# Patient Record
Sex: FEMALE | Race: OTHER | HISPANIC OR LATINO | Employment: UNEMPLOYED | URBAN - METROPOLITAN AREA
[De-identification: names, ages, dates, MRNs, and addresses within clinical notes are randomized per-mention and may not be internally consistent; named-entity substitution may affect disease eponyms.]

---

## 2017-01-31 ENCOUNTER — TRANSCRIBE ORDERS (OUTPATIENT)
Dept: URGENT CARE | Facility: MEDICAL CENTER | Age: 62
End: 2017-01-31

## 2017-01-31 ENCOUNTER — OFFICE VISIT (OUTPATIENT)
Dept: URGENT CARE | Facility: MEDICAL CENTER | Age: 62
End: 2017-01-31
Payer: COMMERCIAL

## 2017-01-31 ENCOUNTER — HOSPITAL ENCOUNTER (OUTPATIENT)
Dept: RADIOLOGY | Facility: MEDICAL CENTER | Age: 62
Discharge: HOME | End: 2017-01-31
Attending: PHYSICIAN ASSISTANT | Admitting: FAMILY MEDICINE
Payer: COMMERCIAL

## 2017-01-31 DIAGNOSIS — R05.9 COUGH: ICD-10-CM

## 2017-01-31 PROCEDURE — 71020 HB CHEST X-RAY 2VW FRONTAL&LATL: CPT

## 2017-01-31 PROCEDURE — G0382 LEV 3 HOSP TYPE B ED VISIT: HCPCS

## 2017-01-31 PROCEDURE — 94640 AIRWAY INHALATION TREATMENT: CPT

## 2017-03-10 ENCOUNTER — DOCTOR'S OFFICE (OUTPATIENT)
Dept: URBAN - METROPOLITAN AREA CLINIC 136 | Facility: CLINIC | Age: 62
Setting detail: OPHTHALMOLOGY
End: 2017-03-10
Payer: COMMERCIAL

## 2017-03-10 DIAGNOSIS — H40.10X1: ICD-10-CM

## 2017-03-10 DIAGNOSIS — Z96.1: ICD-10-CM

## 2017-03-10 DIAGNOSIS — H02.403: ICD-10-CM

## 2017-03-10 PROCEDURE — 92012 INTRM OPH EXAM EST PATIENT: CPT | Performed by: OPHTHALMOLOGY

## 2017-03-10 ASSESSMENT — REFRACTION_MANIFEST
OS_VA3: 20/
OU_VA: 20/
OD_VA1: 20/
OD_SPHERE: PLANO
OS_VA1: 20/
OD_VA1: 20/
OS_VA3: 20/
OU_VA: 20/
OD_ADD: +2.50
OU_VA: 20/
OD_VA2: 20/
OS_VA2: 20/
OD_VA2: 20/
OD_VA3: 20/
OD_VA3: 20/
OS_VA1: 20/
OS_SPHERE: -1.00
OD_VA1: 20/20-2
OD_VA3: 20/
OD_VA2: 20/
OS_VA2: 20/
OS_ADD: +2.50
OS_VA3: 20/
OS_VA2: 20/

## 2017-03-10 ASSESSMENT — REFRACTION_CURRENTRX
OD_SPHERE: +1.50
OS_SPHERE: +1.50
OD_OVR_VA: 20/
OS_ADD: +3.00
OS_AXIS: 165
OD_VPRISM_DIRECTION: BF
OD_ADD: +3.00
OD_CYLINDER: SPH
OS_CYLINDER: -0.75
OS_OVR_VA: 20/
OD_OVR_VA: 20/
OS_VPRISM_DIRECTION: BF
OS_OVR_VA: 20/
OS_OVR_VA: 20/
OD_OVR_VA: 20/

## 2017-03-10 ASSESSMENT — REFRACTION_AUTOREFRACTION
OS_CYLINDER: -0.25
OS_SPHERE: -1.00
OD_CYLINDER: -0.75
OD_AXIS: 121
OD_SPHERE: +0.50
OS_AXIS: 160

## 2017-03-10 ASSESSMENT — VISUAL ACUITY
OD_BCVA: 20/60-2
OS_BCVA: 20/20-2

## 2017-03-10 ASSESSMENT — CONFRONTATIONAL VISUAL FIELD TEST (CVF)
OS_FINDINGS: FULL
OD_FINDINGS: FULL

## 2017-03-10 ASSESSMENT — SPHEQUIV_DERIVED
OS_SPHEQUIV: -1.125
OD_SPHEQUIV: 0.125

## 2017-03-28 ENCOUNTER — DOCTOR'S OFFICE (OUTPATIENT)
Dept: URBAN - METROPOLITAN AREA CLINIC 136 | Facility: CLINIC | Age: 62
Setting detail: OPHTHALMOLOGY
End: 2017-03-28
Payer: COMMERCIAL

## 2017-03-28 DIAGNOSIS — H35.81: ICD-10-CM

## 2017-03-28 DIAGNOSIS — H35.342: ICD-10-CM

## 2017-03-28 DIAGNOSIS — H40.10X1: ICD-10-CM

## 2017-03-28 DIAGNOSIS — H43.813: ICD-10-CM

## 2017-03-28 PROCEDURE — 92014 COMPRE OPH EXAM EST PT 1/>: CPT | Performed by: OPHTHALMOLOGY

## 2017-03-28 PROCEDURE — 92134 CPTRZ OPH DX IMG PST SGM RTA: CPT | Performed by: OPHTHALMOLOGY

## 2017-03-28 ASSESSMENT — REFRACTION_MANIFEST
OS_VA1: 20/
OD_ADD: +2.50
OS_ADD: +2.50
OU_VA: 20/
OD_SPHERE: PLANO
OD_VA3: 20/
OS_SPHERE: -1.00
OD_VA1: 20/
OS_VA3: 20/
OU_VA: 20/
OD_VA2: 20/
OS_VA3: 20/
OS_VA2: 20/
OD_VA3: 20/
OD_VA2: 20/
OD_VA1: 20/
OU_VA: 20/
OD_VA1: 20/20-2
OD_VA3: 20/
OS_VA3: 20/
OS_VA2: 20/
OS_VA1: 20/
OD_VA2: 20/
OS_VA2: 20/

## 2017-03-28 ASSESSMENT — REFRACTION_CURRENTRX
OS_CYLINDER: -0.75
OD_VPRISM_DIRECTION: BF
OD_CYLINDER: SPH
OD_OVR_VA: 20/
OS_AXIS: 165
OD_OVR_VA: 20/
OS_VPRISM_DIRECTION: BF
OD_OVR_VA: 20/
OS_OVR_VA: 20/
OS_ADD: +3.00
OS_OVR_VA: 20/
OS_OVR_VA: 20/
OS_SPHERE: +1.50
OD_ADD: +3.00
OD_SPHERE: +1.50

## 2017-03-28 ASSESSMENT — REFRACTION_AUTOREFRACTION
OS_AXIS: 160
OS_CYLINDER: -0.25
OD_AXIS: 121
OD_CYLINDER: -0.75
OD_SPHERE: +0.50
OS_SPHERE: -1.00

## 2017-03-28 ASSESSMENT — SPHEQUIV_DERIVED
OD_SPHEQUIV: 0.125
OS_SPHEQUIV: -1.125

## 2017-03-28 ASSESSMENT — CONFRONTATIONAL VISUAL FIELD TEST (CVF)
OS_FINDINGS: FULL
OD_FINDINGS: FULL

## 2017-03-28 ASSESSMENT — VISUAL ACUITY
OD_BCVA: 20/80-2
OS_BCVA: 20/20-2

## 2017-04-27 ENCOUNTER — RX ONLY (RX ONLY)
Age: 62
End: 2017-04-27

## 2017-04-27 ENCOUNTER — DOCTOR'S OFFICE (OUTPATIENT)
Dept: URBAN - METROPOLITAN AREA CLINIC 136 | Facility: CLINIC | Age: 62
Setting detail: OPHTHALMOLOGY
End: 2017-04-27
Payer: COMMERCIAL

## 2017-04-27 DIAGNOSIS — H10.12: ICD-10-CM

## 2017-04-27 DIAGNOSIS — H35.81: ICD-10-CM

## 2017-04-27 DIAGNOSIS — H02.831: ICD-10-CM

## 2017-04-27 DIAGNOSIS — H10.11: ICD-10-CM

## 2017-04-27 DIAGNOSIS — H02.403: ICD-10-CM

## 2017-04-27 DIAGNOSIS — H02.834: ICD-10-CM

## 2017-04-27 PROCEDURE — 92285 EXTERNAL OCULAR PHOTOGRAPHY: CPT | Performed by: OPHTHALMOLOGY

## 2017-04-27 PROCEDURE — 92012 INTRM OPH EXAM EST PATIENT: CPT | Performed by: OPHTHALMOLOGY

## 2017-04-27 ASSESSMENT — REFRACTION_CURRENTRX
OD_VPRISM_DIRECTION: BF
OS_OVR_VA: 20/
OD_OVR_VA: 20/
OD_ADD: +3.00
OD_OVR_VA: 20/
OS_AXIS: 165
OS_ADD: +3.00
OS_SPHERE: +1.50
OD_OVR_VA: 20/
OD_CYLINDER: SPH
OS_CYLINDER: -0.75
OS_VPRISM_DIRECTION: BF
OD_SPHERE: +1.50

## 2017-04-27 ASSESSMENT — REFRACTION_MANIFEST
OU_VA: 20/
OD_VA3: 20/
OS_VA2: 20/
OS_ADD: +2.50
OS_VA3: 20/
OD_VA1: 20/
OD_VA1: 20/
OD_VA3: 20/
OS_VA1: 20/
OU_VA: 20/
OS_VA3: 20/
OD_VA1: 20/20-2
OU_VA: 20/
OD_SPHERE: PLANO
OS_VA2: 20/
OS_VA1: 20/
OD_VA2: 20/
OD_VA2: 20/
OS_VA2: 20/
OD_VA2: 20/
OD_VA3: 20/
OS_VA3: 20/
OD_ADD: +2.50
OS_SPHERE: -1.00

## 2017-04-27 ASSESSMENT — LID POSITION - PTOSIS
OS_PTOSIS: 2+
OD_PTOSIS: 1+ 2+

## 2017-04-27 ASSESSMENT — REFRACTION_AUTOREFRACTION
OD_CYLINDER: -0.75
OS_SPHERE: -1.00
OD_SPHERE: +0.50
OS_AXIS: 160
OS_CYLINDER: -0.25
OD_AXIS: 121

## 2017-04-27 ASSESSMENT — LID POSITION - DERMATOCHALASIS
OD_DERMATOCHALASIS: 1+
OS_DERMATOCHALASIS: 1+

## 2017-04-27 ASSESSMENT — CONFRONTATIONAL VISUAL FIELD TEST (CVF)
OD_FINDINGS: FULL
OS_FINDINGS: FULL

## 2017-04-27 ASSESSMENT — SPHEQUIV_DERIVED
OS_SPHEQUIV: -1.125
OD_SPHEQUIV: 0.125

## 2017-04-27 ASSESSMENT — VISUAL ACUITY
OD_BCVA: 20/150
OS_BCVA: 20/20-2

## 2017-05-11 ENCOUNTER — DOCTOR'S OFFICE (OUTPATIENT)
Dept: URBAN - METROPOLITAN AREA CLINIC 136 | Facility: CLINIC | Age: 62
Setting detail: OPHTHALMOLOGY
End: 2017-05-11
Payer: COMMERCIAL

## 2017-05-11 DIAGNOSIS — Z96.1: ICD-10-CM

## 2017-05-11 DIAGNOSIS — H43.813: ICD-10-CM

## 2017-05-11 DIAGNOSIS — H35.81: ICD-10-CM

## 2017-05-11 DIAGNOSIS — H35.342: ICD-10-CM

## 2017-05-11 DIAGNOSIS — H40.10X1: ICD-10-CM

## 2017-05-11 DIAGNOSIS — H02.834: ICD-10-CM

## 2017-05-11 DIAGNOSIS — H02.403: ICD-10-CM

## 2017-05-11 DIAGNOSIS — H02.831: ICD-10-CM

## 2017-05-11 PROCEDURE — 92012 INTRM OPH EXAM EST PATIENT: CPT | Performed by: OPHTHALMOLOGY

## 2017-05-11 PROCEDURE — 92134 CPTRZ OPH DX IMG PST SGM RTA: CPT | Performed by: OPHTHALMOLOGY

## 2017-05-11 ASSESSMENT — REFRACTION_MANIFEST
OS_VA3: 20/
OS_VA2: 20/
OD_VA1: 20/
OS_VA2: 20/
OU_VA: 20/
OD_VA1: 20/20-2
OD_VA1: 20/
OU_VA: 20/
OS_VA3: 20/
OD_ADD: +2.50
OD_VA2: 20/
OD_VA3: 20/
OS_VA1: 20/
OS_ADD: +2.50
OD_SPHERE: PLANO
OS_SPHERE: -1.00
OD_VA2: 20/
OD_VA2: 20/
OD_VA3: 20/
OS_VA3: 20/
OU_VA: 20/
OD_VA3: 20/
OS_VA1: 20/
OS_VA2: 20/

## 2017-05-11 ASSESSMENT — LID POSITION - PTOSIS
OD_PTOSIS: 1+ 2+
OS_PTOSIS: 2+

## 2017-05-11 ASSESSMENT — REFRACTION_CURRENTRX
OD_VPRISM_DIRECTION: BF
OD_OVR_VA: 20/
OD_SPHERE: +1.50
OS_SPHERE: +1.50
OS_OVR_VA: 20/
OD_CYLINDER: SPH
OD_ADD: +3.00
OS_VPRISM_DIRECTION: BF
OS_ADD: +3.00
OS_OVR_VA: 20/
OS_OVR_VA: 20/
OS_AXIS: 165
OS_CYLINDER: -0.75

## 2017-05-11 ASSESSMENT — SPHEQUIV_DERIVED
OD_SPHEQUIV: 0.125
OS_SPHEQUIV: -1.125

## 2017-05-11 ASSESSMENT — REFRACTION_AUTOREFRACTION
OS_AXIS: 160
OD_CYLINDER: -0.75
OS_CYLINDER: -0.25
OD_AXIS: 121
OD_SPHERE: +0.50
OS_SPHERE: -1.00

## 2017-05-11 ASSESSMENT — VISUAL ACUITY
OS_BCVA: 20/20-
OD_BCVA: 20/200

## 2017-05-11 ASSESSMENT — LID POSITION - DERMATOCHALASIS
OS_DERMATOCHALASIS: 1+
OD_DERMATOCHALASIS: 1+

## 2017-05-11 ASSESSMENT — CONFRONTATIONAL VISUAL FIELD TEST (CVF)
OS_FINDINGS: FULL
OD_FINDINGS: FULL

## 2017-06-02 ENCOUNTER — DOCTOR'S OFFICE (OUTPATIENT)
Dept: URBAN - METROPOLITAN AREA CLINIC 136 | Facility: CLINIC | Age: 62
Setting detail: OPHTHALMOLOGY
End: 2017-06-02
Payer: COMMERCIAL

## 2017-06-02 DIAGNOSIS — H40.1131: ICD-10-CM

## 2017-06-02 PROCEDURE — 92014 COMPRE OPH EXAM EST PT 1/>: CPT | Performed by: OPHTHALMOLOGY

## 2017-06-02 ASSESSMENT — CONFRONTATIONAL VISUAL FIELD TEST (CVF)
OD_FINDINGS: FULL
OS_FINDINGS: FULL

## 2017-06-05 ASSESSMENT — REFRACTION_MANIFEST
OU_VA: 20/
OS_ADD: +2.50
OU_VA: 20/
OD_VA3: 20/
OD_VA1: 20/
OD_SPHERE: PLANO
OS_VA1: 20/
OD_VA2: 20/
OU_VA: 20/
OD_VA2: 20/
OS_VA2: 20/
OD_ADD: +2.50
OS_VA2: 20/
OD_VA2: 20/
OS_SPHERE: -1.00
OS_VA1: 20/
OD_VA1: 20/
OD_VA3: 20/
OS_VA3: 20/
OS_VA3: 20/
OD_VA3: 20/
OD_VA1: 20/20-2
OS_VA2: 20/
OS_VA3: 20/

## 2017-06-05 ASSESSMENT — REFRACTION_CURRENTRX
OD_SPHERE: +1.50
OD_OVR_VA: 20/
OS_CYLINDER: -0.75
OS_AXIS: 165
OS_SPHERE: +1.50
OD_OVR_VA: 20/
OS_OVR_VA: 20/
OS_OVR_VA: 20/
OD_OVR_VA: 20/
OD_VPRISM_DIRECTION: BF
OS_VPRISM_DIRECTION: BF
OD_ADD: +3.00
OS_ADD: +3.00
OS_OVR_VA: 20/
OD_CYLINDER: SPH

## 2017-06-05 ASSESSMENT — REFRACTION_AUTOREFRACTION
OS_SPHERE: -1.00
OS_CYLINDER: -0.25
OD_CYLINDER: -0.75
OD_SPHERE: +0.50
OS_AXIS: 160
OD_AXIS: 121

## 2017-06-05 ASSESSMENT — VISUAL ACUITY
OS_BCVA: 20/25
OD_BCVA: 20/200

## 2017-06-05 ASSESSMENT — SPHEQUIV_DERIVED
OD_SPHEQUIV: 0.125
OS_SPHEQUIV: -1.125

## 2017-06-05 ASSESSMENT — LID POSITION - PTOSIS
OS_PTOSIS: 2+
OD_PTOSIS: 1+ 2+

## 2017-06-05 ASSESSMENT — LID POSITION - DERMATOCHALASIS
OS_DERMATOCHALASIS: 1+
OD_DERMATOCHALASIS: 1+

## 2017-07-11 ENCOUNTER — DOCTOR'S OFFICE (OUTPATIENT)
Dept: URBAN - METROPOLITAN AREA CLINIC 136 | Facility: CLINIC | Age: 62
Setting detail: OPHTHALMOLOGY
End: 2017-07-11
Payer: COMMERCIAL

## 2017-07-11 DIAGNOSIS — H35.81: ICD-10-CM

## 2017-07-11 DIAGNOSIS — H35.342: ICD-10-CM

## 2017-07-11 DIAGNOSIS — H43.813: ICD-10-CM

## 2017-07-11 DIAGNOSIS — H40.1131: ICD-10-CM

## 2017-07-11 PROCEDURE — 92014 COMPRE OPH EXAM EST PT 1/>: CPT | Performed by: OPHTHALMOLOGY

## 2017-07-11 PROCEDURE — 92134 CPTRZ OPH DX IMG PST SGM RTA: CPT | Performed by: OPHTHALMOLOGY

## 2017-07-11 ASSESSMENT — REFRACTION_MANIFEST
OU_VA: 20/
OS_SPHERE: -1.00
OD_ADD: +2.50
OS_VA1: 20/
OD_SPHERE: PLANO
OD_VA2: 20/
OD_VA1: 20/
OU_VA: 20/
OS_ADD: +2.50
OS_VA3: 20/
OD_VA3: 20/
OS_VA2: 20/
OS_VA2: 20/
OD_VA2: 20/
OD_VA2: 20/
OS_VA3: 20/
OS_VA1: 20/
OD_VA3: 20/
OU_VA: 20/
OS_VA3: 20/
OS_VA2: 20/
OD_VA1: 20/
OD_VA1: 20/20-2
OD_VA3: 20/

## 2017-07-11 ASSESSMENT — LID POSITION - PTOSIS
OS_PTOSIS: 2+
OD_PTOSIS: 1+ 2+

## 2017-07-11 ASSESSMENT — REFRACTION_CURRENTRX
OD_VPRISM_DIRECTION: BF
OD_CYLINDER: SPH
OD_OVR_VA: 20/
OS_AXIS: 165
OS_SPHERE: +1.50
OD_OVR_VA: 20/
OS_ADD: +3.00
OD_OVR_VA: 20/
OD_SPHERE: +1.50
OS_OVR_VA: 20/
OS_CYLINDER: -0.75
OD_ADD: +3.00
OS_VPRISM_DIRECTION: BF

## 2017-07-11 ASSESSMENT — REFRACTION_AUTOREFRACTION
OD_AXIS: 121
OS_SPHERE: -1.00
OS_AXIS: 160
OD_SPHERE: +0.50
OD_CYLINDER: -0.75
OS_CYLINDER: -0.25

## 2017-07-11 ASSESSMENT — LID POSITION - DERMATOCHALASIS
OS_DERMATOCHALASIS: 1+
OD_DERMATOCHALASIS: 1+

## 2017-07-11 ASSESSMENT — VISUAL ACUITY
OD_BCVA: 20/150
OS_BCVA: 20/25

## 2017-07-11 ASSESSMENT — SPHEQUIV_DERIVED
OS_SPHEQUIV: -1.125
OD_SPHEQUIV: 0.125

## 2017-07-11 ASSESSMENT — CONFRONTATIONAL VISUAL FIELD TEST (CVF)
OS_FINDINGS: FULL
OD_FINDINGS: FULL

## 2017-11-06 ENCOUNTER — DOCTOR'S OFFICE (OUTPATIENT)
Dept: URBAN - METROPOLITAN AREA CLINIC 136 | Facility: CLINIC | Age: 62
Setting detail: OPHTHALMOLOGY
End: 2017-11-06
Payer: COMMERCIAL

## 2017-11-06 DIAGNOSIS — Z96.1: ICD-10-CM

## 2017-11-06 DIAGNOSIS — H10.11: ICD-10-CM

## 2017-11-06 DIAGNOSIS — H10.12: ICD-10-CM

## 2017-11-06 DIAGNOSIS — H35.342: ICD-10-CM

## 2017-11-06 DIAGNOSIS — H02.403: ICD-10-CM

## 2017-11-06 DIAGNOSIS — H35.81: ICD-10-CM

## 2017-11-06 DIAGNOSIS — H40.1131: ICD-10-CM

## 2017-11-06 DIAGNOSIS — H02.831: ICD-10-CM

## 2017-11-06 DIAGNOSIS — H02.834: ICD-10-CM

## 2017-11-06 DIAGNOSIS — H43.813: ICD-10-CM

## 2017-11-06 PROCEDURE — 92133 CPTRZD OPH DX IMG PST SGM ON: CPT | Performed by: OPHTHALMOLOGY

## 2017-11-06 PROCEDURE — 92014 COMPRE OPH EXAM EST PT 1/>: CPT | Performed by: OPHTHALMOLOGY

## 2017-11-06 ASSESSMENT — REFRACTION_MANIFEST
OD_VA3: 20/
OD_VA2: 20/
OU_VA: 20/
OD_ADD: +2.50
OS_VA1: 20/
OS_VA3: 20/
OS_VA3: 20/
OU_VA: 20/
OU_VA: 20/
OS_VA3: 20/
OS_VA1: 20/
OS_VA2: 20/
OD_VA2: 20/
OS_VA2: 20/
OD_SPHERE: PLANO
OD_VA1: 20/
OD_VA1: 20/20-2
OD_VA3: 20/
OS_VA2: 20/
OS_ADD: +2.50
OD_VA3: 20/
OD_VA2: 20/
OD_VA1: 20/
OS_SPHERE: -1.00

## 2017-11-06 ASSESSMENT — REFRACTION_AUTOREFRACTION
OS_CYLINDER: -0.25
OD_SPHERE: +0.50
OS_SPHERE: -1.00
OS_AXIS: 160
OD_AXIS: 121
OD_CYLINDER: -0.75

## 2017-11-06 ASSESSMENT — REFRACTION_CURRENTRX
OD_OVR_VA: 20/
OS_SPHERE: +1.50
OS_VPRISM_DIRECTION: BF
OD_SPHERE: +1.50
OD_ADD: +3.00
OD_VPRISM_DIRECTION: BF
OD_CYLINDER: SPH
OS_OVR_VA: 20/
OS_OVR_VA: 20/
OS_ADD: +3.00
OS_AXIS: 165
OS_CYLINDER: -0.75
OS_OVR_VA: 20/
OD_OVR_VA: 20/
OD_OVR_VA: 20/

## 2017-11-06 ASSESSMENT — SPHEQUIV_DERIVED
OS_SPHEQUIV: -1.125
OD_SPHEQUIV: 0.125

## 2017-11-06 ASSESSMENT — LID POSITION - PTOSIS
OD_PTOSIS: 1+ 2+
OS_PTOSIS: 2+

## 2017-11-06 ASSESSMENT — VISUAL ACUITY
OD_BCVA: 20/200
OS_BCVA: 20/25

## 2017-11-06 ASSESSMENT — LID POSITION - DERMATOCHALASIS
OD_DERMATOCHALASIS: 1+
OS_DERMATOCHALASIS: 1+

## 2017-11-16 ENCOUNTER — DOCTOR'S OFFICE (OUTPATIENT)
Dept: URBAN - METROPOLITAN AREA CLINIC 136 | Facility: CLINIC | Age: 62
Setting detail: OPHTHALMOLOGY
End: 2017-11-16
Payer: COMMERCIAL

## 2017-11-16 ENCOUNTER — RX ONLY (RX ONLY)
Age: 62
End: 2017-11-16

## 2017-11-16 DIAGNOSIS — H40.1131: ICD-10-CM

## 2017-11-16 DIAGNOSIS — H43.813: ICD-10-CM

## 2017-11-16 DIAGNOSIS — H35.81: ICD-10-CM

## 2017-11-16 DIAGNOSIS — H21.232: ICD-10-CM

## 2017-11-16 DIAGNOSIS — H35.342: ICD-10-CM

## 2017-11-16 DIAGNOSIS — Z96.1: ICD-10-CM

## 2017-11-16 PROCEDURE — 92134 CPTRZ OPH DX IMG PST SGM RTA: CPT | Performed by: OPHTHALMOLOGY

## 2017-11-16 PROCEDURE — 92014 COMPRE OPH EXAM EST PT 1/>: CPT | Performed by: OPHTHALMOLOGY

## 2017-11-16 ASSESSMENT — CONFRONTATIONAL VISUAL FIELD TEST (CVF)
OS_FINDINGS: FULL
OD_FINDINGS: FULL

## 2017-11-16 ASSESSMENT — LID POSITION - PTOSIS
OS_PTOSIS: 2+
OD_PTOSIS: 1+ 2+

## 2017-11-16 ASSESSMENT — LID POSITION - DERMATOCHALASIS
OD_DERMATOCHALASIS: 1+
OS_DERMATOCHALASIS: 1+

## 2017-11-17 PROBLEM — H35.81 MACULAR EDEMA: Status: RESOLVED | Noted: 2017-03-28 | Resolved: 2017-11-17

## 2017-11-17 ASSESSMENT — REFRACTION_CURRENTRX
OD_SPHERE: +1.50
OS_AXIS: 165
OS_SPHERE: +1.50
OS_OVR_VA: 20/
OD_OVR_VA: 20/
OS_VPRISM_DIRECTION: BF
OD_VPRISM_DIRECTION: BF
OD_CYLINDER: SPH
OD_ADD: +3.00
OS_OVR_VA: 20/
OS_CYLINDER: -0.75
OS_OVR_VA: 20/
OS_ADD: +3.00
OD_OVR_VA: 20/
OD_OVR_VA: 20/

## 2017-11-17 ASSESSMENT — REFRACTION_MANIFEST
OD_VA2: 20/
OD_VA3: 20/
OD_SPHERE: PLANO
OD_VA2: 20/
OU_VA: 20/
OS_VA2: 20/
OU_VA: 20/
OD_VA1: 20/20-2
OS_VA3: 20/
OD_ADD: +2.50
OS_VA1: 20/
OD_VA3: 20/
OD_VA1: 20/
OD_VA3: 20/
OD_VA1: 20/
OS_VA1: 20/
OS_SPHERE: -1.00
OS_VA2: 20/
OS_VA3: 20/
OS_VA3: 20/
OU_VA: 20/
OD_VA2: 20/
OS_ADD: +2.50
OS_VA2: 20/

## 2017-11-17 ASSESSMENT — REFRACTION_AUTOREFRACTION
OS_AXIS: 160
OS_CYLINDER: -0.25
OS_SPHERE: -1.00
OD_CYLINDER: -0.75
OD_AXIS: 121
OD_SPHERE: +0.50

## 2017-11-17 ASSESSMENT — SPHEQUIV_DERIVED
OD_SPHEQUIV: 0.125
OS_SPHEQUIV: -1.125

## 2017-11-17 ASSESSMENT — VISUAL ACUITY
OD_BCVA: 20/150
OS_BCVA: 20/25

## 2017-12-13 ENCOUNTER — AMBUL SURGICAL CARE (OUTPATIENT)
Dept: URBAN - METROPOLITAN AREA SURGERY 32 | Facility: SURGERY | Age: 62
Setting detail: OPHTHALMOLOGY
End: 2017-12-13
Payer: COMMERCIAL

## 2017-12-13 DIAGNOSIS — H40.1121: ICD-10-CM

## 2017-12-13 PROCEDURE — G8907 PT DOC NO EVENTS ON DISCHARG: HCPCS | Performed by: OPHTHALMOLOGY

## 2017-12-13 PROCEDURE — G8918 PT W/O PREOP ORDER IV AB PRO: HCPCS | Performed by: OPHTHALMOLOGY

## 2017-12-13 PROCEDURE — 65855 TRABECULOPLASTY LASER SURG: CPT | Performed by: OPHTHALMOLOGY

## 2017-12-21 ENCOUNTER — DOCTOR'S OFFICE (OUTPATIENT)
Dept: URBAN - METROPOLITAN AREA CLINIC 136 | Facility: CLINIC | Age: 62
Setting detail: OPHTHALMOLOGY
End: 2017-12-21
Payer: COMMERCIAL

## 2017-12-21 DIAGNOSIS — H02.831: ICD-10-CM

## 2017-12-21 DIAGNOSIS — H02.834: ICD-10-CM

## 2017-12-21 DIAGNOSIS — Z96.1: ICD-10-CM

## 2017-12-21 PROCEDURE — 92285 EXTERNAL OCULAR PHOTOGRAPHY: CPT | Performed by: OPHTHALMOLOGY

## 2017-12-21 PROCEDURE — 99024 POSTOP FOLLOW-UP VISIT: CPT | Performed by: OPHTHALMOLOGY

## 2017-12-21 ASSESSMENT — REFRACTION_AUTOREFRACTION
OS_AXIS: 160
OD_SPHERE: +0.50
OS_CYLINDER: -0.25
OS_SPHERE: -1.00
OD_CYLINDER: -0.75
OD_AXIS: 121

## 2017-12-21 ASSESSMENT — CONFRONTATIONAL VISUAL FIELD TEST (CVF)
OD_FINDINGS: FULL
OS_FINDINGS: FULL

## 2017-12-21 ASSESSMENT — REFRACTION_MANIFEST
OS_VA3: 20/
OS_VA3: 20/
OU_VA: 20/
OS_ADD: +2.50
OS_VA1: 20/
OD_VA1: 20/
OD_VA3: 20/
OS_VA1: 20/
OU_VA: 20/
OD_SPHERE: PLANO
OD_ADD: +2.50
OS_VA3: 20/
OS_VA2: 20/
OS_SPHERE: -1.00
OS_VA2: 20/
OS_VA2: 20/
OD_VA1: 20/20-2
OU_VA: 20/
OD_VA2: 20/
OD_VA3: 20/
OD_VA2: 20/
OD_VA3: 20/
OD_VA1: 20/
OD_VA2: 20/

## 2017-12-21 ASSESSMENT — REFRACTION_CURRENTRX
OS_SPHERE: +1.50
OS_OVR_VA: 20/
OD_OVR_VA: 20/
OS_CYLINDER: -0.75
OS_ADD: +3.00
OD_SPHERE: +1.50
OD_OVR_VA: 20/
OD_CYLINDER: SPH
OS_OVR_VA: 20/
OD_OVR_VA: 20/
OD_ADD: +3.00
OD_VPRISM_DIRECTION: BF
OS_OVR_VA: 20/
OS_AXIS: 165
OS_VPRISM_DIRECTION: BF

## 2017-12-21 ASSESSMENT — SUPERFICIAL PUNCTATE KERATITIS (SPK)
OD_SPK: T
OS_SPK: T

## 2017-12-21 ASSESSMENT — VISUAL ACUITY
OD_BCVA: 20/
OS_BCVA: 20/20-2

## 2017-12-21 ASSESSMENT — LID POSITION - DERMATOCHALASIS
OD_DERMATOCHALASIS: 1+
OS_DERMATOCHALASIS: 1+

## 2017-12-21 ASSESSMENT — LID EXAM ASSESSMENTS
OD_MEIBOMITIS: 1+
OS_MEIBOMITIS: 1+

## 2017-12-21 ASSESSMENT — SPHEQUIV_DERIVED
OS_SPHEQUIV: -1.125
OD_SPHEQUIV: 0.125

## 2017-12-21 ASSESSMENT — LID POSITION - PTOSIS
OD_PTOSIS: 1+ 2+
OS_PTOSIS: 2+

## 2018-02-21 ENCOUNTER — DOCTOR'S OFFICE (OUTPATIENT)
Dept: URBAN - METROPOLITAN AREA CLINIC 136 | Facility: CLINIC | Age: 63
Setting detail: OPHTHALMOLOGY
End: 2018-02-21
Payer: COMMERCIAL

## 2018-02-21 DIAGNOSIS — H02.831: ICD-10-CM

## 2018-02-21 DIAGNOSIS — H02.834: ICD-10-CM

## 2018-02-21 PROCEDURE — 92082 INTERMEDIATE VISUAL FIELD XM: CPT | Performed by: OPHTHALMOLOGY

## 2018-03-05 ENCOUNTER — DOCTOR'S OFFICE (OUTPATIENT)
Dept: URBAN - METROPOLITAN AREA CLINIC 136 | Facility: CLINIC | Age: 63
Setting detail: OPHTHALMOLOGY
End: 2018-03-05
Payer: COMMERCIAL

## 2018-03-05 DIAGNOSIS — H40.1131: ICD-10-CM

## 2018-03-05 DIAGNOSIS — H02.831: ICD-10-CM

## 2018-03-05 DIAGNOSIS — H02.834: ICD-10-CM

## 2018-03-05 DIAGNOSIS — Z96.1: ICD-10-CM

## 2018-03-05 PROCEDURE — 99214 OFFICE O/P EST MOD 30 MIN: CPT | Performed by: OPHTHALMOLOGY

## 2018-03-05 ASSESSMENT — REFRACTION_MANIFEST
OD_VA2: 20/
OS_VA2: 20/
OD_VA1: 20/
OS_VA3: 20/
OD_SPHERE: PLANO
OS_ADD: +2.50
OS_VA1: 20/
OS_VA2: 20/
OS_VA1: 20/
OS_SPHERE: -1.00
OD_VA3: 20/
OU_VA: 20/
OS_VA3: 20/
OD_VA1: 20/
OD_ADD: +2.50
OU_VA: 20/
OD_VA2: 20/
OD_VA3: 20/
OD_VA3: 20/
OD_VA2: 20/
OS_VA3: 20/
OS_VA2: 20/
OD_VA1: 20/20-2
OU_VA: 20/

## 2018-03-05 ASSESSMENT — REFRACTION_CURRENTRX
OS_ADD: +3.00
OD_CYLINDER: SPH
OS_AXIS: 165
OD_OVR_VA: 20/
OS_OVR_VA: 20/
OS_OVR_VA: 20/
OD_ADD: +3.00
OD_SPHERE: +1.50
OD_VPRISM_DIRECTION: BF
OS_VPRISM_DIRECTION: BF
OS_CYLINDER: -0.75
OS_OVR_VA: 20/
OS_SPHERE: +1.50

## 2018-03-05 ASSESSMENT — CONFRONTATIONAL VISUAL FIELD TEST (CVF)
OD_FINDINGS: FULL
OS_FINDINGS: FULL

## 2018-03-05 ASSESSMENT — VISUAL ACUITY
OD_BCVA: 20/125-1
OS_BCVA: 20/20-2

## 2018-03-05 ASSESSMENT — SPHEQUIV_DERIVED
OD_SPHEQUIV: 0.125
OS_SPHEQUIV: -1.125

## 2018-03-05 ASSESSMENT — SUPERFICIAL PUNCTATE KERATITIS (SPK)
OS_SPK: T
OD_SPK: T

## 2018-03-05 ASSESSMENT — REFRACTION_AUTOREFRACTION
OS_CYLINDER: -0.25
OD_AXIS: 121
OS_AXIS: 160
OS_SPHERE: -1.00
OD_CYLINDER: -0.75
OD_SPHERE: +0.50

## 2018-03-05 ASSESSMENT — LID POSITION - DERMATOCHALASIS
OD_DERMATOCHALASIS: 1+
OS_DERMATOCHALASIS: 1+

## 2018-03-05 ASSESSMENT — LID POSITION - PTOSIS
OS_PTOSIS: 2+
OD_PTOSIS: 1+ 2+

## 2018-03-05 ASSESSMENT — LID EXAM ASSESSMENTS
OS_MEIBOMITIS: 1+
OD_MEIBOMITIS: 1+

## 2018-04-03 ENCOUNTER — AMBUL SURGICAL CARE (OUTPATIENT)
Dept: URBAN - METROPOLITAN AREA SURGERY 32 | Facility: SURGERY | Age: 63
Setting detail: OPHTHALMOLOGY
End: 2018-04-03
Payer: COMMERCIAL

## 2018-04-03 DIAGNOSIS — H02.424: ICD-10-CM

## 2018-04-03 DIAGNOSIS — H02.834: ICD-10-CM

## 2018-04-03 DIAGNOSIS — H02.421: ICD-10-CM

## 2018-04-03 DIAGNOSIS — H02.422: ICD-10-CM

## 2018-04-03 DIAGNOSIS — H02.831: ICD-10-CM

## 2018-04-03 PROCEDURE — G8918 PT W/O PREOP ORDER IV AB PRO: HCPCS | Performed by: OPHTHALMOLOGY

## 2018-04-03 PROCEDURE — 67904 REPAIR EYELID DEFECT: CPT | Performed by: OPHTHALMOLOGY

## 2018-04-03 PROCEDURE — 15823 BLEPHARP UPR EYELID XCSV SKN: CPT | Performed by: OPHTHALMOLOGY

## 2018-04-03 PROCEDURE — G8907 PT DOC NO EVENTS ON DISCHARG: HCPCS | Performed by: OPHTHALMOLOGY

## 2018-04-13 ENCOUNTER — RX ONLY (RX ONLY)
Age: 63
End: 2018-04-13

## 2018-04-13 ENCOUNTER — DOCTOR'S OFFICE (OUTPATIENT)
Dept: URBAN - METROPOLITAN AREA CLINIC 136 | Facility: CLINIC | Age: 63
Setting detail: OPHTHALMOLOGY
End: 2018-04-13

## 2018-04-13 DIAGNOSIS — H02.831: ICD-10-CM

## 2018-04-13 DIAGNOSIS — H02.834: ICD-10-CM

## 2018-04-13 DIAGNOSIS — H02.403: ICD-10-CM

## 2018-04-13 DIAGNOSIS — H04.123: ICD-10-CM

## 2018-04-13 PROCEDURE — 99024 POSTOP FOLLOW-UP VISIT: CPT | Performed by: OPHTHALMOLOGY

## 2018-04-13 ASSESSMENT — REFRACTION_CURRENTRX
OS_OVR_VA: 20/
OD_ADD: +3.00
OD_CYLINDER: SPH
OS_CYLINDER: -0.75
OS_OVR_VA: 20/
OS_SPHERE: +1.50
OD_OVR_VA: 20/
OD_SPHERE: +1.50
OS_OVR_VA: 20/
OD_OVR_VA: 20/
OD_OVR_VA: 20/
OS_VPRISM_DIRECTION: BF
OD_VPRISM_DIRECTION: BF
OS_AXIS: 165
OS_ADD: +3.00

## 2018-04-13 ASSESSMENT — REFRACTION_MANIFEST
OD_ADD: +2.50
OS_VA2: 20/
OS_ADD: +2.50
OD_VA2: 20/
OD_VA3: 20/
OU_VA: 20/
OS_VA1: 20/
OD_VA1: 20/
OS_VA2: 20/
OS_VA3: 20/
OU_VA: 20/
OS_VA2: 20/
OS_SPHERE: -1.00
OD_VA2: 20/
OU_VA: 20/
OD_VA2: 20/
OS_VA3: 20/
OD_VA3: 20/
OD_VA3: 20/
OD_SPHERE: PLANO
OD_VA1: 20/20-2
OS_VA3: 20/
OS_VA1: 20/
OD_VA1: 20/

## 2018-04-13 ASSESSMENT — CONFRONTATIONAL VISUAL FIELD TEST (CVF)
OD_FINDINGS: FULL
OS_FINDINGS: FULL

## 2018-04-13 ASSESSMENT — LID POSITION - DERMATOCHALASIS
OD_DERMATOCHALASIS: ABSENT
OS_DERMATOCHALASIS: ABSENT

## 2018-04-13 ASSESSMENT — SPHEQUIV_DERIVED
OD_SPHEQUIV: 0.125
OS_SPHEQUIV: -1.375

## 2018-04-13 ASSESSMENT — REFRACTION_AUTOREFRACTION
OD_SPHERE: +0.75
OS_CYLINDER: -0.25
OD_AXIS: 110
OD_CYLINDER: -1.25
OS_SPHERE: -1.25
OS_AXIS: 063

## 2018-04-13 ASSESSMENT — LID POSITION - PTOSIS
OS_PTOSIS: ABSENT
OD_PTOSIS: ABSENT

## 2018-04-13 ASSESSMENT — KERATOMETRY
OS_K2POWER_DIOPTERS: 44.75
OD_K2POWER_DIOPTERS: 44.50
OS_K1POWER_DIOPTERS: 43.00
OD_K1POWER_DIOPTERS: 43.75
OD_AXISANGLE_DEGREES: 126
OS_AXISANGLE_DEGREES: 70

## 2018-04-13 ASSESSMENT — AXIALLENGTH_DERIVED
OD_AL: 23.3169
OS_AL: 23.9967

## 2018-04-13 ASSESSMENT — VISUAL ACUITY
OS_BCVA: 20/25-2
OD_BCVA: 20/300

## 2018-04-13 ASSESSMENT — SUPERFICIAL PUNCTATE KERATITIS (SPK)
OS_SPK: T 1+
OD_SPK: T

## 2018-04-13 ASSESSMENT — LID EXAM ASSESSMENTS
OD_MEIBOMITIS: 1+
OS_MEIBOMITIS: 1+

## 2018-05-11 ENCOUNTER — DOCTOR'S OFFICE (OUTPATIENT)
Dept: URBAN - METROPOLITAN AREA CLINIC 136 | Facility: CLINIC | Age: 63
Setting detail: OPHTHALMOLOGY
End: 2018-05-11

## 2018-05-11 DIAGNOSIS — H02.403: ICD-10-CM

## 2018-05-11 DIAGNOSIS — H04.123: ICD-10-CM

## 2018-05-11 DIAGNOSIS — H40.1131: ICD-10-CM

## 2018-05-11 DIAGNOSIS — H02.831: ICD-10-CM

## 2018-05-11 DIAGNOSIS — H02.834: ICD-10-CM

## 2018-05-11 PROCEDURE — 99024 POSTOP FOLLOW-UP VISIT: CPT | Performed by: OPHTHALMOLOGY

## 2018-05-11 ASSESSMENT — REFRACTION_CURRENTRX
OD_ADD: +3.00
OS_OVR_VA: 20/
OD_OVR_VA: 20/
OD_SPHERE: +1.50
OD_CYLINDER: SPH
OD_VPRISM_DIRECTION: BF
OD_OVR_VA: 20/
OS_OVR_VA: 20/
OS_SPHERE: +1.50
OS_VPRISM_DIRECTION: BF
OS_OVR_VA: 20/
OS_CYLINDER: -0.75
OS_AXIS: 165
OD_OVR_VA: 20/
OS_ADD: +3.00

## 2018-05-11 ASSESSMENT — REFRACTION_MANIFEST
OD_VA1: 20/
OD_VA3: 20/
OU_VA: 20/
OS_VA3: 20/
OD_VA2: 20/
OD_VA3: 20/
OU_VA: 20/
OD_VA1: 20/
OD_VA3: 20/
OS_SPHERE: -1.00
OD_VA1: 20/20-2
OS_VA2: 20/
OS_ADD: +2.50
OS_VA3: 20/
OS_VA1: 20/
OD_SPHERE: PLANO
OS_VA2: 20/
OD_ADD: +2.50
OS_VA3: 20/
OS_VA1: 20/
OU_VA: 20/
OD_VA2: 20/
OD_VA2: 20/
OS_VA2: 20/

## 2018-05-11 ASSESSMENT — LID POSITION - DERMATOCHALASIS
OD_DERMATOCHALASIS: ABSENT
OS_DERMATOCHALASIS: ABSENT

## 2018-05-11 ASSESSMENT — VISUAL ACUITY
OS_BCVA: 20/25+1
OD_BCVA: 20/200

## 2018-05-11 ASSESSMENT — REFRACTION_AUTOREFRACTION
OS_SPHERE: -1.25
OD_AXIS: 110
OS_AXIS: 063
OS_CYLINDER: -0.25
OD_CYLINDER: -1.25
OD_SPHERE: +0.75

## 2018-05-11 ASSESSMENT — CONFRONTATIONAL VISUAL FIELD TEST (CVF)
OS_FINDINGS: FULL
OD_FINDINGS: FULL

## 2018-05-11 ASSESSMENT — LID EXAM ASSESSMENTS
OS_MEIBOMITIS: 1+
OD_MEIBOMITIS: 1+

## 2018-05-11 ASSESSMENT — SUPERFICIAL PUNCTATE KERATITIS (SPK)
OD_SPK: T
OS_SPK: T 1+

## 2018-05-11 ASSESSMENT — LID POSITION - PTOSIS
OS_PTOSIS: ABSENT
OD_PTOSIS: ABSENT

## 2018-05-11 ASSESSMENT — SPHEQUIV_DERIVED
OS_SPHEQUIV: -1.375
OD_SPHEQUIV: 0.125

## 2018-05-16 ENCOUNTER — RX ONLY (RX ONLY)
Age: 63
End: 2018-05-16

## 2018-05-16 ENCOUNTER — DOCTOR'S OFFICE (OUTPATIENT)
Dept: URBAN - METROPOLITAN AREA CLINIC 136 | Facility: CLINIC | Age: 63
Setting detail: OPHTHALMOLOGY
End: 2018-05-16
Payer: COMMERCIAL

## 2018-05-16 DIAGNOSIS — H02.403: ICD-10-CM

## 2018-05-16 DIAGNOSIS — H40.1131: ICD-10-CM

## 2018-05-16 DIAGNOSIS — H02.834: ICD-10-CM

## 2018-05-16 DIAGNOSIS — H35.342: ICD-10-CM

## 2018-05-16 DIAGNOSIS — H21.232: ICD-10-CM

## 2018-05-16 DIAGNOSIS — Z96.1: ICD-10-CM

## 2018-05-16 DIAGNOSIS — H43.813: ICD-10-CM

## 2018-05-16 DIAGNOSIS — H02.831: ICD-10-CM

## 2018-05-16 DIAGNOSIS — H04.123: ICD-10-CM

## 2018-05-16 PROCEDURE — 92014 COMPRE OPH EXAM EST PT 1/>: CPT | Performed by: OPHTHALMOLOGY

## 2018-05-16 PROCEDURE — 92134 CPTRZ OPH DX IMG PST SGM RTA: CPT | Performed by: OPHTHALMOLOGY

## 2018-05-16 ASSESSMENT — REFRACTION_MANIFEST
OS_VA3: 20/
OD_VA1: 20/20-2
OS_VA3: 20/
OD_VA3: 20/
OS_SPHERE: -1.00
OD_VA1: 20/
OS_VA3: 20/
OU_VA: 20/
OD_VA3: 20/
OD_VA2: 20/
OS_VA1: 20/
OS_VA2: 20/
OD_VA1: 20/
OU_VA: 20/
OD_SPHERE: PLANO
OD_VA2: 20/
OS_ADD: +2.50
OS_VA2: 20/
OU_VA: 20/
OS_VA2: 20/
OD_VA2: 20/
OS_VA1: 20/
OD_VA3: 20/
OD_ADD: +2.50

## 2018-05-16 ASSESSMENT — REFRACTION_CURRENTRX
OD_SPHERE: +1.50
OS_CYLINDER: -0.75
OS_VPRISM_DIRECTION: BF
OD_OVR_VA: 20/
OS_OVR_VA: 20/
OS_OVR_VA: 20/
OD_OVR_VA: 20/
OD_OVR_VA: 20/
OD_ADD: +3.00
OS_SPHERE: +1.50
OS_OVR_VA: 20/
OD_VPRISM_DIRECTION: BF
OD_CYLINDER: SPH
OS_ADD: +3.00
OS_AXIS: 165

## 2018-05-16 ASSESSMENT — LID POSITION - PTOSIS
OS_PTOSIS: ABSENT
OD_PTOSIS: ABSENT

## 2018-05-16 ASSESSMENT — LID POSITION - DERMATOCHALASIS
OD_DERMATOCHALASIS: ABSENT
OS_DERMATOCHALASIS: ABSENT

## 2018-05-16 ASSESSMENT — CONFRONTATIONAL VISUAL FIELD TEST (CVF)
OS_FINDINGS: FULL
OD_FINDINGS: FULL

## 2018-05-16 ASSESSMENT — SUPERFICIAL PUNCTATE KERATITIS (SPK)
OD_SPK: T
OS_SPK: T 1+

## 2018-05-16 ASSESSMENT — VISUAL ACUITY
OD_BCVA: 20/200
OS_BCVA: 20/25

## 2018-05-16 ASSESSMENT — REFRACTION_AUTOREFRACTION
OD_CYLINDER: -1.25
OS_AXIS: 063
OD_SPHERE: +0.75
OD_AXIS: 110
OS_SPHERE: -1.25
OS_CYLINDER: -0.25

## 2018-05-16 ASSESSMENT — SPHEQUIV_DERIVED
OS_SPHEQUIV: -1.375
OD_SPHEQUIV: 0.125

## 2018-05-16 ASSESSMENT — LID EXAM ASSESSMENTS
OD_MEIBOMITIS: 1+
OS_MEIBOMITIS: 1+

## 2018-05-23 ENCOUNTER — OPTICAL OFFICE (OUTPATIENT)
Dept: URBAN - METROPOLITAN AREA CLINIC 143 | Facility: CLINIC | Age: 63
Setting detail: OPHTHALMOLOGY
End: 2018-05-23
Payer: COMMERCIAL

## 2018-05-23 ENCOUNTER — DOCTOR'S OFFICE (OUTPATIENT)
Dept: URBAN - METROPOLITAN AREA CLINIC 136 | Facility: CLINIC | Age: 63
Setting detail: OPHTHALMOLOGY
End: 2018-05-23
Payer: COMMERCIAL

## 2018-05-23 DIAGNOSIS — H52.221: ICD-10-CM

## 2018-05-23 DIAGNOSIS — H52.12: ICD-10-CM

## 2018-05-23 DIAGNOSIS — H52.01: ICD-10-CM

## 2018-05-23 DIAGNOSIS — H52.4: ICD-10-CM

## 2018-05-23 PROCEDURE — V2020 VISION SVCS FRAMES PURCHASES: HCPCS | Performed by: OPTOMETRIST

## 2018-05-23 PROCEDURE — V2203 LENS SPHCYL BIFOCAL 4.00D/.1: HCPCS | Performed by: OPTOMETRIST

## 2018-05-23 PROCEDURE — 92014 COMPRE OPH EXAM EST PT 1/>: CPT | Performed by: OPTOMETRIST

## 2018-05-23 PROCEDURE — V2200 LENS SPHER BIFOC PLANO 4.00D: HCPCS | Performed by: OPTOMETRIST

## 2018-05-23 ASSESSMENT — REFRACTION_MANIFEST
OD_VA3: 20/
OD_VA1: 20/
OS_VA1: 20/
OD_VA2: 20/
OU_VA: 20/
OD_VA1: 20/
OS_VA3: 20/
OS_VA2: 20/
OS_VA2: 20/
OD_VA3: 20/
OS_VA3: 20/
OS_VA1: 20/
OD_VA2: 20/
OU_VA: 20/

## 2018-05-23 ASSESSMENT — LID POSITION - DERMATOCHALASIS
OD_DERMATOCHALASIS: ABSENT
OS_DERMATOCHALASIS: ABSENT

## 2018-05-23 ASSESSMENT — KERATOMETRY
OS_AXISANGLE_DEGREES: 70
OS_K1POWER_DIOPTERS: 43.00
OD_K1POWER_DIOPTERS: 43.75
OS_K2POWER_DIOPTERS: 44.75
OD_AXISANGLE_DEGREES: 126
OD_K2POWER_DIOPTERS: 44.50

## 2018-05-23 ASSESSMENT — REFRACTION_OUTSIDERX
OD_SPHERE: +0.25
OS_ADD: +2.50
OD_VA1: 20/20-2
OS_VA2: 20/50
OD_AXIS: 110
OD_VA2: 20/20
OD_VA3: 20/
OD_CYLINDER: -0.75
OS_VA3: 20/
OU_VA: 20/
OS_CYLINDER: SPH
OS_SPHERE: -1.00
OD_ADD: +2.50

## 2018-05-23 ASSESSMENT — REFRACTION_CURRENTRX
OS_ADD: +3.00
OD_CYLINDER: SPH
OS_SPHERE: +1.50
OD_OVR_VA: 20/
OS_CYLINDER: -0.75
OD_VPRISM_DIRECTION: BF
OD_SPHERE: +1.50
OS_VPRISM_DIRECTION: BF
OS_OVR_VA: 20/
OD_OVR_VA: 20/
OD_ADD: +3.00
OS_AXIS: 165
OS_OVR_VA: 20/
OS_OVR_VA: 20/
OD_OVR_VA: 20/

## 2018-05-23 ASSESSMENT — AXIALLENGTH_DERIVED
OS_AL: 23.9464
OD_AL: 23.3645

## 2018-05-23 ASSESSMENT — VISUAL ACUITY
OS_BCVA: 20/25-1
OD_BCVA: 20/200

## 2018-05-23 ASSESSMENT — SUPERFICIAL PUNCTATE KERATITIS (SPK)
OS_SPK: T 1+
OD_SPK: T

## 2018-05-23 ASSESSMENT — REFRACTION_AUTOREFRACTION
OD_SPHERE: +0.50
OD_CYLINDER: -1.00
OS_AXIS: 109
OS_SPHERE: -1.00
OD_AXIS: 108
OS_CYLINDER: -0.50

## 2018-05-23 ASSESSMENT — LID EXAM ASSESSMENTS
OD_MEIBOMITIS: 1+
OS_MEIBOMITIS: 1+

## 2018-05-23 ASSESSMENT — SPHEQUIV_DERIVED
OS_SPHEQUIV: -1.25
OD_SPHEQUIV: 0

## 2018-05-23 ASSESSMENT — CONFRONTATIONAL VISUAL FIELD TEST (CVF)
OD_FINDINGS: FULL
OS_FINDINGS: FULL

## 2018-05-23 ASSESSMENT — LID POSITION - PTOSIS
OD_PTOSIS: ABSENT
OS_PTOSIS: ABSENT

## 2018-09-12 ENCOUNTER — DOCTOR'S OFFICE (OUTPATIENT)
Dept: URBAN - METROPOLITAN AREA CLINIC 136 | Facility: CLINIC | Age: 63
Setting detail: OPHTHALMOLOGY
End: 2018-09-12
Payer: COMMERCIAL

## 2018-09-12 DIAGNOSIS — Z96.1: ICD-10-CM

## 2018-09-12 DIAGNOSIS — H21.232: ICD-10-CM

## 2018-09-12 DIAGNOSIS — H43.813: ICD-10-CM

## 2018-09-12 DIAGNOSIS — H40.1131: ICD-10-CM

## 2018-09-12 DIAGNOSIS — H04.123: ICD-10-CM

## 2018-09-12 DIAGNOSIS — H35.342: ICD-10-CM

## 2018-09-12 PROCEDURE — 92134 CPTRZ OPH DX IMG PST SGM RTA: CPT | Performed by: OPHTHALMOLOGY

## 2018-09-12 PROCEDURE — 76514 ECHO EXAM OF EYE THICKNESS: CPT | Performed by: OPHTHALMOLOGY

## 2018-09-12 PROCEDURE — 92014 COMPRE OPH EXAM EST PT 1/>: CPT | Performed by: OPHTHALMOLOGY

## 2018-09-12 PROCEDURE — 92250 FUNDUS PHOTOGRAPHY W/I&R: CPT | Performed by: OPHTHALMOLOGY

## 2018-09-12 ASSESSMENT — PACHYMETRY
OS_CT_CORRECTION: -1
OS_CT_UM: 563
OD_CT_CORRECTION: -2
OD_CT_UM: 570

## 2018-09-12 ASSESSMENT — SUPERFICIAL PUNCTATE KERATITIS (SPK)
OD_SPK: T
OS_SPK: T 1+

## 2018-09-12 ASSESSMENT — LID POSITION - DERMATOCHALASIS
OD_DERMATOCHALASIS: ABSENT
OS_DERMATOCHALASIS: ABSENT

## 2018-09-12 ASSESSMENT — LID POSITION - PTOSIS
OD_PTOSIS: ABSENT
OS_PTOSIS: ABSENT

## 2018-09-12 ASSESSMENT — LID EXAM ASSESSMENTS
OD_MEIBOMITIS: 1+
OS_MEIBOMITIS: 1+

## 2018-09-12 ASSESSMENT — CONFRONTATIONAL VISUAL FIELD TEST (CVF)
OD_FINDINGS: FULL
OS_FINDINGS: FULL

## 2019-06-04 ASSESSMENT — REFRACTION_MANIFEST
OU_VA: 20/
OS_SPHERE: -1.00
OD_ADD: +2.50
OD_VA3: 20/
OD_AXIS: 110
OS_VA3: 20/
OU_VA: 20/
OD_SPHERE: +0.25
OS_ADD: +2.50
OD_VA1: 20/20-2
OD_VA3: 20/
OD_VA2: 20/20
OS_VA2: 20/
OD_VA1: 20/
OD_VA2: 20/
OS_VA2: 20/50
OD_CYLINDER: -0.75
OS_VA1: 20/
OS_VA3: 20/
OS_CYLINDER: SPH

## 2019-06-04 ASSESSMENT — REFRACTION_CURRENTRX
OS_OVR_VA: 20/
OD_OVR_VA: 20/
OD_OVR_VA: 20/
OS_OVR_VA: 20/
OS_ADD: +3.00
OS_CYLINDER: -0.75
OD_VPRISM_DIRECTION: BF
OD_ADD: +3.00
OD_SPHERE: +1.50
OD_CYLINDER: SPH
OS_VPRISM_DIRECTION: BF
OS_AXIS: 165
OS_OVR_VA: 20/
OD_OVR_VA: 20/
OS_SPHERE: +1.50

## 2019-06-04 ASSESSMENT — REFRACTION_AUTOREFRACTION
OS_SPHERE: -1.00
OD_SPHERE: +0.50
OD_CYLINDER: -1.00
OS_CYLINDER: -0.50
OD_AXIS: 108
OS_AXIS: 109

## 2019-06-04 ASSESSMENT — SPHEQUIV_DERIVED
OD_SPHEQUIV: -0.125
OD_SPHEQUIV: 0
OS_SPHEQUIV: -1.25

## 2019-06-04 ASSESSMENT — VISUAL ACUITY
OD_BCVA: 20/200
OS_BCVA: 20/25-1

## 2019-10-03 ENCOUNTER — EVALUATION (OUTPATIENT)
Dept: PHYSICAL THERAPY | Facility: REHABILITATION | Age: 64
End: 2019-10-03
Payer: COMMERCIAL

## 2019-10-03 ENCOUNTER — TRANSCRIBE ORDERS (OUTPATIENT)
Dept: PHYSICAL THERAPY | Facility: REHABILITATION | Age: 64
End: 2019-10-03

## 2019-10-03 DIAGNOSIS — N81.89 PELVIC FLOOR WEAKNESS IN FEMALE: ICD-10-CM

## 2019-10-03 DIAGNOSIS — N39.3 FEMALE STRESS INCONTINENCE: Primary | ICD-10-CM

## 2019-10-03 DIAGNOSIS — N39.3 URINARY, INCONTINENCE, STRESS FEMALE: Primary | ICD-10-CM

## 2019-10-03 PROCEDURE — G8988 SELF CARE GOAL STATUS: HCPCS | Performed by: PHYSICAL THERAPIST

## 2019-10-03 PROCEDURE — G8987 SELF CARE CURRENT STATUS: HCPCS | Performed by: PHYSICAL THERAPIST

## 2019-10-03 PROCEDURE — 97161 PT EVAL LOW COMPLEX 20 MIN: CPT | Performed by: PHYSICAL THERAPIST

## 2019-10-03 RX ORDER — MONTELUKAST SODIUM 10 MG/1
10 TABLET ORAL
COMMUNITY
End: 2020-09-03 | Stop reason: SDUPTHER

## 2019-10-03 RX ORDER — LEVOTHYROXINE SODIUM 0.1 MG/1
80 TABLET ORAL DAILY
COMMUNITY
End: 2020-09-01

## 2019-10-03 RX ORDER — UREA 10 %
100 LOTION (ML) TOPICAL DAILY
COMMUNITY
End: 2020-05-12

## 2019-10-03 RX ORDER — MULTIVIT WITH MINERALS/LUTEIN
1000 TABLET ORAL DAILY
COMMUNITY
End: 2020-05-12

## 2019-10-03 RX ORDER — ALBUTEROL SULFATE 1.25 MG/3ML
1 SOLUTION RESPIRATORY (INHALATION) EVERY 6 HOURS PRN
COMMUNITY
End: 2020-05-12

## 2019-10-03 NOTE — PROGRESS NOTES
PT Evaluation     Today's date: 10/3/2019  Patient name: Barry Manley  : 1955  MRN: [id number]  Referring provider: Kait Webb MD  Dx:   Encounter Diagnosis     ICD-10-CM    1  Urinary, incontinence, stress female N39 3    2  Pelvic floor weakness in female N81 89        Start Time: 1700  Stop Time: 1800  Total time in clinic (min): 60 minutes    Assessment  Assessment details: The patient is a 59year old female with complaints of stress urinary incontinence for over one year now  Education provided today and her daughter was present and helped to translate  She presents with weakness in her pelvic floor muscles with assessment today  She had some difficulty isolating her pelvic floor muscles and needed additional instruction to reduce compensation with glutes and hip adductors  She did have difficulty with coordinating her diaphragm and breathing with j carlos her pelvic floor muscles  She also demonstrates some mild pressure/dropping with cue to bear down  She would benefit from skilled pelvic floor therapy to help reduce/manage her symptoms, address her impairments, and maximize her function and quality of life upon discharge  She will be given HEP throughout episode of care  Thank you for the referral    Impairments: abnormal coordination, abnormal muscle tone, activity intolerance, impaired physical strength, lacks appropriate home exercise program and pain with function  Understanding of Dx/Px/POC: good   Prognosis: good    Goals  ST  The patient will improve awareness/isolation of the pelvic floor muscles without compensation in 8 weeks  2  The patient will improve pelvic floor strength by 1 to 2 MMT grades in 8 weeks  3  The patient will improve pelvic floor muscle endurance to 3 to 5 seconds in 8 weeks  LT  The patient will reduce episodes of stress incontinence by 50 to 75% upon discharge  2  The patient will manage symptoms by compliance with HEP upon discharge     3  The patient will eliminate need for pads upon discharge  Plan  Patient would benefit from: skilled PT  Planned modality interventions: biofeedback and ultrasound (Real Time Ultrasound)  Planned therapy interventions: manual therapy, neuromuscular re-education, patient education, strengthening, therapeutic exercise, therapeutic training, home exercise program, abdominal trunk stabilization, self care, postural training, therapeutic activities and stretching  Frequency: 1x week  Duration in visits: 12  Duration in weeks: 12  Plan of Care beginning date: 10/3/2019  Plan of Care expiration date: 12/26/2019  Treatment plan discussed with: patient        PT Pelvic Floor Subjective:   History of Present Illness: The patient's daughter, Jsutice Mcgee, is present for the evaluation today  The patient only speaks Slovenian and her daughter helps to interpret  She states that the patient was referred for pelvic floor therapy by her GYN, Dr Chelsea Hunt for leakage of urine  The reports that she has been experiencing this for over one year now  She never had issues prior to this  The leakage occurs with a cough or sneeze mostly  She does not have a follow up scheduled with Dr Chelsea Hunt at this time  She notes that the symptoms do not interfere with any function  Her goal for PT is to not have to utilize panti liners at home     Social Support:     Lives with:  Spouse    Relationship status: /committed    Work status: unemployed    History of Depression: no  Hand dominance:  Right  Diet and Exercise:      Patient unable to exercise/walk long distances because of her asthma  Co-morbidities:    Asthma  allergies  OB/ gyn History    Gestational History:     Number of prior pregnancies: 3    Number of term pregnancies: 3    Number of vaginal deliveries: 3    Delivery Complications:  No complications with any of the deliveries  No episiotomies or perineal tears during deliveries    Menstrual History:      Menopausal: menopause (at 43 years old)  no hormone replacement therapy  Bladder Function:     Voiding Difficulties negative for: urgency, frequent urination and incomplete emptying      Voiding Difficulties comments:     Voiding frequency: every 1-2 hours and every 3-4 hours    Urinary leakage: urine leakage    Urinary leakage aggravated by: coughing, sneezing, exercise and walking to the bathroom    Urinary leakage not aggravated by: standing up    Nocturia (episodes per night): 1    Painful urination: No      Intake (ounces): Water: 48,     Intake (ounces) comment: 3 bottles of water  Juice  No coffee or alcohol  Incontinence Management:     Pads/Diaper Use:  Day and night    Pads/Diapers Additional Comments: light panti liner  Bowel Function:     Voiding DIfficulties: stool frequency abnormal and constipation      Bowel frequency: multiple times a day    [place] Stool Scale: type 1  Sexual Function:     Sexually Active:  Not sexually active  Pain:     No pain reported by patient  Treatments:     None        Objective   Pelvic Floor Exam     General Perineum Exam:   perineum intact (urethra caruncle)  Positive for descent, gaping introitus, no pelvic organ prolapse at rest and perianal erythema       General perineum exam comments: Pelvic floor verbal consent and written consent signed and in chart (written consent in Afghan)  Education provided today: Daughter helped to interpret  Pelvic floor anatomy and function  Physiology/relationship of abdominal canister and pelvis/pelvic organs/pelvic floor muscles  PT exam and course of treatment  Importance of body mechanics and ergonomics in regards to protecting against activities which increase IAP and pressure      Visual Inspection of Perineum:   Excursion of perineal body in cephalad direction with contraction of pelvic floor muscles (PFM): unable and unable to isolate without substitution  Excursion of perineal body in caudal direction with relaxation of pelvic floor muscles (PFM): fair Involuntary contraction with coughing: no  Cotton swab test: non-tender  Cough reflex: no cough reflex  Sensation: intact    Pelvic Organ Prolapse   Position: hook-lying  At rest: none  With bearing down: mild (>1cm from hymenal remnants)  Location: anterior    Pelvic Floor Muscle Exam     Palpation   No increased muscle tension in the periurethral  Minimal increased muscle tension in the bulbospongiosus, ischiocavernosus, super transverse perineal, puborectalis and pubococcygeus  No tenderness on right in the bulbospongiosus, ischiocavernosus, super transverse perineal, pubococcygeus and periurethral  Minimal tenderness on right in the puborectalis  No tenderness on left in the bulbospongiosus, ischiocavernosus, super transverse perineal, pubococcygeus and periurethral  Minimal tenderness on left in the puborectalis    Muscle Contraction: substitution and patient utilized glutes and hip adductors  Breathing pattern with contraction: holding breath  Pelvic floor muscle relaxation is incomplete  75% pelvic floor relaxation  3 seconds required for complete relaxation       PERFECT Score   Power right: 1/5  Power left: 1/5        Flowsheet Rows      Most Recent Value   PT/OT G-Codes   Assessment Type  Evaluation   G code set  Self-Care   Self Care Current Status ()  CK   Self Care Goal Status ()  CJ           Precautions: hypothyroid; Pashto speaking  Daily Treatment Diary     Manual  10/3                                                                                 Exercise Diary  10/3            PFMC: quick flicks 2"MXQSB/4"JFKS 10x  HEP given            PFMC: slow holds 5"work/10"rest             PFMC: elevators             TA ADIM             TA ADIM + PFMC             TA ADIM + PFMC + hip add isometrics             TA ADIM + PFMC + hip abd isometrics             TA + PFMC in quadruped             Seated PFMC             Pball seated PFMC             PFMC with sit to stand             [facility] with squat             PF with step ups                                                                                                            Modalities

## 2019-10-03 NOTE — LETTER
October 3, 2019    Emily Dia, [address] [place] [address] [address]    Patient: Barry Manley   YOB: 1955   Date of Visit: 10/3/2019     Encounter Diagnosis     ICD-10-CM    1  Urinary, incontinence, stress female N39 3    2  Pelvic floor weakness in female N80 80        Dear Dr Alberto Cameron:    Thank you for your recent referral of Barry Manley  Please review the attached evaluation summary from Noreen's recent visit  Please verify that you agree with the plan of care by signing the attached order  If you have any questions or concerns, please do not hesitate to call  I sincerely appreciate the opportunity to share in the care of one of your patients and hope to have another opportunity to work with you in the near future  Sincerely,    Ck Perkins, PT      Referring Provider:      I certify that I have read the below Plan of Care and certify the need for these services furnished under this plan of treatment while under my care  Emily Dia MD  [facility] [address] [facility] [address]  [facility] [address]: [phone]          PT Evaluation     Today's date: 10/3/2019  Patient name: Barry Manley  : 1955  MRN: [id number]  Referring provider: Kait Webb MD  Dx:   Encounter Diagnosis     ICD-10-CM    1  Urinary, incontinence, stress female N39 3    2  Pelvic floor weakness in female N81 89        Start Time: 1700  Stop Time: 1800  Total time in clinic (min): 60 minutes    Assessment  Assessment details: The patient is a 59year old female with complaints of stress urinary incontinence for over one year now  Education provided today and her daughter was present and helped to translate  She presents with weakness in her pelvic floor muscles with assessment today  She had some difficulty isolating her pelvic floor muscles and needed additional instruction to reduce compensation with glutes and hip adductors   She did have difficulty with coordinating her diaphragm and breathing with j carlos her pelvic floor muscles  She also demonstrates some mild pressure/dropping with cue to bear down  She would benefit from skilled pelvic floor therapy to help reduce/manage her symptoms, address her impairments, and maximize her function and quality of life upon discharge  She will be given HEP throughout episode of care  Thank you for the referral    Impairments: abnormal coordination, abnormal muscle tone, activity intolerance, impaired physical strength, lacks appropriate home exercise program and pain with function  Understanding of Dx/Px/POC: good   Prognosis: good    Plan  Patient would benefit from: skilled PT  Planned modality interventions: biofeedback and ultrasound (Real Time Ultrasound)  Planned therapy interventions: manual therapy, neuromuscular re-education, patient education, strengthening, therapeutic exercise, therapeutic training, home exercise program, abdominal trunk stabilization, self care, postural training, therapeutic activities and stretching  Frequency: 1x week  Duration in visits: 12  Duration in weeks: 12  Plan of Care beginning date: 10/3/2019  Plan of Care expiration date: 12/26/2019  Treatment plan discussed with: patient        PT Pelvic Floor Subjective:   History of Present Illness: The patient's daughter, Len White, is present for the evaluation today  The patient only speaks Irish and her daughter helps to interpret  She states that the patient was referred for pelvic floor therapy by her GYN, Dr Umberto Hastings for leakage of urine  The reports that she has been experiencing this for over one year now  She never had issues prior to this  The leakage occurs with a cough or sneeze mostly  She does not have a follow up scheduled with Dr Umberto Hastings at this time  She notes that the symptoms do not interfere with any function  Her goal for PT is to not have to utilize panti liners at home     Social Support:     Lives with:  Spouse Relationship status: /committed    Work status: unemployed    History of Depression: no  Hand dominance:  Right  Diet and Exercise:      Patient unable to exercise/walk long distances because of her asthma  Co-morbidities:    Asthma  allergies  OB/ gyn History    Gestational History:     Number of prior pregnancies: 3    Number of term pregnancies: 3    Number of vaginal deliveries: 3    Delivery Complications:  No complications with any of the deliveries  No episiotomies or perineal tears during deliveries    Menstrual History:      Menopausal: menopause (at 43years old)  no hormone replacement therapy  Bladder Function:     Voiding Difficulties negative for: urgency, frequent urination and incomplete emptying      Voiding Difficulties comments:     Voiding frequency: every 1-2 hours and every 3-4 hours    Urinary leakage: urine leakage    Urinary leakage aggravated by: coughing, sneezing, exercise and walking to the bathroom    Urinary leakage not aggravated by: standing up    Nocturia (episodes per night): 1    Painful urination: No      Intake (ounces): Water: 48,     Intake (ounces) comment: 3 bottles of water  Juice  No coffee or alcohol  Incontinence Management:     Pads/Diaper Use:  Day and night    Pads/Diapers Additional Comments: light panti liner  Bowel Function:     Voiding DIfficulties: stool frequency abnormal and constipation      Bowel frequency: multiple times a day    [place] Stool Scale: type 1  Sexual Function:     Sexually Active:  Not sexually active  Pain:     No pain reported by patient  Treatments:     None        Objective   Pelvic Floor Exam     General Perineum Exam:   perineum intact (urethra caruncle)  Positive for descent, gaping introitus, no pelvic organ prolapse at rest and perianal erythema       General perineum exam comments: Pelvic floor verbal consent and written consent signed and in chart (written consent in Khmer)  Education provided today: Daughter helped to interpret  Pelvic floor anatomy and function  Physiology/relationship of abdominal canister and pelvis/pelvic organs/pelvic floor muscles  PT exam and course of treatment  Importance of body mechanics and ergonomics in regards to protecting against activities which increase IAP and pressure      Visual Inspection of Perineum:   Excursion of perineal body in cephalad direction with contraction of pelvic floor muscles (PFM): unable and unable to isolate without substitution  Excursion of perineal body in caudal direction with relaxation of pelvic floor muscles (PFM): fair   Involuntary contraction with coughing: no  Cotton swab test: non-tender  Cough reflex: no cough reflex  Sensation: intact    Pelvic Organ Prolapse   Position: hook-lying  At rest: none  With bearing down: mild (>1cm from hymenal remnants)  Location: anterior    Pelvic Floor Muscle Exam     Palpation   No increased muscle tension in the periurethral  Minimal increased muscle tension in the bulbospongiosus, ischiocavernosus, super transverse perineal, puborectalis and pubococcygeus  No tenderness on right in the bulbospongiosus, ischiocavernosus, super transverse perineal, pubococcygeus and periurethral  Minimal tenderness on right in the puborectalis  No tenderness on left in the bulbospongiosus, ischiocavernosus, super transverse perineal, pubococcygeus and periurethral  Minimal tenderness on left in the puborectalis    Muscle Contraction: substitution and patient utilized glutes and hip adductors  Breathing pattern with contraction: holding breath  Pelvic floor muscle relaxation is incomplete  75% pelvic floor relaxation  3 seconds required for complete relaxation       PERFECT Score   Power right: 1/5  Power left: 1/5        Flowsheet Rows      Most Recent Value   PT/OT G-Codes   Assessment Type  Evaluation   G code set  Self-Care   Self Care Current Status ()  CK   Self Care Goal Status ()  CJ           Precautions: hypothyroid; Kenyan speaking  Daily Treatment Diary     Manual  10/3                                                                                 Exercise Diary  10/3            PFMC: quick flicks 8"PJPSG/6"OCCF 10x  HEP given            PFMC: slow holds 5"work/10"rest             PFMC: elevators             TA ADIM             TA ADIM + PFMC             TA ADIM + PFMC + hip add isometrics             TA ADIM + PFMC + hip abd isometrics             TA + PFMC in quadruped             Seated PFMC             Pball seated PFMC             PFMC with sit to stand             [address] with squat             PFMC with step ups                                                                                                            Modalities

## 2019-10-10 ENCOUNTER — OFFICE VISIT (OUTPATIENT)
Dept: PHYSICAL THERAPY | Facility: REHABILITATION | Age: 64
End: 2019-10-10
Payer: COMMERCIAL

## 2019-10-10 DIAGNOSIS — N39.3 URINARY, INCONTINENCE, STRESS FEMALE: Primary | ICD-10-CM

## 2019-10-10 DIAGNOSIS — N81.89 PELVIC FLOOR WEAKNESS IN FEMALE: ICD-10-CM

## 2019-10-10 PROCEDURE — 97112 NEUROMUSCULAR REEDUCATION: CPT | Performed by: PHYSICAL THERAPIST

## 2019-10-10 PROCEDURE — 97110 THERAPEUTIC EXERCISES: CPT | Performed by: PHYSICAL THERAPIST

## 2019-10-10 NOTE — PROGRESS NOTES
Daily Note     Today's date: 10/10/2019  Patient name: Timothy Hansen  : 1955  MRN: [id number]  Referring provider: Shyanne Lisa MD  Dx:   Encounter Diagnosis     ICD-10-CM    1  Urinary, incontinence, stress female N39 3    2  Pelvic floor weakness in female N81 89        Start Time: 180  Stop Time: 8759  Total time in clinic (min): 40 minutes    Subjective: The patient's daughter, Eunice Mendez, was present for treatment today  The patient reports that she does not remember any leakage of urine over the last week  The patient's daughter said she is unsure if the patient had none or simply does not remember  Objective: See treatment diary below      Assessment: Tolerated treatment well  Patient required cueing for form with exercises  She had difficulty isolating her pelvic floor muscles and was compensating with her abdominals and glutes and demonstrated breath holding  She was given some verbal and tactile cues  Biofeedback with Telesis was utilized today to help with patient learning  She had more difficulty with quick flicks due to muscle fatigue than with slow holds  She had good coordination of the diaphragm with her pelvic floor muscles  Educated the patient in regards to how to properly perform the exercises at home and to avoid holding her breath or increasing IAP  She is not scheduled until 10/31 for her next visit  She was given HEP for home today with pictures  Plan: Continue per plan of care        Precautions: hypothyroid; Malaysian speaking  Daily Treatment Diary     Manual  10/3 10/10                                     Education  10 min           Biofeedback awareness training  15 min                            Exercise Diary  10/3 10/10           PFMC: quick flicks 5"EVYOA/3"LVJE 10x  HEP given 2x10           PFMC: slow holds 5"work/10"rest  2x10           PFMC: elevators             TA ADIM             TA ADIM + PFMC             TA ADIM + PFMC + hip add isometrics             TA ADIM + [address] + hip abd isometrics             TA + PFMC in quadruped             Seated PFMC             Pball seated [address]             PFMC with sit to stand             [address] with squat             PFMC with step ups                                                                                                            Modalities

## 2019-10-31 ENCOUNTER — OFFICE VISIT (OUTPATIENT)
Dept: PHYSICAL THERAPY | Facility: REHABILITATION | Age: 64
End: 2019-10-31
Payer: COMMERCIAL

## 2019-10-31 DIAGNOSIS — N39.3 URINARY, INCONTINENCE, STRESS FEMALE: Primary | ICD-10-CM

## 2019-10-31 DIAGNOSIS — N81.89 PELVIC FLOOR WEAKNESS IN FEMALE: ICD-10-CM

## 2019-10-31 PROCEDURE — 97530 THERAPEUTIC ACTIVITIES: CPT | Performed by: PHYSICAL THERAPIST

## 2019-10-31 PROCEDURE — 97112 NEUROMUSCULAR REEDUCATION: CPT | Performed by: PHYSICAL THERAPIST

## 2019-10-31 NOTE — PROGRESS NOTES
Daily Note     Today's date: 10/31/2019  Patient name: Jody Hughes  : 1955  MRN: [id number]  Referring provider: Noa Jackson MD  Dx:   Encounter Diagnosis     ICD-10-CM    1  Urinary, incontinence, stress female N39 3    2  Pelvic floor weakness in female N81 89        Start Time: 1700  Stop Time: 1745  Total time in clinic (min): 45 minutes    Subjective: The patient's daughter was not present but helped to translate over the phone  The patient reports some improvements in incontinence of urine  She is experiencing leakage when she coughs  She has been compliant with HEP  She does also report a pressure downward during bowel movements as if "intestines are coming out"  She denies having to splint during bowel movement  Objective: See treatment diary below      Assessment: Tolerated treatment well  Patient did well with verbal and visual cues utilizing Biofeedback during treatment  She was able to contract against increase in IAP with a cough (on cue) by j carlos her pelvic floor muscles  She is able to recruit her muscles, but was more challenged in sitting and standing  She will continue with HEP including 10 contractions, 3x per day in sitting, standing, and supine  She has one visit remaining and her daughter will be present  Resting rate on average: < 3 0 mV  Recruitment rate: 10 - 12 0 mV in supine and 6 0 -7 0 mV in standing    Plan: Continue per plan of care        Precautions: hypothyroid; Burkinan speaking  Daily Treatment Diary     Manual  10/3 10/10 10/31                                    Education  10 min           Biofeedback awareness training  15 min                            Exercise Diary  10/3 10/10 10/31          PFMC: quick flicks 2"UXOPV/8"XDIG 10x  HEP given 2x10 10x          PFMC: slow holds 5"work/10"rest  2x10 10x          PFMC: elevators             TA ADIM             TA ADIM + PFMC             TA ADIM + PFMC + hip add isometrics             TA ADIM + PFMC + hip abd isometrics             TA + PFMC in quadruped             Seated PFMC             Pball seated [address]             PFMC with sit to stand   10x          PFMC with squat             PFMC with step ups             PFMC in standing   10x          PFMC with the knack in standing   Cough  10x                                                                               Modalities

## 2019-11-19 NOTE — PROGRESS NOTES
PT Discharge    Today's date: 2019  Patient name: Carol Cunha  : 1955  MRN: [id number]  Referring provider: Jasmin Neal MD  Dx:   Encounter Diagnosis     ICD-10-CM    1  Urinary, incontinence, stress female N39 3    2  Pelvic floor weakness in female N81 89        Start Time: 1700  Stop Time: 1745  Total time in clinic (min): 45 minutes    Assessment/Plan: The patient was treated for a total of 3 visits including her IE on 10/3/19  She did not show for her last visit  Called her home and her daughter reports that she forgot and went out of town  PT did not have any further appointments to reschedule the patient to for a while  She does have HEP for home  She would need a new prescription to return for further treatment       Pelvic Floor Subjective     Objective

## 2020-04-13 ENCOUNTER — TELEPHONE (OUTPATIENT)
Dept: FAMILY MEDICINE CLINIC | Facility: CLINIC | Age: 65
End: 2020-04-13

## 2020-04-15 DIAGNOSIS — L20.89 FLEXURAL ATOPIC DERMATITIS: Primary | ICD-10-CM

## 2020-04-15 RX ORDER — PAROXETINE 10 MG/1
TABLET, FILM COATED ORAL
COMMUNITY
Start: 2020-03-08 | End: 2020-06-07

## 2020-04-15 RX ORDER — MECLIZINE HCL 12.5 MG/1
TABLET ORAL
COMMUNITY
Start: 2019-08-26 | End: 2020-05-12

## 2020-04-15 RX ORDER — IPRATROPIUM BROMIDE AND ALBUTEROL SULFATE 2.5; .5 MG/3ML; MG/3ML
1 SOLUTION RESPIRATORY (INHALATION) EVERY 4 HOURS PRN
COMMUNITY
Start: 2017-01-31 | End: 2020-04-15

## 2020-04-15 RX ORDER — DIPHENHYDRAMINE HCL 50 MG
50 CAPSULE ORAL EVERY 6 HOURS PRN
Qty: 25 CAPSULE | Refills: 0 | Status: SHIPPED | OUTPATIENT
Start: 2020-04-15 | End: 2020-05-12

## 2020-04-15 RX ORDER — BRIMONIDINE TARTRATE 2 MG/ML
1 SOLUTION/ DROPS OPHTHALMIC 3 TIMES DAILY
COMMUNITY
End: 2021-10-26

## 2020-04-15 RX ORDER — DIPHENHYDRAMINE HYDROCHLORIDE 25 MG/1
25 CAPSULE ORAL DAILY
COMMUNITY
End: 2020-05-12

## 2020-04-15 RX ORDER — ALBUTEROL SULFATE 90 UG/1
AEROSOL, METERED RESPIRATORY (INHALATION)
COMMUNITY
Start: 2020-04-11 | End: 2020-05-12

## 2020-04-15 RX ORDER — TIMOLOL MALEATE 5 MG/ML
SOLUTION/ DROPS OPHTHALMIC
COMMUNITY
Start: 2019-05-13 | End: 2022-01-04

## 2020-04-15 RX ORDER — PREDNISONE 20 MG/1
20 TABLET ORAL
Qty: 18 TABLET | Refills: 0 | Status: SHIPPED | OUTPATIENT
Start: 2020-04-15 | End: 2020-05-12

## 2020-04-15 RX ORDER — PREDNISONE 20 MG/1
TABLET ORAL
COMMUNITY
Start: 2020-02-19 | End: 2020-04-15 | Stop reason: SDUPTHER

## 2020-04-15 RX ORDER — IPRATROPIUM BROMIDE AND ALBUTEROL SULFATE 2.5; .5 MG/3ML; MG/3ML
SOLUTION RESPIRATORY (INHALATION)
COMMUNITY
Start: 2020-04-11 | End: 2020-05-12 | Stop reason: SDUPTHER

## 2020-05-08 DIAGNOSIS — J45.50 SEVERE PERSISTENT ASTHMA WITHOUT COMPLICATION: Primary | ICD-10-CM

## 2020-05-11 RX ORDER — D-METHORPHAN/PE/ACETAMINOPHEN 5-325MG/15
LIQUID (ML) ORAL
COMMUNITY
Start: 2020-04-16 | End: 2020-05-12

## 2020-05-11 RX ORDER — UMECLIDINIUM BROMIDE AND VILANTEROL TRIFENATATE 62.5; 25 UG/1; UG/1
POWDER RESPIRATORY (INHALATION)
COMMUNITY
Start: 2020-04-12 | End: 2020-05-12

## 2020-05-12 ENCOUNTER — TELEMEDICINE (OUTPATIENT)
Dept: FAMILY MEDICINE CLINIC | Facility: CLINIC | Age: 65
End: 2020-05-12
Payer: MEDICARE

## 2020-05-12 VITALS
OXYGEN SATURATION: 94 % | TEMPERATURE: 97.8 F | WEIGHT: 167 LBS | BODY MASS INDEX: 27.82 KG/M2 | SYSTOLIC BLOOD PRESSURE: 123 MMHG | RESPIRATION RATE: 14 BRPM | DIASTOLIC BLOOD PRESSURE: 79 MMHG | HEART RATE: 69 BPM | HEIGHT: 65 IN

## 2020-05-12 DIAGNOSIS — Z20.828 EXPOSURE TO SARS-ASSOCIATED CORONAVIRUS: ICD-10-CM

## 2020-05-12 DIAGNOSIS — J22 LOWER RESPIRATORY INFECTION (E.G., BRONCHITIS, PNEUMONIA, PNEUMONITIS, PULMONITIS): ICD-10-CM

## 2020-05-12 DIAGNOSIS — J44.0 CHRONIC OBSTRUCTIVE PULMONARY DISEASE WITH ACUTE LOWER RESPIRATORY INFECTION (HCC): Primary | ICD-10-CM

## 2020-05-12 PROBLEM — C14.0 CANCER OF PHARYNX (HCC): Status: ACTIVE | Noted: 2019-08-27

## 2020-05-12 PROBLEM — H91.90 LOSS OF HEARING: Status: ACTIVE | Noted: 2019-12-07

## 2020-05-12 PROBLEM — Z92.3 PERSONAL HISTORY OF RADIATION THERAPY: Status: ACTIVE | Noted: 2019-12-07

## 2020-05-12 PROBLEM — Z85.21 HISTORY OF LARYNGEAL CANCER: Status: ACTIVE | Noted: 2019-12-07

## 2020-05-12 PROBLEM — J44.9 CHRONIC OBSTRUCTIVE PULMONARY DISEASE (HCC): Status: ACTIVE | Noted: 2019-08-27

## 2020-05-12 PROBLEM — N18.30 CHRONIC KIDNEY DISEASE, STAGE 3 (MODERATE): Status: ACTIVE | Noted: 2019-08-27

## 2020-05-12 PROBLEM — F41.8 MIXED ANXIETY AND DEPRESSIVE DISORDER: Status: ACTIVE | Noted: 2019-08-27

## 2020-05-12 PROBLEM — F41.0 PANIC ATTACK: Status: ACTIVE | Noted: 2019-08-27

## 2020-05-12 PROCEDURE — 99214 OFFICE O/P EST MOD 30 MIN: CPT | Performed by: FAMILY MEDICINE

## 2020-05-12 RX ORDER — ALBUTEROL SULFATE 90 UG/1
AEROSOL, METERED RESPIRATORY (INHALATION)
Qty: 18 INHALER | Refills: 2 | Status: SHIPPED | OUTPATIENT
Start: 2020-05-12 | End: 2020-09-03 | Stop reason: SDUPTHER

## 2020-05-12 RX ORDER — LEVOFLOXACIN 500 MG/1
500 TABLET, FILM COATED ORAL EVERY 24 HOURS
Qty: 5 TABLET | Refills: 0 | Status: SHIPPED | OUTPATIENT
Start: 2020-05-12 | End: 2020-05-17

## 2020-05-12 RX ORDER — IPRATROPIUM BROMIDE AND ALBUTEROL SULFATE 2.5; .5 MG/3ML; MG/3ML
3 SOLUTION RESPIRATORY (INHALATION) EVERY 6 HOURS PRN
Qty: 60 VIAL | Refills: 0 | Status: SHIPPED | OUTPATIENT
Start: 2020-05-12 | End: 2020-06-23

## 2020-05-13 DIAGNOSIS — Z20.828 EXPOSURE TO SARS-ASSOCIATED CORONAVIRUS: ICD-10-CM

## 2020-05-13 PROCEDURE — U0003 INFECTIOUS AGENT DETECTION BY NUCLEIC ACID (DNA OR RNA); SEVERE ACUTE RESPIRATORY SYNDROME CORONAVIRUS 2 (SARS-COV-2) (CORONAVIRUS DISEASE [COVID-19]), AMPLIFIED PROBE TECHNIQUE, MAKING USE OF HIGH THROUGHPUT TECHNOLOGIES AS DESCRIBED BY CMS-2020-01-R: HCPCS | Performed by: FAMILY MEDICINE

## 2020-05-14 LAB — SARS-COV-2 RNA RESP QL NAA+PROBE: NEGATIVE

## 2020-05-19 ENCOUNTER — TELEPHONE (OUTPATIENT)
Dept: FAMILY MEDICINE CLINIC | Facility: CLINIC | Age: 65
End: 2020-05-19

## 2020-05-19 DIAGNOSIS — J44.0 CHRONIC OBSTRUCTIVE PULMONARY DISEASE WITH ACUTE LOWER RESPIRATORY INFECTION (HCC): Primary | ICD-10-CM

## 2020-05-19 RX ORDER — AZITHROMYCIN 250 MG/1
TABLET, FILM COATED ORAL
Qty: 6 TABLET | Refills: 0 | Status: SHIPPED | OUTPATIENT
Start: 2020-05-19 | End: 2020-05-24

## 2020-06-07 DIAGNOSIS — F41.8 MIXED ANXIETY AND DEPRESSIVE DISORDER: Primary | ICD-10-CM

## 2020-06-07 RX ORDER — PAROXETINE 10 MG/1
TABLET, FILM COATED ORAL
Qty: 90 TABLET | Refills: 1 | Status: SHIPPED | OUTPATIENT
Start: 2020-06-07 | End: 2020-09-01

## 2020-06-18 DIAGNOSIS — J44.0 CHRONIC OBSTRUCTIVE PULMONARY DISEASE WITH ACUTE LOWER RESPIRATORY INFECTION (HCC): ICD-10-CM

## 2020-06-22 DIAGNOSIS — J44.0 CHRONIC OBSTRUCTIVE PULMONARY DISEASE WITH ACUTE LOWER RESPIRATORY INFECTION (HCC): ICD-10-CM

## 2020-06-22 RX ORDER — FLUTICASONE FUROATE, UMECLIDINIUM BROMIDE AND VILANTEROL TRIFENATATE 100; 62.5; 25 UG/1; UG/1; UG/1
POWDER RESPIRATORY (INHALATION)
Qty: 1 INHALER | Refills: 3 | Status: SHIPPED | OUTPATIENT
Start: 2020-06-22 | End: 2020-08-31

## 2020-06-23 RX ORDER — IPRATROPIUM BROMIDE AND ALBUTEROL SULFATE 2.5; .5 MG/3ML; MG/3ML
SOLUTION RESPIRATORY (INHALATION)
Qty: 180 VIAL | Refills: 1 | Status: SHIPPED | OUTPATIENT
Start: 2020-06-23 | End: 2021-04-07 | Stop reason: SDUPTHER

## 2020-08-10 ENCOUNTER — DOCTOR'S OFFICE (OUTPATIENT)
Dept: URBAN - METROPOLITAN AREA CLINIC 136 | Facility: CLINIC | Age: 65
Setting detail: OPHTHALMOLOGY
End: 2020-08-10
Payer: COMMERCIAL

## 2020-08-10 VITALS — HEIGHT: 55 IN

## 2020-08-10 DIAGNOSIS — H35.342: ICD-10-CM

## 2020-08-10 DIAGNOSIS — H04.121: ICD-10-CM

## 2020-08-10 DIAGNOSIS — H04.122: ICD-10-CM

## 2020-08-10 DIAGNOSIS — H40.1131: ICD-10-CM

## 2020-08-10 DIAGNOSIS — Z96.1: ICD-10-CM

## 2020-08-10 DIAGNOSIS — H21.232: ICD-10-CM

## 2020-08-10 DIAGNOSIS — H43.813: ICD-10-CM

## 2020-08-10 PROBLEM — H02.834 DERMATOCHALASIS; RIGHT UPPER LID, LEFT UPPER LID: Status: RESOLVED | Noted: 2017-04-27 | Resolved: 2020-08-10

## 2020-08-10 PROBLEM — H02.831 DERMATOCHALASIS; RIGHT UPPER LID, LEFT UPPER LID: Status: RESOLVED | Noted: 2017-04-27 | Resolved: 2020-08-10

## 2020-08-10 PROBLEM — H02.403 PTOSIS; BOTH EYES: Status: RESOLVED | Noted: 2017-03-28 | Resolved: 2020-08-10

## 2020-08-10 PROCEDURE — 92083 EXTENDED VISUAL FIELD XM: CPT | Performed by: OPHTHALMOLOGY

## 2020-08-10 PROCEDURE — 92014 COMPRE OPH EXAM EST PT 1/>: CPT | Performed by: OPHTHALMOLOGY

## 2020-08-10 PROCEDURE — 92134 CPTRZ OPH DX IMG PST SGM RTA: CPT | Performed by: OPHTHALMOLOGY

## 2020-08-10 PROCEDURE — 83861 MICROFLUID ANALY TEARS: CPT | Performed by: OPHTHALMOLOGY

## 2020-08-10 PROCEDURE — 76514 ECHO EXAM OF EYE THICKNESS: CPT | Performed by: OPHTHALMOLOGY

## 2020-08-10 ASSESSMENT — CONFRONTATIONAL VISUAL FIELD TEST (CVF)
OS_FINDINGS: FULL
OD_FINDINGS: FULL

## 2020-08-10 ASSESSMENT — REFRACTION_CURRENTRX
OD_AXIS: 110
OD_SPHERE: +0.25
OS_SPHERE: -1.00
OD_OVR_VA: 20/
OD_CYLINDER: -0.75
OD_ADD: +2.50
OS_OVR_VA: 20/
OS_VPRISM_DIRECTION: BF
OS_ADD: +2.50
OS_CYLINDER: SPHERE
OD_VPRISM_DIRECTION: BF

## 2020-08-10 ASSESSMENT — REFRACTION_MANIFEST
OD_AXIS: 110
OS_SPHERE: -1.00
OD_SPHERE: +0.25
OD_VA1: 20/20-2
OS_CYLINDER: SPH
OD_CYLINDER: -0.75
OS_ADD: +2.50
OD_ADD: +2.50
OD_VA2: 20/20
OS_VA2: 20/50

## 2020-08-10 ASSESSMENT — KERATOMETRY
OS_K1POWER_DIOPTERS: 43.00
OS_AXISANGLE_DEGREES: 70
OS_K2POWER_DIOPTERS: 44.75
OD_AXISANGLE_DEGREES: 126
OD_K1POWER_DIOPTERS: 43.75
OD_K2POWER_DIOPTERS: 44.50

## 2020-08-10 ASSESSMENT — LID POSITION - DERMATOCHALASIS
OS_DERMATOCHALASIS: ABSENT
OD_DERMATOCHALASIS: ABSENT

## 2020-08-10 ASSESSMENT — AXIALLENGTH_DERIVED
OD_AL: 23.4124
OD_AL: 23.2221

## 2020-08-10 ASSESSMENT — PACHYMETRY
OD_CT_CORRECTION: -1
OD_CT_UM: 557
OS_CT_CORRECTION: -1
OS_CT_UM: 562

## 2020-08-10 ASSESSMENT — LID POSITION - PTOSIS
OS_PTOSIS: ABSENT
OD_PTOSIS: ABSENT

## 2020-08-10 ASSESSMENT — LID EXAM ASSESSMENTS
OD_MEIBOMITIS: 1+
OS_MEIBOMITIS: 1+

## 2020-08-10 ASSESSMENT — SPHEQUIV_DERIVED
OD_SPHEQUIV: -0.125
OD_SPHEQUIV: 0.375

## 2020-08-10 ASSESSMENT — SUPERFICIAL PUNCTATE KERATITIS (SPK)
OD_SPK: T
OS_SPK: T 1+

## 2020-08-10 ASSESSMENT — REFRACTION_AUTOREFRACTION
OD_CYLINDER: -0.25
OS_AXIS: 109
OD_SPHERE: +0.50
OS_CYLINDER: SPHERE
OS_SPHERE: -0.50
OD_AXIS: 115

## 2020-08-10 ASSESSMENT — VISUAL ACUITY
OD_BCVA: 20/200
OS_BCVA: 20/25

## 2020-08-23 DIAGNOSIS — J44.0 CHRONIC OBSTRUCTIVE PULMONARY DISEASE WITH ACUTE LOWER RESPIRATORY INFECTION (HCC): ICD-10-CM

## 2020-08-31 RX ORDER — FLUTICASONE FUROATE, UMECLIDINIUM BROMIDE AND VILANTEROL TRIFENATATE 100; 62.5; 25 UG/1; UG/1; UG/1
POWDER RESPIRATORY (INHALATION)
Qty: 1 INHALER | Refills: 3 | Status: SHIPPED | OUTPATIENT
Start: 2020-08-31 | End: 2020-09-01 | Stop reason: SDUPTHER

## 2020-09-01 ENCOUNTER — OFFICE VISIT (OUTPATIENT)
Dept: OBGYN CLINIC | Facility: CLINIC | Age: 65
End: 2020-09-01
Payer: COMMERCIAL

## 2020-09-01 ENCOUNTER — DOCTOR'S OFFICE (OUTPATIENT)
Dept: URBAN - METROPOLITAN AREA CLINIC 137 | Facility: CLINIC | Age: 65
Setting detail: OPHTHALMOLOGY
End: 2020-09-01
Payer: COMMERCIAL

## 2020-09-01 VITALS
DIASTOLIC BLOOD PRESSURE: 70 MMHG | HEIGHT: 65 IN | TEMPERATURE: 97.9 F | WEIGHT: 177 LBS | SYSTOLIC BLOOD PRESSURE: 130 MMHG | BODY MASS INDEX: 29.49 KG/M2

## 2020-09-01 DIAGNOSIS — R32 URINARY INCONTINENCE, UNSPECIFIED TYPE: ICD-10-CM

## 2020-09-01 DIAGNOSIS — Z11.51 SCREENING FOR HUMAN PAPILLOMAVIRUS: ICD-10-CM

## 2020-09-01 DIAGNOSIS — Z13.820 SCREENING FOR OSTEOPOROSIS: ICD-10-CM

## 2020-09-01 DIAGNOSIS — J44.0 CHRONIC OBSTRUCTIVE PULMONARY DISEASE WITH ACUTE LOWER RESPIRATORY INFECTION (HCC): ICD-10-CM

## 2020-09-01 DIAGNOSIS — Z12.4 SCREENING FOR MALIGNANT NEOPLASM OF THE CERVIX: ICD-10-CM

## 2020-09-01 DIAGNOSIS — Z01.419 ROUTINE GYNECOLOGICAL EXAMINATION: Primary | ICD-10-CM

## 2020-09-01 DIAGNOSIS — H52.221: ICD-10-CM

## 2020-09-01 DIAGNOSIS — H52.01: ICD-10-CM

## 2020-09-01 DIAGNOSIS — Z12.39 SCREENING FOR MALIGNANT NEOPLASM OF BREAST: ICD-10-CM

## 2020-09-01 PROBLEM — H40.1131: Status: ACTIVE | Noted: 2017-06-02

## 2020-09-01 PROBLEM — H52.4: Status: ACTIVE | Noted: 2018-05-23

## 2020-09-01 PROBLEM — H21.232: Status: ACTIVE | Noted: 2017-11-06

## 2020-09-01 PROBLEM — H35.342: Status: ACTIVE | Noted: 2017-03-28

## 2020-09-01 PROBLEM — H04.121 DRY EYE; RIGHT EYE, LEFT EYE: Status: ACTIVE | Noted: 2020-08-10

## 2020-09-01 PROBLEM — H52.12: Status: ACTIVE | Noted: 2018-05-23

## 2020-09-01 PROBLEM — H43.813: Status: ACTIVE | Noted: 2017-03-28

## 2020-09-01 PROBLEM — H04.122 DRY EYE; RIGHT EYE, LEFT EYE: Status: ACTIVE | Noted: 2020-08-10

## 2020-09-01 PROCEDURE — G0145 SCR C/V CYTO,THINLAYER,RESCR: HCPCS | Performed by: OBSTETRICS & GYNECOLOGY

## 2020-09-01 PROCEDURE — 92014 COMPRE OPH EXAM EST PT 1/>: CPT | Performed by: OPTOMETRIST

## 2020-09-01 PROCEDURE — G0101 CA SCREEN;PELVIC/BREAST EXAM: HCPCS | Performed by: OBSTETRICS & GYNECOLOGY

## 2020-09-01 PROCEDURE — 87624 HPV HI-RISK TYP POOLED RSLT: CPT | Performed by: OBSTETRICS & GYNECOLOGY

## 2020-09-01 RX ORDER — FLUTICASONE FUROATE, UMECLIDINIUM BROMIDE AND VILANTEROL TRIFENATATE 100; 62.5; 25 UG/1; UG/1; UG/1
1 POWDER RESPIRATORY (INHALATION) DAILY
Qty: 1 INHALER | Refills: 3 | Status: SHIPPED | OUTPATIENT
Start: 2020-09-01 | End: 2020-09-03 | Stop reason: SDUPTHER

## 2020-09-01 ASSESSMENT — KERATOMETRY
OS_K1POWER_DIOPTERS: 43.00
OD_K2POWER_DIOPTERS: 44.50
OD_K1POWER_DIOPTERS: 43.75
OS_AXISANGLE_DEGREES: 70
OS_K2POWER_DIOPTERS: 44.75
OD_AXISANGLE_DEGREES: 126

## 2020-09-01 ASSESSMENT — CONFRONTATIONAL VISUAL FIELD TEST (CVF)
OS_FINDINGS: FULL
OD_FINDINGS: FULL

## 2020-09-01 ASSESSMENT — REFRACTION_MANIFEST
OD_VA1: 20/20-2
OS_ADD: +2.50
OD_VA2: 20/20
OS_VA1: 20/100
OD_ADD: +2.50
OS_CYLINDER: 0.00
OS_SPHERE: -1.00
OD_AXIS: 95
OD_SPHERE: +0.25
OD_CYLINDER: -0.75
OS_VA2: 20/50
OD_SPHERE: +0.75
OS_CYLINDER: SPH
OD_AXIS: 110
OS_ADD: +2.50
OS_SPHERE: -1.00
OD_CYLINDER: -0.75
OD_VA1: 20/20
OS_AXIS: 0
OD_ADD: +2.50

## 2020-09-01 ASSESSMENT — AXIALLENGTH_DERIVED
OD_AL: 23.4124
OS_AL: 23.9464
OD_AL: 23.3645
OD_AL: 23.2221
OS_AL: 23.8465

## 2020-09-01 ASSESSMENT — REFRACTION_AUTOREFRACTION
OD_CYLINDER: -0.50
OD_AXIS: 093
OS_SPHERE: -1.00
OD_SPHERE: +0.25
OS_AXIS: 050
OS_CYLINDER: -0.50

## 2020-09-01 ASSESSMENT — REFRACTION_CURRENTRX
OD_ADD: +2.50
OS_OVR_VA: 20/
OS_SPHERE: -1.00
OD_SPHERE: +0.25
OS_VPRISM_DIRECTION: BF
OD_AXIS: 106
OS_CYLINDER: SPHERE
OD_VPRISM_DIRECTION: BF
OS_ADD: +2.50
OD_CYLINDER: -0.75
OD_OVR_VA: 20/

## 2020-09-01 ASSESSMENT — SPHEQUIV_DERIVED
OS_SPHEQUIV: -1
OD_SPHEQUIV: 0
OD_SPHEQUIV: 0.375
OS_SPHEQUIV: -1.25
OD_SPHEQUIV: -0.125

## 2020-09-01 ASSESSMENT — VISUAL ACUITY
OS_BCVA: 20/25+2
OD_BCVA: 20/300

## 2020-09-01 ASSESSMENT — SUPERFICIAL PUNCTATE KERATITIS (SPK)
OS_SPK: T 1+
OD_SPK: T

## 2020-09-01 ASSESSMENT — LID EXAM ASSESSMENTS
OD_MEIBOMITIS: 1+
OS_MEIBOMITIS: 1+

## 2020-09-01 ASSESSMENT — LID POSITION - DERMATOCHALASIS
OD_DERMATOCHALASIS: ABSENT
OS_DERMATOCHALASIS: ABSENT

## 2020-09-01 ASSESSMENT — LID POSITION - PTOSIS
OS_PTOSIS: ABSENT
OD_PTOSIS: ABSENT

## 2020-09-01 NOTE — PROGRESS NOTES
Kaden Carrillo is a 72 y o  female who presents for annual well woman exam      Patient has no more menses since age 48 denies any postmenopausal bleeding patient still complaining of stress urinary incontinence not responding to pelvic floor therapy and Roberto shaikh would recommend refer to urologist for possible think procedure after evaluation, patient has history of throat cancer with radiation and chemotherapy the patient is currently not sexually active    Menstrual History:  OB History        3    Para   3    Term   3            AB        Living   3       SAB        TAB        Ectopic        Multiple        Live Births                      No LMP recorded  Patient is premenopausal        The following portions of the patient's history were reviewed and updated as appropriate: allergies, current medications, past family history, past medical history, past social history, past surgical history and problem list     Review of Systems  Review of Systems   Constitutional: Negative for activity change, appetite change, chills, fatigue and fever  Respiratory: Negative for cough and shortness of breath  Cardiovascular: Negative for chest pain, palpitations and leg swelling  Gastrointestinal: Negative for abdominal pain, constipation, diarrhea, nausea and vomiting  Genitourinary: Negative for difficulty urinating, dysuria, flank pain, frequency, hematuria, urgency and vaginal discharge  Neurological: Negative for dizziness and headaches  Psychiatric/Behavioral: Negative for confusion            Objective      /70 (BP Location: Left arm, Patient Position: Sitting, Cuff Size: Adult)   Temp 97 9 °F (36 6 °C)   Ht 5' 5" (1 651 m)   Wt 80 3 kg (177 lb)   BMI 29 45 kg/m²     Physical Exam  OBGyn Exam     General:   alert and oriented, in no acute distress, alert, appears stated age and cooperative   Heart: regular rate and rhythm, S1, S2 normal, no murmur, click, rub or gallop   Lungs: clear to auscultation bilaterally   Abdomen: soft, non-tender, without masses or organomegaly   Vulva: normal   Vagina: normal mucosa, normal discharge   Cervix: no bleeding following Pap, no cervical motion tenderness and no lesions   Uterus: normal size   Adnexa:  Breast Exam:  normal adnexa  breasts appear normal, no suspicious masses, no skin or nipple changes or axillary nodes  Patient's neck very difficult to examine nontender status post radiation therapy secondary to throat cancer  Patient is complaining of recurrent folliculitis in her months area Female hygiene discussed with patient in details no active lesion noted today            Assessment      @well woman@   17-year-old female  Annual exam  History of throat cancer with radiation and chemotherapy  Chronic hypertension stable  Hypothyroid  Chronic kidney disease stage 3  Stress urinary incontinence  Had prior 3 vaginal delivery and tubal ligation  Plan   Pap/HPV  Diet/exercise  Calcium/vitamin-D  Mammogram  Colonoscopy  DEXA scan  Urology referral secondary to side BRENDAN failed physical therapy and conservative management   All questions answered  There are no Patient Instructions on file for this visit

## 2020-09-02 LAB
HPV HR 12 DNA CVX QL NAA+PROBE: NEGATIVE
HPV16 DNA CVX QL NAA+PROBE: NEGATIVE
HPV18 DNA CVX QL NAA+PROBE: NEGATIVE

## 2020-09-03 ENCOUNTER — OFFICE VISIT (OUTPATIENT)
Dept: FAMILY MEDICINE CLINIC | Facility: CLINIC | Age: 65
End: 2020-09-03
Payer: COMMERCIAL

## 2020-09-03 VITALS
TEMPERATURE: 98 F | RESPIRATION RATE: 16 BRPM | WEIGHT: 179 LBS | HEIGHT: 65 IN | SYSTOLIC BLOOD PRESSURE: 160 MMHG | OXYGEN SATURATION: 94 % | DIASTOLIC BLOOD PRESSURE: 70 MMHG | HEART RATE: 73 BPM | BODY MASS INDEX: 29.82 KG/M2

## 2020-09-03 DIAGNOSIS — J44.0 CHRONIC OBSTRUCTIVE PULMONARY DISEASE WITH ACUTE LOWER RESPIRATORY INFECTION (HCC): ICD-10-CM

## 2020-09-03 DIAGNOSIS — F32.0 CURRENT MILD EPISODE OF MAJOR DEPRESSIVE DISORDER WITHOUT PRIOR EPISODE (HCC): Primary | ICD-10-CM

## 2020-09-03 DIAGNOSIS — F41.8 DEPRESSION WITH ANXIETY: ICD-10-CM

## 2020-09-03 DIAGNOSIS — Z12.11 COLON CANCER SCREENING: ICD-10-CM

## 2020-09-03 DIAGNOSIS — M65.311 TRIGGER FINGER OF RIGHT THUMB: ICD-10-CM

## 2020-09-03 DIAGNOSIS — E66.3 OVERWEIGHT: ICD-10-CM

## 2020-09-03 DIAGNOSIS — J45.50 SEVERE PERSISTENT ASTHMA WITHOUT COMPLICATION: ICD-10-CM

## 2020-09-03 DIAGNOSIS — E03.8 OTHER SPECIFIED HYPOTHYROIDISM: ICD-10-CM

## 2020-09-03 DIAGNOSIS — Z11.59 ENCOUNTER FOR HEPATITIS C SCREENING TEST FOR LOW RISK PATIENT: ICD-10-CM

## 2020-09-03 PROBLEM — M72.0 DUPUYTREN CONTRACTURE: Status: ACTIVE | Noted: 2020-09-03

## 2020-09-03 PROCEDURE — 99214 OFFICE O/P EST MOD 30 MIN: CPT | Performed by: FAMILY MEDICINE

## 2020-09-03 RX ORDER — MONTELUKAST SODIUM 10 MG/1
10 TABLET ORAL
Qty: 90 TABLET | Refills: 1 | Status: SHIPPED | OUTPATIENT
Start: 2020-09-03 | End: 2020-12-29 | Stop reason: SDUPTHER

## 2020-09-03 RX ORDER — PAROXETINE 10 MG/1
1 TABLET, FILM COATED ORAL
COMMUNITY
Start: 2020-03-08 | End: 2020-09-03 | Stop reason: SDUPTHER

## 2020-09-03 RX ORDER — UMECLIDINIUM BROMIDE AND VILANTEROL TRIFENATATE 62.5; 25 UG/1; UG/1
POWDER RESPIRATORY (INHALATION)
COMMUNITY
Start: 2020-03-16 | End: 2020-09-03 | Stop reason: SDUPTHER

## 2020-09-03 RX ORDER — LEVOTHYROXINE SODIUM 0.1 MG/1
100 TABLET ORAL DAILY
Qty: 90 TABLET | Refills: 1 | Status: SHIPPED | OUTPATIENT
Start: 2020-09-03 | End: 2021-04-07 | Stop reason: SDUPTHER

## 2020-09-03 RX ORDER — LEVOTHYROXINE SODIUM 0.1 MG/1
TABLET ORAL
COMMUNITY
Start: 2020-06-18 | End: 2020-09-03 | Stop reason: SDUPTHER

## 2020-09-03 RX ORDER — PAROXETINE 10 MG/1
10 TABLET, FILM COATED ORAL DAILY
Qty: 90 TABLET | Refills: 1 | Status: SHIPPED | OUTPATIENT
Start: 2020-09-03 | End: 2021-02-22

## 2020-09-03 RX ORDER — ALBUTEROL SULFATE 90 UG/1
2 AEROSOL, METERED RESPIRATORY (INHALATION) EVERY 4 HOURS PRN
Qty: 2 INHALER | Refills: 2 | Status: SHIPPED | OUTPATIENT
Start: 2020-09-03 | End: 2021-06-14 | Stop reason: SDUPTHER

## 2020-09-03 RX ORDER — UMECLIDINIUM BROMIDE AND VILANTEROL TRIFENATATE 62.5; 25 UG/1; UG/1
1 POWDER RESPIRATORY (INHALATION) DAILY
Qty: 1 INHALER | Refills: 1 | Status: SHIPPED | OUTPATIENT
Start: 2020-09-03 | End: 2020-12-29

## 2020-09-03 RX ORDER — FLUTICASONE FUROATE, UMECLIDINIUM BROMIDE AND VILANTEROL TRIFENATATE 100; 62.5; 25 UG/1; UG/1; UG/1
1 POWDER RESPIRATORY (INHALATION) DAILY
Qty: 1 INHALER | Refills: 3 | Status: SHIPPED | OUTPATIENT
Start: 2020-09-03 | End: 2020-12-29

## 2020-09-04 RX ORDER — ACETAMINOPHEN AND CODEINE PHOSPHATE 300; 15 MG/1; MG/1
1 TABLET ORAL EVERY 12 HOURS PRN
Qty: 10 TABLET | Refills: 0 | Status: ON HOLD | OUTPATIENT
Start: 2020-09-04 | End: 2021-02-16 | Stop reason: ALTCHOICE

## 2020-09-04 NOTE — PROGRESS NOTES
Subjective:      Patient ID: Derick Gilbert is a 72 y o  female  Copd: states that she has run out of her control inhaler  Htn: controlled, no headache, chest pan or palpitations  Right thumb pain; states going on for few weeks, gets locked, swollen, no injury  Hypothyroidism: on levothyroxine, controlled, concerned of weight gain      Past Medical History:   Diagnosis Date    Acute kidney failure (HCC)     Allergies     Anemia     Cancer ([facility] )     Chronic kidney disease (CKD), stage III (moderate) (HCC)     COPD (chronic obstructive pulmonary disease) (HCC)     Disease of thyroid gland     Essential hypertension     GERD (gastroesophageal reflux disease)     Glaucoma     High blood pressure     Hyperlipidemia     Hypothyroidism     Throat cancer ([facility] ) 2014    chemo and rad therapy 2014       Family History   Problem Relation Age of Onset    Brain cancer Sister     Breast cancer Sister     Diabetes Sister     Other Mother         respiratory disorder    Sudden death Father         shot himself    Suicidality Father        Past Surgical History:   Procedure Laterality Date    COLONOSCOPY  2016    ESOPHAGOGASTRODUODENOSCOPY  2016    LARYNGOSCOPY      w/ Bx      TUBAL LIGATION          reports that she quit smoking about 6 years ago  She quit after 40 00 years of use   She has never used smokeless tobacco       Current Outpatient Medications:     PARoxetine (PAXIL) 10 mg tablet, Take 1 tablet (10 mg total) by mouth daily, Disp: 90 tablet, Rfl: 1    umeclidinium-vilanterol (Anoro Ellipta) 62 5-25 MCG/INH inhaler, Inhale 1 puff daily, Disp: 1 Inhaler, Rfl: 1    acetaminophen-codeine (TYLENOL #2) 300-15 MG per tablet, Take 1 tablet by mouth every 12 (twelve) hours as needed for moderate pain, Disp: 10 tablet, Rfl: 0    albuterol (PROVENTIL HFA,VENTOLIN HFA) 90 mcg/act inhaler, Inhale 2 puffs every 4 (four) hours as needed for wheezing, Disp: 2 Inhaler, Rfl: 2    brimonidine tartrate 0 2 % ophthalmic solution, Apply 1 drop to eye Three times a day, Disp: , Rfl:     fluticasone-umeclidinium-vilanterol (Trelegy Ellipta) 100-62 5-25 MCG/INH inhaler, Inhale 1 puff daily Rinse mouth after use , Disp: 1 Inhaler, Rfl: 3    ipratropium-albuterol (DUO-NEB) 0 5-2 5 mg/3 mL nebulizer solution, TAKE 1 VIAL BY NEBULIZATION EVERY 6 (SIX) HOURS AS NEEDED FOR WHEEZING OR SHORTNESS OF BREATH, Disp: 180 vial, Rfl: 1    levothyroxine 100 mcg tablet, Take 1 tablet (100 mcg total) by mouth daily, Disp: 90 tablet, Rfl: 1    montelukast (SINGULAIR) 10 mg tablet, Take 1 tablet (10 mg total) by mouth daily at bedtime, Disp: 90 tablet, Rfl: 1    timolol (TIMOPTIC) 0 5 % ophthalmic solution, INSTILL 1 DROP INTO EACH EYE TWICE DAILY, Disp: , Rfl:     The following portions of the patient's history were reviewed and updated as appropriate: allergies, current medications, past family history, past medical history, past social history, past surgical history and problem list     Review of Systems   Constitutional: Negative for activity change, appetite change, chills, diaphoresis, fatigue and fever  HENT: Negative for congestion, facial swelling, mouth sores, rhinorrhea, sinus pressure, sneezing, sore throat, tinnitus, trouble swallowing and voice change  Eyes: Negative for pain, discharge, redness and visual disturbance  Respiratory: Negative for cough, shortness of breath and wheezing  Cardiovascular: Negative for chest pain, palpitations and leg swelling  Gastrointestinal: Negative for abdominal pain, blood in stool, constipation, diarrhea and nausea  Endocrine: Negative for cold intolerance and heat intolerance  Genitourinary: Negative for dysuria, hematuria and urgency  Musculoskeletal: Positive for arthralgias  Negative for back pain and myalgias  Skin: Negative for rash and wound  Allergic/Immunologic: Negative for environmental allergies and food allergies     Neurological: Negative for dizziness, tremors, seizures, syncope, facial asymmetry, speech difficulty, weakness, light-headedness, numbness and headaches  Hematological: Negative for adenopathy  Psychiatric/Behavioral: Negative for agitation and behavioral problems  Objective:    /70 (BP Location: Left arm, Patient Position: Sitting, Cuff Size: Standard)   Pulse 73   Temp 98 °F (36 7 °C) (Temporal)   Resp 16   Ht 5' 5" (1 651 m)   Wt 81 2 kg (179 lb)   SpO2 94%   BMI 29 79 kg/m²      Physical Exam  Vitals signs and nursing note reviewed  Exam conducted with a chaperone present  Constitutional:       Appearance: She is well-developed  HENT:      Head: Normocephalic and atraumatic  Right Ear: External ear normal       Left Ear: External ear normal       Nose: Nose normal    Eyes:      General: No scleral icterus  Right eye: No discharge  Left eye: No discharge  Conjunctiva/sclera: Conjunctivae normal       Pupils: Pupils are equal, round, and reactive to light  Neck:      Thyroid: No thyromegaly  Cardiovascular:      Rate and Rhythm: Normal rate  Heart sounds: No murmur  No friction rub  Pulmonary:      Effort: Pulmonary effort is normal       Breath sounds: Normal breath sounds  No wheezing or rales  Abdominal:      General: Bowel sounds are normal  There is no distension  Palpations: Abdomen is soft  There is no mass  Tenderness: There is no abdominal tenderness  There is no guarding or rebound  Hernia: No hernia is present  Musculoskeletal:         General: No tenderness or deformity  Comments: Right thumb swollen and painful, trigger thumb   Lymphadenopathy:      Cervical: No cervical adenopathy  Skin:     Findings: No erythema or rash  Neurological:      Mental Status: She is alert and oriented to person, place, and time        Coordination: Coordination normal       Deep Tendon Reflexes: Reflexes normal    Psychiatric:         Behavior: Behavior normal  Judgment: Judgment normal            Recent Results (from the past 1008 hour(s))   HPV High Risk    Collection Time: 09/01/20  4:06 PM    Specimen: Cervix; Thin-Prep Vial   Result Value Ref Range    HPV Other HR Negative Negative    HPV16 Negative Negative    HPV18 Negative Negative       Assessment/Plan:       Diagnoses and all orders for this visit:    Current mild episode of major depressive disorder without prior episode (HCC)    Severe persistent asthma without complication  -     albuterol (PROVENTIL HFA,VENTOLIN HFA) 90 mcg/act inhaler; Inhale 2 puffs every 4 (four) hours as needed for wheezing  -     umeclidinium-vilanterol (Anoro Ellipta) 62 5-25 MCG/INH inhaler; Inhale 1 puff daily  -     montelukast (SINGULAIR) 10 mg tablet; Take 1 tablet (10 mg total) by mouth daily at bedtime    Chronic obstructive pulmonary disease with acute lower respiratory infection (HCC)  -     fluticasone-umeclidinium-vilanterol (Trelegy Ellipta) 100-62 5-25 MCG/INH inhaler; Inhale 1 puff daily Rinse mouth after use  -     CBC and differential; Future    Other specified hypothyroidism  -     levothyroxine 100 mcg tablet; Take 1 tablet (100 mcg total) by mouth daily  -     TSH, 3rd generation; Future    Depression with anxiety  -     PARoxetine (PAXIL) 10 mg tablet; Take 1 tablet (10 mg total) by mouth daily  -     Cancel: CBC and differential; Future  -     Comprehensive metabolic panel; Future  -     CBC and differential; Future    Trigger finger of right thumb  -     Ambulatory referral to Orthopedic Surgery; Future  -     acetaminophen-codeine (TYLENOL #2) 300-15 MG per tablet; Take 1 tablet by mouth every 12 (twelve) hours as needed for moderate pain    Overweight  -     Lipid panel; Future    Encounter for hepatitis C screening test for low risk patient  -     Hepatitis C antibody; Future    Colon cancer screening  -     Cologuard;  Future    Other orders  -     Discontinue: PARoxetine (PAXIL) 10 mg tablet; 1 tablet  - Discontinue: umeclidinium-vilanterol (Anoro Ellipta) 62 5-25 MCG/INH inhaler; 1 (one) time each day  -     Discontinue: levothyroxine 100 mcg tablet  -     Cancel: Hepatitis C antibody; Future  -     Cancel: Cologuard; Future        Check labs, refis given, refer to ortho, she cannot take nsaids due to hx of john paul,htn  Tylenol w codeine for pain    Does not want to do colonoscopy, agreeable to do cologuard, ordered  She is aware that she will need to do colonoscopy if cologuard is positive and small lesions may be missed

## 2020-09-08 ENCOUNTER — APPOINTMENT (OUTPATIENT)
Dept: LAB | Facility: CLINIC | Age: 65
End: 2020-09-08
Payer: COMMERCIAL

## 2020-09-08 DIAGNOSIS — E66.3 OVERWEIGHT: ICD-10-CM

## 2020-09-08 DIAGNOSIS — Z11.59 ENCOUNTER FOR HEPATITIS C SCREENING TEST FOR LOW RISK PATIENT: ICD-10-CM

## 2020-09-08 DIAGNOSIS — J44.0 CHRONIC OBSTRUCTIVE PULMONARY DISEASE WITH ACUTE LOWER RESPIRATORY INFECTION (HCC): ICD-10-CM

## 2020-09-08 DIAGNOSIS — F41.8 DEPRESSION WITH ANXIETY: ICD-10-CM

## 2020-09-08 DIAGNOSIS — E03.8 OTHER SPECIFIED HYPOTHYROIDISM: ICD-10-CM

## 2020-09-08 LAB
ALBUMIN SERPL BCP-MCNC: 3.6 G/DL (ref 3.5–5)
ALP SERPL-CCNC: 168 U/L (ref 46–116)
ALT SERPL W P-5'-P-CCNC: 22 U/L (ref 12–78)
ANION GAP SERPL CALCULATED.3IONS-SCNC: 7 MMOL/L (ref 4–13)
AST SERPL W P-5'-P-CCNC: 16 U/L (ref 5–45)
BASOPHILS # BLD AUTO: 0.03 THOUSANDS/ΜL (ref 0–0.1)
BASOPHILS NFR BLD AUTO: 1 % (ref 0–1)
BILIRUB SERPL-MCNC: 0.22 MG/DL (ref 0.2–1)
BUN SERPL-MCNC: 28 MG/DL (ref 5–25)
CALCIUM SERPL-MCNC: 9 MG/DL (ref 8.3–10.1)
CHLORIDE SERPL-SCNC: 108 MMOL/L (ref 100–108)
CHOLEST SERPL-MCNC: 185 MG/DL (ref 50–200)
CO2 SERPL-SCNC: 29 MMOL/L (ref 21–32)
CREAT SERPL-MCNC: 1.25 MG/DL (ref 0.6–1.3)
EOSINOPHIL # BLD AUTO: 0.14 THOUSAND/ΜL (ref 0–0.61)
EOSINOPHIL NFR BLD AUTO: 3 % (ref 0–6)
ERYTHROCYTE [DISTWIDTH] IN BLOOD BY AUTOMATED COUNT: 14.1 % (ref 11.6–15.1)
GFR SERPL CREATININE-BSD FRML MDRD: 45 ML/MIN/1.73SQ M
GLUCOSE P FAST SERPL-MCNC: 88 MG/DL (ref 65–99)
HCT VFR BLD AUTO: 39.5 % (ref 34.8–46.1)
HCV AB SER QL: NORMAL
HDLC SERPL-MCNC: 64 MG/DL
HGB BLD-MCNC: 11.8 G/DL (ref 11.5–15.4)
IMM GRANULOCYTES # BLD AUTO: 0.02 THOUSAND/UL (ref 0–0.2)
IMM GRANULOCYTES NFR BLD AUTO: 0 % (ref 0–2)
LDLC SERPL CALC-MCNC: 106 MG/DL (ref 0–100)
LYMPHOCYTES # BLD AUTO: 1.03 THOUSANDS/ΜL (ref 0.6–4.47)
LYMPHOCYTES NFR BLD AUTO: 21 % (ref 14–44)
MCH RBC QN AUTO: 26.1 PG (ref 26.8–34.3)
MCHC RBC AUTO-ENTMCNC: 29.9 G/DL (ref 31.4–37.4)
MCV RBC AUTO: 87 FL (ref 82–98)
MONOCYTES # BLD AUTO: 0.45 THOUSAND/ΜL (ref 0.17–1.22)
MONOCYTES NFR BLD AUTO: 9 % (ref 4–12)
NEUTROPHILS # BLD AUTO: 3.33 THOUSANDS/ΜL (ref 1.85–7.62)
NEUTS SEG NFR BLD AUTO: 66 % (ref 43–75)
NONHDLC SERPL-MCNC: 121 MG/DL
NRBC BLD AUTO-RTO: 0 /100 WBCS
PLATELET # BLD AUTO: 188 THOUSANDS/UL (ref 149–390)
PMV BLD AUTO: 10.1 FL (ref 8.9–12.7)
POTASSIUM SERPL-SCNC: 4.9 MMOL/L (ref 3.5–5.3)
PROT SERPL-MCNC: 7.1 G/DL (ref 6.4–8.2)
RBC # BLD AUTO: 4.52 MILLION/UL (ref 3.81–5.12)
SODIUM SERPL-SCNC: 144 MMOL/L (ref 136–145)
TRIGL SERPL-MCNC: 74 MG/DL
TSH SERPL DL<=0.05 MIU/L-ACNC: 0.65 UIU/ML (ref 0.36–3.74)
WBC # BLD AUTO: 5 THOUSAND/UL (ref 4.31–10.16)

## 2020-09-08 PROCEDURE — 85025 COMPLETE CBC W/AUTO DIFF WBC: CPT

## 2020-09-08 PROCEDURE — 36415 COLL VENOUS BLD VENIPUNCTURE: CPT

## 2020-09-08 PROCEDURE — 80053 COMPREHEN METABOLIC PANEL: CPT

## 2020-09-08 PROCEDURE — 84443 ASSAY THYROID STIM HORMONE: CPT

## 2020-09-08 PROCEDURE — 86803 HEPATITIS C AB TEST: CPT

## 2020-09-08 PROCEDURE — 80061 LIPID PANEL: CPT

## 2020-09-09 DIAGNOSIS — R74.8 ELEVATED ALKALINE PHOSPHATASE LEVEL: Primary | ICD-10-CM

## 2020-09-09 LAB
LAB AP GYN PRIMARY INTERPRETATION: NORMAL
Lab: NORMAL

## 2020-09-18 ENCOUNTER — HOSPITAL ENCOUNTER (OUTPATIENT)
Dept: ULTRASOUND IMAGING | Facility: HOSPITAL | Age: 65
Discharge: HOME | End: 2020-09-18
Payer: COMMERCIAL

## 2020-09-18 ENCOUNTER — OPTICAL OFFICE (OUTPATIENT)
Dept: URBAN - METROPOLITAN AREA CLINIC 143 | Facility: CLINIC | Age: 65
Setting detail: OPHTHALMOLOGY
End: 2020-09-18
Payer: COMMERCIAL

## 2020-09-18 DIAGNOSIS — H52.221: ICD-10-CM

## 2020-09-18 DIAGNOSIS — R74.8 ELEVATED ALKALINE PHOSPHATASE LEVEL: ICD-10-CM

## 2020-09-18 DIAGNOSIS — H52.12: ICD-10-CM

## 2020-09-18 PROCEDURE — 76705 ECHO EXAM OF ABDOMEN: CPT

## 2020-09-18 PROCEDURE — V2020 VISION SVCS FRAMES PURCHASES: HCPCS | Performed by: OPHTHALMOLOGY

## 2020-09-18 PROCEDURE — V2200 LENS SPHER BIFOC PLANO 4.00D: HCPCS | Performed by: OPHTHALMOLOGY

## 2020-09-18 PROCEDURE — V2025 EYEGLASSES DELUX FRAMES: HCPCS | Performed by: OPHTHALMOLOGY

## 2020-09-18 PROCEDURE — V2203 LENS SPHCYL BIFOCAL 4.00D/.1: HCPCS | Performed by: OPHTHALMOLOGY

## 2020-09-21 DIAGNOSIS — N18.30 CHRONIC KIDNEY DISEASE, STAGE 3 (MODERATE): Primary | ICD-10-CM

## 2020-09-30 ENCOUNTER — CONSULT (OUTPATIENT)
Dept: OBGYN CLINIC | Facility: CLINIC | Age: 65
End: 2020-09-30
Payer: COMMERCIAL

## 2020-09-30 VITALS
HEART RATE: 62 BPM | WEIGHT: 175 LBS | SYSTOLIC BLOOD PRESSURE: 138 MMHG | HEIGHT: 65 IN | DIASTOLIC BLOOD PRESSURE: 75 MMHG | TEMPERATURE: 97.9 F | BODY MASS INDEX: 29.16 KG/M2

## 2020-09-30 DIAGNOSIS — M65.311 TRIGGER FINGER OF RIGHT THUMB: Primary | ICD-10-CM

## 2020-09-30 PROCEDURE — 20550 NJX 1 TENDON SHEATH/LIGAMENT: CPT | Performed by: SURGERY

## 2020-09-30 PROCEDURE — 99204 OFFICE O/P NEW MOD 45 MIN: CPT | Performed by: SURGERY

## 2020-09-30 RX ORDER — DEXAMETHASONE SODIUM PHOSPHATE 100 MG/10ML
40 INJECTION INTRAMUSCULAR; INTRAVENOUS
Status: COMPLETED | OUTPATIENT
Start: 2020-09-30 | End: 2020-09-30

## 2020-09-30 RX ORDER — LIDOCAINE HYDROCHLORIDE 10 MG/ML
1 INJECTION, SOLUTION INFILTRATION; PERINEURAL
Status: COMPLETED | OUTPATIENT
Start: 2020-09-30 | End: 2020-09-30

## 2020-09-30 RX ADMIN — DEXAMETHASONE SODIUM PHOSPHATE 40 MG: 100 INJECTION INTRAMUSCULAR; INTRAVENOUS at 15:39

## 2020-09-30 RX ADMIN — LIDOCAINE HYDROCHLORIDE 1 ML: 10 INJECTION, SOLUTION INFILTRATION; PERINEURAL at 15:39

## 2020-09-30 NOTE — PROGRESS NOTES
Tu COLMENARES  Attending, Orthopaedic Surgery  Hand, Wrist, and Elbow Surgery  [facility]      ORTHOPAEDIC HAND, WRIST, AND ELBOW OFFICE  VISIT       ASSESSMENT/PLAN:      72 y o  female with a right thumb trigger finger  The etiology of a trigger finger was discussed with Princess Reilly today as well as treatment options  A right thumb trigger finger CSI was performed in the office without complication  Post injection protocol was discussed  If the CSI is beneficial it can be repeated in 6 weeks time  I will only perform 2 trigger finger CSI's before there is a theoretic change of tendon rupture  If the CSI is not beneficial, a trigger finger release may be discussed in 6 weeks time  Follow up in 6 weeks time for re-evaluation  [place] interpretation was provided     The patient verbalized understanding of exam findings and treatment plan  We engaged in the shared decision-making process and treatment options were discussed at length with the patient  Surgical and conservative management discussed today along with risks and benefits  Diagnoses and all orders for this visit:    Trigger finger of right thumb  -     Ambulatory referral to Orthopedic Surgery    Other orders  -     Hand/upper extremity injection      Follow Up:  Return in about 6 weeks (around 11/11/2020)  To Do Next Visit:  Re-evaluation of current issue    General Discussions:  Trigger Finger: The anatomy and physiology of trigger finger was discussed with the patient today in the office  Edema and increased contact pressure within the flexor tendons at the A1 pulley can cause pain, crepitation, and triggering or locking of the digit resulting in limitation of function  Symptoms can occur at anytime but are typically worse in the morning or after a brief rest from repetitive activity    Treatment options include resting/nighttime MP blocking splints to decrease edema, oral anti-inflammatory medications, home or formal therapy exercises, up to 2 steroid injections within the tendon sheath, or surgical release  While majority of patients do respond to conservative treatment, up to 20% may require surgical release        ____________________________________________________________________________________________________________________________________________      CHIEF COMPLAINT:  Chief Complaint   Patient presents with    Right Thumb - Pain       SUBJECTIVE:  Edward Bentley is a 72y o  year old RHD female who presents to the office today for right thumb clicking, catching and locking  Noreen was referred to the office for consultation by her PCP, Dr Emigdio Rodriguez  The patient states that her right thumb has been clicking, catching and locking for aprox  1 month  She denies any injury or trama  She notes pain to her right thumb A1 pulley  She denies having to use the other hand to unlock her thumb  She did undergo bilateral carpal tunnel release's performed in [facility] at least 10 years ago        Pain/symptom timing:  Worse during the day when active  Pain/symptom context:  Worse with activites and work  Pain/symptom modifying factors:  Rest makes better, activities make worse  Pain/symptom associated signs/symptoms: none    Prior treatment   · NSAIDsNot Asked   · Injections No   · Bracing/Orthotics No    Physical Therapy No     I have personally reviewed all the relevant PMH, PSH, SH, FH, Medications and allergies      PAST MEDICAL HISTORY:  Past Medical History:   Diagnosis Date    [facility]     Allergies     Anemia     [facility]     Chronic kidney disease (CKD), stage III (moderate) (HCC)     [facility]ch[facility] obstructive [facility] disease) [facility]     Disease of thyroid gland     Essential hypertension     GERD (gastroesophageal reflux disease)     Glaucoma     High blood pressure     Hyperlipidemia     Hypothyroidism     [facility] ) 2014    chemo and rad therapy 2014       PAST SURGICAL HISTORY:  Past Surgical History:   Procedure Laterality Date    COLONOSCOPY  2016    ESOPHAGOGASTRODUODENOSCOPY  2016    LARYNGOSCOPY      w/ Bx      TUBAL LIGATION         FAMILY HISTORY:  Family History   Problem Relation Age of Onset    Brain cancer Sister    David Ziegler Breast cancer Sister     Diabetes Sister    David Ziegler Other Mother         respiratory disorder   David Ziegler Sudden death Father         shot himself    Suicidality Father        SOCIAL HISTORY:  Social History     Tobacco Use    Smoking status: Former Smoker     Years: 40 00     Last attempt to quit:      Years since quittin 7    Smokeless tobacco: Never Used   Substance Use Topics    Alcohol use: Not on file     Comment: None    Drug use: Not on file     Comment: Denies       MEDICATIONS:    Current Outpatient Medications:     acetaminophen-codeine (TYLENOL #2) 300-15 MG per tablet, Take 1 tablet by mouth every 12 (twelve) hours as needed for moderate pain, Disp: 10 tablet, Rfl: 0    albuterol (PROVENTIL HFA,VENTOLIN HFA) 90 mcg/act inhaler, Inhale 2 puffs every 4 (four) hours as needed for wheezing, Disp: 2 Inhaler, Rfl: 2    brimonidine tartrate 0 2 % ophthalmic solution, Apply 1 drop to eye Three times a day, Disp: , Rfl:     fluticasone-umeclidinium-vilanterol (Trelegy Ellipta) 100-62 5-25 MCG/INH inhaler, Inhale 1 puff daily Rinse mouth after use , Disp: 1 Inhaler, Rfl: 3    ipratropium-albuterol (DUO-NEB) 0 5-2 5 mg/3 mL nebulizer solution, TAKE 1 VIAL BY NEBULIZATION EVERY 6 (SIX) HOURS AS NEEDED FOR WHEEZING OR SHORTNESS OF BREATH, Disp: 180 vial, Rfl: 1    levothyroxine 100 mcg tablet, Take 1 tablet (100 mcg total) by mouth daily, Disp: 90 tablet, Rfl: 1    montelukast (SINGULAIR) 10 mg tablet, Take 1 tablet (10 mg total) by mouth daily at bedtime, Disp: 90 tablet, Rfl: 1    PARoxetine (PAXIL) 10 mg tablet, Take 1 tablet (10 mg total) by mouth daily, Disp: 90 tablet, Rfl: 1    timolol (TIMOPTIC) 0 5 % ophthalmic solution, INSTILL 1 DROP INTO EACH EYE TWICE DAILY, Disp: , Rfl:     umeclidinium-vilanterol (Anoro Ellipta) 62 5-25 MCG/INH inhaler, Inhale 1 puff daily, Disp: 1 Inhaler, Rfl: 1    ALLERGIES:  Allergies   Allergen Reactions    Amifostine     Pollen Extract            REVIEW OF SYSTEMS:  Review of Systems   Constitutional: Negative for chills, fever and unexpected weight change  HENT: Negative for hearing loss, nosebleeds and sore throat  Eyes: Negative for pain, redness and visual disturbance  Respiratory: Negative for cough, shortness of breath and wheezing  Cardiovascular: Negative for chest pain, palpitations and leg swelling  Gastrointestinal: Negative for abdominal pain, nausea and vomiting  Endocrine: Negative for polydipsia and polyuria  Genitourinary: Negative for difficulty urinating and hematuria  Musculoskeletal: Negative for arthralgias, joint swelling and myalgias  Skin: Negative for rash and wound  Neurological: Negative for dizziness, numbness and headaches  Psychiatric/Behavioral: Negative for decreased concentration, dysphoric mood and suicidal ideas  The patient is not nervous/anxious          VITALS:  Vitals:    09/30/20 1505   BP: 138/75   Pulse: 62   Temp: 97 9 °F (36 6 °C)       LABS:  HgA1c: No results found for: HGBA1C  BMP:   Lab Results   Component Value Date    GLUCOSE 83 02/25/2015    CALCIUM 9 0 09/08/2020     02/25/2015    K 4 9 09/08/2020    CO2 29 09/08/2020     09/08/2020    BUN 28 (H) 09/08/2020    CREATININE 1 25 09/08/2020       _____________________________________________________  PHYSICAL EXAMINATION:  General: well developed and well nourished, alert, oriented times 3 and appears comfortable  Psychiatric: Normal  HEENT: Normocephalic, Atraumatic Trachea Midline, No torticollis  Pulmonary: No audible wheezing or respiratory distress   Cardiovascular: No pitting edema, 2+ radial pulse   Skin: No masses, erythema, lacerations, fluctation, ulcerations  Neurovascular: Sensation Intact to the Median, Ulnar, Radial Nerve, Motor Intact to the Median, Ulnar, Radial Nerve and Pulses Intact  Musculoskeletal: Normal, except as noted in detailed exam and in HPI  MUSCULOSKELETAL EXAMINATION:    Right thumb:  Positive palpable nodule over the A1 pulley  Positive tenderness to palpation over A1 pulley  Positive catching  Positive clicking   Full composite fist  Brisk capillary refill      ___________________________________________________  STUDIES REVIEWED:  No new imaging to review           PROCEDURES PERFORMED:  Hand/upper extremity injection: R thumb A1  Date/Time: 9/30/2020 3:39 PM  Consent given by: patient  Site marked: site marked  Timeout: Immediately prior to procedure a time out was called to verify the correct patient, procedure, equipment, support staff and site/side marked as required   Supporting Documentation  Indications: pain   Procedure Details  Condition:trigger finger Location: thumb - R thumb A1   Preparation: Patient was prepped and draped in the usual sterile fashion  Needle size: 25 G  Ultrasound guidance: no  Medications administered: 1 mL lidocaine 1 %; 40 mg dexamethasone 100 mg/10 mL    Patient tolerance: patient tolerated the procedure well with no immediate complications  Dressing:  Sterile dressing applied            _____________________________________________________      Scribe Attestation    I,:   Haseeb Fermin am acting as a scribe while in the presence of the attending physician :        I,:   Tu Westbrook MD personally performed the services described in this documentation    as scribed in my presence :

## 2020-10-07 ENCOUNTER — CLINICAL SUPPORT (OUTPATIENT)
Dept: FAMILY MEDICINE CLINIC | Facility: CLINIC | Age: 65
End: 2020-10-07
Payer: COMMERCIAL

## 2020-10-07 DIAGNOSIS — Z23 ENCOUNTER FOR IMMUNIZATION: Primary | ICD-10-CM

## 2020-10-07 PROCEDURE — G0008 ADMIN INFLUENZA VIRUS VAC: HCPCS

## 2020-10-07 PROCEDURE — 90670 PCV13 VACCINE IM: CPT

## 2020-10-07 PROCEDURE — G0009 ADMIN PNEUMOCOCCAL VACCINE: HCPCS

## 2020-10-07 PROCEDURE — 90662 IIV NO PRSV INCREASED AG IM: CPT

## 2020-10-12 ENCOUNTER — TELEPHONE (OUTPATIENT)
Dept: FAMILY MEDICINE CLINIC | Facility: CLINIC | Age: 65
End: 2020-10-12

## 2020-11-11 ENCOUNTER — OFFICE VISIT (OUTPATIENT)
Dept: OBGYN CLINIC | Facility: CLINIC | Age: 65
End: 2020-11-11
Payer: COMMERCIAL

## 2020-11-11 VITALS
BODY MASS INDEX: 29.99 KG/M2 | DIASTOLIC BLOOD PRESSURE: 83 MMHG | WEIGHT: 180 LBS | HEIGHT: 65 IN | HEART RATE: 65 BPM | SYSTOLIC BLOOD PRESSURE: 138 MMHG

## 2020-11-11 DIAGNOSIS — M65.311 TRIGGER FINGER OF RIGHT THUMB: Primary | ICD-10-CM

## 2020-11-11 PROCEDURE — 99213 OFFICE O/P EST LOW 20 MIN: CPT | Performed by: SURGERY

## 2020-11-11 PROCEDURE — 20550 NJX 1 TENDON SHEATH/LIGAMENT: CPT | Performed by: SURGERY

## 2020-11-11 RX ORDER — DEXAMETHASONE SODIUM PHOSPHATE 100 MG/10ML
40 INJECTION INTRAMUSCULAR; INTRAVENOUS
Status: COMPLETED | OUTPATIENT
Start: 2020-11-11 | End: 2020-11-11

## 2020-11-11 RX ORDER — LIDOCAINE HYDROCHLORIDE 10 MG/ML
1 INJECTION, SOLUTION INFILTRATION; PERINEURAL
Status: COMPLETED | OUTPATIENT
Start: 2020-11-11 | End: 2020-11-11

## 2020-11-11 RX ADMIN — DEXAMETHASONE SODIUM PHOSPHATE 40 MG: 100 INJECTION INTRAMUSCULAR; INTRAVENOUS at 14:42

## 2020-11-11 RX ADMIN — LIDOCAINE HYDROCHLORIDE 1 ML: 10 INJECTION, SOLUTION INFILTRATION; PERINEURAL at 14:42

## 2020-12-10 ENCOUNTER — OFFICE VISIT (OUTPATIENT)
Dept: OBGYN CLINIC | Facility: CLINIC | Age: 65
End: 2020-12-10
Payer: COMMERCIAL

## 2020-12-10 ENCOUNTER — LAB (OUTPATIENT)
Dept: LAB | Facility: CLINIC | Age: 65
End: 2020-12-10
Payer: COMMERCIAL

## 2020-12-10 VITALS
BODY MASS INDEX: 29.32 KG/M2 | HEART RATE: 75 BPM | SYSTOLIC BLOOD PRESSURE: 135 MMHG | WEIGHT: 176 LBS | DIASTOLIC BLOOD PRESSURE: 80 MMHG | HEIGHT: 65 IN

## 2020-12-10 DIAGNOSIS — Z01.812 ENCOUNTER FOR PRE-OPERATIVE LABORATORY TESTING: ICD-10-CM

## 2020-12-10 DIAGNOSIS — M65.311 TRIGGER FINGER OF RIGHT THUMB: Primary | ICD-10-CM

## 2020-12-10 DIAGNOSIS — M65.311 TRIGGER FINGER OF RIGHT THUMB: ICD-10-CM

## 2020-12-10 LAB
ANION GAP SERPL CALCULATED.3IONS-SCNC: 6 MMOL/L (ref 4–13)
BUN SERPL-MCNC: 27 MG/DL (ref 5–25)
CALCIUM SERPL-MCNC: 9.3 MG/DL (ref 8.3–10.1)
CHLORIDE SERPL-SCNC: 104 MMOL/L (ref 100–108)
CO2 SERPL-SCNC: 31 MMOL/L (ref 21–32)
CREAT SERPL-MCNC: 1.24 MG/DL (ref 0.6–1.3)
GFR SERPL CREATININE-BSD FRML MDRD: 46 ML/MIN/1.73SQ M
GLUCOSE SERPL-MCNC: 83 MG/DL (ref 65–140)
POTASSIUM SERPL-SCNC: 4.2 MMOL/L (ref 3.5–5.3)
SODIUM SERPL-SCNC: 141 MMOL/L (ref 136–145)

## 2020-12-10 PROCEDURE — 93005 ELECTROCARDIOGRAM TRACING: CPT

## 2020-12-10 PROCEDURE — 36415 COLL VENOUS BLD VENIPUNCTURE: CPT

## 2020-12-10 PROCEDURE — 80048 BASIC METABOLIC PNL TOTAL CA: CPT

## 2020-12-10 PROCEDURE — 99214 OFFICE O/P EST MOD 30 MIN: CPT | Performed by: SURGERY

## 2020-12-11 LAB
ATRIAL RATE: 59 BPM
P AXIS: 83 DEGREES
PR INTERVAL: 160 MS
QRS AXIS: 27 DEGREES
QRSD INTERVAL: 84 MS
QT INTERVAL: 432 MS
QTC INTERVAL: 427 MS
T WAVE AXIS: 29 DEGREES
VENTRICULAR RATE: 59 BPM

## 2020-12-29 ENCOUNTER — TELEMEDICINE (OUTPATIENT)
Dept: FAMILY MEDICINE CLINIC | Facility: CLINIC | Age: 65
End: 2020-12-29
Payer: COMMERCIAL

## 2020-12-29 ENCOUNTER — HOSPITAL ENCOUNTER (OUTPATIENT)
Dept: BONE DENSITY | Facility: IMAGING CENTER | Age: 65
Discharge: HOME | End: 2020-12-29
Payer: COMMERCIAL

## 2020-12-29 ENCOUNTER — HOSPITAL ENCOUNTER (OUTPATIENT)
Dept: MAMMOGRAPHY | Facility: IMAGING CENTER | Age: 65
Discharge: HOME | End: 2020-12-29
Payer: COMMERCIAL

## 2020-12-29 VITALS — BODY MASS INDEX: 26.68 KG/M2 | WEIGHT: 170 LBS | HEIGHT: 67 IN

## 2020-12-29 DIAGNOSIS — J45.50 SEVERE PERSISTENT ASTHMA WITHOUT COMPLICATION: ICD-10-CM

## 2020-12-29 DIAGNOSIS — R91.8 MULTIPLE LUNG NODULES ON CT: ICD-10-CM

## 2020-12-29 DIAGNOSIS — J47.1 BRONCHIECTASIS WITH ACUTE EXACERBATION (HCC): ICD-10-CM

## 2020-12-29 DIAGNOSIS — Z12.31 VISIT FOR SCREENING MAMMOGRAM: ICD-10-CM

## 2020-12-29 DIAGNOSIS — Z13.820 SCREENING FOR OSTEOPOROSIS: ICD-10-CM

## 2020-12-29 DIAGNOSIS — Z00.00 MEDICARE ANNUAL WELLNESS VISIT, SUBSEQUENT: Primary | ICD-10-CM

## 2020-12-29 DIAGNOSIS — J43.9 MILD EMPHYSEMA (HCC): ICD-10-CM

## 2020-12-29 PROCEDURE — 77080 DXA BONE DENSITY AXIAL: CPT

## 2020-12-29 PROCEDURE — 77067 SCR MAMMO BI INCL CAD: CPT

## 2020-12-29 PROCEDURE — G0439 PPPS, SUBSEQ VISIT: HCPCS | Performed by: FAMILY MEDICINE

## 2020-12-29 PROCEDURE — 77063 BREAST TOMOSYNTHESIS BI: CPT

## 2020-12-29 PROCEDURE — 99213 OFFICE O/P EST LOW 20 MIN: CPT | Performed by: FAMILY MEDICINE

## 2020-12-29 RX ORDER — FLUTICASONE PROPIONATE 110 UG/1
2 AEROSOL, METERED RESPIRATORY (INHALATION) 2 TIMES DAILY
Qty: 1 INHALER | Refills: 0 | Status: SHIPPED | OUTPATIENT
Start: 2020-12-29 | End: 2021-01-12 | Stop reason: SDUPTHER

## 2020-12-29 RX ORDER — MONTELUKAST SODIUM 10 MG/1
10 TABLET ORAL
Qty: 90 TABLET | Refills: 1 | Status: SHIPPED | OUTPATIENT
Start: 2020-12-29 | End: 2021-04-07 | Stop reason: SDUPTHER

## 2020-12-29 RX ORDER — TIOTROPIUM BROMIDE AND OLODATEROL 3.124; 2.736 UG/1; UG/1
SPRAY, METERED RESPIRATORY (INHALATION)
COMMUNITY
Start: 2020-12-21 | End: 2021-01-12 | Stop reason: SDUPTHER

## 2021-01-05 ENCOUNTER — ANESTHESIA EVENT (OUTPATIENT)
Dept: PERIOP | Facility: AMBULARY SURGERY CENTER | Age: 66
End: 2021-01-05
Payer: COMMERCIAL

## 2021-01-08 ENCOUNTER — TELEMEDICINE (OUTPATIENT)
Dept: FAMILY MEDICINE CLINIC | Facility: CLINIC | Age: 66
End: 2021-01-08
Payer: COMMERCIAL

## 2021-01-08 DIAGNOSIS — M79.10 MYALGIA: ICD-10-CM

## 2021-01-08 DIAGNOSIS — G44.209 ACUTE NON INTRACTABLE TENSION-TYPE HEADACHE: ICD-10-CM

## 2021-01-08 DIAGNOSIS — G44.209 ACUTE NON INTRACTABLE TENSION-TYPE HEADACHE: Primary | ICD-10-CM

## 2021-01-08 PROCEDURE — U0003 INFECTIOUS AGENT DETECTION BY NUCLEIC ACID (DNA OR RNA); SEVERE ACUTE RESPIRATORY SYNDROME CORONAVIRUS 2 (SARS-COV-2) (CORONAVIRUS DISEASE [COVID-19]), AMPLIFIED PROBE TECHNIQUE, MAKING USE OF HIGH THROUGHPUT TECHNOLOGIES AS DESCRIBED BY CMS-2020-01-R: HCPCS | Performed by: FAMILY MEDICINE

## 2021-01-08 PROCEDURE — U0005 INFEC AGEN DETEC AMPLI PROBE: HCPCS | Performed by: FAMILY MEDICINE

## 2021-01-08 PROCEDURE — 99213 OFFICE O/P EST LOW 20 MIN: CPT | Performed by: FAMILY MEDICINE

## 2021-01-08 NOTE — PROGRESS NOTES
Virtual Regular Visit      Assessment/Plan:    Problem List Items Addressed This Visit        Other    Myalgia     Mild achiness check covid  otc tylenol or advil         Tension type headache - Primary     Pt says very mild only use tylenol or advil pt concerned about covid                    Reason for visit is  achiness  Chief Complaint   Patient presents with    URI    Virtual Regular Visit        Encounter provider Leon Freeman MD    Provider located at [address] [place] [address]  60  [address] [place] [id number] [phone]      Recent Visits  No visits were found meeting these conditions  Showing recent visits within past 7 days and meeting all other requirements     Today's Visits  Date Type Provider Dept   01/08/21 Telemedicine Leon Freeman MD [address] today's visits and meeting all other requirements     Future Appointments  No visits were found meeting these conditions  Showing future appointments within next 150 days and meeting all other requirements        The patient was identified by name and date of birth  Higinio Dee was informed that this is a telemedicine visit and that the visit is being conducted through [facility] and patient was informed that this is a secure, HIPAA-compliant platform  She agrees to proceed     My office door was closed  No one else was in the room  She acknowledged consent and understanding of privacy and security of the video platform  The patient has agreed to participate and understands they can discontinue the visit at any time  Patient is aware this is a billable service  Subjective  Higinio Dee is a 72 y o  female pt with mild headache  And mild body aches is concerned about covid since  goes in and out no other symptoms        HPI     Past Medical History:   Diagnosis Date    Acute kidney failure (HCC)     Allergies     Anemia     Cancer ([facility] [address] )     Chronic kidney disease (CKD), stage III (moderate)     COPD (chronic obstructive pulmonary disease) (HCC)     Disease of thyroid gland     Essential hypertension     GERD (gastroesophageal reflux disease)     Glaucoma     High blood pressure     Hyperlipidemia     Hypothyroidism     [facility]roat cancer ([facility] [address] ) 2014    chemo and rad therapy 2014       Past Surgical History:   Procedure Laterality Date    COLONOSCOPY  2016    ESOPHAGOGASTRODUODENOSCOPY  2016    LARYNGOSCOPY      w/ Bx      TUBAL LIGATION         Current Outpatient Medications   Medication Sig Dispense Refill    acetaminophen-codeine (TYLENOL #2) 300-15 MG per tablet Take 1 tablet by mouth every 12 (twelve) hours as needed for moderate pain (Patient not taking: Reported on 12/29/2020) 10 tablet 0    albuterol (PROVENTIL HFA,VENTOLIN HFA) 90 mcg/act inhaler Inhale 2 puffs every 4 (four) hours as needed for wheezing 2 Inhaler 2    brimonidine tartrate 0 2 % ophthalmic solution Apply 1 drop to eye Three times a day      fluticasone (FLOVENT HFA) 110 MCG/ACT inhaler Inhale 2 puffs 2 (two) times a day Rinse mouth after use  1 Inhaler 0    ipratropium-albuterol (DUO-NEB) 0 5-2 5 mg/3 mL nebulizer solution TAKE 1 VIAL BY NEBULIZATION EVERY 6 (SIX) HOURS AS NEEDED FOR WHEEZING OR SHORTNESS OF BREATH 180 vial 1    levothyroxine 100 mcg tablet Take 1 tablet (100 mcg total) by mouth daily 90 tablet 1    montelukast (SINGULAIR) 10 mg tablet Take 1 tablet (10 mg total) by mouth daily at bedtime 90 tablet 1    PARoxetine (PAXIL) 10 mg tablet Take 1 tablet (10 mg total) by mouth daily 90 tablet 1    Stiolto Respimat 2 5-2 5 MCG/ACT inhaler       timolol (TIMOPTIC) 0 5 % ophthalmic solution INSTILL 1 DROP INTO EACH EYE TWICE DAILY       No current facility-administered medications for this visit  Allergies   Allergen Reactions    Amifostine     Pollen Extract        Review of Systems   Constitutional: Negative for appetite change, chills, fatigue and fever     HENT: Negative for congestion, rhinorrhea, sinus pain and sore throat  Eyes: Negative for discharge  Respiratory: Negative for cough, shortness of breath and wheezing  Cardiovascular: Negative for chest pain and palpitations  Gastrointestinal: Negative for abdominal pain, diarrhea, nausea and vomiting  Musculoskeletal: Positive for myalgias  Neurological: Positive for headaches (very mild)  Psychiatric/Behavioral: Negative for dysphoric mood  The patient is not nervous/anxious  Video Exam    There were no vitals filed for this visit  Physical Exam  Constitutional:       General: She is not in acute distress  Appearance: Normal appearance  She is well-developed  She is not ill-appearing  Neck:      Musculoskeletal: Normal range of motion  Thyroid: No thyromegaly  Cardiovascular:      Rate and Rhythm: Normal rate  Pulmonary:      Effort: Pulmonary effort is normal  No respiratory distress  Breath sounds: Normal breath sounds  Neurological:      General: No focal deficit present  Mental Status: She is alert and oriented to person, place, and time  Mental status is at baseline  Psychiatric:         Behavior: Behavior normal          Thought Content: Thought content normal           I spent 15 minutes directly with the patient during this visit      [address] acknowledges that she has consented to an online visit or consultation  She understands that the online visit is based solely on information provided by her, and that, in the absence of a face-to-face physical evaluation by the physician, the diagnosis she receives is both limited and provisional in terms of accuracy and completeness  This is not intended to replace a full medical face-to-face evaluation by the physician  [facility] understands and accepts these terms

## 2021-01-09 LAB — SARS-COV-2 RNA SPEC QL NAA+PROBE: DETECTED

## 2021-01-12 ENCOUNTER — TELEMEDICINE (OUTPATIENT)
Dept: FAMILY MEDICINE CLINIC | Facility: CLINIC | Age: 66
End: 2021-01-12
Payer: COMMERCIAL

## 2021-01-12 ENCOUNTER — TELEPHONE (OUTPATIENT)
Dept: FAMILY MEDICINE CLINIC | Facility: CLINIC | Age: 66
End: 2021-01-12

## 2021-01-12 DIAGNOSIS — J47.1 BRONCHIECTASIS WITH ACUTE EXACERBATION (HCC): ICD-10-CM

## 2021-01-12 DIAGNOSIS — R05.9 COUGH: ICD-10-CM

## 2021-01-12 DIAGNOSIS — J43.9 MILD EMPHYSEMA (HCC): ICD-10-CM

## 2021-01-12 DIAGNOSIS — G44.83 PRIMARY COUGH HEADACHE: ICD-10-CM

## 2021-01-12 DIAGNOSIS — J45.50 SEVERE PERSISTENT ASTHMA WITHOUT COMPLICATION: ICD-10-CM

## 2021-01-12 DIAGNOSIS — U07.1 COVID-19 VIRUS DETECTED: Primary | ICD-10-CM

## 2021-01-12 PROCEDURE — 99214 OFFICE O/P EST MOD 30 MIN: CPT | Performed by: FAMILY MEDICINE

## 2021-01-12 RX ORDER — TIOTROPIUM BROMIDE AND OLODATEROL 3.124; 2.736 UG/1; UG/1
2 SPRAY, METERED RESPIRATORY (INHALATION) DAILY
Qty: 1 INHALER | Refills: 5 | Status: SHIPPED | OUTPATIENT
Start: 2021-01-12 | End: 2021-04-07 | Stop reason: SDUPTHER

## 2021-01-12 RX ORDER — FLUTICASONE PROPIONATE 110 UG/1
2 AEROSOL, METERED RESPIRATORY (INHALATION) 2 TIMES DAILY
Qty: 1 INHALER | Refills: 5 | Status: ON HOLD | OUTPATIENT
Start: 2021-01-12 | End: 2021-02-16 | Stop reason: ALTCHOICE

## 2021-01-12 RX ORDER — PROMETHAZINE HYDROCHLORIDE AND CODEINE PHOSPHATE 6.25; 1 MG/5ML; MG/5ML
5 SYRUP ORAL EVERY 6 HOURS PRN
Qty: 180 ML | Refills: 0 | Status: ON HOLD | OUTPATIENT
Start: 2021-01-12 | End: 2021-02-16 | Stop reason: ALTCHOICE

## 2021-01-12 NOTE — TELEPHONE ENCOUNTER
Spoke with daughter- she is willing to pay for it, cannot obtain prior Lorenzo Almonte  She cannot take NSAIDs and has Tylenol with codeine

## 2021-01-12 NOTE — TELEPHONE ENCOUNTER
Pharmacy called & said medication ordered for her is not covered with her insurance   (Promethazine-codeine)  [facility]

## 2021-01-12 NOTE — PROGRESS NOTES
COVID-19 Virtual Visit     Assessment/Plan:    Problem List Items Addressed This Visit        Respiratory    Mild emphysema (HCC)    Relevant Medications    promethazine-codeine (PHENERGAN WITH CODEINE) 6 25-10 mg/5 mL syrup    Stiolto Respimat 2 5-2 5 MCG/ACT inhaler    fluticasone (FLOVENT HFA) 110 MCG/ACT inhaler    Bronchiectasis with acute exacerbation (HCC)    Relevant Medications    promethazine-codeine (PHENERGAN WITH CODEINE) 6 25-10 mg/5 mL syrup    Stiolto Respimat 2 5-2 5 MCG/ACT inhaler    fluticasone (FLOVENT HFA) 110 MCG/ACT inhaler    Severe persistent asthma without complication    Relevant Medications    Stiolto Respimat 2 5-2 5 MCG/ACT inhaler    fluticasone (FLOVENT HFA) 110 MCG/ACT inhaler      Other Visit Diagnoses     COVID-19 virus detected    -  Primary    Cough        Relevant Medications    promethazine-codeine (PHENERGAN WITH CODEINE) 6 25-10 mg/5 mL syrup    Primary cough headache        Relevant Medications    promethazine-codeine (PHENERGAN WITH CODEINE) 6 25-10 mg/5 mL syrup         Disposition:     I recommended continued isolation until at least 24 hours have passed since recovery defined as resolution of fever without the use of fever-reducing medications AND improvement in COVID symptoms AND 10 days have passed since onset of symptoms (or 10 days have passed since date of first positive viral diagnostic test for asymptomatic patients)  Promethazine-codeine for cough and headache, cannot take NSAIDs, discussed using masks, hand washing, avoiding crowded places, limiting social contact, go to ER if you develop -chest pain,fever>103 5,shortness of breath, lightheadedness  I have spent 15 minutes directly with the patient  Greater than 50% of this time was spent in counseling/coordination of care regarding: prognosis, risks and benefits of treatment options, instructions for management and patient and family education          Encounter provider Forrest Bates, MD    Provider located at [address] [place] [address]  60  [address] [facility] [id number] [address] [phone]    Recent Visits  Date Type Provider Dept   01/08/21 Telemedicine Emperatriz Loco MD  [address] recent visits within past 7 days and meeting all other requirements     Today's Visits  Date Type Provider Dept   01/12/21 Telephone Amira Elias MD [facility]   01/12/21 Telemedicine Amira Elias MD  [address] [facility] today's visits and meeting all other requirements     Future Appointments  No visits were found meeting these conditions  Showing future appointments within next 150 days and meeting all other requirements      This virtual check-in was done via [employer] and patient was informed that this is not a secure, HIPAA-compliant platform  She agrees to proceed  Patient agrees to participate in a virtual check in via telephone or video visit instead of presenting to the office to address urgent/immediate medical needs  Patient is aware this is a billable service  After connecting through [facility], the patient was identified by name and date of birth  Irina Peraza was informed that this was a telemedicine visit and that the exam was being conducted confidentially over secure lines  My office door was closed  No one else was in the room  Irina Peraza acknowledged consent and understanding of privacy and security of the telemedicine visit  I informed the patient that I have reviewed her record in Epic and presented the opportunity for her to ask any questions regarding the visit today  The patient agreed to participate  Subjective:   Irina Peraza is a 72 y o  female who has been screened for COVID-19  Symptom change since last report: improving  Patient's symptoms include fatigue, nasal congestion, rhinorrhea, cough, myalgias and headache   Patient denies fever, chills, malaise, sore throat, anosmia, loss of taste, shortness of breath, chest tightness, abdominal pain, nausea, vomiting and diarrhea  Render Christine has been staying home and has isolated themselves in her home  She is taking care to not share personal items and is cleaning all surfaces that are touched often, like counters, tabletops, and doorknobs using household cleaning sprays or wipes  She is wearing a mask when she leaves her room  Date of symptom onset: 1/8/2021    Lab Results   Component Value Date    SARSCOV2 Detected (A) 01/08/2021     Past Medical History:   Diagnosis Date    Acute kidney failure (HCC)     Allergies     Anemia     [facility] ([facility] )     Chronic kidney disease (CKD), stage III (moderate)     COPD (chronic obstructive pulmonary disease) (HCC)     Disease of thyroid gland     Essential hypertension     GERD (gastroesophageal reflux disease)     Glaucoma     High blood pressure     Hyperlipidemia     Hypothyroidism     Throat cancer ([place] [address] ) 2014    chemo and rad therapy 2014     Past Surgical History:   Procedure Laterality Date    COLONOSCOPY  2016    ESOPHAGOGASTRODUODENOSCOPY  2016    LARYNGOSCOPY      w/ Bx      TUBAL LIGATION       Current Outpatient Medications   Medication Sig Dispense Refill    acetaminophen-codeine (TYLENOL #2) 300-15 MG per tablet Take 1 tablet by mouth every 12 (twelve) hours as needed for moderate pain (Patient not taking: Reported on 12/29/2020) 10 tablet 0    albuterol (PROVENTIL HFA,VENTOLIN HFA) 90 mcg/act inhaler Inhale 2 puffs every 4 (four) hours as needed for wheezing 2 Inhaler 2    brimonidine tartrate 0 2 % ophthalmic solution Apply 1 drop to eye Three times a day      fluticasone (FLOVENT HFA) 110 MCG/ACT inhaler Inhale 2 puffs 2 (two) times a day Rinse mouth after use   1 Inhaler 5    ipratropium-albuterol (DUO-NEB) 0 5-2 5 mg/3 mL nebulizer solution TAKE 1 VIAL BY NEBULIZATION EVERY 6 (SIX) HOURS AS NEEDED FOR WHEEZING OR SHORTNESS OF BREATH 180 vial 1    levothyroxine 100 mcg tablet Take 1 tablet (100 mcg total) by mouth daily 90 tablet 1    montelukast (SINGULAIR) 10 mg tablet Take 1 tablet (10 mg total) by mouth daily at bedtime 90 tablet 1    PARoxetine (PAXIL) 10 mg tablet Take 1 tablet (10 mg total) by mouth daily 90 tablet 1    promethazine-codeine (PHENERGAN WITH CODEINE) 6 25-10 mg/5 mL syrup Take 5 mL by mouth every 6 (six) hours as needed for cough 180 mL 0    Stiolto Respimat 2 5-2 5 MCG/ACT inhaler Inhale 2 puffs daily 1 Inhaler 5    timolol (TIMOPTIC) 0 5 % ophthalmic solution INSTILL 1 DROP INTO EACH EYE TWICE DAILY       No current facility-administered medications for this visit  Allergies   Allergen Reactions    Amifostine     Pollen Extract        Review of Systems   Constitutional: Positive for fatigue  Negative for chills and fever  HENT: Positive for congestion and rhinorrhea  Negative for sore throat  Respiratory: Positive for cough  Negative for chest tightness and shortness of breath  Gastrointestinal: Negative for abdominal pain, diarrhea, nausea and vomiting  Musculoskeletal: Positive for myalgias  Neurological: Positive for headaches  Objective: There were no vitals filed for this visit  Physical Exam  Vitals signs and nursing note reviewed  Constitutional:       Appearance: She is well-developed  She is ill-appearing  HENT:      Head: Normocephalic and atraumatic  Right Ear: External ear normal       Left Ear: External ear normal    Eyes:      General: No scleral icterus  Conjunctiva/sclera: Conjunctivae normal       Pupils: Pupils are equal, round, and reactive to light  Pulmonary:      Effort: Pulmonary effort is normal    Abdominal:      Palpations: Abdomen is soft  Tenderness: There is no abdominal tenderness  Comments: Self exam   Skin:     Coloration: Skin is not jaundiced or pale  Neurological:      General: No focal deficit present  Mental Status: She is alert  Mental status is at baseline  Psychiatric:         Mood and Affect: Mood normal          Behavior: Behavior normal        VIRTUAL VISIT DISCLAIMER    Felix Almaguer acknowledges that she has consented to an online visit or consultation  She understands that the online visit is based solely on information provided by her, and that, in the absence of a face-to-face physical evaluation by the physician, the diagnosis she receives is both limited and provisional in terms of accuracy and completeness  This is not intended to replace a full medical face-to-face evaluation by the physician  Felix Almaguer understands and accepts these terms

## 2021-01-19 ENCOUNTER — ANESTHESIA (OUTPATIENT)
Dept: PERIOP | Facility: AMBULARY SURGERY CENTER | Age: 66
End: 2021-01-19
Payer: COMMERCIAL

## 2021-02-09 NOTE — PRE-PROCEDURE INSTRUCTIONS
Pre-Surgery Instructions:   Medication Instructions    acetaminophen-codeine (TYLENOL #2) 300-15 MG per tablet Instructed patient per Anesthesia Guidelines   albuterol (PROVENTIL HFA,VENTOLIN HFA) 90 mcg/act inhaler Instructed patient per Anesthesia Guidelines   brimonidine tartrate 0 2 % ophthalmic solution Instructed patient per Anesthesia Guidelines   fluticasone (FLOVENT HFA) 110 MCG/ACT inhaler Instructed patient per Anesthesia Guidelines   ipratropium-albuterol (DUO-NEB) 0 5-2 5 mg/3 mL nebulizer solution Instructed patient per Anesthesia Guidelines   levothyroxine 100 mcg tablet Instructed patient per Anesthesia Guidelines   montelukast (SINGULAIR) 10 mg tablet Instructed patient per Anesthesia Guidelines   PARoxetine (PAXIL) 10 mg tablet Instructed patient per Anesthesia Guidelines   Stiolto Respimat 2 5-2 5 MCG/ACT inhaler Instructed patient per Anesthesia Guidelines   timolol (TIMOPTIC) 0 5 % ophthalmic solution Instructed patient per Anesthesia Guidelines  Pre op and bathing instructions reviewed  Pt has hibiclens  Pt  Verbalized understanding of current visitor restrictions  Pt  Verbalized an understanding of all instructions reviewed and offers no concerns at this time  Instructed to avoid all ASA/NSAIDs and OTC Vit/Supp from now until after surgery  Tylenol ok prn  Instructed to take per normal schedule including DOS with sips water DOS will take Levothyroxine,Paxil,with a few sips of H2O  May use inhalers and eye drops DOS

## 2021-02-16 ENCOUNTER — HOSPITAL ENCOUNTER (OUTPATIENT)
Facility: AMBULARY SURGERY CENTER | Age: 66
Setting detail: OUTPATIENT SURGERY
Discharge: HOME | End: 2021-02-16
Attending: SURGERY | Admitting: SURGERY
Payer: COMMERCIAL

## 2021-02-16 VITALS
DIASTOLIC BLOOD PRESSURE: 59 MMHG | SYSTOLIC BLOOD PRESSURE: 110 MMHG | RESPIRATION RATE: 18 BRPM | TEMPERATURE: 97 F | OXYGEN SATURATION: 92 % | WEIGHT: 178 LBS | BODY MASS INDEX: 29.66 KG/M2 | HEIGHT: 65 IN | HEART RATE: 60 BPM

## 2021-02-16 VITALS — HEART RATE: 66 BPM

## 2021-02-16 DIAGNOSIS — M65.311 TRIGGER FINGER OF RIGHT THUMB: Primary | ICD-10-CM

## 2021-02-16 DIAGNOSIS — Z47.89 AFTERCARE FOLLOWING SURGERY OF THE MUSCULOSKELETAL SYSTEM: ICD-10-CM

## 2021-02-16 PROBLEM — U07.1 COVID-19: Status: ACTIVE | Noted: 2021-02-16

## 2021-02-16 PROBLEM — I10 HTN (HYPERTENSION): Status: ACTIVE | Noted: 2021-02-16

## 2021-02-16 PROBLEM — E03.9 HYPOTHYROIDISM: Status: ACTIVE | Noted: 2021-02-16

## 2021-02-16 PROBLEM — E78.5 HYPERLIPIDEMIA: Status: ACTIVE | Noted: 2021-02-16

## 2021-02-16 PROCEDURE — 26055 INCISE FINGER TENDON SHEATH: CPT | Performed by: PHYSICIAN ASSISTANT

## 2021-02-16 PROCEDURE — 26055 INCISE FINGER TENDON SHEATH: CPT | Performed by: SURGERY

## 2021-02-16 PROCEDURE — NC001 PR NO CHARGE: Performed by: SURGERY

## 2021-02-16 RX ORDER — ONDANSETRON 2 MG/ML
4 INJECTION INTRAMUSCULAR; INTRAVENOUS ONCE AS NEEDED
Status: DISCONTINUED | OUTPATIENT
Start: 2021-02-16 | End: 2021-02-16 | Stop reason: HOSPADM

## 2021-02-16 RX ORDER — SODIUM CHLORIDE, SODIUM LACTATE, POTASSIUM CHLORIDE, CALCIUM CHLORIDE 600; 310; 30; 20 MG/100ML; MG/100ML; MG/100ML; MG/100ML
20 INJECTION, SOLUTION INTRAVENOUS CONTINUOUS
Status: DISCONTINUED | OUTPATIENT
Start: 2021-02-16 | End: 2021-02-16 | Stop reason: HOSPADM

## 2021-02-16 RX ORDER — SODIUM CHLORIDE 9 MG/ML
INJECTION, SOLUTION INTRAVENOUS CONTINUOUS PRN
Status: DISCONTINUED | OUTPATIENT
Start: 2021-02-16 | End: 2021-02-16

## 2021-02-16 RX ORDER — SODIUM CHLORIDE, SODIUM LACTATE, POTASSIUM CHLORIDE, CALCIUM CHLORIDE 600; 310; 30; 20 MG/100ML; MG/100ML; MG/100ML; MG/100ML
125 INJECTION, SOLUTION INTRAVENOUS CONTINUOUS
Status: DISCONTINUED | OUTPATIENT
Start: 2021-02-16 | End: 2021-02-16 | Stop reason: HOSPADM

## 2021-02-16 RX ORDER — HYDROCODONE BITARTRATE AND ACETAMINOPHEN 5; 325 MG/1; MG/1
1 TABLET ORAL EVERY 6 HOURS PRN
Qty: 5 TABLET | Refills: 0 | Status: SHIPPED | OUTPATIENT
Start: 2021-02-16 | End: 2021-02-26

## 2021-02-16 RX ORDER — PROPOFOL 10 MG/ML
INJECTION, EMULSION INTRAVENOUS AS NEEDED
Status: DISCONTINUED | OUTPATIENT
Start: 2021-02-16 | End: 2021-02-16

## 2021-02-16 RX ORDER — CEFAZOLIN SODIUM 1 G/3ML
INJECTION, POWDER, FOR SOLUTION INTRAMUSCULAR; INTRAVENOUS AS NEEDED
Status: DISCONTINUED | OUTPATIENT
Start: 2021-02-16 | End: 2021-02-16

## 2021-02-16 RX ORDER — MIDAZOLAM HYDROCHLORIDE 2 MG/2ML
INJECTION, SOLUTION INTRAMUSCULAR; INTRAVENOUS AS NEEDED
Status: DISCONTINUED | OUTPATIENT
Start: 2021-02-16 | End: 2021-02-16

## 2021-02-16 RX ORDER — UBIDECARENONE 75 MG
100 CAPSULE ORAL DAILY
COMMUNITY
End: 2021-10-26

## 2021-02-16 RX ORDER — IBUPROFEN 800 MG
1 TABLET ORAL DAILY
COMMUNITY
End: 2021-10-26

## 2021-02-16 RX ORDER — FENTANYL CITRATE 50 UG/ML
INJECTION, SOLUTION INTRAMUSCULAR; INTRAVENOUS AS NEEDED
Status: DISCONTINUED | OUTPATIENT
Start: 2021-02-16 | End: 2021-02-16

## 2021-02-16 RX ORDER — PROPOFOL 10 MG/ML
INJECTION, EMULSION INTRAVENOUS CONTINUOUS PRN
Status: DISCONTINUED | OUTPATIENT
Start: 2021-02-16 | End: 2021-02-16

## 2021-02-16 RX ORDER — FENTANYL CITRATE/PF 50 MCG/ML
25 SYRINGE (ML) INJECTION
Status: DISCONTINUED | OUTPATIENT
Start: 2021-02-16 | End: 2021-02-16 | Stop reason: HOSPADM

## 2021-02-16 RX ADMIN — PROPOFOL 60 MG: 10 INJECTION, EMULSION INTRAVENOUS at 09:04

## 2021-02-16 RX ADMIN — MIDAZOLAM HYDROCHLORIDE 2 MG: 1 INJECTION, SOLUTION INTRAMUSCULAR; INTRAVENOUS at 08:59

## 2021-02-16 RX ADMIN — SODIUM CHLORIDE: 9 INJECTION, SOLUTION INTRAVENOUS at 09:02

## 2021-02-16 RX ADMIN — FENTANYL CITRATE 25 MCG: 50 INJECTION, SOLUTION INTRAMUSCULAR; INTRAVENOUS at 09:02

## 2021-02-16 RX ADMIN — PROPOFOL 100 MCG/KG/MIN: 10 INJECTION, EMULSION INTRAVENOUS at 09:04

## 2021-02-16 RX ADMIN — CEFAZOLIN 2000 MG: 1 INJECTION, POWDER, FOR SOLUTION INTRAMUSCULAR; INTRAVENOUS at 09:02

## 2021-02-16 RX ADMIN — FENTANYL CITRATE 25 MCG: 50 INJECTION, SOLUTION INTRAMUSCULAR; INTRAVENOUS at 09:04

## 2021-02-16 NOTE — ANESTHESIA POSTPROCEDURE EVALUATION
Post-Op Assessment Note    CV Status:  Stable  Pain Score: 0    Pain management: adequate     Mental Status:  Alert and awake   Hydration Status:  Euvolemic and stable   PONV Controlled:  Controlled   Airway Patency:  Patent and adequate      Post Op Vitals Reviewed: Yes      Staff: CRNA         No complications documented      BP   126/58   Temp  97   Pulse  65   Resp   12   SpO2   94

## 2021-02-16 NOTE — DISCHARGE INSTRUCTIONS
Post Operative Instructions    You have had surgery on your arm today, please read and follow the information below:  · Elevate your hand above your elbow during the next 24-48 hours to help with swelling  · Place your hand and arm over your head with motion at your shoulder three times a day  · Do not apply any cream/ointment/oil to your incisions including antibiotics  · Do not soak your hands in standing water (dishwater, tubs, Jacuzzi's, pools, etc ) until given permission (typically 2-3 weeks after injury)    Call the office if you notice any:  · Increased numbness or tingling of your hand or fingers that is not relieved with elevation  · Increasing pain that is not controlled with medication  · Difficulty chewing, breathing, swallowing  · Chest pains or shortness of breath  · Fever over 101 4 degrees  Bandage: Please keep bandages clean and dry  Remove bandage after 5 days  Once bandages are removed you may wash hands with soap and water, short showers are okay as well but please avoid soaking the hand as described above  Please do NOT put any topical agents on the surgical wound including neosporin, peroxide, tea tree oil, vitamin E, etc  as these can delay wound healing  Motion: Move fingers into a fist 5 times a day, DO NOT move any splinted fingers  Weight bearing status: Avoid heavy lifting (>5 pounds) with the extremity that was operated on until follow up appointment  Normal activities of daily living are OK  Ice: Ice for 10 minutes every hour as needed for swelling x 24 hours  Sling: No sling necessary      Pain medication:   Naproxen 220 mg two time a day (do not take this medication if you were told by your doctor that you cannot take anti-inflammatories or NSAIDS)  Tylenol Extended Release 650 mg every 8 hours  Norco/Hydrocodone one tab every 6 hours ONLY AS NEEDED for severe pain         Follow-up Appointment: 10-14 days with Dr Marychuy Brown        Please call the office if you have any questions or concerns regarding your post-operative care

## 2021-02-16 NOTE — OP NOTE
OPERATIVE REPORT  PATIENT NAME: Fransisco Car  :  1955  MRN: [id number]  Pt Location: [facility]    SURGERY DATE: 21    Surgeon(s) and Role:     * Juliana Rosado MD - Primary     * Varun Bedoya PA-C - Assisting    Pre-Op Diagnosis:  Trigger finger of right thumb [M65 311]    Post-Op Diagnosis Codes:     * Trigger finger of right thumb [M65 311]    Procedure(s):  THUMB TRIGGER FINGER RELEASE (Right)    Specimen(s):  * No orders in the log *    Estimated Blood Loss:   Minimal    Anesthesia Type:   Conscious Sedation     IMPLANTS:  * No implants in log *    PERIOPERATIVE ANTIBIOTICS:    cefazolin, 2 grams    Tourniquet Time: 3 min  at 250 mmHg          Operative Indications: The patient has a history of Trigger Finger  right  thumb that was recalcitrant to conservative management  The decision was made to bring the patient to the operating room for Trigger Finger Release  right  thumb  Risks of the procedure were explained which include, but are not limited to bleeding; infection; damage to nerves, arteries,veins, tendons; scar; pain; need for reoperation; failure to give desired result; and risks of anaesthesia  All questions were answered to satisfaction and they were willing to proceed  Operative Findings:  thickened A1 pulley    Complications:   None    Procedure and Technique:  After the patient, site, and procedure were identified, the patient was brought into the operating room in a supine position  Local anaesthesia and sedation were provided  A well padded tourniquet was applied to the extremity, set at 250 mmHg  The  right upper extremity was then prepped and drapped in a normal, sterile, orthopedic fashion  A  Horizontal incision is made in line with the proximal crease of the right thumb, overlying the A1 pulley Dissection was  carried down under loupe magnification  Skin and subcutaneous tissues were sharply incised   The radial digital nerve was identified and protected The tissue was elevated off the A1 pulley  The A1 pulley was  identified and incised  There was no retinacular cyst noted  Tenosynovectomy was not carried out  The FPL was noted to have  scuffing at the volar surface     At the completion of the procedure, hemostasis was obtained with cautery and direct pressure  The wounds were copiously irrigated with sterile solution  The wounds were closed with Prolene  Sterile dressings were applied, including Xeroform, gauze, tweeners, webril, ACE  Please note, all sponge, needle, and instrument counts were correct prior to closure  Loupe magnification was utilized  The patient tolerated the procedure well       I was present for all critical portions of the procedure, A qualified resident physician was not available and A physician assistant was required during the procedure for retraction tissue handling,dissection and suturing    Patient Disposition:  PACU     SIGNATURE: Diane Awad MD  DATE: 02/16/21  TIME: 10:12 AM

## 2021-02-16 NOTE — ANESTHESIA PREPROCEDURE EVALUATION
Procedure:  THUMB TRIGGER FINGER RELEASE (Right Hand)    Relevant Problems   CARDIO   (+) HTN (hypertension)   (+) Hyperlipidemia      ENDO   (+) Hypothyroidism      /RENAL   (+) Chronic kidney disease, stage 3 (moderate)      NEURO/PSYCH   (+) History of laryngeal cancer   (+) Mixed anxiety and depressive disorder   (+) Panic attack   (+) Tension type headache      PULMONARY   (+) Mild emphysema (HCC)   (+) Severe persistent asthma without complication      Other   (+) COVID-19 (1/8/2021, resolved)        Physical Exam    Airway    Mallampati score: III  TM Distance: >3 FB  Neck ROM: full     Dental       Cardiovascular      Pulmonary      Other Findings        Anesthesia Plan  ASA Score- 3     Anesthesia Type- IV sedation with anesthesia with ASA Monitors  Additional Monitors:   Airway Plan:           Plan Factors-    Chart reviewed  Existing labs reviewed  Patient summary reviewed  Induction- intravenous  Postoperative Plan- Plan for postoperative opioid use  Informed Consent- Anesthetic plan and risks discussed with patient  I personally reviewed this patient with the CRNA  Discussed and agreed on the Anesthesia Plan with the CRNA  Kostas Gore

## 2021-02-16 NOTE — H&P
ASSESSMENT/PLAN:       73 yo female with right thumb trigger finger     Visit was carried out using [employer] interpretor #[id number]     Patient has tried and failed two injections for this finger and has had no improvement in symptoms  She continues to experience significant functional impairment in daily tasks due to the finger and would like to proceed with surgical intervention  Risks, benefits, and alternatives were discussed and informed consent was signed  Patient has history of laryngeal cancer that has since been resolved after chemo and radiation  She will require EKG and BMP prior to surgery  We will see her back 2 weeks after surgery for suture removal and postop evaluation      The patient verbalized understanding of exam findings and treatment plan  We engaged in the shared decision-making process and treatment options were discussed at length with the patient  Surgical and conservative management discussed today along with risks and benefits      Diagnoses and all orders for this visit:     Trigger finger of right thumb  -     Case request operating room: RELEASE TRIGGER FINGER; Standing  -     Basic metabolic panel; Future  -     EKG 12 lead; Future  -     Case request operating room: RELEASE TRIGGER FINGER     Encounter for pre-operative laboratory testing  -     Basic metabolic panel; Future  -     EKG 12 lead; Future     Other orders  -     Void on call to OR; Standing  -     Insert peripheral IV; Standing  -     Diet NPO; Sips with meds; Standing          Trigger Finger Release: The anatomy and physiology of trigger finger was discussed with the patient today in the office  Edema and increased contact pressure within the flexor tendons at the A1 pulley can cause pain, crepitation, and limitation of function    Treatment options include resting MP blocking splints to decrease edema, oral anti-inflammatory medications, home or formal therapy exercises, up to 2 steroid injections or surgical release  While majority of patients do respond to conservative treatment, up to 20% may require surgical release  The patient has elected release of the trigger finger  The patient has elected to undergo a release of the A1 pulley (trigger finger)  A small incision will be made over the palmar aspect of the hand, the tendon sheath holding the flexor tendons will be released  In the postoperative period, light activities are allowed immediately, driving is allowed when narcotic medication has stopped, and the incision may get wet after 2 days  Heavy activities (lifting more than approximately 10 pounds) will be allowed after the follow up appointment in 1-2 weeks  While the pain and discomfort within the wrist typically improves rapidly, some residual discomfort may be present for up to 6 weeks  The nodule that is typically palpable in the palmar aspect of the hand will not be removed, as this would necessitate removal of a portion of the flexor tendon, however the catching, clicking, and locking should resolve  Approximate success rate is 98%  The risks and benefits of the procedure were explained to the patient, which include, but are not limited to: Bleeding, infection, recurrence, pain, scar, damage to tendons, damage to nerves, and damage to blood vessels, need for future surgery and complications related to anesthesia  If bony work is done, risks also include malunion and nonunion  These risks, along with alternative conservative treatment options, and postoperative protocols were voiced back and understood by the patient  All questions were answered to the patient's satisfaction  The patient agrees to comply with a standard postoperative protocol, and is willing to proceed  Education was provided via written and auditory forms  There were no barriers to learning   Written handouts regarding wound care, incision and scar care, and general preoperative information, as well as risks and benefits were provided to the patient      Follow Up:  Return for After Surgery         To Do Next Visit:  Re-evaluation of current issue and Sutures out     ____________________________________________________________________________________________________________________________________________        CHIEF COMPLAINT:      Chief Complaint   Patient presents with    Right Thumb - Follow-up         SUBJECTIVE:  Brii Bragg is a 72y o  year old RHD female who presents for follow up evaluation of a right thumb TF  She has received 2 injections in the finger and continues to notice no improvement in symptoms  She denies any numbness or tingling in the hand   She does note significant difficulty with dressing and doing daily activities          I have personally reviewed all the relevant PMH, PSH, SH, FH, Medications and allergies       PAST MEDICAL HISTORY:       Past Medical History:   Diagnosis Date    [facility]      Allergies      Anemia      Cancer ([facility] )      Chronic kidney disease (CKD), stage III (moderate)      COPD (chronic obstructive pulmonary disease) (HCC)      Disease of thyroid gland      Essential hypertension      GERD (gastroesophageal reflux disease)      Glaucoma      High blood pressure      Hyperlipidemia      Hypothyroidism      Throat cancer ([facility] ) 2014     chemo and rad therapy 2014         PAST SURGICAL HISTORY:        Past Surgical History:   Procedure Laterality Date    COLONOSCOPY   2016    ESOPHAGOGASTRODUODENOSCOPY   2016    LARYNGOSCOPY         w/ Bx      TUBAL LIGATION             FAMILY HISTORY:        Family History   Problem Relation Age of Onset    Brain cancer Sister      Breast cancer Sister      Diabetes Sister      Other Mother           respiratory disorder    Sudden death Father           shot himself    Suicidality Father           SOCIAL HISTORY:  Social History            Tobacco Use    Smoking status: Former Smoker       Years: 40 00       Quit date:        Years since quittin 9    Smokeless tobacco: Never Used   Substance Use Topics    Alcohol use: Not on file       Comment: None    Drug use: Not on file       Comment: Denies         MEDICATIONS:     Current Outpatient Medications:     acetaminophen-codeine (TYLENOL #2) 300-15 MG per tablet, Take 1 tablet by mouth every 12 (twelve) hours as needed for moderate pain, Disp: 10 tablet, Rfl: 0    albuterol (PROVENTIL HFA,VENTOLIN HFA) 90 mcg/act inhaler, Inhale 2 puffs every 4 (four) hours as needed for wheezing, Disp: 2 Inhaler, Rfl: 2    brimonidine tartrate 0 2 % ophthalmic solution, Apply 1 drop to eye Three times a day, Disp: , Rfl:     fluticasone-umeclidinium-vilanterol (Trelegy Ellipta) 100-62 5-25 MCG/INH inhaler, Inhale 1 puff daily Rinse mouth after use , Disp: 1 Inhaler, Rfl: 3    ipratropium-albuterol (DUO-NEB) 0 5-2 5 mg/3 mL nebulizer solution, TAKE 1 VIAL BY NEBULIZATION EVERY 6 (SIX) HOURS AS NEEDED FOR WHEEZING OR SHORTNESS OF BREATH, Disp: 180 vial, Rfl: 1    levothyroxine 100 mcg tablet, Take 1 tablet (100 mcg total) by mouth daily, Disp: 90 tablet, Rfl: 1    montelukast (SINGULAIR) 10 mg tablet, Take 1 tablet (10 mg total) by mouth daily at bedtime, Disp: 90 tablet, Rfl: 1    PARoxetine (PAXIL) 10 mg tablet, Take 1 tablet (10 mg total) by mouth daily, Disp: 90 tablet, Rfl: 1    timolol (TIMOPTIC) 0 5 % ophthalmic solution, INSTILL 1 DROP INTO EACH EYE TWICE DAILY, Disp: , Rfl:     umeclidinium-vilanterol (Anoro Ellipta) 62 5-25 MCG/INH inhaler, Inhale 1 puff daily, Disp: 1 Inhaler, Rfl: 1     ALLERGIES:       Allergies   Allergen Reactions    Amifostine      Pollen Extract           REVIEW OF SYSTEMS:  Review of Systems   Constitutional: Negative for chills, diaphoresis, fatigue and fever  HENT: Negative for congestion, facial swelling, hearing loss, sore throat, trouble swallowing and voice change      Respiratory: Negative for apnea, cough, shortness of breath, wheezing and stridor  Cardiovascular: Negative for chest pain, palpitations and leg swelling  Gastrointestinal: Negative for abdominal pain, constipation, nausea and vomiting  Endocrine: Negative for cold intolerance and heat intolerance  Musculoskeletal: Positive for arthralgias  Negative for gait problem, joint swelling and myalgias  Skin: Negative for color change, pallor, rash and wound  Neurological: Negative for tremors, speech difficulty, weakness and numbness  Psychiatric/Behavioral: Negative for agitation, confusion, decreased concentration and hallucinations  The patient is not nervous/anxious           VITALS:      Vitals:     12/10/20 1356   BP: 135/80   Pulse: 75         LABS:  HgA1c: No results found for: HGBA1C  BMP:         Lab Results   Component Value Date     GLUCOSE 83 02/25/2015     CALCIUM 9 0 09/08/2020      02/25/2015     K 4 9 09/08/2020     CO2 29 09/08/2020      09/08/2020     BUN 28 (H) 09/08/2020     CREATININE 1 25 09/08/2020         _____________________________________________________  PHYSICAL EXAMINATION:  General: well developed and well nourished, alert, oriented times 3 and appears comfortable  Psychiatric: Normal  HEENT: Normocephalic, Atraumatic Trachea Midline, No torticollis  Pulmonary: No audible wheezing or respiratory distress   Cardiovascular: No pitting edema, 2+ radial pulse   Skin: No masses, erythema, lacerations, fluctation, ulcerations  Neurovascular: Sensation Intact to the Median, Ulnar, Radial Nerve, Motor Intact to the Median, Ulnar, Radial Nerve and Pulses Intact  Musculoskeletal: Normal, except as noted in detailed exam and in HPI         MUSCULOSKELETAL EXAMINATION:  right thumb finger:  Negative palpable nodule over the A1 pulley  Positive tenderness to palpation over A1 pulley  Positive catching   Positive clicking           ___________________________________________________  STUDIES REVIEWED:  No new studies to review       PROCEDURES PERFORMED:  Procedures  No Procedures performed today     _____________________________________________________

## 2021-02-20 DIAGNOSIS — F41.8 DEPRESSION WITH ANXIETY: ICD-10-CM

## 2021-02-22 RX ORDER — PAROXETINE 10 MG/1
10 TABLET, FILM COATED ORAL EVERY MORNING
Qty: 90 TABLET | Refills: 1 | Status: SHIPPED | OUTPATIENT
Start: 2021-02-22 | End: 2021-04-07 | Stop reason: SDUPTHER

## 2021-03-01 ENCOUNTER — OFFICE VISIT (OUTPATIENT)
Dept: OBGYN CLINIC | Facility: CLINIC | Age: 66
End: 2021-03-01

## 2021-03-01 VITALS — HEIGHT: 65 IN | WEIGHT: 178 LBS | BODY MASS INDEX: 29.66 KG/M2

## 2021-03-01 DIAGNOSIS — Z98.890 S/P TRIGGER FINGER RELEASE: Primary | ICD-10-CM

## 2021-03-01 PROCEDURE — 99024 POSTOP FOLLOW-UP VISIT: CPT | Performed by: SURGERY

## 2021-03-01 NOTE — PROGRESS NOTES
Assessment/Plan:  Patient ID: Bakari Lo 72 y o  female   Surgery: Thumb Trigger Finger Release - Right  Date of Surgery: 2/16/2021    13 days s/p right thumb trigger finger release  Sutures were removed today  She was advised to perform scar massage  She will also work on thumb ROM, specifically at the IP joint  Fritzi Beatriz was shown place and hold exercises in the office today  Activities to tolerance, advised to avoid dirty dishes x1 week to allow for the incision to heal further  She may shower normally  Follow Up:  PRN    To Do Next Visit:  Re-evaluation       CHIEF COMPLAINT:  No chief complaint on file  SUBJECTIVE:  Bakari Lo is a 72y o  year old female who presents for follow up after Thumb Trigger Finger Release - Right  Today patient has None and No Complaints         PHYSICAL EXAMINATION:  General: well developed and well nourished, alert, oriented times 3 and appears comfortable  Psychiatric: Normal    MUSCULOSKELETAL EXAMINATION:  Incision: clean, dry and suture(s) intact   Surgery Site: normal, no evidence of infection   Range of Motion: As expected and full composite fist possible  Neurovascular status: Neuro intact, good cap refill  Activity Restrictions: No restrictions  Done today: Sutures out      STUDIES REVIEWED:  No Studies to review      PROCEDURES PERFORMED:  Procedures  No Procedures performed today    Scribe Attestation    I,:  Cherry Fermin am acting as a scribe while in the presence of the attending physician :       I,:  Paco Morrissey MD personally performed the services described in this documentation    as scribed in my presence :

## 2021-03-16 ENCOUNTER — TELEPHONE (OUTPATIENT)
Dept: FAMILY MEDICINE CLINIC | Facility: CLINIC | Age: 66
End: 2021-03-16

## 2021-03-16 NOTE — TELEPHONE ENCOUNTER
Gosia Robert said her mom is scheduled for the COVID vaccine tomorrow, but she has some concerns & wants to know if she is ok to get it with her allergies, & "health issues"

## 2021-03-16 NOTE — TELEPHONE ENCOUNTER
Yes, she can stay there longer if needed, may take benadryl before she goes, but no reason to delay vaccination

## 2021-04-07 ENCOUNTER — OFFICE VISIT (OUTPATIENT)
Dept: FAMILY MEDICINE CLINIC | Facility: CLINIC | Age: 66
End: 2021-04-07
Payer: COMMERCIAL

## 2021-04-07 VITALS
WEIGHT: 181 LBS | SYSTOLIC BLOOD PRESSURE: 112 MMHG | RESPIRATION RATE: 18 BRPM | TEMPERATURE: 97.2 F | OXYGEN SATURATION: 96 % | HEART RATE: 64 BPM | BODY MASS INDEX: 33.31 KG/M2 | DIASTOLIC BLOOD PRESSURE: 60 MMHG | HEIGHT: 62 IN

## 2021-04-07 DIAGNOSIS — J44.0 CHRONIC OBSTRUCTIVE PULMONARY DISEASE WITH ACUTE LOWER RESPIRATORY INFECTION (HCC): ICD-10-CM

## 2021-04-07 DIAGNOSIS — M79.672 BILATERAL LEG AND FOOT PAIN: Primary | ICD-10-CM

## 2021-04-07 DIAGNOSIS — M79.604 BILATERAL LEG AND FOOT PAIN: Primary | ICD-10-CM

## 2021-04-07 DIAGNOSIS — M79.671 BILATERAL LEG AND FOOT PAIN: Primary | ICD-10-CM

## 2021-04-07 DIAGNOSIS — K21.9 GASTROESOPHAGEAL REFLUX DISEASE WITHOUT ESOPHAGITIS: ICD-10-CM

## 2021-04-07 DIAGNOSIS — J45.50 SEVERE PERSISTENT ASTHMA WITHOUT COMPLICATION: ICD-10-CM

## 2021-04-07 DIAGNOSIS — F32.0 CURRENT MILD EPISODE OF MAJOR DEPRESSIVE DISORDER WITHOUT PRIOR EPISODE (HCC): ICD-10-CM

## 2021-04-07 DIAGNOSIS — M79.605 BILATERAL LEG AND FOOT PAIN: Primary | ICD-10-CM

## 2021-04-07 DIAGNOSIS — E03.8 OTHER SPECIFIED HYPOTHYROIDISM: ICD-10-CM

## 2021-04-07 DIAGNOSIS — J43.9 MILD EMPHYSEMA (HCC): ICD-10-CM

## 2021-04-07 DIAGNOSIS — F41.8 DEPRESSION WITH ANXIETY: ICD-10-CM

## 2021-04-07 PROBLEM — I10 HTN (HYPERTENSION): Status: RESOLVED | Noted: 2021-02-16 | Resolved: 2021-04-07

## 2021-04-07 PROBLEM — E03.9 HYPOTHYROIDISM: Status: RESOLVED | Noted: 2021-02-16 | Resolved: 2021-04-07

## 2021-04-07 PROCEDURE — 1124F ACP DISCUSS-NO DSCNMKR DOCD: CPT | Performed by: FAMILY MEDICINE

## 2021-04-07 PROCEDURE — 99214 OFFICE O/P EST MOD 30 MIN: CPT | Performed by: FAMILY MEDICINE

## 2021-04-07 RX ORDER — TIOTROPIUM BROMIDE AND OLODATEROL 3.124; 2.736 UG/1; UG/1
2 SPRAY, METERED RESPIRATORY (INHALATION) DAILY
Qty: 1 INHALER | Refills: 5 | Status: SHIPPED | OUTPATIENT
Start: 2021-04-07 | End: 2021-09-29

## 2021-04-07 RX ORDER — MONTELUKAST SODIUM 10 MG/1
10 TABLET ORAL
Qty: 90 TABLET | Refills: 1 | Status: SHIPPED | OUTPATIENT
Start: 2021-04-07 | End: 2022-01-19 | Stop reason: SDUPTHER

## 2021-04-07 RX ORDER — LEVOTHYROXINE SODIUM 0.1 MG/1
100 TABLET ORAL DAILY
Qty: 90 TABLET | Refills: 1 | Status: SHIPPED | OUTPATIENT
Start: 2021-04-07 | End: 2021-11-23

## 2021-04-07 RX ORDER — PANTOPRAZOLE SODIUM 40 MG/1
40 TABLET, DELAYED RELEASE ORAL
Qty: 30 TABLET | Refills: 5 | Status: SHIPPED | OUTPATIENT
Start: 2021-04-07 | End: 2021-10-04

## 2021-04-07 RX ORDER — PAROXETINE 10 MG/1
10 TABLET, FILM COATED ORAL EVERY MORNING
Qty: 90 TABLET | Refills: 1 | Status: SHIPPED | OUTPATIENT
Start: 2021-04-07 | End: 2021-07-12 | Stop reason: SDUPTHER

## 2021-04-07 RX ORDER — ASPIRIN 81 MG/1
81 TABLET ORAL DAILY
Qty: 90 TABLET | Refills: 1 | Status: SHIPPED | OUTPATIENT
Start: 2021-04-07 | End: 2021-09-29 | Stop reason: SDUPTHER

## 2021-04-07 RX ORDER — IPRATROPIUM BROMIDE AND ALBUTEROL SULFATE 2.5; .5 MG/3ML; MG/3ML
3 SOLUTION RESPIRATORY (INHALATION) EVERY 6 HOURS PRN
Qty: 180 VIAL | Refills: 1 | Status: SHIPPED | OUTPATIENT
Start: 2021-04-07 | End: 2021-07-06

## 2021-04-07 NOTE — PROGRESS NOTES
Subjective:      Patient ID: Toribio Bedoya is a 72 y o  female      - - [id number] used, daughter with her today, speaks Hungarian only  1 episode- of right great toe turning purple which last for 3 hours, does not remember injuring it, she does have bilateral leg pain especially upon sitting /squatting  Heartburn-worse in morning, and when she lays down, she eats a lot of queso,no weight loss or abdominal pain, no vomiting or blood in stool      Past Medical History:   Diagnosis Date    [facility]     Allergies     Anemia     Cancer ([facility] )     Chronic kidney disease (CKD), stage III (moderate)     COPD (chronic obstructive pulmonary disease) ([place] [address] )     COVID-19     Covid + 7-0-48-cough at that time now fully resolved    Disease of thyroid gland     Essential hypertension     GERD (gastroesophageal reflux disease)     Glaucoma     High blood pressure     HTN (hypertension) 2/16/2021    Hyperlipidemia     Hypothyroidism     Throat cancer ([place] [address] ) 2014    chemo and rad therapy 2014       Family History   Problem Relation Age of Onset    Brain cancer Sister     Diabetes Sister     Breast cancer Sister     Other Mother         respiratory disorder    Sudden death Father         shot himself    Suicidality Father     No Known Problems Daughter     No Known Problems Maternal Grandmother     No Known Problems Maternal Grandfather     No Known Problems Paternal Grandmother     No Known Problems Paternal Grandfather     No Known Problems Sister     No Known Problems Sister     No Known Problems Sister     No Known Problems Sister     No Known Problems Sister     No Known Problems Sister     No Known Problems Sister     No Known Problems Daughter     No Known Problems Maternal Aunt     No Known Problems Maternal Aunt     No Known Problems Maternal Aunt     No Known Problems Maternal Aunt     No Known Problems Maternal Aunt     No Known Problems Paternal Aunt     No Known Problems Paternal Aunt     No Known Problems Paternal Aunt     No Known Problems Paternal Aunt        Past Surgical History:   Procedure Laterality Date    COLONOSCOPY  2016    ESOPHAGOGASTRODUODENOSCOPY  2016    LARYNGOSCOPY      w/ Bx   MT INCISE FINGER TENDON SHEATH Right 2/16/2021    Procedure: THUMB TRIGGER FINGER RELEASE;  Surgeon: Rose Marie Meek MD;  [facility]: [facility];  Service: Orthopedics    TUBAL LIGATION          reports that she quit smoking about 7 years ago  She quit after 40 00 years of use  She has never used smokeless tobacco  She reports that she does not drink alcohol        Current Outpatient Medications:     albuterol (PROVENTIL HFA,VENTOLIN HFA) 90 mcg/act inhaler, Inhale 2 puffs every 4 (four) hours as needed for wheezing, Disp: 2 Inhaler, Rfl: 2    brimonidine tartrate 0 2 % ophthalmic solution, Apply 1 drop to eye Three times a day, Disp: , Rfl:     Cholecalciferol (Vitamin D3) 10 MCG (400 UNIT) CAPS, Take 1 capsule by mouth daily, Disp: , Rfl:     cyanocobalamin (VITAMIN B-12) 100 mcg tablet, Take 100 mcg by mouth daily, Disp: , Rfl:     ipratropium-albuterol (DUO-NEB) 0 5-2 5 mg/3 mL nebulizer solution, Take 1 vial (3 mL total) by nebulization every 6 (six) hours as needed for wheezing or shortness of breath, Disp: 180 vial, Rfl: 1    levothyroxine 100 mcg tablet, Take 1 tablet (100 mcg total) by mouth daily, Disp: 90 tablet, Rfl: 1    montelukast (SINGULAIR) 10 mg tablet, Take 1 tablet (10 mg total) by mouth daily at bedtime, Disp: 90 tablet, Rfl: 1    PARoxetine (PAXIL) 10 mg tablet, Take 1 tablet (10 mg total) by mouth every morning, Disp: 90 tablet, Rfl: 1    Pyridoxine HCl (VITAMIN B-6 PO), Take 1 tablet by mouth daily, Disp: , Rfl:     Stiolto Respimat 2 5-2 5 MCG/ACT inhaler, Inhale 2 puffs daily, Disp: 1 Inhaler, Rfl: 5    timolol (TIMOPTIC) 0 5 % ophthalmic solution, INSTILL 1 DROP INTO EACH EYE TWICE DAILY, Disp: , Rfl:   aspirin (ECOTRIN LOW STRENGTH) 81 mg EC tablet, Take 1 tablet (81 mg total) by mouth daily, Disp: 90 tablet, Rfl: 1    pantoprazole (PROTONIX) 40 mg tablet, Take 1 tablet (40 mg total) by mouth daily before breakfast, Disp: 30 tablet, Rfl: 5    The following portions of the patient's history were reviewed and updated as appropriate: allergies, current medications, past family history, past medical history, past social history, past surgical history and problem list     Review of Systems   Constitutional: Negative for activity change, appetite change, chills, diaphoresis, fatigue and fever  HENT: Negative for congestion, facial swelling, mouth sores, rhinorrhea, sinus pressure, sneezing, sore throat, tinnitus, trouble swallowing and voice change  Heartburn+   Eyes: Negative for pain, discharge, redness and visual disturbance  Respiratory: Negative for cough, shortness of breath and wheezing  Cardiovascular: Negative for chest pain, palpitations and leg swelling  Gastrointestinal: Negative for abdominal pain, blood in stool, constipation, diarrhea and nausea  Endocrine: Negative for cold intolerance and heat intolerance  Genitourinary: Negative for dysuria, hematuria and urgency  Musculoskeletal: Negative for arthralgias, back pain and myalgias  Bilateral toe discoloration and pain on exertion     Skin: Negative for rash and wound  Discoloration of both toes   Allergic/Immunologic: Negative for environmental allergies and food allergies  Neurological: Negative for dizziness, tremors, seizures, syncope, facial asymmetry, speech difficulty, weakness, light-headedness, numbness and headaches  Hematological: Negative for adenopathy  Psychiatric/Behavioral: Negative for agitation and behavioral problems             Objective:    /60 (BP Location: Left arm, Patient Position: Sitting, Cuff Size: Adult)   Pulse 64   Temp (!) 97 2 °F (36 2 °C) (Temporal)   Resp 18   Ht 5' 2" (1 575 m)   Wt 82 1 kg (181 lb)   SpO2 96%   BMI 33 11 kg/m²      Physical Exam  Vitals signs and nursing note reviewed  Constitutional:       Appearance: Normal appearance  She is well-developed  HENT:      Head: Normocephalic and atraumatic  Right Ear: External ear normal       Left Ear: External ear normal       Nose: Nose normal    Eyes:      General: No scleral icterus  Right eye: No discharge  Left eye: No discharge  Conjunctiva/sclera: Conjunctivae normal       Pupils: Pupils are equal, round, and reactive to light  Neck:      Musculoskeletal: Normal range of motion and neck supple  Thyroid: No thyromegaly  Cardiovascular:      Rate and Rhythm: Normal rate and regular rhythm  Heart sounds: Normal heart sounds  No murmur  No gallop  Pulmonary:      Effort: Pulmonary effort is normal       Breath sounds: Normal breath sounds  No wheezing or rales  Abdominal:      General: There is no distension  Palpations: Abdomen is soft  Tenderness: There is no abdominal tenderness  Musculoskeletal:         General: No tenderness or deformity  Lymphadenopathy:      Cervical: No cervical adenopathy  Skin:     Findings: No erythema or rash  Neurological:      General: No focal deficit present  Mental Status: She is alert and oriented to person, place, and time  Mental status is at baseline  Psychiatric:         Mood and Affect: Mood normal          Behavior: Behavior normal              Assessment/Plan:         Diagnoses and all orders for this visit:    Bilateral leg and foot pain  -     VAS lower limb arterial duplex, complete bilateral; Future  -     VAS lower limb venous duplex study, complete bilateral; Future  -     aspirin (ECOTRIN LOW STRENGTH) 81 mg EC tablet;  Take 1 tablet (81 mg total) by mouth daily    Chronic obstructive pulmonary disease with acute lower respiratory infection (HCC)  -     ipratropium-albuterol (DUO-NEB) 0 5-2 5 mg/3 mL nebulizer solution; Take 1 vial (3 mL total) by nebulization every 6 (six) hours as needed for wheezing or shortness of breath    Current mild episode of major depressive disorder without prior episode ([facility] )    Other specified hypothyroidism  -     levothyroxine 100 mcg tablet; Take 1 tablet (100 mcg total) by mouth daily    Severe persistent asthma without complication  -     montelukast (SINGULAIR) 10 mg tablet; Take 1 tablet (10 mg total) by mouth daily at bedtime  -     Stiolto Respimat 2 5-2 5 MCG/ACT inhaler; Inhale 2 puffs daily  -     ipratropium-albuterol (DUO-NEB) 0 5-2 5 mg/3 mL nebulizer solution; Take 1 vial (3 mL total) by nebulization every 6 (six) hours as needed for wheezing or shortness of breath    Depression with anxiety  -     PARoxetine (PAXIL) 10 mg tablet; Take 1 tablet (10 mg total) by mouth every morning    Mild emphysema (HCC)  -     Stiolto Respimat 2 5-2 5 MCG/ACT inhaler; Inhale 2 puffs daily    Gastroesophageal reflux disease without esophagitis  -     pantoprazole (PROTONIX) 40 mg tablet; Take 1 tablet (40 mg total) by mouth daily before breakfast        Discussed importance of diet and lifestyle modifications to control GERD symptoms  Discussed the importance of eating small, frequent meals instead of large meals  Patient  was counseled on  behavioral modification including avoiding spices, Caffeine, tomato gravy, fried and fatty food , dark chocolate,wine and other caffeinated drinks along with the last meal at least 3 hours from bedtime and do not lay down 2-3 hours following a meal   Also counseled on maintaining the head elevated at 30 degree lizz when sleeping  No red flag symptoms including hematemesis, abdominal pain or weight loss reported  Given a script for Pantoprazole 40 QD    Check venous and arterial studies of lower extremities  Continue stiolto for COPD and prn albuterol  Continue paxil for derpession  Read package inserts for all medications before starting a new medications, call me if you have any questions  Patient was given opportunity to ask questions and all questions were answered

## 2021-04-16 ENCOUNTER — HOSPITAL ENCOUNTER (OUTPATIENT)
Dept: VASCULAR ULTRASOUND | Facility: HOSPITAL | Age: 66
Discharge: HOME | End: 2021-04-16
Attending: FAMILY MEDICINE
Payer: COMMERCIAL

## 2021-04-16 DIAGNOSIS — M79.671 BILATERAL LEG AND FOOT PAIN: ICD-10-CM

## 2021-04-16 DIAGNOSIS — R09.89 CARDIORESPIRATORY PROBLEMS: ICD-10-CM

## 2021-04-16 DIAGNOSIS — M79.605 BILATERAL LEG AND FOOT PAIN: ICD-10-CM

## 2021-04-16 DIAGNOSIS — M79.604 BILATERAL LEG AND FOOT PAIN: ICD-10-CM

## 2021-04-16 DIAGNOSIS — M79.672 BILATERAL LEG AND FOOT PAIN: ICD-10-CM

## 2021-04-16 PROCEDURE — 93970 EXTREMITY STUDY: CPT

## 2021-04-16 PROCEDURE — 93925 LOWER EXTREMITY STUDY: CPT

## 2021-04-16 PROCEDURE — 93923 UPR/LXTR ART STDY 3+ LVLS: CPT

## 2021-04-17 PROCEDURE — 93925 LOWER EXTREMITY STUDY: CPT | Performed by: SURGERY

## 2021-04-17 PROCEDURE — 93922 UPR/L XTREMITY ART 2 LEVELS: CPT | Performed by: SURGERY

## 2021-04-19 PROCEDURE — 93970 EXTREMITY STUDY: CPT | Performed by: SURGERY

## 2021-06-10 ENCOUNTER — TELEPHONE (OUTPATIENT)
Dept: FAMILY MEDICINE CLINIC | Facility: CLINIC | Age: 66
End: 2021-06-10

## 2021-06-14 ENCOUNTER — OFFICE VISIT (OUTPATIENT)
Dept: FAMILY MEDICINE CLINIC | Facility: CLINIC | Age: 66
End: 2021-06-14
Payer: COMMERCIAL

## 2021-06-14 VITALS
BODY MASS INDEX: 33.34 KG/M2 | DIASTOLIC BLOOD PRESSURE: 60 MMHG | WEIGHT: 181.2 LBS | SYSTOLIC BLOOD PRESSURE: 126 MMHG | RESPIRATION RATE: 18 BRPM | TEMPERATURE: 97.8 F | HEIGHT: 62 IN | HEART RATE: 68 BPM | OXYGEN SATURATION: 93 %

## 2021-06-14 DIAGNOSIS — Z23 ENCOUNTER FOR IMMUNIZATION: ICD-10-CM

## 2021-06-14 DIAGNOSIS — C14.0 CANCER OF PHARYNX (HCC): ICD-10-CM

## 2021-06-14 DIAGNOSIS — R09.02 HYPOXIA: ICD-10-CM

## 2021-06-14 DIAGNOSIS — R06.00 PERSISTENT DYSPNEA AFTER COVID-19: ICD-10-CM

## 2021-06-14 DIAGNOSIS — U09.9 PERSISTENT DYSPNEA AFTER COVID-19: ICD-10-CM

## 2021-06-14 DIAGNOSIS — R07.89 LEFT CHEST PRESSURE: Primary | ICD-10-CM

## 2021-06-14 DIAGNOSIS — J43.9 MILD EMPHYSEMA (HCC): ICD-10-CM

## 2021-06-14 DIAGNOSIS — J45.50 SEVERE PERSISTENT ASTHMA WITHOUT COMPLICATION: ICD-10-CM

## 2021-06-14 PROCEDURE — G0009 ADMIN PNEUMOCOCCAL VACCINE: HCPCS | Performed by: FAMILY MEDICINE

## 2021-06-14 PROCEDURE — 90732 PPSV23 VACC 2 YRS+ SUBQ/IM: CPT | Performed by: FAMILY MEDICINE

## 2021-06-14 PROCEDURE — 99214 OFFICE O/P EST MOD 30 MIN: CPT | Performed by: FAMILY MEDICINE

## 2021-06-14 RX ORDER — ALBUTEROL SULFATE 90 UG/1
2 AEROSOL, METERED RESPIRATORY (INHALATION) EVERY 4 HOURS PRN
Qty: 36 G | Refills: 5 | Status: SHIPPED | OUTPATIENT
Start: 2021-06-14 | End: 2022-01-19 | Stop reason: SDUPTHER

## 2021-06-14 NOTE — PROGRESS NOTES
Subjective:      Patient ID: John Ceballos is a 77 y o  female  With daughter Jovanni Kate who she would like to interpret for her today  Offered  services  she previously used the  and does not like to use them  Lavinianicholdee dee Rojo presents with history of CKD3, Hypertension, hypthyroidism, hyperlipidemia, Glaucoma and more recent COVID-10 infection  States that since she has COVID-19 she has been feeling more short of breath and is crying and calls her daughter saying that she is afraid that she is going to die because her oxygen is too low  She does have a history of underlying COPD and asthma including history of laryngeal cancer  Treated with surgery  and radiation  Today her oxygen saturation was 93% which is the lowest it has been in any of her office visits  She has been using her Stiolto daily  She has also been using Flovent  She does not have albuterol inhaler and states that it ran out and the pharmacy did not dispense it  She does have ipratropium albuterol nebulizer solution and uses that intermittently as needed          Past Medical History:   Diagnosis Date    Acute kidney failure (HCC)     Allergies     Anemia     Cancer (HCC)     Chronic kidney disease (CKD), stage III (moderate) (HCC)     COPD [facility]chronic obstructive [facility] disease) ([facility] )     COVID-19     Covid + 9-7-16-cough at that time now fully resolved    Disease of thyroid gland     Essential hypertension     GERD (gastroesophageal reflux disease)     Glaucoma     High blood pressure     HTN (hypertension) 2/16/2021    Hyperlipidemia     Hypothyroidism     Throat cancer ([facility] ) 2014    chemo and rad therapy 2014       Family History   Problem Relation Age of Onset    Brain cancer Sister     Diabetes Sister     Breast cancer Sister     Other Mother         respiratory disorder    Sudden death Father         shot himself    Suicidality Father     No Known Problems Daughter     No Known Problems Maternal Grandmother     No Known Problems Maternal Grandfather     No Known Problems Paternal Grandmother     No Known Problems Paternal Grandfather     No Known Problems Sister     No Known Problems Sister     No Known Problems Sister     No Known Problems Sister     No Known Problems Sister     No Known Problems Sister     No Known Problems Sister     No Known Problems Daughter     No Known Problems Maternal Aunt     No Known Problems Maternal Aunt     No Known Problems Maternal Aunt     No Known Problems Maternal Aunt     No Known Problems Maternal Aunt     No Known Problems Paternal Aunt     No Known Problems Paternal Aunt     No Known Problems Paternal Aunt     No Known Problems Paternal Aunt        Past Surgical History:   Procedure Laterality Date    COLONOSCOPY  2016    ESOPHAGOGASTRODUODENOSCOPY  2016    LARYNGOSCOPY      w/ Bx   [place] INCISE FINGER TENDON SHEATH Right 2/16/2021    Procedure: THUMB TRIGGER FINGER RELEASE;  Surgeon: Lydia Marino MD;  [facility]: [facility];  Service: Orthopedics    TUBAL LIGATION          reports that she quit smoking about 7 years ago  She quit after 40 00 years of use  She has never used smokeless tobacco  She reports that she does not drink alcohol        Current Outpatient Medications:     albuterol (PROVENTIL HFA,VENTOLIN HFA) 90 mcg/act inhaler, Inhale 2 puffs every 4 (four) hours as needed for wheezing, Disp: 36 g, Rfl: 5    aspirin (ECOTRIN LOW STRENGTH) 81 mg EC tablet, Take 1 tablet (81 mg total) by mouth daily, Disp: 90 tablet, Rfl: 1    brimonidine tartrate 0 2 % ophthalmic solution, Apply 1 drop to eye Three times a day, Disp: , Rfl:     Cholecalciferol (Vitamin D3) 10 MCG (400 UNIT) CAPS, Take 1 capsule by mouth daily, Disp: , Rfl:     cyanocobalamin (VITAMIN B-12) 100 mcg tablet, Take 100 mcg by mouth daily, Disp: , Rfl:     ipratropium-albuterol (DUO-NEB) 0 5-2 5 mg/3 mL nebulizer solution, Take 1 vial (3 mL total) by nebulization every 6 (six) hours as needed for wheezing or shortness of breath, Disp: 180 vial, Rfl: 1    levothyroxine 100 mcg tablet, Take 1 tablet (100 mcg total) by mouth daily, Disp: 90 tablet, Rfl: 1    montelukast (SINGULAIR) 10 mg tablet, Take 1 tablet (10 mg total) by mouth daily at bedtime, Disp: 90 tablet, Rfl: 1    pantoprazole (PROTONIX) 40 mg tablet, Take 1 tablet (40 mg total) by mouth daily before breakfast, Disp: 30 tablet, Rfl: 5    PARoxetine (PAXIL) 10 mg tablet, Take 1 tablet (10 mg total) by mouth every morning, Disp: 90 tablet, Rfl: 1    Pyridoxine HCl (VITAMIN B-6 PO), Take 1 tablet by mouth daily, Disp: , Rfl:     Stiolto Respimat 2 5-2 5 MCG/ACT inhaler, Inhale 2 puffs daily, Disp: 1 Inhaler, Rfl: 5    timolol (TIMOPTIC) 0 5 % ophthalmic solution, INSTILL 1 DROP INTO EACH EYE TWICE DAILY, Disp: , Rfl:     Pulmicort Flexhaler 180 MCG/ACT inhaler, INHALE 2 PUFFS POR VIA ORAL A DIARIO, Disp: 1 each, Rfl: 1    The following portions of the patient's history were reviewed and updated as appropriate: allergies, current medications, past family history, past medical history, past social history, past surgical history and problem list     Review of Systems   Constitutional: Negative for fatigue and fever  HENT: Negative for congestion, facial swelling, mouth sores, rhinorrhea, sore throat and trouble swallowing  Eyes: Negative for pain and redness  Respiratory: Positive for shortness of breath  Negative for cough and wheezing  Cardiovascular: Negative for chest pain, palpitations and leg swelling  Gastrointestinal: Negative for abdominal pain, blood in stool, constipation, diarrhea and nausea  Genitourinary: Negative for dysuria, hematuria and urgency  Musculoskeletal: Negative for arthralgias, back pain and myalgias  Skin: Negative for rash and wound  Neurological: Negative for seizures, syncope and headaches  Hematological: Negative for adenopathy  Psychiatric/Behavioral: Negative for agitation and behavioral problems  PHQ-9 Depression Screening    PHQ-9:   Frequency of the following problems over the past two weeks:      Little interest or pleasure in doing things: 0 - not at all  Feeling down, depressed, or hopeless: 0 - not at all  Trouble falling or staying asleep, or sleeping too much: 0 - not at all  Feeling tired or having little energy: 3 - nearly every day  Poor appetite or overeatin - not at all  Feeling bad about yourself - or that you are a failure or have let yourself or your family down: 1 - several days  Trouble concentrating on things, such as reading the newspaper or watching television: 3 - nearly every day  Moving or speaking so slowly that other people could have noticed  Or the opposite - being so fidgety or restless that you have been moving around a lot more than usual: 0 - not at all  Thoughts that you would be better off dead, or of hurting yourself in some way: 0 - not at all  PHQ-2 Score: 0  PHQ-9 Score: 7           Objective:    /60 (BP Location: Left arm, Patient Position: Sitting, Cuff Size: Adult)   Pulse 68   Temp 97 8 °F (36 6 °C) (Temporal)   Resp 18   Ht 5' 2" (1 575 m)   Wt 82 2 kg (181 lb 3 2 oz)   SpO2 93%   BMI 33 14 kg/m²      Physical Exam  Vitals and nursing note reviewed  Constitutional:       Appearance: Normal appearance  She is well-developed  HENT:      Head: Normocephalic and atraumatic  Right Ear: External ear normal       Left Ear: External ear normal       Nose: Nose normal    Eyes:      General: No scleral icterus  Right eye: No discharge  Left eye: No discharge  Conjunctiva/sclera: Conjunctivae normal       Pupils: Pupils are equal, round, and reactive to light  Cardiovascular:      Rate and Rhythm: Normal rate and regular rhythm  Heart sounds: Normal heart sounds  No murmur heard  No gallop      Pulmonary:      Effort: Pulmonary effort is normal       Breath sounds: Examination of the right-upper field reveals decreased breath sounds  Examination of the left-upper field reveals decreased breath sounds  Examination of the right-middle field reveals decreased breath sounds  Examination of the left-middle field reveals decreased breath sounds  Examination of the right-lower field reveals decreased breath sounds  Examination of the left-lower field reveals decreased breath sounds  Decreased breath sounds present  No wheezing or rales  Abdominal:      General: There is no distension  Palpations: Abdomen is soft  Tenderness: There is no abdominal tenderness  Musculoskeletal:         General: No tenderness or deformity  Cervical back: Normal range of motion and neck supple  Lymphadenopathy:      Cervical: No cervical adenopathy  Skin:     Findings: No erythema or rash  Neurological:      General: No focal deficit present  Mental Status: She is alert  Mental status is at baseline  Psychiatric:         Mood and Affect: Mood normal          Behavior: Behavior normal              Laboratory Results: I have personally reviewed the pertinent laboratory results/reports     Radiology/Other Diagnostic Testing Results: I have personally reviewed pertinent reports  VAS lower limb arterial duplex, complete bilateral    Result Date: 4/17/2021   [facility] REPORT [facility]INICAL: Indications:  Patient does not speak [place]  Per medical record patient is referred for evaluation due to B/L leg pain and a single episode of both 1st digits turning purple  Risk Factors The patient has no history of HLD  Clinical Right Pressure:  99/ mm Hg, Left Pressure:  103/ mm Hg    FINDINGS:  Segment                Right            Left                                          PSV (cm/s)  EDV  PSV (cm/s)  EDV  Common Femoral Artery         106    0         113    0  Prox Profunda                  66    0          56    0  Prox SFA 115    0         115    0  Mid SFA                        85    0          89    0  Dist SFA                       52    0          66    0  Proximal Pop                   58    0          60    0  Distal Pop                    107    0          24       Dist Post Tibial               91    0          60    0  Dist  Ant  Tibial              68    0                   Dist Peroneal                                   56    0     CONCLUSION:  Impression: RIGHT LOWER LIMB: This resting evaluation shows no evidence of significant arterial occlusive disease  Ankle/Brachial index:   Poor/non-compressible Biphasic waveforms at ankle  PVR/ PPG tracings are normal  Metatarsal pressure of 103 mmHg Great toe pressure of 98 mmHg, within the healing range  LEFT LOWER LIMB: This resting evaluation shows no evidence of significant arterial occlusive disease  Ankle/Brachial index:   Poor/non-compressible Biphasic waveforms at ankle  PVR/ PPG tracings are normal  Metatarsal pressure of 156 mmHg Great toe pressure of 105 mmHg, within the healing range  SIGNATURE: Electronically Signed by: Gracy Daniel on 2021-04-17 11:03:09 AM    VAS lower limb venous duplex study, complete bilateral    Result Date: 4/19/2021   [facility] REPORT CLINICAL: Indications: Patient does not speak [place]  Per medical record patient is referred for evaluation due to B/L leg pain and a single episode of both 1st digits turning purple  Documented cancer history  CONCLUSION: Impression: RIGHT LOWER LIMB: No evidence of acute or chronic deep vein thrombosis  No evidence of superficial thrombophlebitis noted  Doppler evaluation shows a normal response to augmentation maneuvers  Popliteal, posterior tibial and peroneal arterial Doppler waveforms are triphasic/biphasic  LEFT LOWER LIMB: No evidence of acute or chronic deep vein thrombosis  No evidence of superficial thrombophlebitis noted   Doppler evaluation shows a normal response to augmentation maneuvers  Popliteal, posterior tibial and peroneal arterial Doppler waveforms are triphasic/biphasic  SIGNATURE: Electronically Signed by: Alfred Maldonado MD on 2021-04-19 08:28:56 AM       Assessment/Plan:         Diagnoses and all orders for this visit:    Left chest pressure  -     Echo complete with contrast if indicated; Future    Severe persistent asthma without complication  -     albuterol (PROVENTIL HFA,VENTOLIN HFA) 90 mcg/act inhaler; Inhale 2 puffs every 4 (four) hours as needed for wheezing    Cancer of pharynx (HCC)    Mild emphysema (HCC)  -     Discontinue: budesonide (PULMICORT) 180 MCG/ACT inhaler; Inhale 2 puffs daily  -     Respiratory in home assessment for portable supply    Persistent dyspnea after COVID-19  -     XR chest pa & lateral; Future  -     Respiratory in home assessment for portable supply    Encounter for immunization  -     PNEUMOCOCCAL POLYSACCHARIDE VACCINE 23-VALENT =>3YO SQ IM  -     Cancel: TDAP VACCINE GREATER THAN OR EQUAL TO 8YO IM    Hypoxia  -     Respiratory in home assessment for portable supply          I will check labs as above  Patient previously had inverted T-waves in the EKG as well as left atrial enlargement on the EKG done prior  Today EKG -showed normal sinus rhythm, no acute TS-T wave changes, motion artifact+  Rsr' in avf  She does not have chest pressure at present but it happens on exertion  I will order echo with contrast   She does have high risk of underlying cardiac disease is given hyperlipidemia, hypertension, history of radiation  All check a chest x-ray  Also send her for respiratory in-home assessment for portable oxygen supply  I have advised her to switch to Pulmicort and continue Stiolto  If Pulmicort is not covered by insurance I have advised her to continue Flovent daily  Have advised her to use albuterol 2 puffs every 2-4 hours as needed for shortness of breath    She is due for her Pneumovax today and was administered in the office today  I have also advised her daughter to schedule a follow-up appointment with pulmonologist   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

## 2021-06-15 PROCEDURE — 93000 ELECTROCARDIOGRAM COMPLETE: CPT | Performed by: FAMILY MEDICINE

## 2021-06-15 RX ORDER — BUDESONIDE 180 UG/1
AEROSOL, POWDER RESPIRATORY (INHALATION)
Qty: 1 EACH | Refills: 1 | Status: SHIPPED | OUTPATIENT
Start: 2021-06-15 | End: 2021-07-02

## 2021-07-01 ENCOUNTER — HOSPITAL ENCOUNTER (INPATIENT)
Facility: HOSPITAL | Age: 66
LOS: 1 days | Discharge: HOME | DRG: 192 | End: 2021-07-03
Attending: EMERGENCY MEDICINE | Admitting: INTERNAL MEDICINE
Payer: COMMERCIAL

## 2021-07-01 ENCOUNTER — APPOINTMENT (EMERGENCY)
Dept: CT IMAGING | Facility: HOSPITAL | Age: 66
DRG: 192 | End: 2021-07-01
Payer: COMMERCIAL

## 2021-07-01 ENCOUNTER — APPOINTMENT (EMERGENCY)
Dept: RADIOLOGY | Facility: HOSPITAL | Age: 66
DRG: 192 | End: 2021-07-01
Payer: COMMERCIAL

## 2021-07-01 DIAGNOSIS — J45.50 SEVERE PERSISTENT ASTHMA WITHOUT COMPLICATION: ICD-10-CM

## 2021-07-01 DIAGNOSIS — R79.89 ELEVATED BRAIN NATRIURETIC PEPTIDE (BNP) LEVEL: ICD-10-CM

## 2021-07-01 DIAGNOSIS — J44.0 CHRONIC OBSTRUCTIVE PULMONARY DISEASE WITH ACUTE LOWER RESPIRATORY INFECTION (HCC): ICD-10-CM

## 2021-07-01 DIAGNOSIS — R06.00 EXERTIONAL DYSPNEA: Primary | ICD-10-CM

## 2021-07-01 PROBLEM — Z86.16 HISTORY OF COVID-19: Status: ACTIVE | Noted: 2021-07-01

## 2021-07-01 LAB
ALBUMIN SERPL BCP-MCNC: 3.5 G/DL (ref 3.5–5)
ALP SERPL-CCNC: 138 U/L (ref 46–116)
ALT SERPL W P-5'-P-CCNC: 20 U/L (ref 12–78)
ANION GAP SERPL CALCULATED.3IONS-SCNC: 7 MMOL/L (ref 4–13)
AST SERPL W P-5'-P-CCNC: 21 U/L (ref 5–45)
BASOPHILS # BLD AUTO: 0.02 THOUSANDS/ΜL (ref 0–0.1)
BASOPHILS NFR BLD AUTO: 0 % (ref 0–1)
BILIRUB SERPL-MCNC: 0.25 MG/DL (ref 0.2–1)
BILIRUB UR QL STRIP: NEGATIVE
BUN SERPL-MCNC: 23 MG/DL (ref 5–25)
CALCIUM SERPL-MCNC: 9.2 MG/DL (ref 8.3–10.1)
CHLORIDE SERPL-SCNC: 104 MMOL/L (ref 100–108)
CLARITY UR: CLEAR
CO2 SERPL-SCNC: 32 MMOL/L (ref 21–32)
COLOR UR: NORMAL
CREAT SERPL-MCNC: 1.27 MG/DL (ref 0.6–1.3)
EOSINOPHIL # BLD AUTO: 0.25 THOUSAND/ΜL (ref 0–0.61)
EOSINOPHIL NFR BLD AUTO: 4 % (ref 0–6)
ERYTHROCYTE [DISTWIDTH] IN BLOOD BY AUTOMATED COUNT: 13.5 % (ref 11.6–15.1)
GFR SERPL CREATININE-BSD FRML MDRD: 44 ML/MIN/1.73SQ M
GLUCOSE SERPL-MCNC: 170 MG/DL (ref 65–140)
GLUCOSE UR STRIP-MCNC: NEGATIVE MG/DL
HCT VFR BLD AUTO: 36.6 % (ref 34.8–46.1)
HGB BLD-MCNC: 11.3 G/DL (ref 11.5–15.4)
HGB UR QL STRIP.AUTO: NEGATIVE
IMM GRANULOCYTES # BLD AUTO: 0.02 THOUSAND/UL (ref 0–0.2)
IMM GRANULOCYTES NFR BLD AUTO: 0 % (ref 0–2)
KETONES UR STRIP-MCNC: NEGATIVE MG/DL
LEUKOCYTE ESTERASE UR QL STRIP: NEGATIVE
LYMPHOCYTES # BLD AUTO: 0.47 THOUSANDS/ΜL (ref 0.6–4.47)
LYMPHOCYTES NFR BLD AUTO: 8 % (ref 14–44)
MCH RBC QN AUTO: 26.5 PG (ref 26.8–34.3)
MCHC RBC AUTO-ENTMCNC: 30.9 G/DL (ref 31.4–37.4)
MCV RBC AUTO: 86 FL (ref 82–98)
MONOCYTES # BLD AUTO: 0.4 THOUSAND/ΜL (ref 0.17–1.22)
MONOCYTES NFR BLD AUTO: 7 % (ref 4–12)
NEUTROPHILS # BLD AUTO: 4.76 THOUSANDS/ΜL (ref 1.85–7.62)
NEUTS SEG NFR BLD AUTO: 81 % (ref 43–75)
NITRITE UR QL STRIP: NEGATIVE
NRBC BLD AUTO-RTO: 0 /100 WBCS
NT-PROBNP SERPL-MCNC: 603 PG/ML
PH UR STRIP.AUTO: 6 [PH]
PLATELET # BLD AUTO: 210 THOUSANDS/UL (ref 149–390)
PMV BLD AUTO: 10.5 FL (ref 8.9–12.7)
POTASSIUM SERPL-SCNC: 4.6 MMOL/L (ref 3.5–5.3)
PROT SERPL-MCNC: 7.4 G/DL (ref 6.4–8.2)
PROT UR STRIP-MCNC: NEGATIVE MG/DL
RBC # BLD AUTO: 4.26 MILLION/UL (ref 3.81–5.12)
SODIUM SERPL-SCNC: 143 MMOL/L (ref 136–145)
SP GR UR STRIP.AUTO: 1.01 (ref 1–1.03)
TROPONIN I SERPL-MCNC: <0.02 NG/ML
UROBILINOGEN UR QL STRIP.AUTO: 0.2 E.U./DL
WBC # BLD AUTO: 5.92 THOUSAND/UL (ref 4.31–10.16)

## 2021-07-01 PROCEDURE — 71045 X-RAY EXAM CHEST 1 VIEW: CPT

## 2021-07-01 PROCEDURE — 81003 URINALYSIS AUTO W/O SCOPE: CPT | Performed by: EMERGENCY MEDICINE

## 2021-07-01 PROCEDURE — 80053 COMPREHEN METABOLIC PANEL: CPT | Performed by: EMERGENCY MEDICINE

## 2021-07-01 PROCEDURE — 84484 ASSAY OF TROPONIN QUANT: CPT | Performed by: EMERGENCY MEDICINE

## 2021-07-01 PROCEDURE — 93005 ELECTROCARDIOGRAM TRACING: CPT

## 2021-07-01 PROCEDURE — 83880 ASSAY OF NATRIURETIC PEPTIDE: CPT | Performed by: EMERGENCY MEDICINE

## 2021-07-01 PROCEDURE — 70491 CT SOFT TISSUE NECK W/DYE: CPT

## 2021-07-01 PROCEDURE — 99285 EMERGENCY DEPT VISIT HI MDM: CPT

## 2021-07-01 PROCEDURE — G1004 CDSM NDSC: HCPCS

## 2021-07-01 PROCEDURE — 85025 COMPLETE CBC W/AUTO DIFF WBC: CPT | Performed by: EMERGENCY MEDICINE

## 2021-07-01 PROCEDURE — 99285 EMERGENCY DEPT VISIT HI MDM: CPT | Performed by: EMERGENCY MEDICINE

## 2021-07-01 PROCEDURE — 36415 COLL VENOUS BLD VENIPUNCTURE: CPT | Performed by: EMERGENCY MEDICINE

## 2021-07-01 RX ORDER — OXYMETAZOLINE HYDROCHLORIDE 0.05 G/100ML
2 SPRAY NASAL ONCE
Status: COMPLETED | OUTPATIENT
Start: 2021-07-01 | End: 2021-07-01

## 2021-07-01 RX ADMIN — OXYMETAZOLINE HYDROCHLORIDE 2 SPRAY: 0.05 SPRAY NASAL at 23:14

## 2021-07-01 RX ADMIN — IOHEXOL 85 ML: 350 INJECTION, SOLUTION INTRAVENOUS at 22:05

## 2021-07-02 ENCOUNTER — APPOINTMENT (OUTPATIENT)
Dept: CT IMAGING | Facility: HOSPITAL | Age: 66
DRG: 192 | End: 2021-07-02
Payer: COMMERCIAL

## 2021-07-02 ENCOUNTER — APPOINTMENT (OUTPATIENT)
Dept: NON INVASIVE DIAGNOSTICS | Facility: HOSPITAL | Age: 66
DRG: 192 | End: 2021-07-02
Payer: COMMERCIAL

## 2021-07-02 PROBLEM — M54.2 NECK PAIN ON RIGHT SIDE: Status: ACTIVE | Noted: 2021-07-02

## 2021-07-02 LAB
ANION GAP SERPL CALCULATED.3IONS-SCNC: 8 MMOL/L (ref 4–13)
ATRIAL RATE: 59 BPM
ATRIAL RATE: 63 BPM
BUN SERPL-MCNC: 21 MG/DL (ref 5–25)
CALCIUM SERPL-MCNC: 9.2 MG/DL (ref 8.3–10.1)
CHLORIDE SERPL-SCNC: 105 MMOL/L (ref 100–108)
CO2 SERPL-SCNC: 29 MMOL/L (ref 21–32)
CREAT SERPL-MCNC: 1.14 MG/DL (ref 0.6–1.3)
D DIMER PPP FEU-MCNC: 0.59 UG/ML FEU
ERYTHROCYTE [DISTWIDTH] IN BLOOD BY AUTOMATED COUNT: 13.5 % (ref 11.6–15.1)
GFR SERPL CREATININE-BSD FRML MDRD: 50 ML/MIN/1.73SQ M
GLUCOSE P FAST SERPL-MCNC: 81 MG/DL (ref 65–99)
GLUCOSE SERPL-MCNC: 81 MG/DL (ref 65–140)
HCT VFR BLD AUTO: 36.2 % (ref 34.8–46.1)
HGB BLD-MCNC: 10.8 G/DL (ref 11.5–15.4)
MCH RBC QN AUTO: 25.9 PG (ref 26.8–34.3)
MCHC RBC AUTO-ENTMCNC: 29.8 G/DL (ref 31.4–37.4)
MCV RBC AUTO: 87 FL (ref 82–98)
P AXIS: 83 DEGREES
P AXIS: 97 DEGREES
PLATELET # BLD AUTO: 181 THOUSANDS/UL (ref 149–390)
PMV BLD AUTO: 10.3 FL (ref 8.9–12.7)
POTASSIUM SERPL-SCNC: 4.4 MMOL/L (ref 3.5–5.3)
PR INTERVAL: 154 MS
PR INTERVAL: 192 MS
QRS AXIS: 59 DEGREES
QRS AXIS: 60 DEGREES
QRSD INTERVAL: 82 MS
QRSD INTERVAL: 84 MS
QT INTERVAL: 402 MS
QT INTERVAL: 414 MS
QTC INTERVAL: 409 MS
QTC INTERVAL: 411 MS
RBC # BLD AUTO: 4.17 MILLION/UL (ref 3.81–5.12)
SODIUM SERPL-SCNC: 142 MMOL/L (ref 136–145)
T WAVE AXIS: 55 DEGREES
T WAVE AXIS: 65 DEGREES
TROPONIN I SERPL-MCNC: <0.02 NG/ML
TROPONIN I SERPL-MCNC: <0.02 NG/ML
VENTRICULAR RATE: 59 BPM
VENTRICULAR RATE: 63 BPM
WBC # BLD AUTO: 5.01 THOUSAND/UL (ref 4.31–10.16)

## 2021-07-02 PROCEDURE — 93010 ELECTROCARDIOGRAM REPORT: CPT | Performed by: INTERNAL MEDICINE

## 2021-07-02 PROCEDURE — 99220 PR INITIAL OBSERVATION CARE/DAY 70 MINUTES: CPT | Performed by: INTERNAL MEDICINE

## 2021-07-02 PROCEDURE — 84484 ASSAY OF TROPONIN QUANT: CPT | Performed by: NURSE PRACTITIONER

## 2021-07-02 PROCEDURE — 93005 ELECTROCARDIOGRAM TRACING: CPT

## 2021-07-02 PROCEDURE — 85027 COMPLETE CBC AUTOMATED: CPT | Performed by: NURSE PRACTITIONER

## 2021-07-02 PROCEDURE — 36415 COLL VENOUS BLD VENIPUNCTURE: CPT | Performed by: NURSE PRACTITIONER

## 2021-07-02 PROCEDURE — 93306 TTE W/DOPPLER COMPLETE: CPT

## 2021-07-02 PROCEDURE — B24BZZZ ULTRASONOGRAPHY OF HEART WITH AORTA: ICD-10-PCS | Performed by: INTERNAL MEDICINE

## 2021-07-02 PROCEDURE — 93306 TTE W/DOPPLER COMPLETE: CPT | Performed by: INTERNAL MEDICINE

## 2021-07-02 PROCEDURE — 80048 BASIC METABOLIC PNL TOTAL CA: CPT | Performed by: NURSE PRACTITIONER

## 2021-07-02 PROCEDURE — 85379 FIBRIN DEGRADATION QUANT: CPT | Performed by: NURSE PRACTITIONER

## 2021-07-02 RX ORDER — IPRATROPIUM BROMIDE AND ALBUTEROL SULFATE 2.5; .5 MG/3ML; MG/3ML
3 SOLUTION RESPIRATORY (INHALATION) EVERY 6 HOURS PRN
Status: DISCONTINUED | OUTPATIENT
Start: 2021-07-02 | End: 2021-07-03 | Stop reason: HOSPADM

## 2021-07-02 RX ORDER — PAROXETINE 10 MG/1
10 TABLET, FILM COATED ORAL EVERY MORNING
Status: DISCONTINUED | OUTPATIENT
Start: 2021-07-02 | End: 2021-07-03 | Stop reason: HOSPADM

## 2021-07-02 RX ORDER — MONTELUKAST SODIUM 10 MG/1
10 TABLET ORAL
Status: DISCONTINUED | OUTPATIENT
Start: 2021-07-02 | End: 2021-07-03 | Stop reason: HOSPADM

## 2021-07-02 RX ORDER — BRIMONIDINE TARTRATE 0.15 %
1 DROPS OPHTHALMIC (EYE) 3 TIMES DAILY
Status: DISCONTINUED | OUTPATIENT
Start: 2021-07-02 | End: 2021-07-03 | Stop reason: HOSPADM

## 2021-07-02 RX ORDER — OMEGA-3S/DHA/EPA/FISH OIL/D3 300MG-1000
400 CAPSULE ORAL DAILY
Status: DISCONTINUED | OUTPATIENT
Start: 2021-07-02 | End: 2021-07-03 | Stop reason: HOSPADM

## 2021-07-02 RX ORDER — PANTOPRAZOLE SODIUM 40 MG/1
40 TABLET, DELAYED RELEASE ORAL
Status: DISCONTINUED | OUTPATIENT
Start: 2021-07-02 | End: 2021-07-03 | Stop reason: HOSPADM

## 2021-07-02 RX ORDER — ALBUTEROL SULFATE 90 UG/1
2 AEROSOL, METERED RESPIRATORY (INHALATION) EVERY 4 HOURS PRN
Status: DISCONTINUED | OUTPATIENT
Start: 2021-07-02 | End: 2021-07-03 | Stop reason: HOSPADM

## 2021-07-02 RX ORDER — TIMOLOL MALEATE 5 MG/ML
1 SOLUTION/ DROPS OPHTHALMIC 2 TIMES DAILY
Status: DISCONTINUED | OUTPATIENT
Start: 2021-07-02 | End: 2021-07-03 | Stop reason: HOSPADM

## 2021-07-02 RX ORDER — LEVOTHYROXINE SODIUM 0.1 MG/1
100 TABLET ORAL
Status: DISCONTINUED | OUTPATIENT
Start: 2021-07-02 | End: 2021-07-03 | Stop reason: HOSPADM

## 2021-07-02 RX ORDER — BENZONATATE 100 MG/1
100 CAPSULE ORAL 3 TIMES DAILY PRN
Status: DISCONTINUED | OUTPATIENT
Start: 2021-07-02 | End: 2021-07-03 | Stop reason: HOSPADM

## 2021-07-02 RX ORDER — ASPIRIN 81 MG/1
81 TABLET ORAL DAILY
Status: DISCONTINUED | OUTPATIENT
Start: 2021-07-02 | End: 2021-07-03 | Stop reason: HOSPADM

## 2021-07-02 RX ORDER — GUAIFENESIN 600 MG
600 TABLET, EXTENDED RELEASE 12 HR ORAL EVERY 12 HOURS SCHEDULED
Status: DISCONTINUED | OUTPATIENT
Start: 2021-07-02 | End: 2021-07-03 | Stop reason: HOSPADM

## 2021-07-02 RX ADMIN — GUAIFENESIN 600 MG: 600 TABLET, EXTENDED RELEASE ORAL at 13:51

## 2021-07-02 RX ADMIN — BRIMONIDINE TARTRATE 1 DROP: 1.5 SOLUTION OPHTHALMIC at 21:25

## 2021-07-02 RX ADMIN — ENOXAPARIN SODIUM 40 MG: 40 INJECTION SUBCUTANEOUS at 10:25

## 2021-07-02 RX ADMIN — TIMOLOL MALEATE 1 DROP: 5 SOLUTION/ DROPS OPHTHALMIC at 10:29

## 2021-07-02 RX ADMIN — BRIMONIDINE TARTRATE 1 DROP: 1.5 SOLUTION OPHTHALMIC at 10:28

## 2021-07-02 RX ADMIN — TIOTROPIUM BROMIDE 18 MCG: 18 CAPSULE ORAL; RESPIRATORY (INHALATION) at 10:28

## 2021-07-02 RX ADMIN — CHOLECALCIFEROL TAB 10 MCG (400 UNIT) 400 UNITS: 10 TAB at 10:26

## 2021-07-02 RX ADMIN — SALMETEROL XINAFOATE 1 PUFF: 50 POWDER, METERED ORAL; RESPIRATORY (INHALATION) at 10:27

## 2021-07-02 RX ADMIN — ASPIRIN 81 MG: 81 TABLET, COATED ORAL at 10:26

## 2021-07-02 RX ADMIN — MONTELUKAST 10 MG: 10 TABLET, FILM COATED ORAL at 21:24

## 2021-07-02 RX ADMIN — BRIMONIDINE TARTRATE 1 DROP: 1.5 SOLUTION OPHTHALMIC at 17:49

## 2021-07-02 RX ADMIN — SALMETEROL XINAFOATE 1 PUFF: 50 POWDER, METERED ORAL; RESPIRATORY (INHALATION) at 21:25

## 2021-07-02 RX ADMIN — LEVOTHYROXINE SODIUM 100 MCG: 100 TABLET ORAL at 05:28

## 2021-07-02 RX ADMIN — PAROXETINE HYDROCHLORIDE 10 MG: 10 TABLET, FILM COATED ORAL at 10:26

## 2021-07-02 RX ADMIN — VITAM B12 100 MCG: 100 TAB at 10:26

## 2021-07-02 RX ADMIN — MONTELUKAST 10 MG: 10 TABLET, FILM COATED ORAL at 01:48

## 2021-07-02 RX ADMIN — PANTOPRAZOLE SODIUM 40 MG: 40 TABLET, DELAYED RELEASE ORAL at 05:28

## 2021-07-02 RX ADMIN — TIMOLOL MALEATE 1 DROP: 5 SOLUTION/ DROPS OPHTHALMIC at 21:25

## 2021-07-02 RX ADMIN — GUAIFENESIN 600 MG: 600 TABLET, EXTENDED RELEASE ORAL at 21:24

## 2021-07-02 NOTE — ASSESSMENT & PLAN NOTE
Lab Results   Component Value Date    EGFR 44 07/01/2021    EGFR 46 12/10/2020    EGFR 45 09/08/2020    CREATININE 1 27 07/01/2021    CREATININE 1 24 12/10/2020    CREATININE 1 25 09/08/2020    Currently at baseline   Monitor BMP

## 2021-07-02 NOTE — ASSESSMENT & PLAN NOTE
· Presentation:  Patient presents due to complaints of worsening shortness of breath especially on exertion  Patient is Bengali-speaking only; therefore, history of present illness was provided by daughter at bedside  Daughter reports the patient had COVID in April 2021; chart review indicates patient was COVID positive in January 2021  Daughter states that ever since [facility] patient has had intermittent shortness of breath which is worse with any type of exertion  · Etiology: Suspect multifactorial in the setting of history of COVID 19 infection versus worsening emphysema versus anxiety versus possible new onset of heart failure  · Per ED provider, SpO2 of 88% with ambulation  Currently patient's SpO2 97% on room air  · Troponin <0 02, trend x 3   ·   · Obtain D-dimer  · Obtain ECHO  · Respiratory protocol  · Repeat ambulating pulse ox prior to discharge

## 2021-07-02 NOTE — UTILIZATION REVIEW
Initial Clinical Review    Admission: Date/Time/Statement:   Admission Orders (From admission, onward)     Ordered        07/01/21 2350  Place in Observation  Once                   Orders Placed This Encounter   Procedures    Place in Observation     Standing Status:   Standing     Number of Occurrences:   1     Order Specific Question:   Level of Care     Answer:   Med Surg [16]     ED Arrival Information     Expected Arrival Acuity    - 7/1/2021 18:50 Urgent         Means of arrival Escorted by Service Admission type    Walk-In Family Member Hospitalist Urgent         Arrival complaint    neck pain  shortness of breath        Chief Complaint   Patient presents with    Shortness of Breath     Pt reports worsening SOB on exertion, reports possible swollen lymph nodes with pain  Hx throat cancer with radiation  Reports worsening SOB after Covid in April  Initial Presentation: 77 y o  female with a PMH of laryngeal cancer, CKD, COPD, anemia, GERD and COVID-19 infection who presents to ED as a walk-in with sob, worse with exertion  Arabic-speaking only; therefore, history of present illness was provided by daughter at bedside  Daughter reports the patient had COVID in April 2021; chart review indicates patient was COVID positive in January 2021  Daughter states that ever since [facility] patient has had intermittent shortness of breath which is worse with any type of exertion  In ED SpO2 of 88% with ambulation  Trop neg,   CXR neg  Admit observation to M/S/Tele unit with dyspnea  O2 as needed, current RA Sat 97%  Troponin <0 02, trend x 3  D-dimer and Echo ordered   Continue home medications of Singulair, Salmeterol, Spiriva, Nebs and Proventil prn          ED Triage Vitals   Temperature Pulse Respirations Blood Pressure SpO2   07/01/21 1910 07/01/21 1910 07/01/21 1910 07/01/21 1910 07/01/21 1910   98 2 °F (36 8 °C) 77 18 155/67 96 %      Temp Source Heart Rate Source Patient Position - Orthostatic VS BP Location FiO2 (%)   07/01/21 1910 07/01/21 1910 07/01/21 2230 07/01/21 2230 --   Oral Monitor Sitting Right arm       Pain Score       07/01/21 1910       Worst Possible Pain          Wt Readings from Last 1 Encounters:   07/02/21 82 1 kg (181 lb)     Additional Vital Signs:   Date/Time  Temp  Pulse  Resp  BP  SpO2  O2 Device  Patient Position - Orthostatic VS   07/02/21 0627  98 2 °F (36 8 °C)  77  19  169/78  98 %  --  Lying   07/02/21 0156  98 4 °F (36 9 °C)  67  16  186/80Abnormal   97 %  None (Room air)  Lying   07/02/21 0112  98 3 °F (36 8 °C)  66  16  149/72  97 %  None (Room air)  Sitting   07/01/21 2230  --  60  18  150/69  96 %  None (Room air)  Sitting   07/01/21 1935  --  --  --  --  --  None (Room air)  --   07/01/21 1910  98 2 °F (36 8 °C)  77  18  155/67  96 %  None (Room air)  --       Pertinent Labs/Diagnostic Test Results:   CXR 7/2 -- No acute cardiopulmonary disease  CT soft tissue neck 7/2 -- No cervical lymphadenopathy or soft tissue neck     Mild mediastinal lymphadenopathy measuring up to 14 mm   No prior studies are available for comparison   A nonemergent enhanced CT chest is recommended for further evaluation as metastatic disease cannot be excluded in this patient with a history of laryngeal cancer      Results from last 7 days   Lab Units 07/02/21 0521 07/01/21 2121   WBC Thousand/uL 5 01 5 92   HEMOGLOBIN g/dL 10 8* 11 3*   HEMATOCRIT % 36 2 36 6   PLATELETS Thousands/uL 181 210   NEUTROS ABS Thousands/µL  --  4 76     Results from last 7 days   Lab Units 07/02/21  0521 07/01/21 2121   SODIUM mmol/L 142 143   POTASSIUM mmol/L 4 4 4 6   CHLORIDE mmol/L 105 104   CO2 mmol/L 29 32   ANION GAP mmol/L 8 7   BUN mg/dL 21 23   CREATININE mg/dL 1 14 1 27   EGFR ml/min/1 73sq m 50 44   CALCIUM mg/dL 9 2 9 2     Results from last 7 days   Lab Units 07/01/21 2121   AST U/L 21   ALT U/L 20   ALK PHOS U/L 138*   TOTAL PROTEIN g/dL 7 4   ALBUMIN g/dL 3 5   TOTAL BILIRUBIN mg/dL 0 25 Results from last 7 days   Lab Units 07/02/21  0521 07/01/21 2121   GLUCOSE RANDOM mg/dL 81 170*     Results from last 7 days   Lab Units 07/02/21  0408 07/02/21  0106 07/01/21 2121   TROPONIN I ng/mL <0 02 <0 02 <0 02     Results from last 7 days   Lab Units 07/02/21  0106   D-DIMER QUANTITATIVE ug/ml FEU 0 59*     Results from last 7 days   Lab Units 07/01/21 2121   NT-PRO BNP pg/mL 603*       Results from last 7 days   Lab Units 07/01/21 2126   CLARITY UA  Clear   COLOR UA  Light Yellow   SPEC GRAV UA  1 015   PH UA  6 0   GLUCOSE UA mg/dl Negative   KETONES UA mg/dl Negative   BLOOD UA  Negative   PROTEIN UA mg/dl Negative   NITRITE UA  Negative   BILIRUBIN UA  Negative   UROBILINOGEN UA E U /dl 0 2   LEUKOCYTES UA  Negative             ED Treatment:   Medication Administration from 07/01/2021 1849 to 07/02/2021 0138       Date/Time Order Dose Route Action     07/01/2021 2314 oxymetazoline (AFRIN) 0 05 % nasal spray 2 spray 2 spray Each Nare Given     Past Medical History:   Diagnosis Date    Acute kidney failure (HCC)     Allergies     Anemia     Cancer ([place] [address] )     Chronic kidney disease (CKD), stage III (moderate) (HCC)     COPD (chronic obstructive pulmonary disease) ([place] [address] )     COVID-19     Covid + 4-3-14-cough at that time now fully resolved    Disease of thyroid gland     Essential hypertension     GERD (gastroesophageal reflux disease)     Glaucoma     High blood pressure     HTN (hypertension) 2/16/2021    Hyperlipidemia     Hypothyroidism     Throat cancer ([place] [address] ) 2014    chemo and rad therapy 2014     Present on Admission:   Chronic kidney disease, stage 3 (moderate)   Mild emphysema (HCC)   Mixed anxiety and depressive disorder      Admitting Diagnosis: Neck pain [M54 2]  Exertional dyspnea [R06 00]  Elevated brain natriuretic peptide (BNP) level [R79 89]  Age/Sex: 77 y o  female  Admission Orders:  Scheduled Medications:  aspirin, 81 mg, Oral, Daily  brimonidine, 1 drop, Both Eyes, TID  cholecalciferol, 400 Units, Oral, Daily  cyanocobalamin, 100 mcg, Oral, Daily  enoxaparin, 40 mg, Subcutaneous, Daily  levothyroxine, 100 mcg, Oral, Early Morning  montelukast, 10 mg, Oral, HS  pantoprazole, 40 mg, Oral, Daily Before Breakfast  PARoxetine, 10 mg, Oral, QAM  salmeterol, 1 puff, Inhalation, Q12H ESTEVAN  timolol, 1 drop, Both Eyes, BID  tiotropium, 18 mcg, Inhalation, Daily         PRN Meds:  albuterol, 2 puff, Inhalation, Q4H PRN  ipratropium-albuterol, 3 mL, Nebulization, Q6H PRN      Network Utilization Review Department  ATTENTION: Please call with any questions or concerns to [phone] and carefully listen to the prompts so that you are directed to the right person  All voicemails are confidential   Tracy Dull all requests for admission clinical reviews, approved or denied determinations and any other requests to dedicated fax number below belonging to the campus where the patient is receiving treatment   List of dedicated fax numbers for the Facilities:  [address] DENIALS (Administrative/Medical Necessity) [phone]   [address] (Maternity/NICU/Pediatrics) [phone] [address] Sami Nur [id number] [place] [address] Peggy Perez [address] [facility] [place] [address] [phone]

## 2021-07-02 NOTE — ASSESSMENT & PLAN NOTE
· Presentation:  Patient presents due to complaints of worsening shortness of breath especially on exertion  Patient is [place]-speaking only; therefore, history of present illness was provided by daughter at bedside  Daughter reports the patient had COVID in April 2021; chart review indicates patient was COVID positive in January 2021  D[facility] states [facility] [place] [facility] had intermittent shortness of breath which is worse with any type of exertion  · Echo unremarkable  · Likely due to known COPD    · Home O2 eval  · Rec outpt pulm eval

## 2021-07-02 NOTE — H&P
[facility]  [facility] 1955[facility] 77 [facility]  [facility] MRN: [id number]  Unit/Bed#: S -01 Encounter: [id number]  Primary Care Provider: Nathen Nuñez MD   Date and time admitted to hospital: 7/1/2021  7:19 PM    * Dyspnea  Assessment & Plan  · Presentation:  Patient presents due to complaints of worsening shortness of breath especially on exertion  Patient is Wallisian-speaking only; therefore, history of present illness was provided by daughter at bedside  Daughter reports the patient had COVID in April 2021; chart review indicates patient was COVID positive in January 2021  Daughter states that ever since [facility] patient has had intermittent shortness of breath which is worse with any type of exertion  · Etiology: Suspect multifactorial in the setting of history of COVID 19 infection versus worsening emphysema versus anxiety versus possible new onset of heart failure  · Per ED provider, SpO2 of 88% with ambulation  Currently patient's SpO2 97% on room air  · Troponin <0 02, trend x 3   ·   · Obtain D-dimer  · Obtain ECHO  · Respiratory protocol  · Repeat ambulating pulse ox prior to discharge  Neck pain on right side  Assessment & Plan  · History of laryngeal cancer  · CT soft tissue neck: Pending  History of COVID-19  Assessment & Plan  · Daughter reports COVID-19 in April 2021  · Per chart review, patient tested positive for COVID-19 on 01/08/2021  Mild emphysema (HCC)  Assessment & Plan  · No wheezing appreciated  · Continue home medications of Singulair, Salmeterol, Spiriva, Nebs and Proventil prn  History of laryngeal cancer  Assessment & Plan  · S/P chemoradiation in 2014  · Follow ups annually with E[facility] at [facility]      Chronic kidney disease, stage 3 (moderate)  Assessment & Plan  Lab Results   Component Value Date    EGFR 50 07/02/2021    EGFR 44 07/01/2021    EGFR 46 12/10/2020    CREATININE 1 14 07/02/2021    CREATININE 1 27 07/01/2021    CREATININE 1 24 12/10/2020    Currently at baseline   Monitor BMP  Mixed anxiety and depressive disorder  Assessment & Plan  · Continue Paxil  VTE Pharmacologic Prophylaxis: VTE Score: 4 Moderate Risk (Score 3-4) - Pharmacological DVT Prophylaxis Ordered: enoxaparin (Lovenox)  Code Status: Level 1 - Full Code Patient  Discussion with family: Updated  (daughter) at bedside  Anticipated Length of Stay: Patient will be admitted on an observation basis with an anticipated length of stay of less than 2 midnights secondary to dyspnea work up  Total Time for Visit, including Counseling / Coordination of Care: 70 minutes Greater than 50% of this total time spent on direct patient counseling and coordination of care  Chief Complaint: Shortness of breath    History of Present Illness:  Wil Cooper is a 77 y o  female with a PMH of laryngeal cancer, CKD, COPD, anemia, GERD and COVID-19 infection who presents with shortness of breath  Patient presents due to complaints of worsening shortness of breath especially on exertion  Patient is Greek-speaking only; therefore, history of present illness was provided by daughter at bedside  Daughter reports the patient had COVID in April 2021; chart review indicates patient was COVID positive in January 2021  Daughter states that ever since [facility] patient has had intermittent shortness of breath which is worse with any type of exertion  Patient denies chest pain, chest tightness, fever, chills, leg swelling, myalgias, dizziness, lightheadedness or headache  Daughter reports that the patient is a former smoker who quit in 2014  Additionally, her daughter states that she recently saw ENT doctor for routine follow up and everything was good with her check up  Per ED provider, SpO2 of 88% with ambulation  Review of Systems:  Review of Systems   Constitutional: Negative for fever  Respiratory: Positive for shortness of breath   Negative for cough and wheezing  Cardiovascular: Negative for chest pain and leg swelling  Musculoskeletal: Negative for myalgias  Neurological: Negative for dizziness and light-headedness  Psychiatric/Behavioral: The patient is nervous/anxious  All other systems reviewed and are negative  Past Medical and Surgical History:   Past Medical History:   Diagnosis Date    [facility] failure ([facility] )     Allergies     Anemia     Cancer ([place] [address] )     Chronic kidney disease (CKD), stage III (moderate) (HCC)     COPD (chronic obstructive pulmonary disease) ([place] [address] )     COVID-19     Covid + 7-6-15-cough at that time now fully resolved    Disease of thyroid gland     Essential hypertension     GERD (gastroesophageal reflux disease)     Glaucoma     High blood pressure     HTN (hypertension) 2/16/2021    Hyperlipidemia     Hypothyroidism     Throat cancer ([place] [address] ) 2014    chemo and rad therapy 2014       Past Surgical History:   Procedure Laterality Date    COLONOSCOPY  2016    ESOPHAGOGASTRODUODENOSCOPY  2016    LARYNGOSCOPY      w/ Bx   AL INCISE FINGER TENDON SHEATH Right 2/16/2021    Procedure: THUMB TRIGGER FINGER RELEASE;  Surgeon: Daniela Harris MD;  Location: [facility]  MAIN OR;  Service: Orthopedics    TUBAL LIGATION         Meds/Allergies:  Prior to Admission medications    Medication Sig Start Date End Date Taking?  Authorizing Provider   albuterol (PROVENTIL HFA,VENTOLIN HFA) 90 mcg/act inhaler Inhale 2 puffs every 4 (four) hours as needed for wheezing 6/14/21  Yes Daria Cleveland MD   aspirin (ECOTRIN LOW STRENGTH) 81 mg EC tablet Take 1 tablet (81 mg total) by mouth daily 4/7/21  Yes Daria Cleveland MD   brimonidine tartrate 0 2 % ophthalmic solution Apply 1 drop to eye Three times a day   Yes Historical Provider, MD   Cholecalciferol (Vitamin D3) 10 MCG (400 UNIT) CAPS Take 1 capsule by mouth daily   Yes Historical Provider, MD   cyanocobalamin (VITAMIN B-12) 100 mcg tablet Take 100 mcg by mouth daily   Yes Historical Provider, MD   ipratropium-albuterol (DUO-NEB) 0 5-2 5 mg/3 mL nebulizer solution Take 1 vial (3 mL total) by nebulization every 6 (six) hours as needed for wheezing or shortness of breath 21  Yes Nathen Nuñez MD   levothyroxine 100 mcg tablet Take 1 tablet (100 mcg total) by mouth daily 21  Yes Nathen Nuñez MD   montelukast (SINGULAIR) 10 mg tablet Take 1 tablet (10 mg total) by mouth daily at bedtime 21  Yes Nathen Nuñez MD   pantoprazole (PROTONIX) 40 mg tablet Take 1 tablet (40 mg total) by mouth daily before breakfast 21  Yes Nathen Nuñez MD   PARoxetine (PAXIL) 10 mg tablet Take 1 tablet (10 mg total) by mouth every morning 21  Yes Nathen Nuñez MD   Pyridoxine HCl (VITAMIN B-6 PO) Take 1 tablet by mouth daily   Yes Historical Provider, MD   Stiolto Respimat 2 5-2 5 MCG/ACT inhaler Inhale 2 puffs daily 21  Yes Nathen Nuñez MD   timolol (TIMOPTIC) 0 5 % ophthalmic solution INSTILL 1 DROP INTO EACH EYE TWICE DAILY 19  Yes Historical Provider, MD   Pulmicort Flexhaler 180 MCG/ACT inhaler INHALE 2 PUFFS POR VIA ORAL A DIARIO  Patient not taking: Reported on 2021 6/15/21   Nathen Nuñez MD     I have reviewed home medications with patient family member  Allergies:    Allergies   Allergen Reactions    Amifostine Rash    Pollen Extract Allergic Rhinitis       Social History:  Marital Status: /Civil Union   Occupation: Unknown  Patient Pre-hospital Living Situation: Home  Patient Pre-hospital Level of Mobility: walks  Patient Pre-hospital Diet Restrictions: None  Substance Use History:   Social History     Substance and Sexual Activity   Alcohol Use Never    Comment: None     Social History     Tobacco Use   Smoking Status Former Smoker    Years: 40 00    Quit date:     Years since quittin 5   Smokeless Tobacco Never Used     Social History     Substance and Sexual Activity   Drug Use Not on file    Comment: Denies       Family History:  Family History   Problem Relation Age of Onset    Brain cancer Sister     Diabetes Sister     Breast cancer Sister     Other Mother         respiratory disorder    Sudden death Father         shot himself    Suicidality Father     No Known Problems Daughter     No Known Problems Maternal Grandmother     No Known Problems Maternal Grandfather     No Known Problems Paternal Grandmother     No Known Problems Paternal Grandfather     No Known Problems Sister     No Known Problems Sister     No Known Problems Sister     No Known Problems Sister     No Known Problems Sister     No Known Problems Sister     No Known Problems Sister     No Known Problems Daughter     No Known Problems Maternal Aunt     No Known Problems Maternal Aunt     No Known Problems Maternal Aunt     No Known Problems Maternal Aunt     No Known Problems Maternal Aunt     No Known Problems Paternal Aunt     No Known Problems Paternal Aunt     No Known Problems Paternal Aunt     No Known Problems Paternal Aunt        Physical Exam:     Vitals:   Blood Pressure: (!) 186/80 (07/02/21 0156)  Pulse: 67 (07/02/21 0156)  Temperature: 98 4 °F (36 9 °C) (07/02/21 0156)  Temp Source: Oral (07/02/21 0156)  Respirations: 16 (07/02/21 0156)  Height: 5' 2" (157 5 cm) (07/02/21 0112)  Weight - Scale: 82 1 kg (181 lb) (07/02/21 0112)  SpO2: 97 % (07/02/21 0156)    Physical Exam  Vitals and nursing note reviewed  Constitutional:       General: She is not in acute distress  Appearance: Normal appearance  She is obese  She is not ill-appearing, toxic-appearing or diaphoretic  HENT:      Head: Normocephalic and atraumatic  Nose: Nose normal       Mouth/Throat:      Pharynx: Oropharynx is clear  Eyes:      General: No scleral icterus  Conjunctiva/sclera: Conjunctivae normal    Cardiovascular:      Rate and Rhythm: Normal rate and regular rhythm  Pulses: Normal pulses             Dorsalis pedis pulses are 2+ on the right side and 2+ on the left side  Heart sounds: Normal heart sounds  No murmur heard  Abdominal:      General: Bowel sounds are normal  There is no distension  Palpations: Abdomen is soft  Tenderness: There is no abdominal tenderness  Musculoskeletal:      Cervical back: Normal range of motion  Right lower leg: No edema  Left lower leg: No edema  Skin:     General: Skin is warm and dry  Neurological:      Mental Status: She is alert and oriented to person, place, and time  Psychiatric:         Mood and Affect: Mood is anxious  Additional Data:     Lab Results:  Results from last 7 days   Lab Units 07/02/21  0521 07/01/21  2121   WBC Thousand/uL 5 01 5 92   HEMOGLOBIN g/dL 10 8* 11 3*   HEMATOCRIT % 36 2 36 6   PLATELETS Thousands/uL 181 210   NEUTROS PCT %  --  81*   LYMPHS PCT %  --  8*   MONOS PCT %  --  7   EOS PCT %  --  4     Results from last 7 days   Lab Units 07/02/21  0521 07/01/21  2121   SODIUM mmol/L 142 143   POTASSIUM mmol/L 4 4 4 6   CHLORIDE mmol/L 105 104   CO2 mmol/L 29 32   BUN mg/dL 21 23   CREATININE mg/dL 1 14 1 27   ANION GAP mmol/L 8 7   CALCIUM mg/dL 9 2 9 2   ALBUMIN g/dL  --  3 5   TOTAL BILIRUBIN mg/dL  --  0 25   ALK PHOS U/L  --  138*   ALT U/L  --  20   AST U/L  --  21   GLUCOSE RANDOM mg/dL 81 170*                       Imaging: Personally reviewed the following imaging: chest xray  CT soft tissue neck   Final Result by Radha Ding MD (07/02 0407)      No cervical lymphadenopathy or soft tissue neck  Mild mediastinal lymphadenopathy measuring up to 14 mm  No prior studies are available for comparison  A nonemergent enhanced CT chest is recommended for further evaluation as metastatic disease cannot be excluded in this patient with a history of    laryngeal cancer              Findings are consistent with the preliminary report from [facility] which was provided shortly after completion of the exam  There is an additional finding of mild mediastinal lymphadenopathy  Workstation performed: [id number]         XR chest 1 view portable   ED Interpretation by Pa Sanabria DO (07/01 2211)   No infiltrates  No cardiomegaly  CT chest abdomen pelvis w contrast    (Results Pending)       EKG and Other Studies Reviewed on Admission:   · EKG: Sinus Bradycardia  HR 59     ** Please Note: This note has been constructed using a voice recognition system   **

## 2021-07-02 NOTE — ASSESSMENT & PLAN NOTE
· Daughter reports COVID-19 in April 2021  · Per chart review, patient tested positive for COVID-19 on 01/08/2021

## 2021-07-02 NOTE — ASSESSMENT & PLAN NOTE
Lab Results   Component Value Date    EGFR 50 07/02/2021    EGFR 44 07/01/2021    EGFR 46 12/10/2020    CREATININE 1 14 07/02/2021    CREATININE 1 27 07/01/2021    CREATININE 1 24 12/10/2020    Currently at baseline   Monitor BMP

## 2021-07-02 NOTE — PLAN OF CARE
Problem: SAFETY ADULT  Goal: Patient will remain free of falls  Description: INTERVENTIONS:  - Educate patient/family on patient safety including physical limitations  - Instruct patient to call for assistance with activity   - Consult OT/PT to assist with strengthening/mobility   - Keep Call bell within reach  - Keep bed low and locked with side rails adjusted as appropriate  - Keep care items and personal belongings within reach  - Initiate and maintain comfort rounds  - Make Fall Risk Sign visible to staff  - Offer Toileting every  Hours, in advance of need  - Initiate/Maintain alarm  - Obtain necessary fall risk management equipment:   - Apply yellow socks and bracelet for high fall risk patients  - Consider moving patient to room near nurses station  Outcome: Progressing  Goal: Maintain or return to baseline ADL function  Description: INTERVENTIONS:  -  Assess patient's ability to carry out ADLs; assess patient's baseline for ADL function and identify physical deficits which impact ability to perform ADLs (bathing, care of mouth/teeth, toileting, grooming, dressing, etc )  - Assess/evaluate cause of self-care deficits   - Assess range of motion  - Assess patient's mobility; develop plan if impaired  - Assess patient's need for assistive devices and provide as appropriate  - Encourage maximum independence but intervene and supervise when necessary  - Involve family in performance of ADLs  - Assess for home care needs following discharge   - Consider OT consult to assist with ADL evaluation and planning for discharge  - Provide patient education as appropriate  Outcome: Progressing  Goal: Maintains/Returns to pre admission functional level  Description: INTERVENTIONS:  - Perform BMAT or MOVE assessment daily    - Set and communicate daily mobility goal to care team and patient/family/caregiver     - Collaborate with rehabilitation services on mobility goals if consulted  - Perform Range of Motion  times a day   - Reposition patient every  hours  - Dangle patient  times a day  - Stand patient  times a day  - Ambulate patient  times a day  - Out of bed to chair  times a day   - Out of bed for meals imes a day  - Out of bed for toileting  - Record patient progress and toleration of activity level   Outcome: Progressing     Problem: DISCHARGE PLANNING  Goal: Discharge to home or other facility with appropriate resources  Description: INTERVENTIONS:  - Identify barriers to discharge w/patient and caregiver  - Arrange for needed discharge resources and transportation as appropriate  - Identify discharge learning needs (meds, wound care, etc )  - Arrange for interpretive services to assist at discharge as needed  - Refer to Case Management Department for coordinating discharge planning if the patient needs post-hospital services based on physician/advanced practitioner order or complex needs related to functional status, cognitive ability, or social support system  Outcome: Progressing     Problem: Knowledge Deficit  Goal: Patient/family/caregiver demonstrates understanding of disease process, treatment plan, medications, and discharge instructions  Description: Complete learning assessment and assess knowledge base    Interventions:  - Provide teaching at level of understanding  - Provide teaching via preferred learning methods  Outcome: Progressing

## 2021-07-02 NOTE — ASSESSMENT & PLAN NOTE
· S/P chemoradiation in 2014  · Follow ups annually with ENT at [facility]    · Pending CT chest and abdomen

## 2021-07-02 NOTE — ASSESSMENT & PLAN NOTE
· No wheezing appreciated  · Continue home medications of Singulair, Salmeterol, Spiriva, Nebs and Proventil prn  · COPD per record with unknown severity  Rec outpt f/u with pulmonary   No acute exacerbation

## 2021-07-02 NOTE — CASE MANAGEMENT
LOS: 1  Readmission: none  Bundle: none  Unplanned Readmission Risk: n/a - observation    Patient admitted under observation due to dyspnea  Patient no longer on any o2; sats have been stable on room air  May need respiratory eval prior to d/c home per MD; will continue to follow  Met with patient's son, Shona Favre, and daughter, Steffany Esqueda, at bedside to complete assessment  Patient's son answered majority of questions as patient primarily Azeri-speaking  Per son, patient lives at home with her spouse in a single-level home with no steps to enter  Reports patient is independent with ADLs and ambulation  Denies any home DME  Denies any history of home care services or past physical/occupational therapy  Patient's PCP [facility]  Primary pharmacy is usually [facility] in [place], although family states they will consider filling meds at [facility] if they accept insurance at d/c  Patient does not have any advanced directives or living will  Confirmed that daughter, Steffany Esqueda, would be emergency contact as she manages all appointments, etc  Patient does not drive, but family transports when needed  Observation notice reviewed and daughter signed; Turkmen copy also provided for their records  No discharge needs identified at this time  Will continue to follow      ESTER Del Real  7/2/2021  5:28 PM

## 2021-07-02 NOTE — ED NOTES
Patient O2 while standing 95%, dropped to 89% while ambulating  Pt reports she feels SOB while walking in hallway and also c/o right sided throat/neck pain where radiation was  Physician notified       Payal Jones RN  07/01/21 0232

## 2021-07-02 NOTE — ED PROVIDER NOTES
History  Chief Complaint   Patient presents with    Shortness of Breath     Pt reports worsening SOB on exertion, reports possible swollen lymph nodes with pain  Hx throat cancer with radiation  Reports worsening SOB after Covid in April  Patient is a 59-year-old female who presents with exertional dyspnea  Patient states that she has been feeling short of breath with activity  Her daughter is concerned that she was previously very active and now is not active due to her shortness of breath  Patient has to take breaks when performing simple activities like taking a shower  Patient denies chest pain, cough, diaphoresis, vomiting  Patient also complains of right-sided neck pain which started several days ago  Her daughter is concerned because of a history of cancer  History provided by:  Patient and relative  Shortness of Breath  Severity:  Moderate  Timing:  Intermittent  Progression:  Worsening  Chronicity:  New  Context: activity    Worsened by: Activity  Associated symptoms: neck pain    Associated symptoms: no abdominal pain, no chest pain, no cough, no diaphoresis, no fever, no headaches, no rash, no sore throat, no syncope and no vomiting        Prior to Admission Medications   Prescriptions Last Dose Informant Patient Reported? Taking?    Cholecalciferol (Vitamin D3) 10 MCG (400 UNIT) CAPS 6/30/2021 at Unknown time  Yes Yes   Sig: Take 1 capsule by mouth daily   PARoxetine (PAXIL) 10 mg tablet 6/30/2021 at Unknown time  No Yes   Sig: Take 1 tablet (10 mg total) by mouth every morning   Pyridoxine HCl (VITAMIN B-6 PO) 6/30/2021 at Unknown time  Yes Yes   Sig: Take 1 tablet by mouth daily   Stiolto Respimat 2 5-2 5 MCG/ACT inhaler 6/30/2021 at Unknown time  No Yes   Sig: Inhale 2 puffs daily   albuterol (PROVENTIL HFA,VENTOLIN HFA) 90 mcg/act inhaler Past Week at Unknown time  No Yes   Sig: Inhale 2 puffs every 4 (four) hours as needed for wheezing   aspirin (ECOTRIN LOW STRENGTH) 81 mg EC tablet 6/30/2021 at Unknown time  No Yes   Sig: Take 1 tablet (81 mg total) by mouth daily   brimonidine tartrate 0 2 % ophthalmic solution 6/30/2021 at Unknown time Self Yes Yes   Sig: Apply 1 drop to eye Three times a day   cyanocobalamin (VITAMIN B-12) 100 mcg tablet 6/30/2021 at Unknown time  Yes Yes   Sig: Take 100 mcg by mouth daily   ipratropium-albuterol (DUO-NEB) 0 5-2 5 mg/3 mL nebulizer solution 6/30/2021 at Unknown time  No Yes   Sig: Take 1 vial (3 mL total) by nebulization every 6 (six) hours as needed for wheezing or shortness of breath   levothyroxine 100 mcg tablet 6/30/2021 at Unknown time  No Yes   Sig: Take 1 tablet (100 mcg total) by mouth daily   montelukast (SINGULAIR) 10 mg tablet 6/30/2021 at Unknown time  No Yes   Sig: Take 1 tablet (10 mg total) by mouth daily at bedtime   pantoprazole (PROTONIX) 40 mg tablet 6/30/2021 at Unknown time  No Yes   Sig: Take 1 tablet (40 mg total) by mouth daily before breakfast   timolol (TIMOPTIC) 0 5 % ophthalmic solution 6/30/2021 at Unknown time Self Yes Yes   Sig: INSTILL 1 DROP INTO EACH EYE TWICE DAILY      Facility-Administered Medications: None       Past Medical History:   Diagnosis Date    Acute kidney failure (HCC)     Allergies     Anemia     Cancer ([place] [address] )     Chronic kidney disease (CKD), stage III (moderate) (HCC)     COPD (chronic obstructive pulmonary disease) ([place] [address] )     COVID-19     Covid + 2-2-65-cough at that time now fully resolved    Disease of thyroid gland     Essential hypertension     GERD (gastroesophageal reflux disease)     Glaucoma     High blood pressure     HTN (hypertension) 2/16/2021    Hyperlipidemia     Hypothyroidism     Throat cancer ([facility] ) 2014    chemo and rad therapy 2014       Past Surgical History:   Procedure Laterality Date    COLONOSCOPY  2016    ESOPHAGOGASTRODUODENOSCOPY  2016    LARYNGOSCOPY      w/ Bx       KS INCISE FINGER TENDON SHEATH Right 2/16/2021    Procedure: THUMB TRIGGER FINGER RELEASE;  Surgeon: Rosezella Curling, MD;  [facility];  Service: Orthopedics    TUBAL LIGATION         Family History   Problem Relation Age of Onset    Brain cancer Sister     Diabetes Sister    David Chiang Breast cancer Sister     Other Mother         respiratory disorder    Sudden death Father         shot himself    Suicidality Father     No Known Problems Daughter     No Known Problems Maternal Grandmother     No Known Problems Maternal Grandfather     No Known Problems Paternal Grandmother     No Known Problems Paternal Grandfather     No Known Problems Sister     No Known Problems Sister     No Known Problems Sister     No Known Problems Sister     No Known Problems Sister     No Known Problems Sister     No Known Problems Sister     No Known Problems Daughter     No Known Problems Maternal Aunt     No Known Problems Maternal Aunt     No Known Problems Maternal Aunt     No Known Problems Maternal Aunt     No Known Problems Maternal Aunt     No Known Problems Paternal Aunt     No Known Problems Paternal Aunt     No Known Problems Paternal Aunt     No Known Problems Paternal Aunt      I have reviewed and agree with the history as documented  E-Cigarette/Vaping    E-Cigarette Use Never User      E-Cigarette/Vaping Substances    Nicotine No     THC No     CBD No      Social History     Tobacco Use    Smoking status: Former Smoker     Years: 40 00     Quit date:      Years since quittin 5    Smokeless tobacco: Never Used   Vaping Use    Vaping Use: Never used   Substance Use Topics    Alcohol use: Never     Comment: None    Drug use: Not on file     Comment: Denies       Review of Systems   Constitutional: Negative for chills, diaphoresis and fever  HENT: Negative for nosebleeds, sore throat and trouble swallowing  Eyes: Negative for photophobia, pain and visual disturbance  Respiratory: Positive for shortness of breath   Negative for cough and chest tightness  Cardiovascular: Negative for chest pain, palpitations, leg swelling and syncope  Gastrointestinal: Negative for abdominal pain, constipation, diarrhea, nausea and vomiting  Endocrine: Negative for polydipsia and polyuria  Genitourinary: Negative for difficulty urinating, dysuria, hematuria, pelvic pain, vaginal bleeding and vaginal discharge  Musculoskeletal: Positive for neck pain  Negative for back pain and neck stiffness  Skin: Negative for pallor and rash  Neurological: Negative for dizziness, seizures, light-headedness and headaches  All other systems reviewed and are negative  Physical Exam  Physical Exam  Vitals and nursing note reviewed  Constitutional:       General: She is not in acute distress  Appearance: She is well-developed  HENT:      Head: Normocephalic and atraumatic  Eyes:      Pupils: Pupils are equal, round, and reactive to light  Cardiovascular:      Rate and Rhythm: Normal rate and regular rhythm  Pulses: Normal pulses  Heart sounds: Normal heart sounds  Pulmonary:      Effort: Pulmonary effort is normal  No respiratory distress  Breath sounds: Normal breath sounds  Abdominal:      General: There is no distension  Palpations: Abdomen is soft  Abdomen is not rigid  Tenderness: There is no abdominal tenderness  There is no guarding or rebound  Musculoskeletal:         General: No tenderness  Normal range of motion  Cervical back: Normal range of motion and neck supple  Lymphadenopathy:      Cervical: No cervical adenopathy  Skin:     General: Skin is warm and dry  Capillary Refill: Capillary refill takes less than 2 seconds  Neurological:      Mental Status: She is alert and oriented to person, place, and time  Cranial Nerves: No cranial nerve deficit  Sensory: No sensory deficit           Vital Signs  ED Triage Vitals   Temperature Pulse Respirations Blood Pressure SpO2   07/01/21 1910 07/01/21 1910 07/01/21 1910 07/01/21 1910 07/01/21 1910   98 2 °F (36 8 °C) 77 18 155/67 96 %      Temp Source Heart Rate Source Patient Position - Orthostatic VS BP Location FiO2 (%)   07/01/21 1910 07/01/21 1910 07/01/21 2230 07/01/21 2230 --   Oral Monitor Sitting Right arm       Pain Score       07/01/21 1910       Worst Possible Pain           Vitals:    07/02/21 0156 07/02/21 0627 07/02/21 1512 07/02/21 2142   BP: (!) 186/80 169/78 134/61 134/63   Pulse: 67 77 67 77   Patient Position - Orthostatic VS: Lying Lying Lying Sitting         Visual Acuity      ED Medications  Medications   albuterol (PROVENTIL HFA,VENTOLIN HFA) inhaler 2 puff (has no administration in time range)   aspirin (ECOTRIN LOW STRENGTH) EC tablet 81 mg (81 mg Oral Given 7/2/21 1026)   brimonidine (ALPHAGAN P) 0 15 % ophthalmic solution 1 drop (1 drop Both Eyes Given 7/2/21 2125)   cholecalciferol (VITAMIN D3) tablet 400 Units (400 Units Oral Given 7/2/21 1026)   cyanocobalamin (VITAMIN B-12) tablet 100 mcg (100 mcg Oral Given 7/2/21 1026)   ipratropium-albuterol (DUO-NEB) 0 5-2 5 mg/3 mL inhalation solution 3 mL (has no administration in time range)   levothyroxine tablet 100 mcg (100 mcg Oral Given 7/3/21 0529)   montelukast (SINGULAIR) tablet 10 mg (10 mg Oral Given 7/2/21 2124)   pantoprazole (PROTONIX) EC tablet 40 mg (40 mg Oral Given 7/3/21 0529)   PARoxetine (PAXIL) tablet 10 mg (10 mg Oral Given 7/2/21 1026)   tiotropium (SPIRIVA) capsule for inhaler 18 mcg (18 mcg Inhalation Given 7/2/21 1028)   salmeterol (Serevent Diskus) 50 mcg/dose inhaler 1 puff (1 puff Inhalation Given 7/2/21 2125)   timolol (TIMOPTIC) 0 5 % ophthalmic solution 1 drop (1 drop Both Eyes Given 7/2/21 2125)   enoxaparin (LOVENOX) subcutaneous injection 40 mg (40 mg Subcutaneous Given 7/2/21 1025)   guaiFENesin (MUCINEX) 12 hr tablet 600 mg (600 mg Oral Given 7/2/21 2124)   benzonatate (TESSALON PERLES) capsule 100 mg (has no administration in time range)   iohexol (OMNIPAQUE) 350 MG/ML injection (SINGLE-DOSE) 100 mL (85 mL Intravenous Given 7/1/21 2205)   oxymetazoline (AFRIN) 0 05 % nasal spray 2 spray (2 sprays Each Nare Given 7/1/21 8434)       Diagnostic Studies  Results Reviewed     Procedure Component Value Units Date/Time    Troponin I [[id number]]  (Normal) Collected: 07/02/21 0408    Lab Status: Final result Specimen: Blood from Arm, Left Updated: 07/02/21 0605     Troponin I <0 02 ng/mL     Basic metabolic panel [[id number]] Collected: 07/02/21 0521    Lab Status: Final result Specimen: Blood from Arm, Left Updated: 07/02/21 0550     Sodium 142 mmol/L      Potassium 4 4 mmol/L      Chloride 105 mmol/L      CO2 29 mmol/L      ANION GAP 8 mmol/L      BUN 21 mg/dL      Creatinine 1 14 mg/dL      Glucose 81 mg/dL      Glucose, Fasting 81 mg/dL      Calcium 9 2 mg/dL      eGFR 50 ml/min/1 73sq m     Narrative:      [facility] guidelines for Chronic Kidney Disease (CKD):     Stage 1 with normal or high GFR (GFR > 90 mL/min/1 73 square meters)    Stage 2 Mild CKD (GFR = 60-89 mL/min/1 73 square meters)    Stage 3A Moderate CKD (GFR = 45-59 mL/min/1 73 square meters)    Stage 3B Moderate CKD (GFR = 30-44 mL/min/1 73 square meters)    Stage 4 Severe CKD (GFR = 15-29 mL/min/1 73 square meters)    Stage 5 End Stage CKD (GFR <15 mL/min/1 73 square meters)  Note: GFR calculation is accurate only with a steady state creatinine    CBC (With Platelets) [[id number]]  (Abnormal) Collected: 07/02/21 0521    Lab Status: Final result Specimen: Blood from Arm, Left Updated: 07/02/21 0540     WBC 5 01 Thousand/uL      RBC 4 17 Million/uL      Hemoglobin 10 8 g/dL      Hematocrit 36 2 %      MCV 87 fL      MCH 25 9 pg      MCHC 29 8 g/dL      RDW 13 5 %      Platelets 043 Thousands/uL      MPV 10 3 fL     Troponin I [[id number]]  (Normal) Collected: 07/02/21 0106    Lab Status: Final result Specimen: Blood from Arm, Right Updated: 07/02/21 0138     Troponin I <0 02 ng/mL     D-dimer, quantitative [[id number]]  (Abnormal) Collected: 07/02/21 0106    Lab Status: Final result Specimen: Blood from Arm, Right Updated: 07/02/21 0136     D-Dimer, Quant 0 59 ug/ml FEU     Platelet count [[id number]]     Lab Status: No result Specimen: Blood     NT-BNP PRO [[id number]]  (Abnormal) Collected: 07/01/21 2121    Lab Status: Final result Specimen: Blood from Arm, Right Updated: 07/01/21 2156     NT-proBNP 603 pg/mL     Troponin I [[id number]]  (Normal) Collected: 07/01/21 2121    Lab Status: Final result Specimen: Blood from Arm, Right Updated: 07/01/21 2151     Troponin I <0 02 ng/mL     Comprehensive metabolic panel [[id number]]  (Abnormal) Collected: 07/01/21 2121    Lab Status: Final result Specimen: Blood from Arm, Right Updated: 07/01/21 2150     Sodium 143 mmol/L      Potassium 4 6 mmol/L      Chloride 104 mmol/L      CO2 32 mmol/L      ANION GAP 7 mmol/L      BUN 23 mg/dL      Creatinine 1 27 mg/dL      Glucose 170 mg/dL      Calcium 9 2 mg/dL      AST 21 U/L      ALT 20 U/L      Alkaline Phosphatase 138 U/L      Total Protein 7 4 g/dL      Albumin 3 5 g/dL      Total Bilirubin 0 25 mg/dL      eGFR 44 ml/min/1 73sq m     Narrative:      [facility] guidelines for Chronic Kidney Disease (CKD):     Stage 1 with normal or high GFR (GFR > 90 mL/min/1 73 square meters)    Stage 2 Mild CKD (GFR = 60-89 mL/min/1 73 square meters)    Stage 3A Moderate CKD (GFR = 45-59 mL/min/1 73 square meters)    Stage 3B Moderate CKD (GFR = 30-44 mL/min/1 73 square meters)    Stage 4 Severe CKD (GFR = 15-29 mL/min/1 73 square meters)    Stage 5 End Stage CKD (GFR <15 mL/min/1 73 square meters)  Note: GFR calculation is accurate only with a steady state creatinine    UA (URINE) with reflex to Scope [[id number]] Collected: 07/01/21 2126    Lab Status: Final result Specimen: Urine, Clean Catch Updated: 07/01/21 2137     Color, UA Light Yellow     Clarity, UA Clear     Specific [place], UA 1 015     pH, UA 6 0     Leukocytes, UA Negative     Nitrite, UA Negative     Protein, UA Negative mg/dl      Glucose, UA Negative mg/dl      Ketones, UA Negative mg/dl      Urobilinogen, UA 0 2 E U /dl      Bilirubin, UA Negative     Blood, UA Negative    CBC and differential [[id number]]  (Abnormal) Collected: 07/01/21 2121    Lab Status: Final result Specimen: Blood from Arm, Right Updated: 07/01/21 2134     WBC 5 92 Thousand/uL      RBC 4 26 Million/uL      Hemoglobin 11 3 g/dL      Hematocrit 36 6 %      MCV 86 fL      MCH 26 5 pg      MCHC 30 9 g/dL      RDW 13 5 %      MPV 10 5 fL      Platelets 788 Thousands/uL      nRBC 0 /100 WBCs      Neutrophils Relative 81 %      Immat GRANS % 0 %      Lymphocytes Relative 8 %      Monocytes Relative 7 %      Eosinophils Relative 4 %      Basophils Relative 0 %      Neutrophils Absolute 4 76 Thousands/µL      Immature Grans Absolute 0 02 Thousand/uL      Lymphocytes Absolute 0 47 Thousands/µL      Monocytes Absolute 0 40 Thousand/µL      Eosinophils Absolute 0 25 Thousand/µL      Basophils Absolute 0 02 Thousands/µL                  CT soft tissue neck   Final Result by Jose Luis Esparza MD (07/02 9161)      No cervical lymphadenopathy or soft tissue neck  Mild mediastinal lymphadenopathy measuring up to 14 mm  No prior studies are available for comparison  A nonemergent enhanced CT chest is recommended for further evaluation as metastatic disease cannot be excluded in this patient with a history of    laryngeal cancer  Findings are consistent with the preliminary report from [facility] which was provided shortly after completion of the exam  There is an additional finding of mild mediastinal lymphadenopathy  Workstation performed: [id number]         XR chest 1 view portable   ED Interpretation by Devaughn Crews DO (07/01 2211)   No infiltrates  No cardiomegaly        Final Result by Sheri Hitchcock MD (07/02 9327)      No acute cardiopulmonary disease  Workstation performed: [id number]         CT chest abdomen pelvis w contrast    (Results Pending)              Procedures  ECG 12 Lead Documentation Only    Date/Time: 7/1/2021 10:40 PM  Performed by: Alfreda Eagle DO  Authorized by: Alfreda Eagle DO     ECG reviewed by me, the ED Provider: yes    Patient location:  ED  Previous ECG:     Previous ECG:  Compared to current    Similarity:  No change    Comparison to cardiac monitor: Yes    Comments:      Sinus bradycardia rate of 59 beats per minute  Normal intervals  Normal axis  Normal QRS  No ST T wave abnormalities  Similar to previous from 12/10/2020  ED Course                             SBIRT 22yo+      Most Recent Value   SBIRT (24 yo +)   In order to provide better care to our patients, we are screening all of our patients for alcohol and drug use  Would it be okay to ask you these screening questions? Unable to answer at this time Filed at: 07/01/2021 1937                    MDM  Number of Diagnoses or Management Options  Elevated brain natriuretic peptide (BNP) level: new and requires workup  Exertional dyspnea: new and requires workup  Diagnosis management comments: Patient presents with exertional dyspnea  Chest x-ray unremarkable  BNP is slightly elevated  Concern for cardiac etiology for exertional dyspnea  Ambulated patient in the emergency department and her oxygen desaturated to 88% on room air  She did recover once seated  Will hospitalize for further workup and treatment         Amount and/or Complexity of Data Reviewed  Clinical lab tests: ordered and reviewed  Tests in the radiology section of CPT®: ordered and reviewed  Tests in the medicine section of CPT®: ordered and reviewed  Review and summarize past medical records: yes  Discuss the patient with other providers: yes  Independent visualization of images, tracings, or specimens: yes    Risk of Complications, Morbidity, and/or Mortality  Presenting problems: high  Diagnostic procedures: moderate  Management options: moderate    Patient Progress  Patient progress: stable      Disposition  Final diagnoses:   Exertional dyspnea   Elevated brain natriuretic peptide (BNP) level     Time reflects when diagnosis was documented in both MDM as applicable and the Disposition within this note     Time User Action Codes Description Comment    7/1/2021 11:49 PM Nash Foil L Add [R06 00] Exertional dyspnea     7/1/2021 11:49 PM Cindy Ratel Add [R79 89] Elevated brain natriuretic peptide (BNP) level       ED Disposition     ED Disposition Condition Date/Time Comment    Admit Stable Thu Jul 1, 2021 11:49 PM Case was discussed with TERESA and the patient's admission status was agreed to be Admission Status: observation status to the service of Dr Ayala Selby  Follow-up Information    None         Current Discharge Medication List      CONTINUE these medications which have NOT CHANGED    Details   albuterol (PROVENTIL HFA,VENTOLIN HFA) 90 mcg/act inhaler Inhale 2 puffs every 4 (four) hours as needed for wheezing  Qty: 36 g, Refills: 5    Comments: Substitution to a formulary equivalent within the same pharmaceutical class is authorized    Associated Diagnoses: Severe persistent asthma without complication      aspirin (ECOTRIN LOW STRENGTH) 81 mg EC tablet Take 1 tablet (81 mg total) by mouth daily  Qty: 90 tablet, Refills: 1    Associated Diagnoses: Bilateral leg and foot pain      brimonidine tartrate 0 2 % ophthalmic solution Apply 1 drop to eye Three times a day      Cholecalciferol (Vitamin D3) 10 MCG (400 UNIT) CAPS Take 1 capsule by mouth daily      cyanocobalamin (VITAMIN B-12) 100 mcg tablet Take 100 mcg by mouth daily      ipratropium-albuterol (DUO-NEB) 0 5-2 5 mg/3 mL nebulizer solution Take 1 vial (3 mL total) by nebulization every 6 (six) hours as needed for wheezing or shortness of breath  Qty: 180 vial, Refills: 1    Comments: DX Code Needed    Associated Diagnoses: Chronic obstructive pulmonary disease with acute lower respiratory infection ([facility] ); Severe persistent asthma without complication      levothyroxine 100 mcg tablet Take 1 tablet (100 mcg total) by mouth daily  Qty: 90 tablet, Refills: 1    Associated Diagnoses: Other specified hypothyroidism      montelukast (SINGULAIR) 10 mg tablet Take 1 tablet (10 mg total) by mouth daily at bedtime  Qty: 90 tablet, Refills: 1    Associated Diagnoses: Severe persistent asthma without complication      pantoprazole (PROTONIX) 40 mg tablet Take 1 tablet (40 mg total) by mouth daily before breakfast  Qty: 30 tablet, Refills: 5    Associated Diagnoses: Gastroesophageal reflux disease without esophagitis      PARoxetine (PAXIL) 10 mg tablet Take 1 tablet (10 mg total) by mouth every morning  Qty: 90 tablet, Refills: 1    Associated Diagnoses: Depression with anxiety      Pyridoxine HCl (VITAMIN B-6 PO) Take 1 tablet by mouth daily      Stiolto Respimat 2 5-2 5 MCG/ACT inhaler Inhale 2 puffs daily  Qty: 1 Inhaler, Refills: 5    Associated Diagnoses: Mild emphysema ([facility] ); Severe persistent asthma without complication      timolol (TIMOPTIC) 0 5 % ophthalmic solution INSTILL 1 DROP INTO EACH EYE TWICE DAILY           No discharge procedures on file      PDMP Review       Value Time User    PDMP Reviewed  Yes 6/14/2021  4:39 PM Jamila Burr MD          ED Provider  Electronically Signed by           Bobby Crow DO  07/03/21 0229

## 2021-07-02 NOTE — ASSESSMENT & PLAN NOTE
· No wheezing appreciated  · Continue home medications of Singulair, Salmeterol, Spiriva, Nebs and Proventil prn

## 2021-07-03 ENCOUNTER — APPOINTMENT (OUTPATIENT)
Dept: CT IMAGING | Facility: HOSPITAL | Age: 66
DRG: 192 | End: 2021-07-03
Payer: COMMERCIAL

## 2021-07-03 VITALS
BODY MASS INDEX: 33.27 KG/M2 | WEIGHT: 180.78 LBS | HEIGHT: 62 IN | DIASTOLIC BLOOD PRESSURE: 61 MMHG | SYSTOLIC BLOOD PRESSURE: 125 MMHG | HEART RATE: 76 BPM | TEMPERATURE: 98.2 F | RESPIRATION RATE: 18 BRPM | OXYGEN SATURATION: 96 %

## 2021-07-03 PROCEDURE — 99239 HOSP IP/OBS DSCHRG MGMT >30: CPT | Performed by: INTERNAL MEDICINE

## 2021-07-03 PROCEDURE — G1004 CDSM NDSC: HCPCS

## 2021-07-03 PROCEDURE — 94761 N-INVAS EAR/PLS OXIMETRY MLT: CPT

## 2021-07-03 PROCEDURE — 71260 CT THORAX DX C+: CPT

## 2021-07-03 PROCEDURE — 74177 CT ABD & PELVIS W/CONTRAST: CPT

## 2021-07-03 RX ADMIN — ASPIRIN 81 MG: 81 TABLET, COATED ORAL at 08:15

## 2021-07-03 RX ADMIN — PANTOPRAZOLE SODIUM 40 MG: 40 TABLET, DELAYED RELEASE ORAL at 05:29

## 2021-07-03 RX ADMIN — TIOTROPIUM BROMIDE 18 MCG: 18 CAPSULE ORAL; RESPIRATORY (INHALATION) at 08:19

## 2021-07-03 RX ADMIN — SALMETEROL XINAFOATE 1 PUFF: 50 POWDER, METERED ORAL; RESPIRATORY (INHALATION) at 08:17

## 2021-07-03 RX ADMIN — LEVOTHYROXINE SODIUM 100 MCG: 100 TABLET ORAL at 05:29

## 2021-07-03 RX ADMIN — ENOXAPARIN SODIUM 40 MG: 40 INJECTION SUBCUTANEOUS at 08:20

## 2021-07-03 RX ADMIN — BRIMONIDINE TARTRATE 1 DROP: 1.5 SOLUTION OPHTHALMIC at 08:18

## 2021-07-03 RX ADMIN — GUAIFENESIN 600 MG: 600 TABLET, EXTENDED RELEASE ORAL at 08:15

## 2021-07-03 RX ADMIN — TIMOLOL MALEATE 1 DROP: 5 SOLUTION/ DROPS OPHTHALMIC at 08:16

## 2021-07-03 RX ADMIN — VITAM B12 100 MCG: 100 TAB at 08:15

## 2021-07-03 RX ADMIN — BRIMONIDINE TARTRATE 1 DROP: 1.5 SOLUTION OPHTHALMIC at 16:28

## 2021-07-03 RX ADMIN — IOHEXOL 100 ML: 350 INJECTION, SOLUTION INTRAVENOUS at 16:19

## 2021-07-03 RX ADMIN — PAROXETINE HYDROCHLORIDE 10 MG: 10 TABLET, FILM COATED ORAL at 08:19

## 2021-07-03 RX ADMIN — CHOLECALCIFEROL TAB 10 MCG (400 UNIT) 400 UNITS: 10 TAB at 08:15

## 2021-07-03 NOTE — PROGRESS NOTES
Pt walked in hallway and around room on room air with a portable pulse ox, O2 dipped down to 86% after a minute of walking  Respiratory Therapist contacted  Pt returned to room  No signs of distress, O2 back at 93%  Will continue to monitor

## 2021-07-03 NOTE — PROGRESS NOTES
[facility]  Progress Note - Jaxon Bernardns 1955, 77 y o  female MRN: [id number]  Unit/Bed#: S -01 Encounter: [id number]  Primary Care Provider: Domingo Ureña MD   Date and time admitted to hospital: 7/1/2021  7:19 PM    Mild emphysema ([address] )  Assessment & Plan  · No wheezing appreciated  · Continue home medications of Singulair, Salmeterol, Spiriva, Nebs and Proventil prn  · COPD per record with unknown severity  Rec outpt f/u with pulmonary  No acute exacerbation    * Dyspnea  Assessment & Plan  · Presentation:  Patient presents due to complaints of worsening shortness of breath especially on exertion  Patient is Zimbabwean-speaking only; therefore, history of present illness was provided by daughter at bedside  Daughter reports the patient had COVID in April 2021; chart review indicates patient was COVID positive in January 2021  D[facility] states that [facility] had intermittent shortness of breath which is worse with any type of exertion  · Echo unremarkable  · Likely due to known COPD  · Home O2 eval  · Rec outpt pulm eval    History of COVID-19  Assessment & Plan  · Daughter reports COVID-19 in April 2021  · Per chart review, patient tested positive for COVID-19 on 01/08/2021  Mixed anxiety and depressive disorder  Assessment & Plan  · Continue Paxil  History of laryngeal cancer  Assessment & Plan  · S/P chemoradiation in 2014  · Follow ups annually with ENT at [facility]  · Pending CT chest and abdomen    Chronic kidney disease, stage 3 (moderate)  Assessment & Plan  Lab Results   Component Value Date    EGFR 50 07/02/2021    EGFR 44 07/01/2021    EGFR 46 12/10/2020    CREATININE 1 14 07/02/2021    CREATININE 1 27 07/01/2021    CREATININE 1 24 12/10/2020    Currently at baseline   Monitor BMP  VTE Pharmacologic Prophylaxis:   Pharmacologic: Enoxaparin (Lovenox)  Mechanical: Mechanical VTE prophylaxis in place      Patient Centered Rounds:  Discussions with Specialists or Other Care Team Provider:   Education and Discussions with Family / Patient:   Time Spent for Care: 20 minutes  More than 50% of total time spent on counseling and coordination of care as described above  Current Length of Stay: 0 day(s)  Current Patient Status: Observation   Certification Statement: The patient will continue to require additional inpatient hospital stay due to Waiting for CT report    Discharge Plan:   Code Status: Level 1 - Full Code    Subjective:   Pt seen and examined  Breathing better today  Objective:   Vitals:   Temp (24hrs), Av 4 °F (36 9 °C), Min:98 2 °F (36 8 °C), Max:98 6 °F (37 °C)    Temp:  [98 2 °F (36 8 °C)-98 6 °F (37 °C)] 98 2 °F (36 8 °C)  HR:  [75-77] 76  Resp:  [16-18] 18  BP: (125-134)/(56-63) 125/61  SpO2:  [92 %-96 %] 96 %  Body mass index is 33 06 kg/m²  Input and Output Summary (last 24 hours): Intake/Output Summary (Last 24 hours) at 7/3/2021 191  Last data filed at 7/3/2021 1300  Gross per 24 hour   Intake 480 ml   Output 0 ml   Net 480 ml       Physical Exam:     Physical Exam  Constitutional:       General: She is not in acute distress  Appearance: She is not ill-appearing, toxic-appearing or diaphoretic  Cardiovascular:      Rate and Rhythm: Normal rate and regular rhythm  Pulses: Normal pulses  Heart sounds: No murmur heard  Pulmonary:      Effort: Pulmonary effort is normal  No respiratory distress  Breath sounds: Normal breath sounds  No wheezing  Abdominal:      General: Abdomen is flat  Bowel sounds are normal       Palpations: Abdomen is soft  Musculoskeletal:         General: No swelling  Neurological:      Mental Status: She is oriented to person, place, and time     Psychiatric:         Mood and Affect: Mood normal          Behavior: Behavior normal          Additional Data:   Labs:  Results from last 7 days   Lab Units 21  0521 21  2121   WBC Thousand/uL 5  01 5 92   HEMOGLOBIN g/dL 10 8* 11 3*   HEMATOCRIT % 36 2 36 6   PLATELETS Thousands/uL 181 210   NEUTROS PCT %  --  81*   LYMPHS PCT %  --  8*   MONOS PCT %  --  7   EOS PCT %  --  4     Results from last 7 days   Lab Units 07/02/21  0521 07/01/21  2121   POTASSIUM mmol/L 4 4 4 6   CHLORIDE mmol/L 105 104   CO2 mmol/L 29 32   BUN mg/dL 21 23   CREATININE mg/dL 1 14 1 27   CALCIUM mg/dL 9 2 9 2   ALK PHOS U/L  --  138*   ALT U/L  --  20   AST U/L  --  21             Cultures:   Blood Culture: No results found for: BLOODCX  Urine Culture: No results found for: URINECX  Sputum Culture: No components found for: SPUTUMCX  Wound Culture: No results found for: WOUNDCULT    Last 24 Hours Medication List:   Current Facility-Administered Medications   Medication Dose Route Frequency Provider Last Rate    albuterol  2 puff Inhalation Q4H PRN GERTRUDIS Abdi      aspirin  81 mg Oral Daily GERTRUDIS Abdi      benzonatate  100 mg Oral TID PRN Magalis Deutsch MD      brimonidine  1 drop Both Eyes TID GERTRUDIS Abdi      cholecalciferol  400 Units Oral Daily Germania Lopez, GERTRUDIS      cyanocobalamin  100 mcg Oral Daily Germania Lopez, GERTRUDIS      enoxaparin  40 mg Subcutaneous Daily Germania Lopez, GERTRUDIS      guaiFENesin  600 mg Oral Q12H Noemy Watson MD      ipratropium-albuterol  3 mL Nebulization Q6H PRN GERTRUDIS Abdi      levothyroxine  100 mcg Oral Early Morning Germania Lopez, GERTRUDIS      montelukast  10 mg Oral HS Germania Lopez, GERTRUDIS      pantoprazole  40 mg Oral Daily Before Breakfast Germania Lopez, GERTRUDIS      PARoxetine  10 mg Oral QAM Germania Lopez, GERTRUDIS      salmeterol  1 puff Inhalation Q12H [address] [place] GERTRUDIS Lan      timolol  1 drop Both Eyes BID Germania Lopez, GERTRUDIS      tiotropium  18 mcg Inhalation Daily GERTRUDIS Abdi          Today, Patient Was Seen By: Magalis Deutsch MD    ** Please Note: Dragon 360 Dictation voice to text software may have been used in the creation of this document   **

## 2021-07-03 NOTE — RESPIRATORY THERAPY NOTE
Home Oxygen Qualifying Test       Patient name: Gorge Fang        : 1955   Date of Test:  July 3, 2021  Diagnosis: Dyspnea     Home Oxygen Test:    **Medicare Guidelines require item(s) 1-5 on all ambulatory patients or 1 and 2 on non-ambulatory patients  1   Baseline SPO2 on Room Air at rest: 94-95%  2   SPO2 during exercise on Room Air: 84-91%  During exercise monitor SpO2  If SPO2 increases >=89% with ambulation do not add supplemental             oxygen  If <= 88% on room air add O2 via NC and titrate patient  Patient must be ambulated with O2 and titrated to > 88% with exertion  3   SPO2 on Oxygen at rest: 93% on 2 lpm     4   SPO2 during exercise on Oxygen  91-94% a liter flow of 2 lpm     5   Exercise performed:          walking, duration 6 (min)          [x]  Supplemental Home Oxygen is indicated  []  Client does not qualify for home oxygen  Respiratory Additional Notes: Pt walked for total of 6 minutes  Pt had desaturation down to 84% on RA  Pt was started on 2L NC  SpO2 increased to 93% and walk was continued  No further desaturation while on 2L NC      Heidi Earl, RT

## 2021-07-03 NOTE — CASE MANAGEMENT
Patient qualifies for home o2 - sats dropping with exertion  Met with patient and daughter, Palma Feliz, at bedside to discuss same  Daughter in agreement for home o2 to be arranged and denies any preference for DME provider  In agreement for order to be sent to [employer] for review  Reviewed home o2 and portable tanks and insurance coverage and refill process for same  Aware that portable tank being delivered to bedside will only last a few hours, and concentrator will be delivered directly to the home  States that patient's spouse is home to accept delivery  Referral sent via [facility] to [employer]; awaiting confirmation and then portable o2 to be delivered to bedside      ESTER Silver  7/3/2021  2:44 PM

## 2021-07-03 NOTE — UTILIZATION REVIEW
Continued Stay Review  Admission: Date/Time/Statement:  7/1/2021 2350 Observation AND CHANGED 7/3/2021 1912 INPATIENT RE: PATIENT NEEDS > 2 MIDNIGHT STAY FOR ONGOING DYSPNEA WITH DECREASED SAT OF 84% REQUIRING OXYGEN, FURTHER WORK UP WITH CT CHEST  Start   Ordered   07/03/21 1912  Inpatient Admission Once     Transfer Service: General Medicine       Question Answer Comment   Level of Care Med Surg    Estimated length of stay More than 2 Midnights    Certification I certify that inpatient services are medically necessary for this patient for a duration of greater than two midnights  See H&P and MD Progress Notes for additional information about the patient's course of treatment  07/03/21 1912       Date: 7/3/2021                         Current Patient Class: inpatient  Current Level of Care: med surg     HPI:66 y o  female initially admitted on  7/1/2021 to observation due to dyspnea/neck pain  History of laryngeal cancer, covid 19  4/2021, emphysema, CKD with baseline creatinine 1 24  Presented due to exertional dyspnea and neck pain which started few days ago  On exam lungs diminished breath sounds  Sat 88% in the ED  NT-proBNP 603  Ct neck showed mediastinal lymphadenopathy of 14 mm  Plan is echo  Home oxygen evaluation  Trend troponin  Assessment/Plan:  7/3/2021:  CHANGED TO INPATIENT:  Dyspnea  Patient desaturates on room air with ambulate to 84%  Placed on oxygen at 2 liters  Patient with history of COPD on continued home medications of Singulair, Salmeterol, Spiriva, Nebs and Proventil prn  Ct chest is ordered and foundLymphadenopathies and lung nodule found on CT chest abdomen and neck    To continue oxygen       Vital Signs:   03/21 1523  98 2 °F (36 8 °C)  76  18  125/61  88  96 %  26  1 5 L/min  Nasal cannula       07/03/21 0836  98 6 °F (37 °C)  75  16  130/56  92 %  None (Room air)  Sitting   07/02/21 2142  98 3 °F (36 8 °C)  77  18  134/63  95 %  None (Room air) Sitting   07/02/21 1512  98 2 °F (36 8 °C)  67  18  134/61  93 %  None (Room air)  Lying   07/02/21 0627  98 2 °F (36 8 °C)  77  19  169/78  98 %  --  Lying   07/02/21 0156  98 4 °F (36 9 °C)  67  16  186/80Abnormal   97 %  None (Room air)  Lying   07/02/21 0112  98 3 °F (36 8 °C)  66  16  149/72  97 %  None (Room air)  Sitting   07/01/21 2230  --  60  18  150/69  96 %  None (Room air)         Pertinent Labs/Diagnostic Results:   7/2/2021 ct soft tissue neck - No cervical lymphadenopathy or soft tissue neck  Mild mediastinal lymphadenopathy measuring up to 14 mm   No prior studies are available for comparison   A nonemergent enhanced CT chest is recommended for further evaluation as metastatic disease cannot be excluded in this patient with a history of laryngeal cancer  7/2/2021 CxR No acute cardiopulmonary disease  7/2/2021 ECG - Normal sinus rhythm   Normal ECG   When compared with ECG of 01-JUL-2021 22:30, (unconfirmed)   No significant change was found     7/2/2021 ECG - Sinus bradycardia   Otherwise normal ECG   When compared with ECG of 10-DEC-2020 15:00,   Nonspecific T wave abnormality no longer evident in Inferior leads   Nonspecific T wave abnormality no longer evident in Lateral leads     7/3/2021 ct chest/abdomen Suspicious left lower lobe lung nodule measuring 1 2 cm on image 3/94  Suspicious AP window lymph node measuring 1 3 cm  Stefania mesentery with multiple abnormal lymph nodes the largest measuring 1 1 x 1 3 cm    Consider PET/CT scan versus close interval follow-up of these suspicious abnormalities in a patient with history of cancer    Results from last 7 days   Lab Units 07/02/21  0521 07/01/21  2121   WBC Thousand/uL 5 01 5 92   HEMOGLOBIN g/dL 10 8* 11 3*   HEMATOCRIT % 36 2 36 6   PLATELETS Thousands/uL 181 210   NEUTROS ABS Thousands/µL  --  4 76         Results from last 7 days   Lab Units 07/02/21  0521 07/01/21  2121   SODIUM mmol/L 142 143   POTASSIUM mmol/L 4 4 4 6   CHLORIDE mmol/L 105 104   CO2 mmol/L 29 32   ANION GAP mmol/L 8 7   BUN mg/dL 21 23   CREATININE mg/dL 1 14 1 27   EGFR ml/min/1 73sq m 50 44   CALCIUM mg/dL 9 2 9 2     Results from last 7 days   Lab Units 07/01/21 2121   AST U/L 21   ALT U/L 20   ALK PHOS U/L 138*   TOTAL PROTEIN g/dL 7 4   ALBUMIN g/dL 3 5   TOTAL BILIRUBIN mg/dL 0 25     Results from last 7 days   Lab Units 07/02/21  0521 07/01/21 2121   GLUCOSE RANDOM mg/dL 81 170*     Results from last 7 days   Lab Units 07/02/21  0408 07/02/21  0106 07/01/21 2121   TROPONIN I ng/mL <0 02 <0 02 <0 02     Results from last 7 days   Lab Units 07/02/21  0106   D-DIMER QUANTITATIVE ug/ml FEU 0 59*     Results from last 7 days   Lab Units 07/01/21 2121   NT-PRO BNP pg/mL 603*     Results from last 7 days   Lab Units 07/01/21 2126   CLARITY UA  Clear   COLOR UA  Light Yellow   SPEC GRAV UA  1 015   PH UA  6 0   GLUCOSE UA mg/dl Negative   KETONES UA mg/dl Negative   BLOOD UA  Negative   PROTEIN UA mg/dl Negative   NITRITE UA  Negative   BILIRUBIN UA  Negative   UROBILINOGEN UA E U /dl 0 2   LEUKOCYTES UA  Negative       Medications:   Scheduled Medications:  aspirin, 81 mg, Oral, Daily  brimonidine, 1 drop, Both Eyes, TID  cholecalciferol, 400 Units, Oral, Daily  cyanocobalamin, 100 mcg, Oral, Daily  enoxaparin, 40 mg, Subcutaneous, Daily  guaiFENesin, 600 mg, Oral, Q12H [facility]  levothyroxine, 100 mcg, Oral, Early Morning  montelukast, 10 mg, Oral, HS  pantoprazole, 40 mg, Oral, Daily Before Breakfast  PARoxetine, 10 mg, Oral, QAM  salmeterol, 1 puff, Inhalation, Q12H ESTEVAN  timolol, 1 drop, Both Eyes, BID  tiotropium, 18 mcg, Inhalation, Daily      Continuous IV Infusions: none     PRN Meds: not used   albuterol, 2 puff, Inhalation, Q4H PRN  benzonatate, 100 mg, Oral, TID PRN  ipratropium-albuterol, 3 mL, Nebulization, Q6H PRN        Discharge Plan: To be determined       Network Utilization Review Department  ATTENTION: Please call with any questions or concerns to [phone] and carefully listen to the prompts so that you are directed to the right person  All voicemails are confidential   Audelia Manley all requests for admission clinical reviews, approved or denied determinations and any other requests to dedicated fax number below belonging to the campus where the patient is receiving treatment   List of dedicated fax numbers for the Facilities:  [address] DENIALS (Administrative/Medical Necessity) [phone]   [address] (Maternity/NICU/Pediatrics) [phone]   [address] [place] [address] [id number] [place] [address] [address] [place] [address] [phone]

## 2021-07-03 NOTE — CASE MANAGEMENT
[facility] spoke [facility] Jamie Zarate; confirmed that order has been received and  will be reaching out to family to arrange for concentrator delivery  Portable tank delivered to patient's bedside; daughter signed delivery slip  Daughter aware that  will be contacting her to arrange delivery - [facility]  Provided her with number for office for when they leave the hospital, so that [facility] is aware of discharge timeframe if concentrator is not delivered prior to that  Daughter understanding of same      ESTER Ahumada  7/3/2021  3:36 PM

## 2021-07-04 NOTE — DISCHARGE SUMMARY
[facility]  Discharge- Kike Alarcon 1955, 77 y o  female MRN: [id number]  Unit/Bed#: S -01 Encounter: [id number]  Primary Care Provider: Deborah New MD   Date and time admitted to hospital: 7/1/2021  7:19 PM    Mild emphysema ([address] )  Assessment & Plan  · No wheezing appreciated  · Continue home medications of Singulair, Salmeterol, Spiriva, Nebs and Proventil prn  · COPD per record with unknown severity  Rec outpt f/u with pulmonary  No acute exacerbation    * Dyspnea  Assessment & Plan  · Presentation:  Patient presents due to complaints of worsening shortness of breath especially on exertion  Patient is Ukrainian-speaking only; therefore, history of present illness was provided by daughter at bedside  Daughter reports the patient had COVID in April 2021; chart review indicates patient was COVID positive in January 2021  Daughter states that ever since [place] patient has had intermittent shortness of breath which is worse with any type of exertion  · Echo unremarkable  · Likely due to known COPD  · Home O2 eval  · Rec outpt pulm eval    History of laryngeal cancer  Assessment & Plan  · S/P chemoradiation in 2014  · Follow ups annually with [facility]  · Pending CT chest and abdomen    Chronic kidney disease, stage 3 (moderate)  Assessment & Plan  Lab Results   Component Value Date    EGFR 50 07/02/2021    EGFR 44 07/01/2021    EGFR 46 12/10/2020    CREATININE 1 14 07/02/2021    CREATININE 1 27 07/01/2021    CREATININE 1 24 12/10/2020    Currently at baseline   Monitor BMP        Discharging Physician / Practitioner: Brielle Logan MD  PCP: Deborah New MD  Admission Date:   Admission Orders (From admission, onward)     Ordered        07/03/21 1912  Inpatient Admission  Once         07/01/21 2350  Place in Observation  Once                   Discharge Date: 07/03/21    Medical Problems     Resolved Problems  Date Reviewed: 7/2/2021    None                  Significant Findings / Test Results:   CT chest/abd -- Suspicious left lower lobe lung nodule measuring 1 2 cm on image 3/94  Suspicious AP window lymph node measuring 1 3 cm  Stefania mesentery with multiple abnormal lymph nodes the largest measuring 1 1 x 1 3 cm  · CT soft tissue-- Mild mediastinal lymphadenopathy measuring up to 14 mm  ·   Echocardiogram showed a normal EF    Reason for Admission: Dyspnea    Hospital Course:     Ever Lagunas is a 77 y o  female patient with PMHX of Laryngeal cancer CKD COPD anemia GERD and recent COVID-19 infection who originally presented to the hospital on 7/1/2021 due to  Dyspnea  She was with O2 sat of 88 percent with ambulation in the ED  Patient had extensive workup including CT chest abdomen and CT neck  with above-stated results  Patient will follow with Pulmonary and ENT  given her CT findings and known history of laryngeal cancer  Home O2 eval was done today patient will be discharged home with nasal cannula O2 as needed for activity  It was felt that her dyspnea is likely due to known COPD  We recommend outpatient PFT and pulmonary follow-up  Please see above list of diagnoses and related plan for additional information  Discharge Day Visit / Exam:     * Please refer to separate progress note for these details *    Discussion with Family: Daughter    Discharge instructions/Information to patient and family:   See after visit summary for information provided to patient and family  Provisions for Follow-Up Care:  See after visit summary for information related to follow-up care and any pertinent home health orders  Disposition:     Home    For Discharges to [facility] SNF:   · Not Applicable to this Patient - Not Applicable to this Patient    Planned Readmission: No     Discharge Statement:  I spent 30 minutes discharging the patient  This time was spent on the day of discharge  I had direct contact with the patient on the day of discharge  Greater than 50% of the total time was spent examining patient, answering all patient questions, arranging and discussing plan of care with patient as well as directly providing post-discharge instructions  Additional time then spent on discharge activities  Discharge Medications:  See after visit summary for reconciled discharge medications provided to patient and family        ** Please Note: This note has been constructed using a voice recognition system **

## 2021-07-04 NOTE — DISCHARGE INSTR - AVS FIRST PAGE
Dear Byron Berry,     It was our pleasure to care for you here at [facility]  It is our hope that we were always able to exceed the expected standards for your care during your stay  You were hospitalized due to shortness of breath/COPD  You were cared for on the 3rd floor under the service of Chelsy Teixeira MD with the [facility] Hospitalist Group who covers for your primary care physician (PCP), Boris Rosales MD, while you were hospitalized  If you have any questions or concerns related to this hospitalization, you may contact us at [phone]  For follow up as well as medication refills, we recommend that you follow up with your primary care physician  A registered nurse will reach out to you by phone within a few days after your discharge to answer any additional questions that you may have after going home  However, at this time we provide for you here, the most important instructions / recommendations at discharge:     · Notable Medication Adjustments -   ·  use oxygen as needed for activities  · Testing Required after Discharge -   ·   There were enlarged lymph nodes and lung nodule seen on CT scans  You will need repeat imaging and possible further study  Please follow with your doctor's recommendations  · Important follow up information -   ·  ENT  ·  lung doctor  ·  family doctor  · Other Instructions -   · Please review this entire after visit summary as additional general instructions including medication list, appointments, activity, diet, any pertinent wound care, and other additional recommendations from your care team that may be provided for you        Sincerely,     Chelsy Teixeira MD

## 2021-07-04 NOTE — ASSESSMENT & PLAN NOTE
· Presentation:  Patient presents due to complaints of worsening shortness of breath especially on exertion  Patient is Qatari-speaking only; therefore, history of present illness was provided by daughter at bedside  Daughter reports the patient had COVID in April 2021; chart review indicates patient was COVID positive in January 2021  D[facility] states that [facility] had [facility] of breath which is worse with any type of exertion  · Echo unremarkable  · Likely due to known COPD    · Home O2 eval  · Rec outpt pulm eval

## 2021-07-04 NOTE — ASSESSMENT & PLAN NOTE
· S/P chemoradiation in 2014  · Follow ups annually with ENT at [facility]    · Pending CT chest and abdomen

## 2021-07-04 NOTE — INCIDENTAL FINDINGS
The following findings require follow up:  Radiographic finding   Finding:    Lymphadenopathies and lung nodule found on CT chest abdomen and neck   Follow up required:  Repeat imaging and possible further workup    Please follow with your lung doctor and ENT doctor   Follow up should be done within 2 month(s)    Please notify the following clinician to assist with the follow up:    your ENT doctor and your lung doctor

## 2021-07-06 ENCOUNTER — TELEPHONE (OUTPATIENT)
Dept: FAMILY MEDICINE CLINIC | Facility: CLINIC | Age: 66
End: 2021-07-06

## 2021-07-06 ENCOUNTER — TRANSITIONAL CARE MANAGEMENT (OUTPATIENT)
Dept: FAMILY MEDICINE CLINIC | Facility: CLINIC | Age: 66
End: 2021-07-06

## 2021-07-06 DIAGNOSIS — J47.1 BRONCHIECTASIS WITH ACUTE EXACERBATION (HCC): ICD-10-CM

## 2021-07-06 DIAGNOSIS — J43.9 MILD EMPHYSEMA (HCC): Primary | ICD-10-CM

## 2021-07-06 LAB
DME PARACHUTE DELIVERY DATE ACTUAL: NORMAL
DME PARACHUTE DELIVERY DATE REQUESTED: NORMAL
DME PARACHUTE ITEM DESCRIPTION: NORMAL
DME PARACHUTE ORDER STATUS: NORMAL
DME PARACHUTE SUPPLIER NAME: NORMAL
DME PARACHUTE SUPPLIER PHONE: NORMAL

## 2021-07-06 RX ORDER — IPRATROPIUM BROMIDE AND ALBUTEROL SULFATE 2.5; .5 MG/3ML; MG/3ML
SOLUTION RESPIRATORY (INHALATION)
Qty: 540 ML | Refills: 1 | Status: SHIPPED | OUTPATIENT
Start: 2021-07-06 | End: 2021-10-12

## 2021-07-06 NOTE — TELEPHONE ENCOUNTER
Patients daughter called asking for a pulmonologist she can see within the [facility] network  Do we have any recommendations?   She also booked her TCM on Monday

## 2021-07-06 NOTE — UTILIZATION REVIEW
Inpatient Admission Authorization Request   NOTIFICATION OF INPATIENT ADMISSION/INPATIENT AUTHORIZATION REQUEST   SERVICING FACILITY:   [address]  Tax ID: [id number]  NPI: [id number]  Place of Service: Inpatient [facility]  [address]  Place of Service Code: 24     ATTENDING PROVIDER:  Attending Name and NPI#: Odalys Balderramama [[id number]]  Address: [address]  Phone: [phone]     UTILIZATION REVIEW CONTACT:  Felicitas Delacruz, Utilization   Network Utilization Review Department  Phone: [phone]  Fax: [phone]  Email: [email or web address]     PHYSICIAN ADVISORY SERVICES:  FOR [id number] REVIEW - MEDICAL NECESSITY DENIAL  Phone: [phone]  Fax: [phone]  Email: [email or web address]     TYPE OF REQUEST:  Inpatient Status     ADMISSION INFORMATION:  ADMISSION DATE/TIME: 7/3/21  7:12 PM  PATIENT DIAGNOSIS CODE/DESCRIPTION:  Neck pain [M54 2]  Exertional dyspnea [R06 00]  Elevated brain natriuretic peptide (BNP) level [R79 89]  DISCHARGE DATE/TIME: 7/3/2021  9:01 PM  DISCHARGE DISPOSITION (IF DISCHARGED): Home/Self Care     IMPORTANT INFORMATION:  Please contact the Felicitas Delacruz directly with any questions or concerns regarding this request  Department voicemails are confidential     Send requests for admission clinical reviews, concurrent reviews, approvals, and administrative denials due to lack of clinical to fax [phone]             Initial Clinical Review    Admission: Date/Time/Statement:   Admission Orders (From admission, onward)     Ordered        07/03/21 1912  Inpatient Admission  Once         07/01/21 2350  Place in Observation  Once                   Orders Placed This Encounter   Procedures    Place in Observation     Standing Status:   Standing     Number of Occurrences:   1     Order Specific Question:   Level of Care     Answer:   Med Surg [16]    Inpatient Admission     Standing Status:   Standing     Number of Occurrences:   1     Order Specific Question:   Level of Care     Answer:   Med Surg [16]     Order Specific Question:   Estimated length of stay     Answer:   More than 2 Midnights     Order Specific Question:   Certification     Answer:   I certify that inpatient services are medically necessary for this patient for a duration of greater than two midnights  See H&P and MD Progress Notes for additional information about the patient's course of treatment  ED Arrival Information     Expected Arrival Acuity    - 7/1/2021 18:50 Urgent         Means of arrival Escorted by Service Admission type    [facility] Urgent         Arrival complaint    neck pain  shortness of breath        Chief Complaint   Patient presents with    Shortness of Breath     Pt reports worsening SOB on exertion, reports possible swollen lymph nodes with pain  Hx throat cancer with radiation  Reports worsening SOB after Covid in April  Initial Presentation: 77 y o  female with a PMH of laryngeal cancer, CKD, COPD, anemia, GERD and COVID-19 infection who presents to ED as a walk-in with sob, worse with exertion  Czech-speaking only; therefore, history of present illness was provided by daughter at bedside  Daughter reports the patient had COVID in April 2021; chart review indicates patient was COVID positive in January 2021  Daughter states that ever since [facility] patient has had intermittent shortness of breath which is worse with any type of exertion  In ED SpO2 of 88% with ambulation  Trop neg,   CXR neg  Admit observation to M/S/Tele unit with dyspnea  O2 as needed, current RA Sat 97%  Troponin <0 02, trend x 3  D-dimer and Echo ordered   Continue home medications of Singulair, Salmeterol, Spiriva, Nebs and Proventil prn          ED Triage Vitals   Temperature Pulse Respirations Blood Pressure SpO2   07/01/21 1910 07/01/21 1910 07/01/21 1910 07/01/21 1910 07/01/21 1910   98 2 °F (36 8 °C) 77 18 155/67 96 %      Temp Source Heart Rate Source Patient Position - Orthostatic VS BP Location FiO2 (%)   07/01/21 1910 07/01/21 1910 07/01/21 2230 07/01/21 2230 --   Oral Monitor Sitting Right arm       Pain Score       07/01/21 1910       Worst Possible Pain          Wt Readings from Last 1 Encounters:   07/03/21 82 kg (180 lb 12 4 oz)     Additional Vital Signs:   Date/Time  Temp  Pulse  Resp  BP  SpO2  O2 Device  Patient Position - Orthostatic VS   07/02/21 0627  98 2 °F (36 8 °C)  77  19  169/78  98 %  --  Lying   07/02/21 0156  98 4 °F (36 9 °C)  67  16  186/80Abnormal   97 %  None (Room air)  Lying   07/02/21 0112  98 3 °F (36 8 °C)  66  16  149/72  97 %  None (Room air)  Sitting   07/01/21 2230  --  60  18  150/69  96 %  None (Room air)  Sitting   07/01/21 1935  --  --  --  --  --  None (Room air)  --   07/01/21 1910  98 2 °F (36 8 °C)  77  18  155/67  96 %  None (Room air)  --       Pertinent Labs/Diagnostic Test Results:   CXR 7/2 -- No acute cardiopulmonary disease  CT soft tissue neck 7/2 -- No cervical lymphadenopathy or soft tissue neck     Mild mediastinal lymphadenopathy measuring up to 14 mm   No prior studies are available for comparison   A nonemergent enhanced CT chest is recommended for further evaluation as metastatic disease cannot be excluded in this patient with a history of laryngeal cancer      Results from last 7 days   Lab Units 07/02/21  0521 07/01/21  2121   WBC Thousand/uL 5 01 5 92   HEMOGLOBIN g/dL 10 8* 11 3*   HEMATOCRIT % 36 2 36 6   PLATELETS Thousands/uL 181 210   NEUTROS ABS Thousands/µL  --  4 76     Results from last 7 days   Lab Units 07/02/21  0521 07/01/21  2121   SODIUM mmol/L 142 143   POTASSIUM mmol/L 4 4 4 6   CHLORIDE mmol/L 105 104   CO2 mmol/L 29 32   ANION GAP mmol/L 8 7   BUN mg/dL 21 23   CREATININE mg/dL 1 14 1 27   EGFR ml/min/1 73sq m 50 44   CALCIUM mg/dL 9 2 9 2     Results from last 7 days   Lab Units 07/01/21 2121   AST U/L 21   ALT U/L 20   ALK PHOS U/L 138*   TOTAL PROTEIN g/dL 7 4   ALBUMIN g/dL 3 5   TOTAL BILIRUBIN mg/dL 0 25     Results from last 7 days   Lab Units 07/02/21  0521 07/01/21 2121   GLUCOSE RANDOM mg/dL 81 170*     Results from last 7 days   Lab Units 07/02/21  0408 07/02/21  0106 07/01/21 2121   TROPONIN I ng/mL <0 02 <0 02 <0 02     Results from last 7 days   Lab Units 07/02/21  0106   D-DIMER QUANTITATIVE ug/ml FEU 0 59*     Results from last 7 days   Lab Units 07/01/21 2121   NT-PRO BNP pg/mL 603*       Results from last 7 days   Lab Units 07/01/21 2126   CLARITY UA  Clear   COLOR UA  Light Yellow   SPEC GRAV UA  1 015   PH UA  6 0   GLUCOSE UA mg/dl Negative   KETONES UA mg/dl Negative   BLOOD UA  Negative   PROTEIN UA mg/dl Negative   NITRITE UA  Negative   BILIRUBIN UA  Negative   UROBILINOGEN UA E U /dl 0 2   LEUKOCYTES UA  Negative             ED Treatment:   Medication Administration from 07/01/2021 1849 to 07/02/2021 0138       Date/Time Order Dose Route Action     07/01/2021 2314 oxymetazoline (AFRIN) 0 05 % nasal spray 2 spray 2 spray Each Nare Given     Past Medical History:   Diagnosis Date    Acute kidney failure (HCC)     Allergies     Anemia     [place] ([place] [address] )     Chronic kidney disease (CKD), stage III (moderate) (HCC)     COPD (chronic obstructive pulmonary disease) ([place] [address] )     COVID-19     Covid + 5-3-73-cough at that time now fully resolved    Disease of thyroid gland     Essential hypertension     GERD (gastroesophageal reflux disease)     Glaucoma     High blood pressure     HTN (hypertension) 2/16/2021    Hyperlipidemia     Hypothyroidism     Throat cancer ([place] [address] ) 2014    chemo and rad therapy 2014     Present on Admission:   Chronic kidney disease, stage 3 (moderate)   Mild emphysema (HCC)   Mixed anxiety and depressive disorder      Admitting Diagnosis: Neck pain [M54 2]  Exertional dyspnea [R06 00]  Elevated brain natriuretic peptide (BNP) level [R79 89]  Age/Sex: 77 y o  female  Admission Orders:  Scheduled Medications:  No current facility-administered medications for this encounter  No current facility-administered medications for this encounter  PRN Meds:  No current facility-administered medications for this encounter  Network Utilization Review Department  ATTENTION: Please call with any questions or concerns to [phone] and carefully listen to the prompts so that you are directed to the right person  All voicemails are confidential   Rosaura Bell all requests for admission clinical reviews, approved or denied determinations and any other requests to dedicated fax number below belonging to the campus where the patient is receiving treatment   List of dedicated fax numbers for the Facilities:  [address] DENIALS (Administrative/Medical Necessity) [phone]   [address] (Maternity/NICU/Pediatrics) [phone]   [address] 200 [facility] [address] [place] [address] [address] [facility] [address] [place] [address] [phone]        Franko Villanueva RN   Registered Nurse   Specialty:  Utilization Review   Utilization Review       Addendum   Date of Service:  7/3/2021  9:48 AM                    Show:Clear all  [x]Manual[x]Template[x]Copied    Added by:  Jairo Spring RN    []Hussein for details  Continued Stay Review  Admission: Date/Time/Statement:  7/1/2021 2350 Observation AND CHANGED 7/3/2021 1912 INPATIENT RE: PATIENT NEEDS > 2 MIDNIGHT STAY FOR ONGOING DYSPNEA WITH DECREASED SAT OF 84% REQUIRING OXYGEN, FURTHER WORK UP WITH CT CHEST  Start     Ordered   07/03/21 1912  Inpatient Admission Once     Transfer Service: General Medicine       Question Answer Comment   Level of Care Med Surg     Estimated length of stay More than 2 Midnights     Certification I certify that inpatient services are medically necessary for this patient for a duration of greater than two midnights  See H&P and MD Progress Notes for additional information about the patient's course of treatment          07/03/21 1912         Date: 7/3/2021                          Current Patient Class: inpatient       Current Level of Care: med surg      HPI:66 y o  female initially admitted on  7/1/2021 to observation due to dyspnea/neck pain  History of laryngeal cancer, covid 19  4/2021, emphysema, CKD with baseline creatinine 1 24  Presented due to exertional dyspnea and neck pain which started few days ago  On exam lungs diminished breath sounds  Sat 88% in the ED  NT-proBNP 603  Ct neck showed mediastinal lymphadenopathy of 14 mm  Plan is echo  Home oxygen evaluation  Trend troponin        Assessment/Plan:  7/3/2021:  CHANGED TO INPATIENT:  Dyspnea  Patient desaturates on room air with ambulate to 84%  Placed on oxygen at 2 liters  Patient with history of COPD on continued home medications of Singulair, Salmeterol, Spiriva, Nebs and Proventil prn  Ct chest is ordered and foundLymphadenopathies and lung nodule found on CT chest abdomen and neck       To continue oxygen       Vital Signs:   03/21 1523   98 2 °F (36 8 °C)   76   18   125/61   88   96 %   26   1 5 L/min   Nasal cannula         07/03/21 0836   98 6 °F (37 °C)   75   16   130/56   92 %   None (Room air)   Sitting   07/02/21 2142   98 3 °F (36 8 °C)   77   18   134/63   95 %   None (Room air)   Sitting   07/02/21 1512   98 2 °F (36 8 °C)   67   18   134/61   93 %   None (Room air)   Lying 07/02/21 0627   98 2 °F (36 8 °C)   77   19   169/78   98 %   --   Lying   07/02/21 0156   98 4 °F (36 9 °C)   67   16   186/80Abnormal    97 %   None (Room air)   Lying   07/02/21 0112   98 3 °F (36 8 °C)   66   16   149/72   97 %   None (Room air)   Sitting   07/01/21 2230   --   60   18   150/69   96 %   None (Room air)             Pertinent Labs/Diagnostic Results:   7/2/2021 ct soft tissue neck - No cervical lymphadenopathy or soft tissue neck  Mild mediastinal lymphadenopathy measuring up to 14 mm   No prior studies are available for comparison   A nonemergent enhanced CT chest is recommended for further evaluation as metastatic disease cannot be excluded in this patient with a history of laryngeal cancer       7/2/2021 CxR No acute cardiopulmonary disease       7/2/2021 ECG - Normal sinus rhythm   Normal ECG   When compared with ECG of 01-JUL-2021 22:30, (unconfirmed)   No significant change was found      7/2/2021 ECG - Sinus bradycardia   Otherwise normal ECG   When compared with ECG of 10-DEC-2020 15:00,   Nonspecific T wave abnormality no longer evident in Inferior leads   Nonspecific T wave abnormality no longer evident in Lateral leads      7/3/2021 ct chest/abdomen Suspicious left lower lobe lung nodule measuring 1 2 cm on image 3/94  Suspicious AP window lymph node measuring 1 3 cm  Stefania mesentery with multiple abnormal lymph nodes the largest measuring 1 1 x 1 3 cm    Consider PET/CT scan versus close interval follow-up of these suspicious abnormalities in a patient with history of cancer          Results from last 7 days   Lab Units 07/02/21  0521 07/01/21 2121   WBC Thousand/uL 5 01 5 92   HEMOGLOBIN g/dL 10 8* 11 3*   HEMATOCRIT % 36 2 36 6   PLATELETS Thousands/uL 181 210   NEUTROS ABS Thousands/µL  --  4 76                Results from last 7 days   Lab Units 07/02/21  0521 07/01/21 2121   SODIUM mmol/L 142 143   POTASSIUM mmol/L 4 4 4 6   CHLORIDE mmol/L 105 104   CO2 mmol/L 29 32 ANION GAP mmol/L 8 7   BUN mg/dL 21 23   CREATININE mg/dL 1 14 1 27   EGFR ml/min/1 73sq m 50 44   CALCIUM mg/dL 9 2 9 2           Results from last 7 days   Lab Units 07/01/21 2121   AST U/L 21   ALT U/L 20   ALK PHOS U/L 138*   TOTAL PROTEIN g/dL 7 4   ALBUMIN g/dL 3 5   TOTAL BILIRUBIN mg/dL 0 25            Results from last 7 days   Lab Units 07/02/21  0521 07/01/21 2121   GLUCOSE RANDOM mg/dL 81 170*             Results from last 7 days   Lab Units 07/02/21  0408 07/02/21  0106 07/01/21 2121   TROPONIN I ng/mL <0 02 <0 02 <0 02           Results from last 7 days   Lab Units 07/02/21  0106   D-DIMER QUANTITATIVE ug/ml FEU 0 59*           Results from last 7 days   Lab Units 07/01/21 2121   NT-PRO BNP pg/mL 603*           Results from last 7 days   Lab Units 07/01/21 2126   CLARITY UA   Clear   COLOR UA   Light Yellow   SPEC GRAV UA   1 015   PH UA   6 0   GLUCOSE UA mg/dl Negative   KETONES UA mg/dl Negative   BLOOD UA   Negative   PROTEIN UA mg/dl Negative   NITRITE UA   Negative   BILIRUBIN UA   Negative   UROBILINOGEN UA E U /dl 0 2   LEUKOCYTES UA   Negative         Medications:   Scheduled Medications:  aspirin, 81 mg, Oral, Daily  brimonidine, 1 drop, Both Eyes, TID  cholecalciferol, 400 Units, Oral, Daily  cyanocobalamin, 100 mcg, Oral, Daily  enoxaparin, 40 mg, Subcutaneous, Daily  guaiFENesin, 600 mg, Oral, Q12H [facility]  levothyroxine, 100 mcg, Oral, Early Morning  montelukast, 10 mg, Oral, HS  pantoprazole, 40 mg, Oral, Daily Before Breakfast  PARoxetine, 10 mg, Oral, QAM  salmeterol, 1 puff, Inhalation, Q12H ESTEVAN  timolol, 1 drop, Both Eyes, BID  tiotropium, 18 mcg, Inhalation, Daily        Continuous IV Infusions: none  PRN Meds: not used   albuterol, 2 puff, Inhalation, Q4H PRN  benzonatate, 100 mg, Oral, TID PRN  ipratropium-albuterol, 3 mL, Nebulization, Q6H PRN           Discharge Plan:   To be determined       Network Utilization Review Department  ATTENTION: Please call with any questions or concerns to [phone] and carefully listen to the prompts so that you are directed to the right person  All voicemails are confidential   Li List all requests for admission clinical reviews, approved or denied determinations and any other requests to dedicated fax number below belonging to the campus where the patient is receiving treatment   List of dedicated fax numbers for the Facilities:  [address] DENIALS (Administrative/Medical Necessity) [phone]   [address] (Maternity/NICU/Pediatrics) [phone]   [address] [place] [address] [address] [phone] [place] [address] [address] Ces[address] [place] [address] [facility] [place] [address] [phone]          Revision History

## 2021-07-07 NOTE — UTILIZATION REVIEW
Notification of Discharge   This is a Notification of Discharge from our facility [address]  Please be advised that this patient has been discharge from our facility  Below you will find the admission and discharge date and time including the patients disposition  UTILIZATION REVIEW CONTACT:  Darlean Lefort  Utilization   Network Utilization Review Department  Phone: [phone] x carefully listen to the prompts  All voicemails are confidential   Email: [email or web address]     PHYSICIAN ADVISORY SERVICES:  FOR [id number] REVIEW - MEDICAL NECESSITY DENIAL  Phone: [phone]  Fax: [phone]  Email: [email or web address]     PRESENTATION DATE: 7/1/2021  7:19 PM  OBERVATION ADMISSION DATE:   INPATIENT ADMISSION DATE: 7/3/21  7:12 PM   DISCHARGE DATE: 7/3/2021  9:01 PM  DISPOSITION: Home/Self Care Home/Self Care      IMPORTANT INFORMATION:  Send all requests for admission clinical reviews, approved or denied determinations and any other requests to dedicated fax number below belonging to the campus where the patient is receiving treatment   List of dedicated fax numbers:  [address] DENIALS (Administrative/Medical Necessity) [phone]   1000  [facility] (Maternity/NICU/Pediatrics) [phone]   Tariq Ortega [phone]   [facility] [phone]   [facility] [phone]   Daniel Allred [facility] 1525 [facility] [phone]   [facility]  [phone]   220[address] [facility]  2401 [facility] 1000 [address] [facility] [phone]

## 2021-07-12 ENCOUNTER — OFFICE VISIT (OUTPATIENT)
Dept: FAMILY MEDICINE CLINIC | Facility: CLINIC | Age: 66
End: 2021-07-12
Payer: COMMERCIAL

## 2021-07-12 VITALS
BODY MASS INDEX: 32.9 KG/M2 | WEIGHT: 178.8 LBS | HEART RATE: 66 BPM | DIASTOLIC BLOOD PRESSURE: 70 MMHG | RESPIRATION RATE: 20 BRPM | TEMPERATURE: 97 F | OXYGEN SATURATION: 93 % | SYSTOLIC BLOOD PRESSURE: 118 MMHG | HEIGHT: 62 IN

## 2021-07-12 DIAGNOSIS — R09.02 HYPOXIA: ICD-10-CM

## 2021-07-12 DIAGNOSIS — R91.1 NODULE OF LOWER LOBE OF LEFT LUNG: ICD-10-CM

## 2021-07-12 DIAGNOSIS — E66.09 CLASS 1 OBESITY DUE TO EXCESS CALORIES WITH SERIOUS COMORBIDITY AND BODY MASS INDEX (BMI) OF 32.0 TO 32.9 IN ADULT: ICD-10-CM

## 2021-07-12 DIAGNOSIS — R59.0 MESENTERIC LYMPHADENOPATHY: ICD-10-CM

## 2021-07-12 DIAGNOSIS — J45.50 SEVERE PERSISTENT ASTHMA WITHOUT COMPLICATION: Primary | ICD-10-CM

## 2021-07-12 DIAGNOSIS — M54.2 NECK PAIN ON LEFT SIDE: ICD-10-CM

## 2021-07-12 DIAGNOSIS — F41.8 DEPRESSION WITH ANXIETY: ICD-10-CM

## 2021-07-12 DIAGNOSIS — J43.9 MILD EMPHYSEMA (HCC): ICD-10-CM

## 2021-07-12 DIAGNOSIS — R09.82 POST-NASAL DRIP: ICD-10-CM

## 2021-07-12 PROCEDURE — 99495 TRANSJ CARE MGMT MOD F2F 14D: CPT | Performed by: FAMILY MEDICINE

## 2021-07-12 RX ORDER — PAROXETINE 10 MG/1
10 TABLET, FILM COATED ORAL EVERY MORNING
Qty: 90 TABLET | Refills: 1 | Status: SHIPPED | OUTPATIENT
Start: 2021-07-12 | End: 2021-10-26

## 2021-07-12 RX ORDER — TIZANIDINE 2 MG/1
2 TABLET ORAL EVERY 8 HOURS PRN
Qty: 20 TABLET | Refills: 0 | Status: SHIPPED | OUTPATIENT
Start: 2021-07-12 | End: 2021-09-29

## 2021-07-12 RX ORDER — IPRATROPIUM BROMIDE 42 UG/1
2 SPRAY, METERED NASAL 4 TIMES DAILY
Qty: 15 ML | Refills: 1 | Status: SHIPPED | OUTPATIENT
Start: 2021-07-12 | End: 2021-07-19

## 2021-07-12 RX ORDER — UMECLIDINIUM BROMIDE AND VILANTEROL TRIFENATATE 62.5; 25 UG/1; UG/1
1 POWDER RESPIRATORY (INHALATION) DAILY
Qty: 60 BLISTER | Refills: 1 | Status: SHIPPED | OUTPATIENT
Start: 2021-07-12 | End: 2021-09-29

## 2021-07-13 PROBLEM — E66.09 CLASS 1 OBESITY DUE TO EXCESS CALORIES WITH SERIOUS COMORBIDITY AND BODY MASS INDEX (BMI) OF 32.0 TO 32.9 IN ADULT: Status: ACTIVE | Noted: 2021-07-13

## 2021-07-13 PROBLEM — R09.02 HYPOXIA: Status: ACTIVE | Noted: 2021-07-13

## 2021-07-13 PROBLEM — R59.0 MESENTERIC LYMPHADENOPATHY: Status: ACTIVE | Noted: 2021-07-13

## 2021-07-13 PROBLEM — E66.811 CLASS 1 OBESITY DUE TO EXCESS CALORIES WITH SERIOUS COMORBIDITY AND BODY MASS INDEX (BMI) OF 32.0 TO 32.9 IN ADULT: Status: ACTIVE | Noted: 2021-07-13

## 2021-07-13 PROBLEM — R91.1 NODULE OF LOWER LOBE OF LEFT LUNG: Status: ACTIVE | Noted: 2020-12-29

## 2021-07-13 PROBLEM — R09.82 POST-NASAL DRIP: Status: ACTIVE | Noted: 2021-07-13

## 2021-07-13 PROBLEM — M54.2 NECK PAIN ON LEFT SIDE: Status: ACTIVE | Noted: 2021-07-13

## 2021-07-13 NOTE — PROGRESS NOTES
Subjective:      Patient ID: Ryan Eugene is a 77 y o  female  TCM Call (since 6/12/2021)     Date and time call was made  7/6/2021 11:52 AM    Hospital care reviewed  Records reviewed    Patient was hospitialized at  [address] [place] [address]        Date of Admission  07/01/21    Date of discharge  07/03/21    Diagnosis  dyspnea    Disposition  Home    Were the patients medications reviewed and updated  Yes    Current Symptoms  Shortness of breath    Shortness of breath severity  Moderate (Comment)  while walking      TCM Call (since 6/12/2021)     Post hospital issues  None    Should patient be enrolled in anticoag monitoring? No    Scheduled for follow up?   Yes    Patients specialists  [facility]    Did you obtain your prescribed medications  Yes    Do you need help managing your prescriptions or medications  No    Is transportation to your appointment needed  No    I have advised the patient to call PCP with any new or worsening symptoms    Roshan Chan MA      Kolby Suni to ER with shortness of breath, was found to have low O2 on ambulation and was dc home on 2L of oxygen, CT abdo and CT chest were done which were pending at discharge and patient was discharged home  Today she complains of right sided neck pain, right sided leg pain and right sided chest discomfort or pulling sensation  History was provided by daughter Ansley Rios whe she prefers to translate as she has a difficult time with the  on phone  She prefers anoro over [employer] as her breathing is better with Anoro      Past Medical History:   Diagnosis Date    Acute kidney failure ([address] )     Allergies     Anemia     Cancer [facility]     Chronic kidney disease (CKD), stage III (moderate) ([address] )     COPD (chronic obstructive pulmonary disease) ([facility] )     COVID-19     Covid + 9-8-76-cough at that time now fully resolved    Disease of thyroid gland     Essential hypertension     GERD (gastroesophageal reflux disease)     Glaucoma     High blood pressure     HTN (hypertension) 2/16/2021    Hyperlipidemia     Hypothyroidism     Throat cancer ([address] ) 2014    chemo and rad therapy 2014       Family History   Problem Relation Age of Onset    Brain cancer Sister     Diabetes Sister     Breast cancer Sister     Other Mother         respiratory disorder    Sudden death Father         shot himself    Suicidality Father     No Known Problems Daughter     No Known Problems Maternal Grandmother     No Known Problems Maternal Grandfather     No Known Problems Paternal Grandmother     No Known Problems Paternal Grandfather     No Known Problems Sister     No Known Problems Sister     No Known Problems Sister     No Known Problems Sister     No Known Problems Sister     No Known Problems Sister     No Known Problems Sister     No Known Problems Daughter     No Known Problems Maternal Aunt     No Known Problems Maternal Aunt     No Known Problems Maternal Aunt     No Known Problems Maternal Aunt     No Known Problems Maternal Aunt     No Known Problems Paternal Aunt     No Known Problems Paternal Aunt     No Known Problems Paternal Aunt     No Known Problems Paternal Aunt        Past Surgical History:   Procedure Laterality Date    COLONOSCOPY  2016    ESOPHAGOGASTRODUODENOSCOPY  2016    LARYNGOSCOPY      w/ Bx   HI INCISE FINGER TENDON SHEATH Right 2/16/2021    Procedure: THUMB TRIGGER FINGER RELEASE;  Surgeon: Blank Schmitt MD;  Location: [facility];  Service: Orthopedics    TUBAL LIGATION          reports that she quit smoking about 7 years ago  She quit after 40 00 years of use  She has never used smokeless tobacco  She reports that she does not drink alcohol        Current Outpatient Medications:     albuterol (PROVENTIL HFA,VENTOLIN HFA) 90 mcg/act inhaler, Inhale 2 puffs every 4 (four) hours as needed for wheezing, Disp: 36 g, Rfl: 5    aspirin (ECOTRIN LOW STRENGTH) 81 mg EC tablet, Take 1 tablet (81 mg total) by mouth daily, Disp: 90 tablet, Rfl: 1    brimonidine tartrate 0 2 % ophthalmic solution, Apply 1 drop to eye Three times a day, Disp: , Rfl:     Cholecalciferol (Vitamin D3) 10 MCG (400 UNIT) CAPS, Take 1 capsule by mouth daily, Disp: , Rfl:     cyanocobalamin (VITAMIN B-12) 100 mcg tablet, Take 100 mcg by mouth daily, Disp: , Rfl:     levothyroxine 100 mcg tablet, Take 1 tablet (100 mcg total) by mouth daily, Disp: 90 tablet, Rfl: 1    montelukast (SINGULAIR) 10 mg tablet, Take 1 tablet (10 mg total) by mouth daily at bedtime, Disp: 90 tablet, Rfl: 1    pantoprazole (PROTONIX) 40 mg tablet, Take 1 tablet (40 mg total) by mouth daily before breakfast, Disp: 30 tablet, Rfl: 5    Pyridoxine HCl (VITAMIN B-6 PO), Take 1 tablet by mouth daily, Disp: , Rfl:     Stiolto Respimat 2 5-2 5 MCG/ACT inhaler, Inhale 2 puffs daily, Disp: 1 Inhaler, Rfl: 5    timolol (TIMOPTIC) 0 5 % ophthalmic solution, INSTILL 1 DROP INTO EACH EYE TWICE DAILY, Disp: , Rfl:     ipratropium (ATROVENT) 0 06 % nasal spray, 2 sprays into each nostril 4 (four) times a day, Disp: 15 mL, Rfl: 1    ipratropium-albuterol (DUO-NEB) 0 5-2 5 mg/3 mL nebulizer solution, USE 1 VIAL VIA NEBULIZER EVERY 6 HOURS AS NEEDED FOR WHEEZING/SHORTNESS OF BREATH (Patient not taking: Reported on 7/6/2021), Disp: 540 mL, Rfl: 1    PARoxetine (PAXIL) 10 mg tablet, Take 1 tablet (10 mg total) by mouth every morning, Disp: 90 tablet, Rfl: 1    tiZANidine (ZANAFLEX) 2 mg tablet, Take 1 tablet (2 mg total) by mouth every 8 (eight) hours as needed for muscle spasms, Disp: 20 tablet, Rfl: 0    umeclidinium-vilanterol (Anoro Ellipta) 62 5-25 MCG/INH inhaler, Inhale 1 puff daily, Disp: 60 blister, Rfl: 1    The following portions of the patient's history were reviewed and updated as appropriate: allergies, current medications, past family history, past medical history, past social history, past surgical history and problem list     Review of Systems Constitutional: Negative for fatigue and fever  HENT: Positive for postnasal drip  Negative for congestion, facial swelling, mouth sores, rhinorrhea, sore throat and trouble swallowing  Eyes: Negative for pain and redness  Respiratory: Positive for chest tightness and shortness of breath  Negative for cough and wheezing  Cardiovascular: Negative for chest pain, palpitations and leg swelling  Gastrointestinal: Negative for abdominal pain, blood in stool, constipation, diarrhea and nausea  Genitourinary: Negative for dysuria, hematuria and urgency  Musculoskeletal: Positive for arthralgias and neck pain  Negative for back pain and myalgias  Skin: Negative for rash and wound  Neurological: Negative for seizures, syncope and headaches  Hematological: Negative for adenopathy  Psychiatric/Behavioral: Negative for agitation and behavioral problems  Objective:    /70 (BP Location: Left arm, Patient Position: Sitting, Cuff Size: Adult)   Pulse 66   Temp (!) 97 °F (36 1 °C) (Temporal)   Resp 20   Ht 5' 2" (1 575 m)   Wt 81 1 kg (178 lb 12 8 oz)   SpO2 93%   BMI 32 70 kg/m²      Physical Exam  Vitals and nursing note reviewed  Constitutional:       Appearance: Normal appearance  She is well-developed  She is not ill-appearing  HENT:      Head: Normocephalic and atraumatic  Right Ear: External ear normal       Left Ear: External ear normal       Nose: Nose normal       Mouth/Throat:      Mouth: Mucous membranes are moist       Pharynx: No oropharyngeal exudate or posterior oropharyngeal erythema  Eyes:      General: No scleral icterus  Right eye: No discharge  Left eye: No discharge  Conjunctiva/sclera: Conjunctivae normal    Cardiovascular:      Rate and Rhythm: Normal rate  Heart sounds: No murmur heard  No gallop  Pulmonary:      Effort: Pulmonary effort is normal  No respiratory distress  Breath sounds: Normal breath sounds   No stridor  No wheezing, rhonchi or rales  Abdominal:      Palpations: Abdomen is soft  Tenderness: There is no abdominal tenderness  Musculoskeletal:         General: No tenderness or deformity  Right lower leg: No edema  Left lower leg: No edema  Skin:     Findings: No erythema or rash  Neurological:      General: No focal deficit present  Mental Status: She is alert and oriented to person, place, and time  Mental status is at baseline        Deep Tendon Reflexes: Reflexes normal    Psychiatric:         Behavior: Behavior normal          Judgment: Judgment normal            Recent Results (from the past 2520 hour(s))   CBC and differential    Collection Time: 07/01/21  9:21 PM   Result Value Ref Range    WBC 5 92 4 31 - 10 16 Thousand/uL    RBC 4 26 3 81 - 5 12 Million/uL    Hemoglobin 11 3 (L) 11 5 - 15 4 g/dL    Hematocrit 36 6 34 8 - 46 1 %    MCV 86 82 - 98 fL    MCH 26 5 (L) 26 8 - 34 3 pg    MCHC 30 9 (L) 31 4 - 37 4 g/dL    RDW 13 5 11 6 - 15 1 %    MPV 10 5 8 9 - 12 7 fL    Platelets 173 729 - 909 Thousands/uL    nRBC 0 /100 WBCs    Neutrophils Relative 81 (H) 43 - 75 %    Immat GRANS % 0 0 - 2 %    Lymphocytes Relative 8 (L) 14 - 44 %    Monocytes Relative 7 4 - 12 %    Eosinophils Relative 4 0 - 6 %    Basophils Relative 0 0 - 1 %    Neutrophils Absolute 4 76 1 85 - 7 62 Thousands/µL    Immature Grans Absolute 0 02 0 00 - 0 20 Thousand/uL    Lymphocytes Absolute 0 47 (L) 0 60 - 4 47 Thousands/µL    Monocytes Absolute 0 40 0 17 - 1 22 Thousand/µL    Eosinophils Absolute 0 25 0 00 - 0 61 Thousand/µL    Basophils Absolute 0 02 0 00 - 0 10 Thousands/µL   Troponin I    Collection Time: 07/01/21  9:21 PM   Result Value Ref Range    Troponin I <0 02 <=0 04 ng/mL   NT-BNP PRO    Collection Time: 07/01/21  9:21 PM   Result Value Ref Range    NT-proBNP 603 (H) <125 pg/mL   Comprehensive metabolic panel    Collection Time: 07/01/21  9:21 PM   Result Value Ref Range    Sodium 143 136 - 145 mmol/L    Potassium 4 6 3 5 - 5 3 mmol/L    Chloride 104 100 - 108 mmol/L    CO2 32 21 - 32 mmol/L    ANION GAP 7 4 - 13 mmol/L    BUN 23 5 - 25 mg/dL    Creatinine 1 27 0 60 - 1 30 mg/dL    Glucose 170 (H) 65 - 140 mg/dL    Calcium 9 2 8 3 - 10 1 mg/dL    AST 21 5 - 45 U/L    ALT 20 12 - 78 U/L    Alkaline Phosphatase 138 (H) 46 - 116 U/L    Total Protein 7 4 6 4 - 8 2 g/dL    Albumin 3 5 3 5 - 5 0 g/dL    Total Bilirubin 0 25 0 20 - 1 00 mg/dL    eGFR 44 ml/min/1 73sq m   UA (URINE) with reflex to Scope    Collection Time: 07/01/21  9:26 PM   Result Value Ref Range    Color, UA Light Yellow     Clarity, UA Clear     Specific Benav[place], UA 1 015 1 003 - 1 030    pH, UA 6 0 4 5, 5 0, 5 5, 6 0, 6 5, 7 0, 7 5, 8 0    Leukocytes, UA Negative Negative    Nitrite, UA Negative Negative    Protein, UA Negative Negative mg/dl    Glucose, UA Negative Negative mg/dl    Ketones, UA Negative Negative mg/dl    Urobilinogen, UA 0 2 0 2, 1 0 E U /dl E U /dl    Bilirubin, UA Negative Negative    Blood, UA Negative Negative   ECG 12 lead    Collection Time: 07/01/21 10:30 PM   Result Value Ref Range    Ventricular Rate 59 BPM    Atrial Rate 59 BPM    SD Interval 192 ms    QRSD Interval 84 ms    QT Interval 414 ms    QTC Interval 409 ms    P Hawley 97 degrees    QRS Axis 60 degrees    T Wave Axis 65 degrees   D-dimer, quantitative    Collection Time: 07/02/21  1:06 AM   Result Value Ref Range    D-Dimer, Quant 0 59 (H) <0 50 ug/ml FEU   Troponin I    Collection Time: 07/02/21  1:06 AM   Result Value Ref Range    Troponin I <0 02 <=0 04 ng/mL   ECG 12 lead    Collection Time: 07/02/21  1:08 AM   Result Value Ref Range    Ventricular Rate 63 BPM    Atrial Rate 63 BPM    SD Interval 154 ms    QRSD Interval 82 ms    QT Interval 402 ms    QTC Interval 411 ms    P Axis 83 degrees    QRS Axis 59 degrees    T Wave Axis 55 degrees   Troponin I    Collection Time: 07/02/21  4:08 AM   Result Value Ref Range    Troponin I <0 02 <=0 04 ng/mL   Basic metabolic panel    Collection Time: 21  5:21 AM   Result Value Ref Range    Sodium 142 136 - 145 mmol/L    Potassium 4 4 3 5 - 5 3 mmol/L    Chloride 105 100 - 108 mmol/L    CO2 29 21 - 32 mmol/L    ANION GAP 8 4 - 13 mmol/L    BUN 21 5 - 25 mg/dL    Creatinine 1 14 0 60 - 1 30 mg/dL    Glucose 81 65 - 140 mg/dL    Glucose, Fasting 81 65 - 99 mg/dL    Calcium 9 2 8 3 - 10 1 mg/dL    eGFR 50 ml/min/1 73sq m   CBC (With Platelets)    Collection Time: 21  5:21 AM   Result Value Ref Range    WBC 5 01 4 31 - 10 16 Thousand/uL    RBC 4 17 3 81 - 5 12 Million/uL    Hemoglobin 10 8 (L) 11 5 - 15 4 g/dL    Hematocrit 36 2 34 8 - 46 1 %    MCV 87 82 - 98 fL    MCH 25 9 (L) 26 8 - 34 3 pg    MCHC 29 8 (L) 31 4 - 37 4 g/dL    RDW 13 5 11 6 - 15 1 %    Platelets 208 764 - 524 Thousands/uL    MPV 10 3 8 9 - 12 7 fL   Home O2 Setup    Collection Time: 21  2:26 PM   Result Value Ref Range    Supplier Name [facility]/[address]     Supplier Phone Number [phone]     Order Status Delivery Successful     Delivery Note      Delivery Request Date 2021     Date Delivered  2021     Item Description       Home Oxygen Concentrator with Portability, Adult, Standard Liter Flow    Item Description Portable Gaseous Oxygen System     Item Description Portable O2 Contents, Gas     Item Description No Conserving Device     Item Description O2 Humidifier Bottle, Standard Liter Flow        Laboratory Results: I have personally reviewed the pertinent laboratory results/reports     Radiology/Other Diagnostic Testing Results: I have personally reviewed pertinent reports        Echo complete with contrast if indicated    Result Date: 2021  [address], [address] [place] [address] [phone] Transthoracic Echocardiogram 2D, M-mode, Doppler, and Color Doppler Study date:  2021 Patient: Pierre Monreal MR number: [id number] Account number: [de-identified] : 1955 Age: 77 years Gender: Female Status: Outpatient Location: Bedside Height: 62 in Weight: 181 lb BP: 169/ 78 mmHg Indications: Shortness of breath  Diagnoses: R06 02 - Shortness of breath Sonographer:  DEVIN Mitchell Primary Physician:  Mariana Ribeiro MD Referring Physician:  GERTRUDIS Chow Group:  [facility] Interpreting Physician:  Peace Jordan DO SUMMARY LEFT VENTRICLE: Systolic function was normal  Ejection fraction was estimated to be 60 %  There were no regional wall motion abnormalities  Wall thickness was at the upper limits of normal  Doppler parameters were consistent with abnormal left ventricular relaxation (grade 1 diastolic dysfunction)  RIGHT VENTRICLE: The size was normal  Systolic function was normal  HISTORY: PRIOR HISTORY: Covid-19 1/21, H/o Cancer, CKD, Emphysema, Dyspnea  PROCEDURE: The procedure was performed at the bedside  This was a routine study  The transthoracic approach was used  The study included complete 2D imaging, M-mode, complete spectral Doppler, and color Doppler  The heart rate was 61 bpm, at the start of the study  Images were obtained from the parasternal, apical, subcostal, and suprasternal notch acoustic windows  Image quality was adequate  LEFT VENTRICLE: Size was normal  Systolic function was normal  Ejection fraction was estimated to be 60 %  There were no regional wall motion abnormalities  Wall thickness was at the upper limits of normal  DOPPLER: Doppler parameters were consistent with abnormal left ventricular relaxation (grade 1 diastolic dysfunction)  There was no evidence of elevated ventricular filling pressure by Doppler parameters  RIGHT VENTRICLE: The size was normal  Systolic function was normal  Wall thickness was normal  LEFT ATRIUM: Size was normal  RIGHT ATRIUM: Size was normal  MITRAL VALVE: Valve structure was normal  There was normal leaflet separation  DOPPLER: The transmitral velocity was within the normal range   There was no evidence for stenosis  There was trace regurgitation  AORTIC VALVE: The valve was trileaflet  Leaflets exhibited normal thickness and normal cuspal separation  DOPPLER: Transaortic velocity was within the normal range  There was no evidence for stenosis  There was no regurgitation  TRICUSPID VALVE: The valve structure was normal  There was normal leaflet separation  DOPPLER: The transtricuspid velocity was within the normal range  There was no evidence for stenosis  There was trace regurgitation  The tricuspid jet envelope definition was inadequate for estimation of RV systolic pressure  There are no indirect findings (abnormal RV volume or geometry, altered pulmonary flow velocity profile, or leftward septal displacement) which would suggest moderate or severe pulmonary hypertension  PULMONIC VALVE: Leaflets exhibited normal thickness, no calcification, and normal cuspal separation  DOPPLER: The transpulmonic velocity was within the normal range  There was trace regurgitation  PERICARDIUM: There was no pericardial effusion  The pericardium was normal in appearance  AORTA: The root exhibited normal size  SYSTEMIC VEINS: IVC: The inferior vena cava was normal in size and course   Respirophasic changes were normal  SYSTEM MEASUREMENT TABLES 2D %FS: 31 19 % Ao Diam: 3 25 cm EDV(Teich): 92 41 ml EF(Teich): 59 1 % ESV(Teich): 37 79 ml IVSd: 0 92 cm LA Area: 13 47 cm2 LA Diam: 3 49 cm LVEDV MOD A4C: 62 25 ml LVEF MOD A4C: 58 87 % LVESV MOD A4C: 25 6 ml LVIDd: 4 5 cm LVIDs: 3 1 cm LVLd A4C: 7 5 cm LVLs A4C: 6 11 cm LVPWd: 0 91 cm RA Area: 15 09 cm2 RVIDd: 4 19 cm SV MOD A4C: 36 65 ml SV(Teich): 54 61 ml CW AV MaxP 11 mmHg AV Vmax: 1 13 m/s MM TAPSE: 2 57 cm PW E' Sept: 0 09 m/s E/E' Sept: 8 54 LVOT Vmax: 0 81 m/s LVOT maxP 62 mmHg MV A Khang: 0 91 m/s MV Dec Armstrong: 4 61 m/s2 MV DecT: 175 25 ms MV E Khang: 0 81 m/s MV E/A Ratio: 0 89 MV PHT: 50 82 ms MVA By PHT: 4 33 cm2 [facility] Prepared and electronically signed by Ne Santoyo DO Signed 02-Jul-2021 13:44:30     XR chest 1 view portable    Result Date: 7/2/2021  CHEST INDICATION:   Shortness of breath  COMPARISON: Chest radiograph from 1/31/2017, CT of the neck from 7/1/2021 which includes the upper chest  EXAM PERFORMED/VIEWS:  XR CHEST PORTABLE  FINDINGS: Cardiomediastinal silhouette normal  Lungs clear  No effusion or pneumothorax  Osseous structures normal for age  No acute cardiopulmonary disease  Workstation performed: [id number]     CT soft tissue neck    Result Date: 7/2/2021  CT NECK WITH CONTRAST INDICATION:   Neck mass, history of malignancy (Age => 15y) Right neck pain  History of laryngeal cancer, status post chemoradiation in 2014  COMPARISON:  CT neck dated April 11, 2014  TECHNIQUE:  Axial, sagittal, and coronal 2D reformatted images were created from the axial source data and submitted for interpretation  Radiation dose length product (DLP) for this visit:  555 mGy-cm   This examination, like all CT scans performed in the [facility], was performed utilizing techniques to minimize radiation dose exposure, including the use of iterative reconstruction and automated exposure control  IV Contrast:  85 mL of iohexol (OMNIPAQUE) IMAGE QUALITY:  Diagnostic  FINDINGS: VISUALIZED BRAIN PARENCHYMA:  No acute intracranial pathology of the visualized brain parenchyma  VISUALIZED ORBITS AND PARANASAL SINUSES:  Normal  NASAL CAVITY AND NASOPHARYNX:  Normal  SUPRAHYOID NECK:  Normal oral cavity, tongue base, tonsillar fossa and epiglottis  INFRAHYOID NECK:  Aryepiglottic folds and piriform sinuses are normal   Normal glottis and subglottic airway  THYROID GLAND:  Unremarkable  PAROTID AND SUBMANDIBULAR GLANDS:  Normal  LYMPH NODES:  No pathologic or enlarged adenopathy  VASCULAR STRUCTURES:  Normal enhancement of the cervical vasculature   THORACIC INLET:  Emphysematous changes are noted at both upper lobes  There are prominent to mildly enlarged mediastinal lymph nodes measuring up to 14 mm (at the aortopulmonary window) involving the partially visualized upper chest  BONY STRUCTURES: No acute fracture or destructive osseous lesion  No cervical lymphadenopathy or soft tissue neck  Mild mediastinal lymphadenopathy measuring up to 14 mm  No prior studies are available for comparison  A nonemergent enhanced CT chest is recommended for further evaluation as metastatic disease cannot be excluded in this patient with a history of laryngeal cancer  Findings are consistent with the preliminary report from [facility] which was provided shortly after completion of the exam  There is an additional finding of mild mediastinal lymphadenopathy  Workstation performed: [id number]        Assessment/Plan:         Diagnoses and all orders for this visit:    Severe persistent asthma without complication  -     umeclidinium-vilanterol (Anoro Ellipta) 62 5-25 MCG/INH inhaler; Inhale 1 puff daily    Depression with anxiety  -     PARoxetine (PAXIL) 10 mg tablet; Take 1 tablet (10 mg total) by mouth every morning    Neck pain on left side  -     tiZANidine (ZANAFLEX) 2 mg tablet; Take 1 tablet (2 mg total) by mouth every 8 (eight) hours as needed for muscle spasms    Nodule of lower lobe of left lung  -     NM pet ct tumor imaging whole body; Future  -     Ambulatory referral to Hematology / Oncology; Future    Mesenteric lymphadenopathy  -     Ambulatory referral to Hematology / Oncology; Future    Post-nasal drip  -     ipratropium (ATROVENT) 0 06 % nasal spray; 2 sprays into each nostril 4 (four) times a day    Other orders  -     Cancel: Portable Concentrators       I have discussed at length the findings of CT abdomen and chest with patient and daughter Ma Collet    Patient and daughter both are aware that there is a high suspicion for malignancy and 2 nodules were seen which are 1 3 cm mesenteric nodule in the abdomen and 1 2 cm nodule in the  Left lung  Have referred her to Hematology  she does follow up annually with  ENT and most recent visit was told to follow-up annually  I will give her tizanidine muscle relaxer for her neck pain leg pain  I will order PET-CT of her body in order to rule out malignancy in this patient with a history of laryngeal cancer and previous radiation  I had previously ordered CT of her abdomen due to elevated liver enzymes and fluctuating alkaline phosphatase however it was pending insurance approval and patient was unable to do it  I have previously done ultrasound neck  Over the past 2 year and chest x-ray for her which have been normal  I have told her to follow-up in 4 weeks or sooner if her situation worsens  I have ordered a portable oxygen concentrator  Ipratropium   Nasal spray twice daily for postnasal drip as this also causes or cough  Read package inserts for all medications before starting a new medications, call me if you have any questions  BMI Counseling: Body mass index is 32 7 kg/m²  The BMI is above normal  Nutrition recommendations include decreasing portion sizes, encouraging healthy choices of fruits and vegetables, decreasing fast food intake, consuming healthier snacks, limiting drinks that contain sugar, moderation in carbohydrate intake and reducing intake of cholesterol  Exercise recommendations include moderate physical activity 150 minutes/week  No pharmacotherapy was ordered  Patient is here for transition of care  I have spent > 40 minutes reconciling medications, reviewing hpi, reviewing discharge summary, reviewing hospital labs and imaging and counseling and coordinating his care  Patient was given opportunity to ask questions and all questions were answered  Portions of the record may have been created with voice recognition software   Occasional wrong word or "sound a like" substitutions may have occurred due to the inherent limitations of voice recognition software  Read the chart carefully and recognize, using context, where substitutions have occurred

## 2021-07-16 ENCOUNTER — OFFICE VISIT (OUTPATIENT)
Dept: PULMONOLOGY | Facility: MEDICAL CENTER | Age: 66
End: 2021-07-16
Payer: COMMERCIAL

## 2021-07-16 VITALS
HEART RATE: 74 BPM | OXYGEN SATURATION: 93 % | TEMPERATURE: 97.2 F | BODY MASS INDEX: 32.94 KG/M2 | HEIGHT: 62 IN | DIASTOLIC BLOOD PRESSURE: 82 MMHG | WEIGHT: 179 LBS | SYSTOLIC BLOOD PRESSURE: 124 MMHG | RESPIRATION RATE: 12 BRPM

## 2021-07-16 DIAGNOSIS — R06.00 DYSPNEA ON EXERTION: ICD-10-CM

## 2021-07-16 DIAGNOSIS — R91.1 NODULE OF LOWER LOBE OF LEFT LUNG: ICD-10-CM

## 2021-07-16 DIAGNOSIS — R09.02 EXERCISE HYPOXEMIA: ICD-10-CM

## 2021-07-16 DIAGNOSIS — J43.2 CENTRILOBULAR EMPHYSEMA (HCC): Primary | ICD-10-CM

## 2021-07-16 PROCEDURE — 99204 OFFICE O/P NEW MOD 45 MIN: CPT | Performed by: INTERNAL MEDICINE

## 2021-07-16 PROCEDURE — 94010 BREATHING CAPACITY TEST: CPT | Performed by: INTERNAL MEDICINE

## 2021-07-16 RX ORDER — PREDNISONE 10 MG/1
TABLET ORAL
Qty: 50 TABLET | Refills: 0 | Status: SHIPPED | OUTPATIENT
Start: 2021-07-16 | End: 2021-09-29

## 2021-07-16 NOTE — PATIENT INSTRUCTIONS
Tomorrow morning start prednisone 4 tablets for 4 days 3 tablets for 4 days 2 tablets for 4 days then 1 tablet for 4 days    Use Anoro 1 puff daily while taking the prednisone    When you finish the prednisone start Trelegy 100 mcg 1 puff daily    PET scan August 10th    Buy a pulse oximeter for 30 dollars from [employer] or [employer]

## 2021-07-17 NOTE — PROGRESS NOTES
Assessment/Plan:       Problem List Items Addressed This Visit        Respiratory    Centrilobular emphysema ([address] ) - Primary       Spirometry done today shows very severe airflow obstruction with FEV1 of only 0 53 L or 24% of predicted  Flow volume loop shows marked concavity of the expiratory portion of the flow volume loop consistent with very severe airflow obstruction  She still having shortness of breath with activity so will give her trial of prednisone 40 mg for 4 days with 10 mg taper every 5th day     I did order a complete PFT with blood gas to be done in about 3-4 weeks before in follow-up visit to see if there is any improvement of lung function with treatment  She once before been Stiolto but has some difficulty using this inhaler  I did give her samples of Anoro to try 1 puff daily as this would be easier for her to use  When she completes the prednisone taper she will then start samples of Trelegy 100 mcg 1 puff daily in place of the Anoro  Relevant Medications    predniSONE 10 mg tablet    Other Relevant Orders    Complete PFT with Post Bronchodilator and ABG       Other    Nodule of lower lobe of left lung      I reviewed CT scan of chest done July 3rd and showed this to daughter  There is a 1 2 cm left lower lobe nodule in the posterior basilar region of the lung  This possibly could be an early lung cancer  A PET CT scan of the chest has already been ordered   This will be done before her next office visit  Dyspnea on exertion       She does have shortness of breath with walking 100 ft or going up any type a stairs or hill  Spirometry today showsf severe airflow obstruction  Recent echocardiogram was normal   I prescribed a tapering dose of prednisone to see if this helps her breathing    Lung sounds were decreased but clear today         Relevant Medications    predniSONE 10 mg tablet    Other Relevant Orders    POCT spirometry (Completed)    Exercise hypoxemia She does have mild oxygen desaturation with ambulation  She does have home oxygen that she was prescribed recently that she can use 2 liters/minutes with activity  I did recommend to the daughter to purchase a pulse oximeter so that she can monitor patient's O2 saturations at home  I did tell her goal was for the O2 saturation to be 90% or better                 Return in about 1 month (around 8/16/2021)  All questions are answered to the patient's satisfaction and understanding  She verbalizes understanding  She is encouraged to call with any further questions or concerns  Portions of the record may have been created with voice recognition software  Occasional wrong word or "sound a like" substitutions may have occurred due to the inherent limitations of voice recognition software  Read the chart carefully and recognize, using context, where substitutions have occurred  a    Electronically Signed by Lizette Tong DO    ______________________________________________________________________    Chief Complaint: No chief complaint on file  Patient ID: Tonya Villatoro is a 77 y o  y o  female has a past medical history of Acute kidney failure ([address] ), Allergies, Anemia, Cancer ([address] ), Chronic kidney disease (CKD), stage III (moderate) ([address] ), COPD (chronic obstructive pulmonary disease) ([address] ), COVID-19, Disease of thyroid gland, Essential hypertension, GERD (gastroesophageal reflux disease), Glaucoma, High blood pressure, HTN (hypertension) (2/16/2021), Hyperlipidemia, Hypothyroidism, and Throat cancer ([address] ) (2014)  7/16/2021  Patient presents today for initial visit  She was accompanied by her daughter Michelet Jefferson who patient requested be  for her    HPI    Tonya Villatoro does have shortness of breath with exertional activity  She is very short of breath going up a flight of stairs or if she has to walk 100 ft  Only has occasional cough  She does not wake up at night gasping for air  No chest pain  She does have some dryness in her right nasal passage  She quit smoking in 2014 but did smoke 1 pack of cigarettes per day for 40 years  She is not having any weight loss or hemoptysis  She did have a mild case of COVID 19 infection in January and  Nasal swab test was done January 8, 2021 which was positive  Daughter feels patient has had some increased shortness of    Paul Medina   Was hospitalized from 7/1 to 7/3/21 with shortness of breath   Was admitted to [facility]  She presented ER complaining of shortness of breath  CTA of chest with contrast done on July 3rd showed a 1 2 cm nodule in the posterior basilar portion of the left lower lobe  There are some emphysematous changes bilaterally  She did qualify for home oxygen on July 3rd  Oximetry testing showed her O2 saturation decreased to 84% with ambulating and was 94-95% rest   She required 2 L of oxygen to keep her saturations at 91-94% with walking  Echocardiogram was done during that admission and LV systolic function was normal   No evidence of any pulmonary hypertension  She does have nebulizer with DuoNeb she can use when needed  She is also taking singular  Does have history of laryngeal cancer diagnosed in 2014 had chemo and radiation therapy   Also has history of hypothyroidism and takes levothyroxine supplement and has history of GERD for which she takes Protonix  She was diagnosed with 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 and completed chemo radiation in August 2014  Does have some difficult time swallowing dry foods but otherwise can swallow liquids and other substances without difficulty  Has been no evidence of any recurrence  She does follow with [facility]  Review of Systems   Constitutional: Negative for chills, fever and unexpected weight change  HENT: Negative for congestion, rhinorrhea and sore throat           Has some dryness of right nasal passage   Eyes: Negative for discharge and redness  Respiratory: Positive for shortness of breath  Cardiovascular: Negative for chest pain, palpitations and leg swelling  Gastrointestinal: Negative for abdominal distention, abdominal pain and nausea  Endocrine: Negative for polydipsia and polyphagia  Genitourinary: Negative for dysuria  Musculoskeletal: Negative for joint swelling and myalgias  Skin: Negative for rash  Neurological: Negative for light-headedness  Psychiatric/Behavioral: Positive for decreased concentration  Social history: She reports that she quit smoking about 7 years ago  She has a 40 00 pack-year smoking history  She has never used smokeless tobacco  She reports that she does not drink alcohol  Past surgical history:   Past Surgical History:   Procedure Laterality Date    COLONOSCOPY  2016    ESOPHAGOGASTRODUODENOSCOPY  2016    LARYNGOSCOPY      w/ Bx       AR INCISE FINGER TENDON SHEATH Right 2/16/2021    Procedure: THUMB TRIGGER FINGER RELEASE;  Surgeon: Glenna Owen MD;  Location: [facility];  Service: Orthopedics    TUBAL LIGATION       Family history:   Family History   Problem Relation Age of Onset    Brain cancer Sister     Diabetes Sister    Levora Stuart Breast cancer Sister     Other Mother         respiratory disorder    Sudden death Father         shot himself    Suicidality Father     No Known Problems Daughter     No Known Problems Maternal Grandmother     No Known Problems Maternal Grandfather     No Known Problems Paternal Grandmother     No Known Problems Paternal Grandfather     No Known Problems Sister     No Known Problems Sister     No Known Problems Sister     No Known Problems Sister     No Known Problems Sister     No Known Problems Sister     No Known Problems Sister     No Known Problems Daughter     No Known Problems Maternal Aunt     No Known Problems Maternal Aunt     No Known Problems Maternal Aunt     No Known Problems Maternal Aunt     No Known Problems Maternal Aunt     No Known Problems Paternal Aunt     No Known Problems Paternal Aunt     No Known Problems Paternal Aunt     No Known Problems Paternal Aunt        Immunization History   Administered Date(s) Administered    INFLUENZA 02/06/2019    Influenza, high dose seasonal 0 7 mL 10/07/2020    Pneumococcal Conjugate 13-Valent 10/07/2020    Pneumococcal Polysaccharide PPV23 06/14/2021     Current Outpatient Medications   Medication Sig Dispense Refill    albuterol (PROVENTIL HFA,VENTOLIN HFA) 90 mcg/act inhaler Inhale 2 puffs every 4 (four) hours as needed for wheezing 36 g 5    aspirin (ECOTRIN LOW STRENGTH) 81 mg EC tablet Take 1 tablet (81 mg total) by mouth daily 90 tablet 1    brimonidine tartrate 0 2 % ophthalmic solution Apply 1 drop to eye Three times a day      Cholecalciferol (Vitamin D3) 10 MCG (400 UNIT) CAPS Take 1 capsule by mouth daily      cyanocobalamin (VITAMIN B-12) 100 mcg tablet Take 100 mcg by mouth daily      ipratropium (ATROVENT) 0 06 % nasal spray 2 sprays into each nostril 4 (four) times a day 15 mL 1    ipratropium-albuterol (DUO-NEB) 0 5-2 5 mg/3 mL nebulizer solution USE 1 VIAL VIA NEBULIZER EVERY 6 HOURS AS NEEDED FOR WHEEZING/SHORTNESS OF BREATH 540 mL 1    levothyroxine 100 mcg tablet Take 1 tablet (100 mcg total) by mouth daily 90 tablet 1    montelukast (SINGULAIR) 10 mg tablet Take 1 tablet (10 mg total) by mouth daily at bedtime 90 tablet 1    pantoprazole (PROTONIX) 40 mg tablet Take 1 tablet (40 mg total) by mouth daily before breakfast 30 tablet 5    PARoxetine (PAXIL) 10 mg tablet Take 1 tablet (10 mg total) by mouth every morning 90 tablet 1    Pyridoxine HCl (VITAMIN B-6 PO) Take 1 tablet by mouth daily      timolol (TIMOPTIC) 0 5 % ophthalmic solution INSTILL 1 DROP INTO EACH EYE TWICE DAILY      tiZANidine (ZANAFLEX) 2 mg tablet Take 1 tablet (2 mg total) by mouth every 8 (eight) hours as needed for muscle spasms 20 tablet 0    umeclidinium-vilanterol (Anoro Ellipta) 62 5-25 MCG/INH inhaler Inhale 1 puff daily 60 blister 1    predniSONE 10 mg tablet Take 4 tablets x4 days then 3 tablets x4 days then 2 tablets x4 days then 1 tablet x4 days then stop 50 tablet 0    Stiolto Respimat 2 5-2 5 MCG/ACT inhaler Inhale 2 puffs daily (Patient not taking: Reported on 7/16/2021) 1 Inhaler 5     No current facility-administered medications for this visit  Allergies: Amifostine and Pollen extract    Objective:  Vitals:    07/16/21 1411   BP: 124/82   Pulse: 74   Resp: 12   Temp: (!) 97 2 °F (36 2 °C)   TempSrc: Temporal   SpO2: 93%   Weight: 81 2 kg (179 lb)   Height: 5' 2" (1 575 m)   Oxygen Therapy  SpO2: 93 %    Wt Readings from Last 3 Encounters:   07/16/21 81 2 kg (179 lb)   07/12/21 81 1 kg (178 lb 12 8 oz)   07/03/21 82 kg (180 lb 12 4 oz)     Body mass index is 32 74 kg/m²  Physical Exam  Vitals reviewed  Constitutional:       General: She is not in acute distress  Appearance: Normal appearance  She is well-developed and normal weight  She is not diaphoretic  HENT:      Head: Normocephalic and atraumatic  Nose: Nose normal  No congestion  Mouth/Throat:      Mouth: Mucous membranes are dry  Pharynx: Oropharynx is clear  No oropharyngeal exudate  Eyes:      Pupils: Pupils are equal, round, and reactive to light  Neck:      Thyroid: No thyromegaly  Trachea: No tracheal deviation  Cardiovascular:      Rate and Rhythm: Normal rate and regular rhythm  Heart sounds: No murmur heard  Pulmonary:      Effort: Pulmonary effort is normal  No respiratory distress  Breath sounds: No wheezing, rhonchi or rales  Comments: Lung sounds are decreased but clear  Chest:      Chest wall: No tenderness  Abdominal:      General: Bowel sounds are normal  There is no distension  Palpations: Abdomen is soft  Tenderness: There is no abdominal tenderness     Musculoskeletal: General: Normal range of motion  Cervical back: Normal range of motion and neck supple  Lymphadenopathy:      Cervical: No cervical adenopathy  Skin:     General: Skin is warm and dry  Neurological:      General: No focal deficit present  Mental Status: She is alert and oriented to person, place, and time  Cranial Nerves: No cranial nerve deficit  Psychiatric:         Mood and Affect: Mood normal          Behavior: Behavior normal          Lab Review:   Lab Results   Component Value Date     02/25/2015    K 4 4 07/02/2021    K 4 4 02/25/2015     07/02/2021     02/25/2015    CO2 29 07/02/2021    CO2 33 (H) 02/25/2015    BUN 21 07/02/2021    BUN 22 02/25/2015    CREATININE 1 14 07/02/2021    CREATININE 1 03 02/25/2015    GLUCOSE 83 02/25/2015    CALCIUM 9 2 07/02/2021    CALCIUM 9 2 02/25/2015     Lab Results   Component Value Date    WBC 5 01 07/02/2021    WBC 3 55 (L) 02/25/2015    HGB 10 8 (L) 07/02/2021    HGB 9 8 (L) 02/25/2015    HCT 36 2 07/02/2021    HCT 33 4 (L) 02/25/2015    MCV 87 07/02/2021    MCV 87 02/25/2015     07/02/2021     02/25/2015       Diagnostics:  I have personally reviewed pertinent films in PACS  CT of chest performed on 7/3/21 with contrast revealed   Evidence of emphysematous changes bilaterally  There is also a 1 2 cm left lower lobe nodule and 1 3 cm AP  Window lymph node  This nodule in the left lower lobe is in the posterior basal part of the lung  Pulse oximetry testing:    Room air O2 saturation at rest was 94% and with walking 250 ft lowest O2 saturation was 88%   on 2 liters/minute at rest O2 saturation was 97% and with ambulating 250 ft with 2 L of oxygen lowest saturation was 95%      Office Spirometry Results: done today  FVC - 1 43 L    51%  FEV1 - 0 53 L  24%  FEV1/FVC% - 37%    Very severe airflow obstruction  Moderate decrease in FVC likely due to air trapping    Flow volume loop shows severe concavity of expiratory flow loop     Echo complete with contrast if indicated    Result Date: 2021  Narrative: [facility], [address] [place] [address] [phone] Transthoracic Echocardiogram 2D, M-mode, Doppler, and Color Doppler Study date:  2021 Patient: Marcel Saint MR number: [id number] Account number: [de-identified] : 1955 Age: 77 years Gender: Female Status: Outpatient Location: Bedside Height: 62 in Weight: 181 lb BP: 169/ 78 mmHg Indications: Shortness of breath  Diagnoses: R06 02 - Shortness of breath Sonographer:  DEVIN Trejo Primary Physician:  Selene Garcia MD Referring Physician:  GERTRUDIS Munoz Group:  [facility] Interpreting Physician:  Ynes Davis DO SUMMARY LEFT VENTRICLE: Systolic function was normal  Ejection fraction was estimated to be 60 %  There were no regional wall motion abnormalities  Wall thickness was at the upper limits of normal  Doppler parameters were consistent with abnormal left ventricular relaxation (grade 1 diastolic dysfunction)  RIGHT VENTRICLE: The size was normal  Systolic function was normal  HISTORY: PRIOR HISTORY: Covid-19 , H/o Cancer, CKD, Emphysema, Dyspnea  PROCEDURE: The procedure was performed at the bedside  This was a routine study  The transthoracic approach was used  The study included complete 2D imaging, M-mode, complete spectral Doppler, and color Doppler  The heart rate was 61 bpm, at the start of the study  Images were obtained from the parasternal, apical, subcostal, and suprasternal notch acoustic windows  Image quality was adequate  LEFT VENTRICLE: Size was normal  Systolic function was normal  Ejection fraction was estimated to be 60 %  There were no regional wall motion abnormalities  Wall thickness was at the upper limits of normal  DOPPLER: Doppler parameters were consistent with abnormal left ventricular relaxation (grade 1 diastolic dysfunction)   There was no evidence of elevated ventricular filling pressure by Doppler parameters  RIGHT VENTRICLE: The size was normal  Systolic function was normal  Wall thickness was normal  LEFT ATRIUM: Size was normal  RIGHT ATRIUM: Size was normal  MITRAL VALVE: Valve structure was normal  There was normal leaflet separation  DOPPLER: The transmitral velocity was within the normal range  There was no evidence for stenosis  There was trace regurgitation  AORTIC VALVE: The valve was trileaflet  Leaflets exhibited normal thickness and normal cuspal separation  DOPPLER: Transaortic velocity was within the normal range  There was no evidence for stenosis  There was no regurgitation  TRICUSPID VALVE: The valve structure was normal  There was normal leaflet separation  DOPPLER: The transtricuspid velocity was within the normal range  There was no evidence for stenosis  There was trace regurgitation  The tricuspid jet envelope definition was inadequate for estimation of RV systolic pressure  There are no indirect findings (abnormal RV volume or geometry, altered pulmonary flow velocity profile, or leftward septal displacement) which would suggest moderate or severe pulmonary hypertension  PULMONIC VALVE: Leaflets exhibited normal thickness, no calcification, and normal cuspal separation  DOPPLER: The transpulmonic velocity was within the normal range  There was trace regurgitation  PERICARDIUM: There was no pericardial effusion  The pericardium was normal in appearance  AORTA: The root exhibited normal size  SYSTEMIC VEINS: IVC: The inferior vena cava was normal in size and course   Respirophasic changes were normal  SYSTEM MEASUREMENT TABLES 2D %FS: 31 19 % Ao Diam: 3 25 cm EDV(Teich): 92 41 ml EF(Teich): 59 1 % ESV(Teich): 37 79 ml IVSd: 0 92 cm LA Area: 13 47 cm2 LA Diam: 3 49 cm LVEDV MOD A4C: 62 25 ml LVEF MOD A4C: 58 87 % LVESV MOD A4C: 25 6 ml LVIDd: 4 5 cm LVIDs: 3 1 cm LVLd A4C: 7 5 cm LVLs A4C: 6 11 cm LVPWd: 0 91 cm RA Area: 15 09 cm2 RVIDd: 4 19 cm SV MOD A4C: 36 65 ml SV(Teich): 54 61 ml CW AV MaxP 11 mmHg AV Vmax: 1 13 m/s MM TAPSE: 2 57 cm PW E' Sept: 0 09 m/s E/E' Sept: 8 54 LVOT Vmax: 0 81 m/s LVOT maxP 62 mmHg MV A Khang: 0 91 m/s MV Dec Cottle: 4 61 m/s2 MV DecT: 175 25 ms MV E Khang: 0 81 m/s MV E/A Ratio: 0 89 MV PHT: 50 82 ms MVA By PHT: 4 33 cm2 [employer] Accredited [facility] Prepared and electronically signed by La Rowell DO Signed 2021 13:44:30     XR chest 1 view portable    Result Date: 2021  Narrative: CHEST INDICATION:   Shortness of breath  COMPARISON: Chest radiograph from 2017, CT of the neck from 2021 which includes the upper chest  EXAM PERFORMED/VIEWS:  XR CHEST PORTABLE  FINDINGS: Cardiomediastinal silhouette normal  Lungs clear  No effusion or pneumothorax  Osseous structures normal for age  Impression: No acute cardiopulmonary disease  Workstation performed: [id number]     CT soft tissue neck    Result Date: 2021  Narrative: CT NECK WITH CONTRAST INDICATION:   Neck mass, history of malignancy (Age => 15y) Right neck pain  History of laryngeal cancer, status post chemoradiation in   COMPARISON:  CT neck dated 2014  TECHNIQUE:  Axial, sagittal, and coronal 2D reformatted images were created from the axial source data and submitted for interpretation  Radiation dose length product (DLP) for this visit:  555 mGy-cm   This examination, like all CT scans performed in the [facility], was performed utilizing techniques to minimize radiation dose exposure, including the use of iterative reconstruction and automated exposure control  IV Contrast:  85 mL of iohexol (OMNIPAQUE) IMAGE QUALITY:  Diagnostic  FINDINGS: VISUALIZED BRAIN PARENCHYMA:  No acute intracranial pathology of the visualized brain parenchyma   VISUALIZED ORBITS AND PARANASAL SINUSES:  Normal  NASAL CAVITY AND NASOPHARYNX:  Normal  SUPRAHYOID NECK:  Normal oral cavity, tongue base, tonsillar fossa and epiglottis  INFRAHYOID NECK:  Aryepiglottic folds and piriform sinuses are normal   Normal glottis and subglottic airway  THYROID GLAND:  Unremarkable  PAROTID AND SUBMANDIBULAR GLANDS:  Normal  LYMPH NODES:  No pathologic or enlarged adenopathy  VASCULAR STRUCTURES:  Normal enhancement of the cervical vasculature  THORACIC INLET:  Emphysematous changes are noted at both upper lobes  There are prominent to mildly enlarged mediastinal lymph nodes measuring up to 14 mm (at the aortopulmonary window) involving the partially visualized upper chest  BONY STRUCTURES: No acute fracture or destructive osseous lesion  Impression: No cervical lymphadenopathy or soft tissue neck  Mild mediastinal lymphadenopathy measuring up to 14 mm  No prior studies are available for comparison  A nonemergent enhanced CT chest is recommended for further evaluation as metastatic disease cannot be excluded in this patient with a history of laryngeal cancer  Findings are consistent with the preliminary report from [email or web address] which was provided shortly after completion of the exam  There is an additional finding of mild mediastinal lymphadenopathy  Workstation performed: [id number]     CT chest abdomen pelvis w contrast    Result Date: 7/3/2021  Narrative: CT CHEST, ABDOMEN AND PELVIS WITH IV CONTRAST INDICATION:   Lymphadenopathy, chest or axilla abnormal ct of soft tissue neck, mediastinal lympadenopathy  History of laryngeal cancer  COMPARISON:  CT neck from 2 days ago  TECHNIQUE: CT examination of the chest, abdomen and pelvis was performed  Axial, sagittal, and coronal 2D reformatted images were created from the source data and submitted for interpretation  Radiation dose length product (DLP) for this visit:  637 mGy-cm     This examination, like all CT scans performed in the [facility], was performed utilizing techniques to minimize radiation dose exposure, including the use of iterative reconstruction and automated exposure control  IV Contrast:  100 mL of iohexol (OMNIPAQUE) Enteric Contrast: Enteric contrast was not administered  FINDINGS: CHEST LUNGS:  Background emphysema  Suspicious left lower lobe nodule measuring 1 2 cm on image 3/94  Minimal atelectasis in the lingula and right middle lobe  No endotracheal or endobronchial lesion  PLEURA:  Unremarkable  HEART/GREAT VESSELS:  Unremarkable for patient's age  MEDIASTINUM AND KAYY:  Prominent AP window node measuring 1 3 cm  No other pathologically enlarged lymph nodes identified  CHEST WALL AND LOWER NECK:   Unremarkable  ABDOMEN LIVER/BILIARY TREE:  Unremarkable  GALLBLADDER:  No calcified gallstones  No pericholecystic inflammatory change  SPLEEN:  Unremarkable  PANCREAS:  Unremarkable  ADRENAL GLANDS:  Unremarkable  KIDNEYS/URETERS:  Unremarkable  No hydronephrosis  STOMACH AND BOWEL:  Unremarkable  APPENDIX:  A normal appendix was visualized  ABDOMINOPELVIC CAVITY:  No ascites  No pneumoperitoneum  Stefania mesentery with adenopathy in the mesenteric root for example1 1 x 1 3 on image 2/71  VESSELS:  Unremarkable for patient's age  PELVIS REPRODUCTIVE ORGANS:  Unremarkable for patient's age  URINARY BLADDER:  Unremarkable  ABDOMINAL WALL/INGUINAL REGIONS:  Unremarkable  OSSEOUS STRUCTURES:  No acute fracture or destructive osseous lesion  Impression: Suspicious left lower lobe lung nodule measuring 1 2 cm on image 3/94  Suspicious AP window lymph node measuring 1 3 cm  Stefania mesentery with multiple abnormal lymph nodes the largest measuring 1 1 x 1 3 cm  Consider PET/CT scan versus close interval follow-up of these suspicious abnormalities in a patient with history of cancer  The study was marked in [facility] for immediate notification   Workstation performed: [id number]

## 2021-07-18 PROBLEM — R06.09 DYSPNEA ON EXERTION: Status: ACTIVE | Noted: 2021-07-01

## 2021-07-18 PROBLEM — J43.2 CENTRILOBULAR EMPHYSEMA (HCC): Status: ACTIVE | Noted: 2019-08-27

## 2021-07-19 ENCOUNTER — TELEPHONE (OUTPATIENT)
Dept: HEMATOLOGY ONCOLOGY | Facility: CLINIC | Age: 66
End: 2021-07-19

## 2021-07-19 DIAGNOSIS — R09.82 POST-NASAL DRIP: ICD-10-CM

## 2021-07-19 RX ORDER — IPRATROPIUM BROMIDE 42 UG/1
2 SPRAY, METERED NASAL 4 TIMES DAILY
Qty: 15 ML | Refills: 1 | Status: SHIPPED | OUTPATIENT
Start: 2021-07-19 | End: 2021-11-24

## 2021-07-19 NOTE — ASSESSMENT & PLAN NOTE
I reviewed CT scan of chest done July 3rd and showed this to daughter  There is a 1 2 cm left lower lobe nodule in the posterior basilar region of the lung  This possibly could be an early lung cancer  A PET CT scan of the chest has already been ordered   This will be done before her next office visit

## 2021-07-19 NOTE — ASSESSMENT & PLAN NOTE
She does have mild oxygen desaturation with ambulation  She does have home oxygen that she was prescribed recently that she can use 2 liters/minutes with activity  I did recommend to the daughter to purchase a pulse oximeter so that she can monitor patient's O2 saturations at home     I did tell her goal was for the O2 saturation to be 90% or better

## 2021-07-19 NOTE — ASSESSMENT & PLAN NOTE
Spirometry done today shows very severe airflow obstruction with FEV1 of only 0 53 L or 24% of predicted  Flow volume loop shows marked concavity of the expiratory portion of the flow volume loop consistent with very severe airflow obstruction  She still having shortness of breath with activity so will give her trial of prednisone 40 mg for 4 days with 10 mg taper every 5th day     I did order a complete PFT with blood gas to be done in about 3-4 weeks before in follow-up visit to see if there is any improvement of lung function with treatment  She once before been Stiolto but has some difficulty using this inhaler  I did give her samples of Anoro to try 1 puff daily as this would be easier for her to use  When she completes the prednisone taper she will then start samples of Trelegy 100 mcg 1 puff daily in place of the Anoro

## 2021-07-19 NOTE — TELEPHONE ENCOUNTER
Patient has pet/ct 8/10/21 and appointment with pulmonology 8/23/21  Uncertain significance of mesentery  Recommend f/u with physician after pet/ct

## 2021-07-19 NOTE — ASSESSMENT & PLAN NOTE
She does have shortness of breath with walking 100 ft or going up any type a stairs or hill  Spirometry today showsf severe airflow obstruction  Recent echocardiogram was normal   I prescribed a tapering dose of prednisone to see if this helps her breathing    Lung sounds were decreased but clear today

## 2021-07-19 NOTE — TELEPHONE ENCOUNTER
New Patient Encounter    New Patient Intake Form   Patient Details:  Marta Bansal  1955  [id number]    Background Information:  [address] starts by opening a telephone encounter and gathering the following information   Who is calling to schedule? If not self, relationship to patient? Child   Referring Provider Pablo Connor   What is the diagnosis? Nodule of lower lobe of left lung   Mesenteric lymphadenopathy   Is this Cancer or Non-Cancer? Non-Cancer   Is this diagnosis confirmed? Yes   When was the diagnosis? 7/2021   Is there a confirmed diagnosis from a biopsy/tissue reviewed by pathology? No   Were outside slides requested? No   Is patient aware of diagnosis? Yes   Is there a personal history and what kind? Throat CA 6 yrs ago  Had chemo w/ Dr Elver Olivera private practice, [facility] and RT at [facility]   Is there a family history and what kind? brain   Reason for visit? New Diagnosis   Have you had any imaging or labs done? If so: when, where? yes  [facility]   Are records in Psychiatric? Yes (requesting [facility] records from [facility])   If patient has a prior history of cancer were old records obtained? Daughter will request from Dr Elver Olivera   Was the patient told to bring a disk? No   Does the patient smoke or Vape? No   If yes, how many packs or cartridges per day? Scheduling Information:   Preferred [place]: [place]     Are there any dates/time the patient cannot be seen? No  Prefer as late as possible  Miscellaneous: PET scheduled for 8/10, ordered by Dr Dinorah Johnson, no bx scheduled at this time  Pt is Zambian speaking only  After completing the above information, please route to Financial Counselor and the appropriate Nurse Navigator for review

## 2021-07-20 DIAGNOSIS — J43.2 CENTRILOBULAR EMPHYSEMA (HCC): Primary | ICD-10-CM

## 2021-07-22 NOTE — TELEPHONE ENCOUNTER
I spoke with Dioni Snyder, pt only able to go to [facility]  Pt has been scheduled for 8/12/21 with Dr Deandra Hastings

## 2021-08-04 ENCOUNTER — TELEPHONE (OUTPATIENT)
Dept: FAMILY MEDICINE CLINIC | Facility: CLINIC | Age: 66
End: 2021-08-04

## 2021-08-04 DIAGNOSIS — J20.9 ACUTE BRONCHITIS, UNSPECIFIED ORGANISM: Primary | ICD-10-CM

## 2021-08-04 RX ORDER — AZITHROMYCIN 250 MG/1
TABLET, FILM COATED ORAL
Qty: 6 TABLET | Refills: 0 | Status: SHIPPED | OUTPATIENT
Start: 2021-08-04 | End: 2021-08-09

## 2021-08-04 NOTE — TELEPHONE ENCOUNTER
Kelsy Steve (dtg) is calling for her mom  Is asking if you can order medication for her, like a Zpak or antibiotic  Started yesterday with congestion, tired, chong   Evan Yuen has been sick too)    OTC not working  NK[facility]  [facility]

## 2021-08-09 ENCOUNTER — TELEPHONE (OUTPATIENT)
Dept: FAMILY MEDICINE CLINIC | Facility: CLINIC | Age: 66
End: 2021-08-09

## 2021-08-09 NOTE — TELEPHONE ENCOUNTER
Patients daughtercalled stating that the antibiotic we started her on last week doesn't seem to be helping  I did explain to her that it is a 10 day antibiotic but she seems worse not better  She is having facial under the eyes by the nose and her forehead  Coughing with a lot of phlegm

## 2021-08-10 ENCOUNTER — HOSPITAL ENCOUNTER (OUTPATIENT)
Dept: RADIOLOGY | Age: 66
Discharge: HOME | End: 2021-08-10
Payer: COMMERCIAL

## 2021-08-10 DIAGNOSIS — R91.1 NODULE OF LOWER LOBE OF LEFT LUNG: ICD-10-CM

## 2021-08-10 DIAGNOSIS — C32.9 SQUAMOUS CELL CARCINOMA OF LARYNX (HCC): ICD-10-CM

## 2021-08-10 LAB — GLUCOSE SERPL-MCNC: 92 MG/DL (ref 65–140)

## 2021-08-10 PROCEDURE — G1004 CDSM NDSC: HCPCS

## 2021-08-10 PROCEDURE — 78815 PET IMAGE W/CT SKULL-THIGH: CPT

## 2021-08-10 PROCEDURE — A9552 F18 FDG: HCPCS

## 2021-08-10 PROCEDURE — 82948 REAGENT STRIP/BLOOD GLUCOSE: CPT

## 2021-08-12 ENCOUNTER — TELEPHONE (OUTPATIENT)
Dept: HEMATOLOGY ONCOLOGY | Facility: CLINIC | Age: 66
End: 2021-08-12

## 2021-08-12 ENCOUNTER — CONSULT (OUTPATIENT)
Dept: HEMATOLOGY ONCOLOGY | Facility: CLINIC | Age: 66
End: 2021-08-12
Payer: COMMERCIAL

## 2021-08-12 VITALS
HEIGHT: 62 IN | DIASTOLIC BLOOD PRESSURE: 62 MMHG | RESPIRATION RATE: 16 BRPM | OXYGEN SATURATION: 93 % | SYSTOLIC BLOOD PRESSURE: 120 MMHG | BODY MASS INDEX: 33.21 KG/M2 | WEIGHT: 180.5 LBS | TEMPERATURE: 96.8 F | HEART RATE: 69 BPM

## 2021-08-12 DIAGNOSIS — D50.9 IRON DEFICIENCY ANEMIA, UNSPECIFIED IRON DEFICIENCY ANEMIA TYPE: Primary | ICD-10-CM

## 2021-08-12 DIAGNOSIS — R59.0 MESENTERIC LYMPHADENOPATHY: ICD-10-CM

## 2021-08-12 DIAGNOSIS — R91.1 NODULE OF LOWER LOBE OF LEFT LUNG: ICD-10-CM

## 2021-08-12 PROCEDURE — 99205 OFFICE O/P NEW HI 60 MIN: CPT | Performed by: INTERNAL MEDICINE

## 2021-08-12 NOTE — TELEPHONE ENCOUNTER
New Patient Request   Patient Details:     Diandra Rendon      1955      [id number]      Reason for Appointment   Who is calling to schedule? Physician Office   If not Patient, what is their name? Saba Santoyo    What is the diagnosis? R91 1 (ICD-10-CM) - Nodule of lower lobe of left lung   R59 0 (ICD-10-CM) - Mesenteric lymphadenopathy      Who is the referring doctor? Bette Rock MD   Scheduling Information   Which department are you scheduling with ? Surg Onc   Preferred [address] Number to call back on? If calling from the [facility], use the Nurse number  [phone]   Miscellaneous Information: Referred to Dr Jose Desouza- Phone number is to her  daughter Lurdes Obrien       Please advise the patient, a new patient  will be calling them back within 1 business day

## 2021-08-12 NOTE — PROGRESS NOTES
Oncology Outpatient Consult Note  Maeve Hooper 77 y o  female MRN: @ Encounter: [id number]        Date:  8/12/2021    CC:    Nonspecific lung findings and mesenteric adenopathy on a PET-CT scan with history of head and neck malignancy more than 6 years ago  HPI:  Maeve Hooper is seen for initial consultation 8/12/2021 regarding  A history of head and neck cancer more than 6 years ago  The patient had a CT scan which revealed  A suspicious left lower lobe lung nodule measuring 1 2 cm and is suspicious AP window lymph node measuring 1 3 cm with Stefania mesentery with multiple abnormal nodes the largest measuring 1 1 x 1 3 cm  A PET-CT scan was done to follow-up on this and the patient was referred to see Pulmonary Medicine who she is apparently seeing on the 23rd of August   The PET-CT scan  Showed that the 1 2 x 1 1 cm left lung base nodule does not demonstrate any significant FDG uptake which would favor benign etiology although a low-grade neoplasm could not be excluded  Scattered mildly  Hypermetabolic densities in the bilateral lungs likely inflammatory or infectious could be reassessed on follow-up CT  Mildly prominent mediastinal and mesenteric lymph nodes with mild FDG activity were nonspecific but a low-grade lymphoproliferative disorder could not be excluded  Clinical correlation was recommended  The patient was referred to see us without any biopsies  Again her head and neck malignancy was taken care of by Dr Jefferson Walker at [facility]   She states she has had head and neck exams yearly and her imaging is not suggestive of metastatic disease from this head and neck malignancy at this time with no evidence of local recurrence  I explained to the patient and her son that at this point without a tissue diagnosis it is very hard for me to say anything  She is anemic and her iron does seem to be low so I will check her iron studies and I advised her take an iron pill daily    I will refer to our colleagues in Surgical Oncology to see if they would consider a laparoscopic biopsy of 1 of these lymph nodes or if they would favor observation  She is seeing Pulmonary Medicine and they will need to weigh in on the mediastinal adenopathy along with the lung lesions  Again my suspicion for metastatic head and neck malignancy is very low based on this PET-CT scan  The patient is otherwise at baseline  Denies any nausea or vomiting  The rest of her 14 point review of systems today was negative  Test Results:    Imaging: NM PET CT skull base to mid thigh    Result Date: 8/10/2021  Narrative: WHOLE-BODY PET/CT SCAN INDICATION: C32 9: Malignant neoplasm of larynx, unspecified R91 1: Solitary pulmonary nodule  , restaging for treatment management   MODIFIER: PS COMPARISON: CT 7/3/2021 and priors CELL TYPE:  Squamous cell carcinoma, left larynx biopsied 2014 TECHNIQUE: Following the intravenous administration of 10 8 mCi F-18-FDG, dedicated head and neck images were obtained from the skull vertex to the thoracic inlet with the patient's arms at their side 60 minutes post injection  Subsequently, whole body images were obtained from the thoracic inlet through the proximal femurs with the patients arms above their head  Multiplanar attenuation corrected and non attenuation corrected PET images are available for interpretation  Contiguous, low dose, axial CT sections were also obtained from the head through the thoracic inlet and from the thoracic inlet through the proximal femurs  Intravenous contrast material was not utilized  Additional coronal and sagittal images are available as well as fused PET/CT images  This examination, like all CT scans performed in the [facility], was performed utilizing techniques to minimize radiation dose exposure, including the use of iterative reconstruction and automated exposure control    Fasting serum glucose: 92 mg/dl FINDINGS: VISUALIZED BRAIN: No the bilateral lungs as above, likely inflammatory/infectious  These may also be reassessed on follow-up CT  3   Mildly prominent mediastinal and mesenteric nodes with mild FDG activity, nonspecific however a low-grade lymphoproliferative disorder is not excluded  Clinical correlation recommended  The study was marked in EPIC for significant notification  Workstation performed: [id number]       Labs:   Lab Results   Component Value Date    WBC 5 01 07/02/2021    HGB 10 8 (L) 07/02/2021    HCT 36 2 07/02/2021    MCV 87 07/02/2021     07/02/2021     Lab Results   Component Value Date     02/25/2015    K 4 4 07/02/2021     07/02/2021    CO2 29 07/02/2021    ANIONGAP 3 (L) 02/25/2015    BUN 21 07/02/2021    CREATININE 1 14 07/02/2021    GLUCOSE 83 02/25/2015    GLUF 81 07/02/2021    CALCIUM 9 2 07/02/2021    AST 21 07/01/2021    ALT 20 07/01/2021    ALKPHOS 138 (H) 07/01/2021    PROT 6 9 02/25/2015    BILITOT 0 3 02/25/2015    EGFR 50 07/02/2021       Lab Results   Component Value Date    [id number] 1,273 (H) 02/25/2015       ROS: As stated in history of present illness otherwise her 14 point review of systems today was negative      Active Problems:   Patient Active Problem List   Diagnosis    Cancer of pharynx (HCC)    Cataract    Chronic kidney disease, stage 3 (moderate)    Centrilobular emphysema (HCC)    Extrinsic obstruction of cartilagenous portion of eustachian tube    Personal history of radiation therapy    History of laryngeal cancer    Loss of hearing    Depression with anxiety    Numbness    Panic attack    Vitamin D deficiency    Dupuytren contracture    Elevated alkaline phosphatase level    Bronchiectasis with acute exacerbation (HCC)    Severe persistent asthma without complication    Nodule of lower lobe of left lung    Myalgia    Tension type headache    Hyperlipidemia    COVID-19    Trigger finger of right thumb    Current mild episode of major depressive disorder without prior episode ([place] [address] )    Persistent dyspnea after COVID-19    Dyspnea on exertion    History of COVID-19    Class 1 obesity due to excess calories with serious comorbidity and body mass index (BMI) of 32 0 to 32 9 in adult    Exercise hypoxemia    Post-nasal drip    Mesenteric lymphadenopathy    Neck pain on left side       Past Medical History:   Past Medical History:   Diagnosis Date    Acute kidney failure (HCC)     Allergies     Anemia     Cancer ([place] [address] )     Chronic kidney disease (CKD), stage III (moderate) (HCC)     COPD (chronic obstructive pulmonary disease) ([place] [address] )     COVID-19     Covid + 1-1-91-cough at that time now fully resolved    Disease of thyroid gland     Essential hypertension     GERD (gastroesophageal reflux disease)     Glaucoma     High blood pressure     HTN (hypertension) 2/16/2021    Hyperlipidemia     Hypothyroidism     Throat cancer ([place] [address] ) 2014    chemo and rad therapy 2014       Surgical History:   Past Surgical History:   Procedure Laterality Date    COLONOSCOPY  2016    ESOPHAGOGASTRODUODENOSCOPY  2016    LARYNGOSCOPY      w/ Bx       AZ INCISE FINGER TENDON SHEATH Right 2/16/2021    Procedure: THUMB TRIGGER FINGER RELEASE;  Surgeon: Yessenia Barrios MD;  Location: [facility]  MAIN [facility];  Service: Orthopedics    TUBAL LIGATION         Family History:    Family History   Problem Relation Age of Onset    Brain cancer Sister     Diabetes Sister     Breast cancer Sister     Other Mother         respiratory disorder    Sudden death Father         shot himself    Suicidality Father     No Known Problems Daughter     No Known Problems Maternal Grandmother     No Known Problems Maternal Grandfather     No Known Problems Paternal Grandmother     No Known Problems Paternal Grandfather     No Known Problems Sister     No Known Problems Sister     No Known Problems Sister     No Known Problems Sister     No Known Problems Sister     No Known Problems Sister     No Known Problems Sister     No Known Problems Daughter     No Known Problems Maternal Aunt     No Known Problems Maternal Aunt     No Known Problems Maternal Aunt     No Known Problems Maternal Aunt     No Known Problems Maternal Aunt     No Known Problems Paternal Aunt     No Known Problems Paternal Aunt     No Known Problems Paternal Aunt     No Known Problems Paternal Aunt        Cancer-related family history includes Brain cancer in her sister; Breast cancer in her sister      Social History:   Social History     Socioeconomic History    Marital status: /Civil Union     Spouse name: Not on file    Number of children: Not on file    Years of education: Not on file    Highest education level: Not on file   Occupational History    Not on file   Tobacco Use    Smoking status: Former Smoker     Packs/day: 1 00     Years: 40 00     Pack years: 40 00     Quit date:      Years since quittin 6    Smokeless tobacco: Never Used   Vaping Use    Vaping Use: Never used   Substance and Sexual Activity    Alcohol use: Never     Comment: None    Drug use: Not on file     Comment: Denies    Sexual activity: Not Currently   Other Topics Concern    Not on file   Social History Narrative    Most recent tobacco use screenin2020    Do you currently or have you served in [facility] [employer]: No    Were you activated, into active duty, as a member of the [employer] or as a Reservist: No    Marital status:     Sexual orientation: Heterosexual    Exercise level: Occasional    Diet: Regular    General stress level: Low    Has smoked since age: 23    Alcohol intake: None    Caffeine intake: Occasional    Chewing tobacco: none    Illicit drugs: Denies    Guns present in home: No    Seat belts used routinely: Yes    Sunscreen used routinely: Yes    Smoke alarm in home: Yes    Advance directive: No    Salt Intake: HTN Diet    Has the Patient had a mammogram to screen for breast cancer within 24 months: Yes    Would the patient like to schedule a Mammogram: No    Is the patient interested in a colorectal cancer screening: No    Live alone or with others: with others    Sexually active: No    Do you feel safe at home: Yes     Social Determinants of Health     Financial Resource Strain:     Difficulty of Paying Living Expenses:    Food Insecurity:     Worried About Running Out of Food in the Last Year:     [address] in the Last Year:    Transportation Needs:     Lack of Transportation (Medical):      Lack of Transportation (Non-Medical):    Physical Activity:     Days of Exercise per Week:     Minutes of Exercise per Session:    Stress:     Feeling of Stress :    Social Connections:     Frequency of Communication with Friends and Family:     Frequency of Social Gatherings with Friends and Family:     Attends Sabianist Services:     Active Member of Clubs or Organizations:     Attends Club or Organization Meetings:     Marital Status:    Intimate Partner Violence:     Fear of Current or Ex-Partner:     Emotionally Abused:     Physically Abused:     Sexually Abused:        Current Medications:   Current Outpatient Medications   Medication Sig Dispense Refill    albuterol (PROVENTIL HFA,VENTOLIN HFA) 90 mcg/act inhaler Inhale 2 puffs every 4 (four) hours as needed for wheezing 36 g 5    aspirin (ECOTRIN LOW STRENGTH) 81 mg EC tablet Take 1 tablet (81 mg total) by mouth daily 90 tablet 1    brimonidine tartrate 0 2 % ophthalmic solution Apply 1 drop to eye Three times a day      Cholecalciferol (Vitamin D3) 10 MCG (400 UNIT) CAPS Take 1 capsule by mouth daily      cyanocobalamin (VITAMIN B-12) 100 mcg tablet Take 100 mcg by mouth daily      ipratropium (ATROVENT) 0 06 % nasal spray 2 SPRAYS INTO EACH NOSTRIL 4 (FOUR) TIMES A DAY 15 mL 1    ipratropium-albuterol (DUO-NEB) 0 5-2 5 mg/3 mL nebulizer solution USE 1 VIAL VIA NEBULIZER EVERY 6 HOURS AS NEEDED FOR WHEEZING/SHORTNESS OF BREATH 540 mL 1    pantoprazole (PROTONIX) 40 mg tablet Take 1 tablet (40 mg total) by mouth daily before breakfast 30 tablet 5    PARoxetine (PAXIL) 10 mg tablet Take 1 tablet (10 mg total) by mouth every morning 90 tablet 1    predniSONE 10 mg tablet Take 4 tablets x4 days then 3 tablets x4 days then 2 tablets x4 days then 1 tablet x4 days then stop 50 tablet 0    Pyridoxine HCl (VITAMIN B-6 PO) Take 1 tablet by mouth daily      timolol (TIMOPTIC) 0 5 % ophthalmic solution INSTILL 1 DROP INTO EACH EYE TWICE DAILY      umeclidinium-vilanterol (Anoro Ellipta) 62 5-25 MCG/INH inhaler Inhale 1 puff daily 60 blister 1    levothyroxine 100 mcg tablet Take 1 tablet (100 mcg total) by mouth daily 90 tablet 1    montelukast (SINGULAIR) 10 mg tablet Take 1 tablet (10 mg total) by mouth daily at bedtime (Patient not taking: Reported on 8/12/2021) 90 tablet 1    Stiolto Respimat 2 5-2 5 MCG/ACT inhaler Inhale 2 puffs daily (Patient not taking: Reported on 7/16/2021) 1 Inhaler 5    tiZANidine (ZANAFLEX) 2 mg tablet Take 1 tablet (2 mg total) by mouth every 8 (eight) hours as needed for muscle spasms (Patient not taking: Reported on 8/12/2021) 20 tablet 0     No current facility-administered medications for this visit  Allergies: Allergies   Allergen Reactions    Amifostine Rash    Pollen Extract Allergic Rhinitis         Physical Exam:    Body surface area is 1 83 meters squared      Wt Readings from Last 3 Encounters:   08/12/21 81 9 kg (180 lb 8 oz)   07/16/21 81 2 kg (179 lb)   07/12/21 81 1 kg (178 lb 12 8 oz)        Temp Readings from Last 3 Encounters:   08/12/21 (!) 96 8 °F (36 °C) (Temporal)   07/16/21 (!) 97 2 °F (36 2 °C) (Temporal)   07/12/21 (!) 97 °F (36 1 °C) (Temporal)        BP Readings from Last 3 Encounters:   08/12/21 120/62   07/16/21 124/82   07/12/21 118/70         Pulse Readings from Last 3 Encounters:   08/12/21 69   07/16/21 74   07/12/21 66       Physical Exam     Constitutional   General appearance: No acute distress, well appearing and well nourished  Eyes   Conjunctiva and lids: No swelling, erythema or discharge  Pupils and irises: Equal, round and reactive to light  Ears, Nose, Mouth, and Throat   External inspection of ears and nose: Normal     Nasal mucosa, septum, and turbinates: Normal without edema or erythema  Oropharynx: Normal with no erythema, edema, exudate or lesions  Pulmonary   Respiratory effort: No increased work of breathing or signs of respiratory distress  Auscultation of lungs: Clear to auscultation  Cardiovascular   Palpation of heart: Normal PMI, no thrills  Auscultation of heart: Normal rate and rhythm, normal S1 and S2, without murmurs  Examination of extremities for edema and/or varicosities: Normal     Carotid pulses: Normal     Abdomen   Abdomen: Non-tender, no masses  Liver and spleen: No hepatomegaly or splenomegaly  Lymphatic   Palpation of lymph nodes in neck: No lymphadenopathy  Musculoskeletal   Gait and station: Normal     Digits and nails: Normal without clubbing or cyanosis  Inspection/palpation of joints, bones, and muscles: Normal     Skin   Skin and subcutaneous tissue: Normal without rashes or lesions  Neurologic   Cranial nerves: Cranial nerves 2-12 intact  Sensation: No sensory loss  Psychiatric   Orientation to person, place, and time: Normal     Mood and affect: Normal           Assessment/ Plan:      The patient is a pleasant 58-year-old female with a remote history of head and neck malignancy was referred to see us for non FDG avid lung nodule and some mediastinal and mesenteric adenopathy which had low-grade activity but was never sampled  The patient has an appointment to see our colleagues in Pulmonary Medicine within the next 2 weeks  I will set her up to see our colleagues in Surgical Oncology  Between them 1 of these lymph nodes will need to be sampled    The concern is for a low-grade lymphoproliferative neoplasm like a lymphoma so ideally removal of a lymph node would help with this diagnosis  I will order iron studies and also a repeat CBC with differential and a flow cytometry  I will see the patient back in 4-6 weeks  She will have been seen by our colleagues in surgery and Pulmonary Medicine by then  Based on the recommendations we can then make further recommendations as without a tissue biopsy or any evidence histologically of malignancy I would have no further recommendations  The patient's son understands and agrees with this  I will see her back in 6 weeks  She will have blood work done within the next few weeks  Goals and Barriers:  Current Goal: Prolong Survival from   Head and neck Cancer  Barriers: None  Patient's Capacity to Self Care:  Patient able to self care  Portions of the record may have been created with voice recognition software   Occasional wrong word or "sound a like" substitutions may have occurred due to the inherent limitations of voice recognition software   Read the chart carefully and recognize, using context, where substitutions have occurred

## 2021-08-17 NOTE — TELEPHONE ENCOUNTER
New Patient Encounter    New Patient Intake Form   Patient Details:  Maeve Hooper  1955  [id number]    Background Information:  [address] starts by opening a telephone encounter and gathering the following information   Who is calling to schedule? If not self, relationship to patient? Child Ashley    Referring Provider Brittney Bruno MD   What is the diagnosis? R91 1 (ICD-10-CM) - Nodule of lower lobe of left lung   R59 0 (ICD-10-CM) - Mesenteric lymphadenopathy        Is this Cancer or Non-Cancer? Non-Cancer   Is this diagnosis confirmed? Yes   When was the diagnosis? 08/2021   Is there a confirmed diagnosis from a biopsy/tissue reviewed by pathology? NA   Were outside slides requested? NA   Is patient aware of diagnosis? Yes   Is there a personal history and what kind? Yes throat cancer cancer free 5 years ago    Is there a family history and what kind? Yes sister - head/breast   Reason for visit? New Diagnosis   Have you had any imaging or labs done? If so: when, where? Yes   NM PET CT 08/10/2021 labs 07/02/2021   Are records in [facility]? yes   Are records needed form an outside facility? No   If yes, name, city, state where facility is located  N/a   If patient has a prior history of cancer were old records obtained? No   Was the patient told to bring a disk? No   Does the patient smoke or Vape? No   If yes, how many packs or cartridges per day? n/a   Scheduling Information:   Preferred [place]: [facility]     Are there any dates/time the patient cannot be seen? Miscellaneous: Patient is scheduled 09/14/2021 with Dr Albert Section    After completing the above information, please route to Financial Counselor and the appropriate Nurse Navigator for review

## 2021-08-23 ENCOUNTER — OFFICE VISIT (OUTPATIENT)
Dept: PULMONOLOGY | Facility: MEDICAL CENTER | Age: 66
End: 2021-08-23
Payer: COMMERCIAL

## 2021-08-23 VITALS
SYSTOLIC BLOOD PRESSURE: 122 MMHG | RESPIRATION RATE: 12 BRPM | HEART RATE: 74 BPM | DIASTOLIC BLOOD PRESSURE: 74 MMHG | HEIGHT: 62 IN | OXYGEN SATURATION: 92 % | WEIGHT: 181.2 LBS | BODY MASS INDEX: 33.34 KG/M2 | TEMPERATURE: 97.3 F

## 2021-08-23 DIAGNOSIS — R59.0 MEDIASTINAL LYMPHADENOPATHY: ICD-10-CM

## 2021-08-23 DIAGNOSIS — R91.1 LUNG NODULE: ICD-10-CM

## 2021-08-23 DIAGNOSIS — R59.0 MESENTERIC LYMPHADENOPATHY: ICD-10-CM

## 2021-08-23 DIAGNOSIS — J43.2 CENTRILOBULAR EMPHYSEMA (HCC): Primary | ICD-10-CM

## 2021-08-23 PROCEDURE — 99214 OFFICE O/P EST MOD 30 MIN: CPT | Performed by: PHYSICIAN ASSISTANT

## 2021-08-23 RX ORDER — BUDESONIDE, GLYCOPYRROLATE, AND FORMOTEROL FUMARATE 160; 9; 4.8 UG/1; UG/1; UG/1
2 AEROSOL, METERED RESPIRATORY (INHALATION) 2 TIMES DAILY
Qty: 10.7 G | Refills: 0 | Status: SHIPPED | COMMUNITY
Start: 2021-08-23 | End: 2021-09-29

## 2021-08-23 RX ORDER — BUDESONIDE, GLYCOPYRROLATE, AND FORMOTEROL FUMARATE 160; 9; 4.8 UG/1; UG/1; UG/1
2 AEROSOL, METERED RESPIRATORY (INHALATION) 2 TIMES DAILY
Qty: 10.7 G | Refills: 11 | Status: SHIPPED | OUTPATIENT
Start: 2021-08-23 | End: 2021-09-29

## 2021-08-23 NOTE — PROGRESS NOTES
Assessment/Plan:    Problem List Items Addressed This Visit        Respiratory    Centrilobular emphysema ([address] ) - Primary    Relevant Medications    Budeson-Glycopyrrol-Formoterol (Breztri Aerosphere) 160-9-4 8 MCG/ACT AERO    Budeson-Glycopyrrol-Formoterol (Breztri Aerosphere) 160-9-4 8 MCG/ACT AERO       Cardiovascular and Mediastinum    Mediastinal lymphadenopathy       Immune and Lymphatic    Mesenteric lymphadenopathy       Other    Lung nodule            Plan for today/next follow-up:    Left lower lung nodule:   -Follow-up CT scan 3 months  -follow up with oncology for imaging follow up   -contact office by November if imaging has not yet been followed up   Mediastinal and Abdominal Lymphadenopathy:  -concern for lymphoma process  -follow up with oncology as scheduled on 9/23    very severe COPD:  -start Breztri  -samples provided  -call office with persistent symptoms or lack of insurance coverage    Follow up in 6 months or earlier if needed  Return in about 6 months (around 2/23/2022)  All questions are answered to the patient's satisfaction and understanding  She verbalizes understanding  She is encouraged to call with any further questions or concerns  ______________________________________________________________________    Chief Complaint: No chief complaint on file  Patient ID: Luiza Zeng is a 77 y o  y o  female has a past medical history of Acute kidney failure ([address] ), Allergies, Anemia, Cancer ([address] ), Chronic kidney disease (CKD), stage III (moderate) ([address] ), COPD (chronic obstructive pulmonary disease) ([address] ), COVID-19, Disease of thyroid gland, Essential hypertension, GERD (gastroesophageal reflux disease), Glaucoma, High blood pressure, HTN (hypertension) (2/16/2021), Hyperlipidemia, Hypothyroidism, and Throat cancer ([address] ) (2014)      8/23/2021  Patient presents today for follow-up visit for:    Hunter Leon via [address] [facility]  [place] Gar[place] [id number] [facility]      Has follow-up with Dr Jocelyn Steele on  @ [address]  Prior Hx head and neck CA  Recently seen  by Dr Georgia Shelby  Recommendations for PET CT made which she has obtained and w/ mild hypermetabolic activity in chest and mesenteric lymph nodes  emphysematous COPD with very severe markers FEV1 24% of predicted  Is using Anoro and Ventolin  Still SOB despite Anoro    Discussed Breivonne Schillinglegy  Trial    PT opts for [facility] as she prefers a pump style medication  Has scheduled follow up on  w/ Heme / Onc  Discussed needs imaging follow up in approx 3 months which can be pursued at the direction of oncology service and would be pursuant of follow up for lung nodule by approx 2021 timeframe  Labs Reviewed: yes   Imaging Reviewed:* 3 July 2021      8/10/2021    Echo Reviewed:  EF 61 G1DD  PFT:      Smoking history:   Social History     Tobacco Use   Smoking Status Former Smoker    Packs/day: 1 00    Years: 40 00    Pack years: 40 00    Quit date:     Years since quittin 6   Smokeless Tobacco Never Used       Review of Systems   Constitutional: Negative for activity change, appetite change, chills, fatigue and fever  HENT: Negative for postnasal drip, rhinorrhea, sinus pressure and sinus pain  Eyes: Negative for visual disturbance  Respiratory: Positive for shortness of breath  Negative for apnea, cough, chest tightness, wheezing and stridor  Cardiovascular: Negative for chest pain and leg swelling  Gastrointestinal: Negative for diarrhea, nausea and vomiting  Endocrine: Negative for cold intolerance and heat intolerance  Musculoskeletal: Negative for arthralgias, back pain, joint swelling and myalgias  Skin: Negative for color change  Neurological: Negative for syncope and light-headedness  Hematological: Negative for adenopathy  Psychiatric/Behavioral: Negative for confusion and sleep disturbance         The following portions of the patient's history were reviewed and updated as appropriate: allergies, current medications, past family history, past medical history, past social history, past surgical history and problem list     Smoking history: She reports that she quit smoking about 7 years ago  She has a 40 00 pack-year smoking history  She has never used smokeless tobacco   Social history: She reports that she quit smoking about 7 years ago  She has a 40 00 pack-year smoking history  She has never used smokeless tobacco  She reports that she does not drink alcohol  Past Medical History:   Diagnosis Date    Acute kidney failure (HCC)     Allergies     Anemia     Cancer ([place] [address] )     Chronic kidney disease (CKD), stage III (moderate) (HCC)     COPD (chronic obstructive pulmonary disease) ([place] [address] )     COVID-19     Covid + 6-4-12-cough at that time now fully resolved    Disease of thyroid gland     Essential hypertension     GERD (gastroesophageal reflux disease)     Glaucoma     High blood pressure     HTN (hypertension) 2/16/2021    Hyperlipidemia     Hypothyroidism     Throat cancer ([place] [address] ) 2014    chemo and rad therapy 2014     Past Surgical History:   Procedure Laterality Date    COLONOSCOPY  2016    ESOPHAGOGASTRODUODENOSCOPY  2016    LARYNGOSCOPY      w/ Bx       AK INCISE FINGER TENDON SHEATH Right 2/16/2021    Procedure: THUMB TRIGGER FINGER RELEASE;  Surgeon: Lisa Stallings MD;  Location: [facility];  Service: Orthopedics    TUBAL LIGATION       Family History   Problem Relation Age of Onset    Brain cancer Sister     Diabetes Sister     Breast cancer Sister     Other Mother         respiratory disorder    Sudden death Father         shot himself    Suicidality Father     No Known Problems Daughter     No Known Problems Maternal Grandmother     No Known Problems Maternal Grandfather     No Known Problems Paternal Grandmother     No Known Problems Paternal Grandfather     No Known Problems Sister     No Known Problems Sister     No Known Problems Sister     No Known Problems Sister     No Known Problems Sister     No Known Problems Sister     No Known Problems Sister     No Known Problems Daughter     No Known Problems Maternal Aunt     No Known Problems Maternal Aunt     No Known Problems Maternal Aunt     No Known Problems Maternal Aunt     No Known Problems Maternal Aunt     No Known Problems Paternal Aunt     No Known Problems Paternal Aunt     No Known Problems Paternal Aunt     No Known Problems Paternal Aunt      Immunization History   Administered Date(s) Administered    INFLUENZA 02/06/2019    Influenza, high dose seasonal 0 7 mL 10/07/2020    Pneumococcal Conjugate 13-Valent 10/07/2020    Pneumococcal Polysaccharide PPV23 06/14/2021     Current Outpatient Medications   Medication Sig Dispense Refill    albuterol (PROVENTIL HFA,VENTOLIN HFA) 90 mcg/act inhaler Inhale 2 puffs every 4 (four) hours as needed for wheezing 36 g 5    aspirin (ECOTRIN LOW STRENGTH) 81 mg EC tablet Take 1 tablet (81 mg total) by mouth daily 90 tablet 1    brimonidine tartrate 0 2 % ophthalmic solution Apply 1 drop to eye Three times a day      Cholecalciferol (Vitamin D3) 10 MCG (400 UNIT) CAPS Take 1 capsule by mouth daily      cyanocobalamin (VITAMIN B-12) 100 mcg tablet Take 100 mcg by mouth daily      ipratropium (ATROVENT) 0 06 % nasal spray 2 SPRAYS INTO EACH NOSTRIL 4 (FOUR) TIMES A DAY 15 mL 1    ipratropium-albuterol (DUO-NEB) 0 5-2 5 mg/3 mL nebulizer solution USE 1 VIAL VIA NEBULIZER EVERY 6 HOURS AS NEEDED FOR WHEEZING/SHORTNESS OF BREATH 540 mL 1    levothyroxine 100 mcg tablet Take 1 tablet (100 mcg total) by mouth daily 90 tablet 1    pantoprazole (PROTONIX) 40 mg tablet Take 1 tablet (40 mg total) by mouth daily before breakfast 30 tablet 5    PARoxetine (PAXIL) 10 mg tablet Take 1 tablet (10 mg total) by mouth every morning 90 tablet 1    Pyridoxine HCl (VITAMIN B-6 PO) Take 1 tablet by mouth daily      timolol (TIMOPTIC) 0 5 % ophthalmic solution INSTILL 1 DROP INTO EACH EYE TWICE DAILY      umeclidinium-vilanterol (Anoro Ellipta) 62 5-25 MCG/INH inhaler Inhale 1 puff daily 60 blister 1    montelukast (SINGULAIR) 10 mg tablet Take 1 tablet (10 mg total) by mouth daily at bedtime (Patient not taking: Reported on 8/12/2021) 90 tablet 1    predniSONE 10 mg tablet Take 4 tablets x4 days then 3 tablets x4 days then 2 tablets x4 days then 1 tablet x4 days then stop 50 tablet 0    Stiolto Respimat 2 5-2 5 MCG/ACT inhaler Inhale 2 puffs daily (Patient not taking: Reported on 7/16/2021) 1 Inhaler 5    tiZANidine (ZANAFLEX) 2 mg tablet Take 1 tablet (2 mg total) by mouth every 8 (eight) hours as needed for muscle spasms (Patient not taking: Reported on 8/12/2021) 20 tablet 0     No current facility-administered medications for this visit  Allergies: Amifostine and Pollen extract    Objective:  Vitals:    08/23/21 0825   BP: 122/74   Pulse: 74   Resp: 12   Temp: (!) 97 3 °F (36 3 °C)   TempSrc: Temporal   SpO2: 92%   Weight: 82 2 kg (181 lb 3 2 oz)   Height: 5' 2" (1 575 m)   Oxygen Therapy  SpO2: 92 %    Wt Readings from Last 3 Encounters:   08/23/21 82 2 kg (181 lb 3 2 oz)   08/12/21 81 9 kg (180 lb 8 oz)   07/16/21 81 2 kg (179 lb)     Body mass index is 33 14 kg/m²  Physical Exam  Constitutional:       Appearance: She is well-developed  HENT:      Head: Normocephalic and atraumatic  Eyes:      Conjunctiva/sclera: Conjunctivae normal       Pupils: Pupils are equal, round, and reactive to light  Cardiovascular:      Rate and Rhythm: Normal rate and regular rhythm  Heart sounds: Normal heart sounds  Pulmonary:      Effort: Pulmonary effort is normal  No respiratory distress  Breath sounds: Normal breath sounds  No wheezing or rales  Chest:      Chest wall: No tenderness  Abdominal:      General: Bowel sounds are normal       Palpations: Abdomen is soft  Musculoskeletal:         General: Normal range of motion  Cervical back: Normal range of motion and neck supple  Skin:     General: Skin is warm and dry  Neurological:      Mental Status: She is alert and oriented to person, place, and time           Lab Review:   Hospital Outpatient Visit on 08/10/2021   Component Date Value    POC Glucose 08/10/2021 92    Admission on 07/01/2021, Discharged on 07/03/2021   Component Date Value    WBC 07/01/2021 5 92     RBC 07/01/2021 4 26     Hemoglobin 07/01/2021 11 3*    Hematocrit 07/01/2021 36 6     MCV 07/01/2021 86     MCH 07/01/2021 26 5*    MCHC 07/01/2021 30 9*    RDW 07/01/2021 13 5     MPV 07/01/2021 10 5     Platelets 37/09/2619 210     nRBC 07/01/2021 0     Neutrophils Relative 07/01/2021 81*    Immat GRANS % 07/01/2021 0     Lymphocytes Relative 07/01/2021 8*    Monocytes Relative 07/01/2021 7     Eosinophils Relative 07/01/2021 4     Basophils Relative 07/01/2021 0     Neutrophils Absolute 07/01/2021 4 76     Immature Grans Absolute 07/01/2021 0 02     Lymphocytes Absolute 07/01/2021 0 47*    Monocytes Absolute 07/01/2021 0 40     Eosinophils Absolute 07/01/2021 0 25     Basophils Absolute 07/01/2021 0 02     Color, UA 07/01/2021 Light Yellow     Clarity, UA 07/01/2021 Clear     Specific Gravity, UA 07/01/2021 1 015     pH, UA 07/01/2021 6 0     Leukocytes, UA 07/01/2021 Negative     Nitrite, UA 07/01/2021 Negative     Protein, UA 07/01/2021 Negative     Glucose, UA 07/01/2021 Negative     Ketones, UA 07/01/2021 Negative     Urobilinogen, UA 07/01/2021 0 2     Bilirubin, UA 07/01/2021 Negative     Blood, UA 07/01/2021 Negative     Troponin I 07/01/2021 <0 02     NT-proBNP 07/01/2021 603*    Sodium 07/01/2021 143     Potassium 07/01/2021 4 6     Chloride 07/01/2021 104     CO2 07/01/2021 32     ANION GAP 07/01/2021 7     BUN 07/01/2021 23     Creatinine 07/01/2021 1 27     Glucose 07/01/2021 170*    Calcium 07/01/2021 9 2     AST 07/01/2021 21     ALT 07/01/2021 20  Alkaline Phosphatase 07/01/2021 138*    Total Protein 07/01/2021 7 4     Albumin 07/01/2021 3 5     Total Bilirubin 07/01/2021 0 25     eGFR 07/01/2021 44     D-Dimer, Quant 07/02/2021 0 59*    Troponin I 07/02/2021 <0 02     Troponin I 07/02/2021 <0 02     Sodium 07/02/2021 142     Potassium 07/02/2021 4 4     Chloride 07/02/2021 105     CO2 07/02/2021 29     ANION GAP 07/02/2021 8     BUN 07/02/2021 21     Creatinine 07/02/2021 1 14     Glucose 07/02/2021 81     Glucose, Fasting 07/02/2021 81     Calcium 07/02/2021 9 2     eGFR 07/02/2021 50     WBC 07/02/2021 5 01     RBC 07/02/2021 4 17     Hemoglobin 07/02/2021 10 8*    Hematocrit 07/02/2021 36 2     MCV 07/02/2021 87     MCH 07/02/2021 25 9*    MCHC 07/02/2021 29 8*    RDW 07/02/2021 13 5     Platelets 75/28/5105 181     MPV 07/02/2021 10 3     Ventricular Rate 07/01/2021 59     Atrial Rate 07/01/2021 59     CT Interval 07/01/2021 192     QRSD Interval 07/01/2021 84     QT Interval 07/01/2021 414     QTC Interval 07/01/2021 409     P Axis 07/01/2021 97     QRS Axis 07/01/2021 60     T Wave Axis 07/01/2021 65     Ventricular Rate 07/02/2021 63     Atrial Rate 07/02/2021 63     CT Interval 07/02/2021 154     QRSD Interval 07/02/2021 82     QT Interval 07/02/2021 402     QTC Interval 07/02/2021 411     P Axis 07/02/2021 83     QRS Axis 07/02/2021 59     T Wave Axis 07/02/2021 [address] Name 07/03/2021 [employer]     Supplier Phone Number 07/03/2021 [phone]     Order Status 07/03/2021 Delivery Successful     Delivery Request Date 07/03/2021 07/03/2021     Date Delivered  07/03/2021 07/06/2021     Item Description 07/03/2021 Home Oxygen Concentrator with Portability, Adult, Standard Liter Flow     Item Description 07/03/2021 Portable Gaseous Oxygen System     Item Description 07/03/2021 Portable O2 Contents, Gas     Item Description 07/03/2021 No Conserving Device     Item Description 07/03/2021 O2 Humidifier Bottle, Standard Liter Flow        Diagnostics:  No orders to display            ESS:    NM PET CT skull base to mid thigh    Result Date: 8/10/2021  Narrative: WHOLE-BODY PET/CT SCAN INDICATION: C32 9: Malignant neoplasm of larynx, unspecified R91 1: Solitary pulmonary nodule  , restaging for treatment management   MODIFIER: PS COMPARISON: CT 7/3/2021 and priors CELL TYPE:  Squamous cell carcinoma, left larynx biopsied 2014 TECHNIQUE: Following the intravenous administration of 10 8 mCi F-18-FDG, dedicated head and neck images were obtained from the skull vertex to the thoracic inlet with the patient's arms at their side 60 minutes post injection  Subsequently, whole body images were obtained from the thoracic inlet through the proximal femurs with the patients arms above their head  Multiplanar attenuation corrected and non attenuation corrected PET images are available for interpretation  Contiguous, low dose, axial CT sections were also obtained from the head through the thoracic inlet and from the thoracic inlet through the proximal femurs  Intravenous contrast material was not utilized  Additional coronal and sagittal images are available as well as fused PET/CT images  This examination, like all CT scans performed in the [facility], was performed utilizing techniques to minimize radiation dose exposure, including the use of iterative reconstruction and automated exposure control  Fasting serum glucose: 92 mg/dl FINDINGS: VISUALIZED BRAIN: No acute abnormalities are seen  HEAD/NECK:   There is a physiologic distribution of FDG  No FDG avid cervical adenopathy is seen  CT images:  Left maxillary and ethmoid sinus disease  CHEST: 1 2 x 1 1 cm left lung base nodule image 3/83 does not demonstrate any significant FDG uptake  This would favor a benign etiology, although a low-grade neoplasm is not excluded at this time   Also in the left posterior lung base image 3/88, there is a 9 x 4 mm nodular density that is new from the recent CT, with mild FDG activity, SUV 2 3  This is likely inflammatory/infectious in etiology  Stable atelectasis/infiltrate in the right middle and left lingular regions, with minimal FDG activity, also likely inflammatory  There are shotty mediastinal nodes with mild FDG activity  These may be reactive, however a low-grade lymphoproliferative disorder is not excluded at this time  Example precarinal node image 3/52 measures 7 mm, SUV 2 5  AP window node image 3/52 measures 1 1 cm, SUV 3  Mild bilateral perihilar activity is also nonspecific  SUV measures 3 1 on the right and 3 5 on the left  CT images: Emphysema  Coronary artery calcifications  ABDOMEN:   Mesenteric stranding with mildly prominent nodes are again visualized, some demonstrating mild FDG uptake  Low-grade lymphoproliferative disorder is not excluded  Example node image 3/127 measures 2 x 1 4 cm, SUV 2 3  CT images: Small hiatal hernia  PELVIS:   No FDG avid soft tissue lesions are seen  CT images: Stable  OSSEOUS STRUCTURES:  No FDG avid lesions are  seen  CT images:  No significant findings  Impression: 1   1 2 x 1 1 cm left lung base nodule does not demonstrate any significant FDG uptake  This would favor a benign etiology, although a low-grade neoplasm is not excluded at this time  Continued CT follow-up in 6 months is suggested  If this nodule continues to enlarge, tissue sampling may still be warranted  2   Scattered mildly hypermetabolic densities in the bilateral lungs as above, likely inflammatory/infectious  These may also be reassessed on follow-up CT  3   Mildly prominent mediastinal and mesenteric nodes with mild FDG activity, nonspecific however a low-grade lymphoproliferative disorder is not excluded  Clinical correlation recommended  The study was marked in EPIC for significant notification   Workstation performed: [id number]           Portions of the record may have been created with voice recognition software  Occasional wrong word or "sound a like" substitutions may have occurred due to the inherent limitations of voice recognition software  Read the chart carefully and recognize, using context, where substitutions have occurred      Electronically Signed by Damon Schmid PA-C

## 2021-08-23 NOTE — PATIENT INSTRUCTIONS
Plan for today/next follow-up:    Left lower lung nodule:   -Follow-up CT scan 3 months  -follow up with oncology for imaging follow up   -contact office by November if imaging has not yet been followed up   Mediastinal and Abdominal Lymphadenopathy:  -concern for lymphoma process  -follow up with oncology as scheduled on 9/23    very severe COPD:  -start Breztri  -samples provided  -call office with persistent symptoms or lack of insurance coverage    Follow up in 6 months or earlier if needed

## 2021-09-13 ENCOUNTER — APPOINTMENT (EMERGENCY)
Dept: RADIOLOGY | Facility: HOSPITAL | Age: 66
End: 2021-09-13
Payer: COMMERCIAL

## 2021-09-13 ENCOUNTER — HOSPITAL ENCOUNTER (EMERGENCY)
Facility: HOSPITAL | Age: 66
Discharge: HOME | End: 2021-09-13
Payer: COMMERCIAL

## 2021-09-13 VITALS
OXYGEN SATURATION: 94 % | DIASTOLIC BLOOD PRESSURE: 106 MMHG | HEART RATE: 84 BPM | SYSTOLIC BLOOD PRESSURE: 120 MMHG | BODY MASS INDEX: 33.11 KG/M2 | RESPIRATION RATE: 18 BRPM | WEIGHT: 181 LBS | TEMPERATURE: 98.3 F

## 2021-09-13 DIAGNOSIS — S50.01XA CONTUSION OF RIGHT ELBOW, INITIAL ENCOUNTER: ICD-10-CM

## 2021-09-13 DIAGNOSIS — S51.011A LACERATION OF RIGHT ELBOW, INITIAL ENCOUNTER: Primary | ICD-10-CM

## 2021-09-13 PROCEDURE — 99283 EMERGENCY DEPT VISIT LOW MDM: CPT

## 2021-09-13 PROCEDURE — 12001 RPR S/N/AX/GEN/TRNK 2.5CM/<: CPT

## 2021-09-13 PROCEDURE — 99284 EMERGENCY DEPT VISIT MOD MDM: CPT

## 2021-09-13 PROCEDURE — 73070 X-RAY EXAM OF ELBOW: CPT

## 2021-09-13 PROCEDURE — 90471 IMMUNIZATION ADMIN: CPT

## 2021-09-13 PROCEDURE — 90715 TDAP VACCINE 7 YRS/> IM: CPT

## 2021-09-13 RX ORDER — CEPHALEXIN 250 MG/1
500 CAPSULE ORAL ONCE
Status: COMPLETED | OUTPATIENT
Start: 2021-09-13 | End: 2021-09-13

## 2021-09-13 RX ORDER — BACITRACIN, NEOMYCIN, POLYMYXIN B 400; 3.5; 5 [USP'U]/G; MG/G; [USP'U]/G
1 OINTMENT TOPICAL ONCE
Status: COMPLETED | OUTPATIENT
Start: 2021-09-13 | End: 2021-09-13

## 2021-09-13 RX ORDER — CEPHALEXIN 250 MG/1
250 CAPSULE ORAL EVERY 8 HOURS SCHEDULED
Qty: 20 CAPSULE | Refills: 0 | Status: SHIPPED | OUTPATIENT
Start: 2021-09-13 | End: 2021-09-18

## 2021-09-13 RX ADMIN — TETANUS TOXOID, REDUCED DIPHTHERIA TOXOID AND ACELLULAR PERTUSSIS VACCINE, ADSORBED 0.5 ML: 5; 2.5; 8; 8; 2.5 SUSPENSION INTRAMUSCULAR at 17:08

## 2021-09-13 RX ADMIN — BACITRACIN, NEOMYCIN, POLYMYXIN B 1 SMALL APPLICATION: 400; 3.5; 5 OINTMENT TOPICAL at 18:37

## 2021-09-13 RX ADMIN — CEPHALEXIN 500 MG: 250 CAPSULE ORAL at 17:07

## 2021-09-13 NOTE — ED PROVIDER NOTES
History  Chief Complaint   Patient presents with    Elbow Injury     Fell while mopping floor, landed on R elbow  Pt with abrasion on that elbow     60-year-old female here secondary to injury to right hand today when she slipped while mopping landing on the floor injuring her right elbow  Patient has laceration over the right elbow  Patient having pain with flexion extension of the elbow  Patient did not hit head no neck pain patient denies any back pain  Patient having pain with moving the right arm  Prior to Admission Medications   Prescriptions Last Dose Informant Patient Reported? Taking? Budeson-Glycopyrrol-Formoterol (Breztri Aerosphere) 160-9-4 8 MCG/ACT AERO   No No   Sig: Inhale 2 puffs 2 (two) times a day Rinse mouth after use  Budeson-Glycopyrrol-Formoterol (Breztri Aerosphere) 160-9-4 8 MCG/ACT AERO   No No   Sig: Inhale 2 puffs 2 (two) times a day Rinse mouth after use     Cholecalciferol (Vitamin D3) 10 MCG (400 UNIT) CAPS   Yes No   Sig: Take 1 capsule by mouth daily   PARoxetine (PAXIL) 10 mg tablet   No No   Sig: Take 1 tablet (10 mg total) by mouth every morning   Pyridoxine HCl (VITAMIN B-6 PO)   Yes No   Sig: Take 1 tablet by mouth daily   Stiolto Respimat 2 5-2 5 MCG/ACT inhaler   No No   Sig: Inhale 2 puffs daily   Patient not taking: Reported on 2021   albuterol (PROVENTIL HFA,VENTOLIN HFA) 90 mcg/act inhaler   No No   Sig: Inhale 2 puffs every 4 (four) hours as needed for wheezing   aspirin (ECOTRIN LOW STRENGTH) 81 mg EC tablet   No No   Sig: Take 1 tablet (81 mg total) by mouth daily   brimonidine tartrate 0 2 % ophthalmic solution  Self Yes No   Sig: Apply 1 drop to eye Three times a day   cyanocobalamin (VITAMIN B-12) 100 mcg tablet   Yes No   Sig: Take 100 mcg by mouth daily   ipratropium (ATROVENT) 0 06 % nasal spray   No No   Si SPRAYS INTO EACH NOSTRIL 4 (FOUR) TIMES A DAY   ipratropium-albuterol (DUO-NEB) 0 5-2 5 mg/3 mL nebulizer solution   No No   Sig: USE 1 VIAL VIA NEBULIZER EVERY 6 HOURS AS NEEDED FOR WHEEZING/SHORTNESS OF BREATH   levothyroxine 100 mcg tablet   No No   Sig: Take 1 tablet (100 mcg total) by mouth daily   montelukast (SINGULAIR) 10 mg tablet   No No   Sig: Take 1 tablet (10 mg total) by mouth daily at bedtime   Patient not taking: Reported on 8/12/2021   pantoprazole (PROTONIX) 40 mg tablet   No No   Sig: Take 1 tablet (40 mg total) by mouth daily before breakfast   predniSONE 10 mg tablet   No No   Sig: Take 4 tablets x4 days then 3 tablets x4 days then 2 tablets x4 days then 1 tablet x4 days then stop   tiZANidine (ZANAFLEX) 2 mg tablet   No No   Sig: Take 1 tablet (2 mg total) by mouth every 8 (eight) hours as needed for muscle spasms   Patient not taking: Reported on 8/12/2021   timolol (TIMOPTIC) 0 5 % ophthalmic solution  Self Yes No   Sig: INSTILL 1 DROP INTO EACH EYE TWICE DAILY   umeclidinium-vilanterol (Anoro Ellipta) 62 5-25 MCG/INH inhaler   No No   Sig: Inhale 1 puff daily      Facility-Administered Medications: None       Past Medical History:   Diagnosis Date    Acute kidney failure (HCC)     Allergies     Anemia     Cancer (HCC)     Chronic kidney disease (CKD), stage III (moderate) (HCC)     [facility]ch[facility] obstructive [facility] disease) ([facility] )     COVID-19     Covid + 7-0-98-cough at that time now fully resolved    Disease of thyroid gland     Essential hypertension     GERD (gastroesophageal reflux disease)     Glaucoma     High blood pressure     HTN (hypertension) 2/16/2021    Hyperlipidemia     Hypothyroidism     Throat cancer ([facility] ) 2014    chemo and rad therapy 2014       Past Surgical History:   Procedure Laterality Date    COLONOSCOPY  2016    ESOPHAGOGASTRODUODENOSCOPY  2016    LARYNGOSCOPY      w/ Bx       MN INCISE FINGER TENDON SHEATH Right 2/16/2021    Procedure: THUMB TRIGGER FINGER RELEASE;  Surgeon: Fatou Tang MD;  Location: [facility];  Service: [address] [place] [address]  Family History   Problem Relation Age of Onset    Brain cancer Sister     Diabetes Sister    Flor Hernandez Breast cancer Sister     Other Mother         respiratory disorder    Sudden death Father         shot himself    Suicidality Father     No Known Problems Daughter     No Known Problems Maternal Grandmother     No Known Problems Maternal Grandfather     No Known Problems Paternal Grandmother     No Known Problems Paternal Grandfather     No Known Problems Sister     No Known Problems Sister     No Known Problems Sister     No Known Problems Sister     No Known Problems Sister     No Known Problems Sister     No Known Problems Sister     No Known Problems Daughter     No Known Problems Maternal Aunt     No Known Problems Maternal Aunt     No Known Problems Maternal Aunt     No Known Problems Maternal Aunt     No Known Problems Maternal Aunt     No Known Problems Paternal Aunt     No Known Problems Paternal Aunt     No Known Problems Paternal Aunt     No Known Problems Paternal Aunt      I have reviewed and agree with the history as documented  E-Cigarette/Vaping    E-Cigarette Use Never User      E-Cigarette/Vaping Substances    Nicotine No     THC No     CBD No      Social History     Tobacco Use    Smoking status: Former Smoker     Packs/day: 1 00     Years: 40 00     Pack years: 40 00     Quit date:      Years since quittin 7    Smokeless tobacco: Never Used   Vaping Use    Vaping Use: Never used   Substance Use Topics    Alcohol use: Never     Comment: None    Drug use: Not on file     Comment: Denies       Review of Systems   Constitutional: Negative for chills and fever  HENT: Negative for congestion  Eyes: Negative for visual disturbance  Respiratory: Negative for shortness of breath  Cardiovascular: Negative for chest pain  Gastrointestinal: Negative for abdominal pain  Endocrine: Negative for cold intolerance     Genitourinary: Negative for frequency  Musculoskeletal: Positive for arthralgias  Negative for gait problem  Skin: Positive for wound  Negative for rash  Neurological: Negative for dizziness  Psychiatric/Behavioral: Negative for behavioral problems and confusion  Physical Exam  Physical Exam  Vitals and nursing note reviewed  Constitutional:       General: She is not in acute distress  Appearance: She is well-developed  HENT:      Head: Normocephalic and atraumatic  Eyes:      Conjunctiva/sclera: Conjunctivae normal    Cardiovascular:      Rate and Rhythm: Normal rate and regular rhythm  Heart sounds: No murmur heard  Pulmonary:      Effort: Pulmonary effort is normal  No respiratory distress  Breath sounds: Normal breath sounds  Abdominal:      Palpations: Abdomen is soft  Tenderness: There is no abdominal tenderness  Musculoskeletal:      Cervical back: Neck supple  Comments: Patient has appears to be a 2 cm laceration over the elbow no significant effusion she does have full range of motion flexion extension with some pain  Skin:     General: Skin is warm and dry  Neurological:      Mental Status: She is alert           Vital Signs  ED Triage Vitals [09/13/21 1643]   Temperature Pulse Respirations Blood Pressure SpO2   98 3 °F (36 8 °C) 84 18 (!) 120/106 94 %      Temp src Heart Rate Source Patient Position - Orthostatic VS BP Location FiO2 (%)   -- -- -- -- --      Pain Score       --           Vitals:    09/13/21 1643   BP: (!) 120/106   Pulse: 84         Visual Acuity      ED Medications  Medications   tetanus-diphtheria-acellular pertussis (BOOSTRIX) IM injection 0 5 mL (0 5 mL Intramuscular Given 9/13/21 1708)   cephalexin (KEFLEX) capsule 500 mg (500 mg Oral Given 9/13/21 1707)       Diagnostic Studies  Results Reviewed     None                 XR elbow 2 views RIGHT   ED Interpretation by Nina Beverly MD (09/13 1812)   X-ray right elbow demonstrates no acute fracture Procedures  Laceration repair    Date/Time: 9/13/2021 6:13 PM  Performed by: Geovanna Melgar MD  Authorized by: Geovanna Melgar MD   Consent: Verbal consent obtained  Consent given by: patient  Patient understanding: patient states understanding of the procedure being performed  Patient consent: the patient's understanding of the procedure matches consent given  Procedure consent: procedure consent matches procedure scheduled  Relevant documents: relevant documents present and verified  Site marked: the operative site was marked  Radiology Images displayed and confirmed  If images not available, report reviewed: imaging studies available  Patient identity confirmed: verbally with patient  Time out: Immediately prior to procedure a "time out" was called to verify the correct patient, procedure, equipment, support staff and site/side marked as required  Body area: upper extremity  Location details: right elbow  Laceration length: 2 5 cm  Anesthesia: local infiltration    Anesthesia:  Local Anesthetic: lidocaine 1% without epinephrine    Sedation:  Patient sedated: no      Wound Dehiscence:  Superficial Wound Dehiscence: simple closure      Procedure Details:  Preparation: Patient was prepped and draped in the usual sterile fashion  Irrigation solution: saline  Amount of cleaning: standard  Debridement: none  Degree of undermining: none  Skin closure: 4-0 nylon  Number of sutures: 4  Approximation: close  Approximation difficulty: simple  Dressing: antibiotic ointment and 4x4 sterile gauze               ED Course                             SBIRT 22yo+      Most Recent Value   SBIRT (24 yo +)   In order to provide better care to our patients, we are screening all of our patients for alcohol and drug use  Would it be okay to ask you these screening questions?   No Filed at: 09/13/2021 1647                    [facility]  Number of Diagnoses or Management Options  Diagnosis management comments: Patient was monitored emergency department she received Keflex 500 mg p o  Patient received Tdap 0 5 mL IM patient was sutured as per procedure note x-ray demonstrated no acute fracture plan will be to discharge home follow-up for suture removal in 12 days local wound care      Disposition  Final diagnoses:   None     ED Disposition     None      Follow-up Information    None         Patient's Medications   Discharge Prescriptions    No medications on file     No discharge procedures on file      PDMP Review       Value Time User    PDMP Reviewed  Yes 7/19/2021  9:10 AM Daria Cleveland MD          ED Provider  Electronically Signed by           Geovanna Melgar MD  09/13/21 [address] [facility] [address], [place]  09/13/21 3564

## 2021-09-23 ENCOUNTER — TELEPHONE (OUTPATIENT)
Dept: HEMATOLOGY ONCOLOGY | Facility: CLINIC | Age: 66
End: 2021-09-23

## 2021-09-23 NOTE — TELEPHONE ENCOUNTER
The patient was a No Show for her Th, 09/23/2021, Appointment w/ Dr Prabha Castro today at [facility]  I called Jorge Rodriguez (Daughter) and left  advising her about this   I requested she call us back to re-schedule  -Claude Pereyra

## 2021-09-29 ENCOUNTER — OFFICE VISIT (OUTPATIENT)
Dept: FAMILY MEDICINE CLINIC | Facility: CLINIC | Age: 66
End: 2021-09-29
Payer: COMMERCIAL

## 2021-09-29 VITALS
OXYGEN SATURATION: 96 % | RESPIRATION RATE: 18 BRPM | SYSTOLIC BLOOD PRESSURE: 120 MMHG | DIASTOLIC BLOOD PRESSURE: 62 MMHG | TEMPERATURE: 97.9 F | WEIGHT: 182 LBS | HEART RATE: 64 BPM | BODY MASS INDEX: 33.49 KG/M2 | HEIGHT: 62 IN

## 2021-09-29 DIAGNOSIS — M54.2 NECK PAIN ON LEFT SIDE: ICD-10-CM

## 2021-09-29 DIAGNOSIS — R22.1 MASS OF LATERAL NECK: ICD-10-CM

## 2021-09-29 DIAGNOSIS — J45.50 SEVERE PERSISTENT ASTHMA WITHOUT COMPLICATION: ICD-10-CM

## 2021-09-29 DIAGNOSIS — J43.9 MILD EMPHYSEMA (HCC): ICD-10-CM

## 2021-09-29 DIAGNOSIS — J43.2 CENTRILOBULAR EMPHYSEMA (HCC): ICD-10-CM

## 2021-09-29 DIAGNOSIS — S51.011D LACERATION OF RIGHT ELBOW, SUBSEQUENT ENCOUNTER: Primary | ICD-10-CM

## 2021-09-29 DIAGNOSIS — M25.551 ACUTE RIGHT HIP PAIN: ICD-10-CM

## 2021-09-29 PROCEDURE — 99214 OFFICE O/P EST MOD 30 MIN: CPT | Performed by: FAMILY MEDICINE

## 2021-09-29 RX ORDER — BUDESONIDE, GLYCOPYRROLATE, AND FORMOTEROL FUMARATE 160; 9; 4.8 UG/1; UG/1; UG/1
2 AEROSOL, METERED RESPIRATORY (INHALATION) 2 TIMES DAILY
Qty: 10.7 G | Refills: 0 | Status: SHIPPED | OUTPATIENT
Start: 2021-09-29 | End: 2021-10-26 | Stop reason: SDUPTHER

## 2021-09-29 RX ORDER — ASPIRIN 81 MG/1
81 TABLET ORAL DAILY
Qty: 90 TABLET | Refills: 1 | Status: SHIPPED | OUTPATIENT
Start: 2021-09-29 | End: 2022-01-19 | Stop reason: SDUPTHER

## 2021-09-29 RX ORDER — TIZANIDINE 2 MG/1
2 TABLET ORAL EVERY 8 HOURS PRN
Qty: 20 TABLET | Refills: 0 | Status: SHIPPED | OUTPATIENT
Start: 2021-09-29 | End: 2021-10-26

## 2021-09-29 RX ORDER — TIOTROPIUM BROMIDE AND OLODATEROL 3.124; 2.736 UG/1; UG/1
2 SPRAY, METERED RESPIRATORY (INHALATION) DAILY
Qty: 4 G | Refills: 2 | Status: SHIPPED | OUTPATIENT
Start: 2021-09-29 | End: 2021-09-29

## 2021-09-29 NOTE — PROGRESS NOTES
Subjective:      Patient ID: Dillan Carter is a 77 y o  female  With recent fall where she missed the railing , hit her righthip which still is painful, required 4 sutures over right posterior elbow  There is a new neck swelling that has developed since her fall  COPD- she does not like breztri, states it does not work, she would like her stiolto refilled   Maryam Sheets preferred  by patient    Suture / Staple Removal  The sutures were placed 11 to 14 days ago  She tried regular soap and water washings since the wound repair  The treatment provided significant relief  There is no redness present  There is no swelling present  There is no pain present  She has no difficulty moving the affected extremity or digit         Past Medical History:   Diagnosis Date    Acute kidney failure (HCC)     Allergies     Anemia     Cancer (HCC)     Chronic kidney disease (CKD), stage III (moderate) (HCC)     COPD [facility] disease) ([facility] )     COVID-19     Covid + 0-3-30-cough at that time now fully resolved    Disease of thyroid gland     Essential hypertension     GERD (gastroesophageal reflux disease)     Glaucoma     High blood pressure     HTN (hypertension) 2/16/2021    Hyperlipidemia     Hypothyroidism     Throat cancer ([place] [address] ) 2014    chemo and rad therapy 2014       Family History   Problem Relation Age of Onset    Brain cancer Sister     Diabetes Sister     Breast cancer Sister     Other Mother         respiratory disorder    Sudden death Father         shot himself    Suicidality Father     No Known Problems Daughter     No Known Problems Maternal Grandmother     No Known Problems Maternal Grandfather     No Known Problems Paternal Grandmother     No Known Problems Paternal Grandfather     No Known Problems Sister     No Known Problems Sister     No Known Problems Sister     No Known Problems Sister     No Known Problems Sister     No Known Problems Sister     No Known Problems Sister     No Known Problems Daughter     No Known Problems Maternal Aunt     No Known Problems Maternal Aunt     No Known Problems Maternal Aunt     No Known Problems Maternal Aunt     No Known Problems Maternal Aunt     No Known Problems Paternal Aunt     No Known Problems Paternal Aunt     No Known Problems Paternal Aunt     No Known Problems Paternal Aunt        Past Surgical History:   Procedure Laterality Date    COLONOSCOPY  2016    ESOPHAGOGASTRODUODENOSCOPY  2016    LARYNGOSCOPY      w/ Bx   AL INCISE FINGER TENDON SHEATH Right 2/16/2021    Procedure: THUMB TRIGGER FINGER RELEASE;  Surgeon: Kingston Brizuela MD;  [facility];  Service: Orthopedics    TUBAL LIGATION          reports that she quit smoking about 7 years ago  She has a 40 00 pack-year smoking history  She has never used smokeless tobacco  She reports that she does not drink alcohol        Current Outpatient Medications:     albuterol (PROVENTIL HFA,VENTOLIN HFA) 90 mcg/act inhaler, Inhale 2 puffs every 4 (four) hours as needed for wheezing, Disp: 36 g, Rfl: 5    aspirin (ECOTRIN LOW STRENGTH) 81 mg EC tablet, Take 1 tablet (81 mg total) by mouth daily, Disp: 90 tablet, Rfl: 1    brimonidine tartrate 0 2 % ophthalmic solution, Apply 1 drop to eye Three times a day, Disp: , Rfl:     Budeson-Glycopyrrol-Formoterol (Breztri Aerosphere) 160-9-4 8 MCG/ACT AERO, Inhale 2 puffs 2 (two) times a day Rinse mouth after use , Disp: 10 7 g, Rfl: 0    Cholecalciferol (Vitamin D3) 10 MCG (400 UNIT) CAPS, Take 1 capsule by mouth daily, Disp: , Rfl:     cyanocobalamin (VITAMIN B-12) 100 mcg tablet, Take 100 mcg by mouth daily, Disp: , Rfl:     ipratropium (ATROVENT) 0 06 % nasal spray, 2 SPRAYS INTO EACH NOSTRIL 4 (FOUR) TIMES A DAY, Disp: 15 mL, Rfl: 1    ipratropium-albuterol (DUO-NEB) 0 5-2 5 mg/3 mL nebulizer solution, USE 1 VIAL VIA NEBULIZER EVERY 6 HOURS AS NEEDED FOR WHEEZING/SHORTNESS OF BREATH, Disp: 540 mL, Rfl: 1    levothyroxine 100 mcg tablet, Take 1 tablet (100 mcg total) by mouth daily, Disp: 90 tablet, Rfl: 1    pantoprazole (PROTONIX) 40 mg tablet, Take 1 tablet (40 mg total) by mouth daily before breakfast, Disp: 30 tablet, Rfl: 5    PARoxetine (PAXIL) 10 mg tablet, Take 1 tablet (10 mg total) by mouth every morning, Disp: 90 tablet, Rfl: 1    Pyridoxine HCl (VITAMIN B-6 PO), Take 1 tablet by mouth daily, Disp: , Rfl:     timolol (TIMOPTIC) 0 5 % ophthalmic solution, INSTILL 1 DROP INTO EACH EYE TWICE DAILY, Disp: , Rfl:     montelukast (SINGULAIR) 10 mg tablet, Take 1 tablet (10 mg total) by mouth daily at bedtime (Patient not taking: Reported on 8/12/2021), Disp: 90 tablet, Rfl: 1    tiZANidine (ZANAFLEX) 2 mg tablet, Take 1 tablet (2 mg total) by mouth every 8 (eight) hours as needed for muscle spasms (and hip pain), Disp: 20 tablet, Rfl: 0    The following portions of the patient's history were reviewed and updated as appropriate: allergies, current medications, past family history, past medical history, past social history, past surgical history and problem list     Review of Systems   Constitutional: Negative for fatigue and fever  HENT: Negative for congestion, facial swelling, mouth sores, rhinorrhea, sore throat and trouble swallowing  Eyes: Negative for pain and redness  Respiratory: Negative for cough, shortness of breath and wheezing  Cardiovascular: Negative for chest pain, palpitations and leg swelling  Gastrointestinal: Negative for abdominal pain, blood in stool, constipation, diarrhea and nausea  Genitourinary: Negative for dysuria, hematuria and urgency  Musculoskeletal: Positive for arthralgias  Negative for back pain and myalgias  Skin: Positive for wound  Negative for rash  Neurological: Negative for seizures, syncope and headaches  Hematological: Negative for adenopathy     Psychiatric/Behavioral: Negative for agitation and behavioral problems  Objective:    /62 (BP Location: Right arm, Patient Position: Sitting, Cuff Size: Adult)   Pulse 64   Temp 97 9 °F (36 6 °C) (Temporal)   Resp 18   Ht 5' 2" (1 575 m)   Wt 82 6 kg (182 lb)   SpO2 96%   BMI 33 29 kg/m²      Physical Exam  Vitals and nursing note reviewed  Constitutional:       Appearance: Normal appearance  She is well-developed  She is not ill-appearing  HENT:      Head: Normocephalic and atraumatic  Right Ear: External ear normal       Left Ear: External ear normal       Nose: Nose normal       Mouth/Throat:      Mouth: Mucous membranes are moist       Pharynx: No oropharyngeal exudate or posterior oropharyngeal erythema  Eyes:      General: No scleral icterus  Right eye: No discharge  Left eye: No discharge  Conjunctiva/sclera: Conjunctivae normal    Neck:     Cardiovascular:      Rate and Rhythm: Normal rate  Heart sounds: No murmur heard  No gallop  Pulmonary:      Effort: Pulmonary effort is normal  No respiratory distress  Breath sounds: Normal breath sounds  No stridor  No wheezing, rhonchi or rales  Abdominal:      Palpations: Abdomen is soft  Tenderness: There is no abdominal tenderness  Musculoskeletal:         General: No tenderness or deformity  Right elbow: Laceration present  Skin:     Findings: No erythema or rash  Neurological:      Mental Status: She is alert  Mental status is at baseline     Psychiatric:         Behavior: Behavior normal          Judgment: Judgment normal            Recent Results (from the past 2520 hour(s))   CBC and differential    Collection Time: 07/01/21  9:21 PM   Result Value Ref Range    WBC 5 92 4 31 - 10 16 Thousand/uL    RBC 4 26 3 81 - 5 12 Million/uL    Hemoglobin 11 3 (L) 11 5 - 15 4 g/dL    Hematocrit 36 6 34 8 - 46 1 %    MCV 86 82 - 98 fL    MCH 26 5 (L) 26 8 - 34 3 pg    MCHC 30 9 (L) 31 4 - 37 4 g/dL    RDW 13 5 11 6 - 15 1 %    MPV 10 5 8 9 - 12 7 fL    Platelets 829 856 - 751 Thousands/uL    nRBC 0 /100 WBCs    Neutrophils Relative 81 (H) 43 - 75 %    Immat GRANS % 0 0 - 2 %    Lymphocytes Relative 8 (L) 14 - 44 %    Monocytes Relative 7 4 - 12 %    Eosinophils Relative 4 0 - 6 %    Basophils Relative 0 0 - 1 %    Neutrophils Absolute 4 76 1 85 - 7 62 Thousands/µL    Immature Grans Absolute 0 02 0 00 - 0 20 Thousand/uL    Lymphocytes Absolute 0 47 (L) 0 60 - 4 47 Thousands/µL    Monocytes Absolute 0 40 0 17 - 1 22 Thousand/µL    Eosinophils Absolute 0 25 0 00 - 0 61 Thousand/µL    Basophils Absolute 0 02 0 00 - 0 10 Thousands/µL   Troponin I    Collection Time: 07/01/21  9:21 PM   Result Value Ref Range    Troponin I <0 02 <=0 04 ng/mL   NT-BNP PRO    Collection Time: 07/01/21  9:21 PM   Result Value Ref Range    NT-proBNP 603 (H) <125 pg/mL   Comprehensive metabolic panel    Collection Time: 07/01/21  9:21 PM   Result Value Ref Range    Sodium 143 136 - 145 mmol/L    Potassium 4 6 3 5 - 5 3 mmol/L    Chloride 104 100 - 108 mmol/L    CO2 32 21 - 32 mmol/L    ANION GAP 7 4 - 13 mmol/L    BUN 23 5 - 25 mg/dL    Creatinine 1 27 0 60 - 1 30 mg/dL    Glucose 170 (H) 65 - 140 mg/dL    Calcium 9 2 8 3 - 10 1 mg/dL    AST 21 5 - 45 U/L    ALT 20 12 - 78 U/L    Alkaline Phosphatase 138 (H) 46 - 116 U/L    Total Protein 7 4 6 4 - 8 2 g/dL    Albumin 3 5 3 5 - 5 0 g/dL    Total Bilirubin 0 25 0 20 - 1 00 mg/dL    eGFR 44 ml/min/1 73sq m   UA (URINE) with reflex to Scope    Collection Time: 07/01/21  9:26 PM   Result Value Ref Range    Color, UA Light Yellow     Clarity, UA Clear     Specific [place], UA 1 015 1 003 - 1 030    pH, UA 6 0 4 5, 5 0, 5 5, 6 0, 6 5, 7 0, 7 5, 8 0    Leukocytes, UA Negative Negative    Nitrite, UA Negative Negative    Protein, UA Negative Negative mg/dl    Glucose, UA Negative Negative mg/dl    Ketones, UA Negative Negative mg/dl    Urobilinogen, UA 0 2 0 2, 1 0 E U /dl E U /dl    Bilirubin, UA Negative Negative    Blood, UA Negative Negative   ECG 12 lead    Collection Time: 07/01/21 10:30 PM   Result Value Ref Range    Ventricular Rate 59 BPM    Atrial Rate 59 BPM    SD Interval 192 ms    QRSD Interval 84 ms    QT Interval 414 ms    QTC Interval 409 ms    P [place] 97 degrees    QRS Axis 60 degrees    T Wave Axis 65 degrees   D-dimer, quantitative    Collection Time: 07/02/21  1:06 AM   Result Value Ref Range    D-Dimer, Quant 0 59 (H) <0 50 ug/ml FEU   Troponin I    Collection Time: 07/02/21  1:06 AM   Result Value Ref Range    Troponin I <0 02 <=0 04 ng/mL   ECG 12 lead    Collection Time: 07/02/21  1:08 AM   Result Value Ref Range    Ventricular Rate 63 BPM    Atrial Rate 63 BPM    SD Interval 154 ms    QRSD Interval 82 ms    QT Interval 402 ms    QTC Interval 411 ms    P Axis 83 degrees    QRS Axis 59 degrees    T Wave Axis 55 degrees   Troponin I    Collection Time: 07/02/21  4:08 AM   Result Value Ref Range    Troponin I <0 02 <=0 04 ng/mL   Basic metabolic panel    Collection Time: 07/02/21  5:21 AM   Result Value Ref Range    Sodium 142 136 - 145 mmol/L    Potassium 4 4 3 5 - 5 3 mmol/L    Chloride 105 100 - 108 mmol/L    CO2 29 21 - 32 mmol/L    ANION GAP 8 4 - 13 mmol/L    BUN 21 5 - 25 mg/dL    Creatinine 1 14 0 60 - 1 30 mg/dL    Glucose 81 65 - 140 mg/dL    Glucose, Fasting 81 65 - 99 mg/dL    Calcium 9 2 8 3 - 10 1 mg/dL    eGFR 50 ml/min/1 73sq m   CBC (With Platelets)    Collection Time: 07/02/21  5:21 AM   Result Value Ref Range    WBC 5 01 4 31 - 10 16 Thousand/uL    RBC 4 17 3 81 - 5 12 Million/uL    Hemoglobin 10 8 (L) 11 5 - 15 4 g/dL    Hematocrit 36 2 34 8 - 46 1 %    MCV 87 82 - 98 fL    MCH 25 9 (L) 26 8 - 34 3 pg    MCHC 29 8 (L) 31 4 - 37 4 g/dL    RDW 13 5 11 6 - 15 1 %    Platelets 333 979 - 019 Thousands/uL    MPV 10 3 8 9 - 12 7 fL   Home O2 Setup    Collection Time: 07/03/21  2:26 PM   Result Value Ref Range    Supplier Name [facility]/[address]     Supplier Phone Number [phone]     Order Status Delivery Successful     Delivery Note      Delivery Request Date 07/03/2021     Date Delivered  07/06/2021     Item Description       Home Oxygen Concentrator with Portability, Adult, Standard Liter Flow    Item Description Portable Gaseous Oxygen System     Item Description Portable O2 Contents, Gas     Item Description No Conserving Device     Item Description O2 Humidifier Bottle, Standard Liter Flow    Fingerstick Glucose (POCT)    Collection Time: 08/10/21  9:58 AM   Result Value Ref Range    POC Glucose 92 65 - 140 mg/dl       Laboratory Results: I have personally reviewed the pertinent laboratory results/reports     Radiology/Other Diagnostic Testing Results: I have personally reviewed pertinent reports  XR elbow 2 views RIGHT    Result Date: 9/14/2021  RIGHT ELBOW INDICATION:   right elbow injury  COMPARISON:  None VIEWS:  XR ELBOW 2 VW RIGHT FINDINGS: There is no acute fracture or dislocation  There is no joint effusion  No significant degenerative changes  No lytic or blastic osseous lesion  Soft tissues are unremarkable  No acute osseous abnormality  Workstation performed: [id number]        Assessment/Plan:         Diagnoses and all orders for this visit:    Laceration of right elbow, subsequent encounter    Mild emphysema ([address] )  -     Discontinue: Stiolto Respimat 2 5-2 5 MCG/ACT inhaler; Inhale 2 puffs daily    Severe persistent asthma without complication  -     Discontinue: Stiolto Respimat 2 5-2 5 MCG/ACT inhaler; Inhale 2 puffs daily    Centrilobular emphysema (HCC)  -     Budeson-Glycopyrrol-Formoterol (Breztri Aerosphere) 160-9-4 8 MCG/ACT AERO; Inhale 2 puffs 2 (two) times a day Rinse mouth after use  Acute right hip pain  -     aspirin (ECOTRIN LOW STRENGTH) 81 mg EC tablet; Take 1 tablet (81 mg total) by mouth daily    Neck pain on left side  -     tiZANidine (ZANAFLEX) 2 mg tablet;  Take 1 tablet (2 mg total) by mouth every 8 (eight) hours as needed for muscle spasms (and hip pain)    Mass of lateral neck  -     US head neck soft tissue; Future      4 sutures removed, wound is intact, no complications  Patient prefers stiolto over breztra(got sample from pulm), advised to use breztri- apparently she was using the empty inhaler- educated- to check the numbers to make sure medication is dispensed  Stop stiolto and she is not on anoro  Tizanidine prn for right hip pain  There is a new neck swelling that has developed since her fall-repeat US neck    Read package inserts for all medications before starting a new medications, call me if you have any questions  Patient was given opportunity to ask questions and all questions were answered  Portions of the record may have been created with voice recognition software  Occasional wrong word or "sound a like" substitutions may have occurred due to the inherent limitations of voice recognition software  Read the chart carefully and recognize, using context, where substitutions have occurred

## 2021-10-03 DIAGNOSIS — K21.9 GASTROESOPHAGEAL REFLUX DISEASE WITHOUT ESOPHAGITIS: ICD-10-CM

## 2021-10-04 ENCOUNTER — HOSPITAL ENCOUNTER (OUTPATIENT)
Dept: ULTRASOUND IMAGING | Facility: HOSPITAL | Age: 66
Discharge: HOME | End: 2021-10-04
Attending: FAMILY MEDICINE
Payer: COMMERCIAL

## 2021-10-04 DIAGNOSIS — R22.1 MASS OF LATERAL NECK: ICD-10-CM

## 2021-10-04 PROCEDURE — 76536 US EXAM OF HEAD AND NECK: CPT

## 2021-10-04 RX ORDER — PANTOPRAZOLE SODIUM 40 MG/1
TABLET, DELAYED RELEASE ORAL
Qty: 90 TABLET | Refills: 1 | Status: SHIPPED | OUTPATIENT
Start: 2021-10-04 | End: 2021-10-26 | Stop reason: SDUPTHER

## 2021-10-12 DIAGNOSIS — J44.0 CHRONIC OBSTRUCTIVE PULMONARY DISEASE WITH ACUTE LOWER RESPIRATORY INFECTION (HCC): ICD-10-CM

## 2021-10-12 DIAGNOSIS — J45.50 SEVERE PERSISTENT ASTHMA WITHOUT COMPLICATION: ICD-10-CM

## 2021-10-12 RX ORDER — IPRATROPIUM BROMIDE AND ALBUTEROL SULFATE 2.5; .5 MG/3ML; MG/3ML
SOLUTION RESPIRATORY (INHALATION)
Qty: 540 ML | Refills: 1 | Status: SHIPPED | OUTPATIENT
Start: 2021-10-12 | End: 2022-04-21 | Stop reason: SDUPTHER

## 2021-10-26 ENCOUNTER — TELEMEDICINE (OUTPATIENT)
Dept: FAMILY MEDICINE CLINIC | Facility: CLINIC | Age: 66
End: 2021-10-26
Payer: COMMERCIAL

## 2021-10-26 DIAGNOSIS — J43.2 CENTRILOBULAR EMPHYSEMA (HCC): ICD-10-CM

## 2021-10-26 DIAGNOSIS — K21.9 GASTROESOPHAGEAL REFLUX DISEASE WITHOUT ESOPHAGITIS: ICD-10-CM

## 2021-10-26 DIAGNOSIS — J20.9 ACUTE BRONCHITIS, UNSPECIFIED ORGANISM: Primary | ICD-10-CM

## 2021-10-26 PROCEDURE — 99214 OFFICE O/P EST MOD 30 MIN: CPT | Performed by: FAMILY MEDICINE

## 2021-10-26 RX ORDER — PREDNISONE 20 MG/1
20 TABLET ORAL 2 TIMES DAILY WITH MEALS
Qty: 10 TABLET | Refills: 0 | Status: SHIPPED | OUTPATIENT
Start: 2021-10-26 | End: 2021-10-31

## 2021-10-26 RX ORDER — PANTOPRAZOLE SODIUM 40 MG/1
40 TABLET, DELAYED RELEASE ORAL DAILY
Qty: 90 TABLET | Refills: 1 | Status: SHIPPED | OUTPATIENT
Start: 2021-10-26 | End: 2022-01-19 | Stop reason: SDUPTHER

## 2021-10-26 RX ORDER — AZITHROMYCIN 250 MG/1
TABLET, FILM COATED ORAL
Qty: 6 TABLET | Refills: 0 | Status: SHIPPED | OUTPATIENT
Start: 2021-10-26 | End: 2021-10-31

## 2021-10-26 RX ORDER — BUDESONIDE, GLYCOPYRROLATE, AND FORMOTEROL FUMARATE 160; 9; 4.8 UG/1; UG/1; UG/1
2 AEROSOL, METERED RESPIRATORY (INHALATION) 2 TIMES DAILY
Qty: 10.7 G | Refills: 3 | Status: SHIPPED | OUTPATIENT
Start: 2021-10-26 | End: 2022-01-19 | Stop reason: SDUPTHER

## 2021-10-26 RX ORDER — PROMETHAZINE HYDROCHLORIDE AND CODEINE PHOSPHATE 6.25; 1 MG/5ML; MG/5ML
5 SYRUP ORAL EVERY 6 HOURS PRN
Qty: 180 ML | Refills: 0 | Status: SHIPPED | OUTPATIENT
Start: 2021-10-26 | End: 2022-01-04 | Stop reason: SDUPTHER

## 2021-11-10 ENCOUNTER — CLINICAL SUPPORT (OUTPATIENT)
Dept: FAMILY MEDICINE CLINIC | Facility: CLINIC | Age: 66
End: 2021-11-10
Payer: COMMERCIAL

## 2021-11-10 DIAGNOSIS — Z23 ENCOUNTER FOR IMMUNIZATION: Primary | ICD-10-CM

## 2021-11-10 PROCEDURE — G0008 ADMIN INFLUENZA VIRUS VAC: HCPCS

## 2021-11-10 PROCEDURE — 90662 IIV NO PRSV INCREASED AG IM: CPT

## 2021-11-21 DIAGNOSIS — E03.8 OTHER SPECIFIED HYPOTHYROIDISM: ICD-10-CM

## 2021-11-23 RX ORDER — LEVOTHYROXINE SODIUM 0.1 MG/1
TABLET ORAL
Qty: 90 TABLET | Refills: 1 | Status: SHIPPED | OUTPATIENT
Start: 2021-11-23 | End: 2022-01-19 | Stop reason: SDUPTHER

## 2021-12-04 ENCOUNTER — IMMUNIZATIONS (OUTPATIENT)
Dept: FAMILY MEDICINE CLINIC | Facility: HOSPITAL | Age: 66
End: 2021-12-04

## 2021-12-04 DIAGNOSIS — Z23 ENCOUNTER FOR IMMUNIZATION: Primary | ICD-10-CM

## 2021-12-04 PROCEDURE — 91300 COVID-19 PFIZER VACC 0.3 ML: CPT

## 2021-12-04 PROCEDURE — 0001A COVID-19 PFIZER VACC 0.3 ML: CPT

## 2022-01-03 DIAGNOSIS — Z20.822 EXPOSURE TO COVID-19 VIRUS: ICD-10-CM

## 2022-01-03 DIAGNOSIS — B34.9 VIRAL INFECTION, UNSPECIFIED: ICD-10-CM

## 2022-01-03 PROCEDURE — U0003 INFECTIOUS AGENT DETECTION BY NUCLEIC ACID (DNA OR RNA); SEVERE ACUTE RESPIRATORY SYNDROME CORONAVIRUS 2 (SARS-COV-2) (CORONAVIRUS DISEASE [COVID-19]), AMPLIFIED PROBE TECHNIQUE, MAKING USE OF HIGH THROUGHPUT TECHNOLOGIES AS DESCRIBED BY CMS-2020-01-R: HCPCS | Performed by: FAMILY MEDICINE

## 2022-01-03 PROCEDURE — U0005 INFEC AGEN DETEC AMPLI PROBE: HCPCS | Performed by: FAMILY MEDICINE

## 2022-01-04 ENCOUNTER — TELEPHONE (OUTPATIENT)
Dept: FAMILY MEDICINE CLINIC | Facility: CLINIC | Age: 67
End: 2022-01-04

## 2022-01-04 ENCOUNTER — TELEMEDICINE (OUTPATIENT)
Dept: FAMILY MEDICINE CLINIC | Facility: CLINIC | Age: 67
End: 2022-01-04
Payer: COMMERCIAL

## 2022-01-04 DIAGNOSIS — J47.1 BRONCHIECTASIS WITH ACUTE EXACERBATION (HCC): Primary | ICD-10-CM

## 2022-01-04 DIAGNOSIS — J20.9 ACUTE BRONCHITIS, UNSPECIFIED ORGANISM: ICD-10-CM

## 2022-01-04 PROCEDURE — 99213 OFFICE O/P EST LOW 20 MIN: CPT | Performed by: FAMILY MEDICINE

## 2022-01-04 RX ORDER — PREDNISONE 20 MG/1
20 TABLET ORAL 2 TIMES DAILY WITH MEALS
Qty: 10 TABLET | Refills: 0 | Status: SHIPPED | OUTPATIENT
Start: 2022-01-04 | End: 2022-01-09

## 2022-01-04 RX ORDER — PROMETHAZINE HYDROCHLORIDE AND CODEINE PHOSPHATE 6.25; 1 MG/5ML; MG/5ML
5 SYRUP ORAL EVERY 6 HOURS PRN
Qty: 118 ML | Refills: 0 | Status: SHIPPED | OUTPATIENT
Start: 2022-01-04 | End: 2022-01-19

## 2022-01-04 RX ORDER — BRIMONIDINE TARTRATE 2 MG/ML
SOLUTION/ DROPS OPHTHALMIC
COMMUNITY
Start: 2021-12-23

## 2022-01-04 NOTE — PROGRESS NOTES
Virtual Regular Visit    Verification of patient location:    Patient is located in the following state in which I hold an active license [place]      Assessment/Plan:    Problem List Items Addressed This Visit        Respiratory    Bronchiectasis with acute exacerbation ([address] ) - Primary    Relevant Medications    predniSONE 20 mg tablet    promethazine-codeine (PHENERGAN WITH CODEINE) 6 25-10 mg/5 mL syrup      Other Visit Diagnoses     Acute bronchitis, unspecified organism        Relevant Medications    promethazine-codeine (PHENERGAN WITH CODEINE) 6 25-10 mg/5 mL syrup      COVID-19 test pending  Promethazine-codeine prn for cough  Prednisone BID for 5 days , given her history of bronchiectasis and Asthma/COPD  Ibuprofen prn for pain/fever  F/U in 2 weeks for MAWV    BMI Counseling: There is no height or weight on file to calculate BMI  The BMI is above normal  Nutrition recommendations include decreasing portion sizes, encouraging healthy choices of fruits and vegetables, decreasing fast food intake, consuming healthier snacks, limiting drinks that contain sugar, moderation in carbohydrate intake and reducing intake of cholesterol  Exercise recommendations include moderate physical activity 150 minutes/week  No pharmacotherapy was ordered  Rationale for BMI follow-up plan is due to patient being overweight or obese  Reason for visit is   Chief Complaint   Patient presents with    Virtual Brief Visit     being tested for COVID    Cough     dry    Nasal Congestion    Virtual Regular Visit        Encounter provider Katie Daily MD    Provider located at [address] [place] [id number] [phone]      Recent Visits  No visits were found meeting these conditions    Showing recent visits within past 7 days and meeting all other requirements  Today's Visits  Date Type Provider Dept   01/04/22 Telemedicine MD [facility]   Showing today's visits and meeting all other requirements  Future Appointments  No visits were found meeting these conditions  Showing future appointments within next 150 days and meeting all other requirements       The patient was identified by name and date of birth  Forrest Taylor was informed that this is a telemedicine visit and that the visit is being conducted through Telephone after multiple failed attempt to connect via video  My office door was closed  No one else was in the room  She acknowledged consent and understanding of privacy and security of the video platform  The patient has agreed to participate and understands they can discontinue the visit at any time  It was my intent to perform this visit via video technology but the patient was not able to do a video connection so the visit was completed via audio telephone only  -[id number] service was used for the entire duration of the vision  Patient is aware this is a billable service  Subjective  Forrest Taylor is a 77 y o  female       COUGH and congestion for 2-3 days, cough is worse, has phlegm, COVID-19 test is pending       Past Medical History:   Diagnosis Date    Acute kidney failure (HCC)     Allergies     Anemia     [facility]     Chronic kidney disease (CKD), stage III (moderate) (HCC)     [facility]     COVID-19     Covid + 9-4-79-cough at that time now fully resolved    Disease of thyroid gland     Essential hypertension     GERD (gastroesophageal reflux disease)     Glaucoma     High blood pressure     HTN (hypertension) 2/16/2021    Hyperlipidemia     Hypothyroidism     Throat cancer ([facility] ) 2014    chemo and rad therapy 2014       Past Surgical History:   Procedure Laterality Date    COLONOSCOPY  2016    ESOPHAGOGASTRODUODENOSCOPY  2016    LARYNGOSCOPY      w/ Bx       [place] INCISE FINGER TENDON SHEATH Right 2/16/2021    Procedure: THUMB TRIGGER FINGER RELEASE;  Surgeon: Ioana Dent MD;  [facility];  Service: Orthopedics    TUBAL LIGATION         Current Outpatient Medications   Medication Sig Dispense Refill    albuterol (PROVENTIL HFA,VENTOLIN HFA) 90 mcg/act inhaler Inhale 2 puffs every 4 (four) hours as needed for wheezing 36 g 5    aspirin (ECOTRIN LOW STRENGTH) 81 mg EC tablet Take 1 tablet (81 mg total) by mouth daily 90 tablet 1    brimonidine tartrate 0 2 % ophthalmic solution       Budeson-Glycopyrrol-Formoterol (Breztri Aerosphere) 160-9-4 8 MCG/ACT AERO Inhale 2 puffs 2 (two) times a day Rinse mouth after use  10 7 g 3    ipratropium (ATROVENT) 0 06 % nasal spray USE 2 SPRAYS IN EACH NOSTRIL 4 TIMES DAILY 15 mL 3    ipratropium-albuterol (DUO-NEB) 0 5-2 5 mg/3 mL nebulizer solution USE 1 VIAL VIA NEBULIZER EVERY 6 HOURS AS NEEDED FOR WHEEZING/SHORTNESS OF BREATH 540 mL 1    levothyroxine 100 mcg tablet TOME MERCEDES TABLETA TODOS LOS CUMMINGS 90 tablet 1    montelukast (SINGULAIR) 10 mg tablet Take 1 tablet (10 mg total) by mouth daily at bedtime 90 tablet 1    pantoprazole (PROTONIX) 40 mg tablet Take 1 tablet (40 mg total) by mouth daily 90 tablet 1    predniSONE 20 mg tablet Take 1 tablet (20 mg total) by mouth 2 (two) times a day with meals for 5 days 10 tablet 0    promethazine-codeine (PHENERGAN WITH CODEINE) 6 25-10 mg/5 mL syrup Take 5 mL by mouth every 6 (six) hours as needed for cough 118 mL 0     No current facility-administered medications for this visit  Allergies   Allergen Reactions    Amifostine Rash    Pollen Extract Allergic Rhinitis       Review of Systems   Constitutional: Negative for fatigue and fever  HENT: Positive for congestion  Negative for facial swelling, mouth sores, rhinorrhea, sore throat and trouble swallowing  Eyes: Negative for pain and redness  Respiratory: Positive for cough  Negative for shortness of breath and wheezing      Cardiovascular: Negative for chest pain, palpitations and leg swelling  Gastrointestinal: Negative for abdominal pain, blood in stool, constipation, diarrhea and nausea  Genitourinary: Negative for dysuria, hematuria and urgency  Musculoskeletal: Negative for arthralgias, back pain and myalgias  Skin: Negative for rash and wound  Neurological: Positive for headaches  Negative for seizures and syncope  Hematological: Negative for adenopathy  Psychiatric/Behavioral: Negative for agitation and behavioral problems  VIRTUAL VISIT DISCLAIMER      Celina Barker verbally agrees to participate in [employer]  Pt is aware that [employer] could be limited without vital signs or the ability to perform a full hands-on physical exam  Noreen Corea understands she or the provider may request at any time to terminate the video visit and request the patient to seek care or treatment in person

## 2022-01-04 NOTE — TELEPHONE ENCOUNTER
Spoke with the pharmacy cough medicine is not covered  They ran it through on a coupon card and will be $18 and no cough medicine's are covered  Left message for patient's daughter letting her know this

## 2022-01-04 NOTE — TELEPHONE ENCOUNTER
Dtg said mom was seen in V V  earlier today & got a call from pharmacy that one of the meds ordered was not going to be covered? ? Asking to speak with Marvin Baker

## 2022-01-19 ENCOUNTER — OFFICE VISIT (OUTPATIENT)
Dept: FAMILY MEDICINE CLINIC | Facility: CLINIC | Age: 67
End: 2022-01-19
Payer: COMMERCIAL

## 2022-01-19 VITALS
DIASTOLIC BLOOD PRESSURE: 66 MMHG | WEIGHT: 177 LBS | HEIGHT: 62 IN | RESPIRATION RATE: 20 BRPM | OXYGEN SATURATION: 91 % | TEMPERATURE: 97.2 F | SYSTOLIC BLOOD PRESSURE: 140 MMHG | BODY MASS INDEX: 32.57 KG/M2 | HEART RATE: 86 BPM

## 2022-01-19 DIAGNOSIS — F41.1 GENERALIZED ANXIETY DISORDER: ICD-10-CM

## 2022-01-19 DIAGNOSIS — J44.1 COPD EXACERBATION (HCC): Primary | ICD-10-CM

## 2022-01-19 DIAGNOSIS — J45.50 SEVERE PERSISTENT ASTHMA WITHOUT COMPLICATION: ICD-10-CM

## 2022-01-19 DIAGNOSIS — Z12.31 ENCOUNTER FOR SCREENING MAMMOGRAM FOR BREAST CANCER: ICD-10-CM

## 2022-01-19 DIAGNOSIS — J20.9 ACUTE BRONCHITIS, UNSPECIFIED ORGANISM: ICD-10-CM

## 2022-01-19 DIAGNOSIS — N18.31 STAGE 3A CHRONIC KIDNEY DISEASE (HCC): ICD-10-CM

## 2022-01-19 DIAGNOSIS — J43.9 MILD EMPHYSEMA (HCC): ICD-10-CM

## 2022-01-19 DIAGNOSIS — K21.9 GASTROESOPHAGEAL REFLUX DISEASE WITHOUT ESOPHAGITIS: ICD-10-CM

## 2022-01-19 DIAGNOSIS — M25.551 ACUTE RIGHT HIP PAIN: ICD-10-CM

## 2022-01-19 DIAGNOSIS — Z00.01 ENCOUNTER FOR MEDICARE ANNUAL EXAMINATION WITH ABNORMAL FINDINGS: ICD-10-CM

## 2022-01-19 DIAGNOSIS — E03.9 ACQUIRED HYPOTHYROIDISM: ICD-10-CM

## 2022-01-19 DIAGNOSIS — E03.8 OTHER SPECIFIED HYPOTHYROIDISM: ICD-10-CM

## 2022-01-19 DIAGNOSIS — F32.0 CURRENT MILD EPISODE OF MAJOR DEPRESSIVE DISORDER WITHOUT PRIOR EPISODE (HCC): ICD-10-CM

## 2022-01-19 DIAGNOSIS — R91.1 LUNG NODULE: ICD-10-CM

## 2022-01-19 DIAGNOSIS — C32.9 SQUAMOUS CELL CARCINOMA OF LARYNX (HCC): ICD-10-CM

## 2022-01-19 PROCEDURE — 99214 OFFICE O/P EST MOD 30 MIN: CPT | Performed by: FAMILY MEDICINE

## 2022-01-19 PROCEDURE — 99497 ADVNCD CARE PLAN 30 MIN: CPT | Performed by: FAMILY MEDICINE

## 2022-01-19 PROCEDURE — G0439 PPPS, SUBSEQ VISIT: HCPCS | Performed by: FAMILY MEDICINE

## 2022-01-19 RX ORDER — MONTELUKAST SODIUM 10 MG/1
10 TABLET ORAL
Qty: 90 TABLET | Refills: 1 | Status: SHIPPED | OUTPATIENT
Start: 2022-01-19 | End: 2022-06-02

## 2022-01-19 RX ORDER — ESCITALOPRAM OXALATE 10 MG/1
10 TABLET ORAL DAILY
Qty: 90 TABLET | Refills: 0 | Status: SHIPPED | OUTPATIENT
Start: 2022-01-19 | End: 2022-05-23

## 2022-01-19 RX ORDER — LEVOTHYROXINE SODIUM 0.1 MG/1
100 TABLET ORAL DAILY
Qty: 90 TABLET | Refills: 1 | Status: SHIPPED | OUTPATIENT
Start: 2022-01-19

## 2022-01-19 RX ORDER — AZITHROMYCIN 250 MG/1
TABLET, FILM COATED ORAL
Qty: 6 TABLET | Refills: 0 | Status: SHIPPED | OUTPATIENT
Start: 2022-01-19 | End: 2022-01-24

## 2022-01-19 RX ORDER — BUDESONIDE, GLYCOPYRROLATE, AND FORMOTEROL FUMARATE 160; 9; 4.8 UG/1; UG/1; UG/1
2 AEROSOL, METERED RESPIRATORY (INHALATION) 2 TIMES DAILY
Qty: 10.7 G | Refills: 3 | Status: SHIPPED | OUTPATIENT
Start: 2022-01-19 | End: 2022-04-21 | Stop reason: SDUPTHER

## 2022-01-19 RX ORDER — ALBUTEROL SULFATE 90 UG/1
2 AEROSOL, METERED RESPIRATORY (INHALATION) EVERY 4 HOURS PRN
Qty: 36 G | Refills: 5 | Status: SHIPPED | OUTPATIENT
Start: 2022-01-19

## 2022-01-19 RX ORDER — ASPIRIN 81 MG/1
81 TABLET ORAL DAILY
Qty: 90 TABLET | Refills: 1 | Status: SHIPPED | OUTPATIENT
Start: 2022-01-19 | End: 2022-06-02

## 2022-01-19 RX ORDER — PANTOPRAZOLE SODIUM 40 MG/1
40 TABLET, DELAYED RELEASE ORAL DAILY
Qty: 90 TABLET | Refills: 1 | Status: SHIPPED | OUTPATIENT
Start: 2022-01-19 | End: 2022-08-10

## 2022-01-19 NOTE — PROGRESS NOTES
Assessment and Plan:     Problem List Items Addressed This Visit        Respiratory    Centrilobular emphysema (HCC)    Relevant Medications    montelukast (SINGULAIR) 10 mg tablet    Budeson-Glycopyrrol-Formoterol (Breztri Aerosphere) 160-9-4 8 MCG/ACT AERO    albuterol (PROVENTIL HFA,VENTOLIN HFA) 90 mcg/act inhaler    Severe persistent asthma without complication    Relevant Medications    montelukast (SINGULAIR) 10 mg tablet    Budeson-Glycopyrrol-Formoterol (Breztri Aerosphere) 160-9-4 8 MCG/ACT AERO    albuterol (PROVENTIL HFA,VENTOLIN HFA) 90 mcg/act inhaler       Genitourinary    Chronic kidney disease, stage 3 (moderate)       Other    Lung nodule    Relevant Orders    CT lung nodule follow-up    Current mild episode of major depressive disorder without prior episode (HCC)    Relevant Medications    escitalopram (LEXAPRO) 10 mg tablet      Other Visit Diagnoses     Encounter for Medicare annual examination with abnormal findings    -  Primary    Mild emphysema (HCC)        Relevant Medications    montelukast (SINGULAIR) 10 mg tablet    Budeson-Glycopyrrol-Formoterol (Breztri Aerosphere) 160-9-4 8 MCG/ACT AERO    albuterol (PROVENTIL HFA,VENTOLIN HFA) 90 mcg/act inhaler    Squamous cell carcinoma of larynx (HCC)        Encounter for screening mammogram for breast cancer        Relevant Orders    Mammo screening bilateral w 3d & cad    Acquired hypothyroidism        Relevant Medications    levothyroxine 100 mcg tablet    Other Relevant Orders    TSH, 3rd generation    Generalized anxiety disorder        Relevant Medications    escitalopram (LEXAPRO) 10 mg tablet    Gastroesophageal reflux disease without esophagitis        Relevant Medications    pantoprazole (PROTONIX) 40 mg tablet    Other specified hypothyroidism        Relevant Medications    levothyroxine 100 mcg tablet    Acute bronchitis, unspecified organism        Relevant Medications    montelukast (SINGULAIR) 10 mg tablet    azithromycin (Zithromax) 250 mg tablet    Budeson-Glycopyrrol-Formoterol (Breztri Aerosphere) 160-9-4 8 MCG/ACT AERO    albuterol (PROVENTIL HFA,VENTOLIN HFA) 90 mcg/act inhaler    Acute right hip pain        Relevant Medications    aspirin (ECOTRIN LOW STRENGTH) 81 mg EC tablet          Falls Plan of Care: balance, strength, and gait training instructions were provided  Preventive health issues were discussed with patient, and age appropriate screening tests were ordered as noted in patient's After Visit Summary  Personalized health advice and appropriate referrals for health education or preventive services given if needed, as noted in patient's After Visit Summary       History of Present Illness:     Patient presents for Medicare Annual Wellness visit    Patient Care Team:  Marilyn Abbasi MD as PCP - General (Family Medicine)  Marilyn Abbasi MD as PCP - [address] [place] [address] (RTE)     Problem List:     Patient Active Problem List   Diagnosis    Cancer of pharynx ([address] )    Cataract    Chronic kidney disease, stage 3 (moderate)    Centrilobular emphysema ([address] )    Extrinsic obstruction of cartilagenous portion of eustachian tube    Personal history of radiation therapy    History of laryngeal cancer    Loss of hearing    Depression with anxiety    Numbness    Panic attack    Vitamin D deficiency    Dupuytren contracture    Elevated alkaline phosphatase level    Bronchiectasis with acute exacerbation (HCC)    Severe persistent asthma without complication    Lung nodule    Myalgia    Tension type headache    Hyperlipidemia    COVID-19    Trigger finger of right thumb    Current mild episode of major depressive disorder without prior episode (HCC)    Persistent dyspnea after COVID-19    Dyspnea on exertion    History of COVID-19    Class 1 obesity due to excess calories with serious comorbidity and body mass index (BMI) of 32 0 to 32 9 in adult    Exercise hypoxemia    Post-nasal drip    Mesenteric lymphadenopathy    Neck pain on left side    Mediastinal lymphadenopathy      Past Medical and Surgical History:     Past Medical History:   Diagnosis Date    Acute kidney failure (HCC)     Allergies     Anemia     Cancer ([place] [address] )     Chronic kidney disease (CKD), stage III (moderate) (HCC)     COPD (chronic obstructive pulmonary disease) ([place] [address] )     COVID-19     Covid + 7-1-31-cough at that time now fully resolved    Disease of thyroid gland     Essential hypertension     GERD (gastroesophageal reflux disease)     Glaucoma     High blood pressure     HTN (hypertension) 2/16/2021    Hyperlipidemia     Hypothyroidism     Throat cancer ([place] [address] ) 2014    chemo and rad therapy 2014     Past Surgical History:   Procedure Laterality Date    COLONOSCOPY  2016    ESOPHAGOGASTRODUODENOSCOPY  2016    LARYNGOSCOPY      w/ Bx       UT INCISE FINGER TENDON SHEATH Right 2/16/2021    Procedure: THUMB TRIGGER FINGER RELEASE;  Surgeon: Siria Brian MD;  [facility];  Service: Orthopedics    TUBAL LIGATION        Family History:     Family History   Problem Relation Age of Onset    Brain cancer Sister     Diabetes Sister    Shannon Clunes Breast cancer Sister     Other Mother         respiratory disorder    Sudden death Father         shot himself    Suicidality Father     No Known Problems Daughter     No Known Problems Maternal Grandmother     No Known Problems Maternal Grandfather     No Known Problems Paternal Grandmother     No Known Problems Paternal Grandfather     No Known Problems Sister     No Known Problems Sister     No Known Problems Sister     No Known Problems Sister     No Known Problems Sister     No Known Problems Sister     No Known Problems Sister     No Known Problems Daughter     No Known Problems Maternal Aunt     No Known Problems Maternal Aunt     No Known Problems Maternal Aunt     No Known Problems Maternal Aunt     No Known Problems Maternal Aunt     No Known Problems Paternal Aunt     No Known Problems Paternal Aunt     No Known Problems Paternal Aunt     No Known Problems Paternal Aunt       Social History:     Social History     Socioeconomic History    Marital status: /Civil Union     Spouse name: None    Number of children: None    Years of education: None    Highest education level: None   Occupational History    None   Tobacco Use    Smoking status: Former Smoker     Packs/day: 1 00     Years: 40 00     Pack years: 40 00     Quit date:      Years since quittin 0    Smokeless tobacco: Never Used   Vaping Use    Vaping Use: Never used   Substance and Sexual Activity    Alcohol use: Never     Comment: None    Drug use: None     Comment: Denies    Sexual activity: Not Currently   Other Topics Concern    None   Social History Narrative    Most recent tobacco use screenin2020    Do you currently or have you served in the [employer]: No    Were you activated, into active duty, as a member of the [employer] or as a Reservist: No    Marital status:     Sexual orientation: Heterosexual    Exercise level: Occasional    Diet: Regular    General stress level: Low    Has smoked since age: 23    Alcohol intake: None    Caffeine intake: Occasional    Chewing tobacco: none    Illicit drugs: Denies    Guns present in home: No    Seat belts used routinely: Yes    Sunscreen used routinely: Yes    Smoke alarm in home: Yes    Advance directive: No    Salt Intake: HTN Diet    Has the Patient had a mammogram to screen for breast cancer within 24 months: Yes    Would the patient like to schedule a Mammogram: No    Is the patient interested in a colorectal cancer screening: No    Live alone or with others: with others    Sexually active: No    Do you feel safe at home: Yes     Social Determinants of Health     Financial Resource Strain: Not on file   Food Insecurity: Not on file   Transportation Needs: Not on file   Physical Activity: Not on file   Stress: Not on file   Social Connections: Not on file   Intimate Partner Violence: Not on file   Housing Stability: Not on file      Medications and Allergies:     Current Outpatient Medications   Medication Sig Dispense Refill    albuterol (PROVENTIL HFA,VENTOLIN HFA) 90 mcg/act inhaler Inhale 2 puffs every 4 (four) hours as needed for wheezing 36 g 5    aspirin (ECOTRIN LOW STRENGTH) 81 mg EC tablet Take 1 tablet (81 mg total) by mouth daily 90 tablet 1    Budeson-Glycopyrrol-Formoterol (Breztri Aerosphere) 160-9-4 8 MCG/ACT AERO Inhale 2 puffs 2 (two) times a day Rinse mouth after use  10 7 g 3    ipratropium-albuterol (DUO-NEB) 0 5-2 5 mg/3 mL nebulizer solution USE 1 VIAL VIA NEBULIZER EVERY 6 HOURS AS NEEDED FOR WHEEZING/SHORTNESS OF BREATH 540 mL 1    levothyroxine 100 mcg tablet Take 1 tablet (100 mcg total) by mouth daily 90 tablet 1    montelukast (SINGULAIR) 10 mg tablet Take 1 tablet (10 mg total) by mouth daily at bedtime 90 tablet 1    pantoprazole (PROTONIX) 40 mg tablet Take 1 tablet (40 mg total) by mouth daily 90 tablet 1    azithromycin (Zithromax) 250 mg tablet Take 2 tablets (500 mg total) by mouth daily for 1 day, THEN 1 tablet (250 mg total) daily for 4 days  6 tablet 0    brimonidine tartrate 0 2 % ophthalmic solution       escitalopram (LEXAPRO) 10 mg tablet Take 1 tablet (10 mg total) by mouth daily 90 tablet 0    ipratropium (ATROVENT) 0 06 % nasal spray USE 2 SPRAYS IN EACH NOSTRIL 4 TIMES DAILY 15 mL 3     No current facility-administered medications for this visit       Allergies   Allergen Reactions    Amifostine Rash    Pollen Extract Allergic Rhinitis      Immunizations:     Immunization History   Administered Date(s) Administered    COVID-19 PFIZER VACCINE 0 3 ML IM 12/04/2021    INFLUENZA 02/06/2019    Influenza, high dose seasonal 0 7 mL 10/07/2020, 11/10/2021    Pneumococcal Conjugate 13-Valent 10/07/2020    Pneumococcal Polysaccharide PPV23 06/14/2021    Tdap 09/13/2021      Health Maintenance:         Topic Date Due    Breast Cancer Screening: Mammogram  12/29/2021    Lung Cancer Screening  07/03/2022    Colorectal Cancer Screening  09/16/2023    Hepatitis C Screening  Completed         Topic Date Due    COVID-19 Vaccine (2 - Pfizer 3-dose series) 12/25/2021      Medicare Health Risk Assessment:     /66 (BP Location: Right arm, Patient Position: Sitting, Cuff Size: Adult)   Pulse 86   Temp (!) 97 2 °F (36 2 °C) (Temporal)   Resp 20   Ht 5' 2" (1 575 m)   Wt 80 3 kg (177 lb)   SpO2 91%   BMI 32 37 kg/m²      Yashira Jay is here for her Subsequent Wellness visit  Last Medicare Wellness visit information reviewed, patient interviewed, no change since last AWV  Health Risk Assessment:   Patient rates overall health as good  Patient feels that their physical health rating is same  Patient is satisfied with their life  Eyesight was rated as same  Hearing was rated as same  Patient feels that their emotional and mental health rating is slightly worse  Patients states they are always angry  Patient states they are never, rarely unusually tired/fatigued  Pain experienced in the last 7 days has been none  Patient states that she has experienced no weight loss or gain in last 6 months  Depression Screening:   PHQ-9 Score: 9      Fall Risk Screening: In the past year, patient has experienced: history of falling in past year    Number of falls: 2 or more  Injured during fall?: No    Feels unsteady when standing or walking?: No    Worried about falling?: Yes      Urinary Incontinence Screening:   Patient has not leaked urine accidently in the last six months  Home Safety:  Patient does not have trouble with stairs inside or outside of their home  Patient has working smoke alarms and has working carbon monoxide detector  Home safety hazards include: none  Nutrition:   Current diet is Regular       Medications:   Patient is not currently taking any over-the-counter supplements  Patient is able to manage medications  Activities of Daily Living (ADLs)/Instrumental Activities of Daily Living (IADLs):   Walk and transfer into and out of bed and chair?: Yes  Dress and groom yourself?: Yes    Bathe or shower yourself?: Yes    Feed yourself?  Yes  Do your laundry/housekeeping?: Yes  Manage your money, pay your bills and track your expenses?: Yes  Make your own meals?: Yes    Do your own shopping?: Yes    Previous Hospitalizations:   Any hospitalizations or ED visits within the last 12 months?: Yes    How many hospitalizations have you had in the last year?: 1-2    Advance Care Planning:   Living will: No    Durable POA for healthcare: No    Advanced directive: Yes    Advanced directive counseling given: Yes    Five wishes given: Yes    Patient declined ACP directive: No    End of Life Decisions reviewed with patient: Yes    Provider agrees with end of life decisions: Yes      Cognitive Screening:   Provider or family/friend/caregiver concerned regarding cognition?: No    PREVENTIVE SCREENINGS      Cardiovascular Screening:    General: Screening Not Indicated and History Lipid Disorder      Diabetes Screening:     General: Screening Current      Colorectal Cancer Screening:     General: Screening Current      Breast Cancer Screening:     General: Screening Current      Cervical Cancer Screening:    General: Screening Not Indicated      Osteoporosis Screening:    General: Screening Current      Abdominal Aortic Aneurysm (AAA) Screening:        General: Screening Not Indicated      Lung Cancer Screening:     General: Screening Current      Hepatitis C Screening:    General: Screening Current    Screening, Brief Intervention, and Referral to Treatment (SBIRT)    Screening      AUDIT-C Screenin) How often did you have a drink containing alcohol in the past year? never  2) How many drinks did you have on a typical day when you were drinking in the past year? 0  3) How often did you have 6 or more drinks on one occasion in the past year? never    AUDIT-C Score: 0  Interpretation: Score 0-2 (female): Negative screen for alcohol misuse    Single Item Drug Screening:  How often have you used an illegal drug (including marijuana) or a prescription medication for non-medical reasons in the past year? never    Single Item Drug Screen Score: 0  Interpretation: Negative screen for possible drug use disorder    Other Counseling Topics:   Car/seat belt/driving safety, skin self-exam, sunscreen and regular weightbearing exercise and calcium and vitamin D intake         Erna Abdul MD

## 2022-01-19 NOTE — PATIENT INSTRUCTIONS
Start escitalopram 10 mg oral daily  Follow-up CT of the lungs for lung nodule in 1 month  Follow-up with oncologist  Dr Sindhu King for lung nodule and positive pet ct  Get the blood work as ordered prior to the appointment  Obtain mammogram is ordered      Medicare Preventive Visit Patient Instructions  Thank you for completing your Welcome to Medicare Visit or Medicare Annual Wellness Visit today  Your next wellness visit will be due in one year (1/20/2023)  The screening/preventive services that you may require over the next 5-10 years are detailed below  Some tests may not apply to you based off risk factors and/or age  Screening tests ordered at today's visit but not completed yet may show as past due  Also, please note that scanned in results may not display below  Preventive Screenings:  Service Recommendations Previous Testing/Comments   Colorectal Cancer Screening  * Colonoscopy    * Fecal Occult Blood Test (FOBT)/Fecal Immunochemical Test (FIT)  * Fecal DNA/Cologuard Test  * Flexible Sigmoidoscopy Age: 54-65 years old   Colonoscopy: every 10 years (may be performed more frequently if at higher risk)  OR  FOBT/FIT: every 1 year  OR  Cologuard: every 3 years  OR  Sigmoidoscopy: every 5 years  Screening may be recommended earlier than age 48 if at higher risk for colorectal cancer  Also, an individualized decision between you and your healthcare provider will decide whether screening between the ages of 74-80 would be appropriate  Colonoscopy: 09/16/2020  FOBT/FIT: Not on file  Cologuard: 09/16/2020  Sigmoidoscopy: Not on file          Breast Cancer Screening Age: 36 years old  Frequency: every 1-2 years  Not required if history of left and right mastectomy Mammogram: 12/29/2020    Screening Current   Cervical Cancer Screening Between the ages of 21-29, pap smear recommended once every 3 years  Between the ages of 33-67, can perform pap smear with HPV co-testing every 5 years     Recommendations may differ for women with a history of total hysterectomy, cervical cancer, or abnormal pap smears in past  Pap Smear: 09/01/2020    Screening Not Indicated   Hepatitis C Screening Once for adults born between 1945 and 1965  More frequently in patients at high risk for Hepatitis C Hep C Antibody: 09/08/2020    Screening Current   Diabetes Screening 1-2 times per year if you're at risk for diabetes or have pre-diabetes Fasting glucose: 81 mg/dL   A1C: No results in last 5 years    Screening Current   Cholesterol Screening Once every 5 years if you don't have a lipid disorder  May order more often based on risk factors  Lipid panel: 09/08/2020    Screening Not Indicated  History Lipid Disorder     Other Preventive Screenings Covered by Medicare:  1  Abdominal Aortic Aneurysm (AAA) Screening: covered once if your at risk  You're considered to be at risk if you have a family history of AAA  2  Lung Cancer Screening: covers low dose CT scan once per year if you meet all of the following conditions: (1) Age 50-69; (2) No signs or symptoms of lung cancer; (3) Current smoker or have quit smoking within the last 15 years; (4) You have a tobacco smoking history of at least 30 pack years (packs per day multiplied by number of years you smoked); (5) You get a written order from a healthcare provider  3  Glaucoma Screening: covered annually if you're considered high risk: (1) You have diabetes OR (2) Family history of glaucoma OR (3)  aged 48 and older OR (3)  American aged 72 and older  3  Osteoporosis Screening: covered every 2 years if you meet one of the following conditions: (1) You're estrogen deficient and at risk for osteoporosis based off medical history and other findings; (2) Have a vertebral abnormality; (3) On glucocorticoid therapy for more than 3 months; (4) Have primary hyperparathyroidism; (5) On osteoporosis medications and need to assess response to drug therapy     · Last bone density test (DXA Scan): 12/29/2020   5  HIV Screening: covered annually if you're between the age of 15-65  Also covered annually if you are younger than 13 and older than 72 with risk factors for HIV infection  For pregnant patients, it is covered up to 3 times per pregnancy  Immunizations:  Immunization Recommendations   Influenza Vaccine Annual influenza vaccination during flu season is recommended for all persons aged >= 6 months who do not have contraindications   Pneumococcal Vaccine (Prevnar and Pneumovax)  * Prevnar = PCV13  * Pneumovax = PPSV23   Adults 25-60 years old: 1-3 doses may be recommended based on certain risk factors  Adults 72 years old: Prevnar (PCV13) vaccine recommended followed by Pneumovax (PPSV23) vaccine  If already received PPSV23 since turning 65, then PCV13 recommended at least one year after PPSV23 dose  Hepatitis B Vaccine 3 dose series if at intermediate or high risk (ex: diabetes, end stage renal disease, liver disease)   Tetanus (Td) Vaccine - COST NOT COVERED BY MEDICARE PART B Following completion of primary series, a booster dose should be given every 10 years to maintain immunity against tetanus  Td may also be given as tetanus wound prophylaxis  Tdap Vaccine - COST NOT COVERED BY MEDICARE PART B Recommended at least once for all adults  For pregnant patients, recommended with each pregnancy  Shingles Vaccine (Shingrix) - COST NOT COVERED BY MEDICARE PART B  2 shot series recommended in those aged 48 and above     Health Maintenance Due:      Topic Date Due    Breast Cancer Screening: Mammogram  12/29/2021    Lung Cancer Screening  07/03/2022    Colorectal Cancer Screening  09/16/2023    Hepatitis C Screening  Completed     Immunizations Due:      Topic Date Due    COVID-19 Vaccine (2 - Pfizer 3-dose series) 12/25/2021     Advance Directives   What are advance directives? Advance directives are legal documents that state your wishes and plans for medical care   These plans are made ahead of time in case you lose your ability to make decisions for yourself  Advance directives can apply to any medical decision, such as the treatments you want, and if you want to donate organs  What are the types of advance directives? There are many types of advance directives, and each state has rules about how to use them  You may choose a combination of any of the following:  · Living will: This is a written record of the treatment you want  You can also choose which treatments you do not want, which to limit, and which to stop at a certain time  This includes surgery, medicine, IV fluid, and tube feedings  · Durable power of  for healthcare [place] [facility]): This is a written record that states who you want to make healthcare choices for you when you are unable to make them for yourself  This person, called a proxy, is usually a family member or a friend  You may choose more than 1 proxy  · Do not resuscitate (DNR) order:  A DNR order is used in case your heart stops beating or you stop breathing  It is a request not to have certain forms of treatment, such as CPR  A DNR order may be included in other types of advance directives  · Medical directive: This covers the care that you want if you are in a coma, near death, or unable to make decisions for yourself  You can list the treatments you want for each condition  Treatment may include pain medicine, surgery, blood transfusions, dialysis, IV or tube feedings, and a ventilator (breathing machine)  · Values history: This document has questions about your views, beliefs, and how you feel and think about life  This information can help others choose the care that you would choose  Why are advance directives important? An advance directive helps you control your care  Although spoken wishes may be used, it is better to have your wishes written down  Spoken wishes can be misunderstood, or not followed   Treatments may be given even if you do not want them  An advance directive may make it easier for your family to make difficult choices about your care  Fall Prevention    Fall prevention  includes ways to make your home and other areas safer  It also includes ways you can move more carefully to prevent a fall  Health conditions that cause changes in your blood pressure, vision, or muscle strength and coordination may increase your risk for falls  Medicines may also increase your risk for falls if they make you dizzy, weak, or sleepy  Fall prevention tips:   · Stand or sit up slowly  · Use assistive devices as directed  · Wear shoes that fit well and have soles that   · Wear a personal alarm  · Stay active  · Manage your medical conditions  Home Safety Tips:  · Add items to prevent falls in the bathroom  · Keep paths clear  · Install bright lights in your home  · Keep items you use often on shelves within reach  · Paint or place reflective tape on the edges of your stairs  Weight Management   Why it is important to manage your weight:  Being overweight increases your risk of health conditions such as heart disease, high blood pressure, type 2 diabetes, and certain types of cancer  It can also increase your risk for osteoarthritis, sleep apnea, and other respiratory problems  Aim for a slow, steady weight loss  Even a small amount of weight loss can lower your risk of health problems  How to lose weight safely:  A safe and healthy way to lose weight is to eat fewer calories and get regular exercise  You can lose up about 1 pound a week by decreasing the number of calories you eat by 500 calories each day  Healthy meal plan for weight management:  A healthy meal plan includes a variety of foods, contains fewer calories, and helps you stay healthy  A healthy meal plan includes the following:  · Eat whole-grain foods more often  A healthy meal plan should contain fiber   Fiber is the part of grains, fruits, and vegetables that is not broken down by your body  Whole-grain foods are healthy and provide extra fiber in your diet  Some examples of whole-grain foods are whole-wheat breads and pastas, oatmeal, brown rice, and bulgur  · Eat a variety of vegetables every day  Include dark, leafy greens such as spinach, kale, ludin greens, and mustard greens  Eat yellow and orange vegetables such as carrots, sweet potatoes, and winter squash  · Eat a variety of fruits every day  Choose fresh or canned fruit (canned in its own juice or light syrup) instead of juice  Fruit juice has very little or no fiber  · Eat low-fat dairy foods  Drink fat-free (skim) milk or 1% milk  Eat fat-free yogurt and low-fat cottage cheese  Try low-fat cheeses such as mozzarella and other reduced-fat cheeses  · Choose meat and other protein foods that are low in fat  Choose beans or other legumes such as split peas or lentils  Choose fish, skinless poultry (chicken or turkey), or lean cuts of red meat (beef or pork)  Before you cook meat or poultry, cut off any visible fat  · Use less fat and oil  Try baking foods instead of frying them  Add less fat, such as margarine, sour cream, regular salad dressing and mayonnaise to foods  Eat fewer high-fat foods  Some examples of high-fat foods include french fries, doughnuts, ice cream, and cakes  · Eat fewer sweets  Limit foods and drinks that are high in sugar  This includes candy, cookies, regular soda, and sweetened drinks  Exercise:  Exercise at least 30 minutes per day on most days of the week  Some examples of exercise include walking, biking, dancing, and swimming  You can also fit in more physical activity by taking the stairs instead of the elevator or parking farther away from stores  Ask your healthcare provider about the best exercise plan for you        © Copyright [address] 2018 Information is for End User's use only and may not be sold, redistributed or otherwise used for commercial purposes   All illustrations and images included in CareNotes® are the copyrighted property of [employer] D [employer] M [employer]  or [facility] Marcia Farias

## 2022-01-20 NOTE — PROGRESS NOTES
Serious Illness Conversation    1  What is your understanding now of where you are with your illness? Prognostic Understanding: appropriate understanding of prognosis     2  How much information about what is likely to be ahead with your illness would you like to have? Information: patient wants to be fully informed     3  What did you (clinician) communicate to the patient? Prognostic Communication: Uncertain - It can be difficult to predict what will happen with your illness  I hope you will continue to live well for a long time but Im worried that you could get sick quickly, and I think it is important to prepare for that possibility  4  If your health situation worsens, what are your most important goals? Goals: achieve particular life goal, be mentally aware, have my medical decisions respected, live as long as possible, no matter what, be physically comfortable, be at home, be spiritually and emotionally at peace     5  What are the biggest fears and worries about the future and your health? Fears/Worries: loss of control, loss of dignity, emotional concerns     6  What abilities are so critical to your life that you cannot imagine living without them? Unacceptable Function: being chronically confused or not being myself, being in pain or very uncomfortable, being unconscious, not being myself, being unable to talk, being unable to communicate effectively, being in chronic severe pain, not being able to care for myself, including toileting and feeding, being unable to interact with others     7  What gives you strength as you think about the future with your illness? Anxiety about cancer     8  If you become sicker, how much are you willing to go through for the possibility of gaining more time? Be in the hospital: Yes    Be in the ICU: Yes    Be on a ventilator: Yes    Be on dialysis: Yes       9  How much does your proxy and family know about your priorities and wishes?   Discussion Discussion: wants clinician to talk with family  Daughter Shan Martines present during the discussion and used as  during the visit at patient's request due to cultural comfort        How does this plan sound to you? I will do everything I can to help you through this    Patient verbalized understanding of the plan     I have spent 41 minutes speaking with my patient on advanced care planning today or during this visit     Advanced directives  Five Wishes: Patient has Five Wishes, not in chart

## 2022-01-20 NOTE — PROGRESS NOTES
Subjective:      Patient ID: Oscar Villalpando is a 77 y o  female  With daughter Ciro Mendoza who she would like to interpret for her today  Offered  services  she previously used the  and does not like to use them  Melissa Tena presents with history of CKD3, Hypertension, hypthyroidism, hyperlipidemia, Glaucoma and recovering from viral infection- has wheezing and cough with productive sputum  Daughter reports that Melissa Tena has been anxious and TIMMY positive today  She does have a history of underlying COPD and asthma including history of laryngeal cancer  Treated with surgery  and radiation  Today her oxygen saturation was 91% which is the lowest it has been in any of her office visits  She has been using her Stiolto daily  Uses albuterol inhaler prn  She does have ipratropium albuterol nebulizer solution and uses that intermittently as needed  She missed the appointment with Dr Roselia Walker and did not follow up for her lung and abdominal nodule positive of PET CT, daughter Ciro Mendoza states she was overwhelmed and hence did not follow up and is aware of the results and that it could be cancer  Patient was scheduled for [address] [place] [address] however she did have EM problems that were addressed and is aware that she will get 2 separate bills,   And is agreeable to  Conducting the visit          Past Medical History:   Diagnosis Date    Acute kidney failure (HCC)     Allergies     Anemia     Cancer (HCC)     Chronic kidney disease (CKD), stage III (moderate) (HCC)     COPD [facility]ch[facility] obstructive pulmonary disease) ([facility] )     COVID-19     Covid + 9-9-38-cough at that time now fully resolved    Disease of thyroid gland     Essential hypertension     GERD (gastroesophageal reflux disease)     Glaucoma     High blood pressure     HTN (hypertension) 2/16/2021    Hyperlipidemia     Hypothyroidism     Throat cancer ([facility] ) 2014    chemo and rad therapy 2014       Family History   Problem Relation Age of Onset    Brain cancer Sister     Diabetes Sister     Breast cancer Sister     Other Mother         respiratory disorder    Sudden death Father         shot himself    Suicidality Father     No Known Problems Daughter     No Known Problems Maternal Grandmother     No Known Problems Maternal Grandfather     No Known Problems Paternal Grandmother     No Known Problems Paternal Grandfather     No Known Problems Sister     No Known Problems Sister     No Known Problems Sister     No Known Problems Sister     No Known Problems Sister     No Known Problems Sister     No Known Problems Sister     No Known Problems Daughter     No Known Problems Maternal Aunt     No Known Problems Maternal Aunt     No Known Problems Maternal Aunt     No Known Problems Maternal Aunt     No Known Problems Maternal Aunt     No Known Problems Paternal Aunt     No Known Problems Paternal Aunt     No Known Problems Paternal Aunt     No Known Problems Paternal Aunt        Past Surgical History:   Procedure Laterality Date    COLONOSCOPY  2016    ESOPHAGOGASTRODUODENOSCOPY  2016    LARYNGOSCOPY      w/ Bx   IA INCISE FINGER TENDON SHEATH Right 2/16/2021    Procedure: THUMB TRIGGER FINGER RELEASE;  Surgeon: Aylin Mack MD;  [facility];  Service: Orthopedics    TUBAL LIGATION          reports that she quit smoking about 8 years ago  She has a 40 00 pack-year smoking history  She has never used smokeless tobacco  She reports that she does not drink alcohol        Current Outpatient Medications:     albuterol (PROVENTIL HFA,VENTOLIN HFA) 90 mcg/act inhaler, Inhale 2 puffs every 4 (four) hours as needed for wheezing, Disp: 36 g, Rfl: 5    aspirin (ECOTRIN LOW STRENGTH) 81 mg EC tablet, Take 1 tablet (81 mg total) by mouth daily, Disp: 90 tablet, Rfl: 1    Budeson-Glycopyrrol-Formoterol (Breztri Aerosphere) 160-9-4 8 MCG/ACT AERO, Inhale 2 puffs 2 (two) times a day Rinse mouth after use , Disp: 10 7 g, Rfl: 3   ipratropium-albuterol (DUO-NEB) 0 5-2 5 mg/3 mL nebulizer solution, USE 1 VIAL VIA NEBULIZER EVERY 6 HOURS AS NEEDED FOR WHEEZING/SHORTNESS OF BREATH, Disp: 540 mL, Rfl: 1    levothyroxine 100 mcg tablet, Take 1 tablet (100 mcg total) by mouth daily, Disp: 90 tablet, Rfl: 1    montelukast (SINGULAIR) 10 mg tablet, Take 1 tablet (10 mg total) by mouth daily at bedtime, Disp: 90 tablet, Rfl: 1    pantoprazole (PROTONIX) 40 mg tablet, Take 1 tablet (40 mg total) by mouth daily, Disp: 90 tablet, Rfl: 1    azithromycin (Zithromax) 250 mg tablet, Take 2 tablets (500 mg total) by mouth daily for 1 day, THEN 1 tablet (250 mg total) daily for 4 days  , Disp: 6 tablet, Rfl: 0    brimonidine tartrate 0 2 % ophthalmic solution, , Disp: , Rfl:     escitalopram (LEXAPRO) 10 mg tablet, Take 1 tablet (10 mg total) by mouth daily, Disp: 90 tablet, Rfl: 0    ipratropium (ATROVENT) 0 06 % nasal spray, USE 2 SPRAYS IN EACH NOSTRIL 4 TIMES DAILY, Disp: 15 mL, Rfl: 3    The following portions of the patient's history were reviewed and updated as appropriate: allergies, current medications, past family history, past medical history, past social history, past surgical history and problem list     Review of Systems   Constitutional: Negative for fatigue and fever  HENT: Positive for congestion  Negative for facial swelling, mouth sores, rhinorrhea, sore throat and trouble swallowing  Eyes: Negative for pain and redness  Respiratory: Positive for cough and wheezing  Negative for shortness of breath  Cardiovascular: Negative for chest pain, palpitations and leg swelling  Gastrointestinal: Negative for abdominal pain, blood in stool, constipation, diarrhea and nausea  Genitourinary: Negative for dysuria, hematuria and urgency  Musculoskeletal: Negative for arthralgias, back pain and myalgias  Skin: Negative for rash and wound  Neurological: Negative for seizures, syncope and headaches     Hematological: Negative for adenopathy  Psychiatric/Behavioral: Negative for agitation and behavioral problems  The patient is nervous/anxious  PHQ-2/9 Depression Screening    Little interest or pleasure in doing things: 0 - not at all  Feeling down, depressed, or hopeless: 0 - not at all  Trouble falling or staying asleep, or sleeping too much: 3 - nearly every day  Feeling tired or having little energy: 0 - not at all  Poor appetite or overeating: 3 - nearly every day  Feeling bad about yourself - or that you are a failure or have let yourself or your family down: 0 - not at all  Trouble concentrating on things, such as reading the newspaper or watching television: 3 - nearly every day  Moving or speaking so slowly that other people could have noticed  Or the opposite - being so fidgety or restless that you have been moving around a lot more than usual: 0 - not at all  Thoughts that you would be better off dead, or of hurting yourself in some way: 0 - not at all  PHQ-9 Score: 9   PHQ-9 Interpretation: Mild depression        TIMMY-7 Flowsheet Screening      Most Recent Value   Over the last 2 weeks, how often have you been bothered by any of the following problems? Feeling nervous, anxious, or on edge 3   Not being able to stop or control worrying 3   Worrying too much about different things 3   Trouble relaxing 3   Being so restless that it is hard to sit still 0   Becoming easily annoyed or irritable 3            Objective:    /66 (BP Location: Right arm, Patient Position: Sitting, Cuff Size: Adult)   Pulse 86   Temp (!) 97 2 °F (36 2 °C) (Temporal)   Resp 20   Ht 5' 2" (1 575 m)   Wt 80 3 kg (177 lb)   SpO2 91%   BMI 32 37 kg/m²      Physical Exam  Vitals and nursing note reviewed  Constitutional:       Appearance: Normal appearance  She is well-developed  She is not ill-appearing  HENT:      Head: Normocephalic and atraumatic        Right Ear: External ear normal       Left Ear: External ear normal       Nose: Nose normal       Mouth/Throat:      Mouth: Mucous membranes are moist       Pharynx: No oropharyngeal exudate or posterior oropharyngeal erythema  Eyes:      General: No scleral icterus  Right eye: No discharge  Left eye: No discharge  Conjunctiva/sclera: Conjunctivae normal    Cardiovascular:      Rate and Rhythm: Normal rate  Heart sounds: No murmur heard  No gallop  Pulmonary:      Effort: Pulmonary effort is normal  No respiratory distress  Breath sounds: No stridor  Wheezing present  No rhonchi or rales  Abdominal:      Palpations: Abdomen is soft  Tenderness: There is no abdominal tenderness  Musculoskeletal:         General: No tenderness or deformity  Skin:     Findings: No erythema or rash  Neurological:      Mental Status: She is alert  Mental status is at baseline  Psychiatric:         Behavior: Behavior normal          Judgment: Judgment normal            Recent Results (from the past 2520 hour(s))   COVID Only- Collected at [facility] or [facility]    Collection Time: 01/03/22  6:57 PM    Specimen: Nose; Nares   Result Value Ref Range    SARS-CoV-2 Negative Negative       Laboratory Results: I have personally reviewed the pertinent laboratory results/reports     Radiology/Other Diagnostic Testing Results: I have personally reviewed pertinent reports  US head neck soft tissue    Result Date: 10/7/2021  ULTRASOUND HEAD NECK SOFT TISSUE INDICATION:   R22 1: Localized swelling, mass and lump, neck  COMPARISON:  None TECHNIQUE: Targeted real-time ultrasound of the left upper neck soft tissues was performed with a linear transducer with both volumetric sweeps and still imaging techniques  FINDINGS:  There is no suspicious mass or focal fluid collection in the area of palpable concern in the left upper neck soft tissues as shown by the patient  A 7 x 4 x 6 mm benign-appearing left cervical lymph node is noted       No suspicious mass or focal fluid collection in the area of palpable concern in the left upper neck soft tissues as shown by the patient  Workstation performed: [id number]        Assessment/Plan:       Diagnoses and all orders for this visit:    Encounter for Medicare annual examination with abnormal findings    Mild emphysema ([place] [address] )    Current mild episode of major depressive disorder without prior episode ([place] [address] )    Squamous cell carcinoma of larynx ([place] [address] )    Stage 3a chronic kidney disease ([place] [address] )    Encounter for screening mammogram for breast cancer  -     Mammo screening bilateral w 3d & cad; Future    Centrilobular emphysema ([place] [address] )  -     Budeson-Glycopyrrol-Formoterol (Breztri Aerosphere) 160-9-4 8 MCG/ACT AERO; Inhale 2 puffs 2 (two) times a day Rinse mouth after use  Acquired hypothyroidism  -     TSH, 3rd generation; Future  -     levothyroxine 100 mcg tablet; Take 1 tablet (100 mcg total) by mouth daily    Lung nodule  -     CT lung nodule follow-up; Future    Generalized anxiety disorder  -     escitalopram (LEXAPRO) 10 mg tablet; Take 1 tablet (10 mg total) by mouth daily    Gastroesophageal reflux disease without esophagitis  -     pantoprazole (PROTONIX) 40 mg tablet; Take 1 tablet (40 mg total) by mouth daily    Severe persistent asthma without complication  -     montelukast (SINGULAIR) 10 mg tablet; Take 1 tablet (10 mg total) by mouth daily at bedtime  -     albuterol (PROVENTIL HFA,VENTOLIN HFA) 90 mcg/act inhaler; Inhale 2 puffs every 4 (four) hours as needed for wheezing    Other specified hypothyroidism    Acute bronchitis, unspecified organism  -     azithromycin (Zithromax) 250 mg tablet; Take 2 tablets (500 mg total) by mouth daily for 1 day, THEN 1 tablet (250 mg total) daily for 4 days  Acute right hip pain  -     aspirin (ECOTRIN LOW STRENGTH) 81 mg EC tablet;  Take 1 tablet (81 mg total) by mouth daily      Start escitalopram 10 mg oral daily  Follow-up CT of the lungs for lung nodule in 1 month  Follow-up with oncologist Dr Dexter Bamberger for lung nodule and positive pet ct  Get the blood work as ordered prior to the appointment  Obtain mammogram is ordered  Azithromycin for 5 days, call if not improved-will prescribe prednisone, go home and use duoneb immediately      Read package inserts for all medications before starting a new medications, call me if you have any questions  Patient was given opportunity to ask questions and all questions were answered  Portions of the record may have been created with voice recognition software  Occasional wrong word or "sound a like" substitutions may have occurred due to the inherent limitations of voice recognition software  Read the chart carefully and recognize, using context, where substitutions have occurred

## 2022-02-23 ENCOUNTER — APPOINTMENT (OUTPATIENT)
Dept: LAB | Facility: HOSPITAL | Age: 67
End: 2022-02-23
Payer: COMMERCIAL

## 2022-02-23 DIAGNOSIS — E03.9 ACQUIRED HYPOTHYROIDISM: ICD-10-CM

## 2022-02-23 DIAGNOSIS — D50.9 IRON DEFICIENCY ANEMIA, UNSPECIFIED IRON DEFICIENCY ANEMIA TYPE: ICD-10-CM

## 2022-02-23 DIAGNOSIS — R59.0 MESENTERIC LYMPHADENOPATHY: ICD-10-CM

## 2022-02-23 DIAGNOSIS — R91.1 NODULE OF LOWER LOBE OF LEFT LUNG: ICD-10-CM

## 2022-02-23 LAB
ALBUMIN SERPL BCP-MCNC: 4.3 G/DL (ref 3.4–4.8)
ALP SERPL-CCNC: 110.8 U/L (ref 35–140)
ALT SERPL W P-5'-P-CCNC: 19 U/L (ref 5–54)
ANION GAP SERPL CALCULATED.3IONS-SCNC: 6 MMOL/L (ref 4–13)
AST SERPL W P-5'-P-CCNC: 27 U/L (ref 15–41)
BASOPHILS # BLD AUTO: 0.03 THOUSANDS/ΜL (ref 0–0.1)
BASOPHILS NFR BLD AUTO: 0 % (ref 0–1)
BILIRUB SERPL-MCNC: 0.42 MG/DL (ref 0.3–1.2)
BUN SERPL-MCNC: 30 MG/DL (ref 6–20)
CALCIUM SERPL-MCNC: 9.8 MG/DL (ref 8.4–10.2)
CHLORIDE SERPL-SCNC: 105 MMOL/L (ref 96–108)
CO2 SERPL-SCNC: 31 MMOL/L (ref 22–33)
CREAT SERPL-MCNC: 1.24 MG/DL (ref 0.4–1.1)
EOSINOPHIL # BLD AUTO: 0.14 THOUSAND/ΜL (ref 0–0.61)
EOSINOPHIL NFR BLD AUTO: 2 % (ref 0–6)
ERYTHROCYTE [DISTWIDTH] IN BLOOD BY AUTOMATED COUNT: 14.6 % (ref 11.6–15.1)
FERRITIN SERPL-MCNC: 42 NG/ML (ref 8–388)
GFR SERPL CREATININE-BSD FRML MDRD: 45 ML/MIN/1.73SQ M
GLUCOSE P FAST SERPL-MCNC: 97 MG/DL (ref 70–105)
HCT VFR BLD AUTO: 38.6 % (ref 34.8–46.1)
HGB BLD-MCNC: 11.9 G/DL (ref 11.5–15.4)
IMM GRANULOCYTES # BLD AUTO: 0.03 THOUSAND/UL (ref 0–0.2)
IMM GRANULOCYTES NFR BLD AUTO: 0 % (ref 0–2)
IRON SATN MFR SERPL: 15 % (ref 15–50)
IRON SERPL-MCNC: 50 UG/DL (ref 50–170)
LYMPHOCYTES # BLD AUTO: 1.04 THOUSANDS/ΜL (ref 0.6–4.47)
LYMPHOCYTES NFR BLD AUTO: 15 % (ref 14–44)
MCH RBC QN AUTO: 25.8 PG (ref 26.8–34.3)
MCHC RBC AUTO-ENTMCNC: 30.8 G/DL (ref 31.4–37.4)
MCV RBC AUTO: 84 FL (ref 82–98)
MONOCYTES # BLD AUTO: 0.52 THOUSAND/ΜL (ref 0.17–1.22)
MONOCYTES NFR BLD AUTO: 8 % (ref 4–12)
NEUTROPHILS # BLD AUTO: 5.04 THOUSANDS/ΜL (ref 1.85–7.62)
NEUTS SEG NFR BLD AUTO: 75 % (ref 43–75)
NRBC BLD AUTO-RTO: 0 /100 WBCS
PLATELET # BLD AUTO: 174 THOUSANDS/UL (ref 149–390)
PMV BLD AUTO: 10.1 FL (ref 8.9–12.7)
POTASSIUM SERPL-SCNC: 4.5 MMOL/L (ref 3.5–5)
PROT SERPL-MCNC: 7.1 G/DL (ref 6.4–8.3)
RBC # BLD AUTO: 4.61 MILLION/UL (ref 3.81–5.12)
SODIUM SERPL-SCNC: 142 MMOL/L (ref 133–145)
TIBC SERPL-MCNC: 325 UG/DL (ref 250–450)
TSH SERPL DL<=0.05 MIU/L-ACNC: 0.58 UIU/ML (ref 0.34–5.6)
WBC # BLD AUTO: 6.8 THOUSAND/UL (ref 4.31–10.16)

## 2022-02-23 PROCEDURE — 88185 FLOWCYTOMETRY/TC ADD-ON: CPT

## 2022-02-23 PROCEDURE — 82728 ASSAY OF FERRITIN: CPT

## 2022-02-23 PROCEDURE — 88184 FLOWCYTOMETRY/ TC 1 MARKER: CPT

## 2022-02-23 PROCEDURE — 83540 ASSAY OF IRON: CPT

## 2022-02-23 PROCEDURE — 83550 IRON BINDING TEST: CPT

## 2022-02-23 PROCEDURE — 84443 ASSAY THYROID STIM HORMONE: CPT

## 2022-02-23 PROCEDURE — 83918 ORGANIC ACIDS TOTAL QUANT: CPT

## 2022-02-23 PROCEDURE — 36415 COLL VENOUS BLD VENIPUNCTURE: CPT

## 2022-02-23 PROCEDURE — 80053 COMPREHEN METABOLIC PANEL: CPT

## 2022-02-23 PROCEDURE — 85025 COMPLETE CBC W/AUTO DIFF WBC: CPT

## 2022-02-25 LAB — METHYLMALONATE SERPL-SCNC: 131 NMOL/L (ref 0–378)

## 2022-02-26 ENCOUNTER — HOSPITAL ENCOUNTER (OUTPATIENT)
Dept: CT IMAGING | Facility: HOSPITAL | Age: 67
Discharge: HOME | End: 2022-02-26
Attending: FAMILY MEDICINE
Payer: COMMERCIAL

## 2022-02-26 DIAGNOSIS — R91.1 LUNG NODULE: ICD-10-CM

## 2022-02-26 PROCEDURE — G1004 CDSM NDSC: HCPCS

## 2022-02-26 PROCEDURE — 71250 CT THORAX DX C-: CPT

## 2022-02-28 ENCOUNTER — TELEPHONE (OUTPATIENT)
Dept: HEMATOLOGY ONCOLOGY | Facility: CLINIC | Age: 67
End: 2022-02-28

## 2022-02-28 NOTE — TELEPHONE ENCOUNTER
Scheduling Appointment     Who Is Calling to Schedule Child   Doctor Dr Bello Alfaro   Date and Time 04/20 at 1:00pm    Reason for scheduling appointment Follow up    Patient verbalized understanding    yes

## 2022-03-08 LAB — SCAN RESULT: NORMAL

## 2022-03-26 ENCOUNTER — HOSPITAL ENCOUNTER (OUTPATIENT)
Dept: MAMMOGRAPHY | Facility: HOSPITAL | Age: 67
Discharge: HOME | End: 2022-03-26
Payer: COMMERCIAL

## 2022-03-26 DIAGNOSIS — Z12.31 ENCOUNTER FOR SCREENING MAMMOGRAM FOR BREAST CANCER: ICD-10-CM

## 2022-03-26 DIAGNOSIS — Z12.31 ENCOUNTER FOR SCREENING MAMMOGRAM FOR MALIGNANT NEOPLASM OF BREAST: ICD-10-CM

## 2022-03-26 PROCEDURE — 77063 BREAST TOMOSYNTHESIS BI: CPT

## 2022-03-26 PROCEDURE — 77067 SCR MAMMO BI INCL CAD: CPT

## 2022-04-05 ENCOUNTER — TELEPHONE (OUTPATIENT)
Dept: FAMILY MEDICINE CLINIC | Facility: CLINIC | Age: 67
End: 2022-04-05

## 2022-04-05 NOTE — TELEPHONE ENCOUNTER
Patient's daughter called stating that patient called her having tightness in her chest  I advised her daughter that she needs to go the ER or a walk-in center to be seen  Her daughter said it's not like chest tightness in her heart but the patient explained it as her lungs bothering her  I still advised that she needs to go to the ER or walk-in center to have her lungs listened too

## 2022-04-18 ENCOUNTER — TELEPHONE (OUTPATIENT)
Dept: HEMATOLOGY ONCOLOGY | Facility: HOSPITAL | Age: 67
End: 2022-04-18

## 2022-04-18 NOTE — TELEPHONE ENCOUNTER
04/18/22    LVM reminding pt for updated labs prior to the next appt  I would prefer to repeat CBC/ CMP/ Iron panel  Advising pt to get blood work done in any [facility] labs  [facility] number also provided  ---------------------------    I will still keep pt's appt even she doesn't repeat her labs based on the fact of language barrier and her recent labs

## 2022-04-19 ENCOUNTER — TELEPHONE (OUTPATIENT)
Dept: HEMATOLOGY ONCOLOGY | Facility: CLINIC | Age: 67
End: 2022-04-19

## 2022-04-19 NOTE — TELEPHONE ENCOUNTER
Reschedule Appointment     Who is calling in Daughter   Doctor Appointment Scheduled with Dr Jose Palomino date and time 04/20 at 1:00pm   New date and time 05/24 at 2:20pm   Location [facility]   Patient verbalized understanding    yes

## 2022-04-21 ENCOUNTER — PREP FOR PROCEDURE (OUTPATIENT)
Dept: INTERVENTIONAL RADIOLOGY/VASCULAR | Facility: CLINIC | Age: 67
End: 2022-04-21

## 2022-04-21 ENCOUNTER — CONSULT (OUTPATIENT)
Dept: PULMONOLOGY | Facility: CLINIC | Age: 67
End: 2022-04-21
Payer: COMMERCIAL

## 2022-04-21 VITALS
SYSTOLIC BLOOD PRESSURE: 149 MMHG | BODY MASS INDEX: 31.83 KG/M2 | DIASTOLIC BLOOD PRESSURE: 67 MMHG | HEART RATE: 65 BPM | WEIGHT: 173 LBS | HEIGHT: 62 IN | OXYGEN SATURATION: 91 %

## 2022-04-21 DIAGNOSIS — R91.1 NODULE OF LOWER LOBE OF RIGHT LUNG: Primary | ICD-10-CM

## 2022-04-21 DIAGNOSIS — Z85.21 HISTORY OF LARYNGEAL CANCER: ICD-10-CM

## 2022-04-21 DIAGNOSIS — J43.2 CENTRILOBULAR EMPHYSEMA (HCC): Primary | ICD-10-CM

## 2022-04-21 DIAGNOSIS — R91.8 MULTIPLE LUNG NODULES: ICD-10-CM

## 2022-04-21 DIAGNOSIS — R91.1 PULMONARY NODULE: ICD-10-CM

## 2022-04-21 PROCEDURE — 99215 OFFICE O/P EST HI 40 MIN: CPT | Performed by: INTERNAL MEDICINE

## 2022-04-21 RX ORDER — IPRATROPIUM BROMIDE AND ALBUTEROL SULFATE 2.5; .5 MG/3ML; MG/3ML
3 SOLUTION RESPIRATORY (INHALATION) EVERY 6 HOURS PRN
Qty: 360 ML | Refills: 1 | Status: SHIPPED | OUTPATIENT
Start: 2022-04-21 | End: 2022-06-30

## 2022-04-21 RX ORDER — SODIUM CHLORIDE 9 MG/ML
75 INJECTION, SOLUTION INTRAVENOUS CONTINUOUS
Status: CANCELLED | OUTPATIENT
Start: 2022-04-21

## 2022-04-21 RX ORDER — BUDESONIDE, GLYCOPYRROLATE, AND FORMOTEROL FUMARATE 160; 9; 4.8 UG/1; UG/1; UG/1
2 AEROSOL, METERED RESPIRATORY (INHALATION) 2 TIMES DAILY
Qty: 10.7 G | Refills: 3 | Status: SHIPPED | OUTPATIENT
Start: 2022-04-21

## 2022-04-21 RX ORDER — TIMOLOL MALEATE 5 MG/ML
SOLUTION/ DROPS OPHTHALMIC
COMMUNITY
Start: 2022-03-23

## 2022-04-21 RX ORDER — SODIUM CHLORIDE 9 MG/ML
75 INJECTION, SOLUTION INTRAVENOUS CONTINUOUS
OUTPATIENT
Start: 2022-04-21

## 2022-04-21 NOTE — PROGRESS NOTES
Assessment/Plan:    Multiple lung nodules  She has multiple lung nodules on her CT scan which have been stable  Her previous CT scan was unremarkable  However, she has an enlarging 1 6 x 0 8 cm ovoid groundglass right lower lobe nodule on her CT scan from  February 2022  High index of suspicion for malignancy in view of her history of smoking COPD and previous history of laryngeal cancer  I discussed with Dr Melissa Boggs from IR regarding CT-guided biopsy  Centrilobular emphysema ([address] )  She has severe COPD emphysema from her previous smoking  Her previous spirogram showed severe airflow obstruction  On clinical examination her chest auscultation revealed bilateral rhonchi  She is currently on treatment with Breztri, and nebulized ipratropium and albuterol  She is on oxygen supplementation at 2 liters/minute  She has not had any previous PFT  I have ordered a PFT with 6 minute walk test   She wants to know whether she will be eligible for small portable oxygen concentrator  I had a long discussion with her and her daughter and answered all their questions  History of laryngeal cancer  She had laryngeal cancer in 2015 which was treated with radiation therapy and chemo therapy  She has post radiation changes in the neck     She used to follow with Dr Rosina Gutierrez  She also reports weightloss and anorexia, and I have advised her to speak to her primary care physician for further investigations in this regard  Diagnoses and all orders for this visit:    Centrilobular emphysema ([address] )  -     Complete PFT with post Bronchodilator and Six Minute walk; Future  -     Budeson-Glycopyrrol-Formoterol (Breztri Aerosphere) 160-9-4 8 MCG/ACT AERO; Inhale 2 puffs 2 (two) times a day Rinse mouth after use  -     ipratropium-albuterol (DUO-NEB) 0 5-2 5 mg/3 mL nebulizer solution;  Take 3 mL by nebulization every 6 (six) hours as needed for wheezing or shortness of breath    History of laryngeal cancer    Multiple lung nodules    Pulmonary nodule  -     Ambulatory Referral to Pulmonology  -     Ambulatory Referral to Interventional Radiology; Future    Other orders  -     timolol (TIMOPTIC) 0 5 % ophthalmic solution; INSTILL 1 DROP INTO EACH EYE TWO TIMES A DAY          Subjective:      Patient ID: Mikael Dominguez is a 77 y o  female  [place] has previous history of laryngeal cancer in 2015 which was treated with radiation therapy and chemotherapy  She has radiation fibrosis and skin changes in the neck following that  She has been a smoker for a long time for more than 50 years 1 pack per day  She quit in 2015  She has history of COPD emphysema and has been on treatment with Breztri and nebulized ipratropium and albuterol  Her exercise tolerance is less than 100 feet and she has cough with clear phlegm now  She also has wheezing  No chest pain or palpitations  No fever or chills  Reportedly she has lost weight and her appetite has been poor  She uses oxygen at 2 liters/minute  She has been having multiple lung nodules  She had underwent CT PET scan in September 2021 and there was no significant FDG activity  She had a repeat scan in February 2022 which showed an enlarging 1 6 centimeter right lower lobe low lung nodule with ground-glass consistency  The possibility of malignancy is being considered because of her smoking and previous history of cancer  She denies any hemoptysis  She has not had any PFTs the past   Her previous spirogram showed severe airflow obstruction  The following portions of the patient's history were reviewed and updated as appropriate: allergies, current medications, past family history, past medical history, past social history, past surgical history and problem list     Review of Systems   Constitutional: Positive for activity change, appetite change and unexpected weight change  Negative for chills, diaphoresis, fatigue and fever     HENT: Negative for hearing loss, rhinorrhea, sneezing, sore throat, trouble swallowing and voice change  Eyes: Negative for visual disturbance  Respiratory: Positive for cough, shortness of breath and wheezing  Negative for choking and chest tightness  Cardiovascular: Negative for chest pain, palpitations and leg swelling  Gastrointestinal: Negative for abdominal pain, constipation, diarrhea, nausea and vomiting  Genitourinary: Negative for dysuria, frequency and urgency  Musculoskeletal: Negative for arthralgias, gait problem and joint swelling  Skin: Negative for rash  Neurological: Negative for dizziness, tremors, seizures, light-headedness and headaches  Psychiatric/Behavioral: Negative for agitation and sleep disturbance  The patient is not nervous/anxious  Objective:      /67 (BP Location: Right arm, Patient Position: Sitting, Cuff Size: Adult)   Pulse 65   Ht 5' 2" (1 575 m)   Wt 78 5 kg (173 lb)   SpO2 91%   BMI 31 64 kg/m²          Physical Exam  Vitals reviewed  Constitutional:       General: She is not in acute distress  Appearance: She is obese  She is not ill-appearing, toxic-appearing or diaphoretic  HENT:      Head: Normocephalic  Mouth/Throat:      Mouth: Mucous membranes are moist    Eyes:      General: No scleral icterus  Conjunctiva/sclera: Conjunctivae normal    Neck:      Vascular: No carotid bruit  Comments: Radiation changes  Cardiovascular:      Rate and Rhythm: Normal rate and regular rhythm  Heart sounds: Normal heart sounds  No murmur heard  Pulmonary:      Effort: Pulmonary effort is normal  No respiratory distress  Breath sounds: No stridor  Rhonchi present  No wheezing or rales  Chest:      Chest wall: No tenderness  Abdominal:      General: Bowel sounds are normal       Palpations: Abdomen is soft  Tenderness: There is no abdominal tenderness  There is no guarding  Musculoskeletal:      Cervical back: No rigidity or tenderness  Right lower leg: No edema  Left lower leg: No edema  Lymphadenopathy:      Cervical: No cervical adenopathy  Skin:     Coloration: Skin is not jaundiced or pale  Findings: No rash  Neurological:      Mental Status: She is alert and oriented to person, place, and time  Gait: Gait normal    Psychiatric:         Mood and Affect: Mood normal          Behavior: Behavior normal          Thought Content:  Thought content normal          Judgment: Judgment normal        I spent 45 minutes of time taking care of this patient with complex pulmonary problems the majority of this time was spent directly with the patient counseling as well as coordinating care

## 2022-04-21 NOTE — ASSESSMENT & PLAN NOTE
She had laryngeal cancer in 2015 which was treated with radiation therapy and chemo therapy  She has post radiation changes in the neck     She used to follow with Dr Dewey Gamez  She also reports weightloss and anorexia, and I have advised her to speak to her primary care physician for further investigations in this regard 
She has multiple lung nodules on her CT scan which have been stable  Her previous CT scan was unremarkable  However, she has an enlarging 1 6 x 0 8 cm ovoid groundglass right lower lobe nodule on her CT scan from  February 2022  High index of suspicion for malignancy in view of her history of smoking COPD and previous history of laryngeal cancer  I discussed with Dr Osei Escobedo from IR regarding CT-guided biopsy 
She has severe COPD emphysema from her previous smoking  Her previous spirogram showed severe airflow obstruction  On clinical examination her chest auscultation revealed bilateral rhonchi  She is currently on treatment with Breztri, and nebulized ipratropium and albuterol  She is on oxygen supplementation at 2 liters/minute  She has not had any previous PFT  I have ordered a PFT with 6 minute walk test   She wants to know whether she will be eligible for small portable oxygen concentrator  I had a long discussion with her and her daughter and answered all their questions 
no

## 2022-05-16 ENCOUNTER — HOSPITAL ENCOUNTER (EMERGENCY)
Facility: HOSPITAL | Age: 67
Discharge: HOME | End: 2022-05-16
Attending: EMERGENCY MEDICINE
Payer: COMMERCIAL

## 2022-05-16 ENCOUNTER — APPOINTMENT (EMERGENCY)
Dept: RADIOLOGY | Facility: HOSPITAL | Age: 67
End: 2022-05-16
Payer: COMMERCIAL

## 2022-05-16 VITALS
DIASTOLIC BLOOD PRESSURE: 88 MMHG | OXYGEN SATURATION: 93 % | HEART RATE: 72 BPM | SYSTOLIC BLOOD PRESSURE: 131 MMHG | RESPIRATION RATE: 20 BRPM | TEMPERATURE: 98.7 F

## 2022-05-16 DIAGNOSIS — M54.12 CERVICAL RADICULOPATHY: ICD-10-CM

## 2022-05-16 DIAGNOSIS — M25.512 LEFT SHOULDER PAIN: Primary | ICD-10-CM

## 2022-05-16 DIAGNOSIS — M47.812 DEGENERATIVE ARTHRITIS OF CERVICAL SPINE: ICD-10-CM

## 2022-05-16 PROCEDURE — 99284 EMERGENCY DEPT VISIT MOD MDM: CPT | Performed by: PHYSICIAN ASSISTANT

## 2022-05-16 PROCEDURE — 99283 EMERGENCY DEPT VISIT LOW MDM: CPT

## 2022-05-16 PROCEDURE — 72040 X-RAY EXAM NECK SPINE 2-3 VW: CPT

## 2022-05-16 PROCEDURE — 73030 X-RAY EXAM OF SHOULDER: CPT

## 2022-05-16 RX ORDER — SENNOSIDES 8.6 MG
650 CAPSULE ORAL EVERY 8 HOURS PRN
Qty: 30 TABLET | Refills: 0 | Status: SHIPPED | OUTPATIENT
Start: 2022-05-16

## 2022-05-16 RX ORDER — LIDOCAINE 50 MG/G
1 PATCH TOPICAL DAILY
Qty: 15 PATCH | Refills: 0 | Status: SHIPPED | OUTPATIENT
Start: 2022-05-16

## 2022-05-16 RX ORDER — LIDOCAINE 50 MG/G
1 PATCH TOPICAL ONCE
Status: DISCONTINUED | OUTPATIENT
Start: 2022-05-16 | End: 2022-05-16 | Stop reason: HOSPADM

## 2022-05-16 RX ORDER — ACETAMINOPHEN 325 MG/1
650 TABLET ORAL ONCE
Status: COMPLETED | OUTPATIENT
Start: 2022-05-16 | End: 2022-05-16

## 2022-05-16 RX ORDER — METHOCARBAMOL 500 MG/1
500 TABLET, FILM COATED ORAL ONCE
Status: COMPLETED | OUTPATIENT
Start: 2022-05-16 | End: 2022-05-16

## 2022-05-16 RX ORDER — METHOCARBAMOL 500 MG/1
500 TABLET, FILM COATED ORAL 2 TIMES DAILY
Qty: 20 TABLET | Refills: 0 | Status: SHIPPED | OUTPATIENT
Start: 2022-05-16 | End: 2022-08-10

## 2022-05-16 RX ADMIN — ACETAMINOPHEN 650 MG: 325 TABLET ORAL at 11:53

## 2022-05-16 RX ADMIN — LIDOCAINE 5% 1 PATCH: 700 PATCH TOPICAL at 11:53

## 2022-05-16 RX ADMIN — METHOCARBAMOL 500 MG: 500 TABLET ORAL at 11:53

## 2022-05-16 NOTE — ED PROVIDER NOTES
History  Chief Complaint   Patient presents with    Shoulder Pain     Pt reports worsening L shoulder pain for 1 week, denies CP or cardiac complaints, injured same arm a month ago, but pain never this bad      Patient is a 63-year-old female with a past medical history of CKD 3, COPD, HTN, HLD and hypothyroidism, presenting to the ED for evaluation of left shoulder pain x1 week  Patient's son is at bedside and assisting with history/interpretation  Patient denies any falls, trauma or injuries to the shoulder  She endorses a fall that occurred about a month ago but says that she injured the right elbow at that time and did not have any pain in the left shoulder after this fall  The pain is significantly worsened with abduction and external rotation of the shoulder joint  She also reports chronic pain in the neck that has been radiating down her left arm for the past few weeks with intermittent pins and needle sensation  She denies numbness/weakness of upper extremities  She has not taken any over-the-counter medications for the pain  She denies any fevers, chills, dizziness, chest pain, shortness of breath, abdominal pain or N/V/D  Prior to Admission Medications   Prescriptions Last Dose Informant Patient Reported? Taking? Budeson-Glycopyrrol-Formoterol (Breztri Aerosphere) 160-9-4 8 MCG/ACT AERO   No No   Sig: Inhale 2 puffs 2 (two) times a day Rinse mouth after use     albuterol (PROVENTIL HFA,VENTOLIN HFA) 90 mcg/act inhaler   No No   Sig: Inhale 2 puffs every 4 (four) hours as needed for wheezing   aspirin (ECOTRIN LOW STRENGTH) 81 mg EC tablet   No No   Sig: Take 1 tablet (81 mg total) by mouth daily   brimonidine tartrate 0 2 % ophthalmic solution   Yes No   escitalopram (LEXAPRO) 10 mg tablet   No No   Sig: Take 1 tablet (10 mg total) by mouth daily   ipratropium (ATROVENT) 0 06 % nasal spray   No No   Sig: USE 2 SPRAYS IN EACH NOSTRIL 4 TIMES DAILY   ipratropium-albuterol (DUO-NEB) 0 5-2 5 mg/3 mL nebulizer solution   No No   Sig: Take 3 mL by nebulization every 6 (six) hours as needed for wheezing or shortness of breath   levothyroxine 100 mcg tablet   No No   Sig: Take 1 tablet (100 mcg total) by mouth daily   montelukast (SINGULAIR) 10 mg tablet   No No   Sig: Take 1 tablet (10 mg total) by mouth daily at bedtime   pantoprazole (PROTONIX) 40 mg tablet   No No   Sig: Take 1 tablet (40 mg total) by mouth daily   timolol (TIMOPTIC) 0 5 % ophthalmic solution   Yes No   Sig: INSTILL 1 DROP INTO EACH EYE TWO TIMES A DAY      Facility-Administered Medications: None       Past Medical History:   Diagnosis Date    Acute kidney failure (HCC)     Allergies     Anemia     [place] ([place] [address] )     Chronic kidney disease (CKD), stage III (moderate) (HCC)     COPD (chronic obstructive pulmonary disease) ([place] [address] )     COVID-19     Covid + 0-3-48-cough at that time now fully resolved    Disease of thyroid gland     Essential hypertension     GERD (gastroesophageal reflux disease)     Glaucoma     High blood pressure     HTN (hypertension) 2/16/2021    Hyperlipidemia     Hypothyroidism     Throat cancer ([place] [address] ) 2014    chemo and rad therapy 2014       Past Surgical History:   Procedure Laterality Date    COLONOSCOPY  2016    ESOPHAGOGASTRODUODENOSCOPY  2016    LARYNGOSCOPY      w/ Bx       DC INCISE FINGER TENDON SHEATH Right 2/16/2021    Procedure: THUMB TRIGGER FINGER RELEASE;  Surgeon: Salvador Escamilla MD;  [facility];  Service: Orthopedics    TUBAL LIGATION         Family History   Problem Relation Age of Onset    Brain cancer Sister    Shruthi Slipper Diabetes Sister     Breast cancer Sister     Other Mother         respiratory disorder    Sudden death Father         shot himself    Suicidality Father     No Known Problems Daughter     No Known Problems Maternal Grandmother     No Known Problems Maternal Grandfather     No Known Problems Paternal Grandmother     No Known Problems Paternal Grandfather     No Known Problems Sister     No Known Problems Sister     No Known Problems Sister     No Known Problems Sister     No Known Problems Sister     No Known Problems Sister     No Known Problems Sister     No Known Problems Daughter     No Known Problems Maternal Aunt     No Known Problems Maternal Aunt     No Known Problems Maternal Aunt     No Known Problems Maternal Aunt     No Known Problems Maternal Aunt     No Known Problems Paternal Aunt     No Known Problems Paternal Aunt     No Known Problems Paternal Aunt     No Known Problems Paternal Aunt      I have reviewed and agree with the history as documented  E-Cigarette/Vaping    E-Cigarette Use Never User      E-Cigarette/Vaping Substances    Nicotine No     THC No     CBD No      Social History     Tobacco Use    Smoking status: Former Smoker     Packs/day: 1 00     Years: 40 00     Pack years: 40 00     Quit date:      Years since quittin 3    Smokeless tobacco: Never Used   Vaping Use    Vaping Use: Never used   Substance Use Topics    Alcohol use: Never     Comment: None       Review of Systems   Constitutional: Negative for appetite change, chills, fatigue and fever  HENT: Negative for congestion, rhinorrhea, sinus pressure, sinus pain and sore throat  Eyes: Negative for photophobia and visual disturbance  Respiratory: Negative for cough, shortness of breath and wheezing  Cardiovascular: Negative for chest pain, palpitations and leg swelling  Gastrointestinal: Negative for abdominal pain, blood in stool, constipation, diarrhea, nausea and vomiting  Genitourinary: Negative for difficulty urinating, dysuria, flank pain, frequency, hematuria and urgency  Musculoskeletal: Positive for arthralgias (left shoulder pain) and neck pain  Negative for back pain, joint swelling and myalgias  Neurological: Negative for dizziness, syncope, weakness, light-headedness and headaches     All other systems reviewed and are negative  Physical Exam  Physical Exam  Vitals and nursing note reviewed  Constitutional:       General: She is awake  Appearance: Normal appearance  She is well-developed  She is not toxic-appearing or diaphoretic  HENT:      Head: Normocephalic and atraumatic  Right Ear: External ear normal       Left Ear: External ear normal       Nose: Nose normal       Mouth/Throat:      Lips: Pink  Mouth: Mucous membranes are moist    Eyes:      General: Lids are normal  No scleral icterus  Conjunctiva/sclera: Conjunctivae normal       Pupils: Pupils are equal, round, and reactive to light  Neck:      Comments: Tenderness over the left cervical paraspinal muscles  No midline tenderness, deformities or step-offs  No nuchal rigidity/meningeal signs  Patient is able to move neck freely in all directions without difficulty  Cardiovascular:      Rate and Rhythm: Normal rate and regular rhythm  Pulses: Normal pulses  Radial pulses are 2+ on the right side and 2+ on the left side  Heart sounds: Normal heart sounds, S1 normal and S2 normal    Pulmonary:      Effort: Pulmonary effort is normal  No accessory muscle usage  Breath sounds: Normal breath sounds  No stridor  No decreased breath sounds, wheezing, rhonchi or rales  Abdominal:      General: Abdomen is flat  Bowel sounds are normal  There is no distension  Palpations: Abdomen is soft  Tenderness: There is no abdominal tenderness  There is no right CVA tenderness, left CVA tenderness, guarding or rebound  Musculoskeletal:      Right shoulder: Normal       Left shoulder: Tenderness present  No swelling, deformity or effusion  Decreased range of motion  Normal strength  Normal pulse  Arms:       Cervical back: Full passive range of motion without pain and neck supple  No signs of trauma  Muscular tenderness present  No pain with movement  Right lower leg: No edema        Left lower leg: No edema  Comments: Strength 5/5 in bilateral upper and lower extremities  Sensation equal and intact bilaterally  Radial pulses 2+ bilaterally  Cap refill <2 seconds  Lymphadenopathy:      Cervical: No cervical adenopathy  Skin:     General: Skin is warm and dry  Capillary Refill: Capillary refill takes less than 2 seconds  Coloration: Skin is not cyanotic, jaundiced or pale  Neurological:      General: No focal deficit present  Mental Status: She is alert and oriented to person, place, and time  GCS: GCS eye subscore is 4  GCS verbal subscore is 5  GCS motor subscore is 6  Cranial Nerves: No dysarthria or facial asymmetry  Sensory: Sensation is intact  Motor: Motor function is intact  Coordination: Coordination is intact  Gait: Gait is intact  Gait normal       Comments: No focal neurological deficits  Psychiatric:         Mood and Affect: Mood normal          Speech: Speech normal          Behavior: Behavior is cooperative           Vital Signs  ED Triage Vitals   Temperature Pulse Respirations Blood Pressure SpO2   05/16/22 1113 05/16/22 1113 05/16/22 1113 05/16/22 1113 05/16/22 1113   98 7 °F (37 1 °C) 72 20 131/88 93 %      Temp Source Heart Rate Source Patient Position - Orthostatic VS BP Location FiO2 (%)   05/16/22 1113 05/16/22 1113 05/16/22 1113 05/16/22 1113 --   Oral Monitor Sitting Left arm       Pain Score       05/16/22 1153       Med Not Given for Pain - for MAR use only           Vitals:    05/16/22 1113   BP: 131/88   Pulse: 72   Patient Position - Orthostatic VS: Sitting         Visual Acuity      ED Medications  Medications   lidocaine (LIDODERM) 5 % patch 1 patch (1 patch Topical Medication Applied 5/16/22 1153)   methocarbamol (ROBAXIN) tablet 500 mg (500 mg Oral Given 5/16/22 1153)   acetaminophen (TYLENOL) tablet 650 mg (650 mg Oral Given 5/16/22 1153)       Diagnostic Studies  Results Reviewed     None                 XR shoulder 2+ views LEFT   Final Result by Mariaa Cid MD (05/16 7915)      No acute osseous abnormality  Workstation performed: [id number]         XR cervical spine 2 or 3 views   Final Result by Mariaa Cid MD (05/16 5928)      No acute osseous abnormality  Degenerative changes as above  Workstation performed: [id number]                    Procedures  Procedures         ED Course                               SBIRT 20yo+    Flowsheet Row Most Recent Value   SBIRT (23 yo +)    In order to provide better care to our patients, we are screening all of our patients for alcohol and drug use  Would it be okay to ask you these screening questions? Unable to answer at this time Filed at: 05/16/2022 1114                    MDM  Number of Diagnoses or Management Options  Cervical radiculopathy  Degenerative arthritis of cervical spine  Left shoulder pain  Diagnosis management comments: Patient is a 26-year-old female with a past medical history of CKD 3, COPD, HTN, HLD and hypothyroidism, presenting to the ED for evaluation of left shoulder pain x1 week  Patient has specific point tenderness over the left glenohumeral joint and cervical paraspinal muscles that is worsened with palpation or movement of the shoulder/arm  Do not suspect cardiac origin at this time  X-rays showed mild degenerative changes in the cervical spine  Patient's exam consistent with a cervical radiculopathy with no red flag symptoms (numbness, weakness, etc ) at this time  I also suspect some rotator cuff injury/tendinitis of the shoulder  Ortho and comprehensive Spine program referrals were placed  Patient had significant improvement after muscle relaxers and lidocaine patch  Encouraged patient to rest and avoid heavy lifting over the next few days  Advised prompt follow-up with PCP or return to the ED for new/worsening symptoms    The management plan was discussed in detail with the patient at bedside and all questions were answered  Strict ED return instructions were discussed at bedside  Prior to discharge, both verbal and written instructions were provided  We discussed the signs and symptoms that should prompt the patient to return to the ED  All questions were answered and the patient was comfortable with the plan of care and discharged home  The patient agrees to return to the Emergency Department for concerns and/or progression of illness  Amount and/or Complexity of Data Reviewed  Tests in the radiology section of CPT®: reviewed and ordered    Patient Progress  Patient progress: stable      Disposition  Final diagnoses:   Left shoulder pain   Cervical radiculopathy   Degenerative arthritis of cervical spine     Time reflects when diagnosis was documented in both MDM as applicable and the Disposition within this note     Time User Action Codes Description Comment    5/16/2022 12:49 PM [employer] [employer] [address] Add [M25 [id number]] Left shoulder pain     5/16/2022 12:50 PM [employer] [address] Add [M54 12] Cervical radiculopathy     5/16/2022  1:00 PM [employer] [address] Add [O51 [id number]] Degenerative arthritis of cervical spine       ED Disposition     ED Disposition   Discharge    Condition   Stable    Date/Time   Mon May 16, 2022 12:49 PM    Comment   Naida Armas discharge to home/self care                 Follow-up Information     Follow up With Specialties Details Why Contact Info Additional Information    Tamanna Ward MD Family Medicine Schedule an appointment as soon as possible for a visit   [address]  [facility] [address] Physical Therapy Schedule an appointment as soon as possible for a visit   [phone]    [facility] Emergency Medicine  If symptoms worsen [address] Emergency Department,  [address] [address], [place], [place], [address]          Discharge Medication List as of 5/16/2022  1:02 PM      START taking these medications    Details   acetaminophen (TYLENOL) 650 mg CR tablet Take 1 tablet (650 mg total) by mouth every 8 (eight) hours as needed for mild pain, Starting Mon 5/16/2022, Normal      lidocaine (Lidoderm) 5 % Apply 1 patch topically in the morning   Remove & Discard patch within 12 hours or as directed by MD , Starting Mon 5/16/2022, Normal      methocarbamol (ROBAXIN) 500 mg tablet Take 1 tablet (500 mg total) by mouth in the morning and 1 tablet (500 mg total) in the evening , Starting Mon 5/16/2022, Normal         CONTINUE these medications which have NOT CHANGED    Details   albuterol (PROVENTIL HFA,VENTOLIN HFA) 90 mcg/act inhaler Inhale 2 puffs every 4 (four) hours as needed for wheezing, Starting Wed 1/19/2022, Normal      aspirin (ECOTRIN LOW STRENGTH) 81 mg EC tablet Take 1 tablet (81 mg total) by mouth daily, Starting Wed 1/19/2022, Normal      brimonidine tartrate 0 2 % ophthalmic solution Starting Thu 12/23/2021, Historical Med      Budeson-Glycopyrrol-Formoterol (Breztri Aerosphere) 160-9-4 8 MCG/ACT AERO Inhale 2 puffs 2 (two) times a day Rinse mouth after use , Starting Thu 4/21/2022, Normal      escitalopram (LEXAPRO) 10 mg tablet Take 1 tablet (10 mg total) by mouth daily, Starting Wed 1/19/2022, Normal      ipratropium (ATROVENT) 0 06 % nasal spray USE 2 SPRAYS IN EACH NOSTRIL 4 TIMES DAILY, Normal      ipratropium-albuterol (DUO-NEB) 0 5-2 5 mg/3 mL nebulizer solution Take 3 mL by nebulization every 6 (six) hours as needed for wheezing or shortness of breath, Starting Thu 4/21/2022, Until Mon 6/20/2022 at 2359, Normal      levothyroxine 100 mcg tablet Take 1 tablet (100 mcg total) by mouth daily, Starting Wed 1/19/2022, Normal      montelukast (SINGULAIR) 10 mg tablet Take 1 tablet (10 mg total) by mouth daily at bedtime, Starting Wed 1/19/2022, Normal      pantoprazole (PROTONIX) 40 mg tablet Take 1 tablet (40 mg total) by mouth daily, Starting Wed 1/19/2022, Normal      timolol (TIMOPTIC) 0 5 % ophthalmic solution INSTILL 1 DROP INTO EACH EYE TWO TIMES A DAY, Historical Med                 PDMP Review       Value Time User    PDMP Reviewed  Yes 1/19/2022  5:39 PM Varghese Felder MD          ED Provider  Electronically Signed by           Larisa Allen PA-C  05/16/22 5179

## 2022-05-16 NOTE — H&P (VIEW-ONLY)
History  Chief Complaint   Patient presents with    Shoulder Pain     Pt reports worsening L shoulder pain for 1 week, denies CP or cardiac complaints, injured same arm a month ago, but pain never this bad      Patient is a 49-year-old female with a past medical history of CKD 3, COPD, HTN, HLD and hypothyroidism, presenting to the ED for evaluation of left shoulder pain x1 week  Patient's son is at bedside and assisting with history/interpretation  Patient denies any falls, trauma or injuries to the shoulder  She endorses a fall that occurred about a month ago but says that she injured the right elbow at that time and did not have any pain in the left shoulder after this fall  The pain is significantly worsened with abduction and external rotation of the shoulder joint  She also reports chronic pain in the neck that has been radiating down her left arm for the past few weeks with intermittent pins and needle sensation  She denies numbness/weakness of upper extremities  She has not taken any over-the-counter medications for the pain  She denies any fevers, chills, dizziness, chest pain, shortness of breath, abdominal pain or N/V/D  Prior to Admission Medications   Prescriptions Last Dose Informant Patient Reported? Taking? Budeson-Glycopyrrol-Formoterol (Breztri Aerosphere) 160-9-4 8 MCG/ACT AERO   No No   Sig: Inhale 2 puffs 2 (two) times a day Rinse mouth after use     albuterol (PROVENTIL HFA,VENTOLIN HFA) 90 mcg/act inhaler   No No   Sig: Inhale 2 puffs every 4 (four) hours as needed for wheezing   aspirin (ECOTRIN LOW STRENGTH) 81 mg EC tablet   No No   Sig: Take 1 tablet (81 mg total) by mouth daily   brimonidine tartrate 0 2 % ophthalmic solution   Yes No   escitalopram (LEXAPRO) 10 mg tablet   No No   Sig: Take 1 tablet (10 mg total) by mouth daily   ipratropium (ATROVENT) 0 06 % nasal spray   No No   Sig: USE 2 SPRAYS IN EACH NOSTRIL 4 TIMES DAILY   ipratropium-albuterol (DUO-NEB) 0 5-2 5 mg/3 mL nebulizer solution   No No   Sig: Take 3 mL by nebulization every 6 (six) hours as needed for wheezing or shortness of breath   levothyroxine 100 mcg tablet   No No   Sig: Take 1 tablet (100 mcg total) by mouth daily   montelukast (SINGULAIR) 10 mg tablet   No No   Sig: Take 1 tablet (10 mg total) by mouth daily at bedtime   pantoprazole (PROTONIX) 40 mg tablet   No No   Sig: Take 1 tablet (40 mg total) by mouth daily   timolol (TIMOPTIC) 0 5 % ophthalmic solution   Yes No   Sig: INSTILL 1 DROP INTO EACH EYE TWO TIMES A DAY      Facility-Administered Medications: None       Past Medical History:   Diagnosis Date    Acute kidney failure (HCC)     Allergies     Anemia     Cancer ([place] [address] )     Chronic kidney disease (CKD), stage III (moderate) (HCC)     COPD (chronic obstructive pulmonary disease) ([place] [address] )     COVID-19     Covid + 2-2-77-cough at that time now fully resolved    Disease of thyroid gland     Essential hypertension     GERD (gastroesophageal reflux disease)     Glaucoma     High blood pressure     HTN (hypertension) 2/16/2021    Hyperlipidemia     Hypothyroidism     Throat cancer ([place] [address] ) 2014    chemo and rad therapy 2014       Past Surgical History:   Procedure Laterality Date    COLONOSCOPY  2016    ESOPHAGOGASTRODUODENOSCOPY  2016    LARYNGOSCOPY      w/ Bx       PA INCISE FINGER TENDON SHEATH Right 2/16/2021    Procedure: THUMB TRIGGER FINGER RELEASE;  Surgeon: Sergio Taylor MD;  [facility]: [facility];  Service: Orthopedics    TUBAL LIGATION         Family History   Problem Relation Age of Onset    Brain cancer Sister    Julieann Frankel Diabetes Sister     Breast cancer Sister     Other Mother         respiratory disorder    Sudden death Father         shot himself    Suicidality Father     No Known Problems Daughter     No Known Problems Maternal Grandmother     No Known Problems Maternal Grandfather     No Known Problems Paternal Grandmother     No Known Problems Paternal Grandfather     No Known Problems Sister     No Known Problems Sister     No Known Problems Sister     No Known Problems Sister     No Known Problems Sister     No Known Problems Sister     No Known Problems Sister     No Known Problems Daughter     No Known Problems Maternal Aunt     No Known Problems Maternal Aunt     No Known Problems Maternal Aunt     No Known Problems Maternal Aunt     No Known Problems Maternal Aunt     No Known Problems Paternal Aunt     No Known Problems Paternal Aunt     No Known Problems Paternal Aunt     No Known Problems Paternal Aunt      I have reviewed and agree with the history as documented  E-Cigarette/Vaping    E-Cigarette Use Never User      E-Cigarette/Vaping Substances    Nicotine No     THC No     CBD No      Social History     Tobacco Use    Smoking status: Former Smoker     Packs/day: 1 00     Years: 40 00     Pack years: 40 00     Quit date:      Years since quittin 3    Smokeless tobacco: Never Used   Vaping Use    Vaping Use: Never used   Substance Use Topics    Alcohol use: Never     Comment: None       Review of Systems   Constitutional: Negative for appetite change, chills, fatigue and fever  HENT: Negative for congestion, rhinorrhea, sinus pressure, sinus pain and sore throat  Eyes: Negative for photophobia and visual disturbance  Respiratory: Negative for cough, shortness of breath and wheezing  Cardiovascular: Negative for chest pain, palpitations and leg swelling  Gastrointestinal: Negative for abdominal pain, blood in stool, constipation, diarrhea, nausea and vomiting  Genitourinary: Negative for difficulty urinating, dysuria, flank pain, frequency, hematuria and urgency  Musculoskeletal: Positive for arthralgias (left shoulder pain) and neck pain  Negative for back pain, joint swelling and myalgias  Neurological: Negative for dizziness, syncope, weakness, light-headedness and headaches     All other systems reviewed and are negative  Physical Exam  Physical Exam  Vitals and nursing note reviewed  Constitutional:       General: She is awake  Appearance: Normal appearance  She is well-developed  She is not toxic-appearing or diaphoretic  HENT:      Head: Normocephalic and atraumatic  Right Ear: External ear normal       Left Ear: External ear normal       Nose: Nose normal       Mouth/Throat:      Lips: Pink  Mouth: Mucous membranes are moist    Eyes:      General: Lids are normal  No scleral icterus  Conjunctiva/sclera: Conjunctivae normal       Pupils: Pupils are equal, round, and reactive to light  Neck:      Comments: Tenderness over the left cervical paraspinal muscles  No midline tenderness, deformities or step-offs  No nuchal rigidity/meningeal signs  Patient is able to move neck freely in all directions without difficulty  Cardiovascular:      Rate and Rhythm: Normal rate and regular rhythm  Pulses: Normal pulses  Radial pulses are 2+ on the right side and 2+ on the left side  Heart sounds: Normal heart sounds, S1 normal and S2 normal    Pulmonary:      Effort: Pulmonary effort is normal  No accessory muscle usage  Breath sounds: Normal breath sounds  No stridor  No decreased breath sounds, wheezing, rhonchi or rales  Abdominal:      General: Abdomen is flat  Bowel sounds are normal  There is no distension  Palpations: Abdomen is soft  Tenderness: There is no abdominal tenderness  There is no right CVA tenderness, left CVA tenderness, guarding or rebound  Musculoskeletal:      Right shoulder: Normal       Left shoulder: Tenderness present  No swelling, deformity or effusion  Decreased range of motion  Normal strength  Normal pulse  Arms:       Cervical back: Full passive range of motion without pain and neck supple  No signs of trauma  Muscular tenderness present  No pain with movement  Right lower leg: No edema        Left lower leg: No edema  Comments: Strength 5/5 in bilateral upper and lower extremities  Sensation equal and intact bilaterally  Radial pulses 2+ bilaterally  Cap refill <2 seconds  Lymphadenopathy:      Cervical: No cervical adenopathy  Skin:     General: Skin is warm and dry  Capillary Refill: Capillary refill takes less than 2 seconds  Coloration: Skin is not cyanotic, jaundiced or pale  Neurological:      General: No focal deficit present  Mental Status: She is alert and oriented to person, place, and time  GCS: GCS eye subscore is 4  GCS verbal subscore is 5  GCS motor subscore is 6  Cranial Nerves: No dysarthria or facial asymmetry  Sensory: Sensation is intact  Motor: Motor function is intact  Coordination: Coordination is intact  Gait: Gait is intact  Gait normal       Comments: No focal neurological deficits  Psychiatric:         Mood and Affect: Mood normal          Speech: Speech normal          Behavior: Behavior is cooperative           Vital Signs  ED Triage Vitals   Temperature Pulse Respirations Blood Pressure SpO2   05/16/22 1113 05/16/22 1113 05/16/22 1113 05/16/22 1113 05/16/22 1113   98 7 °F (37 1 °C) 72 20 131/88 93 %      Temp Source Heart Rate Source Patient Position - Orthostatic VS BP Location FiO2 (%)   05/16/22 1113 05/16/22 1113 05/16/22 1113 05/16/22 1113 --   Oral Monitor Sitting Left arm       Pain Score       05/16/22 1153       Med Not Given for Pain - for MAR use only           Vitals:    05/16/22 1113   BP: 131/88   Pulse: 72   Patient Position - Orthostatic VS: Sitting         Visual Acuity      ED Medications  Medications   lidocaine (LIDODERM) 5 % patch 1 patch (1 patch Topical Medication Applied 5/16/22 1153)   methocarbamol (ROBAXIN) tablet 500 mg (500 mg Oral Given 5/16/22 1153)   acetaminophen (TYLENOL) tablet 650 mg (650 mg Oral Given 5/16/22 1153)       Diagnostic Studies  Results Reviewed     None                 XR shoulder 2+ views LEFT   Final Result by Jhoan Reeves MD (05/16 6139)      No acute osseous abnormality  Workstation performed: [id number]         XR cervical spine 2 or 3 views   Final Result by Jhoan Reeves MD (05/16 1605)      No acute osseous abnormality  Degenerative changes as above  Workstation performed: [id number]                    Procedures  Procedures         ED Course                               SBIRT 22yo+    Flowsheet Row Most Recent Value   SBIRT (25 yo +)    In order to provide better care to our patients, we are screening all of our patients for alcohol and drug use  Would it be okay to ask you these screening questions? Unable to answer at this time Filed at: 05/16/2022 1114                    MDM  Number of Diagnoses or Management Options  Cervical radiculopathy  Degenerative arthritis of cervical spine  Left shoulder pain  Diagnosis management comments: Patient is a 45-year-old female with a past medical history of CKD 3, COPD, HTN, HLD and hypothyroidism, presenting to the ED for evaluation of left shoulder pain x1 week  Patient has specific point tenderness over the left glenohumeral joint and cervical paraspinal muscles that is worsened with palpation or movement of the shoulder/arm  Do not suspect cardiac origin at this time  X-rays showed mild degenerative changes in the cervical spine  Patient's exam consistent with a cervical radiculopathy with no red flag symptoms (numbness, weakness, etc ) at this time  I also suspect some rotator cuff injury/tendinitis of the shoulder  Ortho and comprehensive Spine program referrals were placed  Patient had significant improvement after muscle relaxers and lidocaine patch  Encouraged patient to rest and avoid heavy lifting over the next few days  Advised prompt follow-up with PCP or return to the ED for new/worsening symptoms    The management plan was discussed in detail with the patient at bedside and all questions were answered  Strict ED return instructions were discussed at bedside  Prior to discharge, both verbal and written instructions were provided  We discussed the signs and symptoms that should prompt the patient to return to the ED  All questions were answered and the patient was comfortable with the plan of care and discharged home  The patient agrees to return to the Emergency Department for concerns and/or progression of illness  Amount and/or Complexity of Data Reviewed  Tests in the radiology section of CPT®: reviewed and ordered    Patient Progress  Patient progress: stable      Disposition  Final diagnoses:   Left shoulder pain   Cervical radiculopathy   Degenerative arthritis of cervical spine     Time reflects when diagnosis was documented in both MDM as applicable and the Disposition within this note     Time User Action Codes Description Comment    5/16/2022 12:49 PM  Gal Add [M25 512] Left shoulder pain     5/16/2022 12:50 PM  Gal Add [M54 12] Cervical radiculopathy     5/16/2022  1:00 PM  Gal Add [D30 879] Degenerative arthritis of cervical spine       ED Disposition     ED Disposition   Discharge    Condition   Stable    Date/Time   Mon May 16, 2022 12:49 PM    Comment   Cheryl Rodgers discharge to home/self care                 Follow-up Information     Follow up With Specialties Details Why Contact Info Additional Information    Rose Ramirez MD Family Medicine Schedule an appointment as soon as possible for a visit   [address] [facility], [place] [address] [facility] Schedule an appointment as soon as possible for a visit   [phone]    [place] [address] Emergency Department Emergency Medicine  If symptoms worsen [address] Emergency Department,  [address], [place], [place], [address]          Discharge Medication List as of 5/16/2022  1:02 PM      START taking these medications    Details   acetaminophen (TYLENOL) 650 mg CR tablet Take 1 tablet (650 mg total) by mouth every 8 (eight) hours as needed for mild pain, Starting Mon 5/16/2022, Normal      lidocaine (Lidoderm) 5 % Apply 1 patch topically in the morning   Remove & Discard patch within 12 hours or as directed by MD , Starting Mon 5/16/2022, Normal      methocarbamol (ROBAXIN) 500 mg tablet Take 1 tablet (500 mg total) by mouth in the morning and 1 tablet (500 mg total) in the evening , Starting Mon 5/16/2022, Normal         CONTINUE these medications which have NOT CHANGED    Details   albuterol (PROVENTIL HFA,VENTOLIN HFA) 90 mcg/act inhaler Inhale 2 puffs every 4 (four) hours as needed for wheezing, Starting Wed 1/19/2022, Normal      aspirin (ECOTRIN LOW STRENGTH) 81 mg EC tablet Take 1 tablet (81 mg total) by mouth daily, Starting Wed 1/19/2022, Normal      brimonidine tartrate 0 2 % ophthalmic solution Starting Thu 12/23/2021, Historical Med      Budeson-Glycopyrrol-Formoterol (Breztri Aerosphere) 160-9-4 8 MCG/ACT AERO Inhale 2 puffs 2 (two) times a day Rinse mouth after use , Starting Thu 4/21/2022, Normal      escitalopram (LEXAPRO) 10 mg tablet Take 1 tablet (10 mg total) by mouth daily, Starting Wed 1/19/2022, Normal      ipratropium (ATROVENT) 0 06 % nasal spray USE 2 SPRAYS IN EACH NOSTRIL 4 TIMES DAILY, Normal      ipratropium-albuterol (DUO-NEB) 0 5-2 5 mg/3 mL nebulizer solution Take 3 mL by nebulization every 6 (six) hours as needed for wheezing or shortness of breath, Starting Thu 4/21/2022, Until Mon 6/20/2022 at 2359, Normal      levothyroxine 100 mcg tablet Take 1 tablet (100 mcg total) by mouth daily, Starting Wed 1/19/2022, Normal      montelukast (SINGULAIR) 10 mg tablet Take 1 tablet (10 mg total) by mouth daily at bedtime, Starting Wed 1/19/2022, Normal      pantoprazole (PROTONIX) 40 mg tablet Take 1 tablet (40 mg total) by mouth daily, Starting Wed 1/19/2022, Normal      timolol (TIMOPTIC) 0 5 % ophthalmic solution INSTILL 1 DROP INTO EACH EYE TWO TIMES A DAY, Historical Med                 PDMP Review       Value Time User    PDMP Reviewed  Yes 1/19/2022  5:39 PM Stefany Ludwig MD          ED Provider  Electronically Signed by           Rosaura Desouza PA-C  05/16/22 3326

## 2022-05-17 ENCOUNTER — TELEPHONE (OUTPATIENT)
Dept: PHYSICAL THERAPY | Facility: OTHER | Age: 67
End: 2022-05-17

## 2022-05-17 NOTE — TELEPHONE ENCOUNTER
See note from 10:28 am today  Called patients daughter like she requested  Call went to   Voice message left for patient to call back  Phone number and hours of business provided  Will defer x1 day and wait for call back

## 2022-05-17 NOTE — TELEPHONE ENCOUNTER
Call placed to the patient per [facility] referral     Contact # is for her daughter Kaylan Cannon  Spoke with her and she states her mother does not speak [place] and that she would be available later today to assist with translating  Informed Kaylan Cannon that I will be call her back @ 4:30 today

## 2022-05-18 ENCOUNTER — TELEPHONE (OUTPATIENT)
Dept: PHYSICAL THERAPY | Facility: OTHER | Age: 67
End: 2022-05-18

## 2022-05-18 ENCOUNTER — HOSPITAL ENCOUNTER (OUTPATIENT)
Dept: RADIOLOGY | Facility: HOSPITAL | Age: 67
Discharge: HOME | End: 2022-05-18

## 2022-05-18 ENCOUNTER — HOSPITAL ENCOUNTER (OUTPATIENT)
Dept: CT IMAGING | Facility: HOSPITAL | Age: 67
Discharge: HOME | End: 2022-05-18
Attending: RADIOLOGY | Admitting: RADIOLOGY
Payer: COMMERCIAL

## 2022-05-18 VITALS
BODY MASS INDEX: 31.83 KG/M2 | TEMPERATURE: 97 F | OXYGEN SATURATION: 95 % | SYSTOLIC BLOOD PRESSURE: 125 MMHG | HEART RATE: 57 BPM | RESPIRATION RATE: 16 BRPM | DIASTOLIC BLOOD PRESSURE: 67 MMHG | HEIGHT: 62 IN | WEIGHT: 173 LBS

## 2022-05-18 DIAGNOSIS — R91.1 NODULE OF LOWER LOBE OF RIGHT LUNG: ICD-10-CM

## 2022-05-18 LAB
INR PPP: 0.91 (ref 0.84–1.19)
PROTHROMBIN TIME: 12.3 SECONDS (ref 11.6–14.5)

## 2022-05-18 PROCEDURE — 99153 MOD SED SAME PHYS/QHP EA: CPT

## 2022-05-18 PROCEDURE — 71045 X-RAY EXAM CHEST 1 VIEW: CPT

## 2022-05-18 PROCEDURE — 88305 TISSUE EXAM BY PATHOLOGIST: CPT | Performed by: PATHOLOGY

## 2022-05-18 PROCEDURE — 77012 CT SCAN FOR NEEDLE BIOPSY: CPT

## 2022-05-18 PROCEDURE — 99152 MOD SED SAME PHYS/QHP 5/>YRS: CPT

## 2022-05-18 PROCEDURE — 88334 PATH CONSLTJ SURG CYTO XM EA: CPT | Performed by: PATHOLOGY

## 2022-05-18 PROCEDURE — 88333 PATH CONSLTJ SURG CYTO XM 1: CPT | Performed by: PATHOLOGY

## 2022-05-18 PROCEDURE — 32408 CORE NDL BX LNG/MED PERQ: CPT

## 2022-05-18 PROCEDURE — 32408 CORE NDL BX LNG/MED PERQ: CPT | Performed by: RADIOLOGY

## 2022-05-18 PROCEDURE — 99152 MOD SED SAME PHYS/QHP 5/>YRS: CPT | Performed by: RADIOLOGY

## 2022-05-18 PROCEDURE — 85610 PROTHROMBIN TIME: CPT | Performed by: RADIOLOGY

## 2022-05-18 RX ORDER — MIDAZOLAM HYDROCHLORIDE 2 MG/2ML
INJECTION, SOLUTION INTRAMUSCULAR; INTRAVENOUS CODE/TRAUMA/SEDATION MEDICATION
Status: COMPLETED | OUTPATIENT
Start: 2022-05-18 | End: 2022-05-18

## 2022-05-18 RX ORDER — SODIUM CHLORIDE 9 MG/ML
75 INJECTION, SOLUTION INTRAVENOUS CONTINUOUS
Status: DISCONTINUED | OUTPATIENT
Start: 2022-05-18 | End: 2022-05-19 | Stop reason: HOSPADM

## 2022-05-18 RX ORDER — FENTANYL CITRATE 50 UG/ML
INJECTION, SOLUTION INTRAMUSCULAR; INTRAVENOUS CODE/TRAUMA/SEDATION MEDICATION
Status: COMPLETED | OUTPATIENT
Start: 2022-05-18 | End: 2022-05-18

## 2022-05-18 RX ADMIN — MIDAZOLAM HYDROCHLORIDE 1 MG: 1 INJECTION, SOLUTION INTRAMUSCULAR; INTRAVENOUS at 10:33

## 2022-05-18 RX ADMIN — MIDAZOLAM HYDROCHLORIDE 1 MG: 1 INJECTION, SOLUTION INTRAMUSCULAR; INTRAVENOUS at 10:51

## 2022-05-18 RX ADMIN — FENTANYL CITRATE 50 MCG: 50 INJECTION, SOLUTION INTRAMUSCULAR; INTRAVENOUS at 10:51

## 2022-05-18 RX ADMIN — FENTANYL CITRATE 50 MCG: 50 INJECTION, SOLUTION INTRAMUSCULAR; INTRAVENOUS at 10:33

## 2022-05-18 NOTE — INTERVAL H&P NOTE
Update: (This section must be completed if the H&P was completed greater than 24 hrs to procedure or admission)    H&P reviewed  After examining the patient, I find no changed to the H&P since it had been written  Patient re-evaluated   Accept as history and physical     Anamika Bermudez MD/May 18, 2022/12:16 PM

## 2022-05-18 NOTE — TELEPHONE ENCOUNTER
See notes from 4/17/22  Patients daughter Chanda Guerrero did not return our call as she requested      Will close referral

## 2022-05-18 NOTE — DISCHARGE INSTRUCTIONS
Needle Biopsy of the Lung    WHAT YOU NEED TO KNOW:  A needle biopsy of the lung is a procedure to remove cells or tissue from your lung  You may have a fine needle aspiration biopsy (FNAB), or a core needle biopsy (CNB)  A FNAB is used to remove cells through a thin needle  CNB uses a thicker needle to remove lung tissue  The samples are collected and tested for inflammation, infection, or cancer  DISCHARGE INSTRUCTIONS:   Resume your normal diet  Small sips of flat soda will help with nausea  Limit your activity for 24 hours  Wound Care:      - Remove band aid in 24 hours      Contact Interventional Radiology at [phone] Elle PATIENTS: Contact Interventional Radiology at [phone]) Devora Zach PATIENTS: Contact Interventional Radiology at [phone]) if any of the following occur:    - You have a fever greater than 101*    - You cough up large amounts of bright red blood     - You have chest pain with breathing    - You have shortness of breath    -You have persistent nausea and vomiting    - You have pus, redness or swelling around your biopsy site    - You have questions or concerns about your condition or care

## 2022-05-19 NOTE — PROGRESS NOTES
BMI Counseling: Body mass index is 31 61 kg/m²  The BMI is above normal  Nutrition recommendations include decreasing portion sizes, encouraging healthy choices of fruits and vegetables, decreasing fast food intake, consuming healthier snacks, limiting drinks that contain sugar, moderation in carbohydrate intake, increasing intake of lean protein, reducing intake of saturated and trans fat and reducing intake of cholesterol  Exercise recommendations include vigorous physical activity 75 minutes/week, exercising 3-5 times per week, obtaining a gym membership and strength training exercises  No pharmacotherapy was ordered  Rationale for BMI follow-up plan is due to patient being overweight or obese  Assessment/Plan:         Problem List Items Addressed This Visit        Other    Preop examination - Primary    Preoperative cardiovascular examination    Relevant Orders    ECG 12 lead            Subjective:      Patient ID: Iza Jefferson is a 79 y o  female  Patient is a 79year old female present for preoperative clearance for bilateral upper eye lid blepharoplasty with fat pad removal   Patient currently requires EKG and a follow-up pulmonary consultation for clearance for surgery due to history of COPD  Patient be scheduled for surgery 05/31/2022  The following portions of the patient's history were reviewed and updated as appropriate:   Past Medical History:  She has a past medical history of Acute kidney failure ([address] ), Allergies, Anemia, Cancer ([address] ), Chronic kidney disease (CKD), stage III (moderate) ([address] ), COPD (chronic obstructive pulmonary disease) ([address] ), COVID-19, Disease of thyroid gland, Essential hypertension, GERD (gastroesophageal reflux disease), Glaucoma, High blood pressure, HTN (hypertension) (2/16/2021), Hyperlipidemia, Hypothyroidism, and Throat cancer ([address] ) (2014)  ,  _______________________________________________________________________  Medical Problems:  does not have any pertinent problems on file ,  _______________________________________________________________________  Past Surgical History:   has a past surgical history that includes Tubal ligation; Colonoscopy (2016); Esophagogastroduodenoscopy (2016); Laryngoscopy; pr incise finger tendon sheath (Right, 2/16/2021); and IR biopsy lung (5/18/2022)  ,  _______________________________________________________________________  Family History:  family history includes Brain cancer in her sister; Breast cancer in her sister; Diabetes in her sister; No Known Problems in her daughter, daughter, maternal aunt, maternal aunt, maternal aunt, maternal aunt, maternal aunt, maternal grandfather, maternal grandmother, paternal aunt, paternal aunt, paternal aunt, paternal aunt, paternal grandfather, paternal grandmother, sister, sister, sister, sister, sister, sister, and sister; Other in her mother; Sudden death in her father; Suicidality in her father ,  _______________________________________________________________________  Social History:   reports that she quit smoking about 8 years ago  She has a 40 00 pack-year smoking history  She has never used smokeless tobacco  She reports that she does not drink alcohol  No history on file for drug use ,  _______________________________________________________________________  Allergies:  is allergic to amifostine and pollen extract     _______________________________________________________________________  Current Outpatient Medications   Medication Sig Dispense Refill    acetaminophen (TYLENOL) 650 mg CR tablet Take 1 tablet (650 mg total) by mouth every 8 (eight) hours as needed for mild pain 30 tablet 0    albuterol (PROVENTIL HFA,VENTOLIN HFA) 90 mcg/act inhaler Inhale 2 puffs every 4 (four) hours as needed for wheezing 36 g 5    aspirin (ECOTRIN LOW STRENGTH) 81 mg EC tablet Take 1 tablet (81 mg total) by mouth daily 90 tablet 1    brimonidine tartrate 0 2 % ophthalmic solution       Budeson-Glycopyrrol-Formoterol (Breztri Aerosphere) 160-9-4 8 MCG/ACT AERO Inhale 2 puffs 2 (two) times a day Rinse mouth after use  10 7 g 3    escitalopram (LEXAPRO) 10 mg tablet TAKE 1 TABLET BY MOUTH EVERY DAY 90 tablet 0    ipratropium (ATROVENT) 0 06 % nasal spray USE 2 SPRAYS IN EACH NOSTRIL 4 TIMES DAILY 15 mL 3    ipratropium-albuterol (DUO-NEB) 0 5-2 5 mg/3 mL nebulizer solution Take 3 mL by nebulization every 6 (six) hours as needed for wheezing or shortness of breath 360 mL 1    levothyroxine 100 mcg tablet Take 1 tablet (100 mcg total) by mouth daily 90 tablet 1    lidocaine (Lidoderm) 5 % Apply 1 patch topically in the morning  Remove & Discard patch within 12 hours or as directed by MD  15 patch 0    methocarbamol (ROBAXIN) 500 mg tablet Take 1 tablet (500 mg total) by mouth in the morning and 1 tablet (500 mg total) in the evening  20 tablet 0    montelukast (SINGULAIR) 10 mg tablet Take 1 tablet (10 mg total) by mouth daily at bedtime 90 tablet 1    pantoprazole (PROTONIX) 40 mg tablet Take 1 tablet (40 mg total) by mouth daily 90 tablet 1    timolol (TIMOPTIC) 0 5 % ophthalmic solution INSTILL 1 DROP INTO EACH EYE TWO TIMES A DAY       No current facility-administered medications for this visit      _______________________________________________________________________  Review of Systems   Constitutional: Negative for activity change, appetite change, chills, fatigue, fever and unexpected weight change  HENT: Negative for congestion, ear discharge, ear pain, nosebleeds, postnasal drip, rhinorrhea, sinus pressure, sinus pain, sneezing, sore throat and voice change  Eyes: Negative for pain, redness and visual disturbance  Respiratory: Negative for cough, chest tightness, shortness of breath and wheezing  Cardiovascular: Negative for chest pain and palpitations  Gastrointestinal: Negative for abdominal distention, abdominal pain, constipation, diarrhea, nausea and vomiting  Endocrine: Negative  Genitourinary: Negative for difficulty urinating, dysuria, flank pain, frequency, hematuria and urgency  Musculoskeletal: Negative for arthralgias and myalgias  Skin: Negative  Allergic/Immunologic: Negative  Neurological: Negative  Hematological: Negative  Psychiatric/Behavioral: Negative  Objective:  Vitals:    05/23/22 1628   BP: 124/70   BP Location: Left arm   Patient Position: Sitting   Cuff Size: Standard   Pulse: 73   Resp: 18   Temp: 98 1 °F (36 7 °C)   TempSrc: Temporal   SpO2: 94%   Weight: 78 4 kg (172 lb 12 8 oz)   Height: 5' 2" (1 575 m)     Body mass index is 31 61 kg/m²  Physical Exam  Vitals and nursing note reviewed  Constitutional:       Appearance: Normal appearance  She is well-developed  She is obese  HENT:      Head: Normocephalic and atraumatic  Right Ear: Tympanic membrane, ear canal and external ear normal       Left Ear: Tympanic membrane, ear canal and external ear normal       Nose: Nose normal  No congestion or rhinorrhea  Mouth/Throat:      Mouth: Mucous membranes are moist       Pharynx: No oropharyngeal exudate or posterior oropharyngeal erythema  Eyes:      Extraocular Movements: Extraocular movements intact  Conjunctiva/sclera: Conjunctivae normal       Pupils: Pupils are equal, round, and reactive to light  Cardiovascular:      Rate and Rhythm: Normal rate and regular rhythm  Pulses: Normal pulses  Heart sounds: Normal heart sounds  No murmur heard  Pulmonary:      Effort: Pulmonary effort is normal       Breath sounds: Normal breath sounds  Abdominal:      General: Bowel sounds are normal       Palpations: Abdomen is soft  Musculoskeletal:         General: Normal range of motion  Cervical back: Normal range of motion  Skin:     General: Skin is warm  Capillary Refill: Capillary refill takes less than 2 seconds  Neurological:      General: No focal deficit present  Mental Status: She is alert and oriented to person, place, and time     Psychiatric:         Mood and Affect: Mood normal          Behavior: Behavior normal

## 2022-05-20 ENCOUNTER — TELEPHONE (OUTPATIENT)
Dept: PULMONOLOGY | Facility: CLINIC | Age: 67
End: 2022-05-20

## 2022-05-20 ENCOUNTER — TELEPHONE (OUTPATIENT)
Dept: HEMATOLOGY ONCOLOGY | Facility: HOSPITAL | Age: 67
End: 2022-05-20

## 2022-05-20 DIAGNOSIS — D50.9 IRON DEFICIENCY ANEMIA, UNSPECIFIED IRON DEFICIENCY ANEMIA TYPE: ICD-10-CM

## 2022-05-20 DIAGNOSIS — R91.1 NODULE OF LOWER LOBE OF LEFT LUNG: Primary | ICD-10-CM

## 2022-05-20 NOTE — TELEPHONE ENCOUNTER
05/20/22    Spoke with patient's daughter Josi Romero reminding updated labs prior to appt  I will repeat CBC/ Iron study    Advising pt to go to any [facility] walk-in labs to get blood work done  Pt stated she will get labs done tomorrow

## 2022-05-22 DIAGNOSIS — F41.1 GENERALIZED ANXIETY DISORDER: ICD-10-CM

## 2022-05-23 ENCOUNTER — APPOINTMENT (OUTPATIENT)
Dept: LAB | Facility: CLINIC | Age: 67
End: 2022-05-23
Payer: COMMERCIAL

## 2022-05-23 ENCOUNTER — CONSULT (OUTPATIENT)
Dept: FAMILY MEDICINE CLINIC | Facility: CLINIC | Age: 67
End: 2022-05-23
Payer: COMMERCIAL

## 2022-05-23 VITALS
HEIGHT: 62 IN | WEIGHT: 172.8 LBS | OXYGEN SATURATION: 94 % | BODY MASS INDEX: 31.8 KG/M2 | TEMPERATURE: 98.1 F | DIASTOLIC BLOOD PRESSURE: 70 MMHG | RESPIRATION RATE: 18 BRPM | SYSTOLIC BLOOD PRESSURE: 124 MMHG | HEART RATE: 73 BPM

## 2022-05-23 DIAGNOSIS — Z01.818 PREOP EXAMINATION: Primary | ICD-10-CM

## 2022-05-23 DIAGNOSIS — Z01.811 PREOP PULMONARY/RESPIRATORY EXAM: ICD-10-CM

## 2022-05-23 DIAGNOSIS — R91.1 NODULE OF LOWER LOBE OF LEFT LUNG: ICD-10-CM

## 2022-05-23 DIAGNOSIS — Z01.810 PREOPERATIVE CARDIOVASCULAR EXAMINATION: ICD-10-CM

## 2022-05-23 DIAGNOSIS — D50.9 IRON DEFICIENCY ANEMIA, UNSPECIFIED IRON DEFICIENCY ANEMIA TYPE: ICD-10-CM

## 2022-05-23 LAB
ALBUMIN SERPL BCP-MCNC: 4.2 G/DL (ref 3.5–5)
ALP SERPL-CCNC: 107 U/L (ref 34–104)
ALT SERPL W P-5'-P-CCNC: 12 U/L (ref 7–52)
ANION GAP SERPL CALCULATED.3IONS-SCNC: 6 MMOL/L (ref 4–13)
AST SERPL W P-5'-P-CCNC: 21 U/L (ref 13–39)
BASOPHILS # BLD AUTO: 0.05 THOUSANDS/ΜL (ref 0–0.1)
BASOPHILS NFR BLD AUTO: 1 % (ref 0–1)
BILIRUB SERPL-MCNC: 0.41 MG/DL (ref 0.2–1)
BUN SERPL-MCNC: 26 MG/DL (ref 5–25)
CALCIUM SERPL-MCNC: 9.6 MG/DL (ref 8.4–10.2)
CHLORIDE SERPL-SCNC: 105 MMOL/L (ref 96–108)
CO2 SERPL-SCNC: 32 MMOL/L (ref 21–32)
CREAT SERPL-MCNC: 1.24 MG/DL (ref 0.6–1.3)
EOSINOPHIL # BLD AUTO: 0.21 THOUSAND/ΜL (ref 0–0.61)
EOSINOPHIL NFR BLD AUTO: 3 % (ref 0–6)
ERYTHROCYTE [DISTWIDTH] IN BLOOD BY AUTOMATED COUNT: 14.6 % (ref 11.6–15.1)
FERRITIN SERPL-MCNC: 36 NG/ML (ref 8–388)
GFR SERPL CREATININE-BSD FRML MDRD: 45 ML/MIN/1.73SQ M
GLUCOSE SERPL-MCNC: 90 MG/DL (ref 65–140)
HCT VFR BLD AUTO: 42.8 % (ref 34.8–46.1)
HGB BLD-MCNC: 12.8 G/DL (ref 11.5–15.4)
IMM GRANULOCYTES # BLD AUTO: 0.03 THOUSAND/UL (ref 0–0.2)
IMM GRANULOCYTES NFR BLD AUTO: 1 % (ref 0–2)
IRON SATN MFR SERPL: 27 % (ref 15–50)
IRON SERPL-MCNC: 88 UG/DL (ref 50–170)
LYMPHOCYTES # BLD AUTO: 0.99 THOUSANDS/ΜL (ref 0.6–4.47)
LYMPHOCYTES NFR BLD AUTO: 15 % (ref 14–44)
MCH RBC QN AUTO: 25.8 PG (ref 26.8–34.3)
MCHC RBC AUTO-ENTMCNC: 29.9 G/DL (ref 31.4–37.4)
MCV RBC AUTO: 86 FL (ref 82–98)
MONOCYTES # BLD AUTO: 0.48 THOUSAND/ΜL (ref 0.17–1.22)
MONOCYTES NFR BLD AUTO: 7 % (ref 4–12)
NEUTROPHILS # BLD AUTO: 4.81 THOUSANDS/ΜL (ref 1.85–7.62)
NEUTS SEG NFR BLD AUTO: 73 % (ref 43–75)
NRBC BLD AUTO-RTO: 0 /100 WBCS
PLATELET # BLD AUTO: 221 THOUSANDS/UL (ref 149–390)
PMV BLD AUTO: 10.1 FL (ref 8.9–12.7)
POTASSIUM SERPL-SCNC: 5.6 MMOL/L (ref 3.5–5.3)
PROT SERPL-MCNC: 7.1 G/DL (ref 6.4–8.4)
RBC # BLD AUTO: 4.97 MILLION/UL (ref 3.81–5.12)
SODIUM SERPL-SCNC: 143 MMOL/L (ref 135–147)
TIBC SERPL-MCNC: 331 UG/DL (ref 250–450)
WBC # BLD AUTO: 6.57 THOUSAND/UL (ref 4.31–10.16)

## 2022-05-23 PROCEDURE — 99215 OFFICE O/P EST HI 40 MIN: CPT | Performed by: NURSE PRACTITIONER

## 2022-05-23 PROCEDURE — 80053 COMPREHEN METABOLIC PANEL: CPT

## 2022-05-23 PROCEDURE — 82728 ASSAY OF FERRITIN: CPT

## 2022-05-23 PROCEDURE — 83540 ASSAY OF IRON: CPT

## 2022-05-23 PROCEDURE — 83550 IRON BINDING TEST: CPT

## 2022-05-23 PROCEDURE — 36415 COLL VENOUS BLD VENIPUNCTURE: CPT

## 2022-05-23 PROCEDURE — 85025 COMPLETE CBC W/AUTO DIFF WBC: CPT

## 2022-05-23 RX ORDER — ESCITALOPRAM OXALATE 10 MG/1
TABLET ORAL
Qty: 90 TABLET | Refills: 0 | Status: SHIPPED | OUTPATIENT
Start: 2022-05-23 | End: 2022-06-30

## 2022-05-23 NOTE — TELEPHONE ENCOUNTER
05/23/22    Pt doesn't have labs done  I spoke with Ambrocio Camacho again offering r/s but she insisted to keep pt's appt and will get her labs done by end of today  I will keep her appt  Initial Size Of Lesion: 3.1

## 2022-05-24 ENCOUNTER — OFFICE VISIT (OUTPATIENT)
Dept: HEMATOLOGY ONCOLOGY | Facility: CLINIC | Age: 67
End: 2022-05-24
Payer: COMMERCIAL

## 2022-05-24 ENCOUNTER — TELEPHONE (OUTPATIENT)
Dept: HEMATOLOGY ONCOLOGY | Facility: CLINIC | Age: 67
End: 2022-05-24

## 2022-05-24 VITALS
HEIGHT: 62 IN | TEMPERATURE: 97.6 F | WEIGHT: 172 LBS | SYSTOLIC BLOOD PRESSURE: 120 MMHG | BODY MASS INDEX: 31.65 KG/M2 | DIASTOLIC BLOOD PRESSURE: 70 MMHG | RESPIRATION RATE: 14 BRPM | OXYGEN SATURATION: 93 % | HEART RATE: 84 BPM

## 2022-05-24 DIAGNOSIS — C76.0 HEAD AND NECK CANCER (HCC): ICD-10-CM

## 2022-05-24 DIAGNOSIS — R59.0 MESENTERIC LYMPHADENOPATHY: ICD-10-CM

## 2022-05-24 DIAGNOSIS — R91.1 NODULE OF LOWER LOBE OF LEFT LUNG: Primary | ICD-10-CM

## 2022-05-24 PROCEDURE — 99214 OFFICE O/P EST MOD 30 MIN: CPT | Performed by: INTERNAL MEDICINE

## 2022-05-24 NOTE — PROGRESS NOTES
Hematology/Oncology Outpatient Follow- up Note  Santa Elizabeth 79 y o  female MRN: @ Encounter: [id number]        Date:  5/24/2022    Presenting Complaint/Diagnosis :   Nonspecific lung findings and mesenteric adenopathy and a PET-CT scan in 2021    HPI:      Santa Elizabeth is seen for initial consultation 8/12/2021 regarding  A history of head and neck cancer more than 6 years ago  The patient had a CT scan which revealed  A suspicious left lower lobe lung nodule measuring 1 2 cm and is suspicious AP window lymph node measuring 1 3 cm with Stefania mesentery with multiple abnormal nodes the largest measuring 1 1 x 1 3 cm  A PET-CT scan was done to follow-up on this and the patient was referred to see Pulmonary Medicine who she is apparently seeing on the 23rd of August   The PET-CT scan  Showed that the 1 2 x 1 1 cm left lung base nodule does not demonstrate any significant FDG uptake which would favor benign etiology although a low-grade neoplasm could not be excluded  Scattered mildly  Hypermetabolic densities in the bilateral lungs likely inflammatory or infectious could be reassessed on follow-up CT  Mildly prominent mediastinal and mesenteric lymph nodes with mild FDG activity were nonspecific but a low-grade lymphoproliferative disorder could not be excluded  Clinical correlation was recommended  The patient was referred to see us without any biopsies  Again her head and neck malignancy was taken care of by Dr Kami Posada at [facility]   She states she has had head and neck exams yearly and her imaging is not suggestive of metastatic disease from this head and neck malignancy at this time with no evidence of local recurrence  I explained to the patient and her son that at this point without a tissue diagnosis it is very hard for me to say anything  She is anemic and her iron does seem to be low so I will check her iron studies and I advised her take an iron pill daily    I will refer to our colleagues in Surgical Oncology to see if they would consider a laparoscopic biopsy of 1 of these lymph nodes or if they would favor observation  She is seeing Pulmonary Medicine and they will need to weigh in on the mediastinal adenopathy along with the lung lesions  Again my suspicion for metastatic head and neck malignancy is very low based on this PET-CT scan  Previous Hematologic/ Oncologic History:    Workup    Current Hematologic/ Oncologic Treatment:    Workup    Interval History:    Patient returns for follow-up visit  She had an IR guided lung biopsy which did not show any evidence of malignancy  They had advise repeat imaging in a few months and then possible re-biopsy if indicated  I will set the patient up for a PET-CT scan to be done in 2 months  Depending on the results biopsy could be considered  I have a note in the chart from the patient's pulmonologist to the same affect i e  repeat imaging and then biopsy if indicated and they are obviously following also  In terms of symptoms today she has a slightly tender lymph node according to in the left neck  She did have an ultrasound in October of 2021 which showed no major abnormalities in that area  Regardless we will do a follow-up PET-CT scan to evaluate both the neck and the lung with a history of head and neck malignancy  Denies any nausea denies any vomiting denies any diarrhea  She needs to follow-up with ear nose and throat physician also and I will set this up  The rest of her 14 point review of systems today was negative  Test Results:    Imaging: XR chest portable    Result Date: 5/18/2022  Narrative: CHEST INDICATION:   Post lung nodule biopsy, right  COMPARISON:  July 1, 2021 and correlated with CT of the chest performed February 26, 2022 EXAM PERFORMED/VIEWS:  XR CHEST PORTABLE FINDINGS: Cardiomediastinal silhouette appears unremarkable  Known small bilateral pulmonary nodules are not well seen on the current study  Small right lower lung postbiopsy hemorrhage, within normal limits  No pneumothorax  No acute osseous abnormality  Impression: 1  Small right lower lung post biopsy hemorrhage, within normal limits  No pneumothorax  2   Known small bilateral pulmonary nodules are not well seen on the current study  Workstation performed: [id number]     XR cervical spine 2 or 3 views    Result Date: 5/16/2022  Narrative: CERVICAL SPINE INDICATION:   pain, no trauma  COMPARISON:  None VIEWS:  XR SPINE CERVICAL 2 OR 3 VW INJURY Images: 3 FINDINGS: No acute fracture or subluxation  Anatomic alignment  Mild disc space narrowing at C5-6 and C6-7 with minimal osteophyte  Mild hypertrophy of the bilateral C5-6 uncovertebral joints  Normal prevertebral soft tissues  Clear lung apices  Impression: No acute osseous abnormality  Degenerative changes as above  Workstation performed: [id number]     XR shoulder 2+ views LEFT    Result Date: 5/16/2022  Narrative: LEFT SHOULDER INDICATION:   pain, no trauma  COMPARISON:  None VIEWS:  XR SHOULDER 2+ VW LEFT Images: 3 FINDINGS: There is no acute fracture or dislocation  No significant degenerative changes  No lytic or blastic osseous lesion  Soft tissues are unremarkable  Impression: No acute osseous abnormality  Workstation performed: [id number]     IR biopsy lung    Result Date: 5/19/2022  Narrative: CT-scan guided needle biopsy of right lower lobe groundglass nodule History: 20-year-old female with laryngeal cancer the right lower lobe groundglass nodule increasing in size  Radiation dose: 1958 mGy Concious sedation time: 1 hour Technique: This examination, like all CT scans performed in the [facility], was performed utilizing techniques to minimize radiation dose exposure, including the use of iterative reconstruction and automated exposure control  The patient was brought to the CT scanner and placed prone on the table   After axial images were obtained through the region of interest an area of the skin was then marked, prepped, and draped in usual sterile fashion  Lidocaine was administered to the skin and a small skin incision was made  A 19-gauge cannula was advanced up to the lesion, and through this, 3, 20-gauge core biopsy specimens were obtained  The specimen was reviewed by pathology for adequacy  The needle was removed and a post biopsy CT demonstrated small pulmonary hemorrhage with small right pneumothorax  Repeat imaging at 5 minutes demonstrated no enlargement of the pneumothorax  A sterile dressing was applied  The patient was transported to  recovery in stable condition  Impression: Impression: Successful percutaneous core biopsy of right lower lobe groundglass pulmonary nodule  A full pathology report will follow  Workstation performed: [id number]       Labs:   Lab Results   Component Value Date    WBC 6 57 05/23/2022    HGB 12 8 05/23/2022    HCT 42 8 05/23/2022    MCV 86 05/23/2022     05/23/2022     Lab Results   Component Value Date     02/25/2015    K 5 6 (H) 05/23/2022     05/23/2022    CO2 32 05/23/2022    ANIONGAP 3 (L) 02/25/2015    BUN 26 (H) 05/23/2022    CREATININE 1 24 05/23/2022    GLUCOSE 83 02/25/2015    GLUF 97 02/23/2022    CALCIUM 9 6 05/23/2022    AST 21 05/23/2022    ALT 12 05/23/2022    ALKPHOS 107 (H) 05/23/2022    PROT 6 9 02/25/2015    BILITOT 0 3 02/25/2015    EGFR 45 05/23/2022       Lab Results   Component Value Date    IRON 88 05/23/2022    TIBC 331 05/23/2022    FERRITIN 36 05/23/2022       Lab Results   Component Value Date    VLZBOGGZ91 1,273 (H) 02/25/2015         ROS: As stated in the history of present illness otherwise his 14 point review of systems today was negative        Active Problems:   Patient Active Problem List   Diagnosis    Cancer of pharynx ([address] )    Cataract    Chronic kidney disease, stage 3 (moderate)    Centrilobular emphysema (HCC)    Extrinsic obstruction of cartilagenous portion of eustachian tube    Personal history of radiation therapy    History of laryngeal cancer    Loss of hearing    Depression with anxiety    Numbness    Panic attack    Vitamin D deficiency    Dupuytren contracture    Elevated alkaline phosphatase level    Bronchiectasis with acute exacerbation (HCC)    Severe persistent asthma without complication    Multiple lung nodules    Myalgia    Tension type headache    Hyperlipidemia    COVID-19    Trigger finger of right thumb    Current mild episode of major depressive disorder without prior episode (HCC)    Persistent dyspnea after COVID-19    Dyspnea on exertion    History of COVID-19    Class 1 obesity due to excess calories with serious comorbidity and body mass index (BMI) of 32 0 to 32 9 in adult    Exercise hypoxemia    Post-nasal drip    Mesenteric lymphadenopathy    Neck pain on left side    Mediastinal lymphadenopathy    Preop examination    Preoperative cardiovascular examination       Past Medical History:   Past Medical History:   Diagnosis Date    Acute kidney failure (HCC)     Allergies     Anemia     Cancer (HCC)     Chronic kidney disease (CKD), stage III (moderate) (HCC)     COPD [facility]chronic obstructive pulmonary disease) ([facility] )     COVID-19     Covid + 0-2-65-cough at that time now fully resolved    Disease of thyroid gland     Essential hypertension     GERD (gastroesophageal reflux disease)     Glaucoma     High blood pressure     HTN (hypertension) 2/16/2021    Hyperlipidemia     Hypothyroidism     Throat cancer ([facility] ) 2014    chemo and rad therapy 2014       Surgical History:   Past Surgical History:   Procedure Laterality Date    COLONOSCOPY  2016    ESOPHAGOGASTRODUODENOSCOPY  2016    IR BIOPSY LUNG  5/18/2022    LARYNGOSCOPY      w/ Bx       NE INCISE FINGER TENDON SHEATH Right 2/16/2021    Procedure: THUMB TRIGGER FINGER RELEASE;  Surgeon: Chon Russell MD;  Location: [facility]; Service: Orthopedics    TUBAL LIGATION         Family History:    Family History   Problem Relation Age of Onset    Brain cancer Sister     Diabetes Sister    Julieann Frankel Breast cancer Sister     Other Mother         respiratory disorder    Sudden death Father         shot himself    Suicidality Father     No Known Problems Daughter     No Known Problems Maternal Grandmother     No Known Problems Maternal Grandfather     No Known Problems Paternal Grandmother     No Known Problems Paternal Grandfather     No Known Problems Sister     No Known Problems Sister     No Known Problems Sister     No Known Problems Sister     No Known Problems Sister     No Known Problems Sister     No Known Problems Sister     No Known Problems Daughter     No Known Problems Maternal Aunt     No Known Problems Maternal Aunt     No Known Problems Maternal Aunt     No Known Problems Maternal Aunt     No Known Problems Maternal Aunt     No Known Problems Paternal Aunt     No Known Problems Paternal Aunt     No Known Problems Paternal Aunt     No Known Problems Paternal Aunt        Cancer-related family history includes Brain cancer in her sister; Breast cancer in her sister      Social History:   Social History     Socioeconomic History    Marital status: /Civil Union     Spouse name: Not on file    Number of children: Not on file    Years of education: Not on file    Highest education level: Not on file   Occupational History    Not on file   Tobacco Use    Smoking status: Former Smoker     Packs/day: 1 00     Years: 40 00     Pack years: 40 00     Quit date:      Years since quittin 3    Smokeless tobacco: Never Used   Vaping Use    Vaping Use: Never used   Substance and Sexual Activity    Alcohol use: Never     Comment: None    Drug use: Not on file     Comment: Denies    Sexual activity: Not Currently   Other Topics Concern    Not on file   Social History Narrative    Most recent tobacco use screenin2020    Do you currently or have you served in the [employer]: No    Were you activated, into active duty, as a member of the [employer] or as a Reservist: No    Marital status:     Sexual orientation: Heterosexual    Exercise level: Occasional    Diet: Regular    General stress level: Low    Has smoked since age: 23    Alcohol intake: None    Caffeine intake: Occasional    Chewing tobacco: none    Illicit drugs: Denies    Guns present in home: No    Seat belts used routinely: Yes    Sunscreen used routinely: Yes    Smoke alarm in home: Yes    Advance directive: No    Salt Intake: HTN Diet    Has the Patient had a mammogram to screen for breast cancer within 24 months: Yes    Would the patient like to schedule a Mammogram: No    Is the patient interested in a colorectal cancer screening: No    Live alone or with others: with others    Sexually active: No    Do you feel safe at home: Yes     Social Determinants of Health     Financial Resource Strain: Not on file   Food Insecurity: Not on file   Transportation Needs: Not on file   Physical Activity: Not on file   Stress: Not on file   Social Connections: Not on file   Intimate Partner Violence: Not on file   Housing Stability: Not on file       Current Medications:   Current Outpatient Medications   Medication Sig Dispense Refill    acetaminophen (TYLENOL) 650 mg CR tablet Take 1 tablet (650 mg total) by mouth every 8 (eight) hours as needed for mild pain 30 tablet 0    albuterol (PROVENTIL HFA,VENTOLIN HFA) 90 mcg/act inhaler Inhale 2 puffs every 4 (four) hours as needed for wheezing 36 g 5    brimonidine tartrate 0 2 % ophthalmic solution       Budeson-Glycopyrrol-Formoterol (Breztri Aerosphere) 160-9-4 8 MCG/ACT AERO Inhale 2 puffs 2 (two) times a day Rinse mouth after use   10 7 g 3    escitalopram (LEXAPRO) 10 mg tablet TAKE 1 TABLET BY MOUTH EVERY DAY 90 tablet 0    ipratropium (ATROVENT) 0 06 % nasal spray USE 2 SPRAYS IN EACH NOSTRIL 4 TIMES DAILY 15 mL 3    ipratropium-albuterol (DUO-NEB) 0 5-2 5 mg/3 mL nebulizer solution Take 3 mL by nebulization every 6 (six) hours as needed for wheezing or shortness of breath 360 mL 1    levothyroxine 100 mcg tablet Take 1 tablet (100 mcg total) by mouth daily 90 tablet 1    lidocaine (Lidoderm) 5 % Apply 1 patch topically in the morning  Remove & Discard patch within 12 hours or as directed by MD  15 patch 0    methocarbamol (ROBAXIN) 500 mg tablet Take 1 tablet (500 mg total) by mouth in the morning and 1 tablet (500 mg total) in the evening  20 tablet 0    montelukast (SINGULAIR) 10 mg tablet Take 1 tablet (10 mg total) by mouth daily at bedtime 90 tablet 1    pantoprazole (PROTONIX) 40 mg tablet Take 1 tablet (40 mg total) by mouth daily 90 tablet 1    timolol (TIMOPTIC) 0 5 % ophthalmic solution INSTILL 1 DROP INTO EACH EYE TWO TIMES A DAY      aspirin (ECOTRIN LOW STRENGTH) 81 mg EC tablet Take 1 tablet (81 mg total) by mouth daily (Patient not taking: Reported on 5/24/2022) 90 tablet 1     No current facility-administered medications for this visit  Allergies: Allergies   Allergen Reactions    Amifostine Rash    Pollen Extract Allergic Rhinitis       Physical Exam:    Body surface area is 1 79 meters squared  Wt Readings from Last 3 Encounters:   05/24/22 78 kg (172 lb)   05/23/22 78 4 kg (172 lb 12 8 oz)   05/18/22 78 5 kg (173 lb)        Temp Readings from Last 3 Encounters:   05/24/22 97 6 °F (36 4 °C) (Temporal)   05/23/22 98 1 °F (36 7 °C) (Temporal)   05/18/22 (!) 97 °F (36 1 °C)        BP Readings from Last 3 Encounters:   05/24/22 120/70   05/23/22 124/70   05/18/22 125/67         Pulse Readings from Last 3 Encounters:   05/24/22 84   05/23/22 73   05/18/22 57        Physical Exam     Constitutional   General appearance: No acute distress, well appearing and well nourished  Eyes   Conjunctiva and lids: No swelling, erythema or discharge      Pupils and irises: Equal, round and reactive to light  Ears, Nose, Mouth, and Throat   External inspection of ears and nose: Normal     Nasal mucosa, septum, and turbinates: Normal without edema or erythema  Oropharynx: Normal with no erythema, edema, exudate or lesions  Pulmonary   Respiratory effort: No increased work of breathing or signs of respiratory distress  Auscultation of lungs: Clear to auscultation  Cardiovascular   Palpation of heart: Normal PMI, no thrills  Auscultation of heart: Normal rate and rhythm, normal S1 and S2, without murmurs  Examination of extremities for edema and/or varicosities: Normal     Carotid pulses: Normal     Abdomen   Abdomen: Non-tender, no masses  Liver and spleen: No hepatomegaly or splenomegaly  Lymphatic   Palpation of lymph nodes in neck: No lymphadenopathy  Musculoskeletal   Gait and station: Normal     Digits and nails: Normal without clubbing or cyanosis  Inspection/palpation of joints, bones, and muscles: Normal     Skin   Skin and subcutaneous tissue: Normal without rashes or lesions  Neurologic   Cranial nerves: Cranial nerves 2-12 intact  Sensation: No sensory loss  Psychiatric   Orientation to person, place, and time: Normal     Mood and affect: Normal         Assessment / Plan:    The patient is a pleasant 66-year-old female with a remote history of head and neck malignancy was referred to see us for non FDG avid lung nodule and some mediastinal and mesenteric adenopathy which had low-grade activity but was never sampled  We ended up arranging a biopsy and repeat blood work for the patient  Iron studies are normal   Biopsy showed minute foci of atypical glandular proliferation but no definitive diagnosis could be made  The plan at this point is for repeat imaging and then re-biopsy if needed in a few months  This is per the patient's pulmonology notes associated with the biopsy    White count hemoglobin and platelet count are normal     At this point I will see the patient back in 2 months with repeat imaging to include a PET-CT scan for restaging  This will be almost a year from her last scan  She needs lung nodule re-evaluated  She also has tenderness in the neck  I will set up to see ear nose and throat surgery for consult and possible evaluation  Again her most recent biopsy of the changing lung nodule did not show any obvious changes  We will repeat imaging to make sure this was not sampling error  Goals and Barriers:  Current Goal:  Prolong Survival from head and neck cancer and lung nodule  Barriers: None  Patient's Capacity to Self Care:  Patient able to self care  Portions of the record may have been created with voice recognition software  Occasional wrong word or "sound a like" substitutions may have occurred due to the inherent limitations of voice recognition software  Read the chart carefully and recognize, using context, where substitutions have occurred

## 2022-05-24 NOTE — TELEPHONE ENCOUNTER
Due to patient's insurance ([employer]) the only [facility] in the area that accepts is [facility] [place]   I did schedule the appointment for the next available which was 12/30/22 @ 1:40pm and requested the patient be put on a waiting list

## 2022-05-27 ENCOUNTER — TELEPHONE (OUTPATIENT)
Dept: HEMATOLOGY ONCOLOGY | Facility: CLINIC | Age: 67
End: 2022-05-27

## 2022-05-27 ENCOUNTER — TELEPHONE (OUTPATIENT)
Dept: HEMATOLOGY ONCOLOGY | Facility: HOSPITAL | Age: 67
End: 2022-05-27

## 2022-05-27 DIAGNOSIS — C76.0 HEAD AND NECK CANCER (HCC): ICD-10-CM

## 2022-05-27 DIAGNOSIS — R59.0 MESENTERIC LYMPHADENOPATHY: ICD-10-CM

## 2022-05-27 DIAGNOSIS — R91.1 NODULE OF LOWER LOBE OF LEFT LUNG: Primary | ICD-10-CM

## 2022-05-27 NOTE — TELEPHONE ENCOUNTER
PET SCAN is being denied by insurance and recommending CT scan, unless you want to have a peer to peer scheduled please let us know thanks

## 2022-05-27 NOTE — TELEPHONE ENCOUNTER
Good morning  The pt is scheduled for a PET scan on 6/9  Her insurance is denying this request due to not meeting medical necessity guidelines  Stating that a CT would be more appropriate for this pt  There is an option to call in and speak directly to a physician reviewer to perform a peer to peer at 0   Case #[id number]    Please let us know if a peer to peer will be performed or if you will be canceling the PET scan  Thank you

## 2022-05-27 NOTE — TELEPHONE ENCOUNTER
Called patient's daughter Susiecarrie Machado, spoke to Lei Machado  Let Lei Machado know insurance did not clear the PET scan that the appt was changed to a CT scan  Lei Machado confirmed this  Lei Machado confirmed appt,date,time and location and instructions and will  the barium for the scan  I am mailing her instructions and calendar

## 2022-05-31 ENCOUNTER — HOSPITAL ENCOUNTER (OUTPATIENT)
Dept: PULMONOLOGY | Facility: HOSPITAL | Age: 67
Discharge: HOME | End: 2022-05-31
Attending: INTERNAL MEDICINE
Payer: COMMERCIAL

## 2022-05-31 DIAGNOSIS — J43.2 CENTRILOBULAR EMPHYSEMA (HCC): ICD-10-CM

## 2022-05-31 PROCEDURE — 94761 N-INVAS EAR/PLS OXIMETRY MLT: CPT

## 2022-05-31 PROCEDURE — 94060 EVALUATION OF WHEEZING: CPT

## 2022-05-31 PROCEDURE — 94618 PULMONARY STRESS TESTING: CPT | Performed by: INTERNAL MEDICINE

## 2022-05-31 PROCEDURE — 94729 DIFFUSING CAPACITY: CPT | Performed by: INTERNAL MEDICINE

## 2022-05-31 PROCEDURE — 94726 PLETHYSMOGRAPHY LUNG VOLUMES: CPT

## 2022-05-31 PROCEDURE — 94060 EVALUATION OF WHEEZING: CPT | Performed by: INTERNAL MEDICINE

## 2022-05-31 PROCEDURE — 94729 DIFFUSING CAPACITY: CPT

## 2022-05-31 PROCEDURE — 94726 PLETHYSMOGRAPHY LUNG VOLUMES: CPT | Performed by: INTERNAL MEDICINE

## 2022-05-31 RX ORDER — ALBUTEROL SULFATE 2.5 MG/3ML
2.5 SOLUTION RESPIRATORY (INHALATION) ONCE
Status: COMPLETED | OUTPATIENT
Start: 2022-05-31 | End: 2022-05-31

## 2022-05-31 RX ADMIN — ALBUTEROL SULFATE 2.5 MG: 2.5 SOLUTION RESPIRATORY (INHALATION) at 10:10

## 2022-06-02 ENCOUNTER — OFFICE VISIT (OUTPATIENT)
Dept: PULMONOLOGY | Facility: CLINIC | Age: 67
End: 2022-06-02
Payer: COMMERCIAL

## 2022-06-02 VITALS
HEART RATE: 71 BPM | SYSTOLIC BLOOD PRESSURE: 140 MMHG | OXYGEN SATURATION: 94 % | BODY MASS INDEX: 31.83 KG/M2 | HEIGHT: 62 IN | TEMPERATURE: 97.6 F | WEIGHT: 173 LBS | DIASTOLIC BLOOD PRESSURE: 80 MMHG

## 2022-06-02 DIAGNOSIS — Z01.811 PREOP PULMONARY/RESPIRATORY EXAM: ICD-10-CM

## 2022-06-02 DIAGNOSIS — J43.2 CENTRILOBULAR EMPHYSEMA (HCC): Primary | ICD-10-CM

## 2022-06-02 DIAGNOSIS — M25.551 ACUTE RIGHT HIP PAIN: ICD-10-CM

## 2022-06-02 DIAGNOSIS — R91.8 MULTIPLE LUNG NODULES: ICD-10-CM

## 2022-06-02 DIAGNOSIS — J45.50 SEVERE PERSISTENT ASTHMA WITHOUT COMPLICATION: ICD-10-CM

## 2022-06-02 PROCEDURE — 99213 OFFICE O/P EST LOW 20 MIN: CPT | Performed by: INTERNAL MEDICINE

## 2022-06-02 RX ORDER — MONTELUKAST SODIUM 10 MG/1
TABLET ORAL
Qty: 90 TABLET | Refills: 1 | Status: SHIPPED | OUTPATIENT
Start: 2022-06-02

## 2022-06-02 RX ORDER — ASPIRIN 81 MG/1
TABLET, COATED ORAL
Qty: 90 TABLET | Refills: 1 | Status: SHIPPED | OUTPATIENT
Start: 2022-06-02 | End: 2022-08-10

## 2022-06-02 NOTE — ASSESSMENT & PLAN NOTE
Severe dyspnea  Patient uses exercise oxygen I ordered portable oxygen concentrator for her  PFTs do demonstrate severe airflow obstruction with dramatic hyperinflation and air trapping  Structural is emphysema on CT scan  This patient is on appropriate therapy but might benefit from endobronchial valves  Referral to to Dr Kwame Ricci for evaluation  I emphasized regular exercise with oxygen on pre treated with nebulized bronchodilators  Patient's daughter does not think it be practical get her to the hospital for regular pulmonary rehabilitation even though that would be ideal   Trial of Acapella for sense of retained sputum    Instructions given

## 2022-06-02 NOTE — ASSESSMENT & PLAN NOTE
Biopsy demonstrating atypical cells but no convincing sign of true malignancy  Differential diagnosis as listed  Do not think it is practical to consider resection in this patient  Clinical follow-up will be required  We did not address this the to a great degree today

## 2022-06-02 NOTE — PROGRESS NOTES
Assessment/Plan:    Multiple lung nodules  Biopsy demonstrating atypical cells but no convincing sign of true malignancy  Differential diagnosis as listed  Do not think it is practical to consider resection in this patient  Clinical follow-up will be required  We did not address this the to a great degree today  Centrilobular emphysema (HCC)  Severe dyspnea  Patient uses exercise oxygen I ordered portable oxygen concentrator for her  PFTs do demonstrate severe airflow obstruction with dramatic hyperinflation and air trapping  Structural is emphysema on CT scan  This patient is on appropriate therapy but might benefit from endobronchial valves  Referral to to Dr Angela Echols for evaluation  I emphasized regular exercise with oxygen on pre treated with nebulized bronchodilators  Patient's daughter does not think it be practical get her to the hospital for regular pulmonary rehabilitation even though that would be ideal   Trial of Acapella for sense of retained sputum  Instructions given       Diagnoses and all orders for this visit:    Centrilobular emphysema ([address] )  -     Home Oxygen with Portability    Preop pulmonary/respiratory exam  -     Ambulatory Referral to Pulmonology    Multiple lung nodules          Subjective:      Patient ID: Abelardo Cope is a 79 y o  female  Patient returns for evaluation of the COPD  This diagnosis confirmed by PFTs demonstrating severe airflow obstruction and dramatic hyperinflation and air trapping  DLCO not overly low  Patient on appropriate inhaled therapy  In the future however give some thought to providing nebulized long-acting bronchodilators rather than metered dose inhaler  Last smoked in 2014  I could not determine that she ever had alpha-1 antitrypsin level measured and I will request this  Patient has a sense of frequent choking on mucus and I gave her an Acapella device and with instructions  We discussed regular exercise    CT scan shows primarily upper lobe emphysema  She might benefit from endobronchial valves and I made referral for this  Has a sense of nasal its obstruction and does have nasal steroid which I recommended daily use  She is being seen by an ENT physician who might propose surgery  The following portions of the patient's history were reviewed and updated as appropriate: allergies, current medications, past family history, past medical history, past social history, past surgical history and problem list     Review of Systems   Constitutional: Positive for activity change  Negative for unexpected weight change  Respiratory: Positive for cough and shortness of breath  Negative for wheezing  Cardiovascular: Negative for chest pain  Objective:      /80 (BP Location: Right arm, Patient Position: Sitting, Cuff Size: Adult)   Pulse 71   Temp 97 6 °F (36 4 °C) (Tympanic)   Ht 5' 2" (1 575 m)   Wt 78 5 kg (173 lb)   SpO2 94%   BMI 31 64 kg/m²          Physical Exam  Vitals reviewed  Constitutional:       Appearance: She is normal weight  She is not ill-appearing  HENT:      Nose:      Comments: Septal deviation to the left with very narrow left nasal passage  Cardiovascular:      Rate and Rhythm: Normal rate and regular rhythm  Pulses:           Radial pulses are 2+ on the right side and 2+ on the left side  Heart sounds: Normal heart sounds  Comments: No obvious pulmonary hypertension  Pulmonary:      Effort: Pulmonary effort is normal       Breath sounds: Examination of the right-lower field reveals decreased breath sounds  Examination of the left-lower field reveals decreased breath sounds  Decreased breath sounds present  No wheezing or rhonchi  Musculoskeletal:         General: No swelling  Cervical back: No rigidity or tenderness  Right lower leg: No edema  Left lower leg: No edema  Lymphadenopathy:      Cervical: No cervical adenopathy     Skin:     General: Skin is warm and dry    Neurological:      Mental Status: She is alert and oriented to person, place, and time     Psychiatric:         Mood and Affect: Mood normal          Behavior: Behavior normal

## 2022-06-03 ENCOUNTER — TELEPHONE (OUTPATIENT)
Dept: PULMONOLOGY | Facility: CLINIC | Age: 67
End: 2022-06-03

## 2022-06-03 NOTE — TELEPHONE ENCOUNTER
Patient has had her PFT pre and post and 6MW, looks like the number are with in the ranges that are needed  Patient will need all the other testing ordered and scheduled  Did you want me to set her up with an appointment in August on one of the Valve days?

## 2022-06-03 NOTE — TELEPHONE ENCOUNTER
Katrina Reeves can you reach out to this patient and schedule her with Dr Vaibhav Vieira for endobronchial valve eval

## 2022-06-06 LAB
DME PARACHUTE DELIVERY DATE REQUESTED: NORMAL
DME PARACHUTE DELIVERY NOTE: NORMAL
DME PARACHUTE ITEM DESCRIPTION: NORMAL
DME PARACHUTE ORDER STATUS: NORMAL
DME PARACHUTE SUPPLIER NAME: NORMAL
DME PARACHUTE SUPPLIER PHONE: NORMAL

## 2022-06-07 ENCOUNTER — TELEPHONE (OUTPATIENT)
Dept: PULMONOLOGY | Facility: CLINIC | Age: 67
End: 2022-06-07

## 2022-06-07 NOTE — TELEPHONE ENCOUNTER
Oksana Orourke from [facility] called and lm regarding questions to the pt, for the POC  She left her number and asked for a call back at your convenience      [phone]

## 2022-06-08 ENCOUNTER — TELEPHONE (OUTPATIENT)
Dept: PULMONOLOGY | Facility: CLINIC | Age: 67
End: 2022-06-08

## 2022-06-08 DIAGNOSIS — R91.8 MULTIPLE LUNG NODULES: ICD-10-CM

## 2022-06-08 DIAGNOSIS — C14.0 CANCER OF PHARYNX (HCC): Primary | ICD-10-CM

## 2022-06-08 DIAGNOSIS — J43.2 CENTRILOBULAR EMPHYSEMA (HCC): ICD-10-CM

## 2022-06-08 NOTE — TELEPHONE ENCOUNTER
It appears that the patient has a CT of the chest, abdomen and pelvis pending for July  I have placed a new order so that the chest can be done with navigation/valve protocol  Can you call Radiology to confirm they will do this study appropriately?

## 2022-06-08 NOTE — TELEPHONE ENCOUNTER
HT from [facility] surgical called requesting last office visit as patient has an upcoming procedure       Office note faxed to   Ph: [phone]

## 2022-06-09 NOTE — TELEPHONE ENCOUNTER
I called and spoke to Miko Ayala in the [address] department at [facility] and he stated that they can be done the same time and I will note on the scans the protocols that need to be done

## 2022-06-13 NOTE — TELEPHONE ENCOUNTER
Spoke to Mike Chiu in [place] imaging note was placed and they will do the chest portion with the mattie/valave protocol  ,

## 2022-06-29 ENCOUNTER — TELEPHONE (OUTPATIENT)
Dept: FAMILY MEDICINE CLINIC | Facility: CLINIC | Age: 67
End: 2022-06-29

## 2022-06-29 NOTE — TELEPHONE ENCOUNTER
Has a rash around stomach (under breasts), & has gotten medication for this in the past   Can she get a refill? Nguyen Bermudez doesn't know what the medication is    [address] [facility]

## 2022-06-30 DIAGNOSIS — R21 RASH: Primary | ICD-10-CM

## 2022-06-30 DIAGNOSIS — F41.1 GENERALIZED ANXIETY DISORDER: ICD-10-CM

## 2022-06-30 DIAGNOSIS — J43.2 CENTRILOBULAR EMPHYSEMA (HCC): ICD-10-CM

## 2022-06-30 RX ORDER — NYSTATIN 100000 U/G
CREAM TOPICAL 2 TIMES DAILY
Qty: 30 G | Refills: 0 | Status: SHIPPED | OUTPATIENT
Start: 2022-06-30

## 2022-06-30 RX ORDER — IPRATROPIUM BROMIDE AND ALBUTEROL SULFATE 2.5; .5 MG/3ML; MG/3ML
3 SOLUTION RESPIRATORY (INHALATION) EVERY 6 HOURS PRN
Qty: 360 ML | Refills: 1 | Status: SHIPPED | OUTPATIENT
Start: 2022-06-30 | End: 2022-08-29

## 2022-06-30 RX ORDER — NYSTATIN 100000 U/G
CREAM TOPICAL 2 TIMES DAILY
COMMUNITY
End: 2022-06-30 | Stop reason: SDUPTHER

## 2022-06-30 RX ORDER — ESCITALOPRAM OXALATE 10 MG/1
TABLET ORAL
Qty: 90 TABLET | Refills: 0 | Status: SHIPPED | OUTPATIENT
Start: 2022-06-30 | End: 2022-08-10

## 2022-06-30 NOTE — TELEPHONE ENCOUNTER
There is no cream on chart  call her pharmacy if it is under breasts red and itchy likley candida and would use nystatin cream bid #30 if this is not cameron complaint would need visit to diagnose

## 2022-07-05 ENCOUNTER — TELEPHONE (OUTPATIENT)
Dept: PULMONOLOGY | Facility: CLINIC | Age: 67
End: 2022-07-05

## 2022-07-05 NOTE — TELEPHONE ENCOUNTER
Patient's daughter calling to make an appt for zephyr valves, referred by [facility]   Please advise [phone]

## 2022-07-06 NOTE — TELEPHONE ENCOUNTER
Called and spoke to patient's [facility]  I advised her that we are waiting on the imaging to be completed on 7/17 the CTA is scheduled for that day and per Madison Nelson in radiology they are going to do the [place] portion of the study from that imaging  I advised her that I do have her mom's information  We are just waiting for the testing to be completed and reviewed by Dr Maty Schneider to then schedule appt  She stated she understood

## 2022-07-17 ENCOUNTER — HOSPITAL ENCOUNTER (OUTPATIENT)
Dept: CT IMAGING | Facility: HOSPITAL | Age: 67
Discharge: HOME | End: 2022-07-17
Attending: INTERNAL MEDICINE

## 2022-07-17 DIAGNOSIS — R59.0 MESENTERIC LYMPHADENOPATHY: ICD-10-CM

## 2022-07-17 DIAGNOSIS — R91.1 NODULE OF LOWER LOBE OF LEFT LUNG: ICD-10-CM

## 2022-07-17 DIAGNOSIS — C76.0 HEAD AND NECK CANCER (HCC): ICD-10-CM

## 2022-07-18 ENCOUNTER — HOSPITAL ENCOUNTER (OUTPATIENT)
Dept: CT IMAGING | Facility: HOSPITAL | Age: 67
Discharge: HOME | End: 2022-07-18
Attending: INTERNAL MEDICINE
Payer: COMMERCIAL

## 2022-07-18 PROCEDURE — 71260 CT THORAX DX C+: CPT

## 2022-07-18 PROCEDURE — 74177 CT ABD & PELVIS W/CONTRAST: CPT

## 2022-07-18 RX ADMIN — IOHEXOL 70 ML: 350 INJECTION, SOLUTION INTRAVENOUS at 12:22

## 2022-07-22 ENCOUNTER — TELEPHONE (OUTPATIENT)
Dept: HEMATOLOGY ONCOLOGY | Facility: CLINIC | Age: 67
End: 2022-07-22

## 2022-07-22 NOTE — TELEPHONE ENCOUNTER
Significant findings reported on CT CAP completed on 07/18/2022  Pt has f/u scheduled with Dr Marquis Coombs on 7/27/2022  IMPRESSION:        1  Centrilobular emphysema  2   Stable 1 2 cm left lower lobe nodule, nonhypermetabolic on PET/CT dated 5/70/6525   3   Stable 1 4 cm groundglass opacity right lower lobe  Based on current Fleischner Society 2017 Guidelines on incidental pulmonary nodule, followup noncontrast CT is recommended at 6-12 months from the initial examination to confirm persistence; if   stable at that time, additional followup CT is recommended for every 2 years until 5 years of stability is demonstrated  4   Nonspecific mesenteric stranding with mildly prominent mesenteric lymph nodes again noted  While this may be postinflammatory in nature, low-grade lymphoproliferative disorder not excluded and continued surveillance advised (1 year follow-up CT   suggested)  5   Additional findings as noted      The study was marked in EPIC for significant notification

## 2022-07-25 ENCOUNTER — TELEPHONE (OUTPATIENT)
Dept: HEMATOLOGY ONCOLOGY | Facility: HOSPITAL | Age: 67
End: 2022-07-25

## 2022-07-25 DIAGNOSIS — D50.9 IRON DEFICIENCY ANEMIA, UNSPECIFIED IRON DEFICIENCY ANEMIA TYPE: ICD-10-CM

## 2022-07-25 DIAGNOSIS — C76.0 HEAD AND NECK CANCER (HCC): ICD-10-CM

## 2022-07-25 DIAGNOSIS — R91.1 NODULE OF LOWER LOBE OF LEFT LUNG: Primary | ICD-10-CM

## 2022-07-25 DIAGNOSIS — R59.0 MESENTERIC LYMPHADENOPATHY: ICD-10-CM

## 2022-07-25 NOTE — TELEPHONE ENCOUNTER
Doesn't look like they did the [place] protocol   I will contact radiology and see if they can go back and fix

## 2022-07-25 NOTE — TELEPHONE ENCOUNTER
07/25/22    Spoke with patient's daughter reminding updated labs prior to appt  It would be better if pt can repeat her CBC/ CMP  Advising pt to go to any [facility]'s walk-in labs to get blood work done

## 2022-07-25 NOTE — TELEPHONE ENCOUNTER
Pt's daughter calling asking for appointment after the ct was done  She insisted she needed something in [facility]   Pt is scheduled

## 2022-07-25 NOTE — TELEPHONE ENCOUNTER
CTA completed on 07/17 per my note CT portion was supposed to be done with [place] protocol  Please review

## 2022-07-26 ENCOUNTER — TELEPHONE (OUTPATIENT)
Dept: HEMATOLOGY ONCOLOGY | Facility: CLINIC | Age: 67
End: 2022-07-26

## 2022-07-26 NOTE — TELEPHONE ENCOUNTER
Appointment Confirmation (to confirm pre existing appointments - ONLY)  No need to route   Appointment with  Dr Dahlia Gutierrez   Appointment date & time 7/27 2:20PM   Location [place]   Patient verbilized Understanding yes

## 2022-07-27 ENCOUNTER — OFFICE VISIT (OUTPATIENT)
Dept: HEMATOLOGY ONCOLOGY | Facility: CLINIC | Age: 67
End: 2022-07-27
Payer: COMMERCIAL

## 2022-07-27 VITALS
SYSTOLIC BLOOD PRESSURE: 126 MMHG | HEART RATE: 74 BPM | BODY MASS INDEX: 32.57 KG/M2 | DIASTOLIC BLOOD PRESSURE: 72 MMHG | HEIGHT: 62 IN | RESPIRATION RATE: 16 BRPM | OXYGEN SATURATION: 92 % | WEIGHT: 177 LBS | TEMPERATURE: 98 F

## 2022-07-27 DIAGNOSIS — Z12.11 COLON CANCER SCREENING: ICD-10-CM

## 2022-07-27 DIAGNOSIS — R91.1 NODULE OF LOWER LOBE OF LEFT LUNG: ICD-10-CM

## 2022-07-27 DIAGNOSIS — R59.0 MESENTERIC LYMPHADENOPATHY: Primary | ICD-10-CM

## 2022-07-27 DIAGNOSIS — C76.0 HEAD AND NECK CANCER (HCC): ICD-10-CM

## 2022-07-27 PROCEDURE — 99214 OFFICE O/P EST MOD 30 MIN: CPT | Performed by: INTERNAL MEDICINE

## 2022-07-27 NOTE — PROGRESS NOTES
Hematology/Oncology Outpatient Follow- up Note  Rajni Reinoso 79 y o  female MRN: @ Encounter: [id number]        Date:  7/27/2022    Presenting Complaint/Diagnosis :     Nonspecific lung findings and mesenteric adenopathy on a PET-CT scan in 2021    HPI:      Jessica Kennedy seen for initial consultation 8/12/2021 regarding  A history of head and neck cancer more than 6 years ago   The patient had a CT scan which revealed  A suspicious left lower lobe lung nodule measuring 1 2 cm and is suspicious AP window lymph node measuring 1 3 cm with Stefania mesentery with multiple abnormal nodes the largest measuring 1 1 x 1 3 cm   A PET-CT scan was done to follow-up on this and the patient was referred to see Pulmonary Medicine who she is apparently seeing on the 23rd of August   The PET-CT scan [facility] that the 1 2 x 1 1 cm left lung base nodule does not demonstrate any significant FDG uptake which would favor benign etiology although a low-grade neoplasm could not be excluded   Scattered mildly  Hypermetabolic densities in the bilateral lungs likely inflammatory or infectious could be reassessed on follow-up CT  Rochelle Boom prominent mediastinal and mesenteric lymph nodes with mild FDG activity were nonspecific but a low-grade lymphoproliferative disorder could not be excluded   Clinical correlation was recommended   The patient was referred to see us without any biopsies   Again her head and neck malignancy was taken care of by Dr Siddhartha Jacobo at [facility] states she has had head and neck exams yearly and her imaging is not suggestive of metastatic disease from this head and neck malignancy at this time with no evidence of local recurrence       Previous Hematologic/ Oncologic History:      As above    Current Hematologic/ Oncologic Treatment:      Observation    Interval History:      Patient returns for follow-up visit    Most recent blood work shows a white count of 6 57, hemoglobin 12 8 and platelet count of 221  Her most recent imaging shows stable 1 4 cm ground-glass opacity in the right lower lobe with stable 1 2 cm left lower lobe nodule and nonspecific mesenteric stranding with mildly prominent mesenteric lymph nodes again noted  Blood work shows normal iron at this point  White count is not elevated and platelet count is normal   Patient herself is at baseline  Denies any nausea denies any vomiting denies any diarrhea  The rest of her 14 point review of systems today was negative  Test Results:    Imaging: CT chest abdomen pelvis w contrast    Result Date: 7/22/2022  Narrative: CT CHEST, ABDOMEN AND PELVIS WITH IV CONTRAST INDICATION:   R91 1: Solitary pulmonary nodule R59 0: Localized enlarged lymph nodes C76 0: Malignant neoplasm of head, face and neck  HX laryngeal and pharynx cancer, checking for disease progression; HX laryngeal and pharynx cancer, checking for disease progression COMPARISON:  CT chest 2/26/2022; CT chest abdomen pelvis 7/3/2021 TECHNIQUE: CT examination of the chest, abdomen and pelvis was performed  Axial, sagittal, and coronal 2D reformatted images were created from the source data and submitted for interpretation  Radiation dose length product (DLP) for this visit:  676 mGy-cm   This examination, like all CT scans performed in the [facility], was performed utilizing techniques to minimize radiation dose exposure, including the use of iterative reconstruction and automated exposure control  IV Contrast:  70 mL of iohexol (OMNIPAQUE) Enteric Contrast: Enteric contrast was administered  FINDINGS: CHEST LUNGS:  Mild centrilobular emphysematous change is again noted  There is a stable 1 1 x 1 2 cm left lower lobe somewhat lobulated nodule again noted which is unchanged from study of 7/3/2021 and PET/CT of 1/72/3347 (nonhypermetabolic)  Also again identified is a groundglass opacity measuring 0 7 x 1 4 cm which is unchanged    This was biopsied 5/18/2022 with atypical cells but overall indeterminate result  The lungs are otherwise clear  The central airways are patent  PLEURA:  Unremarkable  HEART/GREAT VESSELS: Heart is normal in size  Mild coronary artery calcification is present  No thoracic aortic aneurysm  MEDIASTINUM AND KAYY:  Stable small likely reactive mediastinal lymph nodes again noted, the largest in the aorticopulmonary window region measuring 9 mm short axis  CHEST WALL AND LOWER NECK:  Unremarkable  ABDOMEN LIVER/BILIARY TREE:  Mild steatosis  GALLBLADDER:  No calcified gallstones  No pericholecystic inflammatory change  SPLEEN:  Unremarkable  PANCREAS:  Unremarkable  ADRENAL GLANDS:  Unremarkable  KIDNEYS/URETERS:  Unremarkable  No hydronephrosis  STOMACH AND BOWEL:  Small hiatal hernia is present  Mild retained fecal material in the colon noted  No bowel wall thickening or intestinal obstruction is identified  APPENDIX:  No findings to suggest appendicitis  ABDOMINOPELVIC CAVITY:  Mild hazy stranding at the mesenteric root is again noted) "garo mesentery ") similar to the prior examinations  A few mildly prominent mesenteric lymph nodes persist in this region, the largest measuring 1 3 x 2 4 cm (series 2 image 68), unchanged from the study of 7/3/2021  These previous study demonstrated mild increased metabolic activity on PET CT dated 8/10/2021 and although most likely reactive in nature, a low-grade lymphoproliferative disorder cannot be entirely excluded  Continued routine follow-up advised  No ascites or pneumoperitoneum identified  VESSELS:  Unremarkable for patient's age  PELVIS REPRODUCTIVE ORGANS:  Unremarkable for patient's age  URINARY BLADDER:  Unremarkable  ABDOMINAL WALL/INGUINAL REGIONS:  Unremarkable  OSSEOUS STRUCTURES:  No acute fracture or destructive osseous lesion  Impression: 1  Centrilobular emphysema   2   Stable 1 2 cm left lower lobe nodule, nonhypermetabolic on PET/CT dated 8/48/3795  3   Stable 1 4 cm groundglass opacity right lower lobe  Based on current Fleischner Society 2017 Guidelines on incidental pulmonary nodule, followup noncontrast CT is recommended at 6-12 months from the initial examination to confirm persistence; if stable at that time, additional followup CT is recommended for every 2 years until 5 years of stability is demonstrated  4   Nonspecific mesenteric stranding with mildly prominent mesenteric lymph nodes again noted  While this may be postinflammatory in nature, low-grade lymphoproliferative disorder not excluded and continued surveillance advised (1 year follow-up CT suggested)  5   Additional findings as noted  The study was marked in EPIC for significant notification  Workstation performed: [id number]       Labs:   Lab Results   Component Value Date    WBC 6 57 05/23/2022    HGB 12 8 05/23/2022    HCT 42 8 05/23/2022    MCV 86 05/23/2022     05/23/2022     Lab Results   Component Value Date     02/25/2015    K 5 6 (H) 05/23/2022     05/23/2022    CO2 32 05/23/2022    ANIONGAP 3 (L) 02/25/2015    BUN 26 (H) 05/23/2022    CREATININE 1 24 05/23/2022    GLUCOSE 83 02/25/2015    GLUF 97 02/23/2022    CALCIUM 9 6 05/23/2022    AST 21 05/23/2022    ALT 12 05/23/2022    ALKPHOS 107 (H) 05/23/2022    PROT 6 9 02/25/2015    BILITOT 0 3 02/25/2015    EGFR 45 05/23/2022     Lab Results   Component Value Date    IRON 88 05/23/2022    TIBC 331 05/23/2022    FERRITIN 36 05/23/2022       Lab Results   Component Value Date    OSGAIXPJ51 1,273 (H) 02/25/2015         ROS: As stated in the history of present illness otherwise his 14 point review of systems today was negative        Active Problems:   Patient Active Problem List   Diagnosis    Cancer of pharynx ([address] )    Cataract    Chronic kidney disease, stage 3 (moderate)    Centrilobular emphysema (HCC)    Extrinsic obstruction of cartilagenous portion of eustachian tube    Personal history of radiation therapy    History of laryngeal cancer    Loss of hearing    Depression with anxiety    Numbness    Panic attack    Vitamin D deficiency    Dupuytren contracture    Elevated alkaline phosphatase level    Bronchiectasis with acute exacerbation (HCC)    Severe persistent asthma without complication    Multiple lung nodules    Myalgia    Tension type headache    Hyperlipidemia    COVID-19    Trigger finger of right thumb    Current mild episode of major depressive disorder without [facility]    Persistent dyspnea after COVID-19    History of COVID-19    Class 1 obesity due to excess calories with serious comorbidity and body mass index (BMI) of 32 0 to 32 9 in adult    Exercise hypoxemia    Post-nasal drip    Mesenteric lymphadenopathy    Neck pain on left side    Mediastinal lymphadenopathy    Preop examination    Preoperative cardiovascular examination       Past Medical History:   Past Medical History:   Diagnosis Date    Acute kidney failure (HCC)     Allergies     Anemia     Cancer (HCC)     Chronic kidney disease (CKD), stage III (moderate) (HCC)     COPD (chronic obstructive pulmonary disease) ([facility] )     COVID-19     Covid + 6-8-55-cough at that time now fully resolved    Disease of thyroid gland     Essential hypertension     GERD (gastroesophageal reflux disease)     Glaucoma     High blood pressure     HTN (hypertension) 2/16/2021    Hyperlipidemia     Hypothyroidism     Throat cancer ([facility] ) 2014    chemo and rad therapy 2014       Surgical History:   Past Surgical History:   Procedure Laterality Date    COLONOSCOPY  2016    ESOPHAGOGASTRODUODENOSCOPY  2016    IR BIOPSY LUNG  5/18/2022    LARYNGOSCOPY      w/ Bx       WI INCISE FINGER TENDON SHEATH Right 2/16/2021    Procedure: THUMB TRIGGER FINGER RELEASE;  Surgeon: Power Saha MD;  [facility]: [facility];  Service: Orthopedics    TUBAL LIGATION         Family History:    Family History   Problem Relation Age of Onset  Brain cancer Sister     Diabetes Sister     Breast cancer Sister     Other Mother         respiratory disorder    Sudden death Father         shot himself    Suicidality Father     No Known Problems Daughter     No Known Problems Maternal Grandmother     No Known Problems Maternal Grandfather     No Known Problems Paternal Grandmother     No Known Problems Paternal Grandfather     No Known Problems Sister     No Known Problems Sister     No Known Problems Sister     No Known Problems Sister     No Known Problems Sister     No Known Problems Sister     No Known Problems Sister     No Known Problems Daughter     No Known Problems Maternal Aunt     No Known Problems Maternal Aunt     No Known Problems Maternal Aunt     No Known Problems Maternal Aunt     No Known Problems Maternal Aunt     No Known Problems Paternal Aunt     No Known Problems Paternal Aunt     No Known Problems Paternal Aunt     No Known Problems Paternal Aunt        Cancer-related family history includes Brain cancer in her sister; Breast cancer in her sister      Social History:   Social History     Socioeconomic History    Marital status: /Civil Union     Spouse name: Not on file    Number of children: Not on file    Years of education: Not on file    Highest education level: Not on file   Occupational History    Not on file   Tobacco Use    Smoking status: Former Smoker     Packs/day: 1 00     Years: 40 00     Pack years: 40 00     Quit date:      Years since quittin 5    Smokeless tobacco: Never Used   Vaping Use    Vaping Use: Never used   Substance and Sexual Activity    Alcohol use: Never     Comment: None    Drug use: Not on file     Comment: Denies    Sexual activity: Not Currently   Other Topics Concern    Not on file   Social History Narrative    Most recent tobacco use screenin2020    Do you currently or have you served in the [employer]: No    Were you activated, into active duty, as a member of the [employer] or as a Reservist: No    Marital status:     Sexual orientation: Heterosexual    Exercise level: Occasional    Diet: Regular    General stress level: Low    Has smoked since age: 23    Alcohol intake: None    Caffeine intake: Occasional    Chewing tobacco: none    Illicit drugs: Denies    Guns present in home: No    Seat belts used routinely: Yes    Sunscreen used routinely: Yes    Smoke alarm in home: Yes    Advance directive: No    Salt Intake: HTN Diet    Has the Patient had a mammogram to screen for breast cancer within 24 months: Yes    Would the patient like to schedule a Mammogram: No    Is the patient interested in a colorectal cancer screening: No    Live alone or with others: with others    Sexually active: No    Do you feel safe at home: Yes     Social Determinants of Health     Financial Resource Strain: Not on file   Food Insecurity: Not on file   Transportation Needs: Not on file   Physical Activity: Not on file   Stress: Not on file   Social Connections: Not on file   Intimate Partner Violence: Not on file   Housing Stability: Not on file       Current Medications:   Current Outpatient Medications   Medication Sig Dispense Refill    acetaminophen (TYLENOL) 650 mg CR tablet Take 1 tablet (650 mg total) by mouth every 8 (eight) hours as needed for mild pain 30 tablet 0    albuterol (PROVENTIL HFA,VENTOLIN HFA) 90 mcg/act inhaler Inhale 2 puffs every 4 (four) hours as needed for wheezing 36 g 5    brimonidine tartrate 0 2 % ophthalmic solution       Budeson-Glycopyrrol-Formoterol (Breztri Aerosphere) 160-9-4 8 MCG/ACT AERO Inhale 2 puffs 2 (two) times a day Rinse mouth after use   10 7 g 3    ipratropium (ATROVENT) 0 06 % nasal spray USE 2 SPRAYS IN EACH NOSTRIL 4 TIMES DAILY 15 mL 3    ipratropium-albuterol (DUO-NEB) 0 5-2 5 mg/3 mL nebulizer solution TAKE 3 ML BY NEBULIZATION EVERY 6 (SIX) HOURS AS NEEDED FOR WHEEZING OR SHORTNESS OF BREATH 360 mL 1    levothyroxine 100 mcg tablet Take 1 tablet (100 mcg total) by mouth daily 90 tablet 1    lidocaine (Lidoderm) 5 % Apply 1 patch topically in the morning  Remove & Discard patch within 12 hours or as directed by MD  15 patch 0    montelukast (SINGULAIR) 10 mg tablet TAKE 1 TABLET BY MOUTH DAILY AT BEDTIME 90 tablet 1    nystatin (MYCOSTATIN) cream Apply topically 2 (two) times a day 30 g 0    timolol (TIMOPTIC) 0 5 % ophthalmic solution INSTILL 1 DROP INTO EACH EYE TWO TIMES A DAY      Aspirin Low Dose 81 MG EC tablet TAKE 1 TABLET BY MOUTH EVERY DAY (Patient not taking: Reported on 7/27/2022) 90 tablet 1    escitalopram (LEXAPRO) 10 mg tablet TAKE 1 TABLET BY MOUTH EVERY DAY (Patient not taking: Reported on 7/27/2022) 90 tablet 0    methocarbamol (ROBAXIN) 500 mg tablet Take 1 tablet (500 mg total) by mouth in the morning and 1 tablet (500 mg total) in the evening  (Patient not taking: Reported on 7/27/2022) 20 tablet 0    pantoprazole (PROTONIX) 40 mg tablet Take 1 tablet (40 mg total) by mouth daily (Patient not taking: Reported on 7/27/2022) 90 tablet 1     No current facility-administered medications for this visit  Allergies: Allergies   Allergen Reactions    Amifostine Rash    Pollen Extract Allergic Rhinitis       Physical Exam:    Body surface area is 1 81 meters squared  Wt Readings from Last 3 Encounters:   07/27/22 80 3 kg (177 lb)   06/02/22 78 5 kg (173 lb)   05/24/22 78 kg (172 lb)        Temp Readings from Last 3 Encounters:   07/27/22 98 °F (36 7 °C) (Temporal)   06/02/22 97 6 °F (36 4 °C) (Tympanic)   05/24/22 97 6 °F (36 4 °C) (Temporal)        BP Readings from Last 3 Encounters:   07/27/22 126/72   06/02/22 140/80   05/24/22 120/70         Pulse Readings from Last 3 Encounters:   07/27/22 74   06/02/22 71   05/24/22 84       Physical Exam     Constitutional   General appearance: No acute distress, well appearing and well nourished      Eyes   Conjunctiva and lids: No swelling, erythema or discharge  Pupils and irises: Equal, round and reactive to light  Ears, Nose, Mouth, and Throat   External inspection of ears and nose: Normal     Nasal mucosa, septum, and turbinates: Normal without edema or erythema  Oropharynx: Normal with no erythema, edema, exudate or lesions  Pulmonary   Respiratory effort: No increased work of breathing or signs of respiratory distress  Auscultation of lungs: Clear to auscultation  Cardiovascular   Palpation of heart: Normal PMI, no thrills  Auscultation of heart: Normal rate and rhythm, normal S1 and S2, without murmurs  Examination of extremities for edema and/or varicosities: Normal     Carotid pulses: Normal     Abdomen   Abdomen: Non-tender, no masses  Liver and spleen: No hepatomegaly or splenomegaly  Lymphatic   Palpation of lymph nodes in neck: No lymphadenopathy  Musculoskeletal   Gait and station: Normal     Digits and nails: Normal without clubbing or cyanosis  Inspection/palpation of joints, bones, and muscles: Normal     Skin   Skin and subcutaneous tissue: Normal without rashes or lesions  Neurologic   Cranial nerves: Cranial nerves 2-12 intact  Sensation: No sensory loss  Psychiatric   Orientation to person, place, and time: Normal     Mood and affect: Normal         Assessment / Plan:      The patient is a pleasant 24-year-old female with a remote history of head and neck malignancy was referred to see us for non FDG avid lung nodule and some mediastinal and mesenteric adenopathy which had low-grade activity but was never sampled  We ended up arranging a biopsy and repeat blood work for the patient  Iron studies are normal   Biopsy showed minute foci of atypical glandular proliferation but no definitive diagnosis could be made  PET-CT scan was ordered but not approved so a CT scan of the chest abdomen pelvis was done    It showed stable 1 2 cm left lower lobe nodule 9 hypermetabolic on a PET-CT scan last year  It showed a stable 1 4 cm ground-glass opacity in the right lower lobe  There was nonspecific mesenteric stranding with mildly prominent mesenteric lymph nodes again noted which may be postinflammatory in nature although a low-grade lymphoproliferative disorder could not be excluded  At this point there is no evidence of progression anywhere  I advised her to follow regularly with her ENT physician for scopes  Her voice to change a little and she will discuss this with them and call them according to her son and the patient  I will set her up with our colleagues in Colorectal surgery for colonoscopy  I will see her back in 6 months with repeat imaging and blood work  Goals and Barriers:  Current Goal:  Prolong Survival from head and neck cancer, mesenteric lymphadenopathy  Barriers: None  Patient's Capacity to Self Care:  Patient able to self care  Portions of the record may have been created with voice recognition software  Occasional wrong word or "sound a like" substitutions may have occurred due to the inherent limitations of voice recognition software  Read the chart carefully and recognize, using context, where substitutions have occurred

## 2022-07-29 ENCOUNTER — TELEPHONE (OUTPATIENT)
Dept: PULMONOLOGY | Facility: CLINIC | Age: 67
End: 2022-07-29

## 2022-07-29 NOTE — TELEPHONE ENCOUNTER
I called pt and left her a Vm asking her if she can come in on 8/1/2022 at 11 am  If she calls office and confirms 11 am we will block the current time she is scheduled for

## 2022-07-29 NOTE — TELEPHONE ENCOUNTER
----- Message from Desiree Ordaz DO sent at 7/29/2022  1:02 PM EDT -----  We can do 11am but please block the other slot if she moves  ----- Message -----  From: Santi Nye  Sent: 7/29/2022  12:57 PM EDT  To: Desiree Ordaz DO    Pt called Dacia Kay and would like to know if she can be seen earlier on the date of her appt or in the morning on Friday  Please advise  ----- Message -----  From: Maribel De Paz  Sent: 7/29/2022  12:07 PM EDT  To: Santi Nye    Can she be seen at an earlier time or Friday 8-5?

## 2022-08-01 DIAGNOSIS — J44.9 CHRONIC OBSTRUCTIVE PULMONARY DISEASE, UNSPECIFIED COPD TYPE (HCC): Primary | ICD-10-CM

## 2022-08-10 ENCOUNTER — TELEMEDICINE (OUTPATIENT)
Dept: FAMILY MEDICINE CLINIC | Facility: CLINIC | Age: 67
End: 2022-08-10
Payer: COMMERCIAL

## 2022-08-10 VITALS
DIASTOLIC BLOOD PRESSURE: 64 MMHG | HEIGHT: 62 IN | TEMPERATURE: 97.4 F | HEART RATE: 74 BPM | OXYGEN SATURATION: 93 % | SYSTOLIC BLOOD PRESSURE: 120 MMHG | BODY MASS INDEX: 32.2 KG/M2 | WEIGHT: 175 LBS | RESPIRATION RATE: 14 BRPM

## 2022-08-10 DIAGNOSIS — R91.1 PULMONARY NODULE: ICD-10-CM

## 2022-08-10 DIAGNOSIS — C32.9 SQUAMOUS CELL CARCINOMA OF LARYNX (HCC): ICD-10-CM

## 2022-08-10 DIAGNOSIS — J43.9 ACUTE EXACERBATION OF EMPHYSEMA (HCC): Primary | ICD-10-CM

## 2022-08-10 DIAGNOSIS — R22.1 LOCALIZED SWELLING, MASS AND LUMP, NECK: ICD-10-CM

## 2022-08-10 DIAGNOSIS — Z20.822 CLOSE EXPOSURE TO COVID-19 VIRUS: ICD-10-CM

## 2022-08-10 LAB
SARS-COV-2 AG UPPER RESP QL IA: NEGATIVE
VALID CONTROL: NORMAL

## 2022-08-10 PROCEDURE — 99215 OFFICE O/P EST HI 40 MIN: CPT | Performed by: FAMILY MEDICINE

## 2022-08-10 PROCEDURE — 87811 SARS-COV-2 COVID19 W/OPTIC: CPT | Performed by: FAMILY MEDICINE

## 2022-08-10 RX ORDER — PREDNISONE 20 MG/1
20 TABLET ORAL 2 TIMES DAILY WITH MEALS
Qty: 10 TABLET | Refills: 0 | Status: SHIPPED | OUTPATIENT
Start: 2022-08-10 | End: 2022-09-21

## 2022-08-10 RX ORDER — AZITHROMYCIN 250 MG/1
TABLET, FILM COATED ORAL
Qty: 6 TABLET | Refills: 0 | Status: SHIPPED | OUTPATIENT
Start: 2022-08-10 | End: 2022-08-15

## 2022-08-10 RX ORDER — BROMPHENIRAMINE MALEATE, PSEUDOEPHEDRINE HYDROCHLORIDE, AND DEXTROMETHORPHAN HYDROBROMIDE 2; 30; 10 MG/5ML; MG/5ML; MG/5ML
5 SYRUP ORAL 4 TIMES DAILY PRN
Qty: 120 ML | Refills: 0 | Status: SHIPPED | OUTPATIENT
Start: 2022-08-10 | End: 2022-08-20

## 2022-08-10 NOTE — PROGRESS NOTES
Subjective:      Patient ID: Marcella Baker is a 79 y o  female  Was scheudled for virtual but came to the office   service used for the entire visit  Cough, congestion, chills and worsening shortness of breath for 4 days, daughter present in the room, another daughter Ashley Gonzalez available on the phone    Emphysema- patient is a former 36 PPY smoker , quit 2014, with ILD, Emphysema and lung nodule, recently ground glass opacity in new CT- she is undergoing evaluation for [place] valves and needs repeat CT which is not covered by insurance  Also I am not sure of clear benefit of this procedure on her at this time  Feels her neck lump has increased in size, has a follow up with ENT  Depression- daughter Ashley Gonzalez is worried and states Marybeth Lawrence is constantly anxious and calls her multiple times a day    Cough  Associated symptoms include chills and a sore throat  Pertinent negatives include no chest pain, eye redness, fever, headaches, myalgias, rash, rhinorrhea, shortness of breath or wheezing         Past Medical History:   Diagnosis Date    Acute kidney failure (HCC)     Allergic     Allergies     Anemia     Asthma     [facility]     Chronic kidney disease (CKD), stage III (moderate) (HCC)     [facility]ch[facility]     COVID-19     Covid + 1-6-09-cough at that time now fully resolved    Disease of thyroid gland     Essential hypertension     GERD (gastroesophageal reflux disease)     Glaucoma     High blood pressure     HTN (hypertension) 02/16/2021    Hyperlipidemia     Hypothyroidism     Thro[facility] ) 2014    chemo and rad therapy 2014       Family History   Problem Relation Age of Onset    Brain cancer Sister     Diabetes Sister     Breast cancer Sister     Other Mother         respiratory disorder    Sudden death Father         shot himself    Suicidality Father     No Known Problems Daughter     No Known Problems Maternal Grandmother     No Known Problems Maternal Grandfather     No Known Problems Paternal Grandmother     No Known Problems Paternal Grandfather     No Known Problems Sister     No Known Problems Sister     No Known Problems Sister     No Known Problems Sister     No Known Problems Sister     No Known Problems Sister     No Known Problems Sister     No Known Problems Daughter     No Known Problems Maternal Aunt     No Known Problems Maternal Aunt     No Known Problems Maternal Aunt     No Known Problems Maternal Aunt     No Known Problems Maternal Aunt     No Known Problems Paternal Aunt     No Known Problems Paternal Aunt     No Known Problems Paternal Aunt     No Known Problems Paternal Aunt        Past Surgical History:   Procedure Laterality Date    COLONOSCOPY  2016    ESOPHAGOGASTRODUODENOSCOPY  2016    EYE SURGERY      IR BIOPSY LUNG  05/18/2022    LARYNGOSCOPY      w/ Bx   [place] INCISE FINGER TENDON SHEATH Right 02/16/2021    Procedure: THUMB TRIGGER FINGER RELEASE;  Surgeon: Sujey Van MD;  Location: [facility]  MAIN [facility];  Service: Orthopedics    TUBAL LIGATION          reports that she quit smoking about 8 years ago  She has a 40 00 pack-year smoking history  She has never used smokeless tobacco  She reports that she does not drink alcohol  Current Outpatient Medications:     acetaminophen (TYLENOL) 650 mg CR tablet, Take 1 tablet (650 mg total) by mouth every 8 (eight) hours as needed for mild pain, Disp: 30 tablet, Rfl: 0    albuterol (PROVENTIL HFA,VENTOLIN HFA) 90 mcg/act inhaler, Inhale 2 puffs every 4 (four) hours as needed for wheezing, Disp: 36 g, Rfl: 5    azithromycin (Zithromax) 250 mg tablet, Take 2 tablets (500 mg total) by mouth daily for 1 day, THEN 1 tablet (250 mg total) daily for 4 days  , Disp: 6 tablet, Rfl: 0    brimonidine tartrate 0 2 % ophthalmic solution, , Disp: , Rfl:     brompheniramine-pseudoephedrine-DM 30-2-10 MG/5ML syrup, Take 5 mL by mouth 4 (four) times a day as needed for cough or allergies for up to 10 days, Disp: 120 mL, Rfl: 0    Budeson-Glycopyrrol-Formoterol (Breztri Aerosphere) 160-9-4 8 MCG/ACT AERO, Inhale 2 puffs 2 (two) times a day Rinse mouth after use , Disp: 10 7 g, Rfl: 3    ipratropium (ATROVENT) 0 06 % nasal spray, USE 2 SPRAYS IN EACH NOSTRIL 4 TIMES DAILY, Disp: 15 mL, Rfl: 3    ipratropium-albuterol (DUO-NEB) 0 5-2 5 mg/3 mL nebulizer solution, TAKE 3 ML BY NEBULIZATION EVERY 6 (SIX) HOURS AS NEEDED FOR WHEEZING OR SHORTNESS OF BREATH, Disp: 360 mL, Rfl: 1    levothyroxine 100 mcg tablet, Take 1 tablet (100 mcg total) by mouth daily, Disp: 90 tablet, Rfl: 1    lidocaine (Lidoderm) 5 %, Apply 1 patch topically in the morning  Remove & Discard patch within 12 hours or as directed by MD , Disp: 15 patch, Rfl: 0    montelukast (SINGULAIR) 10 mg tablet, TAKE 1 TABLET BY MOUTH DAILY AT BEDTIME, Disp: 90 tablet, Rfl: 1    nystatin (MYCOSTATIN) cream, Apply topically 2 (two) times a day, Disp: 30 g, Rfl: 0    predniSONE 20 mg tablet, Take 1 tablet (20 mg total) by mouth 2 (two) times a day with meals, Disp: 10 tablet, Rfl: 0    timolol (TIMOPTIC) 0 5 % ophthalmic solution, INSTILL 1 DROP INTO EACH EYE TWO TIMES A DAY, Disp: , Rfl:     The following portions of the patient's history were reviewed and updated as appropriate: allergies, current medications, past family history, past medical history, past social history, past surgical history and problem list     Review of Systems   Constitutional: Positive for chills  Negative for fatigue and fever  HENT: Positive for congestion and sore throat  Negative for facial swelling, mouth sores, rhinorrhea and trouble swallowing  Neck swelling+   Eyes: Negative for pain and redness  Respiratory: Positive for cough  Negative for shortness of breath and wheezing  Cardiovascular: Negative for chest pain, palpitations and leg swelling     Gastrointestinal: Negative for abdominal pain, blood in stool, constipation, diarrhea and nausea  Genitourinary: Negative for dysuria, hematuria and urgency  Musculoskeletal: Negative for arthralgias, back pain and myalgias  Skin: Negative for rash and wound  Neurological: Negative for seizures, syncope and headaches  Hematological: Negative for adenopathy  Psychiatric/Behavioral: Negative for agitation and behavioral problems  Objective:    /64 (BP Location: Right arm, Patient Position: Sitting, Cuff Size: Adult)   Pulse 74   Temp (!) 97 4 °F (36 3 °C) (Temporal)   Resp 14   Ht 5' 2" (1 575 m)   Wt 79 4 kg (175 lb)   SpO2 93%   BMI 32 01 kg/m²      Physical Exam  Vitals and nursing note reviewed  Constitutional:       Appearance: Normal appearance  She is well-developed  She is not ill-appearing  HENT:      Head: Normocephalic and atraumatic  Right Ear: External ear normal       Left Ear: External ear normal       Nose: Congestion present  Mouth/Throat:      Mouth: Mucous membranes are moist       Pharynx: No oropharyngeal exudate or posterior oropharyngeal erythema  Eyes:      General: No scleral icterus  Right eye: No discharge  Left eye: No discharge  Conjunctiva/sclera: Conjunctivae normal    Cardiovascular:      Rate and Rhythm: Normal rate  Heart sounds: No murmur heard  No gallop  Pulmonary:      Effort: Pulmonary effort is normal  No respiratory distress  Breath sounds: No stridor  Examination of the right-upper field reveals rales  Examination of the left-upper field reveals rales  Examination of the right-middle field reveals rales  Examination of the left-middle field reveals rales  Examination of the right-lower field reveals rales  Examination of the left-lower field reveals rales  Decreased breath sounds and rales present  No wheezing or rhonchi  Abdominal:      Palpations: Abdomen is soft  Tenderness: There is no abdominal tenderness     Musculoskeletal: General: No tenderness or deformity  Skin:     Findings: No erythema or rash  Neurological:      Mental Status: She is alert  Mental status is at baseline     Psychiatric:         Behavior: Behavior normal          Judgment: Judgment normal            Recent Results (from the past 8736 hour(s))   COVID Only- Collected at [facility]    Collection Time: 01/03/22  6:57 PM    Specimen: Nose; Nares   Result Value Ref Range    SARS-CoV-2 Negative Negative   CBC and differential    Collection Time: 02/23/22  8:37 AM   Result Value Ref Range    WBC 6 80 4 31 - 10 16 Thousand/uL    RBC 4 61 3 81 - 5 12 Million/uL    Hemoglobin 11 9 11 5 - 15 4 g/dL    Hematocrit 38 6 34 8 - 46 1 %    MCV 84 82 - 98 fL    MCH 25 8 (L) 26 8 - 34 3 pg    MCHC 30 8 (L) 31 4 - 37 4 g/dL    RDW 14 6 11 6 - 15 1 %    MPV 10 1 8 9 - 12 7 fL    Platelets 948 158 - 534 Thousands/uL    nRBC 0 /100 WBCs    Neutrophils Relative 75 43 - 75 %    Immat GRANS % 0 0 - 2 %    Lymphocytes Relative 15 14 - 44 %    Monocytes Relative 8 4 - 12 %    Eosinophils Relative 2 0 - 6 %    Basophils Relative 0 0 - 1 %    Neutrophils Absolute 5 04 1 85 - 7 62 Thousands/µL    Immature Grans Absolute 0 03 0 00 - 0 20 Thousand/uL    Lymphocytes Absolute 1 04 0 60 - 4 47 Thousands/µL    Monocytes Absolute 0 52 0 17 - 1 22 Thousand/µL    Eosinophils Absolute 0 14 0 00 - 0 61 Thousand/µL    Basophils Absolute 0 03 0 00 - 0 10 Thousands/µL   Comprehensive metabolic panel    Collection Time: 02/23/22  8:37 AM   Result Value Ref Range    Sodium 142 133 - 145 mmol/L    Potassium 4 5 3 5 - 5 0 mmol/L    Chloride 105 96 - 108 mmol/L    CO2 31 22 - 33 mmol/L    ANION GAP 6 4 - 13 mmol/L    BUN 30 (H) 6 - 20 mg/dL    Creatinine 1 24 (H) 0 40 - 1 10 mg/dL    Glucose, Fasting 97 70 - 105 mg/dL    Calcium 9 8 8 4 - 10 2 mg/dL    AST 27 15 - 41 U/L    ALT 19 5 - 54 U/L    Alkaline Phosphatase 110 8 35 - 140 U/L    Total Protein 7 1 6 4 - 8 3 g/dL    Albumin 4 3 3 4 - 4 8 g/dL    Total Bilirubin 0 42 0 30 - 1 20 mg/dL    eGFR 45 ml/min/1 73sq m   Ferritin    Collection Time: 02/23/22  8:37 AM   Result Value Ref Range    Ferritin 42 8 - 388 ng/mL   Iron Saturation %    Collection Time: 02/23/22  8:37 AM   Result Value Ref Range    Iron Saturation 15 15 - 50 %    TIBC 325 250 - 450 ug/dL    Iron 50 50 - 170 ug/dL   Methylmalonic acid, serum    Collection Time: 02/23/22  8:37 AM   Result Value Ref Range    Methylmalonic Acid, S 131 0 - 378 nmol/L   Leukemia/Lymphoma flow cytometry    Collection Time: 02/23/22  8:37 AM   Result Value Ref Range    Scan Result SEE WRITTEN REPORT    TSH, 3rd generation    Collection Time: 02/23/22  8:37 AM   Result Value Ref Range    TSH 3RD GENERATON 0 581 0 340 - 5 600 uIU/mL   Protime-INR    Collection Time: 05/18/22  9:28 AM   Result Value Ref Range    Protime 12 3 11 6 - 14 5 seconds    INR 0 91 0 84 - 1 19   Tissue Exam    Collection Time: 05/18/22 10:58 AM   Result Value Ref Range    Case Report       Surgical Pathology Report                         Case: [id number]                                   Authorizing Provider:  Sanjana Jones MD        Collected:           05/18/2022 1058              Ordering Location:     [facility]        Received:            05/18/2022 [address] 1 Scan                                                            Pathologist:           José Miguel Marsh MD                                                                 Specimen:    Lesion, Right pulmonary nodule                                                             Final Diagnosis       A  Lesion, Right pulmonary nodule:  - Minute foci of atypical glandular proliferations  See comment  Comment: There are very minute areas of atypical glands on the core biopsies    Touch preparation slides demonstrate more cellular material with clusters of atypical epithelial cells present  The differential includes atypical adenomatous hyperplasia, adenocarcinoma in-situ, or a low-grade invasive adenocarcinoma  Additional tissue sampling, including excision, may be considered if clinically indicated  Additional Information       All reported additional testing was performed with appropriately reactive controls  These tests were developed and their performance characteristics determined by [facility] or appropriate performing facility, though some tests may be performed on tissues which have not been validated for performance characteristics (such as staining performed on alcohol exposed cell blocks and decalcified tissues)  Results should be interpreted with caution and in the context of the patients clinical condition  These tests may not be cleared or approved by the [place]  Food and Drug Administration, though the FDA has determined that such clearance or approval is not necessary  These tests are used for clinical purposes and they should not be regarded as investigational or for research  This laboratory has been approved by [place] 88, designated as a high-complexity laboratory and is qualified to perform these tests     Interpretation performed at [facility], [address]      Intraoperative Consultation          Touch prep x2:  - Adequate, requested additional core  CVytlacil  Interpretation performed at [id number] [facility], [address] [place] [address]        Gross Description          A  The specimen is received fresh, labeled with the patient's name and hospital number, and is designated "she right pulmonary nodule   It consists of 3 gray-brown, delicate, and cylindrical tissue cores  that range from 0 5 cm in length by less than 0 1 cm in diameter to 0 8 cm in length by less than 0 1 cm in diameter   The cores are placed between 2 sponges and submitted entirely as follows: A1: One core   A2: One core   A3: One core     Note: The estimated total formalin fixation time based upon information provided by the submitting clinician and the standard processing schedule is 29 00 hours       HPolster        CBC and differential    Collection Time: 05/23/22  4:05 PM   Result Value Ref Range    WBC 6 57 4 31 - 10 16 Thousand/uL    RBC 4 97 3 81 - 5 12 Million/uL    Hemoglobin 12 8 11 5 - 15 4 g/dL    Hematocrit 42 8 34 8 - 46 1 %    MCV 86 82 - 98 fL    MCH 25 8 (L) 26 8 - 34 3 pg    MCHC 29 9 (L) 31 4 - 37 4 g/dL    RDW 14 6 11 6 - 15 1 %    MPV 10 1 8 9 - 12 7 fL    Platelets 040 829 - 604 Thousands/uL    nRBC 0 /100 WBCs    Neutrophils Relative 73 43 - 75 %    Immat GRANS % 1 0 - 2 %    Lymphocytes Relative 15 14 - 44 %    Monocytes Relative 7 4 - 12 %    Eosinophils Relative 3 0 - 6 %    Basophils Relative 1 0 - 1 %    Neutrophils Absolute 4 81 1 85 - 7 62 Thousands/µL    Immature Grans Absolute 0 03 0 00 - 0 20 Thousand/uL    Lymphocytes Absolute 0 99 0 60 - 4 47 Thousands/µL    Monocytes Absolute 0 48 0 17 - 1 22 Thousand/µL    Eosinophils Absolute 0 21 0 00 - 0 61 Thousand/µL    Basophils Absolute 0 05 0 00 - 0 10 Thousands/µL   Comprehensive metabolic panel    Collection Time: 05/23/22  4:05 PM   Result Value Ref Range    Sodium 143 135 - 147 mmol/L    Potassium 5 6 (H) 3 5 - 5 3 mmol/L    Chloride 105 96 - 108 mmol/L    CO2 32 21 - 32 mmol/L    ANION GAP 6 4 - 13 mmol/L    BUN 26 (H) 5 - 25 mg/dL    Creatinine 1 24 0 60 - 1 30 mg/dL    Glucose 90 65 - 140 mg/dL    Calcium 9 6 8 4 - 10 2 mg/dL    AST 21 13 - 39 U/L    ALT 12 7 - 52 U/L    Alkaline Phosphatase 107 (H) 34 - 104 U/L    Total Protein 7 1 6 4 - 8 4 g/dL    Albumin 4 2 3 5 - 5 0 g/dL    Total Bilirubin 0 41 0 20 - 1 00 mg/dL    eGFR 45 ml/min/1 73sq m   Iron Saturation %    Collection Time: 05/23/22  4:05 PM   Result Value Ref Range    Iron Saturation 27 15 - 50 %    TIBC 331 250 - 450 ug/dL    Iron 88 50 - 170 ug/dL Ferritin    Collection Time: 05/23/22  4:05 PM   Result Value Ref Range    Ferritin 36 8 - 388 ng/mL   Portability Revision for Existing O2 Patients    Collection Time: 06/03/22  1:59 PM   Result Value Ref Range    Supplier Name [employer] [place]     Supplier Phone Number [phone]     Order Status Contacting Patient     Delivery Note Please contact daughter Apryl Azevedo      Delivery Request Date 06/03/2022     Item Description       O2 with Portability for Existing O2 Patients, Standard Liter Flow    Item Description New Portable Setup, Portable Oxygen Concentrator     Item Description POC with Conserving Device / Pulse Dose    POCT Rapid Covid Ag    Collection Time: 08/10/22  9:41 AM   Result Value Ref Range    POCT SARS-CoV-2 Ag Negative Negative    VALID CONTROL Valid        Laboratory Results: I have personally reviewed the pertinent laboratory results/reports     Radiology/Other Diagnostic Testing Results: I have personally reviewed pertinent reports  CT chest abdomen pelvis w contrast    Result Date: 7/22/2022  CT CHEST, ABDOMEN AND PELVIS WITH IV CONTRAST INDICATION:   R91 1: Solitary pulmonary nodule R59 0: Localized enlarged lymph nodes C76 0: Malignant neoplasm of head, face and neck  HX laryngeal and pharynx cancer, checking for disease progression; HX laryngeal and pharynx cancer, checking for disease progression COMPARISON:  CT chest 2/26/2022; CT chest abdomen pelvis 7/3/2021 TECHNIQUE: CT examination of the chest, abdomen and pelvis was performed  Axial, sagittal, and coronal 2D reformatted images were created from the source data and submitted for interpretation  Radiation dose length product (DLP) for this visit:  676 mGy-cm   This examination, like all CT scans performed in the [facility], was performed utilizing techniques to minimize radiation dose exposure, including the use of iterative reconstruction and automated exposure control   IV Contrast:  70 mL of iohexol (OMNIPAQUE) Enteric Contrast: Enteric contrast was administered  FINDINGS: CHEST LUNGS:  Mild centrilobular emphysematous change is again noted  There is a stable 1 1 x 1 2 cm left lower lobe somewhat lobulated nodule again noted which is unchanged from study of 7/3/2021 and PET/CT of 4/05/3996 (nonhypermetabolic)  Also again identified is a groundglass opacity measuring 0 7 x 1 4 cm which is unchanged  This was biopsied 5/18/2022 with atypical cells but overall indeterminate result  The lungs are otherwise clear  The central airways are patent  PLEURA:  Unremarkable  HEART/GREAT VESSELS: Heart is normal in size  Mild coronary artery calcification is present  No thoracic aortic aneurysm  MEDIASTINUM AND KAYY:  Stable small likely reactive mediastinal lymph nodes again noted, the largest in the aorticopulmonary window region measuring 9 mm short axis  CHEST WALL AND LOWER NECK:  Unremarkable  ABDOMEN LIVER/BILIARY TREE:  Mild steatosis  GALLBLADDER:  No calcified gallstones  No pericholecystic inflammatory change  SPLEEN:  Unremarkable  PANCREAS:  Unremarkable  ADRENAL GLANDS:  Unremarkable  KIDNEYS/URETERS:  Unremarkable  No hydronephrosis  STOMACH AND BOWEL:  Small hiatal hernia is present  Mild retained fecal material in the colon noted  No bowel wall thickening or intestinal obstruction is identified  APPENDIX:  No findings to suggest appendicitis  ABDOMINOPELVIC CAVITY:  Mild hazy stranding at the mesenteric root is again noted) "garo mesentery ") similar to the prior examinations  A few mildly prominent mesenteric lymph nodes persist in this region, the largest measuring 1 3 x 2 4 cm (series 2 image 68), unchanged from the study of 7/3/2021  These previous study demonstrated mild increased metabolic activity on PET CT dated 8/10/2021 and although most likely reactive in nature, a low-grade lymphoproliferative disorder cannot be entirely excluded    Continued routine follow-up advised  No ascites or pneumoperitoneum identified  VESSELS:  Unremarkable for patient's age  PELVIS REPRODUCTIVE ORGANS:  Unremarkable for patient's age  URINARY BLADDER:  Unremarkable  ABDOMINAL WALL/INGUINAL REGIONS:  Unremarkable  OSSEOUS STRUCTURES:  No acute fracture or destructive osseous lesion  1   Centrilobular emphysema  2   Stable 1 2 cm left lower lobe nodule, nonhypermetabolic on PET/CT dated 8/33/4122  3   Stable 1 4 cm groundglass opacity right lower lobe  Based on current Fleischner Society 2017 Guidelines on incidental pulmonary nodule, followup noncontrast CT is recommended at 6-12 months from the initial examination to confirm persistence; if stable at that time, additional followup CT is recommended for every 2 years until 5 years of stability is demonstrated  4   Nonspecific mesenteric stranding with mildly prominent mesenteric lymph nodes again noted  While this may be postinflammatory in nature, low-grade lymphoproliferative disorder not excluded and continued surveillance advised (1 year follow-up CT suggested)  5   Additional findings as noted  The study was marked in EPIC for significant notification  Workstation performed: [id number]        Assessment/Plan:         Diagnoses and all orders for this visit:    Acute exacerbation of emphysema ([address] )  -     POCT Rapid Covid Ag  -     brompheniramine-pseudoephedrine-DM 30-2-10 MG/5ML syrup; Take 5 mL by mouth 4 (four) times a day as needed for cough or allergies for up to 10 days  -     azithromycin (Zithromax) 250 mg tablet; Take 2 tablets (500 mg total) by mouth daily for 1 day, THEN 1 tablet (250 mg total) daily for 4 days  -     predniSONE 20 mg tablet;  Take 1 tablet (20 mg total) by mouth 2 (two) times a day with meals    Localized swelling, mass and lump, neck  -     Cancel: CT soft tissue neck wo contrast; Future    Close exposure to COVID-19 virus  -     POCT Rapid Covid Ag    Pulmonary nodule    Squamous cell carcinoma of larynx ([facility] )      since she has a follow up with ENT, recommend CT neck given she has had abnormal CT abdo,chest , pelvis insurance denied [place] PET CT previously ordered by Dr Ainsley Hook, I am not clear on her prognosis with [place] valve given her history of cancer and further more ongoing symptoms,Abnormal CT chest and abdomen with possibility of low grade lymphoproliferative disorder  I have spoken to both daughters and Junaid How, using   Spent >60 min in reviewing chart, documenting, >50% time with patient and family   Read package inserts for all medications before starting a new medications, call me if you have any questions  Patient was given opportunity to ask questions and all questions were answered  Portions of the record may have been created with voice recognition software  Occasional wrong word or "sound a like" substitutions may have occurred due to the inherent limitations of voice recognition software  Read the chart carefully and recognize, using context, where substitutions have occurred

## 2022-08-30 ENCOUNTER — TELEPHONE (OUTPATIENT)
Dept: PULMONOLOGY | Facility: CLINIC | Age: 67
End: 2022-08-30

## 2022-08-30 DIAGNOSIS — J43.2 CENTRILOBULAR EMPHYSEMA (HCC): ICD-10-CM

## 2022-08-30 RX ORDER — IPRATROPIUM BROMIDE AND ALBUTEROL SULFATE 2.5; .5 MG/3ML; MG/3ML
3 SOLUTION RESPIRATORY (INHALATION) EVERY 6 HOURS PRN
Qty: 360 ML | Refills: 1 | Status: ON HOLD | OUTPATIENT
Start: 2022-08-30 | End: 2022-10-29

## 2022-08-30 NOTE — TELEPHONE ENCOUNTER
8/30/22 - Pt wants a call back from Kizzy Farias to speak with her in [place] to discuss her portable O2 concerns  Pt wants call back today

## 2022-09-09 NOTE — TELEPHONE ENCOUNTER
Spoke to patient daughter she stated [facility]  told her they have no information, printed last office note and PFT and faxed to giovanny at [facility]

## 2022-09-21 ENCOUNTER — OFFICE VISIT (OUTPATIENT)
Dept: FAMILY MEDICINE CLINIC | Facility: CLINIC | Age: 67
End: 2022-09-21
Payer: COMMERCIAL

## 2022-09-21 VITALS
RESPIRATION RATE: 14 BRPM | TEMPERATURE: 97 F | SYSTOLIC BLOOD PRESSURE: 122 MMHG | HEIGHT: 62 IN | OXYGEN SATURATION: 91 % | WEIGHT: 176 LBS | BODY MASS INDEX: 32.39 KG/M2 | DIASTOLIC BLOOD PRESSURE: 62 MMHG | HEART RATE: 66 BPM

## 2022-09-21 DIAGNOSIS — J45.50 SEVERE PERSISTENT ASTHMA WITHOUT COMPLICATION: ICD-10-CM

## 2022-09-21 DIAGNOSIS — Z85.21 HISTORY OF LARYNGEAL CANCER: ICD-10-CM

## 2022-09-21 DIAGNOSIS — N18.31 STAGE 3A CHRONIC KIDNEY DISEASE (HCC): ICD-10-CM

## 2022-09-21 DIAGNOSIS — M54.6 ACUTE MIDLINE THORACIC BACK PAIN: Primary | ICD-10-CM

## 2022-09-21 DIAGNOSIS — Z23 ENCOUNTER FOR IMMUNIZATION: ICD-10-CM

## 2022-09-21 DIAGNOSIS — M54.12 CERVICAL RADICULOPATHY: ICD-10-CM

## 2022-09-21 DIAGNOSIS — J43.9 MILD EMPHYSEMA (HCC): ICD-10-CM

## 2022-09-21 PROBLEM — C32.9 SQUAMOUS CELL CARCINOMA OF LARYNX (HCC): Status: RESOLVED | Noted: 2022-08-10 | Resolved: 2022-09-21

## 2022-09-21 PROCEDURE — G0008 ADMIN INFLUENZA VIRUS VAC: HCPCS | Performed by: FAMILY MEDICINE

## 2022-09-21 PROCEDURE — 99214 OFFICE O/P EST MOD 30 MIN: CPT | Performed by: FAMILY MEDICINE

## 2022-09-21 PROCEDURE — 90662 IIV NO PRSV INCREASED AG IM: CPT | Performed by: FAMILY MEDICINE

## 2022-09-21 RX ORDER — TIZANIDINE 2 MG/1
2 TABLET ORAL EVERY 8 HOURS PRN
Qty: 30 TABLET | Refills: 0 | Status: ON HOLD | OUTPATIENT
Start: 2022-09-21

## 2022-09-21 RX ORDER — LIDOCAINE 50 MG/G
OINTMENT TOPICAL AS NEEDED
Qty: 35.44 G | Refills: 0 | Status: ON HOLD | OUTPATIENT
Start: 2022-09-21

## 2022-09-21 NOTE — PROGRESS NOTES
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 s/p chemoradiation completed in August 2014      present, phone  service used      Past Medical History:   Diagnosis Date    Acute kidney failure ([place] [address] )     Allergic     Allergies     Anemia     Asthma     Cancer ([place] [address] )     Chronic kidney disease (CKD), stage III (moderate) (HCC)     COPD (chronic obstructive pulmonary disease) ([place] [address] )     COVID-19     Covid + 4-6-27-cough at that time now fully resolved    Disease of thyroid gland     Essential hypertension     GERD (gastroesophageal reflux disease)     Glaucoma     High blood pressure     HTN (hypertension) 02/16/2021    Hyperlipidemia     Hypothyroidism     Squamous cell carcinoma of larynx ([place] [address] ) 8/10/2022    Throat cancer ([place] [address] ) 2014    chemo and rad therapy 2014       Family History   Problem Relation Age of Onset    Brain cancer Sister     Diabetes Sister     Breast cancer Sister     Other Mother         respiratory disorder    Sudden death Father         shot himself    Suicidality Father     No Known Problems Daughter     No Known Problems Maternal Grandmother     No Known Problems Maternal Grandfather     No Known Problems Paternal Grandmother     No Known Problems Paternal Grandfather     No Known Problems Sister     No Known Problems Sister     No Known Problems Sister     No Known Problems Sister     No Known Problems Sister     No Known Problems Sister     No Known Problems Sister     No Known Problems Daughter     No Known Problems Maternal Aunt     No Known Problems Maternal Aunt     No Known Problems Maternal Aunt     No Known Problems Maternal Aunt     No Known Problems Maternal Aunt     No Known Problems Paternal Aunt     No Known Problems Paternal Aunt     No Known Problems Paternal Aunt     No Known Problems Paternal Aunt        Past Surgical History:   Procedure Laterality Date    COLONOSCOPY  2016    ESOPHAGOGASTRODUODENOSCOPY  2016    EYE SURGERY      IR BIOPSY LUNG  05/18/2022    LARYNGOSCOPY      w/ Bx   OK INCISE FINGER TENDON SHEATH Right 02/16/2021    Procedure: THUMB TRIGGER FINGER RELEASE;  Surgeon: Lilliam Saldaña MD;  [facility];  Service: Orthopedics    TUBAL LIGATION          reports that she quit smoking about 8 years ago  She has a 40 00 pack-year smoking history  She has never used smokeless tobacco  She reports that she does not drink alcohol        Current Outpatient Medications:     acetaminophen (TYLENOL) 650 mg CR tablet, Take 1 tablet (650 mg total) by mouth every 8 (eight) hours as needed for mild pain, Disp: 30 tablet, Rfl: 0    albuterol (PROVENTIL HFA,VENTOLIN HFA) 90 mcg/act inhaler, Inhale 2 puffs every 4 (four) hours as needed for wheezing, Disp: 36 g, Rfl: 5    brimonidine tartrate 0 2 % ophthalmic solution, , Disp: , Rfl:     Budeson-Glycopyrrol-Formoterol (Breztri Aerosphere) 160-9-4 8 MCG/ACT AERO, Inhale 2 puffs 2 (two) times a day Rinse mouth after use , Disp: 10 7 g, Rfl: 3    guaiFENesin (ROBITUSSIN) 100 mg/5 mL syrup, Take 10 mL (200 mg total) by mouth 3 (three) times a day as needed for cough for up to 10 days, Disp: 236 mL, Rfl: 0    ipratropium (ATROVENT) 0 06 % nasal spray, USE 2 SPRAYS IN EACH NOSTRIL 4 TIMES DAILY, Disp: 15 mL, Rfl: 3    ipratropium-albuterol (DUO-NEB) 0 5-2 5 mg/3 mL nebulizer solution, TAKE 3 ML BY NEBULIZATION EVERY 6 (SIX) HOURS AS NEEDED FOR WHEEZING OR SHORTNESS OF BREATH, Disp: 360 mL, Rfl: 1    levothyroxine 100 mcg tablet, Take 1 tablet (100 mcg total) by mouth daily, Disp: 90 tablet, Rfl: 1    lidocaine (XYLOCAINE) 5 % ointment, Apply topically as needed for mild pain, Disp: 35 44 g, Rfl: 0    montelukast (SINGULAIR) 10 mg tablet, TAKE 1 TABLET BY MOUTH DAILY AT BEDTIME, Disp: 90 tablet, Rfl: 1    nystatin (MYCOSTATIN) cream, Apply topically 2 (two) times a day, Disp: 30 g, Rfl: 0    timolol (TIMOPTIC) 0 5 % ophthalmic solution, INSTILL 1 DROP INTO EACH EYE TWO TIMES A DAY, Disp: , Rfl:     tiZANidine (ZANAFLEX) 2 mg tablet, Take 1 tablet (2 mg total) by mouth every 8 (eight) hours as needed for muscle spasms (back pain), Disp: 30 tablet, Rfl: 0    The following portions of the patient's history were reviewed and updated as appropriate: allergies, current medications, past family history, past medical history, past social history, past surgical history and problem list     Review of Systems   Constitutional: Negative for fatigue and fever  HENT: Negative for congestion, facial swelling, mouth sores, rhinorrhea, sore throat and trouble swallowing  Eyes: Negative for pain and redness  Respiratory: Negative for cough, shortness of breath and wheezing  Cardiovascular: Negative for chest pain, palpitations and leg swelling  Gastrointestinal: Negative for abdominal pain, blood in stool, constipation, diarrhea and nausea  Genitourinary: Negative for dysuria, hematuria and urgency  Musculoskeletal: Positive for arthralgias and back pain  Negative for myalgias  Skin: Negative for rash and wound  Neurological: Negative for seizures, syncope and headaches  Hematological: Negative for adenopathy  Psychiatric/Behavioral: Negative for agitation and behavioral problems  PHQ-2/9 Depression Screening               Objective:    /62 (BP Location: Right arm, Patient Position: Sitting, Cuff Size: Adult)   Pulse 66   Temp (!) 97 °F (36 1 °C) (Temporal)   Resp 14   Ht 5' 2" (1 575 m)   Wt 79 8 kg (176 lb)   SpO2 91%   BMI 32 19 kg/m²      Physical Exam  Vitals and nursing note reviewed  Constitutional:       Appearance: Normal appearance  She is well-developed  She is not ill-appearing  HENT:      Head: Normocephalic and atraumatic  Right Ear: External ear normal       Left Ear: External ear normal       Nose: Nose normal       Mouth/Throat:      Mouth: Mucous membranes are moist       Pharynx: No oropharyngeal exudate or posterior oropharyngeal erythema  Eyes:      General: No scleral icterus  Right eye: No discharge  Left eye: No discharge  Conjunctiva/sclera: Conjunctivae normal    Cardiovascular:      Rate and Rhythm: Normal rate  Heart sounds: No murmur heard  No gallop  Pulmonary:      Effort: Pulmonary effort is normal  No respiratory distress  Breath sounds: Normal breath sounds  No stridor  No wheezing, rhonchi or rales  Abdominal:      Palpations: Abdomen is soft  Tenderness: There is no abdominal tenderness  Musculoskeletal:         General: No deformity  Lumbar back: Spasms and tenderness present  No bony tenderness  Decreased range of motion  Negative right straight leg raise test and negative left straight leg raise test         Back:       Right hip: Normal       Left hip: Normal       Right lower leg: No edema  Left lower leg: No edema  Skin:     Findings: No erythema or rash  Neurological:      Mental Status: She is alert  Mental status is at baseline     Psychiatric:         Behavior: Behavior normal          Judgment: Judgment normal            Recent Results (from the past 8736 hour(s))   COVID Only- Collected at [facility]    Collection Time: 01/03/22  6:57 PM    Specimen: Nose; Nares   Result Value Ref Range    SARS-CoV-2 Negative Negative   CBC and differential    Collection Time: 02/23/22  8:37 AM   Result Value Ref Range    WBC 6 80 4 31 - 10 16 Thousand/uL    RBC 4 61 3 81 - 5 12 Million/uL    Hemoglobin 11 9 11 5 - 15 4 g/dL    Hematocrit 38 6 34 8 - 46 1 %    MCV 84 82 - 98 fL    MCH 25 8 (L) 26 8 - 34 3 pg    MCHC 30 8 (L) 31 4 - 37 4 g/dL    RDW 14 6 11 6 - 15 1 %    MPV 10 1 8 9 - 12 7 fL Platelets 516 901 - 361 Thousands/uL    nRBC 0 /100 WBCs    Neutrophils Relative 75 43 - 75 %    Immat GRANS % 0 0 - 2 %    Lymphocytes Relative 15 14 - 44 %    Monocytes Relative 8 4 - 12 %    Eosinophils Relative 2 0 - 6 %    Basophils Relative 0 0 - 1 %    Neutrophils Absolute 5 04 1 85 - 7 62 Thousands/µL    Immature Grans Absolute 0 03 0 00 - 0 20 Thousand/uL    Lymphocytes Absolute 1 04 0 60 - 4 47 Thousands/µL    Monocytes Absolute 0 52 0 17 - 1 22 Thousand/µL    Eosinophils Absolute 0 14 0 00 - 0 61 Thousand/µL    Basophils Absolute 0 03 0 00 - 0 10 Thousands/µL   Comprehensive metabolic panel    Collection Time: 02/23/22  8:37 AM   Result Value Ref Range    Sodium 142 133 - 145 mmol/L    Potassium 4 5 3 5 - 5 0 mmol/L    Chloride 105 96 - 108 mmol/L    CO2 31 22 - 33 mmol/L    ANION GAP 6 4 - 13 mmol/L    BUN 30 (H) 6 - 20 mg/dL    Creatinine 1 24 (H) 0 40 - 1 10 mg/dL    Glucose, Fasting 97 70 - 105 mg/dL    Calcium 9 8 8 4 - 10 2 mg/dL    AST 27 15 - 41 U/L    ALT 19 5 - 54 U/L    Alkaline Phosphatase 110 8 35 - 140 U/L    Total Protein 7 1 6 4 - 8 3 g/dL    Albumin 4 3 3 4 - 4 8 g/dL    Total Bilirubin 0 42 0 30 - 1 20 mg/dL    eGFR 45 ml/min/1 73sq m   Ferritin    Collection Time: 02/23/22  8:37 AM   Result Value Ref Range    Ferritin 42 8 - 388 ng/mL   Iron Saturation %    Collection Time: 02/23/22  8:37 AM   Result Value Ref Range    Iron Saturation 15 15 - 50 %    TIBC 325 250 - 450 ug/dL    Iron 50 50 - 170 ug/dL   Methylmalonic acid, serum    Collection Time: 02/23/22  8:37 AM   Result Value Ref Range    Methylmalonic Acid, S 131 0 - 378 nmol/L   Leukemia/Lymphoma flow cytometry    Collection Time: 02/23/22  8:37 AM   Result Value Ref Range    Scan Result SEE WRITTEN REPORT    TSH, 3rd generation    Collection Time: 02/23/22  8:37 AM   Result Value Ref Range    TSH 3RD GENERATON 0 581 0 340 - 5 600 uIU/mL   Protime-INR    Collection Time: 05/18/22  9:28 AM   Result Value Ref Range    Protime 12 3 11 6 - 14 5 seconds    INR 0 91 0 84 - 1 19   Tissue Exam    Collection Time: 05/18/22 10:58 AM   Result Value Ref Range    Case Report       Surgical Pathology Report                         Case: [id number]                                   Authorizing Provider:  Jonathan Kebede MD        Collected:           05/18/2022 1058              Ordering Location:     [facility]        Received:            05/18/2022 [address] Scan                                                            Pathologist:           Sreedhar Armstrong MD                                                                 Specimen:    Lesion, Right pulmonary nodule                                                             Final Diagnosis       A  Lesion, Right pulmonary nodule:  - Minute foci of atypical glandular proliferations  See comment  Comment: There are very minute areas of atypical glands on the core biopsies  Touch preparation slides demonstrate more cellular material with clusters of atypical epithelial cells present  The differential includes atypical adenomatous hyperplasia, adenocarcinoma in-situ, or a low-grade invasive adenocarcinoma  Additional tissue sampling, including excision, may be considered if clinically indicated  Additional Information       All reported additional testing was performed with appropriately reactive controls  These tests were developed and their performance characteristics determined by [address] [place] [address] Specialty Laboratory or appropriate performing facility, though some tests may be performed on tissues which have not been validated for performance characteristics (such as staining performed on alcohol exposed cell blocks and decalcified tissues)  Results should be interpreted with caution and in the context of the patients clinical condition  These tests may not be cleared or approved by the [place]  Food and Drug Administration, though the FDA has determined that such clearance or approval is not necessary  These tests are used for clinical purposes and they should not be regarded as investigational or for research  This laboratory has been approved by CLIA 88, designated as a high-complexity laboratory and is qualified to perform these tests     Interpretation performed at [facility], [address]      Intraoperative Consultation          Touch prep x2:  - Adequate, requested additional core  CVytlacil  Interpretation performed at [address] [place] [address], [address] [place] [address]        Gross Description          A  The specimen is received fresh, labeled with the patient's name and hospital number, and is designated "she right pulmonary nodule   It consists of 3 gray-brown, delicate, and cylindrical tissue cores  that range from 0 5 cm in length by less than 0 1 cm in diameter to 0 8 cm in length by less than 0 1 cm in diameter  The cores are placed between 2 sponges and submitted entirely as follows:      A1: One core   A2: One core   A3: One core     Note: The estimated total formalin fixation time based upon information provided by the submitting clinician and the standard processing schedule is 29 00 hours       [facility]        CBC and differential    Collection Time: 05/23/22  4:05 PM   Result Value Ref Range    WBC 6 57 4 31 - 10 16 Thousand/uL    RBC 4 97 3 81 - 5 12 Million/uL    Hemoglobin 12 8 11 5 - 15 4 g/dL    Hematocrit 42 8 34 8 - 46 1 %    MCV 86 82 - 98 fL    MCH 25 8 (L) 26 8 - 34 3 pg    MCHC 29 9 (L) 31 4 - 37 4 g/dL    RDW 14 6 11 6 - 15 1 %    MPV 10 1 8 9 - 12 7 fL    Platelets 820 963 - 981 Thousands/uL    nRBC 0 /100 WBCs    Neutrophils Relative 73 43 - 75 %    Immat GRANS % 1 0 - 2 %    Lymphocytes Relative 15 14 - 44 %    Monocytes Relative 7 4 - 12 %    Eosinophils Relative 3 0 - 6 % Basophils Relative 1 0 - 1 %    Neutrophils Absolute 4 81 1 85 - 7 62 Thousands/µL    Immature Grans Absolute 0 03 0 00 - 0 20 Thousand/uL    Lymphocytes Absolute 0 99 0 60 - 4 47 Thousands/µL    Monocytes Absolute 0 48 0 17 - 1 22 Thousand/µL    Eosinophils Absolute 0 21 0 00 - 0 61 Thousand/µL    Basophils Absolute 0 05 0 00 - 0 10 Thousands/µL   Comprehensive metabolic panel    Collection Time: 05/23/22  4:05 PM   Result Value Ref Range    Sodium 143 135 - 147 mmol/L    Potassium 5 6 (H) 3 5 - 5 3 mmol/L    Chloride 105 96 - 108 mmol/L    CO2 32 21 - 32 mmol/L    ANION GAP 6 4 - 13 mmol/L    BUN 26 (H) 5 - 25 mg/dL    Creatinine 1 24 0 60 - 1 30 mg/dL    Glucose 90 65 - 140 mg/dL    Calcium 9 6 8 4 - 10 2 mg/dL    AST 21 13 - 39 U/L    ALT 12 7 - 52 U/L    Alkaline Phosphatase 107 (H) 34 - 104 U/L    Total Protein 7 1 6 4 - 8 4 g/dL    Albumin 4 2 3 5 - 5 0 g/dL    Total Bilirubin 0 41 0 20 - 1 00 mg/dL    eGFR 45 ml/min/1 73sq m   Iron Saturation %    Collection Time: 05/23/22  4:05 PM   Result Value Ref Range    Iron Saturation 27 15 - 50 %    TIBC 331 250 - 450 ug/dL    Iron 88 50 - 170 ug/dL   Ferritin    Collection Time: 05/23/22  4:05 PM   Result Value Ref Range    Ferritin 36 8 - 388 ng/mL   Portability Revision for Existing O2 Patients    Collection Time: 06/03/22  1:59 PM   Result Value Ref Range    Supplier Name [facility]/[address]     Supplier Phone Number [phone]     Order Status Contacting Patient     Delivery Note Please contact daughter Kathrine Schultz     Delivery Request Date 06/03/2022     Item Description       O2 with Portability for Existing O2 Patients, Standard Liter Flow    Item Description New Portable Setup, Portable Oxygen Concentrator     Item Description POC with Conserving Device / Pulse Dose    POCT Rapid Covid Ag    Collection Time: 08/10/22  9:41 AM   Result Value Ref Range    POCT SARS-CoV-2 Ag Negative Negative    VALID CONTROL Valid        Laboratory Results: I have personally reviewed the pertinent laboratory results/reports     Radiology/Other Diagnostic Testing Results: I have personally reviewed pertinent reports  CT chest abdomen pelvis w contrast    Result Date: 7/22/2022  CT CHEST, ABDOMEN AND PELVIS WITH IV CONTRAST INDICATION:   R91 1: Solitary pulmonary nodule R59 0: Localized enlarged lymph nodes C76 0: Malignant neoplasm of head, face and neck  HX laryngeal and pharynx cancer, checking for disease progression; HX laryngeal and pharynx cancer, checking for disease progression COMPARISON:  CT chest 2/26/2022; CT chest abdomen pelvis 7/3/2021 TECHNIQUE: CT examination of the chest, abdomen and pelvis was performed  Axial, sagittal, and coronal 2D reformatted images were created from the source data and submitted for interpretation  Radiation dose length product (DLP) for this visit:  676 mGy-cm   This examination, like all CT scans performed in the [facility], was performed utilizing techniques to minimize radiation dose exposure, including the use of iterative reconstruction and automated exposure control  IV Contrast:  70 mL of iohexol (OMNIPAQUE) Enteric Contrast: Enteric contrast was administered  FINDINGS: CHEST LUNGS:  Mild centrilobular emphysematous change is again noted  There is a stable 1 1 x 1 2 cm left lower lobe somewhat lobulated nodule again noted which is unchanged from study of 7/3/2021 and PET/CT of 1/87/2637 (nonhypermetabolic)  Also again identified is a groundglass opacity measuring 0 7 x 1 4 cm which is unchanged  This was biopsied 5/18/2022 with atypical cells but overall indeterminate result  The lungs are otherwise clear  The central airways are patent  PLEURA:  Unremarkable  HEART/GREAT VESSELS: Heart is normal in size  Mild coronary artery calcification is present  No thoracic aortic aneurysm   MEDIASTINUM AND KAYY:  Stable small likely reactive mediastinal lymph nodes again noted, the largest in the aorticopulmonary window region measuring 9 mm short axis  CHEST WALL AND LOWER NECK:  Unremarkable  ABDOMEN LIVER/BILIARY TREE:  Mild steatosis  GALLBLADDER:  No calcified gallstones  No pericholecystic inflammatory change  SPLEEN:  Unremarkable  PANCREAS:  Unremarkable  ADRENAL GLANDS:  Unremarkable  KIDNEYS/URETERS:  Unremarkable  No hydronephrosis  STOMACH AND BOWEL:  Small hiatal hernia is present  Mild retained fecal material in the colon noted  No bowel wall thickening or intestinal obstruction is identified  APPENDIX:  No findings to suggest appendicitis  ABDOMINOPELVIC CAVITY:  Mild hazy stranding at the mesenteric root is again noted) "garo mesentery ") similar to the prior examinations  A few mildly prominent mesenteric lymph nodes persist in this region, the largest measuring 1 3 x 2 4 cm (series 2 image 68), unchanged from the study of 7/3/2021  These previous study demonstrated mild increased metabolic activity on PET CT dated 8/10/2021 and although most likely reactive in nature, a low-grade lymphoproliferative disorder cannot be entirely excluded  Continued routine follow-up advised  No ascites or pneumoperitoneum identified  VESSELS:  Unremarkable for patient's age  PELVIS REPRODUCTIVE ORGANS:  Unremarkable for patient's age  URINARY BLADDER:  Unremarkable  ABDOMINAL WALL/INGUINAL REGIONS:  Unremarkable  OSSEOUS STRUCTURES:  No acute fracture or destructive osseous lesion  1   Centrilobular emphysema  2   Stable 1 2 cm left lower lobe nodule, nonhypermetabolic on PET/CT dated 7/21/0992  3   Stable 1 4 cm groundglass opacity right lower lobe  Based on current Fleischner Society 2017 Guidelines on incidental pulmonary nodule, followup noncontrast CT is recommended at 6-12 months from the initial examination to confirm persistence; if stable at that time, additional followup CT is recommended for every 2 years until 5 years of stability is demonstrated   4   Nonspecific mesenteric stranding with mildly prominent mesenteric lymph nodes again noted  While this may be postinflammatory in nature, low-grade lymphoproliferative disorder not excluded and continued surveillance advised (1 year follow-up CT suggested)  5   Additional findings as noted  The study was marked in EPIC for significant notification  Workstation performed: [id number]        Assessment/Plan:       Diagnoses and all orders for this visit:    Acute midline thoracic back pain  -     tiZANidine (ZANAFLEX) 2 mg tablet; Take 1 tablet (2 mg total) by mouth every 8 (eight) hours as needed for muscle spasms (back pain)  -     Discontinue: Diclofenac Sodium (VOLTAREN) 1 %; Apply 2 g topically 4 (four) times a day  -     lidocaine (XYLOCAINE) 5 % ointment; Apply topically as needed for mild pain    Mild emphysema (HCC)  -     guaiFENesin (ROBITUSSIN) 100 mg/5 mL syrup; Take 10 mL (200 mg total) by mouth 3 (three) times a day as needed for cough for up to 10 days    Encounter for immunization  -     influenza vaccine, high-dose, PF 0 7 mL (FLUZONE HIGH-DOSE)    Severe persistent asthma without complication    Stage 3a chronic kidney disease (HCC)    Cervical radiculopathy  -     lidocaine (XYLOCAINE) 5 % ointment; Apply topically as needed for mild pain    History of laryngeal cancer        No oral NSAIDs, can use minimal topical lidocaine gel and prn tylenol and tizanidine for pain  Robitussin prn for cough  Reviewed ENT consult and previous Hem-onc consult with Dr Justine Saenz- she does have stable ground-glass opacity of right lower lobe and prominent mildly prominent mesenteric lymph nodes again noted which may be postinflammatory in nature although a low-grade lymphoproliferative disorder could not be excluded which Jerald Glover is aware of  Read package inserts for all medications before starting a new medications, call me if you have any questions    Patient was given opportunity to ask questions and all questions were answered  Portions of the record may have been created with voice recognition software  Occasional wrong word or "sound a like" substitutions may have occurred due to the inherent limitations of voice recognition software  Read the chart carefully and recognize, using context, where substitutions have occurred

## 2022-10-08 DIAGNOSIS — R09.82 POST-NASAL DRIP: ICD-10-CM

## 2022-10-11 RX ORDER — IPRATROPIUM BROMIDE 42 UG/1
SPRAY, METERED NASAL
Qty: 15 ML | Refills: 3 | Status: SHIPPED | OUTPATIENT
Start: 2022-10-11 | End: 2022-10-26

## 2022-10-25 DIAGNOSIS — R09.82 POST-NASAL DRIP: ICD-10-CM

## 2022-10-26 RX ORDER — IPRATROPIUM BROMIDE 42 UG/1
SPRAY, METERED NASAL
Qty: 45 ML | Refills: 2 | Status: SHIPPED | OUTPATIENT
Start: 2022-10-26

## 2022-10-28 ENCOUNTER — HOSPITAL ENCOUNTER (INPATIENT)
Facility: HOSPITAL | Age: 67
LOS: 3 days | Discharge: HOME | End: 2022-10-31
Attending: EMERGENCY MEDICINE | Admitting: INTERNAL MEDICINE

## 2022-10-28 ENCOUNTER — APPOINTMENT (EMERGENCY)
Dept: RADIOLOGY | Facility: HOSPITAL | Age: 67
End: 2022-10-28

## 2022-10-28 DIAGNOSIS — R10.13 DYSPEPSIA: ICD-10-CM

## 2022-10-28 DIAGNOSIS — J43.2 CENTRILOBULAR EMPHYSEMA (HCC): ICD-10-CM

## 2022-10-28 DIAGNOSIS — R07.9 CHEST PAIN: Primary | ICD-10-CM

## 2022-10-28 DIAGNOSIS — E03.9 HYPOTHYROIDISM, UNSPECIFIED TYPE: ICD-10-CM

## 2022-10-28 DIAGNOSIS — J18.9 LEFT LOWER LOBE PNEUMONIA: ICD-10-CM

## 2022-10-28 LAB
2HR DELTA HS TROPONIN: 0 NG/L
ANION GAP SERPL CALCULATED.3IONS-SCNC: 4 MMOL/L (ref 4–13)
APTT PPP: 28 SECONDS (ref 23–37)
BASOPHILS # BLD AUTO: 0.03 THOUSANDS/ÂΜL (ref 0–0.1)
BASOPHILS NFR BLD AUTO: 0 % (ref 0–1)
BUN SERPL-MCNC: 18 MG/DL (ref 5–25)
CALCIUM SERPL-MCNC: 9.5 MG/DL (ref 8.4–10.2)
CARDIAC TROPONIN I PNL SERPL HS: 3 NG/L
CARDIAC TROPONIN I PNL SERPL HS: 3 NG/L
CHLORIDE SERPL-SCNC: 104 MMOL/L (ref 96–108)
CO2 SERPL-SCNC: 31 MMOL/L (ref 21–32)
CREAT SERPL-MCNC: 1.12 MG/DL (ref 0.6–1.3)
EOSINOPHIL # BLD AUTO: 0.04 THOUSAND/ÂΜL (ref 0–0.61)
EOSINOPHIL NFR BLD AUTO: 0 % (ref 0–6)
ERYTHROCYTE [DISTWIDTH] IN BLOOD BY AUTOMATED COUNT: 14 % (ref 11.6–15.1)
FLUAV RNA RESP QL NAA+PROBE: NEGATIVE
FLUBV RNA RESP QL NAA+PROBE: NEGATIVE
GFR SERPL CREATININE-BSD FRML MDRD: 50 ML/MIN/1.73SQ M
GLUCOSE SERPL-MCNC: 133 MG/DL (ref 65–140)
HCT VFR BLD AUTO: 39.1 % (ref 34.8–46.1)
HGB BLD-MCNC: 12 G/DL (ref 11.5–15.4)
IMM GRANULOCYTES # BLD AUTO: 0.09 THOUSAND/UL (ref 0–0.2)
IMM GRANULOCYTES NFR BLD AUTO: 1 % (ref 0–2)
INR PPP: 1.07 (ref 0.84–1.19)
LACTATE SERPL-SCNC: 0.7 MMOL/L (ref 0.5–2)
LYMPHOCYTES # BLD AUTO: 0.5 THOUSANDS/ÂΜL (ref 0.6–4.47)
LYMPHOCYTES NFR BLD AUTO: 3 % (ref 14–44)
MAGNESIUM SERPL-MCNC: 1.7 MG/DL (ref 1.9–2.7)
MCH RBC QN AUTO: 26.8 PG (ref 26.8–34.3)
MCHC RBC AUTO-ENTMCNC: 30.7 G/DL (ref 31.4–37.4)
MCV RBC AUTO: 88 FL (ref 82–98)
MONOCYTES # BLD AUTO: 1.21 THOUSAND/ÂΜL (ref 0.17–1.22)
MONOCYTES NFR BLD AUTO: 8 % (ref 4–12)
NEUTROPHILS # BLD AUTO: 13.1 THOUSANDS/ÂΜL (ref 1.85–7.62)
NEUTS SEG NFR BLD AUTO: 88 % (ref 43–75)
NRBC BLD AUTO-RTO: 0 /100 WBCS
PLATELET # BLD AUTO: 185 THOUSANDS/UL (ref 149–390)
PMV BLD AUTO: 9.8 FL (ref 8.9–12.7)
POTASSIUM SERPL-SCNC: 4.7 MMOL/L (ref 3.5–5.3)
PROCALCITONIN SERPL-MCNC: 0.24 NG/ML
PROTHROMBIN TIME: 14.1 SECONDS (ref 11.6–14.5)
RBC # BLD AUTO: 4.47 MILLION/UL (ref 3.81–5.12)
RSV RNA RESP QL NAA+PROBE: NEGATIVE
SARS-COV-2 RNA RESP QL NAA+PROBE: NEGATIVE
SODIUM SERPL-SCNC: 139 MMOL/L (ref 135–147)
TSH SERPL DL<=0.05 MIU/L-ACNC: 0.22 UIU/ML (ref 0.45–4.5)
WBC # BLD AUTO: 14.97 THOUSAND/UL (ref 4.31–10.16)

## 2022-10-28 RX ORDER — ENOXAPARIN SODIUM 100 MG/ML
40 INJECTION SUBCUTANEOUS DAILY
Status: DISCONTINUED | OUTPATIENT
Start: 2022-10-29 | End: 2022-10-31 | Stop reason: HOSPADM

## 2022-10-28 RX ORDER — MONTELUKAST SODIUM 10 MG/1
10 TABLET ORAL
Status: DISCONTINUED | OUTPATIENT
Start: 2022-10-28 | End: 2022-10-31 | Stop reason: HOSPADM

## 2022-10-28 RX ORDER — OXYCODONE HYDROCHLORIDE 5 MG/1
2.5 TABLET ORAL EVERY 4 HOURS PRN
Status: DISCONTINUED | OUTPATIENT
Start: 2022-10-28 | End: 2022-10-31 | Stop reason: HOSPADM

## 2022-10-28 RX ORDER — BENZONATATE 100 MG/1
100 CAPSULE ORAL 3 TIMES DAILY PRN
Status: DISCONTINUED | OUTPATIENT
Start: 2022-10-28 | End: 2022-10-31 | Stop reason: HOSPADM

## 2022-10-28 RX ORDER — ACETAMINOPHEN 325 MG/1
975 TABLET ORAL EVERY 8 HOURS SCHEDULED
Status: DISCONTINUED | OUTPATIENT
Start: 2022-10-28 | End: 2022-10-31 | Stop reason: HOSPADM

## 2022-10-28 RX ORDER — BRIMONIDINE TARTRATE 2 MG/ML
1 SOLUTION/ DROPS OPHTHALMIC 3 TIMES DAILY
Status: DISCONTINUED | OUTPATIENT
Start: 2022-10-28 | End: 2022-10-31 | Stop reason: HOSPADM

## 2022-10-28 RX ORDER — IPRATROPIUM BROMIDE AND ALBUTEROL SULFATE 2.5; .5 MG/3ML; MG/3ML
3 SOLUTION RESPIRATORY (INHALATION) EVERY 6 HOURS PRN
Status: DISCONTINUED | OUTPATIENT
Start: 2022-10-28 | End: 2022-10-28

## 2022-10-28 RX ORDER — CEFTRIAXONE 1 G/50ML
1000 INJECTION, SOLUTION INTRAVENOUS ONCE
Status: COMPLETED | OUTPATIENT
Start: 2022-10-28 | End: 2022-10-28

## 2022-10-28 RX ORDER — LEVALBUTEROL 1.25 MG/.5ML
1.25 SOLUTION, CONCENTRATE RESPIRATORY (INHALATION)
Status: DISCONTINUED | OUTPATIENT
Start: 2022-10-29 | End: 2022-10-31 | Stop reason: HOSPADM

## 2022-10-28 RX ORDER — LEVOTHYROXINE SODIUM 0.1 MG/1
100 TABLET ORAL
Status: DISCONTINUED | OUTPATIENT
Start: 2022-10-29 | End: 2022-10-29

## 2022-10-28 RX ORDER — ASPIRIN 81 MG/1
324 TABLET, CHEWABLE ORAL ONCE
Status: COMPLETED | OUTPATIENT
Start: 2022-10-28 | End: 2022-10-28

## 2022-10-28 RX ORDER — CEFTRIAXONE 1 G/50ML
1000 INJECTION, SOLUTION INTRAVENOUS EVERY 24 HOURS
Status: DISCONTINUED | OUTPATIENT
Start: 2022-10-29 | End: 2022-10-31 | Stop reason: HOSPADM

## 2022-10-28 RX ORDER — MAGNESIUM SULFATE 1 G/100ML
1 INJECTION INTRAVENOUS ONCE
Status: COMPLETED | OUTPATIENT
Start: 2022-10-28 | End: 2022-10-28

## 2022-10-28 RX ORDER — TIMOLOL MALEATE 5 MG/ML
1 SOLUTION/ DROPS OPHTHALMIC 2 TIMES DAILY
Status: DISCONTINUED | OUTPATIENT
Start: 2022-10-28 | End: 2022-10-31 | Stop reason: HOSPADM

## 2022-10-28 RX ORDER — BUDESONIDE 0.5 MG/2ML
0.5 INHALANT ORAL
Status: DISCONTINUED | OUTPATIENT
Start: 2022-10-28 | End: 2022-10-31 | Stop reason: HOSPADM

## 2022-10-28 RX ADMIN — TIMOLOL MALEATE 1 DROP: 5 SOLUTION/ DROPS OPHTHALMIC at 22:46

## 2022-10-28 RX ADMIN — CEFTRIAXONE 1000 MG: 1 INJECTION, SOLUTION INTRAVENOUS at 11:03

## 2022-10-28 RX ADMIN — IPRATROPIUM BROMIDE AND ALBUTEROL SULFATE 3 ML: 2.5; .5 SOLUTION RESPIRATORY (INHALATION) at 20:01

## 2022-10-28 RX ADMIN — BRIMONIDINE TARTRATE 1 DROP: 2 SOLUTION/ DROPS OPHTHALMIC at 22:46

## 2022-10-28 RX ADMIN — ASPIRIN 81 MG CHEWABLE TABLET 324 MG: 81 TABLET CHEWABLE at 09:12

## 2022-10-28 RX ADMIN — SODIUM CHLORIDE 1000 ML: 0.9 INJECTION, SOLUTION INTRAVENOUS at 11:00

## 2022-10-28 RX ADMIN — AZITHROMYCIN 500 MG: 500 INJECTION, POWDER, LYOPHILIZED, FOR SOLUTION INTRAVENOUS at 12:09

## 2022-10-28 RX ADMIN — MAGNESIUM SULFATE HEPTAHYDRATE 1 G: 1 INJECTION, SOLUTION INTRAVENOUS at 13:50

## 2022-10-28 RX ADMIN — ACETAMINOPHEN 975 MG: 325 TABLET, FILM COATED ORAL at 21:09

## 2022-10-28 RX ADMIN — MONTELUKAST 10 MG: 10 TABLET, FILM COATED ORAL at 21:09

## 2022-10-28 NOTE — ED PROVIDER NOTES
History  Chief Complaint   Patient presents with   • Chest Pain     Patient c/o chest pain since yesterday  This 72-year-old female presents to the emergency room with her   She is essentially [place]ym[place]-speaking that is interpreted  She has a 1 day history of chest pain that started last evening  She describes it as constant pressure  She states it radiates from her left chest to her left shoulder  She complaints of pain that radiates up to the left lateral aspect of her neck  The pain is not worse with range of motion of her neck or shoulder  She denies any nausea or diaphoresis  She denies any increasing shortness of breath  She does have a history of COPD and wears chronic oxygen at 2 liters/minute  She has not noticed a increased work of breathing with activity  She states that this chest pain is worse with exertion and activity  It is unrelieved with rest   She denies any diaphoresis  She does complain of a 2-3 day history of nasal congestion and postnasal drip and nonproductive cough  She denies any fever or chills  She denies any generalized myalgias  Patient is a former smoker of 1 pack a day for the past 40 years  She quit in 2014  Denies alcohol use  Denies any substance abuse  Differential diagnosis includes but is not limited to acute coronary syndrome, COVID, myocarditis, pericarditis, GERD, esophagitis, pneumonia    Past medical history is positive for COPD, hypertension, acute kidney failure, allergies, anemia, asthma, cancer, COVID, hypothyroidism, GERD, glaucoma, hyperlipidemia, squamous cell carcinoma of the larynx      History provided by:  Patient  Chest Pain  Pain location:  L chest  Pain quality: aching    Pain radiates to:  L shoulder  Pain radiates to the back: no    Pain severity:  Moderate  Onset quality:  Gradual  Duration:  1 day  Timing:  Constant  Chronicity:  New  Context: at rest    Context: not breathing, no drug use, not eating, not lifting, no movement, not raising an arm, no stress and no trauma    Relieved by:  Nothing  Worsened by:  Exertion  Ineffective treatments:  Rest and oxygen  Associated symptoms: cough and shortness of breath    Associated symptoms: no abdominal pain, no AICD problem, no altered mental status, no anorexia, no anxiety, no back pain, no claudication, no diaphoresis, no dizziness, no dysphagia, no fatigue, no fever, no headache, no heartburn, no lower extremity edema, no nausea, no near-syncope, no numbness, no orthopnea, no palpitations, no PND, no syncope, not vomiting and no weakness    Cough:     Cough characteristics:  Dry and non-productive    Severity:  Mild    Onset quality:  Gradual    Duration:  2 days    Timing:  Intermittent    Progression:  Unchanged    Chronicity:  New  Risk factors: hypertension and obesity    Risk factors: no aortic disease, no coronary artery disease, no diabetes mellitus, no Sherita-Danlos syndrome, no high cholesterol, no immobilization, not male, no Marfan's syndrome, no prior DVT/PE, no smoking and no surgery        Prior to Admission Medications   Prescriptions Last Dose Informant Patient Reported? Taking? Budeson-Glycopyrrol-Formoterol (Breztri Aerosphere) 160-9-4 8 MCG/ACT AERO  Self No No   Sig: Inhale 2 puffs 2 (two) times a day Rinse mouth after use     acetaminophen (TYLENOL) 650 mg CR tablet  Self No No   Sig: Take 1 tablet (650 mg total) by mouth every 8 (eight) hours as needed for mild pain   albuterol (PROVENTIL HFA,VENTOLIN HFA) 90 mcg/act inhaler  Self No No   Sig: Inhale 2 puffs every 4 (four) hours as needed for wheezing   brimonidine tartrate 0 2 % ophthalmic solution  Self Yes No   ipratropium (ATROVENT) 0 06 % nasal spray   No No   Sig: USE 2 SPRAYS IN EACH NOSTRIL 4 TIMES DAILY   ipratropium-albuterol (DUO-NEB) 0 5-2 5 mg/3 mL nebulizer solution   No No   Sig: TAKE 3 ML BY NEBULIZATION EVERY 6 (SIX) HOURS AS NEEDED FOR WHEEZING OR SHORTNESS OF BREATH   levothyroxine 100 mcg tablet Self No No   Sig: Take 1 tablet (100 mcg total) by mouth daily   lidocaine (XYLOCAINE) 5 % ointment   No No   Sig: Apply topically as needed for mild pain   montelukast (SINGULAIR) 10 mg tablet  Self No No   Sig: TAKE 1 TABLET BY MOUTH DAILY AT BEDTIME   nystatin (MYCOSTATIN) cream  Self No No   Sig: Apply topically 2 (two) times a day   tiZANidine (ZANAFLEX) 2 mg tablet   No No   Sig: Take 1 tablet (2 mg total) by mouth every 8 (eight) hours as needed for muscle spasms (back pain)   timolol (TIMOPTIC) 0 5 % ophthalmic solution  Self Yes No   Sig: INSTILL 1 DROP INTO EACH EYE TWO TIMES A DAY      Facility-Administered Medications: None       Past Medical History:   Diagnosis Date   • Acute kidney failure (HCC)    • Allergic    • Allergies    • Anemia    • Asthma    • Cancer ([place] [address] )    • Chronic kidney disease (CKD), stage III (moderate) ([facility])    • COPD (chronic obstructive pulmonary disease) ([place] [address] )    • COVID-19     Covid + 1-9-33-cough at that time now fully resolved   • Disease of thyroid gland    • Essential hypertension    • GERD (gastroesophageal reflux disease)    • Glaucoma    • High blood pressure    • HTN (hypertension) 02/16/2021   • Hyperlipidemia    • Hypothyroidism    • Squamous cell carcinoma of larynx ([place] [address] ) 8/10/2022   • Throat cancer ([place] [address] ) 2014    chemo and rad therapy 2014       Past Surgical History:   Procedure Laterality Date   • COLONOSCOPY  2016   • ESOPHAGOGASTRODUODENOSCOPY  2016   • EYE SURGERY     • IR BIOPSY LUNG  05/18/2022   • LARYNGOSCOPY      w/ Bx      • DC INCISE FINGER TENDON SHEATH Right 02/16/2021    Procedure: THUMB TRIGGER FINGER RELEASE;  Surgeon: Jenny Muñoz MD;  Location: [facility];  Service: Orthopedics   • TUBAL LIGATION         Family History   Problem Relation Age of Onset   • Brain cancer Sister    • Diabetes Sister    • Breast cancer Sister    • Other Mother         respiratory disorder   • Sudden death Father         shot himself   • Suicidality Father    • No Known Problems Daughter    • No Known Problems Maternal Grandmother    • No Known Problems Maternal Grandfather    • No Known Problems Paternal Grandmother    • No Known Problems Paternal Grandfather    • No Known Problems Sister    • No Known Problems Sister    • No Known Problems Sister    • No Known Problems Sister    • No Known Problems Sister    • No Known Problems Sister    • No Known Problems Sister    • No Known Problems Daughter    • No Known Problems Maternal Aunt    • No Known Problems Maternal Aunt    • No Known Problems Maternal Aunt    • No Known Problems Maternal Aunt    • No Known Problems Maternal Aunt    • No Known Problems Paternal Aunt    • No Known Problems Paternal Aunt    • No Known Problems Paternal Aunt    • No Known Problems Paternal Aunt      I have reviewed and agree with the history as documented  E-Cigarette/Vaping   • E-Cigarette Use Never User      E-Cigarette/Vaping Substances   • Nicotine No    • THC No    • CBD No      Social History     Tobacco Use   • Smoking status: Former Smoker     Packs/day: 1 00     Years: 40 00     Pack years: 40 00     Quit date: 2014     Years since quittin 8   • Smokeless tobacco: Never Used   Vaping Use   • Vaping Use: Never used   Substance Use Topics   • Alcohol use: Never     Comment: None       Review of Systems   Constitutional: Positive for activity change  Negative for appetite change, chills, diaphoresis, fatigue and fever  HENT: Positive for congestion, postnasal drip and rhinorrhea  Negative for sore throat and trouble swallowing  Eyes: Negative for pain, discharge, redness and itching  Respiratory: Positive for cough and shortness of breath  Cardiovascular: Positive for chest pain  Negative for palpitations, orthopnea, claudication, syncope, PND and near-syncope  Gastrointestinal: Negative for abdominal pain, anorexia, diarrhea, heartburn, nausea and vomiting     Genitourinary: Negative for dysuria, frequency, hematuria and urgency  Musculoskeletal: Negative for back pain  Skin: Negative for color change, pallor and rash  Neurological: Negative for dizziness, weakness, numbness and headaches  Psychiatric/Behavioral: Negative for confusion  All other systems reviewed and are negative  Physical Exam  Physical Exam  Vitals and nursing note reviewed  Constitutional:       General: She is not in acute distress  Appearance: She is well-developed and normal weight  She is not ill-appearing or diaphoretic  HENT:      Head: Normocephalic and atraumatic  Neck:      Vascular: No hepatojugular reflux  Cardiovascular:      Rate and Rhythm: Normal rate  Heart sounds: Normal heart sounds  Pulmonary:      Effort: Pulmonary effort is normal       Breath sounds: Normal breath sounds  Musculoskeletal:      Cervical back: Neck supple  Right lower leg: No edema  Left lower leg: No edema  Lymphadenopathy:      Cervical: No cervical adenopathy  Skin:     General: Skin is warm  Capillary Refill: Capillary refill takes less than 2 seconds  Neurological:      Mental Status: She is alert and oriented to person, place, and time     Psychiatric:         Mood and Affect: Mood normal          Behavior: Behavior normal          Vital Signs  ED Triage Vitals [10/28/22 0836]   Temperature Pulse Respirations Blood Pressure SpO2   98 8 °F (37 1 °C) 90 22 141/64 98 %      Temp Source Heart Rate Source Patient Position - Orthostatic VS BP Location FiO2 (%)   Oral Monitor Lying Right arm --      Pain Score       10 - Worst Possible Pain           Vitals:    10/28/22 0836 10/28/22 1115   BP: 141/64 130/60   Pulse: 90 72   Patient Position - Orthostatic VS: Lying          Visual Acuity      ED Medications  Medications   aspirin chewable tablet 324 mg (324 mg Oral Given 10/28/22 0912)   cefTRIAXone (ROCEPHIN) IVPB (premix in dextrose) 1,000 mg 50 mL (0 mg Intravenous Stopped 10/28/22 1133)   azithromycin (ZITHROMAX) 500 mg in sodium chloride 0 9% 250mL IVPB 500 mg (500 mg Intravenous New Bag 10/28/22 1209)   magnesium sulfate IVPB (premix) SOLN 1 g (1 g Intravenous New Bag 10/28/22 1350)   sodium chloride 0 9 % bolus 2,000 mL (1,000 mL Intravenous New Bag 10/28/22 1100)       Diagnostic Studies  Results Reviewed     Procedure Component Value Units Date/Time    Blood culture #1 [[id number]] Collected: 10/28/22 1101    Lab Status: Preliminary result Specimen: Blood from Arm, Right Updated: 10/28/22 1503     Blood Culture Received in Microbiology Lab  Culture in Progress  Blood culture #2 [[id number]] Collected: 10/28/22 1101    Lab Status: Preliminary result Specimen: Blood from Arm, Left Updated: 10/28/22 1503     Blood Culture Received in Microbiology Lab  Culture in Progress  Procalcitonin [[id number]]  (Normal) Collected: 10/28/22 1101    Lab Status: Final result Specimen: Blood from Arm, Right Updated: 10/28/22 1138     Procalcitonin 0 24 ng/ml     HS Troponin I 2hr [[id number]]  (Normal) Collected: 10/28/22 1101    Lab Status: Final result Specimen: Blood from Arm, Right Updated: 10/28/22 1137     hs TnI 2hr 3 ng/L      Delta 2hr hsTnI 0 ng/L     Lactic acid, plasma [[id number]]  (Normal) Collected: 10/28/22 1101    Lab Status: Final result Specimen: Blood from Arm, Right Updated: 10/28/22 1126     LACTIC ACID 0 7 mmol/L     Narrative:      Result may be elevated if tourniquet was used during collection  FLU/RSV/COVID - if FLU/RSV clinically relevant [[id number]]  (Normal) Collected: 10/28/22 0914    Lab Status: Final result Specimen: Nares from Nose Updated: 10/28/22 1015     SARS-CoV-2 Negative     INFLUENZA A PCR Negative     INFLUENZA B PCR Negative     RSV PCR Negative    Narrative:      FOR PEDIATRIC PATIENTS - copy/paste COVID Guidelines URL to browser: [email or web address]    SARS-CoV-2 assay is a Nucleic Acid Amplification assay intended for the  qualitative detection of nucleic acid from SARS-CoV-2 in nasopharyngeal  swabs  Results are for the presumptive identification of SARS-CoV-2 RNA  Positive results are indicative of infection with SARS-CoV-2, the virus  causing COVID-19, but do not rule out bacterial infection or co-infection  with other viruses  Laboratories within the [facility] and its  territories are required to report all positive results to the appropriate  public health authorities  Negative results do not preclude SARS-CoV-2  infection and should not be used as the sole basis for treatment or other  patient management decisions  Negative results must be combined with  clinical observations, patient history, and epidemiological information  This test has not been FDA cleared or approved  This test has been authorized by FDA under an Emergency Use Authorization  (EUA)  This test is only authorized for the duration of time the  declaration that circumstances exist justifying the authorization of the  emergency use of an in vitro diagnostic tests for detection of SARS-CoV-2  virus and/or diagnosis of COVID-19 infection under section 564(b)(1) of  the Act, 21 U  S C  566YVL-7(Q)(2), unless the authorization is terminated  or revoked sooner  The test has been validated but independent review by FDA  and CLIA is pending  Test performed using [employer] Gene[employer]pert: This RT-PCR assay targets N2,  a region unique to SARS-CoV-2  A conserved region in the E-gene was chosen  for pan-Sarbecovirus detection which includes SARS-CoV-2  According to CMS-2020-01-R, this platform meets the definition of high-throughput technology  TSH [[id number]]  (Abnormal) Collected: 10/28/22 0914    Lab Status: Final result Specimen: Blood from Arm, Right Updated: 10/28/22 1006     TSH [address] [place] [address] 0 217 uIU/mL     Narrative:      Patients undergoing fluorescein dye angiography may retain small amounts of fluorescein in the body for 48-72 hours post procedure   Samples containing fluorescein can produce falsely depressed TSH values  If the patient had this procedure,a specimen should be resubmitted post fluorescein clearance        HS Troponin 0hr (reflex protocol) [[id number]]  (Normal) Collected: 10/28/22 0914    Lab Status: Final result Specimen: Blood from Arm, Right Updated: 10/28/22 0955     hs TnI 0hr 3 ng/L     Basic metabolic panel [[id number]] Collected: 10/28/22 0914    Lab Status: Final result Specimen: Blood from Arm, Right Updated: 10/28/22 0945     Sodium 139 mmol/L      Potassium 4 7 mmol/L      Chloride 104 mmol/L      CO2 31 mmol/L      ANION GAP 4 mmol/L      BUN 18 mg/dL      Creatinine 1 12 mg/dL      Glucose 133 mg/dL      Calcium 9 5 mg/dL      eGFR 50 ml/min/1 73sq m     Narrative:      [facility] guidelines for Chronic Kidney Disease (CKD):   •  Stage 1 with normal or high GFR (GFR > 90 mL/min/1 73 square meters)  •  Stage 2 Mild CKD (GFR = 60-89 mL/min/1 73 square meters)  •  Stage 3A Moderate CKD (GFR = 45-59 mL/min/1 73 square meters)  •  Stage 3B Moderate CKD (GFR = 30-44 mL/min/1 73 square meters)  •  Stage 4 Severe CKD (GFR = 15-29 mL/min/1 73 square meters)  •  Stage 5 End Stage CKD (GFR <15 mL/min/1 73 square meters)  Note: GFR calculation is accurate only with a steady state creatinine    Protime-INR [[id number]]  (Normal) Collected: 10/28/22 0914    Lab Status: Final result Specimen: Blood from Arm, Right Updated: 10/28/22 0945     Protime 14 1 seconds      INR 1 07    APTT [[id number]]  (Normal) Collected: 10/28/22 0914    Lab Status: Final result Specimen: Blood from Arm, Right Updated: 10/28/22 0945     PTT 28 seconds     Magnesium [[id number]]  (Abnormal) Collected: 10/28/22 0914    Lab Status: Final result Specimen: Blood from Arm, Right Updated: 10/28/22 0945     Magnesium 1 7 mg/dL     CBC and differential [[id number]]  (Abnormal) Collected: 10/28/22 0914    Lab Status: Final result Specimen: Blood from Arm, Right Updated: 10/28/22 0929     WBC 14 97 Thousand/uL      RBC 4 47 Million/uL      Hemoglobin 12 0 g/dL      Hematocrit 39 1 %      MCV 88 fL      MCH 26 8 pg      MCHC 30 7 g/dL      RDW 14 0 %      MPV 9 8 fL      Platelets 899 Thousands/uL      nRBC 0 /100 WBCs      Neutrophils Relative 88 %      Immat GRANS % 1 %      Lymphocytes Relative 3 %      Monocytes Relative 8 %      Eosinophils Relative 0 %      Basophils Relative 0 %      Neutrophils Absolute 13 10 Thousands/µL      Immature Grans Absolute 0 09 Thousand/uL      Lymphocytes Absolute 0 50 Thousands/µL      Monocytes Absolute 1 21 Thousand/µL      Eosinophils Absolute 0 04 Thousand/µL      Basophils Absolute 0 03 Thousands/µL                  XR chest 1 view portable   ED Interpretation by Leonia Harada, PA-C (10/28 1019)   LLL pneumonia        Final Result by Elva Machado MD (10/28 1012)      Development of left basal infiltrate suspicious for pneumonia                  Workstation performed: [id number]                    Procedures  ECG 12 Lead Documentation Only    Date/Time: 10/28/2022 9:27 AM  Performed by: Leonia Harada, PA-C  Authorized by: Leonia Harada, PA-C     Indications / Diagnosis:  Chest pain  ECG reviewed by me, the ED Provider: yes    Patient location:  ED  Previous ECG:     Previous ECG:  Unavailable  Interpretation:     Interpretation: normal    Rate:     ECG rate:  86    ECG rate assessment: normal    Rhythm:     Rhythm: sinus rhythm    Ectopy:     Ectopy: none    QRS:     QRS axis:  Normal    QRS intervals:  Normal  Conduction:     Conduction: normal    ST segments:     ST segments:  Normal  T waves:     T waves: normal    Comments:      No sign of acute ischemia    Independently interpreted by me             ED Course             HEART Risk Score    Flowsheet Row Most Recent Value   Heart Score Risk Calculator    History 1 Filed at: 10/28/2022 1736   ECG 0 Filed at: 10/28/2022 1736   Age 2 Filed at: 10/28/2022 1736   Risk Factors 1 Filed at: 10/28/2022 1736   Troponin 0 Filed at: 10/28/2022 1736   HEART Score 4 Filed at: 10/28/2022 1736                                      [facility]  Number of Diagnoses or Management Options  Chest pain: new and requires workup  Left lower lobe pneumonia: new and requires workup     Amount and/or Complexity of Data Reviewed  Clinical lab tests: ordered and reviewed  Tests in the radiology section of CPT®: ordered and reviewed  Tests in the medicine section of CPT®: ordered and reviewed        Disposition  Final diagnoses:   Chest pain   Left lower lobe pneumonia     Time reflects when diagnosis was documented in both MDM as applicable and the Disposition within this note     Time User Action Codes Description Comment    10/28/2022 11:14 AM Hickey Goodpasture Add [R07 9] Chest pain     10/28/2022 11:14 AM Hickey Goodpasture Add [J18 9] Left lower lobe pneumonia       ED Disposition     ED Disposition   Admit    Condition   Stable    Date/Time   Fri Oct 28, 2022 11:18 AM    Comment   Case was discussed with Dr Sarah Villegas and the patient's admission status was agreed to be Admission Status: inpatient status to the service of Dr Sarah Villegas   Follow-up Information    None         Current Discharge Medication List      CONTINUE these medications which have NOT CHANGED    Details   acetaminophen (TYLENOL) 650 mg CR tablet Take 1 tablet (650 mg total) by mouth every 8 (eight) hours as needed for mild pain  Qty: 30 tablet, Refills: 0    Associated Diagnoses: Left shoulder pain; Cervical radiculopathy      albuterol (PROVENTIL HFA,VENTOLIN HFA) 90 mcg/act inhaler Inhale 2 puffs every 4 (four) hours as needed for wheezing  Qty: 36 g, Refills: 5    Comments: Substitution to a formulary equivalent within the same pharmaceutical class is authorized    Associated Diagnoses: Severe persistent asthma without complication      brimonidine tartrate 0 2 % ophthalmic solution Budeson-Glycopyrrol-Formoterol (Breztri Aerosphere) 160-9-4 8 MCG/ACT AERO Inhale 2 puffs 2 (two) times a day Rinse mouth after use  Qty: 10 7 g, Refills: 3    Comments: Discontinue stiolto  Associated Diagnoses: Centrilobular emphysema (HCC)      ipratropium (ATROVENT) 0 06 % nasal spray USE 2 SPRAYS IN EACH NOSTRIL 4 TIMES DAILY  Qty: 45 mL, Refills: 2    Associated Diagnoses: Post-nasal drip      ipratropium-albuterol (DUO-NEB) 0 5-2 5 mg/3 mL nebulizer solution TAKE 3 ML BY NEBULIZATION EVERY 6 (SIX) HOURS AS NEEDED FOR WHEEZING OR SHORTNESS OF BREATH  Qty: 360 mL, Refills: 1    Associated Diagnoses: Centrilobular emphysema (HCC)      levothyroxine 100 mcg tablet Take 1 tablet (100 mcg total) by mouth daily  Qty: 90 tablet, Refills: 1    Comments: PT NEEDS NEW SCRIPT  Associated Diagnoses: Acquired hypothyroidism      lidocaine (XYLOCAINE) 5 % ointment Apply topically as needed for mild pain  Qty: 35 44 g, Refills: 0    Associated Diagnoses: Acute midline thoracic back pain; Cervical radiculopathy      montelukast (SINGULAIR) 10 mg tablet TAKE 1 TABLET BY MOUTH DAILY AT BEDTIME  Qty: 90 tablet, Refills: 1    Associated Diagnoses: Severe persistent asthma without complication      nystatin (MYCOSTATIN) cream Apply topically 2 (two) times a day  Qty: 30 g, Refills: 0    Associated Diagnoses: Rash      timolol (TIMOPTIC) 0 5 % ophthalmic solution INSTILL 1 DROP INTO EACH EYE TWO TIMES A DAY      tiZANidine (ZANAFLEX) 2 mg tablet Take 1 tablet (2 mg total) by mouth every 8 (eight) hours as needed for muscle spasms (back pain)  Qty: 30 tablet, Refills: 0    Associated Diagnoses: Acute midline thoracic back pain             No discharge procedures on file      PDMP Review       Value Time User    PDMP Reviewed  Yes 9/21/2022 10:16 AM Arya Perez MD          ED Provider  Electronically Signed by           Lauri Wisdom PA-C  10/28/22 0715

## 2022-10-29 LAB
ANION GAP SERPL CALCULATED.3IONS-SCNC: 9 MMOL/L (ref 4–13)
ATRIAL RATE: 86 BPM
BUN SERPL-MCNC: 17 MG/DL (ref 5–25)
CALCIUM SERPL-MCNC: 9.3 MG/DL (ref 8.4–10.2)
CHLORIDE SERPL-SCNC: 106 MMOL/L (ref 96–108)
CO2 SERPL-SCNC: 26 MMOL/L (ref 21–32)
CREAT SERPL-MCNC: 0.96 MG/DL (ref 0.6–1.3)
ERYTHROCYTE [DISTWIDTH] IN BLOOD BY AUTOMATED COUNT: 14.2 % (ref 11.6–15.1)
GFR SERPL CREATININE-BSD FRML MDRD: 61 ML/MIN/1.73SQ M
GLUCOSE SERPL-MCNC: 75 MG/DL (ref 65–140)
HCT VFR BLD AUTO: 38 % (ref 34.8–46.1)
HGB BLD-MCNC: 11.3 G/DL (ref 11.5–15.4)
MCH RBC QN AUTO: 26.7 PG (ref 26.8–34.3)
MCHC RBC AUTO-ENTMCNC: 29.7 G/DL (ref 31.4–37.4)
MCV RBC AUTO: 90 FL (ref 82–98)
P AXIS: 83 DEGREES
PLATELET # BLD AUTO: 192 THOUSANDS/UL (ref 149–390)
PMV BLD AUTO: 10.5 FL (ref 8.9–12.7)
POTASSIUM SERPL-SCNC: 4.1 MMOL/L (ref 3.5–5.3)
PR INTERVAL: 146 MS
PROCALCITONIN SERPL-MCNC: 0.27 NG/ML
QRS AXIS: 2 DEGREES
QRSD INTERVAL: 86 MS
QT INTERVAL: 352 MS
QTC INTERVAL: 421 MS
RBC # BLD AUTO: 4.23 MILLION/UL (ref 3.81–5.12)
SODIUM SERPL-SCNC: 141 MMOL/L (ref 135–147)
T WAVE AXIS: 59 DEGREES
T4 FREE SERPL-MCNC: 1.64 NG/DL (ref 0.76–1.46)
VENTRICULAR RATE: 86 BPM
WBC # BLD AUTO: 13.84 THOUSAND/UL (ref 4.31–10.16)

## 2022-10-29 RX ORDER — ESCITALOPRAM OXALATE 10 MG/1
10 TABLET ORAL DAILY
Status: DISCONTINUED | OUTPATIENT
Start: 2022-10-30 | End: 2022-10-31 | Stop reason: HOSPADM

## 2022-10-29 RX ORDER — LEVOTHYROXINE SODIUM 0.07 MG/1
75 TABLET ORAL
Status: DISCONTINUED | OUTPATIENT
Start: 2022-10-30 | End: 2022-10-31 | Stop reason: HOSPADM

## 2022-10-29 RX ORDER — IPRATROPIUM BROMIDE AND ALBUTEROL SULFATE 2.5; .5 MG/3ML; MG/3ML
3 SOLUTION RESPIRATORY (INHALATION) ONCE
Status: COMPLETED | OUTPATIENT
Start: 2022-10-29 | End: 2022-10-29

## 2022-10-29 RX ADMIN — BUDESONIDE 0.5 MG: 0.5 INHALANT ORAL at 19:40

## 2022-10-29 RX ADMIN — BRIMONIDINE TARTRATE 1 DROP: 2 SOLUTION/ DROPS OPHTHALMIC at 08:23

## 2022-10-29 RX ADMIN — TIMOLOL MALEATE 1 DROP: 5 SOLUTION/ DROPS OPHTHALMIC at 17:21

## 2022-10-29 RX ADMIN — BRIMONIDINE TARTRATE 1 DROP: 2 SOLUTION/ DROPS OPHTHALMIC at 21:15

## 2022-10-29 RX ADMIN — MONTELUKAST 10 MG: 10 TABLET, FILM COATED ORAL at 22:18

## 2022-10-29 RX ADMIN — IPRATROPIUM BROMIDE AND ALBUTEROL SULFATE 3 ML: 2.5; .5 SOLUTION RESPIRATORY (INHALATION) at 06:43

## 2022-10-29 RX ADMIN — ACETAMINOPHEN 975 MG: 325 TABLET, FILM COATED ORAL at 06:48

## 2022-10-29 RX ADMIN — ACETAMINOPHEN 975 MG: 325 TABLET, FILM COATED ORAL at 22:18

## 2022-10-29 RX ADMIN — IPRATROPIUM BROMIDE 0.5 MG: 0.5 SOLUTION RESPIRATORY (INHALATION) at 19:41

## 2022-10-29 RX ADMIN — LEVALBUTEROL HYDROCHLORIDE 1.25 MG: 1.25 SOLUTION, CONCENTRATE RESPIRATORY (INHALATION) at 13:42

## 2022-10-29 RX ADMIN — IPRATROPIUM BROMIDE 0.5 MG: 0.5 SOLUTION RESPIRATORY (INHALATION) at 13:42

## 2022-10-29 RX ADMIN — DICLOFENAC SODIUM TOPICAL GEL, 1%, 2 G: 10 GEL TOPICAL at 22:20

## 2022-10-29 RX ADMIN — ENOXAPARIN SODIUM 40 MG: 40 INJECTION SUBCUTANEOUS at 08:22

## 2022-10-29 RX ADMIN — LEVALBUTEROL HYDROCHLORIDE 1.25 MG: 1.25 SOLUTION, CONCENTRATE RESPIRATORY (INHALATION) at 19:41

## 2022-10-29 RX ADMIN — CEFTRIAXONE 1000 MG: 1 INJECTION, SOLUTION INTRAVENOUS at 11:51

## 2022-10-29 RX ADMIN — TIMOLOL MALEATE 1 DROP: 5 SOLUTION/ DROPS OPHTHALMIC at 08:24

## 2022-10-29 RX ADMIN — BRIMONIDINE TARTRATE 1 DROP: 2 SOLUTION/ DROPS OPHTHALMIC at 17:17

## 2022-10-29 RX ADMIN — BUDESONIDE 0.5 MG: 0.5 INHALANT ORAL at 07:54

## 2022-10-29 RX ADMIN — LEVOTHYROXINE SODIUM 100 MCG: 100 TABLET ORAL at 06:48

## 2022-10-29 NOTE — ASSESSMENT & PLAN NOTE
Chronic, not in acute exacerbation  Resume home Breztri inhaler BID  Resume home duonebs prn  Resume [place] O2, on 2L at baseline

## 2022-10-29 NOTE — PROGRESS NOTES
[facility]  Progress Note - Izzy Guy 1955, 79 y o  female MRN: [id number]  Unit/Bed#: S -01 Encounter: [id number]  Primary Care Provider: Lesia Callahan MD   Date and time admitted to hospital: 10/28/2022  8:30 AM    * Community acquired pneumonia of left lower lobe of lung  Assessment & Plan  · POA: cough, congestion, chills, SOBoE, runny nose, left sided chest pain for 3 days  · Suspect viral, will monitor procal   · WBC 14k  · Covid/flu/rsv negative  · Trop neg x2  · Lactate wnl  · procal mildly elevated  · Drip score 1  · CXR suspicious for PNA    Plan:  · Ceftriaxone IV  · duonebs prn  · Resume home inhaler: Breztri  · mucinex BID, tessalon perles prn  · Resume NC oxygen, 2L at baseline  · IS, resp protocol  · Schedule tylenol for pain  · Trend CBC  · AM procal      Chest pain  Assessment & Plan  · POA: left sided chest pain, worse with deep breath  · Tender on palpation  · Suspect costochondritis in setting of suspected PNA  · Tylenol scheduled for pain q8h  · voltaren gel qid   · Management of pna as below    Hypothyroidism  Assessment & Plan  Lab Results   Component Value Date    [id number] 0 217 (L) 10/28/2022     TSH low on admission  Resume home levothyroxine, adjust as needed    Centrilobular emphysema ([address] )  Assessment & Plan  Chronic, not in acute exacerbation  Resume home Breztri inhaler BID  Resume home duonebs prn  Resume NC O2, on 2L at baseline    Chronic kidney disease, stage 3 (moderate)  Assessment & Plan  Lab Results   Component Value Date    EGFR 61 10/29/2022    EGFR 50 10/28/2022    EGFR 45 05/23/2022    CREATININE 0 96 10/29/2022    CREATININE 1 12 10/28/2022    CREATININE 1 24 05/23/2022     Chronic, stable  Trend BMP   Encourage PO intake        VTE Pharmacologic Prophylaxis: VTE Score: 7 High Risk (Score >/= 5) - Pharmacological DVT Prophylaxis Ordered: enoxaparin (Lovenox)  Sequential Compression Devices Ordered  Patient Centered Rounds:  I performed bedside rounds with nursing staff today  Discussions with Specialists or Other Care Team Provider: N/A     Education and Discussions with Family / Patient: Patient declined call to   Current Length of Stay: 1 day(s)  Current Patient Status: Inpatient   Discharge Plan: Anticipate discharge in 24-48 hrs to home  Code Status: Level 1 - Full Code    Subjective:   No acute events overnight  Patient reports improvement in shortness of breath and chest pain  Discussed continuing treatment with IV antibiotics in setting of elevated pro count with imaging findings suspicious for pneumonia  Patient with no further concerns at this time  Objective:     Vitals:   Temp (24hrs), Av 2 °F (36 8 °C), Min:98 2 °F (36 8 °C), Max:98 3 °F (36 8 °C)    Temp:  [98 2 °F (36 8 °C)-98 3 °F (36 8 °C)] 98 3 °F (36 8 °C)  HR:  [78-91] 85  Resp:  [18-20] 18  BP: (102-145)/(47-64) 102/47  SpO2:  [94 %-97 %] 94 %  Body mass index is 32 9 kg/m²  Input and Output Summary (last 24 hours):   No intake or output data in the 24 hours ending 10/29/22 1458    Physical Exam:   Physical Exam  Vitals and nursing note reviewed  Constitutional:       General: She is not in acute distress  Appearance: She is well-developed  She is not ill-appearing or toxic-appearing  HENT:      Head: Normocephalic and atraumatic  Eyes:      Conjunctiva/sclera: Conjunctivae normal    Cardiovascular:      Rate and Rhythm: Normal rate and regular rhythm  Heart sounds: No murmur heard  Pulmonary:      Effort: Pulmonary effort is normal  No respiratory distress  Breath sounds: Rhonchi present  Abdominal:      Palpations: Abdomen is soft  Tenderness: There is no abdominal tenderness  Musculoskeletal:      Cervical back: Neck supple  Skin:     General: Skin is warm and dry  Capillary Refill: Capillary refill takes less than 2 seconds     Neurological:      Mental Status: She is alert and oriented to person, place, and time  Psychiatric:         Mood and Affect: Mood normal          Behavior: Behavior normal           Additional Data:     Labs:  Results from last 7 days   Lab Units 10/29/22  0606 10/28/22  0914   WBC Thousand/uL 13 84* 14 97*   HEMOGLOBIN g/dL 11 3* 12 0   HEMATOCRIT % 38 0 39 1   PLATELETS Thousands/uL 192 185   NEUTROS PCT %  --  88*   LYMPHS PCT %  --  3*   MONOS PCT %  --  8   EOS PCT %  --  0     Results from last 7 days   Lab Units 10/29/22  0606   SODIUM mmol/L 141   POTASSIUM mmol/L 4 1   CHLORIDE mmol/L 106   CO2 mmol/L 26   BUN mg/dL 17   CREATININE mg/dL 0 96   ANION GAP mmol/L 9   CALCIUM mg/dL 9 3   GLUCOSE RANDOM mg/dL 75     Results from last 7 days   Lab Units 10/28/22  0914   INR  1 07             Results from last 7 days   Lab Units 10/29/22  0606 10/28/22  1101   LACTIC ACID mmol/L  --  0 7   PROCALCITONIN ng/ml 0 27* 0 24       Lines/Drains:  Invasive Devices  Report    Peripheral Intravenous Line  Duration           Peripheral IV 10/28/22 Right Antecubital 1 day                      Imaging: Reviewed radiology reports from this admission including: chest xray    Recent Cultures (last 7 days):   Results from last 7 days   Lab Units 10/28/22  1101   BLOOD CULTURE  No Growth at 24 hrs  No Growth at 24 hrs         Last 24 Hours Medication List:   Current Facility-Administered Medications   Medication Dose Route Frequency Provider Last Rate   • acetaminophen  975 mg Oral [facility] Bernabe Woods DO     • benzonatate  100 mg Oral TID PRN Bernabe Woods DO     • brimonidine tartrate  1 drop Both Eyes TID Bernabe Woods DO     • budesonide  0 5 mg Nebulization Q12H Aylinalondra Hughes DO     • cefTRIAXone  1,000 mg Intravenous Q24H Bernabe Woods DO 1,000 mg (10/29/22 1151)   • Diclofenac Sodium  2 g Topical 4x Daily Bernabe Woods DO     • enoxaparin  40 mg Subcutaneous Daily Bernabe Woods DO     • ipratropium  0 5 mg Nebulization TID Tavon Werner MD     • levalbuterol  1 25 mg Nebulization TID Tavon Werner MD     • levothyroxine  100 mcg Oral Early Morning Haydee Mckeon DO     • montelukast  10 mg Oral HS Haydee Mckeon DO     • NON FORMULARY  2 puff Inhalation BID Haydee Mckeon DO     • oxyCODONE  2 5 mg Oral Q4H PRN Agustin Barahona MD     • timolol  1 drop Both Eyes BID Haydee Mckeon DO          Today, Patient Was Seen By: Maureen Duverney    **Please Note: This note may have been constructed using a voice recognition system  **

## 2022-10-29 NOTE — ASSESSMENT & PLAN NOTE
· POA: left sided chest pain, worse with deep breath  · Tender on palpation  · Suspect costochondritis in setting of suspected PNA  · Tylenol scheduled for pain q8h  · voltaren gel qid   · Management of pna as below

## 2022-10-29 NOTE — UTILIZATION REVIEW
Initial Clinical Review    Admission: Date/Time/Statement:   Admission Orders (From admission, onward)     Ordered        10/28/22 1117  INPATIENT ADMISSION  Once                      Orders Placed This Encounter   Procedures   • INPATIENT ADMISSION     Standing Status:   Standing     Number of Occurrences:   1     Order Specific Question:   Level of Care     Answer:   Med Surg [16]     Order Specific Question:   Estimated length of stay     Answer:   More than 2 Midnights     Order Specific Question:   Certification     Answer:   I certify that inpatient services are medically necessary for this patient for a duration of greater than two midnights  See H&P and MD Progress Notes for additional information about the patient's course of treatment  ED Arrival Information     Expected   -    Arrival   10/28/2022 08:25    Acuity   Emergent            Means of arrival   Wheelchair    Escorted by   Spouse    Service   Hospitalist    Admission type   Emergency            Arrival complaint   COPD/chest pain           Chief Complaint   Patient presents with   • Chest Pain     Patient c/o chest pain since yesterday  Initial Presentation: 79 y o  female PMH COPD ,CKD stage 3, hypothyroidism, depression, pararyngeal/laryngeal carcinoma (chemo,rad therapy 2014)  She present ED with c/o chest pain, congestion, runny nose and increased SOB on exertion  cough x3 days  Chest pain worse with deep breath  She has been using nebulizer q2hr without improvement  In ED pt is congestion, lungs left basal rales and tenderness is al the left lower lateral chest area  Still with sob  CXR showing LLL Pneumonia  Admitted Inpatient Community acquired pneumonia of the left lobe  Suspect viral, wbc 14K covid flu rsv negative  Troponin neg x2 , scheduled tylenol for pain, continue IV Ceftriaxone  duonebs prn  Resume home inhaler, mucinex bid ,tessalon perles prn     Resume oxygen 2L baseline , ISS, resp protocol    Date: 10/29/22   Day 2:   Community acquired pneumonia of left lower lobe of lung , cxr suspicious for PNA ,lungs with Rhonchi, continue IV Ceftriaxone, duonebs prn , IS continue oxygen am procal    Hypothyroidism tsh 0 217, levothyroxine   ckd stage 3 trend bmp    ED Triage Vitals [10/28/22 0836]   Temperature Pulse Respirations Blood Pressure SpO2   98 8 °F (37 1 °C) 90 22 141/64 98 %      Temp Source Heart Rate Source Patient Position - Orthostatic VS BP Location FiO2 (%)   Oral Monitor Lying Right arm --      Pain Score       10 - Worst Possible Pain          Wt Readings from Last 1 Encounters:   10/28/22 81 6 kg (179 lb 14 3 oz)     Additional Vital Signs:   10/29/22 0756 -- -- -- -- -- 96 % 28 2 L/min Nasal cannula --   10/29/22 07:44:41 98 3 °F (36 8 °C) 85 18 102/47 Abnormal  65 97 % -- -- -- --   10/29/22 06:08:40 98 2 °F (36 8 °C) 91 18 145/62 90 96 % -- -- -- Sitting   10/28/22 2015 98 2 °F (36 8 °C) 78 20 136/64 -- 96 % -- -- -- Sitting   10/28/22 1900 -- -- -- -- -- 95            Pertinent Labs/Diagnostic Test Results:   XR chest 1 view portable   ED Interpretation by Tom Watters PA-C (10/28 1019)   LLL pneumonia        Final Result by Aly Worley MD (10/28 1012)      Development of left basal infiltrate suspicious for pneumonia                  Workstation performed: [id number]           Results from last 7 days   Lab Units 10/28/22  0914   SARS-COV-2  Negative     Results from last 7 days   Lab Units 10/29/22  0606 10/28/22  0914   WBC Thousand/uL 13 84* 14 97*   HEMOGLOBIN g/dL 11 3* 12 0   HEMATOCRIT % 38 0 39 1   PLATELETS Thousands/uL 192 185   NEUTROS ABS Thousands/µL  --  13 10*     Results from last 7 days   Lab Units 10/29/22  0606 10/28/22  0914   SODIUM mmol/L 141 139   POTASSIUM mmol/L 4 1 4 7   CHLORIDE mmol/L 106 104   CO2 mmol/L 26 31   ANION GAP mmol/L 9 4   BUN mg/dL 17 18   CREATININE mg/dL 0 96 1 12   EGFR ml/min/1 73sq m 61 50   CALCIUM mg/dL 9 3 9 5   MAGNESIUM mg/dL  --  1 7* Results from last 7 days   Lab Units 10/29/22  0606 10/28/22  0914   GLUCOSE RANDOM mg/dL 75 133     Results from last 7 days   Lab Units 10/28/22  1101 10/28/22  0914   HS TNI 0HR ng/L  --  3   HS TNI 2HR ng/L 3  --    HSTNI D2 ng/L 0  --      Results from last 7 days   Lab Units 10/28/22  0914   PROTIME seconds 14 1   INR  1 07   PTT seconds 28     Results from last 7 days   Lab Units 10/28/22  0914   TSH 3RD GENERATON uIU/mL 0 217*     Results from last 7 days   Lab Units 10/29/22  0606 10/28/22  1101   PROCALCITONIN ng/ml 0 27* 0 24     Results from last 7 days   Lab Units 10/28/22  1101   LACTIC ACID mmol/L 0 7     Results from last 7 days   Lab Units 10/28/22  0914   INFLUENZA A PCR  Negative   INFLUENZA B PCR  Negative   RSV PCR  Negative     Results from last 7 days   Lab Units 10/28/22  1101   BLOOD CULTURE  Received in Microbiology Lab  Culture in Progress  Received in Microbiology Lab  Culture in Progress         ED Treatment:   Medication Administration from 10/28/2022 0824 to 10/28/2022 1334       Date/Time Order Dose Route Action Action by Comments     10/28/2022 0912 aspirin chewable tablet 324 mg 324 mg Oral Given Cindy Lowe RN      10/28/2022 1133 cefTRIAXone (ROCEPHIN) IVPB (premix in dextrose) 1,000 mg 50 mL 0 mg Intravenous Stopped Paulina Hale RN      10/28/2022 1103 cefTRIAXone (ROCEPHIN) IVPB (premix in dextrose) 1,000 mg 50 mL 1,000 mg Intravenous New Bag Janelle Hutchinson RN      10/28/2022 1209 azithromycin (ZITHROMAX) 500 mg in sodium chloride 0 9% 250mL IVPB 500 mg 500 mg Intravenous New Bag Paulina Hale RN      10/28/2022 1100 sodium chloride 0 9 % bolus 2,000 mL 1,000 mL Intravenous New Bag Cindy Lowe RN         Past Medical History:   Diagnosis Date   • Acute kidney failure ([facility] )    • Allergic    • Allergies    • Anemia    • Asthma    • Cancer ([facility] )    • Chronic kidney disease (CKD), stage III (moderate) ([address] )    • COPD (chronic obstructive pulmonary disease) ([facility] )    • COVID-19     Covid + 9-1-95-cough at that time now fully resolved   • Disease of thyroid gland    • Essential hypertension    • GERD (gastroesophageal reflux disease)    • Glaucoma    • High blood pressure    • HTN (hypertension) 02/16/2021   • Hyperlipidemia    • Hypothyroidism    • 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   • Throat cancer ([place] [address] ) 2014    chemo and rad therapy 2014     Present on Admission:  • Chronic kidney disease, stage 3 (moderate)  • Centrilobular emphysema (HCC)      Admitting Diagnosis: Chest pain [R07 9]  Left lower lobe pneumonia [J18 9]  Age/Sex: 79 y o  female  Admission Orders:  gmf  Oxygen 2L nc   Aspiration precuations  Oral care  IS  Cbc plt bmp  Sputum gscs  Legionella antigen, strep pneumoniae urine   Scheduled Medications:  acetaminophen, 975 mg, Oral, Q8H [address]  brimonidine tartrate, 1 drop, Both Eyes, TID  budesonide, 0 5 mg, Nebulization, Q12H  cefTRIAXone, 1,000 mg, Intravenous, Q24H  Diclofenac Sodium, 2 g, Topical, 4x Daily  enoxaparin, 40 mg, Subcutaneous, Daily  ipratropium, 0 5 mg, Nebulization, TID  levalbuterol, 1 25 mg, Nebulization, TID  levothyroxine, 100 mcg, Oral, Early Morning  montelukast, 10 mg, Oral, HS  NON FORMULARY, 2 puff, Inhalation, BID  timolol, 1 drop, Both Eyes, BID      Continuous IV Infusions:     PRN Meds:  benzonatate, 100 mg, Oral, TID PRN  oxyCODONE, 2 5 mg, Oral, Q4H PRN        None    Network Utilization Review Department  ATTENTION: Please call with any questions or concerns to [phone] and carefully listen to the prompts so that you are directed to the right person  All voicemails are confidential   Paullye Festus all requests for admission clinical reviews, approved or denied determinations and any other requests to dedicated fax number below belonging to the campus where the patient is receiving treatment   List of dedicated fax numbers for the Facilities:  FACILITY NAME UR FAX NUMBER   ADMISSION DENIALS (Administrative/Medical [facility]) [phone]   [address] (Maternity/NICU/Pediatrics) Priscilla Colon [address] [place] Lucy Ramires  [phone]   [address] [place] [address] [place] [address] 28 U [facility] 310 [facility] [place] [address] [phone]

## 2022-10-29 NOTE — PLAN OF CARE
Problem: PAIN - ADULT  Goal: Verbalizes/displays adequate comfort level or baseline comfort level  Description: Interventions:  - Encourage patient to monitor pain and request assistance  - Assess pain using appropriate pain scale  - Administer analgesics based on type and severity of pain and evaluate response  - Implement non-pharmacological measures as appropriate and evaluate response  - Consider cultural and social influences on pain and pain management  - Notify physician/advanced practitioner if interventions unsuccessful or patient reports new pain  Outcome: Progressing     Problem: INFECTION - ADULT  Goal: Absence or prevention of progression during hospitalization  Description: INTERVENTIONS:  - Assess and monitor for signs and symptoms of infection  - Monitor lab/diagnostic results  - Monitor all insertion sites, i e  indwelling lines, tubes, and drains  - Monitor endotracheal if appropriate and nasal secretions for changes in amount and color  - [place] appropriate cooling/warming therapies per order  - Administer medications as ordered  - Instruct and encourage patient and family to use good hand hygiene technique  Outcome: Progressing     Problem: DISCHARGE PLANNING  Goal: Discharge to home or other facility with appropriate resources  Description: INTERVENTIONS:  - Identify barriers to discharge w/patient and caregiver  - Arrange for needed discharge resources and transportation as appropriate  - Identify discharge learning needs (meds, wound care, etc )  - Arrange for interpretive services to assist at discharge as needed  - Refer to Case Management Department for coordinating discharge planning if the patient needs post-hospital services based on physician/advanced practitioner order or complex needs related to functional status, cognitive ability, or social support system  Outcome: Progressing     Problem: Knowledge Deficit  Goal: Patient/family/caregiver demonstrates understanding of disease process, treatment plan, medications, and discharge instructions  Description: Complete learning assessment and assess knowledge base    Interventions:  - Provide teaching at level of understanding  - Provide teaching via preferred learning methods  Outcome: Progressing

## 2022-10-29 NOTE — H&P
[facility]  H&P- Ema Dale 1955, 79 y o  female MRN: [id number]  Unit/Bed#: S -01 Encounter: [id number]  Primary Care Provider: Humberto Mane MD   Date and time admitted to hospital: 10/28/2022  8:30 AM    * Chest pain  Assessment & Plan  POA: left sided chest pain, worse with deep breath  Tender on palpation  Suspect costochondritis in setting of suspected PNA  Tylenol scheduled for pain q8h  voltaren gel qid   Management of pna as below    Community acquired pneumonia of left lower lobe of lung  Assessment & Plan  POA: cough, congestion, chills, SOBoE, runny nose, left sided chest pain for 3 days  Suspect viral, will monitor procal   WBC 14k  Covid/flu/rsv negative  Trop neg x2  Lactate wnl  procal neg  Drip score 1    Plan:  Ceftriaxone IV, can discontinue if repeat procal neg  duonebs prn  Resume home inhaler: Breztri  mucinex BID, tessalon perles prn  Resume NC oxygen, 2L at baseline  ISS, resp protocol  Schedule tylenol for pain  Am CBC      Hypothyroidism  Assessment & Plan  TSH low on admission   F/u T4  Resume home levo, adjust if needed     Centrilobular emphysema (HCC)  Assessment & Plan  Chronic, not in acute exacerbation  Resume home Breztri inhaler BID  Resume home duonebs prn  Resume NC O2, on 2L at baseline    Chronic kidney disease, stage 3 (moderate)  Assessment & Plan  Lab Results   Component Value Date    EGFR 50 10/28/2022    EGFR 45 05/23/2022    EGFR 45 02/23/2022    CREATININE 1 12 10/28/2022    CREATININE 1 24 05/23/2022    CREATININE 1 24 (H) 02/23/2022   Chronic, stable  monitor with AM labs  Encourage PO intake    VTE Prophylaxis: Enoxaparin (Lovenox)  / sequential compression device   Code Status: level 1  POLST: There is no POLST form on file for this patient (pre-hospital)    Anticipated Length of Stay:  Patient will be admitted on an Inpatient basis with an anticipated length of stay of  > 2 midnights     Justification for Hospital Stay: PNA    Chief Complaint:   Chest pain    History of Present Illness:    Virgil White is a 79 y o  female with past medical history of COPD, CKD stage 3, hypothyroidism, depression, pharyngeal/laryngeal carcinoma who presents with chest pain, congestion, runny nose, increased SOBoE, chills, cough x3 days  Chest pain is worse with deep breathing and worse with ambulation associated with some shortness of breath  She also notes some associated headache  She denies known sick contacts  She has been using her DuoNeb at home every 2 hours without significant improvement  She has been using her scheduled Breztri inhaler twice daily  In the ED she was noted to have white blood [place] count of 61383  COVID fluid RSV were negative, troponins were negative, will lactate and procalcitonin were within normal limits  She received IV ceftriaxone and IV fluids 2L  Review of Systems:    Review of Systems   Constitutional: Positive for chills  Negative for fever  HENT: Positive for congestion, postnasal drip and rhinorrhea  Eyes: Negative for visual disturbance  Respiratory: Positive for cough and shortness of breath  Cardiovascular: Positive for chest pain  Negative for leg swelling  Gastrointestinal: Negative for abdominal pain, diarrhea, nausea and vomiting  Genitourinary: Negative for difficulty urinating and dysuria  Musculoskeletal: Negative for gait problem  Skin: Negative for rash  Neurological: Positive for headaches  Negative for dizziness and syncope  Psychiatric/Behavioral: Negative for confusion         Past Medical and Surgical History:     Past Medical History:   Diagnosis Date   • Acute kidney failure ([facility] [place] [facility] )    • Allergic    • Allergies    • Anemia    • Asthma    • Cancer ([facility] [place] [facility] )    • Chronic kidney disease (CKD), stage III (moderate) ([facility] )    • COPD (chronic obstructive pulmonary disease) ([facility] [place] [facility] )    • COVID-19     Covid + 9-8-24-cough at that time now fully resolved   • Disease of thyroid gland    • Essential hypertension    • GERD (gastroesophageal reflux disease)    • Glaucoma    • High blood pressure    • HTN (hypertension) 02/16/2021   • Hyperlipidemia    • Hypothyroidism    • Squamous cell carcinoma of larynx ([facility] ) 8/10/2022   • [facility] 2014    chemo and rad therapy 2014       Past Surgical History:   Procedure Laterality Date   • COLONOSCOPY  2016   • ESOPHAGOGASTRODUODENOSCOPY  2016   • EYE SURGERY     • IR BIOPSY LUNG  05/18/2022   • LARYNGOSCOPY      w/ Bx  • NY INCISE FINGER TENDON SHEATH Right 02/16/2021    Procedure: THUMB TRIGGER FINGER RELEASE;  Surgeon: Jenny Muñoz MD;  Location: [facility]  MAIN OR;  Service: Orthopedics   • TUBAL LIGATION         Meds/Allergies:    Prior to Admission medications    Medication Sig Start Date End Date Taking?  Authorizing Provider   brimonidine tartrate 0 2 % ophthalmic solution  12/23/21  Yes Historical Provider, MD   Budeson-Glycopyrrol-Formoterol (Breztri Aerosphere) 160-9-4 8 MCG/ACT AERO Inhale 2 puffs 2 (two) times a day Rinse mouth after use  4/21/22  Yes Shannan Thompson MD   ipratropium-albuterol (DUO-NEB) 0 5-2 5 mg/3 mL nebulizer solution TAKE 3 ML BY NEBULIZATION EVERY 6 (SIX) HOURS AS NEEDED FOR WHEEZING OR SHORTNESS OF BREATH 8/30/22 10/29/22 Yes Zana Cushing, MD   levothyroxine 100 mcg tablet Take 1 tablet (100 mcg total) by mouth daily 1/19/22  Yes Shawn Vizcaino MD   montelukast (SINGULAIR) 10 mg tablet TAKE 1 TABLET BY MOUTH DAILY AT BEDTIME 6/2/22  Yes Shawn Vizcaino MD   timolol (TIMOPTIC) 0 5 % ophthalmic solution INSTILL 1 DROP INTO EACH EYE TWO TIMES A DAY 3/23/22  Yes Historical Provider, MD   acetaminophen (TYLENOL) 650 mg CR tablet Take 1 tablet (650 mg total) by mouth every 8 (eight) hours as needed for mild pain 5/16/22   Zari Root PA-C   albuterol (PROVENTIL HFA,VENTOLIN HFA) 90 mcg/act inhaler Inhale 2 puffs every 4 (four) hours as needed for wheezing 1/19/22   Shawn Vizcaino MD   ipratropium (ATROVENT) 0 06 % nasal spray USE 2 SPRAYS IN EACH NOSTRIL 4 TIMES DAILY 10/26/22   Travon Woodward MD   lidocaine (XYLOCAINE) 5 % ointment Apply topically as needed for mild pain 22   Travon Woodward MD   nystatin (MYCOSTATIN) cream Apply topically 2 (two) times a day 22   Isis Navas MD   tiZANidine (ZANAFLEX) 2 mg tablet Take 1 tablet (2 mg total) by mouth every 8 (eight) hours as needed for muscle spasms (back pain) 22   Travon Woodward MD     I have reviewed home medications with patient personally  Allergies:    Allergies   Allergen Reactions   • Amifostine Rash   • Pollen Extract Allergic Rhinitis       Social History:     Marital Status: /Civil Union   Occupation: unknown  Patient Pre-hospital Living Situation: home  Patient Pre-hospital Level of Mobility: full  Patient Pre-hospital Diet Restrictions: none  Substance Use History:   Social History     Substance and Sexual Activity   Alcohol Use Never    Comment: None     Social History     Tobacco Use   Smoking Status Former Smoker   • Packs/day: 1 00   • Years: 40 00   • Pack years: 40 00   • Quit date: 2014   • Years since quittin 8   Smokeless Tobacco Never Used     Social History     Substance and Sexual Activity   Drug Use Not on file    Comment: Denies       Family History:    Family History   Problem Relation Age of Onset   • Brain cancer Sister    • Diabetes Sister    • Breast cancer Sister    • Other Mother         respiratory disorder   • Sudden death Father         shot himself   • Suicidality Father    • No Known Problems Daughter    • No Known Problems Maternal Grandmother    • No Known Problems Maternal Grandfather    • No Known Problems Paternal Grandmother    • No Known Problems Paternal Grandfather    • No Known Problems Sister    • No Known Problems Sister    • No Known Problems Sister    • No Known Problems Sister    • No Known Problems Sister    • No Known Problems Sister    • No Known Problems Sister    • No Known Problems Daughter    • No Known Problems Maternal Aunt    • No Known Problems Maternal Aunt    • No Known Problems Maternal Aunt    • No Known Problems Maternal Aunt    • No Known Problems Maternal Aunt    • No Known Problems Paternal Aunt    • No Known Problems Paternal Aunt    • No Known Problems Paternal Aunt    • No Known Problems Paternal Aunt        Physical Exam:     Vitals:   Blood Pressure: 130/60 (10/28/22 1115)  Pulse: 72 (10/28/22 1115)  Temperature: 98 8 °F (37 1 °C) (10/28/22 0836)  Temp Source: Oral (10/28/22 0836)  Respirations: 20 (10/28/22 1115)  Height: 5' 2" (157 5 cm) (10/28/22 0836)  Weight - Scale: 81 6 kg (179 lb 14 3 oz) (10/28/22 0836)  SpO2: 95 % (10/28/22 1900)    Physical Exam  Vitals reviewed  Constitutional:       General: She is not in acute distress  Appearance: She is normal weight  She is not ill-appearing  HENT:      Head: Normocephalic and atraumatic  Right Ear: External ear normal       Left Ear: External ear normal       Nose: Congestion present  Mouth/Throat:      Mouth: Mucous membranes are moist       Pharynx: Oropharynx is clear  Eyes:      Extraocular Movements: Extraocular movements intact  Conjunctiva/sclera: Conjunctivae normal    Cardiovascular:      Rate and Rhythm: Normal rate and regular rhythm  Pulses: Normal pulses  Heart sounds: Normal heart sounds  Pulmonary:      Effort: Pulmonary effort is normal       Breath sounds: Normal breath sounds  Chest:      Chest wall: Tenderness (left sided) present  Abdominal:      General: Abdomen is flat  Bowel sounds are normal       Palpations: Abdomen is soft  Musculoskeletal:      Right lower leg: No edema  Left lower leg: No edema  Skin:     General: Skin is warm and dry  Capillary Refill: Capillary refill takes less than 2 seconds  Neurological:      General: No focal deficit present  Mental Status: She is alert     Psychiatric:         Mood and Affect: Mood normal          Behavior: Behavior normal          Additional Data:     Lab Results: I have personally reviewed pertinent reports  Results from last 7 days   Lab Units 10/28/22  0914   WBC Thousand/uL 14 97*   HEMOGLOBIN g/dL 12 0   HEMATOCRIT % 39 1   PLATELETS Thousands/uL 185   NEUTROS PCT % 88*   LYMPHS PCT % 3*   MONOS PCT % 8   EOS PCT % 0     Results from last 7 days   Lab Units 10/28/22  0914   POTASSIUM mmol/L 4 7   CHLORIDE mmol/L 104   CO2 mmol/L 31   BUN mg/dL 18   CREATININE mg/dL 1 12   CALCIUM mg/dL 9 5     Results from last 7 days   Lab Units 10/28/22  0914   INR  1 07       Imaging: I have personally reviewed pertinent reports  XR chest 1 view portable    Result Date: 10/28/2022  Narrative: CHEST INDICATION:   chest pain  COMPARISON:  5/18/2022 EXAM PERFORMED/VIEWS:  XR CHEST PORTABLE Single view FINDINGS: Development of left basal infiltrate suspicious for pneumonia Cardiomediastinal silhouette appears unremarkable  No pneumothorax or pleural effusion  Osseous structures appear within normal limits for patient age  Impression: Development of left basal infiltrate suspicious for pneumonia Workstation performed: [id number]       EKG, Pathology, and Other Studies Reviewed on Admission:   Date/Time: 10/28/2022 9:27 AM   Performed by: Jenelle Wasserman PA-C   Authorized by: Jenelle Wasserman PA-C     Indications / Diagnosis:  Chest pain   ECG reviewed by me, the ED Provider: yes     Patient location:  ED   Previous ECG:     Previous ECG:  Unavailable   Interpretation:     Interpretation: normal     Rate:     ECG rate:  86     ECG rate assessment: normal     Rhythm:     Rhythm: sinus rhythm     Ectopy:     Ectopy: none     QRS:     QRS axis:  Normal     QRS intervals:  Normal   Conduction:     Conduction: normal     ST segments:     ST segments:  Normal   T waves:     T waves: normal     Comments:      No sign of acute ischemia       Epic / Care Everywhere Records Reviewed:  Yes ** Please Note: This note has been constructed using a voice recognition system   **

## 2022-10-29 NOTE — ASSESSMENT & PLAN NOTE
Lab Results   Component Value Date    [id number] 0 217 (L) 10/28/2022     TSH low on admission  Resume home levothyroxine, adjust as needed

## 2022-10-29 NOTE — ASSESSMENT & PLAN NOTE
Lab Results   Component Value Date    EGFR 50 10/28/2022    EGFR 45 05/23/2022    EGFR 45 02/23/2022    CREATININE 1 12 10/28/2022    CREATININE 1 24 05/23/2022    CREATININE 1 24 (H) 02/23/2022   Chronic, stable  monitor with AM labs  Encourage PO intake

## 2022-10-29 NOTE — ASSESSMENT & PLAN NOTE
· POA: cough, congestion, chills, SOBoE, runny nose, left sided chest pain for 3 days    · Suspect viral, will monitor procal   · WBC 14k  · Covid/flu/rsv negative  · Trop neg x2  · Lactate wnl  · procal mildly elevated  · Drip score 1  · CXR suspicious for PNA    Plan:  · Ceftriaxone IV  · duonebs prn  · Resume home inhaler: Breztri  · mucinex BID, tessalon perles prn  · Resume NC oxygen, 2L at baseline  · IS, resp protocol  · Schedule tylenol for pain  · Trend CBC  · AM procal

## 2022-10-29 NOTE — ASSESSMENT & PLAN NOTE
POA: left sided chest pain, worse with deep breath  Tender on palpation  Suspect costochondritis in setting of suspected PNA  Tylenol scheduled for pain q8h  voltaren gel qid   Management of pna as below

## 2022-10-29 NOTE — ASSESSMENT & PLAN NOTE
Lab Results   Component Value Date    EGFR 61 10/29/2022    EGFR 50 10/28/2022    EGFR 45 05/23/2022    CREATININE 0 96 10/29/2022    CREATININE 1 12 10/28/2022    CREATININE 1 24 05/23/2022     Chronic, stable  Trend BMP   Encourage PO intake

## 2022-10-29 NOTE — ASSESSMENT & PLAN NOTE
POA: cough, congestion, chills, SOBoE, runny nose, left sided chest pain for 3 days    Suspect viral, will monitor procal   WBC 14k  Covid/flu/rsv negative  Trop neg x2  Lactate wnl  procal neg  Drip score 1    Plan:  Ceftriaxone IV, can discontinue if repeat procal neg  duonebs prn  Resume home inhaler: Breztri  mucinex BID, tessalon perles prn  Resume NC oxygen, 2L at baseline  ISS, resp protocol  Schedule tylenol for pain  Am CBC

## 2022-10-30 ENCOUNTER — APPOINTMENT (INPATIENT)
Dept: CT IMAGING | Facility: HOSPITAL | Age: 67
End: 2022-10-30

## 2022-10-30 PROBLEM — R10.13 DYSPEPSIA: Status: ACTIVE | Noted: 2022-10-30

## 2022-10-30 PROBLEM — D64.9 ANEMIA: Status: ACTIVE | Noted: 2022-10-30

## 2022-10-30 LAB
D DIMER PPP FEU-MCNC: 1.37 UG/ML FEU
ERYTHROCYTE [DISTWIDTH] IN BLOOD BY AUTOMATED COUNT: 13.9 % (ref 11.6–15.1)
HCT VFR BLD AUTO: 36.4 % (ref 34.8–46.1)
HGB BLD-MCNC: 10.9 G/DL (ref 11.5–15.4)
MCH RBC QN AUTO: 26.6 PG (ref 26.8–34.3)
MCHC RBC AUTO-ENTMCNC: 29.9 G/DL (ref 31.4–37.4)
MCV RBC AUTO: 89 FL (ref 82–98)
PLATELET # BLD AUTO: 191 THOUSANDS/UL (ref 149–390)
PMV BLD AUTO: 10.6 FL (ref 8.9–12.7)
PROCALCITONIN SERPL-MCNC: 0.25 NG/ML
RBC # BLD AUTO: 4.1 MILLION/UL (ref 3.81–5.12)
WBC # BLD AUTO: 9.43 THOUSAND/UL (ref 4.31–10.16)

## 2022-10-30 RX ORDER — PANTOPRAZOLE SODIUM 40 MG/1
40 TABLET, DELAYED RELEASE ORAL
Status: DISCONTINUED | OUTPATIENT
Start: 2022-10-30 | End: 2022-10-31 | Stop reason: HOSPADM

## 2022-10-30 RX ORDER — CALCIUM CARBONATE 200(500)MG
500 TABLET,CHEWABLE ORAL 2 TIMES DAILY PRN
Status: DISCONTINUED | OUTPATIENT
Start: 2022-10-30 | End: 2022-10-31 | Stop reason: HOSPADM

## 2022-10-30 RX ORDER — SODIUM CHLORIDE 9 MG/ML
75 INJECTION, SOLUTION INTRAVENOUS CONTINUOUS
Status: DISCONTINUED | OUTPATIENT
Start: 2022-10-30 | End: 2022-10-31 | Stop reason: HOSPADM

## 2022-10-30 RX ADMIN — ENOXAPARIN SODIUM 40 MG: 40 INJECTION SUBCUTANEOUS at 09:15

## 2022-10-30 RX ADMIN — ESCITALOPRAM OXALATE 10 MG: 10 TABLET ORAL at 09:15

## 2022-10-30 RX ADMIN — SODIUM CHLORIDE 75 ML/HR: 0.9 INJECTION, SOLUTION INTRAVENOUS at 17:26

## 2022-10-30 RX ADMIN — BUDESONIDE 0.5 MG: 0.5 INHALANT ORAL at 19:44

## 2022-10-30 RX ADMIN — LEVALBUTEROL HYDROCHLORIDE 1.25 MG: 1.25 SOLUTION, CONCENTRATE RESPIRATORY (INHALATION) at 07:34

## 2022-10-30 RX ADMIN — LEVALBUTEROL HYDROCHLORIDE 1.25 MG: 1.25 SOLUTION, CONCENTRATE RESPIRATORY (INHALATION) at 14:49

## 2022-10-30 RX ADMIN — MONTELUKAST 10 MG: 10 TABLET, FILM COATED ORAL at 21:51

## 2022-10-30 RX ADMIN — DICLOFENAC SODIUM TOPICAL GEL, 1%, 2 G: 10 GEL TOPICAL at 21:51

## 2022-10-30 RX ADMIN — DICLOFENAC SODIUM TOPICAL GEL, 1%, 2 G: 10 GEL TOPICAL at 17:33

## 2022-10-30 RX ADMIN — IPRATROPIUM BROMIDE 0.5 MG: 0.5 SOLUTION RESPIRATORY (INHALATION) at 07:34

## 2022-10-30 RX ADMIN — LEVALBUTEROL HYDROCHLORIDE 1.25 MG: 1.25 SOLUTION, CONCENTRATE RESPIRATORY (INHALATION) at 19:44

## 2022-10-30 RX ADMIN — BUDESONIDE 0.5 MG: 0.5 INHALANT ORAL at 07:34

## 2022-10-30 RX ADMIN — PANTOPRAZOLE SODIUM 40 MG: 40 TABLET, DELAYED RELEASE ORAL at 12:11

## 2022-10-30 RX ADMIN — TIMOLOL MALEATE 1 DROP: 5 SOLUTION/ DROPS OPHTHALMIC at 17:33

## 2022-10-30 RX ADMIN — BRIMONIDINE TARTRATE 1 DROP: 2 SOLUTION/ DROPS OPHTHALMIC at 21:51

## 2022-10-30 RX ADMIN — BRIMONIDINE TARTRATE 1 DROP: 2 SOLUTION/ DROPS OPHTHALMIC at 09:16

## 2022-10-30 RX ADMIN — IOHEXOL 85 ML: 350 INJECTION, SOLUTION INTRAVENOUS at 16:44

## 2022-10-30 RX ADMIN — IPRATROPIUM BROMIDE 0.5 MG: 0.5 SOLUTION RESPIRATORY (INHALATION) at 19:44

## 2022-10-30 RX ADMIN — LEVOTHYROXINE SODIUM 75 MCG: 75 TABLET ORAL at 04:36

## 2022-10-30 RX ADMIN — BRIMONIDINE TARTRATE 1 DROP: 2 SOLUTION/ DROPS OPHTHALMIC at 17:30

## 2022-10-30 RX ADMIN — IPRATROPIUM BROMIDE 0.5 MG: 0.5 SOLUTION RESPIRATORY (INHALATION) at 14:49

## 2022-10-30 RX ADMIN — TIMOLOL MALEATE 1 DROP: 5 SOLUTION/ DROPS OPHTHALMIC at 09:16

## 2022-10-30 RX ADMIN — ACETAMINOPHEN 975 MG: 325 TABLET, FILM COATED ORAL at 21:51

## 2022-10-30 RX ADMIN — CEFTRIAXONE 1000 MG: 1 INJECTION, SOLUTION INTRAVENOUS at 09:15

## 2022-10-30 NOTE — ASSESSMENT & PLAN NOTE
Lab Results   Component Value Date    EGFR 61 10/29/2022    EGFR 50 10/28/2022    EGFR 45 05/23/2022    CREATININE 0 96 10/29/2022    CREATININE 1 12 10/28/2022    CREATININE 1 24 05/23/2022     Chronic, stable  Plan  Follow-up with PCP

## 2022-10-30 NOTE — ASSESSMENT & PLAN NOTE
Lab Results   Component Value Date    [id number] 0 217 (L) 10/28/2022     TSH low on admission  Plan  Continue levothyroxine 75 mcg    Follow-up with PCP

## 2022-10-30 NOTE — ASSESSMENT & PLAN NOTE
· POA: left sided chest pain, worse with deep breath  · Tender on palpation  · Has elevated D-dimer  1 37  · CTA chest:  No evidence of pulmonary embolism  Plan  Tylenol PRN for pain  Follow-up with PCP

## 2022-10-30 NOTE — DISCHARGE SUMMARY
[facility]  Discharge- Higinio Dee 1955, 79 y o  female MRN: [id number]  Unit/Bed#: S -01 Encounter: [id number]  Primary Care Provider: Maxi Godfrey MD   Date and time admitted to hospital: 10/28/2022  8:30 AM    * Community acquired pneumonia of left lower lobe of lung  Assessment & Plan  · POA: cough, congestion, chills, SOBoE, runny nose, left sided chest pain for 3 days  · Suspect viral, will monitor procal   · WBC POA 14k, currently within normal range  · Covid/flu/rsv negative  · Trop neg x2  · Lactate wnl  · procal mildly elevated  · Drip score 1  · CXR suspicious for PNA     Plan:  Continue cefdinir for 3 days  Continue benzonatate PRN for cough  Follow up with PCP in 1 week  CBC and BMP in 1 week  Repeat CXR in 6-8 weeks  Repeat Chest CT in 6 weeks  Chest pain  Assessment & Plan  · POA: left sided chest pain, worse with deep breath  · Tender on palpation  · Has elevated D-dimer  1 37  · CTA chest:  No evidence of pulmonary embolism  Plan  Tylenol PRN for pain  Follow-up with PCP  Anemia  Assessment & Plan  Stable  Follow-up with PCP  CBC in 1 week  Hypothyroidism  Assessment & Plan  Lab Results   Component Value Date    [id number] 0 217 (L) 10/28/2022     TSH low on admission  Plan  Continue levothyroxine 75 mcg  Follow-up with PCP      Multiple lung nodules  Assessment & Plan  Follow-up with PCP/pulmonology  Repeat chest x-ray in 6-8 weeks      Centrilobular emphysema ([address] )  Assessment & Plan  Chronic, not in acute exacerbation  Continue home Breztri inhaler BID  Continue home duonebs prn  Continue [place] O2, on 2L at baseline  Follow-up with PCP      Chronic kidney disease, stage 3 (moderate)  Assessment & Plan  Lab Results   Component Value Date    EGFR 61 10/29/2022    EGFR 50 10/28/2022    EGFR 45 05/23/2022    CREATININE 0 96 10/29/2022    CREATININE 1 12 10/28/2022    CREATININE 1 24 05/23/2022     Chronic, stable  Plan  Follow-up with PCP      Medical Problems             Resolved Problems  Date Reviewed: 10/31/2022   None               Discharging Resident: Chery Tipton [facility]  Discharging Attending: Rommel Swann*  PCP: Vik Soliman MD  Admission Date:   Admission Orders (From admission, onward)     Ordered        10/28/22 1117  [facility]  Once                      Discharge Date: 10/31/22    Consultations During Hospital Stay:  · None    Procedures Performed:   · None    Significant Findings / Test Results:   · TSH 0 217, T4 1 64  · Elevated WBC  · Chest x-ray: Development of left basal infiltrate suspicious for pneumonia    Incidental Findings:   Elevated D-dimer  CTA-chest:  Stable 12 mm left lower lobe pulmonary nodule and 14 mm groundglass nodule in the right lower lobe  4 9 mm indeterminate liver lesion, not visualized on prior exam which may be secondary to differences in contrast bolus timing  Recommend further characterization with nonemergent liver MRI  Test Results Pending at Discharge (will require follow up): · None     Outpatient Tests Requested:  CBC and BMP in 1 week  Chest x-ray in 6-8 weeks    Complications:  None    Reason for Admission:  No    Hospital Course:   Real Hi is a 79 y o  female patient who originally presented to the hospital on 10/28/2022 due to chest pain, congestion, runny nose, increasing SOB, and cough for 3 days  WBC 14 K on admission  COVID negative  Lactate normal   Procalcitonin elevated  CXR: Development of left basal infiltrate suspicious for pneumonia  Patient was treated for community-acquired pneumonia of left lower lobe  Treated with IV ceftriaxone  Patient improved clinically  Will be discharged on oral antibiotics  Patient may get repeat chest x-ray in 6-8 weeks  CBC and BMP in 1 week  Moreover, patient's D-dimer was elevated  CTA chest was ordered  No evidence of pulmonary embolism seen     Also, found to have low TSH with elevated T4  Levothyroxine dose was reduced to 75 mcg from 100 mcg  Please see above list of diagnoses and related plan for additional information  Condition at Discharge: good    Discharge Day Visit / Exam:   Subjective:  Patient is feeling better  Satting well on 1 L of oxygen  No SOB, chest pain or dyspnea  No fever  Having good oral intake  Having good UOP  No new complaint  Vitals: Blood Pressure: 156/68 (10/31/22 0724)  Pulse: 79 (10/31/22 0724)  Temperature: 97 6 °F (36 4 °C) (10/31/22 0724)  Temp Source: Oral (10/31/22 0724)  Respirations: 18 (10/31/22 0724)  Height: 5' 2" (157 5 cm) (10/28/22 0836)  Weight - Scale: 81 6 kg (179 lb 14 3 oz) (10/28/22 0836)  SpO2: 93 % (10/31/22 1329)  Exam:   Physical Exam  Constitutional:       General: She is not in acute distress  Appearance: She is not ill-appearing  HENT:      Head: Normocephalic  Cardiovascular:      Rate and Rhythm: Normal rate and regular rhythm  Heart sounds: Normal heart sounds  Pulmonary:      Effort: Pulmonary effort is normal  No tachypnea or respiratory distress  Abdominal:      General: Bowel sounds are normal       Palpations: Abdomen is soft  Skin:     General: Skin is warm  Capillary Refill: Capillary refill takes less than 2 seconds  Neurological:      General: No focal deficit present  Mental Status: She is alert and oriented to person, place, and time  Psychiatric:         Mood and Affect: Mood normal          Behavior: Behavior normal           Discussion with Family: Updated  (daughter) via phone  Discharge instructions/Information to patient and family:   See after visit summary for information provided to patient and family  Provisions for Follow-Up Care:  See after visit summary for information related to follow-up care and any pertinent home health orders         Disposition:   Home    Planned Readmission: no    Discharge Medications:  See after visit summary for reconciled discharge medications provided to patient and/or family        **Please Note: This note may have been constructed using a voice recognition system**

## 2022-10-30 NOTE — ASSESSMENT & PLAN NOTE
Chronic, not in acute exacerbation  Continue home Breztri inhaler BID  Continue home duonebs prn  Continue [place] O2, on 2L at baseline  Follow-up with PCP

## 2022-10-30 NOTE — UTILIZATION REVIEW
NOTIFICATION OF INPATIENT ADMISSION   AUTHORIZATION REQUEST   SERVICING FACILITY:   [address] Barry Maxwell, [address] [place] [address]  Tax ID: [id number]  NPI: [id number]   ATTENDING PROVIDER:  Attending Name and NPI#: Ck Hicks [place] [[id number]]  Address: [address] [place] [address] Dr Barry Maxwell, [address] [place] [address]  Phone: [phone]     ADMISSION INFORMATION:  [facility]patient [facility]  [address]  Place of Service Code: 21  Inpatient Admission Date/Time: 10/28/22 11:17 AM  Discharge Date/Time: No discharge date for patient encounter  Admitting Diagnosis Code/Description:  Chest pain [R07 9]  Left lower lobe pneumonia [J18 9]     UTILIZATION REVIEW CONTACT:  Ileana Golden Utilization   Network Utilization Review Department  Phone: [phone]  Fax: [phone]  Email: [email or web address]  Contact for approvals/pending authorizations, clinical reviews, and discharge  PHYSICIAN ADVISORY SERVICES:  Medical Necessity Denial & Obsx-kr-Fqzr Review  Phone: [phone]  Fax: [phone]  Email: [email or web address]

## 2022-10-30 NOTE — DISCHARGE INSTR - AVS FIRST PAGE
Dear Timothy Hansen,     It was our pleasure to care for you here at [facility]  It is our hope that we were always able to exceed the expected standards for your care during your stay  You were hospitalized due to pneumonia  You were cared for on the 3rd floor by Jaswant De La Rosa under the service of [address] [place] [address] with the [facility] who covers for your primary care physician (PCP), Lidia Irizarry MD, while you were hospitalized  If you have any questions or concerns related to this hospitalization, you may contact us at [phone]  For follow up as well as any medication refills, we recommend that you follow up with your primary care physician  A registered nurse will reach out to you by phone within a few days after your discharge to answer any additional questions that you may have after going home  However, at this time we provide for you here, the most important instructions / recommendations at discharge:     Notable Medication Adjustments -   Take Cefdinir 300 mg 1 capsule my mouth every 12 hours for 3 days  Take benzonatae 100 mg 3 rimes daily as needed for cough  Take pantoprazole 40 mg daily as needed for heart burn  Take levothyroxine 75 mcg  Testing Required after Discharge -   CBC and BMP in 1 week  Chest x ray in 6-8 weeks  Chest CT in 6 weeks  Important follow up information -   Follow up with your PCP in 1 week  Other Instructions -   Visit ED if you develop fever, shortness of breath or worsening of symptoms  Please review this entire after visit summary as additional general instructions including medication list, appointments, activity, diet, any pertinent wound care, and other additional recommendations from your care team that may be provided for you        Sincerely,     Jaswant De La Rosa MD

## 2022-10-30 NOTE — ASSESSMENT & PLAN NOTE
· POA: cough, congestion, chills, SOBoE, runny nose, left sided chest pain for 3 days  · Suspect viral, will monitor procal   · WBC POA 14k, currently within normal range  · Covid/flu/rsv negative  · Trop neg x2  · Lactate wnl  · procal mildly elevated  · Drip score 1  · CXR suspicious for PNA     Plan:  Continue cefdinir for 3 days  Continue benzonatate PRN for cough  Follow up with PCP in 1 week  CBC and BMP in 1 week  Repeat CXR in 6-8 weeks  Repeat Chest CT in 6 weeks

## 2022-10-30 NOTE — ASSESSMENT & PLAN NOTE
Lab Results   Component Value Date    EGFR 61 10/29/2022    EGFR 50 10/28/2022    EGFR 45 05/23/2022    CREATININE 0 96 10/29/2022    CREATININE 1 12 10/28/2022    CREATININE 1 24 05/23/2022

## 2022-10-30 NOTE — PROGRESS NOTES
[facility]  Progress Note - Higinio Dee 1955, 79 y o  female MRN: [id number]  Unit/Bed#: S -01 Encounter: [id number]  Primary Care Provider: Maxi Godfrey MD   Date and time admitted to hospital: 10/28/2022  8:30 AM    * Community acquired pneumonia of left lower lobe of lung  Assessment & Plan  · POA: cough, congestion, chills, SOBoE, runny nose, left sided chest pain for 3 days  · Suspect viral, will monitor procal   · WBC POA 14k, currently within normal range  · Covid/flu/rsv negative  · Trop neg x2  · Lactate wnl  · procal mildly elevated  · Drip score 1  · CXR suspicious for PNA    Plan:  Continue ceftriaxone IV  DuoNebs p r n  Resume home inhaler  Continue benzonatate PRN for cough  IS, resp protocol  Tylenol p r n  For pain  Trend CBC  Chest pain  Assessment & Plan  · POA: left sided chest pain, worse with deep breath  · Tender on palpation  · Has elevated D-dimer  1 37  Plan  Tylenol PRN for pain  Follow-up CTA chest   Continue IV fluids  Anemia  Assessment & Plan  Stable  will monitor    Hypothyroidism  Assessment & Plan  Lab Results   Component Value Date    [id number] 0 217 (L) 10/28/2022     TSH low on admission  Plan  Continue levothyroxine 75 mcg  Centrilobular emphysema ([address] )  Assessment & Plan  Chronic, not in acute exacerbation  Resume home Breztri inhaler BID  Resume home duonebs prn  Resume NC O2, on 2L at baseline      Chronic kidney disease, stage 3 (moderate)  Assessment & Plan  Lab Results   Component Value Date    EGFR 61 10/29/2022    EGFR 50 10/28/2022    EGFR 45 05/23/2022    CREATININE 0 96 10/29/2022    CREATININE 1 12 10/28/2022    CREATININE 1 24 05/23/2022     Chronic, stable  Plan  Monitor renal function        VTE Pharmacologic Prophylaxis: VTE Score: 7 High Risk (Score >/= 5) - Pharmacological DVT Prophylaxis Ordered: enoxaparin (Lovenox)  Sequential Compression Devices Ordered      Patient Centered Rounds: I performed bedside rounds with nursing staff today  Discussions with Specialists or Other Care Team Provider: N/A     Education and Discussions with Family / Patient: talked to daughter at bedside  Current Length of Stay: 2 day(s)  Current Patient Status: Inpatient   Discharge Plan: Anticipate discharge in 24-48 hrs to home  Code Status: Level 1 - Full Code    Subjective:   No acute events overnight  Patient still has some cough  Still complaining of chest pain on deep breathing  But overall patient feels better  Patient he is sitting on the bed comfortable  On 1 L of oxygen  Vital stable  No fever  D dimer elevated  Objective:     Vitals:   Temp (24hrs), Av 3 °F (36 8 °C), Min:97 8 °F (36 6 °C), Max:98 8 °F (37 1 °C)    Temp:  [97 8 °F (36 6 °C)-98 8 °F (37 1 °C)] 98 2 °F (36 8 °C)  HR:  [79-85] 80  Resp:  [18-19] 19  BP: (130-143)/(51-80) 143/80  SpO2:  [94 %-100 %] 100 %  Body mass index is 32 9 kg/m²  Input and Output Summary (last 24 hours): Intake/Output Summary (Last 24 hours) at 10/30/2022 1332  Last data filed at 10/30/2022 0900  Gross per 24 hour   Intake 240 ml   Output --   Net 240 ml       Physical Exam:   Physical Exam  Vitals and nursing note reviewed  Constitutional:       General: She is not in acute distress  Appearance: She is well-developed  She is not ill-appearing or toxic-appearing  HENT:      Head: Normocephalic and atraumatic  Eyes:      Conjunctiva/sclera: Conjunctivae normal    Cardiovascular:      Rate and Rhythm: Normal rate and regular rhythm  Heart sounds: No murmur heard  Pulmonary:      Effort: Pulmonary effort is normal  No respiratory distress  Breath sounds: Rhonchi present  Abdominal:      Palpations: Abdomen is soft  Tenderness: There is no abdominal tenderness  Musculoskeletal:      Cervical back: Neck supple  Skin:     General: Skin is warm and dry  Capillary Refill: Capillary refill takes less than 2 seconds  Neurological:      Mental Status: She is alert and oriented to person, place, and time  Psychiatric:         Mood and Affect: Mood normal          Behavior: Behavior normal           Additional Data:     Labs:  Results from last 7 days   Lab Units 10/30/22  0434 10/29/22  0606 10/28/22  0914   WBC Thousand/uL 9 43   < > 14 97*   HEMOGLOBIN g/dL 10 9*   < > 12 0   HEMATOCRIT % 36 4   < > 39 1   PLATELETS Thousands/uL 191   < > 185   NEUTROS PCT %  --   --  88*   LYMPHS PCT %  --   --  3*   MONOS PCT %  --   --  8   EOS PCT %  --   --  0    < > = values in this interval not displayed  Results from last 7 days   Lab Units 10/29/22  0606   SODIUM mmol/L 141   POTASSIUM mmol/L 4 1   CHLORIDE mmol/L 106   CO2 mmol/L 26   BUN mg/dL 17   CREATININE mg/dL 0 96   ANION GAP mmol/L 9   CALCIUM mg/dL 9 3   GLUCOSE RANDOM mg/dL 75     Results from last 7 days   Lab Units 10/28/22  0914   INR  1 07             Results from last 7 days   Lab Units 10/30/22  0434 10/29/22  0606 10/28/22  1101   LACTIC ACID mmol/L  --   --  0 7   PROCALCITONIN ng/ml 0 25 0 27* 0 24       Lines/Drains:  Invasive Devices  Report    Peripheral Intravenous Line  Duration           Peripheral IV 10/28/22 Right Antecubital 2 days                      Imaging: Reviewed radiology reports from this admission including: chest xray    Recent Cultures (last 7 days):   Results from last 7 days   Lab Units 10/28/22  1101   BLOOD CULTURE  No Growth at 24 hrs  No Growth at 24 hrs         Last 24 Hours Medication List:   Current Facility-Administered Medications   Medication Dose Route Frequency Provider Last Rate   • acetaminophen  975 mg Oral [facility] Randolph Peterson, DO     • benzonatate  100 mg Oral TID PRN Randolph Peterson DO     • brimonidine tartrate  1 drop Both Eyes TID Randolph Peterson DO     • budesonide  0 5 mg Nebulization Q12H Sreekanth Hughes DO     • cefTRIAXone  1,000 mg Intravenous Q24H Randolph Peterson DO 1,000 mg (10/30/22 0915)   • Diclofenac Sodium  2 g Topical 4x Daily Morena León DO     • enoxaparin  40 mg Subcutaneous Daily Morena León DO     • escitalopram  10 mg Oral Daily Agustin Wyatt MD     • ipratropium  0 5 mg Nebulization TID Tavon Werner MD     • levalbuterol  1 25 mg Nebulization TID Tavon Werner MD     • levothyroxine  75 mcg Oral Early Morning Agustin Wyatt MD     • montelukast  10 mg Oral HS Morena León DO     • NON FORMULARY  2 puff Inhalation BID Morena León DO     • oxyCODONE  2 5 mg Oral Q4H PRN Agustin Wyatt MD     • pantoprazole  40 mg Oral Early Morning Deven Healy MD     • sodium chloride  75 mL/hr Intravenous Continuous Deven Stein MD     • timolol  1 drop Both Eyes BID Morena León DO          Today, Patient Was Seen By: Virgil Rowell    **Please Note: This note may have been constructed using a voice recognition system  **

## 2022-10-30 NOTE — CASE MANAGEMENT
Case Management Progress Note    Patient name Marcie Mcmahon  Location S /S -03 MRN [id number]  : 1955 Date 10/30/2022       LOS (days): 2  Geometric Mean LOS (GMLOS) (days):   Days to GMLOS:        OBJECTIVE:        Current admission status: Inpatient  Preferred Pharmacy:   [id number] [place], [address]  [address] [address]  Phone: [phone] Fax: [phone]    Primary Care Provider: Chelo Minor MD    Primary Insurance: [facility]  Secondary Insurance: [facility]    PROGRESS NOTE:    CM made aware patient/family requesting POC  POC ordered created via [place] to [address] (patient current with [facility] for current home O2/DME)  Contact information listed on AVS      CM will continue to follow for any other DCP needs

## 2022-10-31 VITALS
DIASTOLIC BLOOD PRESSURE: 83 MMHG | HEART RATE: 80 BPM | SYSTOLIC BLOOD PRESSURE: 168 MMHG | HEIGHT: 62 IN | TEMPERATURE: 98.2 F | RESPIRATION RATE: 16 BRPM | BODY MASS INDEX: 33.1 KG/M2 | OXYGEN SATURATION: 97 % | WEIGHT: 179.9 LBS

## 2022-10-31 LAB
ANION GAP SERPL CALCULATED.3IONS-SCNC: 5 MMOL/L (ref 4–13)
BUN SERPL-MCNC: 12 MG/DL (ref 5–25)
CALCIUM SERPL-MCNC: 9.3 MG/DL (ref 8.4–10.2)
CHLORIDE SERPL-SCNC: 105 MMOL/L (ref 96–108)
CO2 SERPL-SCNC: 31 MMOL/L (ref 21–32)
CREAT SERPL-MCNC: 0.95 MG/DL (ref 0.6–1.3)
ERYTHROCYTE [DISTWIDTH] IN BLOOD BY AUTOMATED COUNT: 13.8 % (ref 11.6–15.1)
GFR SERPL CREATININE-BSD FRML MDRD: 62 ML/MIN/1.73SQ M
GLUCOSE SERPL-MCNC: 87 MG/DL (ref 65–140)
HCT VFR BLD AUTO: 35.5 % (ref 34.8–46.1)
HGB BLD-MCNC: 10.6 G/DL (ref 11.5–15.4)
MCH RBC QN AUTO: 26.4 PG (ref 26.8–34.3)
MCHC RBC AUTO-ENTMCNC: 29.9 G/DL (ref 31.4–37.4)
MCV RBC AUTO: 89 FL (ref 82–98)
PLATELET # BLD AUTO: 197 THOUSANDS/UL (ref 149–390)
PMV BLD AUTO: 9.7 FL (ref 8.9–12.7)
POTASSIUM SERPL-SCNC: 4.2 MMOL/L (ref 3.5–5.3)
RBC # BLD AUTO: 4.01 MILLION/UL (ref 3.81–5.12)
SODIUM SERPL-SCNC: 141 MMOL/L (ref 135–147)
WBC # BLD AUTO: 5.65 THOUSAND/UL (ref 4.31–10.16)

## 2022-10-31 RX ORDER — IPRATROPIUM BROMIDE AND ALBUTEROL SULFATE 2.5; .5 MG/3ML; MG/3ML
3 SOLUTION RESPIRATORY (INHALATION) EVERY 6 HOURS PRN
Qty: 360 ML | Refills: 1 | Status: SHIPPED | OUTPATIENT
Start: 2022-10-31 | End: 2022-12-30

## 2022-10-31 RX ORDER — BENZONATATE 100 MG/1
100 CAPSULE ORAL 3 TIMES DAILY PRN
Qty: 18 CAPSULE | Refills: 0 | Status: SHIPPED | OUTPATIENT
Start: 2022-10-31

## 2022-10-31 RX ORDER — CEFDINIR 300 MG/1
300 CAPSULE ORAL EVERY 12 HOURS SCHEDULED
Qty: 6 CAPSULE | Refills: 0 | Status: SHIPPED | OUTPATIENT
Start: 2022-10-31 | End: 2022-11-03

## 2022-10-31 RX ORDER — PANTOPRAZOLE SODIUM 40 MG/1
40 TABLET, DELAYED RELEASE ORAL DAILY
Qty: 30 TABLET | Refills: 0 | Status: SHIPPED | OUTPATIENT
Start: 2022-10-31

## 2022-10-31 RX ORDER — LEVOTHYROXINE SODIUM 0.07 MG/1
75 TABLET ORAL
Qty: 30 TABLET | Refills: 0 | Status: SHIPPED | OUTPATIENT
Start: 2022-10-31 | End: 2022-11-03 | Stop reason: SDUPTHER

## 2022-10-31 RX ADMIN — LEVOTHYROXINE SODIUM 75 MCG: 75 TABLET ORAL at 05:36

## 2022-10-31 RX ADMIN — IPRATROPIUM BROMIDE 0.5 MG: 0.5 SOLUTION RESPIRATORY (INHALATION) at 13:28

## 2022-10-31 RX ADMIN — LEVALBUTEROL HYDROCHLORIDE 1.25 MG: 1.25 SOLUTION, CONCENTRATE RESPIRATORY (INHALATION) at 13:29

## 2022-10-31 RX ADMIN — ENOXAPARIN SODIUM 40 MG: 40 INJECTION SUBCUTANEOUS at 09:12

## 2022-10-31 RX ADMIN — BRIMONIDINE TARTRATE 1 DROP: 2 SOLUTION/ DROPS OPHTHALMIC at 09:12

## 2022-10-31 RX ADMIN — LEVALBUTEROL HYDROCHLORIDE 1.25 MG: 1.25 SOLUTION, CONCENTRATE RESPIRATORY (INHALATION) at 07:41

## 2022-10-31 RX ADMIN — CEFTRIAXONE 1000 MG: 1 INJECTION, SOLUTION INTRAVENOUS at 12:55

## 2022-10-31 RX ADMIN — ACETAMINOPHEN 975 MG: 325 TABLET, FILM COATED ORAL at 05:36

## 2022-10-31 RX ADMIN — ESCITALOPRAM OXALATE 10 MG: 10 TABLET ORAL at 09:12

## 2022-10-31 RX ADMIN — BUDESONIDE 0.5 MG: 0.5 INHALANT ORAL at 07:41

## 2022-10-31 RX ADMIN — PANTOPRAZOLE SODIUM 40 MG: 40 TABLET, DELAYED RELEASE ORAL at 05:36

## 2022-10-31 RX ADMIN — TIMOLOL MALEATE 1 DROP: 5 SOLUTION/ DROPS OPHTHALMIC at 09:12

## 2022-10-31 RX ADMIN — IPRATROPIUM BROMIDE 0.5 MG: 0.5 SOLUTION RESPIRATORY (INHALATION) at 07:41

## 2022-10-31 NOTE — CASE MANAGEMENT
Case Management Progress Note    Patient name Irina Peraza  Location S /S -66 MRN [id number]  : 1955 Date 10/31/2022       LOS (days): 3  Geometric Mean LOS (GMLOS) (days):   Days to GMLOS:        OBJECTIVE:        Current admission status: Inpatient  Preferred Pharmacy:   [id number] [place], [address]  [address] [place] [address] [address] [address]  Phone: [phone] Fax: [phone]    Primary Care Provider: Amira Elias MD    Primary Insurance: [facility]  Secondary Insurance: [facility]    PROGRESS NOTE:    CM informed by [employer] they will re-open POC and process to f/u with pt at home    CM informed [employer] plan for DC home today

## 2022-10-31 NOTE — PLAN OF CARE
Problem: PAIN - ADULT  Goal: Verbalizes/displays adequate comfort level or baseline comfort level  Description: Interventions:  - Encourage patient to monitor pain and request assistance  - Assess pain using appropriate pain scale  - Administer analgesics based on type and severity of pain and evaluate response  - Implement non-pharmacological measures as appropriate and evaluate response  - Consider cultural and social influences on pain and pain management  - Notify physician/advanced practitioner if interventions unsuccessful or patient reports new pain  10/31/2022 1135 by Philomena Stewart RN  Outcome: Progressing  10/31/2022 1135 by Philomena Stewart RN  Outcome: Progressing     Problem: INFECTION - ADULT  Goal: Absence or prevention of progression during hospitalization  Description: INTERVENTIONS:  - Assess and monitor for signs and symptoms of infection  - Monitor lab/diagnostic results  - Monitor all insertion sites, i e  indwelling lines, tubes, and drains  - Monitor endotracheal if appropriate and nasal secretions for changes in amount and color  - [place] appropriate cooling/warming therapies per order  - Administer medications as ordered  - Instruct and encourage patient and family to use good hand hygiene technique  - Identify and instruct in appropriate isolation precautions for identified infection/condition  10/31/2022 1135 by Philomena Stewart RN  Outcome: Progressing  10/31/2022 1135 by Philomena Stewart RN  Outcome: Progressing     Problem: Knowledge Deficit  Goal: Patient/family/caregiver demonstrates understanding of disease process, treatment plan, medications, and discharge instructions  Description: Complete learning assessment and assess knowledge base    Interventions:  - Provide teaching at level of understanding  - Provide teaching via preferred learning methods  10/31/2022 1135 by Philomena Stewart RN  Outcome: Progressing  10/31/2022 1135 by Philomena Stewart RN  Outcome: Progressing     Problem: Potential for Falls  Goal: Patient will remain free of falls  Description: INTERVENTIONS:  - Educate patient/family on patient safety including physical limitations  - Instruct patient to call for assistance with activity   - Consult OT/PT to assist with strengthening/mobility   - Keep Call bell within reach  - Keep bed low and locked with side rails adjusted as appropriate  - Keep care items and personal belongings within reach  - Initiate and maintain comfort rounds  - Make Fall Risk Sign visible to staff  - Offer Toileting every 2 Hours, in advance of need  - Obtain necessary fall risk management equipment  - Apply yellow socks and bracelet for high fall risk patients  - Consider moving patient to room near nurses station  10/31/2022 1135 by Trina Villalpando RN  Outcome: Progressing  10/31/2022 1135 by Trina Villalpando RN  Outcome: Progressing     Problem: RESPIRATORY - ADULT  Goal: Achieves optimal ventilation and oxygenation  Description: INTERVENTIONS:  - Assess for changes in respiratory status  - Assess for changes in mentation and behavior  - Position to facilitate oxygenation and minimize respiratory effort  - Oxygen administered by appropriate delivery if ordered  - Initiate smoking cessation education as indicated  - Encourage broncho-pulmonary hygiene including cough, deep breathe, Incentive Spirometry  - Assess the need for suctioning and aspirate as needed  - Assess and instruct to report SOB or any respiratory difficulty  - Respiratory Therapy support as indicated  Outcome: Progressing

## 2022-10-31 NOTE — INCIDENTAL FINDINGS
The following findings require follow up:  Radiographic finding   Finding:  Stable 12 mm left lower lobe pulmonary nodule and 14 mm nodule at the right lower lobe  9 mm indeterminate liver lesion  Follow up required:  Outpatient follow-up with primary care physician  Follow up should be done within 1 month(s)    These findings were discussed with the patient with our medical resident, who speaks good Mongolian, translating      Please notify the following clinician to assist with the follow up:   Dr Dank Hampton

## 2022-11-01 NOTE — UTILIZATION REVIEW
NOTIFICATION OF ADMISSION DISCHARGE   This is a Notification of Discharge from [address]  Please be advised that this patient has been discharge from our facility  Below you will find the admission and discharge date and time including the patient’s disposition  UTILIZATION REVIEW CONTACT:  Cahta Adams MA  Utilization   Network Utilization Review Department  Phone: [phone] x carefully listen to the prompts  All voicemails are confidential   Email: [email or web address]     ADMISSION INFORMATION  PRESENTATION DATE: 10/28/2022  8:30 AM  OBERVATION ADMISSION DATE:   INPATIENT ADMISSION DATE: 10/28/22 11:17 AM   DISCHARGE DATE: 10/31/2022  4:46 PM  DISPOSITION: Home/Self Care Home/Self Care      IMPORTANT INFORMATION:  Send all requests for admission clinical reviews, approved or denied determinations and any other requests to dedicated fax number below belonging to the campus where the patient is receiving treatment   List of dedicated fax numbers:  [address] DENIALS (Administrative/Medical Necessity) [phone]   [address] (Maternity/NICU/Pediatrics) [phone]   [facility] [phone]   [place] [address] [phone]   [address] [phone]   220 [facility] [phone] 90 [facility] [phone]   [address] [place] [address] 119 [phone]   [facility]  [phone]   [address] [facility] [phone] 412 [facility] [address] [facility] [phone]

## 2022-11-02 ENCOUNTER — TRANSITIONAL CARE MANAGEMENT (OUTPATIENT)
Dept: FAMILY MEDICINE CLINIC | Facility: CLINIC | Age: 67
End: 2022-11-02

## 2022-11-02 LAB
BACTERIA BLD CULT: NORMAL
BACTERIA BLD CULT: NORMAL

## 2022-11-02 RX ORDER — GUAIFENESIN 100 MG/5ML
SYRUP ORAL
COMMUNITY
Start: 2022-09-21

## 2022-11-03 ENCOUNTER — OFFICE VISIT (OUTPATIENT)
Dept: FAMILY MEDICINE CLINIC | Facility: CLINIC | Age: 67
End: 2022-11-03

## 2022-11-03 VITALS
DIASTOLIC BLOOD PRESSURE: 66 MMHG | HEIGHT: 62 IN | SYSTOLIC BLOOD PRESSURE: 126 MMHG | HEART RATE: 67 BPM | BODY MASS INDEX: 32.57 KG/M2 | OXYGEN SATURATION: 92 % | WEIGHT: 177 LBS | TEMPERATURE: 97.2 F

## 2022-11-03 DIAGNOSIS — J43.2 CENTRILOBULAR EMPHYSEMA (HCC): ICD-10-CM

## 2022-11-03 DIAGNOSIS — K76.89 LIVER NODULE: Primary | ICD-10-CM

## 2022-11-03 DIAGNOSIS — F41.1 GAD (GENERALIZED ANXIETY DISORDER): ICD-10-CM

## 2022-11-03 DIAGNOSIS — N18.31 STAGE 3A CHRONIC KIDNEY DISEASE (HCC): ICD-10-CM

## 2022-11-03 DIAGNOSIS — E03.9 HYPOTHYROIDISM, UNSPECIFIED TYPE: ICD-10-CM

## 2022-11-03 DIAGNOSIS — F41.8 DEPRESSION WITH ANXIETY: ICD-10-CM

## 2022-11-03 DIAGNOSIS — R91.8 OPACITIES OF BOTH LUNGS PRESENT ON CHEST X-RAY: ICD-10-CM

## 2022-11-03 DIAGNOSIS — D50.8 IRON DEFICIENCY ANEMIA DUE TO DIETARY CAUSES: ICD-10-CM

## 2022-11-03 DIAGNOSIS — J18.9 COMMUNITY ACQUIRED PNEUMONIA, BILATERAL: ICD-10-CM

## 2022-11-03 DIAGNOSIS — L50.9 HIVES: ICD-10-CM

## 2022-11-03 RX ORDER — FLUOXETINE 10 MG/1
10 TABLET, FILM COATED ORAL DAILY
Qty: 30 TABLET | Refills: 2 | Status: SHIPPED | OUTPATIENT
Start: 2022-11-03

## 2022-11-03 RX ORDER — FERROUS SULFATE TAB EC 324 MG (65 MG FE EQUIVALENT) 324 (65 FE) MG
324 TABLET DELAYED RESPONSE ORAL
Qty: 90 TABLET | Refills: 1 | Status: SHIPPED | OUTPATIENT
Start: 2022-11-03

## 2022-11-03 RX ORDER — HYDROXYZINE HYDROCHLORIDE 10 MG/1
10 TABLET, FILM COATED ORAL EVERY 6 HOURS PRN
Qty: 30 TABLET | Refills: 0 | Status: SHIPPED | OUTPATIENT
Start: 2022-11-03

## 2022-11-03 RX ORDER — PAROXETINE 10 MG/1
10 TABLET, FILM COATED ORAL EVERY MORNING
Qty: 90 TABLET | Refills: 1 | Status: SHIPPED | OUTPATIENT
Start: 2022-11-03 | End: 2022-11-03

## 2022-11-03 RX ORDER — LEVOTHYROXINE SODIUM 0.07 MG/1
75 TABLET ORAL
Qty: 90 TABLET | Refills: 0 | Status: SHIPPED | OUTPATIENT
Start: 2022-11-03

## 2022-11-03 NOTE — PATIENT INSTRUCTIONS
Stop  cefdinir- you had allergic reaction to this  Do not ever take it again  Start taking hydroxyzine 10 mg 1 tablet as needed for anxiety as well as this allergy reaction  It will help you with the itching  Contact the pulmonologist for portable oxygen    I have ordered MRI of her liver to further characterize the lesion or the nodule that was seen on your liver  Call to Schedule this MRI  Obtain CT scan of the chest for follow-up of her pneumonia and lung nodules in 6 weeks  Call to schedule now  Start taking fluoxetine 10 mg oral daily for anxiety  Continue levothyroxine 75 mcg oral daily  Continue pantoprazole 40 mg 1 capsule daily in the morning empty stomach  Obtain labs in 3 months    I have attached your updated medication list  Start taking iron 325 mg oral daily after eating lunch

## 2022-11-03 NOTE — PROGRESS NOTES
FAMILY MEDICINE TRANSITION OF CARE OFFICE VISIT  [facility] - [facility] FAMILY MEDICINE    NAME: Valeria Nichols  AGE: 79 y o  SEX: female  : 1955       DATE: 11/3/2022    Assessment and Plan     Problem List Items Addressed This Visit        Endocrine    Hypothyroidism    Relevant Medications    levothyroxine 75 mcg tablet       Respiratory    Centrilobular emphysema (HCC)    Relevant Orders    CT chest wo contrast    Community acquired pneumonia, bilateral    Relevant Orders    CT chest wo contrast       Genitourinary    Chronic kidney disease, stage 3 (moderate)    Relevant Orders    Comprehensive metabolic panel       Other    Depression with anxiety    Relevant Medications    hydrOXYzine HCL (ATARAX) 10 mg tablet    FLUoxetine (PROzac) 10 MG tablet      Other Visit Diagnoses     Liver nodule    -  Primary    Relevant Orders    MRI abdomen w wo contrast    Hives        Relevant Medications    hydrOXYzine HCL (ATARAX) 10 mg tablet    TIMMY (generalized anxiety disorder)        Relevant Medications    hydrOXYzine HCL (ATARAX) 10 mg tablet    FLUoxetine (PROzac) 10 MG tablet    Iron deficiency anemia due to dietary causes        Relevant Medications    ferrous sulfate 324 (65 Fe) mg    Other Relevant Orders    CBC and differential    Iron Panel (Includes Ferritin, Iron Sat%, Iron, and TIBC)    Opacities of both lungs present on chest x-ray            Stop  cefdinir- you had allergic reaction to this  Do not ever take it again  Start taking hydroxyzine 10 mg 1 tablet as needed for anxiety as well as this allergy reaction  It will help you with the itching  Contact the pulmonologist for portable oxygen    I have ordered MRI of her liver to further characterize the lesion or the nodule that was seen on your liver  Call to Schedule this MRI  Obtain CT scan of the chest for follow-up of her pneumonia and lung nodules in 6 weeks  Call to schedule now      Start taking fluoxetine 10 mg oral daily for anxiety  Continue levothyroxine 75 mcg oral daily  Continue pantoprazole 40 mg 1 capsule daily in the morning empty stomach  Obtain labs in 3 months    I have attached your updated medication list  Start taking iron 325 mg oral daily after eating lunch  - Counseling Documentation: patient was counseled regarding: diagnostic results, instructions for management, prognosis, patient and family education, impressions and risks and benefits of treatment options  - Counseling Time: counseling time more than 50% of visit: 1 hour  - Barriers to treatment: language, anxiety, cultural factors  - Medication Side Effects: Adverse side effects of medications were reviewed with the patient/guardian today  - Self Referrals: Yes     Transitional Care Management Review     Ava Monte is a 79 y o  female here for TCM follow-up    During the TCM phone call patient stated:    TCM Call     Date and time call was made  11/2/2022 11:41 AM    Hospital care reviewed  Records reviewed    Patient was hospitialized at  [address] [place] [address]    Date of Admission  10/28/22    Date of discharge  10/31/22    Diagnosis  Community acquired pneumonia of left lower lobe of lung    Disposition  Home    Were the patients medications reviewed and updated  Yes    Current Symptoms  --  just overall not feeling well- could be her nerves about what is going on    Shortness of breath severity  Moderate  while walking      TCM Call     Post hospital issues  None    Should patient be enrolled in anticoag monitoring? No    Scheduled for follow up?   Yes    Patients specialists  Pulmonlolgist    Did you obtain your prescribed medications  Yes    Do you need help managing your prescriptions or medications  No    Is transportation to your appointment needed  No    I have advised the patient to call PCP with any new or worsening symptoms  Marko Greer MA          History of Present Illness     TCM Call     Date and time call was made 11/2/2022 11:41 AM    Hospital care reviewed  Records reviewed    Patient was hospitialized at  [facility]    Date of Admission  10/28/22    Date of discharge  10/31/22    Diagnosis  Community acquired pneumonia of left lower lobe of lung    Disposition  Home    Were the patients medications reviewed and updated  Yes    Current Symptoms  --  just overall not feeling well- could be her nerves   about what is going on    Shortness of breath severity  Moderate  while walking      TCM Call     Post hospital issues  None    Should patient be enrolled in anticoag monitoring? No    Scheduled for follow up? Yes    Patients specialists  Pulmonlolgist    Did you obtain your prescribed medications  Yes    Do you need help managing your prescriptions or medications  No    Is transportation to your appointment needed  No    I have advised the patient to call PCP with any new or worsening symptoms    Christopher Perdomo MA      Feeling better, but has a rash on her forearm-left sided raised -the only change is her new antibiotics     service used  Daughter irina was also available by phone added to the history  She is anxious due to her younger sister everton diagnosed with pancreatic cancer -stage 4    She was taking escitalopram in the hospital which she did not like, she does like paxil but did not take it when prescribed  Needs portable oxygen concentrator      The following portions of the patient's history were reviewed and updated as appropriate: allergies, current medications, past family history, past medical history, past social history, past surgical history and problem list     Review of Systems       Review of Systems   Constitutional: Negative for fatigue and fever  HENT: Negative for congestion, facial swelling, mouth sores, rhinorrhea, sore throat and trouble swallowing  Eyes: Negative for pain and redness  Respiratory: Positive for shortness of breath   Negative for cough and wheezing  Cardiovascular: Negative for chest pain, palpitations and leg swelling  Gastrointestinal: Negative for abdominal pain, blood in stool, constipation, diarrhea and nausea  Genitourinary: Negative for dysuria, hematuria and urgency  Musculoskeletal: Negative for arthralgias, back pain and myalgias  Skin: Positive for rash  Negative for wound  Neurological: Negative for seizures, syncope and headaches  Hematological: Negative for adenopathy  Psychiatric/Behavioral: Negative for agitation and behavioral problems  The patient is nervous/anxious          Active Problem List     Patient Active Problem List   Diagnosis   • Cancer of pharynx ([address] )   • Cataract   • Chronic kidney disease, stage 3 (moderate)   • Centrilobular emphysema (HCC)   • Extrinsic obstruction of cartilagenous portion of eustachian tube   • Personal history of radiation therapy   • History of laryngeal cancer   • Loss of hearing   • Depression with anxiety   • Numbness   • Panic attack   • Vitamin D deficiency   • Dupuytren contracture   • Elevated alkaline phosphatase level   • Bronchiectasis with acute exacerbation (HCC)   • Severe persistent asthma without complication   • Multiple lung nodules   • Myalgia   • Tension type headache   • Hyperlipidemia   • Hypothyroidism   • COVID-19   • Trigger finger of right thumb   • Current mild episode of major depressive disorder without prior episode (HCC)   • Persistent dyspnea after COVID-19   • History of COVID-19   • Class 1 obesity due to excess calories with serious comorbidity and body mass index (BMI) of 32 0 to 32 9 in adult   • Exercise hypoxemia   • Post-nasal drip   • Mesenteric lymphadenopathy   • Neck pain on left side   • Mediastinal lymphadenopathy   • Preop examination   • Preoperative cardiovascular examination   • Community acquired pneumonia, bilateral   • Chest pain   • Anemia   • Dyspepsia       Objective     /66 (BP Location: Right arm, Patient Position: Sitting, Cuff Size: Standard)   Pulse 67   Temp (!) 97 2 °F (36 2 °C) (Temporal)   Ht 5' 2" (1 575 m)   Wt 80 3 kg (177 lb)   SpO2 92%   BMI 32 37 kg/m²     Physical Exam  Vitals and nursing note reviewed  Constitutional:       Appearance: Normal appearance  She is well-developed  She is not ill-appearing  HENT:      Head: Normocephalic and atraumatic  Right Ear: External ear normal       Left Ear: External ear normal       Nose: Nose normal       Mouth/Throat:      Mouth: Mucous membranes are moist       Pharynx: No oropharyngeal exudate or posterior oropharyngeal erythema  Eyes:      General: No scleral icterus  Right eye: No discharge  Left eye: No discharge  Conjunctiva/sclera: Conjunctivae normal    Cardiovascular:      Rate and Rhythm: Normal rate  Heart sounds: No murmur heard  No gallop  Pulmonary:      Effort: Pulmonary effort is normal  No respiratory distress  Breath sounds: No stridor  Examination of the right-upper field reveals decreased breath sounds  Examination of the left-upper field reveals decreased breath sounds  Examination of the left-middle field reveals rhonchi  Decreased breath sounds and rhonchi present  No wheezing or rales  Comments: Oxygen via nasal cannula  Abdominal:      Palpations: Abdomen is soft  Tenderness: There is no abdominal tenderness  Musculoskeletal:         General: No tenderness or deformity  Skin:     Findings: No erythema or rash  Neurological:      Mental Status: She is alert  Mental status is at baseline  Psychiatric:         Behavior: Behavior normal          Judgment: Judgment normal          Laboratory Results: I have personally reviewed the pertinent laboratory results/reports     Radiology/Other Diagnostic Testing Results: I have personally reviewed pertinent reports  XR chest 1 view portable    Result Date: 10/28/2022  CHEST INDICATION:   chest pain   COMPARISON:  5/18/2022 EXAM PERFORMED/VIEWS:  XR CHEST PORTABLE Single view FINDINGS: Development of left basal infiltrate suspicious for pneumonia Cardiomediastinal silhouette appears unremarkable  No pneumothorax or pleural effusion  Osseous structures appear within normal limits for patient age       Development of left basal infiltrate suspicious for pneumonia Workstation performed: [id number]        Current Medications     Current Outpatient Medications:   •  Budeson-Glycopyrrol-Formoterol (Breztri Aerosphere) 160-9-4 8 MCG/ACT AERO, Inhale 2 puffs 2 (two) times a day Rinse mouth after use , Disp: 10 7 g, Rfl: 3  •  ferrous sulfate 324 (65 Fe) mg, Take 1 tablet (324 mg total) by mouth daily after lunch, Disp: 90 tablet, Rfl: 1  •  FLUoxetine (PROzac) 10 MG tablet, Take 1 tablet (10 mg total) by mouth daily, Disp: 30 tablet, Rfl: 2  •  hydrOXYzine HCL (ATARAX) 10 mg tablet, Take 1 tablet (10 mg total) by mouth every 6 (six) hours as needed for itching, allergies or anxiety, Disp: 30 tablet, Rfl: 0  •  levothyroxine 75 mcg tablet, Take 1 tablet (75 mcg total) by mouth daily in the early morning, Disp: 90 tablet, Rfl: 0  •  montelukast (SINGULAIR) 10 mg tablet, TAKE 1 TABLET BY MOUTH DAILY AT BEDTIME, Disp: 90 tablet, Rfl: 1  •  albuterol (PROVENTIL HFA,VENTOLIN HFA) 90 mcg/act inhaler, Inhale 2 puffs every 4 (four) hours as needed for wheezing, Disp: 36 g, Rfl: 5  •  benzonatate (TESSALON PERLES) 100 mg capsule, Take 1 capsule (100 mg total) by mouth 3 (three) times a day as needed for cough, Disp: 18 capsule, Rfl: 0  •  brimonidine tartrate 0 2 % ophthalmic solution, , Disp: , Rfl:   •  Diclofenac Sodium (VOLTAREN) 1 %, APPLY 2 GRAMS TO AFFECTED AREA 4 TIMES A DAY, Disp: , Rfl:   •  guaiFENesin (ROBITUSSIN) 100 MG/5ML oral liquid, TAKE 10ML BY MOUTH 3 TIMES A DAY AS NEEDED FOR COUGH FOR UP TO 10 DAYS, Disp: , Rfl:   •  ipratropium (ATROVENT) 0 06 % nasal spray, USE 2 SPRAYS IN EACH NOSTRIL 4 TIMES DAILY, Disp: 45 mL, Rfl: 2  • ipratropium-albuterol (DUO-NEB) 0 5-2 5 mg/3 mL nebulizer solution, Take 3 mL by nebulization every 6 (six) hours as needed for wheezing or shortness of breath, Disp: 360 mL, Rfl: 1  •  lidocaine (XYLOCAINE) 5 % ointment, Apply topically as needed for mild pain, Disp: 35 44 g, Rfl: 0  •  nystatin (MYCOSTATIN) cream, Apply topically 2 (two) times a day, Disp: 30 g, Rfl: 0  •  pantoprazole (PROTONIX) 40 mg tablet, Take 1 tablet (40 mg total) by mouth daily, Disp: 30 tablet, Rfl: 0  •  timolol (TIMOPTIC) 0 5 % ophthalmic solution, INSTILL 1 DROP INTO EACH EYE TWO TIMES A DAY, Disp: , Rfl:     Quintin Dela Cruz  [facility]

## 2022-11-22 DIAGNOSIS — R10.13 DYSPEPSIA: ICD-10-CM

## 2022-11-24 DIAGNOSIS — J43.2 CENTRILOBULAR EMPHYSEMA (HCC): ICD-10-CM

## 2022-11-25 RX ORDER — BUDESONIDE, GLYCOPYRROLATE, AND FORMOTEROL FUMARATE 160; 9; 4.8 UG/1; UG/1; UG/1
AEROSOL, METERED RESPIRATORY (INHALATION)
Qty: 32.1 G | Refills: 1 | Status: SHIPPED | OUTPATIENT
Start: 2022-11-25

## 2022-11-26 DIAGNOSIS — F41.1 GAD (GENERALIZED ANXIETY DISORDER): ICD-10-CM

## 2022-11-26 DIAGNOSIS — F41.8 DEPRESSION WITH ANXIETY: ICD-10-CM

## 2022-11-28 RX ORDER — PANTOPRAZOLE SODIUM 40 MG/1
TABLET, DELAYED RELEASE ORAL
Qty: 90 TABLET | Refills: 1 | Status: SHIPPED | OUTPATIENT
Start: 2022-11-28

## 2022-11-28 RX ORDER — FLUOXETINE 10 MG/1
TABLET, FILM COATED ORAL
Qty: 90 TABLET | Refills: 1 | Status: SHIPPED | OUTPATIENT
Start: 2022-11-28

## 2022-11-28 NOTE — TELEPHONE ENCOUNTER
Marcie Salome is not a patient of the [facility] office  No longer under the care of Dr Tavarez Bench  Will route to PCP

## 2022-12-12 ENCOUNTER — OFFICE VISIT (OUTPATIENT)
Dept: FAMILY MEDICINE CLINIC | Facility: CLINIC | Age: 67
End: 2022-12-12

## 2022-12-12 VITALS
SYSTOLIC BLOOD PRESSURE: 110 MMHG | HEART RATE: 64 BPM | HEIGHT: 62 IN | BODY MASS INDEX: 32.94 KG/M2 | OXYGEN SATURATION: 94 % | TEMPERATURE: 97.2 F | DIASTOLIC BLOOD PRESSURE: 66 MMHG | RESPIRATION RATE: 16 BRPM | WEIGHT: 179 LBS

## 2022-12-12 DIAGNOSIS — J43.2 CENTRILOBULAR EMPHYSEMA (HCC): ICD-10-CM

## 2022-12-12 DIAGNOSIS — R68.89 FLU-LIKE SYMPTOMS: Primary | ICD-10-CM

## 2022-12-12 DIAGNOSIS — F43.21 GRIEF: ICD-10-CM

## 2022-12-12 DIAGNOSIS — J18.9 LEFT LOWER LOBE PNEUMONIA: ICD-10-CM

## 2022-12-12 DIAGNOSIS — J45.50 SEVERE PERSISTENT ASTHMA WITHOUT COMPLICATION: ICD-10-CM

## 2022-12-12 LAB
SL AMB POCT RAPID FLU A: NEGATIVE
SL AMB POCT RAPID FLU B: NEGATIVE

## 2022-12-12 RX ORDER — OSELTAMIVIR PHOSPHATE 75 MG/1
75 CAPSULE ORAL EVERY 12 HOURS SCHEDULED
Qty: 10 CAPSULE | Refills: 0 | Status: SHIPPED | OUTPATIENT
Start: 2022-12-12 | End: 2022-12-17

## 2022-12-12 RX ORDER — BENZONATATE 100 MG/1
100 CAPSULE ORAL 3 TIMES DAILY PRN
Qty: 20 CAPSULE | Refills: 0 | Status: SHIPPED | OUTPATIENT
Start: 2022-12-12 | End: 2022-12-12

## 2022-12-12 RX ORDER — BUDESONIDE, GLYCOPYRROLATE, AND FORMOTEROL FUMARATE 160; 9; 4.8 UG/1; UG/1; UG/1
2 AEROSOL, METERED RESPIRATORY (INHALATION) 2 TIMES DAILY
Qty: 32.1 G | Refills: 1 | Status: SHIPPED | OUTPATIENT
Start: 2022-12-12

## 2022-12-12 RX ORDER — BENZONATATE 100 MG/1
100 CAPSULE ORAL 3 TIMES DAILY PRN
Qty: 20 CAPSULE | Refills: 0
Start: 2022-12-12

## 2022-12-12 RX ORDER — MONTELUKAST SODIUM 10 MG/1
10 TABLET ORAL
Qty: 90 TABLET | Refills: 1 | Status: SHIPPED | OUTPATIENT
Start: 2022-12-12

## 2022-12-12 RX ORDER — GUAIFENESIN 100 MG/5ML
200 SYRUP ORAL 3 TIMES DAILY PRN
Qty: 120 ML | Refills: 0 | Status: SHIPPED | OUTPATIENT
Start: 2022-12-12

## 2022-12-12 NOTE — PROGRESS NOTES
Subjective:      Patient ID: Live Chi is a 79 y o  female  With cough congestion headache, chest pain, shortness of breath, exposure to influenza, for 2 days  She is also grieving the loss of her younger sister who  of cancer  Daughter Brian Kaur present and gave the history  Patient reports her then the   Cough  Associated symptoms include chest pain, headaches and shortness of breath  Pertinent negatives include no eye redness, fever, myalgias, rash, rhinorrhea, sore throat or wheezing  Headache  Chest Pain   Associated symptoms include a cough, headaches and shortness of breath  Pertinent negatives include no abdominal pain, back pain, fever, nausea or palpitations  Pertinent negatives for past medical history include no seizures         Past Medical History:   Diagnosis Date   • Acute kidney failure (HCC)    • Allergic    • Allergies    • Anemia    • Asthma    • Cancer ([place] [address] )    • Chronic kidney disease (CKD), stage III (moderate) (HCC)    • COPD (chronic obstructive pulmonary disease) ([place] [address] )    • COVID-19     Covid + 7-5-54-cough at that time now fully resolved   • Disease of thyroid gland    • Essential hypertension    • GERD (gastroesophageal reflux disease)    • Glaucoma    • High blood pressure    • HTN (hypertension) 2021   • Hyperlipidemia    • Hypothyroidism    • Squamous cell carcinoma of larynx ([place] [address] ) 8/10/2022   • Throat cancer ([place] [address] )     chemo and rad therapy        Family History   Problem Relation Age of Onset   • Other Mother         respiratory disorder   • Sudden death Father         shot himself   • Suicidality Father    • Brain cancer Sister    • Diabetes Sister    • Breast cancer Sister    • Cancer Sister 58        pancreatic cancer   • No Known Problems Sister    • No Known Problems Sister    • No Known Problems Sister    • No Known Problems Sister    • No Known Problems Sister    • No Known Problems Sister    • No Known Problems Daughter    • No Known Problems Daughter    • No Known Problems Maternal Grandmother    • No Known Problems Maternal Grandfather    • No Known Problems Paternal Grandmother    • No Known Problems Paternal Grandfather    • No Known Problems Maternal Aunt    • No Known Problems Maternal Aunt    • No Known Problems Maternal Aunt    • No Known Problems Maternal Aunt    • No Known Problems Maternal Aunt    • No Known Problems Paternal Aunt    • No Known Problems Paternal Aunt    • No Known Problems Paternal Aunt    • No Known Problems Paternal Aunt        Past Surgical History:   Procedure Laterality Date   • COLONOSCOPY  2016   • ESOPHAGOGASTRODUODENOSCOPY  2016   • EYE SURGERY     • IR BIOPSY LUNG  05/18/2022   • LARYNGOSCOPY      w/ Bx  • TX TENDON SHEATH INCISION Right 02/16/2021    Procedure: THUMB TRIGGER FINGER RELEASE;  Surgeon: Spenser Waters MD;  Location: [facility];  Service: Orthopedics   • TUBAL LIGATION          reports that she quit smoking about 8 years ago  Her smoking use included cigarettes  She has a 40 00 pack-year smoking history  She has never used smokeless tobacco  She reports that she does not drink alcohol and does not use drugs        Current Outpatient Medications:   •  albuterol (PROVENTIL HFA,VENTOLIN HFA) 90 mcg/act inhaler, Inhale 2 puffs every 4 (four) hours as needed for wheezing, Disp: 36 g, Rfl: 5  •  benzonatate (TESSALON PERLES) 100 mg capsule, Take 1 capsule (100 mg total) by mouth 3 (three) times a day as needed for cough, Disp: 20 capsule, Rfl: 0  •  brimonidine tartrate 0 2 % ophthalmic solution, , Disp: , Rfl:   •  Budeson-Glycopyrrol-Formoterol (Breztri Aerosphere) 160-9-4 8 MCG/ACT AERO, Inhale 2 puffs 2 (two) times a day Rinse mouth after use , Disp: 32 1 g, Rfl: 1  •  Diclofenac Sodium (VOLTAREN) 1 %, APPLY 2 GRAMS TO AFFECTED AREA 4 TIMES A DAY, Disp: , Rfl:   •  ferrous sulfate 324 (65 Fe) mg, Take 1 tablet (324 mg total) by mouth daily after lunch, Disp: 90 tablet, Rfl: 1  • FLUoxetine (PROzac) 10 MG tablet, TAKE 1 TABLET BY MOUTH EVERY DAY, Disp: 90 tablet, Rfl: 1  •  guaiFENesin (ROBITUSSIN) 100 MG/5ML oral liquid, Take 10 mL (200 mg total) by mouth 3 (three) times a day as needed for cough, Disp: 120 mL, Rfl: 0  •  hydrOXYzine HCL (ATARAX) 10 mg tablet, Take 1 tablet (10 mg total) by mouth every 6 (six) hours as needed for itching, allergies or anxiety, Disp: 30 tablet, Rfl: 0  •  ipratropium (ATROVENT) 0 06 % nasal spray, USE 2 SPRAYS IN EACH NOSTRIL 4 TIMES DAILY, Disp: 45 mL, Rfl: 2  •  ipratropium-albuterol (DUO-NEB) 0 5-2 5 mg/3 mL nebulizer solution, Take 3 mL by nebulization every 6 (six) hours as needed for wheezing or shortness of breath, Disp: 360 mL, Rfl: 1  •  levothyroxine 75 mcg tablet, Take 1 tablet (75 mcg total) by mouth daily in the early morning, Disp: 90 tablet, Rfl: 0  •  montelukast (SINGULAIR) 10 mg tablet, Take 1 tablet (10 mg total) by mouth daily at bedtime, Disp: 90 tablet, Rfl: 1  •  nystatin (MYCOSTATIN) cream, Apply topically 2 (two) times a day, Disp: 30 g, Rfl: 0  •  oseltamivir (TAMIFLU) 75 mg capsule, Take 1 capsule (75 mg total) by mouth every 12 (twelve) hours for 5 days, Disp: 10 capsule, Rfl: 0  •  pantoprazole (PROTONIX) 40 mg tablet, TAKE 1 TABLET BY MOUTH EVERY DAY, Disp: 90 tablet, Rfl: 1  •  timolol (TIMOPTIC) 0 5 % ophthalmic solution, INSTILL 1 DROP INTO EACH EYE TWO TIMES A DAY, Disp: , Rfl:     The following portions of the patient's history were reviewed and updated as appropriate: allergies, current medications, past family history, past medical history, past social history, past surgical history and problem list     Review of Systems   Constitutional: Negative for fatigue and fever  HENT: Positive for congestion  Negative for facial swelling, mouth sores, rhinorrhea, sore throat and trouble swallowing  Eyes: Negative for pain and redness  Respiratory: Positive for cough and shortness of breath  Negative for wheezing  Cardiovascular: Positive for chest pain  Negative for palpitations and leg swelling  Gastrointestinal: Negative for abdominal pain, blood in stool, constipation, diarrhea and nausea  Genitourinary: Negative for dysuria, hematuria and urgency  Musculoskeletal: Negative for arthralgias, back pain and myalgias  Skin: Negative for rash and wound  Neurological: Positive for headaches  Negative for seizures and syncope  Hematological: Negative for adenopathy  Psychiatric/Behavioral: Negative for agitation and behavioral problems  PHQ-2/9 Depression Screening               Objective:    /66 (BP Location: Right arm, Patient Position: Sitting, Cuff Size: Adult)   Pulse 64   Temp (!) 97 2 °F (36 2 °C) (Temporal)   Resp 16   Ht 5' 2" (1 575 m)   Wt 81 2 kg (179 lb)   SpO2 94%   BMI 32 74 kg/m²      Physical Exam  Vitals and nursing note reviewed  Constitutional:       Appearance: Normal appearance  She is well-developed  She is not ill-appearing  HENT:      Head: Normocephalic and atraumatic  Right Ear: External ear normal       Left Ear: External ear normal       Nose: Congestion present  Mouth/Throat:      Mouth: Mucous membranes are moist       Pharynx: No oropharyngeal exudate or posterior oropharyngeal erythema  Eyes:      General: No scleral icterus  Right eye: No discharge  Left eye: No discharge  Conjunctiva/sclera: Conjunctivae normal    Cardiovascular:      Rate and Rhythm: Normal rate  Heart sounds: No murmur heard  No gallop  Pulmonary:      Effort: Pulmonary effort is normal  No respiratory distress  Breath sounds: Normal breath sounds  No stridor  No wheezing, rhonchi or rales  Abdominal:      Palpations: Abdomen is soft  Tenderness: There is no abdominal tenderness  Musculoskeletal:         General: No tenderness or deformity  Skin:     Findings: No erythema or rash     Neurological:      Mental Status: She is alert  Mental status is at baseline     Psychiatric:         Behavior: Behavior normal          Judgment: Judgment normal            Recent Results (from the past 8736 hour(s))   COVID Only- Collected at [facility]    Collection Time: 01/03/22  6:57 PM    Specimen: Nose; Nares   Result Value Ref Range    SARS-CoV-2 Negative Negative   CBC and differential    Collection Time: 02/23/22  8:37 AM   Result Value Ref Range    WBC 6 80 4 31 - 10 16 Thousand/uL    RBC 4 61 3 81 - 5 12 Million/uL    Hemoglobin 11 9 11 5 - 15 4 g/dL    Hematocrit 38 6 34 8 - 46 1 %    MCV 84 82 - 98 fL    MCH 25 8 (L) 26 8 - 34 3 pg    MCHC 30 8 (L) 31 4 - 37 4 g/dL    RDW 14 6 11 6 - 15 1 %    MPV 10 1 8 9 - 12 7 fL    Platelets 248 453 - 094 Thousands/uL    nRBC 0 /100 WBCs    Neutrophils Relative 75 43 - 75 %    Immat GRANS % 0 0 - 2 %    Lymphocytes Relative 15 14 - 44 %    Monocytes Relative 8 4 - 12 %    Eosinophils Relative 2 0 - 6 %    Basophils Relative 0 0 - 1 %    Neutrophils Absolute 5 04 1 85 - 7 62 Thousands/µL    Immature Grans Absolute 0 03 0 00 - 0 20 Thousand/uL    Lymphocytes Absolute 1 04 0 60 - 4 47 Thousands/µL    Monocytes Absolute 0 52 0 17 - 1 22 Thousand/µL    Eosinophils Absolute 0 14 0 00 - 0 61 Thousand/µL    Basophils Absolute 0 03 0 00 - 0 10 Thousands/µL   Comprehensive metabolic panel    Collection Time: 02/23/22  8:37 AM   Result Value Ref Range    Sodium 142 133 - 145 mmol/L    Potassium 4 5 3 5 - 5 0 mmol/L    Chloride 105 96 - 108 mmol/L    CO2 31 22 - 33 mmol/L    ANION GAP 6 4 - 13 mmol/L    BUN 30 (H) 6 - 20 mg/dL    Creatinine 1 24 (H) 0 40 - 1 10 mg/dL    Glucose, Fasting 97 70 - 105 mg/dL    Calcium 9 8 8 4 - 10 2 mg/dL    AST 27 15 - 41 U/L    ALT 19 5 - 54 U/L    Alkaline Phosphatase 110 8 35 - 140 U/L    Total Protein 7 1 6 4 - 8 3 g/dL    Albumin 4 3 3 4 - 4 8 g/dL    Total Bilirubin 0 42 0 30 - 1 20 mg/dL    eGFR 45 ml/min/1 73sq m   Ferritin    Collection Time: 02/23/22  8:37 AM Result Value Ref Range    Ferritin 42 8 - 388 ng/mL   Iron Saturation %    Collection Time: 02/23/22  8:37 AM   Result Value Ref Range    Iron Saturation 15 15 - 50 %    TIBC 325 250 - 450 ug/dL    Iron 50 50 - 170 ug/dL   Methylmalonic acid, serum    Collection Time: 02/23/22  8:37 AM   Result Value Ref Range    Methylmalonic Acid, S 131 0 - 378 nmol/L   Leukemia/Lymphoma flow cytometry    Collection Time: 02/23/22  8:37 AM   Result Value Ref Range    Scan Result SEE WRITTEN REPORT    TSH, 3rd generation    Collection Time: 02/23/22  8:37 AM   Result Value Ref Range    TSH 3RD GENERATON 0 581 0 340 - 5 600 uIU/mL   Protime-INR    Collection Time: 05/18/22  9:28 AM   Result Value Ref Range    Protime 12 3 11 6 - 14 5 seconds    INR 0 91 0 84 - 1 19   Tissue Exam    Collection Time: 05/18/22 10:58 AM   Result Value Ref Range    Case Report       Surgical Pathology Report                         Case: [id number]                                   Authorizing Provider:  Luh Escamilla MD        Collected:           05/18/2022 1058              Ordering Location:     [facility]        Received:            05/18/2022 [address] Scan                                                            Pathologist:           Michaelle Dickerson MD                                                                 Specimen:    Lesion, Right pulmonary nodule                                                             Final Diagnosis       A  Lesion, Right pulmonary nodule:  - Minute foci of atypical glandular proliferations  See comment  Comment: There are very minute areas of atypical glands on the core biopsies  Touch preparation slides demonstrate more cellular material with clusters of atypical epithelial cells present    The differential includes atypical adenomatous hyperplasia, adenocarcinoma in-situ, or a low-grade invasive adenocarcinoma  Additional tissue sampling, including excision, may be considered if clinically indicated  Additional Information       All reported additional testing was performed with appropriately reactive controls  These tests were developed and their performance characteristics determined by [facility] or appropriate performing facility, though some tests may be performed on tissues which have not been validated for performance characteristics (such as staining performed on alcohol exposed cell blocks and decalcified tissues)  Results should be interpreted with caution and in the context of the patients’ clinical condition  These tests may not be cleared or approved by the [place]  Food and Drug Administration, though the FDA has determined that such clearance or approval is not necessary  These tests are used for clinical purposes and they should not be regarded as investigational or for research  This laboratory has been approved by [facility], designated as a high-complexity laboratory and is qualified to perform these tests     Interpretation performed at [facility], [address]      Intraoperative Consultation          Touch prep x2:  - Adequate, requested additional core  CVytlacil  Interpretation performed at [id number] [place] [address], [address] [place] [address]        Gross Description          A  The specimen is received fresh, labeled with the patient's name and hospital number, and is designated "she right pulmonary nodule ” It consists of 3 gray-brown, delicate, and cylindrical tissue cores  that range from 0 5 cm in length by less than 0 1 cm in diameter to 0 8 cm in length by less than 0 1 cm in diameter   The cores are placed between 2 sponges and submitted entirely as follows:      A1: One core   A2: One core   A3: One core     Note: The estimated total formalin fixation time based upon information provided by the submitting clinician and the standard processing schedule is 29 00 hours       olster        CBC and differential    Collection Time: 05/23/22  4:05 PM   Result Value Ref Range    WBC 6 57 4 31 - 10 16 Thousand/uL    RBC 4 97 3 81 - 5 12 Million/uL    Hemoglobin 12 8 11 5 - 15 4 g/dL    Hematocrit 42 8 34 8 - 46 1 %    MCV 86 82 - 98 fL    MCH 25 8 (L) 26 8 - 34 3 pg    MCHC 29 9 (L) 31 4 - 37 4 g/dL    RDW 14 6 11 6 - 15 1 %    MPV 10 1 8 9 - 12 7 fL    Platelets 564 765 - 363 Thousands/uL    nRBC 0 /100 WBCs    Neutrophils Relative 73 43 - 75 %    Immat GRANS % 1 0 - 2 %    Lymphocytes Relative 15 14 - 44 %    Monocytes Relative 7 4 - 12 %    Eosinophils Relative 3 0 - 6 %    Basophils Relative 1 0 - 1 %    Neutrophils Absolute 4 81 1 85 - 7 62 Thousands/µL    Immature Grans Absolute 0 03 0 00 - 0 20 Thousand/uL    Lymphocytes Absolute 0 99 0 60 - 4 47 Thousands/µL    Monocytes Absolute 0 48 0 17 - 1 22 Thousand/µL    Eosinophils Absolute 0 21 0 00 - 0 61 Thousand/µL    Basophils Absolute 0 05 0 00 - 0 10 Thousands/µL   Comprehensive metabolic panel    Collection Time: 05/23/22  4:05 PM   Result Value Ref Range    Sodium 143 135 - 147 mmol/L    Potassium 5 6 (H) 3 5 - 5 3 mmol/L    Chloride 105 96 - 108 mmol/L    CO2 32 21 - 32 mmol/L    ANION GAP 6 4 - 13 mmol/L    BUN 26 (H) 5 - 25 mg/dL    Creatinine 1 24 0 60 - 1 30 mg/dL    Glucose 90 65 - 140 mg/dL    Calcium 9 6 8 4 - 10 2 mg/dL    AST 21 13 - 39 U/L    ALT 12 7 - 52 U/L    Alkaline Phosphatase 107 (H) 34 - 104 U/L    Total Protein 7 1 6 4 - 8 4 g/dL    Albumin 4 2 3 5 - 5 0 g/dL    Total Bilirubin 0 41 0 20 - 1 00 mg/dL    eGFR 45 ml/min/1 73sq m   Iron Saturation %    Collection Time: 05/23/22  4:05 PM   Result Value Ref Range    Iron Saturation 27 15 - 50 %    TIBC 331 250 - 450 ug/dL    Iron 88 50 - 170 ug/dL   Ferritin    Collection Time: 05/23/22  4:05 PM   Result Value Ref Range    Ferritin 36 8 - 388 ng/mL   POCT Rapid Covid Ag    Collection Time: 08/10/22  9:41 AM   Result Value Ref Range    POCT SARS-CoV-2 Ag Negative Negative    VALID CONTROL Valid    ECG 12 lead    Collection Time: 10/28/22  8:39 AM   Result Value Ref Range    Ventricular Rate 86 BPM    Atrial Rate 86 BPM    PA Interval 146 ms    QRSD Interval 86 ms    QT Interval 352 ms    QTC Interval 421 ms    P Axis 83 degrees    QRS Axis 2 degrees    T Wave Axis 59 degrees   CBC and differential    Collection Time: 10/28/22  9:14 AM   Result Value Ref Range    WBC 14 97 (H) 4 31 - 10 16 Thousand/uL    RBC 4 47 3 81 - 5 12 Million/uL    Hemoglobin 12 0 11 5 - 15 4 g/dL    Hematocrit 39 1 34 8 - 46 1 %    MCV 88 82 - 98 fL    MCH 26 8 26 8 - 34 3 pg    MCHC 30 7 (L) 31 4 - 37 4 g/dL    RDW 14 0 11 6 - 15 1 %    MPV 9 8 8 9 - 12 7 fL    Platelets 731 554 - 644 Thousands/uL    nRBC 0 /100 WBCs    Neutrophils Relative 88 (H) 43 - 75 %    Immat GRANS % 1 0 - 2 %    Lymphocytes Relative 3 (L) 14 - 44 %    Monocytes Relative 8 4 - 12 %    Eosinophils Relative 0 0 - 6 %    Basophils Relative 0 0 - 1 %    Neutrophils Absolute 13 10 (H) 1 85 - 7 62 Thousands/µL    Immature Grans Absolute 0 09 0 00 - 0 20 Thousand/uL    Lymphocytes Absolute 0 50 (L) 0 60 - 4 47 Thousands/µL    Monocytes Absolute 1 21 0 17 - 1 22 Thousand/µL    Eosinophils Absolute 0 04 0 00 - 0 61 Thousand/µL    Basophils Absolute 0 03 0 00 - 0 10 Thousands/µL   Protime-INR    Collection Time: 10/28/22  9:14 AM   Result Value Ref Range    Protime 14 1 11 6 - 14 5 seconds    INR 1 07 0 84 - 1 19   APTT    Collection Time: 10/28/22  9:14 AM   Result Value Ref Range    PTT 28 23 - 37 seconds   Basic metabolic panel    Collection Time: 10/28/22  9:14 AM   Result Value Ref Range    Sodium 139 135 - 147 mmol/L    Potassium 4 7 3 5 - 5 3 mmol/L    Chloride 104 96 - 108 mmol/L    CO2 31 21 - 32 mmol/L    ANION GAP 4 4 - 13 mmol/L    BUN 18 5 - 25 mg/dL    Creatinine 1 12 0 60 - 1 30 mg/dL    Glucose 133 65 - 140 mg/dL Calcium 9 5 8 4 - 10 2 mg/dL    eGFR 50 ml/min/1 73sq m   Magnesium    Collection Time: 10/28/22  9:14 AM   Result Value Ref Range    Magnesium 1 7 (L) 1 9 - 2 7 mg/dL   HS Troponin 0hr (reflex protocol)    Collection Time: 10/28/22  9:14 AM   Result Value Ref Range    hs TnI 0hr 3 "Refer to ACS Flowchart"- see link ng/L   TSH    Collection Time: 10/28/22  9:14 AM   Result Value Ref Range    TSH 3RD GENERATON 0 217 (L) 0 450 - 4 500 uIU/mL   FLU/RSV/COVID - if FLU/RSV clinically relevant    Collection Time: 10/28/22  9:14 AM    Specimen: Nose; Nares   Result Value Ref Range    SARS-CoV-2 Negative Negative    INFLUENZA A PCR Negative Negative    INFLUENZA B PCR Negative Negative    RSV PCR Negative Negative   HS Troponin I 2hr    Collection Time: 10/28/22 11:01 AM   Result Value Ref Range    hs TnI 2hr 3 "Refer to ACS Flowchart"- see link ng/L    Delta 2hr hsTnI 0 <20 ng/L   Blood culture #1    Collection Time: 10/28/22 11:01 AM    Specimen: Arm, Right; Blood   Result Value Ref Range    Blood Culture No Growth After 5 Days  Blood culture #2    Collection Time: 10/28/22 11:01 AM    Specimen: Arm, Left; Blood   Result Value Ref Range    Blood Culture No Growth After 5 Days      Procalcitonin    Collection Time: 10/28/22 11:01 AM   Result Value Ref Range    Procalcitonin 0 24 <=0 25 ng/ml   Lactic acid, plasma    Collection Time: 10/28/22 11:01 AM   Result Value Ref Range    LACTIC ACID 0 7 0 5 - 2 0 mmol/L   T4, free    Collection Time: 10/28/22 11:01 AM   Result Value Ref Range    Free T4 1 64 (H) 0 76 - 1 46 ng/dL   Procalcitonin    Collection Time: 10/29/22  6:06 AM   Result Value Ref Range    Procalcitonin 0 27 (H) <=0 25 ng/ml   CBC and Platelet, AM Draw, Tomorrow    Collection Time: 10/29/22  6:06 AM   Result Value Ref Range    WBC 13 84 (H) 4 31 - 10 16 Thousand/uL    RBC 4 23 3 81 - 5 12 Million/uL    Hemoglobin 11 3 (L) 11 5 - 15 4 g/dL    Hematocrit 38 0 34 8 - 46 1 %    MCV 90 82 - 98 fL    MCH 26 7 (L) 26 8 - 34 3 pg    MCHC 29 7 (L) 31 4 - 37 4 g/dL    RDW 14 2 11 6 - 15 1 %    Platelets 530 565 - 851 Thousands/uL    MPV 10 5 8 9 - 12 7 fL   Basic metabolic panel, AM Draw, Tomorrow    Collection Time: 10/29/22  6:06 AM   Result Value Ref Range    Sodium 141 135 - 147 mmol/L    Potassium 4 1 3 5 - 5 3 mmol/L    Chloride 106 96 - 108 mmol/L    CO2 26 21 - 32 mmol/L    ANION GAP 9 4 - 13 mmol/L    BUN 17 5 - 25 mg/dL    Creatinine 0 96 0 60 - 1 30 mg/dL    Glucose 75 65 - 140 mg/dL    Calcium 9 3 8 4 - 10 2 mg/dL    eGFR 61 ml/min/1 73sq m   CBC and Platelet    Collection Time: 10/30/22  4:34 AM   Result Value Ref Range    WBC 9 43 4 31 - 10 16 Thousand/uL    RBC 4 10 3 81 - 5 12 Million/uL    Hemoglobin 10 9 (L) 11 5 - 15 4 g/dL    Hematocrit 36 4 34 8 - 46 1 %    MCV 89 82 - 98 fL    MCH 26 6 (L) 26 8 - 34 3 pg    MCHC 29 9 (L) 31 4 - 37 4 g/dL    RDW 13 9 11 6 - 15 1 %    Platelets 112 153 - 237 Thousands/uL    MPV 10 6 8 9 - 12 7 fL   Procalcitonin    Collection Time: 10/30/22  4:34 AM   Result Value Ref Range    Procalcitonin 0 25 <=0 25 ng/ml   D-dimer, quantitative    Collection Time: 10/30/22 12:12 PM   Result Value Ref Range    D-Dimer, Quant 1 37 (H) <0 50 ug/ml FEU   CBC and Platelet    Collection Time: 10/31/22  5:12 AM   Result Value Ref Range    WBC 5 65 4 31 - 10 16 Thousand/uL    RBC 4 01 3 81 - 5 12 Million/uL    Hemoglobin 10 6 (L) 11 5 - 15 4 g/dL    Hematocrit 35 5 34 8 - 46 1 %    MCV 89 82 - 98 fL    MCH 26 4 (L) 26 8 - 34 3 pg    MCHC 29 9 (L) 31 4 - 37 4 g/dL    RDW 13 8 11 6 - 15 1 %    Platelets 528 267 - 336 Thousands/uL    MPV 9 7 8 9 - 12 7 fL   Basic metabolic panel    Collection Time: 10/31/22  5:12 AM   Result Value Ref Range    Sodium 141 135 - 147 mmol/L    Potassium 4 2 3 5 - 5 3 mmol/L    Chloride 105 96 - 108 mmol/L    CO2 31 21 - 32 mmol/L    ANION GAP 5 4 - 13 mmol/L    BUN 12 5 - 25 mg/dL    Creatinine 0 95 0 60 - 1 30 mg/dL    Glucose 87 65 - 140 mg/dL    Calcium 9 3 8 4 - 10 2 mg/dL    eGFR 62 ml/min/1 73sq m   POCT rapid flu A and B    Collection Time: 12/12/22  5:32 PM   Result Value Ref Range    RAPID FLU A negative     RAPID FLU B negative        Laboratory Results: I have personally reviewed the pertinent laboratory results/reports     Radiology/Other Diagnostic Testing Results: I have personally reviewed pertinent reports  XR chest 1 view portable    Result Date: 10/28/2022  CHEST INDICATION:   chest pain  COMPARISON:  5/18/2022 EXAM PERFORMED/VIEWS:  XR CHEST PORTABLE Single view FINDINGS: Development of left basal infiltrate suspicious for pneumonia Cardiomediastinal silhouette appears unremarkable  No pneumothorax or pleural effusion  Osseous structures appear within normal limits for patient age       Development of left basal infiltrate suspicious for pneumonia Workstation performed: [id number]        Assessment/Plan:  Problem List Items Addressed This Visit        Respiratory    Centrilobular emphysema (HCC)    Relevant Medications    Budeson-Glycopyrrol-Formoterol (Breztri Aerosphere) 160-9-4 8 MCG/ACT AERO    montelukast (SINGULAIR) 10 mg tablet    guaiFENesin (ROBITUSSIN) 100 MG/5ML oral liquid    benzonatate (TESSALON PERLES) 100 mg capsule    Severe persistent asthma without complication    Relevant Medications    Budeson-Glycopyrrol-Formoterol (Breztri Aerosphere) 160-9-4 8 MCG/ACT AERO    montelukast (SINGULAIR) 10 mg tablet   Other Visit Diagnoses     Flu-like symptoms    -  Primary    Relevant Medications    oseltamivir (TAMIFLU) 75 mg capsule    guaiFENesin (ROBITUSSIN) 100 MG/5ML oral liquid    benzonatate (TESSALON PERLES) 100 mg capsule    Other Relevant Orders    POCT rapid flu A and B (Completed)    Left lower lobe pneumonia        Relevant Medications    Budeson-Glycopyrrol-Formoterol (Breztri Aerosphere) 160-9-4 8 MCG/ACT AERO    montelukast (SINGULAIR) 10 mg tablet    guaiFENesin (ROBITUSSIN) 100 MG/5ML oral liquid    benzonatate (TESSALON PERLES) 100 mg capsule    Grief            Brief grief counseling done  Chest pain is may be noncardiac in origin  Symptoms are consistent with influenza  Her rapid flu test was negative  I will treat her empirically with Tamiflu since her daughter and other family members had similar symptoms and tested positive  This is likely a false negative test    Continue daily montelukast   Robitussin as needed for cough and Tessalon Perles as needed for cough  Tylenol for pain and fever  Read package inserts for all medications before starting a new medications, call me if you have any questions  Patient was given opportunity to ask questions and all questions were answered  Disclaimer: Portions of the record may have been created with voice recognition software  Occasional wrong word or "sound a like" substitutions may have occurred due to the inherent limitations of voice recognition software  Read the chart carefully and recognize, using context, where substitutions have occurred  I have used the Epic copy/forward function to compose this note  I have reviewed my current note to ensure it reflects the current patient status, exam, assessment and plan

## 2022-12-15 ENCOUNTER — HOSPITAL ENCOUNTER (OUTPATIENT)
Dept: CT IMAGING | Facility: HOSPITAL | Age: 67
End: 2022-12-15
Attending: FAMILY MEDICINE

## 2022-12-15 DIAGNOSIS — J18.9 COMMUNITY ACQUIRED PNEUMONIA, BILATERAL: ICD-10-CM

## 2022-12-15 DIAGNOSIS — J43.2 CENTRILOBULAR EMPHYSEMA (HCC): ICD-10-CM

## 2022-12-20 ENCOUNTER — HOSPITAL ENCOUNTER (OUTPATIENT)
Dept: CT IMAGING | Facility: HOSPITAL | Age: 67
Discharge: HOME | End: 2022-12-20

## 2022-12-20 DIAGNOSIS — J43.9 EMPHYSEMA LUNG (HCC): ICD-10-CM

## 2022-12-28 ENCOUNTER — DOCUMENTATION (OUTPATIENT)
Dept: PULMONOLOGY | Facility: CLINIC | Age: 67
End: 2022-12-28

## 2022-12-30 ENCOUNTER — HOSPITAL ENCOUNTER (OUTPATIENT)
Dept: MRI IMAGING | Facility: HOSPITAL | Age: 67
End: 2022-12-30
Attending: FAMILY MEDICINE

## 2022-12-30 DIAGNOSIS — K76.89 LIVER NODULE: ICD-10-CM

## 2022-12-30 RX ADMIN — GADOBUTROL 8 ML: 604.72 INJECTION INTRAVENOUS at 12:08

## 2023-01-02 PROBLEM — J18.9 COMMUNITY ACQUIRED PNEUMONIA, BILATERAL: Status: RESOLVED | Noted: 2022-10-28 | Resolved: 2023-01-02

## 2023-01-23 ENCOUNTER — OFFICE VISIT (OUTPATIENT)
Dept: FAMILY MEDICINE CLINIC | Facility: CLINIC | Age: 68
End: 2023-01-23

## 2023-01-23 VITALS
DIASTOLIC BLOOD PRESSURE: 50 MMHG | TEMPERATURE: 97.4 F | HEART RATE: 64 BPM | BODY MASS INDEX: 32.57 KG/M2 | WEIGHT: 177 LBS | HEIGHT: 62 IN | OXYGEN SATURATION: 95 % | SYSTOLIC BLOOD PRESSURE: 122 MMHG | RESPIRATION RATE: 18 BRPM

## 2023-01-23 DIAGNOSIS — Z91.041 CONTRAST MEDIA ALLERGY: ICD-10-CM

## 2023-01-23 DIAGNOSIS — N18.31 STAGE 3A CHRONIC KIDNEY DISEASE (HCC): ICD-10-CM

## 2023-01-23 DIAGNOSIS — J43.2 CENTRILOBULAR EMPHYSEMA (HCC): ICD-10-CM

## 2023-01-23 DIAGNOSIS — Z71.0 DISCUSSION ABOUT ADVANCE CARE PLANNING HELD WITH FAMILY MEMBER: ICD-10-CM

## 2023-01-23 DIAGNOSIS — C32.9 MALIGNANT NEOPLASM OF LARYNX, UNSPECIFIED (HCC): ICD-10-CM

## 2023-01-23 DIAGNOSIS — Z13.31 ENCOUNTER FOR SCREENING FOR DEPRESSION: ICD-10-CM

## 2023-01-23 DIAGNOSIS — Z00.00 MEDICARE ANNUAL WELLNESS VISIT, SUBSEQUENT: Primary | ICD-10-CM

## 2023-01-23 DIAGNOSIS — M25.551 RIGHT HIP PAIN: ICD-10-CM

## 2023-01-23 DIAGNOSIS — Z71.89 ADVANCED CARE PLANNING/COUNSELING DISCUSSION: ICD-10-CM

## 2023-01-23 DIAGNOSIS — F32.0 CURRENT MILD EPISODE OF MAJOR DEPRESSIVE DISORDER WITHOUT PRIOR EPISODE (HCC): ICD-10-CM

## 2023-01-23 DIAGNOSIS — E03.9 HYPOTHYROIDISM, UNSPECIFIED TYPE: ICD-10-CM

## 2023-01-23 DIAGNOSIS — Z12.31 ENCOUNTER FOR SCREENING MAMMOGRAM FOR BREAST CANCER: ICD-10-CM

## 2023-01-23 RX ORDER — PREDNISONE 20 MG/1
40 TABLET ORAL ONCE
Qty: 2 TABLET | Refills: 0 | Status: SHIPPED | OUTPATIENT
Start: 2023-01-23 | End: 2023-01-23

## 2023-01-23 RX ORDER — DIPHENHYDRAMINE HCL 25 MG
25 TABLET ORAL EVERY 6 HOURS PRN
Qty: 30 TABLET | Refills: 0 | Status: SHIPPED | OUTPATIENT
Start: 2023-01-23

## 2023-01-23 RX ORDER — DIPHENHYDRAMINE HCL 25 MG
25 TABLET ORAL EVERY 6 HOURS PRN
Qty: 30 TABLET | Refills: 0 | Status: SHIPPED | OUTPATIENT
Start: 2023-01-23 | End: 2023-01-23 | Stop reason: SDUPTHER

## 2023-01-23 NOTE — PATIENT INSTRUCTIONS
Medicare Preventive Visit Patient Instructions  Thank you for completing your Welcome to Medicare Visit or Medicare Annual Wellness Visit today  Your next wellness visit will be due in one year (1/24/2024)  The screening/preventive services that you may require over the next 5-10 years are detailed below  Some tests may not apply to you based off risk factors and/or age  Screening tests ordered at today's visit but not completed yet may show as past due  Also, please note that scanned in results may not display below  Preventive Screenings:  Service Recommendations Previous Testing/Comments   Colorectal Cancer Screening  * Colonoscopy    * Fecal Occult Blood Test (FOBT)/Fecal Immunochemical Test (FIT)  * Fecal DNA/Cologuard Test  * Flexible Sigmoidoscopy Age: 39-70 years old   Colonoscopy: every 10 years (may be performed more frequently if at higher risk)  OR  FOBT/FIT: every 1 year  OR  Cologuard: every 3 years  OR  Sigmoidoscopy: every 5 years  Screening may be recommended earlier than age 39 if at higher risk for colorectal cancer  Also, an individualized decision between you and your healthcare provider will decide whether screening between the ages of 74-80 would be appropriate  Colonoscopy: 09/16/2020  FOBT/FIT: Not on file  Cologuard: 09/16/2020  Sigmoidoscopy: Not on file          Breast Cancer Screening Age: 36 years old  Frequency: every 1-2 years  Not required if history of left and right mastectomy Mammogram: 03/26/2022    Screening Current   Cervical Cancer Screening Between the ages of 21-29, pap smear recommended once every 3 years  Between the ages of 33-67, can perform pap smear with HPV co-testing every 5 years     Recommendations may differ for women with a history of total hysterectomy, cervical cancer, or abnormal pap smears in past  Pap Smear: 09/01/2020    Screening Not Indicated   Hepatitis C Screening Once for adults born between 1945 and 1965  More frequently in patients at high risk for Hepatitis C Hep C Antibody: 09/08/2020    Screening Current   Diabetes Screening 1-2 times per year if you're at risk for diabetes or have pre-diabetes Fasting glucose: 97 mg/dL (2/23/2022)  A1C: No results in last 5 years (No results in last 5 years)  Screening Current   Cholesterol Screening Once every 5 years if you don't have a lipid disorder  May order more often based on risk factors  Lipid panel: 09/08/2020    Screening Not Indicated  History Lipid Disorder     Other Preventive Screenings Covered by Medicare:  1  Abdominal Aortic Aneurysm (AAA) Screening: covered once if your at risk  You're considered to be at risk if you have a family history of AAA  2  Lung Cancer Screening: covers low dose CT scan once per year if you meet all of the following conditions: (1) Age 50-69; (2) No signs or symptoms of lung cancer; (3) Current smoker or have quit smoking within the last 15 years; (4) You have a tobacco smoking history of at least 20 pack years (packs per day multiplied by number of years you smoked); (5) You get a written order from a healthcare provider  3  Glaucoma Screening: covered annually if you're considered high risk: (1) You have diabetes OR (2) Family history of glaucoma OR (3)  aged 48 and older OR (3)  American aged 72 and older  3  Osteoporosis Screening: covered every 2 years if you meet one of the following conditions: (1) You're estrogen deficient and at risk for osteoporosis based off medical history and other findings; (2) Have a vertebral abnormality; (3) On glucocorticoid therapy for more than 3 months; (4) Have primary hyperparathyroidism; (5) On osteoporosis medications and need to assess response to drug therapy  · Last bone density test (DXA Scan): 12/29/2020   5  HIV Screening: covered annually if you're between the age of 15-65  Also covered annually if you are younger than 13 and older than 72 with risk factors for HIV infection   For pregnant patients, it is covered up to 3 times per pregnancy  Immunizations:  Immunization Recommendations   Influenza Vaccine Annual influenza vaccination during flu season is recommended for all persons aged >= 6 months who do not have contraindications   Pneumococcal Vaccine   * Pneumococcal conjugate vaccine = PCV13 (Prevnar 13), PCV15 (Vaxneuvance), PCV20 (Prevnar 20)  * Pneumococcal polysaccharide vaccine = PPSV23 (Pneumovax) Adults 25-60 years old: 1-3 doses may be recommended based on certain risk factors  Adults 72 years old: 1-2 doses may be recommended based off what pneumonia vaccine you previously received   Hepatitis B Vaccine 3 dose series if at intermediate or high risk (ex: diabetes, end stage renal disease, liver disease)   Tetanus (Td) Vaccine - COST NOT COVERED BY MEDICARE PART B Following completion of primary series, a booster dose should be given every 10 years to maintain immunity against tetanus  Td may also be given as tetanus wound prophylaxis  Tdap Vaccine - COST NOT COVERED BY MEDICARE PART B Recommended at least once for all adults  For pregnant patients, recommended with each pregnancy  Shingles Vaccine (Shingrix) - COST NOT COVERED BY MEDICARE PART B  2 shot series recommended in those aged 48 and above     Health Maintenance Due:      Topic Date Due   • Breast Cancer Screening: Mammogram  03/26/2023   • Colorectal Cancer Screening  09/16/2023   • Lung Cancer Screening  12/15/2023   • Hepatitis C Screening  Completed     Immunizations Due:      Topic Date Due   • COVID-19 Vaccine (4 - Booster for [place] series) 01/29/2022     Advance Directives   What are advance directives? Advance directives are legal documents that state your wishes and plans for medical care  These plans are made ahead of time in case you lose your ability to make decisions for yourself  Advance directives can apply to any medical decision, such as the treatments you want, and if you want to donate organs     What are the types of advance directives? There are many types of advance directives, and each state has rules about how to use them  You may choose a combination of any of the following:  · Living will: This is a written record of the treatment you want  You can also choose which treatments you do not want, which to limit, and which to stop at a certain time  This includes surgery, medicine, IV fluid, and tube feedings  · Durable power of  for healthcare [facility]): This is a written record that states who you want to make healthcare choices for you when you are unable to make them for yourself  This person, called a proxy, is usually a family member or a friend  You may choose more than 1 proxy  · Do not resuscitate (DNR) order:  A DNR order is used in case your heart stops beating or you stop breathing  It is a request not to have certain forms of treatment, such as CPR  A DNR order may be included in other types of advance directives  · Medical directive: This covers the care that you want if you are in a coma, near death, or unable to make decisions for yourself  You can list the treatments you want for each condition  Treatment may include pain medicine, surgery, blood transfusions, dialysis, IV or tube feedings, and a ventilator (breathing machine)  · Values history: This document has questions about your views, beliefs, and how you feel and think about life  This information can help others choose the care that you would choose  Why are advance directives important? An advance directive helps you control your care  Although spoken wishes may be used, it is better to have your wishes written down  Spoken wishes can be misunderstood, or not followed  Treatments may be given even if you do not want them  An advance directive may make it easier for your family to make difficult choices about your care  Fall Prevention    Fall prevention  includes ways to make your home and other areas safer   It also includes ways you can move more carefully to prevent a fall  Health conditions that cause changes in your blood pressure, vision, or muscle strength and coordination may increase your risk for falls  Medicines may also increase your risk for falls if they make you dizzy, weak, or sleepy  Fall prevention tips:   · Stand or sit up slowly  · Use assistive devices as directed  · Wear shoes that fit well and have soles that   · Wear a personal alarm  · Stay active  · Manage your medical conditions  Home Safety Tips:  · Add items to prevent falls in the bathroom  · Keep paths clear  · Install bright lights in your home  · Keep items you use often on shelves within reach  · Paint or place reflective tape on the edges of your stairs  Weight Management   Why it is important to manage your weight:  Being overweight increases your risk of health conditions such as heart disease, high blood pressure, type 2 diabetes, and certain types of cancer  It can also increase your risk for osteoarthritis, sleep apnea, and other respiratory problems  Aim for a slow, steady weight loss  Even a small amount of weight loss can lower your risk of health problems  How to lose weight safely:  A safe and healthy way to lose weight is to eat fewer calories and get regular exercise  You can lose up about 1 pound a week by decreasing the number of calories you eat by 500 calories each day  Healthy meal plan for weight management:  A healthy meal plan includes a variety of foods, contains fewer calories, and helps you stay healthy  A healthy meal plan includes the following:  · Eat whole-grain foods more often  A healthy meal plan should contain fiber  Fiber is the part of grains, fruits, and vegetables that is not broken down by your body  Whole-grain foods are healthy and provide extra fiber in your diet   Some examples of whole-grain foods are whole-wheat breads and pastas, oatmeal, brown rice, and bulgur  · Eat a variety of vegetables every day  Include dark, leafy greens such as spinach, kale, ludin greens, and mustard greens  Eat yellow and orange vegetables such as carrots, sweet potatoes, and winter squash  · Eat a variety of fruits every day  Choose fresh or canned fruit (canned in its own juice or light syrup) instead of juice  Fruit juice has very little or no fiber  · Eat low-fat dairy foods  Drink fat-free (skim) milk or 1% milk  Eat fat-free yogurt and low-fat cottage cheese  Try low-fat cheeses such as mozzarella and other reduced-fat cheeses  · Choose meat and other protein foods that are low in fat  Choose beans or other legumes such as split peas or lentils  Choose fish, skinless poultry (chicken or turkey), or lean cuts of red meat (beef or pork)  Before you cook meat or poultry, cut off any visible fat  · Use less fat and oil  Try baking foods instead of frying them  Add less fat, such as margarine, sour cream, regular salad dressing and mayonnaise to foods  Eat fewer high-fat foods  Some examples of high-fat foods include french fries, doughnuts, ice cream, and cakes  · Eat fewer sweets  Limit foods and drinks that are high in sugar  This includes candy, cookies, regular soda, and sweetened drinks  Exercise:  Exercise at least 30 minutes per day on most days of the week  Some examples of exercise include walking, biking, dancing, and swimming  You can also fit in more physical activity by taking the stairs instead of the elevator or parking farther away from stores  Ask your healthcare provider about the best exercise plan for you  © Copyright [employer] 2018 Information is for End User's use only and may not be sold, redistributed or otherwise used for commercial purposes   All illustrations and images included in CareNotes® are the copyrighted property of [employer]  or [address] [place] [address] [employer]

## 2023-01-23 NOTE — PROGRESS NOTES
Assessment and Plan:     Problem List Items Addressed This Visit        Endocrine    Hypothyroidism    Relevant Orders    TSH, 3rd generation       Respiratory    Centrilobular emphysema (HCC)    Relevant Medications    diphenhydrAMINE (BENADRYL) 25 mg tablet       Genitourinary    Chronic kidney disease, stage 3 (moderate)       Other    Current mild episode of major depressive disorder without prior episode ([facility] )   Other Visit Diagnoses     Medicare annual wellness visit, subsequent    -  Primary    Right hip pain        Relevant Orders    XR hips bilateral 3-4 vw w pelvis if performed    Malignant neoplasm of larynx, unspecified ([place] [id number] )        Encounter for screening mammogram for breast cancer        Relevant Orders    Mammo screening bilateral w 3d & cad    Contrast media allergy        Relevant Medications    diphenhydrAMINE (BENADRYL) 25 mg tablet    Advanced care planning/counseling discussion        Discussion about advance care planning held with family member        Encounter for screening for depression            Copd on breztri and albuterol prn  Xray bilateral hip for chronic hip pain, can use tylenol prn for hip pain  Given post contrast rash that she had previously-recommend prednisone 40 mg and benadryl one time prior to CT imaging tomorrow  Annual mammogram ordered  Advised to obtain labs as previously ordered  BMI Counseling: Body mass index is 32 37 kg/m²  The BMI is above normal  Nutrition recommendations include decreasing portion sizes, encouraging healthy choices of fruits and vegetables, decreasing fast food intake, consuming healthier snacks, limiting drinks that contain sugar, moderation in carbohydrate intake and reducing intake of cholesterol  Exercise recommendations include moderate physical activity 150 minutes/week  No pharmacotherapy was ordered  Rationale for BMI follow-up plan is due to patient being overweight or obese       Falls Plan of Care: balance, strength, and gait training instructions were provided  Preventive health issues were discussed with patient, and age appropriate screening tests were ordered as noted in patient's After Visit Summary  Personalized health advice and appropriate referrals for health education or preventive services given if needed, as noted in patient's After Visit Summary  History of Present Illness:     Patient presents for a Medicare Wellness Visit    Here for follow up with Son opal  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 laryngeal cancer follows ENT, abdominal nodule - follows with Dr Shira Sidhu, COPD-saw pulmonary, she has had multiple CT chests, most recent 3 weeks ago  complains of intermittent right hip pain intermittently, worse for past 2 weeks  , no difficulty walking  States that previously after the contrast for CT abdomen she had a rash and itching within an hour and she is concerned about imaging tomorrow  She has stopped her anxiety and depression meds-does not need it anymore     Patient Care Team:  Reed Hernandez MD as PCP - General (Family Medicine)  Reed Hernandez MD as PCP - [address] [place] [address] ([place])  Reed Hernandez MD as PCP - PCP-[facility] ([place])     Review of Systems:     Review of Systems   Constitutional: Negative for fatigue and fever  HENT: Negative for congestion, facial swelling, mouth sores, rhinorrhea, sore throat and trouble swallowing  Eyes: Negative for pain and redness  Respiratory: Negative for cough, shortness of breath and wheezing  Cardiovascular: Negative for chest pain, palpitations and leg swelling  Gastrointestinal: Negative for abdominal pain, blood in stool, constipation, diarrhea and nausea  Genitourinary: Negative for dysuria, hematuria and urgency  Musculoskeletal: Positive for arthralgias  Negative for back pain and myalgias  Skin: Negative for rash and wound     Neurological: Negative for seizures, syncope and headaches  Hematological: Negative for adenopathy  Psychiatric/Behavioral: Negative for agitation and behavioral problems          Problem List:     Patient Active Problem List   Diagnosis   • Cancer of pharynx ([facility] )   • Cataract   • Chronic kidney disease, stage 3 (moderate)   • Centrilobular emphysema (HCC)   • Extrinsic obstruction of cartilagenous portion of eustachian tube   • Personal history of radiation therapy   • History of laryngeal cancer   • Loss of hearing   • Depression with anxiety   • Numbness   • Panic attack   • Vitamin D deficiency   • Dupuytren contracture   • Elevated alkaline phosphatase level   • Bronchiectasis with acute exacerbation (HCC)   • Severe persistent asthma without complication   • Multiple lung nodules   • Myalgia   • Tension type headache   • Hyperlipidemia   • Hypothyroidism   • COVID-19   • Trigger finger of right thumb   • Current mild episode of major depressive disorder without prior episode (HCC)   • Persistent dyspnea after COVID-19   • History of COVID-19   • Class 1 obesity due to excess calories with serious comorbidity and body mass index (BMI) of 32 0 to 32 9 in adult   • Exercise hypoxemia   • Post-nasal drip   • Mesenteric lymphadenopathy   • Neck pain on left side   • Mediastinal lymphadenopathy   • Preop examination   • Preoperative cardiovascular examination   • Chest pain   • Anemia   • Dyspepsia      Past Medical and Surgical History:     Past Medical History:   Diagnosis Date   • Acute kidney failure ([facility] )    • Allergic    • Allergies    • Anemia    • Asthma    • Cancer ([facility] )    • Chronic kidney disease (CKD), stage III (moderate) (HCC)    • COPD (chronic obstructive pulmonary disease) ([facility] )    • COVID-19     Covid + 6-0-31-cough at that time now fully resolved   • Disease of thyroid gland    • Essential hypertension    • GERD (gastroesophageal reflux disease)    • Glaucoma    • High blood pressure    • HTN (hypertension) 02/16/2021   • Hyperlipidemia    • Hypothyroidism    • 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   • Throat cancer ([facility] ) 2014    chemo and rad therapy 2014     Past Surgical History:   Procedure Laterality Date   • COLONOSCOPY  2016   • ESOPHAGOGASTRODUODENOSCOPY  2016   • EYE SURGERY     • IR BIOPSY LUNG  05/18/2022   • LARYNGOSCOPY      w/ Bx      • CO TENDON SHEATH INCISION Right 02/16/2021    Procedure: THUMB TRIGGER FINGER RELEASE;  Surgeon: Nichole Reina MD;  Location: [facility];  Service: Orthopedics   • TUBAL LIGATION        Family History:     Family History   Problem Relation Age of Onset   • Other Mother         respiratory disorder   • Sudden death Father         shot himself   • Suicidality Father    • Brain cancer Sister    • Diabetes Sister    • Breast cancer Sister    • Cancer Sister 58        pancreatic cancer   • No Known Problems Sister    • No Known Problems Sister    • No Known Problems Sister    • No Known Problems Sister    • No Known Problems Sister    • No Known Problems Sister    • No Known Problems Daughter    • No Known Problems Daughter    • No Known Problems Maternal Grandmother    • No Known Problems Maternal Grandfather    • No Known Problems Paternal Grandmother    • No Known Problems Paternal Grandfather    • No Known Problems Maternal Aunt    • No Known Problems Maternal Aunt    • No Known Problems Maternal Aunt    • No Known Problems Maternal Aunt    • No Known Problems Maternal Aunt    • No Known Problems Paternal Aunt    • No Known Problems Paternal Aunt    • No Known Problems Paternal Aunt    • No Known Problems Paternal Aunt       Social History:     Social History     Socioeconomic History   • Marital status: /Civil Union     Spouse name: None   • Number of children: None   • Years of education: None   • Highest education level: None   Occupational History   • None   Tobacco Use   • Smoking status: Former     Packs/day: 1 00     Years: 40 00     Pack years: 40 00 Types: Cigarettes     Quit date: 2014     Years since quittin 0   • Smokeless tobacco: Never   Vaping Use   • Vaping Use: Never used   Substance and Sexual Activity   • Alcohol use: Never     Comment: None   • Drug use: Never     Comment: Denies   • Sexual activity: Not Currently   Other Topics Concern   • None   Social History Narrative    Most recent tobacco use screenin2020    Do you currently or have you served in the [employer]: No    Were you activated, into active duty, as a member of the [employer] or as a Reservist: No    Marital status:     Sexual orientation: Heterosexual    Exercise level: Occasional    Diet: Regular    General stress level: Low    Has smoked since age: 23    Alcohol intake: None    Caffeine intake: Occasional    Chewing tobacco: none    Illicit drugs: Denies    Guns present in home: No    Seat belts used routinely: Yes    Sunscreen used routinely: Yes    Smoke alarm in home: Yes    Advance directive: No    Salt Intake: HTN Diet    Has the Patient had a mammogram to screen for breast cancer within 24 months: Yes    Would the patient like to schedule a Mammogram: No    Is the patient interested in a colorectal cancer screening: No    Live alone or with others: with others    Sexually active: No    Do you feel safe at home: Yes     Social Determinants of Health     Financial Resource Strain: Low Risk    • Difficulty of Paying Living Expenses: Not hard at all   Food Insecurity: Not on file   Transportation Needs: No Transportation Needs   • Lack of Transportation (Medical): No   • Lack of Transportation (Non-Medical):  No   Physical Activity: Not on file   Stress: Not on file   Social Connections: Not on file   Intimate Partner Violence: Not on file   Housing Stability: Not on file      Medications and Allergies:     Current Outpatient Medications   Medication Sig Dispense Refill   • albuterol (PROVENTIL HFA,VENTOLIN HFA) 90 mcg/act inhaler Inhale 2 puffs every 4 (four) hours as needed for wheezing 36 g 5   • brimonidine tartrate 0 2 % ophthalmic solution      • Budeson-Glycopyrrol-Formoterol (Breztri Aerosphere) 160-9-4 8 MCG/ACT AERO Inhale 2 puffs 2 (two) times a day Rinse mouth after use  32 1 g 1   • diphenhydrAMINE (BENADRYL) 25 mg tablet Take 1 tablet (25 mg total) by mouth every 6 (six) hours as needed for itching 30 tablet 0   • ferrous sulfate 324 (65 Fe) mg Take 1 tablet (324 mg total) by mouth daily after lunch 90 tablet 1   • FLUoxetine (PROzac) 10 MG tablet TAKE 1 TABLET BY MOUTH EVERY DAY 90 tablet 1   • levothyroxine 75 mcg tablet Take 1 tablet (75 mcg total) by mouth daily in the early morning 90 tablet 0   • timolol (TIMOPTIC) 0 5 % ophthalmic solution INSTILL 1 DROP INTO EACH EYE TWO TIMES A DAY       No current facility-administered medications for this visit  Allergies   Allergen Reactions   • Cefdinir Hives   • Amifostine Rash   • Pollen Extract Allergic Rhinitis      Immunizations:     Immunization History   Administered Date(s) Administered   • COVID-19 MODERNA VACC 0 5 ML IM 03/17/2021, 04/14/2021   • COVID-19 PFIZER VACCINE 0 3 ML IM 12/04/2021   • INFLUENZA 02/06/2019   • Influenza, high dose seasonal 0 7 mL 10/07/2020, 11/10/2021, 09/21/2022   • Pneumococcal Conjugate 13-Valent 10/07/2020   • Pneumococcal Polysaccharide PPV23 06/14/2021   • Tdap 09/13/2021      Health Maintenance:         Topic Date Due   • Breast Cancer Screening: Mammogram  03/26/2023   • Colorectal Cancer Screening  09/16/2023   • Lung Cancer Screening  12/15/2023   • Hepatitis C Screening  Completed         Topic Date Due   • COVID-19 Vaccine (4 - Booster for Tala Mace series) 01/29/2022      Medicare Screening Tests and Risk Assessments:     Gabriela Leon is here for her Subsequent Wellness visit  Last Medicare Wellness visit information reviewed, patient interviewed, no change since last AWV  Health Risk Assessment:   Patient rates overall health as good   Patient feels that their physical health rating is same  Patient is very satisfied with their life  Eyesight was rated as same  Hearing was rated as same  Patient feels that their emotional and mental health rating is same  Patients states they are always angry  Patient states they are sometimes unusually tired/fatigued  Pain experienced in the last 7 days has been some  Patient's pain rating has been 10/10  Patient states that she has experienced no weight loss or gain in last 6 months  Depression Screening:   PHQ-9 Score: 0      Fall Risk Screening: In the past year, patient has experienced: history of falling in past year    Number of falls: 1  Injured during fall?: Yes    Feels unsteady when standing or walking?: No    Worried about falling?: No      Urinary Incontinence Screening:   Patient has not leaked urine accidently in the last six months  Home Safety:  Patient has trouble with stairs inside or outside of their home  Patient has working smoke alarms and has working carbon monoxide detector  Home safety hazards include: none  Nutrition:   Current diet is Regular  Medications:   Patient is not currently taking any over-the-counter supplements  Patient is able to manage medications  Activities of Daily Living (ADLs)/Instrumental Activities of Daily Living (IADLs):   Walk and transfer into and out of bed and chair?: Yes  Dress and groom yourself?: Yes    Bathe or shower yourself?: Yes    Feed yourself?  Yes  Do your laundry/housekeeping?: Yes  Manage your money, pay your bills and track your expenses?: Yes  Make your own meals?: Yes    Do your own shopping?: Yes    Previous Hospitalizations:   Any hospitalizations or ED visits within the last 12 months?: Yes    How many hospitalizations have you had in the last year?: 1-2    Advance Care Planning:   Living will: No    Durable POA for healthcare: No      PREVENTIVE SCREENINGS      Cardiovascular Screening:    General: Screening Not Indicated and History Lipid Disorder      Diabetes Screening:     General: Screening Current      Breast Cancer Screening:     General: Screening Current      Cervical Cancer Screening:    General: Screening Not Indicated      Lung Cancer Screening:     General: Screening Current      Hepatitis C Screening:    General: Screening Current    Screening, Brief Intervention, and Referral to Treatment (SBIRT)    Screening      AUDIT-C Screenin) How often did you have a drink containing alcohol in the past year? never  2) How many drinks did you have on a typical day when you were drinking in the past year? 0  3) How often did you have 6 or more drinks on one occasion in the past year? never    AUDIT-C Score: 0  Interpretation: Score 0-2 (female): Negative screen for alcohol misuse    Single Item Drug Screening:  How often have you used an illegal drug (including marijuana) or a prescription medication for non-medical reasons in the past year? never    Single Item Drug Screen Score: 0  Interpretation: Negative screen for possible drug use disorder    No results found  Physical Exam:     /50 (BP Location: Right arm, Patient Position: Sitting, Cuff Size: Adult)   Pulse 64   Temp (!) 97 4 °F (36 3 °C) (Tympanic)   Resp 18   Ht 5' 2" (1 575 m)   Wt 80 3 kg (177 lb)   SpO2 95%   BMI 32 37 kg/m²     Physical Exam  Vitals and nursing note reviewed  Constitutional:       Appearance: She is well-developed  She is obese  HENT:      Head: Normocephalic and atraumatic  Right Ear: Tympanic membrane, ear canal and external ear normal       Left Ear: Tympanic membrane, ear canal and external ear normal       Nose: Nose normal       Mouth/Throat:      Pharynx: No oropharyngeal exudate  Eyes:      General: No scleral icterus  Right eye: No discharge  Left eye: No discharge  Conjunctiva/sclera: Conjunctivae normal       Pupils: Pupils are equal, round, and reactive to light     Neck:      Thyroid: No thyromegaly  Cardiovascular:      Rate and Rhythm: Normal rate and regular rhythm  Heart sounds: No murmur heard  No gallop  Pulmonary:      Effort: Pulmonary effort is normal  No respiratory distress  Breath sounds: Normal breath sounds  No wheezing or rales  Abdominal:      Palpations: Abdomen is soft  Tenderness: There is no abdominal tenderness  Musculoskeletal:         General: No tenderness or deformity  Cervical back: Normal range of motion  Right lower leg: No edema  Left lower leg: No edema  Lymphadenopathy:      Cervical: No cervical adenopathy  Skin:     General: Skin is warm  Capillary Refill: Capillary refill takes less than 2 seconds  Findings: No erythema or rash  Neurological:      Mental Status: She is alert and oriented to person, place, and time  Deep Tendon Reflexes: Reflexes normal    Psychiatric:         Behavior: Behavior normal          Thought Content:  Thought content normal          Judgment: Judgment normal           Karlee Swain MD

## 2023-01-24 ENCOUNTER — HOSPITAL ENCOUNTER (OUTPATIENT)
Dept: CT IMAGING | Facility: HOSPITAL | Age: 68
Discharge: HOME | End: 2023-01-24
Attending: INTERNAL MEDICINE

## 2023-01-24 DIAGNOSIS — C76.0 HEAD AND NECK CANCER (HCC): ICD-10-CM

## 2023-01-24 DIAGNOSIS — R59.0 MESENTERIC LYMPHADENOPATHY: ICD-10-CM

## 2023-01-24 DIAGNOSIS — R91.1 NODULE OF LOWER LOBE OF LEFT LUNG: ICD-10-CM

## 2023-01-24 RX ADMIN — IOHEXOL 35 ML: 300 INJECTION, SOLUTION INTRAVENOUS at 10:44

## 2023-01-24 RX ADMIN — IOHEXOL 100 ML: 350 INJECTION, SOLUTION INTRAVENOUS at 10:44

## 2023-01-24 NOTE — PROGRESS NOTES
Serious Illness Conversation    1  What is your understanding now of where you are with your illness? Prognostic Understanding: appropriate understanding of prognosis     2  How much information about what is likely to be ahead with your illness would you like to have? Information: patient wants to be fully informed     3  What did you (clinician) communicate to the patient? Prognostic Communication: Uncertain - It can be difficult to predict what will happen with your illness  I hope you will continue to live well for a long time but I’m worried that you could get sick quickly, and I think it is important to prepare for that possibility  4  If your health situation worsens, what are your most important goals? Goals: be mentally aware, have my medical decisions respected, be spiritually and emotionally at peace, be physically comfortable     5  What are the biggest fears and worries about the future and your health? Fears/Worries: choking or suffocating     6  What abilities are so critical to your life that you cannot imagine living without them? Unacceptable Function: not being able to care for myself, including toileting and feeding, being in pain or very uncomfortable  Being bedbound     7  What gives you strength as you think about the future with your illness? GOD and THEN FAMILY     8  If you become sicker, how much are you willing to go through for the possibility of gaining more time? Be in the hospital: Yes Have a feeding tube: Yes   Be in the ICU: Yes Live in a nursing home: No   Be on a ventilator: Yes Be uncomfortable: No   Be on dialysis: Yes Undergo aggressive test and/or procedures: No   9  How much does your proxy and family know about your priorities and wishes? Discussion Discussion: wants clinician to talk with family     Briana Hyman heard you say that staying pain free and living as long as possible is really important to you   Keeping that in mind, and what we know about your illness, I recommend that we continue current management and do periodic screenings as recommended from time to time  This will help us make sure that your treatment plans reflect what’s important to you  How does this plan sound to you? I will do everything I can to help you through this    Patient verbalized understanding of the plan     I have spent 25 minutes speaking with my patient on advanced care planning today or during this visit     Advanced directives  Five Wishes: Patient has Five Wishes, not in chart

## 2023-01-28 DIAGNOSIS — J43.2 CENTRILOBULAR EMPHYSEMA (HCC): ICD-10-CM

## 2023-01-30 RX ORDER — IPRATROPIUM BROMIDE AND ALBUTEROL SULFATE 2.5; .5 MG/3ML; MG/3ML
3 SOLUTION RESPIRATORY (INHALATION) EVERY 6 HOURS PRN
Qty: 360 ML | Refills: 1 | Status: SHIPPED | OUTPATIENT
Start: 2023-01-30 | End: 2023-03-31

## 2023-02-01 ENCOUNTER — OFFICE VISIT (OUTPATIENT)
Dept: HEMATOLOGY ONCOLOGY | Facility: CLINIC | Age: 68
End: 2023-02-01

## 2023-02-01 VITALS
TEMPERATURE: 98 F | BODY MASS INDEX: 32.76 KG/M2 | HEIGHT: 62 IN | RESPIRATION RATE: 14 BRPM | HEART RATE: 78 BPM | WEIGHT: 178 LBS | SYSTOLIC BLOOD PRESSURE: 120 MMHG | DIASTOLIC BLOOD PRESSURE: 60 MMHG | OXYGEN SATURATION: 94 %

## 2023-02-01 DIAGNOSIS — R59.0 MESENTERIC LYMPHADENOPATHY: ICD-10-CM

## 2023-02-01 DIAGNOSIS — C76.0 HEAD AND NECK CANCER (HCC): Primary | ICD-10-CM

## 2023-02-01 NOTE — PROGRESS NOTES
Hematology/Oncology Outpatient Follow- up Note  Susan Gerber 79 y o  female MRN: @ Encounter: [id number]        Date:  2/1/2023    Presenting Complaint/Diagnosis : Nonspecific lung findings and mesenteric adenopathy on a PET-CT scan in 2021    HPI:    Jordi Garcia seen for initial consultation 8/12/2021 regarding  A history of head and neck cancer more than 6 years ago   The patient had a CT scan which revealed  A suspicious left lower lobe lung nodule measuring 1 2 cm and is suspicious AP window lymph node measuring 1 3 cm with Stefania mesentery with multiple abnormal nodes the largest measuring 1 1 x 1 3 cm   A PET-CT scan was done to follow-up on this and the patient was referred to see Pulmonary Medicine who she is apparently seeing on the 23rd of August   The PET-CT scan [facility] that the 1 2 x 1 1 cm left lung base nodule does not demonstrate any significant FDG uptake which would favor benign etiology although a low-grade neoplasm could not be excluded   Scattered mildly  Hypermetabolic densities in the bilateral lungs likely inflammatory or infectious could be reassessed on follow-up CT  Herb Mike prominent mediastinal and mesenteric lymph nodes with mild FDG activity were nonspecific but a low-grade lymphoproliferative disorder could not be excluded   Clinical correlation was recommended   The patient was referred to see us without any biopsies   Again her head and neck malignancy was taken care of by Dr Jonna Friedman at [facility] states she has had head and neck exams yearly and her imaging is not suggestive of metastatic disease from this head and neck malignancy at this time with no evidence of local recurrence       Previous Hematologic/ Oncologic History:      As above    Current Hematologic/ Oncologic Treatment:      Observation  Interval History:      Patient returns for follow-up visit  Blood work is stable    Imaging is stable with no evidence of progression of the findings on previous imaging which is nonspecific  Has not had a colonoscopy or ENT exam from what I can see  I emphasized the importance of following up with her other doctors  She did not have any of the blood work we had recommended  Last blood work is from October which showed hemoglobin of 10 6 with a normal white count of 5 65 and platelet count of 413  I advised her the importance of compliance and following up with her primary care physician also for these issues  Denies any nausea denies any vomiting denies any diarrhea  The patient herself is asymptomatic with no problems  The rest of her 14 point review of systems today was negative  Test Results:    Imaging: CT chest abdomen pelvis w contrast    Result Date: 1/29/2023  Narrative: CT CHEST, ABDOMEN AND PELVIS WITH IV CONTRAST INDICATION:   R59 0: Localized enlarged lymph nodes C76 0: Malignant neoplasm of head, face and neck R91 1: Solitary pulmonary nodule  History of head and neck cancer more than 6 years ago  Lung nodule  AP window lymph node  Unless changes in the mesentery  Results of lung biopsy performed May 18, 2022 reveal differential which includes atypical adenomatous hyperplasia, adenocarcinoma in-situ, or a low-grade invasive adenocarcinoma  Reevaluate  COMPARISON:  Multiple prior examinations including most recent abdominal MRI performed December 30, 2022, most recent chest CT performed December 15, 2022, and most recent PET/CT performed August 10, 2021  TECHNIQUE: CT examination of the chest, abdomen and pelvis was performed  Axial, sagittal, and coronal 2D reformatted images were created from the source data and submitted for interpretation  Radiation dose length product (DLP) for this visit:  803 mGy-cm     This examination, like all CT scans performed in the [facility], was performed utilizing techniques to minimize radiation dose exposure, including the use of iterative reconstruction and automated exposure control  IV Contrast: 100 mL of iohexol (OMNIPAQUE) Enteric Contrast: Enteric contrast was administered  FINDINGS: CHEST LUNGS:  Mild centrilobular emphysematous change is again noted  Lobulated left lower lobe pulmonary nodule on image 199 of series 4, 12 x 11 mm, unchanged over multiple prior examinations at least as far back as August 10, 2021  This was not hypermetabolic on prior PET/CT scan performed in August 2021  Purely groundglass opacity lateral right lower lobe image 150 of series 4 measuring 14 x 7 mm also unchanged over multiple prior examinations  Biopsy results of this lesion from May 18, 2022 are as described in clinical indication above  No new, enlarging, or otherwise suspicious pulmonary lesion  No tracheal or endobronchial lesion PLEURA:  Unremarkable  HEART/GREAT VESSELS: Heart is normal in size  Mild coronary artery calcification is present  No thoracic aortic aneurysm  MEDIASTINUM AND KAYY:  Borderline and minimally enlarged mediastinal and hilar nodes, largest measuring 15 x 10 mm in the AP window on image 47 of series 2 are, when allowing for differences in measuring technique, unchanged over multiple prior examinations at least as far back as July 2021  This also includes an unchanged lower right paraesophageal node measuring 9 x 7 mm on image 92 of series 2  Small sliding-type hiatal hernia noted  CHEST WALL AND LOWER NECK:  Unremarkable  ABDOMEN LIVER/BILIARY TREE:  A 5 mm arterial phase enhancing finding identified on MR performed December 30, 2022 is not detectable solid by CT and probably represents vascular flow phenomenon of arterial portal shunting as mentioned in report of prior MR  No CT findings to suggest hepatic tumor  Normal hepatic contours  No biliary dilatation  Portal and hepatic veins  GALLBLADDER:  No calcified gallstones  No pericholecystic inflammatory change  SPLEEN:  Unremarkable  PANCREAS:  Unremarkable  ADRENAL GLANDS:  Unremarkable   KIDNEYS/URETERS: Unremarkable  No hydronephrosis  STOMACH AND BOWEL:  Small hiatal hernia  No bowel wall thickening or bowel obstruction  APPENDIX:  No findings to suggest appendicitis  ABDOMINOPELVIC CAVITY:  Mild hazy groundglass change associated with the small bowel mesentery is unchanged from previous examinations most consistent with mild changes of chronic mesenteric panniculitis  No developing lymphadenopathy  No ascites  No pneumoperitoneum  VESSELS:  Unremarkable for patient's age  PELVIS REPRODUCTIVE ORGANS:  Unremarkable for patient's age  URINARY BLADDER:  Unremarkable  ABDOMINAL WALL/INGUINAL REGIONS:  Unremarkable  OSSEOUS STRUCTURES:  No acute fracture or destructive osseous lesion  Impression: No significant interval change from previous examinations  Specifically, unchanged findings as below: -Unchanged 12 mm noncalcified left lower lobe pulmonary nodule unchanged over multiple prior examinations and without hypermetabolism on PET/CT scan of August 10, 4182, almost certainly benign  -Unchanged 14 mm groundglass opacity in the lateral right lower lobe with no associated solid tissue and with no interval increase in size from prior examination, recently biopsied indeterminate findings  -Unchanged benign borderline and minimally enlarged mediastinal nodes, present over multiple prior examinations and not hypermetabolic on PET/CT of August 10, 2021  -Unchanged appearance of groundglass opacity in the small bowel mesentery with few intermixed nodes most consistent with mild sequela of chronic mesenteric panniculitis  -Reidentified findings of emphysema and sliding type hiatal hernia   Workstation performed: [id number]       Labs:   Lab Results   Component Value Date    WBC 5 65 10/31/2022    HGB 10 6 (L) 10/31/2022    HCT 35 5 10/31/2022    MCV 89 10/31/2022     10/31/2022     Lab Results   Component Value Date     02/25/2015    K 4 2 10/31/2022     10/31/2022    CO2 31 10/31/2022    ANIONGAP 3 (L) 02/25/2015    BUN 12 10/31/2022    CREATININE 0 95 10/31/2022    GLUCOSE 83 02/25/2015    GLUF 97 02/23/2022    CALCIUM 9 3 10/31/2022    AST 21 05/23/2022    ALT 12 05/23/2022    ALKPHOS 107 (H) 05/23/2022    PROT 6 9 02/25/2015    BILITOT 0 3 02/25/2015    EGFR 62 10/31/2022       Lab Results   Component Value Date    IRON 88 05/23/2022    TIBC 331 05/23/2022    FERRITIN 36 05/23/2022       Lab Results   Component Value Date    WXFMICSS53 1,273 (H) 02/25/2015         ROS: As stated in the history of present illness otherwise his 14 point review of systems today was negative        Active Problems:   Patient Active Problem List   Diagnosis   • Cancer of pharynx ([facility] [address] )   • Cataract   • Chronic kidney disease, stage 3 (moderate)   • Centrilobular emphysema (HCC)   • Extrinsic obstruction of cartilagenous portion of eustachian tube   • Personal history of radiation therapy   • History of laryngeal cancer   • Loss of hearing   • Depression with anxiety   • Numbness   • Panic attack   • Vitamin D deficiency   • Dupuytren contracture   • Elevated alkaline phosphatase level   • Bronchiectasis with acute exacerbation (HCC)   • Severe persistent asthma without complication   • Multiple lung nodules   • Myalgia   • Tension type headache   • Hyperlipidemia   • Hypothyroidism   • COVID-19   • Trigger finger of right thumb   • Current mild episode of major depressive disorder without prior episode (HCC)   • Persistent dyspnea after COVID-19   • History of COVID-19   • Class 1 obesity due to excess calories with serious comorbidity and body mass index (BMI) of 32 0 to 32 9 in adult   • Exercise hypoxemia   • Post-nasal drip   • Mesenteric lymphadenopathy   • Neck pain on left side   • Mediastinal lymphadenopathy   • Preop examination   • Preoperative cardiovascular examination   • Chest pain   • Anemia   • Dyspepsia       Past Medical History:   Past Medical History:   Diagnosis Date   • [facility] )    • Allergic    • Allergies    • Anemia    • Asthma    • Cancer ([place] [address] )    • Chronic kidney disease (CKD), stage III (moderate) (HCC)    • COPD (chronic obstructive pulmonary disease) ([place] [address] )    • COVID-19     Covid + 0-2-80-cough at that time now fully resolved   • Disease of thyroid gland    • Essential hypertension    • GERD (gastroesophageal reflux disease)    • Glaucoma    • High blood pressure    • HTN (hypertension) 02/16/2021   • Hyperlipidemia    • Hypothyroidism    • 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   • Throat cancer ([place] [address] ) 2014    chemo and rad therapy 2014       Surgical History:   Past Surgical History:   Procedure Laterality Date   • COLONOSCOPY  2016   • ESOPHAGOGASTRODUODENOSCOPY  2016   • EYE SURGERY     • IR BIOPSY LUNG  05/18/2022   • LARYNGOSCOPY      w/ Bx      • OK TENDON SHEATH INCISION Right 02/16/2021    Procedure: THUMB TRIGGER FINGER RELEASE;  Surgeon: Felicita Martínez MD;  Location: [facility]  MAIN [facility];  Service: Orthopedics   • TUBAL LIGATION         Family History:    Family History   Problem Relation Age of Onset   • Other Mother         respiratory disorder   • Sudden death Father         shot himself   • Suicidality Father    • Brain cancer Sister    • Diabetes Sister    • Breast cancer Sister    • Cancer Sister 58        pancreatic cancer   • No Known Problems Sister    • No Known Problems Sister    • No Known Problems Sister    • No Known Problems Sister    • No Known Problems Sister    • No Known Problems Sister    • No Known Problems Daughter    • No Known Problems Daughter    • No Known Problems Maternal Grandmother    • No Known Problems Maternal Grandfather    • No Known Problems Paternal Grandmother    • No Known Problems Paternal Grandfather    • No Known Problems Maternal Aunt    • No Known Problems Maternal Aunt    • No Known Problems Maternal Aunt    • No Known Problems Maternal Aunt    • No Known Problems Maternal Aunt    • No Known Problems Paternal Aunt    • No Known Problems Paternal Aunt    • No Known Problems Paternal Aunt    • No Known Problems Paternal Aunt        Cancer-related family history includes Brain cancer in her sister; Breast cancer in her sister; Cancer (age of onset: 58) in her sister  Social History:   Social History     Socioeconomic History   • Marital status: /Civil Union     Spouse name: Not on file   • Number of children: Not on file   • Years of education: Not on file   • Highest education level: Not on file   Occupational History   • Not on file   Tobacco Use   • Smoking status: Former     Packs/day: 1 00     Years: 40 00     Pack years: 40 00     Types: Cigarettes     Quit date: 2014     Years since quittin 0   • Smokeless tobacco: Never   Vaping Use   • Vaping Use: Never used   Substance and Sexual Activity   • Alcohol use: Never     Comment: None   • Drug use: Never     Comment: Denies   • Sexual activity: Not Currently   Other Topics Concern   • Not on file   Social History Narrative    Most recent tobacco use screenin2020    Do you currently or have you served in [employer]: No    Were you activated, into active duty, as a member of the [employer] or as a Reservist: No    Marital status:     Sexual orientation: Heterosexual    Exercise level: Occasional    Diet: Regular    General stress level: Low    Has smoked since age: 23    Alcohol intake: None    Caffeine intake: Occasional    Chewing tobacco: none    Illicit drugs: Denies    Guns present in home: No    Seat belts used routinely: Yes    Sunscreen used routinely: Yes    Smoke alarm in home: Yes    Advance directive: No    Salt Intake: HTN Diet    Has the Patient had a mammogram to screen for breast cancer within 24 months: Yes    Would the patient like to schedule a Mammogram: No    Is the patient interested in a colorectal cancer screening: No    Live alone or with others: with others    Sexually active: No    Do you feel safe at home:  Yes Social Determinants of Health     Financial Resource Strain: Low Risk    • Difficulty of Paying Living Expenses: Not hard at all   Food Insecurity: Not on file   Transportation Needs: No Transportation Needs   • Lack of Transportation (Medical): No   • Lack of Transportation (Non-Medical): No   Physical Activity: Not on file   Stress: Not on file   Social Connections: Not on file   Intimate Partner Violence: Not on file   Housing Stability: Not on file       Current Medications:   Current Outpatient Medications   Medication Sig Dispense Refill   • albuterol (PROVENTIL HFA,VENTOLIN HFA) 90 mcg/act inhaler Inhale 2 puffs every 4 (four) hours as needed for wheezing 36 g 5   • brimonidine tartrate 0 2 % ophthalmic solution      • Budeson-Glycopyrrol-Formoterol (Breztri Aerosphere) 160-9-4 8 MCG/ACT AERO Inhale 2 puffs 2 (two) times a day Rinse mouth after use  32 1 g 1   • diphenhydrAMINE (BENADRYL) 25 mg tablet Take 1 tablet (25 mg total) by mouth every 6 (six) hours as needed for itching 30 tablet 0   • ferrous sulfate 324 (65 Fe) mg Take 1 tablet (324 mg total) by mouth daily after lunch 90 tablet 1   • FLUoxetine (PROzac) 10 MG tablet TAKE 1 TABLET BY MOUTH EVERY DAY 90 tablet 1   • ipratropium-albuterol (DUO-NEB) 0 5-2 5 mg/3 mL nebulizer solution TAKE 3 ML BY NEBULIZATION EVERY 6 (SIX) HOURS AS NEEDED FOR WHEEZING OR SHORTNESS OF BREATH 360 mL 1   • levothyroxine 75 mcg tablet Take 1 tablet (75 mcg total) by mouth daily in the early morning 90 tablet 0   • timolol (TIMOPTIC) 0 5 % ophthalmic solution INSTILL 1 DROP INTO EACH EYE TWO TIMES A DAY       No current facility-administered medications for this visit  Allergies: Allergies   Allergen Reactions   • Cefdinir Hives   • Amifostine Rash   • Pollen Extract Allergic Rhinitis       Physical Exam:    Body surface area is 1 82 meters squared      Wt Readings from Last 3 Encounters:   02/01/23 80 7 kg (178 lb)   01/23/23 80 3 kg (177 lb)   12/12/22 81 2 kg (179 lb)        Temp Readings from Last 3 Encounters:   02/01/23 98 °F (36 7 °C) (Temporal)   01/23/23 (!) 97 4 °F (36 3 °C) (Tympanic)   12/12/22 (!) 97 2 °F (36 2 °C) (Temporal)        BP Readings from Last 3 Encounters:   02/01/23 120/60   01/23/23 122/50   12/12/22 110/66         Pulse Readings from Last 3 Encounters:   02/01/23 78   01/23/23 64   12/12/22 64        Physical Exam     Constitutional   General appearance: No acute distress, well appearing and well nourished  Eyes   Conjunctiva and lids: No swelling, erythema or discharge  Pupils and irises: Equal, round and reactive to light  Ears, Nose, Mouth, and Throat   External inspection of ears and nose: Normal     Nasal mucosa, septum, and turbinates: Normal without edema or erythema  Oropharynx: Normal with no erythema, edema, exudate or lesions  Pulmonary   Respiratory effort: No increased work of breathing or signs of respiratory distress  Auscultation of lungs: Clear to auscultation  Cardiovascular   Palpation of heart: Normal PMI, no thrills  Auscultation of heart: Normal rate and rhythm, normal S1 and S2, without murmurs  Examination of extremities for edema and/or varicosities: Normal     Carotid pulses: Normal     Abdomen   Abdomen: Non-tender, no masses  Liver and spleen: No hepatomegaly or splenomegaly  Lymphatic   Palpation of lymph nodes in neck: No lymphadenopathy  Musculoskeletal   Gait and station: Normal     Digits and nails: Normal without clubbing or cyanosis  Inspection/palpation of joints, bones, and muscles: Normal     Skin   Skin and subcutaneous tissue: Normal without rashes or lesions  Neurologic   Cranial nerves: Cranial nerves 2-12 intact  Sensation: No sensory loss      Psychiatric   Orientation to person, place, and time: Normal     Mood and affect: Normal         Assessment / Plan:      The patient is a pleasant 42-year-old female with a remote history of head and neck malignancy was referred to see us for non FDG avid lung nodule and some mediastinal and mesenteric adenopathy which had low-grade activity but was never sampled       We ended up arranging a biopsy and repeat blood work for the patient  Janae Bur studies are normal   Biopsy showed minute foci of atypical glandular proliferation but no definitive diagnosis could be made  PET-CT scan was ordered but not approved so a CT scan of the chest abdomen pelvis was done  It showed stable 1 2 cm left lower lobe nodule 9 hypermetabolic on a PET-CT scan last year  It showed a stable 1 4 cm ground-glass opacity in the right lower lobe  There was nonspecific mesenteric stranding with mildly prominent mesenteric lymph nodes again noted which may be postinflammatory in nature although a low-grade lymphoproliferative disorder could not be excluded  We advised the patient to stay on observation and then we would repeat imaging and she should continue to follow with ENT along with her other physicians for routine maintenance including colonoscopies  She has not set this up despite multiple referrals  We discussed this again today with her son and he promises to do so so I will put in another referral     The patient's most recent CAT scan shows no significant interval change from previous examination  At this point continued observation is reasonable  We will see her back in 6 months with repeat blood work and imaging  Until then she has any question she will call our office  She will get her screening colonoscopy and ENT exam before then  Goals and Barriers:  Current Goal:  Prolong Survival from head and neck cancer  Barriers: None  Patient's Capacity to Self Care:  Patient  able to self care  Portions of the record may have been created with voice recognition software  Occasional wrong word or "sound a like" substitutions may have occurred due to the inherent limitations of voice recognition software    Read the chart carefully and recognize, using context, where substitutions have occurred

## 2023-02-28 DIAGNOSIS — E03.9 HYPOTHYROIDISM, UNSPECIFIED TYPE: ICD-10-CM

## 2023-02-28 RX ORDER — LEVOTHYROXINE SODIUM 0.07 MG/1
75 TABLET ORAL
Qty: 90 TABLET | Refills: 0 | Status: SHIPPED | OUTPATIENT
Start: 2023-02-28 | End: 2023-03-10

## 2023-03-23 ENCOUNTER — TELEPHONE (OUTPATIENT)
Dept: FAMILY MEDICINE CLINIC | Facility: CLINIC | Age: 68
End: 2023-03-23

## 2023-03-28 ENCOUNTER — OFFICE VISIT (OUTPATIENT)
Dept: FAMILY MEDICINE CLINIC | Facility: CLINIC | Age: 68
End: 2023-03-28

## 2023-03-28 VITALS
HEART RATE: 70 BPM | WEIGHT: 179 LBS | RESPIRATION RATE: 18 BRPM | TEMPERATURE: 97.3 F | DIASTOLIC BLOOD PRESSURE: 60 MMHG | OXYGEN SATURATION: 96 % | HEIGHT: 62 IN | BODY MASS INDEX: 32.94 KG/M2 | SYSTOLIC BLOOD PRESSURE: 138 MMHG

## 2023-03-28 DIAGNOSIS — J31.1 POST-NASAL CATARRH: ICD-10-CM

## 2023-03-28 DIAGNOSIS — K21.9 GASTROESOPHAGEAL REFLUX DISEASE WITHOUT ESOPHAGITIS: ICD-10-CM

## 2023-03-28 DIAGNOSIS — J43.2 CENTRILOBULAR EMPHYSEMA (HCC): ICD-10-CM

## 2023-03-28 DIAGNOSIS — J20.9 ACUTE BRONCHITIS, UNSPECIFIED ORGANISM: Primary | ICD-10-CM

## 2023-03-28 DIAGNOSIS — J30.1 SEASONAL ALLERGIC RHINITIS DUE TO POLLEN: ICD-10-CM

## 2023-03-28 RX ORDER — IPRATROPIUM BROMIDE 42 UG/1
2 SPRAY, METERED NASAL 3 TIMES DAILY
Qty: 15 ML | Refills: 0 | Status: SHIPPED | OUTPATIENT
Start: 2023-03-28

## 2023-03-28 RX ORDER — PREDNISONE 20 MG/1
20 TABLET ORAL 2 TIMES DAILY WITH MEALS
Qty: 10 TABLET | Refills: 0 | Status: SHIPPED | OUTPATIENT
Start: 2023-03-28 | End: 2023-04-02

## 2023-03-28 RX ORDER — FEXOFENADINE HCL 180 MG/1
180 TABLET ORAL DAILY
Qty: 30 TABLET | Refills: 1 | Status: SHIPPED | OUTPATIENT
Start: 2023-03-28

## 2023-03-28 RX ORDER — FLUTICASONE PROPIONATE 50 MCG
1 SPRAY, SUSPENSION (ML) NASAL DAILY
Qty: 16 G | Refills: 0 | Status: SHIPPED | OUTPATIENT
Start: 2023-03-28

## 2023-03-28 RX ORDER — MONTELUKAST SODIUM 10 MG/1
10 TABLET ORAL
Qty: 30 TABLET | Refills: 0 | Status: SHIPPED | OUTPATIENT
Start: 2023-03-28

## 2023-03-28 RX ORDER — OMEPRAZOLE 40 MG/1
40 CAPSULE, DELAYED RELEASE ORAL
Qty: 30 CAPSULE | Refills: 5 | Status: SHIPPED | OUTPATIENT
Start: 2023-03-28 | End: 2023-09-24

## 2023-03-28 NOTE — PROGRESS NOTES
Subjective:      Patient ID: Kevin Hunt is a 79 y o  female   service use [id number]  15 days of dry cough and epigastric pain  No nausea, vomiting    Cough  Associated symptoms include headaches  Pertinent negatives include no chest pain, eye redness, fever, myalgias, rash, rhinorrhea, sore throat, shortness of breath or wheezing         Past Medical History:   Diagnosis Date   • Acute kidney failure (HCC)    • Allergic    • Allergies    • Anemia    • Asthma    • Cancer ([place] [address] )    • Chronic kidney disease (CKD), stage III (moderate) (HCC)    • COPD (chronic obstructive pulmonary disease) ([place] [address] )    • COVID-19     Covid + 4-7-44-cough at that time now fully resolved   • Disease of thyroid gland    • Essential hypertension    • GERD (gastroesophageal reflux disease)    • Glaucoma    • High blood pressure    • HTN (hypertension) 02/16/2021   • Hyperlipidemia    • Hypothyroidism    • Squamous cell carcinoma of larynx ([place] [address] ) 8/10/2022   • Throat cancer ([place] [address] ) 2014    chemo and rad therapy 2014       Family History   Problem Relation Age of Onset   • Other Mother         respiratory disorder   • Sudden death Father         shot himself   • Suicidality Father    • Brain cancer Sister    • Diabetes Sister    • Breast cancer Sister    • Cancer Sister 58        pancreatic cancer   • No Known Problems Sister    • No Known Problems Sister    • No Known Problems Sister    • No Known Problems Sister    • No Known Problems Sister    • No Known Problems Sister    • No Known Problems Daughter    • No Known Problems Daughter    • No Known Problems Maternal Grandmother    • No Known Problems Maternal Grandfather    • No Known Problems Paternal Grandmother    • No Known Problems Paternal Grandfather    • No Known Problems Maternal Aunt    • No Known Problems Maternal Aunt    • No Known Problems Maternal Aunt    • No Known Problems Maternal Aunt    • No Known Problems Maternal Aunt    • No Known Problems Paternal Aunt • No Known Problems Paternal Aunt    • No Known Problems Paternal Aunt    • No Known Problems Paternal Aunt        Past Surgical History:   Procedure Laterality Date   • COLONOSCOPY  2016   • ESOPHAGOGASTRODUODENOSCOPY  2016   • EYE SURGERY     • IR BIOPSY LUNG  05/18/2022   • LARYNGOSCOPY      w/ Bx  • NE TENDON SHEATH INCISION Right 02/16/2021    Procedure: THUMB TRIGGER FINGER RELEASE;  Surgeon: Anupama Daniels MD;  Location: [facility];  Service: Orthopedics   • TUBAL LIGATION          reports that she quit smoking about 9 years ago  Her smoking use included cigarettes  She has a 40 00 pack-year smoking history  She has never used smokeless tobacco  She reports that she does not drink alcohol and does not use drugs        Current Outpatient Medications:   •  albuterol (PROVENTIL HFA,VENTOLIN HFA) 90 mcg/act inhaler, TAKE 2 PUFFS BY MOUTH EVERY 4 HOURS AS NEEDED FOR WHEEZE, Disp: 18 g, Rfl: 5  •  brimonidine tartrate 0 2 % ophthalmic solution, , Disp: , Rfl:   •  Budeson-Glycopyrrol-Formoterol (Breztri Aerosphere) 160-9-4 8 MCG/ACT AERO, Inhale 2 puffs 2 (two) times a day Rinse mouth after use , Disp: 32 1 g, Rfl: 1  •  diphenhydrAMINE (BENADRYL) 25 mg tablet, Take 1 tablet (25 mg total) by mouth every 6 (six) hours as needed for itching, Disp: 30 tablet, Rfl: 0  •  ferrous sulfate 324 (65 Fe) mg, Take 1 tablet (324 mg total) by mouth daily after lunch, Disp: 90 tablet, Rfl: 1  •  fexofenadine (ALLEGRA) 180 MG tablet, Take 1 tablet (180 mg total) by mouth daily, Disp: 30 tablet, Rfl: 1  •  FLUoxetine (PROzac) 10 MG tablet, TAKE 1 TABLET BY MOUTH EVERY DAY, Disp: 90 tablet, Rfl: 1  •  fluticasone (FLONASE) 50 mcg/act nasal spray, 1 spray into each nostril daily, Disp: 16 g, Rfl: 0  •  ipratropium (ATROVENT) 0 06 % nasal spray, 2 sprays into each nostril 3 (three) times a day For increased nasal secretion, Disp: 15 mL, Rfl: 0  •  ipratropium-albuterol (DUO-NEB) 0 5-2 5 mg/3 mL nebulizer solution, TAKE 3 ML BY NEBULIZATION EVERY 6 (SIX) HOURS AS NEEDED FOR WHEEZING OR SHORTNESS OF BREATH, Disp: 360 mL, Rfl: 1  •  levothyroxine 75 mcg tablet, TAKE 1 TABLET (75 MCG TOTAL) BY MOUTH DAILY IN THE EARLY MORNING, Disp: 90 tablet, Rfl: 0  •  lidocaine (XYLOCAINE) 5 % ointment, APPLY TOPICALLY AS NEEDED FOR MILD PAIN, Disp: 35 44 g, Rfl: 0  •  montelukast (SINGULAIR) 10 mg tablet, Take 1 tablet (10 mg total) by mouth daily at bedtime, Disp: 30 tablet, Rfl: 0  •  omeprazole (PriLOSEC) 40 MG capsule, Take 1 capsule (40 mg total) by mouth daily before breakfast, Disp: 30 capsule, Rfl: 5  •  predniSONE 20 mg tablet, Take 1 tablet (20 mg total) by mouth 2 (two) times a day with meals for 5 days, Disp: 10 tablet, Rfl: 0  •  timolol (TIMOPTIC) 0 5 % ophthalmic solution, INSTILL 1 DROP INTO EACH EYE TWO TIMES A DAY, Disp: , Rfl:     The following portions of the patient's history were reviewed and updated as appropriate: allergies, current medications, past family history, past medical history, past social history, past surgical history and problem list     Review of Systems   Constitutional: Negative for fatigue and fever  HENT: Negative for congestion, facial swelling, mouth sores, rhinorrhea, sore throat and trouble swallowing  Eyes: Negative for pain and redness  Respiratory: Positive for cough  Negative for shortness of breath and wheezing  Cardiovascular: Negative for chest pain, palpitations and leg swelling  Gastrointestinal: Negative for abdominal pain, blood in stool, constipation, diarrhea and nausea  Epigastric pain_+   Genitourinary: Negative for dysuria, hematuria and urgency  Musculoskeletal: Negative for arthralgias, back pain and myalgias  Skin: Negative for rash and wound  Neurological: Positive for headaches  Negative for seizures and syncope  Hematological: Negative for adenopathy  Psychiatric/Behavioral: Negative for agitation and behavioral problems           PHQ-2/9 Depression "Screening    Little interest or pleasure in doing things: 0 - not at all  Feeling down, depressed, or hopeless: 0 - not at all  Trouble falling or staying asleep, or sleeping too much: 0 - not at all  Feeling tired or having little energy: 0 - not at all  Poor appetite or overeatin - not at all  Feeling bad about yourself - or that you are a failure or have let yourself or your family down: 0 - not at all  Trouble concentrating on things, such as reading the newspaper or watching television: 0 - not at all  Moving or speaking so slowly that other people could have noticed  Or the opposite - being so fidgety or restless that you have been moving around a lot more than usual: 0 - not at all  Thoughts that you would be better off dead, or of hurting yourself in some way: 0 - not at all  PHQ-9 Score: 0   PHQ-9 Interpretation: No or Minimal depression              Objective:    /60 (BP Location: Left arm, Patient Position: Sitting, Cuff Size: Adult)   Pulse 70   Temp (!) 97 3 °F (36 3 °C) (Tympanic)   Resp 18   Ht 5' 2\" (1 575 m)   Wt 81 2 kg (179 lb)   SpO2 96% Comment: on 2L of O2  BMI 32 74 kg/m²      Physical Exam  Vitals and nursing note reviewed  Constitutional:       Appearance: Normal appearance  She is well-developed  HENT:      Head: Normocephalic and atraumatic  Right Ear: External ear normal       Left Ear: External ear normal       Nose: Nose normal       Mouth/Throat:      Mouth: Mucous membranes are moist       Pharynx: No oropharyngeal exudate or posterior oropharyngeal erythema  Eyes:      General: No scleral icterus  Right eye: No discharge  Left eye: No discharge  Conjunctiva/sclera: Conjunctivae normal       Pupils: Pupils are equal, round, and reactive to light  Neck:      Thyroid: No thyromegaly  Cardiovascular:      Rate and Rhythm: Normal rate and regular rhythm  Heart sounds: Normal heart sounds  No murmur heard  No gallop     Pulmonary: " Effort: Pulmonary effort is normal       Breath sounds: Normal breath sounds  No wheezing or rales  Comments: On 2L of oxygen via nasal cannula  Abdominal:      General: There is no distension  Palpations: Abdomen is soft  Tenderness: There is no abdominal tenderness  Musculoskeletal:         General: No tenderness or deformity  Cervical back: Normal range of motion and neck supple  Lymphadenopathy:      Cervical: No cervical adenopathy  Skin:     Findings: No erythema or rash  Neurological:      General: No focal deficit present  Mental Status: She is alert  Mental status is at baseline  Psychiatric:         Mood and Affect: Mood normal          Behavior: Behavior normal            Recent Results (from the past 8736 hour(s))   Protime-INR    Collection Time: 05/18/22  9:28 AM   Result Value Ref Range    Protime 12 3 11 6 - 14 5 seconds    INR 0 91 0 84 - 1 19   Tissue Exam    Collection Time: 05/18/22 10:58 AM   Result Value Ref Range    Case Report       Surgical Pathology Report                         Case: [id number]                                   Authorizing Provider:  Taylor John MD        Collected:           05/18/2022 1058              Ordering Location:     [facility]        Received:            05/18/2022 [facility] 1 Scan                                                            Pathologist:           Roopa Butler MD                                                                 Specimen:    Lesion, Right pulmonary nodule                                                             Final Diagnosis       A  Lesion, Right pulmonary nodule:  - Minute foci of atypical glandular proliferations  See comment  Comment: There are very minute areas of atypical glands on the core biopsies    Touch preparation slides "demonstrate more cellular material with clusters of atypical epithelial cells present  The differential includes atypical adenomatous hyperplasia, adenocarcinoma in-situ, or a low-grade invasive adenocarcinoma  Additional tissue sampling, including excision, may be considered if clinically indicated  Additional Information       All reported additional testing was performed with appropriately reactive controls  These tests were developed and their performance characteristics determined by [facility] or appropriate performing facility, though some tests may be performed on tissues which have not been validated for performance characteristics (such as staining performed on alcohol exposed cell blocks and decalcified tissues)  Results should be interpreted with caution and in the context of the patients’ clinical condition  These tests may not be cleared or approved by the [place]  Food and Drug Administration, though the FDA has determined that such clearance or approval is not necessary  These tests are used for clinical purposes and they should not be regarded as investigational or for research  This laboratory has been approved by CLIA 88, designated as a high-complexity laboratory and is qualified to perform these tests     Interpretation performed at [facility], [address]      Intraoperative Consultation          Touch prep x2:  - Adequate, requested additional core  CVytlacil  Interpretation performed at [address] [place] [address], [address] [facility]        Gross Description          A  The specimen is received fresh, labeled with the patient's name and hospital number, and is designated \"she right pulmonary nodule ” It consists of 3 gray-brown, delicate, and cylindrical tissue cores  that range from 0 5 cm in length by less than 0 1 cm in diameter to 0 8 cm in length by less than 0 1 cm in diameter   The cores are placed between 2 sponges and " submitted entirely as follows:      A1: One core   A2: One core   A3: One core     Note: The estimated total formalin fixation time based upon information provided by the submitting clinician and the standard processing schedule is 29 00 hours       olster        CBC and differential    Collection Time: 05/23/22  4:05 PM   Result Value Ref Range    WBC 6 57 4 31 - 10 16 Thousand/uL    RBC 4 97 3 81 - 5 12 Million/uL    Hemoglobin 12 8 11 5 - 15 4 g/dL    Hematocrit 42 8 34 8 - 46 1 %    MCV 86 82 - 98 fL    MCH 25 8 (L) 26 8 - 34 3 pg    MCHC 29 9 (L) 31 4 - 37 4 g/dL    RDW 14 6 11 6 - 15 1 %    MPV 10 1 8 9 - 12 7 fL    Platelets 923 650 - 628 Thousands/uL    nRBC 0 /100 WBCs    Neutrophils Relative 73 43 - 75 %    Immat GRANS % 1 0 - 2 %    Lymphocytes Relative 15 14 - 44 %    Monocytes Relative 7 4 - 12 %    Eosinophils Relative 3 0 - 6 %    Basophils Relative 1 0 - 1 %    Neutrophils Absolute 4 81 1 85 - 7 62 Thousands/µL    Immature Grans Absolute 0 03 0 00 - 0 20 Thousand/uL    Lymphocytes Absolute 0 99 0 60 - 4 47 Thousands/µL    Monocytes Absolute 0 48 0 17 - 1 22 Thousand/µL    Eosinophils Absolute 0 21 0 00 - 0 61 Thousand/µL    Basophils Absolute 0 05 0 00 - 0 10 Thousands/µL   Comprehensive metabolic panel    Collection Time: 05/23/22  4:05 PM   Result Value Ref Range    Sodium 143 135 - 147 mmol/L    Potassium 5 6 (H) 3 5 - 5 3 mmol/L    Chloride 105 96 - 108 mmol/L    CO2 32 21 - 32 mmol/L    ANION GAP 6 4 - 13 mmol/L    BUN 26 (H) 5 - 25 mg/dL    Creatinine 1 24 0 60 - 1 30 mg/dL    Glucose 90 65 - 140 mg/dL    Calcium 9 6 8 4 - 10 2 mg/dL    AST 21 13 - 39 U/L    ALT 12 7 - 52 U/L    Alkaline Phosphatase 107 (H) 34 - 104 U/L    Total Protein 7 1 6 4 - 8 4 g/dL    Albumin 4 2 3 5 - 5 0 g/dL    Total Bilirubin 0 41 0 20 - 1 00 mg/dL    eGFR 45 ml/min/1 73sq m   Iron Saturation %    Collection Time: 05/23/22  4:05 PM   Result Value Ref Range    Iron Saturation 27 15 - 50 %    TIBC 331 250 - 450 ug/dL Iron 88 50 - 170 ug/dL   Ferritin    Collection Time: 05/23/22  4:05 PM   Result Value Ref Range    Ferritin 36 8 - 388 ng/mL   POCT Rapid Covid Ag    Collection Time: 08/10/22  9:41 AM   Result Value Ref Range    POCT SARS-CoV-2 Ag Negative Negative    VALID CONTROL Valid    ECG 12 lead    Collection Time: 10/28/22  8:39 AM   Result Value Ref Range    Ventricular Rate 86 BPM    Atrial Rate 86 BPM    SD Interval 146 ms    QRSD Interval 86 ms    QT Interval 352 ms    QTC Interval 421 ms    P Axis 83 degrees    QRS Axis 2 degrees    T Wave Axis 59 degrees   CBC and differential    Collection Time: 10/28/22  9:14 AM   Result Value Ref Range    WBC 14 97 (H) 4 31 - 10 16 Thousand/uL    RBC 4 47 3 81 - 5 12 Million/uL    Hemoglobin 12 0 11 5 - 15 4 g/dL    Hematocrit 39 1 34 8 - 46 1 %    MCV 88 82 - 98 fL    MCH 26 8 26 8 - 34 3 pg    MCHC 30 7 (L) 31 4 - 37 4 g/dL    RDW 14 0 11 6 - 15 1 %    MPV 9 8 8 9 - 12 7 fL    Platelets 114 106 - 601 Thousands/uL    nRBC 0 /100 WBCs    Neutrophils Relative 88 (H) 43 - 75 %    Immat GRANS % 1 0 - 2 %    Lymphocytes Relative 3 (L) 14 - 44 %    Monocytes Relative 8 4 - 12 %    Eosinophils Relative 0 0 - 6 %    Basophils Relative 0 0 - 1 %    Neutrophils Absolute 13 10 (H) 1 85 - 7 62 Thousands/µL    Immature Grans Absolute 0 09 0 00 - 0 20 Thousand/uL    Lymphocytes Absolute 0 50 (L) 0 60 - 4 47 Thousands/µL    Monocytes Absolute 1 21 0 17 - 1 22 Thousand/µL    Eosinophils Absolute 0 04 0 00 - 0 61 Thousand/µL    Basophils Absolute 0 03 0 00 - 0 10 Thousands/µL   Protime-INR    Collection Time: 10/28/22  9:14 AM   Result Value Ref Range    Protime 14 1 11 6 - 14 5 seconds    INR 1 07 0 84 - 1 19   APTT    Collection Time: 10/28/22  9:14 AM   Result Value Ref Range    PTT 28 23 - 37 seconds   Basic metabolic panel    Collection Time: 10/28/22  9:14 AM   Result Value Ref Range    Sodium 139 135 - 147 mmol/L    Potassium 4 7 3 5 - 5 3 mmol/L    Chloride 104 96 - 108 mmol/L    CO2 "31 21 - 32 mmol/L    ANION GAP 4 4 - 13 mmol/L    BUN 18 5 - 25 mg/dL    Creatinine 1 12 0 60 - 1 30 mg/dL    Glucose 133 65 - 140 mg/dL    Calcium 9 5 8 4 - 10 2 mg/dL    eGFR 50 ml/min/1 73sq m   Magnesium    Collection Time: 10/28/22  9:14 AM   Result Value Ref Range    Magnesium 1 7 (L) 1 9 - 2 7 mg/dL   HS Troponin 0hr (reflex protocol)    Collection Time: 10/28/22  9:14 AM   Result Value Ref Range    hs TnI 0hr 3 \"Refer to ACS Flowchart\"- see link ng/L   TSH    Collection Time: 10/28/22  9:14 AM   Result Value Ref Range    TSH 3RD GENERATON 0 217 (L) 0 450 - 4 500 uIU/mL   FLU/RSV/COVID - if FLU/RSV clinically relevant    Collection Time: 10/28/22  9:14 AM    Specimen: Nose; Nares   Result Value Ref Range    SARS-CoV-2 Negative Negative    INFLUENZA A PCR Negative Negative    INFLUENZA B PCR Negative Negative    RSV PCR Negative Negative   HS Troponin I 2hr    Collection Time: 10/28/22 11:01 AM   Result Value Ref Range    hs TnI 2hr 3 \"Refer to ACS Flowchart\"- see link ng/L    Delta 2hr hsTnI 0 <20 ng/L   Blood culture #1    Collection Time: 10/28/22 11:01 AM    Specimen: Arm, Right; Blood   Result Value Ref Range    Blood Culture No Growth After 5 Days  Blood culture #2    Collection Time: 10/28/22 11:01 AM    Specimen: Arm, Left; Blood   Result Value Ref Range    Blood Culture No Growth After 5 Days      Procalcitonin    Collection Time: 10/28/22 11:01 AM   Result Value Ref Range    Procalcitonin 0 24 <=0 25 ng/ml   Lactic acid, plasma    Collection Time: 10/28/22 11:01 AM   Result Value Ref Range    LACTIC ACID 0 7 0 5 - 2 0 mmol/L   T4, free    Collection Time: 10/28/22 11:01 AM   Result Value Ref Range    Free T4 1 64 (H) 0 76 - 1 46 ng/dL   Procalcitonin    Collection Time: 10/29/22  6:06 AM   Result Value Ref Range    Procalcitonin 0 27 (H) <=0 25 ng/ml   CBC and Platelet, AM Draw, Tomorrow    Collection Time: 10/29/22  6:06 AM   Result Value Ref Range    WBC 13 84 (H) 4 31 - 10 16 Thousand/uL    RBC " 4  23 3 81 - 5 12 Million/uL    Hemoglobin 11 3 (L) 11 5 - 15 4 g/dL    Hematocrit 38 0 34 8 - 46 1 %    MCV 90 82 - 98 fL    MCH 26 7 (L) 26 8 - 34 3 pg    MCHC 29 7 (L) 31 4 - 37 4 g/dL    RDW 14 2 11 6 - 15 1 %    Platelets 920 677 - 705 Thousands/uL    MPV 10 5 8 9 - 12 7 fL   Basic metabolic panel, AM Draw, Tomorrow    Collection Time: 10/29/22  6:06 AM   Result Value Ref Range    Sodium 141 135 - 147 mmol/L    Potassium 4 1 3 5 - 5 3 mmol/L    Chloride 106 96 - 108 mmol/L    CO2 26 21 - 32 mmol/L    ANION GAP 9 4 - 13 mmol/L    BUN 17 5 - 25 mg/dL    Creatinine 0 96 0 60 - 1 30 mg/dL    Glucose 75 65 - 140 mg/dL    Calcium 9 3 8 4 - 10 2 mg/dL    eGFR 61 ml/min/1 73sq m   CBC and Platelet    Collection Time: 10/30/22  4:34 AM   Result Value Ref Range    WBC 9 43 4 31 - 10 16 Thousand/uL    RBC 4 10 3 81 - 5 12 Million/uL    Hemoglobin 10 9 (L) 11 5 - 15 4 g/dL    Hematocrit 36 4 34 8 - 46 1 %    MCV 89 82 - 98 fL    MCH 26 6 (L) 26 8 - 34 3 pg    MCHC 29 9 (L) 31 4 - 37 4 g/dL    RDW 13 9 11 6 - 15 1 %    Platelets 985 910 - 796 Thousands/uL    MPV 10 6 8 9 - 12 7 fL   Procalcitonin    Collection Time: 10/30/22  4:34 AM   Result Value Ref Range    Procalcitonin 0 25 <=0 25 ng/ml   D-dimer, quantitative    Collection Time: 10/30/22 12:12 PM   Result Value Ref Range    D-Dimer, Quant 1 37 (H) <0 50 ug/ml FEU   CBC and Platelet    Collection Time: 10/31/22  5:12 AM   Result Value Ref Range    WBC 5 65 4 31 - 10 16 Thousand/uL    RBC 4 01 3 81 - 5 12 Million/uL    Hemoglobin 10 6 (L) 11 5 - 15 4 g/dL    Hematocrit 35 5 34 8 - 46 1 %    MCV 89 82 - 98 fL    MCH 26 4 (L) 26 8 - 34 3 pg    MCHC 29 9 (L) 31 4 - 37 4 g/dL    RDW 13 8 11 6 - 15 1 %    Platelets 931 782 - 065 Thousands/uL    MPV 9 7 8 9 - 12 7 fL   Basic metabolic panel    Collection Time: 10/31/22  5:12 AM   Result Value Ref Range    Sodium 141 135 - 147 mmol/L    Potassium 4 2 3 5 - 5 3 mmol/L    Chloride 105 96 - 108 mmol/L    CO2 31 21 - 32 mmol/L ANION GAP 5 4 - 13 mmol/L    BUN 12 5 - 25 mg/dL    Creatinine 0 95 0 60 - 1 30 mg/dL    Glucose 87 65 - 140 mg/dL    Calcium 9 3 8 4 - 10 2 mg/dL    eGFR 62 ml/min/1 73sq m   POCT rapid flu A and B    Collection Time: 12/12/22  5:32 PM   Result Value Ref Range    RAPID FLU A negative     RAPID FLU B negative        Laboratory Results: I have personally reviewed the pertinent laboratory results/reports     Radiology/Other Diagnostic Testing Results: I have personally reviewed pertinent reports  CT chest abdomen pelvis w contrast    Result Date: 1/29/2023  CT CHEST, ABDOMEN AND PELVIS WITH IV CONTRAST INDICATION:   R59 0: Localized enlarged lymph nodes C76 0: Malignant neoplasm of head, face and neck R91 1: Solitary pulmonary nodule  History of head and neck cancer more than 6 years ago  Lung nodule  AP window lymph node  Unless changes in the mesentery  Results of lung biopsy performed May 18, 2022 reveal differential which includes atypical adenomatous hyperplasia, adenocarcinoma in-situ, or a low-grade invasive adenocarcinoma  Reevaluate  COMPARISON:  Multiple prior examinations including most recent abdominal MRI performed December 30, 2022, most recent chest CT performed December 15, 2022, and most recent PET/CT performed August 10, 2021  TECHNIQUE: CT examination of the chest, abdomen and pelvis was performed  Axial, sagittal, and coronal 2D reformatted images were created from the source data and submitted for interpretation  Radiation dose length product (DLP) for this visit:  803 mGy-cm   This examination, like all CT scans performed in the [facility], was performed utilizing techniques to minimize radiation dose exposure, including the use of iterative reconstruction and automated exposure control  IV Contrast: 100 mL of iohexol (OMNIPAQUE) Enteric Contrast: Enteric contrast was administered  FINDINGS: CHEST LUNGS:  Mild centrilobular emphysematous change is again noted  Lobulated left lower lobe pulmonary nodule on image 199 of series 4, 12 x 11 mm, unchanged over multiple prior examinations at least as far back as August 10, 2021  This was not hypermetabolic on prior PET/CT scan performed in August 2021  Purely groundglass opacity lateral right lower lobe image 150 of series 4 measuring 14 x 7 mm also unchanged over multiple prior examinations  Biopsy results of this lesion from May 18, 2022 are as described in clinical indication above  No new, enlarging, or otherwise suspicious pulmonary lesion  No tracheal or endobronchial lesion PLEURA:  Unremarkable  HEART/GREAT VESSELS: Heart is normal in size  Mild coronary artery calcification is present  No thoracic aortic aneurysm  MEDIASTINUM AND KAYY:  Borderline and minimally enlarged mediastinal and hilar nodes, largest measuring 15 x 10 mm in the AP window on image 47 of series 2 are, when allowing for differences in measuring technique, unchanged over multiple prior examinations at least as far back as July 2021  This also includes an unchanged lower right paraesophageal node measuring 9 x 7 mm on image 92 of series 2  Small sliding-type hiatal hernia noted  CHEST WALL AND LOWER NECK:  Unremarkable  ABDOMEN LIVER/BILIARY TREE:  A 5 mm arterial phase enhancing finding identified on MR performed December 30, 2022 is not detectable solid by CT and probably represents vascular flow phenomenon of arterial portal shunting as mentioned in report of prior MR  No CT findings to suggest hepatic tumor  Normal hepatic contours  No biliary dilatation  Portal and hepatic veins  GALLBLADDER:  No calcified gallstones  No pericholecystic inflammatory change  SPLEEN:  Unremarkable  PANCREAS:  Unremarkable  ADRENAL GLANDS:  Unremarkable  KIDNEYS/URETERS:  Unremarkable  No hydronephrosis  STOMACH AND BOWEL:  Small hiatal hernia  No bowel wall thickening or bowel obstruction  APPENDIX:  No findings to suggest appendicitis   ABDOMINOPELVIC CAVITY:  Mild hazy groundglass change associated with the small bowel mesentery is unchanged from previous examinations most consistent with mild changes of chronic mesenteric panniculitis  No developing lymphadenopathy  No ascites  No pneumoperitoneum  VESSELS:  Unremarkable for patient's age  PELVIS REPRODUCTIVE ORGANS:  Unremarkable for patient's age  URINARY BLADDER:  Unremarkable  ABDOMINAL WALL/INGUINAL REGIONS:  Unremarkable  OSSEOUS STRUCTURES:  No acute fracture or destructive osseous lesion  No significant interval change from previous examinations  Specifically, unchanged findings as below: -Unchanged 12 mm noncalcified left lower lobe pulmonary nodule unchanged over multiple prior examinations and without hypermetabolism on PET/CT scan of August 10, 1778, almost certainly benign  -Unchanged 14 mm groundglass opacity in the lateral right lower lobe with no associated solid tissue and with no interval increase in size from prior examination, recently biopsied indeterminate findings  -Unchanged benign borderline and minimally enlarged mediastinal nodes, present over multiple prior examinations and not hypermetabolic on PET/CT of August 10, 2021  -Unchanged appearance of groundglass opacity in the small bowel mesentery with few intermixed nodes most consistent with mild sequela of chronic mesenteric panniculitis  -Reidentified findings of emphysema and sliding type hiatal hernia   Workstation performed: [id number]        Assessment/Plan:  Problem List Items Addressed This Visit        Respiratory    Centrilobular emphysema (HCC)    Relevant Medications    montelukast (SINGULAIR) 10 mg tablet    predniSONE 20 mg tablet    ipratropium (ATROVENT) 0 06 % nasal spray    fexofenadine (ALLEGRA) 180 MG tablet    fluticasone (FLONASE) 50 mcg/act nasal spray   Other Visit Diagnoses     Acute bronchitis, unspecified organism    -  Primary    Relevant Medications    montelukast (SINGULAIR) 10 mg tablet "predniSONE 20 mg tablet    ipratropium (ATROVENT) 0 06 % nasal spray    fexofenadine (ALLEGRA) 180 MG tablet    fluticasone (FLONASE) 50 mcg/act nasal spray    Post-nasal catarrh        Relevant Medications    ipratropium (ATROVENT) 0 06 % nasal spray    fexofenadine (ALLEGRA) 180 MG tablet    Seasonal allergic rhinitis due to pollen        Relevant Medications    predniSONE 20 mg tablet    fluticasone (FLONASE) 50 mcg/act nasal spray    Gastroesophageal reflux disease without esophagitis        Relevant Medications    omeprazole (PriLOSEC) 40 MG capsule          Likely dry cough is due to post nasal drip-advised to start montelukast daily and ipratropium nasal spray 2 to 3 times a day and start allegra daily at night time  If this does not improve the symptoms in 3 days then she is going to start prednisone 20 mg she is going to start prednisone 20 mg 1 a twice a day for 5 days if her symptoms  She may consider starting flonase nasal spray daily  Her epigastric pain is likely secondary to worsening of her gastritis and reflux symptoms for which I will resume omeprazole 40 mg oral daily  She will continue this for 14 days and thereafter as needed  Read package inserts for all medications before starting a new medications, call me if you have any questions  Patient was given opportunity to ask questions and all questions were answered  Disclaimer: Portions of the record may have been created with voice recognition software  Occasional wrong word or \"sound a like\" substitutions may have occurred due to the inherent limitations of voice recognition software  Read the chart carefully and recognize, using context, where substitutions have occurred  I have used the Epic copy/forward function to compose this note  I have reviewed my current note to ensure it reflects the current patient status, exam, assessment and plan      "

## 2023-04-25 ENCOUNTER — HOSPITAL ENCOUNTER (OUTPATIENT)
Dept: MAMMOGRAPHY | Facility: HOSPITAL | Age: 68
Discharge: HOME | End: 2023-04-25

## 2023-04-25 VITALS — HEIGHT: 62 IN | WEIGHT: 179.01 LBS | BODY MASS INDEX: 32.94 KG/M2

## 2023-04-25 DIAGNOSIS — Z12.31 ENCOUNTER FOR SCREENING MAMMOGRAM FOR BREAST CANCER: ICD-10-CM

## 2023-04-27 ENCOUNTER — OFFICE VISIT (OUTPATIENT)
Dept: FAMILY MEDICINE CLINIC | Facility: CLINIC | Age: 68
End: 2023-04-27

## 2023-04-27 ENCOUNTER — APPOINTMENT (OUTPATIENT)
Dept: LAB | Facility: CLINIC | Age: 68
End: 2023-04-27

## 2023-04-27 ENCOUNTER — HOSPITAL ENCOUNTER (OUTPATIENT)
Dept: RADIOLOGY | Facility: HOSPITAL | Age: 68
Discharge: HOME | End: 2023-04-27

## 2023-04-27 VITALS
OXYGEN SATURATION: 92 % | DIASTOLIC BLOOD PRESSURE: 80 MMHG | RESPIRATION RATE: 20 BRPM | SYSTOLIC BLOOD PRESSURE: 138 MMHG | WEIGHT: 185 LBS | BODY MASS INDEX: 34.04 KG/M2 | HEIGHT: 62 IN | HEART RATE: 82 BPM | TEMPERATURE: 97.2 F

## 2023-04-27 DIAGNOSIS — E03.9 HYPOTHYROIDISM, UNSPECIFIED TYPE: ICD-10-CM

## 2023-04-27 DIAGNOSIS — M46.1 SACROILIAC INFLAMMATION (HCC): ICD-10-CM

## 2023-04-27 DIAGNOSIS — N18.31 STAGE 3A CHRONIC KIDNEY DISEASE (HCC): ICD-10-CM

## 2023-04-27 DIAGNOSIS — M25.551 RIGHT HIP PAIN: ICD-10-CM

## 2023-04-27 DIAGNOSIS — D50.8 IRON DEFICIENCY ANEMIA DUE TO DIETARY CAUSES: ICD-10-CM

## 2023-04-27 DIAGNOSIS — M25.551 RIGHT HIP PAIN: Primary | ICD-10-CM

## 2023-04-27 DIAGNOSIS — J43.2 CENTRILOBULAR EMPHYSEMA (HCC): ICD-10-CM

## 2023-04-27 LAB
ALBUMIN SERPL BCP-MCNC: 4.3 G/DL (ref 3.5–5)
ALP SERPL-CCNC: 114 U/L (ref 34–104)
ALT SERPL W P-5'-P-CCNC: 13 U/L (ref 7–52)
ANION GAP SERPL CALCULATED.3IONS-SCNC: 6 MMOL/L (ref 4–13)
AST SERPL W P-5'-P-CCNC: 22 U/L (ref 13–39)
BASOPHILS # BLD AUTO: 0.05 THOUSANDS/ΜL (ref 0–0.1)
BASOPHILS NFR BLD AUTO: 1 % (ref 0–1)
BILIRUB SERPL-MCNC: 0.41 MG/DL (ref 0.2–1)
BUN SERPL-MCNC: 28 MG/DL (ref 5–25)
CALCIUM SERPL-MCNC: 9.5 MG/DL (ref 8.4–10.2)
CHLORIDE SERPL-SCNC: 103 MMOL/L (ref 96–108)
CO2 SERPL-SCNC: 31 MMOL/L (ref 21–32)
CREAT SERPL-MCNC: 1.18 MG/DL (ref 0.6–1.3)
EOSINOPHIL # BLD AUTO: 0.12 THOUSAND/ΜL (ref 0–0.61)
EOSINOPHIL NFR BLD AUTO: 1 % (ref 0–6)
ERYTHROCYTE [DISTWIDTH] IN BLOOD BY AUTOMATED COUNT: 14.5 % (ref 11.6–15.1)
FERRITIN SERPL-MCNC: 57 NG/ML (ref 8–388)
GFR SERPL CREATININE-BSD FRML MDRD: 47 ML/MIN/1.73SQ M
GLUCOSE SERPL-MCNC: 80 MG/DL (ref 65–140)
HCT VFR BLD AUTO: 41.4 % (ref 34.8–46.1)
HGB BLD-MCNC: 12.5 G/DL (ref 11.5–15.4)
IMM GRANULOCYTES # BLD AUTO: 0.04 THOUSAND/UL (ref 0–0.2)
IMM GRANULOCYTES NFR BLD AUTO: 1 % (ref 0–2)
IRON SATN MFR SERPL: 19 % (ref 15–50)
IRON SERPL-MCNC: 65 UG/DL (ref 50–170)
LYMPHOCYTES # BLD AUTO: 0.88 THOUSANDS/ΜL (ref 0.6–4.47)
LYMPHOCYTES NFR BLD AUTO: 11 % (ref 14–44)
MCH RBC QN AUTO: 27 PG (ref 26.8–34.3)
MCHC RBC AUTO-ENTMCNC: 30.2 G/DL (ref 31.4–37.4)
MCV RBC AUTO: 89 FL (ref 82–98)
MONOCYTES # BLD AUTO: 0.59 THOUSAND/ΜL (ref 0.17–1.22)
MONOCYTES NFR BLD AUTO: 7 % (ref 4–12)
NEUTROPHILS # BLD AUTO: 6.69 THOUSANDS/ΜL (ref 1.85–7.62)
NEUTS SEG NFR BLD AUTO: 79 % (ref 43–75)
NRBC BLD AUTO-RTO: 0 /100 WBCS
PLATELET # BLD AUTO: 189 THOUSANDS/UL (ref 149–390)
PMV BLD AUTO: 9.7 FL (ref 8.9–12.7)
POTASSIUM SERPL-SCNC: 4.5 MMOL/L (ref 3.5–5.3)
PROT SERPL-MCNC: 7 G/DL (ref 6.4–8.4)
RBC # BLD AUTO: 4.63 MILLION/UL (ref 3.81–5.12)
SODIUM SERPL-SCNC: 140 MMOL/L (ref 135–147)
TIBC SERPL-MCNC: 336 UG/DL (ref 250–450)
TSH SERPL DL<=0.05 MIU/L-ACNC: 3.63 UIU/ML (ref 0.45–4.5)
WBC # BLD AUTO: 8.37 THOUSAND/UL (ref 4.31–10.16)

## 2023-04-27 RX ORDER — BUDESONIDE, GLYCOPYRROLATE, AND FORMOTEROL FUMARATE 160; 9; 4.8 UG/1; UG/1; UG/1
2 AEROSOL, METERED RESPIRATORY (INHALATION) 2 TIMES DAILY
Qty: 32.1 G | Refills: 1 | Status: SHIPPED | OUTPATIENT
Start: 2023-04-27

## 2023-04-27 RX ORDER — TRAMADOL HYDROCHLORIDE 50 MG/1
50 TABLET ORAL EVERY 12 HOURS PRN
Qty: 20 TABLET | Refills: 0 | Status: SHIPPED | OUTPATIENT
Start: 2023-04-27

## 2023-04-27 RX ORDER — IPRATROPIUM BROMIDE AND ALBUTEROL SULFATE 2.5; .5 MG/3ML; MG/3ML
SOLUTION RESPIRATORY (INHALATION)
COMMUNITY
Start: 2023-04-06

## 2023-04-27 RX ORDER — LEVOTHYROXINE SODIUM 0.07 MG/1
75 TABLET ORAL
Qty: 90 TABLET | Refills: 0 | Status: SHIPPED | OUTPATIENT
Start: 2023-04-27

## 2023-04-27 NOTE — PATIENT INSTRUCTIONS
Get the blood work as ordered last time  Take tylenol 500 mg every 4-6 hours for pain as needed  Diclofenac gel as needed for hip pain every 4 hours, can also use lidocaine 5% cream as needed  Obtain Xray of the hip as ordered last time  Use tramadol every 12 hours as needed for severe pain that does not respond to tylenol

## 2023-04-28 NOTE — PROGRESS NOTES
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 laryngeal cancer follows ENT, abdominal nodule - followed with Dr Julio(left)-transitioning to Dr Neno Craig, COPD-saw pulmonary, she has had multiple serial chest CT chest and CT abdomen for nodules   She has taken fluoxetine again and is doing ok  Here with  for follow up - did not obtain xray of the hip or labs as previously ordered  Main complain is anterior and posterior hip pain     used for entire duration of the visit      Past Medical History:   Diagnosis Date   • Acute kidney failure ([facility] )    • Allergic    • Allergies    • Anemia    • Asthma    • Cancer ([facility] )    • Chronic kidney disease (CKD), stage III (moderate) ([facility] )    • COPD (chronic obstructive pulmonary disease) ([facility] )    • COVID-19     Covid + 5-0-55-cough at that time now fully resolved   • Disease of thyroid gland    • Essential hypertension    • GERD (gastroesophageal reflux disease)    • Glaucoma    • High blood pressure    • HTN (hypertension) 02/16/2021   • Hyperlipidemia    • Hypothyroidism    • 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   • Throat cancer ([facility] ) 2014    chemo and rad therapy 2014       Family History   Problem Relation Age of Onset   • Other Mother         respiratory disorder   • Sudden death Father         shot himself   • Suicidality Father    • Brain cancer Sister    • Diabetes Sister    • Breast cancer Sister    • Cancer Sister 58        pancreatic cancer   • No Known Problems Sister    • No Known Problems Sister    • No Known Problems Sister    • No Known Problems Sister    • No Known Problems Sister    • No Known Problems Sister    • No Known Problems Daughter    • No Known Problems Daughter    • No Known Problems Maternal Grandmother    • No Known Problems Maternal Grandfather    • No Known Problems Paternal Grandmother • No Known Problems Paternal Grandfather    • No Known Problems Maternal Aunt    • No Known Problems Maternal Aunt    • No Known Problems Maternal Aunt    • No Known Problems Maternal Aunt    • No Known Problems Maternal Aunt    • No Known Problems Paternal Aunt    • No Known Problems Paternal Aunt    • No Known Problems Paternal Aunt    • No Known Problems Paternal Aunt        Past Surgical History:   Procedure Laterality Date   • COLONOSCOPY  2016   • ESOPHAGOGASTRODUODENOSCOPY  2016   • EYE SURGERY     • IR BIOPSY LUNG  05/18/2022   • LARYNGOSCOPY      w/ Bx  • PA TENDON SHEATH INCISION Right 02/16/2021    Procedure: THUMB TRIGGER FINGER RELEASE;  Surgeon: Floy Severance, MD;  Location: [facility]  MAIN [facility];  Service: Orthopedics   • TUBAL LIGATION          reports that she quit smoking about 9 years ago  Her smoking use included cigarettes  She has a 40 00 pack-year smoking history  She has never used smokeless tobacco  She reports that she does not drink alcohol and does not use drugs        Current Outpatient Medications:   •  albuterol (PROVENTIL HFA,VENTOLIN HFA) 90 mcg/act inhaler, TAKE 2 PUFFS BY MOUTH EVERY 4 HOURS AS NEEDED FOR WHEEZE, Disp: 18 g, Rfl: 5  •  brimonidine tartrate 0 2 % ophthalmic solution, , Disp: , Rfl:   •  Budeson-Glycopyrrol-Formoterol (Breztri Aerosphere) 160-9-4 8 MCG/ACT AERO, Inhale 2 puffs 2 (two) times a day Rinse mouth after use , Disp: 32 1 g, Rfl: 1  •  Diclofenac Sodium (VOLTAREN) 1 %, Apply 2 g topically 4 (four) times a day, Disp: 350 g, Rfl: 0  •  diphenhydrAMINE (BENADRYL) 25 mg tablet, Take 1 tablet (25 mg total) by mouth every 6 (six) hours as needed for itching, Disp: 30 tablet, Rfl: 0  •  ferrous sulfate 324 (65 Fe) mg, Take 1 tablet (324 mg total) by mouth daily after lunch, Disp: 90 tablet, Rfl: 1  •  fexofenadine (ALLEGRA) 180 MG tablet, TAKE 1 TABLET BY MOUTH EVERY DAY, Disp: 90 tablet, Rfl: 1  •  FLUoxetine (PROzac) 10 MG tablet, TAKE 1 TABLET BY MOUTH EVERY DAY, Disp: 90 tablet, Rfl: 1  •  fluticasone (FLONASE) 50 mcg/act nasal spray, 1 spray into each nostril daily, Disp: 16 g, Rfl: 0  •  ipratropium (ATROVENT) 0 06 % nasal spray, 2 SPRAYS INTO EACH NOSTRIL 3 (THREE) TIMES A DAY FOR INCREASED NASAL SECRETION, Disp: 15 mL, Rfl: 3  •  levothyroxine 75 mcg tablet, Take 1 tablet (75 mcg total) by mouth daily in the early morning, Disp: 90 tablet, Rfl: 0  •  lidocaine (XYLOCAINE) 5 % ointment, APPLY TOPICALLY AS NEEDED FOR MILD PAIN, Disp: 35 44 g, Rfl: 0  •  montelukast (SINGULAIR) 10 mg tablet, TAKE 1 TABLET BY MOUTH DAILY AT BEDTIME, Disp: 90 tablet, Rfl: 1  •  omeprazole (PriLOSEC) 40 MG capsule, TAKE 1 CAPSULE BY MOUTH EVERY DAY BEFORE BREAKFAST, Disp: 90 capsule, Rfl: 2  •  timolol (TIMOPTIC) 0 5 % ophthalmic solution, INSTILL 1 DROP INTO EACH EYE TWO TIMES A DAY, Disp: , Rfl:   •  traMADol (Ultram) 50 mg tablet, Take 1 tablet (50 mg total) by mouth every 12 (twelve) hours as needed for moderate pain, Disp: 20 tablet, Rfl: 0  •  ipratropium-albuterol (DUO-NEB) 0 5-2 5 mg/3 mL nebulizer solution, , Disp: , Rfl:     The following portions of the patient's history were reviewed and updated as appropriate: allergies, current medications, past family history, past medical history, past social history, past surgical history and problem list     Review of Systems   Constitutional: Negative for activity change, appetite change, chills, diaphoresis, fatigue and fever  HENT: Negative for congestion, facial swelling, mouth sores, rhinorrhea, sinus pressure, sneezing, sore throat, tinnitus, trouble swallowing and voice change  Eyes: Negative for pain, discharge, redness and visual disturbance  Respiratory: Negative for cough, shortness of breath and wheezing  Cardiovascular: Negative for chest pain, palpitations and leg swelling  Gastrointestinal: Negative for abdominal pain, blood in stool, constipation, diarrhea and nausea     Endocrine: Negative for cold "intolerance and heat intolerance  Genitourinary: Negative for dysuria, hematuria and urgency  Musculoskeletal: Positive for arthralgias  Negative for back pain and myalgias  Skin: Negative for rash and wound  Allergic/Immunologic: Negative for environmental allergies and food allergies  Neurological: Negative for dizziness, tremors, seizures, syncope, facial asymmetry, speech difficulty, weakness, light-headedness, numbness and headaches  Hematological: Negative for adenopathy  Psychiatric/Behavioral: Negative for agitation and behavioral problems  PHQ-2/9 Depression Screening    Little interest or pleasure in doing things: 0 - not at all  Feeling down, depressed, or hopeless: 0 - not at all  Trouble falling or staying asleep, or sleeping too much: 0 - not at all  Feeling tired or having little energy: 0 - not at all  Poor appetite or overeatin - not at all  Feeling bad about yourself - or that you are a failure or have let yourself or your family down: 0 - not at all  Trouble concentrating on things, such as reading the newspaper or watching television: 0 - not at all  Moving or speaking so slowly that other people could have noticed  Or the opposite - being so fidgety or restless that you have been moving around a lot more than usual: 0 - not at all  Thoughts that you would be better off dead, or of hurting yourself in some way: 0 - not at all  PHQ-9 Score: 0   PHQ-9 Interpretation: No or Minimal depression              Objective:    /80 (BP Location: Right arm, Patient Position: Sitting, Cuff Size: Adult)   Pulse 82   Temp (!) 97 2 °F (36 2 °C) (Tympanic)   Resp 20   Ht 5' 2\" (1 575 m)   Wt 83 9 kg (185 lb)   SpO2 92%   BMI 33 84 kg/m²      Physical Exam  Vitals and nursing note reviewed  Constitutional:       Appearance: Normal appearance  She is well-developed  HENT:      Head: Normocephalic and atraumatic        Right Ear: External ear normal       Left Ear: External " ear normal       Nose: Nose normal    Eyes:      General: No scleral icterus  Right eye: No discharge  Left eye: No discharge  Conjunctiva/sclera: Conjunctivae normal       Pupils: Pupils are equal, round, and reactive to light  Neck:      Thyroid: No thyromegaly  Cardiovascular:      Rate and Rhythm: Normal rate and regular rhythm  Heart sounds: Normal heart sounds  No murmur heard  No gallop  Pulmonary:      Effort: Pulmonary effort is normal       Breath sounds: Decreased air movement present  Examination of the right-upper field reveals decreased breath sounds  Examination of the left-upper field reveals decreased breath sounds  Examination of the right-middle field reveals decreased breath sounds  Examination of the left-middle field reveals decreased breath sounds  Decreased breath sounds present  No wheezing or rales  Abdominal:      General: There is no distension  Palpations: Abdomen is soft  Tenderness: There is no abdominal tenderness  Musculoskeletal:         General: Tenderness (right sacroiliac joint and right hip laterally, no limitation of ROM) present  No deformity  Cervical back: Normal range of motion and neck supple  Right lower leg: No edema  Left lower leg: No edema  Lymphadenopathy:      Cervical: No cervical adenopathy  Skin:     Findings: No erythema or rash  Neurological:      General: No focal deficit present  Mental Status: She is alert  Mental status is at baseline     Psychiatric:         Mood and Affect: Mood normal          Behavior: Behavior normal                  Assessment/Plan:  Problem List Items Addressed This Visit        Endocrine    Hypothyroidism    Relevant Medications    levothyroxine 75 mcg tablet       Respiratory    Centrilobular emphysema (HCC)    Relevant Medications    ipratropium-albuterol (DUO-NEB) 0 5-2 5 mg/3 mL nebulizer solution    Budeson-Glycopyrrol-Formoterol (Breztri Aerosphere) "160-9-4 8 MCG/ACT AERO   Other Visit Diagnoses     Right hip pain    -  Primary    Relevant Medications    Diclofenac Sodium (VOLTAREN) 1 %    traMADol (Ultram) 50 mg tablet    Sacroiliac inflammation ([address] )            Get the blood work as ordered last time  Take tylenol 500 mg every 4-6 hours for pain as needed  Diclofenac gel as needed for hip pain every 4 hours, can also use lidocaine 5% cream as needed  Obtain Xray of the hip as ordered last time  Use tramadol every 12 hours as needed for severe pain that does not respond to tylenol    I have spent 43 min face to face with patient ,reviewing data, labs , imaging, going over medications and treatment plain, additional 10 min documenting  Total 53 in spent in patients care           Read package inserts for all medications before starting a new medications, call me if you have any questions  Patient was given opportunity to ask questions and all questions were answered  Disclaimer: Portions of the record may have been created with voice recognition software  Occasional wrong word or \"sound a like\" substitutions may have occurred due to the inherent limitations of voice recognition software  Read the chart carefully and recognize, using context, where substitutions have occurred  I have used the Epic copy/forward function to compose this note  I have reviewed my current note to ensure it reflects the current patient status, exam, assessment and plan      "

## 2023-05-17 ENCOUNTER — OFFICE VISIT (OUTPATIENT)
Dept: PULMONOLOGY | Facility: CLINIC | Age: 68
End: 2023-05-17

## 2023-05-17 VITALS
BODY MASS INDEX: 33.86 KG/M2 | DIASTOLIC BLOOD PRESSURE: 80 MMHG | HEART RATE: 75 BPM | WEIGHT: 184 LBS | TEMPERATURE: 98 F | HEIGHT: 62 IN | OXYGEN SATURATION: 97 % | SYSTOLIC BLOOD PRESSURE: 122 MMHG

## 2023-05-17 DIAGNOSIS — J43.2 CENTRILOBULAR EMPHYSEMA (HCC): Primary | ICD-10-CM

## 2023-05-17 NOTE — ASSESSMENT & PLAN NOTE
This patient continues to have dyspnea on exertion grade 3  Uses oxygen with activity  I encouraged her to use oxygen at night  Previously we had discussed having her evaluated for endobronchial valves  That appointment was canceled  I told the patient through the  that I would prefer that she meet directly with Dr Ubaldo Vale to discuss the potential that endobronchial valves might improve her lung function and her symptoms  I did discuss this directly with her daughter  Otherwise patient advised to do walking on a regular basis using oxygen  Continues with Breztri and as needed albuterol

## 2023-05-17 NOTE — PROGRESS NOTES
Assessment/Plan:    Centrilobular emphysema ([address] )  This patient continues to have dyspnea on exertion grade 3  Uses oxygen with activity  I encouraged her to use oxygen at night  Previously we had discussed having her evaluated for endobronchial valves  That appointment was canceled  I told the patient through the  that I would prefer that she meet directly with Dr Janelle Traore to discuss the potential that endobronchial valves might improve her lung function and her symptoms  I did discuss this directly with her daughter  Otherwise patient advised to do walking on a regular basis using oxygen  Continues with Breztri and as needed albuterol  Diagnoses and all orders for this visit:    Centrilobular emphysema ([address] )          Subjective:      Patient ID: Nadeem Holbrook is a 76 y o  female  I spoke with this patient through a   Patient still has moderate to severe dyspnea on exertion  Not overly troubled with cough as far as I can tell  Uses oxygen with activity but not at night  No leg edema apparent  Patient did have testing for endobronchial valves including appropriate imaging  An appointment with Dr Janelle Traore was canceled for unclear reasons  I spoke to the patient's daughter by phone and she stated that the primary care physician told her not to proceed with endobronchial valves  I am disappointed that they have not met directly with the physician that would do this procedure to get the details in terms of the potential benefits and side effects  I recommend she continue with the current Breo as tree and as needed albuterol  Patient states that she has some dysphagia  Note that she has a distant history of radiation for head neck cancer  Recent CT scan demonstrates 2 stable lung nodules structural emphysema    A groundglass opacity has been previously biopsied demonstrating atypical glandular features with a potential differential diagnosis including "low-grade adenocarcinoma  primary symptoms  Associated symptoms include coughing and headaches  Pertinent negatives include no chest pain, fever, myalgias or sore throat  The following portions of the patient's history were reviewed and updated as appropriate: allergies, current medications, past family history, past medical history, past social history, past surgical history and problem list     Review of Systems   Constitutional: Negative for activity change, appetite change, fever and unexpected weight change  HENT: Positive for rhinorrhea and trouble swallowing  Negative for ear pain, postnasal drip, sneezing and sore throat  Respiratory: Positive for cough and shortness of breath  Negative for wheezing  Cardiovascular: Negative for chest pain, palpitations and leg swelling  Musculoskeletal: Negative for myalgias  Neurological: Positive for headaches  Objective:      /80   Pulse 75   Temp 98 °F (36 7 °C)   Ht 5' 2\" (1 575 m)   Wt 83 5 kg (184 lb)   SpO2 97%   BMI 33 65 kg/m²          Physical Exam  Vitals reviewed  Constitutional:       General: She is not in acute distress  Appearance: She is normal weight  She is not ill-appearing  Cardiovascular:      Rate and Rhythm: Normal rate and regular rhythm  Heart sounds: Heart sounds are distant  No murmur heard  Pulmonary:      Breath sounds: Examination of the right-lower field reveals decreased breath sounds  Examination of the left-lower field reveals decreased breath sounds  Decreased breath sounds present  No wheezing or rhonchi  Musculoskeletal:         General: No swelling  Cervical back: No rigidity or tenderness  Right lower leg: No edema  Left lower leg: No edema  Lymphadenopathy:      Cervical: No cervical adenopathy  Skin:     General: Skin is warm and dry  Neurological:      Mental Status: She is alert and oriented to person, place, and time     Psychiatric:         Mood and " Affect: Mood normal          Behavior: Behavior normal        Answers for HPI/ROS submitted by the patient on 5/17/2023  Do you have chest tightness?: Yes  Do you have a cough?: Yes  Do you have difficulty breathing?: Yes  Do you have a hoarse voice?: Yes  Do you have shortness of breath?: Yes  Chronicity: chronic  When did you first notice your symptoms?: more than 1 month ago  How often do your symptoms occur?: daily  Since you first noticed this problem, how has it changed?: gradually worsening  Have you had a change in appetite?: No  Do you have chest pain?: No  Do you have shortness of breath that occurs with effort or exertion?: Yes  Do you have ear congestion?: No  Do you have ear pain?: No  Do you have a fever?: No  Do you have headaches?: Yes  Do you have heartburn?: No  Do you have fatigue?: Yes  Do you have muscle pain?: No  Do you have nasal congestion?: No  Do you have shortness of breath when lying flat?: No  Do you have shortness of breath when you wake up?: Yes  Do you have post-nasal drip?: No  Do you have a runny nose?: Yes  Do you have sneezing?: No  Do you have a sore throat?: No  Do you have sweats?: No  Do you have trouble swallowing?: Yes  Have you experienced weight loss?: No  Which of the following makes your symptoms worse?: any activity, change in weather, climbing stairs, emotional stress, exercise, minimal activity, occupational exposure, pollen, strenuous activity  Which of the following makes your symptoms better?: rest, steroid inhaler

## 2023-05-19 DIAGNOSIS — J30.1 SEASONAL ALLERGIC RHINITIS DUE TO POLLEN: ICD-10-CM

## 2023-05-19 DIAGNOSIS — J31.1 POST-NASAL CATARRH: ICD-10-CM

## 2023-05-19 RX ORDER — IPRATROPIUM BROMIDE 42 UG/1
2 SPRAY, METERED NASAL 3 TIMES DAILY
Qty: 15 ML | Refills: 2 | Status: SHIPPED | OUTPATIENT
Start: 2023-05-19

## 2023-05-19 RX ORDER — FLUTICASONE PROPIONATE 50 MCG
SPRAY, SUSPENSION (ML) NASAL
Qty: 24 ML | Refills: 1 | Status: SHIPPED | OUTPATIENT
Start: 2023-05-19

## 2023-05-21 ENCOUNTER — APPOINTMENT (EMERGENCY)
Dept: RADIOLOGY | Facility: HOSPITAL | Age: 68
End: 2023-05-21

## 2023-05-21 ENCOUNTER — HOSPITAL ENCOUNTER (EMERGENCY)
Facility: HOSPITAL | Age: 68
Discharge: HOME | End: 2023-05-21
Attending: EMERGENCY MEDICINE

## 2023-05-21 VITALS
OXYGEN SATURATION: 95 % | DIASTOLIC BLOOD PRESSURE: 66 MMHG | RESPIRATION RATE: 20 BRPM | SYSTOLIC BLOOD PRESSURE: 140 MMHG | HEIGHT: 62 IN | HEART RATE: 79 BPM | TEMPERATURE: 99.9 F | WEIGHT: 182 LBS | BODY MASS INDEX: 33.49 KG/M2

## 2023-05-21 DIAGNOSIS — J40 BRONCHITIS: ICD-10-CM

## 2023-05-21 DIAGNOSIS — R06.02 SHORTNESS OF BREATH: Primary | ICD-10-CM

## 2023-05-21 DIAGNOSIS — J18.9 PNEUMONIA: ICD-10-CM

## 2023-05-21 LAB
ALBUMIN SERPL BCP-MCNC: 4 G/DL (ref 3.5–5)
ALP SERPL-CCNC: 117 U/L (ref 34–104)
ALT SERPL W P-5'-P-CCNC: 15 U/L (ref 7–52)
ANION GAP SERPL CALCULATED.3IONS-SCNC: 5 MMOL/L (ref 4–13)
APTT PPP: 26 SECONDS (ref 23–37)
AST SERPL W P-5'-P-CCNC: 24 U/L (ref 13–39)
BASOPHILS # BLD MANUAL: 0 THOUSAND/UL (ref 0–0.1)
BASOPHILS NFR MAR MANUAL: 0 % (ref 0–1)
BILIRUB SERPL-MCNC: 0.48 MG/DL (ref 0.2–1)
BUN SERPL-MCNC: 25 MG/DL (ref 5–25)
CALCIUM SERPL-MCNC: 9 MG/DL (ref 8.4–10.2)
CARDIAC TROPONIN I PNL SERPL HS: 3 NG/L
CHLORIDE SERPL-SCNC: 100 MMOL/L (ref 96–108)
CO2 SERPL-SCNC: 34 MMOL/L (ref 21–32)
CREAT SERPL-MCNC: 1.02 MG/DL (ref 0.6–1.3)
EOSINOPHIL # BLD MANUAL: 0.28 THOUSAND/UL (ref 0–0.4)
EOSINOPHIL NFR BLD MANUAL: 2 % (ref 0–6)
ERYTHROCYTE [DISTWIDTH] IN BLOOD BY AUTOMATED COUNT: 13.9 % (ref 11.6–15.1)
FLUAV RNA RESP QL NAA+PROBE: NEGATIVE
FLUBV RNA RESP QL NAA+PROBE: NEGATIVE
GFR SERPL CREATININE-BSD FRML MDRD: 56 ML/MIN/1.73SQ M
GLUCOSE SERPL-MCNC: 102 MG/DL (ref 65–140)
HCT VFR BLD AUTO: 41.7 % (ref 34.8–46.1)
HGB BLD-MCNC: 12.5 G/DL (ref 11.5–15.4)
INR PPP: 0.92 (ref 0.84–1.19)
LYMPHOCYTES # BLD AUTO: 0.71 THOUSAND/UL (ref 0.6–4.47)
LYMPHOCYTES # BLD AUTO: 5 % (ref 14–44)
MAGNESIUM SERPL-MCNC: 1.8 MG/DL (ref 1.9–2.7)
MCH RBC QN AUTO: 26.8 PG (ref 26.8–34.3)
MCHC RBC AUTO-ENTMCNC: 30 G/DL (ref 31.4–37.4)
MCV RBC AUTO: 90 FL (ref 82–98)
MONOCYTES # BLD AUTO: 0.71 THOUSAND/UL (ref 0–1.22)
MONOCYTES NFR BLD: 5 % (ref 4–12)
NEUTROPHILS # BLD MANUAL: 12.47 THOUSAND/UL (ref 1.85–7.62)
NEUTS BAND NFR BLD MANUAL: 1 % (ref 0–8)
NEUTS SEG NFR BLD AUTO: 87 % (ref 43–75)
PLATELET # BLD AUTO: 189 THOUSANDS/UL (ref 149–390)
PLATELET BLD QL SMEAR: ADEQUATE
PMV BLD AUTO: 10.1 FL (ref 8.9–12.7)
POTASSIUM SERPL-SCNC: 4.4 MMOL/L (ref 3.5–5.3)
PROT SERPL-MCNC: 7 G/DL (ref 6.4–8.4)
PROTHROMBIN TIME: 12.5 SECONDS (ref 11.6–14.5)
RBC # BLD AUTO: 4.66 MILLION/UL (ref 3.81–5.12)
RBC MORPH BLD: NORMAL
RSV RNA RESP QL NAA+PROBE: NEGATIVE
SARS-COV-2 RNA RESP QL NAA+PROBE: NEGATIVE
SODIUM SERPL-SCNC: 139 MMOL/L (ref 135–147)
WBC # BLD AUTO: 14.17 THOUSAND/UL (ref 4.31–10.16)

## 2023-05-21 RX ORDER — PREDNISONE 20 MG/1
20 TABLET ORAL DAILY
Qty: 5 TABLET | Refills: 0 | Status: SHIPPED | OUTPATIENT
Start: 2023-05-21 | End: 2023-05-26

## 2023-05-21 RX ORDER — METHYLPREDNISOLONE SODIUM SUCCINATE 125 MG/2ML
100 INJECTION, POWDER, LYOPHILIZED, FOR SOLUTION INTRAMUSCULAR; INTRAVENOUS ONCE
Status: COMPLETED | OUTPATIENT
Start: 2023-05-21 | End: 2023-05-21

## 2023-05-21 RX ORDER — AZITHROMYCIN 250 MG/1
250 TABLET, FILM COATED ORAL EVERY 24 HOURS
Qty: 4 TABLET | Refills: 0 | Status: SHIPPED | OUTPATIENT
Start: 2023-05-21 | End: 2023-05-25

## 2023-05-21 RX ORDER — AZITHROMYCIN 250 MG/1
TABLET, FILM COATED ORAL
Qty: 6 TABLET | Refills: 0 | Status: SHIPPED | OUTPATIENT
Start: 2023-05-21 | End: 2023-05-21 | Stop reason: SDUPTHER

## 2023-05-21 RX ORDER — SODIUM CHLORIDE 9 MG/ML
125 INJECTION, SOLUTION INTRAVENOUS CONTINUOUS
Status: DISCONTINUED | OUTPATIENT
Start: 2023-05-21 | End: 2023-05-21 | Stop reason: HOSPADM

## 2023-05-21 RX ORDER — IPRATROPIUM BROMIDE AND ALBUTEROL SULFATE 2.5; .5 MG/3ML; MG/3ML
3 SOLUTION RESPIRATORY (INHALATION)
Status: DISCONTINUED | OUTPATIENT
Start: 2023-05-21 | End: 2023-05-21 | Stop reason: HOSPADM

## 2023-05-21 RX ORDER — AZITHROMYCIN 250 MG/1
500 TABLET, FILM COATED ORAL ONCE
Status: COMPLETED | OUTPATIENT
Start: 2023-05-21 | End: 2023-05-21

## 2023-05-21 RX ADMIN — SODIUM CHLORIDE 125 ML/HR: 0.9 INJECTION, SOLUTION INTRAVENOUS at 16:43

## 2023-05-21 RX ADMIN — METHYLPREDNISOLONE SODIUM SUCCINATE 100 MG: 125 INJECTION, POWDER, FOR SOLUTION INTRAMUSCULAR; INTRAVENOUS at 16:43

## 2023-05-21 RX ADMIN — AZITHROMYCIN MONOHYDRATE 500 MG: 250 TABLET ORAL at 18:39

## 2023-05-21 RX ADMIN — IPRATROPIUM BROMIDE AND ALBUTEROL SULFATE 3 ML: 2.5; .5 SOLUTION RESPIRATORY (INHALATION) at 16:43

## 2023-05-21 NOTE — ED PROVIDER NOTES
History  Chief Complaint   Patient presents with   • Shortness of Breath     Patient reports SOB, increased work of breathing, HA, dizziness, generalized bodyaches, and low Sp02 reading at home  History provided by:  Patient  Shortness of Breath  Severity:  Moderate  Onset quality:  Gradual  Duration:  1 hour  Timing:  Intermittent  Progression:  Resolved  Chronicity:  New  Context: activity and URI    Context: not animal exposure, not emotional upset, not fumes, not known allergens, not occupational exposure, not pollens, not smoke exposure, not strong odors and not weather changes    Relieved by:  Nothing  Worsened by:  Coughing, activity and exertion  Ineffective treatments:  Inhaler, rest, sitting up and oxygen  Associated symptoms: no abdominal pain, no chest pain, no claudication, no cough, no diaphoresis, no ear pain, no fever, no headaches, no hemoptysis, no neck pain, no PND, no rash, no sore throat, no sputum production, no syncope, no swollen glands, no vomiting and no wheezing    Risk factors: hx of cancer    Risk factors: no recent alcohol use, no family hx of DVT, no hx of PE/DVT, no obesity, no oral contraceptive use, no prolonged immobilization, no recent surgery and no tobacco use        Prior to Admission Medications   Prescriptions Last Dose Informant Patient Reported? Taking? Budeson-Glycopyrrol-Formoterol (Breztri Aerosphere) 160-9-4 8 MCG/ACT AERO   No No   Sig: Inhale 2 puffs 2 (two) times a day Rinse mouth after use     Diclofenac Sodium (VOLTAREN) 1 %   No No   Sig: Apply 2 g topically 4 (four) times a day   FLUoxetine (PROzac) 10 MG tablet   No No   Sig: TAKE 1 TABLET BY MOUTH EVERY DAY   albuterol (PROVENTIL HFA,VENTOLIN HFA) 90 mcg/act inhaler   No No   Sig: TAKE 2 PUFFS BY MOUTH EVERY 4 HOURS AS NEEDED FOR WHEEZE   brimonidine tartrate 0 2 % ophthalmic solution   Yes No   diphenhydrAMINE (BENADRYL) 25 mg tablet   No No   Sig: Take 1 tablet (25 mg total) by mouth every 6 (six) hours as needed for itching   ferrous sulfate 324 (65 Fe) mg   No No   Sig: Take 1 tablet (324 mg total) by mouth daily after lunch   fexofenadine (ALLEGRA) 180 MG tablet   No No   Sig: TAKE 1 TABLET BY MOUTH EVERY DAY   fluticasone (FLONASE) 50 mcg/act nasal spray   No No   Sig: SPRAY 1 SPRAY INTO EACH NOSTRIL EVERY DAY   ipratropium (ATROVENT) 0 06 % nasal spray   No No   Si SPRAYS INTO EACH NOSTRIL 3 (THREE) TIMES A DAY FOR INCREASED NASAL SECRETION   ipratropium-albuterol (DUO-NEB) 0 5-2 5 mg/3 mL nebulizer solution   Yes No   levothyroxine 75 mcg tablet   No No   Sig: Take 1 tablet (75 mcg total) by mouth daily in the early morning   lidocaine (XYLOCAINE) 5 % ointment   No No   Sig: APPLY TOPICALLY AS NEEDED FOR MILD PAIN   montelukast (SINGULAIR) 10 mg tablet   No No   Sig: TAKE 1 TABLET BY MOUTH DAILY AT BEDTIME   omeprazole (PriLOSEC) 40 MG capsule   No No   Sig: TAKE 1 CAPSULE BY MOUTH EVERY DAY BEFORE BREAKFAST   timolol (TIMOPTIC) 0 5 % ophthalmic solution   Yes No   Sig: INSTILL 1 DROP INTO EACH EYE TWO TIMES A DAY   traMADol (Ultram) 50 mg tablet   No No   Sig: Take 1 tablet (50 mg total) by mouth every 12 (twelve) hours as needed for moderate pain      Facility-Administered Medications: None       Past Medical History:   Diagnosis Date   • Acute kidney failure (HCC)    • Allergic    • Allergies    • Anemia    • Asthma    • Cancer (HCC)    • Cataract    • Chronic kidney disease (CKD), stage III (moderate) ([facility])    • COPD (chronic obstructive pulmonary disease) ([facility])    • COVID-19     Covid + 2-4-11-cough at that time now fully resolved   • Disease of thyroid gland    • Essential hypertension    • GERD (gastroesophageal reflux disease)    • Glaucoma    • High blood pressure    • HTN (hypertension) 2021   • Hyperlipidemia    • Hypothyroidism    • Pulmonary hypertension (HCC)    • 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   • Throat cancer ([facility] [address] ) 2014    chemo and rad therapy  Past Surgical History:   Procedure Laterality Date   • COLONOSCOPY  2016   • ESOPHAGOGASTRODUODENOSCOPY  2016   • EYE SURGERY     • IR BIOPSY LUNG  2022   • LARYNGOSCOPY      w/ Bx  • NC TENDON SHEATH INCISION Right 2021    Procedure: THUMB TRIGGER FINGER RELEASE;  Surgeon: Tresa Cruz MD;  Location: [facility]  MAIN [facility];  Service: Orthopedics   • TUBAL LIGATION         Family History   Problem Relation Age of Onset   • Other Mother         respiratory disorder   • Sudden death Father         shot himself   • Suicidality Father    • Brain cancer Sister    • Diabetes Sister    • Breast cancer Sister    • Cancer Sister         pancreatic cancer   • No Known Problems Sister    • No Known Problems Sister    • No Known Problems Sister    • No Known Problems Sister    • No Known Problems Sister    • No Known Problems Sister    • No Known Problems Daughter    • No Known Problems Daughter    • No Known Problems Maternal Grandmother    • No Known Problems Maternal Grandfather    • No Known Problems Paternal Grandmother    • No Known Problems Paternal Grandfather    • No Known Problems Maternal Aunt    • No Known Problems Maternal Aunt    • No Known Problems Maternal Aunt    • No Known Problems Maternal Aunt    • No Known Problems Maternal Aunt    • No Known Problems Paternal Aunt    • No Known Problems Paternal Aunt    • No Known Problems Paternal Aunt    • No Known Problems Paternal Aunt      I have reviewed and agree with the history as documented      E-Cigarette/Vaping   • E-Cigarette Use Never User      E-Cigarette/Vaping Substances   • Nicotine No    • THC No    • CBD No      Social History     Tobacco Use   • Smoking status: Former     Packs/day: 1 00     Years: 40 00     Pack years: 40 00     Types: Cigarettes     Quit date: 2014     Years since quittin 3   • Smokeless tobacco: Never   Vaping Use   • Vaping Use: Never used   Substance Use Topics   • Alcohol use: Never     Comment: None • Drug use: Never     Comment: Denies       Review of Systems   Constitutional: Positive for activity change  Negative for appetite change, chills, diaphoresis, fatigue and fever  HENT: Positive for congestion, postnasal drip and rhinorrhea  Negative for ear pain and sore throat  Respiratory: Positive for shortness of breath  Negative for cough, hemoptysis, sputum production and wheezing  Cardiovascular: Negative for chest pain, claudication, syncope and PND  Gastrointestinal: Negative for abdominal pain, anal bleeding, blood in stool, diarrhea and vomiting  Genitourinary: Negative for dysuria, frequency, hematuria and urgency  Musculoskeletal: Negative for back pain and neck pain  Skin: Negative for color change and rash  Neurological: Negative for headaches  Psychiatric/Behavioral: Negative for confusion  All other systems reviewed and are negative  Physical Exam  Physical Exam  Vitals and nursing note reviewed  Constitutional:       General: She is not in acute distress  Appearance: She is well-developed and normal weight  She is not ill-appearing, toxic-appearing or diaphoretic  HENT:      Head: Normocephalic and atraumatic  Mouth/Throat:      Pharynx: No pharyngeal swelling or oropharyngeal exudate  Neck:      Thyroid: No thyromegaly  Vascular: No hepatojugular reflux or JVD  Trachea: No tracheal deviation  Cardiovascular:      Rate and Rhythm: Normal rate and regular rhythm  Pulmonary:      Breath sounds: Examination of the right-upper field reveals decreased breath sounds  Examination of the left-upper field reveals decreased breath sounds  Examination of the right-middle field reveals decreased breath sounds  Examination of the left-middle field reveals decreased breath sounds  Examination of the right-lower field reveals rhonchi  Examination of the left-lower field reveals rhonchi  Decreased breath sounds and rhonchi present     Abdominal: Palpations: Abdomen is soft  There is no hepatomegaly, splenomegaly or mass  Tenderness: There is no abdominal tenderness  There is no guarding or rebound  Musculoskeletal:         General: Normal range of motion  Cervical back: Neck supple  Right lower leg: No edema  Left lower leg: No edema  Lymphadenopathy:      Cervical: No cervical adenopathy  Skin:     General: Skin is warm  Capillary Refill: Capillary refill takes less than 2 seconds  Neurological:      General: No focal deficit present  Mental Status: She is alert and oriented to person, place, and time     Psychiatric:         Mood and Affect: Mood normal          Behavior: Behavior normal          Vital Signs  ED Triage Vitals [05/21/23 1531]   Temperature Pulse Respirations Blood Pressure SpO2   99 9 °F (37 7 °C) 80 22 156/84 96 %      Temp Source Heart Rate Source Patient Position - Orthostatic VS BP Location FiO2 (%)   Oral Monitor Sitting Right arm --      Pain Score       8           Vitals:    05/21/23 1531 05/21/23 1600 05/21/23 1730   BP: 156/84 108/64 140/66   Pulse: 80 78 79   Patient Position - Orthostatic VS: Sitting           Visual Acuity  Visual Acuity    Flowsheet Row Most Recent Value   L Pupil Size (mm) 3   R Pupil Size (mm) 3          ED Medications  Medications   methylPREDNISolone sodium succinate (Solu-MEDROL) injection 100 mg (100 mg Intravenous Given 5/21/23 1643)   azithromycin (ZITHROMAX) tablet 500 mg (500 mg Oral Given 5/21/23 1839)       Diagnostic Studies  Results Reviewed     Procedure Component Value Units Date/Time    FLU/RSV/COVID - if FLU/RSV clinically relevant [[id number]]  (Normal) Collected: 05/21/23 1633    Lab Status: Final result Specimen: Nares from Nose Updated: 05/21/23 3617     SARS-CoV-2 Negative     INFLUENZA A PCR Negative     INFLUENZA B PCR Negative     RSV PCR Negative    Narrative:      FOR PEDIATRIC PATIENTS - copy/paste COVID Guidelines URL to browser: [email or web address]    SARS-CoV-2 assay is a Nucleic Acid Amplification assay intended for the  qualitative detection of nucleic acid from SARS-CoV-2 in nasopharyngeal  swabs  Results are for the presumptive identification of SARS-CoV-2 RNA  Positive results are indicative of infection with SARS-CoV-2, the virus  causing COVID-19, but do not rule out bacterial infection or co-infection  with other viruses  Laboratories within the [place] and its  territories are required to report all positive results to the appropriate  public health authorities  Negative results do not preclude SARS-CoV-2  infection and should not be used as the sole basis for treatment or other  patient management decisions  Negative results must be combined with  clinical observations, patient history, and epidemiological information  This test has not been FDA cleared or approved  This test has been authorized by FDA under an Emergency Use Authorization  (EUA)  This test is only authorized for the duration of time the  declaration that circumstances exist justifying the authorization of the  emergency use of an in vitro diagnostic tests for detection of SARS-CoV-2  virus and/or diagnosis of COVID-19 infection under section 564(b)(1) of  the Act, 21 U  S C  541YAO-3(O)(3), unless the authorization is terminated  or revoked sooner  The test has been validated but independent review by FDA  and CLIA is pending  Test performed using [employer] GeneXpert: This RT-PCR assay targets N2,  a region unique to SARS-CoV-2  A conserved region in the E-gene was chosen  for pan-Sarbecovirus detection which includes SARS-CoV-2  According to CMS-2020-01-R, this platform meets the definition of high-[employer] technology      Manual Differential(PHLEBS Do Not Order) [[id number]]  (Abnormal) Collected: 05/21/23 1633    Lab Status: Final result Specimen: Blood from Arm, Right Updated: 05/21/23 1732     Segmented % 87 %      Bands % 1 %      Lymphocytes % 5 %      Monocytes % 5 %      Eosinophils, % 2 %      Basophils % 0 %      Absolute Neutrophils 12 47 Thousand/uL      Lymphocytes Absolute 0 71 Thousand/uL      Monocytes Absolute 0 71 Thousand/uL      Eosinophils Absolute 0 28 Thousand/uL      Basophils Absolute 0 00 Thousand/uL      Total Counted --     RBC Morphology Normal     Platelet Estimate Adequate    CBC and differential [[id number]]  (Abnormal) Collected: 05/21/23 1633    Lab Status: Final result Specimen: Blood from Arm, Right Updated: 05/21/23 1732     WBC 14 17 Thousand/uL      RBC 4 66 Million/uL      Hemoglobin 12 5 g/dL      Hematocrit 41 7 %      MCV 90 fL      MCH 26 8 pg      MCHC 30 0 g/dL      RDW 13 9 %      MPV 10 1 fL      Platelets 130 Thousands/uL     Narrative: This is an appended report  These results have been appended to a previously verified report      HS Troponin 0hr (reflex protocol) [[id number]]  (Normal) Collected: 05/21/23 1633    Lab Status: Final result Specimen: Blood from Arm, Right Updated: 05/21/23 1710     hs TnI 0hr 3 ng/L     Comprehensive metabolic panel [[id number]]  (Abnormal) Collected: 05/21/23 1633    Lab Status: Final result Specimen: Blood from Arm, Right Updated: 05/21/23 1703     Sodium 139 mmol/L      Potassium 4 4 mmol/L      Chloride 100 mmol/L      CO2 34 mmol/L      ANION GAP 5 mmol/L      BUN 25 mg/dL      Creatinine 1 02 mg/dL      Glucose 102 mg/dL      Calcium 9 0 mg/dL      AST 24 U/L      ALT 15 U/L      Alkaline Phosphatase 117 U/L      Total Protein 7 0 g/dL      Albumin 4 0 g/dL      Total Bilirubin 0 48 mg/dL      eGFR 56 ml/min/1 73sq m     Narrative:      [facility] guidelines for Chronic Kidney Disease (CKD):   •  Stage 1 with normal or high GFR (GFR > 90 mL/min/1 73 square meters)  •  Stage 2 Mild CKD (GFR = 60-89 mL/min/1 73 square meters)  •  Stage 3A Moderate CKD (GFR = 45-59 mL/min/1 73 square meters)  •  Stage 3B Moderate CKD (GFR = 30-44 mL/min/1 73 square meters)  •  Stage 4 Severe CKD (GFR = 15-29 mL/min/1 73 square meters)  •  Stage 5 End Stage CKD (GFR <15 mL/min/1 73 square meters)  Note: GFR calculation is accurate only with a steady state creatinine    Magnesium [[id number]]  (Abnormal) Collected: 05/21/23 1633    Lab Status: Final result Specimen: Blood from Arm, Right Updated: 05/21/23 1703     Magnesium 1 8 mg/dL     Protime-INR [[id number]]  (Normal) Collected: 05/21/23 1633    Lab Status: Final result Specimen: Blood from Arm, Right Updated: 05/21/23 1659     Protime 12 5 seconds      INR 0 92    APTT [[id number]]  (Normal) Collected: 05/21/23 1633    Lab Status: Final result Specimen: Blood from Arm, Right Updated: 05/21/23 1659     PTT 26 seconds                  XR chest 1 view portable   ED Interpretation by Lara Avendaño PA-C (05/21 1719)   No acute cardiopulmonary disease      Final Result by Timbo Quinn MD (05/21 1937)      Patchy opacities right upper lobe suspicious for pneumonia  No change 12 mm nodule left lung base  This has had prior work-up with CT and PET imaging  Follow-up would be based on those studies which are more sensitive than plain film radiographs  Findings marked for immediate notification regarding pneumonia                 Workstation performed: [id number]                    Procedures  ECG 12 Lead Documentation Only    Date/Time: 5/21/2023 4:05 PM  Performed by: Lara Avendaño PA-C  Authorized by: Lara Avendaño PA-C     Indications / Diagnosis:  Dyspnea  ECG reviewed by me, the ED Provider: yes    Patient location:  ED  Previous ECG:     Previous ECG:  Compared to current    Similarity:  No change    Comparison to cardiac monitor: No    Interpretation:     Interpretation: normal    Rate:     ECG rate:  78    ECG rate assessment: normal    Rhythm:     Rhythm: sinus rhythm    Ectopy:     Ectopy: none    QRS:     QRS axis: Normal    QRS intervals:  Normal  Conduction:     Conduction: normal    ST segments:     ST segments:  Normal  T waves:     T waves: normal    Comments:      No signs of acute ischemia    Independently interpreted by me             ED Course  ED Course as of 05/22/23 2055   Sun May 21, 2023   1744 Patient signed out to [employer] PA-C  If second troponin is negative and the patient is able to ambulate without becoming hypoxic, she may be able to be discharged home with doxycycline and prednisone  She will continue with her inhalers  SBIRT 22yo+    Flowsheet Row Most Recent Value   Initial Alcohol Screen: US AUDIT-C     1  How often do you have a drink containing alcohol? 0 Filed at: 05/21/2023 1532   2  How many drinks containing alcohol do you have on a typical day you are drinking? 0 Filed at: 05/21/2023 1532   3a  Male UNDER 65: How often do you have five or more drinks on one occasion? 0 Filed at: 05/21/2023 1532   3b  FEMALE Any Age, or MALE 65+: How often do you have 4 or more drinks on one occassion? 0 Filed at: 05/21/2023 1532   Audit-C Score 0 Filed at: 05/21/2023 1532   KVNG: How many times in the past year have you    Used an illegal drug or used a prescription medication for non-medical reasons? Never Filed at: 05/21/2023 1532                    Medical Decision Making  This is a 35-year-old  woman presents emergency room with complaints of shortness of breath  She states she felt short of breath  She is always on 2 L of oxygen at home  She states that she put a pulse ox on her finger and noticed that it was 50%  It did not improve with rest   She   Presented to the emergency room for evaluation  Here in the emergency room she does not complain of shortness of breath  She does complain of productive cough with white phlegm  She complains of shortness of breath that is worse with activity  She complains of some nasal congestion and postnasal drip    She denies any sore throat  She denies any chest pain but does complain of chest tightness  She complains of and some nonspecific anterior abdominal pain  She denies any nausea or vomiting  She denies any urinary symptoms of frequency, urgency, hematuria, dysuria  She denies any black tarry stools or bright red blood per rectum  She has been moving her bowels normally  She has never had any surgeries on her abdomen in the past     Past medical history is positive for COPD, allergies, anemia, asthma, throat cancer, cataracts, chronic kidney disease, COVID, hypothyroidism, GERD, glaucoma, hypertension, hyperlipidemia, hypothyroidism, pulmonary hypertension, squamous cell carcinoma of the larynx  Physical exam-  This 28-year-old  female is alert and oriented x3  She is in no acute distress  She is well and nontoxic in appearance's  She speaks in full sentences  She is on 2 L of nasal cannula and is satting 96% on room air  Her head is normocephalic  Her mucous membranes are moist without evidence of exudates or erythema in the posterior pharynx  Her neck is supple upon palpation with no lymphadenopathy  The neck is supple without rigidity  There is no thyroid megaly seen or palpated  Lungs are clear to auscultation  Breath sounds are distal in the upper lung fields  Few scattered rhonchi in both bases  Her heart is regular rate and rhythm without murmur  Abdomen is soft nondistended nontender without mass or hepatosplenomegaly  There is no peripheral edema inspected  Laboratory studies were taken and were reviewed  Chest x-ray demonstrated pneumonia  An EKG did not demonstrate any acute signs of ischemia  Impression is acute bronchitis  Pneumonia  Dyspnea    Plan-  Patient was given a prescription for Zithromax to take 500 mg now and then to 50 a day every day for 4 more days    She was instructed to follow-up with her family physician for recheck exam   She was given reasons to return to the emergency room should her symptoms worsen  Bronchitis: acute illness or injury  Pneumonia: acute illness or injury  Shortness of breath: acute illness or injury  Amount and/or Complexity of Data Reviewed  Labs: ordered  Details: Independently interpreted by me  Radiology: ordered and independent interpretation performed  Details: Dependently interpreted by me, interpreted by the radiologist as well  ECG/medicine tests: ordered and independent interpretation performed  Details: Independently interpreted by me, no acute signs of ischemia      Risk  Prescription drug management  Disposition  Final diagnoses:   Shortness of breath   Bronchitis   Pneumonia     Time reflects when diagnosis was documented in both MDM as applicable and the Disposition within this note     Time User Action Codes Description Comment    5/21/2023  5:58 PM Ann Antoine Add [R06 02] Shortness of breath     5/21/2023  6:28 PM Ann Antoine Add [J40] Bronchitis     5/22/2023  7:41 PM Nichelle Niño Add [J18 9] Pneumonia       ED Disposition     ED Disposition   Discharge    Condition   Stable    Date/Time   Sun May 21, 2023  6:28 PM    Comment   Anthony Ascencio discharge to home/self care                 Follow-up Information     Follow up With Specialties Details Why Contact Info Additional [address] Emergency Department Emergency Medicine Go to  If symptoms worsen [address] Emergency Department, [place], [place], [address]    Black Cha MD [facility] Medicine Schedule an appointment as soon as possible for a visit  As needed [address]  [id number] [facility] [address] [phone]             Discharge Medication List as of 5/21/2023  6:37 PM      START taking these medications    Details   predniSONE 20 mg tablet Take 1 tablet (20 mg total) by mouth daily for 5 days, Starting Sun 5/21/2023, Until Fri 5/26/2023, Normal         CONTINUE these medications which have CHANGED    Details   azithromycin (ZITHROMAX) 250 mg tablet Take 1 tablet (250 mg total) by mouth every 24 hours for 4 days, Starting Sun 5/21/2023, Until Thu 5/25/2023, Normal         CONTINUE these medications which have NOT CHANGED    Details   albuterol (PROVENTIL HFA,VENTOLIN HFA) 90 mcg/act inhaler TAKE 2 PUFFS BY MOUTH EVERY 4 HOURS AS NEEDED FOR WHEEZE, Normal      brimonidine tartrate 0 2 % ophthalmic solution Starting Thu 12/23/2021, Historical Med      Budeson-Glycopyrrol-Formoterol (Breztri Aerosphere) 160-9-4 8 MCG/ACT AERO Inhale 2 puffs 2 (two) times a day Rinse mouth after use , Starting Thu 4/27/2023, Normal      Diclofenac Sodium (VOLTAREN) 1 % Apply 2 g topically 4 (four) times a day, Starting Thu 4/27/2023, Normal      diphenhydrAMINE (BENADRYL) 25 mg tablet Take 1 tablet (25 mg total) by mouth every 6 (six) hours as needed for itching, Starting Mon 1/23/2023, Normal      ferrous sulfate 324 (65 Fe) mg Take 1 tablet (324 mg total) by mouth daily after lunch, Starting Thu 11/3/2022, Normal      fexofenadine (ALLEGRA) 180 MG tablet TAKE 1 TABLET BY MOUTH EVERY DAY, Normal      FLUoxetine (PROzac) 10 MG tablet TAKE 1 TABLET BY MOUTH EVERY DAY, Normal      fluticasone (FLONASE) 50 mcg/act nasal spray SPRAY 1 SPRAY INTO EACH NOSTRIL EVERY DAY, Normal      ipratropium (ATROVENT) 0 06 % nasal spray 2 SPRAYS INTO EACH NOSTRIL 3 (THREE) TIMES A DAY FOR INCREASED NASAL SECRETION, Starting Fri 5/19/2023, Normal      ipratropium-albuterol (DUO-NEB) 0 5-2 5 mg/3 mL nebulizer solution Starting Thu 4/6/2023, Historical Med      levothyroxine 75 mcg tablet Take 1 tablet (75 mcg total) by mouth daily in the early morning, Starting Thu 4/27/2023, Normal      lidocaine (XYLOCAINE) 5 % ointment APPLY TOPICALLY AS NEEDED FOR MILD PAIN, Normal      montelukast (SINGULAIR) 10 mg tablet TAKE 1 TABLET BY MOUTH DAILY AT BEDTIME, Normal      omeprazole (PriLOSEC) 40 MG capsule TAKE 1 CAPSULE BY MOUTH EVERY DAY BEFORE BREAKFAST, Normal      timolol (TIMOPTIC) 0 5 % ophthalmic solution INSTILL 1 DROP INTO EACH EYE TWO TIMES A DAY, Historical Med      traMADol (Ultram) 50 mg tablet Take 1 tablet (50 mg total) by mouth every 12 (twelve) hours as needed for moderate pain, Starting Thu 4/27/2023, Normal             No discharge procedures on file      PDMP Review       Value Time User    PDMP Reviewed  Yes 4/27/2023 11:08 AM Georgia Arevalo MD          ED Provider  Electronically Signed by           Lottie Dueñas PA-C  05/22/23 2056

## 2023-05-21 NOTE — ED NOTES
Patient's O2 saturation 90-92% on 2L NC (baseline) when ambulating  No distress noted  Patient noted she felt pressure in her lungs  Provider aware       Clark Eisenmenger, RN  05/21/23 7106

## 2023-05-22 ENCOUNTER — VBI (OUTPATIENT)
Dept: ADMINISTRATIVE | Facility: OTHER | Age: 68
End: 2023-05-22

## 2023-05-22 LAB
ATRIAL RATE: 78 BPM
P AXIS: 84 DEGREES
PR INTERVAL: 154 MS
QRS AXIS: 34 DEGREES
QRSD INTERVAL: 82 MS
QT INTERVAL: 354 MS
QTC INTERVAL: 403 MS
T WAVE AXIS: 88 DEGREES
VENTRICULAR RATE: 78 BPM

## 2023-05-22 NOTE — TELEPHONE ENCOUNTER
Kristal Zimmer    ED Visit Information     Ed visit date: 5/21/23  Diagnosis Description:   Shortness of breath   In Network? Yes [facility]  Discharge status: Home  Discharged with meds ? Yes  Number of ED visits to date: 1  ED Severity:1     Outreach Information    Outreach successful: Yes 2  Date letter mailed:0  Date Finalized:5-23-23    Care Coordination    Follow up appointment with pcp: no declined  Transportation issues ? No    [employer] type: 7 Day Outreach  Emergent necessity warranted by diagnosis: Yes  [facility] PCP: Yes  Transportation: Friend/Family Transport  Called PCP first?: No  Feels able to call PCP for urgent problems ?: Yes  Understands what emergencies can be handled by PCP ?: Yes  Ever any problems getting appointment with PCP for minor emergency/urgency problems?: No  Practice Contacted Patient ?: No  Pt had ED follow up with pcp/staff ?: No    Seen for follow-up out of network ?: No  Reason Patient went to ED instead of Urgent Care or PCP?: Perceived Severity of Illness  Urgent care Education?: No  05/22/2023 11:10 AM EDT by Danielle Comment 05/22/2023 11:10 AM EDT by Danielle Comment  [facility] [address] [facility] ([address]) [phone] ([phone] (Mobile)  [phone] (Mobile)   - Not AvailableCommunicated -     05/23/2023 09:43 AM EDT by Madison Lynn 05/23/2023 09:43 AM EDT by Travon Novoa ([address]) [phone] (HZARZM)[phone] (Mobile)  [phone] (Mobile) Remove    Personal communication with patients daughter regarding recent ED visit on 5-21-23  Patients daughter stated that patient is doing much better   Patients daughter declines PCP follow-up and states she will call if she needs to schedule an appt

## 2023-06-13 DIAGNOSIS — J31.1 POST-NASAL CATARRH: ICD-10-CM

## 2023-06-13 RX ORDER — IPRATROPIUM BROMIDE 42 UG/1
2 SPRAY, METERED NASAL 3 TIMES DAILY
Qty: 45 ML | Refills: 1 | Status: SHIPPED | OUTPATIENT
Start: 2023-06-13

## 2023-07-10 ENCOUNTER — OFFICE VISIT (OUTPATIENT)
Dept: FAMILY MEDICINE CLINIC | Facility: CLINIC | Age: 68
End: 2023-07-10
Payer: COMMERCIAL

## 2023-07-10 VITALS
OXYGEN SATURATION: 98 % | SYSTOLIC BLOOD PRESSURE: 126 MMHG | BODY MASS INDEX: 33.86 KG/M2 | HEART RATE: 66 BPM | HEIGHT: 62 IN | DIASTOLIC BLOOD PRESSURE: 72 MMHG | TEMPERATURE: 97.6 F | WEIGHT: 184 LBS | RESPIRATION RATE: 20 BRPM

## 2023-07-10 DIAGNOSIS — J34.2 DEVIATED NASAL SEPTUM: ICD-10-CM

## 2023-07-10 DIAGNOSIS — M53.3 PAIN OF RIGHT SACROILIAC JOINT: ICD-10-CM

## 2023-07-10 DIAGNOSIS — E83.42 HYPOMAGNESEMIA: ICD-10-CM

## 2023-07-10 DIAGNOSIS — J96.11 CHRONIC RESPIRATORY FAILURE WITH HYPOXIA, ON HOME O2 THERAPY (HCC): ICD-10-CM

## 2023-07-10 DIAGNOSIS — Z99.81 CHRONIC RESPIRATORY FAILURE WITH HYPOXIA, ON HOME O2 THERAPY (HCC): ICD-10-CM

## 2023-07-10 DIAGNOSIS — M79.89 SWELLING OF LOWER EXTREMITY: Primary | ICD-10-CM

## 2023-07-10 DIAGNOSIS — J45.50 SEVERE PERSISTENT ASTHMA WITHOUT COMPLICATION: ICD-10-CM

## 2023-07-10 DIAGNOSIS — J47.9 BRONCHIECTASIS WITHOUT COMPLICATION (HCC): ICD-10-CM

## 2023-07-10 PROCEDURE — 99214 OFFICE O/P EST MOD 30 MIN: CPT | Performed by: FAMILY MEDICINE

## 2023-07-10 RX ORDER — LANOLIN ALCOHOL/MO/W.PET/CERES
400 CREAM (GRAM) TOPICAL DAILY
Qty: 30 TABLET | Refills: 1 | Status: SHIPPED | OUTPATIENT
Start: 2023-07-10

## 2023-07-10 RX ORDER — IPRATROPIUM BROMIDE AND ALBUTEROL SULFATE 2.5; .5 MG/3ML; MG/3ML
3 SOLUTION RESPIRATORY (INHALATION) EVERY 8 HOURS PRN
Qty: 60 ML | Refills: 2 | Status: SHIPPED | OUTPATIENT
Start: 2023-07-10

## 2023-07-11 PROBLEM — M53.3 PAIN OF RIGHT SACROILIAC JOINT: Status: ACTIVE | Noted: 2023-07-11

## 2023-07-11 PROBLEM — J96.11 CHRONIC RESPIRATORY FAILURE WITH HYPOXIA, ON HOME O2 THERAPY (HCC): Status: ACTIVE | Noted: 2023-07-11

## 2023-07-11 PROBLEM — J34.2 DEVIATED NASAL SEPTUM: Status: ACTIVE | Noted: 2023-07-11

## 2023-07-11 PROBLEM — E83.42 HYPOMAGNESEMIA: Status: ACTIVE | Noted: 2023-07-11

## 2023-07-11 PROBLEM — Z99.81 CHRONIC RESPIRATORY FAILURE WITH HYPOXIA, ON HOME O2 THERAPY (HCC): Status: ACTIVE | Noted: 2023-07-11

## 2023-07-11 NOTE — PROGRESS NOTES
Subjective:      Patient ID: Liza Cast is a 76 y.o. female.     Here with daughter Hilda Headley she prefers to translate  States that for 2-3 days has right lower extremity trace swelling upon standing and also had left leg swelling now, also reports that breathing has worsened since weather has been hot , humid and rainy  She does have ongoing right hip pain, she does not take tramadol        Past Medical History:   Diagnosis Date   • Acute kidney failure ([address])    • Allergic    • Allergies    • Anemia    • Asthma    • Cancer ([address])    • Cataract    • Chronic kidney disease (CKD), stage III (moderate) (HCC)    • COPD (chronic obstructive pulmonary disease) ([address])    • COVID-19     Covid + 9-3-15-cough at that time now fully resolved   • Disease of thyroid gland    • Essential hypertension    • GERD (gastroesophageal reflux disease)    • Glaucoma    • High blood pressure    • HTN (hypertension) 02/16/2021   • Hyperlipidemia    • Hypothyroidism    • Pulmonary hypertension ([address])    • 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   • Throat cancer ([address]) 2014    chemo and rad therapy 2014       Family History   Problem Relation Age of Onset   • Other Mother         respiratory disorder   • Sudden death Father         shot himself   • Suicidality Father    • Brain cancer Sister    • Diabetes Sister    • Breast cancer Sister    • Cancer Sister         pancreatic cancer   • No Known Problems Sister    • No Known Problems Sister    • No Known Problems Sister    • No Known Problems Sister    • No Known Problems Sister    • No Known Problems Sister    • No Known Problems Daughter    • No Known Problems Daughter    • No Known Problems Maternal Grandmother    • No Known Problems Maternal Grandfather    • No Known Problems Paternal Grandmother    • No Known Problems Paternal Grandfather    • No Known Problems Maternal Aunt    • No Known Problems Maternal Aunt    • No Known Problems Maternal Aunt    • No Known Problems Maternal Aunt    • No Known Problems Maternal Aunt    • No Known Problems Paternal Aunt    • No Known Problems Paternal Aunt    • No Known Problems Paternal Aunt    • No Known Problems Paternal Aunt        Past Surgical History:   Procedure Laterality Date   • COLONOSCOPY  2016   • ESOPHAGOGASTRODUODENOSCOPY  2016   • EYE SURGERY     • IR BIOPSY LUNG  05/18/2022   • LARYNGOSCOPY      w/ Bx. • DC TENDON SHEATH INCISION Right 02/16/2021    Procedure: THUMB TRIGGER FINGER RELEASE;  Surgeon: Mari Kumar MD;  Location: [facility];  Service: Orthopedics   • TUBAL LIGATION          reports that she quit smoking about 9 years ago. Her smoking use included cigarettes. She has a 40.00 pack-year smoking history. She has never used smokeless tobacco. She reports that she does not drink alcohol and does not use drugs.       Current Outpatient Medications:   •  albuterol (PROVENTIL HFA,VENTOLIN HFA) 90 mcg/act inhaler, TAKE 2 PUFFS BY MOUTH EVERY 4 HOURS AS NEEDED FOR WHEEZE, Disp: 18 g, Rfl: 5  •  brimonidine tartrate 0.2 % ophthalmic solution, , Disp: , Rfl:   •  Budeson-Glycopyrrol-Formoterol (Breztri Aerosphere) 160-9-4.8 MCG/ACT AERO, Inhale 2 puffs 2 (two) times a day Rinse mouth after use., Disp: 32.1 g, Rfl: 1  •  Diclofenac Sodium (VOLTAREN) 1 %, Apply 2 g topically 4 (four) times a day, Disp: 350 g, Rfl: 0  •  diphenhydrAMINE (BENADRYL) 25 mg tablet, Take 1 tablet (25 mg total) by mouth every 6 (six) hours as needed for itching, Disp: 30 tablet, Rfl: 0  •  ferrous sulfate 324 (65 Fe) mg, Take 1 tablet (324 mg total) by mouth daily after lunch, Disp: 90 tablet, Rfl: 1  •  fexofenadine (ALLEGRA) 180 MG tablet, TAKE 1 TABLET BY MOUTH EVERY DAY, Disp: 90 tablet, Rfl: 1  •  FLUoxetine (PROzac) 10 MG tablet, TAKE 1 TABLET BY MOUTH EVERY DAY, Disp: 90 tablet, Rfl: 1  •  fluticasone (FLONASE) 50 mcg/act nasal spray, SPRAY 1 SPRAY INTO EACH NOSTRIL EVERY DAY, Disp: 24 mL, Rfl: 1  •  ipratropium (ATROVENT) 0.06 % nasal spray, 2 SPRAYS INTO EACH NOSTRIL 3 (THREE) TIMES A DAY FOR INCREASED NASAL SECRETION, Disp: 45 mL, Rfl: 1  •  ipratropium-albuterol (DUO-NEB) 0.5-2.5 mg/3 mL nebulizer solution, Take 3 mL by nebulization every 8 (eight) hours as needed for wheezing or shortness of breath, Disp: 60 mL, Rfl: 2  •  levothyroxine 75 mcg tablet, Take 1 tablet (75 mcg total) by mouth daily in the early morning, Disp: 90 tablet, Rfl: 0  •  lidocaine (XYLOCAINE) 5 % ointment, APPLY TOPICALLY AS NEEDED FOR MILD PAIN, Disp: 35.44 g, Rfl: 0  •  magnesium Oxide (MAG-OX) 400 mg TABS, Take 1 tablet (400 mg total) by mouth daily, Disp: 30 tablet, Rfl: 1  •  montelukast (SINGULAIR) 10 mg tablet, TAKE 1 TABLET BY MOUTH DAILY AT BEDTIME, Disp: 90 tablet, Rfl: 1  •  omeprazole (PriLOSEC) 40 MG capsule, TAKE 1 CAPSULE BY MOUTH EVERY DAY BEFORE BREAKFAST, Disp: 90 capsule, Rfl: 2  •  timolol (TIMOPTIC) 0.5 % ophthalmic solution, INSTILL 1 DROP INTO EACH EYE TWO TIMES A DAY, Disp: , Rfl:     The following portions of the patient's history were reviewed and updated as appropriate: allergies, current medications, past family history, past medical history, past social history, past surgical history and problem list.    Review of Systems   Constitutional: Negative for fatigue and fever. HENT: Negative for congestion, facial swelling, mouth sores, rhinorrhea, sore throat and trouble swallowing. Eyes: Negative for pain and redness. Respiratory: Positive for shortness of breath. Negative for cough and wheezing. Cardiovascular: Positive for leg swelling. Negative for chest pain and palpitations. Gastrointestinal: Negative for abdominal pain, blood in stool, constipation, diarrhea and nausea. Genitourinary: Negative for dysuria, hematuria and urgency. Musculoskeletal: Positive for arthralgias. Negative for back pain and myalgias. Skin: Negative for rash and wound. Neurological: Negative for seizures, syncope and headaches. Hematological: Negative for adenopathy. Psychiatric/Behavioral: Negative for agitation and behavioral problems. PHQ-2/9 Depression Screening    Little interest or pleasure in doing things: 0 - not at all  Feeling down, depressed, or hopeless: 0 - not at all  Trouble falling or staying asleep, or sleeping too much: 0 - not at all  Feeling tired or having little energy: 0 - not at all  Poor appetite or overeatin - not at all  Feeling bad about yourself - or that you are a failure or have let yourself or your family down: 0 - not at all  Trouble concentrating on things, such as reading the newspaper or watching television: 0 - not at all  Moving or speaking so slowly that other people could have noticed. Or the opposite - being so fidgety or restless that you have been moving around a lot more than usual: 0 - not at all  Thoughts that you would be better off dead, or of hurting yourself in some way: 0 - not at all  PHQ-9 Score: 0   PHQ-9 Interpretation: No or Minimal depression              Objective:    /72 (BP Location: Left arm, Patient Position: Sitting, Cuff Size: Adult)   Pulse 66   Temp 97.6 °F (36.4 °C) (Tympanic)   Resp 20   Ht 5' 2" (1.575 m)   Wt 83.5 kg (184 lb)   SpO2 98% Comment: on 2 liters of O2  BMI 33.65 kg/m²      Physical Exam  Vitals and nursing note reviewed. Constitutional:       Appearance: Normal appearance. She is well-developed. She is not ill-appearing. HENT:      Head: Normocephalic and atraumatic. Right Ear: External ear normal.      Left Ear: External ear normal.      Nose: Septal deviation present. Comments: Occluded right nostril due to DNS     Mouth/Throat:      Mouth: Mucous membranes are moist.      Pharynx: No oropharyngeal exudate or posterior oropharyngeal erythema. Eyes:      General: No scleral icterus. Right eye: No discharge. Left eye: No discharge.       Conjunctiva/sclera: Conjunctivae normal.   Cardiovascular: Rate and Rhythm: Normal rate. Heart sounds: No murmur heard. No gallop. Pulmonary:      Effort: Pulmonary effort is normal. No respiratory distress. Breath sounds: Decreased air movement present. No stridor. Examination of the right-upper field reveals decreased breath sounds. Examination of the left-upper field reveals decreased breath sounds. Examination of the right-middle field reveals decreased breath sounds. Examination of the left-middle field reveals decreased breath sounds. Decreased breath sounds present. No wheezing, rhonchi or rales. Comments: Oxygen via nasal cannula  Abdominal:      Palpations: Abdomen is soft. Tenderness: There is no abdominal tenderness. Musculoskeletal:         General: Tenderness (Tenderness (right sacroiliac joint and right hip laterally, no limitation of ROM)) present. No deformity. Right lower leg: Edema present. Left lower leg: Edema (trace bilateral pitting ) present. Skin:     Findings: No erythema or rash. Neurological:      Mental Status: She is alert. Mental status is at baseline.    Psychiatric:         Behavior: Behavior normal.         Judgment: Judgment normal.           Recent Results (from the past 8736 hour(s))   POCT Rapid Covid Ag    Collection Time: 08/10/22  9:41 AM   Result Value Ref Range    POCT SARS-CoV-2 Ag Negative Negative    VALID CONTROL Valid    ECG 12 lead    Collection Time: 10/28/22  8:39 AM   Result Value Ref Range    Ventricular Rate 86 BPM    Atrial Rate 86 BPM    MD Interval 146 ms    QRSD Interval 86 ms    QT Interval 352 ms    QTC Interval 421 ms    P Axis 83 degrees    QRS Axis 2 degrees    T Wave Axis 59 degrees   CBC and differential    Collection Time: 10/28/22  9:14 AM   Result Value Ref Range    WBC 14.97 (H) 4.31 - 10.16 Thousand/uL    RBC 4.47 3.81 - 5.12 Million/uL    Hemoglobin 12.0 11.5 - 15.4 g/dL    Hematocrit 39.1 34.8 - 46.1 %    MCV 88 82 - 98 fL    MCH 26.8 26.8 - 34.3 pg    MCHC 30.7 (L) 31.4 - 37.4 g/dL    RDW 14.0 11.6 - 15.1 %    MPV 9.8 8.9 - 12.7 fL    Platelets 918 321 - 274 Thousands/uL    nRBC 0 /100 WBCs    Neutrophils Relative 88 (H) 43 - 75 %    Immat GRANS % 1 0 - 2 %    Lymphocytes Relative 3 (L) 14 - 44 %    Monocytes Relative 8 4 - 12 %    Eosinophils Relative 0 0 - 6 %    Basophils Relative 0 0 - 1 %    Neutrophils Absolute 13.10 (H) 1.85 - 7.62 Thousands/µL    Immature Grans Absolute 0.09 0.00 - 0.20 Thousand/uL    Lymphocytes Absolute 0.50 (L) 0.60 - 4.47 Thousands/µL    Monocytes Absolute 1.21 0.17 - 1.22 Thousand/µL    Eosinophils Absolute 0.04 0.00 - 0.61 Thousand/µL    Basophils Absolute 0.03 0.00 - 0.10 Thousands/µL   Protime-INR    Collection Time: 10/28/22  9:14 AM   Result Value Ref Range    Protime 14.1 11.6 - 14.5 seconds    INR 1.07 0.84 - 1.19   APTT    Collection Time: 10/28/22  9:14 AM   Result Value Ref Range    PTT 28 23 - 37 seconds   Basic metabolic panel    Collection Time: 10/28/22  9:14 AM   Result Value Ref Range    Sodium 139 135 - 147 mmol/L    Potassium 4.7 3.5 - 5.3 mmol/L    Chloride 104 96 - 108 mmol/L    CO2 31 21 - 32 mmol/L    ANION GAP 4 4 - 13 mmol/L    BUN 18 5 - 25 mg/dL    Creatinine 1.12 0.60 - 1.30 mg/dL    Glucose 133 65 - 140 mg/dL    Calcium 9.5 8.4 - 10.2 mg/dL    eGFR 50 ml/min/1.73sq m   Magnesium    Collection Time: 10/28/22  9:14 AM   Result Value Ref Range    Magnesium 1.7 (L) 1.9 - 2.7 mg/dL   HS Troponin 0hr (reflex protocol)    Collection Time: 10/28/22  9:14 AM   Result Value Ref Range    hs TnI 0hr 3 "Refer to ACS Flowchart"- see link ng/L   TSH    Collection Time: 10/28/22  9:14 AM   Result Value Ref Range    TSH 3RD GENERATON 0.217 (L) 0.450 - 4.500 uIU/mL   FLU/RSV/COVID - if FLU/RSV clinically relevant    Collection Time: 10/28/22  9:14 AM    Specimen: Nose; Nares   Result Value Ref Range    SARS-CoV-2 Negative Negative    INFLUENZA A PCR Negative Negative    INFLUENZA B PCR Negative Negative    RSV PCR Negative Negative HS Troponin I 2hr    Collection Time: 10/28/22 11:01 AM   Result Value Ref Range    hs TnI 2hr 3 "Refer to ACS Flowchart"- see link ng/L    Delta 2hr hsTnI 0 <20 ng/L   Blood culture #1    Collection Time: 10/28/22 11:01 AM    Specimen: Arm, Right; Blood   Result Value Ref Range    Blood Culture No Growth After 5 Days. Blood culture #2    Collection Time: 10/28/22 11:01 AM    Specimen: Arm, Left; Blood   Result Value Ref Range    Blood Culture No Growth After 5 Days.     Procalcitonin    Collection Time: 10/28/22 11:01 AM   Result Value Ref Range    Procalcitonin 0.24 <=0.25 ng/ml   Lactic acid, plasma    Collection Time: 10/28/22 11:01 AM   Result Value Ref Range    LACTIC ACID 0.7 0.5 - 2.0 mmol/L   T4, free    Collection Time: 10/28/22 11:01 AM   Result Value Ref Range    Free T4 1.64 (H) 0.76 - 1.46 ng/dL   Procalcitonin    Collection Time: 10/29/22  6:06 AM   Result Value Ref Range    Procalcitonin 0.27 (H) <=0.25 ng/ml   CBC and Platelet, AM Draw, Tomorrow    Collection Time: 10/29/22  6:06 AM   Result Value Ref Range    WBC 13.84 (H) 4.31 - 10.16 Thousand/uL    RBC 4.23 3.81 - 5.12 Million/uL    Hemoglobin 11.3 (L) 11.5 - 15.4 g/dL    Hematocrit 38.0 34.8 - 46.1 %    MCV 90 82 - 98 fL    MCH 26.7 (L) 26.8 - 34.3 pg    MCHC 29.7 (L) 31.4 - 37.4 g/dL    RDW 14.2 11.6 - 15.1 %    Platelets 075 089 - 645 Thousands/uL    MPV 10.5 8.9 - 12.7 fL   Basic metabolic panel, AM Draw, Tomorrow    Collection Time: 10/29/22  6:06 AM   Result Value Ref Range    Sodium 141 135 - 147 mmol/L    Potassium 4.1 3.5 - 5.3 mmol/L    Chloride 106 96 - 108 mmol/L    CO2 26 21 - 32 mmol/L    ANION GAP 9 4 - 13 mmol/L    BUN 17 5 - 25 mg/dL    Creatinine 0.96 0.60 - 1.30 mg/dL    Glucose 75 65 - 140 mg/dL    Calcium 9.3 8.4 - 10.2 mg/dL    eGFR 61 ml/min/1.73sq m   CBC and Platelet    Collection Time: 10/30/22  4:34 AM   Result Value Ref Range    WBC 9.43 4.31 - 10.16 Thousand/uL    RBC 4.10 3.81 - 5.12 Million/uL    Hemoglobin 10.9 (L) 11.5 - 15.4 g/dL    Hematocrit 36.4 34.8 - 46.1 %    MCV 89 82 - 98 fL    MCH 26.6 (L) 26.8 - 34.3 pg    MCHC 29.9 (L) 31.4 - 37.4 g/dL    RDW 13.9 11.6 - 15.1 %    Platelets 600 822 - 560 Thousands/uL    MPV 10.6 8.9 - 12.7 fL   Procalcitonin    Collection Time: 10/30/22  4:34 AM   Result Value Ref Range    Procalcitonin 0.25 <=0.25 ng/ml   D-dimer, quantitative    Collection Time: 10/30/22 12:12 PM   Result Value Ref Range    D-Dimer, Quant 1.37 (H) <0.50 ug/ml FEU   CBC and Platelet    Collection Time: 10/31/22  5:12 AM   Result Value Ref Range    WBC 5.65 4.31 - 10.16 Thousand/uL    RBC 4.01 3.81 - 5.12 Million/uL    Hemoglobin 10.6 (L) 11.5 - 15.4 g/dL    Hematocrit 35.5 34.8 - 46.1 %    MCV 89 82 - 98 fL    MCH 26.4 (L) 26.8 - 34.3 pg    MCHC 29.9 (L) 31.4 - 37.4 g/dL    RDW 13.8 11.6 - 15.1 %    Platelets 255 494 - 245 Thousands/uL    MPV 9.7 8.9 - 12.7 fL   Basic metabolic panel    Collection Time: 10/31/22  5:12 AM   Result Value Ref Range    Sodium 141 135 - 147 mmol/L    Potassium 4.2 3.5 - 5.3 mmol/L    Chloride 105 96 - 108 mmol/L    CO2 31 21 - 32 mmol/L    ANION GAP 5 4 - 13 mmol/L    BUN 12 5 - 25 mg/dL    Creatinine 0.95 0.60 - 1.30 mg/dL    Glucose 87 65 - 140 mg/dL    Calcium 9.3 8.4 - 10.2 mg/dL    eGFR 62 ml/min/1.73sq m   POCT rapid flu A and B    Collection Time: 12/12/22  5:32 PM   Result Value Ref Range    RAPID FLU A negative     RAPID FLU B negative    CBC and differential    Collection Time: 04/27/23 12:12 PM   Result Value Ref Range    WBC 8.37 4.31 - 10.16 Thousand/uL    RBC 4.63 3.81 - 5.12 Million/uL    Hemoglobin 12.5 11.5 - 15.4 g/dL    Hematocrit 41.4 34.8 - 46.1 %    MCV 89 82 - 98 fL    MCH 27.0 26.8 - 34.3 pg    MCHC 30.2 (L) 31.4 - 37.4 g/dL    RDW 14.5 11.6 - 15.1 %    MPV 9.7 8.9 - 12.7 fL    Platelets 614 210 - 022 Thousands/uL    nRBC 0 /100 WBCs    Neutrophils Relative 79 (H) 43 - 75 %    Immat GRANS % 1 0 - 2 %    Lymphocytes Relative 11 (L) 14 - 44 %    Monocytes Relative 7 4 - 12 %    Eosinophils Relative 1 0 - 6 %    Basophils Relative 1 0 - 1 %    Neutrophils Absolute 6.69 1.85 - 7.62 Thousands/µL    Immature Grans Absolute 0.04 0.00 - 0.20 Thousand/uL    Lymphocytes Absolute 0.88 0.60 - 4.47 Thousands/µL    Monocytes Absolute 0.59 0.17 - 1.22 Thousand/µL    Eosinophils Absolute 0.12 0.00 - 0.61 Thousand/µL    Basophils Absolute 0.05 0.00 - 0.10 Thousands/µL   Comprehensive metabolic panel    Collection Time: 04/27/23 12:12 PM   Result Value Ref Range    Sodium 140 135 - 147 mmol/L    Potassium 4.5 3.5 - 5.3 mmol/L    Chloride 103 96 - 108 mmol/L    CO2 31 21 - 32 mmol/L    ANION GAP 6 4 - 13 mmol/L    BUN 28 (H) 5 - 25 mg/dL    Creatinine 1.18 0.60 - 1.30 mg/dL    Glucose 80 65 - 140 mg/dL    Calcium 9.5 8.4 - 10.2 mg/dL    AST 22 13 - 39 U/L    ALT 13 7 - 52 U/L    Alkaline Phosphatase 114 (H) 34 - 104 U/L    Total Protein 7.0 6.4 - 8.4 g/dL    Albumin 4.3 3.5 - 5.0 g/dL    Total Bilirubin 0.41 0.20 - 1.00 mg/dL    eGFR 47 ml/min/1.73sq m   TSH, 3rd generation    Collection Time: 04/27/23 12:12 PM   Result Value Ref Range    TSH 3RD GENERATON 3.635 0.450 - 4.500 uIU/mL   Iron Saturation %    Collection Time: 04/27/23 12:12 PM   Result Value Ref Range    Iron Saturation 19 15 - 50 %    TIBC 336 250 - 450 ug/dL    Iron 65 50 - 170 ug/dL   Ferritin    Collection Time: 04/27/23 12:12 PM   Result Value Ref Range    Ferritin 57 8 - 388 ng/mL   ECG 12 lead    Collection Time: 05/21/23  3:55 PM   Result Value Ref Range    Ventricular Rate 78 BPM    Atrial Rate 78 BPM    MS Interval 154 ms    QRSD Interval 82 ms    QT Interval 354 ms    QTC Interval 403 ms    P Axis 84 degrees    QRS Axis 34 degrees    T Wave Axis 88 degrees   CBC and differential    Collection Time: 05/21/23  4:33 PM   Result Value Ref Range    WBC 14.17 (H) 4.31 - 10.16 Thousand/uL    RBC 4.66 3.81 - 5.12 Million/uL    Hemoglobin 12.5 11.5 - 15.4 g/dL    Hematocrit 41.7 34.8 - 46.1 %    MCV 90 82 - 98 fL MCH 26.8 26.8 - 34.3 pg    MCHC 30.0 (L) 31.4 - 37.4 g/dL    RDW 13.9 11.6 - 15.1 %    MPV 10.1 8.9 - 12.7 fL    Platelets 668 077 - 989 Thousands/uL   Protime-INR    Collection Time: 05/21/23  4:33 PM   Result Value Ref Range    Protime 12.5 11.6 - 14.5 seconds    INR 0.92 0.84 - 1.19   APTT    Collection Time: 05/21/23  4:33 PM   Result Value Ref Range    PTT 26 23 - 37 seconds   FLU/RSV/COVID - if FLU/RSV clinically relevant    Collection Time: 05/21/23  4:33 PM    Specimen: Nose; Nares   Result Value Ref Range    SARS-CoV-2 Negative Negative    INFLUENZA A PCR Negative Negative    INFLUENZA B PCR Negative Negative    RSV PCR Negative Negative   Comprehensive metabolic panel    Collection Time: 05/21/23  4:33 PM   Result Value Ref Range    Sodium 139 135 - 147 mmol/L    Potassium 4.4 3.5 - 5.3 mmol/L    Chloride 100 96 - 108 mmol/L    CO2 34 (H) 21 - 32 mmol/L    ANION GAP 5 4 - 13 mmol/L    BUN 25 5 - 25 mg/dL    Creatinine 1.02 0.60 - 1.30 mg/dL    Glucose 102 65 - 140 mg/dL    Calcium 9.0 8.4 - 10.2 mg/dL    AST 24 13 - 39 U/L    ALT 15 7 - 52 U/L    Alkaline Phosphatase 117 (H) 34 - 104 U/L    Total Protein 7.0 6.4 - 8.4 g/dL    Albumin 4.0 3.5 - 5.0 g/dL    Total Bilirubin 0.48 0.20 - 1.00 mg/dL    eGFR 56 ml/min/1.73sq m   Magnesium    Collection Time: 05/21/23  4:33 PM   Result Value Ref Range    Magnesium 1.8 (L) 1.9 - 2.7 mg/dL   HS Troponin 0hr (reflex protocol)    Collection Time: 05/21/23  4:33 PM   Result Value Ref Range    hs TnI 0hr 3 "Refer to ACS Flowchart"- see link ng/L   Manual Differential(PHLEBS Do Not Order)    Collection Time: 05/21/23  4:33 PM   Result Value Ref Range    Segmented % 87 (H) 43 - 75 %    Bands % 1 0 - 8 %    Lymphocytes % 5 (L) 14 - 44 %    Monocytes % 5 4 - 12 %    Eosinophils, % 2 0 - 6 %    Basophils % 0 0 - 1 %    Absolute Neutrophils 12.47 (H) 1.85 - 7.62 Thousand/uL    Lymphocytes Absolute 0.71 0.60 - 4.47 Thousand/uL    Monocytes Absolute 0.71 0.00 - 1.22 Thousand/uL Eosinophils Absolute 0.28 0.00 - 0.40 Thousand/uL    Basophils Absolute 0.00 0.00 - 0.10 Thousand/uL    Total Counted      RBC Morphology Normal     Platelet Estimate Adequate Adequate       Laboratory Results: I have personally reviewed the pertinent laboratory results/reports     Radiology/Other Diagnostic Testing Results: I have personally reviewed pertinent reports. XR chest 1 view portable    Result Date: 5/21/2023  CHEST INDICATION:   cough, dyspnea. COMPARISON: Chest radiograph October 28, 2022. CT chest January 24, 2023. EXAM PERFORMED/VIEWS:  XR CHEST PORTABLE FINDINGS: Cardiomediastinal silhouette appears unremarkable. There are are several patchy opacities in the right upper lobe which are new since the comparison study. Subtle 12 mm nodule left lung base unchanged since the prior CT. Osseous structures appear within normal limits for patient age. Patchy opacities right upper lobe suspicious for pneumonia. No change 12 mm nodule left lung base. This has had prior work-up with CT and PET imaging. Follow-up would be based on those studies which are more sensitive than plain film radiographs. Findings marked for immediate notification regarding pneumonia. Workstation performed: [id number]        Assessment/Plan:    Rao Ohara was seen today for foot swelling and lungs tight. Diagnoses and all orders for this visit:    Swelling of lower extremity  -     magnesium Oxide (MAG-OX) 400 mg TABS; Take 1 tablet (400 mg total) by mouth daily    Severe persistent asthma without complication    Bronchiectasis without complication (HCC)  -     magnesium Oxide (MAG-OX) 400 mg TABS; Take 1 tablet (400 mg total) by mouth daily  -     ipratropium-albuterol (DUO-NEB) 0.5-2.5 mg/3 mL nebulizer solution;  Take 3 mL by nebulization every 8 (eight) hours as needed for wheezing or shortness of breath    Pain of right sacroiliac joint    Deviated nasal septum    Hypomagnesemia    Chronic respiratory failure with hypoxia, on home O2 therapy (HCC)        START magnesium supplement  No acute exacerbation suspected, prn duoneb treatment  Wear compression stockings and elevate leg when standing and sitting which will help with trace LE swelling. F/u in 3months, sooner if not improved. Read package inserts for all medications before starting a new medications, call me if you have any questions. Patient was given opportunity to ask questions and all questions were answered. Disclaimer: Portions of the record may have been created with voice recognition software. Occasional wrong word or "sound a like" substitutions may have occurred due to the inherent limitations of voice recognition software. Read the chart carefully and recognize, using context, where substitutions have occurred. I have used the Epic copy/forward function to compose this note. I have reviewed my current note to ensure it reflects the current patient status, exam, assessment and plan.

## 2023-07-13 DIAGNOSIS — J30.1 SEASONAL ALLERGIC RHINITIS DUE TO POLLEN: ICD-10-CM

## 2023-07-13 RX ORDER — FLUTICASONE PROPIONATE 50 MCG
SPRAY, SUSPENSION (ML) NASAL
Qty: 24 ML | Refills: 2 | Status: SHIPPED | OUTPATIENT
Start: 2023-07-13

## 2023-07-24 DIAGNOSIS — J47.9 BRONCHIECTASIS WITHOUT COMPLICATION (HCC): ICD-10-CM

## 2023-07-25 RX ORDER — IPRATROPIUM BROMIDE AND ALBUTEROL SULFATE 2.5; .5 MG/3ML; MG/3ML
3 SOLUTION RESPIRATORY (INHALATION) EVERY 8 HOURS PRN
Qty: 270 ML | Refills: 2 | Status: SHIPPED | OUTPATIENT
Start: 2023-07-25

## 2023-07-31 ENCOUNTER — HOSPITAL ENCOUNTER (OUTPATIENT)
Dept: CT IMAGING | Facility: HOSPITAL | Age: 68
Discharge: HOME | End: 2023-07-31
Attending: INTERNAL MEDICINE
Payer: COMMERCIAL

## 2023-07-31 DIAGNOSIS — C76.0 HEAD AND NECK CANCER (HCC): ICD-10-CM

## 2023-07-31 DIAGNOSIS — R59.0 MESENTERIC LYMPHADENOPATHY: ICD-10-CM

## 2023-07-31 PROCEDURE — 74177 CT ABD & PELVIS W/CONTRAST: CPT

## 2023-07-31 PROCEDURE — G1004 CDSM NDSC: HCPCS

## 2023-07-31 PROCEDURE — 71260 CT THORAX DX C+: CPT

## 2023-07-31 PROCEDURE — 70491 CT SOFT TISSUE NECK W/DYE: CPT

## 2023-07-31 RX ADMIN — IOHEXOL 100 ML: 350 INJECTION, SOLUTION INTRAVENOUS at 16:21

## 2023-08-03 DIAGNOSIS — J47.9 BRONCHIECTASIS WITHOUT COMPLICATION (HCC): ICD-10-CM

## 2023-08-03 DIAGNOSIS — M79.89 SWELLING OF LOWER EXTREMITY: ICD-10-CM

## 2023-08-03 RX ORDER — LANOLIN ALCOHOL/MO/W.PET/CERES
400 CREAM (GRAM) TOPICAL DAILY
Qty: 90 TABLET | Refills: 1 | Status: SHIPPED | OUTPATIENT
Start: 2023-08-03

## 2023-08-04 ENCOUNTER — DOCUMENTATION (OUTPATIENT)
Dept: HEMATOLOGY ONCOLOGY | Facility: CLINIC | Age: 68
End: 2023-08-04

## 2023-08-04 NOTE — PROGRESS NOTES
Read requested for :      CT chest abdomen pelvis w contrast (Order: [id number]) - 7/31/2023      Verbal confirmation from Jan.

## 2023-08-07 ENCOUNTER — OFFICE VISIT (OUTPATIENT)
Dept: HEMATOLOGY ONCOLOGY | Facility: CLINIC | Age: 68
End: 2023-08-07
Payer: COMMERCIAL

## 2023-08-07 ENCOUNTER — TELEPHONE (OUTPATIENT)
Dept: HEMATOLOGY ONCOLOGY | Facility: CLINIC | Age: 68
End: 2023-08-07

## 2023-08-07 VITALS
BODY MASS INDEX: 34.5 KG/M2 | TEMPERATURE: 97.7 F | SYSTOLIC BLOOD PRESSURE: 130 MMHG | OXYGEN SATURATION: 94 % | RESPIRATION RATE: 17 BRPM | WEIGHT: 187.5 LBS | HEART RATE: 73 BPM | DIASTOLIC BLOOD PRESSURE: 78 MMHG | HEIGHT: 62 IN

## 2023-08-07 DIAGNOSIS — R91.8 MULTIPLE LUNG NODULES: ICD-10-CM

## 2023-08-07 DIAGNOSIS — Z85.21 HISTORY OF LARYNGEAL CANCER: Primary | ICD-10-CM

## 2023-08-07 PROBLEM — J34.2 DEVIATED NASAL SEPTUM: Status: RESOLVED | Noted: 2023-07-11 | Resolved: 2023-08-07

## 2023-08-07 PROCEDURE — 99214 OFFICE O/P EST MOD 30 MIN: CPT | Performed by: INTERNAL MEDICINE

## 2023-08-07 NOTE — PROGRESS NOTES
Hematology Outpatient Follow - Up Note  Raquel Her 76 y.o. female MRN: @ Encounter: [id number]        Date:  8/7/2023        Assessment/ Plan:    59-year-old female with a remote history of head and neck malignancy was referred to see us for non FDG avid lung nodule and some mediastinal and mesenteric adenopathy which had low-grade activity but was never sampled.       We ended up arranging a biopsy and repeat blood work for the patient. Sheridan Merritts studies are normal.  Biopsy showed minute foci of atypical glandular proliferation but no definitive diagnosis could be made.   PET-CT scan was ordered but not approved so a CT scan of the chest abdomen pelvis was done. CAT scan showed no evidence of labial cancer, stable bilateral pulmonary nodule, there is a 3 to 4 mm left lower lobe lung nodule and this is a new from before we will repeat CAT scan of the chest in 6 months        Labs and imaging studies are reviewed by ordering provider once results are available. If there are findings that need immediate attention, you will be contacted when results available. Discussing results and the implication on your healthcare is best discussed in person at your follow-up visit.        HPI:    Epifanio Leyden seen for initial consultation 8/12/2021 regarding  A history of head and neck cancer more than 6 years ago.  The patient had a CT scan which revealed  A suspicious left lower lobe lung nodule measuring 1.2 cm and is suspicious AP window lymph node measuring 1.3 cm with Stefania mesentery with multiple abnormal nodes the largest measuring 1.1 x 1.3 cm.  A PET-CT scan was done to follow-up on this and the patient was referred to see Pulmonary Medicine who she is apparently seeing on the 23rd of August.  The PET-CT scan  Showed that the 1.2 x 1.1 cm left lung base nodule does not demonstrate any significant FDG uptake which would favor benign etiology although a low-grade neoplasm could not be excluded.  Scattered mildly  Hypermetabolic densities in the bilateral lungs likely inflammatory or infectious could be reassessed on follow-up CT. Keila Nguyen prominent mediastinal and mesenteric lymph nodes with mild FDG activity were nonspecific but a low-grade lymphoproliferative disorder could not be excluded.  Clinical correlation was recommended.  The patient was referred to see us without any biopsies.  Again her head and neck malignancy was taken care of by Dr. Verner Brand at [facility]. [facility] states she has had head and neck exams yearly and her imaging is not suggestive of metastatic disease from this head and neck malignancy at this time with no evidence of local recurrence.    Interval History:        Previous Treatment:         Test Results:    Imaging: CT chest abdomen pelvis w contrast    Result Date: 8/6/2023  Narrative: CT CHEST, ABDOMEN AND PELVIS WITH IV CONTRAST INDICATION:   R59.0: Localized enlarged lymph nodes C76.0: Malignant neoplasm of head, face and neck. COMPARISON: Comparison is made to prior studies, most recent is dated January 24, 2023. TECHNIQUE: CT examination of the chest, abdomen and pelvis was performed. Axial, sagittal, and coronal 2D reformatted images were created from the source data and submitted for interpretation. Radiation dose length product (DLP) for this visit:  [phone] mGy-cm . This examination, like all CT scans performed in the [facility], was performed utilizing techniques to minimize radiation dose exposure, including the use of iterative reconstruction and automated exposure control. IV Contrast:  100 mL of iohexol (OMNIPAQUE) Enteric Contrast: Enteric contrast was administered. FINDINGS: CHEST LUNGS: Groundglass nodule right lower lobe measures 1 x 1 cm, previously 1.2 x 1.1 cm, not significantly changed. Lobulated solid nodule in the left lower lobe is unchanged measuring 1.2 x 1.1 cm.  Cluster of tree-in-bud nodules in the right lung apex are new compatible with bronchiolitis. Also new nodule in the left lower lobe medially which measures 0.4 cm on series 5 image 137. Lower lungs are degraded by respiratory motion artifact. Moderate centrilobular emphysema. Biapical scarring and scarring/atelectasis anterior right middle lobe and lingula unchanged. . Central airways are patent. PLEURA:  Unremarkable. HEART/GREAT VESSELS: Heart is unremarkable for patient's age. No thoracic aortic aneurysm. MEDIASTINUM AND KAYY: Mediastinal lymph nodes are stable, with an index lower right paraesophageal lymph node node measuring 1 x 0.9 cm, previously 1.2 x 1 cm (this lymph node was remeasured for purposes of direct comparison.). CHEST WALL AND LOWER NECK:  Unremarkable. ABDOMEN LIVER/BILIARY TREE:  Unremarkable. GALLBLADDER:  No calcified gallstones. No pericholecystic inflammatory change. SPLEEN:  Unremarkable. PANCREAS:  Unremarkable. ADRENAL GLANDS:  Unremarkable. KIDNEYS/URETERS:  Unremarkable. No hydronephrosis. STOMACH AND BOWEL: Large amount of stool throughout the colon. No bowel obstruction. No bowel wall thickening. APPENDIX:  No findings to suggest appendicitis. 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 VESSELS:  Unremarkable for patient's age. PELVIS REPRODUCTIVE ORGANS:  Unremarkable for patient's age. URINARY BLADDER:  Unremarkable. ABDOMINAL WALL/INGUINAL REGIONS:  Unremarkable. OSSEOUS STRUCTURES:  No acute fracture or destructive osseous lesion. Impression: New left lower lobe 4 mm pulmonary nodule which is too small to characterize. Recommend short interim follow-up CT scan of the chest in approximately 3 months. Right upper lobe bronchiolitis. No significant change in the size of a groundglass right lower lobe nodule and no change in the size of a solid nodule in the left lower lobe. ..  No metastatic disease in the abdomen or pelvis. Workstation performed: [id number]     CT soft tissue neck w contrast    Result Date: 8/4/2023  Narrative: CT NECK WITH CONTRAST INDICATION:   R59.0: Localized enlarged lymph nodes C76.0: Malignant neoplasm of head, face and neck. History of laryngeal cancer status post chemoradiation therapy 2014. COMPARISON: 7/1/2021 TECHNIQUE:  Axial, sagittal, and coronal 2D reformatted images were created from the axial source data and submitted for interpretation. Radiation dose length product (DLP) for this visit:  554 mGy-cm . This examination, like all CT scans performed in the [facility], was performed utilizing techniques to minimize radiation dose exposure, including the use of iterative reconstruction and automated exposure control. IV Contrast:  100 mL of iohexol (OMNIPAQUE) IMAGE QUALITY:  Diagnostic. FINDINGS: VISUALIZED BRAIN PARENCHYMA:  No acute intracranial pathology of the visualized brain parenchyma. VISUALIZED ORBITS: The patient is status post bilateral cataract surgery. PARANASAL SINUSES: Normal visualized paranasal sinuses. NASAL CAVITY AND NASOPHARYNX:  Normal. SUPRAHYOID NECK:  Normal oral cavity, tongue base, tonsillar fossa. INFRAHYOID NECK:  Aryepiglottic folds and piriform sinuses are normal. No nodular enhancing lesions within the glottic or subglottic airway. Thickening of the true vocal cords is likely posttreatment in nature. . There is also thickening of the epiglottis. THYROID GLAND:  Unremarkable. PAROTID AND SUBMANDIBULAR GLANDS:  Normal. LYMPH NODES:  No pathologic or enlarged adenopathy. VASCULAR STRUCTURES:  Normal enhancement of the cervical vasculature. THORACIC INLET: Correlate with separate chest CT report for additional findings. BONY STRUCTURES: No acute fracture or destructive osseous lesion. Impression: No pathologically enlarged cervical lymphadenopathy. No evidence for residual or recurrent disease within the larynx.  Correlate with separate chest CT report for additional findings. Workstation performed: [id number]       Labs:   Lab Results   Component Value Date    WBC 14.17 (H) 05/21/2023    HGB 12.5 05/21/2023    HCT 41.7 05/21/2023    MCV 90 05/21/2023     05/21/2023     Lab Results   Component Value Date     02/25/2015    K 4.4 05/21/2023     05/21/2023    CO2 34 (H) 05/21/2023    ANIONGAP 3 (L) 02/25/2015    BUN 25 05/21/2023    CREATININE 1.02 05/21/2023    GLUCOSE 83 02/25/2015    GLUF 97 02/23/2022    CALCIUM 9.0 05/21/2023    AST 24 05/21/2023    ALT 15 05/21/2023    ALKPHOS 117 (H) 05/21/2023    PROT 6.9 02/25/2015    BILITOT 0.3 02/25/2015    EGFR 56 05/21/2023       Lab Results   Component Value Date    IRON 65 04/27/2023    TIBC 336 04/27/2023    FERRITIN 57 04/27/2023       Lab Results   Component Value Date    BIBRXZTK07 1,273 (H) 02/25/2015         ROS: Review of Systems   Constitutional: Negative for appetite change, chills, diaphoresis, fatigue and unexpected weight change. HENT:   Negative for mouth sores, nosebleeds, sore throat, trouble swallowing and voice change. Eyes: Negative for eye problems and icterus. Respiratory: Positive for shortness of breath and wheezing. Negative for chest tightness, cough and hemoptysis. Cardiovascular: Negative for chest pain, leg swelling and palpitations. Gastrointestinal: Negative for abdominal distention, abdominal pain, blood in stool, constipation, diarrhea, nausea and vomiting. Endocrine: Negative for hot flashes. Genitourinary: Negative for bladder incontinence, difficulty urinating, dyspareunia, dysuria and frequency. Musculoskeletal: Negative for arthralgias, back pain, gait problem, neck pain and neck stiffness. Skin: Negative for itching and rash. Neurological: Negative for dizziness, gait problem, headaches, numbness, seizures and speech difficulty. Hematological: Negative for adenopathy. Does not bruise/bleed easily. Psychiatric/Behavioral: Negative for decreased concentration, depression, sleep disturbance and suicidal ideas. The patient is not nervous/anxious. Current Medications: Reviewed  Allergies: Reviewed  PMH/FH/SH:  Reviewed      Physical Exam:    Body surface area is 1.86 meters squared. Wt Readings from Last 3 Encounters:   08/07/23 85 kg (187 lb 8 oz)   07/10/23 83.5 kg (184 lb)   05/21/23 82.6 kg (182 lb)        Temp Readings from Last 3 Encounters:   08/07/23 97.7 °F (36.5 °C) (Temporal)   07/10/23 97.6 °F (36.4 °C) (Tympanic)   05/21/23 99.9 °F (37.7 °C) (Oral)        BP Readings from Last 3 Encounters:   08/07/23 130/78   07/10/23 126/72   05/21/23 140/66         Pulse Readings from Last 3 Encounters:   08/07/23 73   07/10/23 66   05/21/23 79        Physical Exam  Vitals reviewed. Constitutional:       General: She is not in acute distress. Appearance: She is well-developed. She is not diaphoretic. HENT:      Head: Normocephalic and atraumatic. Eyes:      Conjunctiva/sclera: Conjunctivae normal.   Neck:      Trachea: No tracheal deviation. Cardiovascular:      Rate and Rhythm: Normal rate and regular rhythm. Heart sounds: No murmur heard. No friction rub. No gallop. Pulmonary:      Effort: Pulmonary effort is normal. No respiratory distress. Breath sounds: Wheezing present. No rales. Chest:      Chest wall: No tenderness. Abdominal:      General: There is no distension. Palpations: Abdomen is soft. Tenderness: There is no abdominal tenderness. Musculoskeletal:      Cervical back: Normal range of motion and neck supple. Right lower leg: Edema present. Left lower leg: Edema present. Lymphadenopathy:      Cervical: No cervical adenopathy. Skin:     General: Skin is warm and dry. Coloration: Skin is not pale. Findings: No erythema. Neurological:      Mental Status: She is alert and oriented to person, place, and time.    Psychiatric: Behavior: Behavior normal.         Thought Content: Thought content normal.         Judgment: Judgment normal.         ECO    Goals and Barriers:  Current Goal: Minimize effects of disease. Barriers: None. Patient's Capacity to Self Care:  Patient is able to self care.     Code Status: [unfilled]

## 2023-08-07 NOTE — TELEPHONE ENCOUNTER
Appointment Confirmation   Who are you speaking with? Child   If it is not the patient, are they listed on an active communication consent form? Yes   Which provider is the appointment scheduled with? Dr. Martina Nicole   When is the appointment scheduled? Please list date and time 08/07/2023 @3PM   At which location is the appointment scheduled to take place? [facility]   Did caller verbalize understanding of appointment details?  Yes

## 2023-08-12 DIAGNOSIS — E03.9 HYPOTHYROIDISM, UNSPECIFIED TYPE: ICD-10-CM

## 2023-08-14 RX ORDER — LEVOTHYROXINE SODIUM 0.07 MG/1
75 TABLET ORAL
Qty: 90 TABLET | Refills: 0 | Status: SHIPPED | OUTPATIENT
Start: 2023-08-14

## 2023-09-16 DIAGNOSIS — J30.1 SEASONAL ALLERGIC RHINITIS DUE TO POLLEN: ICD-10-CM

## 2023-09-18 RX ORDER — FLUTICASONE PROPIONATE 50 MCG
SPRAY, SUSPENSION (ML) NASAL
Qty: 24 ML | Refills: 2 | Status: SHIPPED | OUTPATIENT
Start: 2023-09-18

## 2023-09-29 ENCOUNTER — TELEPHONE (OUTPATIENT)
Dept: PULMONOLOGY | Facility: CLINIC | Age: 68
End: 2023-09-29

## 2023-09-29 NOTE — TELEPHONE ENCOUNTER
[facility] called about about needs a medical necessity form for this patient. Form is waiting to be signed and dated by Doctor.

## 2023-10-09 ENCOUNTER — OFFICE VISIT (OUTPATIENT)
Dept: FAMILY MEDICINE CLINIC | Facility: CLINIC | Age: 68
End: 2023-10-09
Payer: COMMERCIAL

## 2023-10-09 VITALS
TEMPERATURE: 96.9 F | WEIGHT: 185 LBS | HEART RATE: 80 BPM | HEIGHT: 62 IN | OXYGEN SATURATION: 98 % | DIASTOLIC BLOOD PRESSURE: 70 MMHG | SYSTOLIC BLOOD PRESSURE: 122 MMHG | BODY MASS INDEX: 34.04 KG/M2 | RESPIRATION RATE: 20 BRPM

## 2023-10-09 DIAGNOSIS — E66.09 CLASS 1 OBESITY DUE TO EXCESS CALORIES WITH SERIOUS COMORBIDITY AND BODY MASS INDEX (BMI) OF 33.0 TO 33.9 IN ADULT: ICD-10-CM

## 2023-10-09 DIAGNOSIS — R91.8 MULTIPLE LUNG NODULES: ICD-10-CM

## 2023-10-09 DIAGNOSIS — K59.04 CHRONIC IDIOPATHIC CONSTIPATION: ICD-10-CM

## 2023-10-09 DIAGNOSIS — J47.9 BRONCHIECTASIS WITHOUT COMPLICATION (HCC): ICD-10-CM

## 2023-10-09 DIAGNOSIS — J45.50 SEVERE PERSISTENT ASTHMA WITHOUT COMPLICATION: ICD-10-CM

## 2023-10-09 DIAGNOSIS — F41.1 GAD (GENERALIZED ANXIETY DISORDER): ICD-10-CM

## 2023-10-09 DIAGNOSIS — F41.8 DEPRESSION WITH ANXIETY: ICD-10-CM

## 2023-10-09 DIAGNOSIS — K21.9 GASTROESOPHAGEAL REFLUX DISEASE WITHOUT ESOPHAGITIS: Primary | ICD-10-CM

## 2023-10-09 DIAGNOSIS — J43.2 CENTRILOBULAR EMPHYSEMA (HCC): ICD-10-CM

## 2023-10-09 DIAGNOSIS — Z12.11 COLON CANCER SCREENING: ICD-10-CM

## 2023-10-09 PROCEDURE — 99214 OFFICE O/P EST MOD 30 MIN: CPT | Performed by: FAMILY MEDICINE

## 2023-10-09 RX ORDER — POLYETHYLENE GLYCOL 3350 17 G/17G
POWDER, FOR SOLUTION ORAL
Qty: 507 G | Refills: 0 | Status: SHIPPED | OUTPATIENT
Start: 2023-10-09

## 2023-10-09 RX ORDER — BUDESONIDE, GLYCOPYRROLATE, AND FORMOTEROL FUMARATE 160; 9; 4.8 UG/1; UG/1; UG/1
2 AEROSOL, METERED RESPIRATORY (INHALATION) 2 TIMES DAILY
Qty: 32.1 G | Refills: 1 | Status: SHIPPED | OUTPATIENT
Start: 2023-10-09

## 2023-10-09 RX ORDER — MONTELUKAST SODIUM 10 MG/1
10 TABLET ORAL
Qty: 90 TABLET | Refills: 1 | Status: SHIPPED | OUTPATIENT
Start: 2023-10-09

## 2023-10-09 RX ORDER — ALBUTEROL SULFATE 90 UG/1
2 AEROSOL, METERED RESPIRATORY (INHALATION) EVERY 6 HOURS PRN
Qty: 18 G | Refills: 5 | Status: SHIPPED | OUTPATIENT
Start: 2023-10-09

## 2023-10-09 RX ORDER — FAMOTIDINE 20 MG/1
20 TABLET, FILM COATED ORAL
Qty: 90 TABLET | Refills: 0 | Status: SHIPPED | OUTPATIENT
Start: 2023-10-09

## 2023-10-09 RX ORDER — PANTOPRAZOLE SODIUM 40 MG/1
40 TABLET, DELAYED RELEASE ORAL
Qty: 90 TABLET | Refills: 1 | Status: SHIPPED | OUTPATIENT
Start: 2023-10-09 | End: 2024-04-06

## 2023-10-09 RX ORDER — SERTRALINE HYDROCHLORIDE 25 MG/1
25 TABLET, FILM COATED ORAL
Qty: 90 TABLET | Refills: 1 | Status: SHIPPED | OUTPATIENT
Start: 2023-10-09 | End: 2024-04-06

## 2023-10-09 RX ORDER — IPRATROPIUM BROMIDE AND ALBUTEROL SULFATE 2.5; .5 MG/3ML; MG/3ML
3 SOLUTION RESPIRATORY (INHALATION) EVERY 8 HOURS PRN
Qty: 270 ML | Refills: 2 | Status: SHIPPED | OUTPATIENT
Start: 2023-10-09

## 2023-10-09 NOTE — PROGRESS NOTES
Subjective:      Patient ID: Juvenal Rossi is a 76 y.o. female.     Here for follow up with Son opal  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 laryngeal cancer follows ENT, abdominal nodule ,lung nodule- follows with Dr Leda Simms, COPD-saw pulmonary, she has had multiple CT chest and abdomen, follow up CT is scheduled  complains of epigastric pain, also feels depressed        Past Medical History:   Diagnosis Date    Acute kidney failure ([address])     Allergic     Allergies     Anemia     Asthma     Cancer ([address])     Cataract     Chronic kidney disease (CKD), stage III (moderate) (HCC)     COPD (chronic obstructive pulmonary disease) ([address])     COVID-19     Covid + 5-1-12-cough at that time now fully resolved    Disease of thyroid gland     Essential hypertension     GERD (gastroesophageal reflux disease)     Glaucoma     High blood pressure     HTN (hypertension) 02/16/2021    Hyperlipidemia     Hypothyroidism     Pulmonary hypertension (HCC)     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    Throat cancer ([address]) 2014    chemo and rad therapy 2014       Family History   Problem Relation Age of Onset    Other Mother         respiratory disorder    Sudden death Father         shot himself    Suicidality Father     Brain cancer Sister     Diabetes Sister     Breast cancer Sister     Cancer Sister         pancreatic cancer    No Known Problems Sister     No Known Problems Sister     No Known Problems Sister     No Known Problems Sister     No Known Problems Sister     No Known Problems Sister     No Known Problems Daughter     No Known Problems Daughter     No Known Problems Maternal Grandmother     No Known Problems Maternal Grandfather     No Known Problems Paternal Grandmother     No Known Problems Paternal Grandfather     No Known Problems Maternal Aunt     No Known Problems Maternal Aunt     No Known Problems Maternal Aunt     No Known Problems Maternal Aunt     No Known Problems Maternal Aunt     No Known Problems Paternal Aunt     No Known Problems Paternal Aunt     No Known Problems Paternal Aunt     No Known Problems Paternal Aunt        Past Surgical History:   Procedure Laterality Date    COLONOSCOPY  2016    ESOPHAGOGASTRODUODENOSCOPY  2016    EYE SURGERY      IR BIOPSY LUNG  05/18/2022    LARYNGOSCOPY      w/ Bx. [place] TENDON SHEATH INCISION Right 02/16/2021    Procedure: THUMB TRIGGER FINGER RELEASE;  Surgeon: Radha Tuttle MD;  Location: [facility];  Service: Orthopedics    TUBAL LIGATION          reports that she quit smoking about 9 years ago. Her smoking use included cigarettes. She has a 40.00 pack-year smoking history. She has never used smokeless tobacco. She reports that she does not drink alcohol and does not use drugs.       Current Outpatient Medications:     albuterol (PROVENTIL HFA,VENTOLIN HFA) 90 mcg/act inhaler, Inhale 2 puffs every 6 (six) hours as needed for wheezing or shortness of breath, Disp: 18 g, Rfl: 5    brimonidine tartrate 0.2 % ophthalmic solution, , Disp: , Rfl:     Budeson-Glycopyrrol-Formoterol (Breztri Aerosphere) 160-9-4.8 MCG/ACT AERO, Inhale 2 puffs 2 (two) times a day Rinse mouth after use., Disp: 32.1 g, Rfl: 1    Diclofenac Sodium (VOLTAREN) 1 %, Apply 2 g topically 4 (four) times a day, Disp: 350 g, Rfl: 0    diphenhydrAMINE (BENADRYL) 25 mg tablet, Take 1 tablet (25 mg total) by mouth every 6 (six) hours as needed for itching, Disp: 30 tablet, Rfl: 0    famotidine (PEPCID) 20 mg tablet, Take 1 tablet (20 mg total) by mouth daily at bedtime, Disp: 90 tablet, Rfl: 0    ferrous sulfate 324 (65 Fe) mg, Take 1 tablet (324 mg total) by mouth daily after lunch, Disp: 90 tablet, Rfl: 1    fexofenadine (ALLEGRA) 180 MG tablet, TAKE 1 TABLET BY MOUTH EVERY DAY, Disp: 90 tablet, Rfl: 1    fluticasone (FLONASE) 50 mcg/act nasal spray, SPRAY 1 SPRAY INTO EACH NOSTRIL EVERY DAY, Disp: 24 mL, Rfl: 2 ipratropium (ATROVENT) 0.06 % nasal spray, 2 SPRAYS INTO EACH NOSTRIL 3 (THREE) TIMES A DAY FOR INCREASED NASAL SECRETION, Disp: 45 mL, Rfl: 1    ipratropium-albuterol (DUO-NEB) 0.5-2.5 mg/3 mL nebulizer solution, Take 3 mL by nebulization every 8 (eight) hours as needed for wheezing or shortness of breath, Disp: 270 mL, Rfl: 2    levothyroxine 75 mcg tablet, TAKE 1 TABLET (75 MCG TOTAL) BY MOUTH DAILY IN THE EARLY MORNING, Disp: 90 tablet, Rfl: 0    lidocaine (XYLOCAINE) 5 % ointment, APPLY TOPICALLY AS NEEDED FOR MILD PAIN, Disp: 35.44 g, Rfl: 0    magnesium Oxide (MAG-OX) 400 mg TABS, TAKE 1 TABLET BY MOUTH EVERY DAY, Disp: 90 tablet, Rfl: 1    montelukast (SINGULAIR) 10 mg tablet, Take 1 tablet (10 mg total) by mouth daily at bedtime, Disp: 90 tablet, Rfl: 1    pantoprazole (PROTONIX) 40 mg tablet, Take 1 tablet (40 mg total) by mouth daily before breakfast, Disp: 90 tablet, Rfl: 1    polyethylene glycol (GLYCOLAX) 17 GM/SCOOP powder, 1 scoop in 8 oz of water/orange juice, Disp: 507 g, Rfl: 0    sertraline (ZOLOFT) 25 mg tablet, Take 1 tablet (25 mg total) by mouth daily at bedtime, Disp: 90 tablet, Rfl: 1    timolol (TIMOPTIC) 0.5 % ophthalmic solution, INSTILL 1 DROP INTO EACH EYE TWO TIMES A DAY, Disp: , Rfl:     The following portions of the patient's history were reviewed and updated as appropriate: allergies, current medications, past family history, past medical history, past social history, past surgical history and problem list.    Review of Systems   Constitutional:  Negative for fatigue and fever. HENT:  Negative for congestion, facial swelling, mouth sores, rhinorrhea, sore throat and trouble swallowing. Eyes:  Negative for pain and redness. Respiratory:  Negative for cough, shortness of breath and wheezing. Cardiovascular:  Negative for chest pain, palpitations and leg swelling. Gastrointestinal:  Positive for abdominal pain.  Negative for blood in stool, constipation, diarrhea and nausea. Genitourinary:  Negative for dysuria, hematuria and urgency. Musculoskeletal:  Negative for arthralgias, back pain and myalgias. Skin:  Negative for rash and wound. Neurological:  Negative for seizures, syncope and headaches. Hematological:  Negative for adenopathy. Psychiatric/Behavioral:  Negative for agitation and behavioral problems. PHQ-2/9 Depression Screening    Little interest or pleasure in doing things: 3 - nearly every day  Feeling down, depressed, or hopeless: 3 - nearly every day  Trouble falling or staying asleep, or sleeping too much: 3 - nearly every day  Feeling tired or having little energy: 3 - nearly every day  Poor appetite or overeatin - not at all  Feeling bad about yourself - or that you are a failure or have let yourself or your family down: 3 - nearly every day  Trouble concentrating on things, such as reading the newspaper or watching television: 3 - nearly every day  Moving or speaking so slowly that other people could have noticed. Or the opposite - being so fidgety or restless that you have been moving around a lot more than usual: 0 - not at all  Thoughts that you would be better off dead, or of hurting yourself in some way: 0 - not at all  PHQ-9 Score: 18   PHQ-9 Interpretation: Moderately severe depression                Objective:    /70 (BP Location: Left arm, Patient Position: Sitting, Cuff Size: Adult)   Pulse 80   Temp (!) 96.9 °F (36.1 °C) (Tympanic)   Resp 20   Ht 5' 2" (1.575 m)   Wt 83.9 kg (185 lb)   SpO2 98% Comment: 2l  BMI 33.84 kg/m²      Physical Exam  Vitals and nursing note reviewed. Constitutional:       Appearance: Normal appearance. She is well-developed. She is not ill-appearing. HENT:      Head: Normocephalic and atraumatic.       Right Ear: External ear normal.      Left Ear: External ear normal.      Nose: Nose normal.      Mouth/Throat:      Mouth: Mucous membranes are moist.      Pharynx: No oropharyngeal exudate or posterior oropharyngeal erythema. Eyes:      General: No scleral icterus. Right eye: No discharge. Left eye: No discharge. Conjunctiva/sclera: Conjunctivae normal.   Cardiovascular:      Rate and Rhythm: Normal rate. Heart sounds: No murmur heard. No gallop. Pulmonary:      Effort: Pulmonary effort is normal. No respiratory distress. Breath sounds: Normal breath sounds. No stridor. No wheezing, rhonchi or rales. Abdominal:      Palpations: Abdomen is soft. Tenderness: There is abdominal tenderness (epigastric). Musculoskeletal:         General: No tenderness or deformity. Skin:     Findings: No erythema or rash. Neurological:      Mental Status: She is alert. Mental status is at baseline.    Psychiatric:         Behavior: Behavior normal.         Judgment: Judgment normal.           Recent Results (from the past 8736 hour(s))   ECG 12 lead    Collection Time: 10/28/22  8:39 AM   Result Value Ref Range    Ventricular Rate 86 BPM    Atrial Rate 86 BPM    [place] Interval 146 ms    QRSD Interval 86 ms    QT Interval 352 ms    QTC Interval 421 ms    P Axis 83 degrees    QRS Axis 2 degrees    T Wave Axis 59 degrees   CBC and differential    Collection Time: 10/28/22  9:14 AM   Result Value Ref Range    WBC 14.97 (H) 4.31 - 10.16 Thousand/uL    RBC 4.47 3.81 - 5.12 Million/uL    Hemoglobin 12.0 11.5 - 15.4 g/dL    Hematocrit 39.1 34.8 - 46.1 %    MCV 88 82 - 98 fL    MCH 26.8 26.8 - 34.3 pg    MCHC 30.7 (L) 31.4 - 37.4 g/dL    RDW 14.0 11.6 - 15.1 %    MPV 9.8 8.9 - 12.7 fL    Platelets 492 269 - 944 Thousands/uL    nRBC 0 /100 WBCs    Neutrophils Relative 88 (H) 43 - 75 %    Immat GRANS % 1 0 - 2 %    Lymphocytes Relative 3 (L) 14 - 44 %    Monocytes Relative 8 4 - 12 %    Eosinophils Relative 0 0 - 6 %    Basophils Relative 0 0 - 1 %    Neutrophils Absolute 13.10 (H) 1.85 - 7.62 Thousands/µL    Immature Grans Absolute 0.09 0.00 - 0.20 Thousand/uL    Lymphocytes Absolute 0.50 (L) 0.60 - 4.47 Thousands/µL    Monocytes Absolute 1.21 0.17 - 1.22 Thousand/µL    Eosinophils Absolute 0.04 0.00 - 0.61 Thousand/µL    Basophils Absolute 0.03 0.00 - 0.10 Thousands/µL   Protime-INR    Collection Time: 10/28/22  9:14 AM   Result Value Ref Range    Protime 14.1 11.6 - 14.5 seconds    INR 1.07 0.84 - 1.19   APTT    Collection Time: 10/28/22  9:14 AM   Result Value Ref Range    PTT 28 23 - 37 seconds   Basic metabolic panel    Collection Time: 10/28/22  9:14 AM   Result Value Ref Range    Sodium 139 135 - 147 mmol/L    Potassium 4.7 3.5 - 5.3 mmol/L    Chloride 104 96 - 108 mmol/L    CO2 31 21 - 32 mmol/L    ANION GAP 4 4 - 13 mmol/L    BUN 18 5 - 25 mg/dL    Creatinine 1.12 0.60 - 1.30 mg/dL    Glucose 133 65 - 140 mg/dL    Calcium 9.5 8.4 - 10.2 mg/dL    eGFR 50 ml/min/1.73sq m   Magnesium    Collection Time: 10/28/22  9:14 AM   Result Value Ref Range    Magnesium 1.7 (L) 1.9 - 2.7 mg/dL   HS Troponin 0hr (reflex protocol)    Collection Time: 10/28/22  9:14 AM   Result Value Ref Range    hs TnI 0hr 3 "Refer to ACS Flowchart"- see link ng/L   TSH    Collection Time: 10/28/22  9:14 AM   Result Value Ref Range    TSH 3RD GENERATON 0.217 (L) 0.450 - 4.500 uIU/mL   FLU/RSV/COVID - if FLU/RSV clinically relevant    Collection Time: 10/28/22  9:14 AM    Specimen: Nose; Nares   Result Value Ref Range    SARS-CoV-2 Negative Negative    INFLUENZA A PCR Negative Negative    INFLUENZA B PCR Negative Negative    RSV PCR Negative Negative   HS Troponin I 2hr    Collection Time: 10/28/22 11:01 AM   Result Value Ref Range    hs TnI 2hr 3 "Refer to ACS Flowchart"- see link ng/L    Delta 2hr hsTnI 0 <20 ng/L   Blood culture #1    Collection Time: 10/28/22 11:01 AM    Specimen: Arm, Right; Blood   Result Value Ref Range    Blood Culture No Growth After 5 Days.     Blood culture #2    Collection Time: 10/28/22 11:01 AM    Specimen: Arm, Left; Blood   Result Value Ref Range    Blood Culture No Growth After 5 Days.    Procalcitonin    Collection Time: 10/28/22 11:01 AM   Result Value Ref Range    Procalcitonin 0.24 <=0.25 ng/ml   Lactic acid, plasma    Collection Time: 10/28/22 11:01 AM   Result Value Ref Range    LACTIC ACID 0.7 0.5 - 2.0 mmol/L   T4, free    Collection Time: 10/28/22 11:01 AM   Result Value Ref Range    Free T4 1.64 (H) 0.76 - 1.46 ng/dL   Procalcitonin    Collection Time: 10/29/22  6:06 AM   Result Value Ref Range    Procalcitonin 0.27 (H) <=0.25 ng/ml   CBC and Platelet, AM Draw, Tomorrow    Collection Time: 10/29/22  6:06 AM   Result Value Ref Range    WBC 13.84 (H) 4.31 - 10.16 Thousand/uL    RBC 4.23 3.81 - 5.12 Million/uL    Hemoglobin 11.3 (L) 11.5 - 15.4 g/dL    Hematocrit 38.0 34.8 - 46.1 %    MCV 90 82 - 98 fL    MCH 26.7 (L) 26.8 - 34.3 pg    MCHC 29.7 (L) 31.4 - 37.4 g/dL    RDW 14.2 11.6 - 15.1 %    Platelets 848 323 - 843 Thousands/uL    MPV 10.5 8.9 - 12.7 fL   Basic metabolic panel, AM Draw, Tomorrow    Collection Time: 10/29/22  6:06 AM   Result Value Ref Range    Sodium 141 135 - 147 mmol/L    Potassium 4.1 3.5 - 5.3 mmol/L    Chloride 106 96 - 108 mmol/L    CO2 26 21 - 32 mmol/L    ANION GAP 9 4 - 13 mmol/L    BUN 17 5 - 25 mg/dL    Creatinine 0.96 0.60 - 1.30 mg/dL    Glucose 75 65 - 140 mg/dL    Calcium 9.3 8.4 - 10.2 mg/dL    eGFR 61 ml/min/1.73sq m   CBC and Platelet    Collection Time: 10/30/22  4:34 AM   Result Value Ref Range    WBC 9.43 4.31 - 10.16 Thousand/uL    RBC 4.10 3.81 - 5.12 Million/uL    Hemoglobin 10.9 (L) 11.5 - 15.4 g/dL    Hematocrit 36.4 34.8 - 46.1 %    MCV 89 82 - 98 fL    MCH 26.6 (L) 26.8 - 34.3 pg    MCHC 29.9 (L) 31.4 - 37.4 g/dL    RDW 13.9 11.6 - 15.1 %    Platelets 170 425 - 061 Thousands/uL    MPV 10.6 8.9 - 12.7 fL   Procalcitonin    Collection Time: 10/30/22  4:34 AM   Result Value Ref Range    Procalcitonin 0.25 <=0.25 ng/ml   D-dimer, quantitative    Collection Time: 10/30/22 12:12 PM   Result Value Ref Range    D-Dimer, Quant 1.37 (H) <0.50 ug/ml FEU   CBC and Platelet    Collection Time: 10/31/22  5:12 AM   Result Value Ref Range    WBC 5.65 4.31 - 10.16 Thousand/uL    RBC 4.01 3.81 - 5.12 Million/uL    Hemoglobin 10.6 (L) 11.5 - 15.4 g/dL    Hematocrit 35.5 34.8 - 46.1 %    MCV 89 82 - 98 fL    MCH 26.4 (L) 26.8 - 34.3 pg    MCHC 29.9 (L) 31.4 - 37.4 g/dL    RDW 13.8 11.6 - 15.1 %    Platelets 014 264 - 436 Thousands/uL    MPV 9.7 8.9 - 12.7 fL   Basic metabolic panel    Collection Time: 10/31/22  5:12 AM   Result Value Ref Range    Sodium 141 135 - 147 mmol/L    Potassium 4.2 3.5 - 5.3 mmol/L    Chloride 105 96 - 108 mmol/L    CO2 31 21 - 32 mmol/L    ANION GAP 5 4 - 13 mmol/L    BUN 12 5 - 25 mg/dL    Creatinine 0.95 0.60 - 1.30 mg/dL    Glucose 87 65 - 140 mg/dL    Calcium 9.3 8.4 - 10.2 mg/dL    eGFR 62 ml/min/1.73sq m   POCT rapid flu A and B    Collection Time: 12/12/22  5:32 PM   Result Value Ref Range    RAPID FLU A negative     RAPID FLU B negative    CBC and differential    Collection Time: 04/27/23 12:12 PM   Result Value Ref Range    WBC 8.37 4.31 - 10.16 Thousand/uL    RBC 4.63 3.81 - 5.12 Million/uL    Hemoglobin 12.5 11.5 - 15.4 g/dL    Hematocrit 41.4 34.8 - 46.1 %    MCV 89 82 - 98 fL    MCH 27.0 26.8 - 34.3 pg    MCHC 30.2 (L) 31.4 - 37.4 g/dL    RDW 14.5 11.6 - 15.1 %    MPV 9.7 8.9 - 12.7 fL    Platelets 281 885 - 930 Thousands/uL    nRBC 0 /100 WBCs    Neutrophils Relative 79 (H) 43 - 75 %    Immat GRANS % 1 0 - 2 %    Lymphocytes Relative 11 (L) 14 - 44 %    Monocytes Relative 7 4 - 12 %    Eosinophils Relative 1 0 - 6 %    Basophils Relative 1 0 - 1 %    Neutrophils Absolute 6.69 1.85 - 7.62 Thousands/µL    Immature Grans Absolute 0.04 0.00 - 0.20 Thousand/uL    Lymphocytes Absolute 0.88 0.60 - 4.47 Thousands/µL    Monocytes Absolute 0.59 0.17 - 1.22 Thousand/µL    Eosinophils Absolute 0.12 0.00 - 0.61 Thousand/µL    Basophils Absolute 0.05 0.00 - 0.10 Thousands/µL   Comprehensive metabolic panel    Collection Time: 04/27/23 12:12 PM   Result Value Ref Range    Sodium 140 135 - 147 mmol/L    Potassium 4.5 3.5 - 5.3 mmol/L    Chloride 103 96 - 108 mmol/L    CO2 31 21 - 32 mmol/L    ANION GAP 6 4 - 13 mmol/L    BUN 28 (H) 5 - 25 mg/dL    Creatinine 1.18 0.60 - 1.30 mg/dL    Glucose 80 65 - 140 mg/dL    Calcium 9.5 8.4 - 10.2 mg/dL    AST 22 13 - 39 U/L    ALT 13 7 - 52 U/L    Alkaline Phosphatase 114 (H) 34 - 104 U/L    Total Protein 7.0 6.4 - 8.4 g/dL    Albumin 4.3 3.5 - 5.0 g/dL    Total Bilirubin 0.41 0.20 - 1.00 mg/dL    eGFR 47 ml/min/1.73sq m   TSH, 3rd generation    Collection Time: 04/27/23 12:12 PM   Result Value Ref Range    TSH 3RD GENERATON 3.635 0.450 - 4.500 uIU/mL   Iron Saturation %    Collection Time: 04/27/23 12:12 PM   Result Value Ref Range    Iron Saturation 19 15 - 50 %    TIBC 336 250 - 450 ug/dL    Iron 65 50 - 170 ug/dL   Ferritin    Collection Time: 04/27/23 12:12 PM   Result Value Ref Range    Ferritin 57 8 - 388 ng/mL   ECG 12 lead    Collection Time: 05/21/23  3:55 PM   Result Value Ref Range    Ventricular Rate 78 BPM    Atrial Rate 78 BPM    [place] Interval 154 ms    QRSD Interval 82 ms    QT Interval 354 ms    QTC Interval 403 ms    P Axis 84 degrees    QRS Axis 34 degrees    T Wave Axis 88 degrees   CBC and differential    Collection Time: 05/21/23  4:33 PM   Result Value Ref Range    WBC 14.17 (H) 4.31 - 10.16 Thousand/uL    RBC 4.66 3.81 - 5.12 Million/uL    Hemoglobin 12.5 11.5 - 15.4 g/dL    Hematocrit 41.7 34.8 - 46.1 %    MCV 90 82 - 98 fL    MCH 26.8 26.8 - 34.3 pg    MCHC 30.0 (L) 31.4 - 37.4 g/dL    RDW 13.9 11.6 - 15.1 %    MPV 10.1 8.9 - 12.7 fL    Platelets 377 824 - 171 Thousands/uL   Protime-INR    Collection Time: 05/21/23  4:33 PM   Result Value Ref Range    Protime 12.5 11.6 - 14.5 seconds    INR 0.92 0.84 - 1.19   APTT    Collection Time: 05/21/23  4:33 PM   Result Value Ref Range    PTT 26 23 - 37 seconds   FLU/RSV/COVID - if FLU/RSV clinically relevant    Collection Time: 05/21/23  4:33 PM Specimen: Nose; Nares   Result Value Ref Range    SARS-CoV-2 Negative Negative    INFLUENZA A PCR Negative Negative    INFLUENZA B PCR Negative Negative    RSV PCR Negative Negative   Comprehensive metabolic panel    Collection Time: 05/21/23  4:33 PM   Result Value Ref Range    Sodium 139 135 - 147 mmol/L    Potassium 4.4 3.5 - 5.3 mmol/L    Chloride 100 96 - 108 mmol/L    CO2 34 (H) 21 - 32 mmol/L    ANION GAP 5 4 - 13 mmol/L    BUN 25 5 - 25 mg/dL    Creatinine 1.02 0.60 - 1.30 mg/dL    Glucose 102 65 - 140 mg/dL    Calcium 9.0 8.4 - 10.2 mg/dL    AST 24 13 - 39 U/L    ALT 15 7 - 52 U/L    Alkaline Phosphatase 117 (H) 34 - 104 U/L    Total Protein 7.0 6.4 - 8.4 g/dL    Albumin 4.0 3.5 - 5.0 g/dL    Total Bilirubin 0.48 0.20 - 1.00 mg/dL    eGFR 56 ml/min/1.73sq m   Magnesium    Collection Time: 05/21/23  4:33 PM   Result Value Ref Range    Magnesium 1.8 (L) 1.9 - 2.7 mg/dL   HS Troponin 0hr (reflex protocol)    Collection Time: 05/21/23  4:33 PM   Result Value Ref Range    hs TnI 0hr 3 "Refer to ACS Flowchart"- see link ng/L   Manual Differential(PHLEBS Do Not Order)    Collection Time: 05/21/23  4:33 PM   Result Value Ref Range    Segmented % 87 (H) 43 - 75 %    Bands % 1 0 - 8 %    Lymphocytes % 5 (L) 14 - 44 %    Monocytes % 5 4 - 12 %    Eosinophils, % 2 0 - 6 %    Basophils % 0 0 - 1 %    Absolute Neutrophils 12.47 (H) 1.85 - 7.62 Thousand/uL    Lymphocytes Absolute 0.71 0.60 - 4.47 Thousand/uL    Monocytes Absolute 0.71 0.00 - 1.22 Thousand/uL    Eosinophils Absolute 0.28 0.00 - 0.40 Thousand/uL    Basophils Absolute 0.00 0.00 - 0.10 Thousand/uL    Total Counted      RBC Morphology Normal     Platelet Estimate Adequate Adequate       Laboratory Results: I have personally reviewed the pertinent laboratory results/reports     Radiology/Other Diagnostic Testing Results: I have personally reviewed pertinent reports.       CT chest abdomen pelvis w contrast    Result Date: 8/6/2023  CT CHEST, ABDOMEN AND PELVIS WITH IV CONTRAST INDICATION:   R59.0: Localized enlarged lymph nodes C76.0: Malignant neoplasm of head, face and neck. COMPARISON: Comparison is made to prior studies, most recent is dated January 24, 2023. TECHNIQUE: CT examination of the chest, abdomen and pelvis was performed. Axial, sagittal, and coronal 2D reformatted images were created from the source data and submitted for interpretation. Radiation dose length product (DLP) for this visit:  [phone] mGy-cm . This examination, like all CT scans performed in the [facility], was performed utilizing techniques to minimize radiation dose exposure, including the use of iterative reconstruction and automated exposure control. IV Contrast:  100 mL of iohexol (OMNIPAQUE) Enteric Contrast: Enteric contrast was administered. FINDINGS: CHEST LUNGS: Groundglass nodule right lower lobe measures 1 x 1 cm, previously 1.2 x 1.1 cm, not significantly changed. Lobulated solid nodule in the left lower lobe is unchanged measuring 1.2 x 1.1 cm. Cluster of tree-in-bud nodules in the right lung apex are new compatible with bronchiolitis. Also new nodule in the left lower lobe medially which measures 0.4 cm on series 5 image 137. Lower lungs are degraded by respiratory motion artifact. Moderate centrilobular emphysema. Biapical scarring and scarring/atelectasis anterior right middle lobe and lingula unchanged. . Central airways are patent. PLEURA:  Unremarkable. HEART/GREAT VESSELS: Heart is unremarkable for patient's age. No thoracic aortic aneurysm. MEDIASTINUM AND KAYY: Mediastinal lymph nodes are stable, with an index lower right paraesophageal lymph node node measuring 1 x 0.9 cm, previously 1.2 x 1 cm (this lymph node was remeasured for purposes of direct comparison.). CHEST WALL AND LOWER NECK:  Unremarkable. ABDOMEN LIVER/BILIARY TREE:  Unremarkable. GALLBLADDER:  No calcified gallstones. No pericholecystic inflammatory change. SPLEEN:  Unremarkable. PANCREAS:  Unremarkable. ADRENAL GLANDS:  Unremarkable. KIDNEYS/URETERS:  Unremarkable. No hydronephrosis. STOMACH AND BOWEL: Large amount of stool throughout the colon. No bowel obstruction. No bowel wall thickening. APPENDIX:  No findings to suggest appendicitis. 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 VESSELS:  Unremarkable for patient's age. PELVIS REPRODUCTIVE ORGANS:  Unremarkable for patient's age. URINARY BLADDER:  Unremarkable. ABDOMINAL WALL/INGUINAL REGIONS:  Unremarkable. OSSEOUS STRUCTURES:  No acute fracture or destructive osseous lesion. New left lower lobe 4 mm pulmonary nodule which is too small to characterize. Recommend short interim follow-up CT scan of the chest in approximately 3 months. Right upper lobe bronchiolitis. No significant change in the size of a groundglass right lower lobe nodule and no change in the size of a solid nodule in the left lower lobe. .. No metastatic disease in the abdomen or pelvis. Workstation performed: [id number]     CT soft tissue neck w contrast    Result Date: 8/4/2023  CT NECK WITH CONTRAST INDICATION:   R59.0: Localized enlarged lymph nodes C76.0: Malignant neoplasm of head, face and neck. History of laryngeal cancer status post chemoradiation therapy 2014. COMPARISON: 7/1/2021 TECHNIQUE:  Axial, sagittal, and coronal 2D reformatted images were created from the axial source data and submitted for interpretation. Radiation dose length product (DLP) for this visit:  554 mGy-cm . This examination, like all CT scans performed in the [facility], was performed utilizing techniques to minimize radiation dose exposure, including the use of iterative reconstruction and automated exposure control. IV Contrast:  100 mL of iohexol (OMNIPAQUE) IMAGE QUALITY:  Diagnostic.  FINDINGS: VISUALIZED BRAIN PARENCHYMA:  No acute intracranial pathology of the visualized brain parenchyma. VISUALIZED ORBITS: The patient is status post bilateral cataract surgery. PARANASAL SINUSES: Normal visualized paranasal sinuses. NASAL CAVITY AND NASOPHARYNX:  Normal. SUPRAHYOID NECK:  Normal oral cavity, tongue base, tonsillar fossa. INFRAHYOID NECK:  Aryepiglottic folds and piriform sinuses are normal. No nodular enhancing lesions within the glottic or subglottic airway. Thickening of the true vocal cords is likely posttreatment in nature. . There is also thickening of the epiglottis. THYROID GLAND:  Unremarkable. PAROTID AND SUBMANDIBULAR GLANDS:  Normal. LYMPH NODES:  No pathologic or enlarged adenopathy. VASCULAR STRUCTURES:  Normal enhancement of the cervical vasculature. THORACIC INLET: Correlate with separate chest CT report for additional findings. BONY STRUCTURES: No acute fracture or destructive osseous lesion. No pathologically enlarged cervical lymphadenopathy. No evidence for residual or recurrent disease within the larynx. Correlate with separate chest CT report for additional findings.  Workstation performed: [id number]        Assessment/Plan:  Problem List Items Addressed This Visit          Respiratory    Centrilobular emphysema (HCC)    Relevant Medications    montelukast (SINGULAIR) 10 mg tablet    Budeson-Glycopyrrol-Formoterol (Breztri Aerosphere) 160-9-4.8 MCG/ACT AERO    ipratropium-albuterol (DUO-NEB) 0.5-2.5 mg/3 mL nebulizer solution    albuterol (PROVENTIL HFA,VENTOLIN HFA) 90 mcg/act inhaler    Severe persistent asthma without complication    Relevant Medications    montelukast (SINGULAIR) 10 mg tablet    Budeson-Glycopyrrol-Formoterol (Breztri Aerosphere) 160-9-4.8 MCG/ACT AERO    ipratropium-albuterol (DUO-NEB) 0.5-2.5 mg/3 mL nebulizer solution    albuterol (PROVENTIL HFA,VENTOLIN HFA) 90 mcg/act inhaler    Multiple lung nodules    Relevant Medications    montelukast (SINGULAIR) 10 mg tablet Budeson-Glycopyrrol-Formoterol (Breztri Aerosphere) 160-9-4.8 MCG/ACT AERO    ipratropium-albuterol (DUO-NEB) 0.5-2.5 mg/3 mL nebulizer solution    albuterol (PROVENTIL HFA,VENTOLIN HFA) 90 mcg/act inhaler       Other    Depression with anxiety    Relevant Medications    sertraline (ZOLOFT) 25 mg tablet     Other Visit Diagnoses       Gastroesophageal reflux disease without esophagitis    -  Primary    Relevant Medications    pantoprazole (PROTONIX) 40 mg tablet    famotidine (PEPCID) 20 mg tablet    Other Relevant Orders    Ambulatory Referral to Gastroenterology    Bronchiectasis without complication (HCC)        Relevant Medications    montelukast (SINGULAIR) 10 mg tablet    Budeson-Glycopyrrol-Formoterol (Breztri Aerosphere) 160-9-4.8 MCG/ACT AERO    ipratropium-albuterol (DUO-NEB) 0.5-2.5 mg/3 mL nebulizer solution    albuterol (PROVENTIL HFA,VENTOLIN HFA) 90 mcg/act inhaler    TIMMY (generalized anxiety disorder)        Relevant Medications    sertraline (ZOLOFT) 25 mg tablet    Chronic idiopathic constipation        Relevant Medications    polyethylene glycol (GLYCOLAX) 17 GM/SCOOP powder    Class 1 obesity due to excess calories with serious comorbidity and body mass index (BMI) of 33.0 to 33.9 in adult        Colon cancer screening        Relevant Orders    Ambulatory Referral to Gastroenterology            Stop taking omeprazole 40 mg oral daily. You will start taking pantoprazole 40 mg oral daily in the morning empty stomach do not eat or drink anything for 30 minutes thereafter. You will take your levothyroxine the same day. You will take famotidine 20 mg for 6 weeks and sertraline 25 mg daily at bedtime for your depression. You will continue your current inhalers. follow-up with gastroenterology for colonoscopy and endoscopy. Discussed importance of diet and lifestyle modifications to control GERD symptoms. Discussed the importance of eating small, frequent meals instead of large meals.    Patient was counseled on  behavioral modification including avoiding spices, Caffeine, tomato gravy, fried and fatty food , dark chocolate,wine and other caffeinated drinks along with the last meal at least 3 hours from bedtime and do not lay down 2-3 hours following a meal.  Also counseled on maintaining the head elevated at 30 degree lizz when sleeping. Discussed with daughter Diana Arnett over the phone at patient's request.           Read package inserts for all medications before starting a new medications, call me if you have any questions. Patient was given opportunity to ask questions and all questions were answered. Disclaimer: Portions of the record may have been created with voice recognition software. Occasional wrong word or "sound a like" substitutions may have occurred due to the inherent limitations of voice recognition software. Read the chart carefully and recognize, using context, where substitutions have occurred. I have used the Epic copy/forward function to compose this note. I have reviewed my current note to ensure it reflects the current patient status, exam, assessment and plan.

## 2023-10-09 NOTE — PATIENT INSTRUCTIONS
Stop taking omeprazole 40 mg oral daily. You will start taking pantoprazole 40 mg oral daily in the morning empty stomach do not eat or drink anything for 30 minutes thereafter. You will take your levothyroxine the same day. You will take famotidine 20 mg for 6 weeks and sertraline 25 mg daily at bedtime for your depression. You will continue your current inhalers. You will follow-up with gastroenterology for colonoscopy and endoscopy. Discussed importance of diet and lifestyle modifications to control GERD symptoms. Discussed the importance of eating small, frequent meals instead of large meals. Patient  was counseled on  behavioral modification including avoiding spices, Caffeine, tomato gravy, fried and fatty food , dark chocolate,wine and other caffeinated drinks along with the last meal at least 3 hours from bedtime and do not lay down 2-3 hours following a meal.  Also counseled on maintaining the head elevated at 30 degree lizz when sleeping.

## 2023-10-22 DIAGNOSIS — J31.1 POST-NASAL CATARRH: ICD-10-CM

## 2023-10-23 RX ORDER — FEXOFENADINE HCL 180 MG/1
TABLET ORAL
Qty: 90 TABLET | Refills: 1 | Status: SHIPPED | OUTPATIENT
Start: 2023-10-23

## 2023-11-08 ENCOUNTER — TELEPHONE (OUTPATIENT)
Dept: FAMILY MEDICINE CLINIC | Facility: CLINIC | Age: 68
End: 2023-11-08

## 2023-11-08 ENCOUNTER — TELEPHONE (OUTPATIENT)
Age: 68
End: 2023-11-08

## 2023-11-08 ENCOUNTER — TELEMEDICINE (OUTPATIENT)
Dept: FAMILY MEDICINE CLINIC | Facility: CLINIC | Age: 68
End: 2023-11-08
Payer: COMMERCIAL

## 2023-11-08 DIAGNOSIS — J96.11 CHRONIC RESPIRATORY FAILURE WITH HYPOXIA, ON HOME O2 THERAPY: ICD-10-CM

## 2023-11-08 DIAGNOSIS — J43.2 CENTRILOBULAR EMPHYSEMA (HCC): Primary | ICD-10-CM

## 2023-11-08 DIAGNOSIS — J44.1 COPD WITH ACUTE EXACERBATION (HCC): ICD-10-CM

## 2023-11-08 DIAGNOSIS — J47.1 BRONCHIECTASIS WITH ACUTE EXACERBATION (HCC): ICD-10-CM

## 2023-11-08 DIAGNOSIS — Z99.81 CHRONIC RESPIRATORY FAILURE WITH HYPOXIA, ON HOME O2 THERAPY: ICD-10-CM

## 2023-11-08 PROCEDURE — 99214 OFFICE O/P EST MOD 30 MIN: CPT | Performed by: FAMILY MEDICINE

## 2023-11-08 RX ORDER — AZITHROMYCIN 250 MG/1
TABLET, FILM COATED ORAL
Qty: 6 TABLET | Refills: 0 | Status: SHIPPED | OUTPATIENT
Start: 2023-11-08 | End: 2023-11-13

## 2023-11-08 RX ORDER — PREDNISONE 20 MG/1
20 TABLET ORAL 2 TIMES DAILY WITH MEALS
Qty: 10 TABLET | Refills: 0 | Status: SHIPPED | OUTPATIENT
Start: 2023-11-08 | End: 2023-11-13

## 2023-11-08 NOTE — TELEPHONE ENCOUNTER
Pt's daughter called in requesting an appt with Dr. Tutu Hinton only. There was no availability on the schedule. Warm transferred her over to Jocelyn Molina in the office.

## 2023-11-08 NOTE — TELEPHONE ENCOUNTER
Daughter Ashley called and wanted to know if you could do a virtual visit with her Mom today. Cough and congestion. No fever and Covid tested her twice. Only wants her to see you because she said you know her history.

## 2023-11-08 NOTE — PROGRESS NOTES
Virtual Regular Visit    Verification of patient location:    Patient is located at Home in the following state in which I hold an active license [place]      Assessment/Plan:    Problem List Items Addressed This Visit          Respiratory    Centrilobular emphysema ([address]) - Primary    Relevant Medications    predniSONE 20 mg tablet    Bronchiectasis with acute exacerbation (HCC)    Chronic respiratory failure with hypoxia, on home O2 therapy      Other Visit Diagnoses       COPD with acute exacerbation ([address])        Relevant Medications    predniSONE 20 mg tablet    azithromycin (Zithromax) 250 mg tablet                 Reason for visit is   Chief Complaint   Patient presents with    Virtual Regular Visit          Encounter provider Hugo Camargo MD    Provider located at [address] [place] [id number] [phone]      Recent Visits  Date Type Provider Dept   11/08/23 Telemedicine Hugo Camargo MD [facility]   11/08/23 Telephone [facility]   Showing recent visits within past 7 days and meeting all other requirements  Future Appointments  No visits were found meeting these conditions. Showing future appointments within next 150 days and meeting all other requirements       The patient was identified by name and date of birth. Austine Libman was informed that this is a telemedicine visit and that the visit is being conducted through the [employer] as she could not use Epic Platform. She agrees to proceed. .  My office door was closed. No one else was in the room. She acknowledged consent and understanding of privacy and security of the video platform. The patient has agreed to participate and understands they can discontinue the visit at any time. Patient is aware this is a billable service.      Subjective  Austine Libman is a 76 y.o. female       With 2 days of cough, congestion, green phlegm, shortness of breath, fatigue and wheezing  2 home covid-19 tests are negative         Past Medical History:   Diagnosis Date    Acute kidney failure (HCC)     Allergic     Allergies     Anemia     Asthma     Cancer ([address])     Cataract     Chronic kidney disease (CKD), stage III (moderate) (HCC)     COPD (chronic obstructive pulmonary disease) ([address])     COVID-19     Covid + 1-5-38-cough at that time now fully resolved    Disease of thyroid gland     Essential hypertension     GERD (gastroesophageal reflux disease)     Glaucoma     High blood pressure     HTN (hypertension) 02/16/2021    Hyperlipidemia     Hypothyroidism     Pulmonary hypertension ([address])     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 NM TENDON SHEATH INCISION Right 02/16/2021    Procedure: THUMB TRIGGER FINGER RELEASE;  Surgeon: Lynford Holstein, MD;  Location: [facility];  Service: Orthopedics    TUBAL LIGATION         Current Outpatient Medications   Medication Sig Dispense Refill    azithromycin (Zithromax) 250 mg tablet Take 2 tablets (500 mg total) by mouth daily for 1 day, THEN 1 tablet (250 mg total) daily for 4 days. 6 tablet 0    predniSONE 20 mg tablet Take 1 tablet (20 mg total) by mouth 2 (two) times a day with meals for 5 days 10 tablet 0    albuterol (PROVENTIL HFA,VENTOLIN HFA) 90 mcg/act inhaler Inhale 2 puffs every 6 (six) hours as needed for wheezing or shortness of breath 18 g 5    brimonidine tartrate 0.2 % ophthalmic solution       Budeson-Glycopyrrol-Formoterol (Breztri Aerosphere) 160-9-4.8 MCG/ACT AERO Inhale 2 puffs 2 (two) times a day Rinse mouth after use.  32.1 g 1    Diclofenac Sodium (VOLTAREN) 1 % Apply 2 g topically 4 (four) times a day 350 g 0    diphenhydrAMINE (BENADRYL) 25 mg tablet Take 1 tablet (25 mg total) by mouth every 6 (six) hours as needed for itching 30 tablet 0    famotidine (PEPCID) 20 mg tablet Take 1 tablet (20 mg total) by mouth daily at bedtime 90 tablet 0    ferrous sulfate 324 (65 Fe) mg Take 1 tablet (324 mg total) by mouth daily after lunch 90 tablet 1    fexofenadine (ALLEGRA) 180 MG tablet TAKE 1 TABLET BY MOUTH EVERY DAY 90 tablet 1    fluticasone (FLONASE) 50 mcg/act nasal spray SPRAY 1 SPRAY INTO EACH NOSTRIL EVERY DAY 24 mL 2    ipratropium (ATROVENT) 0.06 % nasal spray 2 SPRAYS INTO EACH NOSTRIL 3 (THREE) TIMES A DAY FOR INCREASED NASAL SECRETION 45 mL 1    ipratropium-albuterol (DUO-NEB) 0.5-2.5 mg/3 mL nebulizer solution Take 3 mL by nebulization every 8 (eight) hours as needed for wheezing or shortness of breath 270 mL 2    levothyroxine 75 mcg tablet TAKE 1 TABLET (75 MCG TOTAL) BY MOUTH DAILY IN THE EARLY MORNING 90 tablet 0    lidocaine (XYLOCAINE) 5 % ointment APPLY TOPICALLY AS NEEDED FOR MILD PAIN 35.44 g 0    magnesium Oxide (MAG-OX) 400 mg TABS TAKE 1 TABLET BY MOUTH EVERY DAY 90 tablet 1    montelukast (SINGULAIR) 10 mg tablet Take 1 tablet (10 mg total) by mouth daily at bedtime 90 tablet 1    pantoprazole (PROTONIX) 40 mg tablet Take 1 tablet (40 mg total) by mouth daily before breakfast 90 tablet 1    polyethylene glycol (GLYCOLAX) 17 GM/SCOOP powder 1 scoop in 8 oz of water/orange juice 507 g 0    sertraline (ZOLOFT) 25 mg tablet Take 1 tablet (25 mg total) by mouth daily at bedtime 90 tablet 1    timolol (TIMOPTIC) 0.5 % ophthalmic solution INSTILL 1 DROP INTO EACH EYE TWO TIMES A DAY       No current facility-administered medications for this visit. Allergies   Allergen Reactions    Cefdinir Hives    Amifostine Rash    Pollen Extract Allergic Rhinitis       Review of Systems   Constitutional:  Positive for fatigue. Negative for chills and fever. HENT:  Positive for congestion.  Negative for facial swelling, mouth sores, rhinorrhea, sore throat and trouble swallowing. Eyes:  Negative for pain and redness. Respiratory:  Positive for cough, shortness of breath and wheezing. Cardiovascular:  Negative for chest pain, palpitations and leg swelling. Gastrointestinal:  Negative for abdominal pain, blood in stool, constipation, diarrhea and nausea. Genitourinary:  Negative for dysuria, hematuria and urgency. Musculoskeletal:  Negative for arthralgias, back pain and myalgias. Skin:  Negative for rash and wound. Neurological:  Negative for seizures, syncope and headaches. Hematological:  Negative for adenopathy. Psychiatric/Behavioral:  Negative for agitation and behavioral problems. Video Exam    There were no vitals filed for this visit. Physical Exam  Vitals and nursing note reviewed. Constitutional:       Appearance: She is well-developed. HENT:      Head: Normocephalic and atraumatic. Right Ear: External ear normal.      Left Ear: External ear normal.      Nose: Congestion present. Eyes:      General: No scleral icterus. Right eye: No discharge. Left eye: No discharge. Conjunctiva/sclera: Conjunctivae normal.      Comments: Visual observation   Pulmonary:      Effort: No respiratory distress. Musculoskeletal:         General: No tenderness or deformity. Neurological:      Mental Status: She is alert. Mental status is at baseline.    Psychiatric:         Behavior: Behavior normal.         Judgment: Judgment normal.

## 2023-11-17 ENCOUNTER — TELEPHONE (OUTPATIENT)
Age: 68
End: 2023-11-17

## 2023-11-17 DIAGNOSIS — J30.1 SEASONAL ALLERGIC RHINITIS DUE TO POLLEN: ICD-10-CM

## 2023-11-17 DIAGNOSIS — E03.9 HYPOTHYROIDISM, UNSPECIFIED TYPE: ICD-10-CM

## 2023-11-17 RX ORDER — FLUTICASONE PROPIONATE 50 MCG
SPRAY, SUSPENSION (ML) NASAL
Qty: 24 ML | Refills: 2 | Status: SHIPPED | OUTPATIENT
Start: 2023-11-17

## 2023-11-17 RX ORDER — LEVOTHYROXINE SODIUM 0.07 MG/1
75 TABLET ORAL
Qty: 90 TABLET | Refills: 0 | Status: SHIPPED | OUTPATIENT
Start: 2023-11-17

## 2023-11-17 NOTE — TELEPHONE ENCOUNTER
Fluticasone (FLONASE) 50 mcg/act nasal spray PA Submitted with clinical documentation via surescripts, waiting on determination.

## 2023-11-17 NOTE — TELEPHONE ENCOUNTER
Fluticasone (FLONASE) 50 mcg/act nasal spray PA Approved / medication is on plan formulary, pharmacy made aware.

## 2023-11-20 ENCOUNTER — TELEPHONE (OUTPATIENT)
Age: 68
End: 2023-11-20

## 2023-11-20 NOTE — TELEPHONE ENCOUNTER
Patients GI provider:  Dr. Byron Narvaez    Number to return call: [phone]    Reason for call: GI symptoms-- GERD /colon screening,  No previous GI Hx    Scheduled procedure/appointment date if applicable: Appt 8/92/58

## 2023-11-27 ENCOUNTER — TELEPHONE (OUTPATIENT)
Dept: PULMONOLOGY | Facility: CLINIC | Age: 68
End: 2023-11-27

## 2023-11-29 ENCOUNTER — OFFICE VISIT (OUTPATIENT)
Dept: PULMONOLOGY | Facility: CLINIC | Age: 68
End: 2023-11-29
Payer: COMMERCIAL

## 2023-11-29 VITALS
SYSTOLIC BLOOD PRESSURE: 126 MMHG | BODY MASS INDEX: 34.6 KG/M2 | OXYGEN SATURATION: 92 % | WEIGHT: 188 LBS | HEART RATE: 71 BPM | HEIGHT: 62 IN | TEMPERATURE: 96.9 F | DIASTOLIC BLOOD PRESSURE: 64 MMHG

## 2023-11-29 DIAGNOSIS — J43.2 CENTRILOBULAR EMPHYSEMA (HCC): Primary | ICD-10-CM

## 2023-11-29 DIAGNOSIS — R09.82 POST-NASAL DRIP: ICD-10-CM

## 2023-11-29 PROCEDURE — 99213 OFFICE O/P EST LOW 20 MIN: CPT | Performed by: INTERNAL MEDICINE

## 2023-11-29 RX ORDER — AZITHROMYCIN 250 MG/1
250 TABLET, FILM COATED ORAL 3 TIMES WEEKLY
Qty: 14 TABLET | Refills: 5 | Status: SHIPPED | OUTPATIENT
Start: 2023-11-29 | End: 2024-06-12

## 2023-11-29 NOTE — PROGRESS NOTES
Assessment/Plan:    Centrilobular emphysema (HCC)  Dyspnea on exertion grade 3. Patient complains that she has increased cough and sputum every winter. Based on this I decided to put her on azithromycin 250 mg 3 times weekly to control sputum. This also may be useful in reducing the likelihood of exacerbations. Advised to do regular exercise. Patient has had appropriate vaccinations but I advised RSV vaccine. Post-nasal drip  She complains of postnasal drip worse in the winter causing throat congestion and hoarseness. She already is on nasal fluticasone and ipratropium. If the latter is not helpful I would advocate trying azelastine. Diagnoses and all orders for this visit:    Centrilobular emphysema ([address])  -     azithromycin (ZITHROMAX) 250 mg tablet; Take 1 tablet (250 mg total) by mouth 3 (three) times a week    Post-nasal drip          Subjective:      Patient ID: Yareli Guerra is a 76 y.o. female. This patient returns for reevaluation of advanced COPD. I started with an online  but patient prefers to have her daughter to do interpretation and we switch to that. Patient concerned that she has advancing dyspnea on exertion and is discouraged. Has chronic cough and now green sputum. Apparently treated a month ago with antibiotic and prednisone. Patient states that she has cough and sputum all winter long. Does not otherwise act as if she has an acute respiratory infection. Has chronic nose and sinus problems possibly related to deviated nasal septum. Add previous ENT evaluations but not recommended to have surgery. Treated with nasal fluticasone and ipratropium without much benefit. No known heart problem. Occasional sticking pain in the chest wall. Uses daily Breztri and as needed DuoNeb in the nebulizer or albuterol when away from home.         The following portions of the patient's history were reviewed and updated as appropriate: allergies, current medications, past family history, past medical history, past social history, past surgical history and problem list.    Review of Systems   Constitutional:  Negative for activity change, chills, fever and unexpected weight change. HENT:  Positive for congestion, postnasal drip and sinus pain. Respiratory:  Positive for shortness of breath. Negative for cough and wheezing. Cardiovascular:  Positive for chest pain. Negative for palpitations and leg swelling. See HPI   Musculoskeletal:  Negative for myalgias. Allergic/Immunologic: Negative for environmental allergies. Neurological:  Negative for headaches. Objective:      /64 (BP Location: Left arm, Patient Position: Sitting, Cuff Size: Standard)   Pulse 71   Temp (!) 96.9 °F (36.1 °C) (Tympanic)   Ht 5' 2" (1.575 m)   Wt 85.3 kg (188 lb)   SpO2 95%   BMI 34.39 kg/m²          Physical Exam  Vitals reviewed. Constitutional:       General: She is not in acute distress. Appearance: She is normal weight. She is not ill-appearing. Comments: Hoarse voice. Patient states this is chronic   Neck:      Comments: Fibrotic neck tissues from prior radiation  Cardiovascular:      Rate and Rhythm: Normal rate and regular rhythm. Pulses:           Radial pulses are 2+ on the right side and 2+ on the left side. Heart sounds: Heart sounds are distant. No murmur heard. Pulmonary:      Effort: Pulmonary effort is normal.      Breath sounds: Examination of the right-lower field reveals decreased breath sounds. Examination of the left-lower field reveals decreased breath sounds. Decreased breath sounds and rhonchi present. No wheezing. Comments: Rhonchi with cough  Musculoskeletal:         General: No swelling. Cervical back: Rigidity present. No tenderness. Right lower leg: No edema. Left lower leg: No edema. Lymphadenopathy:      Cervical: No cervical adenopathy. Skin:     General: Skin is warm and dry. Neurological:      Mental Status: She is alert and oriented to person, place, and time.    Psychiatric:         Mood and Affect: Mood normal.         Behavior: Behavior normal.

## 2023-11-29 NOTE — ASSESSMENT & PLAN NOTE
Dyspnea on exertion grade 3. Patient complains that she has increased cough and sputum every winter. Based on this I decided to put her on azithromycin 250 mg 3 times weekly to control sputum. This also may be useful in reducing the likelihood of exacerbations. Advised to do regular exercise. Patient has had appropriate vaccinations but I advised RSV vaccine.

## 2023-11-29 NOTE — ASSESSMENT & PLAN NOTE
She complains of postnasal drip worse in the winter causing throat congestion and hoarseness. She already is on nasal fluticasone and ipratropium. If the latter is not helpful I would advocate trying azelastine.

## 2023-12-28 ENCOUNTER — TELEPHONE (OUTPATIENT)
Age: 68
End: 2023-12-28

## 2023-12-28 NOTE — TELEPHONE ENCOUNTER
Pt's daughter called in, about 2 weeks ago she dropped off forms to be filled out for her mom Noreen, she hasn't heard an update. Please advise

## 2023-12-30 DIAGNOSIS — M54.6 ACUTE MIDLINE THORACIC BACK PAIN: ICD-10-CM

## 2024-01-01 ENCOUNTER — APPOINTMENT (EMERGENCY)
Dept: RADIOLOGY | Facility: HOSPITAL | Age: 69
End: 2024-01-01
Payer: COMMERCIAL

## 2024-01-01 ENCOUNTER — HOSPITAL ENCOUNTER (EMERGENCY)
Facility: HOSPITAL | Age: 69
Discharge: HOME | End: 2024-01-02
Attending: EMERGENCY MEDICINE
Payer: COMMERCIAL

## 2024-01-01 DIAGNOSIS — J44.9 COPD (CHRONIC OBSTRUCTIVE PULMONARY DISEASE) (HCC): ICD-10-CM

## 2024-01-01 DIAGNOSIS — J96.11 CHRONIC RESPIRATORY FAILURE WITH HYPOXIA, ON HOME O2 THERAPY: ICD-10-CM

## 2024-01-01 DIAGNOSIS — J10.1 INFLUENZA A: Primary | ICD-10-CM

## 2024-01-01 DIAGNOSIS — Z99.81 CHRONIC RESPIRATORY FAILURE WITH HYPOXIA, ON HOME O2 THERAPY: ICD-10-CM

## 2024-01-01 LAB
ALBUMIN SERPL BCP-MCNC: 4 G/DL (ref 3.5–5)
ALP SERPL-CCNC: 95 U/L (ref 34–104)
ALT SERPL W P-5'-P-CCNC: 16 U/L (ref 7–52)
ANION GAP SERPL CALCULATED.3IONS-SCNC: 5 MMOL/L
AST SERPL W P-5'-P-CCNC: 24 U/L (ref 13–39)
BASOPHILS # BLD AUTO: 0.03 THOUSANDS/ÂΜL (ref 0–0.1)
BASOPHILS NFR BLD AUTO: 1 % (ref 0–1)
BILIRUB SERPL-MCNC: 0.28 MG/DL (ref 0.2–1)
BNP SERPL-MCNC: 44 PG/ML (ref 0–100)
BUN SERPL-MCNC: 28 MG/DL (ref 5–25)
CALCIUM SERPL-MCNC: 9.4 MG/DL (ref 8.4–10.2)
CARDIAC TROPONIN I PNL SERPL HS: 4 NG/L
CHLORIDE SERPL-SCNC: 104 MMOL/L (ref 96–108)
CO2 SERPL-SCNC: 33 MMOL/L (ref 21–32)
CREAT SERPL-MCNC: 1.16 MG/DL (ref 0.6–1.3)
EOSINOPHIL # BLD AUTO: 0.08 THOUSAND/ÂΜL (ref 0–0.61)
EOSINOPHIL NFR BLD AUTO: 1 % (ref 0–6)
ERYTHROCYTE [DISTWIDTH] IN BLOOD BY AUTOMATED COUNT: 15.1 % (ref 11.6–15.1)
FLUAV RNA RESP QL NAA+PROBE: POSITIVE
FLUBV RNA RESP QL NAA+PROBE: NEGATIVE
GFR SERPL CREATININE-BSD FRML MDRD: 48 ML/MIN/1.73SQ M
GLUCOSE SERPL-MCNC: 98 MG/DL (ref 65–140)
HCT VFR BLD AUTO: 38.7 % (ref 34.8–46.1)
HGB BLD-MCNC: 11.2 G/DL (ref 11.5–15.4)
IMM GRANULOCYTES # BLD AUTO: 0.03 THOUSAND/UL (ref 0–0.2)
IMM GRANULOCYTES NFR BLD AUTO: 1 % (ref 0–2)
LYMPHOCYTES # BLD AUTO: 0.64 THOUSANDS/ÂΜL (ref 0.6–4.47)
LYMPHOCYTES NFR BLD AUTO: 10 % (ref 14–44)
MCH RBC QN AUTO: 25.9 PG (ref 26.8–34.3)
MCHC RBC AUTO-ENTMCNC: 28.9 G/DL (ref 31.4–37.4)
MCV RBC AUTO: 89 FL (ref 82–98)
MONOCYTES # BLD AUTO: 0.81 THOUSAND/ÂΜL (ref 0.17–1.22)
MONOCYTES NFR BLD AUTO: 13 % (ref 4–12)
NEUTROPHILS # BLD AUTO: 4.81 THOUSANDS/ÂΜL (ref 1.85–7.62)
NEUTS SEG NFR BLD AUTO: 74 % (ref 43–75)
NRBC BLD AUTO-RTO: 0 /100 WBCS
PLATELET # BLD AUTO: 234 THOUSANDS/UL (ref 149–390)
PMV BLD AUTO: 9.7 FL (ref 8.9–12.7)
POTASSIUM SERPL-SCNC: 4.6 MMOL/L (ref 3.5–5.3)
PROT SERPL-MCNC: 7 G/DL (ref 6.4–8.4)
RBC # BLD AUTO: 4.33 MILLION/UL (ref 3.81–5.12)
RSV RNA RESP QL NAA+PROBE: NEGATIVE
SARS-COV-2 RNA RESP QL NAA+PROBE: NEGATIVE
SODIUM SERPL-SCNC: 142 MMOL/L (ref 135–147)
WBC # BLD AUTO: 6.4 THOUSAND/UL (ref 4.31–10.16)

## 2024-01-01 PROCEDURE — 71045 X-RAY EXAM CHEST 1 VIEW: CPT

## 2024-01-01 PROCEDURE — 96374 THER/PROPH/DIAG INJ IV PUSH: CPT

## 2024-01-01 PROCEDURE — 80053 COMPREHEN METABOLIC PANEL: CPT | Performed by: EMERGENCY MEDICINE

## 2024-01-01 PROCEDURE — 94640 AIRWAY INHALATION TREATMENT: CPT

## 2024-01-01 PROCEDURE — 85025 COMPLETE CBC W/AUTO DIFF WBC: CPT | Performed by: EMERGENCY MEDICINE

## 2024-01-01 PROCEDURE — 84484 ASSAY OF TROPONIN QUANT: CPT | Performed by: EMERGENCY MEDICINE

## 2024-01-01 PROCEDURE — 0241U HB NFCT DS VIR RESP RNA 4 TRGT: CPT | Performed by: EMERGENCY MEDICINE

## 2024-01-01 PROCEDURE — 36415 COLL VENOUS BLD VENIPUNCTURE: CPT

## 2024-01-01 PROCEDURE — 83880 ASSAY OF NATRIURETIC PEPTIDE: CPT | Performed by: EMERGENCY MEDICINE

## 2024-01-01 PROCEDURE — 99285 EMERGENCY DEPT VISIT HI MDM: CPT

## 2024-01-01 PROCEDURE — 93005 ELECTROCARDIOGRAM TRACING: CPT

## 2024-01-01 PROCEDURE — 99285 EMERGENCY DEPT VISIT HI MDM: CPT | Performed by: EMERGENCY MEDICINE

## 2024-01-01 RX ORDER — IPRATROPIUM BROMIDE AND ALBUTEROL SULFATE 2.5; .5 MG/3ML; MG/3ML
3 SOLUTION RESPIRATORY (INHALATION)
Status: COMPLETED | OUTPATIENT
Start: 2024-01-01 | End: 2024-01-01

## 2024-01-01 RX ORDER — IPRATROPIUM BROMIDE AND ALBUTEROL SULFATE 2.5; .5 MG/3ML; MG/3ML
3 SOLUTION RESPIRATORY (INHALATION)
Status: DISCONTINUED | OUTPATIENT
Start: 2024-01-02 | End: 2024-01-01

## 2024-01-01 RX ORDER — METHYLPREDNISOLONE SODIUM SUCCINATE 125 MG/2ML
125 INJECTION, POWDER, LYOPHILIZED, FOR SOLUTION INTRAMUSCULAR; INTRAVENOUS ONCE
Status: COMPLETED | OUTPATIENT
Start: 2024-01-01 | End: 2024-01-01

## 2024-01-01 RX ADMIN — IPRATROPIUM BROMIDE AND ALBUTEROL SULFATE 3 ML: .5; 3 SOLUTION RESPIRATORY (INHALATION) at 23:22

## 2024-01-01 RX ADMIN — METHYLPREDNISOLONE SODIUM SUCCINATE 125 MG: 125 INJECTION, POWDER, FOR SOLUTION INTRAMUSCULAR; INTRAVENOUS at 23:23

## 2024-01-02 VITALS
TEMPERATURE: 98.5 F | HEART RATE: 73 BPM | DIASTOLIC BLOOD PRESSURE: 76 MMHG | OXYGEN SATURATION: 95 % | RESPIRATION RATE: 24 BRPM | SYSTOLIC BLOOD PRESSURE: 160 MMHG

## 2024-01-02 LAB
ATRIAL RATE: 73 BPM
P AXIS: 90 DEGREES
PR INTERVAL: 142 MS
QRS AXIS: 19 DEGREES
QRSD INTERVAL: 62 MS
QT INTERVAL: 450 MS
QTC INTERVAL: 495 MS
T WAVE AXIS: 247 DEGREES
VENTRICULAR RATE: 73 BPM

## 2024-01-02 RX ORDER — GUAIFENESIN 600 MG/1
1200 TABLET, EXTENDED RELEASE ORAL EVERY 12 HOURS SCHEDULED
Qty: 20 TABLET | Refills: 0 | Status: SHIPPED | OUTPATIENT
Start: 2024-01-02 | End: 2024-01-07

## 2024-01-02 RX ORDER — PREDNISONE 20 MG/1
40 TABLET ORAL DAILY
Qty: 8 TABLET | Refills: 0 | Status: SHIPPED | OUTPATIENT
Start: 2024-01-02 | End: 2024-01-06

## 2024-01-02 RX ORDER — LIDOCAINE 50 MG/G
OINTMENT TOPICAL
Qty: 35.44 G | Refills: 0 | Status: SHIPPED | OUTPATIENT
Start: 2024-01-02

## 2024-01-02 NOTE — ED PROVIDER NOTES
History  Chief Complaint   Patient presents with    Shortness of Breath     Patient reports SOB x3 days. Reports partner had cold like sx prior to her getting sick. Wears chronically 2L NC. +ST +weakness, unable to ambulate Hx Emphysema/ COPD.      60-year-old female history of COPD presenting due to worsening shortness of breath over the last 3 days.  Patient states she is on baseline 2 L nasal cannula due to COPD and her  has recently had a respiratory illness.  Patient states that she has been having increased shortness of breath but her oxygen saturations at home have been 96 to 98% with a normal heart rate.  Patient states she has been feeling more short of breath and also cough productive of clear or green sputum and runny nose.  When talking patient's voice does sound a little wheezy but daughter states that this is normal for her due to history of throat cancer.  Patient has been prescribed azithromycin that she takes every other day to help prevent any lung infections.        Prior to Admission Medications   Prescriptions Last Dose Informant Patient Reported? Taking?   Budeson-Glycopyrrol-Formoterol (Breztri Aerosphere) 160-9-4.8 MCG/ACT AERO   No No   Sig: Inhale 2 puffs 2 (two) times a day Rinse mouth after use.   Diclofenac Sodium (VOLTAREN) 1 %   No No   Sig: Apply 2 g topically 4 (four) times a day   albuterol (PROVENTIL HFA,VENTOLIN HFA) 90 mcg/act inhaler   No No   Sig: Inhale 2 puffs every 6 (six) hours as needed for wheezing or shortness of breath   azithromycin (ZITHROMAX) 250 mg tablet   No No   Sig: Take 1 tablet (250 mg total) by mouth 3 (three) times a week   brimonidine tartrate 0.2 % ophthalmic solution  Self Yes No   diphenhydrAMINE (BENADRYL) 25 mg tablet  Self No No   Sig: Take 1 tablet (25 mg total) by mouth every 6 (six) hours as needed for itching   famotidine (PEPCID) 20 mg tablet   No No   Sig: Take 1 tablet (20 mg total) by mouth daily at bedtime   ferrous sulfate 324 (65 Fe)  mg  Self No No   Sig: Take 1 tablet (324 mg total) by mouth daily after lunch   fexofenadine (ALLEGRA) 180 MG tablet   No No   Sig: TAKE 1 TABLET BY MOUTH EVERY DAY   fluticasone (FLONASE) 50 mcg/act nasal spray   No No   Sig: SPRAY 1 SPRAY INTO EACH NOSTRIL EVERY DAY   ipratropium (ATROVENT) 0.06 % nasal spray   No No   Si SPRAYS INTO EACH NOSTRIL 3 (THREE) TIMES A DAY FOR INCREASED NASAL SECRETION   ipratropium-albuterol (DUO-NEB) 0.5-2.5 mg/3 mL nebulizer solution   No No   Sig: Take 3 mL by nebulization every 8 (eight) hours as needed for wheezing or shortness of breath   levothyroxine 75 mcg tablet   No No   Sig: TAKE 1 TABLET (75 MCG TOTAL) BY MOUTH DAILY IN THE EARLY MORNING   lidocaine (XYLOCAINE) 5 % ointment   No No   Sig: APPLY TOPICALLY AS NEEDED FOR MILD PAIN   magnesium Oxide (MAG-OX) 400 mg TABS   No No   Sig: TAKE 1 TABLET BY MOUTH EVERY DAY   montelukast (SINGULAIR) 10 mg tablet   No No   Sig: Take 1 tablet (10 mg total) by mouth daily at bedtime   pantoprazole (PROTONIX) 40 mg tablet   No No   Sig: Take 1 tablet (40 mg total) by mouth daily before breakfast   polyethylene glycol (GLYCOLAX) 17 GM/SCOOP powder   No No   Si scoop in 8 oz of water/orange juice   sertraline (ZOLOFT) 25 mg tablet   No No   Sig: Take 1 tablet (25 mg total) by mouth daily at bedtime   timolol (TIMOPTIC) 0.5 % ophthalmic solution  Self Yes No   Sig: INSTILL 1 DROP INTO EACH EYE TWO TIMES A DAY      Facility-Administered Medications: None       Past Medical History:   Diagnosis Date    Acute kidney failure (HCC)     Allergic     Allergies     Anemia     Asthma     Cancer ([facility])     Cataract     Chronic kidney disease (CKD), stage III (moderate) ([facility])     COPD (chronic obstructive pulmonary disease) ([facility])     COVID-19     Covid + 4-4-12-cough at that time now fully resolved    Disease of thyroid gland     Essential hypertension     GERD (gastroesophageal reflux disease)     Glaucoma     High blood pressure     HTN  (hypertension) 02/16/2021    Hyperlipidemia     Hypothyroidism     Pulmonary hypertension (HCC)     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     UT TENDON SHEATH INCISION Right 02/16/2021    Procedure: THUMB TRIGGER FINGER RELEASE;  Surgeon: Angeles Denton MD;  Location: [facility]  MAIN [facility];  Service: Orthopedics    TUBAL LIGATION         Family History   Problem Relation Age of Onset    Other Mother         respiratory disorder    Sudden death Father         shot himself    Suicidality Father     Brain cancer Sister     Diabetes Sister     Breast cancer Sister     Cancer Sister 62        pancreatic cancer    No Known Problems Sister     No Known Problems Sister     No Known Problems Sister     No Known Problems Sister     No Known Problems Sister     No Known Problems Sister     No Known Problems Daughter     No Known Problems Daughter     No Known Problems Maternal Grandmother     No Known Problems Maternal Grandfather     No Known Problems Paternal Grandmother     No Known Problems Paternal Grandfather     No Known Problems Maternal Aunt     No Known Problems Maternal Aunt     No Known Problems Maternal Aunt     No Known Problems Maternal Aunt     No Known Problems Maternal Aunt     No Known Problems Paternal Aunt     No Known Problems Paternal Aunt     No Known Problems Paternal Aunt     No Known Problems Paternal Aunt      I have reviewed and agree with the history as documented.    E-Cigarette/Vaping    E-Cigarette Use Never User      E-Cigarette/Vaping Substances    Nicotine No     THC No     CBD No      Social History     Tobacco Use    Smoking status: Former     Current packs/day: 0.00     Average packs/day: 1 pack/day for 40.0 years (40.0 ttl pk-yrs)      Types: Cigarettes     Start date: 1/1/1974     Quit date: 1/1/2014     Years since quitting: 10.0    Smokeless tobacco: Never   Vaping Use    Vaping status: Never Used   Substance Use Topics    Alcohol use: Never     Comment: None    Drug use: Never     Comment: Denies        Review of Systems   Constitutional:  Negative for chills and fever.   HENT:  Positive for congestion and sore throat. Negative for ear pain.    Eyes:  Negative for pain and visual disturbance.   Respiratory:  Positive for cough and shortness of breath.    Cardiovascular:  Negative for chest pain and palpitations.   Gastrointestinal:  Negative for abdominal pain, constipation, diarrhea, nausea and vomiting.   Genitourinary:  Negative for dysuria and hematuria.   Musculoskeletal:  Negative for arthralgias and back pain.   Skin:  Negative for color change and rash.   Neurological:  Positive for weakness and headaches. Negative for dizziness, seizures, syncope and light-headedness.   All other systems reviewed and are negative.      Physical Exam  ED Triage Vitals [01/01/24 2115]   Temperature Pulse Respirations Blood Pressure SpO2   98.5 °F (36.9 °C) 74 (!) 24 164/78 94 %      Temp Source Heart Rate Source Patient Position - Orthostatic VS BP Location FiO2 (%)   Oral Monitor Sitting Right arm --      Pain Score       --             Orthostatic Vital Signs  Vitals:    01/01/24 2230 01/01/24 2330 01/02/24 0000 01/02/24 0101   BP: (!) 176/79 168/77 (!) 177/86 160/76   Pulse: 75 73 75 73   Patient Position - Orthostatic VS:           Physical Exam  Vitals and nursing note reviewed.   Constitutional:       General: She is not in acute distress.     Appearance: She is well-developed.      Comments: Patient [place]an-speaking, speaking in full sentences, in no acute distress.   HENT:      Head: Normocephalic and atraumatic.      Nose: Nose normal. No congestion.   Eyes:      Extraocular Movements: Extraocular movements intact.      Conjunctiva/sclera:  Conjunctivae normal.      Pupils: Pupils are equal, round, and reactive to light.   Cardiovascular:      Rate and Rhythm: Normal rate and regular rhythm.      Pulses: Normal pulses.      Heart sounds: Normal heart sounds. No murmur heard.  Pulmonary:      Effort: Pulmonary effort is normal. No respiratory distress.      Breath sounds: Decreased breath sounds present. No wheezing, rhonchi or rales.   Chest:      Chest wall: No tenderness.   Abdominal:      General: Abdomen is flat. Bowel sounds are normal.      Palpations: Abdomen is soft.      Tenderness: There is no abdominal tenderness. There is no right CVA tenderness or left CVA tenderness.   Musculoskeletal:         General: No deformity or signs of injury. Normal range of motion.      Cervical back: Normal range of motion and neck supple. No rigidity or tenderness.      Right lower leg: No tenderness. Edema present.      Left lower leg: No tenderness. Edema present.      Comments: 1+ pitting edema bilaterally.  No erythema or tenderness to palpation.   Skin:     General: Skin is warm and dry.      Findings: No bruising, lesion or rash.   Neurological:      General: No focal deficit present.      Mental Status: She is alert.      Sensory: No sensory deficit.         ED Medications  Medications   methylPREDNISolone sodium succinate (Solu-MEDROL) injection 125 mg (125 mg Intravenous Given 1/1/24 2323)   ipratropium-albuterol (DUO-NEB) 0.5-2.5 mg/3 mL inhalation solution 3 mL (3 mL Nebulization Given 1/1/24 2322)   ipratropium-albuterol (DUO-NEB) 0.5-2.5 mg/3 mL inhalation solution 3 mL (3 mL Nebulization Given 1/1/24 2322)   ipratropium-albuterol (DUO-NEB) 0.5-2.5 mg/3 mL inhalation solution 3 mL (3 mL Nebulization Given 1/1/24 2322)       Diagnostic Studies  Results Reviewed       Procedure Component Value Units Date/Time    FLU/RSV/COVID - if FLU/RSV clinically relevant [[id number]]  (Abnormal) Collected: 01/01/24 2125    Lab Status: Final result Specimen: Nares  from Nose Updated: 01/01/24 2237     SARS-CoV-2 Negative     INFLUENZA A PCR Positive     INFLUENZA B PCR Negative     RSV PCR Negative    Narrative:      FOR PEDIATRIC PATIENTS - copy/paste COVID Guidelines URL to browser: [email or web address]    SARS-CoV-2 assay is a Nucleic Acid Amplification assay intended for the  qualitative detection of nucleic acid from SARS-CoV-2 in nasopharyngeal  swabs. Results are for the presumptive identification of SARS-CoV-2 RNA.    Positive results are indicative of infection with SARS-CoV-2, the virus  causing COVID-19, but do not rule out bacterial infection or co-infection  with other viruses. Laboratories within the [place] and its  territories are required to report all positive results to the appropriate  public health authorities. Negative results do not preclude SARS-CoV-2  infection and should not be used as the sole basis for treatment or other  patient management decisions. Negative results must be combined with  clinical observations, patient history, and epidemiological information.  This test has not been FDA cleared or approved.    This test has been authorized by FDA under an Emergency Use Authorization  (EUA). This test is only authorized for the duration of time the  declaration that circumstances exist justifying the authorization of the  emergency use of an in vitro diagnostic tests for detection of SARS-CoV-2  virus and/or diagnosis of COVID-19 infection under section 564(b)(1) of  the Act, 21 U.S.C. 360bbb-3(b)(1), unless the authorization is terminated  or revoked sooner. The test has been validated but independent review by FDA  and CLIA is pending.    Test performed using [employer]: This RT-PCR assay targets N2,  a region unique to SARS-CoV-2. A conserved region in the E-gene was chosen  for pan-Sarbecovirus detection which includes SARS-CoV-2.    According to CMS-2020-01-R, this platform  meets the definition of high-throughput technology.    HS Troponin 0hr (reflex protocol) [[id number]]  (Normal) Collected: 01/01/24 2125    Lab Status: Final result Specimen: Blood from Arm, Right Updated: 01/01/24 2208     hs TnI 0hr 4 ng/L     B-Type Natriuretic Peptide(BNP) [[id number]]  (Normal) Collected: 01/01/24 2125    Lab Status: Final result Specimen: Blood from Arm, Right Updated: 01/01/24 2206     BNP 44 pg/mL     Comprehensive metabolic panel [[id number]]  (Abnormal) Collected: 01/01/24 2125    Lab Status: Final result Specimen: Blood from Arm, Right Updated: 01/01/24 2203     Sodium 142 mmol/L      Potassium 4.6 mmol/L      Chloride 104 mmol/L      CO2 33 mmol/L      ANION GAP 5 mmol/L      BUN 28 mg/dL      Creatinine 1.16 mg/dL      Glucose 98 mg/dL      Calcium 9.4 mg/dL      AST 24 U/L      ALT 16 U/L      Alkaline Phosphatase 95 U/L      Total Protein 7.0 g/dL      Albumin 4.0 g/dL      Total Bilirubin 0.28 mg/dL      eGFR 48 ml/min/1.73sq m     Narrative:      National Kidney Disease Foundation guidelines for Chronic Kidney Disease (CKD):     Stage 1 with normal or high GFR (GFR > 90 mL/min/1.73 square meters)    Stage 2 Mild CKD (GFR = 60-89 mL/min/1.73 square meters)    Stage 3A Moderate CKD (GFR = 45-59 mL/min/1.73 square meters)    Stage 3B Moderate CKD (GFR = 30-44 mL/min/1.73 square meters)    Stage 4 Severe CKD (GFR = 15-29 mL/min/1.73 square meters)    Stage 5 End Stage CKD (GFR <15 mL/min/1.73 square meters)  Note: GFR calculation is accurate only with a steady state creatinine    CBC and differential [[id number]]  (Abnormal) Collected: 01/01/24 2125    Lab Status: Final result Specimen: Blood from Arm, Right Updated: 01/01/24 2149     WBC 6.40 Thousand/uL      RBC 4.33 Million/uL      Hemoglobin 11.2 g/dL      Hematocrit 38.7 %      MCV 89 fL      MCH 25.9 pg      MCHC 28.9 g/dL      RDW 15.1 %      MPV 9.7 fL      Platelets 234 Thousands/uL      nRBC 0 /100 WBCs      Neutrophils Relative  74 %      Immat GRANS % 1 %      Lymphocytes Relative 10 %      Monocytes Relative 13 %      Eosinophils Relative 1 %      Basophils Relative 1 %      Neutrophils Absolute 4.81 Thousands/µL      Immature Grans Absolute 0.03 Thousand/uL      Lymphocytes Absolute 0.64 Thousands/µL      Monocytes Absolute 0.81 Thousand/µL      Eosinophils Absolute 0.08 Thousand/µL      Basophils Absolute 0.03 Thousands/µL                    XR chest 1 view portable   ED Interpretation by Cy Lagunas MD (01/02 0014)   My personal interpretation-no acute cardiopulmonary disease appreciated.            Procedures  ECG 12 Lead Documentation Only    Date/Time: 1/2/2024 6:18 AM    Performed by: Cy Lagunas MD  Authorized by: Cy Lagunas MD    Patient location:  ED  Interpretation:     Interpretation: abnormal    Quality:     Tracing quality:  Limited by artifact  Rate:     ECG rate:  73    ECG rate assessment: normal    Rhythm:     Rhythm: sinus rhythm          ED Course  ED Course as of 01/02/24 0625   Mon Jan 01, 2024   2226 68-year-old female presenting due to worsening shortness of breath and cough for the last few days.  Presents sats has been normal at home but states patient has been wheezy with runny nose headache and cough productive mucus.  Concern for flu/COVID/RSV, pneumonia, ACS, bronchitis, COPD exacerbation, heart failure.  Will evaluate with nasal swab, basic labs, EKG, chest x-ray.  Due to concern of COPD exacerbation, will start patient on DuoNebs and Solu-Medrol.    Vitals reviewed, tachypneic on arrival.   2248 Hemoglobin(!): 11.2  Baseline 12.5   2248 Comprehensive metabolic panel(!)  Baseline   2248 hs TnI 0hr: 4  WNL   2248 BNP: 44  WNL   2248 INFLU A PCR(!): Positive  Flu positive   2250 Will start patient on 125 mg Solu-Medrol as well as DuoNeb treatments.  Patient is outside window for Tamiflu.   Tue Jan 02, 2024   0014 XR chest 1 view portable  My personal interpretation-no acute cardiopulmonary disease  appreciated.     0102 On reassessment, patient is feeling much better at this time.  Symptoms likely due to influenza infection.  Main concern is for viral illness triggering a COPD exacerbation at this time.    Will prescribe short course of steroids as well as Mucinex.  Patient is agreeable and appropriate for discharge at this time.  Return precautions discussed.    Vitals remained stable throughout emergency department stay.  Patient no longer tachypneic on evaluation.  No acute distress prior to discharge.             HEART Risk Score      Flowsheet Row Most Recent Value   Heart Score Risk Calculator    History 1 Filed at: 01/02/2024 0619   ECG 1 Filed at: 01/02/2024 0619   Age 2 Filed at: 01/02/2024 0619   Risk Factors 1 Filed at: 01/02/2024 0619   Troponin 0 Filed at: 01/02/2024 0619   HEART Score 5 Filed at: 01/02/2024 0619                            Wells' Criteria for PE      Flowsheet Row Most Recent Value   Wells' Criteria for PE    Clinical signs and symptoms of DVT 0 Filed at: 01/02/2024 0619   PE is primary diagnosis or equally likely 0 Filed at: 01/02/2024 0619   HR >100 0 Filed at: 01/02/2024 0619   Immobilization at least 3 days or Surgery in the previous 4 weeks 0 Filed at: 01/02/2024 0619   Previous, objectively diagnosed PE or DVT 0 Filed at: 01/02/2024 0619   Hemoptysis 0 Filed at: 01/02/2024 0619   Malignancy with treatment within 6 months or palliative 0 Filed at: 01/02/2024 0619   Wells' Criteria Total 0 Filed at: 01/02/2024 0619              Medical Decision Making  Amount and/or Complexity of Data Reviewed  Labs: ordered. Decision-making details documented in ED Course.  Radiology: ordered and independent interpretation performed. Decision-making details documented in ED Course.    Risk  OTC drugs.  Prescription drug management.          Disposition  Final diagnoses:   Influenza A   COPD (chronic obstructive pulmonary disease) (HCC)   Chronic respiratory failure with hypoxia, on home O2  therapy      Time reflects when diagnosis was documented in both MDM as applicable and the Disposition within this note       Time User Action Codes Description Comment    1/2/2024  1:03 AM Cy Lagunas [J10.1] Influenza A     1/2/2024  1:03 AM Cy Lagunas [J44.9] COPD (chronic obstructive pulmonary disease) (HCC)     1/2/2024  1:07 AM Cy Lagunas [J96.11,  Z99.81] Chronic respiratory failure with hypoxia, on home O2 therapy            ED Disposition       ED Disposition   Discharge    Condition   Stable    Date/Time   Tue Jan 2, 2024 0102    Comment   Noreen Alvarez discharge to home/self care.                   Follow-up Information       Follow up With Specialties Details Why Contact Info Additional Information    Samantha Hernandez MD Family Medicine   [address] [place] [id number]  [phone]       [facility] Emergency Department Emergency Medicine   [address] [place] [id number]  [phone] [facility] Emergency Department, [address] [place] [address], [phone]            Discharge Medication List as of 1/2/2024  1:08 AM        START taking these medications    Details   guaiFENesin (MUCINEX) 600 mg 12 hr tablet Take 2 tablets (1,200 mg total) by mouth every 12 (twelve) hours for 5 days, Starting Tue 1/2/2024, Until Sun 1/7/2024, Normal      predniSONE 20 mg tablet Take 2 tablets (40 mg total) by mouth daily for 4 days Take 3 tabs daily for 4 days, Starting Tue 1/2/2024, Until Sat 1/6/2024, Normal           CONTINUE these medications which have NOT CHANGED    Details   albuterol (PROVENTIL HFA,VENTOLIN HFA) 90 mcg/act inhaler Inhale 2 puffs every 6 (six) hours as needed for wheezing or shortness of breath, Starting Mon 10/9/2023, Normal      azithromycin (ZITHROMAX) 250 mg tablet Take 1 tablet (250 mg total) by mouth 3 (three) times a week, Starting Wed 11/29/2023, Until Wed 6/12/2024, Normal       brimonidine tartrate 0.2 % ophthalmic solution Starting Thu 12/23/2021, Historical Med      Budeson-Glycopyrrol-Formoterol (Breztri Aerosphere) 160-9-4.8 MCG/ACT AERO Inhale 2 puffs 2 (two) times a day Rinse mouth after use., Starting Mon 10/9/2023, Normal      Diclofenac Sodium (VOLTAREN) 1 % Apply 2 g topically 4 (four) times a day, Starting Thu 4/27/2023, Normal      diphenhydrAMINE (BENADRYL) 25 mg tablet Take 1 tablet (25 mg total) by mouth every 6 (six) hours as needed for itching, Starting Mon 1/23/2023, Normal      famotidine (PEPCID) 20 mg tablet Take 1 tablet (20 mg total) by mouth daily at bedtime, Starting Mon 10/9/2023, Normal      ferrous sulfate 324 (65 Fe) mg Take 1 tablet (324 mg total) by mouth daily after lunch, Starting Thu 11/3/2022, Normal      fexofenadine (ALLEGRA) 180 MG tablet TAKE 1 TABLET BY MOUTH EVERY DAY, Normal      fluticasone (FLONASE) 50 mcg/act nasal spray SPRAY 1 SPRAY INTO EACH NOSTRIL EVERY DAY, Normal      ipratropium (ATROVENT) 0.06 % nasal spray 2 SPRAYS INTO EACH NOSTRIL 3 (THREE) TIMES A DAY FOR INCREASED NASAL SECRETION, Starting Tue 6/13/2023, Normal      ipratropium-albuterol (DUO-NEB) 0.5-2.5 mg/3 mL nebulizer solution Take 3 mL by nebulization every 8 (eight) hours as needed for wheezing or shortness of breath, Starting Mon 10/9/2023, Normal      levothyroxine 75 mcg tablet TAKE 1 TABLET (75 MCG TOTAL) BY MOUTH DAILY IN THE EARLY MORNING, Starting Fri 11/17/2023, Normal      lidocaine (XYLOCAINE) 5 % ointment APPLY TOPICALLY AS NEEDED FOR MILD PAIN, Normal      magnesium Oxide (MAG-OX) 400 mg TABS TAKE 1 TABLET BY MOUTH EVERY DAY, Starting Thu 8/3/2023, Normal      montelukast (SINGULAIR) 10 mg tablet Take 1 tablet (10 mg total) by mouth daily at bedtime, Starting Mon 10/9/2023, Normal      pantoprazole (PROTONIX) 40 mg tablet Take 1 tablet (40 mg total) by mouth daily before breakfast, Starting Mon 10/9/2023, Until Sat 4/6/2024, Normal      polyethylene glycol  (GLYCOLAX) 17 GM/SCOOP powder 1 scoop in 8 oz of water/orange juice, Normal      sertraline (ZOLOFT) 25 mg tablet Take 1 tablet (25 mg total) by mouth daily at bedtime, Starting Mon 10/9/2023, Until Sat 4/6/2024, Normal      timolol (TIMOPTIC) 0.5 % ophthalmic solution INSTILL 1 DROP INTO EACH EYE TWO TIMES A DAY, Historical Med           No discharge procedures on file.    PDMP Review         Value Time User    PDMP Reviewed  Yes 7/11/2023 10:38 AM Samantha Hernandez MD             ED Provider  Attending physically available and evaluated Noreen Alvarez. I managed the patient along with the ED Attending.    Electronically Signed by           Cy Lagunas MD  01/02/24 [id number]

## 2024-01-02 NOTE — DISCHARGE INSTRUCTIONS
Please take steroids as prescribed for symptomatic relief.    You may use Mucinex for cough suppression.    Thank you for allowing us to take part in your care.

## 2024-01-03 ENCOUNTER — VBI (OUTPATIENT)
Dept: FAMILY MEDICINE CLINIC | Facility: CLINIC | Age: 69
End: 2024-01-03

## 2024-01-03 NOTE — TELEPHONE ENCOUNTER
01/03/24 10:43 AM    Patient contacted post ED visit, first outreach attempt made. Message was left for patient to return a call to the [facility] Department at [facility]: Phone [phone].    Thank you.  [facility] Ferreira  [facility]

## 2024-01-04 DIAGNOSIS — K21.9 GASTROESOPHAGEAL REFLUX DISEASE WITHOUT ESOPHAGITIS: ICD-10-CM

## 2024-01-04 RX ORDER — FAMOTIDINE 20 MG/1
20 TABLET, FILM COATED ORAL
Qty: 90 TABLET | Refills: 1 | Status: SHIPPED | OUTPATIENT
Start: 2024-01-04

## 2024-01-04 NOTE — TELEPHONE ENCOUNTER
01/04/24 11:51 AM    Patient contacted post ED visit, second outreach attempt made. Message was left for patient to return a call to the [facility] Department at [facility]: Phone [phone].    Thank you.  [facility] Ferreira  [facility]

## 2024-01-04 NOTE — TELEPHONE ENCOUNTER
01/04/24 12:25 PM    Patient contacted post ED visit, [facility] department spoke with patient/caregiver and outreach was successful.    Thank you.  [facility] Ferreira  [facility]

## 2024-01-07 DIAGNOSIS — J31.1 POST-NASAL CATARRH: ICD-10-CM

## 2024-01-08 RX ORDER — IPRATROPIUM BROMIDE 42 UG/1
2 SPRAY, METERED NASAL 3 TIMES DAILY
Qty: 15 ML | Refills: 1 | Status: SHIPPED | OUTPATIENT
Start: 2024-01-08

## 2024-01-10 ENCOUNTER — OFFICE VISIT (OUTPATIENT)
Dept: GASTROENTEROLOGY | Facility: AMBULARY SURGERY CENTER | Age: 69
End: 2024-01-10
Payer: COMMERCIAL

## 2024-01-10 VITALS
SYSTOLIC BLOOD PRESSURE: 138 MMHG | HEIGHT: 62 IN | BODY MASS INDEX: 34.41 KG/M2 | DIASTOLIC BLOOD PRESSURE: 72 MMHG | WEIGHT: 187 LBS | OXYGEN SATURATION: 97 % | HEART RATE: 74 BPM

## 2024-01-10 DIAGNOSIS — K21.9 GASTROESOPHAGEAL REFLUX DISEASE WITHOUT ESOPHAGITIS: ICD-10-CM

## 2024-01-10 DIAGNOSIS — R10.13 EPIGASTRIC PAIN: Primary | ICD-10-CM

## 2024-01-10 DIAGNOSIS — Z12.11 COLON CANCER SCREENING: ICD-10-CM

## 2024-01-10 PROCEDURE — 99204 OFFICE O/P NEW MOD 45 MIN: CPT | Performed by: INTERNAL MEDICINE

## 2024-01-10 RX ORDER — BISACODYL 5 MG/1
10 TABLET, DELAYED RELEASE ORAL ONCE
Qty: 2 TABLET | Refills: 0 | Status: SHIPPED | OUTPATIENT
Start: 2024-01-10 | End: 2024-01-10

## 2024-01-10 NOTE — PATIENT INSTRUCTIONS
Scheduled date of EGD/colonoscopy (as of today): 04/11/24  Physician performing EGD/colonoscopy: Dr. Raymundo  Location of EGD[facility]colonoscopy: [facility]   Desired bowel prep reviewed with patient: [email or web address]  Instructions reviewed with patient by: emanuel  Clearances:  n/a

## 2024-01-10 NOTE — PROGRESS NOTES
Consultation -  [facility]  Noreen Alvarez 1955 female         ASSESSMENT & PLAN:    Epigastric pain  Patient reports having nonspecific of pain without any associated nausea or vomiting.  Rule out biliary pathology.  Rule out peptic ulcer disease or gastric erosions.  Rule out H. pylori gastritis.    -Explained to patient and her  in detail about different possible etiologies and given reassurance.    -Schedule EGD    -Right upper quadrant ultrasound    -Take pantoprazole regularly    -Patient was explained about the lifestyle and dietary modifications.  Advised to avoid fatty foods, chocolates, caffeine, alcohol and any other triggering foods.  Avoid eating for at least 3 hours before going to bed.        Gastroesophageal reflux disease without esophagitis  Gastroesophageal reflux disease - Patient has the symptoms of chronic acid reflux for a long time.  Possible hiatal hernia or LES weakness.  Should rule out Toledo's esophagus because of chronic symptoms.    -Treatment plan as described above    Colon cancer screening  Screening for colon cancer - patient is at average risk for colon cancer screening.  Rule out colorectal lesions including polyps or malignancy.    -Schedule for colonoscopy.    -High-fiber diet.    -Patient was given instructions about the colonoscopy prep.    -Patient was explained about the risks and benefits of the procedure. Risks including but not limited to bleeding, infection, perforation were explained in detail. Also explained about less than 100% sensitivity with the exam and other alternatives.      Chief Complaint:  GERD, epigastric pain, screening for colon cancer    HPI: 68-year-old  female with history of laryngeal cancer status post radiation therapy in 2014, emphysema, hypothyroidism, anxiety, GERD was referred for colonoscopy.  Patient reports having epigastric pain at times.  Denies any nausea or vomiting.  Denies any specific  relationship with meal.  She takes pantoprazole regularly.  Denies any NSAID use.  Good appetite, no recent weight loss.  Regular bowel movements and denies any blood or mucus in the stool.  She reports having a colonoscopy many years ago.    Used telephone      Chaperon: Ms. Doty    REVIEW OF SYSTEMS: Review of Systems   Constitutional:  Negative for activity change, appetite change, chills, diaphoresis, fatigue, fever and unexpected weight change.   HENT:  Negative for ear discharge, ear pain, facial swelling, hearing loss, nosebleeds, sore throat, tinnitus and voice change.    Eyes:  Negative for pain, discharge, redness, itching and visual disturbance.   Respiratory:  Negative for apnea, cough, chest tightness, shortness of breath and wheezing.    Cardiovascular:  Negative for chest pain and palpitations.   Gastrointestinal:         As noted in HPI   Endocrine: Negative for cold intolerance, heat intolerance and polyuria.   Genitourinary:  Negative for difficulty urinating, dysuria, flank pain, hematuria and urgency.   Musculoskeletal:  Negative for arthralgias, back pain, gait problem, joint swelling and myalgias.   Skin:  Negative for rash and wound.   Neurological:  Negative for dizziness, tremors, seizures, speech difficulty, light-headedness, numbness and headaches.   Hematological:  Negative for adenopathy. Does not bruise/bleed easily.   Psychiatric/Behavioral:  Negative for agitation, behavioral problems and confusion. The patient is not nervous/anxious.         Past Medical History:   Diagnosis Date    Acute kidney failure (HCC)     Allergic     Allergies     Anemia     Asthma     Cancer (HCC)     Cataract     Chronic kidney disease (CKD), stage III (moderate) ([facility])     COPD (chronic obstructive pulmonary disease) ([facility])     COVID-19     Covid + 1-5-64-cough at that time now fully resolved    Disease of thyroid gland     Essential hypertension     GERD (gastroesophageal reflux  disease)     Glaucoma     High blood pressure     HTN (hypertension) 2021    Hyperlipidemia     Hypothyroidism     Pulmonary hypertension (HCC)     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     IN TENDON SHEATH INCISION Right 2021    Procedure: THUMB TRIGGER FINGER RELEASE;  Surgeon: Angeles Denton MD;  Location: [facility];  Service: Orthopedics    TUBAL LIGATION       Social History     Socioeconomic History    Marital status: /Civil Union     Spouse name: Not on file    Number of children: Not on file    Years of education: Not on file    Highest education level: Not on file   Occupational History    Not on file   Tobacco Use    Smoking status: Former     Current packs/day: 0.00     Average packs/day: 1 pack/day for 40.0 years (40.0 ttl pk-yrs)     Types: Cigarettes     Start date: 1974     Quit date: 2014     Years since quitting: 10.0    Smokeless tobacco: Never   Vaping Use    Vaping status: Never Used   Substance and Sexual Activity    Alcohol use: Never     Comment: None    Drug use: Never     Comment: Denies    Sexual activity: Not Currently     Partners: Female     Birth control/protection: Surgical   Other Topics Concern    Not on file   Social History Narrative    Most recent tobacco use screenin2020    Do you currently or have you served in the [employer]: No    Were you activated, into active duty, as a member of the [employer] or as a Reservist: No    Marital status:     Sexual orientation: Heterosexual    Exercise level: Occasional    Diet: Regular    General stress level: Low    Has smoked since age: 19    Alcohol intake: None    Caffeine intake: Occasional    Chewing tobacco: none     Illicit drugs: Denies    Guns present in home: No    Seat belts used routinely: Yes    Sunscreen used routinely: Yes    Smoke alarm in home: Yes    Advance directive: No    Salt Intake: HTN Diet    Has the Patient had a mammogram to screen for breast cancer within 24 months: Yes    Would the patient like to schedule a Mammogram: No    Is the patient interested in a colorectal cancer screening: No    Live alone or with others: with others    Sexually active: No    Do you feel safe at home: Yes     Social Determinants of Health     Financial Resource Strain: Low Risk  (1/23/2023)    Overall Financial Resource Strain (CARDIA)     Difficulty of Paying Living Expenses: Not hard at all   Food Insecurity: Not on file   Transportation Needs: No Transportation Needs (1/23/2023)    PRAPARE - Transportation     Lack of Transportation (Medical): No     Lack of Transportation (Non-Medical): No   Physical Activity: Not on file   Stress: Not on file   Social Connections: Not on file   Intimate Partner Violence: Not on file   Housing Stability: Not on file     Family History   Problem Relation Age of Onset    Other Mother         respiratory disorder    Sudden death Father         shot himself    Suicidality Father     Brain cancer Sister     Diabetes Sister     Breast cancer Sister     Cancer Sister 62        pancreatic cancer    No Known Problems Sister     No Known Problems Sister     No Known Problems Sister     No Known Problems Sister     No Known Problems Sister     No Known Problems Sister     No Known Problems Daughter     No Known Problems Daughter     No Known Problems Maternal Grandmother     No Known Problems Maternal Grandfather     No Known Problems Paternal Grandmother     No Known Problems Paternal Grandfather     No Known Problems Maternal Aunt     No Known Problems Maternal Aunt     No Known Problems Maternal Aunt     No Known Problems Maternal Aunt     No Known Problems Maternal Aunt     No Known Problems  Paternal Aunt     No Known Problems Paternal Aunt     No Known Problems Paternal Aunt     No Known Problems Paternal Aunt      Cefdinir, Amifostine, and Pollen extract  Current Outpatient Medications   Medication Sig Dispense Refill    albuterol (PROVENTIL HFA,VENTOLIN HFA) 90 mcg/act inhaler Inhale 2 puffs every 6 (six) hours as needed for wheezing or shortness of breath 18 g 5    azithromycin (ZITHROMAX) 250 mg tablet Take 1 tablet (250 mg total) by mouth 3 (three) times a week 14 tablet 5    bisacodyl (DULCOLAX) 5 mg EC tablet Take 2 tablets (10 mg total) by mouth once for 1 dose 2 tablet 0    brimonidine tartrate 0.2 % ophthalmic solution       Budeson-Glycopyrrol-Formoterol (Breztri Aerosphere) 160-9-4.8 MCG/ACT AERO Inhale 2 puffs 2 (two) times a day Rinse mouth after use. 32.1 g 1    Diclofenac Sodium (VOLTAREN) 1 % Apply 2 g topically 4 (four) times a day 350 g 0    diphenhydrAMINE (BENADRYL) 25 mg tablet Take 1 tablet (25 mg total) by mouth every 6 (six) hours as needed for itching 30 tablet 0    famotidine (PEPCID) 20 mg tablet TAKE 1 TABLET BY MOUTH DAILY AT BEDTIME 90 tablet 1    ferrous sulfate 324 (65 Fe) mg Take 1 tablet (324 mg total) by mouth daily after lunch 90 tablet 1    fexofenadine (ALLEGRA) 180 MG tablet TAKE 1 TABLET BY MOUTH EVERY DAY 90 tablet 1    fluticasone (FLONASE) 50 mcg/act nasal spray SPRAY 1 SPRAY INTO EACH NOSTRIL EVERY DAY 24 mL 2    ipratropium (ATROVENT) 0.06 % nasal spray 2 SPRAYS INTO EACH NOSTRIL 3 (THREE) TIMES A DAY FOR INCREASED NASAL SECRETION 15 mL 1    ipratropium-albuterol (DUO-NEB) 0.5-2.5 mg/3 mL nebulizer solution Take 3 mL by nebulization every 8 (eight) hours as needed for wheezing or shortness of breath 270 mL 2    levothyroxine 75 mcg tablet TAKE 1 TABLET (75 MCG TOTAL) BY MOUTH DAILY IN THE EARLY MORNING 90 tablet 0    lidocaine (XYLOCAINE) 5 % ointment APPLY TOPICALLY AS NEEDED FOR MILD PAIN. 35.44 g 0    magnesium Oxide (MAG-OX) 400 mg TABS TAKE 1 TABLET BY  "MOUTH EVERY DAY 90 tablet 1    montelukast (SINGULAIR) 10 mg tablet Take 1 tablet (10 mg total) by mouth daily at bedtime 90 tablet 1    pantoprazole (PROTONIX) 40 mg tablet Take 1 tablet (40 mg total) by mouth daily before breakfast 90 tablet 1    polyethylene glycol (GLYCOLAX) 17 GM/SCOOP powder 1 scoop in 8 oz of water/orange juice 507 g 0    polyethylene glycol (GOLYTELY) 4000 mL solution Take 4,000 mL by mouth once for 1 dose 4000 mL 0    sertraline (ZOLOFT) 25 mg tablet Take 1 tablet (25 mg total) by mouth daily at bedtime 90 tablet 1    timolol (TIMOPTIC) 0.5 % ophthalmic solution INSTILL 1 DROP INTO EACH EYE TWO TIMES A DAY       No current facility-administered medications for this visit.       Blood pressure 138/72, pulse 74, height 5' 2\" (1.575 m), weight 84.8 kg (187 lb), SpO2 97%.    PHYSICAL EXAM: Physical Exam  Constitutional:       Appearance: Normal appearance. She is well-developed.   HENT:      Head: Normocephalic and atraumatic.      Nose: Nose normal.   Eyes:      Conjunctiva/sclera: Conjunctivae normal.   Neck:      Thyroid: No thyromegaly.      Vascular: No JVD.      Trachea: No tracheal deviation.   Cardiovascular:      Rate and Rhythm: Normal rate and regular rhythm.      Heart sounds: Normal heart sounds. No murmur heard.     No friction rub. No gallop.   Pulmonary:      Effort: Pulmonary effort is normal. No respiratory distress.      Breath sounds: Normal breath sounds. No wheezing or rales.   Abdominal:      General: Bowel sounds are normal. There is no distension.      Palpations: Abdomen is soft. There is no mass.      Tenderness: There is no abdominal tenderness. There is no guarding.      Hernia: No hernia is present.   Musculoskeletal:         General: No tenderness or deformity.      Cervical back: Neck supple.      Right lower leg: No edema.      Left lower leg: No edema.   Lymphadenopathy:      Cervical: No cervical adenopathy.   Skin:     General: Skin is warm and dry.      " Findings: No erythema or rash.   Neurological:      Mental Status: She is alert and oriented to person, place, and time.   Psychiatric:         Mood and Affect: Mood normal.         Behavior: Behavior normal.         Thought Content: Thought content normal.          Lab Results   Component Value Date    WBC 6.40 01/01/2024    HGB 11.2 (L) 01/01/2024    HCT 38.7 01/01/2024    MCV 89 01/01/2024     01/01/2024     Lab Results   Component Value Date    GLUCOSE 83 02/25/2015    CALCIUM 9.4 01/01/2024     02/25/2015    K 4.6 01/01/2024    CO2 33 (H) 01/01/2024     01/01/2024    BUN 28 (H) 01/01/2024    CREATININE 1.16 01/01/2024     Lab Results   Component Value Date    ALT 16 01/01/2024    AST 24 01/01/2024    ALKPHOS 95 01/01/2024    BILITOT 0.3 02/25/2015     Lab Results   Component Value Date    INR 0.92 05/21/2023    INR 1.07 10/28/2022    INR 0.91 05/18/2022    PROTIME 12.5 05/21/2023    PROTIME 14.1 10/28/2022    PROTIME 12.3 05/18/2022       XR chest 1 view portable    Result Date: 1/2/2024  Impression: Resolution of right upper lobe infiltrates. Workstation performed: [id number]

## 2024-01-10 NOTE — ASSESSMENT & PLAN NOTE
Gastroesophageal reflux disease - Patient has the symptoms of chronic acid reflux for a long time.  Possible hiatal hernia or LES weakness.  Should rule out Toledo's esophagus because of chronic symptoms.    -Treatment plan as described above

## 2024-01-10 NOTE — ASSESSMENT & PLAN NOTE
Screening for colon cancer - patient is at average risk for colon cancer screening.  Rule out colorectal lesions including polyps or malignancy.    -Schedule for colonoscopy.    -High-fiber diet.    -Patient was given instructions about the colonoscopy prep.    -Patient was explained about the risks and benefits of the procedure. Risks including but not limited to bleeding, infection, perforation were explained in detail. Also explained about less than 100% sensitivity with the exam and other alternatives.

## 2024-01-10 NOTE — ASSESSMENT & PLAN NOTE
Patient reports having nonspecific of pain without any associated nausea or vomiting.  Rule out biliary pathology.  Rule out peptic ulcer disease or gastric erosions.  Rule out H. pylori gastritis.    -Explained to patient and her  in detail about different possible etiologies and given reassurance.    -Schedule EGD    -Right upper quadrant ultrasound    -Take pantoprazole regularly    -Patient was explained about the lifestyle and dietary modifications.  Advised to avoid fatty foods, chocolates, caffeine, alcohol and any other triggering foods.  Avoid eating for at least 3 hours before going to bed.

## 2024-01-19 DIAGNOSIS — J43.2 CENTRILOBULAR EMPHYSEMA (HCC): ICD-10-CM

## 2024-01-20 DIAGNOSIS — J30.1 SEASONAL ALLERGIC RHINITIS DUE TO POLLEN: ICD-10-CM

## 2024-01-21 ENCOUNTER — HOSPITAL ENCOUNTER (EMERGENCY)
Facility: HOSPITAL | Age: 69
Discharge: HOME | DRG: 202 | End: 2024-01-21
Attending: EMERGENCY MEDICINE
Payer: COMMERCIAL

## 2024-01-21 ENCOUNTER — APPOINTMENT (EMERGENCY)
Dept: CT IMAGING | Facility: HOSPITAL | Age: 69
DRG: 202 | End: 2024-01-21
Payer: COMMERCIAL

## 2024-01-21 ENCOUNTER — APPOINTMENT (EMERGENCY)
Dept: ULTRASOUND IMAGING | Facility: HOSPITAL | Age: 69
DRG: 202 | End: 2024-01-21
Payer: COMMERCIAL

## 2024-01-21 VITALS
OXYGEN SATURATION: 95 % | DIASTOLIC BLOOD PRESSURE: 60 MMHG | HEART RATE: 75 BPM | SYSTOLIC BLOOD PRESSURE: 137 MMHG | RESPIRATION RATE: 20 BRPM | TEMPERATURE: 98.1 F

## 2024-01-21 DIAGNOSIS — R10.9 ABDOMINAL PAIN: Primary | ICD-10-CM

## 2024-01-21 DIAGNOSIS — K65.4 MESENTERIC PANNICULITIS (HCC): ICD-10-CM

## 2024-01-21 LAB
ALBUMIN SERPL BCP-MCNC: 3.8 G/DL (ref 3.5–5)
ALP SERPL-CCNC: 90 U/L (ref 34–104)
ALT SERPL W P-5'-P-CCNC: 12 U/L (ref 7–52)
ANION GAP SERPL CALCULATED.3IONS-SCNC: 4 MMOL/L
AST SERPL W P-5'-P-CCNC: 21 U/L (ref 13–39)
ATRIAL RATE: 74 BPM
BASOPHILS # BLD AUTO: 0.02 THOUSANDS/ÂΜL (ref 0–0.1)
BASOPHILS NFR BLD AUTO: 0 % (ref 0–1)
BILIRUB SERPL-MCNC: 0.42 MG/DL (ref 0.2–1)
BUN SERPL-MCNC: 21 MG/DL (ref 5–25)
CALCIUM SERPL-MCNC: 9.3 MG/DL (ref 8.4–10.2)
CARDIAC TROPONIN I PNL SERPL HS: 3 NG/L
CHLORIDE SERPL-SCNC: 100 MMOL/L (ref 96–108)
CO2 SERPL-SCNC: 34 MMOL/L (ref 21–32)
CREAT SERPL-MCNC: 1.18 MG/DL (ref 0.6–1.3)
EOSINOPHIL # BLD AUTO: 0.23 THOUSAND/ÂΜL (ref 0–0.61)
EOSINOPHIL NFR BLD AUTO: 4 % (ref 0–6)
ERYTHROCYTE [DISTWIDTH] IN BLOOD BY AUTOMATED COUNT: 14.6 % (ref 11.6–15.1)
GFR SERPL CREATININE-BSD FRML MDRD: 47 ML/MIN/1.73SQ M
GLUCOSE SERPL-MCNC: 86 MG/DL (ref 65–140)
HCT VFR BLD AUTO: 37 % (ref 34.8–46.1)
HGB BLD-MCNC: 11 G/DL (ref 11.5–15.4)
IMM GRANULOCYTES # BLD AUTO: 0.02 THOUSAND/UL (ref 0–0.2)
IMM GRANULOCYTES NFR BLD AUTO: 0 % (ref 0–2)
LIPASE SERPL-CCNC: 17 U/L (ref 11–82)
LYMPHOCYTES # BLD AUTO: 0.63 THOUSANDS/ÂΜL (ref 0.6–4.47)
LYMPHOCYTES NFR BLD AUTO: 11 % (ref 14–44)
MCH RBC QN AUTO: 26.4 PG (ref 26.8–34.3)
MCHC RBC AUTO-ENTMCNC: 29.7 G/DL (ref 31.4–37.4)
MCV RBC AUTO: 89 FL (ref 82–98)
MONOCYTES # BLD AUTO: 0.73 THOUSAND/ÂΜL (ref 0.17–1.22)
MONOCYTES NFR BLD AUTO: 12 % (ref 4–12)
NEUTROPHILS # BLD AUTO: 4.36 THOUSANDS/ÂΜL (ref 1.85–7.62)
NEUTS SEG NFR BLD AUTO: 73 % (ref 43–75)
NRBC BLD AUTO-RTO: 0 /100 WBCS
P AXIS: 86 DEGREES
PLATELET # BLD AUTO: 175 THOUSANDS/UL (ref 149–390)
PMV BLD AUTO: 9.5 FL (ref 8.9–12.7)
POTASSIUM SERPL-SCNC: 5.1 MMOL/L (ref 3.5–5.3)
PR INTERVAL: 146 MS
PROT SERPL-MCNC: 6.9 G/DL (ref 6.4–8.4)
QRS AXIS: 40 DEGREES
QRSD INTERVAL: 80 MS
QT INTERVAL: 396 MS
QTC INTERVAL: 439 MS
RBC # BLD AUTO: 4.17 MILLION/UL (ref 3.81–5.12)
SODIUM SERPL-SCNC: 138 MMOL/L (ref 135–147)
T WAVE AXIS: 84 DEGREES
VENTRICULAR RATE: 74 BPM
WBC # BLD AUTO: 5.99 THOUSAND/UL (ref 4.31–10.16)

## 2024-01-21 PROCEDURE — 85025 COMPLETE CBC W/AUTO DIFF WBC: CPT | Performed by: EMERGENCY MEDICINE

## 2024-01-21 PROCEDURE — 83690 ASSAY OF LIPASE: CPT | Performed by: EMERGENCY MEDICINE

## 2024-01-21 PROCEDURE — 94640 AIRWAY INHALATION TREATMENT: CPT

## 2024-01-21 PROCEDURE — 76705 ECHO EXAM OF ABDOMEN: CPT

## 2024-01-21 PROCEDURE — 99285 EMERGENCY DEPT VISIT HI MDM: CPT

## 2024-01-21 PROCEDURE — 84484 ASSAY OF TROPONIN QUANT: CPT | Performed by: EMERGENCY MEDICINE

## 2024-01-21 PROCEDURE — 96375 TX/PRO/DX INJ NEW DRUG ADDON: CPT

## 2024-01-21 PROCEDURE — 36415 COLL VENOUS BLD VENIPUNCTURE: CPT | Performed by: EMERGENCY MEDICINE

## 2024-01-21 PROCEDURE — 93005 ELECTROCARDIOGRAM TRACING: CPT

## 2024-01-21 PROCEDURE — G1004 CDSM NDSC: HCPCS

## 2024-01-21 PROCEDURE — 99285 EMERGENCY DEPT VISIT HI MDM: CPT | Performed by: EMERGENCY MEDICINE

## 2024-01-21 PROCEDURE — 74177 CT ABD & PELVIS W/CONTRAST: CPT

## 2024-01-21 PROCEDURE — 80053 COMPREHEN METABOLIC PANEL: CPT | Performed by: EMERGENCY MEDICINE

## 2024-01-21 PROCEDURE — 96374 THER/PROPH/DIAG INJ IV PUSH: CPT

## 2024-01-21 RX ORDER — MAGNESIUM HYDROXIDE/ALUMINUM HYDROXICE/SIMETHICONE 120; 1200; 1200 MG/30ML; MG/30ML; MG/30ML
30 SUSPENSION ORAL ONCE
Status: COMPLETED | OUTPATIENT
Start: 2024-01-21 | End: 2024-01-21

## 2024-01-21 RX ORDER — HYDROMORPHONE HCL/PF 1 MG/ML
0.5 SYRINGE (ML) INJECTION ONCE
Status: COMPLETED | OUTPATIENT
Start: 2024-01-21 | End: 2024-01-21

## 2024-01-21 RX ORDER — IPRATROPIUM BROMIDE AND ALBUTEROL SULFATE 2.5; .5 MG/3ML; MG/3ML
3 SOLUTION RESPIRATORY (INHALATION) ONCE
Status: COMPLETED | OUTPATIENT
Start: 2024-01-21 | End: 2024-01-21

## 2024-01-21 RX ORDER — ONDANSETRON 2 MG/ML
4 INJECTION INTRAMUSCULAR; INTRAVENOUS ONCE
Status: COMPLETED | OUTPATIENT
Start: 2024-01-21 | End: 2024-01-21

## 2024-01-21 RX ADMIN — HYDROMORPHONE HYDROCHLORIDE 0.5 MG: 1 INJECTION, SOLUTION INTRAMUSCULAR; INTRAVENOUS; SUBCUTANEOUS at 15:04

## 2024-01-21 RX ADMIN — ONDANSETRON 4 MG: 2 INJECTION INTRAMUSCULAR; INTRAVENOUS at 15:04

## 2024-01-21 RX ADMIN — IOHEXOL 85 ML: 350 INJECTION, SOLUTION INTRAVENOUS at 16:12

## 2024-01-21 RX ADMIN — IPRATROPIUM BROMIDE AND ALBUTEROL SULFATE 3 ML: .5; 3 SOLUTION RESPIRATORY (INHALATION) at 15:30

## 2024-01-21 RX ADMIN — ALUMINUM HYDROXIDE, MAGNESIUM HYDROXIDE, AND DIMETHICONE 30 ML: 200; 20; 200 SUSPENSION ORAL at 15:01

## 2024-01-21 NOTE — ED PROVIDER NOTES
History  Chief Complaint   Patient presents with    Headache    Nausea     Pt c/o epigastric pain, nausea, headache since yesterday morning. Recently seen by GI OP. Pt reports SOB with epigastric pain. History of COPD, chronic oxygen user (2L/min). Recent flu diagnosis.     Shortness of Breath     67 yo F coming into the ED for eval of upper abdominal pain, nausea, and anxiety, sob. [place] speaking, daughter at bedside translating.      History provided by:  Patient   used: No    Headache  Associated symptoms: abdominal pain and nausea    Nausea  The primary symptoms include abdominal pain and nausea.   Shortness of Breath  Associated symptoms: abdominal pain and headaches        Prior to Admission Medications   Prescriptions Last Dose Informant Patient Reported? Taking?   Budeson-Glycopyrrol-Formoterol (Breztri Aerosphere) 160-9-4.8 MCG/ACT AERO  Self No No   Sig: Inhale 2 puffs 2 (two) times a day Rinse mouth after use.   Diclofenac Sodium (VOLTAREN) 1 %  Self No No   Sig: Apply 2 g topically 4 (four) times a day   albuterol (PROVENTIL HFA,VENTOLIN HFA) 90 mcg/act inhaler  Self No No   Sig: Inhale 2 puffs every 6 (six) hours as needed for wheezing or shortness of breath   azithromycin (ZITHROMAX) 250 mg tablet  Self No No   Sig: Take 1 tablet (250 mg total) by mouth 3 (three) times a week   bisacodyl (DULCOLAX) 5 mg EC tablet   No No   Sig: Take 2 tablets (10 mg total) by mouth once for 1 dose   brimonidine tartrate 0.2 % ophthalmic solution  Self Yes No   diphenhydrAMINE (BENADRYL) 25 mg tablet  Self No No   Sig: Take 1 tablet (25 mg total) by mouth every 6 (six) hours as needed for itching   famotidine (PEPCID) 20 mg tablet  Self No No   Sig: TAKE 1 TABLET BY MOUTH DAILY AT BEDTIME   ferrous sulfate 324 (65 Fe) mg  Self No No   Sig: Take 1 tablet (324 mg total) by mouth daily after lunch   fexofenadine (ALLEGRA) 180 MG tablet  Self No No   Sig: TAKE 1 TABLET BY MOUTH EVERY DAY   fluticasone  (FLONASE) 50 mcg/act nasal spray  Self No No   Sig: SPRAY 1 SPRAY INTO EACH NOSTRIL EVERY DAY   ipratropium (ATROVENT) 0.06 % nasal spray  Self No No   Si SPRAYS INTO EACH NOSTRIL 3 (THREE) TIMES A DAY FOR INCREASED NASAL SECRETION   ipratropium-albuterol (DUO-NEB) 0.5-2.5 mg/3 mL nebulizer solution  Self No No   Sig: Take 3 mL by nebulization every 8 (eight) hours as needed for wheezing or shortness of breath   levothyroxine 75 mcg tablet  Self No No   Sig: TAKE 1 TABLET (75 MCG TOTAL) BY MOUTH DAILY IN THE EARLY MORNING   lidocaine (XYLOCAINE) 5 % ointment  Self No No   Sig: APPLY TOPICALLY AS NEEDED FOR MILD PAIN.   magnesium Oxide (MAG-OX) 400 mg TABS  Self No No   Sig: TAKE 1 TABLET BY MOUTH EVERY DAY   montelukast (SINGULAIR) 10 mg tablet  Self No No   Sig: Take 1 tablet (10 mg total) by mouth daily at bedtime   pantoprazole (PROTONIX) 40 mg tablet  Self No No   Sig: Take 1 tablet (40 mg total) by mouth daily before breakfast   polyethylene glycol (GLYCOLAX) 17 GM/SCOOP powder  Self No No   Si scoop in 8 oz of water/orange juice   polyethylene glycol (GOLYTELY) 4000 mL solution   No No   Sig: Take 4,000 mL by mouth once for 1 dose   sertraline (ZOLOFT) 25 mg tablet  Self No No   Sig: Take 1 tablet (25 mg total) by mouth daily at bedtime   timolol (TIMOPTIC) 0.5 % ophthalmic solution  Self Yes No   Sig: INSTILL 1 DROP INTO EACH EYE TWO TIMES A DAY      Facility-Administered Medications: None       Past Medical History:   Diagnosis Date    Acute kidney failure (HCC)     Allergic     Allergies     Anemia     Asthma     Cancer (HCC)     Cataract     Chronic kidney disease (CKD), stage III (moderate) (HCC)     COPD (chronic obstructive pulmonary disease) ([facility])     COVID-19     Covid + 4-6-54-cough at that time now fully resolved    Disease of thyroid gland     Essential hypertension     GERD (gastroesophageal reflux disease)     Glaucoma     High blood pressure     HTN (hypertension) 2021     Hyperlipidemia     Hypothyroidism     Pulmonary hypertension (HCC)     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     IA TENDON SHEATH INCISION Right 02/16/2021    Procedure: THUMB TRIGGER FINGER RELEASE;  Surgeon: Angeles Denton MD;  Location: [facility];  Service: Orthopedics    TUBAL LIGATION         Family History   Problem Relation Age of Onset    Other Mother         respiratory disorder    Sudden death Father         shot himself    Suicidality Father     Brain cancer Sister     Diabetes Sister     Breast cancer Sister     Cancer Sister 62        pancreatic cancer    No Known Problems Sister     No Known Problems Sister     No Known Problems Sister     No Known Problems Sister     No Known Problems Sister     No Known Problems Sister     No Known Problems Daughter     No Known Problems Daughter     No Known Problems Maternal Grandmother     No Known Problems Maternal Grandfather     No Known Problems Paternal Grandmother     No Known Problems Paternal Grandfather     No Known Problems Maternal Aunt     No Known Problems Maternal Aunt     No Known Problems Maternal Aunt     No Known Problems Maternal Aunt     No Known Problems Maternal Aunt     No Known Problems Paternal Aunt     No Known Problems Paternal Aunt     No Known Problems Paternal Aunt     No Known Problems Paternal Aunt      I have reviewed and agree with the history as documented.    E-Cigarette/Vaping    E-Cigarette Use Never User      E-Cigarette/Vaping Substances    Nicotine No     THC No     CBD No      Social History     Tobacco Use    Smoking status: Former     Current packs/day: 0.00     Average packs/day: 1 pack/day for 40.0 years (40.0 ttl pk-yrs)     Types: Cigarettes     Start  date: 1/1/1974     Quit date: 1/1/2014     Years since quitting: 10.0    Smokeless tobacco: Never   Vaping Use    Vaping status: Never Used   Substance Use Topics    Alcohol use: Never     Comment: None    Drug use: Never     Comment: Denies       Review of Systems   Respiratory:  Positive for shortness of breath.    Gastrointestinal:  Positive for abdominal pain and nausea.   Neurological:  Positive for headaches.   All other systems reviewed and are negative.      Physical Exam  Physical Exam  Vitals and nursing note reviewed.   Constitutional:       General: She is not in acute distress.     Appearance: Normal appearance. She is well-developed and normal weight. She is not ill-appearing, toxic-appearing or diaphoretic.   HENT:      Head: Normocephalic and atraumatic.      Right Ear: External ear normal.      Left Ear: External ear normal.      Nose: Nose normal.      Mouth/Throat:      Mouth: Mucous membranes are moist.      Pharynx: Oropharynx is clear.   Eyes:      Conjunctiva/sclera: Conjunctivae normal.   Cardiovascular:      Rate and Rhythm: Normal rate and regular rhythm.      Pulses: Normal pulses.      Heart sounds: Normal heart sounds.   Pulmonary:      Effort: Pulmonary effort is normal.      Breath sounds: Normal breath sounds.   Abdominal:      General: Abdomen is flat. There is no distension or abdominal bruit. There are no signs of injury.      Palpations: Abdomen is soft. There is no shifting dullness, hepatomegaly, splenomegaly or mass.      Tenderness: There is abdominal tenderness in the right upper quadrant, epigastric area and left upper quadrant. There is no right CVA tenderness, left CVA tenderness, guarding or rebound. Negative signs include Duong's sign, Rovsing's sign, McBurney's sign and psoas sign.      Hernia: No hernia is present.   Genitourinary:     Adnexa: Right adnexa normal and left adnexa normal.   Musculoskeletal:         General: Normal range of motion.      Cervical back:  Normal range of motion and neck supple.      Right lower leg: No tenderness. No edema.      Left lower leg: No tenderness. No edema.   Skin:     General: Skin is warm and dry.      Capillary Refill: Capillary refill takes less than 2 seconds.   Neurological:      General: No focal deficit present.      Mental Status: She is alert and oriented to person, place, and time. Mental status is at baseline.   Psychiatric:         Mood and Affect: Mood normal.         Behavior: Behavior normal.         Vital Signs  ED Triage Vitals [01/21/24 1321]   Temperature Pulse Respirations Blood Pressure SpO2   98.1 °F (36.7 °C) 84 18 132/63 91 %      Temp Source Heart Rate Source Patient Position - Orthostatic VS BP Location FiO2 (%)   Oral Monitor Sitting Right arm --      Pain Score       --           Vitals:    01/21/24 1321 01/21/24 1534   BP: 132/63 137/60   Pulse: 84 75   Patient Position - Orthostatic VS: Sitting          Visual Acuity      ED Medications  Medications   aluminum-magnesium hydroxide-simethicone (MAALOX) oral suspension 30 mL (30 mL Oral Given 1/21/24 1501)   ondansetron (ZOFRAN) injection 4 mg (4 mg Intravenous Given 1/21/24 1504)   HYDROmorphone (DILAUDID) injection 0.5 mg (0.5 mg Intravenous Given 1/21/24 1504)   ipratropium-albuterol (DUO-NEB) 0.5-2.5 mg/3 mL inhalation solution 3 mL (3 mL Nebulization Given 1/21/24 1530)   iohexol (OMNIPAQUE) 350 MG/ML injection (MULTI-DOSE) 85 mL (85 mL Intravenous Given 1/21/24 1612)       Diagnostic Studies  Results Reviewed       Procedure Component Value Units Date/Time    HS Troponin I 4hr [[id number]]     Lab Status: No result Specimen: Blood     HS Troponin I 2hr [[id number]]     Lab Status: No result Specimen: Blood     HS Troponin 0hr (reflex protocol) [[id number]]  (Normal) Collected: 01/21/24 1457    Lab Status: Final result Specimen: Blood from Arm, Right Updated: 01/21/24 1529     hs TnI 0hr 3 ng/L     Comprehensive metabolic panel [[id number]]  (Abnormal)  Collected: 01/21/24 1457    Lab Status: Final result Specimen: Blood from Arm, Right Updated: 01/21/24 1525     Sodium 138 mmol/L      Potassium 5.1 mmol/L      Chloride 100 mmol/L      CO2 34 mmol/L      ANION GAP 4 mmol/L      BUN 21 mg/dL      Creatinine 1.18 mg/dL      Glucose 86 mg/dL      Calcium 9.3 mg/dL      AST 21 U/L      ALT 12 U/L      Alkaline Phosphatase 90 U/L      Total Protein 6.9 g/dL      Albumin 3.8 g/dL      Total Bilirubin 0.42 mg/dL      eGFR 47 ml/min/1.73sq m     Narrative:      National Kidney Disease Foundation guidelines for Chronic Kidney Disease (CKD):     Stage 1 with normal or high GFR (GFR > 90 mL/min/1.73 square meters)    Stage 2 Mild CKD (GFR = 60-89 mL/min/1.73 square meters)    Stage 3A Moderate CKD (GFR = 45-59 mL/min/1.73 square meters)    Stage 3B Moderate CKD (GFR = 30-44 mL/min/1.73 square meters)    Stage 4 Severe CKD (GFR = 15-29 mL/min/1.73 square meters)    Stage 5 End Stage CKD (GFR <15 mL/min/1.73 square meters)  Note: GFR calculation is accurate only with a steady state creatinine    Lipase [[id number]]  (Normal) Collected: 01/21/24 1457    Lab Status: Final result Specimen: Blood from Arm, Right Updated: 01/21/24 1525     Lipase 17 u/L     CBC and differential [[id number]]  (Abnormal) Collected: 01/21/24 1457    Lab Status: Final result Specimen: Blood from Arm, Right Updated: 01/21/24 1522     WBC 5.99 Thousand/uL      RBC 4.17 Million/uL      Hemoglobin 11.0 g/dL      Hematocrit 37.0 %      MCV 89 fL      MCH 26.4 pg      MCHC 29.7 g/dL      RDW 14.6 %      MPV 9.5 fL      Platelets 175 Thousands/uL      nRBC 0 /100 WBCs      Neutrophils Relative 73 %      Immat GRANS % 0 %      Lymphocytes Relative 11 %      Monocytes Relative 12 %      Eosinophils Relative 4 %      Basophils Relative 0 %      Neutrophils Absolute 4.36 Thousands/µL      Immature Grans Absolute 0.02 Thousand/uL      Lymphocytes Absolute 0.63 Thousands/µL      Monocytes Absolute 0.73 Thousand/µL       Eosinophils Absolute 0.23 Thousand/µL      Basophils Absolute 0.02 Thousands/µL                    CT abdomen pelvis with contrast   Final Result by Emmanuel Da Silva MD (01/21 1715)      Haziness at the root of the small bowel mesentery with mildly enlarged lymph nodes, 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 This could account for the patient's abdominal pain.         Workstation performed: [id number]         US right upper quadrant   Final Result by Hilario Storm MD (01/21 1614)      Within normal limits as above.      Workstation performed: [id number]                    Procedures  Procedures         ED Course                                             Medical Decision Making  69 yo F coming in for upper abdominal pain, nausea, anxiety, sob, headache.   Given IV dilaudid, zofran, maalox with resolution of her symptoms.  Ddx: gastritis, pancreatitis, cholecystitis, SBO, viral process.  ED workup: labs, CT a/p - overall negative, except for findings of mesenteric panniculitis (seen on CT 6 months ago in July 2023 as well)  Stable for d/c home, to f/u GI outpatient.    Amount and/or Complexity of Data Reviewed  External Data Reviewed: labs, radiology and notes.  Labs: ordered. Decision-making details documented in ED Course.  Radiology: ordered. Decision-making details documented in ED Course.    Risk  OTC drugs.  Prescription drug management.             Disposition  Final diagnoses:   Abdominal pain   Mesenteric panniculitis (HCC)     Time reflects when diagnosis was documented in both MDM as applicable and the Disposition within this note       Time User Action Codes Description Comment    1/21/2024  5:35 PM John Santiago Add [R10.9] Abdominal pain     1/21/2024  5:35 PM John Santiago Add [K65.4] Mesenteric panniculitis (HCC)           ED Disposition       ED Disposition   Discharge    Condition   Stable    Date/Time   Sun Jan 21, 2024  5:35 PM    Comment   Noreen Alvarez  discharge to home/self care.                   Follow-up Information       Follow up With Specialties Details Why Contact Info    Samantha Hernandez MD Family Medicine   [address] [address]  [phone]              Discharge Medication List as of 1/21/2024  5:37 PM        CONTINUE these medications which have NOT CHANGED    Details   albuterol (PROVENTIL HFA,VENTOLIN HFA) 90 mcg/act inhaler Inhale 2 puffs every 6 (six) hours as needed for wheezing or shortness of breath, Starting Mon 10/9/2023, Normal      azithromycin (ZITHROMAX) 250 mg tablet Take 1 tablet (250 mg total) by mouth 3 (three) times a week, Starting Wed 11/29/2023, Until Wed 6/12/2024, Normal      bisacodyl (DULCOLAX) 5 mg EC tablet Take 2 tablets (10 mg total) by mouth once for 1 dose, Starting Wed 1/10/2024, Normal      brimonidine tartrate 0.2 % ophthalmic solution Starting Thu 12/23/2021, Historical Med      Budeson-Glycopyrrol-Formoterol (Breztri Aerosphere) 160-9-4.8 MCG/ACT AERO Inhale 2 puffs 2 (two) times a day Rinse mouth after use., Starting Mon 10/9/2023, Normal      Diclofenac Sodium (VOLTAREN) 1 % Apply 2 g topically 4 (four) times a day, Starting Thu 4/27/2023, Normal      diphenhydrAMINE (BENADRYL) 25 mg tablet Take 1 tablet (25 mg total) by mouth every 6 (six) hours as needed for itching, Starting Mon 1/23/2023, Normal      famotidine (PEPCID) 20 mg tablet TAKE 1 TABLET BY MOUTH DAILY AT BEDTIME, Starting Thu 1/4/2024, Normal      ferrous sulfate 324 (65 Fe) mg Take 1 tablet (324 mg total) by mouth daily after lunch, Starting Thu 11/3/2022, Normal      fexofenadine (ALLEGRA) 180 MG tablet TAKE 1 TABLET BY MOUTH EVERY DAY, Normal      fluticasone (FLONASE) 50 mcg/act nasal spray SPRAY 1 SPRAY INTO EACH NOSTRIL EVERY DAY, Normal      ipratropium (ATROVENT) 0.06 % nasal spray 2 SPRAYS INTO EACH NOSTRIL 3 (THREE) TIMES A DAY FOR INCREASED NASAL SECRETION, Starting Mon 1/8/2024, Normal       ipratropium-albuterol (DUO-NEB) 0.5-2.5 mg/3 mL nebulizer solution Take 3 mL by nebulization every 8 (eight) hours as needed for wheezing or shortness of breath, Starting Mon 10/9/2023, Normal      levothyroxine 75 mcg tablet TAKE 1 TABLET (75 MCG TOTAL) BY MOUTH DAILY IN THE EARLY MORNING, Starting Fri 11/17/2023, Normal      lidocaine (XYLOCAINE) 5 % ointment APPLY TOPICALLY AS NEEDED FOR MILD PAIN., Normal      magnesium Oxide (MAG-OX) 400 mg TABS TAKE 1 TABLET BY MOUTH EVERY DAY, Starting Thu 8/3/2023, Normal      montelukast (SINGULAIR) 10 mg tablet Take 1 tablet (10 mg total) by mouth daily at bedtime, Starting Mon 10/9/2023, Normal      pantoprazole (PROTONIX) 40 mg tablet Take 1 tablet (40 mg total) by mouth daily before breakfast, Starting Mon 10/9/2023, Until Sat 4/6/2024, Normal      polyethylene glycol (GLYCOLAX) 17 GM/SCOOP powder 1 scoop in 8 oz of water/orange juice, Normal      polyethylene glycol (GOLYTELY) 4000 mL solution Take 4,000 mL by mouth once for 1 dose, Starting Wed 1/10/2024, Normal      sertraline (ZOLOFT) 25 mg tablet Take 1 tablet (25 mg total) by mouth daily at bedtime, Starting Mon 10/9/2023, Until Sat 4/6/2024, Normal      timolol (TIMOPTIC) 0.5 % ophthalmic solution INSTILL 1 DROP INTO EACH EYE TWO TIMES A DAY, Historical Med             No discharge procedures on file.    PDMP Review         Value Time User    PDMP Reviewed  Yes 7/11/2023 10:38 AM Samantha Hernandez MD            ED Provider  Electronically Signed by             John Santiago DO  01/21/24 2649

## 2024-01-22 RX ORDER — FLUTICASONE PROPIONATE 50 MCG
SPRAY, SUSPENSION (ML) NASAL
Qty: 24 ML | Refills: 2 | Status: SHIPPED | OUTPATIENT
Start: 2024-01-22

## 2024-01-23 ENCOUNTER — APPOINTMENT (EMERGENCY)
Dept: RADIOLOGY | Facility: HOSPITAL | Age: 69
DRG: 202 | End: 2024-01-23
Payer: COMMERCIAL

## 2024-01-23 ENCOUNTER — NURSE TRIAGE (OUTPATIENT)
Age: 69
End: 2024-01-23

## 2024-01-23 ENCOUNTER — HOSPITAL ENCOUNTER (INPATIENT)
Facility: HOSPITAL | Age: 69
LOS: 6 days | Discharge: HOME | DRG: 202 | End: 2024-01-29
Attending: EMERGENCY MEDICINE | Admitting: INTERNAL MEDICINE
Payer: COMMERCIAL

## 2024-01-23 DIAGNOSIS — J47.9 BRONCHIECTASIS WITHOUT COMPLICATION (HCC): ICD-10-CM

## 2024-01-23 DIAGNOSIS — J43.2 CENTRILOBULAR EMPHYSEMA (HCC): ICD-10-CM

## 2024-01-23 DIAGNOSIS — J45.901 ASTHMA EXACERBATION: ICD-10-CM

## 2024-01-23 DIAGNOSIS — J21.0 RSV BRONCHIOLITIS: Primary | ICD-10-CM

## 2024-01-23 DIAGNOSIS — J44.1 ACUTE EXACERBATION OF CHRONIC OBSTRUCTIVE PULMONARY DISEASE (HCC): ICD-10-CM

## 2024-01-23 PROBLEM — R59.0 MESENTERIC LYMPHADENOPATHY: Status: RESOLVED | Noted: 2021-07-13 | Resolved: 2024-01-23

## 2024-01-23 PROBLEM — D50.9 IRON DEFICIENCY ANEMIA: Status: ACTIVE | Noted: 2022-10-30

## 2024-01-23 LAB
2HR DELTA HS TROPONIN: -1 NG/L
ALBUMIN SERPL BCP-MCNC: 4.1 G/DL (ref 3.5–5)
ALP SERPL-CCNC: 95 U/L (ref 34–104)
ALT SERPL W P-5'-P-CCNC: 11 U/L (ref 7–52)
ANION GAP SERPL CALCULATED.3IONS-SCNC: 6 MMOL/L
AST SERPL W P-5'-P-CCNC: 18 U/L (ref 13–39)
BASOPHILS # BLD AUTO: 0.03 THOUSANDS/ÂΜL (ref 0–0.1)
BASOPHILS NFR BLD AUTO: 0 % (ref 0–1)
BILIRUB SERPL-MCNC: 0.39 MG/DL (ref 0.2–1)
BNP SERPL-MCNC: 73 PG/ML (ref 0–100)
BUN SERPL-MCNC: 19 MG/DL (ref 5–25)
CALCIUM SERPL-MCNC: 9.6 MG/DL (ref 8.4–10.2)
CARDIAC TROPONIN I PNL SERPL HS: 3 NG/L
CARDIAC TROPONIN I PNL SERPL HS: 4 NG/L
CHLORIDE SERPL-SCNC: 96 MMOL/L (ref 96–108)
CO2 SERPL-SCNC: 34 MMOL/L (ref 21–32)
CREAT SERPL-MCNC: 0.92 MG/DL (ref 0.6–1.3)
EOSINOPHIL # BLD AUTO: 0.21 THOUSAND/ÂΜL (ref 0–0.61)
EOSINOPHIL NFR BLD AUTO: 3 % (ref 0–6)
ERYTHROCYTE [DISTWIDTH] IN BLOOD BY AUTOMATED COUNT: 14.3 % (ref 11.6–15.1)
FLUAV RNA RESP QL NAA+PROBE: NEGATIVE
FLUBV RNA RESP QL NAA+PROBE: NEGATIVE
GFR SERPL CREATININE-BSD FRML MDRD: 64 ML/MIN/1.73SQ M
GLUCOSE SERPL-MCNC: 117 MG/DL (ref 65–140)
HCT VFR BLD AUTO: 38.6 % (ref 34.8–46.1)
HGB BLD-MCNC: 11.4 G/DL (ref 11.5–15.4)
IMM GRANULOCYTES # BLD AUTO: 0.03 THOUSAND/UL (ref 0–0.2)
IMM GRANULOCYTES NFR BLD AUTO: 0 % (ref 0–2)
LIPASE SERPL-CCNC: 9 U/L (ref 11–82)
LYMPHOCYTES # BLD AUTO: 0.56 THOUSANDS/ÂΜL (ref 0.6–4.47)
LYMPHOCYTES NFR BLD AUTO: 8 % (ref 14–44)
MAGNESIUM SERPL-MCNC: 1.9 MG/DL (ref 1.9–2.7)
MCH RBC QN AUTO: 26.6 PG (ref 26.8–34.3)
MCHC RBC AUTO-ENTMCNC: 29.5 G/DL (ref 31.4–37.4)
MCV RBC AUTO: 90 FL (ref 82–98)
MONOCYTES # BLD AUTO: 0.7 THOUSAND/ÂΜL (ref 0.17–1.22)
MONOCYTES NFR BLD AUTO: 10 % (ref 4–12)
NEUTROPHILS # BLD AUTO: 5.33 THOUSANDS/ÂΜL (ref 1.85–7.62)
NEUTS SEG NFR BLD AUTO: 79 % (ref 43–75)
NRBC BLD AUTO-RTO: 0 /100 WBCS
PLATELET # BLD AUTO: 222 THOUSANDS/UL (ref 149–390)
PMV BLD AUTO: 10 FL (ref 8.9–12.7)
POTASSIUM SERPL-SCNC: 4.4 MMOL/L (ref 3.5–5.3)
PROCALCITONIN SERPL-MCNC: <0.05 NG/ML
PROT SERPL-MCNC: 7.4 G/DL (ref 6.4–8.4)
RBC # BLD AUTO: 4.28 MILLION/UL (ref 3.81–5.12)
RSV RNA RESP QL NAA+PROBE: POSITIVE
SARS-COV-2 RNA RESP QL NAA+PROBE: NEGATIVE
SODIUM SERPL-SCNC: 136 MMOL/L (ref 135–147)
WBC # BLD AUTO: 6.86 THOUSAND/UL (ref 4.31–10.16)

## 2024-01-23 PROCEDURE — 96375 TX/PRO/DX INJ NEW DRUG ADDON: CPT

## 2024-01-23 PROCEDURE — 36415 COLL VENOUS BLD VENIPUNCTURE: CPT

## 2024-01-23 PROCEDURE — 99223 1ST HOSP IP/OBS HIGH 75: CPT | Performed by: INTERNAL MEDICINE

## 2024-01-23 PROCEDURE — 80053 COMPREHEN METABOLIC PANEL: CPT

## 2024-01-23 PROCEDURE — 84145 PROCALCITONIN (PCT): CPT | Performed by: PSYCHIATRY & NEUROLOGY

## 2024-01-23 PROCEDURE — 83880 ASSAY OF NATRIURETIC PEPTIDE: CPT

## 2024-01-23 PROCEDURE — 94664 DEMO&/EVAL PT USE INHALER: CPT

## 2024-01-23 PROCEDURE — 83690 ASSAY OF LIPASE: CPT

## 2024-01-23 PROCEDURE — 99291 CRITICAL CARE FIRST HOUR: CPT | Performed by: EMERGENCY MEDICINE

## 2024-01-23 PROCEDURE — 94760 N-INVAS EAR/PLS OXIMETRY 1: CPT

## 2024-01-23 PROCEDURE — 71045 X-RAY EXAM CHEST 1 VIEW: CPT

## 2024-01-23 PROCEDURE — 93005 ELECTROCARDIOGRAM TRACING: CPT

## 2024-01-23 PROCEDURE — 94644 CONT INHLJ TX 1ST HOUR: CPT

## 2024-01-23 PROCEDURE — 85025 COMPLETE CBC W/AUTO DIFF WBC: CPT

## 2024-01-23 PROCEDURE — 99291 CRITICAL CARE FIRST HOUR: CPT

## 2024-01-23 PROCEDURE — 96365 THER/PROPH/DIAG IV INF INIT: CPT

## 2024-01-23 PROCEDURE — 94640 AIRWAY INHALATION TREATMENT: CPT

## 2024-01-23 PROCEDURE — 84484 ASSAY OF TROPONIN QUANT: CPT

## 2024-01-23 PROCEDURE — 83735 ASSAY OF MAGNESIUM: CPT

## 2024-01-23 PROCEDURE — 0241U HB NFCT DS VIR RESP RNA 4 TRGT: CPT

## 2024-01-23 RX ORDER — SERTRALINE HYDROCHLORIDE 25 MG/1
25 TABLET, FILM COATED ORAL
Status: CANCELLED | OUTPATIENT
Start: 2024-01-23

## 2024-01-23 RX ORDER — IPRATROPIUM BROMIDE AND ALBUTEROL SULFATE 2.5; .5 MG/3ML; MG/3ML
3 SOLUTION RESPIRATORY (INHALATION) EVERY 8 HOURS PRN
Status: CANCELLED | OUTPATIENT
Start: 2024-01-23

## 2024-01-23 RX ORDER — FERROUS SULFATE 325(65) MG
325 TABLET ORAL
Status: DISCONTINUED | OUTPATIENT
Start: 2024-01-24 | End: 2024-01-29 | Stop reason: HOSPADM

## 2024-01-23 RX ORDER — BUDESONIDE AND FORMOTEROL FUMARATE DIHYDRATE 160; 4.5 UG/1; UG/1
2 AEROSOL RESPIRATORY (INHALATION) 2 TIMES DAILY
Status: CANCELLED | OUTPATIENT
Start: 2024-01-23

## 2024-01-23 RX ORDER — MAGNESIUM SULFATE HEPTAHYDRATE 40 MG/ML
2 INJECTION, SOLUTION INTRAVENOUS ONCE
Status: COMPLETED | OUTPATIENT
Start: 2024-01-23 | End: 2024-01-23

## 2024-01-23 RX ORDER — SERTRALINE HYDROCHLORIDE 25 MG/1
25 TABLET, FILM COATED ORAL
Status: DISCONTINUED | OUTPATIENT
Start: 2024-01-23 | End: 2024-01-29 | Stop reason: HOSPADM

## 2024-01-23 RX ORDER — BUDESONIDE AND FORMOTEROL FUMARATE DIHYDRATE 160; 4.5 UG/1; UG/1
2 AEROSOL RESPIRATORY (INHALATION) 2 TIMES DAILY
Status: DISCONTINUED | OUTPATIENT
Start: 2024-01-23 | End: 2024-01-29 | Stop reason: HOSPADM

## 2024-01-23 RX ORDER — ALBUTEROL SULFATE 90 UG/1
2 AEROSOL, METERED RESPIRATORY (INHALATION) EVERY 6 HOURS PRN
Status: DISCONTINUED | OUTPATIENT
Start: 2024-01-23 | End: 2024-01-23

## 2024-01-23 RX ORDER — LEVOTHYROXINE SODIUM 0.07 MG/1
75 TABLET ORAL
Status: CANCELLED | OUTPATIENT
Start: 2024-01-24

## 2024-01-23 RX ORDER — ALBUTEROL SULFATE 90 UG/1
2 AEROSOL, METERED RESPIRATORY (INHALATION) EVERY 6 HOURS PRN
Status: CANCELLED | OUTPATIENT
Start: 2024-01-23

## 2024-01-23 RX ORDER — MONTELUKAST SODIUM 10 MG/1
10 TABLET ORAL
Status: DISCONTINUED | OUTPATIENT
Start: 2024-01-23 | End: 2024-01-29 | Stop reason: HOSPADM

## 2024-01-23 RX ORDER — LEVALBUTEROL INHALATION SOLUTION 1.25 MG/3ML
1.25 SOLUTION RESPIRATORY (INHALATION)
Status: CANCELLED | OUTPATIENT
Start: 2024-01-23

## 2024-01-23 RX ORDER — ONDANSETRON 2 MG/ML
4 INJECTION INTRAMUSCULAR; INTRAVENOUS EVERY 6 HOURS PRN
Status: DISCONTINUED | OUTPATIENT
Start: 2024-01-23 | End: 2024-01-29 | Stop reason: HOSPADM

## 2024-01-23 RX ORDER — SODIUM CHLORIDE FOR INHALATION 0.9 %
12 VIAL, NEBULIZER (ML) INHALATION ONCE
Status: COMPLETED | OUTPATIENT
Start: 2024-01-23 | End: 2024-01-23

## 2024-01-23 RX ORDER — IPRATROPIUM BROMIDE 42 UG/1
2 SPRAY, METERED NASAL 3 TIMES DAILY
Status: DISCONTINUED | OUTPATIENT
Start: 2024-01-23 | End: 2024-01-23

## 2024-01-23 RX ORDER — SODIUM CHLORIDE FOR INHALATION 0.9 %
3 VIAL, NEBULIZER (ML) INHALATION
Status: CANCELLED | OUTPATIENT
Start: 2024-01-23

## 2024-01-23 RX ORDER — PANTOPRAZOLE SODIUM 40 MG/1
40 TABLET, DELAYED RELEASE ORAL
Status: DISCONTINUED | OUTPATIENT
Start: 2024-01-24 | End: 2024-01-29 | Stop reason: HOSPADM

## 2024-01-23 RX ORDER — POLYETHYLENE GLYCOL 3350 17 G/17G
17 POWDER, FOR SOLUTION ORAL DAILY
Status: DISCONTINUED | OUTPATIENT
Start: 2024-01-24 | End: 2024-01-29 | Stop reason: HOSPADM

## 2024-01-23 RX ORDER — TIMOLOL MALEATE 5 MG/ML
1 SOLUTION/ DROPS OPHTHALMIC DAILY
Status: CANCELLED | OUTPATIENT
Start: 2024-01-24

## 2024-01-23 RX ORDER — PANTOPRAZOLE SODIUM 40 MG/1
40 TABLET, DELAYED RELEASE ORAL
Status: CANCELLED | OUTPATIENT
Start: 2024-01-24

## 2024-01-23 RX ORDER — BRIMONIDINE TARTRATE 2 MG/ML
1 SOLUTION/ DROPS OPHTHALMIC 3 TIMES DAILY
Status: DISCONTINUED | OUTPATIENT
Start: 2024-01-23 | End: 2024-01-29 | Stop reason: HOSPADM

## 2024-01-23 RX ORDER — ENOXAPARIN SODIUM 100 MG/ML
40 INJECTION SUBCUTANEOUS DAILY
Status: DISCONTINUED | OUTPATIENT
Start: 2024-01-24 | End: 2024-01-29 | Stop reason: HOSPADM

## 2024-01-23 RX ORDER — ONDANSETRON 2 MG/ML
4 INJECTION INTRAMUSCULAR; INTRAVENOUS ONCE
Status: COMPLETED | OUTPATIENT
Start: 2024-01-23 | End: 2024-01-23

## 2024-01-23 RX ORDER — FLUTICASONE PROPIONATE 50 MCG
1 SPRAY, SUSPENSION (ML) NASAL DAILY
Status: DISCONTINUED | OUTPATIENT
Start: 2024-01-24 | End: 2024-01-29 | Stop reason: HOSPADM

## 2024-01-23 RX ORDER — FAMOTIDINE 10 MG/ML
20 INJECTION, SOLUTION INTRAVENOUS
Status: DISCONTINUED | OUTPATIENT
Start: 2024-01-24 | End: 2024-01-23

## 2024-01-23 RX ORDER — LEVOTHYROXINE SODIUM 0.07 MG/1
75 TABLET ORAL
Status: DISCONTINUED | OUTPATIENT
Start: 2024-01-24 | End: 2024-01-29 | Stop reason: HOSPADM

## 2024-01-23 RX ORDER — MONTELUKAST SODIUM 10 MG/1
10 TABLET ORAL
Status: CANCELLED | OUTPATIENT
Start: 2024-01-23

## 2024-01-23 RX ORDER — FERROUS SULFATE 325(65) MG
325 TABLET ORAL
Status: CANCELLED | OUTPATIENT
Start: 2024-01-24

## 2024-01-23 RX ORDER — ENOXAPARIN SODIUM 100 MG/ML
40 INJECTION SUBCUTANEOUS DAILY
Status: CANCELLED | OUTPATIENT
Start: 2024-01-24

## 2024-01-23 RX ORDER — SODIUM CHLORIDE FOR INHALATION 0.9 %
3 VIAL, NEBULIZER (ML) INHALATION
Status: DISCONTINUED | OUTPATIENT
Start: 2024-01-23 | End: 2024-01-23

## 2024-01-23 RX ORDER — LEVALBUTEROL INHALATION SOLUTION 1.25 MG/3ML
1.25 SOLUTION RESPIRATORY (INHALATION)
Status: DISCONTINUED | OUTPATIENT
Start: 2024-01-23 | End: 2024-01-24

## 2024-01-23 RX ORDER — TIMOLOL MALEATE 5 MG/ML
1 SOLUTION/ DROPS OPHTHALMIC 2 TIMES DAILY
Status: DISCONTINUED | OUTPATIENT
Start: 2024-01-23 | End: 2024-01-29 | Stop reason: HOSPADM

## 2024-01-23 RX ORDER — TIMOLOL MALEATE 5 MG/ML
1 SOLUTION/ DROPS OPHTHALMIC DAILY
Status: DISCONTINUED | OUTPATIENT
Start: 2024-01-24 | End: 2024-01-23

## 2024-01-23 RX ORDER — IPRATROPIUM BROMIDE 42 UG/1
2 SPRAY, METERED NASAL 3 TIMES DAILY
Status: CANCELLED | OUTPATIENT
Start: 2024-01-23

## 2024-01-23 RX ORDER — IPRATROPIUM BROMIDE AND ALBUTEROL SULFATE 2.5; .5 MG/3ML; MG/3ML
3 SOLUTION RESPIRATORY (INHALATION) EVERY 8 HOURS PRN
Qty: 270 ML | Refills: 1 | Status: SHIPPED | OUTPATIENT
Start: 2024-01-23

## 2024-01-23 RX ORDER — ALBUTEROL SULFATE 90 UG/1
2 AEROSOL, METERED RESPIRATORY (INHALATION) EVERY 4 HOURS PRN
Status: DISCONTINUED | OUTPATIENT
Start: 2024-01-23 | End: 2024-01-29 | Stop reason: HOSPADM

## 2024-01-23 RX ORDER — METHYLPREDNISOLONE SODIUM SUCCINATE 125 MG/2ML
125 INJECTION, POWDER, LYOPHILIZED, FOR SOLUTION INTRAMUSCULAR; INTRAVENOUS ONCE
Status: COMPLETED | OUTPATIENT
Start: 2024-01-23 | End: 2024-01-23

## 2024-01-23 RX ORDER — METHYLPREDNISOLONE SODIUM SUCCINATE 40 MG/ML
40 INJECTION, POWDER, LYOPHILIZED, FOR SOLUTION INTRAMUSCULAR; INTRAVENOUS EVERY 8 HOURS
Status: DISCONTINUED | OUTPATIENT
Start: 2024-01-24 | End: 2024-01-26

## 2024-01-23 RX ORDER — ACETAMINOPHEN 325 MG/1
650 TABLET ORAL EVERY 6 HOURS PRN
Status: DISCONTINUED | OUTPATIENT
Start: 2024-01-23 | End: 2024-01-25

## 2024-01-23 RX ORDER — BUDESONIDE, GLYCOPYRROLATE, AND FORMOTEROL FUMARATE 160; 9; 4.8 UG/1; UG/1; UG/1
2 AEROSOL, METERED RESPIRATORY (INHALATION) 2 TIMES DAILY
Qty: 32.1 G | Refills: 0 | Status: SHIPPED | OUTPATIENT
Start: 2024-01-23 | End: 2024-01-30 | Stop reason: SDUPTHER

## 2024-01-23 RX ORDER — IPRATROPIUM BROMIDE AND ALBUTEROL SULFATE 2.5; .5 MG/3ML; MG/3ML
3 SOLUTION RESPIRATORY (INHALATION)
Status: DISCONTINUED | OUTPATIENT
Start: 2024-01-23 | End: 2024-01-23

## 2024-01-23 RX ORDER — AZITHROMYCIN 250 MG/1
250 TABLET, FILM COATED ORAL 3 TIMES WEEKLY
Qty: 36 TABLET | Refills: 3 | Status: SHIPPED | OUTPATIENT
Start: 2024-01-24 | End: 2024-01-29

## 2024-01-23 RX ORDER — FLUTICASONE PROPIONATE 50 MCG
1 SPRAY, SUSPENSION (ML) NASAL DAILY
Status: CANCELLED | OUTPATIENT
Start: 2024-01-24

## 2024-01-23 RX ORDER — BRIMONIDINE TARTRATE 2 MG/ML
1 SOLUTION/ DROPS OPHTHALMIC 3 TIMES DAILY
Status: CANCELLED | OUTPATIENT
Start: 2024-01-23

## 2024-01-23 RX ADMIN — MONTELUKAST 10 MG: 10 TABLET, FILM COATED ORAL at 22:16

## 2024-01-23 RX ADMIN — MAGNESIUM SULFATE HEPTAHYDRATE 2 G: 40 INJECTION, SOLUTION INTRAVENOUS at 15:14

## 2024-01-23 RX ADMIN — IPRATROPIUM BROMIDE 0.5 MG: 0.5 SOLUTION RESPIRATORY (INHALATION) at 19:33

## 2024-01-23 RX ADMIN — ONDANSETRON 4 MG: 2 INJECTION INTRAMUSCULAR; INTRAVENOUS at 15:07

## 2024-01-23 RX ADMIN — LEVALBUTEROL HYDROCHLORIDE 1.25 MG: 1.25 SOLUTION RESPIRATORY (INHALATION) at 19:33

## 2024-01-23 RX ADMIN — SERTRALINE 25 MG: 25 TABLET, FILM COATED ORAL at 22:16

## 2024-01-23 RX ADMIN — METHYLPREDNISOLONE SODIUM SUCCINATE 125 MG: 125 INJECTION, POWDER, FOR SOLUTION INTRAMUSCULAR; INTRAVENOUS at 16:10

## 2024-01-23 RX ADMIN — Medication 12 ML: at 15:03

## 2024-01-23 RX ADMIN — ALBUTEROL SULFATE 10 MG: 2.5 SOLUTION RESPIRATORY (INHALATION) at 15:02

## 2024-01-23 RX ADMIN — IPRATROPIUM BROMIDE 1 MG: 0.5 SOLUTION RESPIRATORY (INHALATION) at 15:02

## 2024-01-23 NOTE — H&P
On license of [facility]  H&P  Name: Noreen Alvarez 68 y.o. 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 associated with cough, wheezing, nasal congestion, headaches, nausea, low-grade temperature of 100.5F. Arrived to the ED on 6 L.     Most likely 2/2 +RSV   Smoking Status: quit 9 years ago  CXR: negative for acute cardiopulmonary process.  Troponin 4, CO2 34, BNP 73    Plan:  Procalcitonin, CBC w/ Diff, CMP  IV Solu-Medrol: 40 mg q8h; taper as appropriate  Xopenex/Atrovent scheduled TID  Currently on 4 L NC, Goal O2 sat 88-92% on home 2 L  Monitor respiratory status, Respiratory protocol, Airway clearance protocol, incentive spirometry    Gastroesophageal reflux disease without esophagitis  Assessment & Plan  Continue home dose of Protonix 40 mg qd    PRN Zofran for nausea    Iron deficiency anemia  Assessment & Plan  Continue home dose of ferrous sulfate 375 mg    Hypothyroidism  Assessment & Plan  Continue home dose of levothyroxine 75 mcg     Depression with anxiety  Assessment & Plan  Continue home dose of Zoloft 25 mg qhs    History of laryngeal cancer  Assessment & Plan  S/p chemo and radiation in 2014    Chronic kidney disease, stage 3 (moderate)  Assessment & Plan  Lab Results   Component Value Date    EGFR 64 01/23/2024    EGFR 47 01/21/2024    EGFR 48 01/01/2024    CREATININE 0.92 01/23/2024    CREATININE 1.18 01/21/2024    CREATININE 1.16 01/01/2024     At baseline         VTE Pharmacologic Prophylaxis: VTE Score: 11 High Risk (Score >/= 5) - Pharmacological DVT Prophylaxis Ordered: enoxaparin (Lovenox). Sequential Compression Devices Ordered.  Code  Status: Level 1 - Full Code   Discussion with family: Updated  (granddaughter) at bedside.    Anticipated Length of Stay: Patient will be admitted on an inpatient basis with an anticipated length of stay of greater than 2 midnights secondary to acute exacerbation of COPD.    Chief Complaint: shortness of breath    History of Present Illness:  Noreen Alvarez is a 68 y.o. female with a PMH of asthma, COPD on 2 L home oxygen, hypertension, squamous cell carcinoma of the larynx status post chemo and radiation, hypothyroidism, depression, GERD who presents with shortness of breath x 2 days.  Patient reports that for the last 2 days she has been having worsening shortness of breath associated with cough, wheezing, nasal congestion, headaches, nausea, and low-grade temperature of 100.5F.  Patient denies chest pain or swelling of the legs.  She reports that she had to sleep in her chair because she was having shortness of breath.  Patient reports that she was requiring more oxygen at home and came to the ED.    Upon arrival to the ED, patient was on 6 L of oxygen.  In the ED she was on 4 L satting 95% and tachypneic.  Labs were significant for + RSV, normal troponin, normal lipase, CO2 34, BNP 73, no acute findings on chest x-ray.  Patient was given Solu-Medrol 125 and albuterol/Atrovent nebulizers.    Due to language barrier, an  was present via audio during the history-taking and subsequent discussion (and for part of the physical exam) with this patient.  number: [id number]      Review of Systems:  Review of Systems   Constitutional:  Negative for chills, fatigue and fever.   HENT:  Positive for congestion, hearing loss (chronic), sinus pressure and sinus pain. Negative for ear pain and sore throat.    Eyes:  Negative for pain.   Respiratory:  Positive for cough, shortness of breath and wheezing.    Cardiovascular:  Negative for chest pain, palpitations and leg swelling.   Gastrointestinal:   Positive for abdominal pain and nausea. Negative for diarrhea and vomiting.   Genitourinary:  Negative for flank pain and urgency.   Musculoskeletal:  Negative for back pain and myalgias.   Skin:  Negative for rash.   Neurological:  Positive for headaches. Negative for dizziness, syncope and numbness.   Psychiatric/Behavioral:  Negative for confusion.    All other systems reviewed and are negative.      Past Medical and Surgical History:   Past Medical History:   Diagnosis Date    Acute kidney failure (HCC)     Allergic     Allergies     Anemia     Asthma     Cancer (HCC)     Cataract     Chronic kidney disease (CKD), stage III (moderate) (HCC)     COPD (chronic obstructive pulmonary disease) (HCC)     COVID-19     Covid + 9-2-73-cough at that time now fully resolved    Disease of thyroid gland     Essential hypertension     GERD (gastroesophageal reflux disease)     Glaucoma     High blood pressure     HTN (hypertension) 02/16/2021    Hyperlipidemia     Hypothyroidism     Pulmonary hypertension (HCC)     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     CA TENDON SHEATH INCISION Right 02/16/2021    Procedure: THUMB TRIGGER FINGER RELEASE;  Surgeon: Angeles Denton MD;  Location: [facility];  Service: Orthopedics    TUBAL LIGATION         Meds/Allergies:  Prior to Admission medications    Medication Sig Start Date End Date Taking? Authorizing Provider   albuterol (PROVENTIL HFA,VENTOLIN HFA) 90 mcg/act inhaler Inhale 2 puffs every 6 (six) hours as needed for wheezing or shortness of breath 10/9/23   Samantha Hernandez MD   azithromycin (ZITHROMAX) 250 mg tablet Take 1 tablet (250 mg total) by mouth 3 (three) times a week 1/24/24 8/7/24  Wade Hastings MD    bisacodyl (DULCOLAX) 5 mg EC tablet Take 2 tablets (10 mg total) by mouth once for 1 dose 1/10/24 1/10/24  Sidra Raymundo MD   brimonidine tartrate 0.2 % ophthalmic solution  12/23/21   Ricarda Gabriel MD   Budeson-Glycopyrrol-Formoterol (Breztri Aerosphere) 160-9-4.8 MCG/ACT AERO Inhale 2 puffs 2 (two) times a day Rinse mouth after use. 1/23/24   Samantha Hernandez MD   Diclofenac Sodium (VOLTAREN) 1 % Apply 2 g topically 4 (four) times a day 4/27/23   Samantha Hernandez MD   diphenhydrAMINE (BENADRYL) 25 mg tablet Take 1 tablet (25 mg total) by mouth every 6 (six) hours as needed for itching 1/23/23   Samantha Hernandez MD   famotidine (PEPCID) 20 mg tablet TAKE 1 TABLET BY MOUTH DAILY AT BEDTIME 1/4/24   Samantha Hernandez MD   ferrous sulfate 324 (65 Fe) mg Take 1 tablet (324 mg total) by mouth daily after lunch 11/3/22   Samantha Hernandez MD   fexofenadine (ALLEGRA) 180 MG tablet TAKE 1 TABLET BY MOUTH EVERY DAY 10/23/23   Samantha Hernandez MD   fluticasone (FLONASE) 50 mcg/act nasal spray SPRAY 1 SPRAY INTO EACH NOSTRIL EVERY DAY 1/22/24   Samantha Hernandez MD   ipratropium (ATROVENT) 0.06 % nasal spray 2 SPRAYS INTO EACH NOSTRIL 3 (THREE) TIMES A DAY FOR INCREASED NASAL SECRETION 1/8/24   Samantha Hernandez MD   ipratropium-albuterol (DUO-NEB) 0.5-2.5 mg/3 mL nebulizer solution Take 3 mL by nebulization every 8 (eight) hours as needed for wheezing or shortness of breath 1/23/24   Samantha Hernandez MD   levothyroxine 75 mcg tablet TAKE 1 TABLET (75 MCG TOTAL) BY MOUTH DAILY IN THE EARLY MORNING 11/17/23   Samantha Hernandez MD   lidocaine (XYLOCAINE) 5 % ointment APPLY TOPICALLY AS NEEDED FOR MILD PAIN. 1/2/24   Samantha Hernandez MD   magnesium Oxide (MAG-OX) 400 mg TABS TAKE 1 TABLET BY MOUTH EVERY DAY 8/3/23   Samantha Hernandez MD   montelukast (SINGULAIR) 10 mg tablet Take 1 tablet (10 mg total) by mouth daily at bedtime 10/9/23   Samantha Hernandez MD   pantoprazole (PROTONIX) 40 mg tablet Take 1 tablet (40 mg total) by mouth daily before breakfast 10/9/23  4/6/24  Samantha Hernandez MD   polyethylene glycol (GLYCOLAX) 17 GM/SCOOP powder 1 scoop in 8 oz of water/orange juice 10/9/23   Samantha Hernandez MD   polyethylene glycol (GOLYTELY) 4000 mL solution Take 4,000 mL by mouth once for 1 dose 1/10/24 1/10/24  Sidra Raymundo MD   sertraline (ZOLOFT) 25 mg tablet Take 1 tablet (25 mg total) by mouth daily at bedtime 10/9/23 4/6/24  Samantha Hernandez MD   timolol (TIMOPTIC) 0.5 % ophthalmic solution INSTILL 1 DROP INTO EACH EYE TWO TIMES A DAY 3/23/22   Ricarda Gabriel MD   azithromycin (ZITHROMAX) 250 mg tablet Take 1 tablet (250 mg total) by mouth 3 (three) times a week 11/29/23 1/23/24  Wade Hastings MD   Budeson-Glycopyrrol-Formoterol (Breztri Aerosphere) 160-9-4.8 MCG/ACT AERO Inhale 2 puffs 2 (two) times a day Rinse mouth after use. 10/9/23 1/23/24  Samantha Hernandez MD   ipratropium-albuterol (DUO-NEB) 0.5-2.5 mg/3 mL nebulizer solution Take 3 mL by nebulization every 8 (eight) hours as needed for wheezing or shortness of breath 10/9/23 1/23/24  Samantha Hernandez MD     I have reviewed home medications with patient personally.    Allergies:   Allergies   Allergen Reactions    Cefdinir Hives    Amifostine Rash    Pollen Extract Allergic Rhinitis       Social History:  Marital Status: /Civil Union   Occupation: unemployed  Patient Pre-hospital Living Situation: Home  Patient Pre-hospital Level of Mobility: walks  Patient Pre-hospital Diet Restrictions: none  Substance Use History:   Social History     Substance and Sexual Activity   Alcohol Use Never    Comment: None     Social History     Tobacco Use   Smoking Status Former    Current packs/day: 0.00    Average packs/day: 1 pack/day for 40.0 years (40.0 ttl pk-yrs)    Types: Cigarettes    Start date: 1/1/1974    Quit date: 1/1/2014    Years since quitting: 10.0   Smokeless Tobacco Never     Social History     Substance and Sexual Activity   Drug Use Never    Comment: Denies       Family History:  Family History    Problem Relation Age of Onset    Other Mother         respiratory disorder    Sudden death Father         shot himself    Suicidality Father     Brain cancer Sister     Diabetes Sister     Breast cancer Sister     Cancer Sister 62        pancreatic cancer    No Known Problems Sister     No Known Problems Sister     No Known Problems Sister     No Known Problems Sister     No Known Problems Sister     No Known Problems Sister     No Known Problems Daughter     No Known Problems Daughter     No Known Problems Maternal Grandmother     No Known Problems Maternal Grandfather     No Known Problems Paternal Grandmother     No Known Problems Paternal Grandfather     No Known Problems Maternal Aunt     No Known Problems Maternal Aunt     No Known Problems Maternal Aunt     No Known Problems Maternal Aunt     No Known Problems Maternal Aunt     No Known Problems Paternal Aunt     No Known Problems Paternal Aunt     No Known Problems Paternal Aunt     No Known Problems Paternal Aunt        Physical Exam:     Vitals:   Blood Pressure: 151/66 (01/23/24 1615)  Pulse: 90 (01/23/24 1615)  Temperature: 98.4 °F (36.9 °C) (01/23/24 1419)  Temp Source: Oral (01/23/24 1419)  Respirations: 20 (01/23/24 1615)  SpO2: 95 % (01/23/24 1615)    Physical Exam  Vitals and nursing note reviewed.   Constitutional:       General: She is not in acute distress.     Appearance: She is obese. She is not toxic-appearing.   HENT:      Head: Normocephalic and atraumatic.      Comments: Facial sinus tenderness on palpation     Right Ear: External ear normal.      Left Ear: External ear normal.      Nose: Congestion present.      Mouth/Throat:      Mouth: Mucous membranes are moist.      Pharynx: Oropharynx is clear.   Eyes:      General: No scleral icterus.     Conjunctiva/sclera: Conjunctivae normal.   Cardiovascular:      Rate and Rhythm: Normal rate and regular rhythm.      Pulses: Normal pulses.      Heart sounds: Normal heart sounds. No murmur  heard.  Pulmonary:      Effort: No tachypnea or accessory muscle usage.      Breath sounds: Decreased breath sounds present. No wheezing or rhonchi.   Abdominal:      General: Bowel sounds are normal. There is no distension.      Palpations: Abdomen is soft.      Tenderness: There is no abdominal tenderness. There is no guarding or rebound.   Musculoskeletal:      Cervical back: Neck supple.      Right lower leg: No edema.      Left lower leg: No edema.   Skin:     General: Skin is warm and dry.   Neurological:      General: No focal deficit present.      Mental Status: She is alert and oriented to person, place, and time.   Psychiatric:         Mood and Affect: Mood normal.         Additional Data:     Lab Results:  Results from last 7 days   Lab Units 01/23/24  1502   WBC Thousand/uL 6.86   HEMOGLOBIN g/dL 11.4*   HEMATOCRIT % 38.6   PLATELETS Thousands/uL 222   NEUTROS PCT % 79*   LYMPHS PCT % 8*   MONOS PCT % 10   EOS PCT % 3     Results from last 7 days   Lab Units 01/23/24  1502   SODIUM mmol/L 136   POTASSIUM mmol/L 4.4   CHLORIDE mmol/L 96   CO2 mmol/L 34*   BUN mg/dL 19   CREATININE mg/dL 0.92   ANION GAP mmol/L 6   CALCIUM mg/dL 9.6   ALBUMIN g/dL 4.1   TOTAL BILIRUBIN mg/dL 0.39   ALK PHOS U/L 95   ALT U/L 11   AST U/L 18   GLUCOSE RANDOM mg/dL 117                       Lines/Drains:  Invasive Devices       Peripheral Intravenous Line  Duration             Peripheral IV 01/23/24 Right Antecubital <1 day                        Imaging: Reviewed radiology reports from this admission including: chest xray  XR chest portable   Final Result by Gemma Rosales MD (01/23 6838)      No acute cardiopulmonary disease.               Workstation performed: [id number]             EKG and Other Studies Reviewed on Admission:   EKG: NSR. HR 87.    ** Please Note: This note has been constructed using a voice recognition system. **

## 2024-01-23 NOTE — ED ATTENDING ATTESTATION
1/23/2024  I, Noemi Fuller, DO, saw and evaluated the patient. I have discussed the patient with the resident/non-physician practitioner and agree with the resident's/non-physician practitioner's findings, Plan of Care, and MDM as documented in the resident's/non-physician practitioner's note, except where noted. All available labs and Radiology studies were reviewed.  I was present for key portions of any procedure(s) performed by the resident/non-physician practitioner and I was immediately available to provide assistance.       At this point I agree with the current assessment done in the Emergency Department.  I have conducted an independent evaluation of this patient a history and physical is as follows:    ED Course  ED Course as of 01/23/24 1645   Tue Jan 23, 2024   1604 Patient with continued wheezing despite high dose neb treatment, corticosteroid ordered.     68 year old female with a history of asthma and on home oxygen presents to the ED for evaluation of shortness of breath and wheezing.  Patient has hx of asthma and uses inhalers daily, over the past 1 day she has developed increased cough which is mostly nonproductive.  She is having associated headache and nausea with a temperature of 100.5 yesterday.  Of note the patient was not evaluated in the emergency department over the weekend when she was having generalized abdominal pain.  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  She states that her abdominal pain has mostly resolved.  She does have intermittent nausea.  No difficulty with bowel or bladder habits.    Past medical history: Recent influenza A,, GERD, emphysema, bronchiectasis, deviated nasal septum    Physical exam: Patient is awake and alert, moderate distress due to difficulty breathing, tachypneic.  Pupils are equal and reactive.  Mucous membranes are slightly dry.  Neck is supple.  Heart is regular rate without murmur.  Lungs have diffuse wheezes in all lung fields and scattered  rhonchi.  Abdomen is soft mild tenderness in the epigastric region.  No rebound or guarding.  No lower extremity edema.  Speech is clear and appropriate.  No focal motor or sensory deficits.    Assessment: 68-year-old female presents with acute respiratory distress.  Differential diagnosis includes was not limited to acute exacerbation of emphysema/COPD, pneumonia, pneumothorax, cardiac dysrhythmia, COVID/flu/RSV, myocardial ischemia, pleural effusion.    Plan: Will start 1 hour nebulizer treatment, IV corticosteroid, check labs, EKG, chest x-ray, viral panel.  Reassess.    Critical Care Time  CriticalCare Time    Date/Time: 1/23/2024 4:41 PM    Performed by: Noemi Fuller DO  Authorized by: Noemi Fuller DO    Critical care provider statement:     Critical care time (minutes):  45    Critical care time was exclusive of:  Separately billable procedures and treating other patients and teaching time    Critical care was necessary to treat or prevent imminent or life-threatening deterioration of the following conditions:  Respiratory failure    Critical care was time spent personally by me on the following activities:  Obtaining history from patient or surrogate, development of treatment plan with patient or surrogate, evaluation of patient's response to treatment, examination of patient, re-evaluation of patient's condition, ordering and review of radiographic studies, ordering and review of laboratory studies, ordering and performing treatments and interventions, review of old charts and blood draw for specimens    I assumed direction of critical care for this patient from another provider in my specialty: no

## 2024-01-23 NOTE — RESPIRATORY THERAPY NOTE
RT Protocol Note  Noreen Alvarez 68 y.o. female MRN: [id number]  Unit/Bed#: ED-29 Encounter: [id number]    Assessment    Active Problems:  There are no active Hospital Problems.      Home Pulmonary Medications:  Duo Q8 PRN and albuterol inhaler PRN  Home Devices/Therapy: Home O2 (3L NC)    Past Medical History:   Diagnosis Date    Acute kidney failure (HCC)     Allergic     Allergies     Anemia     Asthma     Cancer (HCC)     Cataract     Chronic kidney disease (CKD), stage III (moderate) (HCC)     COPD (chronic obstructive pulmonary disease) (HCC)     COVID-19     Covid + 9-0-46-cough at that time now fully resolved    Disease of thyroid gland     Essential hypertension     GERD (gastroesophageal reflux disease)     Glaucoma     High blood pressure     HTN (hypertension) 2021    Hyperlipidemia     Hypothyroidism     Pulmonary hypertension (HCC)     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    Throat cancer (HCC) 2014    chemo and rad therapy      Social History     Socioeconomic History    Marital status: /Civil Union     Spouse name: None    Number of children: None    Years of education: None    Highest education level: None   Occupational History    None   Tobacco Use    Smoking status: Former     Current packs/day: 0.00     Average packs/day: 1 pack/day for 40.0 years (40.0 ttl pk-yrs)     Types: Cigarettes     Start date: 1974     Quit date: 2014     Years since quitting: 10.0    Smokeless tobacco: Never   Vaping Use    Vaping status: Never Used   Substance and Sexual Activity    Alcohol use: Never     Comment: None    Drug use: Never     Comment: Denies    Sexual activity: Not Currently     Partners: Female     Birth control/protection: Surgical   Other Topics Concern    None   Social History Narrative    Most recent tobacco use screenin2020    Do you currently or have you served in the [employer]: No    Were you activated, into active duty, as a member of the  [employer] or as a Reservist: No    Marital status:     Sexual orientation: Heterosexual    Exercise level: Occasional    Diet: Regular    General stress level: Low    Has smoked since age: 19    Alcohol intake: None    Caffeine intake: Occasional    Chewing tobacco: none    Illicit drugs: Denies    Guns present in home: No    Seat belts used routinely: Yes    Sunscreen used routinely: Yes    Smoke alarm in home: Yes    Advance directive: No    Salt Intake: HTN Diet    Has the Patient had a mammogram to screen for breast cancer within 24 months: Yes    Would the patient like to schedule a Mammogram: No    Is the patient interested in a colorectal cancer screening: No    Live alone or with others: with others    Sexually active: No    Do you feel safe at home: Yes     Social Determinants of Health     Financial Resource Strain: Low Risk  (1/23/2023)    Overall Financial Resource Strain (CARDIA)     Difficulty of Paying Living Expenses: Not hard at all   Food Insecurity: Not on file   Transportation Needs: No Transportation Needs (1/23/2023)    PRAPARE - Transportation     Lack of Transportation (Medical): No     Lack of Transportation (Non-Medical): No   Physical Activity: Not on file   Stress: Not on file   Social Connections: Not on file   Intimate Partner Violence: Not on file   Housing Stability: Not on file       Subjective         Objective    Physical Exam:   Assessment Type: During-treatment  General Appearance: Awake, Alert  Respiratory Pattern: Labored  Chest Assessment: Chest expansion symmetrical  Bilateral Breath Sounds: Diminished, Expiratory wheezes  O2 Device: 3L NC    Vitals:  Blood pressure 133/83, pulse 95, temperature 98.4 °F (36.9 °C), temperature source Oral, resp. rate 20, SpO2 92%.          Imaging and other studies: I have personally reviewed pertinent reports.      O2 Device: 3L NC     Plan    Respiratory Plan: Mild Distress pathway        Resp Comments: Assessed patient per  protocol. Patient on 5L on arrival, Weaned to 3L NC. Patients baseline is 2L NC.. She has duo Q8 PRN nebs at home and albuterol inhaler PRN. Ordered inhaler for patient for wheezing and sob. Patient given LÓPEZ neb per order.

## 2024-01-23 NOTE — LETTER
Thank you for allowing us to participate in the care of your patient, Noreen Alvarez, who was hospitalized from 1/23/2024 through 1/27/2024 with the admitting diagnosis of acute exacerbation of COPD most likely due to RSV.  Patient encouraged to follow-up outpatient with pulmonology and to make an appointment with sleep medicine for likely ELIUD.      If you have any additional questions or would like to discuss further, please feel free to contact me.    Mya Rivera  [facility]'s Internal Medicine, Hospitalist  [phone]

## 2024-01-23 NOTE — ED PROVIDER NOTES
History  Chief Complaint   Patient presents with    Flu Symptoms     Headache, SOB/tightness in lungs, on NC at baseline, vomiting     Patient is a 68-year-old female with asthma that was diagnosed with influenza 3 weeks ago the presents to the emergency department with significantly increased work of breathing, shortness of breath, and productive cough.  She reports symptoms have worsened over the last 2 days.  She also reports a fever of 100.4 taken yesterday.  She reports coughing so much that she had 1 episode of vomiting yesterday and does report that she has had persistent nausea.  The patient uses an oxygen concentrator at home.  She also reports headache.  She denies weakness, diarrhea, blood in the stool or vomit, urinary symptoms, or abnormal vaginal discharge or bleeding.  Patient has been using her daily steroid inhaler in addition to albuterol at home without relief of symptoms.  She has no known sick contacts.        Prior to Admission Medications   Prescriptions Last Dose Informant Patient Reported? Taking?   Budeson-Glycopyrrol-Formoterol (Breztri Aerosphere) 160-9-4.8 MCG/ACT AERO 1/23/2024  No Yes   Sig: Inhale 2 puffs 2 (two) times a day Rinse mouth after use.   Diclofenac Sodium (VOLTAREN) 1 % 1/23/2024 Self No Yes   Sig: Apply 2 g topically 4 (four) times a day   albuterol (PROVENTIL HFA,VENTOLIN HFA) 90 mcg/act inhaler 1/23/2024 Self No Yes   Sig: Inhale 2 puffs every 6 (six) hours as needed for wheezing or shortness of breath   azithromycin (ZITHROMAX) 250 mg tablet Not Taking  No No   Sig: Take 1 tablet (250 mg total) by mouth 3 (three) times a week   Patient not taking: Reported on 1/23/2024 Do not start before January 24, 2024.   bisacodyl (DULCOLAX) 5 mg EC tablet   No No   Sig: Take 2 tablets (10 mg total) by mouth once for 1 dose   brimonidine tartrate 0.2 % ophthalmic solution 1/22/2024 Self Yes Yes   diphenhydrAMINE (BENADRYL) 25 mg tablet Not Taking Self No No   Sig: Take 1 tablet (25  mg total) by mouth every 6 (six) hours as needed for itching   Patient not taking: Reported on 2024   famotidine (PEPCID) 20 mg tablet Not Taking Self No No   Sig: TAKE 1 TABLET BY MOUTH DAILY AT BEDTIME   Patient not taking: Reported on 2024   ferrous sulfate 324 (65 Fe) mg 2024 Self No Yes   Sig: Take 1 tablet (324 mg total) by mouth daily after lunch   fexofenadine (ALLEGRA) 180 MG tablet Not Taking Self No No   Sig: TAKE 1 TABLET BY MOUTH EVERY DAY   Patient not taking: Reported on 2024   fluticasone (FLONASE) 50 mcg/act nasal spray 2024  No Yes   Sig: SPRAY 1 SPRAY INTO EACH NOSTRIL EVERY DAY   ipratropium (ATROVENT) 0.06 % nasal spray 2024 Self No Yes   Si SPRAYS INTO EACH NOSTRIL 3 (THREE) TIMES A DAY FOR INCREASED NASAL SECRETION   ipratropium-albuterol (DUO-NEB) 0.5-2.5 mg/3 mL nebulizer solution Past Week  No Yes   Sig: Take 3 mL by nebulization every 8 (eight) hours as needed for wheezing or shortness of breath   levothyroxine 75 mcg tablet 2024 Self No Yes   Sig: TAKE 1 TABLET (75 MCG TOTAL) BY MOUTH DAILY IN THE EARLY MORNING   lidocaine (XYLOCAINE) 5 % ointment Not Taking Self No No   Sig: APPLY TOPICALLY AS NEEDED FOR MILD PAIN.   Patient not taking: Reported on 2024   magnesium Oxide (MAG-OX) 400 mg TABS Not Taking Self No No   Sig: TAKE 1 TABLET BY MOUTH EVERY DAY   Patient not taking: Reported on 2024   montelukast (SINGULAIR) 10 mg tablet 2024 Self No Yes   Sig: Take 1 tablet (10 mg total) by mouth daily at bedtime   pantoprazole (PROTONIX) 40 mg tablet 2024 Self No Yes   Sig: Take 1 tablet (40 mg total) by mouth daily before breakfast   polyethylene glycol (GLYCOLAX) 17 GM/SCOOP powder Not Taking Self No No   Si scoop in 8 oz of water/orange juice   Patient not taking: Reported on 2024   polyethylene glycol (GOLYTELY) 4000 mL solution   No No   Sig: Take 4,000 mL by mouth once for 1 dose   sertraline (ZOLOFT) 25 mg tablet  1/23/2024 Self No Yes   Sig: Take 1 tablet (25 mg total) by mouth daily at bedtime   timolol (TIMOPTIC) 0.5 % ophthalmic solution 1/23/2024 Self Yes Yes   Sig: INSTILL 1 DROP INTO EACH EYE TWO TIMES A DAY      Facility-Administered Medications: None       Past Medical History:   Diagnosis Date    Acute kidney failure (HCC)     Allergic     Allergies     Anemia     Asthma     Cancer (HCC)     Cataract     Chronic kidney disease (CKD), stage III (moderate) (HCC)     COPD (chronic obstructive pulmonary disease) ([facility])     COVID-19     Covid + 3-7-61-cough at that time now fully resolved    Disease of thyroid gland     Essential hypertension     GERD (gastroesophageal reflux disease)     Glaucoma     High blood pressure     HTN (hypertension) 02/16/2021    Hyperlipidemia     Hypothyroidism     Pulmonary hypertension (HCC)     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     WI TENDON SHEATH INCISION Right 02/16/2021    Procedure: THUMB TRIGGER FINGER RELEASE;  Surgeon: Angeles Denton MD;  Location: [facility];  Service: Orthopedics    TUBAL LIGATION         Family History   Problem Relation Age of Onset    Other Mother         respiratory disorder    Sudden death Father         shot himself    Suicidality Father     Brain cancer Sister     Diabetes Sister     Breast cancer Sister     Cancer Sister 62        pancreatic cancer    No Known Problems Sister     No Known Problems Sister     No Known Problems Sister     No Known Problems Sister     No Known Problems Sister     No Known Problems Sister     No Known Problems Daughter     No Known Problems Daughter     No Known Problems Maternal Grandmother     No Known Problems Maternal Grandfather     No Known Problems  Paternal Grandmother     No Known Problems Paternal Grandfather     No Known Problems Maternal Aunt     No Known Problems Maternal Aunt     No Known Problems Maternal Aunt     No Known Problems Maternal Aunt     No Known Problems Maternal Aunt     No Known Problems Paternal Aunt     No Known Problems Paternal Aunt     No Known Problems Paternal Aunt     No Known Problems Paternal Aunt      I have reviewed and agree with the history as documented.    E-Cigarette/Vaping    E-Cigarette Use Never User      E-Cigarette/Vaping Substances    Nicotine No     THC No     CBD No      Social History     Tobacco Use    Smoking status: Former     Current packs/day: 0.00     Average packs/day: 1 pack/day for 40.0 years (40.0 ttl pk-yrs)     Types: Cigarettes     Start date: 1/1/1974     Quit date: 1/1/2014     Years since quitting: 10.0    Smokeless tobacco: Never   Vaping Use    Vaping status: Never Used   Substance Use Topics    Alcohol use: Never     Comment: None    Drug use: Never     Comment: Denies        Review of Systems   Constitutional:  Positive for chills, fatigue and fever.   HENT:  Negative for congestion, ear pain and sore throat.    Eyes:  Negative for pain and visual disturbance.   Respiratory:  Positive for cough, chest tightness, shortness of breath and wheezing.    Cardiovascular:  Negative for chest pain and palpitations.   Gastrointestinal:  Positive for abdominal pain (Mild, epigastric), nausea and vomiting (x1). Negative for constipation and diarrhea.   Genitourinary:  Negative for dysuria and hematuria.   Musculoskeletal:  Negative for arthralgias and back pain.   Skin:  Negative for color change and rash.   Neurological:  Negative for dizziness, seizures, syncope, light-headedness and headaches.   All other systems reviewed and are negative.      Physical Exam  ED Triage Vitals   Temperature Pulse Respirations Blood Pressure SpO2   01/23/24 1419 01/23/24 1419 01/23/24 1419 01/23/24 1419 01/23/24 1419    98.4 °F (36.9 °C) 95 20 133/83 92 %      Temp Source Heart Rate Source Patient Position - Orthostatic VS BP Location FiO2 (%)   01/23/24 1419 01/23/24 1419 01/23/24 1615 01/23/24 1419 --   Oral Monitor Sitting Right arm       Pain Score       --                    Orthostatic Vital Signs  Vitals:    01/23/24 1419 01/23/24 1615   BP: 133/83 151/66   Pulse: 95 90   Patient Position - Orthostatic VS:  Sitting       Physical Exam  Vitals and nursing note reviewed.   Constitutional:       General: She is in acute distress.      Appearance: Normal appearance. She is well-developed. She is ill-appearing.   HENT:      Head: Normocephalic and atraumatic.      Right Ear: External ear normal.      Left Ear: External ear normal.      Mouth/Throat:      Pharynx: Oropharynx is clear. No oropharyngeal exudate or posterior oropharyngeal erythema.   Eyes:      General:         Right eye: No discharge.         Left eye: No discharge.      Extraocular Movements: Extraocular movements intact.      Conjunctiva/sclera: Conjunctivae normal.   Cardiovascular:      Rate and Rhythm: Normal rate and regular rhythm.      Pulses: Normal pulses.      Heart sounds: Normal heart sounds. No murmur heard.  Pulmonary:      Effort: Tachypnea and accessory muscle usage present.      Breath sounds: Decreased breath sounds and wheezing present. No rhonchi or rales.   Abdominal:      General: Abdomen is flat.      Palpations: Abdomen is soft.      Tenderness: There is abdominal tenderness (mild, epigastric). There is no guarding or rebound.   Musculoskeletal:         General: No swelling or deformity. Normal range of motion.      Cervical back: Neck supple. No tenderness.   Skin:     General: Skin is warm and dry.      Capillary Refill: Capillary refill takes less than 2 seconds.   Neurological:      Mental Status: She is alert.         ED Medications  Medications   albuterol (PROVENTIL HFA,VENTOLIN HFA) inhaler 2 puff (has no administration in time  range)   budesonide-formoterol (SYMBICORT) 160-4.5 mcg/act inhaler 2 puff (has no administration in time range)   ferrous sulfate tablet 325 mg (has no administration in time range)   levothyroxine tablet 75 mcg (has no administration in time range)   montelukast (SINGULAIR) tablet 10 mg (has no administration in time range)   pantoprazole (PROTONIX) EC tablet 40 mg (has no administration in time range)   sertraline (ZOLOFT) tablet 25 mg (has no administration in time range)   brimonidine tartrate 0.2 % ophthalmic solution 1 drop (has no administration in time range)   fluticasone (FLONASE) 50 mcg/act nasal spray 1 spray (has no administration in time range)   acetaminophen (TYLENOL) tablet 650 mg (has no administration in time range)   polyethylene glycol (MIRALAX) packet 17 g (has no administration in time range)   ipratropium (ATROVENT) 0.02 % inhalation solution 0.5 mg (0.5 mg Nebulization Given 1/23/24 1933)   levalbuterol (XOPENEX) inhalation solution 1.25 mg (1.25 mg Nebulization Given 1/23/24 1933)   methylPREDNISolone sodium succinate (Solu-MEDROL) injection 40 mg (has no administration in time range)   enoxaparin (LOVENOX) subcutaneous injection 40 mg (has no administration in time range)   ondansetron (ZOFRAN) injection 4 mg (has no administration in time range)   timolol (TIMOPTIC) 0.5 % ophthalmic solution 1 drop (has no administration in time range)   ondansetron (ZOFRAN) injection 4 mg (4 mg Intravenous Given 1/23/24 1507)   albuterol inhalation solution 10 mg (10 mg Nebulization Given by Other 1/23/24 1502)   ipratropium (ATROVENT) 0.02 % inhalation solution 1 mg (1 mg Nebulization Given by Other 1/23/24 1502)   sodium chloride 0.9 % inhalation solution 12 mL (12 mL Nebulization Given by Other 1/23/24 1503)   magnesium sulfate 2 g/50 mL IVPB (premix) 2 g (0 g Intravenous Stopped 1/23/24 1612)   methylPREDNISolone sodium succinate (Solu-MEDROL) injection 125 mg (125 mg Intravenous Given 1/23/24 3570)        Diagnostic Studies  Results Reviewed       Procedure Component Value Units Date/Time    Procalcitonin [[id number]]  (Normal) Collected: 01/23/24 1502    Lab Status: Final result Specimen: Blood from Arm, Right Updated: 01/23/24 1951     Procalcitonin <0.05 ng/ml     HS Troponin I 2hr [[id number]]  (Normal) Collected: 01/23/24 1750    Lab Status: Final result Specimen: Blood from Arm, Right Updated: 01/23/24 1818     hs TnI 2hr 3 ng/L      Delta 2hr hsTnI -1 ng/L     FLU/RSV/COVID - if FLU/RSV clinically relevant [[id number]]  (Abnormal) Collected: 01/23/24 1502    Lab Status: Final result Specimen: Nares from Nose Updated: 01/23/24 1618     SARS-CoV-2 Negative     INFLUENZA A PCR Negative     INFLUENZA B PCR Negative     RSV PCR Positive    Narrative:      FOR PEDIATRIC PATIENTS - copy/paste COVID Guidelines URL to browser: [email or web address]    SARS-CoV-2 assay is a Nucleic Acid Amplification assay intended for the  qualitative detection of nucleic acid from SARS-CoV-2 in nasopharyngeal  swabs. Results are for the presumptive identification of SARS-CoV-2 RNA.    Positive results are indicative of infection with SARS-CoV-2, the virus  causing COVID-19, but do not rule out bacterial infection or co-infection  with other viruses. Laboratories within the [place] and its  territories are required to report all positive results to the appropriate  public health authorities. Negative results do not preclude SARS-CoV-2  infection and should not be used as the sole basis for treatment or other  patient management decisions. Negative results must be combined with  clinical observations, patient history, and epidemiological information.  This test has not been FDA cleared or approved.    This test has been authorized by FDA under an Emergency Use Authorization  (EUA). This test is only authorized for the duration of time the  declaration that circumstances exist  justifying the authorization of the  emergency use of an in vitro diagnostic tests for detection of SARS-CoV-2  virus and/or diagnosis of COVID-19 infection under section 564(b)(1) of  the Act, 21 U.S.C. 360bbb-3(b)(1), unless the authorization is terminated  or revoked sooner. The test has been validated but independent review by FDA  and CLIA is pending.    Test performed using [employer] Gene[employer]pert: This RT-PCR assay targets N2,  a region unique to SARS-CoV-2. A conserved region in the E-gene was chosen  for pan-Sarbecovirus detection which includes SARS-CoV-2.    According to CMS-2020-01-R, this platform meets the definition of high-throughput technology.    B-Type Natriuretic Peptide(BNP) [[id number]]  (Normal) Collected: 01/23/24 1502    Lab Status: Final result Specimen: Blood from Arm, Right Updated: 01/23/24 1608     BNP 73 pg/mL     HS Troponin I 4hr [[id number]]     Lab Status: No result Specimen: Blood     Comprehensive metabolic panel [[id number]]  (Abnormal) Collected: 01/23/24 1502    Lab Status: Final result Specimen: Blood from Arm, Right Updated: 01/23/24 1546     Sodium 136 mmol/L      Potassium 4.4 mmol/L      Chloride 96 mmol/L      CO2 34 mmol/L      ANION GAP 6 mmol/L      BUN 19 mg/dL      Creatinine 0.92 mg/dL      Glucose 117 mg/dL      Calcium 9.6 mg/dL      AST 18 U/L      ALT 11 U/L      Alkaline Phosphatase 95 U/L      Total Protein 7.4 g/dL      Albumin 4.1 g/dL      Total Bilirubin 0.39 mg/dL      eGFR 64 ml/min/1.73sq m     Narrative:      National Kidney Disease Foundation guidelines for Chronic Kidney Disease (CKD):     Stage 1 with normal or high GFR (GFR > 90 mL/min/1.73 square meters)    Stage 2 Mild CKD (GFR = 60-89 mL/min/1.73 square meters)    Stage 3A Moderate CKD (GFR = 45-59 mL/min/1.73 square meters)    Stage 3B Moderate CKD (GFR = 30-44 mL/min/1.73 square meters)    Stage 4 Severe CKD (GFR = 15-29 mL/min/1.73 square meters)    Stage 5 End Stage CKD (GFR <15 mL/min/1.73 square  meters)  Note: GFR calculation is accurate only with a steady state creatinine    Lipase [[id number]]  (Abnormal) Collected: 01/23/24 1502    Lab Status: Final result Specimen: Blood from Arm, Right Updated: 01/23/24 1546     Lipase 9 u/L     Magnesium [[id number]]  (Normal) Collected: 01/23/24 1502    Lab Status: Final result Specimen: Blood from Arm, Right Updated: 01/23/24 1546     Magnesium 1.9 mg/dL     HS Troponin 0hr (reflex protocol) [[id number]]  (Normal) Collected: 01/23/24 1502    Lab Status: Final result Specimen: Blood from Arm, Right Updated: 01/23/24 1546     hs TnI 0hr 4 ng/L     CBC and differential [[id number]]  (Abnormal) Collected: 01/23/24 1502    Lab Status: Final result Specimen: Blood from Arm, Right Updated: 01/23/24 1527     WBC 6.86 Thousand/uL      RBC 4.28 Million/uL      Hemoglobin 11.4 g/dL      Hematocrit 38.6 %      MCV 90 fL      MCH 26.6 pg      MCHC 29.5 g/dL      RDW 14.3 %      MPV 10.0 fL      Platelets 222 Thousands/uL      nRBC 0 /100 WBCs      Neutrophils Relative 79 %      Immat GRANS % 0 %      Lymphocytes Relative 8 %      Monocytes Relative 10 %      Eosinophils Relative 3 %      Basophils Relative 0 %      Neutrophils Absolute 5.33 Thousands/µL      Immature Grans Absolute 0.03 Thousand/uL      Lymphocytes Absolute 0.56 Thousands/µL      Monocytes Absolute 0.70 Thousand/µL      Eosinophils Absolute 0.21 Thousand/µL      Basophils Absolute 0.03 Thousands/µL                    XR chest portable   Final Result by Gemma Rosales MD (01/23 0568)      No acute cardiopulmonary disease.               Workstation performed: [id number]               Procedures  ECG 12 Lead Documentation Only    Date/Time: 1/23/2024 2:45 PM    Performed by: Julio C Prado DO  Authorized by: Julio C Prado DO    Indications / Diagnosis:  SOB  ECG reviewed by me, the ED Provider: yes    Patient location:  ED  Previous ECG:     Previous ECG:  Compared to current    Similarity:  No  change  Interpretation:     Interpretation: normal    Quality:     Tracing quality:  Limited by artifact  Rate:     ECG rate:  87    ECG rate assessment: normal    Rhythm:     Rhythm: sinus rhythm    Ectopy:     Ectopy: none    QRS:     QRS axis:  Normal    QRS intervals:  Normal  Conduction:     Conduction: normal    ST segments:     ST segments:  Normal  T waves:     T waves: normal          ED Course                                       Medical Decision Making  68F with asthma presents to the emergency department with significantly increased work of breathing, cough and shortness of breath along with wheezing worsening over the last 2 days.  She has had some nausea and vomiting as well.  She was diagnosed with influenza 3 weeks ago and has had some difficulty breathing since that time.  On initial evaluation, patient is tripoding, has frequent coughing, diffuse wheezing through the lungs bilaterally along with some decreased air movement.  CBC, high-sensitivity opponent, CMP, lipase, magnesium, BNP all within normal limits.  Patient is found to have RSV.    ECG nonconcerning for acute ischemia or dysrhythmia.  This, combined with negative troponin-doubt ACS.    Chest x-ray without evidence of acute cardiopulmonary abnormalities-doubt pneumonia or pneumothorax.    Patient has significant improvement of shortness of breath after oakes neb, magnesium, Solu-Medrol administration, although she continues to have O2 saturation in the low 90s despite oxygen supplementation.  Her work of breathing and cough have improved however.    Given RSV bronchiolitis causing acute asthma exacerbation, patient is admitted under the care of [facility] internal medicine for further evaluation and treatment of symptoms.    Amount and/or Complexity of Data Reviewed  Labs: ordered.  Radiology: ordered.    Risk  Prescription drug management.  Decision regarding hospitalization.          Disposition  Final diagnoses:   RSV bronchiolitis    Asthma exacerbation     Time reflects when diagnosis was documented in both MDM as applicable and the Disposition within this note       Time User Action Codes Description Comment    1/23/2024  4:42 PM Julio C Prado [J21.0] RSV bronchiolitis     1/23/2024  4:43 PM Julio C Prado [J45.901] Asthma exacerbation           ED Disposition       ED Disposition   Admit    Condition   Stable    Date/Time   Tue Jan 23, 2024  4:42 PM    Comment   Case was discussed with TERESA and the patient's admission status was agreed to be Admission Status: inpatient status to the service of Dr. Acharya.               Follow-up Information    None         Current Discharge Medication List        START taking these medications    Details   Budeson-Glycopyrrol-Formoterol (Breztri Aerosphere) 160-9-4.8 MCG/ACT AERO Inhale 2 puffs 2 (two) times a day Rinse mouth after use.  Qty: 32.1 g, Refills: 0    Associated Diagnoses: Centrilobular emphysema (HCC)           CONTINUE these medications which have NOT CHANGED    Details   albuterol (PROVENTIL HFA,VENTOLIN HFA) 90 mcg/act inhaler Inhale 2 puffs every 6 (six) hours as needed for wheezing or shortness of breath  Qty: 18 g, Refills: 5    Comments: Substitution to a formulary equivalent within the same pharmaceutical class is authorized.  Associated Diagnoses: Severe persistent asthma without complication      brimonidine tartrate 0.2 % ophthalmic solution       Diclofenac Sodium (VOLTAREN) 1 % Apply 2 g topically 4 (four) times a day  Qty: 350 g, Refills: 0    Associated Diagnoses: Right hip pain      ferrous sulfate 324 (65 Fe) mg Take 1 tablet (324 mg total) by mouth daily after lunch  Qty: 90 tablet, Refills: 1    Associated Diagnoses: Iron deficiency anemia due to dietary causes      fluticasone (FLONASE) 50 mcg/act nasal spray SPRAY 1 SPRAY INTO EACH NOSTRIL EVERY DAY  Qty: 24 mL, Refills: 2    Associated Diagnoses: Seasonal allergic rhinitis due to pollen      ipratropium (ATROVENT)  0.06 % nasal spray 2 SPRAYS INTO EACH NOSTRIL 3 (THREE) TIMES A DAY FOR INCREASED NASAL SECRETION  Qty: 15 mL, Refills: 1    Associated Diagnoses: Post-nasal catarrh      ipratropium-albuterol (DUO-NEB) 0.5-2.5 mg/3 mL nebulizer solution Take 3 mL by nebulization every 8 (eight) hours as needed for wheezing or shortness of breath  Qty: 270 mL, Refills: 1    Associated Diagnoses: Bronchiectasis without complication (HCC)      levothyroxine 75 mcg tablet TAKE 1 TABLET (75 MCG TOTAL) BY MOUTH DAILY IN THE EARLY MORNING  Qty: 90 tablet, Refills: 0    Associated Diagnoses: Hypothyroidism, unspecified type      montelukast (SINGULAIR) 10 mg tablet Take 1 tablet (10 mg total) by mouth daily at bedtime  Qty: 90 tablet, Refills: 1    Associated Diagnoses: Centrilobular emphysema (HCC)      pantoprazole (PROTONIX) 40 mg tablet Take 1 tablet (40 mg total) by mouth daily before breakfast  Qty: 90 tablet, Refills: 1    Associated Diagnoses: Gastroesophageal reflux disease without esophagitis      sertraline (ZOLOFT) 25 mg tablet Take 1 tablet (25 mg total) by mouth daily at bedtime  Qty: 90 tablet, Refills: 1    Comments: Discontinue fluoxetine  Associated Diagnoses: TIMMY (generalized anxiety disorder); Depression with anxiety      timolol (TIMOPTIC) 0.5 % ophthalmic solution INSTILL 1 DROP INTO EACH EYE TWO TIMES A DAY      azithromycin (ZITHROMAX) 250 mg tablet Take 1 tablet (250 mg total) by mouth 3 (three) times a week  Qty: 36 tablet, Refills: 3    Associated Diagnoses: Centrilobular emphysema (HCC)      bisacodyl (DULCOLAX) 5 mg EC tablet Take 2 tablets (10 mg total) by mouth once for 1 dose  Qty: 2 tablet, Refills: 0    Associated Diagnoses: Colon cancer screening      diphenhydrAMINE (BENADRYL) 25 mg tablet Take 1 tablet (25 mg total) by mouth every 6 (six) hours as needed for itching  Qty: 30 tablet, Refills: 0    Associated Diagnoses: Contrast media allergy      famotidine (PEPCID) 20 mg tablet TAKE 1 TABLET BY MOUTH  DAILY AT BEDTIME  Qty: 90 tablet, Refills: 1    Associated Diagnoses: Gastroesophageal reflux disease without esophagitis      fexofenadine (ALLEGRA) 180 MG tablet TAKE 1 TABLET BY MOUTH EVERY DAY  Qty: 90 tablet, Refills: 1    Associated Diagnoses: Post-nasal catarrh      lidocaine (XYLOCAINE) 5 % ointment APPLY TOPICALLY AS NEEDED FOR MILD PAIN.  Qty: 35.44 g, Refills: 0    Associated Diagnoses: Acute midline thoracic back pain      magnesium Oxide (MAG-OX) 400 mg TABS TAKE 1 TABLET BY MOUTH EVERY DAY  Qty: 90 tablet, Refills: 1    Associated Diagnoses: Swelling of lower extremity; Bronchiectasis without complication (HCC)      polyethylene glycol (GLYCOLAX) 17 GM/SCOOP powder 1 scoop in 8 oz of water/orange juice  Qty: 507 g, Refills: 0    Associated Diagnoses: Chronic idiopathic constipation      polyethylene glycol (GOLYTELY) 4000 mL solution Take 4,000 mL by mouth once for 1 dose  Qty: 4000 mL, Refills: 0    Associated Diagnoses: Colon cancer screening           No discharge procedures on file.    PDMP Review         Value Time User    PDMP Reviewed  Yes 7/11/2023 10:38 AM Samantha Hernandez MD             ED Provider  Attending physically available and evaluated Noreen Alvarez. I managed the patient along with the ED Attending.    Electronically Signed by           Julio C Prado DO  01/23/24 [id number]

## 2024-01-23 NOTE — TELEPHONE ENCOUNTER
"Reason for Disposition   MILD pain (e.g., does not interfere with normal activities) and pain comes and goes (cramps) lasts > 48 hours (Exception: this same abdominal pain is a chronic symptom recurrent or ongoing AND present > 4 weeks)    Answer Assessment - Initial Assessment Questions  1. LOCATION: \"Where does it hurt?\"       Upper   2. RADIATION: \"Does the pain shoot anywhere else?\" (e.g., chest, back)      No   3. ONSET: \"When did the pain begin?\" (e.g., minutes, hours or days ago)       Five days ago  4. SUDDEN: \"Gradual or sudden onset?\"      Gradual   5. PATTERN \"Does the pain come and go, or is it constant?\"     - If constant: \"Is it getting better, staying the same, or worsening?\"       (Note: Constant means the pain never goes away completely; most serious pain is constant and it progresses)      - If intermittent: \"How long does it last?\" \"Do you have pain now?\"      (Note: Intermittent means the pain goes away completely between bouts)      Constant   6. SEVERITY: \"How bad is the pain?\"  (e.g., Scale 1-10; mild, moderate, or severe)    - MILD (1-3): doesn't interfere with normal activities, abdomen soft and not tender to touch     - MODERATE (4-7): interferes with normal activities or awakens from sleep, tender to touch     - SEVERE (8-10): excruciating pain, doubled over, unable to do any normal activities       10  7. RECURRENT SYMPTOM: \"Have you ever had this type of stomach pain before?\" If Yes, ask: \"When was the last time?\" and \"What happened that time?\"Yes seen in the ER        8. CAUSE: \"What do you think is causing the stomach pain?\"      UNKNOWN   9. RELIEVING/AGGRAVATING FACTORS: \"What makes it better or worse?\" (e.g., movement, antacids, bowel movement)      NOTHING MAKES PAIN BETTER  10. OTHER SYMPTOMS: \"Has there been any vomiting, diarrhea, constipation, or urine problems?\"        NAUSEA,COUGH,HEADACHE  PATIENT SCHEDULED OV TOMORROW  DAUGHTER IS REQUESTING A COUGH SUPRESSANT AND NAUSEA " MEDICATION    Protocols used: Abdominal Pain - Female-ADULT-[place]

## 2024-01-23 NOTE — ASSESSMENT & PLAN NOTE
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 associated with cough, wheezing, nasal congestion, headaches, nausea, low-grade temperature of 100.5F. Arrived to the ED on 6 L.     Most likely 2/2 +RSV   Smoking Status: quit 9 years ago  CXR: negative for acute cardiopulmonary process.  Troponin 4, CO2 34, BNP 73    Plan:  Procalcitonin, CBC w/ Diff, CMP  IV Solu-Medrol: 40 mg q8h; taper as appropriate  Xopenex/Atrovent scheduled TID  Currently on 4 L NC, Goal O2 sat 88-92% on home 2 L  Monitor respiratory status, Respiratory protocol, Airway clearance protocol, incentive spirometry

## 2024-01-24 LAB
ANION GAP SERPL CALCULATED.3IONS-SCNC: 5 MMOL/L
BASOPHILS # BLD AUTO: 0.01 THOUSANDS/ÂΜL (ref 0–0.1)
BASOPHILS NFR BLD AUTO: 0 % (ref 0–1)
BUN SERPL-MCNC: 19 MG/DL (ref 5–25)
CALCIUM SERPL-MCNC: 9.7 MG/DL (ref 8.4–10.2)
CHLORIDE SERPL-SCNC: 99 MMOL/L (ref 96–108)
CO2 SERPL-SCNC: 34 MMOL/L (ref 21–32)
CREAT SERPL-MCNC: 0.93 MG/DL (ref 0.6–1.3)
EOSINOPHIL # BLD AUTO: 0 THOUSAND/ÂΜL (ref 0–0.61)
EOSINOPHIL NFR BLD AUTO: 0 % (ref 0–6)
ERYTHROCYTE [DISTWIDTH] IN BLOOD BY AUTOMATED COUNT: 14.1 % (ref 11.6–15.1)
GFR SERPL CREATININE-BSD FRML MDRD: 63 ML/MIN/1.73SQ M
GLUCOSE SERPL-MCNC: 142 MG/DL (ref 65–140)
HCT VFR BLD AUTO: 37.4 % (ref 34.8–46.1)
HGB BLD-MCNC: 11.1 G/DL (ref 11.5–15.4)
IMM GRANULOCYTES # BLD AUTO: 0.04 THOUSAND/UL (ref 0–0.2)
IMM GRANULOCYTES NFR BLD AUTO: 1 % (ref 0–2)
LYMPHOCYTES # BLD AUTO: 0.38 THOUSANDS/ÂΜL (ref 0.6–4.47)
LYMPHOCYTES NFR BLD AUTO: 7 % (ref 14–44)
MCH RBC QN AUTO: 26.5 PG (ref 26.8–34.3)
MCHC RBC AUTO-ENTMCNC: 29.7 G/DL (ref 31.4–37.4)
MCV RBC AUTO: 89 FL (ref 82–98)
MONOCYTES # BLD AUTO: 0.09 THOUSAND/ÂΜL (ref 0.17–1.22)
MONOCYTES NFR BLD AUTO: 2 % (ref 4–12)
NEUTROPHILS # BLD AUTO: 4.9 THOUSANDS/ÂΜL (ref 1.85–7.62)
NEUTS SEG NFR BLD AUTO: 90 % (ref 43–75)
NRBC BLD AUTO-RTO: 0 /100 WBCS
PLATELET # BLD AUTO: 213 THOUSANDS/UL (ref 149–390)
PLATELET BLD QL SMEAR: ADEQUATE
PMV BLD AUTO: 9.9 FL (ref 8.9–12.7)
POTASSIUM SERPL-SCNC: 5.2 MMOL/L (ref 3.5–5.3)
RBC # BLD AUTO: 4.19 MILLION/UL (ref 3.81–5.12)
RBC MORPH BLD: NORMAL
SODIUM SERPL-SCNC: 138 MMOL/L (ref 135–147)
WBC # BLD AUTO: 5.42 THOUSAND/UL (ref 4.31–10.16)

## 2024-01-24 PROCEDURE — 85025 COMPLETE CBC W/AUTO DIFF WBC: CPT | Performed by: PSYCHIATRY & NEUROLOGY

## 2024-01-24 PROCEDURE — 94640 AIRWAY INHALATION TREATMENT: CPT

## 2024-01-24 PROCEDURE — 94762 N-INVAS EAR/PLS OXIMTRY CONT: CPT

## 2024-01-24 PROCEDURE — 80048 BASIC METABOLIC PNL TOTAL CA: CPT | Performed by: PSYCHIATRY & NEUROLOGY

## 2024-01-24 PROCEDURE — 94760 N-INVAS EAR/PLS OXIMETRY 1: CPT

## 2024-01-24 PROCEDURE — 99232 SBSQ HOSP IP/OBS MODERATE 35: CPT | Performed by: INTERNAL MEDICINE

## 2024-01-24 RX ORDER — LEVALBUTEROL INHALATION SOLUTION 1.25 MG/3ML
1.25 SOLUTION RESPIRATORY (INHALATION)
Status: DISCONTINUED | OUTPATIENT
Start: 2024-01-24 | End: 2024-01-26

## 2024-01-24 RX ORDER — GUAIFENESIN 600 MG/1
1200 TABLET, EXTENDED RELEASE ORAL EVERY 12 HOURS SCHEDULED
Status: DISCONTINUED | OUTPATIENT
Start: 2024-01-24 | End: 2024-01-29 | Stop reason: HOSPADM

## 2024-01-24 RX ORDER — SIMETHICONE 80 MG
80 TABLET,CHEWABLE ORAL
Status: DISCONTINUED | OUTPATIENT
Start: 2024-01-24 | End: 2024-01-29 | Stop reason: HOSPADM

## 2024-01-24 RX ORDER — AMOXICILLIN 250 MG
1 CAPSULE ORAL
Status: DISCONTINUED | OUTPATIENT
Start: 2024-01-24 | End: 2024-01-26

## 2024-01-24 RX ADMIN — IPRATROPIUM BROMIDE 0.5 MG: 0.5 SOLUTION RESPIRATORY (INHALATION) at 07:30

## 2024-01-24 RX ADMIN — LEVALBUTEROL HYDROCHLORIDE 1.25 MG: 1.25 SOLUTION RESPIRATORY (INHALATION) at 19:46

## 2024-01-24 RX ADMIN — TIMOLOL MALEATE 1 DROP: 5 SOLUTION/ DROPS OPHTHALMIC at 08:42

## 2024-01-24 RX ADMIN — ALBUTEROL SULFATE 2 PUFF: 90 AEROSOL, METERED RESPIRATORY (INHALATION) at 08:42

## 2024-01-24 RX ADMIN — IPRATROPIUM BROMIDE 0.5 MG: 0.5 SOLUTION RESPIRATORY (INHALATION) at 14:39

## 2024-01-24 RX ADMIN — SENNOSIDES AND DOCUSATE SODIUM 1 TABLET: 8.6; 5 TABLET ORAL at 15:40

## 2024-01-24 RX ADMIN — GUAIFENESIN 1200 MG: 600 TABLET ORAL at 23:12

## 2024-01-24 RX ADMIN — SIMETHICONE 80 MG: 80 TABLET, CHEWABLE ORAL at 15:40

## 2024-01-24 RX ADMIN — METHYLPREDNISOLONE SODIUM SUCCINATE 40 MG: 40 INJECTION, POWDER, FOR SOLUTION INTRAMUSCULAR; INTRAVENOUS at 15:40

## 2024-01-24 RX ADMIN — BUDESONIDE AND FORMOTEROL FUMARATE DIHYDRATE 2 PUFF: 160; 4.5 AEROSOL RESPIRATORY (INHALATION) at 17:16

## 2024-01-24 RX ADMIN — LEVALBUTEROL HYDROCHLORIDE 1.25 MG: 1.25 SOLUTION RESPIRATORY (INHALATION) at 07:30

## 2024-01-24 RX ADMIN — TIMOLOL MALEATE 1 DROP: 5 SOLUTION/ DROPS OPHTHALMIC at 17:16

## 2024-01-24 RX ADMIN — LEVOTHYROXINE SODIUM 75 MCG: 75 TABLET ORAL at 06:07

## 2024-01-24 RX ADMIN — BUDESONIDE AND FORMOTEROL FUMARATE DIHYDRATE 2 PUFF: 160; 4.5 AEROSOL RESPIRATORY (INHALATION) at 08:42

## 2024-01-24 RX ADMIN — BRIMONIDINE TARTRATE 1 DROP: 2 SOLUTION OPHTHALMIC at 08:42

## 2024-01-24 RX ADMIN — FLUTICASONE PROPIONATE 1 SPRAY: 50 SPRAY, METERED NASAL at 08:42

## 2024-01-24 RX ADMIN — PANTOPRAZOLE SODIUM 40 MG: 40 TABLET, DELAYED RELEASE ORAL at 06:07

## 2024-01-24 RX ADMIN — BRIMONIDINE TARTRATE 1 DROP: 2 SOLUTION OPHTHALMIC at 23:13

## 2024-01-24 RX ADMIN — METHYLPREDNISOLONE SODIUM SUCCINATE 40 MG: 40 INJECTION, POWDER, FOR SOLUTION INTRAMUSCULAR; INTRAVENOUS at 23:13

## 2024-01-24 RX ADMIN — METHYLPREDNISOLONE SODIUM SUCCINATE 40 MG: 40 INJECTION, POWDER, FOR SOLUTION INTRAMUSCULAR; INTRAVENOUS at 08:57

## 2024-01-24 RX ADMIN — GUAIFENESIN 1200 MG: 600 TABLET ORAL at 08:41

## 2024-01-24 RX ADMIN — MONTELUKAST 10 MG: 10 TABLET, FILM COATED ORAL at 23:12

## 2024-01-24 RX ADMIN — LEVALBUTEROL HYDROCHLORIDE 1.25 MG: 1.25 SOLUTION RESPIRATORY (INHALATION) at 14:38

## 2024-01-24 RX ADMIN — SIMETHICONE 80 MG: 80 TABLET, CHEWABLE ORAL at 23:12

## 2024-01-24 RX ADMIN — FERROUS SULFATE TAB 325 MG (65 MG ELEMENTAL FE) 325 MG: 325 (65 FE) TAB at 08:41

## 2024-01-24 RX ADMIN — BRIMONIDINE TARTRATE 1 DROP: 2 SOLUTION OPHTHALMIC at 15:41

## 2024-01-24 RX ADMIN — POLYETHYLENE GLYCOL 3350 17 G: 17 POWDER, FOR SOLUTION ORAL at 08:41

## 2024-01-24 RX ADMIN — METHYLPREDNISOLONE SODIUM SUCCINATE 40 MG: 40 INJECTION, POWDER, FOR SOLUTION INTRAMUSCULAR; INTRAVENOUS at 02:06

## 2024-01-24 RX ADMIN — IPRATROPIUM BROMIDE 0.5 MG: 0.5 SOLUTION RESPIRATORY (INHALATION) at 19:46

## 2024-01-24 RX ADMIN — SERTRALINE 25 MG: 25 TABLET, FILM COATED ORAL at 23:13

## 2024-01-24 RX ADMIN — ENOXAPARIN SODIUM 40 MG: 40 INJECTION SUBCUTANEOUS at 08:41

## 2024-01-24 NOTE — UTILIZATION REVIEW
Initial Clinical Review    Admission: Date/Time/Statement:   Admission Orders (From admission, onward)       Ordered        01/23/24 1643  INPATIENT ADMISSION  Once                          Orders Placed This Encounter   Procedures    INPATIENT ADMISSION     Standing Status:   Standing     Number of Occurrences:   1     Order Specific Question:   Level of Care     Answer:   Med Surg [16]     Order Specific Question:   Estimated length of stay     Answer:   More than 2 Midnights     Order Specific Question:   Certification     Answer:   I certify that inpatient services are medically necessary for this patient for a duration of greater than two midnights. See H&P and MD Progress Notes for additional information about the patient's course of treatment.     ED Arrival Information       Expected   -    Arrival   1/23/2024 14:15    Acuity   Urgent              Means of arrival   Wheelchair    Escorted by   Family Member    Service   Hospitalist    Admission type   Emergency              Arrival complaint   sob and flu             Chief Complaint   Patient presents with    Flu Symptoms     Headache, SOB/tightness in lungs, on NC at baseline, vomiting       Initial Presentation: 68 y.o. female with a PMH of asthma, COPD on 2 L home oxygen, hypertension, squamous cell carcinoma of the larynx status post chemo and radiation, hypothyroidism, depression, GERD who presents with shortness of breath x 2 days. Patient reports that for the last 2 days she has been having worsening shortness of breath associated with cough, wheezing, nasal congestion, headaches, nausea, and low-grade temperature of 100.5F. Patient denies chest pain or swelling of the legs. She reports that she had to sleep in her chair because she was having shortness of breath. Patient reports that she was requiring more oxygen at home and came to the ED. Upon arrival to the ED, patient was on 6 L of oxygen.  In the ED she was on 4 L satting 95% and tachypneic.   Labs were significant for + RSV. Plan: Inpatient admission for evaluation and treatment of acute exacerbation of COPD, GERD, iron deficiency anemia, hypothyroidism, depression with anxiety, hx of laryngeal cancer, CKD: procal, CBC, CMP, IV solumedrol 40 mg q 8, Xopenex/Atrovent  tid, currently on O2 4L, continue Protonix, ferrous sulfate, levothyroxine, Zoloft.    Date: 1/24   Day 2:     Internal medicine: She remains on 3 L nasal cannula O2 above her baseline of 2 L. Failed attempt to wean her down to her baseline this morning as her sats dropped to the 80s.  Breath sounds remain significantly decreased bilaterally with poor air movement and end expiratory wheezes.  She will be continued on IV steroids TID, continue scheduled nebs, Mucinex.     Date: 1/25    Day 3: Has surpassed a 2nd midnight with active treatments and services, which include IV steroids.      ED Triage Vitals   Temperature Pulse Respirations Blood Pressure SpO2   01/23/24 1419 01/23/24 1419 01/23/24 1419 01/23/24 1419 01/23/24 1419   98.4 °F (36.9 °C) 95 20 133/83 92 %      Temp Source Heart Rate Source Patient Position - Orthostatic VS BP Location FiO2 (%)   01/23/24 1419 01/23/24 1419 01/23/24 1615 01/23/24 1419 --   Oral Monitor Sitting Right arm       Pain Score       01/24/24 0900       No Pain          Wt Readings from Last 1 Encounters:   01/10/24 84.8 kg (187 lb)     Additional Vital Signs:     Date/Time Temp Pulse Resp BP MAP (mmHg) SpO2 Calculated FIO2 (%) - Nasal Cannula Nasal Cannula O2 Flow Rate (L/min) O2 Device   01/24/24 0900 -- -- -- -- -- 93 %  32 3 L/min Nasal cannula   SpO2: 93 at 01/24/24 0900   01/24/24 0804 -- -- 18 125/56 79 -- -- -- --   01/24/24 0730 -- -- -- -- -- 92 % 28 2 L/min Nasal cannula   01/23/24 21:15:53 -- -- 19 115/75 88 -- -- -- --   01/23/24 1936 -- -- -- -- -- 95 % -- -- --   01/23/24 1615 -- 90 20 151/66 -- 95 % 32 3 L/min Nasal cannula   01/23/24 1509 -- -- -- -- -- -- 32 3 L/min Nasal cannula   01/23/24  1430 -- -- -- -- -- -- -- -- Nasal cannula       Pertinent Labs/Diagnostic Test Results:   XR chest portable   Final Result by Gemma Rosales MD (01/23 1558)      No acute cardiopulmonary disease.               Workstation performed: [id number]           Results from last 7 days   Lab Units 01/23/24  1502   SARS-COV-2  Negative     Results from last 7 days   Lab Units 01/24/24  0446 01/23/24  1502 01/21/24  1457   WBC Thousand/uL 5.42 6.86 5.99   HEMOGLOBIN g/dL 11.1* 11.4* 11.0*   HEMATOCRIT % 37.4 38.6 37.0   PLATELETS Thousands/uL 213 222 175   NEUTROS ABS Thousands/µL 4.90 5.33 4.36         Results from last 7 days   Lab Units 01/24/24  0446 01/23/24  1502 01/21/24  1457   SODIUM mmol/L 138 136 138   POTASSIUM mmol/L 5.2 4.4 5.1   CHLORIDE mmol/L 99 96 100   CO2 mmol/L 34* 34* 34*   ANION GAP mmol/L 5 6 4   BUN mg/dL 19 19 21   CREATININE mg/dL 0.93 0.92 1.18   EGFR ml/min/1.73sq m 63 64 47   CALCIUM mg/dL 9.7 9.6 9.3   MAGNESIUM mg/dL  --  1.9  --      Results from last 7 days   Lab Units 01/23/24  1502 01/21/24  1457   AST U/L 18 21   ALT U/L 11 12   ALK PHOS U/L 95 90   TOTAL PROTEIN g/dL 7.4 6.9   ALBUMIN g/dL 4.1 3.8   TOTAL BILIRUBIN mg/dL 0.39 0.42         Results from last 7 days   Lab Units 01/24/24  0446 01/23/24  1502 01/21/24  1457   GLUCOSE RANDOM mg/dL 142* 117 86           Results from last 7 days   Lab Units 01/23/24  1750 01/23/24  1502 01/21/24  1457   HS TNI 0HR ng/L  --  4 3   HS TNI 2HR ng/L 3  --   --    HSTNI D2 ng/L -1  --   --            Results from last 7 days   Lab Units 01/23/24  1502   PROCALCITONIN ng/ml <0.05           Results from last 7 days   Lab Units 01/23/24  1502   BNP pg/mL 73           Results from last 7 days   Lab Units 01/23/24  1502 01/21/24  1457   LIPASE u/L 9* 17           Results from last 7 days   Lab Units 01/23/24  1502   INFLUENZA A PCR  Negative   INFLUENZA B PCR  Negative   RSV PCR  Positive*         ED Treatment:   Medication Administration from  01/23/2024 1415 to 01/23/2024 1806         Date/Time Order Dose Route Action     01/23/2024 1507 EST ondansetron (ZOFRAN) injection 4 mg 4 mg Intravenous Given     01/23/2024 1502 EST albuterol inhalation solution 10 mg 10 mg Nebulization Given by Other     01/23/2024 1502 EST ipratropium (ATROVENT) 0.02 % inhalation solution 1 mg 1 mg Nebulization Given by Other     01/23/2024 1503 EST sodium chloride 0.9 % inhalation solution 12 mL 12 mL Nebulization Given by Other     01/23/2024 1514 EST magnesium sulfate 2 g/50 mL IVPB (premix) 2 g 2 g Intravenous New Bag     01/23/2024 1610 EST methylPREDNISolone sodium succinate (Solu-MEDROL) injection 125 mg 125 mg Intravenous Given          Past Medical History:   Diagnosis Date    Acute kidney failure (HCC)     Allergic     Allergies     Anemia     Asthma     Cancer (HCC)     Cataract     Chronic kidney disease (CKD), stage III (moderate) ([facility])     COPD (chronic obstructive pulmonary disease) ([facility])     COVID-19     Covid + 4-8-03-cough at that time now fully resolved    Disease of thyroid gland     Essential hypertension     GERD (gastroesophageal reflux disease)     Glaucoma     High blood pressure     HTN (hypertension) 02/16/2021    Hyperlipidemia     Hypothyroidism     Mesenteric lymphadenopathy 07/13/2021    Pulmonary hypertension (HCC)     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    Throat cancer (HCC) 2014    chemo and rad therapy 2014     Present on Admission:   Hypothyroidism   Chronic kidney disease, stage 3 (moderate)   Iron deficiency anemia   Gastroesophageal reflux disease without esophagitis   Depression with anxiety      Admitting Diagnosis: RSV bronchiolitis [J21.0]  Asthma exacerbation [J45.901]  Age/Sex: 68 y.o. female  Admission Orders:  Scheduled Medications:  brimonidine tartrate, 1 drop, Both Eyes, TID  budesonide-formoterol, 2 puff, Inhalation, BID  enoxaparin, 40 mg, Subcutaneous, Daily  ferrous sulfate, 325 mg, Oral, Daily With  Breakfast  fluticasone, 1 spray, Each Nare, Daily  guaiFENesin, 1,200 mg, Oral, Q12H ESTEVAN  ipratropium, 0.5 mg, Nebulization, Q6H  levalbuterol, 1.25 mg, Nebulization, Q6H  levothyroxine, 75 mcg, Oral, Early Morning  methylPREDNISolone sodium succinate, 40 mg, Intravenous, Q8H  montelukast, 10 mg, Oral, HS  pantoprazole, 40 mg, Oral, Daily Before Breakfast  polyethylene glycol, 17 g, Oral, Daily  sertraline, 25 mg, Oral, HS  timolol, 1 drop, Both Eyes, BID      Continuous IV Infusions:     PRN Meds:  acetaminophen, 650 mg, Oral, Q6H PRN  albuterol, 2 puff, Inhalation, Q4H PRN  ondansetron, 4 mg, Intravenous, Q6H PRN        None    Network Utilization Review Department  ATTENTION: Please call with any questions or concerns to [phone] and carefully listen to the prompts so that you are directed to the right person. All voicemails are confidential.   For Discharge needs, contact Care Management DC Support Team at [phone] opt. [phone]  Send all requests for admission clinical reviews, approved or denied determinations and any other requests to dedicated fax number below belonging to the campus where the patient is receiving treatment. List of dedicated fax numbers for the Facilities:  FACILITY NAME UR FAX NUMBER   ADMISSION DENIALS (Administrative/Medical Necessity) [phone]   DISCHARGE SUPPORT TEAM (NETWORK) [phone]   PARENT CHILD HEALTH (Maternity/NICU/Pediatrics) [phone]   [facility] [phone]   [facility] [phone]   [facility] [phone]   [facility] [phone]   [facility] [phone]   [facility] [phone]   [facility] [phone]   [facility] [phone]   [facility] [phone]   [facility]  [phone]   [facility] [phone]

## 2024-01-24 NOTE — PLAN OF CARE
Problem: PAIN - ADULT  Goal: Verbalizes/displays adequate comfort level or baseline comfort level  Description: Interventions:  - Encourage patient to monitor pain and request assistance  - Assess pain using appropriate pain scale  - Administer analgesics based on type and severity of pain and evaluate response  - Implement non-pharmacological measures as appropriate and evaluate response  - Consider cultural and social influences on pain and pain management  - Notify physician/advanced practitioner if interventions unsuccessful or patient reports new pain  Outcome: Progressing     Problem: SAFETY ADULT  Goal: Patient will remain free of falls  Description: INTERVENTIONS:  - Educate patient/family on patient safety including physical limitations  - Instruct patient to call for assistance with activity   - Consult OT/PT to assist with strengthening/mobility   - Keep Call bell within reach  - Keep bed low and locked with side rails adjusted as appropriate  - Keep care items and personal belongings within reach  - Initiate and maintain comfort rounds  - Make Fall Risk Sign visible to staff  - Offer Toileting every 2 Hours, in advance of need  - Initiate/Maintain bed/ chair alarm  - Apply yellow socks and bracelet for high fall risk patients  - Consider moving patient to room near nurses station  Outcome: Progressing  Goal: Maintain or return to baseline ADL function  Description: INTERVENTIONS:  -  Assess patient's ability to carry out ADLs; assess patient's baseline for ADL function and identify physical deficits which impact ability to perform ADLs (bathing, care of mouth/teeth, toileting, grooming, dressing, etc.)  - Assess/evaluate cause of self-care deficits   - Assess range of motion  - Assess patient's mobility; develop plan if impaired  - Assess patient's need for assistive devices and provide as appropriate  - Encourage maximum independence but intervene and supervise when necessary  - Involve family in  performance of ADLs  - Assess for home care needs following discharge   - Consider OT consult to assist with ADL evaluation and planning for discharge  - Provide patient education as appropriate  Outcome: Progressing  Goal: Maintains/Returns to pre admission functional level  Description: INTERVENTIONS:  - Perform AM-PAC 6 Click Basic Mobility/ Daily Activity assessment daily.  - Set and communicate daily mobility goal to care team and patient/family/caregiver.   - Collaborate with rehabilitation services on mobility goals if consulted  - Perform Range of Motion 2 times a day.  - Reposition patient every 3 hours.  - Dangle patient 3 times a day  - Stand patient 3 times a day  - Ambulate patient 3 times a day  - Out of bed to chair 3 times a day   - Out of bed for meals 3 times a day  - Out of bed for toileting  - Record patient progress and toleration of activity level   Outcome: Progressing     Problem: RESPIRATORY - ADULT  Goal: Achieves optimal ventilation and oxygenation  Description: INTERVENTIONS:  - Assess for changes in respiratory status  - Assess for changes in mentation and behavior  - Position to facilitate oxygenation and minimize respiratory effort  - Oxygen administered by appropriate delivery if ordered  - Initiate smoking cessation education as indicated  - Encourage broncho-pulmonary hygiene including cough, deep breathe, Incentive Spirometry  - Assess the need for suctioning and aspirate as needed  - Assess and instruct to report SOB or any respiratory difficulty  - Respiratory Therapy support as indicated  Outcome: Progressing     Problem: MUSCULOSKELETAL - ADULT  Goal: Maintain or return mobility to safest level of function  Description: INTERVENTIONS:  - Assess patient's ability to carry out ADLs; assess patient's baseline for ADL function and identify physical deficits which impact ability to perform ADLs (bathing, care of mouth/teeth, toileting, grooming, dressing, etc.)  - Assess/evaluate  cause of self-care deficits   - Assess range of motion  - Assess patient's mobility  - Assess patient's need for assistive devices and provide as appropriate  - Encourage maximum independence but intervene and supervise when necessary  - Involve family in performance of ADLs  - Assess for home care needs following discharge   - Consider OT consult to assist with ADL evaluation and planning for discharge  - Provide patient education as appropriate  Outcome: Progressing  Goal: Maintain proper alignment of affected body part  Description: INTERVENTIONS:  - Support, maintain and protect limb and body alignment  - Provide patient/ family with appropriate education  Outcome: Progressing

## 2024-01-24 NOTE — ASSESSMENT & PLAN NOTE
Lab Results   Component Value Date    EGFR 63 01/24/2024    EGFR 64 01/23/2024    EGFR 47 01/21/2024    CREATININE 0.93 01/24/2024    CREATININE 0.92 01/23/2024    CREATININE 1.18 01/21/2024     Baseline Cr 0.9-1.2

## 2024-01-24 NOTE — ASSESSMENT & PLAN NOTE
Lab Results   Component Value Date    EGFR 64 01/23/2024    EGFR 47 01/21/2024    EGFR 48 01/01/2024    CREATININE 0.92 01/23/2024    CREATININE 1.18 01/21/2024    CREATININE 1.16 01/01/2024     At baseline

## 2024-01-24 NOTE — RESPIRATORY THERAPY NOTE
01/24/24 0730   Respiratory Assessment   Assessment Type Pre-treatment   General Appearance Alert;Awake   Respiratory Pattern Tachypneic   Chest Assessment Chest expansion symmetrical   Bilateral Breath Sounds Diminished   Resp Comments Patient request tx be given Q6 will change for patient and also requested for continous pulse ox be placed on   Oxygen Therapy/Pulse Ox   O2 Device Nasal cannula   O2 Therapy Oxygen   Nasal Cannula O2 Flow Rate (L/min) 2 L/min   Calculated FIO2 (%) - Nasal Cannula 28   SpO2 92 %   $ Pulse Oximetry Spot Check Charge Completed

## 2024-01-24 NOTE — PROGRESS NOTES
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 associated with cough, wheezing, nasal congestion, headaches, nausea, low-grade temperature of 100.5F. Arrived to the ED on 6 L.     Most likely 2/2 +RSV   Smoking Status: quit 9 years ago  CXR: negative for acute cardiopulmonary process.  Troponin 4, CO2 34, BNP 73  PCT neg    Plan:  Monitor for clinical changes  Monitor off antibiotics  IV Solu-Medrol: 40 mg q8h   Xopenex/Atrovent scheduled TID; albuterol as needed  Mucinex 1200 mg BID  Currently on 3 L NC, Goal O2 sat 88-92% on home 2 L; wean as tolerated  Monitor respiratory status, Respiratory protocol, Airway clearance protocol, incentive spirometry    Chronic kidney disease, stage 3 (moderate)  Assessment & Plan  Lab Results   Component Value Date    EGFR 63 01/24/2024    EGFR 64 01/23/2024    EGFR 47 01/21/2024    CREATININE 0.93 01/24/2024    CREATININE 0.92 01/23/2024    CREATININE 1.18 01/21/2024     Baseline Cr 0.9-1.2    Gastroesophageal reflux disease without esophagitis  Assessment & Plan  Continue home dose of Protonix 40 mg qd    PRN Zofran for nausea    Iron deficiency anemia  Assessment & Plan  Continue home dose of ferrous sulfate 375 mg    Hypothyroidism  Assessment & Plan  Continue home dose of levothyroxine 75 mcg     Depression with anxiety  Assessment & Plan  Continue home dose of Zoloft 25 mg qhs    History of laryngeal cancer  Assessment & Plan  S/p chemo and radiation in 2014               VTE Pharmacologic Prophylaxis: VTE Score: 11 High Risk (Score >/= 5) - Pharmacological DVT Prophylaxis  Ordered: enoxaparin (Lovenox). Sequential Compression Devices Ordered.    Mobility:   JH-HLM Achieved: 6: Walk 10 steps or more  HLM Goal NOT achieved. Continue with multidisciplinary rounding and encourage appropriate mobility to improve upon HLM goals.    Patient Centered Rounds: I performed bedside rounds with nursing staff today.  Discussions with Specialists or Other Care Team Provider: IM    Education and Discussions with Family / Patient: Updated  (son) at bedside.    Current Length of Stay: 1 day(s)  Current Patient Status: Inpatient   Discharge Plan: Anticipate discharge in 48-72 hrs to home.    Code Status: Level 1 - Full Code    Subjective:   Her son is at bedside and serves to translate.  They declined professional  services.  She is feeling just a little bit better but continues to cough and wheeze.  Her main complaint is that she is producing a lot of mucus.  Otherwise she has no other complaints.  She and her son denies any prior history of diabetes.    Objective:     Vitals:   Temp (24hrs), Av.4 °F (36.9 °C), Min:98.4 °F (36.9 °C), Max:98.4 °F (36.9 °C)    Temp:  [98.4 °F (36.9 °C)] 98.4 °F (36.9 °C)  HR:  [90-95] 90  Resp:  [18-20] 18  BP: (115-151)/(56-83) 125/56  SpO2:  [92 %-95 %] 92 %  There is no height or weight on file to calculate BMI.     Input and Output Summary (last 24 hours):     Intake/Output Summary (Last 24 hours) at 2024 1116  Last data filed at 2024 0900  Gross per 24 hour   Intake 388.33 ml   Output --   Net 388.33 ml       Physical Exam:   Physical Exam  Vitals and nursing note reviewed.   Constitutional:       General: She is not in acute distress.     Appearance: Normal appearance.   HENT:      Head: Normocephalic and atraumatic.   Cardiovascular:      Rate and Rhythm: Normal rate and regular rhythm.      Pulses: Normal pulses.      Heart sounds: Normal heart sounds.   Pulmonary:      Effort: Pulmonary effort is normal.      Breath sounds:  Wheezing present.   Abdominal:      General: Abdomen is flat. There is no distension.      Palpations: Abdomen is soft.      Tenderness: There is no abdominal tenderness. There is no guarding.   Neurological:      Mental Status: She is alert.          Additional Data:     Labs:  Results from last 7 days   Lab Units 01/24/24  0446   WBC Thousand/uL 5.42   HEMOGLOBIN g/dL 11.1*   HEMATOCRIT % 37.4   PLATELETS Thousands/uL 213   NEUTROS PCT % 90*   LYMPHS PCT % 7*   MONOS PCT % 2*   EOS PCT % 0     Results from last 7 days   Lab Units 01/24/24  0446 01/23/24  1502   SODIUM mmol/L 138 136   POTASSIUM mmol/L 5.2 4.4   CHLORIDE mmol/L 99 96   CO2 mmol/L 34* 34*   BUN mg/dL 19 19   CREATININE mg/dL 0.93 0.92   ANION GAP mmol/L 5 6   CALCIUM mg/dL 9.7 9.6   ALBUMIN g/dL  --  4.1   TOTAL BILIRUBIN mg/dL  --  0.39   ALK PHOS U/L  --  95   ALT U/L  --  11   AST U/L  --  18   GLUCOSE RANDOM mg/dL 142* 117                 Results from last 7 days   Lab Units 01/23/24  1502   PROCALCITONIN ng/ml <0.05       Lines/Drains:  Invasive Devices       Peripheral Intravenous Line  Duration             Peripheral IV 01/23/24 Right Antecubital <1 day                          Imaging: Reviewed radiology reports from this admission including: chest xray    Recent Cultures (last 7 days):         Last 24 Hours Medication List:   Current Facility-Administered Medications   Medication Dose Route Frequency Provider Last Rate    acetaminophen  650 mg Oral Q6H PRN Mya Rivera, DO      albuterol  2 puff Inhalation Q4H PRN Mya Rivera, DO      brimonidine tartrate  1 drop Both Eyes TID Mya Rivera, DO      budesonide-formoterol  2 puff Inhalation BID Mya Rivera, DO      enoxaparin  40 mg Subcutaneous Daily Mya Rivera, DO      ferrous sulfate  325 mg Oral Daily With Breakfast Mya Rivera DO      fluticasone  1 spray Each Nare Daily Mya Rivera, DO      guaiFENesin  1,200 mg Oral Q12H [facility] Joe Santamaria Cha, MD      ipratropium  0.5 mg  Nebulization Q6H Ana Cristina Hills MD      levalbuterol  1.25 mg Nebulization Q6H Ana Cristina Hills MD      levothyroxine  75 mcg Oral Early Morning Mya Rivera DO      methylPREDNISolone sodium succinate  40 mg Intravenous Q8H Mya Rivera, DO      montelukast  10 mg Oral HS Mya Rivera DO      ondansetron  4 mg Intravenous Q6H PRN Mya Rivera, DO      pantoprazole  40 mg Oral Daily Before Breakfast Mya Rivera DO      polyethylene glycol  17 g Oral Daily Mya Rivera, DO      sertraline  25 mg Oral HS Mya Rivera DO      timolol  1 drop Both Eyes BID Mya Rivera DO          Today, Patient Was Seen By: Joe Santamaria Cha, MD    **Please Note: This note may have been constructed using a voice recognition system.**

## 2024-01-24 NOTE — ASSESSMENT & PLAN NOTE
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 associated with cough, wheezing, nasal congestion, headaches, nausea, low-grade temperature of 100.5F. Arrived to the ED on 6 L.     Most likely 2/2 +RSV   Smoking Status: quit 9 years ago  CXR: negative for acute cardiopulmonary process.  Troponin 4, CO2 34, BNP 73  PCT neg    Plan:  Monitor for clinical changes  Monitor off antibiotics  IV Solu-Medrol: 40 mg q8h   Xopenex/Atrovent scheduled TID; albuterol as needed  Mucinex 1200 mg BID  Currently on 3 L NC, Goal O2 sat 88-92% on home 2 L; wean as tolerated  Monitor respiratory status, Respiratory protocol, Airway clearance protocol, incentive spirometry

## 2024-01-25 PROBLEM — R07.89 ATYPICAL CHEST PAIN: Status: ACTIVE | Noted: 2022-10-28

## 2024-01-25 LAB
4HR DELTA HS TROPONIN: -2 NG/L
ANION GAP SERPL CALCULATED.3IONS-SCNC: 7 MMOL/L
BASOPHILS # BLD MANUAL: 0 THOUSAND/UL (ref 0–0.1)
BASOPHILS NFR MAR MANUAL: 0 % (ref 0–1)
BUN SERPL-MCNC: 35 MG/DL (ref 5–25)
CALCIUM SERPL-MCNC: 10 MG/DL (ref 8.4–10.2)
CARDIAC TROPONIN I PNL SERPL HS: 3 NG/L
CARDIAC TROPONIN I PNL SERPL HS: 5 NG/L
CHLORIDE SERPL-SCNC: 99 MMOL/L (ref 96–108)
CO2 SERPL-SCNC: 32 MMOL/L (ref 21–32)
CREAT SERPL-MCNC: 1.02 MG/DL (ref 0.6–1.3)
DME PARACHUTE DELIVERY DATE REQUESTED: NORMAL
DME PARACHUTE ITEM DESCRIPTION: NORMAL
DME PARACHUTE ORDER STATUS: NORMAL
DME PARACHUTE SUPPLIER NAME: NORMAL
DME PARACHUTE SUPPLIER PHONE: NORMAL
EOSINOPHIL # BLD MANUAL: 0 THOUSAND/UL (ref 0–0.4)
EOSINOPHIL NFR BLD MANUAL: 0 % (ref 0–6)
ERYTHROCYTE [DISTWIDTH] IN BLOOD BY AUTOMATED COUNT: 14.2 % (ref 11.6–15.1)
GFR SERPL CREATININE-BSD FRML MDRD: 56 ML/MIN/1.73SQ M
GLUCOSE SERPL-MCNC: 128 MG/DL (ref 65–140)
HCT VFR BLD AUTO: 39 % (ref 34.8–46.1)
HGB BLD-MCNC: 11.6 G/DL (ref 11.5–15.4)
LYMPHOCYTES # BLD AUTO: 1.06 THOUSAND/UL (ref 0.6–4.47)
LYMPHOCYTES # BLD AUTO: 7 % (ref 14–44)
MCH RBC QN AUTO: 26.9 PG (ref 26.8–34.3)
MCHC RBC AUTO-ENTMCNC: 29.7 G/DL (ref 31.4–37.4)
MCV RBC AUTO: 90 FL (ref 82–98)
MONOCYTES # BLD AUTO: 0.45 THOUSAND/UL (ref 0–1.22)
MONOCYTES NFR BLD: 3 % (ref 4–12)
NEUTROPHILS # BLD MANUAL: 13.6 THOUSAND/UL (ref 1.85–7.62)
NEUTS SEG NFR BLD AUTO: 90 % (ref 43–75)
PLATELET # BLD AUTO: 280 THOUSANDS/UL (ref 149–390)
PLATELET BLD QL SMEAR: ADEQUATE
PMV BLD AUTO: 10.6 FL (ref 8.9–12.7)
POTASSIUM SERPL-SCNC: 5.5 MMOL/L (ref 3.5–5.3)
RBC # BLD AUTO: 4.32 MILLION/UL (ref 3.81–5.12)
RBC MORPH BLD: NORMAL
SODIUM SERPL-SCNC: 138 MMOL/L (ref 135–147)
WBC # BLD AUTO: 15.11 THOUSAND/UL (ref 4.31–10.16)

## 2024-01-25 PROCEDURE — 94760 N-INVAS EAR/PLS OXIMETRY 1: CPT

## 2024-01-25 PROCEDURE — 80048 BASIC METABOLIC PNL TOTAL CA: CPT

## 2024-01-25 PROCEDURE — 94668 MNPJ CHEST WALL SBSQ: CPT

## 2024-01-25 PROCEDURE — 85027 COMPLETE CBC AUTOMATED: CPT

## 2024-01-25 PROCEDURE — 99232 SBSQ HOSP IP/OBS MODERATE 35: CPT | Performed by: INTERNAL MEDICINE

## 2024-01-25 PROCEDURE — 84484 ASSAY OF TROPONIN QUANT: CPT | Performed by: PSYCHIATRY & NEUROLOGY

## 2024-01-25 PROCEDURE — 94640 AIRWAY INHALATION TREATMENT: CPT

## 2024-01-25 PROCEDURE — 85007 BL SMEAR W/DIFF WBC COUNT: CPT

## 2024-01-25 PROCEDURE — 93005 ELECTROCARDIOGRAM TRACING: CPT

## 2024-01-25 RX ORDER — ACETAMINOPHEN 325 MG/1
975 TABLET ORAL EVERY 8 HOURS PRN
Status: DISCONTINUED | OUTPATIENT
Start: 2024-01-25 | End: 2024-01-26

## 2024-01-25 RX ORDER — ACETAMINOPHEN 325 MG/1
975 TABLET ORAL EVERY 8 HOURS SCHEDULED
Status: DISCONTINUED | OUTPATIENT
Start: 2024-01-25 | End: 2024-01-25

## 2024-01-25 RX ADMIN — PANTOPRAZOLE SODIUM 40 MG: 40 TABLET, DELAYED RELEASE ORAL at 06:02

## 2024-01-25 RX ADMIN — LEVALBUTEROL HYDROCHLORIDE 1.25 MG: 1.25 SOLUTION RESPIRATORY (INHALATION) at 13:55

## 2024-01-25 RX ADMIN — BUDESONIDE AND FORMOTEROL FUMARATE DIHYDRATE 2 PUFF: 160; 4.5 AEROSOL RESPIRATORY (INHALATION) at 18:07

## 2024-01-25 RX ADMIN — IPRATROPIUM BROMIDE 0.5 MG: 0.5 SOLUTION RESPIRATORY (INHALATION) at 13:55

## 2024-01-25 RX ADMIN — ENOXAPARIN SODIUM 40 MG: 40 INJECTION SUBCUTANEOUS at 08:23

## 2024-01-25 RX ADMIN — SIMETHICONE 80 MG: 80 TABLET, CHEWABLE ORAL at 22:13

## 2024-01-25 RX ADMIN — LEVALBUTEROL HYDROCHLORIDE 1.25 MG: 1.25 SOLUTION RESPIRATORY (INHALATION) at 07:37

## 2024-01-25 RX ADMIN — ALBUTEROL SULFATE 2 PUFF: 90 AEROSOL, METERED RESPIRATORY (INHALATION) at 22:53

## 2024-01-25 RX ADMIN — POLYETHYLENE GLYCOL 3350 17 G: 17 POWDER, FOR SOLUTION ORAL at 08:23

## 2024-01-25 RX ADMIN — METHYLPREDNISOLONE SODIUM SUCCINATE 40 MG: 40 INJECTION, POWDER, FOR SOLUTION INTRAMUSCULAR; INTRAVENOUS at 08:23

## 2024-01-25 RX ADMIN — GUAIFENESIN 1200 MG: 600 TABLET ORAL at 08:23

## 2024-01-25 RX ADMIN — LEVALBUTEROL HYDROCHLORIDE 1.25 MG: 1.25 SOLUTION RESPIRATORY (INHALATION) at 19:09

## 2024-01-25 RX ADMIN — SIMETHICONE 80 MG: 80 TABLET, CHEWABLE ORAL at 08:23

## 2024-01-25 RX ADMIN — SIMETHICONE 80 MG: 80 TABLET, CHEWABLE ORAL at 15:59

## 2024-01-25 RX ADMIN — GUAIFENESIN 1200 MG: 600 TABLET ORAL at 22:13

## 2024-01-25 RX ADMIN — TIMOLOL MALEATE 1 DROP: 5 SOLUTION/ DROPS OPHTHALMIC at 18:07

## 2024-01-25 RX ADMIN — IPRATROPIUM BROMIDE 0.5 MG: 0.5 SOLUTION RESPIRATORY (INHALATION) at 19:09

## 2024-01-25 RX ADMIN — SENNOSIDES AND DOCUSATE SODIUM 1 TABLET: 8.6; 5 TABLET ORAL at 22:13

## 2024-01-25 RX ADMIN — ACETAMINOPHEN 975 MG: 325 TABLET, FILM COATED ORAL at 10:42

## 2024-01-25 RX ADMIN — TIMOLOL MALEATE 1 DROP: 5 SOLUTION/ DROPS OPHTHALMIC at 08:23

## 2024-01-25 RX ADMIN — BRIMONIDINE TARTRATE 1 DROP: 2 SOLUTION OPHTHALMIC at 15:59

## 2024-01-25 RX ADMIN — SERTRALINE 25 MG: 25 TABLET, FILM COATED ORAL at 22:13

## 2024-01-25 RX ADMIN — MONTELUKAST 10 MG: 10 TABLET, FILM COATED ORAL at 22:13

## 2024-01-25 RX ADMIN — BUDESONIDE AND FORMOTEROL FUMARATE DIHYDRATE 2 PUFF: 160; 4.5 AEROSOL RESPIRATORY (INHALATION) at 08:23

## 2024-01-25 RX ADMIN — LEVOTHYROXINE SODIUM 75 MCG: 75 TABLET ORAL at 05:24

## 2024-01-25 RX ADMIN — BRIMONIDINE TARTRATE 1 DROP: 2 SOLUTION OPHTHALMIC at 22:14

## 2024-01-25 RX ADMIN — FERROUS SULFATE TAB 325 MG (65 MG ELEMENTAL FE) 325 MG: 325 (65 FE) TAB at 08:23

## 2024-01-25 RX ADMIN — METHYLPREDNISOLONE SODIUM SUCCINATE 40 MG: 40 INJECTION, POWDER, FOR SOLUTION INTRAMUSCULAR; INTRAVENOUS at 15:59

## 2024-01-25 RX ADMIN — IPRATROPIUM BROMIDE 0.5 MG: 0.5 SOLUTION RESPIRATORY (INHALATION) at 07:37

## 2024-01-25 RX ADMIN — BRIMONIDINE TARTRATE 1 DROP: 2 SOLUTION OPHTHALMIC at 08:23

## 2024-01-25 RX ADMIN — FLUTICASONE PROPIONATE 1 SPRAY: 50 SPRAY, METERED NASAL at 08:23

## 2024-01-25 NOTE — ASSESSMENT & PLAN NOTE
Lab Results   Component Value Date    EGFR 56 01/25/2024    EGFR 63 01/24/2024    EGFR 64 01/23/2024    CREATININE 1.02 01/25/2024    CREATININE 0.93 01/24/2024    CREATININE 0.92 01/23/2024     Baseline Cr 0.9-1.2

## 2024-01-25 NOTE — ASSESSMENT & PLAN NOTE
R sided chest pain that was present on admission. Worsens with cough.   Most likely 2/2 musculoskeletal from coughing vs GERD    Plan:  Repeat ECG  Trend troponin

## 2024-01-25 NOTE — CASE MANAGEMENT
Case Management Assessment & Discharge Planning Note    Patient name Noreen Alvarez  Location S /S -01 MRN [id number]  : 1955 Date 2024       Current Admission Date: 2024  Current Admission Diagnosis:Acute exacerbation of chronic obstructive pulmonary disease (HCC)   Patient Active Problem List    Diagnosis Date Noted    Acute exacerbation of chronic obstructive pulmonary disease (HCC) 2024    Epigastric pain 01/10/2024    Colon cancer screening 01/10/2024    Gastroesophageal reflux disease without esophagitis 01/10/2024    Pain of right sacroiliac joint 2023    Hypomagnesemia 2023    Chronic respiratory failure with hypoxia, on home O2 therapy  2023    Iron deficiency anemia 10/30/2022    Dyspepsia 10/30/2022    Chest pain 10/28/2022    Preop examination 2022    Preoperative cardiovascular examination 2022    Mediastinal lymphadenopathy 2021    Class 1 obesity due to excess calories with serious comorbidity and body mass index (BMI) of 32.0 to 32.9 in adult 2021    Exercise hypoxemia 2021    Post-nasal drip 2021    Neck pain on left side 2021    History of COVID-19 2021    Persistent dyspnea after COVID-19 2021    Current mild episode of major depressive disorder without prior episode (HCC) 2021    Hyperlipidemia 2021    Hypothyroidism 2021    COVID-19 2021    Trigger finger of right thumb 2021    Myalgia 2021    Tension type headache 2021    Severe persistent asthma without complication 2020    Multiple lung nodules 2020    Elevated alkaline phosphatase level 2020    Dupuytren contracture 2020    Personal history of radiation therapy 2019    History of laryngeal cancer 2019    Loss of hearing 2019    Cancer of pharynx (HCC) 2019    Chronic kidney disease, stage 3 (moderate) 2019    Centrilobular emphysema (HCC)  08/27/2019    Depression with anxiety 08/27/2019    Panic attack 08/27/2019    Cataract 02/19/2015    Numbness 02/19/2015    Vitamin D deficiency 12/10/2013    Extrinsic obstruction of cartilagenous portion of eustachian tube 11/12/2013      LOS (days): 2  Geometric Mean LOS (GMLOS) (days): 2.9  Days to GMLOS:1.2     OBJECTIVE:    Risk of Unplanned Readmission Score: 19.75         Current admission status: Inpatient       Preferred Pharmacy:   [facility]/pharm[facility] #[id number] - [place] - [address]  [address]  [place] [address]  Phone: [phone] Fax: [phone]    [id number] - [place] - [address]  [address]  [facility] [place] [address]  Phone: [phone] Fax: [phone]    Primary Care Provider: Samantha Hernandez MD    Primary Insurance: [employer]  Secondary Insurance: [employer]    ASSESSMENT:  Active Health Care Proxies       [facility] Representative - Daughter   Primary Phone: [phone] (Mobile)  Home Phone: [phone]                                Patient Information  Admitted from:: Home  Mental Status: Alert  During Assessment patient was accompanied by: Not accompanied during assessment  Assessment information provided by:: Daughter  Primary Caregiver: Self  Support Systems: Family members  County of Residence: [place]  What [facility] do you live in?: [place]  Home entry access options. Select all that apply.: No steps to enter home  Type of Current Residence: [facility]  Living Arrangements: Lives w/ Extended Family    Activities of Daily Living Prior to Admission  Functional Status: Independent  Completes ADLs independently?: Yes  Ambulates independently?: Yes  Does patient use assisted devices?: Yes  Assisted Devices (DME) used: Home Oxygen concentrator, Portable Oxygen concentrator  DME Company Name (respiratory supplies): [employer]  O2  Rate(s): 2L  Does patient currently own DME?: Yes  What DME does the patient currently own?: Home Oxygen concentrator, Portable Oxygen concentrator  Does patient have a history of Outpatient Therapy (PT/OT)?: No  Does the patient have a history of Short-Term Rehab?: No  Does patient have a history of HHC?: No  Does patient currently have HHC?: No                   Means of Transportation  Means of Transport to Appts:: Family transport      Housing Stability: Unknown (1/25/2024)    Housing Stability Vital Sign     Unable to Pay for Housing in the Last Year: No     Number of Places Lived in the Last Year: Not on file     Unstable Housing in the Last Year: No   Food Insecurity: No Food Insecurity (1/25/2024)    Hunger Vital Sign     Worried About Running Out of Food in the Last Year: Never true     Ran Out of Food in the Last Year: Never true   Transportation Needs: No Transportation Needs (1/25/2024)    PRAPARE - Transportation     Lack of Transportation (Medical): No     Lack of Transportation (Non-Medical): No   Utilities: Not At Risk (1/25/2024)    [place] Utilities     Threatened with loss of utilities: No       DISCHARGE DETAILS:    Discharge planning discussed with:: patient's dtr  [place] of Choice: Yes  Comments - Freedom of Choice: CM s/w pt's dtr Ashley re: pt assessment. Patient lives with granddaughter in ranch home, no [place]. Patient independent with ADLS, does not use any dme to ambulate, and has home oxygen thru [employer] (2L chronic). Dtr reported no hx of PT/OT for pt and family provides pt transport to appointments. Dtr inquired into home therapies and RW if needed. CM f/u w/ pt's RN re: same. Per RN - pt has been independent within room and utilizing RW. [facility]W [facility] [place] for delivery to pt room prior to d/c. Dtr to provide pt transport home when ready for d/c. CM to continue to follow for any further CM d/c needs identified.  CM contacted family/caregiver?: Yes  Were Treatment Team  discharge recommendations reviewed with patient/caregiver?: Yes  Did patient/caregiver verbalize understanding of patient care needs?: Yes  Were patient/caregiver advised of the risks associated with not following Treatment Team discharge recommendations?: Yes    Contacts  Patient Contacts: Ashley (dtr)  Relationship to Patient:: Family  Contact Method: Phone  Phone Number: [phone]  Reason/Outcome: Continuity of Care, Emergency Contact, Discharge Planning, Referral         DME Referral Provided  Referral made for DME?: Yes  DME referral completed for the following items:: [place]  DME Supplier Name:: [employer]    Other Referral/Resources/Interventions Provided:  Interventions: DME  Referral Comments: RW ordered for pt thru [employer] via parachute.         Treatment Team Recommendation: Home  Discharge Destination Plan:: Home  Transport at Discharge : Family

## 2024-01-25 NOTE — PROGRESS NOTES
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 associated with cough, wheezing, nasal congestion, headaches, nausea, low-grade temperature of 100.5F. Arrived to the ED on 6 L.     Most likely 2/2 +RSV   Smoking Status: quit 9 years ago  CXR: negative for acute cardiopulmonary process.  Troponin 4, CO2 34, BNP 73  PCT neg    Plan:  Monitor for clinical changes  Monitor off antibiotics  IV Solu-Medrol: 40 mg q8h -- will consider q12 if sx begin to improve tomorrow  Xopenex/Atrovent scheduled TID; albuterol as needed  Mucinex 1200 mg BID  Currently on 3-4 L NC, Goal O2 sat 88-92% on home 2 L; wean as tolerated  Monitor respiratory status, Respiratory protocol, Airway clearance protocol, oscillatory PEP, incentive spirometry    Gastroesophageal reflux disease without esophagitis  Assessment & Plan  Continue home dose of Protonix 40 mg qd    PRN Zofran for nausea    Iron deficiency anemia  Assessment & Plan  Continue home dose of ferrous sulfate 375 mg    Atypical chest pain  Assessment & Plan  R sided chest pain that was present on admission. Worsens with cough.   Most likely 2/2 musculoskeletal from coughing vs GERD    Plan:  Repeat ECG  Trend troponin        Hypothyroidism  Assessment & Plan  Continue home dose of levothyroxine 75 mcg     Depression with anxiety  Assessment & Plan  Continue home dose of Zoloft 25 mg qhs    History of laryngeal cancer  Assessment & Plan  S/p chemo and radiation in 2014    Chronic kidney disease, stage 3 (moderate)  Assessment & Plan  Lab Results   Component Value Date    EGFR 56  2024    EGFR 63 2024    EGFR 64 2024    CREATININE 1.02 2024    CREATININE 0.93 2024    CREATININE 0.92 2024     Baseline Cr 0.9-1.2           VTE Pharmacologic Prophylaxis: VTE Score: 11 High Risk (Score >/= 5) - Pharmacological DVT Prophylaxis Ordered: enoxaparin (Lovenox). Sequential Compression Devices Ordered.    Mobility:   Basic Mobility Inpatient Raw Score: 21  JH-HLM Goal: 6: Walk 10 steps or more  JH-HLM Achieved: 6: Walk 10 steps or more  HLM Goal achieved. Continue to encourage appropriate mobility.    Patient Centered Rounds: I performed bedside rounds with nursing staff today.  Discussions with Specialists or Other Care Team Provider: none    Education and Discussions with Family / Patient: Patient declined call to .     Current Length of Stay: 2 day(s)  Current Patient Status: Inpatient   Discharge Plan: Anticipate discharge in 48 hrs to home.    Code Status: Level 1 - Full Code    Subjective:   Pt seen and evaluated at bedside. Pt reports that she is having headaches. She states that she felt feverish last night. She reports some improvement in cough/shortness of breath.     Objective:     Vitals:   Temp (24hrs), Av.8 °F (36.6 °C), Min:97.7 °F (36.5 °C), Max:97.9 °F (36.6 °C)    Temp:  [97.7 °F (36.5 °C)-97.9 °F (36.6 °C)] 97.7 °F (36.5 °C)  HR:  [79-80] 80  Resp:  [16-18] 18  BP: (105-148)/(61-68) 148/68  SpO2:  [93 %-99 %] 93 %  Body mass index is 34.07 kg/m².     Input and Output Summary (last 24 hours):     Intake/Output Summary (Last 24 hours) at 2024 1101  Last data filed at 2024 0124  Gross per 24 hour   Intake 420 ml   Output --   Net 420 ml       Physical Exam:   Physical Exam  Vitals and nursing note reviewed.   Constitutional:       General: She is not in acute distress.     Appearance: She is not toxic-appearing.   HENT:      Head: Normocephalic and atraumatic.      Right Ear: External ear normal.      Left Ear: External ear  normal.      Nose: Nose normal.      Mouth/Throat:      Mouth: Mucous membranes are moist.      Pharynx: Oropharynx is clear.   Eyes:      General: No scleral icterus.     Conjunctiva/sclera: Conjunctivae normal.   Cardiovascular:      Rate and Rhythm: Normal rate and regular rhythm.      Pulses: Normal pulses.      Heart sounds: No murmur heard.  Pulmonary:      Breath sounds: Wheezing and rhonchi present.      Comments: Decreased breath sounds b/l  On 3 L nasal cannula  Abdominal:      General: Bowel sounds are normal. There is no distension.      Palpations: Abdomen is soft.      Tenderness: There is no abdominal tenderness. There is no guarding or rebound.   Musculoskeletal:      Right lower leg: No edema.      Left lower leg: No edema.   Skin:     General: Skin is warm and dry.      Coloration: Skin is pale.   Neurological:      General: No focal deficit present.      Mental Status: She is alert and oriented to person, place, and time.   Psychiatric:         Mood and Affect: Mood normal.         Additional Data:     Labs:  Results from last 7 days   Lab Units 01/25/24 0445 01/24/24 0446   WBC Thousand/uL 15.11* 5.42   HEMOGLOBIN g/dL 11.6 11.1*   HEMATOCRIT % 39.0 37.4   PLATELETS Thousands/uL 280 213   NEUTROS PCT %  --  90*   LYMPHS PCT %  --  7*   LYMPHO PCT % 7*  --    MONOS PCT %  --  2*   MONO PCT % 3*  --    EOS PCT % 0 0     Results from last 7 days   Lab Units 01/25/24  0445 01/24/24  0446 01/23/24  1502   SODIUM mmol/L 138   < > 136   POTASSIUM mmol/L 5.5*   < > 4.4   CHLORIDE mmol/L 99   < > 96   CO2 mmol/L 32   < > 34*   BUN mg/dL 35*   < > 19   CREATININE mg/dL 1.02   < > 0.92   ANION GAP mmol/L 7   < > 6   CALCIUM mg/dL 10.0   < > 9.6   ALBUMIN g/dL  --   --  4.1   TOTAL BILIRUBIN mg/dL  --   --  0.39   ALK PHOS U/L  --   --  95   ALT U/L  --   --  11   AST U/L  --   --  18   GLUCOSE RANDOM mg/dL 128   < > 117    < > = values in this interval not displayed.                 Results from last 7  days   Lab Units 01/23/24  1502   PROCALCITONIN ng/ml <0.05       Lines/Drains:  Invasive Devices       Peripheral Intravenous Line  Duration             Peripheral IV 01/23/24 Right Antecubital 1 day                          Imaging: Reviewed radiology reports from this admission including: chest xray    Recent Cultures (last 7 days):         Last 24 Hours Medication List:   Current Facility-Administered Medications   Medication Dose Route Frequency Provider Last Rate    acetaminophen  975 mg Oral Q8H PRN Mya Rivera DO      albuterol  2 puff Inhalation Q4H PRN Mya Rivera DO      brimonidine tartrate  1 drop Both Eyes TID Mya Rivera DO      budesonide-formoterol  2 puff Inhalation BID Mya Rivera DO      enoxaparin  40 mg Subcutaneous Daily Mya Rivera DO      ferrous sulfate  325 mg Oral Daily With Breakfast Mya Rivera DO      fluticasone  1 spray Each Nare Daily Mya Rivera DO      guaiFENesin  1,200 mg Oral Q12H [facility] Joe Santamaria Cha, MD      ipratropium  0.5 mg Nebulization Q6H Ana Cristina Zainab Hills MD      levalbuterol  1.25 mg Nebulization Q6H Ana Cristina Zainab Hills MD      levothyroxine  75 mcg Oral Early Morning Mya Rivera DO      methylPREDNISolone sodium succinate  40 mg Intravenous Q8H Mya Rivera DO      montelukast  10 mg Oral HS Mya Rivera DO      ondansetron  4 mg Intravenous Q6H PRN Mya Rivera DO      pantoprazole  40 mg Oral Daily Before Breakfast Mya Rivera DO      polyethylene glycol  17 g Oral Daily Mya Rivera DO      senna-docusate sodium  1 tablet Oral HS Joe Santamaria Cha, MD      sertraline  25 mg Oral HS Mya Rivera DO      simethicone  80 mg Oral 4x Daily (with meals and at bedtime) Joe Santamaria Cha, MD      timolol  1 drop Both Eyes BID Mya Rievra DO          Today, Patient Was Seen By: Mya Rivera DO    **Please Note: This note may have been constructed using a voice recognition system.**

## 2024-01-25 NOTE — PLAN OF CARE
Problem: PAIN - ADULT  Goal: Verbalizes/displays adequate comfort level or baseline comfort level  Description: Interventions:  - Encourage patient to monitor pain and request assistance  - Assess pain using appropriate pain scale  - Administer analgesics based on type and severity of pain and evaluate response  - Implement non-pharmacological measures as appropriate and evaluate response  - Consider cultural and social influences on pain and pain management  - Notify physician/advanced practitioner if interventions unsuccessful or patient reports new pain  Outcome: Progressing     Problem: SAFETY ADULT  Goal: Patient will remain free of falls  Description: INTERVENTIONS:  - Educate patient/family on patient safety including physical limitations  - Instruct patient to call for assistance with activity   - Consult OT/PT to assist with strengthening/mobility   - Keep Call bell within reach  - Keep bed low and locked with side rails adjusted as appropriate  - Keep care items and personal belongings within reach  - Initiate and maintain comfort rounds  - Make Fall Risk Sign visible to staff  - Offer Toileting every  Hours, in advance of need  - Initiate/Maintain alarm  - Obtain necessary fall risk management equipment:   - Apply yellow socks and bracelet for high fall risk patients  - Consider moving patient to room near nurses station  Outcome: Progressing  Goal: Maintain or return to baseline ADL function  Description: INTERVENTIONS:  -  Assess patient's ability to carry out ADLs; assess patient's baseline for ADL function and identify physical deficits which impact ability to perform ADLs (bathing, care of mouth/teeth, toileting, grooming, dressing, etc.)  - Assess/evaluate cause of self-care deficits   - Assess range of motion  - Assess patient's mobility; develop plan if impaired  - Assess patient's need for assistive devices and provide as appropriate  - Encourage maximum independence but intervene and supervise  when necessary  - Involve family in performance of ADLs  - Assess for home care needs following discharge   - Consider OT consult to assist with ADL evaluation and planning for discharge  - Provide patient education as appropriate  Outcome: Progressing  Goal: Maintains/Returns to pre admission functional level  Description: INTERVENTIONS:  - Perform AM-PAC 6 Click Basic Mobility/ Daily Activity assessment daily.  - Set and communicate daily mobility goal to care team and patient/family/caregiver.   - Collaborate with rehabilitation services on mobility goals if consulted  - Perform Range of Motion  times a day.  - Reposition patient every  hours.  - Dangle patient  times a day  - Stand patient  times a day  - Ambulate patient  times a day  - Out of bed to chair  times a day   - Out of bed for meals  times a day  - Out of bed for toileting  - Record patient progress and toleration of activity level   Outcome: Progressing     Problem: RESPIRATORY - ADULT  Goal: Achieves optimal ventilation and oxygenation  Description: INTERVENTIONS:  - Assess for changes in respiratory status  - Assess for changes in mentation and behavior  - Position to facilitate oxygenation and minimize respiratory effort  - Oxygen administered by appropriate delivery if ordered  - Initiate smoking cessation education as indicated  - Encourage broncho-pulmonary hygiene including cough, deep breathe, Incentive Spirometry  - Assess the need for suctioning and aspirate as needed  - Assess and instruct to report SOB or any respiratory difficulty  - Respiratory Therapy support as indicated  Outcome: Progressing

## 2024-01-25 NOTE — ASSESSMENT & PLAN NOTE
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 associated with cough, wheezing, nasal congestion, headaches, nausea, low-grade temperature of 100.5F. Arrived to the ED on 6 L.     Most likely 2/2 +RSV   Smoking Status: quit 9 years ago  CXR: negative for acute cardiopulmonary process.  Troponin 4, CO2 34, BNP 73  PCT neg    Plan:  Monitor for clinical changes  Monitor off antibiotics  IV Solu-Medrol: 40 mg q8h -- will consider q12 if sx begin to improve tomorrow  Xopenex/Atrovent scheduled TID; albuterol as needed  Mucinex 1200 mg BID  Currently on 3-4 L NC, Goal O2 sat 88-92% on home 2 L; wean as tolerated  Monitor respiratory status, Respiratory protocol, Airway clearance protocol, oscillatory PEP, incentive spirometry

## 2024-01-25 NOTE — PLAN OF CARE
Problem: PAIN - ADULT  Goal: Verbalizes/displays adequate comfort level or baseline comfort level  Description: Interventions:  - Encourage patient to monitor pain and request assistance  - Assess pain using appropriate pain scale  - Administer analgesics based on type and severity of pain and evaluate response  - Implement non-pharmacological measures as appropriate and evaluate response  - Consider cultural and social influences on pain and pain management  - Notify physician/advanced practitioner if interventions unsuccessful or patient reports new pain  Outcome: Progressing     Problem: SAFETY ADULT  Goal: Patient will remain free of falls  Description: INTERVENTIONS:  - Educate patient/family on patient safety including physical limitations  - Instruct patient to call for assistance with activity   - Consult OT/PT to assist with strengthening/mobility   - Keep Call bell within reach  - Keep bed low and locked with side rails adjusted as appropriate  - Keep care items and personal belongings within reach  - Initiate and maintain comfort rounds  - Make Fall Risk Sign visible to staff  - Offer Toileting every 2 Hours, in advance of need  - Initiate/Maintain bed chair alarm  - Apply yellow socks and bracelet for high fall risk patients  - Consider moving patient to room near nurses station  Outcome: Progressing  Goal: Maintain or return to baseline ADL function  Description: INTERVENTIONS:  -  Assess patient's ability to carry out ADLs; assess patient's baseline for ADL function and identify physical deficits which impact ability to perform ADLs (bathing, care of mouth/teeth, toileting, grooming, dressing, etc.)  - Assess/evaluate cause of self-care deficits   - Assess range of motion  - Assess patient's mobility; develop plan if impaired  - Assess patient's need for assistive devices and provide as appropriate  - Encourage maximum independence but intervene and supervise when necessary  - Involve family in  performance of ADLs  - Assess for home care needs following discharge   - Consider OT consult to assist with ADL evaluation and planning for discharge  - Provide patient education as appropriate  Outcome: Progressing  Goal: Maintains/Returns to pre admission functional level  Description: INTERVENTIONS:  - Perform AM-PAC 6 Click Basic Mobility/ Daily Activity assessment daily.  - Set and communicate daily mobility goal to care team and patient/family/caregiver.   - Collaborate with rehabilitation services on mobility goals if consulted  =- Out of bed for toileting  - Record patient progress and toleration of activity level   Outcome: Progressing     Problem: RESPIRATORY - ADULT  Goal: Achieves optimal ventilation and oxygenation  Description: INTERVENTIONS:  - Assess for changes in respiratory status  - Assess for changes in mentation and behavior  - Position to facilitate oxygenation and minimize respiratory effort  - Oxygen administered by appropriate delivery if ordered  - Initiate smoking cessation education as indicated  - Encourage broncho-pulmonary hygiene including cough, deep breathe, Incentive Spirometry  - Assess the need for suctioning and aspirate as needed  - Assess and instruct to report SOB or any respiratory difficulty  - Respiratory Therapy support as indicated  Outcome: Progressing     Problem: MUSCULOSKELETAL - ADULT  Goal: Maintain or return mobility to safest level of function  Description: INTERVENTIONS:  - Assess patient's ability to carry out ADLs; assess patient's baseline for ADL function and identify physical deficits which impact ability to perform ADLs (bathing, care of mouth/teeth, toileting, grooming, dressing, etc.)  - Assess/evaluate cause of self-care deficits   - Assess range of motion  - Assess patient's mobility  - Assess patient's need for assistive devices and provide as appropriate  - Encourage maximum independence but intervene and supervise when necessary  - Involve family  in performance of ADLs  - Assess for home care needs following discharge   - Consider OT consult to assist with ADL evaluation and planning for discharge  - Provide patient education as appropriate  Outcome: Progressing  Goal: Maintain proper alignment of affected body part  Description: INTERVENTIONS:  - Support, maintain and protect limb and body alignment  - Provide patient/ family with appropriate education  Outcome: Progressing

## 2024-01-25 NOTE — UTILIZATION REVIEW
NOTIFICATION OF INPATIENT ADMISSION   AUTHORIZATION REQUEST   SERVICING FACILITY:   [place] [address]  Tax ID: [id number]  NPI: [id number]   ATTENDING PROVIDER:  Attending Name and NPI#: Ana Cristina Hills Md [[id number]]  Address: [address] [place] [address]  Phone: [phone]     ADMISSION INFORMATION:  Place of Service: Inpatient [facility]  Place of Service Code: 21  Inpatient Admission Date/Time: 1/23/24  4:43 PM  Discharge Date/Time: No discharge date for patient encounter.  Admitting Diagnosis Code/Description:  RSV bronchiolitis [J21.0]  Asthma exacerbation [J45.901]     UTILIZATION REVIEW CONTACT:  [employer] Utilization   Network Utilization Review Department  Phone: [phone]  Fax: [phone]  Email: [email or web address]  Contact for approvals/pending authorizations, clinical reviews, and discharge.     PHYSICIAN ADVISORY SERVICES:  Medical Necessity Denial & Jeyy-za-Lptr Review  Phone: [phone]  Fax: [phone]  Email: [email or web address]     DISCHARGE SUPPORT TEAM:  For Patients Discharge Needs & Updates  Phone: [phone] opt. 2 Fax: [phone]  Email: [email or web address]

## 2024-01-26 LAB
ANION GAP SERPL CALCULATED.3IONS-SCNC: 4 MMOL/L
BUN SERPL-MCNC: 37 MG/DL (ref 5–25)
CALCIUM SERPL-MCNC: 9.4 MG/DL (ref 8.4–10.2)
CHLORIDE SERPL-SCNC: 100 MMOL/L (ref 96–108)
CO2 SERPL-SCNC: 35 MMOL/L (ref 21–32)
CREAT SERPL-MCNC: 1.05 MG/DL (ref 0.6–1.3)
ERYTHROCYTE [DISTWIDTH] IN BLOOD BY AUTOMATED COUNT: 14.6 % (ref 11.6–15.1)
GFR SERPL CREATININE-BSD FRML MDRD: 54 ML/MIN/1.73SQ M
GLUCOSE SERPL-MCNC: 107 MG/DL (ref 65–140)
HCT VFR BLD AUTO: 35.9 % (ref 34.8–46.1)
HGB BLD-MCNC: 10.6 G/DL (ref 11.5–15.4)
MCH RBC QN AUTO: 26.5 PG (ref 26.8–34.3)
MCHC RBC AUTO-ENTMCNC: 29.5 G/DL (ref 31.4–37.4)
MCV RBC AUTO: 90 FL (ref 82–98)
PLATELET # BLD AUTO: 256 THOUSANDS/UL (ref 149–390)
PMV BLD AUTO: 9.8 FL (ref 8.9–12.7)
POTASSIUM SERPL-SCNC: 5.1 MMOL/L (ref 3.5–5.3)
RBC # BLD AUTO: 4 MILLION/UL (ref 3.81–5.12)
SODIUM SERPL-SCNC: 139 MMOL/L (ref 135–147)
WBC # BLD AUTO: 10.45 THOUSAND/UL (ref 4.31–10.16)

## 2024-01-26 PROCEDURE — 99232 SBSQ HOSP IP/OBS MODERATE 35: CPT | Performed by: INTERNAL MEDICINE

## 2024-01-26 PROCEDURE — 85027 COMPLETE CBC AUTOMATED: CPT | Performed by: PSYCHIATRY & NEUROLOGY

## 2024-01-26 PROCEDURE — 94640 AIRWAY INHALATION TREATMENT: CPT

## 2024-01-26 PROCEDURE — 94760 N-INVAS EAR/PLS OXIMETRY 1: CPT

## 2024-01-26 PROCEDURE — 94664 DEMO&/EVAL PT USE INHALER: CPT

## 2024-01-26 PROCEDURE — 80048 BASIC METABOLIC PNL TOTAL CA: CPT | Performed by: PSYCHIATRY & NEUROLOGY

## 2024-01-26 PROCEDURE — 94668 MNPJ CHEST WALL SBSQ: CPT

## 2024-01-26 RX ORDER — METHYLPREDNISOLONE SODIUM SUCCINATE 40 MG/ML
40 INJECTION, POWDER, LYOPHILIZED, FOR SOLUTION INTRAMUSCULAR; INTRAVENOUS EVERY 12 HOURS SCHEDULED
Status: COMPLETED | OUTPATIENT
Start: 2024-01-26 | End: 2024-01-27

## 2024-01-26 RX ORDER — ACETAMINOPHEN 325 MG/1
975 TABLET ORAL EVERY 8 HOURS SCHEDULED
Status: DISCONTINUED | OUTPATIENT
Start: 2024-01-26 | End: 2024-01-29 | Stop reason: HOSPADM

## 2024-01-26 RX ORDER — AMOXICILLIN 250 MG
2 CAPSULE ORAL
Status: DISCONTINUED | OUTPATIENT
Start: 2024-01-26 | End: 2024-01-26

## 2024-01-26 RX ORDER — ECHINACEA PURPUREA EXTRACT 125 MG
2 TABLET ORAL
Status: DISCONTINUED | OUTPATIENT
Start: 2024-01-26 | End: 2024-01-29 | Stop reason: HOSPADM

## 2024-01-26 RX ORDER — BENZONATATE 100 MG/1
100 CAPSULE ORAL 3 TIMES DAILY
Status: DISCONTINUED | OUTPATIENT
Start: 2024-01-26 | End: 2024-01-29 | Stop reason: HOSPADM

## 2024-01-26 RX ORDER — LEVALBUTEROL INHALATION SOLUTION 1.25 MG/3ML
1.25 SOLUTION RESPIRATORY (INHALATION)
Status: DISCONTINUED | OUTPATIENT
Start: 2024-01-26 | End: 2024-01-29 | Stop reason: HOSPADM

## 2024-01-26 RX ORDER — AMOXICILLIN 250 MG
1 CAPSULE ORAL 2 TIMES DAILY
Status: DISCONTINUED | OUTPATIENT
Start: 2024-01-26 | End: 2024-01-29 | Stop reason: HOSPADM

## 2024-01-26 RX ORDER — SODIUM CHLORIDE/ALOE VERA
1 GEL (GRAM) NASAL
Status: DISCONTINUED | OUTPATIENT
Start: 2024-01-26 | End: 2024-01-26

## 2024-01-26 RX ADMIN — BUDESONIDE AND FORMOTEROL FUMARATE DIHYDRATE 2 PUFF: 160; 4.5 AEROSOL RESPIRATORY (INHALATION) at 10:58

## 2024-01-26 RX ADMIN — ENOXAPARIN SODIUM 40 MG: 40 INJECTION SUBCUTANEOUS at 10:56

## 2024-01-26 RX ADMIN — BRIMONIDINE TARTRATE 1 DROP: 2 SOLUTION OPHTHALMIC at 22:13

## 2024-01-26 RX ADMIN — SIMETHICONE 80 MG: 80 TABLET, CHEWABLE ORAL at 22:12

## 2024-01-26 RX ADMIN — GUAIFENESIN 1200 MG: 600 TABLET ORAL at 22:13

## 2024-01-26 RX ADMIN — FLUTICASONE PROPIONATE 1 SPRAY: 50 SPRAY, METERED NASAL at 10:58

## 2024-01-26 RX ADMIN — METHYLPREDNISOLONE SODIUM SUCCINATE 40 MG: 40 INJECTION, POWDER, FOR SOLUTION INTRAMUSCULAR; INTRAVENOUS at 22:13

## 2024-01-26 RX ADMIN — BENZONATATE 100 MG: 100 CAPSULE ORAL at 10:56

## 2024-01-26 RX ADMIN — IPRATROPIUM BROMIDE 0.5 MG: 0.5 SOLUTION RESPIRATORY (INHALATION) at 13:45

## 2024-01-26 RX ADMIN — BENZONATATE 100 MG: 100 CAPSULE ORAL at 17:59

## 2024-01-26 RX ADMIN — SIMETHICONE 80 MG: 80 TABLET, CHEWABLE ORAL at 14:16

## 2024-01-26 RX ADMIN — ACETAMINOPHEN 975 MG: 325 TABLET, FILM COATED ORAL at 22:12

## 2024-01-26 RX ADMIN — BRIMONIDINE TARTRATE 1 DROP: 2 SOLUTION OPHTHALMIC at 10:57

## 2024-01-26 RX ADMIN — SENNOSIDES AND DOCUSATE SODIUM 1 TABLET: 8.6; 5 TABLET ORAL at 10:56

## 2024-01-26 RX ADMIN — LEVALBUTEROL HYDROCHLORIDE 1.25 MG: 1.25 SOLUTION RESPIRATORY (INHALATION) at 08:04

## 2024-01-26 RX ADMIN — IPRATROPIUM BROMIDE 0.5 MG: 0.5 SOLUTION RESPIRATORY (INHALATION) at 19:30

## 2024-01-26 RX ADMIN — IPRATROPIUM BROMIDE 0.5 MG: 0.5 SOLUTION RESPIRATORY (INHALATION) at 08:04

## 2024-01-26 RX ADMIN — BENZONATATE 100 MG: 100 CAPSULE ORAL at 22:13

## 2024-01-26 RX ADMIN — ALBUTEROL SULFATE 2 PUFF: 90 AEROSOL, METERED RESPIRATORY (INHALATION) at 22:14

## 2024-01-26 RX ADMIN — TIMOLOL MALEATE 1 DROP: 5 SOLUTION/ DROPS OPHTHALMIC at 10:57

## 2024-01-26 RX ADMIN — SENNOSIDES AND DOCUSATE SODIUM 1 TABLET: 8.6; 5 TABLET ORAL at 17:59

## 2024-01-26 RX ADMIN — LEVALBUTEROL HYDROCHLORIDE 1.25 MG: 1.25 SOLUTION RESPIRATORY (INHALATION) at 13:45

## 2024-01-26 RX ADMIN — SIMETHICONE 80 MG: 80 TABLET, CHEWABLE ORAL at 11:00

## 2024-01-26 RX ADMIN — LEVALBUTEROL HYDROCHLORIDE 1.25 MG: 1.25 SOLUTION RESPIRATORY (INHALATION) at 19:30

## 2024-01-26 RX ADMIN — PANTOPRAZOLE SODIUM 40 MG: 40 TABLET, DELAYED RELEASE ORAL at 06:18

## 2024-01-26 RX ADMIN — BRIMONIDINE TARTRATE 1 DROP: 2 SOLUTION OPHTHALMIC at 18:00

## 2024-01-26 RX ADMIN — TIMOLOL MALEATE 1 DROP: 5 SOLUTION/ DROPS OPHTHALMIC at 17:59

## 2024-01-26 RX ADMIN — ACETAMINOPHEN 975 MG: 325 TABLET, FILM COATED ORAL at 14:16

## 2024-01-26 RX ADMIN — FERROUS SULFATE TAB 325 MG (65 MG ELEMENTAL FE) 325 MG: 325 (65 FE) TAB at 11:00

## 2024-01-26 RX ADMIN — SERTRALINE 25 MG: 25 TABLET, FILM COATED ORAL at 22:13

## 2024-01-26 RX ADMIN — GUAIFENESIN 1200 MG: 600 TABLET ORAL at 10:56

## 2024-01-26 RX ADMIN — MONTELUKAST 10 MG: 10 TABLET, FILM COATED ORAL at 22:12

## 2024-01-26 RX ADMIN — BUDESONIDE AND FORMOTEROL FUMARATE DIHYDRATE 2 PUFF: 160; 4.5 AEROSOL RESPIRATORY (INHALATION) at 17:59

## 2024-01-26 RX ADMIN — METHYLPREDNISOLONE SODIUM SUCCINATE 40 MG: 40 INJECTION, POWDER, FOR SOLUTION INTRAMUSCULAR; INTRAVENOUS at 00:34

## 2024-01-26 RX ADMIN — LEVOTHYROXINE SODIUM 75 MCG: 75 TABLET ORAL at 06:18

## 2024-01-26 RX ADMIN — POLYETHYLENE GLYCOL 3350 17 G: 17 POWDER, FOR SOLUTION ORAL at 10:56

## 2024-01-26 NOTE — PROGRESS NOTES
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 associated with cough, wheezing, nasal congestion, headaches, nausea, low-grade temperature of 100.5F. Arrived to the ED on 6 L.     Most likely 2/2 +RSV   Smoking Status: quit 9 years ago  CXR: negative for acute cardiopulmonary process.  Troponin 4, CO2 34, BNP 73  PCT neg    Plan:  Monitor for clinical changes  Monitor off antibiotics  IV Solu-Medrol: 40 mg q8h --> Will switch to q12h today and possibly transition to PO tomorrow  Xopenex/Atrovent scheduled TID; albuterol as needed  Mucinex 1200 mg BID  Currently on 2-3 L NC, Goal O2 sat 88-92% on home 2 L; wean as tolerated  Monitor respiratory status, Respiratory protocol, Airway clearance protocol, oscillatory PEP, incentive spirometry    Gastroesophageal reflux disease without esophagitis  Assessment & Plan  Continue home dose of Protonix 40 mg qd    PRN Zofran for nausea    Iron deficiency anemia  Assessment & Plan  Continue home dose of ferrous sulfate 375 mg    Atypical chest pain  Assessment & Plan  R sided chest pain that was present on admission. Worsens with cough.   Most likely 2/2 musculoskeletal from coughing vs GERD    Plan:  Repeat ECG NSR  Repeat troponins 5-->3        Hypothyroidism  Assessment & Plan  Continue home dose of levothyroxine 75 mcg     Depression with anxiety  Assessment & Plan  Continue home dose of Zoloft 25 mg qhs    History of laryngeal cancer  Assessment & Plan  S/p chemo and radiation in 2014    Chronic kidney disease, stage 3 (moderate)  Assessment & Plan  Lab Results   Component  Value Date    EGFR 54 2024    EGFR 56 2024    EGFR 63 2024    CREATININE 1.05 2024    CREATININE 1.02 2024    CREATININE 0.93 2024     Baseline Cr 0.9-1.2           VTE Pharmacologic Prophylaxis: VTE Score: 11 High Risk (Score >/= 5) - Pharmacological DVT Prophylaxis Ordered: enoxaparin (Lovenox). Sequential Compression Devices Ordered.    Mobility:   Basic Mobility Inpatient Raw Score: 21  JH-HLM Goal: 6: Walk 10 steps or more  JH-HLM Achieved: 6: Walk 10 steps or more  HLM Goal achieved. Continue to encourage appropriate mobility.    Patient Centered Rounds: I performed bedside rounds with nursing staff today.  Discussions with Specialists or Other Care Team Provider: none    Education and Discussions with Family / Patient: Patient declined call to .     Current Length of Stay: 3 day(s)  Current Patient Status: Inpatient   Discharge Plan: Anticipate discharge in 48 hrs to home.    Code Status: Level 1 - Full Code    Subjective:   Pt seen and evaluated at bedside. Pt reports that she is still having a headache.  She reports that she feels a little less shortness of breath.  She reports that she is coughing up a lot of mucus.  Reports that she has been having small bowel movements.  Denies chest pain.  No other complaints    Objective:     Vitals:   Temp (24hrs), Av.8 °F (36.6 °C), Min:97.8 °F (36.6 °C), Max:97.8 °F (36.6 °C)    Temp:  [97.8 °F (36.6 °C)] 97.8 °F (36.6 °C)  HR:  [73-80] 73  Resp:  [18-24] 18  BP: (128-150)/(61-76) 150/65  SpO2:  [92 %-99 %] 98 %  Body mass index is 34.07 kg/m².     Input and Output Summary (last 24 hours):   No intake or output data in the 24 hours ending 24 0951      Physical Exam:   Physical Exam  Vitals and nursing note reviewed.   Constitutional:       General: She is not in acute distress.     Appearance: She is not toxic-appearing.   HENT:      Head: Normocephalic and atraumatic.      Right Ear: External ear normal.       Left Ear: External ear normal.      Nose: Nose normal.      Mouth/Throat:      Mouth: Mucous membranes are moist.      Pharynx: Oropharynx is clear.   Eyes:      General: No scleral icterus.     Conjunctiva/sclera: Conjunctivae normal.   Cardiovascular:      Rate and Rhythm: Normal rate and regular rhythm.      Pulses: Normal pulses.      Heart sounds: No murmur heard.  Pulmonary:      Breath sounds: Wheezing and rhonchi present.      Comments: Decreased breath sounds b/l  On 2 L nasal cannula  Abdominal:      General: Bowel sounds are normal. There is no distension.      Palpations: Abdomen is soft.      Tenderness: There is no abdominal tenderness. There is no guarding or rebound.   Musculoskeletal:      Right lower leg: No edema.      Left lower leg: No edema.   Skin:     General: Skin is warm and dry.      Coloration: Skin is pale.   Neurological:      General: No focal deficit present.      Mental Status: She is alert and oriented to person, place, and time.   Psychiatric:         Mood and Affect: Mood normal.         Additional Data:     Labs:  Results from last 7 days   Lab Units 01/26/24  0440 01/25/24  0445 01/24/24  0446   WBC Thousand/uL 10.45* 15.11* 5.42   HEMOGLOBIN g/dL 10.6* 11.6 11.1*   HEMATOCRIT % 35.9 39.0 37.4   PLATELETS Thousands/uL 256 280 213   NEUTROS PCT %  --   --  90*   LYMPHS PCT %  --   --  7*   LYMPHO PCT %  --  7*  --    MONOS PCT %  --   --  2*   MONO PCT %  --  3*  --    EOS PCT %  --  0 0     Results from last 7 days   Lab Units 01/26/24  0440 01/24/24  0446 01/23/24  1502   SODIUM mmol/L 139   < > 136   POTASSIUM mmol/L 5.1   < > 4.4   CHLORIDE mmol/L 100   < > 96   CO2 mmol/L 35*   < > 34*   BUN mg/dL 37*   < > 19   CREATININE mg/dL 1.05   < > 0.92   ANION GAP mmol/L 4   < > 6   CALCIUM mg/dL 9.4   < > 9.6   ALBUMIN g/dL  --   --  4.1   TOTAL BILIRUBIN mg/dL  --   --  0.39   ALK PHOS U/L  --   --  95   ALT U/L  --   --  11   AST U/L  --   --  18   GLUCOSE RANDOM mg/dL 107    < > 117    < > = values in this interval not displayed.                 Results from last 7 days   Lab Units 01/23/24  1502   PROCALCITONIN ng/ml <0.05       Lines/Drains:  Invasive Devices       Peripheral Intravenous Line  Duration             Peripheral IV 01/23/24 Right Antecubital 2 days                          Imaging: Reviewed radiology reports from this admission including: chest xray    Recent Cultures (last 7 days):         Last 24 Hours Medication List:   Current Facility-Administered Medications   Medication Dose Route Frequency Provider Last Rate    acetaminophen  975 mg Oral Q8H PRN Mya Rivera DO      albuterol  2 puff Inhalation Q4H PRN Mya Rivera DO      brimonidine tartrate  1 drop Both Eyes TID Mya Rivera DO      budesonide-formoterol  2 puff Inhalation BID Mya Rivera DO      enoxaparin  40 mg Subcutaneous Daily Mya Rivera DO      ferrous sulfate  325 mg Oral Daily With Breakfast Mya Rivera DO      fluticasone  1 spray Each Nare Daily Mya Rivera DO      guaiFENesin  1,200 mg Oral Q12H [facility] Joe Santamaria Cha, MD      ipratropium  0.5 mg Nebulization TID Ana Cristina Hills MD      levalbuterol  1.25 mg Nebulization TID Ana Cristina Hills MD      levothyroxine  75 mcg Oral Early Morning Mya Rivera DO      methylPREDNISolone sodium succinate  40 mg Intravenous Q12H [facility] Mya Rivera DO      montelukast  10 mg Oral HS Mya Rivera DO      ondansetron  4 mg Intravenous Q6H PRN Mya Rivera DO      pantoprazole  40 mg Oral Daily Before Breakfast Mya Rivera DO      polyethylene glycol  17 g Oral Daily Mya Rivera DO      senna-docusate sodium  1 tablet Oral HS Joe Santamaria Cha, MD      sertraline  25 mg Oral HS Mya Rivera DO      simethicone  80 mg Oral 4x Daily (with meals and at bedtime) Joe Santamaria Cha, MD      timolol  1 drop Both Eyes BID Mya Rivera DO          Today, Patient Was Seen By: Mya Rivera DO    **Please Note: This note may have been  constructed using a voice recognition system.**

## 2024-01-26 NOTE — PLAN OF CARE
Problem: PAIN - ADULT  Goal: Verbalizes/displays adequate comfort level or baseline comfort level  Description: Interventions:  - Encourage patient to monitor pain and request assistance  - Assess pain using appropriate pain scale  - Administer analgesics based on type and severity of pain and evaluate response  - Implement non-pharmacological measures as appropriate and evaluate response  - Consider cultural and social influences on pain and pain management  - Notify physician/advanced practitioner if interventions unsuccessful or patient reports new pain  Outcome: Progressing     Problem: SAFETY ADULT  Goal: Patient will remain free of falls  Description: INTERVENTIONS:  - Educate patient/family on patient safety including physical limitations  - Instruct patient to call for assistance with activity   - Consult OT/PT to assist with strengthening/mobility   - Keep Call bell within reach  - Keep bed low and locked with side rails adjusted as appropriate  - Keep care items and personal belongings within reach  - Initiate and maintain comfort rounds  - Make Fall Risk Sign visible to staff  - Apply yellow socks and bracelet for high fall risk patients  - Consider moving patient to room near nurses station  Outcome: Progressing  Goal: Maintain or return to baseline ADL function  Description: INTERVENTIONS:  -  Assess patient's ability to carry out ADLs; assess patient's baseline for ADL function and identify physical deficits which impact ability to perform ADLs (bathing, care of mouth/teeth, toileting, grooming, dressing, etc.)  - Assess/evaluate cause of self-care deficits   - Assess range of motion  - Assess patient's mobility; develop plan if impaired  - Assess patient's need for assistive devices and provide as appropriate  - Encourage maximum independence but intervene and supervise when necessary  - Involve family in performance of ADLs  - Assess for home care needs following discharge   - Consider OT consult  to assist with ADL evaluation and planning for discharge  - Provide patient education as appropriate  Outcome: Progressing  Goal: Maintains/Returns to pre admission functional level  Description: INTERVENTIONS:  - Perform AM-PAC 6 Click Basic Mobility/ Daily Activity assessment daily.  - Set and communicate daily mobility goal to care team and patient/family/caregiver.   - Collaborate with rehabilitation services on mobility goals if consulted  - Out of bed for toileting  - Record patient progress and toleration of activity level   Outcome: Progressing     Problem: RESPIRATORY - ADULT  Goal: Achieves optimal ventilation and oxygenation  Description: INTERVENTIONS:  - Assess for changes in respiratory status  - Assess for changes in mentation and behavior  - Position to facilitate oxygenation and minimize respiratory effort  - Oxygen administered by appropriate delivery if ordered  - Initiate smoking cessation education as indicated  - Encourage broncho-pulmonary hygiene including cough, deep breathe, Incentive Spirometry  - Assess the need for suctioning and aspirate as needed  - Assess and instruct to report SOB or any respiratory difficulty  - Respiratory Therapy support as indicated  Outcome: Progressing     Problem: MUSCULOSKELETAL - ADULT  Goal: Maintain or return mobility to safest level of function  Description: INTERVENTIONS:  - Assess patient's ability to carry out ADLs; assess patient's baseline for ADL function and identify physical deficits which impact ability to perform ADLs (bathing, care of mouth/teeth, toileting, grooming, dressing, etc.)  - Assess/evaluate cause of self-care deficits   - Assess range of motion  - Assess patient's mobility  - Assess patient's need for assistive devices and provide as appropriate  - Encourage maximum independence but intervene and supervise when necessary  - Involve family in performance of ADLs  - Assess for home care needs following discharge   - Consider OT  consult to assist with ADL evaluation and planning for discharge  - Provide patient education as appropriate  Outcome: Progressing  Goal: Maintain proper alignment of affected body part  Description: INTERVENTIONS:  - Support, maintain and protect limb and body alignment  - Provide patient/ family with appropriate education  Outcome: Progressing

## 2024-01-26 NOTE — ASSESSMENT & PLAN NOTE
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 associated with cough, wheezing, nasal congestion, headaches, nausea, low-grade temperature of 100.5F. Arrived to the ED on 6 L.     Most likely 2/2 +RSV   Smoking Status: quit 9 years ago  CXR: negative for acute cardiopulmonary process.  Troponin 4, CO2 34, BNP 73  PCT neg    Plan:  Monitor for clinical changes  Monitor off antibiotics  IV Solu-Medrol: 40 mg q8h --> Will switch to q12h today and possibly transition to PO tomorrow  Xopenex/Atrovent scheduled TID; albuterol as needed  Mucinex 1200 mg BID  Currently on 2-3 L NC, Goal O2 sat 88-92% on home 2 L; wean as tolerated  Monitor respiratory status, Respiratory protocol, Airway clearance protocol, oscillatory PEP, incentive spirometry

## 2024-01-26 NOTE — ASSESSMENT & PLAN NOTE
R sided chest pain that was present on admission. Worsens with cough.   Most likely 2/2 musculoskeletal from coughing vs GERD    Plan:  Repeat ECG NSR  Repeat troponins 5-->3

## 2024-01-26 NOTE — ASSESSMENT & PLAN NOTE
Lab Results   Component Value Date    EGFR 54 01/26/2024    EGFR 56 01/25/2024    EGFR 63 01/24/2024    CREATININE 1.05 01/26/2024    CREATININE 1.02 01/25/2024    CREATININE 0.93 01/24/2024     Baseline Cr 0.9-1.2

## 2024-01-26 NOTE — PLAN OF CARE
Problem: PAIN - ADULT  Goal: Verbalizes/displays adequate comfort level or baseline comfort level  Description: Interventions:  - Encourage patient to monitor pain and request assistance  - Assess pain using appropriate pain scale  - Administer analgesics based on type and severity of pain and evaluate response  - Implement non-pharmacological measures as appropriate and evaluate response  - Consider cultural and social influences on pain and pain management  - Notify physician/advanced practitioner if interventions unsuccessful or patient reports new pain  Outcome: Progressing     Problem: SAFETY ADULT  Goal: Patient will remain free of falls  Description: INTERVENTIONS:  - Educate patient/family on patient safety including physical limitations  - Instruct patient to call for assistance with activity   - Consult OT/PT to assist with strengthening/mobility   - Keep Call bell within reach  - Keep bed low and locked with side rails adjusted as appropriate  - Keep care items and personal belongings within reach  - Initiate and maintain comfort rounds  - Make Fall Risk Sign visible to staff  - Offer Toileting every  Hours, in advance of need  - Initiate/Maintain alarm  - Obtain necessary fall risk management equipment:   - Apply yellow socks and bracelet for high fall risk patints  - Consider moving patient to room near nurses station  Outcome: Progressing  Goal: Maintain or return to baseline ADL function  Description: INTERVENTIONS:  -  Assess patient's ability to carry out ADLs; assess patient's baseline for ADL function and identify physical deficits which impact ability to perform ADLs (bathing, care of mouth/teeth, toileting, grooming, dressing, etc.)  - Assess/evaluate cause of self-care deficits   - Assess range of motion  - Assess patient's mobility; develop plan if impaired  - Assess patient's need for assistive devices and provide as appropriate  - Encourage maximum independence but intervene and supervise  when necessary  - Involve family in performance of ADLs  - Assess for home care needs following discharge   - Consider OT consult to assist with ADL evaluation and planning for discharge  - Provide patient education as appropriate  Outcome: Progressing  Goal: Maintains/Returns to pre admission functional level  Description: INTERVENTIONS:  - Perform AM-PAC 6 Click Basic Mobility/ Daily Activity assessment daily.  - Set and communicate daily mobility goal to care team and patient/family/caregiver.   - Collaborate with rehabilitation services on mobility goals if consulted  - Perform Range of Motion  times a day.  - Reposition patient every  hours.  - Dangle patient  times a day  - Stand patient  times a day  - Ambulate patient  times a day  - Out of bed to chair  times a day   - Out of bed for meals  times a day  - Out of bed for toileting  - Record patient progress and toleration of activity level   Outcome: Progressing     Problem: RESPIRATORY - ADULT  Goal: Achieves optimal ventilation and oxygenation  Description: INTERVENTIONS:  - Assess for changes in respiratory status  - Assess for changes in mentation and behavior  - Position to facilitate oxygenation and minimize respiratory effort  - Oxygen administered by appropriate delivery if ordered  - Initiate smoking cessation education as indicated  - Encourage broncho-pulmonary hygiene including cough, deep breathe, Incentive Spirometry  - Assess the need for suctioning and aspirate as needed  - Assess and instruct to report SOB or any respiratory difficulty  - Respiratory Therapy support as indicated  Outcome: Progressing

## 2024-01-27 ENCOUNTER — APPOINTMENT (INPATIENT)
Dept: RADIOLOGY | Facility: HOSPITAL | Age: 69
DRG: 202 | End: 2024-01-27
Payer: COMMERCIAL

## 2024-01-27 LAB
ANION GAP SERPL CALCULATED.3IONS-SCNC: 6 MMOL/L
ATRIAL RATE: 70 BPM
ATRIAL RATE: 87 BPM
BASOPHILS # BLD AUTO: 0.02 THOUSANDS/ÂΜL (ref 0–0.1)
BASOPHILS NFR BLD AUTO: 0 % (ref 0–1)
BUN SERPL-MCNC: 39 MG/DL (ref 5–25)
CALCIUM SERPL-MCNC: 9.2 MG/DL (ref 8.4–10.2)
CHLORIDE SERPL-SCNC: 100 MMOL/L (ref 96–108)
CO2 SERPL-SCNC: 33 MMOL/L (ref 21–32)
CREAT SERPL-MCNC: 1.11 MG/DL (ref 0.6–1.3)
EOSINOPHIL # BLD AUTO: 0 THOUSAND/ÂΜL (ref 0–0.61)
EOSINOPHIL NFR BLD AUTO: 0 % (ref 0–6)
ERYTHROCYTE [DISTWIDTH] IN BLOOD BY AUTOMATED COUNT: 14.7 % (ref 11.6–15.1)
GFR SERPL CREATININE-BSD FRML MDRD: 51 ML/MIN/1.73SQ M
GLUCOSE SERPL-MCNC: 127 MG/DL (ref 65–140)
HCT VFR BLD AUTO: 37.7 % (ref 34.8–46.1)
HGB BLD-MCNC: 11 G/DL (ref 11.5–15.4)
IMM GRANULOCYTES # BLD AUTO: 0.15 THOUSAND/UL (ref 0–0.2)
IMM GRANULOCYTES NFR BLD AUTO: 2 % (ref 0–2)
LYMPHOCYTES # BLD AUTO: 0.65 THOUSANDS/ÂΜL (ref 0.6–4.47)
LYMPHOCYTES NFR BLD AUTO: 8 % (ref 14–44)
MCH RBC QN AUTO: 26.5 PG (ref 26.8–34.3)
MCHC RBC AUTO-ENTMCNC: 29.2 G/DL (ref 31.4–37.4)
MCV RBC AUTO: 91 FL (ref 82–98)
MONOCYTES # BLD AUTO: 0.21 THOUSAND/ÂΜL (ref 0.17–1.22)
MONOCYTES NFR BLD AUTO: 3 % (ref 4–12)
NEUTROPHILS # BLD AUTO: 6.93 THOUSANDS/ÂΜL (ref 1.85–7.62)
NEUTS SEG NFR BLD AUTO: 87 % (ref 43–75)
NRBC BLD AUTO-RTO: 0 /100 WBCS
P AXIS: 79 DEGREES
P AXIS: 88 DEGREES
PLATELET # BLD AUTO: 258 THOUSANDS/UL (ref 149–390)
PMV BLD AUTO: 9.7 FL (ref 8.9–12.7)
POTASSIUM SERPL-SCNC: 5.3 MMOL/L (ref 3.5–5.3)
PR INTERVAL: 150 MS
PR INTERVAL: 150 MS
QRS AXIS: 29 DEGREES
QRS AXIS: 50 DEGREES
QRSD INTERVAL: 82 MS
QRSD INTERVAL: 84 MS
QT INTERVAL: 382 MS
QT INTERVAL: 386 MS
QTC INTERVAL: 416 MS
QTC INTERVAL: 459 MS
RBC # BLD AUTO: 4.15 MILLION/UL (ref 3.81–5.12)
SODIUM SERPL-SCNC: 139 MMOL/L (ref 135–147)
T WAVE AXIS: 55 DEGREES
T WAVE AXIS: 81 DEGREES
VENTRICULAR RATE: 70 BPM
VENTRICULAR RATE: 87 BPM
WBC # BLD AUTO: 7.96 THOUSAND/UL (ref 4.31–10.16)

## 2024-01-27 PROCEDURE — 99232 SBSQ HOSP IP/OBS MODERATE 35: CPT | Performed by: INTERNAL MEDICINE

## 2024-01-27 PROCEDURE — 94760 N-INVAS EAR/PLS OXIMETRY 1: CPT

## 2024-01-27 PROCEDURE — 71046 X-RAY EXAM CHEST 2 VIEWS: CPT

## 2024-01-27 PROCEDURE — 94668 MNPJ CHEST WALL SBSQ: CPT

## 2024-01-27 PROCEDURE — 94640 AIRWAY INHALATION TREATMENT: CPT

## 2024-01-27 PROCEDURE — 80048 BASIC METABOLIC PNL TOTAL CA: CPT | Performed by: PSYCHIATRY & NEUROLOGY

## 2024-01-27 PROCEDURE — 85025 COMPLETE CBC W/AUTO DIFF WBC: CPT | Performed by: PSYCHIATRY & NEUROLOGY

## 2024-01-27 RX ORDER — PREDNISONE 20 MG/1
60 TABLET ORAL DAILY
Status: DISCONTINUED | OUTPATIENT
Start: 2024-01-28 | End: 2024-01-27

## 2024-01-27 RX ORDER — PREDNISONE 20 MG/1
40 TABLET ORAL DAILY
Status: DISCONTINUED | OUTPATIENT
Start: 2024-01-28 | End: 2024-01-29 | Stop reason: HOSPADM

## 2024-01-27 RX ADMIN — LEVALBUTEROL HYDROCHLORIDE 1.25 MG: 1.25 SOLUTION RESPIRATORY (INHALATION) at 13:52

## 2024-01-27 RX ADMIN — BRIMONIDINE TARTRATE 1 DROP: 2 SOLUTION OPHTHALMIC at 21:21

## 2024-01-27 RX ADMIN — METHYLPREDNISOLONE SODIUM SUCCINATE 40 MG: 40 INJECTION, POWDER, FOR SOLUTION INTRAMUSCULAR; INTRAVENOUS at 09:12

## 2024-01-27 RX ADMIN — METHYLPREDNISOLONE SODIUM SUCCINATE 40 MG: 40 INJECTION, POWDER, FOR SOLUTION INTRAMUSCULAR; INTRAVENOUS at 21:21

## 2024-01-27 RX ADMIN — SIMETHICONE 80 MG: 80 TABLET, CHEWABLE ORAL at 17:38

## 2024-01-27 RX ADMIN — IPRATROPIUM BROMIDE 0.5 MG: 0.5 SOLUTION RESPIRATORY (INHALATION) at 08:02

## 2024-01-27 RX ADMIN — BUDESONIDE AND FORMOTEROL FUMARATE DIHYDRATE 2 PUFF: 160; 4.5 AEROSOL RESPIRATORY (INHALATION) at 17:39

## 2024-01-27 RX ADMIN — SENNOSIDES AND DOCUSATE SODIUM 1 TABLET: 8.6; 5 TABLET ORAL at 17:38

## 2024-01-27 RX ADMIN — LEVALBUTEROL HYDROCHLORIDE 1.25 MG: 1.25 SOLUTION RESPIRATORY (INHALATION) at 08:02

## 2024-01-27 RX ADMIN — ENOXAPARIN SODIUM 40 MG: 40 INJECTION SUBCUTANEOUS at 09:13

## 2024-01-27 RX ADMIN — BRIMONIDINE TARTRATE 1 DROP: 2 SOLUTION OPHTHALMIC at 09:18

## 2024-01-27 RX ADMIN — BENZONATATE 100 MG: 100 CAPSULE ORAL at 21:21

## 2024-01-27 RX ADMIN — FLUTICASONE PROPIONATE 1 SPRAY: 50 SPRAY, METERED NASAL at 09:19

## 2024-01-27 RX ADMIN — GUAIFENESIN 1200 MG: 600 TABLET ORAL at 21:21

## 2024-01-27 RX ADMIN — TIMOLOL MALEATE 1 DROP: 5 SOLUTION/ DROPS OPHTHALMIC at 17:39

## 2024-01-27 RX ADMIN — IPRATROPIUM BROMIDE 0.5 MG: 0.5 SOLUTION RESPIRATORY (INHALATION) at 13:52

## 2024-01-27 RX ADMIN — LEVOTHYROXINE SODIUM 75 MCG: 75 TABLET ORAL at 05:46

## 2024-01-27 RX ADMIN — BENZONATATE 100 MG: 100 CAPSULE ORAL at 09:13

## 2024-01-27 RX ADMIN — IPRATROPIUM BROMIDE 0.5 MG: 0.5 SOLUTION RESPIRATORY (INHALATION) at 19:10

## 2024-01-27 RX ADMIN — SIMETHICONE 80 MG: 80 TABLET, CHEWABLE ORAL at 12:30

## 2024-01-27 RX ADMIN — TIMOLOL MALEATE 1 DROP: 5 SOLUTION/ DROPS OPHTHALMIC at 09:18

## 2024-01-27 RX ADMIN — SIMETHICONE 80 MG: 80 TABLET, CHEWABLE ORAL at 09:12

## 2024-01-27 RX ADMIN — BRIMONIDINE TARTRATE 1 DROP: 2 SOLUTION OPHTHALMIC at 17:39

## 2024-01-27 RX ADMIN — PANTOPRAZOLE SODIUM 40 MG: 40 TABLET, DELAYED RELEASE ORAL at 05:46

## 2024-01-27 RX ADMIN — ACETAMINOPHEN 975 MG: 325 TABLET, FILM COATED ORAL at 21:21

## 2024-01-27 RX ADMIN — POLYETHYLENE GLYCOL 3350 17 G: 17 POWDER, FOR SOLUTION ORAL at 09:13

## 2024-01-27 RX ADMIN — SIMETHICONE 80 MG: 80 TABLET, CHEWABLE ORAL at 21:21

## 2024-01-27 RX ADMIN — GUAIFENESIN 1200 MG: 600 TABLET ORAL at 09:13

## 2024-01-27 RX ADMIN — MONTELUKAST 10 MG: 10 TABLET, FILM COATED ORAL at 21:22

## 2024-01-27 RX ADMIN — LEVALBUTEROL HYDROCHLORIDE 1.25 MG: 1.25 SOLUTION RESPIRATORY (INHALATION) at 19:10

## 2024-01-27 RX ADMIN — FERROUS SULFATE TAB 325 MG (65 MG ELEMENTAL FE) 325 MG: 325 (65 FE) TAB at 09:13

## 2024-01-27 RX ADMIN — BUDESONIDE AND FORMOTEROL FUMARATE DIHYDRATE 2 PUFF: 160; 4.5 AEROSOL RESPIRATORY (INHALATION) at 09:18

## 2024-01-27 RX ADMIN — BENZONATATE 100 MG: 100 CAPSULE ORAL at 17:38

## 2024-01-27 RX ADMIN — ACETAMINOPHEN 975 MG: 325 TABLET, FILM COATED ORAL at 05:46

## 2024-01-27 RX ADMIN — SERTRALINE 25 MG: 25 TABLET, FILM COATED ORAL at 21:21

## 2024-01-27 RX ADMIN — SENNOSIDES AND DOCUSATE SODIUM 1 TABLET: 8.6; 5 TABLET ORAL at 09:13

## 2024-01-27 NOTE — DISCHARGE SUMMARY
[facility]  Discharge- Noreen Alvarez 1955, 68 y.o. 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 associated with cough, wheezing, nasal congestion, headaches, nausea, low-grade temperature of 100.5F. Arrived to the ED on 6 L.     Most likely 2/2 +RSV   Smoking Status: quit 9 years ago  CXR: negative for acute cardiopulmonary process.  Troponin 4, CO2 34, BNP 73  PCT neg    Plan:  IV Solu-Medrol: 40 mg q8h --> switched to q12h 1/27. Transition to oral prednisone 40 mg qd 1/28  Xopenex/Atrovent scheduled TID; albuterol as needed  Mucinex 1200 mg BID  Monitor respiratory status, Respiratory protocol, Airway clearance protocol, oscillatory PEP, incentive spirometry  On 1/27: patient still requiring 3 L O2 in the morning, could be ELIUD.  Repeat chest x-ray appears unchanged from prior.  Patient back on home 2 L requirement. Will discharge patient with steroid taper and outpatient sleep medicine follow-up.     Elevated blood pressure reading  Assessment & Plan  Prior to potential discharge 1/29, patient had an elevated blood pressure reading in the 170s over 70s in the a.m.  Upon recheck, patient was elevated to 190s over 70s.  Ordered hydralazine.  Patient complaining of possible atypical chest pain, head pressure, increased shortness of breath.  Ordered EKG, troponins, hydralazine.  Patient has no history of previous hypertension.  Could also be related to steroids.    TTE (1/29): Left Ventricle: Left ventricular cavity size is normal. Wall thickness is normal. The left ventricular ejection fraction is 60%. Systolic function is  normal. Wall motion is normal. Diastolic function is normal.  Right Ventricle: Right ventricular cavity size is normal. Systolic function is normal. Aortic Valve: The aortic valve cannot be ruled out as bicuspid. The leaflets are not thickened. The leaflets are not calcified. The leaflets exhibit normal mobility.  Tricuspid Valve: There is mild regurgitation.    Recommend for patient to periodically check blood pressure at home and to follow-up with her PCP if remains elevated after steroids.    Gastroesophageal reflux disease without esophagitis  Assessment & Plan  Continue home dose of Protonix 40 mg qd    Iron deficiency anemia  Assessment & Plan  Continue home dose of ferrous sulfate 375 mg    Atypical chest pain  Assessment & Plan  R sided chest pain that was present on admission. Worsens with cough.   Most likely 2/2 musculoskeletal from coughing vs GERD    Plan:  Prior Repeat ECG NSR  Prior Repeat troponins 5-->3  Given shortness of breath, high blood pressure, pressure in head, and feeling of restrictive pain under bilateral breast, ordered EKG and troponins, will follow-up.  Also ordered hydralazine for high blood pressure and Atarax as could be related to anxiety.        Hypothyroidism  Assessment & Plan  Continue home dose of levothyroxine 75 mcg     Depression with anxiety  Assessment & Plan  Continue home dose of Zoloft 25 mg qhs    History of laryngeal cancer  Assessment & Plan  S/p chemo and radiation in 2014    Chronic kidney disease, stage 3 (moderate)  Assessment & Plan  Lab Results   Component Value Date    EGFR 52 01/29/2024    EGFR 58 01/28/2024    EGFR 51 01/27/2024    CREATININE 1.08 01/29/2024    CREATININE 0.99 01/28/2024    CREATININE 1.11 01/27/2024     Baseline Cr 0.9-1.2      Medical Problems       Resolved Problems  Date Reviewed: 1/28/2024   None       Discharging Resident: Mya Rivera DO  Discharging Attending: Ana Cristina Hlils MD  PCP: Samantha Hernandez MD  Admission Date:    Admission Orders (From admission, onward)       Ordered        01/23/24 1643  INPATIENT ADMISSION  Once                          Discharge Date: 01/29/24    Consultations During Hospital Stay:  none    Procedures Performed:   TTE (1/29/2024): Left Ventricle: Left ventricular cavity size is normal. Wall thickness is normal. The left ventricular ejection fraction is 60%. Systolic function is normal. Wall motion is normal. Diastolic function is normal. Right Ventricle: Right ventricular cavity size is normal. Systolic function is normal.  Aortic Valve: The aortic valve cannot be ruled out as bicuspid. The leaflets are not thickened. The leaflets are not calcified. The leaflets exhibit normal mobility. Tricuspid Valve: There is mild regurgitation.    Significant Findings / Test Results:   CXR (1/26/2024): Cardiomediastinal silhouette normal. Decreased vascularity due to emphysema with no definite acute disease. Left lower lobe nodule projects over the left heart, better shown on CT. No effusion or pneumothorax.     Incidental Findings:   none     Test Results Pending at Discharge (will require follow up):  none     Outpatient Tests Requested:  none    Complications:  none    Reason for Admission: acute exacerbation of COPD    Hospital Course:   Noreen Alvarez is a 68 y.o. female patient who originally presented to the hospital on 1/23/2024 due to shortness of breath x2 days. Patient reported that for the last 2 days she had been having worsening shortness of breath associated with cough, wheezing, nasal congetsion, headaches, nausea, and low-grade fever of 100.5 F. Patient came to the ED as she was requiring more than 2 L of her home O2. Upon arrival to the ED, patient was on 6 L of oxygen.  In the ED she was on 4 L satting 95% and tachypneic. Labs were significant for + RSV, normal troponin, normal lipase, CO2 34, BNP 73, no acute findings on chest x-ray.  Patient was given Solu-Medrol 125 and albuterol/Atrovent  "nebulizers.     On the medical floor, patient was started on Solumedrol 40 mg IV q8. She was tapered and transition to oral prednisone. Patient was given scheduled Xopenex/Attrovent nebulizers, Mucinex, and oscillatory PEP. Patient improved throughout her admission. On discharge, she was back to her baseline 2 L O2. She will be discharge with prednisone taper. Patient will be given referral to sleep medicine for sleep apnea evaluation due to morning headaches and shortness of breath in the morning. Patient also encouraged to follow up with pulmonology to discuss home oxygen machine. Patient had high blood pressure readings. This is most likely due to steroids vs anxiety given patient was normotensive on arrival. Patient encouraged to periodically check blood pressure and follow-up with PCP. On day of discharge, patient is feeling improved.  Spoke with patient and patient's daughter about discharge.  They both are comfortable with this and will follow-up accordingly.    New medications on discharge:  Prednisone taper      Please see above list of diagnoses and related plan for additional information.     Condition at Discharge: fair    Discharge Day Visit / Exam:   Subjective: Patient seen evaluated at bedside.  Patient reports that her shortness of breath has improved significantly since her admission.  Patient reports some fatigue and shortness of breath with walking.  Discussed with patient that this will take some time given she had RSV.  No other complaints by patient.    Vitals: Blood Pressure: 150/81 (01/29/24 1049)  Pulse: 90 (01/29/24 1049)  Temperature: 98.5 °F (36.9 °C) (01/29/24 0907)  Temp Source: Oral (01/29/24 0907)  Respirations: 18 (01/29/24 0907)  Height: 5' 2\" (157.5 cm) (01/29/24 1049)  Weight - Scale: 84.4 kg (186 lb) (01/29/24 1049)  SpO2: 94 % (01/29/24 0912)  Exam:   Physical Exam  Vitals and nursing note reviewed.   Constitutional:       General: She is not in acute distress.     Appearance: " She is not toxic-appearing.   HENT:      Head: Normocephalic and atraumatic.      Right Ear: External ear normal.      Left Ear: External ear normal.      Nose: Nose normal.      Mouth/Throat:      Mouth: Mucous membranes are moist.      Pharynx: Oropharynx is clear.   Eyes:      General: No scleral icterus.     Conjunctiva/sclera: Conjunctivae normal.   Cardiovascular:      Rate and Rhythm: Normal rate and regular rhythm.      Pulses: Normal pulses.      Heart sounds: No murmur heard.  Pulmonary:      Breath sounds: Wheezing (mild) present. No rhonchi or rales.      Comments: Decreased breath sounds b/l-- improving from prior  On 2 L nasal cannula  Abdominal:      General: Bowel sounds are normal. There is no distension.      Palpations: Abdomen is soft.      Tenderness: There is no abdominal tenderness. There is no guarding or rebound.   Musculoskeletal:      Right lower leg: No edema.      Left lower leg: No edema.   Skin:     General: Skin is warm and dry.      Coloration: Skin is pale.   Neurological:      General: No focal deficit present.      Mental Status: She is alert and oriented to person, place, and time.   Psychiatric:         Mood and Affect: Mood normal.         Discussion with Family: Updated  (daughter) via phone.    Discharge instructions/Information to patient and family:   See after visit summary for information provided to patient and family.      Provisions for Follow-Up Care:  See after visit summary for information related to follow-up care and any pertinent home health orders.      Mobility at time of Discharge:   Basic Mobility Inpatient Raw Score: 22  JH-HLM Goal: 7: Walk 25 feet or more  JH-HLM Achieved: 6: Walk 10 steps or more  HLM Goal NOT achieved. Continue to encourage mobility in post discharge setting.     Disposition:   Home    Planned Readmission: no    Discharge Medications:  See after visit summary for reconciled discharge medications provided to patient and/or  family.      **Please Note: This note may have been constructed using a voice recognition system**

## 2024-01-27 NOTE — PLAN OF CARE
Problem: PAIN - ADULT  Goal: Verbalizes/displays adequate comfort level or baseline comfort level  Description: Interventions:  - Encourage patient to monitor pain and request assistance  - Assess pain using appropriate pain scale  - Administer analgesics based on type and severity of pain and evaluate response  - Implement non-pharmacological measures as appropriate and evaluate response  - Consider cultural and social influences on pain and pain management  - Notify physician/advanced practitioner if interventions unsuccessful or patient reports new pain  Outcome: Progressing     Problem: SAFETY ADULT  Goal: Patient will remain free of falls  Description: INTERVENTIONS:  - Educate patient/family on patient safety including physical limitations  - Instruct patient to call for assistance with activity   - Consult OT/PT to assist with strengthening/mobility   - Keep Call bell within reach  - Keep bed low and locked with side rails adjusted as appropriate  - Keep care items and personal belongings within reach  - Initiate and maintain comfort rounds  - Make Fall Risk Sign visible to staff  - Offer Toileting every 2 Hours, in advance of need  - Initiate/Maintain bed and chair alarm  - Obtain necessary fall risk management equipment:   - Apply yellow socks and bracelet for high fall risk patients  - Consider moving patient to room near nurses station  Outcome: Progressing  Goal: Maintain or return to baseline ADL function  Description: INTERVENTIONS:  -  Assess patient's ability to carry out ADLs; assess patient's baseline for ADL function and identify physical deficits which impact ability to perform ADLs (bathing, care of mouth/teeth, toileting, grooming, dressing, etc.)  - Assess/evaluate cause of self-care deficits   - Assess range of motion  - Assess patient's mobility; develop plan if impaired  - Assess patient's need for assistive devices and provide as appropriate  - Encourage maximum independence but  intervene and supervise when necessary  - Involve family in performance of ADLs  - Assess for home care needs following discharge   - Consider OT consult to assist with ADL evaluation and planning for discharge  - Provide patient education as appropriate  Outcome: Progressing  Goal: Maintains/Returns to pre admission functional level  Description: INTERVENTIONS:  - Perform AM-PAC 6 Click Basic Mobility/ Daily Activity assessment daily.  - Set and communicate daily mobility goal to care team and patient/family/caregiver.   - Collaborate with rehabilitation services on mobility goals if consulted  - Perform Range of Motion 3 times a day.  - Reposition patient every 2 hours.  - Dangle patient 3 times a day  - Stand patient 3 times a day  - Ambulate patient 3 times a day  - Out of bed to chair 3 times a day   - Out of bed for meals 3 times a day  - Out of bed for toileting  - Record patient progress and toleration of activity level   Outcome: Progressing     Problem: RESPIRATORY - ADULT  Goal: Achieves optimal ventilation and oxygenation  Description: INTERVENTIONS:  - Assess for changes in respiratory status  - Assess for changes in mentation and behavior  - Position to facilitate oxygenation and minimize respiratory effort  - Oxygen administered by appropriate delivery if ordered  - Initiate smoking cessation education as indicated  - Encourage broncho-pulmonary hygiene including cough, deep breathe, Incentive Spirometry  - Assess the need for suctioning and aspirate as needed  - Assess and instruct to report SOB or any respiratory difficulty  - Respiratory Therapy support as indicated  Outcome: Progressing     Problem: MUSCULOSKELETAL - ADULT  Goal: Maintain or return mobility to safest level of function  Description: INTERVENTIONS:  - Assess patient's ability to carry out ADLs; assess patient's baseline for ADL function and identify physical deficits which impact ability to perform ADLs (bathing, care of  mouth/teeth, toileting, grooming, dressing, etc.)  - Assess/evaluate cause of self-care deficits   - Assess range of motion  - Assess patient's mobility  - Assess patient's need for assistive devices and provide as appropriate  - Encourage maximum independence but intervene and supervise when necessary  - Involve family in performance of ADLs  - Assess for home care needs following discharge   - Consider OT consult to assist with ADL evaluation and planning for discharge  - Provide patient education as appropriate  Outcome: Progressing  Goal: Maintain proper alignment of affected body part  Description: INTERVENTIONS:  - Support, maintain and protect limb and body alignment  - Provide patient/ family with appropriate education  Outcome: Progressing

## 2024-01-27 NOTE — ASSESSMENT & PLAN NOTE
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 associated with cough, wheezing, nasal congestion, headaches, nausea, low-grade temperature of 100.5F. Arrived to the ED on 6 L.     Most likely 2/2 +RSV   Smoking Status: quit 9 years ago  CXR: negative for acute cardiopulmonary process.  Troponin 4, CO2 34, BNP 73  PCT neg    Plan:  IV Solu-Medrol: 40 mg q8h --> switched to q12h 1/27. Transition to oral prednisone 60 mg qd 1/28  Xopenex/Atrovent scheduled TID; albuterol as needed  Mucinex 1200 mg BID  Currently on 2-3 L NC, Goal O2 sat 88-92% on home 2 L; wean as tolerated  Monitor respiratory status, Respiratory protocol, Airway clearance protocol, oscillatory PEP, incentive spirometry  On 1/27: patient still requiring 3 L O2 in the morning, could be ELIUD. Will obtain TTE and repeat CXR. Potential d/c tomorrow if sx improve with outpatient sleep medicine follow-up.

## 2024-01-27 NOTE — PROGRESS NOTES
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 associated with cough, wheezing, nasal congestion, headaches, nausea, low-grade temperature of 100.5F. Arrived to the ED on 6 L.     Most likely 2/2 +RSV   Smoking Status: quit 9 years ago  CXR: negative for acute cardiopulmonary process.  Troponin 4, CO2 34, BNP 73  PCT neg    Plan:  IV Solu-Medrol: 40 mg q8h --> switched to q12h 1/27. Transition to oral prednisone 60 mg qd 1/28  Xopenex/Atrovent scheduled TID; albuterol as needed  Mucinex 1200 mg BID  Currently on 2-3 L NC, Goal O2 sat 88-92% on home 2 L; wean as tolerated  Monitor respiratory status, Respiratory protocol, Airway clearance protocol, oscillatory PEP, incentive spirometry  On 1/27: patient still requiring 3 L O2 in the morning, could be ELIUD. Will obtain TTE and repeat CXR. Potential d/c tomorrow if sx improve with outpatient sleep medicine follow-up.    Gastroesophageal reflux disease without esophagitis  Assessment & Plan  Continue home dose of Protonix 40 mg qd    PRN Zofran for nausea    Iron deficiency anemia  Assessment & Plan  Continue home dose of ferrous sulfate 375 mg    Atypical chest pain  Assessment & Plan  R sided chest pain that was present on admission. Worsens with cough.   Most likely 2/2 musculoskeletal from coughing vs GERD    Plan:  Repeat ECG NSR  Repeat troponins 5-->3        Hypothyroidism  Assessment & Plan  Continue home dose of levothyroxine 75 mcg     Depression with anxiety  Assessment & Plan  Continue home dose of Zoloft 25 mg qhs    History of laryngeal cancer  Assessment &  Plan  S/p chemo and radiation in     Chronic kidney disease, stage 3 (moderate)  Assessment & Plan  Lab Results   Component Value Date    EGFR 54 2024    EGFR 56 2024    EGFR 63 2024    CREATININE 1.05 2024    CREATININE 1.02 2024    CREATININE 0.93 2024     Baseline Cr 0.9-1.2           VTE Pharmacologic Prophylaxis: VTE Score: 11 High Risk (Score >/= 5) - Pharmacological DVT Prophylaxis Ordered: enoxaparin (Lovenox). Sequential Compression Devices Ordered.    Mobility:   Basic Mobility Inpatient Raw Score: 21  JH-HLM Goal: 6: Walk 10 steps or more  JH-HLM Achieved: 7: Walk 25 feet or more  HLM Goal achieved. Continue to encourage appropriate mobility.    Patient Centered Rounds: I performed bedside rounds with nursing staff today.  Discussions with Specialists or Other Care Team Provider: none    Education and Discussions with Family / Patient: Updated  (daughter) via phone.    Current Length of Stay: 4 day(s)  Current Patient Status: Inpatient   Discharge Plan: Anticipate discharge tomorrow to home.    Code Status: Level 1 - Full Code    Subjective:   Pt seen and evaluated at bedside. Pt reports that she is still having shortness of breat on exertion. She states that she is still waking up with headaches in the morning. We discussed that this may be related to sleep apnea and we will provide outpatient follow-up. Patient denies any chest pain. No other complaints.    Objective:     Vitals:   Temp (24hrs), Av.1 °F (36.7 °C), Min:98.1 °F (36.7 °C), Max:98.1 °F (36.7 °C)    Temp:  [98.1 °F (36.7 °C)] 98.1 °F (36.7 °C)  HR:  [60-82] 82  Resp:  [16-20] 16  BP: (122-137)/(56-88) 122/88  SpO2:  [93 %-96 %] 94 %  Body mass index is 34.07 kg/m².     Input and Output Summary (last 24 hours):     Intake/Output Summary (Last 24 hours) at 2024 1119  Last data filed at 2024 0301  Gross per 24 hour   Intake 480 ml   Output 400 ml   Net 80 ml         Physical  Exam:   Physical Exam  Vitals and nursing note reviewed.   Constitutional:       General: She is not in acute distress.     Appearance: She is not toxic-appearing.   HENT:      Head: Normocephalic and atraumatic.      Right Ear: External ear normal.      Left Ear: External ear normal.      Nose: Nose normal.      Mouth/Throat:      Mouth: Mucous membranes are moist.      Pharynx: Oropharynx is clear.   Eyes:      General: No scleral icterus.     Conjunctiva/sclera: Conjunctivae normal.   Cardiovascular:      Rate and Rhythm: Normal rate and regular rhythm.      Pulses: Normal pulses.      Heart sounds: No murmur heard.  Pulmonary:      Breath sounds: Wheezing (mild expiratory- improving) and rhonchi (mild- improving) present.      Comments: On 3 L nasal cannula O2 sat 94% at rest, with ambulation 83%  Abdominal:      General: Bowel sounds are normal. There is no distension.      Palpations: Abdomen is soft.      Tenderness: There is no abdominal tenderness. There is no guarding or rebound.   Musculoskeletal:      Right lower leg: No edema.      Left lower leg: No edema.   Skin:     General: Skin is warm and dry.      Coloration: Skin is pale.   Neurological:      General: No focal deficit present.      Mental Status: She is alert and oriented to person, place, and time.   Psychiatric:         Mood and Affect: Mood normal.         Additional Data:     Labs:  Results from last 7 days   Lab Units 01/27/24  0552   WBC Thousand/uL 7.96   HEMOGLOBIN g/dL 11.0*   HEMATOCRIT % 37.7   PLATELETS Thousands/uL 258   NEUTROS PCT % 87*   LYMPHS PCT % 8*   MONOS PCT % 3*   EOS PCT % 0     Results from last 7 days   Lab Units 01/27/24  0552 01/24/24  0446 01/23/24  1502   SODIUM mmol/L 139   < > 136   POTASSIUM mmol/L 5.3   < > 4.4   CHLORIDE mmol/L 100   < > 96   CO2 mmol/L 33*   < > 34*   BUN mg/dL 39*   < > 19   CREATININE mg/dL 1.11   < > 0.92   ANION GAP mmol/L 6   < > 6   CALCIUM mg/dL 9.2   < > 9.6   ALBUMIN g/dL  --   --   4.1   TOTAL BILIRUBIN mg/dL  --   --  0.39   ALK PHOS U/L  --   --  95   ALT U/L  --   --  11   AST U/L  --   --  18   GLUCOSE RANDOM mg/dL 127   < > 117    < > = values in this interval not displayed.                 Results from last 7 days   Lab Units 01/23/24  1502   PROCALCITONIN ng/ml <0.05       Lines/Drains:  Invasive Devices       Peripheral Intravenous Line  Duration             Peripheral IV 01/23/24 Right Antecubital 3 days                          Imaging: Reviewed radiology reports from this admission including: chest xray    Recent Cultures (last 7 days):         Last 24 Hours Medication List:   Current Facility-Administered Medications   Medication Dose Route Frequency Provider Last Rate    acetaminophen  975 mg Oral Q8H [facility] Mya Rivera DO      albuterol  2 puff Inhalation Q4H PRN Mya Rivera DO      benzonatate  100 mg Oral TID Joe Santamaria Cha, MD      brimonidine tartrate  1 drop Both Eyes TID Mya Rivera DO      budesonide-formoterol  2 puff Inhalation BID Mya Rivera DO      enoxaparin  40 mg Subcutaneous Daily Mya Rivera DO      ferrous sulfate  325 mg Oral Daily With Breakfast Mya Rivera DO      fluticasone  1 spray Each Nare Daily Mya Rivera DO      guaiFENesin  1,200 mg Oral Q12H [facility] Joe Santamaria Cha, MD      ipratropium  0.5 mg Nebulization TID Ana Cristina Hills MD      levalbuterol  1.25 mg Nebulization TID Ana Cristina Hills MD      levothyroxine  75 mcg Oral Early Morning Mya Rivera DO      methylPREDNISolone sodium succinate  40 mg Intravenous Q12H [facility] Mya Rivera DO      montelukast  10 mg Oral HS Mya Rivera DO      ondansetron  4 mg Intravenous Q6H PRN Mya Rivera DO      pantoprazole  40 mg Oral Daily Before Breakfast yMa Rivera DO      polyethylene glycol  17 g Oral Daily Mya Rivera DO      [START ON 1/28/2024] predniSONE  60 mg Oral Daily Mya Rivera DO      senna-docusate sodium  1 tablet Oral BID Joe Santamaria Cha, MD      sertraline   25 mg Oral HS Mya Rivera DO      simethicone  80 mg Oral 4x Daily (with meals and at bedtime) Joe Santamaria Cha, MD      sodium chloride  2 spray Each Nare Q1H PRN Joe Santamaria Cha, MD      timolol  1 drop Both Eyes BID Mya Rivera DO          Today, Patient Was Seen By: Mya Rivera DO    **Please Note: This note may have been constructed using a voice recognition system.**

## 2024-01-28 PROBLEM — R03.0 ELEVATED BLOOD PRESSURE READING: Status: ACTIVE | Noted: 2024-01-28

## 2024-01-28 LAB
2HR DELTA HS TROPONIN: 3 NG/L
4HR DELTA HS TROPONIN: 2 NG/L
ANION GAP SERPL CALCULATED.3IONS-SCNC: 5 MMOL/L
ATRIAL RATE: 74 BPM
BASOPHILS # BLD MANUAL: 0 THOUSAND/UL (ref 0–0.1)
BASOPHILS NFR MAR MANUAL: 0 % (ref 0–1)
BUN SERPL-MCNC: 38 MG/DL (ref 5–25)
CALCIUM SERPL-MCNC: 9.3 MG/DL (ref 8.4–10.2)
CARDIAC TROPONIN I PNL SERPL HS: 3 NG/L
CARDIAC TROPONIN I PNL SERPL HS: 5 NG/L
CARDIAC TROPONIN I PNL SERPL HS: 6 NG/L
CHLORIDE SERPL-SCNC: 99 MMOL/L (ref 96–108)
CO2 SERPL-SCNC: 35 MMOL/L (ref 21–32)
CREAT SERPL-MCNC: 0.99 MG/DL (ref 0.6–1.3)
EOSINOPHIL # BLD MANUAL: 0 THOUSAND/UL (ref 0–0.4)
EOSINOPHIL NFR BLD MANUAL: 0 % (ref 0–6)
ERYTHROCYTE [DISTWIDTH] IN BLOOD BY AUTOMATED COUNT: 14.5 % (ref 11.6–15.1)
GFR SERPL CREATININE-BSD FRML MDRD: 58 ML/MIN/1.73SQ M
GLUCOSE SERPL-MCNC: 136 MG/DL (ref 65–140)
HCT VFR BLD AUTO: 36.8 % (ref 34.8–46.1)
HGB BLD-MCNC: 11 G/DL (ref 11.5–15.4)
LYMPHOCYTES # BLD AUTO: 0.69 THOUSAND/UL (ref 0.6–4.47)
LYMPHOCYTES # BLD AUTO: 8 % (ref 14–44)
MCH RBC QN AUTO: 26.6 PG (ref 26.8–34.3)
MCHC RBC AUTO-ENTMCNC: 29.9 G/DL (ref 31.4–37.4)
MCV RBC AUTO: 89 FL (ref 82–98)
METAMYELOCYTES NFR BLD MANUAL: 1 % (ref 0–1)
MONOCYTES # BLD AUTO: 0.17 THOUSAND/UL (ref 0–1.22)
MONOCYTES NFR BLD: 2 % (ref 4–12)
NEUTROPHILS # BLD MANUAL: 7.66 THOUSAND/UL (ref 1.85–7.62)
NEUTS SEG NFR BLD AUTO: 89 % (ref 43–75)
P AXIS: 87 DEGREES
PLATELET # BLD AUTO: 281 THOUSANDS/UL (ref 149–390)
PLATELET BLD QL SMEAR: ADEQUATE
PMV BLD AUTO: 9.6 FL (ref 8.9–12.7)
POTASSIUM SERPL-SCNC: 5 MMOL/L (ref 3.5–5.3)
PR INTERVAL: 136 MS
QRS AXIS: 41 DEGREES
QRSD INTERVAL: 88 MS
QT INTERVAL: 378 MS
QTC INTERVAL: 419 MS
RBC # BLD AUTO: 4.14 MILLION/UL (ref 3.81–5.12)
RBC MORPH BLD: NORMAL
SODIUM SERPL-SCNC: 139 MMOL/L (ref 135–147)
T WAVE AXIS: 76 DEGREES
VENTRICULAR RATE: 74 BPM
WBC # BLD AUTO: 8.61 THOUSAND/UL (ref 4.31–10.16)

## 2024-01-28 PROCEDURE — 99233 SBSQ HOSP IP/OBS HIGH 50: CPT | Performed by: INTERNAL MEDICINE

## 2024-01-28 PROCEDURE — 80048 BASIC METABOLIC PNL TOTAL CA: CPT | Performed by: PSYCHIATRY & NEUROLOGY

## 2024-01-28 PROCEDURE — 85007 BL SMEAR W/DIFF WBC COUNT: CPT | Performed by: PSYCHIATRY & NEUROLOGY

## 2024-01-28 PROCEDURE — 93005 ELECTROCARDIOGRAM TRACING: CPT

## 2024-01-28 PROCEDURE — 85027 COMPLETE CBC AUTOMATED: CPT | Performed by: PSYCHIATRY & NEUROLOGY

## 2024-01-28 PROCEDURE — 94760 N-INVAS EAR/PLS OXIMETRY 1: CPT

## 2024-01-28 PROCEDURE — 84484 ASSAY OF TROPONIN QUANT: CPT

## 2024-01-28 PROCEDURE — 94668 MNPJ CHEST WALL SBSQ: CPT

## 2024-01-28 PROCEDURE — 94640 AIRWAY INHALATION TREATMENT: CPT

## 2024-01-28 RX ORDER — HYDRALAZINE HYDROCHLORIDE 20 MG/ML
5 INJECTION INTRAMUSCULAR; INTRAVENOUS ONCE
Status: COMPLETED | OUTPATIENT
Start: 2024-01-28 | End: 2024-01-28

## 2024-01-28 RX ORDER — HYDROXYZINE HYDROCHLORIDE 25 MG/1
25 TABLET, FILM COATED ORAL EVERY 6 HOURS PRN
Status: DISCONTINUED | OUTPATIENT
Start: 2024-01-28 | End: 2024-01-29 | Stop reason: HOSPADM

## 2024-01-28 RX ORDER — HYDROXYZINE HYDROCHLORIDE 25 MG/1
25 TABLET, FILM COATED ORAL ONCE
Status: COMPLETED | OUTPATIENT
Start: 2024-01-28 | End: 2024-01-28

## 2024-01-28 RX ADMIN — SALINE NASAL SPRAY 2 SPRAY: 1.5 SOLUTION NASAL at 05:28

## 2024-01-28 RX ADMIN — LEVALBUTEROL HYDROCHLORIDE 1.25 MG: 1.25 SOLUTION RESPIRATORY (INHALATION) at 14:08

## 2024-01-28 RX ADMIN — BENZONATATE 100 MG: 100 CAPSULE ORAL at 21:32

## 2024-01-28 RX ADMIN — BUDESONIDE AND FORMOTEROL FUMARATE DIHYDRATE 2 PUFF: 160; 4.5 AEROSOL RESPIRATORY (INHALATION) at 08:51

## 2024-01-28 RX ADMIN — BRIMONIDINE TARTRATE 1 DROP: 2 SOLUTION OPHTHALMIC at 17:52

## 2024-01-28 RX ADMIN — SIMETHICONE 80 MG: 80 TABLET, CHEWABLE ORAL at 11:17

## 2024-01-28 RX ADMIN — ACETAMINOPHEN 975 MG: 325 TABLET, FILM COATED ORAL at 14:00

## 2024-01-28 RX ADMIN — PANTOPRAZOLE SODIUM 40 MG: 40 TABLET, DELAYED RELEASE ORAL at 08:50

## 2024-01-28 RX ADMIN — BENZONATATE 100 MG: 100 CAPSULE ORAL at 08:51

## 2024-01-28 RX ADMIN — ACETAMINOPHEN 975 MG: 325 TABLET, FILM COATED ORAL at 05:28

## 2024-01-28 RX ADMIN — HYDROXYZINE HYDROCHLORIDE 25 MG: 25 TABLET ORAL at 14:00

## 2024-01-28 RX ADMIN — PREDNISONE 40 MG: 20 TABLET ORAL at 08:50

## 2024-01-28 RX ADMIN — LEVALBUTEROL HYDROCHLORIDE 1.25 MG: 1.25 SOLUTION RESPIRATORY (INHALATION) at 07:23

## 2024-01-28 RX ADMIN — HYDRALAZINE HYDROCHLORIDE 5 MG: 20 INJECTION, SOLUTION INTRAMUSCULAR; INTRAVENOUS at 14:01

## 2024-01-28 RX ADMIN — SIMETHICONE 80 MG: 80 TABLET, CHEWABLE ORAL at 21:31

## 2024-01-28 RX ADMIN — FERROUS SULFATE TAB 325 MG (65 MG ELEMENTAL FE) 325 MG: 325 (65 FE) TAB at 08:50

## 2024-01-28 RX ADMIN — ENOXAPARIN SODIUM 40 MG: 40 INJECTION SUBCUTANEOUS at 08:51

## 2024-01-28 RX ADMIN — LEVOTHYROXINE SODIUM 75 MCG: 75 TABLET ORAL at 05:28

## 2024-01-28 RX ADMIN — SENNOSIDES AND DOCUSATE SODIUM 1 TABLET: 8.6; 5 TABLET ORAL at 17:52

## 2024-01-28 RX ADMIN — MONTELUKAST 10 MG: 10 TABLET, FILM COATED ORAL at 21:31

## 2024-01-28 RX ADMIN — BRIMONIDINE TARTRATE 1 DROP: 2 SOLUTION OPHTHALMIC at 08:51

## 2024-01-28 RX ADMIN — TIMOLOL MALEATE 1 DROP: 5 SOLUTION/ DROPS OPHTHALMIC at 08:51

## 2024-01-28 RX ADMIN — SERTRALINE 25 MG: 25 TABLET, FILM COATED ORAL at 21:31

## 2024-01-28 RX ADMIN — BENZONATATE 100 MG: 100 CAPSULE ORAL at 17:52

## 2024-01-28 RX ADMIN — SIMETHICONE 80 MG: 80 TABLET, CHEWABLE ORAL at 08:49

## 2024-01-28 RX ADMIN — GUAIFENESIN 1200 MG: 600 TABLET ORAL at 21:31

## 2024-01-28 RX ADMIN — ACETAMINOPHEN 975 MG: 325 TABLET, FILM COATED ORAL at 21:32

## 2024-01-28 RX ADMIN — IPRATROPIUM BROMIDE 0.5 MG: 0.5 SOLUTION RESPIRATORY (INHALATION) at 19:22

## 2024-01-28 RX ADMIN — BUDESONIDE AND FORMOTEROL FUMARATE DIHYDRATE 2 PUFF: 160; 4.5 AEROSOL RESPIRATORY (INHALATION) at 17:52

## 2024-01-28 RX ADMIN — IPRATROPIUM BROMIDE 0.5 MG: 0.5 SOLUTION RESPIRATORY (INHALATION) at 14:08

## 2024-01-28 RX ADMIN — LEVALBUTEROL HYDROCHLORIDE 1.25 MG: 1.25 SOLUTION RESPIRATORY (INHALATION) at 19:22

## 2024-01-28 RX ADMIN — TIMOLOL MALEATE 1 DROP: 5 SOLUTION/ DROPS OPHTHALMIC at 17:51

## 2024-01-28 RX ADMIN — FLUTICASONE PROPIONATE 1 SPRAY: 50 SPRAY, METERED NASAL at 08:50

## 2024-01-28 RX ADMIN — IPRATROPIUM BROMIDE 0.5 MG: 0.5 SOLUTION RESPIRATORY (INHALATION) at 07:23

## 2024-01-28 RX ADMIN — GUAIFENESIN 1200 MG: 600 TABLET ORAL at 08:49

## 2024-01-28 NOTE — ASSESSMENT & PLAN NOTE
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 associated with cough, wheezing, nasal congestion, headaches, nausea, low-grade temperature of 100.5F. Arrived to the ED on 6 L.     Most likely 2/2 +RSV   Smoking Status: quit 9 years ago  CXR: negative for acute cardiopulmonary process.  Troponin 4, CO2 34, BNP 73  PCT neg    Plan:  IV Solu-Medrol: 40 mg q8h --> switched to q12h 1/27. Transition to oral prednisone 60 mg qd 1/28  Xopenex/Atrovent scheduled TID; albuterol as needed  Mucinex 1200 mg BID  Currently on 2-3 L NC, Goal O2 sat 88-92% on home 2 L; wean as tolerated  Monitor respiratory status, Respiratory protocol, Airway clearance protocol, oscillatory PEP, incentive spirometry  On 1/27: patient still requiring 3 L O2 in the morning, could be ELIUD. Will obtain TTE. Potential d/c tomorrow if sx improve with outpatient sleep medicine follow-up.  Repeat chest x-ray appears unchanged from prior.  Patient having increased shortness of breath and postnasal drip today, will receive her scheduled medications.

## 2024-01-28 NOTE — CASE MANAGEMENT
Case Management Discharge Planning Note    Patient name Noreen Alvarez  Location S /S -01 MRN [id number]  : 1955 Date 2024       Current Admission Date: 2024  Current Admission Diagnosis:Acute exacerbation of chronic obstructive pulmonary disease (HCC)   Patient Active Problem List    Diagnosis Date Noted    Acute exacerbation of chronic obstructive pulmonary disease (HCC) 2024    Epigastric pain 01/10/2024    Colon cancer screening 01/10/2024    Gastroesophageal reflux disease without esophagitis 01/10/2024    Pain of right sacroiliac joint 2023    Hypomagnesemia 2023    Chronic respiratory failure with hypoxia, on home O2 therapy  2023    Iron deficiency anemia 10/30/2022    Dyspepsia 10/30/2022    Atypical chest pain 10/28/2022    Preop examination 2022    Preoperative cardiovascular examination 2022    Mediastinal lymphadenopathy 2021    Class 1 obesity due to excess calories with serious comorbidity and body mass index (BMI) of 32.0 to 32.9 in adult 2021    Exercise hypoxemia 2021    Post-nasal drip 2021    Neck pain on left side 2021    History of COVID-19 2021    Persistent dyspnea after COVID-19 2021    Current mild episode of major depressive disorder without prior episode (HCC) 2021    Hyperlipidemia 2021    Hypothyroidism 2021    COVID-19 2021    Trigger finger of right thumb 2021    Myalgia 2021    Tension type headache 2021    Severe persistent asthma without complication 2020    Multiple lung nodules 2020    Elevated alkaline phosphatase level 2020    Dupuytren contracture 2020    Personal history of radiation therapy 2019    History of laryngeal cancer 2019    Loss of hearing 2019    Cancer of pharynx (HCC) 2019    Chronic kidney disease, stage 3 (moderate) 2019    Centrilobular emphysema (HCC)  08/27/2019    Depression with anxiety 08/27/2019    Panic attack 08/27/2019    Cataract 02/19/2015    Numbness 02/19/2015    Vitamin D deficiency 12/10/2013    Extrinsic obstruction of cartilagenous portion of eustachian tube 11/12/2013      LOS (days): 5  Geometric Mean LOS (GMLOS) (days): 2.9  Days to GMLOS:-1.8     OBJECTIVE:  Risk of Unplanned Readmission Score: 23.09         Current admission status: Inpatient   Preferred Pharmacy:   [facility]pharm[facility] #[id number] - [place] - [address]  [address]  [place] [address]  Phone: [phone] Fax: [phone]    [id number] [place] - [address] [facility]  [address]  [facility] [place] [address]  Phone: [phone] Fax: [phone]    Primary Care Provider: Samantha Hernandez MD    Primary Insurance: [employer]  Secondary Insurance:     DISCHARGE DETAILS:                           Contacts  Patient Contacts: Ashley (dtr)  Relationship to Patient:: Family  Contact Method: Phone  Phone Number: [phone]  Reason/Outcome: Continuity of Care, Emergency Contact, Discharge Planning, Referral                      RW delivered to patient room.                                   IMM reviewed with patient's caregiver, patient's caregiver agrees with discharge determination.  IMM Given (Date):: 01/28/24  IMM Given to:: Family  Family notified:: Ashley (dtr)

## 2024-01-28 NOTE — PROGRESS NOTES
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 associated with cough, wheezing, nasal congestion, headaches, nausea, low-grade temperature of 100.5F. Arrived to the ED on 6 L.     Most likely 2/2 +RSV   Smoking Status: quit 9 years ago  CXR: negative for acute cardiopulmonary process.  Troponin 4, CO2 34, BNP 73  PCT neg    Plan:  IV Solu-Medrol: 40 mg q8h --> switched to q12h 1/27. Transition to oral prednisone 60 mg qd 1/28  Xopenex/Atrovent scheduled TID; albuterol as needed  Mucinex 1200 mg BID  Currently on 2-3 L NC, Goal O2 sat 88-92% on home 2 L; wean as tolerated  Monitor respiratory status, Respiratory protocol, Airway clearance protocol, oscillatory PEP, incentive spirometry  On 1/27: patient still requiring 3 L O2 in the morning, could be ELIUD. Will obtain TTE. Potential d/c tomorrow if sx improve with outpatient sleep medicine follow-up.  Repeat chest x-ray appears unchanged from prior.  Patient having increased shortness of breath and postnasal drip today, will receive her scheduled medications.    Atypical chest pain  Assessment & Plan  R sided chest pain that was present on admission. Worsens with cough.   Most likely 2/2 musculoskeletal from coughing vs GERD    Plan:  Prior Repeat ECG NSR  Prior Repeat troponins 5-->3  Given shortness of breath, high blood pressure, pressure in head, and feeling of restrictive pain under bilateral breast, ordered EKG and troponins, will follow-up.  Also ordered hydralazine for high blood pressure and Atarax as could be related to anxiety.        Elevated blood  pressure reading  Assessment & Plan  Prior to potential discharge today, patient had an elevated blood pressure reading in the 170s over 70s this a.m.  Upon recheck, patient was elevated to 190s over 70s.  Ordered hydralazine.  Patient complaining of possible atypical chest pain, head pressure, increased shortness of breath.  Ordered EKG, troponins, hydralazine.  Can consider possible blood pressure medication tomorrow if pressures remain elevated.  Patient has no history of previous hypertension.  Could also be related to anxiety, ordered Atarax.    Chronic kidney disease, stage 3 (moderate)  Assessment & Plan  Lab Results   Component Value Date    EGFR 58 01/28/2024    EGFR 51 01/27/2024    EGFR 54 01/26/2024    CREATININE 0.99 01/28/2024    CREATININE 1.11 01/27/2024    CREATININE 1.05 01/26/2024     Baseline Cr 0.9-1.2    History of laryngeal cancer  Assessment & Plan  S/p chemo and radiation in 2014    Gastroesophageal reflux disease without esophagitis  Assessment & Plan  Continue home dose of Protonix 40 mg qd    PRN Zofran for nausea    Iron deficiency anemia  Assessment & Plan  Continue home dose of ferrous sulfate 375 mg    Hypothyroidism  Assessment & Plan  Continue home dose of levothyroxine 75 mcg     Depression with anxiety  Assessment & Plan  Continue home dose of Zoloft 25 mg qhs  Ordered Atarax as high blood pressure could be related to anxiety, daughter explains that patient has increasing anxiety due to going home.           VTE Pharmacologic Prophylaxis: VTE Score: 11 High Risk (Score >/= 5) - Pharmacological DVT Prophylaxis Ordered: enoxaparin (Lovenox). Sequential Compression Devices Ordered.    Mobility:   Basic Mobility Inpatient Raw Score: 22  JH-HLM Goal: 7: Walk 25 feet or more  JH-HLM Achieved: 7: Walk 25 feet or more  HLM Goal achieved. Continue to encourage appropriate mobility.    Patient Centered Rounds: I performed bedside rounds with nursing staff today.   Discussions with  Specialists or Other Care Team Provider: JESSEE    Education and Discussions with Family / Patient: Updated  (daughter) via phone.    Total Time Spent on Date of Encounter in care of patient: 45 mins. This time was spent on one or more of the following: performing physical exam; counseling and coordination of care; obtaining or reviewing history; documenting in the medical record; reviewing/ordering tests, medications or procedures; communicating with other healthcare professionals and discussing with patient's family/caregivers.    Current Length of Stay: 5 day(s)  Current Patient Status: Inpatient   Certification Statement: The patient will continue to require additional inpatient hospital stay due to possible atypical chest pain, increased SOB this afternoon, pressure likely due to high BP, BP management, anxiety management trial   Discharge Plan: Anticipate discharge tomorrow to home.    Code Status: Level 1 - Full Code    Subjective:   Patient was seen and examined at bedside this am. Patient reports feeling improved this am; however, upon reexamination this afternoon with VS check, patient stated she started having more SOB with restricted feeling in chest as well as head pressure and feeling subjectively hot. Called daughter and she is okay with keeping patient tonight instead of dc today.     Objective:     Vitals:   Temp (24hrs), Av.1 °F (36.7 °C), Min:97.8 °F (36.6 °C), Max:98.4 °F (36.9 °C)    Temp:  [97.8 °F (36.6 °C)-98.4 °F (36.9 °C)] 98.4 °F (36.9 °C)  HR:  [74-98] 98  Resp:  [16-18] 17  BP: (128-194)/(67-90) 152/67  SpO2:  [93 %-98 %] 93 %  Body mass index is 34.07 kg/m².     Input and Output Summary (last 24 hours):     Intake/Output Summary (Last 24 hours) at 2024 1645  Last data filed at 2024 2102  Gross per 24 hour   Intake 360 ml   Output --   Net 360 ml       Physical Exam:   Physical Exam  Vitals reviewed.   Constitutional:       Appearance: Normal appearance. She is  toxic-appearing. She is not ill-appearing.   HENT:      Head: Normocephalic and atraumatic.      Right Ear: External ear normal.      Left Ear: External ear normal.      Nose: Congestion present.      Mouth/Throat:      Mouth: Mucous membranes are moist.      Pharynx: Oropharynx is clear.   Eyes:      Extraocular Movements: Extraocular movements intact.      Conjunctiva/sclera: Conjunctivae normal.      Pupils: Pupils are equal, round, and reactive to light.   Cardiovascular:      Rate and Rhythm: Normal rate and regular rhythm.      Pulses: Normal pulses.      Heart sounds: Normal heart sounds.   Pulmonary:      Effort: Respiratory distress present.      Breath sounds: Wheezing and rhonchi present. No rales.   Abdominal:      General: Abdomen is flat. There is no distension.      Palpations: Abdomen is soft.      Tenderness: There is no abdominal tenderness.   Musculoskeletal:         General: Normal range of motion.      Cervical back: Normal range of motion and neck supple.      Right lower leg: No edema.      Left lower leg: No edema.   Skin:     General: Skin is warm.      Capillary Refill: Capillary refill takes less than 2 seconds.      Findings: No erythema or rash.   Neurological:      General: No focal deficit present.      Mental Status: She is alert and oriented to person, place, and time. Mental status is at baseline.   Psychiatric:         Mood and Affect: Mood normal.         Behavior: Behavior normal.      Comments: anxious          Additional Data:     Labs:  Results from last 7 days   Lab Units 01/28/24  0431 01/27/24  0552   WBC Thousand/uL 8.61 7.96   HEMOGLOBIN g/dL 11.0* 11.0*   HEMATOCRIT % 36.8 37.7   PLATELETS Thousands/uL 281 258   NEUTROS PCT %  --  87*   LYMPHS PCT %  --  8*   LYMPHO PCT % 8*  --    MONOS PCT %  --  3*   MONO PCT % 2*  --    EOS PCT % 0 0     Results from last 7 days   Lab Units 01/28/24  0431 01/24/24  0446 01/23/24  1502   SODIUM mmol/L 139   < > 136   POTASSIUM mmol/L  5.0   < > 4.4   CHLORIDE mmol/L 99   < > 96   CO2 mmol/L 35*   < > 34*   BUN mg/dL 38*   < > 19   CREATININE mg/dL 0.99   < > 0.92   ANION GAP mmol/L 5   < > 6   CALCIUM mg/dL 9.3   < > 9.6   ALBUMIN g/dL  --   --  4.1   TOTAL BILIRUBIN mg/dL  --   --  0.39   ALK PHOS U/L  --   --  95   ALT U/L  --   --  11   AST U/L  --   --  18   GLUCOSE RANDOM mg/dL 136   < > 117    < > = values in this interval not displayed.                 Results from last 7 days   Lab Units 01/23/24  1502   PROCALCITONIN ng/ml <0.05       Lines/Drains:  Invasive Devices       Peripheral Intravenous Line  Duration             Peripheral IV 01/28/24 Left Forearm <1 day                          Imaging: Reviewed radiology reports from this admission including: chest xray, abdominal/pelvic CT, and ultrasound(s)    Recent Cultures (last 7 days):         Last 24 Hours Medication List:   Current Facility-Administered Medications   Medication Dose Route Frequency Provider Last Rate    acetaminophen  975 mg Oral Q8H [facility] Mya Rivera DO      albuterol  2 puff Inhalation Q4H PRN Mya Rviera DO      benzonatate  100 mg Oral TID Joe Santamaria Cha, MD      brimonidine tartrate  1 drop Both Eyes TID Mya Rivera DO      budesonide-formoterol  2 puff Inhalation BID Mya Rivera DO      enoxaparin  40 mg Subcutaneous Daily Mya Rivera DO      ferrous sulfate  325 mg Oral Daily With Breakfast Mya Rivera DO      fluticasone  1 spray Each Nare Daily Mya Rivera DO      guaiFENesin  1,200 mg Oral Q12H [facility] Joe Santamaria Cha, MD      hydrOXYzine HCL  25 mg Oral Q6H PRN Caro Crowley DO      ipratropium  0.5 mg Nebulization TID Ana Cristina Hills MD      levalbuterol  1.25 mg Nebulization TID Ana Cristina Hills MD      levothyroxine  75 mcg Oral Early Morning Mya Rivera DO      montelukast  10 mg Oral HS Mya Rivera DO      ondansetron  4 mg Intravenous Q6H PRN Mya Rivera DO      pantoprazole  40 mg Oral Daily Before Breakfast  Mya Rivera DO      polyethylene glycol  17 g Oral Daily Mya Rivera DO      predniSONE  40 mg Oral Daily Ana Cristina Hills MD      senna-docusate sodium  1 tablet Oral BID Joe Santamaria Cha, MD      sertraline  25 mg Oral HS Mya Rivera DO      simethicone  80 mg Oral 4x Daily (with meals and at bedtime) Joe Santamaria Cha, MD      sodium chloride  2 spray Each Nare Q1H PRN Joe Santamaria Cha, MD      timolol  1 drop Both Eyes BID Mya Rivera DO          Today, Patient Was Seen By: Caro Crowley DO    **Please Note: This note may have been constructed using a voice recognition system.**

## 2024-01-28 NOTE — PLAN OF CARE
Problem: PAIN - ADULT  Goal: Verbalizes/displays adequate comfort level or baseline comfort level  Description: Interventions:  - Encourage patient to monitor pain and request assistance  - Assess pain using appropriate pain scale  - Administer analgesics based on type and severity of pain and evaluate response  - Implement non-pharmacological measures as appropriate and evaluate response  - Consider cultural and social influences on pain and pain management  - Notify physician/advanced practitioner if interventions unsuccessful or patient reports new pain  Outcome: Progressing     Problem: SAFETY ADULT  Goal: Patient will remain free of falls  Description: INTERVENTIONS:  - Educate patient/family on patient safety including physical limitations  - Instruct patient to call for assistance with activity   - Consult OT/PT to assist with strengthening/mobility   - Keep Call bell within reach  - Keep bed low and locked with side rails adjusted as appropriate  - Keep care items and personal belongings within reach  - Initiate and maintain comfort rounds  - Make Fall Risk Sign visible to staff  - Offer Toileting every 2 Hours, in advance of need  - Initiate/Maintain bed/chair alarm  - Obtain necessary fall risk management equipment  - Apply yellow socks and bracelet for high fall risk patients  - Consider moving patient to room near nurses station  Outcome: Progressing  Goal: Maintain or return to baseline ADL function  Description: INTERVENTIONS:  -  Assess patient's ability to carry out ADLs; assess patient's baseline for ADL function and identify physical deficits which impact ability to perform ADLs (bathing, care of mouth/teeth, toileting, grooming, dressing, etc.)  - Assess/evaluate cause of self-care deficits   - Assess range of motion  - Assess patient's mobility; develop plan if impaired  - Assess patient's need for assistive devices and provide as appropriate  - Encourage maximum independence but intervene and  supervise when necessary  - Involve family in performance of ADLs  - Assess for home care needs following discharge   - Consider OT consult to assist with ADL evaluation and planning for discharge  - Provide patient education as appropriate  Outcome: Progressing  Goal: Maintains/Returns to pre admission functional level  Description: INTERVENTIONS:  - Perform AM-PAC 6 Click Basic Mobility/ Daily Activity assessment daily.  - Set and communicate daily mobility goal to care team and patient/family/caregiver.   - Collaborate with rehabilitation services on mobility goals if consulted  - Perform Range of Motion   - Reposition patient   - Dangle patient  - Stand patient   - Ambulate patient   - Out of bed to chair  - Out of bed for meals  - Out of bed for toileting  - Record patient progress and toleration of activity level   Outcome: Progressing     Problem: RESPIRATORY - ADULT  Goal: Achieves optimal ventilation and oxygenation  Description: INTERVENTIONS:  - Assess for changes in respiratory status  - Assess for changes in mentation and behavior  - Position to facilitate oxygenation and minimize respiratory effort  - Oxygen administered by appropriate delivery if ordered  - Initiate smoking cessation education as indicated  - Encourage broncho-pulmonary hygiene including cough, deep breathe, Incentive Spirometry  - Assess the need for suctioning and aspirate as needed  - Assess and instruct to report SOB or any respiratory difficulty  - Respiratory Therapy support as indicated  Outcome: Progressing     Problem: MUSCULOSKELETAL - ADULT  Goal: Maintain or return mobility to safest level of function  Description: INTERVENTIONS:  - Assess patient's ability to carry out ADLs; assess patient's baseline for ADL function and identify physical deficits which impact ability to perform ADLs (bathing, care of mouth/teeth, toileting, grooming, dressing, etc.)  - Assess/evaluate cause of self-care deficits   - Assess range of  motion  - Assess patient's mobility  - Assess patient's need for assistive devices and provide as appropriate  - Encourage maximum independence but intervene and supervise when necessary  - Involve family in performance of ADLs  - Assess for home care needs following discharge   - Consider OT consult to assist with ADL evaluation and planning for discharge  - Provide patient education as appropriate  Outcome: Progressing  Goal: Maintain proper alignment of affected body part  Description: INTERVENTIONS:  - Support, maintain and protect limb and body alignment  - Provide patient/ family with appropriate education  Outcome: Progressing

## 2024-01-28 NOTE — ASSESSMENT & PLAN NOTE
R sided chest pain that was present on admission. Worsens with cough.   Most likely 2/2 musculoskeletal from coughing vs GERD    Plan:  Prior Repeat ECG NSR  Prior Repeat troponins 5-->3  Given shortness of breath, high blood pressure, pressure in head, and feeling of restrictive pain under bilateral breast, ordered EKG and troponins, will follow-up.  Also ordered hydralazine for high blood pressure and Atarax as could be related to anxiety.

## 2024-01-28 NOTE — ASSESSMENT & PLAN NOTE
Lab Results   Component Value Date    EGFR 58 01/28/2024    EGFR 51 01/27/2024    EGFR 54 01/26/2024    CREATININE 0.99 01/28/2024    CREATININE 1.11 01/27/2024    CREATININE 1.05 01/26/2024     Baseline Cr 0.9-1.2

## 2024-01-28 NOTE — ASSESSMENT & PLAN NOTE
Continue home dose of Zoloft 25 mg qhs  Ordered Atarax as high blood pressure could be related to anxiety, daughter explains that patient has increasing anxiety due to going home.

## 2024-01-28 NOTE — ASSESSMENT & PLAN NOTE
Prior to potential discharge today, patient had an elevated blood pressure reading in the 170s over 70s this a.m.  Upon recheck, patient was elevated to 190s over 70s.  Ordered hydralazine.  Patient complaining of possible atypical chest pain, head pressure, increased shortness of breath.  Ordered EKG, troponins, hydralazine.  Can consider possible blood pressure medication tomorrow if pressures remain elevated.  Patient has no history of previous hypertension.  Could also be related to anxiety, ordered Atarax.

## 2024-01-29 ENCOUNTER — APPOINTMENT (INPATIENT)
Dept: NON INVASIVE DIAGNOSTICS | Facility: HOSPITAL | Age: 69
DRG: 202 | End: 2024-01-29
Payer: COMMERCIAL

## 2024-01-29 ENCOUNTER — TELEPHONE (OUTPATIENT)
Age: 69
End: 2024-01-29

## 2024-01-29 VITALS
HEART RATE: 90 BPM | BODY MASS INDEX: 34.23 KG/M2 | HEIGHT: 62 IN | OXYGEN SATURATION: 95 % | SYSTOLIC BLOOD PRESSURE: 150 MMHG | WEIGHT: 186 LBS | DIASTOLIC BLOOD PRESSURE: 81 MMHG | RESPIRATION RATE: 18 BRPM | TEMPERATURE: 98.5 F

## 2024-01-29 DIAGNOSIS — M25.551 RIGHT HIP PAIN: ICD-10-CM

## 2024-01-29 LAB
ANION GAP SERPL CALCULATED.3IONS-SCNC: 5 MMOL/L
AORTIC ROOT: 2.9 CM
APICAL FOUR CHAMBER EJECTION FRACTION: 61 %
BASOPHILS # BLD AUTO: 0.02 THOUSANDS/ÂΜL (ref 0–0.1)
BASOPHILS NFR BLD AUTO: 0 % (ref 0–1)
BSA FOR ECHO PROCEDURE: 1.85 M2
BUN SERPL-MCNC: 32 MG/DL (ref 5–25)
CALCIUM SERPL-MCNC: 9.1 MG/DL (ref 8.4–10.2)
CHLORIDE SERPL-SCNC: 99 MMOL/L (ref 96–108)
CO2 SERPL-SCNC: 35 MMOL/L (ref 21–32)
CREAT SERPL-MCNC: 1.08 MG/DL (ref 0.6–1.3)
E WAVE DECELERATION TIME: 161 MS
E/A RATIO: 0.93
EOSINOPHIL # BLD AUTO: 0.03 THOUSAND/ÂΜL (ref 0–0.61)
EOSINOPHIL NFR BLD AUTO: 0 % (ref 0–6)
ERYTHROCYTE [DISTWIDTH] IN BLOOD BY AUTOMATED COUNT: 14.9 % (ref 11.6–15.1)
FRACTIONAL SHORTENING: 33 (ref 28–44)
GFR SERPL CREATININE-BSD FRML MDRD: 52 ML/MIN/1.73SQ M
GLUCOSE SERPL-MCNC: 89 MG/DL (ref 65–140)
HCT VFR BLD AUTO: 37.4 % (ref 34.8–46.1)
HGB BLD-MCNC: 10.9 G/DL (ref 11.5–15.4)
IMM GRANULOCYTES # BLD AUTO: 0.37 THOUSAND/UL (ref 0–0.2)
IMM GRANULOCYTES NFR BLD AUTO: 4 % (ref 0–2)
INTERVENTRICULAR SEPTUM IN DIASTOLE (PARASTERNAL SHORT AXIS VIEW): 0.9 CM
INTERVENTRICULAR SEPTUM: 0.9 CM (ref 0.6–1.1)
LAAS-AP2: 12.5 CM2
LAAS-AP4: 12.5 CM2
LEFT ATRIUM SIZE: 3 CM
LEFT ATRIUM VOLUME (MOD BIPLANE): 33 ML
LEFT ATRIUM VOLUME INDEX (MOD BIPLANE): 17.8 ML/M2
LEFT INTERNAL DIMENSION IN SYSTOLE: 3.1 CM (ref 2.1–4)
LEFT VENTRICULAR INTERNAL DIMENSION IN DIASTOLE: 4.6 CM (ref 3.5–6)
LEFT VENTRICULAR POSTERIOR WALL IN END DIASTOLE: 1 CM
LEFT VENTRICULAR STROKE VOLUME: 61 ML
LVSV (TEICH): 61 ML
LYMPHOCYTES # BLD AUTO: 1.3 THOUSANDS/ÂΜL (ref 0.6–4.47)
LYMPHOCYTES NFR BLD AUTO: 14 % (ref 14–44)
MCH RBC QN AUTO: 26.1 PG (ref 26.8–34.3)
MCHC RBC AUTO-ENTMCNC: 29.1 G/DL (ref 31.4–37.4)
MCV RBC AUTO: 90 FL (ref 82–98)
MONOCYTES # BLD AUTO: 0.81 THOUSAND/ÂΜL (ref 0.17–1.22)
MONOCYTES NFR BLD AUTO: 9 % (ref 4–12)
MV E'TISSUE VEL-SEP: 9 CM/S
MV PEAK A VEL: 0.71 M/S
MV PEAK E VEL: 66 CM/S
MV STENOSIS PRESSURE HALF TIME: 47 MS
MV VALVE AREA P 1/2 METHOD: 4.68
NEUTROPHILS # BLD AUTO: 6.85 THOUSANDS/ÂΜL (ref 1.85–7.62)
NEUTS SEG NFR BLD AUTO: 73 % (ref 43–75)
NRBC BLD AUTO-RTO: 0 /100 WBCS
PLATELET # BLD AUTO: 262 THOUSANDS/UL (ref 149–390)
PMV BLD AUTO: 9.5 FL (ref 8.9–12.7)
POTASSIUM SERPL-SCNC: 4.5 MMOL/L (ref 3.5–5.3)
PROCALCITONIN SERPL-MCNC: <0.05 NG/ML
RBC # BLD AUTO: 4.17 MILLION/UL (ref 3.81–5.12)
RIGHT ATRIUM AREA SYSTOLE A4C: 13.8 CM2
RIGHT VENTRICLE ID DIMENSION: 2.9 CM
SL CV LEFT ATRIUM LENGTH A2C: 4.1 CM
SL CV LV EF: 60
SL CV PED ECHO LEFT VENTRICLE DIASTOLIC VOLUME (MOD BIPLANE) 2D: 98 ML
SL CV PED ECHO LEFT VENTRICLE SYSTOLIC VOLUME (MOD BIPLANE) 2D: 37 ML
SODIUM SERPL-SCNC: 139 MMOL/L (ref 135–147)
TRICUSPID ANNULAR PLANE SYSTOLIC EXCURSION: 2 CM
WBC # BLD AUTO: 9.38 THOUSAND/UL (ref 4.31–10.16)

## 2024-01-29 PROCEDURE — 85027 COMPLETE CBC AUTOMATED: CPT

## 2024-01-29 PROCEDURE — 94668 MNPJ CHEST WALL SBSQ: CPT

## 2024-01-29 PROCEDURE — 94760 N-INVAS EAR/PLS OXIMETRY 1: CPT

## 2024-01-29 PROCEDURE — 80048 BASIC METABOLIC PNL TOTAL CA: CPT

## 2024-01-29 PROCEDURE — 84145 PROCALCITONIN (PCT): CPT

## 2024-01-29 PROCEDURE — 99239 HOSP IP/OBS DSCHRG MGMT >30: CPT | Performed by: INTERNAL MEDICINE

## 2024-01-29 PROCEDURE — 93306 TTE W/DOPPLER COMPLETE: CPT | Performed by: INTERNAL MEDICINE

## 2024-01-29 PROCEDURE — 93306 TTE W/DOPPLER COMPLETE: CPT

## 2024-01-29 PROCEDURE — 94640 AIRWAY INHALATION TREATMENT: CPT

## 2024-01-29 RX ORDER — PREDNISONE 10 MG/1
TABLET ORAL DAILY
Qty: 22 TABLET | Refills: 0 | Status: SHIPPED | OUTPATIENT
Start: 2024-01-30 | End: 2024-02-09

## 2024-01-29 RX ORDER — BENZONATATE 100 MG/1
100 CAPSULE ORAL 3 TIMES DAILY
Qty: 20 CAPSULE | Refills: 0 | Status: SHIPPED | OUTPATIENT
Start: 2024-01-29

## 2024-01-29 RX ADMIN — IPRATROPIUM BROMIDE 0.5 MG: 0.5 SOLUTION RESPIRATORY (INHALATION) at 09:11

## 2024-01-29 RX ADMIN — BRIMONIDINE TARTRATE 1 DROP: 2 SOLUTION OPHTHALMIC at 00:00

## 2024-01-29 RX ADMIN — GUAIFENESIN 1200 MG: 600 TABLET ORAL at 08:56

## 2024-01-29 RX ADMIN — ENOXAPARIN SODIUM 40 MG: 40 INJECTION SUBCUTANEOUS at 08:56

## 2024-01-29 RX ADMIN — SENNOSIDES AND DOCUSATE SODIUM 1 TABLET: 8.6; 5 TABLET ORAL at 09:01

## 2024-01-29 RX ADMIN — LEVOTHYROXINE SODIUM 75 MCG: 75 TABLET ORAL at 05:47

## 2024-01-29 RX ADMIN — LEVALBUTEROL HYDROCHLORIDE 1.25 MG: 1.25 SOLUTION RESPIRATORY (INHALATION) at 15:09

## 2024-01-29 RX ADMIN — SIMETHICONE 80 MG: 80 TABLET, CHEWABLE ORAL at 08:56

## 2024-01-29 RX ADMIN — BUDESONIDE AND FORMOTEROL FUMARATE DIHYDRATE 2 PUFF: 160; 4.5 AEROSOL RESPIRATORY (INHALATION) at 08:57

## 2024-01-29 RX ADMIN — LEVALBUTEROL HYDROCHLORIDE 1.25 MG: 1.25 SOLUTION RESPIRATORY (INHALATION) at 09:11

## 2024-01-29 RX ADMIN — IPRATROPIUM BROMIDE 0.5 MG: 0.5 SOLUTION RESPIRATORY (INHALATION) at 15:09

## 2024-01-29 RX ADMIN — ACETAMINOPHEN 975 MG: 325 TABLET, FILM COATED ORAL at 05:47

## 2024-01-29 RX ADMIN — PREDNISONE 40 MG: 20 TABLET ORAL at 08:56

## 2024-01-29 RX ADMIN — SIMETHICONE 80 MG: 80 TABLET, CHEWABLE ORAL at 13:17

## 2024-01-29 RX ADMIN — PANTOPRAZOLE SODIUM 40 MG: 40 TABLET, DELAYED RELEASE ORAL at 05:47

## 2024-01-29 RX ADMIN — FLUTICASONE PROPIONATE 1 SPRAY: 50 SPRAY, METERED NASAL at 08:57

## 2024-01-29 RX ADMIN — FERROUS SULFATE TAB 325 MG (65 MG ELEMENTAL FE) 325 MG: 325 (65 FE) TAB at 08:56

## 2024-01-29 RX ADMIN — BENZONATATE 100 MG: 100 CAPSULE ORAL at 08:56

## 2024-01-29 RX ADMIN — BRIMONIDINE TARTRATE 1 DROP: 2 SOLUTION OPHTHALMIC at 08:57

## 2024-01-29 RX ADMIN — TIMOLOL MALEATE 1 DROP: 5 SOLUTION/ DROPS OPHTHALMIC at 08:57

## 2024-01-29 NOTE — ASSESSMENT & PLAN NOTE
Lab Results   Component Value Date    EGFR 52 01/29/2024    EGFR 58 01/28/2024    EGFR 51 01/27/2024    CREATININE 1.08 01/29/2024    CREATININE 0.99 01/28/2024    CREATININE 1.11 01/27/2024     Baseline Cr 0.9-1.2

## 2024-01-29 NOTE — TELEPHONE ENCOUNTER
Pts daughter calling in stating the physician in the hospital put in a referral for sleep medicine to have a sleep study done montrell she called the sleep medicine office they advised her they would not be able to schedule the pt because of her COPD and the order for a sleep study would need to come from the pts pulmonologist. Please advise if you are able to order a sleep study or if you would like the pt to come in for a visit to discuss.

## 2024-01-29 NOTE — PLAN OF CARE
Problem: PAIN - ADULT  Goal: Verbalizes/displays adequate comfort level or baseline comfort level  Description: Interventions:  - Encourage patient to monitor pain and request assistance  - Assess pain using appropriate pain scale  - Administer analgesics based on type and severity of pain and evaluate response  - Implement non-pharmacological measures as appropriate and evaluate response  - Consider cultural and social influences on pain and pain management  - Notify physician/advanced practitioner if interventions unsuccessful or patient reports new pain  Outcome: Progressing     Problem: SAFETY ADULT  Goal: Patient will remain free of falls  Description: INTERVENTIONS:  - Educate patient/family on patient safety including physical limitations  - Instruct patient to call for assistance with activity   - Consult OT/PT to assist with strengthening/mobility   - Keep Call bell within reach  - Keep bed low and locked with side rails adjusted as appropriate  - Keep care items and personal belongings within reach  - Initiate and maintain comfort rounds  - Make Fall Risk Sign visible to staff  - Offer Toileting every 2 Hours, in advance of need  - Initiate/Maintain bed alarm  - Obtain necessary fall risk management equipment: bed alarm  - Apply yellow socks and bracelet for high fall risk patients  - Consider moving patient to room near nurses station  Outcome: Progressing  Goal: Maintain or return to baseline ADL function  Description: INTERVENTIONS:  -  Assess patient's ability to carry out ADLs; assess patient's baseline for ADL function and identify physical deficits which impact ability to perform ADLs (bathing, care of mouth/teeth, toileting, grooming, dressing, etc.)  - Assess/evaluate cause of self-care deficits   - Assess range of motion  - Assess patient's mobility; develop plan if impaired  - Assess patient's need for assistive devices and provide as appropriate  - Encourage maximum independence but intervene  and supervise when necessary  - Involve family in performance of ADLs  - Assess for home care needs following discharge   - Consider OT consult to assist with ADL evaluation and planning for discharge  - Provide patient education as appropriate  Outcome: Progressing  Goal: Maintains/Returns to pre admission functional level  Description: INTERVENTIONS:  - Perform AM-PAC 6 Click Basic Mobility/ Daily Activity assessment daily.  - Set and communicate daily mobility goal to care team and patient/family/caregiver.   - Collaborate with rehabilitation services on mobility goals if consulted  - Perform Range of Motion 2 times a day.  - Reposition patient every 2 hours.  - Dangle patient 2 times a day  - Stand patient 2 times a day  - Ambulate patient 3 times a day  - Out of bed to chair 3 times a day   - Out of bed for meals 3 times a day  - Out of bed for toileting  - Record patient progress and toleration of activity level   Outcome: Progressing     Problem: RESPIRATORY - ADULT  Goal: Achieves optimal ventilation and oxygenation  Description: INTERVENTIONS:  - Assess for changes in respiratory status  - Assess for changes in mentation and behavior  - Position to facilitate oxygenation and minimize respiratory effort  - Oxygen administered by appropriate delivery if ordered  - Initiate smoking cessation education as indicated  - Encourage broncho-pulmonary hygiene including cough, deep breathe, Incentive Spirometry  - Assess the need for suctioning and aspirate as needed  - Assess and instruct to report SOB or any respiratory difficulty  - Respiratory Therapy support as indicated  Outcome: Progressing     Problem: MUSCULOSKELETAL - ADULT  Goal: Maintain or return mobility to safest level of function  Description: INTERVENTIONS:  - Assess patient's ability to carry out ADLs; assess patient's baseline for ADL function and identify physical deficits which impact ability to perform ADLs (bathing, care of mouth/teeth,  toileting, grooming, dressing, etc.)  - Assess/evaluate cause of self-care deficits   - Assess range of motion  - Assess patient's mobility  - Assess patient's need for assistive devices and provide as appropriate  - Encourage maximum independence but intervene and supervise when necessary  - Involve family in performance of ADLs  - Assess for home care needs following discharge   - Consider OT consult to assist with ADL evaluation and planning for discharge  - Provide patient education as appropriate  Outcome: Progressing  Goal: Maintain proper alignment of affected body part  Description: INTERVENTIONS:  - Support, maintain and protect limb and body alignment  - Provide patient/ family with appropriate education  Outcome: Progressing

## 2024-01-29 NOTE — ASSESSMENT & PLAN NOTE
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 associated with cough, wheezing, nasal congestion, headaches, nausea, low-grade temperature of 100.5F. Arrived to the ED on 6 L.     Most likely 2/2 +RSV   Smoking Status: quit 9 years ago  CXR: negative for acute cardiopulmonary process.  Troponin 4, CO2 34, BNP 73  PCT neg    Plan:  IV Solu-Medrol: 40 mg q8h --> switched to q12h 1/27. Transition to oral prednisone 40 mg qd 1/28  Xopenex/Atrovent scheduled TID; albuterol as needed  Mucinex 1200 mg BID  Monitor respiratory status, Respiratory protocol, Airway clearance protocol, oscillatory PEP, incentive spirometry  On 1/27: patient still requiring 3 L O2 in the morning, could be ELIUD.  Repeat chest x-ray appears unchanged from prior.  Patient back on home 2 L requirement. Will discharge patient with steroid taper and outpatient sleep medicine follow-up.

## 2024-01-29 NOTE — DISCHARGE INSTR - AVS FIRST PAGE
Dear Noreen Alvarez,     It was our pleasure to care for you here at [facility].  It is our hope that we were always able to exceed the expected standards for your care during your stay.  You were hospitalized due to COPD exacerbation due to RSV (respiratory syncytial virus).  You were cared for on the 3rd floor by Mya Rivera DO under the service of Ana Cristina Hills MD with the [facility] who covers for your primary care physician (PCP), Samantha Heranndez MD, while you were hospitalized.  If you have any questions or concerns related to this hospitalization, you may contact us at .  For follow up as well as any medication refills, we recommend that you follow up with your primary care physician.  A registered nurse will reach out to you by phone within a few days after your discharge to answer any additional questions that you may have after going home.  However, at this time we provide for you here, the most important instructions / recommendations at discharge:     Notable Medication Adjustments -   Continue prednisone taper as follows:   Take 40 mg tomorrow then  Take 30 mg for 3 days then  Take 20 mg for 3 days then  Take 10 mg for 3 days then stop  Testing Required after Discharge -   None- possible sleep study  ** Please contact your PCP to request testing orders for any of the testing recommended here **  Important follow up information -   Please follow-up with pulmonologist for any O2 machine needs  Please make an appointment with pulmonology or sleep medicine for sleep study to test for sleep apnea.  Other Instructions -   Please periodically check your blood pressure. Blood pressure may be high due to current steroids. Please follow-up with PCP to discuss this.  Please review this entire after visit summary as additional general instructions including medication list, appointments, activity, diet, any pertinent wound care, and  other additional recommendations from your care team that may be provided for you.      Sincerely,     Mya Rivera, DO

## 2024-01-29 NOTE — ASSESSMENT & PLAN NOTE
Prior to potential discharge 1/29, patient had an elevated blood pressure reading in the 170s over 70s in the a.m.  Upon recheck, patient was elevated to 190s over 70s.  Ordered hydralazine.  Patient complaining of possible atypical chest pain, head pressure, increased shortness of breath.  Ordered EKG, troponins, hydralazine.  Patient has no history of previous hypertension.  Could also be related to steroids.    TTE (1/29): Left Ventricle: Left ventricular cavity size is normal. Wall thickness is normal. The left ventricular ejection fraction is 60%. Systolic function is normal. Wall motion is normal. Diastolic function is normal.  Right Ventricle: Right ventricular cavity size is normal. Systolic function is normal. Aortic Valve: The aortic valve cannot be ruled out as bicuspid. The leaflets are not thickened. The leaflets are not calcified. The leaflets exhibit normal mobility.  Tricuspid Valve: There is mild regurgitation.    Recommend for patient to periodically check blood pressure at home and to follow-up with her PCP if remains elevated after steroids.

## 2024-01-30 ENCOUNTER — OFFICE VISIT (OUTPATIENT)
Dept: FAMILY MEDICINE CLINIC | Facility: CLINIC | Age: 69
End: 2024-01-30
Payer: COMMERCIAL

## 2024-01-30 DIAGNOSIS — F41.1 GAD (GENERALIZED ANXIETY DISORDER): ICD-10-CM

## 2024-01-30 DIAGNOSIS — F41.8 DEPRESSION WITH ANXIETY: ICD-10-CM

## 2024-01-30 DIAGNOSIS — K59.09 CHRONIC CONSTIPATION: Primary | ICD-10-CM

## 2024-01-30 DIAGNOSIS — E66.09 CLASS 1 OBESITY DUE TO EXCESS CALORIES WITH SERIOUS COMORBIDITY AND BODY MASS INDEX (BMI) OF 34.0 TO 34.9 IN ADULT: ICD-10-CM

## 2024-01-30 DIAGNOSIS — J43.2 CENTRILOBULAR EMPHYSEMA (HCC): ICD-10-CM

## 2024-01-30 DIAGNOSIS — J45.50 SEVERE PERSISTENT ASTHMA WITHOUT COMPLICATION: ICD-10-CM

## 2024-01-30 DIAGNOSIS — R73.01 IMPAIRED FASTING GLUCOSE: ICD-10-CM

## 2024-01-30 DIAGNOSIS — J44.1 ACUTE EXACERBATION OF CHRONIC OBSTRUCTIVE PULMONARY DISEASE (HCC): ICD-10-CM

## 2024-01-30 DIAGNOSIS — M46.1 SACROILIAC INFLAMMATION (HCC): ICD-10-CM

## 2024-01-30 DIAGNOSIS — E03.9 HYPOTHYROIDISM, UNSPECIFIED TYPE: ICD-10-CM

## 2024-01-30 DIAGNOSIS — N18.31 STAGE 3A CHRONIC KIDNEY DISEASE (HCC): ICD-10-CM

## 2024-01-30 DIAGNOSIS — F32.0 CURRENT MILD EPISODE OF MAJOR DEPRESSIVE DISORDER WITHOUT PRIOR EPISODE (HCC): ICD-10-CM

## 2024-01-30 DIAGNOSIS — E78.00 ELEVATED LDL CHOLESTEROL LEVEL: ICD-10-CM

## 2024-01-30 DIAGNOSIS — J21.0 RSV BRONCHIOLITIS: ICD-10-CM

## 2024-01-30 DIAGNOSIS — K21.9 GASTROESOPHAGEAL REFLUX DISEASE WITHOUT ESOPHAGITIS: ICD-10-CM

## 2024-01-30 DIAGNOSIS — C14.0 CANCER OF PHARYNX (HCC): ICD-10-CM

## 2024-01-30 DIAGNOSIS — M25.551 RIGHT HIP PAIN: ICD-10-CM

## 2024-01-30 DIAGNOSIS — Z13.1 ENCOUNTER FOR SCREENING FOR DIABETES MELLITUS: ICD-10-CM

## 2024-01-30 PROCEDURE — 99496 TRANSJ CARE MGMT HIGH F2F 7D: CPT | Performed by: FAMILY MEDICINE

## 2024-01-30 RX ORDER — ALBUTEROL SULFATE 90 UG/1
2 AEROSOL, METERED RESPIRATORY (INHALATION) EVERY 6 HOURS PRN
Qty: 18 G | Refills: 5 | Status: SHIPPED | OUTPATIENT
Start: 2024-01-30

## 2024-01-30 RX ORDER — BUDESONIDE, GLYCOPYRROLATE, AND FORMOTEROL FUMARATE 160; 9; 4.8 UG/1; UG/1; UG/1
2 AEROSOL, METERED RESPIRATORY (INHALATION) 2 TIMES DAILY
Qty: 32.1 G | Refills: 1 | Status: SHIPPED | OUTPATIENT
Start: 2024-01-30

## 2024-01-30 RX ORDER — PANTOPRAZOLE SODIUM 40 MG/1
40 TABLET, DELAYED RELEASE ORAL
Qty: 90 TABLET | Refills: 1 | Status: SHIPPED | OUTPATIENT
Start: 2024-01-30 | End: 2024-07-28

## 2024-01-30 RX ORDER — POLYETHYLENE GLYCOL 3350 17 G/17G
17 POWDER, FOR SOLUTION ORAL DAILY
Qty: 507 G | Refills: 0 | Status: SHIPPED | OUTPATIENT
Start: 2024-01-30

## 2024-01-30 RX ORDER — SERTRALINE HYDROCHLORIDE 25 MG/1
25 TABLET, FILM COATED ORAL
Qty: 90 TABLET | Refills: 1 | Status: SHIPPED | OUTPATIENT
Start: 2024-01-30 | End: 2024-07-28

## 2024-01-30 RX ORDER — BRIMONIDINE TARTRATE 2 MG/ML
SOLUTION/ DROPS OPHTHALMIC
Refills: 0 | OUTPATIENT
Start: 2024-01-30

## 2024-01-30 RX ORDER — MONTELUKAST SODIUM 10 MG/1
10 TABLET ORAL
Qty: 90 TABLET | Refills: 1 | Status: SHIPPED | OUTPATIENT
Start: 2024-01-30

## 2024-01-30 RX ORDER — LEVOTHYROXINE SODIUM 0.07 MG/1
75 TABLET ORAL
Qty: 90 TABLET | Refills: 0 | Status: SHIPPED | OUTPATIENT
Start: 2024-01-30

## 2024-01-30 NOTE — PROGRESS NOTES
Virtual TCM Visit:    Verification of patient location:    Patient is located at Home in the following state in which I hold an active license [place]    Assessment/Plan:          Problem List Items Addressed This Visit          Digestive    Cancer of pharynx (HCC)    Gastroesophageal reflux disease without esophagitis    Relevant Medications    pantoprazole (PROTONIX) 40 mg tablet       Endocrine    Hypothyroidism    Relevant Medications    levothyroxine 75 mcg tablet    Other Relevant Orders    TSH, 3rd generation       Respiratory    Centrilobular emphysema (HCC)    Relevant Medications    albuterol (PROVENTIL HFA,VENTOLIN HFA) 90 mcg/act inhaler    Budeson-Glycopyrrol-Formoterol (Breztri Aerosphere) 160-9-4.8 MCG/ACT AERO    montelukast (SINGULAIR) 10 mg tablet    Severe persistent asthma without complication    Relevant Medications    albuterol (PROVENTIL HFA,VENTOLIN HFA) 90 mcg/act inhaler    Budeson-Glycopyrrol-Formoterol (Breztri Aerosphere) 160-9-4.8 MCG/ACT AERO    montelukast (SINGULAIR) 10 mg tablet    Other Relevant Orders    Spacer Device for Inhaler    Acute exacerbation of chronic obstructive pulmonary disease (HCC)    Relevant Medications    albuterol (PROVENTIL HFA,VENTOLIN HFA) 90 mcg/act inhaler    Budeson-Glycopyrrol-Formoterol (Breztri Aerosphere) 160-9-4.8 MCG/ACT AERO    montelukast (SINGULAIR) 10 mg tablet       Musculoskeletal and Integument    Sacroiliac inflammation (HCC)       Genitourinary    Chronic kidney disease, stage 3 (moderate)    Relevant Orders    CBC and differential    Comprehensive metabolic panel       Other    Depression with anxiety    Relevant Medications    sertraline (ZOLOFT) 25 mg tablet    Current mild episode of major depressive disorder without prior episode (HCC)    Relevant Medications    sertraline (ZOLOFT) 25 mg tablet     Other Visit Diagnoses       Chronic constipation    -  Primary    Relevant Medications    polyethylene glycol (GLYCOLAX) 17 GM/SCOOP powder     TIMMY (generalized anxiety disorder)        Relevant Medications    sertraline (ZOLOFT) 25 mg tablet    Other Relevant Orders    CBC and differential    RSV bronchiolitis        Relevant Medications    albuterol (PROVENTIL HFA,VENTOLIN HFA) 90 mcg/act inhaler    Budeson-Glycopyrrol-Formoterol (Breztri Aerosphere) 160-9-4.8 MCG/ACT AERO    montelukast (SINGULAIR) 10 mg tablet    Right hip pain        Relevant Medications    Diclofenac Sodium (VOLTAREN) 1 %    Encounter for screening for diabetes mellitus        Relevant Orders    Hemoglobin A1C    Elevated LDL cholesterol level        Relevant Orders    Lipid panel    Class 1 obesity due to excess calories with serious comorbidity and body mass index (BMI) of 34.0 to 34.9 in adult        Relevant Orders    Hemoglobin A1C    Impaired fasting glucose        Relevant Orders    Hemoglobin A1C           Check labs  Continue duonebs  Follow up with pulmonary  She does likely have nocturnal hypoxia  Discussed with patient and Ashley that she should get a medical alert device which she uses at all times.  Reason for visit is TCM follow up    Encounter provider Samantha Hernandez MD     Provider located at [facility]  [facility]  [address]  [facility] [id number] [phone]    Recent Visits  Date Type Provider Dept   01/30/24 Office Visit Samantha Hernandez MD [facility]   Showing recent visits within past 7 days and meeting all other requirements  Future Appointments  No visits were found meeting these conditions.  Showing future appointments within next 150 days and meeting all other requirements       After connecting through [employer], the patient was identified by name and date of birth. Noreen Alvarez was informed that this is a telemedicine visit and that the visit is being conducted through the [employer] platform. She agrees to proceed..  My office door was closed. No one else was in the room.  She acknowledged consent  and understanding of privacy and security of the video platform. The patient has agreed to participate and understands they can discontinue the visit at any time.    Patient is aware this is a billable service.    Transitional Care Management Review:  Noreen Alvarez is a 68 y.o. female here for TCM follow up.     During the TCM phone call patient stated:    TCM Call       Date and time call was made  1/30/2024  3:39 PM    Hospital care reviewed  Records reviewed    Patient was hospitialized at  [facility]    Date of Admission  01/23/24    Date of discharge  01/29/24    Diagnosis  Acute exacerbation of chronic obstructive pulmonary disease    Disposition  Home    Were the patients medications reviewed and updated  Yes    Current Symptoms  None    Shortness of breath severity  Moderate  while walking          TCM Call       Post hospital issues  None    Should patient be enrolled in anticoag monitoring?  No    Scheduled for follow up?  Yes    Patients specialists  [facility]    Did you obtain your prescribed medications  Yes    Do you need help managing your prescriptions or medications  No    Is transportation to your appointment needed  No    I have advised the patient to call PCP with any new or worsening symptoms  Valerie Salamanca MA          Subjective:     Patient ID: Noreen Alvarez is a 68 y.o. female.    Was diagnosed with likely RSV pneumonia- she is better but is using duoneb every 4 hours, states she has been shaky , not doing well because of that, states albuterol does not help, she uses mainly while outside  Daughter Ashley was here and did virtual visit on her phone through a video call as Noreen does not want to leave the house at present      Review of Systems   Constitutional:  Negative for fatigue and fever.   HENT:  Negative for congestion, facial swelling, mouth sores, rhinorrhea, sore throat and trouble swallowing.    Eyes:  Negative for pain and redness.   Respiratory:  Positive for  shortness of breath. Negative for cough and wheezing.    Cardiovascular:  Negative for chest pain, palpitations and leg swelling.   Gastrointestinal:  Negative for abdominal pain, blood in stool, constipation, diarrhea and nausea.   Genitourinary:  Negative for dysuria, hematuria and urgency.   Musculoskeletal:  Negative for arthralgias, back pain and myalgias.   Skin:  Negative for rash and wound.   Neurological:  Negative for seizures, syncope and headaches.   Hematological:  Negative for adenopathy.   Psychiatric/Behavioral:  Negative for agitation and behavioral problems. The patient is nervous/anxious.          Objective:    There were no vitals filed for this visit.    Physical Exam  Vitals and nursing note reviewed.   Constitutional:       Appearance: She is well-developed.   HENT:      Head: Normocephalic and atraumatic.      Right Ear: External ear normal.      Left Ear: External ear normal.      Nose: Nose normal.   Eyes:      General: No scleral icterus.        Right eye: No discharge.         Left eye: No discharge.      Conjunctiva/sclera: Conjunctivae normal.      Comments: Visual observation   Pulmonary:      Effort: Accessory muscle usage present. No respiratory distress.   Musculoskeletal:         General: No tenderness or deformity.   Neurological:      Mental Status: She is alert. Mental status is at baseline.   Psychiatric:         Behavior: Behavior normal.         Judgment: Judgment normal.         Medications have been reviewed by provider in current encounter    I spent 25 minutes with the patient during this visit.    Samantha Hernandez MD      VIRTUAL VISIT DISCLAIMER    Noreen Alvarez verbally agrees to participate in Virtual Care Services. Pt is aware that Virtual Care Services could be limited without vital signs or the ability to perform a full hands-on physical exam. Noreen Alvarez understands she or the provider may request at any time to terminate the video visit and request the patient to  seek care or treatment in person.

## 2024-01-31 ENCOUNTER — TRANSITIONAL CARE MANAGEMENT (OUTPATIENT)
Dept: FAMILY MEDICINE CLINIC | Facility: CLINIC | Age: 69
End: 2024-01-31

## 2024-02-01 ENCOUNTER — TELEPHONE (OUTPATIENT)
Dept: HEMATOLOGY ONCOLOGY | Facility: CLINIC | Age: 69
End: 2024-02-01

## 2024-02-01 NOTE — TELEPHONE ENCOUNTER
Appointment Change  Cancel, Reschedule, Change to Virtual      Who are you speaking with? Patient   If it is not the patient, is the caller listed on the communication consent form? N/A   Which provider is the appointment scheduled with? Dr. Muñoz   When was the original appointment scheduled?    Please list date and time 02/12/2024 @3PM    At which location is the appointment scheduled to take place? [facility]   Was the appointment rescheduled?     Was the appointment changed from an in person visit to a virtual visit?    If so, please list the details of the change. Yes, 03/20/2024 @1:40PM    What is the reason for the appointment change? provider

## 2024-02-02 DIAGNOSIS — D50.8 IRON DEFICIENCY ANEMIA DUE TO DIETARY CAUSES: ICD-10-CM

## 2024-02-02 RX ORDER — FERROUS SULFATE 324(65)MG
324 TABLET, DELAYED RELEASE (ENTERIC COATED) ORAL
Qty: 90 TABLET | Refills: 1 | Status: SHIPPED | OUTPATIENT
Start: 2024-02-02

## 2024-02-12 ENCOUNTER — OFFICE VISIT (OUTPATIENT)
Dept: PULMONOLOGY | Facility: CLINIC | Age: 69
End: 2024-02-12
Payer: COMMERCIAL

## 2024-02-12 VITALS
TEMPERATURE: 98.6 F | OXYGEN SATURATION: 95 % | DIASTOLIC BLOOD PRESSURE: 70 MMHG | BODY MASS INDEX: 34.37 KG/M2 | WEIGHT: 186.8 LBS | HEART RATE: 74 BPM | HEIGHT: 62 IN | SYSTOLIC BLOOD PRESSURE: 132 MMHG

## 2024-02-12 DIAGNOSIS — Z99.81 CHRONIC RESPIRATORY FAILURE WITH HYPOXIA, ON HOME O2 THERAPY: ICD-10-CM

## 2024-02-12 DIAGNOSIS — Z85.21 HISTORY OF LARYNGEAL CANCER: ICD-10-CM

## 2024-02-12 DIAGNOSIS — J96.11 CHRONIC RESPIRATORY FAILURE WITH HYPOXIA, ON HOME O2 THERAPY: ICD-10-CM

## 2024-02-12 DIAGNOSIS — J31.1 POST-NASAL CATARRH: ICD-10-CM

## 2024-02-12 DIAGNOSIS — G47.33 OSA (OBSTRUCTIVE SLEEP APNEA): ICD-10-CM

## 2024-02-12 DIAGNOSIS — R91.8 MULTIPLE LUNG NODULES: ICD-10-CM

## 2024-02-12 DIAGNOSIS — M25.551 RIGHT HIP PAIN: ICD-10-CM

## 2024-02-12 DIAGNOSIS — E66.09 CLASS 1 OBESITY DUE TO EXCESS CALORIES WITH SERIOUS COMORBIDITY AND BODY MASS INDEX (BMI) OF 32.0 TO 32.9 IN ADULT: ICD-10-CM

## 2024-02-12 DIAGNOSIS — K59.09 CHRONIC CONSTIPATION: ICD-10-CM

## 2024-02-12 DIAGNOSIS — J43.2 CENTRILOBULAR EMPHYSEMA (HCC): Primary | ICD-10-CM

## 2024-02-12 PROCEDURE — 99215 OFFICE O/P EST HI 40 MIN: CPT | Performed by: INTERNAL MEDICINE

## 2024-02-12 RX ORDER — BUDESONIDE, GLYCOPYRROLATE, AND FORMOTEROL FUMARATE 160; 9; 4.8 UG/1; UG/1; UG/1
2 AEROSOL, METERED RESPIRATORY (INHALATION) 2 TIMES DAILY
Qty: 32.1 G | Refills: 1 | Status: SHIPPED | OUTPATIENT
Start: 2024-02-12

## 2024-02-12 RX ORDER — AZITHROMYCIN 250 MG/1
250 TABLET, FILM COATED ORAL
Qty: 36 TABLET | Refills: 1 | Status: SHIPPED | OUTPATIENT
Start: 2024-02-12 | End: 2024-07-05

## 2024-02-12 NOTE — PROGRESS NOTES
Assessment/Plan:    Chronic respiratory failure with hypoxia, on home O2 therapy (HCC)  Mrs.Piedad Boswell was found to have chronic hypoxic respiratory failure and has been started on oxygen supplementation at 2 L/min which she uses around-the-clock.  She has portable oxygen concentrator.  I have advised her to continue with oxygen supplementation as before.    Centrilobular emphysema (HCC)  She has severe COPD emphysema from her previous smoking.  Her previous spirogram showed severe airflow obstruction.  On clinical examination her chest auscultation revealed bilateral rhonchi.  She is currently on treatment with Breztri, and nebulized ipratropium and albuterol.  She is on oxygen supplementation at 2 liters/minute.  Her PFT from 4/21/2022 showed severe airflow obstruction with severe diffusion defect air-trapping and hyperinflation consistent with emphysema.  I have advised her to continue with Breztri regularly and albuterol as needed.  I have also advised her to continue with azithromycin 3 times a week to prevent exacerbations.  I had a long discussion with her and her daughter and answered all their questions.       Multiple lung nodules  She has multiple lung nodules on her CT scan which have been stable.  Her previous CT scan was unremarkable. However, she has an enlarging 1.6 x 0.8 cm ovoid groundglass right lower lobe nodule on her CT scan from  February 2022.  CT scan of the abdomen from 1/24/2024 showed stability of the lung nodules.    ELIUD (obstructive sleep apnea)  She has history of daytime sleepiness and tiredness.  Her sleep is interrupted and she snores severely.  Her daughter has noticed apneic events.  She likely has significant obstructive sleep apnea and could benefit from positive airway pressure therapy.  She has got severe COPD emphysema.  She needs a in laboratory overnight sleep study for further evaluation and this has been ordered.    History of laryngeal cancer  She had laryngeal cancer  in 2015 which was treated with radiation therapy and chemo therapy.  She has post radiation changes in the neck..  She used to follow with Dr. Brooks.     Class 1 obesity due to excess calories with serious comorbidity and body mass index (BMI) of 32.0 to 32.9 in adult  Is obese and understands the need for weight reduction.  She understands that weight reduction can significantly improve her sleep apnea and other symptoms.       Diagnoses and all orders for this visit:    Centrilobular emphysema (HCC)  -     Budeson-Glycopyrrol-Formoterol (Breztri Aerosphere) 160-9-4.8 MCG/ACT AERO; Inhale 2 puffs 2 (two) times a day Rinse mouth after use.  -     azithromycin (ZITHROMAX) 250 mg tablet; Take 1 tablet (250 mg total) by mouth every other day    Multiple lung nodules    ELIUD (obstructive sleep apnea)  -     Diagnostic Sleep Study; Future    Chronic respiratory failure with hypoxia, on home O2 therapy     History of laryngeal cancer    Class 1 obesity due to excess calories with serious comorbidity and body mass index (BMI) of 32.0 to 32.9 in adult          Subjective:      Patient ID: Noreen Alvarez is a 68 y.o. female.    Mrs.Piedad Alvarez is a 68-year-old Gabonese-speaking lady who came for follow-up with her daughter who speaks excellent English.  A  was not immediately available.  The patient was admitted to hospital last month with worsening shortness of breath and cough and was found to have respiratory bronchiolitis secondary to RSV.  She was started on 2 L/min of oxygen supplementation which she uses around-the-clock now.  Her shortness of breath is improved but she continues to have cough the cough is productive with occasional yellow-greenish phlegm.  She still has wheezing.  She has been on Breztri regularly and albuterol as needed.  She has previous history of laryngeal cancer.  She has occasional dysphagia.  Her exercise tolerance is less than half a block.  She denied any swelling of feet  chest pain or palpitations.  Her appetite has been good.  No fever or chills.  She was also on azithromycin for immunomodulatory effects which she has not been taking after her recent admission.  She was found to have severe COPD emphysema from her smoking.  Her previous PFT showed severe airflow obstruction with severe diffusion defect air-trapping and hyperinflation.  She was considered for endobronchial valve therapy but the patient refused.  She did not have pulmonary rehab.  Incidentally she mentioned daytime sleepiness and tiredness.  Her sleep is interrupted and she snores heavily.  Her daughter has noted apneic events.  She has not been sleep tested before.  She is currently on 2 L of oxygen supplementation.  She has a portable oxygen concentrator.  She has history of allergies.  She has had multiple lung nodules which have been on follow-up.  She had a left lower lobe lung nodule as well as a right-sided lung nodule.  Her most recent CT scan of the abdomen from 1/24/2024 showed stability of the lung nodules.        The following portions of the patient's history were reviewed and updated as appropriate: allergies, current medications, past family history, past medical history, past social history, past surgical history, and problem list.    Review of Systems   Constitutional:  Negative for appetite change, chills, fatigue and fever.   HENT:  Positive for rhinorrhea and trouble swallowing. Negative for hearing loss, sneezing and sore throat.    Eyes:  Negative for visual disturbance.   Respiratory:  Positive for cough, shortness of breath and wheezing. Negative for chest tightness.    Cardiovascular:  Negative for chest pain, palpitations and leg swelling.   Gastrointestinal:  Positive for constipation. Negative for abdominal pain, diarrhea, nausea and vomiting.   Genitourinary:  Negative for dysuria, frequency and urgency.   Musculoskeletal:  Positive for back pain. Negative for arthralgias and gait  "problem.   Skin:  Negative for rash.   Allergic/Immunologic: Positive for environmental allergies.   Neurological:  Positive for light-headedness. Negative for dizziness, syncope and headaches.   Psychiatric/Behavioral:  Positive for sleep disturbance. Negative for agitation and confusion. The patient is not nervous/anxious.          Objective:      /70 (BP Location: Left arm, Patient Position: Sitting, Cuff Size: Standard)   Pulse 74   Temp 98.6 °F (37 °C) (Tympanic)   Ht 5' 2\" (1.575 m)   Wt 84.7 kg (186 lb 12.8 oz)   SpO2 95%   BMI 34.17 kg/m²          Physical Exam  Vitals reviewed.   Constitutional:       General: She is not in acute distress.     Appearance: She is not ill-appearing, toxic-appearing or diaphoretic.   HENT:      Head: Normocephalic.      Mouth/Throat:      Mouth: Mucous membranes are moist.   Eyes:      General: No scleral icterus.     Conjunctiva/sclera: Conjunctivae normal.   Cardiovascular:      Rate and Rhythm: Normal rate and regular rhythm.      Heart sounds: Normal heart sounds. No murmur heard.  Pulmonary:      Effort: Pulmonary effort is normal. No respiratory distress.      Breath sounds: Normal breath sounds. No stridor. No wheezing, rhonchi or rales.   Chest:      Chest wall: No tenderness.   Abdominal:      General: Bowel sounds are normal.      Palpations: Abdomen is soft.      Tenderness: There is no abdominal tenderness. There is no guarding.   Musculoskeletal:      Cervical back: Neck supple. No rigidity.      Right lower leg: No edema.      Left lower leg: No edema.   Lymphadenopathy:      Cervical: No cervical adenopathy.   Skin:     Coloration: Skin is not jaundiced or pale.      Findings: No rash.   Neurological:      Mental Status: She is alert and oriented to person, place, and time.      Gait: Gait normal.   Psychiatric:         Mood and Affect: Mood normal.         Behavior: Behavior normal.         Thought Content: Thought content normal.         Judgment: " Judgment normal.      I spent 45 minutes of time during care of this patient with complex medical issues who is coming to see me after a long time.  She was recently admitted to hospital with worsening symptoms.  The majority of this time was spent directly with the patient counseling as well as coordinating care.

## 2024-02-13 RX ORDER — POLYETHYLENE GLYCOL 3350 17 G/17G
POWDER, FOR SOLUTION ORAL
Qty: 510 G | Refills: 0 | Status: SHIPPED | OUTPATIENT
Start: 2024-02-13

## 2024-02-13 RX ORDER — IPRATROPIUM BROMIDE 42 UG/1
2 SPRAY, METERED NASAL 3 TIMES DAILY
Qty: 45 ML | Refills: 1 | Status: SHIPPED | OUTPATIENT
Start: 2024-02-13

## 2024-02-13 NOTE — ASSESSMENT & PLAN NOTE
She has multiple lung nodules on her CT scan which have been stable.  Her previous CT scan was unremarkable. However, she has an enlarging 1.6 x 0.8 cm ovoid groundglass right lower lobe nodule on her CT scan from  February 2022.  CT scan of the abdomen from 1/24/2024 showed stability of the lung nodules.

## 2024-02-13 NOTE — ASSESSMENT & PLAN NOTE
Mrs.Piedad Boswell was found to have chronic hypoxic respiratory failure and has been started on oxygen supplementation at 2 L/min which she uses around-the-clock.  She has portable oxygen concentrator.  I have advised her to continue with oxygen supplementation as before.

## 2024-02-13 NOTE — ASSESSMENT & PLAN NOTE
Is obese and understands the need for weight reduction.  She understands that weight reduction can significantly improve her sleep apnea and other symptoms.

## 2024-02-13 NOTE — ASSESSMENT & PLAN NOTE
She had laryngeal cancer in 2015 which was treated with radiation therapy and chemo therapy.  She has post radiation changes in the neck..  She used to follow with Dr. Brooks.

## 2024-02-13 NOTE — ASSESSMENT & PLAN NOTE
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  Her previous spirogram showed severe airflow obstruction.  On clinical examination her chest auscultation revealed bilateral rhonchi.  She is currently on treatment with Breztri, and nebulized ipratropium and albuterol.  She is on oxygen supplementation at 2 liters/minute.  Her PFT from 4/21/2022 showed severe airflow obstruction with severe diffusion defect air-trapping and hyperinflation consistent with emphysema.  I have advised her to continue with Breztri regularly and albuterol as needed.  I have also advised her to continue with azithromycin 3 times a week to prevent exacerbations.  I had a long discussion with her and her daughter and answered all their questions.

## 2024-02-13 NOTE — ASSESSMENT & PLAN NOTE
She has history of daytime sleepiness and tiredness.  Her sleep is interrupted and she snores severely.  Her daughter has noticed apneic events.  She likely has significant obstructive sleep apnea and could benefit from positive airway pressure therapy.  She has got severe COPD emphysema.  She needs a in laboratory overnight sleep study for further evaluation and this has been ordered.

## 2024-02-15 ENCOUNTER — TELEPHONE (OUTPATIENT)
Age: 69
End: 2024-02-15

## 2024-02-15 ENCOUNTER — NURSE TRIAGE (OUTPATIENT)
Age: 69
End: 2024-02-15

## 2024-02-15 ENCOUNTER — HOSPITAL ENCOUNTER (OUTPATIENT)
Dept: SLEEP CENTER | Facility: CLINIC | Age: 69
Discharge: HOME | End: 2024-02-15
Payer: COMMERCIAL

## 2024-02-15 DIAGNOSIS — G47.33 OSA (OBSTRUCTIVE SLEEP APNEA): ICD-10-CM

## 2024-02-15 PROCEDURE — 95810 POLYSOM 6/> YRS 4/> PARAM: CPT | Performed by: INTERNAL MEDICINE

## 2024-02-15 PROCEDURE — 95810 POLYSOM 6/> YRS 4/> PARAM: CPT

## 2024-02-15 NOTE — TELEPHONE ENCOUNTER
Judie calls from [facility] and requesting that we fax last office visit note to them @ [phone]

## 2024-02-15 NOTE — TELEPHONE ENCOUNTER
Last OV 1/10/24  Hx: epigastric pain, GERD  EGD/Colonoscopy- 4/11/24       Spoke with pts daughter Ashley, pt is having ongoing epigastric pain and nausea. She has US done on 1/21/24 in the ED and wanted Dr. Raymundo to review it.   I reviewed OV notes with pts daughter, she does not think pt is on pantoprazole she did not see that when looking through her medications yesterday. I advised her I would call pharmacy as it was prescribed on 1/30 by PCP but pt should be on this to help with epigastric pain.   Pt is also having constipation due to iron pills daily- small hard stools. Using miralax daily, magnesium capsules at night, high fiber diet and drinking plenty of fluids.   I advised her to increase miralax to BID as constipation can worsen upper GI symptoms and nausea. She understood.       Spoke with [facility] pharmacy, pt picked up pantorpazole next refill due in March. I spoke with ashley again and advised her to look through pts medication to see if she has it and pt should take it by itself in the morning 30 min before breakfast. She understood.     Answer Questions - Initial Assesment - Gerd Recurrent  When did your symptoms start: ongoing    How often are you experiencing symptoms: daily     When are your symptoms occurring: constant     Are you currently taking: unknown     Have you tried any OTC medications: no     Have any OTC medications resolved or lessened your symptoms: no     What recent testing has the patient had:     Are you following the diet and lifestyle modifications: yes     Any recent changes in your bowel habits: yes constipation    Protocols used: GERD

## 2024-02-15 NOTE — TELEPHONE ENCOUNTER
Regarding: Epigastric pain/reflux  ----- Message from Perlita Kathleen sent at 2/15/2024  3:53 PM EST -----  Pts daughter Ashley called in first to see if there was anything sooner for her upcoming procedures with Dr. Raymundo. Nothing sooner that 4/11, which is her current appt. She stated that pt is having a lot of reflux and epigastric pain. She is not taking anything for it and would like to discuss putting her on something if possible until her upcoming procedures.

## 2024-02-16 NOTE — PROGRESS NOTES
Sleep Study Documentation    Pre-Sleep Study       Sleep testing procedure explained to patient:YES    Patient napped prior to study:YES- less than 30 minutes. Napped after 2PM: yes    Caffeine:Dayshift worker after 12PM.  Caffeine use:NO    Alcohol:Dayshift workers after 5PM: Alcohol use:NO    Typical day for patient:YES       Study Documentation    Sleep Study Indications: COPD, EDS    Sleep Study: Diagnostic   Snore:Mild  Supplemental O2: 1L/Min  O2 flow rate (L/min) range 1    O2 flow rate (L/min) final 1  Minimum SaO2 86  Baseline SaO2 91    EKG abnormalities: no     EEG abnormalities: no    Were abnormal behaviors in sleep observed:NO    Is Total Sleep Study Recording Time < 2 hours: N/A    Is Total Sleep Study Recording Time > 2 hours but study is incomplete: N/A    Is Total Sleep Study Recording Time 6 hours or more but sleep was not obtained: NO    Patient classification: disabled       Post-Sleep Study    Medication used at bedtime or during sleep study:YES other prescription medications    Patient reports time it took to fall asleep:less than 20 minutes    Patient reports waking up during study:Denied    Patient reports sleeping 6 to 8 hours without dreaming.    Does the Patient feel this is a typical night of sleep:better than usual    Patient rated sleepiness: Somewhat sleepy or tired    PAP treatment:no.

## 2024-02-20 DIAGNOSIS — K21.9 GASTROESOPHAGEAL REFLUX DISEASE WITHOUT ESOPHAGITIS: ICD-10-CM

## 2024-02-20 RX ORDER — PANTOPRAZOLE SODIUM 40 MG/1
40 TABLET, DELAYED RELEASE ORAL
Qty: 30 TABLET | Refills: 3 | Status: ON HOLD | OUTPATIENT
Start: 2024-02-20

## 2024-02-20 NOTE — TELEPHONE ENCOUNTER
Pts daughter called about clarification of US. She inquired that since US was normal and everything was normal, why was her mom still having symptoms.     I explained that although US was normal, EGD has not been done yet and that will help further evaluate pts symptoms. Ashley verbalized understanding. She further inquired if pt should be seen by provider before or after EGD. I confirmed that pts symptoms were not severe. I advised OV after EGD. I mentioned pt to continue to take pantoprazole 40 mg daily. However, ashley explained that pt does not have rx. She stated that although [facility] stated pt picked up pantoprazole, pt does not have it and ashley is unsure what happened to medication. She spoke with pcp office who originally prescribed the ppi and they recommended she contact us.     Ashley is requesting new script for pantoprazole to be sent to pharmacy   Reason for Disposition   Information only question and nurse able to answer    Protocols used: Information Only Call - No Triage-ADULT-[place]

## 2024-02-20 NOTE — TELEPHONE ENCOUNTER
Called with the interpretor line and cleo to contrast the office to make a follow up with the office with a PA     Interpretor Number:  [id number]

## 2024-02-20 NOTE — TELEPHONE ENCOUNTER
Pt's daughter Ashley calling to speak with a nurse regarding US and clarification on results. Connected for assistance.

## 2024-02-21 PROBLEM — Z12.11 COLON CANCER SCREENING: Status: RESOLVED | Noted: 2024-01-10 | Resolved: 2024-02-21

## 2024-02-27 DIAGNOSIS — J43.2 CENTRILOBULAR EMPHYSEMA (HCC): ICD-10-CM

## 2024-02-27 RX ORDER — AZITHROMYCIN 250 MG/1
250 TABLET, FILM COATED ORAL
Qty: 45 TABLET | Refills: 0 | Status: ON HOLD | OUTPATIENT
Start: 2024-02-27 | End: 2024-05-26

## 2024-02-28 ENCOUNTER — APPOINTMENT (EMERGENCY)
Dept: RADIOLOGY | Facility: HOSPITAL | Age: 69
DRG: 391 | End: 2024-02-28
Payer: COMMERCIAL

## 2024-02-28 ENCOUNTER — HOSPITAL ENCOUNTER (INPATIENT)
Facility: HOSPITAL | Age: 69
LOS: 6 days | Discharge: HOME | DRG: 391 | End: 2024-03-05
Attending: STUDENT IN AN ORGANIZED HEALTH CARE EDUCATION/TRAINING PROGRAM | Admitting: FAMILY MEDICINE
Payer: COMMERCIAL

## 2024-02-28 DIAGNOSIS — R91.8 MULTIPLE LUNG NODULES: ICD-10-CM

## 2024-02-28 DIAGNOSIS — R07.89 ATYPICAL CHEST PAIN: Primary | ICD-10-CM

## 2024-02-28 DIAGNOSIS — E78.5 HYPERLIPIDEMIA, UNSPECIFIED HYPERLIPIDEMIA TYPE: ICD-10-CM

## 2024-02-28 DIAGNOSIS — K21.9 GASTROESOPHAGEAL REFLUX DISEASE WITHOUT ESOPHAGITIS: ICD-10-CM

## 2024-02-28 DIAGNOSIS — R03.0 ELEVATED BLOOD PRESSURE READING: ICD-10-CM

## 2024-02-28 DIAGNOSIS — J44.1 COPD EXACERBATION (HCC): ICD-10-CM

## 2024-02-28 LAB
2HR DELTA HS TROPONIN: 79 NG/L
ALBUMIN SERPL BCP-MCNC: 4.3 G/DL (ref 3.5–5)
ALP SERPL-CCNC: 96 U/L (ref 34–104)
ALT SERPL W P-5'-P-CCNC: 13 U/L (ref 7–52)
ANION GAP SERPL CALCULATED.3IONS-SCNC: 6 MMOL/L
AST SERPL W P-5'-P-CCNC: 23 U/L (ref 13–39)
ATRIAL RATE: 93 BPM
BASOPHILS # BLD AUTO: 0.02 THOUSANDS/ÂΜL (ref 0–0.1)
BASOPHILS NFR BLD AUTO: 0 % (ref 0–1)
BILIRUB SERPL-MCNC: 0.36 MG/DL (ref 0.2–1)
BUN SERPL-MCNC: 18 MG/DL (ref 5–25)
CALCIUM SERPL-MCNC: 9.7 MG/DL (ref 8.4–10.2)
CARDIAC TROPONIN I PNL SERPL HS: 10 NG/L
CARDIAC TROPONIN I PNL SERPL HS: 89 NG/L
CHLORIDE SERPL-SCNC: 97 MMOL/L (ref 96–108)
CO2 SERPL-SCNC: 38 MMOL/L (ref 21–32)
CREAT SERPL-MCNC: 0.92 MG/DL (ref 0.6–1.3)
EOSINOPHIL # BLD AUTO: 0.07 THOUSAND/ÂΜL (ref 0–0.61)
EOSINOPHIL NFR BLD AUTO: 1 % (ref 0–6)
ERYTHROCYTE [DISTWIDTH] IN BLOOD BY AUTOMATED COUNT: 14.5 % (ref 11.6–15.1)
FLUAV RNA RESP QL NAA+PROBE: NEGATIVE
FLUBV RNA RESP QL NAA+PROBE: NEGATIVE
GFR SERPL CREATININE-BSD FRML MDRD: 64 ML/MIN/1.73SQ M
GLUCOSE SERPL-MCNC: 134 MG/DL (ref 65–140)
HCT VFR BLD AUTO: 38.3 % (ref 34.8–46.1)
HGB BLD-MCNC: 11.1 G/DL (ref 11.5–15.4)
IMM GRANULOCYTES # BLD AUTO: 0.05 THOUSAND/UL (ref 0–0.2)
IMM GRANULOCYTES NFR BLD AUTO: 1 % (ref 0–2)
LYMPHOCYTES # BLD AUTO: 1.02 THOUSANDS/ÂΜL (ref 0.6–4.47)
LYMPHOCYTES NFR BLD AUTO: 13 % (ref 14–44)
MCH RBC QN AUTO: 26.6 PG (ref 26.8–34.3)
MCHC RBC AUTO-ENTMCNC: 29 G/DL (ref 31.4–37.4)
MCV RBC AUTO: 92 FL (ref 82–98)
MONOCYTES # BLD AUTO: 0.6 THOUSAND/ÂΜL (ref 0.17–1.22)
MONOCYTES NFR BLD AUTO: 8 % (ref 4–12)
NEUTROPHILS # BLD AUTO: 6.23 THOUSANDS/ÂΜL (ref 1.85–7.62)
NEUTS SEG NFR BLD AUTO: 77 % (ref 43–75)
NRBC BLD AUTO-RTO: 0 /100 WBCS
P AXIS: 88 DEGREES
PLATELET # BLD AUTO: 231 THOUSANDS/UL (ref 149–390)
PMV BLD AUTO: 9.8 FL (ref 8.9–12.7)
POTASSIUM SERPL-SCNC: 4.4 MMOL/L (ref 3.5–5.3)
PR INTERVAL: 156 MS
PROT SERPL-MCNC: 7.4 G/DL (ref 6.4–8.4)
QRS AXIS: 58 DEGREES
QRSD INTERVAL: 84 MS
QT INTERVAL: 372 MS
QTC INTERVAL: 462 MS
RBC # BLD AUTO: 4.18 MILLION/UL (ref 3.81–5.12)
RSV RNA RESP QL NAA+PROBE: NEGATIVE
SARS-COV-2 RNA RESP QL NAA+PROBE: NEGATIVE
SODIUM SERPL-SCNC: 141 MMOL/L (ref 135–147)
T WAVE AXIS: 85 DEGREES
VENTRICULAR RATE: 93 BPM
WBC # BLD AUTO: 7.99 THOUSAND/UL (ref 4.31–10.16)

## 2024-02-28 PROCEDURE — 94644 CONT INHLJ TX 1ST HOUR: CPT

## 2024-02-28 PROCEDURE — 80053 COMPREHEN METABOLIC PANEL: CPT | Performed by: STUDENT IN AN ORGANIZED HEALTH CARE EDUCATION/TRAINING PROGRAM

## 2024-02-28 PROCEDURE — 85025 COMPLETE CBC W/AUTO DIFF WBC: CPT | Performed by: STUDENT IN AN ORGANIZED HEALTH CARE EDUCATION/TRAINING PROGRAM

## 2024-02-28 PROCEDURE — 36415 COLL VENOUS BLD VENIPUNCTURE: CPT

## 2024-02-28 PROCEDURE — 93005 ELECTROCARDIOGRAM TRACING: CPT

## 2024-02-28 PROCEDURE — 84484 ASSAY OF TROPONIN QUANT: CPT | Performed by: STUDENT IN AN ORGANIZED HEALTH CARE EDUCATION/TRAINING PROGRAM

## 2024-02-28 PROCEDURE — 0241U HB NFCT DS VIR RESP RNA 4 TRGT: CPT | Performed by: STUDENT IN AN ORGANIZED HEALTH CARE EDUCATION/TRAINING PROGRAM

## 2024-02-28 PROCEDURE — 99285 EMERGENCY DEPT VISIT HI MDM: CPT

## 2024-02-28 PROCEDURE — 71045 X-RAY EXAM CHEST 1 VIEW: CPT

## 2024-02-28 PROCEDURE — 99285 EMERGENCY DEPT VISIT HI MDM: CPT | Performed by: STUDENT IN AN ORGANIZED HEALTH CARE EDUCATION/TRAINING PROGRAM

## 2024-02-28 RX ORDER — IPRATROPIUM BROMIDE AND ALBUTEROL SULFATE .5; 3 MG/3ML; MG/3ML
2 SOLUTION RESPIRATORY (INHALATION) ONCE
Status: COMPLETED | OUTPATIENT
Start: 2024-02-28 | End: 2024-02-28

## 2024-02-28 RX ORDER — METHYLPREDNISOLONE SOD SUCC 125 MG
1 VIAL (EA) INJECTION ONCE
Status: COMPLETED | OUTPATIENT
Start: 2024-02-28 | End: 2024-02-28

## 2024-02-28 RX ORDER — SODIUM CHLORIDE FOR INHALATION 0.9 %
12 VIAL, NEBULIZER (ML) INHALATION ONCE
Status: COMPLETED | OUTPATIENT
Start: 2024-02-28 | End: 2024-02-28

## 2024-02-28 RX ADMIN — ALBUTEROL SULFATE 10 MG: 2.5 SOLUTION RESPIRATORY (INHALATION) at 21:34

## 2024-02-28 RX ADMIN — Medication 12 ML: at 21:34

## 2024-02-28 RX ADMIN — IPRATROPIUM BROMIDE 1 MG: 0.5 SOLUTION RESPIRATORY (INHALATION) at 21:33

## 2024-02-29 PROBLEM — R06.02 SOB (SHORTNESS OF BREATH): Status: ACTIVE | Noted: 2024-01-23

## 2024-02-29 LAB
2HR DELTA HS TROPONIN: -20 NG/L
4HR DELTA HS TROPONIN: -36 NG/L
4HR DELTA HS TROPONIN: 147 NG/L
ANION GAP SERPL CALCULATED.3IONS-SCNC: 4 MMOL/L
ATRIAL RATE: 95 BPM
BASOPHILS # BLD MANUAL: 0 THOUSAND/UL (ref 0–0.1)
BASOPHILS NFR MAR MANUAL: 0 % (ref 0–1)
BUN SERPL-MCNC: 17 MG/DL (ref 5–25)
CALCIUM SERPL-MCNC: 9.5 MG/DL (ref 8.4–10.2)
CARDIAC TROPONIN I PNL SERPL HS: 157 NG/L
CARDIAC TROPONIN I PNL SERPL HS: 220 NG/L
CARDIAC TROPONIN I PNL SERPL HS: 236 NG/L
CARDIAC TROPONIN I PNL SERPL HS: 256 NG/L
CHLORIDE SERPL-SCNC: 98 MMOL/L (ref 96–108)
CHOLEST SERPL-MCNC: 207 MG/DL
CO2 SERPL-SCNC: 38 MMOL/L (ref 21–32)
CREAT SERPL-MCNC: 0.85 MG/DL (ref 0.6–1.3)
EOSINOPHIL # BLD MANUAL: 0 THOUSAND/UL (ref 0–0.4)
EOSINOPHIL NFR BLD MANUAL: 0 % (ref 0–6)
ERYTHROCYTE [DISTWIDTH] IN BLOOD BY AUTOMATED COUNT: 14.2 % (ref 11.6–15.1)
GFR SERPL CREATININE-BSD FRML MDRD: 70 ML/MIN/1.73SQ M
GLUCOSE SERPL-MCNC: 150 MG/DL (ref 65–140)
HCT VFR BLD AUTO: 36.3 % (ref 34.8–46.1)
HDLC SERPL-MCNC: 78 MG/DL
HGB BLD-MCNC: 10.7 G/DL (ref 11.5–15.4)
LDLC SERPL CALC-MCNC: 117 MG/DL (ref 0–100)
LYMPHOCYTES # BLD AUTO: 0.74 THOUSAND/UL (ref 0.6–4.47)
LYMPHOCYTES # BLD AUTO: 11 % (ref 14–44)
MAGNESIUM SERPL-MCNC: 2 MG/DL (ref 1.9–2.7)
MCH RBC QN AUTO: 26.4 PG (ref 26.8–34.3)
MCHC RBC AUTO-ENTMCNC: 29.5 G/DL (ref 31.4–37.4)
MCV RBC AUTO: 90 FL (ref 82–98)
MONOCYTES # BLD AUTO: 0 THOUSAND/UL (ref 0–1.22)
MONOCYTES NFR BLD: 0 % (ref 4–12)
NEUTROPHILS # BLD MANUAL: 5.95 THOUSAND/UL (ref 1.85–7.62)
NEUTS SEG NFR BLD AUTO: 89 % (ref 43–75)
NONHDLC SERPL-MCNC: 129 MG/DL
P AXIS: 83 DEGREES
PLATELET # BLD AUTO: 190 THOUSANDS/UL (ref 149–390)
PLATELET # BLD AUTO: 207 THOUSANDS/UL (ref 149–390)
PLATELET BLD QL SMEAR: ADEQUATE
PMV BLD AUTO: 9.6 FL (ref 8.9–12.7)
PMV BLD AUTO: 9.6 FL (ref 8.9–12.7)
POTASSIUM SERPL-SCNC: 4.8 MMOL/L (ref 3.5–5.3)
PR INTERVAL: 196 MS
QRS AXIS: 15 DEGREES
QRSD INTERVAL: 86 MS
QT INTERVAL: 352 MS
QTC INTERVAL: 442 MS
RBC # BLD AUTO: 4.05 MILLION/UL (ref 3.81–5.12)
RBC MORPH BLD: NORMAL
SODIUM SERPL-SCNC: 140 MMOL/L (ref 135–147)
T WAVE AXIS: -18 DEGREES
TRIGL SERPL-MCNC: 61 MG/DL
TSH SERPL DL<=0.05 MIU/L-ACNC: 1.77 UIU/ML (ref 0.45–4.5)
VENTRICULAR RATE: 95 BPM
WBC # BLD AUTO: 6.69 THOUSAND/UL (ref 4.31–10.16)

## 2024-02-29 PROCEDURE — 85027 COMPLETE CBC AUTOMATED: CPT

## 2024-02-29 PROCEDURE — 94664 DEMO&/EVAL PT USE INHALER: CPT

## 2024-02-29 PROCEDURE — 99223 1ST HOSP IP/OBS HIGH 75: CPT | Performed by: FAMILY MEDICINE

## 2024-02-29 PROCEDURE — 93005 ELECTROCARDIOGRAM TRACING: CPT

## 2024-02-29 PROCEDURE — 84484 ASSAY OF TROPONIN QUANT: CPT | Performed by: STUDENT IN AN ORGANIZED HEALTH CARE EDUCATION/TRAINING PROGRAM

## 2024-02-29 PROCEDURE — 83735 ASSAY OF MAGNESIUM: CPT

## 2024-02-29 PROCEDURE — 84484 ASSAY OF TROPONIN QUANT: CPT

## 2024-02-29 PROCEDURE — NC001 PR NO CHARGE: Performed by: FAMILY MEDICINE

## 2024-02-29 PROCEDURE — 85049 AUTOMATED PLATELET COUNT: CPT

## 2024-02-29 PROCEDURE — 94760 N-INVAS EAR/PLS OXIMETRY 1: CPT

## 2024-02-29 PROCEDURE — 94640 AIRWAY INHALATION TREATMENT: CPT

## 2024-02-29 PROCEDURE — 84443 ASSAY THYROID STIM HORMONE: CPT

## 2024-02-29 PROCEDURE — 80061 LIPID PANEL: CPT

## 2024-02-29 PROCEDURE — 80048 BASIC METABOLIC PNL TOTAL CA: CPT

## 2024-02-29 PROCEDURE — 99222 1ST HOSP IP/OBS MODERATE 55: CPT | Performed by: INTERNAL MEDICINE

## 2024-02-29 PROCEDURE — 85007 BL SMEAR W/DIFF WBC COUNT: CPT

## 2024-02-29 RX ORDER — ASPIRIN 81 MG/1
81 TABLET, CHEWABLE ORAL DAILY
Status: DISCONTINUED | OUTPATIENT
Start: 2024-03-01 | End: 2024-03-04

## 2024-02-29 RX ORDER — IPRATROPIUM BROMIDE 42 UG/1
2 SPRAY, METERED NASAL 3 TIMES DAILY
Status: DISCONTINUED | OUTPATIENT
Start: 2024-02-29 | End: 2024-02-29

## 2024-02-29 RX ORDER — PANTOPRAZOLE SODIUM 40 MG/1
40 TABLET, DELAYED RELEASE ORAL
Status: DISCONTINUED | OUTPATIENT
Start: 2024-02-29 | End: 2024-03-01

## 2024-02-29 RX ORDER — BRIMONIDINE TARTRATE 2 MG/ML
1 SOLUTION/ DROPS OPHTHALMIC EVERY 8 HOURS SCHEDULED
Status: DISCONTINUED | OUTPATIENT
Start: 2024-02-29 | End: 2024-03-05 | Stop reason: HOSPADM

## 2024-02-29 RX ORDER — BENZONATATE 100 MG/1
100 CAPSULE ORAL 3 TIMES DAILY
Status: DISCONTINUED | OUTPATIENT
Start: 2024-02-29 | End: 2024-03-05 | Stop reason: HOSPADM

## 2024-02-29 RX ORDER — FERROUS SULFATE 325(65) MG
325 TABLET ORAL
Status: DISCONTINUED | OUTPATIENT
Start: 2024-02-29 | End: 2024-03-05 | Stop reason: HOSPADM

## 2024-02-29 RX ORDER — FLUTICASONE PROPIONATE 50 MCG
2 SPRAY, SUSPENSION (ML) NASAL DAILY
Status: DISCONTINUED | OUTPATIENT
Start: 2024-02-29 | End: 2024-03-05 | Stop reason: HOSPADM

## 2024-02-29 RX ORDER — ALBUTEROL SULFATE 90 UG/1
2 AEROSOL, METERED RESPIRATORY (INHALATION) EVERY 6 HOURS PRN
Status: DISCONTINUED | OUTPATIENT
Start: 2024-02-29 | End: 2024-03-05 | Stop reason: HOSPADM

## 2024-02-29 RX ORDER — SERTRALINE HYDROCHLORIDE 25 MG/1
25 TABLET, FILM COATED ORAL
Status: DISCONTINUED | OUTPATIENT
Start: 2024-02-29 | End: 2024-03-05 | Stop reason: HOSPADM

## 2024-02-29 RX ORDER — MONTELUKAST SODIUM 10 MG/1
10 TABLET ORAL
Status: DISCONTINUED | OUTPATIENT
Start: 2024-02-29 | End: 2024-03-05 | Stop reason: HOSPADM

## 2024-02-29 RX ORDER — CALCIUM CARBONATE 500 MG/1
500 TABLET, CHEWABLE ORAL DAILY PRN
Status: DISCONTINUED | OUTPATIENT
Start: 2024-02-29 | End: 2024-03-05 | Stop reason: HOSPADM

## 2024-02-29 RX ORDER — ACETAMINOPHEN 325 MG/1
975 TABLET ORAL EVERY 8 HOURS PRN
Status: DISCONTINUED | OUTPATIENT
Start: 2024-02-29 | End: 2024-03-05 | Stop reason: HOSPADM

## 2024-02-29 RX ORDER — ATORVASTATIN CALCIUM 40 MG/1
40 TABLET, FILM COATED ORAL
Status: DISCONTINUED | OUTPATIENT
Start: 2024-02-29 | End: 2024-03-05 | Stop reason: HOSPADM

## 2024-02-29 RX ORDER — IPRATROPIUM BROMIDE AND ALBUTEROL SULFATE 2.5; .5 MG/3ML; MG/3ML
3 SOLUTION RESPIRATORY (INHALATION) EVERY 8 HOURS PRN
Status: DISCONTINUED | OUTPATIENT
Start: 2024-02-29 | End: 2024-03-03

## 2024-02-29 RX ORDER — LOSARTAN POTASSIUM 25 MG/1
25 TABLET ORAL DAILY
Status: DISCONTINUED | OUTPATIENT
Start: 2024-02-29 | End: 2024-03-05 | Stop reason: HOSPADM

## 2024-02-29 RX ORDER — ENOXAPARIN SODIUM 100 MG/ML
40 INJECTION SUBCUTANEOUS DAILY
Status: DISCONTINUED | OUTPATIENT
Start: 2024-02-29 | End: 2024-03-03

## 2024-02-29 RX ORDER — BUDESONIDE AND FORMOTEROL FUMARATE DIHYDRATE 160; 4.5 UG/1; UG/1
2 AEROSOL RESPIRATORY (INHALATION) 2 TIMES DAILY
Status: DISCONTINUED | OUTPATIENT
Start: 2024-02-29 | End: 2024-03-05 | Stop reason: HOSPADM

## 2024-02-29 RX ORDER — TIMOLOL MALEATE 5 MG/ML
1 SOLUTION/ DROPS OPHTHALMIC 2 TIMES DAILY
Status: DISCONTINUED | OUTPATIENT
Start: 2024-02-29 | End: 2024-03-05 | Stop reason: HOSPADM

## 2024-02-29 RX ORDER — ASPIRIN 325 MG
325 TABLET ORAL DAILY
Status: DISCONTINUED | OUTPATIENT
Start: 2024-02-29 | End: 2024-02-29

## 2024-02-29 RX ORDER — LEVOTHYROXINE SODIUM 0.07 MG/1
75 TABLET ORAL
Status: DISCONTINUED | OUTPATIENT
Start: 2024-02-29 | End: 2024-03-05 | Stop reason: HOSPADM

## 2024-02-29 RX ORDER — LABETALOL HYDROCHLORIDE 5 MG/ML
10 INJECTION, SOLUTION INTRAVENOUS EVERY 6 HOURS PRN
Status: DISCONTINUED | OUTPATIENT
Start: 2024-02-29 | End: 2024-03-05 | Stop reason: HOSPADM

## 2024-02-29 RX ADMIN — ASPIRIN 325 MG ORAL TABLET 325 MG: 325 PILL ORAL at 09:38

## 2024-02-29 RX ADMIN — SERTRALINE HYDROCHLORIDE 25 MG: 25 TABLET ORAL at 22:06

## 2024-02-29 RX ADMIN — ATORVASTATIN CALCIUM 40 MG: 40 TABLET, FILM COATED ORAL at 17:52

## 2024-02-29 RX ADMIN — LOSARTAN POTASSIUM 25 MG: 25 TABLET, FILM COATED ORAL at 08:31

## 2024-02-29 RX ADMIN — BENZONATATE 100 MG: 100 CAPSULE ORAL at 17:52

## 2024-02-29 RX ADMIN — SERTRALINE HYDROCHLORIDE 25 MG: 25 TABLET ORAL at 00:49

## 2024-02-29 RX ADMIN — LEVOTHYROXINE SODIUM 75 MCG: 75 TABLET ORAL at 05:46

## 2024-02-29 RX ADMIN — ACETAMINOPHEN 975 MG: 325 TABLET, FILM COATED ORAL at 10:59

## 2024-02-29 RX ADMIN — FLUTICASONE PROPIONATE 2 SPRAY: 50 SPRAY, METERED NASAL at 08:33

## 2024-02-29 RX ADMIN — MONTELUKAST 10 MG: 10 TABLET, FILM COATED ORAL at 22:06

## 2024-02-29 RX ADMIN — TIMOLOL MALEATE 1 DROP: 5 SOLUTION/ DROPS OPHTHALMIC at 08:33

## 2024-02-29 RX ADMIN — MONTELUKAST 10 MG: 10 TABLET, FILM COATED ORAL at 00:49

## 2024-02-29 RX ADMIN — BRIMONIDINE TARTRATE 1 DROP: 2 SOLUTION/ DROPS OPHTHALMIC at 06:19

## 2024-02-29 RX ADMIN — BENZONATATE 100 MG: 100 CAPSULE ORAL at 08:28

## 2024-02-29 RX ADMIN — BRIMONIDINE TARTRATE 1 DROP: 2 SOLUTION/ DROPS OPHTHALMIC at 22:06

## 2024-02-29 RX ADMIN — ENOXAPARIN SODIUM 40 MG: 40 INJECTION SUBCUTANEOUS at 08:28

## 2024-02-29 RX ADMIN — BUDESONIDE AND FORMOTEROL FUMARATE DIHYDRATE 2 PUFF: 160; 4.5 AEROSOL RESPIRATORY (INHALATION) at 17:15

## 2024-02-29 RX ADMIN — PANTOPRAZOLE SODIUM 40 MG: 20 TABLET, DELAYED RELEASE ORAL at 05:46

## 2024-02-29 RX ADMIN — IPRATROPIUM BROMIDE AND ALBUTEROL SULFATE 3 ML: 2.5; .5 SOLUTION RESPIRATORY (INHALATION) at 17:15

## 2024-02-29 RX ADMIN — CALCIUM CARBONATE (ANTACID) CHEW TAB 500 MG 500 MG: 500 CHEW TAB at 11:04

## 2024-02-29 RX ADMIN — TIMOLOL MALEATE 1 DROP: 5 SOLUTION/ DROPS OPHTHALMIC at 17:52

## 2024-02-29 RX ADMIN — FERROUS SULFATE TAB 325 MG (65 MG ELEMENTAL FE) 325 MG: 325 (65 FE) TAB at 08:28

## 2024-02-29 RX ADMIN — BENZONATATE 100 MG: 100 CAPSULE ORAL at 22:06

## 2024-02-29 RX ADMIN — BRIMONIDINE TARTRATE 1 DROP: 2 SOLUTION/ DROPS OPHTHALMIC at 13:03

## 2024-02-29 RX ADMIN — BUDESONIDE AND FORMOTEROL FUMARATE DIHYDRATE 2 PUFF: 160; 4.5 AEROSOL RESPIRATORY (INHALATION) at 08:34

## 2024-02-29 NOTE — H&P
Formerly [facility]  H&P  Name: Noreen Alvarez 68 y.o. female I MRN: [id number]  Unit/Bed#: -01 I Date of Admission: 2/28/2024   Date of Service: 2/29/2024 I Hospital Day: 1      Assessment/Plan   Atypical chest pain  Assessment & Plan  Lab Results   Component Value Date    HSTNI0 256 (H) 02/29/2024    HSTNI2 236 (H) 02/29/2024    HSTNID2 -20 02/29/2024    HSTNI4 220 (H) 02/29/2024    HSTNID4 -36 02/29/2024         CXR: Result Date: 2/29/2024 Impression: There is hazy bibasilar opacity which may represent a combination of layered effusion and parenchymal opacity.     EKG: In ED, NSR without ST elevations.   Follow up EKG overnight remarkable for ST wave depression in leads V4, V5, and V6.   Morning EKG was a posterior EKG and still shows some nonspecific ST abnormalities  Likely unstable angina or demand ischemia secondary to COPD exacerbation  RAYA Score: 4  Lipid panel showed LDL of 117  ECHO showed EF of 60% on 1/29     Plan:   STAT EKG and troponin if chest pain, Telemetry for arrhythmias  Monitor Troponin Trend  Started atorvastatin 40 mg daily  Started losartan 25mg daily  HbA1C  Received  on 2/29, starting on 3/1 she will get 81 mg daily  Consult cardiology for further workup and possible catheterization/PCI    Will not start heparin drip at this time due to troponins peaking and going back down  Supportive care with tylenol and tums for further pain    SOB (shortness of breath)  Assessment & Plan  Patient SOB with exertion. Currently doing well at her usual home O2 level of 2L. Patient also states she has what feels like blockage of her right nostril which sounds chronic according to her daughter since she was a child, but it is bothering patient more.  Chest x-ray 2/28: hazy bibasilar opacity may represent a combination of layered effusion and parenchymal opacity  Plan:  - Monitor O2 needs  - Flonase ordered    Multiple lung nodules  Assessment & Plan  Assessment:   - Patient  "presented with 3 days of sudden onset SOB and atypical chest pressure with fatigue and declining ADLs  - Wt loss of 12 pounds in the last month; wt loss of 14 in the last 3 months   - Given symptoms and presentation, I suspect she may have an undiagnosed cancer and was lost to follow up outpatient  - Multiple lung nodules first seen on CT imaging on 12/29/2020  - Prior to hospitalization, patient was being worked up for lung cancer etiology of multiple lung nodules   - Last seen Dr. Muñoz, hem/onc specialist, on 8/7/2023; per chart review, patient was referred to Dr. Muñoz for non FDG avid lung nodules and some mediastinal and mesenteric adenopathy which had low grade activity but was never sampled  - Biopsy per Dr. Muñoz in 2023 revealed concerns for atypical adenomatous hyperplasia, adenocarcinoma in-situ, or a low-grade invasive adenocarcinoma   - 1/21/2024 CT AP 1.4 cm groundglass nodules in RLL, 1.2 cm lobulated nodule LLL, no significant changes according to CT AP  - During this hospitalization, ED ordered CT AP which showed stability of lung nodules; however, PET-CT would be preferable imaging for assessment of lung cancer; thus would like to obtain recommendations per pulmonology   - CXR 2/29 result: hazy bibasilar opacity which may represent layered effusion and parenchymal opacity on recent imaging, with lymphocytes 11%, I recommend further investigation into etiology of patient's presenting symptoms    - Per chart review, \"CAT scan showed no evidence of labial cancer, stable bilateral pulmonary nodule, there is a 3 to 4 mm left lower lobe lung nodule and this is a new from before we will repeat CAT scan of the chest in 6 months\"  - Differential diagnoses: secondary cancer (remote history of head and neck malignancy) with mets to lungs vs primary lung cancer. Per lung biopsy from prior, there was concern for atypical adenomatous hyperplasia vs adenocarcinoma in-situ vs low-grade invasive adenocarcinoma, " Suspect atypical chest pain could be related to possible undiagnosed lung cancer.     Plan:   - Lung biopsy scheduled as of now  - F/u repeat blood work  - Iron studies are normal  - Consider hem/onc consult, appreciate recs  - Consider pulm consult, appreciate recs   - Consider palliative care consult if needed   - PET-CT considered previously by outpatient hem/onc; may consider PET-CT (previously was ordered but was denied by patient's insurance company)   - May need to investigate GI symptoms as it may related to possible undiagnosed lung cancer since 2020    Elevated blood pressure reading  Assessment & Plan  When patient came in, bp was 184/81. Patient not on current blood pressure medications at home    Plan:  -started losartan 25mg daily  - avoid beta blockers pre cardio in case her chest pain is cardiac in etiology        Chronic kidney disease, stage 3 (moderate)  Assessment & Plan  Lab Results   Component Value Date    EGFR 70 02/29/2024    EGFR 64 02/28/2024    EGFR 52 01/29/2024    CREATININE 0.85 02/29/2024    CREATININE 0.92 02/28/2024    CREATININE 1.08 01/29/2024    Creatinine at baseline. Will watch with morning labs.     Hypothyroidism  Assessment & Plan  Home levothyroxine is 75 mcg daily. Continued.  -TSH wnl as of 2/29    Hyperlipidemia  Assessment & Plan  Patient not on any home med for HLD. Ordered lipid panel which showed LDL of 117    Plan:  -started patient on atorvastatin 40 mg daily    Severe persistent asthma without complication  Assessment & Plan  Patient's chest pain could be due to pulmonary causes such as her COPD or asthma. Currently doing well on her home O2 of 2L. No wheezing appreciate thus far.    Depression with anxiety  Assessment & Plan  Continuing home Zoloft.    Centrilobular emphysema (HCC)  Assessment & Plan  Chronic. Patient's chest pain could be related to exacerbation of COPD. Patient currently on her home O2 needs of 2L.            VTE Pharmacologic Prophylaxis: VTE  Score: 6 High Risk (Score >/= 5) - Pharmacological DVT Prophylaxis Ordered: enoxaparin (Lovenox). Sequential Compression Devices Ordered.  Code Status: Level 1 - Full Code   Discussion with family: Patient declined call to .     Anticipated Length of Stay: Patient will be admitted on an inpatient basis with an anticipated length of stay of greater than 2 midnights secondary to work up of COPD exacerbation and ongoing chest pressure with elevated troponins with progressive decline in ability to perform ADLs given SOB.    Total Time Spent on Date of Encounter in care of patient: 45 mins. This time was spent on one or more of the following: performing physical exam; counseling and coordination of care; obtaining or reviewing history; documenting in the medical record; reviewing/ordering tests, medications or procedures; communicating with other healthcare professionals and discussing with patient's family/caregivers.    Chief Complaint: SOB    History of Present Illness:  Noreen Alvarez is a 68 y.o. female with a PMH of HTN, HLD, hypothyroidism, and COPD who presents with SOB and chest pressure that has been ongoing for 3 days. The patient states that her symptoms have worsening since they began. She states that she has been very fatigued recently, making it difficult to complete her ADLs. Due to her SOB, the patient performed a home nebulizer treatment; her symptoms did not improve thus presented to the ED. En route, patient received 2 doses of duonebs as well as solumedrol. Patient had some improvement with these medications.     In the ED, CXR was     Currently, the patient complains of a squeezing-like chest pressure in her distal chest region/epigastric area. The patient was hospitalized for similar symptoms 1 month ago. At that time, patient also had an elevated BP in setting of chest pressure causing EKG and troponins to be ordered, which showed normal EKG and negative troponin at that time.      Review of Systems:  Review of Systems   Constitutional:  Positive for activity change, appetite change and fatigue. Negative for chills and fever.   HENT:  Negative for ear pain and sore throat.    Eyes:  Negative for pain and visual disturbance.   Respiratory:  Negative for cough and shortness of breath.    Cardiovascular:  Negative for chest pain and palpitations.   Gastrointestinal:  Negative for abdominal pain and vomiting.   Genitourinary:  Negative for dysuria and hematuria.   Musculoskeletal:  Negative for arthralgias and back pain.   Skin:  Negative for color change and rash.   Neurological:  Negative for seizures and syncope.   All other systems reviewed and are negative.      Past Medical and Surgical History:   Past Medical History:   Diagnosis Date    Acute kidney failure (HCC)     Allergic     Allergies     Anemia     Asthma     Cancer (HCC)     Cataract     Chronic kidney disease (CKD), stage III (moderate) (HCC)     COPD (chronic obstructive pulmonary disease) (HCC)     COVID-19     Covid + 3-3-23-cough at that time now fully resolved    Disease of thyroid gland     Essential hypertension     GERD (gastroesophageal reflux disease)     Glaucoma     High blood pressure     HTN (hypertension) 02/16/2021    Hyperlipidemia     Hypothyroidism     Mesenteric lymphadenopathy 07/13/2021    Pulmonary hypertension (HCC)     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     AL TENDON SHEATH INCISION Right 02/16/2021    Procedure: THUMB TRIGGER FINGER RELEASE;  Surgeon: Angeles Denton MD;  Location: [facility]  MAIN [facility];  Service: Orthopedics    TUBAL LIGATION         Meds/Allergies:  Prior to Admission medications    Medication Sig Start Date End Date Taking?  Authorizing Provider   albuterol (PROVENTIL HFA,VENTOLIN HFA) 90 mcg/act inhaler Inhale 2 puffs every 6 (six) hours as needed for wheezing or shortness of breath 1/30/24   Samantha Hernandez MD   azithromycin (ZITHROMAX) 250 mg tablet Take 1 tablet (250 mg total) by mouth every other day for 45 doses 2/27/24 5/26/24  Shannan Thompson MD   benzonatate (TESSALON PERLES) 100 mg capsule Take 1 capsule (100 mg total) by mouth 3 (three) times a day 1/29/24   Mya Rivera DO   brimonidine tartrate 0.2 % ophthalmic solution  12/23/21   Historical Provider, MD   Budtrent-Glycopyrrol-Formoterol (Breztri Aerosphere) 160-9-4.8 MCG/ACT AERO Inhale 2 puffs 2 (two) times a day Rinse mouth after use. 2/12/24   Shannan Thompson MD   Diclofenac Sodium (VOLTAREN) 1 % APPLY 2 GRAMS TO AFFECTED AREA 4 TIMES A DAY 2/13/24   Samantha Hernandez MD   ferrous sulfate 324 (65 Fe) mg Take 1 tablet (324 mg total) by mouth daily after lunch 2/2/24   Samantha Hernandez MD   fluticasone (FLONASE) 50 mcg/act nasal spray SPRAY 1 SPRAY INTO EACH NOSTRIL EVERY DAY 1/22/24   Samantha Hernandez MD   ipratropium (ATROVENT) 0.06 % nasal spray 2 SPRAYS INTO EACH NOSTRIL 3 (THREE) TIMES A DAY FOR INCREASED NASAL SECRETION 2/13/24   Josephine Anderson MD   ipratropium-albuterol (DUO-NEB) 0.5-2.5 mg/3 mL nebulizer solution Take 3 mL by nebulization every 8 (eight) hours as needed for wheezing or shortness of breath 1/23/24   Samantha Hernandez MD   levothyroxine 75 mcg tablet Take 1 tablet (75 mcg total) by mouth daily in the early morning 1/30/24   Samantha Hernandez MD   montelukast (SINGULAIR) 10 mg tablet Take 1 tablet (10 mg total) by mouth daily at bedtime 1/30/24   Samantha Hernandez MD   pantoprazole (PROTONIX) 40 mg tablet Take 1 tablet (40 mg total) by mouth daily before breakfast 2/20/24   Brooke Craft PA-C   polyethylene glycol (GLYCOLAX) 17 GM/SCOOP powder DISSOLVE 17 GRAMS INTO WATER AND DRINK BY MOUTH EVERY DAY 2/13/24   Samantha Hernandez MD   sertraline (ZOLOFT) 25 mg tablet  Take 1 tablet (25 mg total) by mouth daily at bedtime 1/30/24 7/28/24  Samantha Hernandez MD   timolol (TIMOPTIC) 0.5 % ophthalmic solution INSTILL 1 DROP INTO EACH EYE TWO TIMES A DAY 3/23/22   Historical Provider, MD     I have reviewed home medications with patient personally.    Allergies:   Allergies   Allergen Reactions    Cefdinir Hives    Amifostine Rash    Pollen Extract Allergic Rhinitis       Social History:  Marital Status: /Civil Union   Occupation: Retired  Patient Pre-hospital Living Situation: Home  Patient Pre-hospital Level of Mobility: walks  Patient Pre-hospital Diet Restrictions: None  Substance Use History:   Social History     Substance and Sexual Activity   Alcohol Use Never    Comment: None     Social History     Tobacco Use   Smoking Status Former    Current packs/day: 0.00    Average packs/day: 1 pack/day for 40.0 years (40.0 ttl pk-yrs)    Types: Cigarettes    Start date: 1/1/1974    Quit date: 1/1/2014    Years since quitting: 10.1   Smokeless Tobacco Never     Social History     Substance and Sexual Activity   Drug Use Never    Comment: Denies

## 2024-02-29 NOTE — ASSESSMENT & PLAN NOTE
When patient came in, bp was 184/81. Patient not on current blood pressure medications at home    Plan:  -started losartan 25mg daily  - avoid beta blockers pre cardio in case her chest pain is cardiac in etiology

## 2024-02-29 NOTE — ASSESSMENT & PLAN NOTE
Patient SOB with exertion. Currently doing well at her usual home O2 level of 2L. Patient also states she has what feels like blockage of her right nostril which sounds chronic according to her daughter since she was a child, but it is bothering patient more.  Chest x-ray 2/28: hazy bibasilar opacity may represent a combination of layered effusion and parenchymal opacity  Plan:  - Monitor O2 needs  - Flonase ordered

## 2024-02-29 NOTE — ASSESSMENT & PLAN NOTE
Patient's chest pain could be due to pulmonary causes such as her COPD or asthma. Currently doing well on her home O2 of 2L. No wheezing appreciate thus far.

## 2024-02-29 NOTE — ASSESSMENT & PLAN NOTE
Lab Results   Component Value Date    HSTNI0 256 (H) 02/29/2024    HSTNI2 236 (H) 02/29/2024    HSTNID2 -20 02/29/2024    HSTNI4 220 (H) 02/29/2024    HSTNID4 -36 02/29/2024         CXR: Result Date: 2/29/2024 Impression: There is hazy bibasilar opacity which may represent a combination of layered effusion and parenchymal opacity.     EKG: In ED, NSR without ST elevations.   Follow up EKG overnight remarkable for ST wave depression in leads V4, V5, and V6.   Morning EKG was a posterior EKG and still shows some nonspecific ST abnormalities  Likely unstable angina or demand ischemia secondary to COPD exacerbation  RAYA Score: 4  Lipid panel showed LDL of 117  ECHO showed EF of 60% on 1/29     Plan:   STAT EKG and troponin if chest pain, Telemetry for arrhythmias  Monitor Troponin Trend  Started atorvastatin 40 mg daily  Started losartan 25mg daily  HbA1C  Received  on 2/29, starting on 3/1 she will get 81 mg daily  Consult cardiology for further workup and possible catheterization/PCI    Will not start heparin drip at this time due to troponins peaking and going back down  Supportive care with tylenol and tums for further pain

## 2024-02-29 NOTE — ASSESSMENT & PLAN NOTE
Patient SOB with exertion. Currently doing well at her usual home O2 level of 2L. Patient also states she has what feels like blockage of her right nostril which sounds chronic according to her daughter since she was a child, but it is bothering patient more.    Plan:  -monitor O2 needs  - flonase ordered

## 2024-02-29 NOTE — ASSESSMENT & PLAN NOTE
Patient not on any home med for HLD. Ordered lipid panel which showed LDL of 117    Plan:  -started patient on atorvastatin 40 mg daily

## 2024-02-29 NOTE — CONSULTS
Consultation - Cardiology Team 1  Noreen Alvarez 68 y.o. female MRN: [id number]  Unit/Bed#: -01 Encounter: [id number]    Assessment/Plan     Principal Problem:    Atypical chest pain  Active Problems:    Chronic kidney disease, stage 3 (moderate)    Centrilobular emphysema (HCC)    Depression with anxiety    Severe persistent asthma without complication    Multiple lung nodules    Hyperlipidemia    Hypothyroidism    SOB (shortness of breath)    Elevated blood pressure reading      Assessment  Chest tightness -reports a band across lower chest to back . 2-3 days.         Worse with deep breath and worse when short of breath  Abnormal troponin- minimal as below. In setting of worsening dypsnea        Chest tightness as above. ECG non specific ST/T wave abnormality        ECG- preserved LV function, no RWMA  Acute on chronic dyspnea- family reports significant limitations in able         To perform ADL's  Acute on chronic respiratory failure- on current liter flow  Severe COPD with exacerbation- s/p duoneb x2 and 125mg IV solumedrol - with improvement   Hypertension- presented hypertensive in setting of respiratory distress  HLD -on atorvastatin 80 mg        Cholesterol 207.  Triglyceride 61.  HDL 78.  Calculated     Plan:  Would proceed with ischemia evaluation.  Type and timing  to be determined. Will follow pts hospital course.   Would appreciate pulmonary status optimized.   3.   Continue with asa 81mg daily, continue statin. Avoid BB at this time d/t         COPD exacerbation.     History of Present Illness   Physician Requesting Consult: Julio C Cooley MD  Reason for Consult / Principal Problem: cp with elevated troponin    HPI: Noreen Alvarez is a 68 y.o. year old female with tobacco abuse, CKD III, severe COPD,  chronic hypoxic respiratory failure -oxygen dependent , hypertension, ELIUD, history of laryngeal cancer status post radiation and chemotherapy, hyperlipidemia, hypothyroidism who presents  with worsening shortness of breath.  I did speak patient via [employer]   #[id number].  Despite this translation was challenging.  Separately I also spoke to the patient's son.  He expressed concern of worsening shortness of breath.  She cannot walk room to room without being profoundly short of breath.  He said the last 2 to 3 days she has had a bandlike sensation around her lower chest to the back.  The patient particularly tell me this is worse when her breathing is worse.  She became more acutely short of breath and thus EMS called.  She received a nebulizer at home with minimal relief.  She also received 125 mg of IV Solu-Medrol.  She received another and route with significant improvement by the time she arrived. Presenting /81.  On arrival she was using accessory muscles.  Received full dose aspirin this a.m.    0-hour troponin 10  2-hour troponin 89  4-hour troponin 157  Follow-up random troponin 257  Follow-up random troponin 236  Follow-up random troponin 220  Cholesterol 207.  Triglyceride 61.  HDL 78.  Calculated   H&H-11.1/10.7  Influenza A/B/ RSV/COVID-negative    TTE 1/29/2024 LV function normal.  Aortic valve not well-visualized cannot rule out bicuspid.  No significant stenosis    Aortic valve on echo 7/2021 trileaflet    Inpatient consult to Cardiology  Consult performed by: GERTRUDIS Cano  Consult ordered by: Alka Caceres DO          Review of Systems   Constitutional:  Positive for activity change.   HENT: Negative.     Eyes: Negative.    Respiratory:  Positive for chest tightness and shortness of breath.    Cardiovascular:  Positive for chest pain.   Gastrointestinal: Negative.    Endocrine: Negative.    Genitourinary: Negative.    Musculoskeletal: Negative.    Allergic/Immunologic: Negative.    Neurological: Negative.    Hematological: Negative.    Psychiatric/Behavioral: Negative.         Historical Information   Past Medical History:   Diagnosis Date    Acute  kidney failure (HCC)     Allergic     Allergies     Anemia     Asthma     Cancer (HCC)     Cataract     Chronic kidney disease (CKD), stage III (moderate) (HCC)     COPD (chronic obstructive pulmonary disease) (HCC)     COVID-19     Covid + 0-7-40-cough at that time now fully resolved    Disease of thyroid gland     Essential hypertension     GERD (gastroesophageal reflux disease)     Glaucoma     High blood pressure     HTN (hypertension) 02/16/2021    Hyperlipidemia     Hypothyroidism     Mesenteric lymphadenopathy 07/13/2021    Pulmonary hypertension (HCC)     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     SD TENDON SHEATH INCISION Right 02/16/2021    Procedure: THUMB TRIGGER FINGER RELEASE;  Surgeon: Angeles Denton MD;  Location: [facility];  Service: Orthopedics    TUBAL LIGATION       Social History     Substance and Sexual Activity   Alcohol Use Never    Comment: None     Social History     Substance and Sexual Activity   Drug Use Never    Comment: Denies     Social History     Tobacco Use   Smoking Status Former    Current packs/day: 0.00    Average packs/day: 1 pack/day for 40.0 years (40.0 ttl pk-yrs)    Types: Cigarettes    Start date: 1/1/1974    Quit date: 1/1/2014    Years since quitting: 10.1   Smokeless Tobacco Never     Family History:   Family History   Problem Relation Age of Onset    Other Mother         respiratory disorder    Sudden death Father         shot himself    Suicidality Father     Brain cancer Sister     Diabetes Sister     Breast cancer Sister     Cancer Sister 62        pancreatic cancer    No Known Problems Sister     No Known Problems Sister     No Known Problems Sister     No Known Problems Sister     No Known Problems  Sister     No Known Problems Sister     No Known Problems Daughter     No Known Problems Daughter     No Known Problems Maternal Grandmother     No Known Problems Maternal Grandfather     No Known Problems Paternal Grandmother     No Known Problems Paternal Grandfather     No Known Problems Maternal Aunt     No Known Problems Maternal Aunt     No Known Problems Maternal Aunt     No Known Problems Maternal Aunt     No Known Problems Maternal Aunt     No Known Problems Paternal Aunt     No Known Problems Paternal Aunt     No Known Problems Paternal Aunt     No Known Problems Paternal Aunt        Meds/Allergies   current meds:   Current Facility-Administered Medications   Medication Dose Route Frequency    acetaminophen (TYLENOL) tablet 975 mg  975 mg Oral Q8H PRN    albuterol (PROVENTIL HFA,VENTOLIN HFA) inhaler 2 puff  2 puff Inhalation Q6H PRN    aspirin tablet 325 mg  325 mg Oral Daily    atorvastatin (LIPITOR) tablet 40 mg  40 mg Oral Daily With Dinner    benzonatate (TESSALON PERLES) capsule 100 mg  100 mg Oral TID    brimonidine tartrate 0.2 % ophthalmic solution 1 drop  1 drop Both Eyes Q8H ESTEVAN    budesonide-formoterol (SYMBICORT) 160-4.5 mcg/act inhaler 2 puff  2 puff Inhalation BID    calcium carbonate (TUMS) chewable tablet 500 mg  500 mg Oral Daily PRN    enoxaparin (LOVENOX) subcutaneous injection 40 mg  40 mg Subcutaneous Daily    ferrous sulfate tablet 325 mg  325 mg Oral Daily With Breakfast    fluticasone (FLONASE) 50 mcg/act nasal spray 2 spray  2 spray Each Nare Daily    ipratropium-albuterol (DUO-NEB) 0.5-2.5 mg/3 mL inhalation solution 3 mL  3 mL Nebulization Q8H PRN    labetalol (NORMODYNE) injection 10 mg  10 mg Intravenous Q6H PRN    levothyroxine tablet 75 mcg  75 mcg Oral Early Morning    losartan (COZAAR) tablet 25 mg  25 mg Oral Daily    montelukast (SINGULAIR) tablet 10 mg  10 mg Oral HS    pantoprazole (PROTONIX) EC tablet 40 mg  40 mg Oral Daily Before Breakfast    sertraline (ZOLOFT)  tablet 25 mg  25 mg Oral HS    timolol (TIMOPTIC) 0.5 % ophthalmic solution 1 drop  1 drop Both Eyes BID    and PTA meds:    Medications Prior to Admission   Medication    albuterol (PROVENTIL HFA,VENTOLIN HFA) 90 mcg/act inhaler    azithromycin (ZITHROMAX) 250 mg tablet    benzonatate (TESSALON PERLES) 100 mg capsule    brimonidine tartrate 0.2 % ophthalmic solution    Budeson-Glycopyrrol-Formoterol (Breztri Aerosphere) 160-9-4.8 MCG/ACT AERO    Diclofenac Sodium (VOLTAREN) 1 %    ferrous sulfate 324 (65 Fe) mg    fluticasone (FLONASE) 50 mcg/act nasal spray    ipratropium (ATROVENT) 0.06 % nasal spray    ipratropium-albuterol (DUO-NEB) 0.5-2.5 mg/3 mL nebulizer solution    levothyroxine 75 mcg tablet    montelukast (SINGULAIR) 10 mg tablet    pantoprazole (PROTONIX) 40 mg tablet    polyethylene glycol (GLYCOLAX) 17 GM/SCOOP powder    sertraline (ZOLOFT) 25 mg tablet    timolol (TIMOPTIC) 0.5 % ophthalmic solution     Allergies   Allergen Reactions    Cefdinir Hives    Amifostine Rash    Pollen Extract Allergic Rhinitis       Objective   Vitals: Blood pressure 137/63, pulse 80, temperature 98 °F (36.7 °C), resp. rate 18, weight 78.9 kg (174 lb), SpO2 94%.  Orthostatic Blood Pressures      Flowsheet Row Most Recent Value   Blood Pressure 137/63 filed at 02/29/2024 0703   Patient Position - Orthostatic VS Sitting filed at 02/29/2024 0621              Intake/Output Summary (Last 24 hours) at 2/29/2024 1137  Last data filed at 2/29/2024 0856  Gross per 24 hour   Intake 720 ml   Output --   Net 720 ml       Invasive Devices       Peripheral Intravenous Line  Duration             Peripheral IV 02/28/24 Right Antecubital <1 day                    Physical Exam: /63   Pulse 80   Temp 98 °F (36.7 °C)   Resp 18   Wt 78.9 kg (174 lb)   SpO2 94%   BMI 31.83 kg/m²   General Appearance:    Alert, cooperative, no distress, appears stated age   Head:    Normocephalic, no scleral icterus   Eyes:    PERRL   Nose:   Nares  normal, septum midline, mucosa normal, no drainage    Throat:   Lips, mucosa, and tongue normal   Neck:   Supple, symmetrical, trachea midline     no JVD   Back:     Symmetric   Lungs:     Clear to auscultation bilaterally, respirations unlabored   Chest Wall:    No tenderness or deformity    Heart:    Regular rate and rhythm, S1 and S2 normal, no murmur, rub   or gallop   Abdomen:     Soft, non-tender, bowel sounds active all four quadrants,     no masses, no organomegaly   Extremities:   Extremities normal, atraumatic, no cyanosis or edema   Pulses:   2+ and symmetric all extremities   Skin:   Skin color, texture, turgor normal, no rashes or lesions   Neurologic:   Alert and oriented to person place and time. No focal deficits       Lab Results:   Recent Results (from the past 72 hour(s))   CBC and differential    Collection Time: 02/28/24  8:30 PM   Result Value Ref Range    WBC 7.99 4.31 - 10.16 Thousand/uL    RBC 4.18 3.81 - 5.12 Million/uL    Hemoglobin 11.1 (L) 11.5 - 15.4 g/dL    Hematocrit 38.3 34.8 - 46.1 %    MCV 92 82 - 98 fL    MCH 26.6 (L) 26.8 - 34.3 pg    MCHC 29.0 (L) 31.4 - 37.4 g/dL    RDW 14.5 11.6 - 15.1 %    MPV 9.8 8.9 - 12.7 fL    Platelets 231 149 - 390 Thousands/uL    nRBC 0 /100 WBCs    Neutrophils Relative 77 (H) 43 - 75 %    Immat GRANS % 1 0 - 2 %    Lymphocytes Relative 13 (L) 14 - 44 %    Monocytes Relative 8 4 - 12 %    Eosinophils Relative 1 0 - 6 %    Basophils Relative 0 0 - 1 %    Neutrophils Absolute 6.23 1.85 - 7.62 Thousands/µL    Immature Grans Absolute 0.05 0.00 - 0.20 Thousand/uL    Lymphocytes Absolute 1.02 0.60 - 4.47 Thousands/µL    Monocytes Absolute 0.60 0.17 - 1.22 Thousand/µL    Eosinophils Absolute 0.07 0.00 - 0.61 Thousand/µL    Basophils Absolute 0.02 0.00 - 0.10 Thousands/µL   Comprehensive metabolic panel    Collection Time: 02/28/24  8:30 PM   Result Value Ref Range    Sodium 141 135 - 147 mmol/L    Potassium 4.4 3.5 - 5.3 mmol/L    Chloride 97 96 - 108 mmol/L  "   CO2 38 (H) 21 - 32 mmol/L    ANION GAP 6 mmol/L    BUN 18 5 - 25 mg/dL    Creatinine 0.92 0.60 - 1.30 mg/dL    Glucose 134 65 - 140 mg/dL    Calcium 9.7 8.4 - 10.2 mg/dL    AST 23 13 - 39 U/L    ALT 13 7 - 52 U/L    Alkaline Phosphatase 96 34 - 104 U/L    Total Protein 7.4 6.4 - 8.4 g/dL    Albumin 4.3 3.5 - 5.0 g/dL    Total Bilirubin 0.36 0.20 - 1.00 mg/dL    eGFR 64 ml/min/1.73sq m   HS Troponin 0hr (reflex protocol)    Collection Time: 02/28/24  8:30 PM   Result Value Ref Range    hs TnI 0hr 10 \"Refer to ACS Flowchart\"- see link ng/L   FLU/RSV/COVID - if FLU/RSV clinically relevant    Collection Time: 02/28/24  8:30 PM    Specimen: Nose; Nares   Result Value Ref Range    SARS-CoV-2 Negative Negative    INFLUENZA A PCR Negative Negative    INFLUENZA B PCR Negative Negative    RSV PCR Negative Negative   ECG 12 lead    Collection Time: 02/28/24  8:30 PM   Result Value Ref Range    Ventricular Rate 93 BPM    Atrial Rate 93 BPM    DE Interval 156 ms    QRSD Interval 84 ms    QT Interval 372 ms    QTC Interval 462 ms    P Axis 88 degrees    QRS Axis 58 degrees    T Wave Milmay 85 degrees   HS Troponin I 2hr    Collection Time: 02/28/24 10:40 PM   Result Value Ref Range    hs TnI 2hr 89 (H) \"Refer to ACS Flowchart\"- see link ng/L    Delta 2hr hsTnI 79 (H) <20 ng/L   HS Troponin I 4hr    Collection Time: 02/29/24  1:27 AM   Result Value Ref Range    hs TnI 4hr 157 (H) \"Refer to ACS Flowchart\"- see link ng/L    Delta 4hr hsTnI 147 (H) <20 ng/L   Platelet count    Collection Time: 02/29/24  1:27 AM   Result Value Ref Range    Platelets 190 149 - 390 Thousands/uL    MPV 9.6 8.9 - 12.7 fL   ECG 12 lead    Collection Time: 02/29/24  3:24 AM   Result Value Ref Range    Ventricular Rate 95 BPM    Atrial Rate 95 BPM    DE Interval 196 ms    QRSD Interval 86 ms    QT Interval 352 ms    QTC Interval 442 ms    P Axis 83 degrees    QRS Axis 15 degrees    T Wave Axis -18 degrees   HS Troponin 0hr (reflex protocol)    Collection " "Time: 02/29/24  5:55 AM   Result Value Ref Range    hs TnI 0hr 256 (H) \"Refer to ACS Flowchart\"- see link ng/L   CBC and differential    Collection Time: 02/29/24  5:55 AM   Result Value Ref Range    WBC 6.69 4.31 - 10.16 Thousand/uL    RBC 4.05 3.81 - 5.12 Million/uL    Hemoglobin 10.7 (L) 11.5 - 15.4 g/dL    Hematocrit 36.3 34.8 - 46.1 %    MCV 90 82 - 98 fL    MCH 26.4 (L) 26.8 - 34.3 pg    MCHC 29.5 (L) 31.4 - 37.4 g/dL    RDW 14.2 11.6 - 15.1 %    MPV 9.6 8.9 - 12.7 fL    Platelets 207 149 - 390 Thousands/uL   Basic metabolic panel    Collection Time: 02/29/24  5:55 AM   Result Value Ref Range    Sodium 140 135 - 147 mmol/L    Potassium 4.8 3.5 - 5.3 mmol/L    Chloride 98 96 - 108 mmol/L    CO2 38 (H) 21 - 32 mmol/L    ANION GAP 4 mmol/L    BUN 17 5 - 25 mg/dL    Creatinine 0.85 0.60 - 1.30 mg/dL    Glucose 150 (H) 65 - 140 mg/dL    Calcium 9.5 8.4 - 10.2 mg/dL    eGFR 70 ml/min/1.73sq m   Magnesium    Collection Time: 02/29/24  5:55 AM   Result Value Ref Range    Magnesium 2.0 1.9 - 2.7 mg/dL   Manual Differential(PHLEBS Do Not Order)    Collection Time: 02/29/24  5:55 AM   Result Value Ref Range    Segmented % 89 (H) 43 - 75 %    Lymphocytes % 11 (L) 14 - 44 %    Monocytes % 0 (L) 4 - 12 %    Eosinophils, % 0 0 - 6 %    Basophils % 0 0 - 1 %    Absolute Neutrophils 5.95 1.85 - 7.62 Thousand/uL    Lymphocytes Absolute 0.74 0.60 - 4.47 Thousand/uL    Monocytes Absolute 0.00 0.00 - 1.22 Thousand/uL    Eosinophils Absolute 0.00 0.00 - 0.40 Thousand/uL    Basophils Absolute 0.00 0.00 - 0.10 Thousand/uL    Total Counted      RBC Morphology Normal     Platelet Estimate Adequate Adequate   Lipid panel    Collection Time: 02/29/24  5:55 AM   Result Value Ref Range    Cholesterol 207 (H) See Comment mg/dL    Triglycerides 61 See Comment mg/dL    HDL, Direct 78 >=50 mg/dL    LDL Calculated 117 (H) 0 - 100 mg/dL    Non-HDL-Chol (CHOL-HDL) 129 mg/dl   HS Troponin I 2hr    Collection Time: 02/29/24  7:57 AM   Result Value " "Ref Range    hs TnI 2hr 236 (H) \"Refer to ACS Flowchart\"- see link ng/L    Delta 2hr hsTnI -20 <20 ng/L   HS Troponin I 4hr    Collection Time: 02/29/24  9:56 AM   Result Value Ref Range    hs TnI 4hr 220 (H) \"Refer to ACS Flowchart\"- see link ng/L    Delta 4hr hsTnI -36 <20 ng/L     Imaging: I have personally reviewed pertinent reports.    EKG/NSR non specific st /t wave abnormality      Code Status: Level 1 - Full Code  Advance Directive and Living Will:      Power of :    POLST:      Counseling / Coordination of Care  Total floor / unit time spent today 70 minutes.  Greater than 50% of total time was spent with the patient and / or family counseling and / or coordination of care.    "

## 2024-02-29 NOTE — UTILIZATION REVIEW
Initial Clinical Review    Date: 3/1/24    Day 3: Has surpassed a 2nd midnight with active treatments and services, which include work up of dyspnea and chest discomfort, management of COPD exacerbation.   Cardiology on consult.   Telemetry.  Stress test today.       Admission: Date/Time/Statement:   Admission Orders (From admission, onward)       Ordered        02/28/24 2216  INPATIENT ADMISSION  Once                          Orders Placed This Encounter   Procedures    INPATIENT ADMISSION     Standing Status:   Standing     Number of Occurrences:   1     Order Specific Question:   Level of Care     Answer:   Med Surg [16]     Order Specific Question:   Estimated length of stay     Answer:   More than 2 Midnights     Order Specific Question:   Certification     Answer:   I certify that inpatient services are medically necessary for this patient for a duration of greater than two midnights. See H&P and MD Progress Notes for additional information about the patient's course of treatment.     ED Arrival Information       Expected   -    Arrival   2/28/2024 20:06    Acuity   Emergent              Means of arrival   Ambulance    Escorted by   [facility]    Service   Family Medicine    Admission type   Emergency              Arrival complaint   EMS             Chief Complaint   Patient presents with    Shortness of Breath     Pt arrives via EMS from home c/o SOB x3 days, recent viral illness. Hx COPD. 2 duo nebs, 125 solu PTA       Initial Presentation: 68 y.o. female from home to ED via ems admitted inpatient due to acute exacerbation of COPD/atypical chest pain/elevated BP.   PMH of HTN, HLD, hypothyroidism, and COPD.   Presented due to shortness of breath and chest pressure starting 3 days prior to arrival.   Tried home nebulizer without improvement.   Ems gave 2 additional Duo neb and IV anne marie medrol.   On exam: tachypnea.   Accessory muscle use.   Lungs diffuse wheezing with decreased movement.     Hypertensive.    CO2 38.   In the ED given Valentin Neb.    Plan:   Symbicort, Flonase, singular.  Monitor respiratory status.  Duo Neb as needed.  Trend troponin.  Consult Cardiology.     Date: 2/29/24    Day 2: anxious about chest pain.  Tearful.   On exam: decreased breath sounds.  On oxygen 2 liters.  Plan:  monitor oxygen needs.    Continue asa, stating.  On tessalon perles, Symbicort, Flonase and singular.     2/29/24 per Cardiology - patient  atypical chest pain and abnormal troponin.   Acute on chronic dyspnea.  Recommend ischemic evaluations.   Optimize pulmonary status, has COPD.   Continue asa, statin.  Avoid beta blocker.     ED Triage Vitals   Temperature Pulse Respirations Blood Pressure SpO2   02/28/24 2009 02/28/24 2008 02/28/24 2008 02/28/24 2008 02/28/24 2008   97.9 °F (36.6 °C) 98 20 (!) 184/81 94 %      Temp Source Heart Rate Source Patient Position - Orthostatic VS BP Location FiO2 (%)   02/28/24 2009 02/28/24 2008 02/28/24 2008 02/28/24 2008 --   Oral Monitor Sitting Right arm       Pain Score       02/29/24 0037       No Pain          Wt Readings from Last 1 Encounters:   02/28/24 78.9 kg (174 lb)     Additional Vital Signs:   02/29/24 0703 98 °F (36.7 °C) 80 18 137/63 90 94 % -- -- Nasal cannula --   02/29/24 0621 98.5 °F (36.9 °C) 84 16 147/70 -- 96 % 28 2 L/min Nasal cannula Sitting   02/29/24 0036 99.4 °F (37.4 °C) 89 18 163/77 111 91 % -- -- Nasal cannula Sitting   02/28/24 2248 -- -- -- -- -- -- -- -- Nasal cannula --   02/28/24 2226 -- 84 18 150/65 94 100 % 28 2 L/min Nasal cannula      Pertinent Labs/Diagnostic Test Results:   XR chest 1 view portable   Final Result by Keith Bustillos MD (02/29 1041)      There is hazy bibasilar opacity which may represent a combination of layered effusion and parenchymal opacity.            Workstation performed: [id number]           EKG performed at 2030: Sinus rhythm with ventricular rate of 93, normal axis, , QRS 84, QTc 462, no ST segment or T  wave changes concerning for acute ischemia/infarct. Interpreted by ED as normal sinus rhythm     Results from last 7 days   Lab Units 02/28/24 2030   SARS-COV-2  Negative     Results from last 7 days   Lab Units 03/01/24  0539 02/29/24  0555 02/29/24  0127 02/28/24 2030   WBC Thousand/uL 8.08 6.69  --  7.99   HEMOGLOBIN g/dL 10.6* 10.7*  --  11.1*   HEMATOCRIT % 36.5 36.3  --  38.3   PLATELETS Thousands/uL 208 207 190 231   NEUTROS ABS Thousands/µL 6.27  --   --  6.23     Results from last 7 days   Lab Units 03/01/24  0539 02/29/24  0555 02/28/24 2030   SODIUM mmol/L 143 140 141   POTASSIUM mmol/L 4.9 4.8 4.4   CHLORIDE mmol/L 101 98 97   CO2 mmol/L 40* 38* 38*   ANION GAP mmol/L 2 4 6   BUN mg/dL 24 17 18   CREATININE mg/dL 1.05 0.85 0.92   EGFR ml/min/1.73sq m 54 70 64   CALCIUM mg/dL 9.4 9.5 9.7   MAGNESIUM mg/dL 2.0 2.0  --      Results from last 7 days   Lab Units 02/28/24 2030   AST U/L 23   ALT U/L 13   ALK PHOS U/L 96   TOTAL PROTEIN g/dL 7.4   ALBUMIN g/dL 4.3   TOTAL BILIRUBIN mg/dL 0.36     Results from last 7 days   Lab Units 03/01/24  0539 02/29/24  0555 02/28/24 2030   GLUCOSE RANDOM mg/dL 96 150* 134     Results from last 7 days   Lab Units 02/29/24  0956 02/29/24  0757 02/29/24  0555 02/29/24  0127 02/28/24 2240 02/28/24 2030   HS TNI 0HR ng/L  --   --  256*  --   --  10   HS TNI 2HR ng/L  --  236*  --   --  89*  --    HSTNI D2 ng/L  --  -20  --   --  79*  --    HS TNI 4HR ng/L 220*  --   --  157*  --   --    HSTNI D4 ng/L -36  --   --  147*  --   --      Results from last 7 days   Lab Units 02/28/24 2030   INFLUENZA A PCR  Negative   INFLUENZA B PCR  Negative   RSV PCR  Negative         ED Treatment:   Medication Administration from 02/28/2024 2006 to 02/29/2024 0031         Date/Time Order Dose Route Action Comments     02/28/2024 2013 EST ipratropium-albuterol (FOR EMS ONLY) (DUO-NEB) 0.5-2.5 mg/3 mL inhalation solution 6 mL 0 mL Does not apply Given to EMS --     02/28/2024 2013 EST  methylPREDNISolone sodium succinate (FOR EMS ONLY) (Solu-MEDROL) 125 MG injection 125 mg 0 mg Does not apply Given to EMS --     02/28/2024 2134 EST albuterol inhalation solution 10 mg 10 mg Nebulization Given --     02/28/2024 2133 EST ipratropium (ATROVENT) 0.02 % inhalation solution 1 mg 1 mg Nebulization Given --     02/28/2024 2134 EST sodium chloride 0.9 % inhalation solution 12 mL 12 mL Nebulization Given --          Past Medical History:   Diagnosis Date    Acute kidney failure (HCC)     Allergic     Allergies     Anemia     Asthma     Cancer (HCC)     Cataract     Chronic kidney disease (CKD), stage III (moderate) (HCC)     COPD (chronic obstructive pulmonary disease) ([facility])     COVID-19     Covid + 4-4-35-cough at that time now fully resolved    Disease of thyroid gland     Essential hypertension     GERD (gastroesophageal reflux disease)     Glaucoma     High blood pressure     HTN (hypertension) 02/16/2021    Hyperlipidemia     Hypothyroidism     Mesenteric lymphadenopathy 07/13/2021    Pulmonary hypertension (HCC)     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 female  Admission Orders: 2/28/24 2216 inpatient   Scheduled Medications:  aspirin, 325 mg, Oral, Daily - changed  to 81 mg 2/29/24  atorvastatin, 40 mg, Oral, Daily With Dinner  benzonatate, 100 mg, Oral, TID  brimonidine tartrate, 1 drop, Both Eyes, Q8H ESTEVAN  budesonide-formoterol, 2 puff, Inhalation, BID  enoxaparin, 40 mg, Subcutaneous, Daily  ferrous  sulfate, 325 mg, Oral, Daily With Breakfast  fluticasone, 2 spray, Each Nare, Daily  levothyroxine, 75 mcg, Oral, Early Morning  losartan, 25 mg, Oral, Daily  montelukast, 10 mg, Oral, HS  pantoprazole, 40 mg, Oral, Daily Before Breakfast  sertraline, 25 mg, Oral, HS  timolol, 1 drop, Both Eyes, BID    Continuous IV Infusions: none      PRN Meds: not used.   acetaminophen, 975 mg, Oral, Q8H PRN x 1 2/29  albuterol, 2 puff, Inhalation, Q6H PRN  ipratropium-albuterol, 3 mL, Nebulization, Q8H PRN  labetalol, 10 mg, Intravenous, Q6H PRN    Telemetry     IP CONSULT TO CARDIOLOGY    Network Utilization Review Department  ATTENTION: Please call with any questions or concerns to [phone] and carefully listen to the prompts so that you are directed to the right person. All voicemails are confidential.   For Discharge needs, contact Care Management DC Support Team at [phone] opt. [phone]  Send all requests for admission clinical reviews, approved or denied determinations and any other requests to dedicated fax number below belonging to the campus where the patient is receiving treatment. List of dedicated fax numbers for the Facilities:  FACILITY NAME UR FAX NUMBER   ADMISSION DENIALS (Administrative/Medical Necessity) [phone]   DISCHARGE SUPPORT TEAM (NETWORK) [phone]   PARENT CHILD HEALTH (Maternity/NICU/Pediatrics) [phone]   [facility] [phone]   [facility] [phone]   [facility] [phone]   [facility] [phone]   [facility] [phone]   [facility] [phone]   [facility] [phone]   [facility] [phone]   [facility] [phone]   [facility] [phone]   [facility] [phone]    [facility] [phone]

## 2024-02-29 NOTE — PLAN OF CARE
Problem: RESPIRATORY - ADULT  Goal: Achieves optimal ventilation and oxygenation  Description: INTERVENTIONS:  - Assess for changes in respiratory status  - Assess for changes in mentation and behavior  - Position to facilitate oxygenation and minimize respiratory effort  - Oxygen administered by appropriate delivery if ordered  - Initiate smoking cessation education as indicated  - Encourage broncho-pulmonary hygiene including cough, deep breathe, Incentive Spirometry  - Assess the need for suctioning and aspirate as needed  - Assess and instruct to report SOB or any respiratory difficulty  - Respiratory Therapy support as indicated  2/29/2024 0705 by Christi Zhang RN  Outcome: Progressing  2/29/2024 0351 by Christi Zhang RN  Outcome: Progressing

## 2024-02-29 NOTE — PROGRESS NOTES
[facility]  Progress Note  Name: Noreen Alvarez I  MRN: [id number]  Unit/Bed#: -01 I Date of Admission: 2/28/2024   Date of Service: 2/29/2024 I Hospital Day: 1    Assessment/Plan   * Atypical chest pain  Assessment & Plan  Lab Results   Component Value Date    HSTNI0 256 (H) 02/29/2024    HSTNI2 236 (H) 02/29/2024    HSTNID2 -20 02/29/2024    HSTNI4 220 (H) 02/29/2024    HSTNID4 -36 02/29/2024         CXR: Result Date: 2/29/2024 Impression: There is hazy bibasilar opacity which may represent a combination of layered effusion and parenchymal opacity.     EKG: In ED, NSR without ST elevations.   Follow up EKG overnight remarkable for ST wave depression in leads V4, V5, and V6.   Morning EKG was a posterior EKG and still shows some nonspecific ST abnormalities  Likely unstable angina or demand ischemia secondary to COPD exacerbation  RAYA Score: 4  Lipid panel showed LDL of 117  ECHO showed EF of 60% on 1/29     Plan:   STAT EKG and troponin if chest pain, Telemetry for arrhythmias  Monitor Troponin Trend  Started atorvastatin 40 mg daily  Started losartan 25mg daily  HbA1C  Received  on 2/29, starting on 3/1 she will get 81 mg daily  Consult cardiology for further workup and possible catheterization/PCI    Will not start heparin drip at this time due to troponins peaking and going back down  Supportive care with tylenol and tums for further pain    Elevated blood pressure reading  Assessment & Plan  When patient came in, bp was 184/81. Patient not on current blood pressure medications at home    Plan:  -started losartan 25mg daily  - avoid beta blockers pre cardio in case her chest pain is cardiac in etiology        SOB (shortness of breath)  Assessment & Plan  Patient SOB with exertion. Currently doing well at her usual home O2 level of 2L. Patient also states she has what feels like blockage of her right nostril which sounds chronic according to her daughter since she was a  child, but it is bothering patient more.  Chest x-ray 2/28: hazy bibasilar opacity may represent a combination of layered effusion and parenchymal opacity  Plan:  - Monitor O2 needs  - Flonase ordered    Hypothyroidism  Assessment & Plan  Home levothyroxine is 75 mcg daily. Continued.  -TSH wnl as of 2/29    Hyperlipidemia  Assessment & Plan  Patient not on any home med for HLD. Ordered lipid panel which showed LDL of 117    Plan:  -started patient on atorvastatin 40 mg daily    Multiple lung nodules  Assessment & Plan  Assessment:   - Patient presented with 3 days of sudden onset SOB and atypical chest pressure with fatigue and declining ADLs  - Wt loss of 12 pounds in the last month; wt loss of 14 in the last 3 months   - Given symptoms and presentation, I suspect she may have an undiagnosed cancer and was lost to follow up outpatient  - Multiple lung nodules first seen on CT imaging on 12/29/2020  - Prior to hospitalization, patient was being worked up for lung cancer etiology of multiple lung nodules   - Last seen Dr. Muñoz, hem/onc specialist, on 8/7/2023; per chart review, patient was referred to Dr. Muñoz for non FDG avid lung nodules and some mediastinal and mesenteric adenopathy which had low grade activity but was never sampled  - Biopsy per Dr. Muñoz in 2023 revealed concerns for atypical adenomatous hyperplasia, adenocarcinoma in-situ, or a low-grade invasive adenocarcinoma   - 1/21/2024 CT AP 1.4 cm groundglass nodules in RLL, 1.2 cm lobulated nodule LLL, no significant changes according to CT AP  - During this hospitalization, ED ordered CT AP which showed stability of lung nodules; however, PET-CT would be preferable imaging for assessment of lung cancer; thus would like to obtain recommendations per pulmonology   - CXR 2/29 result: hazy bibasilar opacity which may represent layered effusion and parenchymal opacity on recent imaging, with lymphocytes 11%, I recommend further investigation into  "etiology of patient's presenting symptoms    - Per chart review, \"CAT scan showed no evidence of labial cancer, stable bilateral pulmonary nodule, there is a 3 to 4 mm left lower lobe lung nodule and this is a new from before we will repeat CAT scan of the chest in 6 months\"  - Differential diagnoses: secondary cancer (remote history of head and neck malignancy) with mets to lungs vs primary lung cancer. Per lung biopsy from prior, there was concern for atypical adenomatous hyperplasia vs adenocarcinoma in-situ vs low-grade invasive adenocarcinoma, Suspect atypical chest pain could be related to possible undiagnosed lung cancer.     Plan:   - Lung biopsy scheduled as of now  - F/u repeat blood work  - Iron studies are normal  - Consider hem/onc consult, appreciate recs  - Pulm consulted, appreciate recs   - Consider palliative care consult if needed   - PET-CT considered previously by outpatient hem/onc; may consider PET-CT (previously was ordered but was denied by patient's insurance company)   - May need to investigate GI symptoms as it may related to possible undiagnosed lung cancer since 2020    Severe persistent asthma without complication  Assessment & Plan  Patient's chest pain could be due to pulmonary causes such as her COPD or asthma. Currently doing well on her home O2 of 2L. No wheezing appreciated thus far.    Depression with anxiety  Assessment & Plan  Continuing home Zoloft.    Centrilobular emphysema (HCC)  Assessment & Plan  Chronic. Patient's chest pain could be related to exacerbation of COPD. Patient currently on her home O2 needs of 2L.     Chronic kidney disease, stage 3 (moderate)  Assessment & Plan  Lab Results   Component Value Date    EGFR 54 03/01/2024    EGFR 70 02/29/2024    EGFR 64 02/28/2024    CREATININE 1.05 03/01/2024    CREATININE 0.85 02/29/2024    CREATININE 0.92 02/28/2024    Creatinine at baseline. Will watch with morning labs.            VTE Pharmacologic Prophylaxis: VTE " Score: 6 High Risk (Score >/= 5) - Pharmacological DVT Prophylaxis Ordered: enoxaparin (Lovenox). Sequential Compression Devices Ordered.    Mobility:   Basic Mobility Inpatient Raw Score: 24  JH-HLM Goal: 8: Walk 250 feet or more  JH-HLM Achieved: 6: Walk 10 steps or more  HLM Goal NOT achieved. Continue with multidisciplinary rounding and encourage appropriate mobility to improve upon HLM goals.    Patient Centered Rounds: I performed bedside rounds with nursing staff today.  Discussions with Specialists or Other Care Team Provider: case management, nursing, cardio, attending    Education and Discussions with Family / Patient: Updated  (son) at bedside.    Current Length of Stay: 1 day(s)  Current Patient Status: Inpatient   Discharge Plan: Anticipate discharge in 48-72 hrs to D    Code Status: Level 1 - Full Code    Subjective:   Patient seen at bedside this morning. She is very anxious about her pain and was tearful. No overnight events. She understands the plan to find out the cause of her chest pain.    Objective:     Vitals:   Temp (24hrs), Av.5 °F (36.9 °C), Min:97.9 °F (36.6 °C), Max:99.4 °F (37.4 °C)    Temp:  [97.9 °F (36.6 °C)-99.4 °F (37.4 °C)] 98 °F (36.7 °C)  HR:  [80-98] 80  Resp:  [16-20] 18  BP: (137-184)/(63-81) 137/63  SpO2:  [91 %-100 %] 94 %  Body mass index is 31.83 kg/m².     Input and Output Summary (last 24 hours):     Intake/Output Summary (Last 24 hours) at 2024 1335  Last data filed at 2024 0856  Gross per 24 hour   Intake 720 ml   Output --   Net 720 ml       Physical Exam:   Physical Exam  Constitutional:       General: She is not in acute distress.     Appearance: Normal appearance. She is not ill-appearing.   HENT:      Head: Normocephalic and atraumatic.      Nose: Nose normal.      Mouth/Throat:      Mouth: Mucous membranes are moist.      Pharynx: Oropharynx is clear.   Cardiovascular:      Rate and Rhythm: Normal rate and regular rhythm.      Pulses:  Normal pulses.      Heart sounds: Normal heart sounds.   Pulmonary:      Effort: Pulmonary effort is normal.      Comments: Decreased breath sounds diffusely. Patient on 2L O2 breathing well.  Abdominal:      General: Abdomen is flat. Bowel sounds are normal.      Palpations: Abdomen is soft.      Tenderness: There is no abdominal tenderness.   Skin:     General: Skin is warm and dry.      Capillary Refill: Capillary refill takes less than 2 seconds.   Neurological:      General: No focal deficit present.      Mental Status: She is alert and oriented to person, place, and time.   Psychiatric:         Mood and Affect: Mood normal.         Behavior: Behavior normal.          Additional Data:     Labs:  Results from last 7 days   Lab Units 02/29/24  0555 02/29/24  0127 02/28/24  2030   WBC Thousand/uL 6.69  --  7.99   HEMOGLOBIN g/dL 10.7*  --  11.1*   HEMATOCRIT % 36.3  --  38.3   PLATELETS Thousands/uL 207   < > 231   NEUTROS PCT %  --   --  77*   LYMPHS PCT %  --   --  13*   LYMPHO PCT % 11*  --   --    MONOS PCT %  --   --  8   MONO PCT % 0*  --   --    EOS PCT % 0  --  1    < > = values in this interval not displayed.     Results from last 7 days   Lab Units 02/29/24  0555 02/28/24  2030   SODIUM mmol/L 140 141   POTASSIUM mmol/L 4.8 4.4   CHLORIDE mmol/L 98 97   CO2 mmol/L 38* 38*   BUN mg/dL 17 18   CREATININE mg/dL 0.85 0.92   ANION GAP mmol/L 4 6   CALCIUM mg/dL 9.5 9.7   ALBUMIN g/dL  --  4.3   TOTAL BILIRUBIN mg/dL  --  0.36   ALK PHOS U/L  --  96   ALT U/L  --  13   AST U/L  --  23   GLUCOSE RANDOM mg/dL 150* 134                       Lines/Drains:  Invasive Devices       Peripheral Intravenous Line  Duration             Peripheral IV 02/28/24 Right Antecubital <1 day                      Telemetry:  Telemetry Orders (From admission, onward)               24 Hour Telemetry Monitoring  Continuous x 24 Hours (Telem)        Question:  Reason for 24 Hour Telemetry  Answer:  PCI/EP study (including pacer and ICD  implementation), Cardiac surgery, MI, abnormal cardiac cath, and chest pain- rule out MI  Comment:  suspected CAD                     Telemetry Reviewed: Normal Sinus Rhythm  Indication for Continued Telemetry Use: Acute MI/Unstable Angina/Rule out ACS             Imaging: Reviewed radiology reports from this admission including: chest xray and ECHO    Recent Cultures (last 7 days):         Last 24 Hours Medication List:   Current Facility-Administered Medications   Medication Dose Route Frequency Provider Last Rate    acetaminophen  975 mg Oral Q8H PRN Alka Caceres, DO      albuterol  2 puff Inhalation Q6H PRN Caro Sostorecbeatrice, DO      [START ON 3/1/2024] aspirin  81 mg Oral Daily Alka Caceres, DO      atorvastatin  40 mg Oral Daily With Dinner Alka Caceres, DO      benzonatate  100 mg Oral TID Caro Crowley, DO      brimonidine tartrate  1 drop Both Eyes Q8H ESTEVAN Caro Crowley, DO      budesonide-formoterol  2 puff Inhalation BID Caro Crowley, DO      calcium carbonate  500 mg Oral Daily PRN Alka Caceres, DO      enoxaparin  40 mg Subcutaneous Daily Caro Crowley, DO      ferrous sulfate  325 mg Oral Daily With Breakfast Caro Crowley, DO      fluticasone  2 spray Each Nare Daily Caro Crowley, DO      ipratropium-albuterol  3 mL Nebulization Q8H PRN Caro Crowley, DO      labetalol  10 mg Intravenous Q6H PRN Caro Johanntorecz, DO      levothyroxine  75 mcg Oral Early Morning Caro Crowley, DO      losartan  25 mg Oral Daily Alka Caceres, DO      montelukast  10 mg Oral HS Caro Crowley, DO      pantoprazole  40 mg Oral Daily Before Breakfast Caro Crowley, DO      sertraline  25 mg Oral HS Caro Crowley, DO      timolol  1 drop Both Eyes BID Caro Crowley, DO          Today, Patient Was Seen By: Alka Caceres DO    **Please Note: This note may have been constructed using a voice recognition system.**

## 2024-02-29 NOTE — ASSESSMENT & PLAN NOTE
Lab Results   Component Value Date    EGFR 64 02/28/2024    EGFR 52 01/29/2024    EGFR 58 01/28/2024    CREATININE 0.92 02/28/2024    CREATININE 1.08 01/29/2024    CREATININE 0.99 01/28/2024    Creatinine at baseline. Will watch with morning labs.

## 2024-02-29 NOTE — ED PROVIDER NOTES
History  Chief Complaint   Patient presents with    Shortness of Breath     Pt arrives via EMS from home c/o SOB x3 days, recent viral illness. Hx COPD. 2 duo nebs, 125 solu PTA     68-year-old female with past medical history significant for hypertension, hyperlipidemia, hypothyroidism, COPD who presents to the ED for evaluation of shortness of breath.  Symptoms started about 3 days ago and have been gradually worsening since onset.  No known provoking or palliating factors.  Took a home nebulizer treatment without any relief.  EMS was then called who administered 2 additional DuoNebs as well as Solu-Medrol 125 with some improvement in symptoms.  No pain associated with this.  No fevers.  Reports associated soreness of her muscles while breathing        Prior to Admission Medications   Prescriptions Last Dose Informant Patient Reported? Taking?   Budeson-Glycopyrrol-Formoterol (Breztri Aerosphere) 160-9-4.8 MCG/ACT AERO   No No   Sig: Inhale 2 puffs 2 (two) times a day Rinse mouth after use.   Diclofenac Sodium (VOLTAREN) 1 %   No No   Sig: APPLY 2 GRAMS TO AFFECTED AREA 4 TIMES A DAY   albuterol (PROVENTIL HFA,VENTOLIN HFA) 90 mcg/act inhaler   No No   Sig: Inhale 2 puffs every 6 (six) hours as needed for wheezing or shortness of breath   azithromycin (ZITHROMAX) 250 mg tablet   No No   Sig: Take 1 tablet (250 mg total) by mouth every other day for 45 doses   benzonatate (TESSALON PERLES) 100 mg capsule   No No   Sig: Take 1 capsule (100 mg total) by mouth 3 (three) times a day   brimonidine tartrate 0.2 % ophthalmic solution  Self Yes No   ferrous sulfate 324 (65 Fe) mg   No No   Sig: Take 1 tablet (324 mg total) by mouth daily after lunch   fluticasone (FLONASE) 50 mcg/act nasal spray   No No   Sig: SPRAY 1 SPRAY INTO EACH NOSTRIL EVERY DAY   ipratropium (ATROVENT) 0.06 % nasal spray   No No   Si SPRAYS INTO EACH NOSTRIL 3 (THREE) TIMES A DAY FOR INCREASED NASAL SECRETION   ipratropium-albuterol (DUO-NEB)  0.5-2.5 mg/3 mL nebulizer solution   No No   Sig: Take 3 mL by nebulization every 8 (eight) hours as needed for wheezing or shortness of breath   levothyroxine 75 mcg tablet   No No   Sig: Take 1 tablet (75 mcg total) by mouth daily in the early morning   montelukast (SINGULAIR) 10 mg tablet   No No   Sig: Take 1 tablet (10 mg total) by mouth daily at bedtime   pantoprazole (PROTONIX) 40 mg tablet   No No   Sig: Take 1 tablet (40 mg total) by mouth daily before breakfast   polyethylene glycol (GLYCOLAX) 17 GM/SCOOP powder   No No   Sig: DISSOLVE 17 GRAMS INTO WATER AND DRINK BY MOUTH EVERY DAY   sertraline (ZOLOFT) 25 mg tablet   No No   Sig: Take 1 tablet (25 mg total) by mouth daily at bedtime   timolol (TIMOPTIC) 0.5 % ophthalmic solution  Self Yes No   Sig: INSTILL 1 DROP INTO EACH EYE TWO TIMES A DAY      Facility-Administered Medications: None       Past Medical History:   Diagnosis Date    Acute kidney failure (HCC)     Allergic     Allergies     Anemia     Asthma     Cancer (HCC)     Cataract     Chronic kidney disease (CKD), stage III (moderate) (HCC)     COPD (chronic obstructive pulmonary disease) ([facility])     COVID-19     Covid + 2-4-24-cough at that time now fully resolved    Disease of thyroid gland     Essential hypertension     GERD (gastroesophageal reflux disease)     Glaucoma     High blood pressure     HTN (hypertension) 02/16/2021    Hyperlipidemia     Hypothyroidism     Mesenteric lymphadenopathy 07/13/2021    Pulmonary hypertension (HCC)     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     IL TENDON SHEATH INCISION Right 02/16/2021    Procedure: THUMB TRIGGER FINGER RELEASE;  Surgeon: Angeles Denton MD;  Location: [facility];  Service:  Orthopedics    TUBAL LIGATION         Family History   Problem Relation Age of Onset    Other Mother         respiratory disorder    Sudden death Father         shot himself    Suicidality Father     Brain cancer Sister     Diabetes Sister     Breast cancer Sister     Cancer Sister 62        pancreatic cancer    No Known Problems Sister     No Known Problems Sister     No Known Problems Sister     No Known Problems Sister     No Known Problems Sister     No Known Problems Sister     No Known Problems Daughter     No Known Problems Daughter     No Known Problems Maternal Grandmother     No Known Problems Maternal Grandfather     No Known Problems Paternal Grandmother     No Known Problems Paternal Grandfather     No Known Problems Maternal Aunt     No Known Problems Maternal Aunt     No Known Problems Maternal Aunt     No Known Problems Maternal Aunt     No Known Problems Maternal Aunt     No Known Problems Paternal Aunt     No Known Problems Paternal Aunt     No Known Problems Paternal Aunt     No Known Problems Paternal Aunt      I have reviewed and agree with the history as documented.    E-Cigarette/Vaping    E-Cigarette Use Never User      E-Cigarette/Vaping Substances    Nicotine No     THC No     CBD No      Social History     Tobacco Use    Smoking status: Former     Current packs/day: 0.00     Average packs/day: 1 pack/day for 40.0 years (40.0 ttl pk-yrs)     Types: Cigarettes     Start date: 1/1/1974     Quit date: 1/1/2014     Years since quitting: 10.1    Smokeless tobacco: Never   Vaping Use    Vaping status: Never Used   Substance Use Topics    Alcohol use: Never     Comment: None    Drug use: Never     Comment: Denies       Review of Systems   Constitutional:  Negative for chills and fever.   HENT:  Negative for ear pain and sore throat.    Eyes:  Negative for pain and visual disturbance.   Respiratory:  Positive for cough and shortness of breath.    Cardiovascular:  Negative for chest pain and  palpitations.   Gastrointestinal:  Negative for abdominal pain and vomiting.   Genitourinary:  Negative for dysuria and hematuria.   Musculoskeletal:  Negative for arthralgias and back pain.   Skin:  Negative for color change and rash.   Neurological:  Negative for seizures and syncope.   All other systems reviewed and are negative.      Physical Exam  Physical Exam  Vitals and nursing note reviewed.   Constitutional:       General: She is not in acute distress.     Appearance: She is well-developed.   HENT:      Head: Normocephalic and atraumatic.   Eyes:      Conjunctiva/sclera: Conjunctivae normal.   Cardiovascular:      Rate and Rhythm: Normal rate and regular rhythm.      Heart sounds: No murmur heard.  Pulmonary:      Effort: Tachypnea and accessory muscle usage present.      Breath sounds: Wheezing (diffuse with decreased movement overall) present.   Abdominal:      Palpations: Abdomen is soft.      Tenderness: There is no abdominal tenderness.   Musculoskeletal:         General: No swelling.      Cervical back: Neck supple.   Skin:     General: Skin is warm and dry.      Capillary Refill: Capillary refill takes less than 2 seconds.   Neurological:      Mental Status: She is alert.   Psychiatric:         Mood and Affect: Mood normal.         Vital Signs  ED Triage Vitals   Temperature Pulse Respirations Blood Pressure SpO2   02/28/24 2009 02/28/24 2008 02/28/24 2008 02/28/24 2008 02/28/24 2008   97.9 °F (36.6 °C) 98 20 (!) 184/81 94 %      Temp Source Heart Rate Source Patient Position - Orthostatic VS BP Location FiO2 (%)   02/28/24 2009 02/28/24 2008 02/28/24 2008 02/28/24 2008 --   Oral Monitor Sitting Right arm       Pain Score       --                  Vitals:    02/28/24 2008   BP: (!) 184/81   Pulse: 98   Patient Position - Orthostatic VS: Sitting         Visual Acuity      ED Medications  Medications   ipratropium-albuterol (FOR EMS ONLY) (DUO-NEB) 0.5-2.5 mg/3 mL inhalation solution 6 mL (0 mL Does  not apply Given to EMS 2/28/24 2013)   methylPREDNISolone sodium succinate (FOR EMS ONLY) (Solu-MEDROL) 125 MG injection 125 mg (0 mg Does not apply Given to EMS 2/28/24 2013)   albuterol inhalation solution 10 mg (10 mg Nebulization Given 2/28/24 2134)   ipratropium (ATROVENT) 0.02 % inhalation solution 1 mg (1 mg Nebulization Given 2/28/24 2133)   sodium chloride 0.9 % inhalation solution 12 mL (12 mL Nebulization Given 2/28/24 2134)       Diagnostic Studies  Results Reviewed       Procedure Component Value Units Date/Time    FLU/RSV/COVID - if FLU/RSV clinically relevant [[id number]]  (Normal) Collected: 02/28/24 2030    Lab Status: Final result Specimen: Nares from Nose Updated: 02/28/24 2125     SARS-CoV-2 Negative     INFLUENZA A PCR Negative     INFLUENZA B PCR Negative     RSV PCR Negative    Narrative:      FOR PEDIATRIC PATIENTS - copy/paste COVID Guidelines URL to browser: [email or web address]    SARS-CoV-2 assay is a Nucleic Acid Amplification assay intended for the  qualitative detection of nucleic acid from SARS-CoV-2 in nasopharyngeal  swabs. Results are for the presumptive identification of SARS-CoV-2 RNA.    Positive results are indicative of infection with SARS-CoV-2, the virus  causing COVID-19, but do not rule out bacterial infection or co-infection  with other viruses. Laboratories within the [place] and its  territories are required to report all positive results to the appropriate  public health authorities. Negative results do not preclude SARS-CoV-2  infection and should not be used as the sole basis for treatment or other  patient management decisions. Negative results must be combined with  clinical observations, patient history, and epidemiological information.  This test has not been FDA cleared or approved.    This test has been authorized by FDA under an Emergency Use Authorization  (EUA). This test is only authorized for the  duration of time the  declaration that circumstances exist justifying the authorization of the  emergency use of an in vitro diagnostic tests for detection of SARS-CoV-2  virus and/or diagnosis of COVID-19 infection under section 564(b)(1) of  the Act, 21 U.S.C. 360bbb-3(b)(1), unless the authorization is terminated  or revoked sooner. The test has been validated but independent review by FDA  and CLIA is pending.    Test performed using [employer] GeneXpert: This RT-PCR assay targets N2,  a region unique to SARS-CoV-2. A conserved region in the E-gene was chosen  for pan-Sarbecovirus detection which includes SARS-CoV-2.    According to CMS-2020-01-R, this platform meets the definition of high-throughput technology.    HS Troponin I 2hr [[id number]]     Lab Status: No result Specimen: Blood     HS Troponin 0hr (reflex protocol) [[id number]]  (Normal) Collected: 02/28/24 2030    Lab Status: Final result Specimen: Blood from Arm, Right Updated: 02/28/24 2113     hs TnI 0hr 10 ng/L     Comprehensive metabolic panel [[id number]]  (Abnormal) Collected: 02/28/24 2030    Lab Status: Final result Specimen: Blood from Arm, Right Updated: 02/28/24 2109     Sodium 141 mmol/L      Potassium 4.4 mmol/L      Chloride 97 mmol/L      CO2 38 mmol/L      ANION GAP 6 mmol/L      BUN 18 mg/dL      Creatinine 0.92 mg/dL      Glucose 134 mg/dL      Calcium 9.7 mg/dL      AST 23 U/L      ALT 13 U/L      Alkaline Phosphatase 96 U/L      Total Protein 7.4 g/dL      Albumin 4.3 g/dL      Total Bilirubin 0.36 mg/dL      eGFR 64 ml/min/1.73sq m     Narrative:      National Kidney Disease Foundation guidelines for Chronic Kidney Disease (CKD):     Stage 1 with normal or high GFR (GFR > 90 mL/min/1.73 square meters)    Stage 2 Mild CKD (GFR = 60-89 mL/min/1.73 square meters)    Stage 3A Moderate CKD (GFR = 45-59 mL/min/1.73 square meters)    Stage 3B Moderate CKD (GFR = 30-44 mL/min/1.73 square meters)    Stage 4 Severe CKD (GFR = 15-29 mL/min/1.73  square meters)    Stage 5 End Stage CKD (GFR <15 mL/min/1.73 square meters)  Note: GFR calculation is accurate only with a steady state creatinine    CBC and differential [[id number]]  (Abnormal) Collected: 02/28/24 2030    Lab Status: Final result Specimen: Blood from Arm, Right Updated: 02/28/24 2049     WBC 7.99 Thousand/uL      RBC 4.18 Million/uL      Hemoglobin 11.1 g/dL      Hematocrit 38.3 %      MCV 92 fL      MCH 26.6 pg      MCHC 29.0 g/dL      RDW 14.5 %      MPV 9.8 fL      Platelets 231 Thousands/uL      nRBC 0 /100 WBCs      Neutrophils Relative 77 %      Immat GRANS % 1 %      Lymphocytes Relative 13 %      Monocytes Relative 8 %      Eosinophils Relative 1 %      Basophils Relative 0 %      Neutrophils Absolute 6.23 Thousands/µL      Immature Grans Absolute 0.05 Thousand/uL      Lymphocytes Absolute 1.02 Thousands/µL      Monocytes Absolute 0.60 Thousand/µL      Eosinophils Absolute 0.07 Thousand/µL      Basophils Absolute 0.02 Thousands/µL                    XR chest 1 view portable    (Results Pending)              Procedures  Procedures         ED Course  ED Course as of 02/28/24 2216 Wed Feb 28, 2024 2112 XR chest 1 view portable  X-ray shows no acute cardiopulmonary pathology as interpreted by myself pending final radiology read   2112 ECG 12 lead  EKG performed at 2030: Sinus rhythm with ventricular rate of 93, normal axis, , QRS 84, QTc 462, no ST segment or T wave changes concerning for acute ischemia/infarct.  Interpreted by me as normal sinus rhythm   2113 CBC and differential(!)  No leukocytosis, stable anemia   2114 Comprehensive metabolic panel(!)  No acute electrolyte abnormalities, normal renal function, no acute LFT abnormalities   2114 HS Troponin 0hr (reflex protocol)  Indeterminant.  Will need 2-hour troponin to trend and calculate delta   2126 FLU/RSV/COVID - if FLU/RSV clinically relevant  Negative   2208 Admitting team consulted   2213 Residents to reach out per [facility]  AP   1001 After discussing with residents, accepted onto service of Dr. Cooley for further care/management, stable at the time of disposition.  Inpatient [facility] [place]                                             Medical Decision Making  68-year-old female presents to the ED for evaluation of shortness of breath.  Differential includes but not limited to COPD exacerbation, URI, pneumonia.  Anticipate need for hospitalization.  Please see ED course for remainder of MDM    Amount and/or Complexity of Data Reviewed  Labs: ordered. Decision-making details documented in ED Course.  Radiology: ordered. Decision-making details documented in ED Course.  ECG/medicine tests:  Decision-making details documented in ED Course.    Risk  Prescription drug management.  Decision regarding hospitalization.             Disposition  Final diagnoses:   COPD exacerbation (HCC)     Time reflects when diagnosis was documented in both MDM as applicable and the Disposition within this note       Time User Action Codes Description Comment    2/28/2024  9:26 PM Aashish Danielson Add [J44.1] COPD exacerbation (HCC)           ED Disposition       ED Disposition   Admit    Condition   Stable    Date/Time   Wed Feb 28, 2024 10:15 PM    Comment   Case was discussed with [facility] and the patient's admission status was agreed to be Admission Status: inpatient status to the service of Dr. Cooley .               Follow-up Information    None         Patient's Medications   Discharge Prescriptions    No medications on file       No discharge procedures on file.    PDMP Review         Value Time User    PDMP Reviewed  Yes 7/11/2023 10:38 AM Samantah Hernandez MD            ED Provider  Electronically Signed by             Aashish Danielson DO  02/28/24 2210

## 2024-02-29 NOTE — ASSESSMENT & PLAN NOTE
Chronic. Patient's chest pain could be related to exacerbation of COPD. Patient currently on her home O2 needs of 2L.

## 2024-03-01 ENCOUNTER — APPOINTMENT (INPATIENT)
Dept: NUCLEAR MEDICINE | Facility: HOSPITAL | Age: 69
DRG: 391 | End: 2024-03-01
Payer: COMMERCIAL

## 2024-03-01 ENCOUNTER — APPOINTMENT (INPATIENT)
Dept: NON INVASIVE DIAGNOSTICS | Facility: HOSPITAL | Age: 69
DRG: 391 | End: 2024-03-01
Payer: COMMERCIAL

## 2024-03-01 ENCOUNTER — APPOINTMENT (INPATIENT)
Dept: CT IMAGING | Facility: HOSPITAL | Age: 69
DRG: 391 | End: 2024-03-01
Payer: COMMERCIAL

## 2024-03-01 LAB
ANION GAP SERPL CALCULATED.3IONS-SCNC: 2 MMOL/L
BASOPHILS # BLD AUTO: 0.04 THOUSANDS/ÂΜL (ref 0–0.1)
BASOPHILS NFR BLD AUTO: 1 % (ref 0–1)
BUN SERPL-MCNC: 24 MG/DL (ref 5–25)
CALCIUM SERPL-MCNC: 9.4 MG/DL (ref 8.4–10.2)
CHEST PAIN STATEMENT: NORMAL
CHEST PAIN STATEMENT: NORMAL
CHLORIDE SERPL-SCNC: 101 MMOL/L (ref 96–108)
CO2 SERPL-SCNC: 40 MMOL/L (ref 21–32)
CREAT SERPL-MCNC: 1.05 MG/DL (ref 0.6–1.3)
EOSINOPHIL # BLD AUTO: 0.02 THOUSAND/ÂΜL (ref 0–0.61)
EOSINOPHIL NFR BLD AUTO: 0 % (ref 0–6)
ERYTHROCYTE [DISTWIDTH] IN BLOOD BY AUTOMATED COUNT: 14.7 % (ref 11.6–15.1)
EST. AVERAGE GLUCOSE BLD GHB EST-MCNC: 123 MG/DL
GFR SERPL CREATININE-BSD FRML MDRD: 54 ML/MIN/1.73SQ M
GLUCOSE SERPL-MCNC: 96 MG/DL (ref 65–140)
HBA1C MFR BLD: 5.9 %
HCT VFR BLD AUTO: 36.5 % (ref 34.8–46.1)
HGB BLD-MCNC: 10.6 G/DL (ref 11.5–15.4)
IMM GRANULOCYTES # BLD AUTO: 0.03 THOUSAND/UL (ref 0–0.2)
IMM GRANULOCYTES NFR BLD AUTO: 0 % (ref 0–2)
LYMPHOCYTES # BLD AUTO: 1.04 THOUSANDS/ÂΜL (ref 0.6–4.47)
LYMPHOCYTES NFR BLD AUTO: 13 % (ref 14–44)
MAGNESIUM SERPL-MCNC: 2 MG/DL (ref 1.9–2.7)
MAX DIASTOLIC BP: 74 MMHG
MAX DIASTOLIC BP: 74 MMHG
MAX PREDICTED HEART RATE: 152 BPM
MAX PREDICTED HEART RATE: 152 BPM
MCH RBC QN AUTO: 26.6 PG (ref 26.8–34.3)
MCHC RBC AUTO-ENTMCNC: 29 G/DL (ref 31.4–37.4)
MCV RBC AUTO: 92 FL (ref 82–98)
MONOCYTES # BLD AUTO: 0.68 THOUSAND/ÂΜL (ref 0.17–1.22)
MONOCYTES NFR BLD AUTO: 8 % (ref 4–12)
NEUTROPHILS # BLD AUTO: 6.27 THOUSANDS/ÂΜL (ref 1.85–7.62)
NEUTS SEG NFR BLD AUTO: 78 % (ref 43–75)
NRBC BLD AUTO-RTO: 0 /100 WBCS
NUC STRESS EJECTION FRACTION: 65 %
PLATELET # BLD AUTO: 208 THOUSANDS/UL (ref 149–390)
PMV BLD AUTO: 9.9 FL (ref 8.9–12.7)
POTASSIUM SERPL-SCNC: 4.9 MMOL/L (ref 3.5–5.3)
PROTOCOL NAME: NORMAL
PROTOCOL NAME: NORMAL
RATE PRESSURE PRODUCT: NORMAL
RBC # BLD AUTO: 3.98 MILLION/UL (ref 3.81–5.12)
REASON FOR TERMINATION: NORMAL
REASON FOR TERMINATION: NORMAL
SL CV REST NUCLEAR ISOTOPE DOSE: 10.8 MCI
SL CV STRESS NUCLEAR ISOTOPE DOSE: 32.6 MCI
SL CV STRESS RECOVERY BP: NORMAL MMHG
SL CV STRESS RECOVERY HR: 82 BPM
SL CV STRESS RECOVERY O2 SAT: 100 %
SODIUM SERPL-SCNC: 143 MMOL/L (ref 135–147)
STRESS ANGINA INDEX: 0
STRESS BASELINE BP: NORMAL MMHG
STRESS BASELINE HR: 72 BPM
STRESS O2 SAT REST: 100 %
STRESS PEAK HR: 90 BPM
STRESS POST EXERCISE DUR MIN: 3 MIN
STRESS POST EXERCISE DUR MIN: 3 MIN
STRESS POST EXERCISE DUR SEC: 0 SEC
STRESS POST EXERCISE DUR SEC: 0 SEC
STRESS POST O2 SAT PEAK: 100 %
STRESS POST PEAK BP: 178 MMHG
STRESS POST PEAK HR: 90 BPM
STRESS POST PEAK HR: 90 BPM
STRESS POST PEAK SYSTOLIC BP: 178 MMHG
STRESS POST PEAK SYSTOLIC BP: 178 MMHG
TARGET HR FORMULA: NORMAL
TARGET HR FORMULA: NORMAL
TEST INDICATION: NORMAL
TEST INDICATION: NORMAL
WBC # BLD AUTO: 8.08 THOUSAND/UL (ref 4.31–10.16)

## 2024-03-01 PROCEDURE — 93016 CV STRESS TEST SUPVJ ONLY: CPT | Performed by: INTERNAL MEDICINE

## 2024-03-01 PROCEDURE — 83036 HEMOGLOBIN GLYCOSYLATED A1C: CPT

## 2024-03-01 PROCEDURE — 80048 BASIC METABOLIC PNL TOTAL CA: CPT

## 2024-03-01 PROCEDURE — 71250 CT THORAX DX C-: CPT

## 2024-03-01 PROCEDURE — 78452 HT MUSCLE IMAGE SPECT MULT: CPT | Performed by: INTERNAL MEDICINE

## 2024-03-01 PROCEDURE — 83735 ASSAY OF MAGNESIUM: CPT

## 2024-03-01 PROCEDURE — 93017 CV STRESS TEST TRACING ONLY: CPT

## 2024-03-01 PROCEDURE — 99232 SBSQ HOSP IP/OBS MODERATE 35: CPT | Performed by: INTERNAL MEDICINE

## 2024-03-01 PROCEDURE — 99232 SBSQ HOSP IP/OBS MODERATE 35: CPT | Performed by: FAMILY MEDICINE

## 2024-03-01 PROCEDURE — 94760 N-INVAS EAR/PLS OXIMETRY 1: CPT

## 2024-03-01 PROCEDURE — 85025 COMPLETE CBC W/AUTO DIFF WBC: CPT

## 2024-03-01 PROCEDURE — 99223 1ST HOSP IP/OBS HIGH 75: CPT | Performed by: INTERNAL MEDICINE

## 2024-03-01 PROCEDURE — 78452 HT MUSCLE IMAGE SPECT MULT: CPT

## 2024-03-01 PROCEDURE — 93018 CV STRESS TEST I&R ONLY: CPT | Performed by: INTERNAL MEDICINE

## 2024-03-01 PROCEDURE — A9502 TC99M TETROFOSMIN: HCPCS

## 2024-03-01 PROCEDURE — 94640 AIRWAY INHALATION TREATMENT: CPT

## 2024-03-01 RX ORDER — REGADENOSON 0.08 MG/ML
0.4 INJECTION, SOLUTION INTRAVENOUS ONCE
Status: COMPLETED | OUTPATIENT
Start: 2024-03-01 | End: 2024-03-01

## 2024-03-01 RX ORDER — ISOSORBIDE MONONITRATE 30 MG/1
30 TABLET, EXTENDED RELEASE ORAL DAILY
Status: DISCONTINUED | OUTPATIENT
Start: 2024-03-01 | End: 2024-03-01

## 2024-03-01 RX ORDER — PANTOPRAZOLE SODIUM 40 MG/1
40 TABLET, DELAYED RELEASE ORAL
Status: DISCONTINUED | OUTPATIENT
Start: 2024-03-01 | End: 2024-03-05 | Stop reason: HOSPADM

## 2024-03-01 RX ORDER — SUCRALFATE 1 G/1
1 TABLET ORAL
Status: DISCONTINUED | OUTPATIENT
Start: 2024-03-01 | End: 2024-03-03

## 2024-03-01 RX ADMIN — TIMOLOL MALEATE 1 DROP: 5 SOLUTION/ DROPS OPHTHALMIC at 10:40

## 2024-03-01 RX ADMIN — MONTELUKAST 10 MG: 10 TABLET, FILM COATED ORAL at 22:24

## 2024-03-01 RX ADMIN — BENZONATATE 100 MG: 100 CAPSULE ORAL at 15:31

## 2024-03-01 RX ADMIN — ASPIRIN 81 MG 81 MG: 81 TABLET ORAL at 10:39

## 2024-03-01 RX ADMIN — BUDESONIDE AND FORMOTEROL FUMARATE DIHYDRATE 2 PUFF: 160; 4.5 AEROSOL RESPIRATORY (INHALATION) at 17:44

## 2024-03-01 RX ADMIN — PANTOPRAZOLE SODIUM 40 MG: 40 TABLET, DELAYED RELEASE ORAL at 15:31

## 2024-03-01 RX ADMIN — BRIMONIDINE TARTRATE 1 DROP: 2 SOLUTION/ DROPS OPHTHALMIC at 22:28

## 2024-03-01 RX ADMIN — ENOXAPARIN SODIUM 40 MG: 40 INJECTION SUBCUTANEOUS at 10:39

## 2024-03-01 RX ADMIN — ISOSORBIDE MONONITRATE 30 MG: 30 TABLET, EXTENDED RELEASE ORAL at 15:31

## 2024-03-01 RX ADMIN — PANTOPRAZOLE SODIUM 40 MG: 20 TABLET, DELAYED RELEASE ORAL at 05:42

## 2024-03-01 RX ADMIN — BENZONATATE 100 MG: 100 CAPSULE ORAL at 22:24

## 2024-03-01 RX ADMIN — IPRATROPIUM BROMIDE AND ALBUTEROL SULFATE 3 ML: 2.5; .5 SOLUTION RESPIRATORY (INHALATION) at 10:56

## 2024-03-01 RX ADMIN — LOSARTAN POTASSIUM 25 MG: 25 TABLET, FILM COATED ORAL at 10:39

## 2024-03-01 RX ADMIN — LEVOTHYROXINE SODIUM 75 MCG: 75 TABLET ORAL at 05:40

## 2024-03-01 RX ADMIN — BENZONATATE 100 MG: 100 CAPSULE ORAL at 10:39

## 2024-03-01 RX ADMIN — ATORVASTATIN CALCIUM 40 MG: 40 TABLET, FILM COATED ORAL at 15:31

## 2024-03-01 RX ADMIN — REGADENOSON 0.4 MG: 0.08 INJECTION, SOLUTION INTRAVENOUS at 09:24

## 2024-03-01 RX ADMIN — SUCRALFATE 1 G: 1 TABLET ORAL at 15:31

## 2024-03-01 RX ADMIN — SERTRALINE HYDROCHLORIDE 25 MG: 25 TABLET ORAL at 22:24

## 2024-03-01 RX ADMIN — BRIMONIDINE TARTRATE 1 DROP: 2 SOLUTION/ DROPS OPHTHALMIC at 05:40

## 2024-03-01 RX ADMIN — FLUTICASONE PROPIONATE 2 SPRAY: 50 SPRAY, METERED NASAL at 10:39

## 2024-03-01 RX ADMIN — FERROUS SULFATE TAB 325 MG (65 MG ELEMENTAL FE) 325 MG: 325 (65 FE) TAB at 10:39

## 2024-03-01 RX ADMIN — TIMOLOL MALEATE 1 DROP: 5 SOLUTION/ DROPS OPHTHALMIC at 17:45

## 2024-03-01 RX ADMIN — BUDESONIDE AND FORMOTEROL FUMARATE DIHYDRATE 2 PUFF: 160; 4.5 AEROSOL RESPIRATORY (INHALATION) at 10:39

## 2024-03-01 RX ADMIN — BRIMONIDINE TARTRATE 1 DROP: 2 SOLUTION/ DROPS OPHTHALMIC at 13:52

## 2024-03-01 NOTE — ASSESSMENT & PLAN NOTE
Currently doing well on her home O2 of 2L. No wheezing appreciated thus far.  Continue regimen as ordered.

## 2024-03-01 NOTE — PROGRESS NOTES
[facility]  Progress Note  Name: Noreen Alvarez I  MRN: [id number]  Unit/Bed#: S -01 I Date of Admission: 2/28/2024   Date of Service: 3/1/2024 I Hospital Day: 2    Assessment/Plan   * Atypical chest pain  Assessment & Plan  Lab Results   Component Value Date    HSTNI0 256 (H) 02/29/2024    HSTNI2 236 (H) 02/29/2024    HSTNID2 -20 02/29/2024    HSTNI4 220 (H) 02/29/2024    HSTNID4 -36 02/29/2024         CXR: Result Date: 2/29/2024 Impression: There is hazy bibasilar opacity which may represent a combination of layered effusion and parenchymal opacity.     EKG: In ED, NSR without ST elevations.   Follow up EKG overnight remarkable for ST wave depression in leads V4, V5, and V6.   Morning EKG was a posterior EKG and still shows some nonspecific ST abnormalities  Likely unstable angina or demand ischemia secondary to COPD exacerbation  RAYA Score: 4  Lipid panel showed LDL of 117  ECHO showed EF of 60% on 1/29  A1c 5.9  Stress test 3/1: There is an inferior perfusion defect, but it appears to mostly improve with prone imaging, suggesting probably related to artifact. But a small amount of ischemia in the inferoapical region cannot be completely excluded. Clinical correlation suggested.      Plan:   STAT EKG and troponin if chest pain, Telemetry for arrhythmias  Monitor Troponin Trend  Started atorvastatin 40 mg daily  Started losartan 25mg daily  Received  on 2/29, starting on 3/1 she will get 81 mg daily  Consulted cardiology for further workup and possible catheterization/PCI    Will not start heparin drip at this time due to troponins having peaked and went back down  Supportive care with tylenol and tums for further pain    Elevated blood pressure reading  Assessment & Plan  When patient came in, bp was 184/81. Patient not on current blood pressure medications at home    Plan:  -started losartan 25mg daily  - avoid beta blockers pre cardio in case her chest pain is cardiac in  etiology        SOB (shortness of breath)  Assessment & Plan  Patient SOB with exertion. Currently doing well at her usual home O2 level of 2L. Patient also states she has what feels like blockage of her right nostril which sounds chronic according to her daughter since she was a child, but it is bothering patient more.  Chest x-ray 2/28: hazy bibasilar opacity may represent a combination of layered effusion and parenchymal opacity  Plan:  - Monitor O2 needs  - Flonase ordered    Hypothyroidism  Assessment & Plan  Home levothyroxine is 75 mcg daily. Continued.  -TSH wnl as of 2/29    Hyperlipidemia  Assessment & Plan  Patient not on any home med for HLD. Ordered lipid panel which showed LDL of 117    Plan:  -started patient on atorvastatin 40 mg daily    Multiple lung nodules  Assessment & Plan  Assessment:   - Patient presented with 3 days of sudden onset SOB and atypical chest pressure with fatigue and declining ADLs  - Wt loss of 12 pounds in the last month; wt loss of 14 in the last 3 months   - Given symptoms and presentation, I suspect she may have an undiagnosed cancer and was lost to follow up outpatient  - Multiple lung nodules first seen on CT imaging on 12/29/2020  - Prior to hospitalization, patient was being worked up for lung cancer etiology of multiple lung nodules   - Last seen Dr. Muñoz, hem/onc specialist, on 8/7/2023; per chart review, patient was referred to Dr. Muñoz for non FDG avid lung nodules and some mediastinal and mesenteric adenopathy which had low grade activity but was never sampled  - Biopsy per Dr. Muñoz in 2023 revealed concerns for atypical adenomatous hyperplasia, adenocarcinoma in-situ, or a low-grade invasive adenocarcinoma   - 1/21/2024 CT AP 1.4 cm groundglass nodules in RLL, 1.2 cm lobulated nodule LLL, no significant changes according to CT AP  - During this hospitalization, ED ordered CT AP which showed stability of lung nodules; however, PET-CT would be preferable  "imaging for assessment of lung cancer; thus would like to obtain recommendations per pulmonology   - CXR 2/29 result: hazy bibasilar opacity which may represent layered effusion and parenchymal opacity on recent imaging, with lymphocytes 11%, I recommend further investigation into etiology of patient's presenting symptoms    - Per chart review, \"CAT scan showed no evidence of labial cancer, stable bilateral pulmonary nodule, there is a 3 to 4 mm left lower lobe lung nodule and this is a new from before we will repeat CAT scan of the chest in 6 months\"  - Differential diagnoses: secondary cancer (remote history of head and neck malignancy) with mets to lungs vs primary lung cancer. Per lung biopsy from prior, there was concern for atypical adenomatous hyperplasia vs adenocarcinoma in-situ vs low-grade invasive adenocarcinoma, Suspect atypical chest pain could be related to possible undiagnosed lung cancer.     Plan:   - Lung biopsy scheduled as of now  - F/u repeat blood work  - Iron studies are normal  - Consider hem/onc consult, appreciate recs  - Pulm consulted, appreciate recs   - Consider palliative care consult if needed   - PET-CT considered previously by outpatient hem/onc; may consider PET-CT (previously was ordered but was denied by patient's insurance company)   - May need to investigate GI symptoms as it may related to possible undiagnosed lung cancer since 2020    Severe persistent asthma without complication  Assessment & Plan  Patient's chest pain could be due to pulmonary causes such as her COPD or asthma. Currently doing well on her home O2 of 2L. No wheezing appreciated thus far.    Depression with anxiety  Assessment & Plan  Continuing home Zoloft.    Centrilobular emphysema (HCC)  Assessment & Plan  Chronic. Patient's chest pain could be related to exacerbation of COPD. Patient currently on her home O2 needs of 2L.     Chronic kidney disease, stage 3 (moderate)  Assessment & Plan  Lab Results "   Component Value Date    EGFR 54 2024    EGFR 70 2024    EGFR 64 2024    CREATININE 1.05 2024    CREATININE 0.85 2024    CREATININE 0.92 2024    Creatinine at baseline. Will watch with morning labs.            VTE Pharmacologic Prophylaxis: VTE Score: 6 High Risk (Score >/= 5) - Pharmacological DVT Prophylaxis Ordered: enoxaparin (Lovenox). Sequential Compression Devices Ordered.    Mobility:   Basic Mobility Inpatient Raw Score: 24  JH-HLM Goal: 8: Walk 250 feet or more  JH-HLM Achieved: 6: Walk 10 steps or more  HLM Goal NOT achieved. Continue with multidisciplinary rounding and encourage appropriate mobility to improve upon HLM goals.    Patient Centered Rounds: I performed bedside rounds with nursing staff today.  Discussions with Specialists or Other Care Team Provider: attending, case management, cardio    Education and Discussions with Family / Patient: Attempted to update  (daughter) via phone. Left voicemail.     Current Length of Stay: 2 day(s)  Current Patient Status: Inpatient   Discharge Plan: Anticipate discharge in 48-72 hrs to home.    Code Status: Level 1 - Full Code    Subjective:   Patient seen at bedside. She was informed that she will be having a stress test this morning with the cardiologist. No overnight events. Chest pain is the same as yesterday.     Objective:     Vitals:   Temp (24hrs), Av °F (36.7 °C), Min:98 °F (36.7 °C), Max:98.1 °F (36.7 °C)    Temp:  [98 °F (36.7 °C)-98.1 °F (36.7 °C)] 98.1 °F (36.7 °C)  HR:  [69-71] 71  Resp:  [18] 18  BP: (124-133)/(61-64) 133/63  SpO2:  [94 %-99 %] 95 %  Body mass index is 31.83 kg/m².     Input and Output Summary (last 24 hours):   No intake or output data in the 24 hours ending 24 1301      Physical Exam:   Physical Exam  Constitutional:       Appearance: Normal appearance.   HENT:      Head: Normocephalic.      Nose: Nose normal.      Comments: Nasal cannula in place     Mouth/Throat:       Mouth: Mucous membranes are moist.      Pharynx: Oropharynx is clear.   Cardiovascular:      Rate and Rhythm: Normal rate and regular rhythm.   Pulmonary:      Effort: Pulmonary effort is normal. No respiratory distress.      Breath sounds: No wheezing, rhonchi or rales.      Comments: Decreased breath sounds diffusely. Patient saturating well on 2L  Abdominal:      General: Abdomen is flat. Bowel sounds are normal.      Palpations: Abdomen is soft.      Tenderness: There is no abdominal tenderness.   Skin:     General: Skin is warm and dry.      Capillary Refill: Capillary refill takes less than 2 seconds.   Neurological:      General: No focal deficit present.      Mental Status: She is alert and oriented to person, place, and time.   Psychiatric:         Mood and Affect: Mood normal.         Behavior: Behavior normal.          Additional Data:     Labs:  Results from last 7 days   Lab Units 03/01/24  0539   WBC Thousand/uL 8.08   HEMOGLOBIN g/dL 10.6*   HEMATOCRIT % 36.5   PLATELETS Thousands/uL 208   NEUTROS PCT % 78*   LYMPHS PCT % 13*   MONOS PCT % 8   EOS PCT % 0     Results from last 7 days   Lab Units 03/01/24  0539 02/29/24  0555 02/28/24  2030   SODIUM mmol/L 143   < > 141   POTASSIUM mmol/L 4.9   < > 4.4   CHLORIDE mmol/L 101   < > 97   CO2 mmol/L 40*   < > 38*   BUN mg/dL 24   < > 18   CREATININE mg/dL 1.05   < > 0.92   ANION GAP mmol/L 2   < > 6   CALCIUM mg/dL 9.4   < > 9.7   ALBUMIN g/dL  --   --  4.3   TOTAL BILIRUBIN mg/dL  --   --  0.36   ALK PHOS U/L  --   --  96   ALT U/L  --   --  13   AST U/L  --   --  23   GLUCOSE RANDOM mg/dL 96   < > 134    < > = values in this interval not displayed.             Results from last 7 days   Lab Units 03/01/24  0539   HEMOGLOBIN A1C % 5.9*           Lines/Drains:  Invasive Devices       Peripheral Intravenous Line  Duration             Peripheral IV 02/28/24 Right Antecubital 1 day                      Telemetry:  Telemetry Orders (From admission,  onward)               24 Hour Telemetry Monitoring  Continuous x 24 Hours (Telem)        Question:  Reason for 24 Hour Telemetry  Answer:  PCI/EP study (including pacer and ICD implementation), Cardiac surgery, MI, abnormal cardiac cath, and chest pain- rule out MI  Comment:  suspected CAD                     Telemetry Reviewed: Normal Sinus Rhythm  Indication for Continued Telemetry Use: Acute MI/Unstable Angina/Rule out ACS             Imaging: Reviewed radiology reports from this admission including: chest xray and ECHO    Recent Cultures (last 7 days):         Last 24 Hours Medication List:   Current Facility-Administered Medications   Medication Dose Route Frequency Provider Last Rate    acetaminophen  975 mg Oral Q8H PRN Alka Sovak, DO      albuterol  2 puff Inhalation Q6H PRN Caro Sostorecz, DO      aspirin  81 mg Oral Daily Alka Sovak, DO      atorvastatin  40 mg Oral Daily With Dinner Alka Sovak, DO      benzonatate  100 mg Oral TID Caro Sostorecz, DO      brimonidine tartrate  1 drop Both Eyes Q8H ESTEVAN Caro Sostorecz, DO      budesonide-formoterol  2 puff Inhalation BID Caro Sostorecz, DO      calcium carbonate  500 mg Oral Daily PRN Alka Sovak, DO      enoxaparin  40 mg Subcutaneous Daily Caro Sostorecz, DO      ferrous sulfate  325 mg Oral Daily With Breakfast Caro Sostorecz, DO      fluticasone  2 spray Each Nare Daily Caro Sostorecz, DO      ipratropium-albuterol  3 mL Nebulization Q8H PRN Caro Sostorecz, DO      labetalol  10 mg Intravenous Q6H PRN Caro Sostorecz, DO      levothyroxine  75 mcg Oral Early Morning Caro Sostorecz, DO      losartan  25 mg Oral Daily Alka Sovak, DO      montelukast  10 mg Oral HS Caro Sostorecz, DO      pantoprazole  40 mg Oral Daily Before Breakfast Caro Sostorecz, DO      sertraline  25 mg Oral HS Caro Sostorecz, DO      timolol  1 drop Both Eyes BID Caro Sostorecz, DO          Today, Patient Was Seen  By: Alka Caceres DO    **Please Note: This note may have been constructed using a voice recognition system.**

## 2024-03-01 NOTE — ASSESSMENT & PLAN NOTE
"Assessment:   - Patient presented with 3 days of sudden onset SOB and atypical chest pressure with fatigue and declining ADLs  - Wt loss of 12 pounds in the last month; wt loss of 14 in the last 3 months   - Given symptoms and presentation, I suspect she may have an undiagnosed cancer and was lost to follow up outpatient  - Multiple lung nodules first seen on CT imaging on 12/29/2020  - Prior to hospitalization, patient was being worked up for lung cancer etiology of multiple lung nodules   - Last seen Dr. Muñoz, hem/onc specialist, on 8/7/2023; per chart review, patient was referred to Dr. Muñoz for non FDG avid lung nodules and some mediastinal and mesenteric adenopathy which had low grade activity but was never sampled  - Biopsy per Dr. Muñoz in 2023 revealed concerns for atypical adenomatous hyperplasia, adenocarcinoma in-situ, or a low-grade invasive adenocarcinoma   - 1/21/2024 CT AP 1.4 cm groundglass nodules in RLL, 1.2 cm lobulated nodule LLL, no significant changes according to CT AP  - During this hospitalization, ED ordered CT AP which showed stability of lung nodules; however, PET-CT would be preferable imaging for assessment of lung cancer; thus would like to obtain recommendations per pulmonology   - CXR 2/29 result: hazy bibasilar opacity which may represent layered effusion and parenchymal opacity on recent imaging, with lymphocytes 11%, I recommend further investigation into etiology of patient's presenting symptoms    - Per chart review, \"CAT scan showed no evidence of labial cancer, stable bilateral pulmonary nodule, there is a 3 to 4 mm left lower lobe lung nodule and this is a new from before we will repeat CAT scan of the chest in 6 months\"  - Differential diagnoses: secondary cancer (remote history of head and neck malignancy) with mets to lungs vs primary lung cancer. Per lung biopsy from prior, there was concern for atypical adenomatous hyperplasia vs adenocarcinoma in-situ vs low-grade " invasive adenocarcinoma, Suspect atypical chest pain could be related to possible undiagnosed lung cancer.     Plan:   - Lung biopsy scheduled as of now  - F/u repeat blood work  - Iron studies are normal  - Consider hem/onc consult, appreciate recs  - Consider pulm consult, appreciate recs   - Consider palliative care consult if needed   - PET-CT considered previously by outpatient hem/onc; may consider PET-CT (previously was ordered but was denied by patient's insurance company)   - May need to investigate GI symptoms as it may related to possible undiagnosed lung cancer since 2020

## 2024-03-01 NOTE — ASSESSMENT & PLAN NOTE
Patient's chest pain could be due to pulmonary causes such as her COPD or asthma. Currently doing well on her home O2 of 2L. No wheezing appreciated thus far.

## 2024-03-01 NOTE — ASSESSMENT & PLAN NOTE
Lab Results   Component Value Date    HSTNI0 256 (H) 02/29/2024    HSTNI2 236 (H) 02/29/2024    HSTNID2 -20 02/29/2024    HSTNI4 220 (H) 02/29/2024    HSTNID4 -36 02/29/2024         CXR: Result Date: 2/29/2024 Impression: There is hazy bibasilar opacity which may represent a combination of layered effusion and parenchymal opacity.     EKG: In ED, NSR without ST elevations.   Follow up EKG overnight remarkable for ST wave depression in leads V4, V5, and V6.   Morning EKG was a posterior EKG and still shows some nonspecific ST abnormalities  Likely unstable angina or demand ischemia secondary to COPD exacerbation  RAYA Score: 4  Lipid panel showed LDL of 117  ECHO showed EF of 60% on 1/29     Plan:   STAT EKG and troponin if chest pain, Telemetry for arrhythmias  Monitor Troponin Trend  Started atorvastatin 40 mg daily  Started losartan 25mg daily  HbA1C  Received  on 2/29, starting on 3/1 she will get 81 mg daily  Consult cardiology for further workup and possible catheterization/PCI    Will not start heparin drip at this time due to troponins having peaked and went back down  Supportive care with tylenol and tums for further pain  Patient has stress test 3/1, f/u on results

## 2024-03-01 NOTE — UTILIZATION REVIEW
NOTIFICATION OF INPATIENT ADMISSION   AUTHORIZATION REQUEST   SERVICING FACILITY:   [place] [address]  Tax ID: [id number]  NPI: [id number]   ATTENDING PROVIDER:  Attending Name and NPI#: Julio C Cooley Md [[id number]]  Address: [address] [place] [address]  Phone: [phone]     ADMISSION INFORMATION:  Place of Service: Inpatient [facility]  Place of Service Code: 21  Inpatient Admission Date/Time: 2/28/24 10:16 PM  Discharge Date/Time: No discharge date for patient encounter.  Admitting Diagnosis Code/Description:  SOB (shortness of breath) [R06.02]  COPD exacerbation (HCC) [J44.1]     UTILIZATION REVIEW CONTACT:  [employer] Utilization   Network Utilization Review Department  Phone: [phone]  Fax: [phone]  Email: [email or web address]  Contact for approvals/pending authorizations, clinical reviews, and discharge.     PHYSICIAN ADVISORY SERVICES:  Medical Necessity Denial & [place] Review  Phone: [phone]  Fax: [phone]  Email: [email or web address]     DISCHARGE SUPPORT TEAM:  For Patients Discharge Needs & Updates  Phone: [phone] opt. 2 Fax: [phone]  Email: [email or web address]

## 2024-03-01 NOTE — ASSESSMENT & PLAN NOTE
Lab Results   Component Value Date    HSTNI0 256 (H) 02/29/2024    HSTNI2 236 (H) 02/29/2024    HSTNID2 -20 02/29/2024    HSTNI4 220 (H) 02/29/2024    HSTNID4 -36 02/29/2024     · CXR: Result Date: 2/29/2024 Impression: There is hazy bibasilar opacity which may represent a combination of layered effusion and parenchymal opacity.     · EKG: In ED, NSR without ST elevations.    · RAYA Score: 4  · Lipid panel showed LDL of 117  · ECHO showed EF of 60% on 1/29  · A1c 5.9  · Stress test 3/1: There is an inferior perfusion defect, but it appears to mostly improve with prone imaging, suggesting probably related to artifact. But a small amount of ischemia in the inferoapical region cannot be completely excluded. Clinical correlation suggested.   -Appreciate input from our pulmonology colleagues, who do not feel her lung nodules are causing her current symptoms.  Current working diagnosis is gastritis.    Overall Assessment: In light of unremarkable stress test, and less likely pulmonary etiology of chest discomfort, current working diagnosis is gastritis.     Plan:   · STAT EKG and troponin if chest pain, Telemetry for arrhythmias  · Started atorvastatin 40 mg daily during this admission  · Started losartan 25mg daily during this admission  · Received  on 2/29, starting on 3/1 she will get 81 mg daily  · Consulted cardiology for further workup. Added Imdur 30 mg daily. Patient should f/u outpatient, if symptoms persist they may want to do a left heart cath.  · Pantoprazole increased to BID 3/1. Carafate added as well.   -          Patient told pulmonologist on 3/1 that she spontaneously regurgitates food and that it comes out her nostrils. GI consulted, will appreciate their input.

## 2024-03-01 NOTE — CASE MANAGEMENT
Case Management Assessment    Patient name Noreen Alvarez  Location S /S -01 MRN [id number]  : 1955 Date 3/1/2024       Current Admission Date: 2024  Current Admission Diagnosis:Atypical chest pain   Patient Active Problem List    Diagnosis Date Noted    ELIUD (obstructive sleep apnea) 2024    Sacroiliac inflammation (HCC) 2024    Elevated blood pressure reading 2024    SOB (shortness of breath) 2024    Epigastric pain 01/10/2024    Gastroesophageal reflux disease without esophagitis 01/10/2024    Pain of right sacroiliac joint 2023    Hypomagnesemia 2023    Chronic respiratory failure with hypoxia, on home O2 therapy  2023    Iron deficiency anemia 10/30/2022    Dyspepsia 10/30/2022    Atypical chest pain 10/28/2022    Preop examination 2022    Preoperative cardiovascular examination 2022    Mediastinal lymphadenopathy 2021    Class 1 obesity due to excess calories with serious comorbidity and body mass index (BMI) of 32.0 to 32.9 in adult 2021    Exercise hypoxemia 2021    Post-nasal drip 2021    Neck pain on left side 2021    History of COVID-19 2021    Persistent dyspnea after COVID-19 2021    Current mild episode of major depressive disorder without prior episode (HCC) 2021    Hyperlipidemia 2021    Hypothyroidism 2021    COVID-19 2021    Trigger finger of right thumb 2021    Myalgia 2021    Tension type headache 2021    Severe persistent asthma without complication 2020    Multiple lung nodules 2020    Elevated alkaline phosphatase level 2020    Dupuytren contracture 2020    Personal history of radiation therapy 2019    History of laryngeal cancer 2019    Loss of hearing 2019    Cancer of pharynx (HCC) 2019    Chronic kidney disease, stage 3 (moderate) 2019    Centrilobular emphysema (HCC)  08/27/2019    Depression with anxiety 08/27/2019    Panic attack 08/27/2019    Cataract 02/19/2015    Numbness 02/19/2015    Vitamin D deficiency 12/10/2013    Extrinsic obstruction of cartilagenous portion of eustachian tube 11/12/2013      LOS (days): 2  Geometric Mean LOS (GMLOS) (days): 2.2  Days to GMLOS:0.5     OBJECTIVE:  PATIENT READMITTED TO HOSPITAL  Risk of Unplanned Readmission Score: 19.73         Current admission status: Inpatient       Preferred Pharmacy:   [facility] #[id number] - [place] - [address]  [address] [facility]  [place] [address]  Phone: [phone] Fax: [phone]    [id number] - [place] - [address]  [address] [place] [place] [place] [address]  Phone: [phone] Fax: [phone]    Primary Care Provider: Samantha Hernandez MD    [facility]  Secondary Insurance:     ASSESSMENT:  Active Health Care Proxies       [facility] Representative - Daughter   Primary Phone: [phone] (Mobile)  Home Phone: [phone]                           Readmission Root Cause  30 Day Readmission: No    Patient Information  Admitted from:: Home  Mental Status: Alert  During Assessment patient was accompanied by: Not accompanied during assessment  Assessment information provided by:: Patient  Primary Caregiver: Self  Support Systems: Spouse/significant other, Family members  County of Residence: [place]  What city do you live in?: [place]  Home entry access options. Select all that apply.: No steps to enter home  Type of Current Residence: Apartment  Floor Level: 1  Upon entering residence, is there a bedroom on the main floor (no further steps)?: Yes  Upon entering residence, is there a bathroom on the main floor (no further steps)?: Yes  Living Arrangements: Lives w/ Spouse/significant other  Is patient a ?: No    Activities of Daily Living Prior to  Admission  Functional Status: Independent  Completes ADLs independently?: Yes  Ambulates independently?: Yes  Does patient use assisted devices?: No  Does patient currently own DME?: Yes  What DME does the patient currently own?: Walker, Shower Chair  Does patient have a history of Outpatient Therapy (PT/OT)?: No  Does the patient have a history of Short-Term Rehab?: No  Does patient have a history of HHC?: No  Does patient currently have HHC?: No         Patient Information Continued  Income Source: Pension/longterm  Does patient have prescription coverage?: Yes  Does patient receive dialysis treatments?: No  Does patient have a history of substance abuse?: No  Does patient have a history of Mental Health Diagnosis?: No         Means of Transportation  Means of Transport to App:: Family transport      Social Determinants of Health (SDOH)      Flowsheet Row Most Recent Value   Housing Stability    In the last 12 months, was there a time when you were not able to pay the mortgage or rent on time? N   In the last 12 months, how many places have you lived? 1   In the last 12 months, was there a time when you did not have a steady place to sleep or slept in a shelter (including now)? N   Transportation Needs    In the past 12 months, has lack of transportation kept you from medical appointments or from getting medications? no   In the past 12 months, has lack of transportation kept you from meetings, work, or from getting things needed for daily living? No   Food Insecurity    Within the past 12 months, you worried that your food would run out before you got the money to buy more. Never true   Within the past 12 months, the food you bought just didn't last and you didn't have money to get more. Never true   Utilities    In the past 12 months has the electric, gas, oil, or water company threatened to shut off services in your home? No

## 2024-03-01 NOTE — CONSULTS
Pulmonary Consultation   Noreen Alvarez 68 y.o. female MRN: [id number]  Unit/Bed#: S -01 Encounter: [id number]    Reason for consultation: Pulmonary nodules; atypical chest pain.    Requesting physician: Dr. Cooley.    Impressions:  Acute on chronic hypoxic respiratory failure, uncertain etiology.  Severe COPD with possible acute exacerbation.  Obstructive sleep apnea.  History of laryngeal cancer s/p radiation and chemotherapy.  Abnormal chest x-ray with layering effusion and probable adjacent subsegmental atelectasis.  Known pulmonary nodules, likely adenocarcinoma spectrum lesions, stable for years and unrelated to chest pain.  Atypical chest pain, likely related to gastritis.  Involuntary regurgitation of food, including out the nares, rule out esophageal stricture or diverticulum.    Recommendations:  Patient was due for CT chest in February; this will be re-ordered now as non-contrast.  Intermittent wheezing noted, but due to possible gastritis, will try to avoid steroids and instead schedule nebulizer therapies.  At patient & family's request, I increased therapy for possible gastritis with increasing PPI to BID and adding carafate.  Patient and her family were counseled that these nodules are likely pre-malignant conditions that could require intervention eventually, but do not presently require any treatment, all questions answered.    History of Present Illness   HPI:  Noreen Alvarez is a 68 y.o. female who is admitted for dyspnea and chest pain.  She is a patient of Dr. Sandhu, last seen in the office a few weeks ago, whom he follows for lung nodules likely representative of adenocarcinoma spectrum lesions (biopsied in 2022 with pathology suggesting the same), as well as COPD and chronic hypoxic respiratory failure.  She complains of intermittent wheezing as well as central chest pain.  Translation was provided by her son.    She reports that she has had some ongoing central and subxiphoid chest  pain which she cannot really describe the nature of very well.  She can say it's pain rather than shortness of breath or muscle tightness, but cannot describe it further.  Alleviating factors have included use of omeprazole (specifically, not pantoprazole which has not been as helpful), and worsening factors have included aspirin which she took yesterday.      She has shortness of breath and hypoxemia with minimal activity.  Walking from her bed to the bathroom has led to desaturations.  She will wheeze off and on, not necessarily with forced expiration.      Review of systems:  Review of Systems   Respiratory:  Positive for shortness of breath and wheezing.    Cardiovascular:  Positive for chest pain.   Gastrointestinal:  Positive for abdominal pain.       All other 12-point review of systems are negative.    Historical Information   Past Medical History:   Diagnosis Date    Acute kidney failure (HCC)     Allergic     Allergies     Anemia     Asthma     Cancer (HCC)     Cataract     Chronic kidney disease (CKD), stage III (moderate) (HCC)     COPD (chronic obstructive pulmonary disease) (HCC)     COVID-19     Covid + 4-6-64-cough at that time now fully resolved    Disease of thyroid gland     Essential hypertension     GERD (gastroesophageal reflux disease)     Glaucoma     High blood pressure     HTN (hypertension) 02/16/2021    Hyperlipidemia     Hypothyroidism     Mesenteric lymphadenopathy 07/13/2021    Pulmonary hypertension (HCC)     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     LA TENDON SHEATH INCISION Right 02/16/2021    Procedure: THUMB TRIGGER FINGER RELEASE;  Surgeon: Angeles Denton MD;  Location: [facility];  Service: Orthopedics    TUBAL  LIGATION       Family History   Problem Relation Age of Onset    Other Mother         respiratory disorder    Sudden death Father         shot himself    Suicidality Father     Brain cancer Sister     Diabetes Sister     Breast cancer Sister     Cancer Sister 62        pancreatic cancer    No Known Problems Sister     No Known Problems Sister     No Known Problems Sister     No Known Problems Sister     No Known Problems Sister     No Known Problems Sister     No Known Problems Daughter     No Known Problems Daughter     No Known Problems Maternal Grandmother     No Known Problems Maternal Grandfather     No Known Problems Paternal Grandmother     No Known Problems Paternal Grandfather     No Known Problems Maternal Aunt     No Known Problems Maternal Aunt     No Known Problems Maternal Aunt     No Known Problems Maternal Aunt     No Known Problems Maternal Aunt     No Known Problems Paternal Aunt     No Known Problems Paternal Aunt     No Known Problems Paternal Aunt     No Known Problems Paternal Aunt        Tobacco history: Former smoker, quit 10 years ago    Family history: NC    Meds/Allergies   Current Facility-Administered Medications   Medication Dose Route Frequency    acetaminophen (TYLENOL) tablet 975 mg  975 mg Oral Q8H PRN    albuterol (PROVENTIL HFA,VENTOLIN HFA) inhaler 2 puff  2 puff Inhalation Q6H PRN    aspirin chewable tablet 81 mg  81 mg Oral Daily    atorvastatin (LIPITOR) tablet 40 mg  40 mg Oral Daily With Dinner    benzonatate (TESSALON PERLES) capsule 100 mg  100 mg Oral TID    brimonidine tartrate 0.2 % ophthalmic solution 1 drop  1 drop Both Eyes Q8H ESTEVAN    budesonide-formoterol (SYMBICORT) 160-4.5 mcg/act inhaler 2 puff  2 puff Inhalation BID    calcium carbonate (TUMS) chewable tablet 500 mg  500 mg Oral Daily PRN    enoxaparin (LOVENOX) subcutaneous injection 40 mg  40 mg Subcutaneous Daily    ferrous sulfate tablet 325 mg  325 mg Oral Daily With Breakfast    fluticasone (FLONASE) 50  mcg/act nasal spray 2 spray  2 spray Each Nare Daily    ipratropium-albuterol (DUO-NEB) 0.5-2.5 mg/3 mL inhalation solution 3 mL  3 mL Nebulization Q8H PRN    labetalol (NORMODYNE) injection 10 mg  10 mg Intravenous Q6H PRN    levothyroxine tablet 75 mcg  75 mcg Oral Early Morning    losartan (COZAAR) tablet 25 mg  25 mg Oral Daily    montelukast (SINGULAIR) tablet 10 mg  10 mg Oral HS    pantoprazole (PROTONIX) EC tablet 40 mg  40 mg Oral BID AC    sertraline (ZOLOFT) tablet 25 mg  25 mg Oral HS    sucralfate (CARAFATE) tablet 1 g  1 g Oral BID AC    timolol (TIMOPTIC) 0.5 % ophthalmic solution 1 drop  1 drop Both Eyes BID     Allergies   Allergen Reactions    Cefdinir Hives    Amifostine Rash    Pollen Extract Allergic Rhinitis       Vitals: Blood pressure 114/57, pulse 71, temperature 99.2 °F (37.3 °C), resp. rate 18, weight 78.9 kg (174 lb), SpO2 95%., on 3 L O2, Body mass index is 31.83 kg/m².  No intake or output data in the 24 hours ending 03/01/24 1852  Physical exam:     Physical Exam  Vitals reviewed.   Constitutional:       General: She is not in acute distress.     Appearance: Normal appearance. She is well-developed. She is not ill-appearing.   HENT:      Head: Normocephalic and atraumatic.   Eyes:      General: No scleral icterus.     Conjunctiva/sclera: Conjunctivae normal.   Neck:      Vascular: No JVD.   Cardiovascular:      Rate and Rhythm: Normal rate and regular rhythm.      Heart sounds: Normal heart sounds. No murmur heard.     No friction rub. No gallop.   Pulmonary:      Effort: Pulmonary effort is normal. No respiratory distress.      Breath sounds: Decreased breath sounds and wheezing present. No rhonchi or rales.   Musculoskeletal:      Cervical back: Neck supple.      Right lower leg: No edema.      Left lower leg: No edema.   Skin:     General: Skin is warm and dry.      Findings: No rash.   Neurological:      General: No focal deficit present.      Mental Status: She is alert and  oriented to person, place, and time. Mental status is at baseline.   Psychiatric:         Mood and Affect: Mood normal.         Behavior: Behavior normal.     Labs: I have personally reviewed pertinent lab results.    Results from last 7 days   Lab Units 03/01/24  0539 02/29/24  0555 02/29/24 0127 02/28/24 2030   WBC Thousand/uL 8.08 6.69  --  7.99   HEMOGLOBIN g/dL 10.6* 10.7*  --  11.1*   HEMATOCRIT % 36.5 36.3  --  38.3   PLATELETS Thousands/uL 208 207 190 231     Results from last 7 days   Lab Units 02/29/24  0555   TRIGLYCERIDES mg/dL 61   HDL mg/dL 78     Results from last 7 days   Lab Units 03/01/24  0539 02/29/24  0555 02/28/24 2030   POTASSIUM mmol/L 4.9 4.8 4.4   CHLORIDE mmol/L 101 98 97   CO2 mmol/L 40* 38* 38*   BUN mg/dL 24 17 18   CREATININE mg/dL 1.05 0.85 0.92   CALCIUM mg/dL 9.4 9.5 9.7   ALK PHOS U/L  --   --  96   ALT U/L  --   --  13   AST U/L  --   --  23         Results from last 7 days   Lab Units 03/01/24  0539 02/29/24  0555   MAGNESIUM mg/dL 2.0 2.0       Imaging and other studies: I have personally reviewed pertinent films in PACS    Code Status: Level 1 - Full Code    Thank you for allowing us to participate in the care of your patient.    Clifton Paredes M.D.

## 2024-03-01 NOTE — ASSESSMENT & PLAN NOTE
Chronic. Patient's chest pain less likely related to exacerbation of COPD, but could be impacting dyspnea.  Continue nebulizer treatments.  Patient currently on her home O2 needs of 2L.

## 2024-03-01 NOTE — ASSESSMENT & PLAN NOTE
"Assessment:   - Patient presented with 3 days of sudden onset SOB and atypical chest pressure with fatigue and declining ADLs  - Wt loss of 12 pounds in the last month; wt loss of 14 in the last 3 months   - Given symptoms and presentation, I suspect she may have an undiagnosed cancer and was lost to follow up outpatient  - Multiple lung nodules first seen on CT imaging on 12/29/2020  - Prior to hospitalization, patient was being worked up for lung cancer etiology of multiple lung nodules   - Last seen Dr. Muñoz, hem/onc specialist, on 8/7/2023; per chart review, patient was referred to Dr. Muñoz for non FDG avid lung nodules and some mediastinal and mesenteric adenopathy which had low grade activity but was never sampled  - Biopsy per Dr. Muñoz in 2023 revealed concerns for atypical adenomatous hyperplasia, adenocarcinoma in-situ, or a low-grade invasive adenocarcinoma   - 1/21/2024 CT AP 1.4 cm groundglass nodules in RLL, 1.2 cm lobulated nodule LLL, no significant changes according to CT AP  - During this hospitalization, ED ordered CT AP which showed stability of lung nodules; however, PET-CT would be preferable imaging for assessment of lung cancer; thus would like to obtain recommendations per pulmonology   - CXR 2/29 result: hazy bibasilar opacity which may represent layered effusion and parenchymal opacity on recent imaging, with lymphocytes 11%, I recommend further investigation into etiology of patient's presenting symptoms    - Per chart review, \"CAT scan showed no evidence of labial cancer, stable bilateral pulmonary nodule, there is a 3 to 4 mm left lower lobe lung nodule and this is a new from before we will repeat CAT scan of the chest in 6 months\"  - Differential diagnoses: secondary cancer (remote history of head and neck malignancy) with mets to lungs vs primary lung cancer. Per lung biopsy from prior, there was concern for atypical adenomatous hyperplasia vs adenocarcinoma in-situ vs low-grade " invasive adenocarcinoma, Suspect atypical chest pain could be related to possible undiagnosed lung cancer.     Plan:   -CT of chest repeated by our pulmonology colleagues during this admission, nodules are believed to be likely premalignant.  Will be followed as an outpatient.

## 2024-03-01 NOTE — ASSESSMENT & PLAN NOTE
Lab Results   Component Value Date    EGFR 54 03/01/2024    EGFR 70 02/29/2024    EGFR 64 02/28/2024    CREATININE 1.05 03/01/2024    CREATININE 0.85 02/29/2024    CREATININE 0.92 02/28/2024    Creatinine at baseline. Will monitor with morning labs.

## 2024-03-01 NOTE — ASSESSMENT & PLAN NOTE
Patient SOB with exertion. Currently doing well at her usual home O2 level of 2L. Patient also states she has what feels like blockage of her right nostril which sounds chronic according to her daughter since she was a child, but it is bothering patient more.  Chest x-ray 2/28: hazy bibasilar opacity may represent a combination of layered effusion and parenchymal opacity  Plan:  - Monitor O2 needs  - Flonase ordered  -Steroids discontinued by our pulmonology colleagues.  To continue nebulizer treatments.

## 2024-03-01 NOTE — PROGRESS NOTES
Progress Note - Cardiology Team 1  Noreen Alvarez 68 y.o. female MRN: [id number]  Unit/Bed#: S -01 Encounter: [id number]        Principal Problem:    Atypical chest pain  Active Problems:    Chronic kidney disease, stage 3 (moderate)    Centrilobular emphysema (HCC)    Depression with anxiety    Severe persistent asthma without complication    Multiple lung nodules    Hyperlipidemia    Hypothyroidism    SOB (shortness of breath)    Elevated blood pressure reading      Assessment  Chest tightness -typical and atypical features. Reports a band across lower chest to back .2-3 days.         Worse with deep breath and worse when short of breath. Worse         Bending. . Worse with activity. Now resolved.   Abnormal troponin- 200 range.  In setting of worsening dypsnea        Chest tightness as above. ECG non specific ST/T wave abnormality        ECG- preserved LV function, no RWMA        Pharmacological Myoview stress test today-inferior perfusion defect            probably artifact.  Unable to exclude small amount of ischemia            Inferoapical  region        TTE- Normal LVEF. No RMWA.   Acute on chronic dyspnea- family reports significant limitations in able         To perform ADL's        Pulm to evaluate  Chronic hypoxic  respiratory failure- on current liter flow  Severe COPD with exacerbation- s/p duoneb x2 and 125mg IV solumedrol - with improvement   Hypertension- presented hypertensive in setting of respiratory distress       On losartan 25 mg.  Blood pressure better controlled  HLD -on atorvastatin 40 mg        Cholesterol 207.  Triglyceride 61.  HDL 78.  Calculated   8.   Prediabetes - Hgb AIC 5.8%     Plan  Continue Asa 81mg, statin- atorvastatin 40mg . Will add imdur 30mg daily. Will f/u in office to re evaluate symptoms. If ongoing symptoms consider [facility] for definitve evaluation. Would recommend ambulation today. Will avoid BB at this time d/t concerns for possible COPD  exacerbation.    Subjective/Objective   Chief Complaint/subjective  No events overnight  Sob improved. Chest pain resolved  Son at bedside translating        Vitals: /63   Pulse 71   Temp 98.1 °F (36.7 °C)   Resp 18   Wt 78.9 kg (174 lb)   SpO2 95%   BMI 31.83 kg/m²     Vitals:    02/28/24 2008   Weight: 78.9 kg (174 lb)     Orthostatic Blood Pressures      Flowsheet Row Most Recent Value   Blood Pressure 133/63 filed at 03/01/2024 0753   Patient Position - Orthostatic VS Sitting filed at 03/01/2024 0030            No intake or output data in the 24 hours ending 03/01/24 1338    Invasive Devices       Peripheral Intravenous Line  Duration             Peripheral IV 02/28/24 Right Antecubital 1 day                    Current Facility-Administered Medications   Medication Dose Route Frequency    acetaminophen (TYLENOL) tablet 975 mg  975 mg Oral Q8H PRN    albuterol (PROVENTIL HFA,VENTOLIN HFA) inhaler 2 puff  2 puff Inhalation Q6H PRN    aspirin chewable tablet 81 mg  81 mg Oral Daily    atorvastatin (LIPITOR) tablet 40 mg  40 mg Oral Daily With Dinner    benzonatate (TESSALON PERLES) capsule 100 mg  100 mg Oral TID    brimonidine tartrate 0.2 % ophthalmic solution 1 drop  1 drop Both Eyes Q8H ESTEVAN    budesonide-formoterol (SYMBICORT) 160-4.5 mcg/act inhaler 2 puff  2 puff Inhalation BID    calcium carbonate (TUMS) chewable tablet 500 mg  500 mg Oral Daily PRN    enoxaparin (LOVENOX) subcutaneous injection 40 mg  40 mg Subcutaneous Daily    ferrous sulfate tablet 325 mg  325 mg Oral Daily With Breakfast    fluticasone (FLONASE) 50 mcg/act nasal spray 2 spray  2 spray Each Nare Daily    ipratropium-albuterol (DUO-NEB) 0.5-2.5 mg/3 mL inhalation solution 3 mL  3 mL Nebulization Q8H PRN    labetalol (NORMODYNE) injection 10 mg  10 mg Intravenous Q6H PRN    levothyroxine tablet 75 mcg  75 mcg Oral Early Morning    losartan (COZAAR) tablet 25 mg  25 mg Oral Daily    montelukast (SINGULAIR) tablet 10 mg  10 mg  Oral HS    pantoprazole (PROTONIX) EC tablet 40 mg  40 mg Oral Daily Before Breakfast    sertraline (ZOLOFT) tablet 25 mg  25 mg Oral HS    timolol (TIMOPTIC) 0.5 % ophthalmic solution 1 drop  1 drop Both Eyes BID         Physical Exam: /63   Pulse 71   Temp 98.1 °F (36.7 °C)   Resp 18   Wt 78.9 kg (174 lb)   SpO2 95%   BMI 31.83 kg/m²     General Appearance:    Alert, cooperative, no distress, appears stated age   Head:    Normocephalic, no scleral icterus   Eyes:    PERRL   Nose:   Nares normal, septum midline, no drainage    Throat:   Lips, mucosa, and tongue normal   Neck:   Supple, symmetrical, trachea midline,       no carotid    bruit or JVD   Back:     Symmetric, no CVA tenderness   Lungs:     Clear to auscultation bilaterally, respirations unlabored   Chest Wall:    No tenderness or deformity    Heart:    Regular rate and rhythm, S1 and S2 normal, no murmur, rub   or gallop   Abdomen:     Soft, non-tender, bowel sounds active all four quadrants,     no masses, no organomegaly   Extremities:   Extremities normal, atraumatic, no cyanosis or edema   Pulses:   2+ and symmetric all extremities   Skin:   Skin color, texture, turgor normal, no rashes or lesions   Neurologic:   Alert and oriented to person place and time, no focal deficits                 Lab Results:   Recent Results (from the past 72 hour(s))   CBC and differential    Collection Time: 02/28/24  8:30 PM   Result Value Ref Range    WBC 7.99 4.31 - 10.16 Thousand/uL    RBC 4.18 3.81 - 5.12 Million/uL    Hemoglobin 11.1 (L) 11.5 - 15.4 g/dL    Hematocrit 38.3 34.8 - 46.1 %    MCV 92 82 - 98 fL    MCH 26.6 (L) 26.8 - 34.3 pg    MCHC 29.0 (L) 31.4 - 37.4 g/dL    RDW 14.5 11.6 - 15.1 %    MPV 9.8 8.9 - 12.7 fL    Platelets 231 149 - 390 Thousands/uL    nRBC 0 /100 WBCs    Neutrophils Relative 77 (H) 43 - 75 %    Immat GRANS % 1 0 - 2 %    Lymphocytes Relative 13 (L) 14 - 44 %    Monocytes Relative 8 4 - 12 %    Eosinophils Relative 1 0 - 6 %  "   Basophils Relative 0 0 - 1 %    Neutrophils Absolute 6.23 1.85 - 7.62 Thousands/µL    Immature Grans Absolute 0.05 0.00 - 0.20 Thousand/uL    Lymphocytes Absolute 1.02 0.60 - 4.47 Thousands/µL    Monocytes Absolute 0.60 0.17 - 1.22 Thousand/µL    Eosinophils Absolute 0.07 0.00 - 0.61 Thousand/µL    Basophils Absolute 0.02 0.00 - 0.10 Thousands/µL   Comprehensive metabolic panel    Collection Time: 02/28/24  8:30 PM   Result Value Ref Range    Sodium 141 135 - 147 mmol/L    Potassium 4.4 3.5 - 5.3 mmol/L    Chloride 97 96 - 108 mmol/L    CO2 38 (H) 21 - 32 mmol/L    ANION GAP 6 mmol/L    BUN 18 5 - 25 mg/dL    Creatinine 0.92 0.60 - 1.30 mg/dL    Glucose 134 65 - 140 mg/dL    Calcium 9.7 8.4 - 10.2 mg/dL    AST 23 13 - 39 U/L    ALT 13 7 - 52 U/L    Alkaline Phosphatase 96 34 - 104 U/L    Total Protein 7.4 6.4 - 8.4 g/dL    Albumin 4.3 3.5 - 5.0 g/dL    Total Bilirubin 0.36 0.20 - 1.00 mg/dL    eGFR 64 ml/min/1.73sq m   HS Troponin 0hr (reflex protocol)    Collection Time: 02/28/24  8:30 PM   Result Value Ref Range    hs TnI 0hr 10 \"Refer to ACS Flowchart\"- see link ng/L   FLU/RSV/COVID - if FLU/RSV clinically relevant    Collection Time: 02/28/24  8:30 PM    Specimen: Nose; Nares   Result Value Ref Range    SARS-CoV-2 Negative Negative    INFLUENZA A PCR Negative Negative    INFLUENZA B PCR Negative Negative    RSV PCR Negative Negative   ECG 12 lead    Collection Time: 02/28/24  8:30 PM   Result Value Ref Range    Ventricular Rate 93 BPM    Atrial Rate 93 BPM    MS Interval 156 ms    QRSD Interval 84 ms    QT Interval 372 ms    QTC Interval 462 ms    P Axis 88 degrees    QRS Axis 58 degrees    T Wave [place] 85 degrees   HS Troponin I 2hr    Collection Time: 02/28/24 10:40 PM   Result Value Ref Range    hs TnI 2hr 89 (H) \"Refer to ACS Flowchart\"- see link ng/L    Delta 2hr hsTnI 79 (H) <20 ng/L   HS Troponin I 4hr    Collection Time: 02/29/24  1:27 AM   Result Value Ref Range    hs TnI 4hr 157 (H) \"Refer to ACS " "Flowchart\"- see link ng/L    Delta 4hr hsTnI 147 (H) <20 ng/L   Platelet count    Collection Time: 02/29/24  1:27 AM   Result Value Ref Range    Platelets 190 149 - 390 Thousands/uL    MPV 9.6 8.9 - 12.7 fL   ECG 12 lead    Collection Time: 02/29/24  3:24 AM   Result Value Ref Range    Ventricular Rate 95 BPM    Atrial Rate 95 BPM    MN Interval 196 ms    QRSD Interval 86 ms    QT Interval 352 ms    QTC Interval 442 ms    P Axis 83 degrees    QRS Axis 15 degrees    T Wave Axis -18 degrees   HS Troponin 0hr (reflex protocol)    Collection Time: 02/29/24  5:55 AM   Result Value Ref Range    hs TnI 0hr 256 (H) \"Refer to ACS Flowchart\"- see link ng/L   CBC and differential    Collection Time: 02/29/24  5:55 AM   Result Value Ref Range    WBC 6.69 4.31 - 10.16 Thousand/uL    RBC 4.05 3.81 - 5.12 Million/uL    Hemoglobin 10.7 (L) 11.5 - 15.4 g/dL    Hematocrit 36.3 34.8 - 46.1 %    MCV 90 82 - 98 fL    MCH 26.4 (L) 26.8 - 34.3 pg    MCHC 29.5 (L) 31.4 - 37.4 g/dL    RDW 14.2 11.6 - 15.1 %    MPV 9.6 8.9 - 12.7 fL    Platelets 207 149 - 390 Thousands/uL   Basic metabolic panel    Collection Time: 02/29/24  5:55 AM   Result Value Ref Range    Sodium 140 135 - 147 mmol/L    Potassium 4.8 3.5 - 5.3 mmol/L    Chloride 98 96 - 108 mmol/L    CO2 38 (H) 21 - 32 mmol/L    ANION GAP 4 mmol/L    BUN 17 5 - 25 mg/dL    Creatinine 0.85 0.60 - 1.30 mg/dL    Glucose 150 (H) 65 - 140 mg/dL    Calcium 9.5 8.4 - 10.2 mg/dL    eGFR 70 ml/min/1.73sq m   Magnesium    Collection Time: 02/29/24  5:55 AM   Result Value Ref Range    Magnesium 2.0 1.9 - 2.7 mg/dL   Manual Differential(PHLEBS Do Not Order)    Collection Time: 02/29/24  5:55 AM   Result Value Ref Range    Segmented % 89 (H) 43 - 75 %    Lymphocytes % 11 (L) 14 - 44 %    Monocytes % 0 (L) 4 - 12 %    Eosinophils, % 0 0 - 6 %    Basophils % 0 0 - 1 %    Absolute Neutrophils 5.95 1.85 - 7.62 Thousand/uL    Lymphocytes Absolute 0.74 0.60 - 4.47 Thousand/uL    Monocytes Absolute 0.00 " "0.00 - 1.22 Thousand/uL    Eosinophils Absolute 0.00 0.00 - 0.40 Thousand/uL    Basophils Absolute 0.00 0.00 - 0.10 Thousand/uL    Total Counted      RBC Morphology Normal     Platelet Estimate Adequate Adequate   Lipid panel    Collection Time: 02/29/24  5:55 AM   Result Value Ref Range    Cholesterol 207 (H) See Comment mg/dL    Triglycerides 61 See Comment mg/dL    HDL, Direct 78 >=50 mg/dL    LDL Calculated 117 (H) 0 - 100 mg/dL    Non-HDL-Chol (CHOL-HDL) 129 mg/dl   TSH, 3rd generation with Free T4 reflex    Collection Time: 02/29/24  5:55 AM   Result Value Ref Range    TSH 3RD GENERATON 1.767 0.450 - 4.500 uIU/mL   HS Troponin I 2hr    Collection Time: 02/29/24  7:57 AM   Result Value Ref Range    hs TnI 2hr 236 (H) \"Refer to ACS Flowchart\"- see link ng/L    Delta 2hr hsTnI -20 <20 ng/L   HS Troponin I 4hr    Collection Time: 02/29/24  9:56 AM   Result Value Ref Range    hs TnI 4hr 220 (H) \"Refer to ACS Flowchart\"- see link ng/L    Delta 4hr hsTnI -36 <20 ng/L   Hemoglobin A1C    Collection Time: 03/01/24  5:39 AM   Result Value Ref Range    Hemoglobin A1C 5.9 (H) Normal 4.0-5.6%; PreDiabetic 5.7-6.4%; Diabetic >=6.5%; Glycemic control for adults with diabetes <7.0% %     mg/dl   CBC and differential    Collection Time: 03/01/24  5:39 AM   Result Value Ref Range    WBC 8.08 4.31 - 10.16 Thousand/uL    RBC 3.98 3.81 - 5.12 Million/uL    Hemoglobin 10.6 (L) 11.5 - 15.4 g/dL    Hematocrit 36.5 34.8 - 46.1 %    MCV 92 82 - 98 fL    MCH 26.6 (L) 26.8 - 34.3 pg    MCHC 29.0 (L) 31.4 - 37.4 g/dL    RDW 14.7 11.6 - 15.1 %    MPV 9.9 8.9 - 12.7 fL    Platelets 208 149 - 390 Thousands/uL    nRBC 0 /100 WBCs    Neutrophils Relative 78 (H) 43 - 75 %    Immat GRANS % 0 0 - 2 %    Lymphocytes Relative 13 (L) 14 - 44 %    Monocytes Relative 8 4 - 12 %    Eosinophils Relative 0 0 - 6 %    Basophils Relative 1 0 - 1 %    Neutrophils Absolute 6.27 1.85 - 7.62 Thousands/µL    Immature Grans Absolute 0.03 0.00 - 0.20 " Thousand/uL    Lymphocytes Absolute 1.04 0.60 - 4.47 Thousands/µL    Monocytes Absolute 0.68 0.17 - 1.22 Thousand/µL    Eosinophils Absolute 0.02 0.00 - 0.61 Thousand/µL    Basophils Absolute 0.04 0.00 - 0.10 Thousands/µL   Basic metabolic panel    Collection Time: 03/01/24  5:39 AM   Result Value Ref Range    Sodium 143 135 - 147 mmol/L    Potassium 4.9 3.5 - 5.3 mmol/L    Chloride 101 96 - 108 mmol/L    CO2 40 (H) 21 - 32 mmol/L    ANION GAP 2 mmol/L    BUN 24 5 - 25 mg/dL    Creatinine 1.05 0.60 - 1.30 mg/dL    Glucose 96 65 - 140 mg/dL    Calcium 9.4 8.4 - 10.2 mg/dL    eGFR 54 ml/min/1.73sq m   Magnesium    Collection Time: 03/01/24  5:39 AM   Result Value Ref Range    Magnesium 2.0 1.9 - 2.7 mg/dL   Stress strip    Collection Time: 03/01/24  9:18 AM   Result Value Ref Range    Protocol Name EMMA SIT     Exercise duration (min) 3 min    Exercise duration (sec) 0 sec    Post Peak Systolic  mmHg    Max Diastolic Bp 74 mmHg    Peak HR 90 BPM    Max Predicted Heart Rate 152 BPM    Reason for Termination Protocol Complete     Test Indication CHEST PAIN     Target Hr Formular (220 - Age)*100%     Arrhy During Ex      ECG Interp Before Ex      ECG Interp during Ex      Ex Summary Comment      Chest Pain Statement none     Overall Hr Response To Exercise      Overall BP Response To Exercise     Stress strip    Collection Time: 03/01/24  9:18 AM   Result Value Ref Range    Protocol Name EMMA SIT     Exercise duration (min) 3 min    Exercise duration (sec) 0 sec    Post Peak Systolic  mmHg    Max Diastolic Bp 74 mmHg    Peak HR 90 BPM    Max Predicted Heart Rate 152 BPM    Reason for Termination Protocol Complete     Test Indication CHEST PAIN     Target Hr Formular (220 - Age)*100%     Arrhy During Ex      ECG Interp Before Ex      ECG Interp during Ex      Ex Summary Comment      Chest Pain Statement none     Overall Hr Response To Exercise      Overall BP Response To Exercise     NM myocardial perfusion  spect (rx stress and/or rest)    Collection Time: 03/01/24 10:33 AM   Result Value Ref Range    Baseline HR 72 bpm    Baseline /65 mmHg    O2 sat rest 100 %    Stress peak HR 90 bpm    Post peak  mmHg    O2 sat peak 100 %    Recovery HR 82 bpm    Recovery /70 mmHg    O2 sat recovery 100 %    Rate Pressure Product 16,020.0     Angina Index 0     Rest Nuclear Isotope Dose 10.80 mCi    Stress Nuclear Isotope Dose 32.60 mCi    EF (%) 65 %     Imaging: I have personally reviewed pertinent reports.        Counseling / Coordination of Care  Total time spent today 25 minutes. Greater than 50% of total time was spent with the patient and / or family counseling and / or coordination of care.

## 2024-03-01 NOTE — ASSESSMENT & PLAN NOTE
Lab Results   Component Value Date    EGFR 70 02/29/2024    EGFR 64 02/28/2024    EGFR 52 01/29/2024    CREATININE 0.85 02/29/2024    CREATININE 0.92 02/28/2024    CREATININE 1.08 01/29/2024    Creatinine at baseline. Will watch with morning labs.

## 2024-03-02 LAB
ANION GAP SERPL CALCULATED.3IONS-SCNC: 1 MMOL/L
BASOPHILS # BLD AUTO: 0.04 THOUSANDS/ÂΜL (ref 0–0.1)
BASOPHILS NFR BLD AUTO: 1 % (ref 0–1)
BUN SERPL-MCNC: 22 MG/DL (ref 5–25)
CALCIUM SERPL-MCNC: 9.3 MG/DL (ref 8.4–10.2)
CHLORIDE SERPL-SCNC: 100 MMOL/L (ref 96–108)
CO2 SERPL-SCNC: 42 MMOL/L (ref 21–32)
CREAT SERPL-MCNC: 1.01 MG/DL (ref 0.6–1.3)
EOSINOPHIL # BLD AUTO: 0.08 THOUSAND/ÂΜL (ref 0–0.61)
EOSINOPHIL NFR BLD AUTO: 1 % (ref 0–6)
ERYTHROCYTE [DISTWIDTH] IN BLOOD BY AUTOMATED COUNT: 14.8 % (ref 11.6–15.1)
GFR SERPL CREATININE-BSD FRML MDRD: 57 ML/MIN/1.73SQ M
GLUCOSE SERPL-MCNC: 93 MG/DL (ref 65–140)
HCT VFR BLD AUTO: 35.3 % (ref 34.8–46.1)
HGB BLD-MCNC: 10.3 G/DL (ref 11.5–15.4)
IMM GRANULOCYTES # BLD AUTO: 0.02 THOUSAND/UL (ref 0–0.2)
IMM GRANULOCYTES NFR BLD AUTO: 0 % (ref 0–2)
LYMPHOCYTES # BLD AUTO: 0.85 THOUSANDS/ÂΜL (ref 0.6–4.47)
LYMPHOCYTES NFR BLD AUTO: 12 % (ref 14–44)
MAGNESIUM SERPL-MCNC: 1.9 MG/DL (ref 1.9–2.7)
MCH RBC QN AUTO: 26.6 PG (ref 26.8–34.3)
MCHC RBC AUTO-ENTMCNC: 29.2 G/DL (ref 31.4–37.4)
MCV RBC AUTO: 91 FL (ref 82–98)
MONOCYTES # BLD AUTO: 0.66 THOUSAND/ÂΜL (ref 0.17–1.22)
MONOCYTES NFR BLD AUTO: 9 % (ref 4–12)
NEUTROPHILS # BLD AUTO: 5.56 THOUSANDS/ÂΜL (ref 1.85–7.62)
NEUTS SEG NFR BLD AUTO: 77 % (ref 43–75)
NRBC BLD AUTO-RTO: 0 /100 WBCS
PLATELET # BLD AUTO: 194 THOUSANDS/UL (ref 149–390)
PMV BLD AUTO: 9.6 FL (ref 8.9–12.7)
POTASSIUM SERPL-SCNC: 5 MMOL/L (ref 3.5–5.3)
RBC # BLD AUTO: 3.87 MILLION/UL (ref 3.81–5.12)
SODIUM SERPL-SCNC: 143 MMOL/L (ref 135–147)
WBC # BLD AUTO: 7.21 THOUSAND/UL (ref 4.31–10.16)

## 2024-03-02 PROCEDURE — 85025 COMPLETE CBC W/AUTO DIFF WBC: CPT

## 2024-03-02 PROCEDURE — 83735 ASSAY OF MAGNESIUM: CPT

## 2024-03-02 PROCEDURE — 80048 BASIC METABOLIC PNL TOTAL CA: CPT

## 2024-03-02 PROCEDURE — 99222 1ST HOSP IP/OBS MODERATE 55: CPT | Performed by: INTERNAL MEDICINE

## 2024-03-02 PROCEDURE — 99232 SBSQ HOSP IP/OBS MODERATE 35: CPT | Performed by: INTERNAL MEDICINE

## 2024-03-02 PROCEDURE — 99232 SBSQ HOSP IP/OBS MODERATE 35: CPT | Performed by: FAMILY MEDICINE

## 2024-03-02 RX ADMIN — FLUTICASONE PROPIONATE 2 SPRAY: 50 SPRAY, METERED NASAL at 08:28

## 2024-03-02 RX ADMIN — LEVOTHYROXINE SODIUM 75 MCG: 75 TABLET ORAL at 05:33

## 2024-03-02 RX ADMIN — CALCIUM CARBONATE (ANTACID) CHEW TAB 500 MG 500 MG: 500 CHEW TAB at 19:45

## 2024-03-02 RX ADMIN — FERROUS SULFATE TAB 325 MG (65 MG ELEMENTAL FE) 325 MG: 325 (65 FE) TAB at 08:23

## 2024-03-02 RX ADMIN — SUCRALFATE 1 G: 1 TABLET ORAL at 08:23

## 2024-03-02 RX ADMIN — ALBUTEROL SULFATE 2 PUFF: 90 AEROSOL, METERED RESPIRATORY (INHALATION) at 13:36

## 2024-03-02 RX ADMIN — BRIMONIDINE TARTRATE 1 DROP: 2 SOLUTION/ DROPS OPHTHALMIC at 21:24

## 2024-03-02 RX ADMIN — BENZONATATE 100 MG: 100 CAPSULE ORAL at 08:23

## 2024-03-02 RX ADMIN — TIMOLOL MALEATE 1 DROP: 5 SOLUTION/ DROPS OPHTHALMIC at 08:28

## 2024-03-02 RX ADMIN — LOSARTAN POTASSIUM 25 MG: 25 TABLET, FILM COATED ORAL at 08:23

## 2024-03-02 RX ADMIN — ACETAMINOPHEN 975 MG: 325 TABLET, FILM COATED ORAL at 19:45

## 2024-03-02 RX ADMIN — BENZONATATE 100 MG: 100 CAPSULE ORAL at 17:12

## 2024-03-02 RX ADMIN — SUCRALFATE 1 G: 1 TABLET ORAL at 17:12

## 2024-03-02 RX ADMIN — PANTOPRAZOLE SODIUM 40 MG: 40 TABLET, DELAYED RELEASE ORAL at 05:33

## 2024-03-02 RX ADMIN — PANTOPRAZOLE SODIUM 40 MG: 40 TABLET, DELAYED RELEASE ORAL at 17:11

## 2024-03-02 RX ADMIN — SERTRALINE HYDROCHLORIDE 25 MG: 25 TABLET ORAL at 21:22

## 2024-03-02 RX ADMIN — ATORVASTATIN CALCIUM 40 MG: 40 TABLET, FILM COATED ORAL at 17:11

## 2024-03-02 RX ADMIN — ASPIRIN 81 MG 81 MG: 81 TABLET ORAL at 08:23

## 2024-03-02 RX ADMIN — BRIMONIDINE TARTRATE 1 DROP: 2 SOLUTION/ DROPS OPHTHALMIC at 05:34

## 2024-03-02 RX ADMIN — BUDESONIDE AND FORMOTEROL FUMARATE DIHYDRATE 2 PUFF: 160; 4.5 AEROSOL RESPIRATORY (INHALATION) at 17:11

## 2024-03-02 RX ADMIN — BRIMONIDINE TARTRATE 1 DROP: 2 SOLUTION/ DROPS OPHTHALMIC at 13:35

## 2024-03-02 RX ADMIN — BUDESONIDE AND FORMOTEROL FUMARATE DIHYDRATE 2 PUFF: 160; 4.5 AEROSOL RESPIRATORY (INHALATION) at 08:28

## 2024-03-02 RX ADMIN — BENZONATATE 100 MG: 100 CAPSULE ORAL at 21:22

## 2024-03-02 RX ADMIN — ENOXAPARIN SODIUM 40 MG: 40 INJECTION SUBCUTANEOUS at 08:23

## 2024-03-02 RX ADMIN — MONTELUKAST 10 MG: 10 TABLET, FILM COATED ORAL at 21:22

## 2024-03-02 RX ADMIN — TIMOLOL MALEATE 1 DROP: 5 SOLUTION/ DROPS OPHTHALMIC at 17:11

## 2024-03-02 NOTE — PROGRESS NOTES
Progress Note - Pulmonary   Noreen Alvarez 68 y.o. female MRN: [id number]  Unit/Bed#: S -01 Encounter: [id number]    Assessment & Plan:  Acute on chronic hypoxic respiratory failure, unclear etiology  Severe COPD with possible acute exacerbation  Obstructive sleep apnea  History of laryngeal cancer status post radiation and chemotherapy  Pulmonary nodules  Atypical chest pain  Involuntary regurgitation of food    Maintain pulse ox greater than 88%  Reviewed chest CT with resolved 4 mm left lower lobe pulmonary nodule and other lung nodules are stable. No pleural effusion or pneumothorax  GI evaluating today. PPI increased to BID and carafate added yesterday  Continue scheduled nebs. Would continue to hold on steroids.   Symbicort 160-4.5 2 puffs BID, Singulair 10 mg QHS  Continue annual CT scans for lung nodule monitoring    Subjective:   Patient says she is is still having difficulty breathing. Reports epigastric discomfort. Does not have much of a cough. She did not notice any wheezing today.    Objective:     Vitals: Blood pressure 134/66, pulse 80, temperature 98 °F (36.7 °C), temperature source Oral, resp. rate 16, weight 78.9 kg (174 lb), SpO2 98%.,Body mass index is 31.83 kg/m².    No intake or output data in the 24 hours ending 03/02/24 1120    Invasive Devices       Peripheral Intravenous Line  Duration             Peripheral IV 02/28/24 Right Antecubital 2 days                    Physical Exam:   General appearance: Alert and awake, in no acute distress  Head: Normocephalic, without obvious abnormality, atraumatic  Eyes: No scleral icterus   Lungs: Decreased breath sounds - no wheezing appreciated  Heart: Regular rate  Abdomen:  No appreciable distension or tenderness  Extremities: No deformity  Skin: Warm and dry  Neurologic: No acute focal deficits are noted      Labs: I have personally reviewed pertinent lab results.  Imaging and other studies: I have personally reviewed pertinent reports.

## 2024-03-02 NOTE — CONSULTS
Consultation -  [facility]  Noreen Alvarez 68 y.o. female MRN: [id number]  Unit/Bed#: S -01 Encounter: [id number]        Inpatient consult to gastroenterology  Consult performed by: GERTRUDIS Vasquez  Consult ordered by: Alka Caceres DO          Reason for Consult / Principal Problem:     Atypical chest pain, regurgitation      ASSESSMENT AND PLAN:      68 y.o. female Nigerien-speaking female with history of HTN, HLD, CKD 3, hypothyroidism, laryngeal cancer s/p radiation/chemo, lung nodules representative of adenocarcinoma spectrum lesions (biopsied 2022), severe COPD who presented 2/28 with shortness of breath and chest pressure ongoing for 3 days.    #1 Atypical chest pain w/ regurgitation  #2 GERD  #3 History of laryngeal cancer s/p chemo/rad (2014)  Pt with atypical chest pain & regurgitation & GI consultation requested for further evaluation to r/o stricture or diverticulum. She has Protonix listed on her home med list but denies taking this. Recently saw GI in the office for epigastric pain/GERD and scheduled for outpatient EGD/Colon 4/11.  She underwent workup with cardiology w/ nuclear stress test which was normal and no further CV workup needed. Currently being treated for possible COPD exacerbation with scheduled nebs.    -Diet as tolerated  -Agree with PPI twice daily, Carafate  -Will schedule for EGD prior to discharge once respiratory status has improved. Spoke to cardiology and she is cleared from their end.    Thank you for the consultation.  Patient seen and examined with Dr. Holt  ______________________________________________________________________    HPI:  Noreen Alvarez is a 68 y.o. female Nigerien-speaking female with history of HTN, HLD, CKD 3, hypothyroidism, laryngeal cancer s/p radiation/chemo, lung nodules representative of adenocarcinoma spectrum lesions (biopsied 2022), severe COPD who presented 2/28 with shortness of breath and chest pressure ongoing for  3 days.  Received steroids for suspected COPD exacerbation and underwent cardiac evaluation for abnormal troponin with nuclear stress test with no diagnostic evidence of moderate to large amount of ischemia or infarct. LVEF normal - 65%. There is an inferior perfusion defect, but it appears to mostly improve with prone imaging, suggesting probably related to artifact.  But a small amount of ischemia in the inferoapical region cannot be completely excluded. Cardiology is medically managing. Pulmonary evaluated and recommended GI consult due to involuntary regurgitation of food, including out the nares, rule out esophageal stricture or diverticulum. PPI was increased to BID & Carafate added.     Recently saw GI in the outpatient office for epigastric pain, GERD, and colon cancer screening in January and scheduled for an EGD/colonoscopy which is scheduled for April 11 and right upper quadrant ultrasound was done which showed no biliary findings.  She is maintained on Protonix 40 mg daily.  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    REVIEW OF SYSTEMS:    CONSTITUTIONAL: Denies any fever, chills, rigors, and weight loss.  HEENT: No earache or tinnitus. Denies hearing loss or visual disturbances.  CARDIOVASCULAR: No chest pain or palpitations.   RESPIRATORY: Denies any cough, hemoptysis, shortness of breath or dyspnea on exertion.  GASTROINTESTINAL: As noted in the History of Present Illness.   GENITOURINARY: No problems with urination. Denies any hematuria or dysuria.  NEUROLOGIC: No dizziness or vertigo, denies headaches.   MUSCULOSKELETAL: Denies any muscle or joint pain.   SKIN: Denies skin rashes or itching.   ENDOCRINE: Denies excessive thirst. Denies intolerance to heat or cold.  PSYCHOSOCIAL: Denies depression or anxiety. Denies any recent memory loss.        Historical Information   Past Medical History:   Diagnosis Date    Acute kidney failure (HCC)     Allergic     Allergies     Anemia     Asthma     Cancer (HCC)     Cataract     Chronic kidney disease (CKD), stage III (moderate) (HCC)     COPD (chronic obstructive pulmonary disease) (HCC)     COVID-19     Covid + 2-3-77-cough at that time now fully resolved    Disease of thyroid gland     Essential hypertension     GERD (gastroesophageal reflux disease)     Glaucoma     High blood pressure     HTN (hypertension) 02/16/2021    Hyperlipidemia     Hypothyroidism     Mesenteric lymphadenopathy 07/13/2021    Pulmonary hypertension (HCC)     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     VT TENDON SHEATH INCISION Right 02/16/2021    Procedure: THUMB TRIGGER FINGER RELEASE;  Surgeon: Angeles Denton MD;  Location: [facility];  Service: Orthopedics    TUBAL LIGATION       Social History   Social History     Substance and Sexual Activity   Alcohol Use Never    Comment: None     Social History     Substance and Sexual Activity   Drug Use Never    Comment: Denies     Social History     Tobacco Use   Smoking Status Former    Current packs/day: 0.00    Average packs/day: 1 pack/day for 40.0 years (40.0 ttl pk-yrs)    Types: Cigarettes    Start date: 1/1/1974    Quit date: 1/1/2014    Years since quitting: 10.1   Smokeless Tobacco Never     Family History   Problem Relation Age of Onset    Other Mother         respiratory disorder    Sudden death Father         shot himself    Suicidality Father     Brain cancer Sister     Diabetes Sister     Breast cancer Sister     Cancer Sister 62        pancreatic cancer    No Known Problems Sister     No Known Problems Sister      No Known Problems Sister     No Known Problems Sister     No Known Problems Sister     No Known Problems Sister     No Known Problems Daughter     No Known Problems Daughter     No Known Problems Maternal Grandmother     No Known Problems Maternal Grandfather     No Known Problems Paternal Grandmother     No Known Problems Paternal Grandfather     No Known Problems Maternal Aunt     No Known Problems Maternal Aunt     No Known Problems Maternal Aunt     No Known Problems Maternal Aunt     No Known Problems Maternal Aunt     No Known Problems Paternal Aunt     No Known Problems Paternal Aunt     No Known Problems Paternal Aunt     No Known Problems Paternal Aunt        Meds/Allergies     Medications Prior to Admission   Medication    albuterol (PROVENTIL HFA,VENTOLIN HFA) 90 mcg/act inhaler    azithromycin (ZITHROMAX) 250 mg tablet    benzonatate (TESSALON PERLES) 100 mg capsule    brimonidine tartrate 0.2 % ophthalmic solution    Budeson-Glycopyrrol-Formoterol (Breztri Aerosphere) 160-9-4.8 MCG/ACT AERO    Diclofenac Sodium (VOLTAREN) 1 %    ferrous sulfate 324 (65 Fe) mg    fluticasone (FLONASE) 50 mcg/act nasal spray    ipratropium (ATROVENT) 0.06 % nasal spray    ipratropium-albuterol (DUO-NEB) 0.5-2.5 mg/3 mL nebulizer solution    levothyroxine 75 mcg tablet    montelukast (SINGULAIR) 10 mg tablet    pantoprazole (PROTONIX) 40 mg tablet    polyethylene glycol (GLYCOLAX) 17 GM/SCOOP powder    sertraline (ZOLOFT) 25 mg tablet    timolol (TIMOPTIC) 0.5 % ophthalmic solution     Current Facility-Administered Medications   Medication Dose Route Frequency    acetaminophen (TYLENOL) tablet 975 mg  975 mg Oral Q8H PRN    albuterol (PROVENTIL HFA,VENTOLIN HFA) inhaler 2 puff  2 puff Inhalation Q6H PRN    aspirin chewable tablet 81 mg  81 mg Oral Daily    atorvastatin (LIPITOR) tablet 40 mg  40 mg Oral Daily With Dinner    benzonatate (TESSALON PERLES) capsule 100 mg  100 mg Oral TID    brimonidine tartrate 0.2 %  ophthalmic solution 1 drop  1 drop Both Eyes Q8H ESTEVAN    budesonide-formoterol (SYMBICORT) 160-4.5 mcg/act inhaler 2 puff  2 puff Inhalation BID    calcium carbonate (TUMS) chewable tablet 500 mg  500 mg Oral Daily PRN    enoxaparin (LOVENOX) subcutaneous injection 40 mg  40 mg Subcutaneous Daily    ferrous sulfate tablet 325 mg  325 mg Oral Daily With Breakfast    fluticasone (FLONASE) 50 mcg/act nasal spray 2 spray  2 spray Each Nare Daily    ipratropium-albuterol (DUO-NEB) 0.5-2.5 mg/3 mL inhalation solution 3 mL  3 mL Nebulization Q8H PRN    labetalol (NORMODYNE) injection 10 mg  10 mg Intravenous Q6H PRN    levothyroxine tablet 75 mcg  75 mcg Oral Early Morning    losartan (COZAAR) tablet 25 mg  25 mg Oral Daily    montelukast (SINGULAIR) tablet 10 mg  10 mg Oral HS    pantoprazole (PROTONIX) EC tablet 40 mg  40 mg Oral BID AC    sertraline (ZOLOFT) tablet 25 mg  25 mg Oral HS    sucralfate (CARAFATE) tablet 1 g  1 g Oral BID AC    timolol (TIMOPTIC) 0.5 % ophthalmic solution 1 drop  1 drop Both Eyes BID       Allergies   Allergen Reactions    Cefdinir Hives    Amifostine Rash    Pollen Extract Allergic Rhinitis           Objective     Blood pressure 134/66, pulse 80, temperature 98 °F (36.7 °C), temperature source Oral, resp. rate 16, weight 78.9 kg (174 lb), SpO2 98%. Body mass index is 31.83 kg/m².    No intake or output data in the 24 hours ending 03/02/24 0916      PHYSICAL EXAM:      General Appearance:   Alert, cooperative, no distress   HEENT:   Normocephalic, atraumatic, anicteric.     Neck:  Supple, symmetrical, trachea midline   Lungs:   Clear to auscultation bilaterally; no rales, rhonchi or wheezing; respirations unlabored    Heart::   Regular rate and rhythm; no murmur, rub, or gallop.   Abdomen:   Soft, non-tender, non-distended; normal bowel sounds; no masses, no organomegaly    Genitalia:   Deferred    Rectal:   Deferred    Extremities:  No cyanosis, clubbing or edema    Pulses:  2+ and  symmetric all extremities    Skin:  No jaundice, rashes, or lesions    Lymph nodes:  No palpable cervical lymphadenopathy        Lab Results:   No results displayed because visit has over 200 results.          Imaging Studies: I have personally reviewed pertinent imaging studies.

## 2024-03-02 NOTE — PLAN OF CARE
Problem: RESPIRATORY - ADULT  Goal: Achieves optimal ventilation and oxygenation  Description: INTERVENTIONS:  - Assess for changes in respiratory status  - Assess for changes in mentation and behavior  - Position to facilitate oxygenation and minimize respiratory effort  - Oxygen administered by appropriate delivery if ordered  - Initiate smoking cessation education as indicated  - Encourage broncho-pulmonary hygiene including cough, deep breathe, Incentive Spirometry  - Assess the need for suctioning and aspirate as needed  - Assess and instruct to report SOB or any respiratory difficulty  - Respiratory Therapy support as indicated  Outcome: Progressing     Problem: PAIN - ADULT  Goal: Verbalizes/displays adequate comfort level or baseline comfort level  Description: Interventions:  - Encourage patient to monitor pain and request assistance  - Assess pain using appropriate pain scale  - Administer analgesics based on type and severity of pain and evaluate response  - Implement non-pharmacological measures as appropriate and evaluate response  - Consider cultural and social influences on pain and pain management  - Notify physician/advanced practitioner if interventions unsuccessful or patient reports new pain  Outcome: Progressing     Problem: CARDIOVASCULAR - ADULT  Goal: Maintains optimal cardiac output and hemodynamic stability  Description: INTERVENTIONS:  - Monitor I/O, vital signs and rhythm  - Monitor for S/S and trends of decreased cardiac output  - Administer and titrate ordered vasoactive medications to optimize hemodynamic stability  - Assess quality of pulses, skin color and temperature  - Assess for signs of decreased coronary artery perfusion  - Instruct patient to report change in severity of symptoms  Outcome: Progressing  Goal: Absence of cardiac dysrhythmias or at baseline rhythm  Description: INTERVENTIONS:  - Continuous cardiac monitoring, vital signs, obtain 12 lead EKG if ordered  -  Administer antiarrhythmic and heart rate control medications as ordered  - Monitor electrolytes and administer replacement therapy as ordered  Outcome: Progressing

## 2024-03-02 NOTE — PROGRESS NOTES
[facility]  Progress Note  Name: Noreen Alvarez I  MRN: [id number]  Unit/Bed#: S -01 I Date of Admission: 2/28/2024   Date of Service: 3/2/2024 I Hospital Day: 3    Assessment/Plan   * Atypical chest pain  Assessment & Plan  Lab Results   Component Value Date    HSTNI0 256 (H) 02/29/2024    HSTNI2 236 (H) 02/29/2024    HSTNID2 -20 02/29/2024    HSTNI4 220 (H) 02/29/2024    HSTNID4 -36 02/29/2024     · CXR: Result Date: 2/29/2024 Impression: There is hazy bibasilar opacity which may represent a combination of layered effusion and parenchymal opacity.     · EKG: In ED, NSR without ST elevations.    · RAYA Score: 4  · Lipid panel showed LDL of 117  · ECHO showed EF of 60% on 1/29  · A1c 5.9  · Stress test 3/1: There is an inferior perfusion defect, but it appears to mostly improve with prone imaging, suggesting probably related to artifact. But a small amount of ischemia in the inferoapical region cannot be completely excluded. Clinical correlation suggested.   -Appreciate input from our pulmonology colleagues, who do not feel her lung nodules are causing her current symptoms.  Current working diagnosis is gastritis.    Overall Assessment: In light of unremarkable stress test, and less likely pulmonary etiology of chest discomfort, current working diagnosis is gastritis.     Plan:   · STAT EKG and troponin if chest pain, Telemetry for arrhythmias  · Started atorvastatin 40 mg daily during this admission  · Started losartan 25mg daily during this admission  · Received  on 2/29, starting on 3/1 she will get 81 mg daily  · Consulted cardiology for further workup. Added Imdur 30 mg daily. Patient should f/u outpatient, if symptoms persist they may want to do a left heart cath.  · Pantoprazole increased to BID 3/1. Carafate added as well.   -          Patient told pulmonologist on 3/1 that she spontaneously regurgitates food and that it comes out her nostrils. GI consulted, will  appreciate their input.      SOB (shortness of breath)  Assessment & Plan  Patient SOB with exertion. Currently doing well at her usual home O2 level of 2L. Patient also states she has what feels like blockage of her right nostril which sounds chronic according to her daughter since she was a child, but it is bothering patient more.  Chest x-ray 2/28: hazy bibasilar opacity may represent a combination of layered effusion and parenchymal opacity  Plan:  - Monitor O2 needs  - Flonase ordered  -Steroids discontinued by our pulmonology colleagues.  To continue nebulizer treatments.    Centrilobular emphysema (HCC)  Assessment & Plan  Chronic. Patient's chest pain less likely related to exacerbation of COPD, but could be impacting dyspnea.  Continue nebulizer treatments.  Patient currently on her home O2 needs of 2L.     Elevated blood pressure reading  Assessment & Plan  When patient came in, bp was 184/81. Patient not on current blood pressure medications at home    Plan:  -started losartan 25mg daily  - avoid beta blockers pre cardio in case her chest pain is cardiac in etiology        Hypothyroidism  Assessment & Plan  Home levothyroxine is 75 mcg daily. Continued.  -TSH wnl as of 2/29    Hyperlipidemia  Assessment & Plan  Patient not on any home med for HLD. Ordered lipid panel which showed LDL of 117    Plan:  -started patient on atorvastatin 40 mg daily    Multiple lung nodules  Assessment & Plan  Assessment:   - Patient presented with 3 days of sudden onset SOB and atypical chest pressure with fatigue and declining ADLs  - Wt loss of 12 pounds in the last month; wt loss of 14 in the last 3 months   - Given symptoms and presentation, I suspect she may have an undiagnosed cancer and was lost to follow up outpatient  - Multiple lung nodules first seen on CT imaging on 12/29/2020  - Prior to hospitalization, patient was being worked up for lung cancer etiology of multiple lung nodules   - Last seen Dr. Muñoz,  "hem/onc specialist, on 8/7/2023; per chart review, patient was referred to Dr. Muñoz for non FDG avid lung nodules and some mediastinal and mesenteric adenopathy which had low grade activity but was never sampled  - Biopsy per Dr. Muñoz in 2023 revealed concerns for atypical adenomatous hyperplasia, adenocarcinoma in-situ, or a low-grade invasive adenocarcinoma   - 1/21/2024 CT AP 1.4 cm groundglass nodules in RLL, 1.2 cm lobulated nodule LLL, no significant changes according to CT AP  - During this hospitalization, ED ordered CT AP which showed stability of lung nodules; however, PET-CT would be preferable imaging for assessment of lung cancer; thus would like to obtain recommendations per pulmonology   - CXR 2/29 result: hazy bibasilar opacity which may represent layered effusion and parenchymal opacity on recent imaging, with lymphocytes 11%, I recommend further investigation into etiology of patient's presenting symptoms    - Per chart review, \"CAT scan showed no evidence of labial cancer, stable bilateral pulmonary nodule, there is a 3 to 4 mm left lower lobe lung nodule and this is a new from before we will repeat CAT scan of the chest in 6 months\"  - Differential diagnoses: secondary cancer (remote history of head and neck malignancy) with mets to lungs vs primary lung cancer. Per lung biopsy from prior, there was concern for atypical adenomatous hyperplasia vs adenocarcinoma in-situ vs low-grade invasive adenocarcinoma, Suspect atypical chest pain could be related to possible undiagnosed lung cancer.     Plan:   -CT of chest repeated by our pulmonology colleagues during this admission, nodules are believed to be likely premalignant.  Will be followed as an outpatient.    Severe persistent asthma without complication  Assessment & Plan  Currently doing well on her home O2 of 2L. No wheezing appreciated thus far.  Continue regimen as ordered.    Depression with anxiety  Assessment & Plan  Continuing home " Zoloft.    Chronic kidney disease, stage 3 (moderate)  Assessment & Plan  Lab Results   Component Value Date    EGFR 54 2024    EGFR 70 2024    EGFR 64 2024    CREATININE 1.05 2024    CREATININE 0.85 2024    CREATININE 0.92 2024    Creatinine at baseline. Will monitor with morning labs.                VTE Pharmacologic Prophylaxis:   Pharmacologic: Enoxaparin (Lovenox)  Mechanical VTE Prophylaxis in Place: Yes    Discussions with Specialists or Other Care Team Provider: Pulmonology, GI    Education and Discussions with Family / Patient: at bedside with Noreen and later with Leroy.    Current Length of Stay: 3 day(s)    Current Patient Status: Inpatient     Discharge Plan / Estimated Discharge Date: possible d/c 3/3.    Code Status: Level 1 - Full Code      Subjective:   Noreen reports that her chest/back are feeling better, but that she's more congested.  Still with exertional dyspnea even going to the bathroom.  No new symptoms or overnight events.    Objective:     Vitals:   Temp (24hrs), Av.4 °F (36.9 °C), Min:98 °F (36.7 °C), Max:99.2 °F (37.3 °C)    Temp:  [98 °F (36.7 °C)-99.2 °F (37.3 °C)] 98 °F (36.7 °C)  HR:  [80] 80  Resp:  [16] 16  BP: (114-141)/(57-66) 134/66  SpO2:  [95 %-98 %] 98 %  Body mass index is 31.83 kg/m².     Input and Output Summary (last 24 hours):     No intake or output data in the 24 hours ending 24 08    Physical Exam:     Physical Exam  Constitutional:       General: She is not in acute distress.     Appearance: She is well-developed.   HENT:      Head: Normocephalic and atraumatic.   Eyes:      Pupils: Pupils are equal, round, and reactive to light.   Cardiovascular:      Rate and Rhythm: Normal rate and regular rhythm.      Heart sounds: Normal heart sounds. No murmur heard.     No friction rub. No gallop.   Pulmonary:      Effort: Pulmonary effort is normal. No respiratory distress.      Breath sounds: No wheezing or rales.       Comments: Diffusely reduced air movement.  Abdominal:      General: There is no distension.      Palpations: Abdomen is soft.   Musculoskeletal:         General: Normal range of motion.      Cervical back: Normal range of motion and neck supple.   Skin:     Capillary Refill: Capillary refill takes less than 2 seconds.   Neurological:      Mental Status: She is alert and oriented to person, place, and time.   Psychiatric:         Behavior: Behavior normal.         Thought Content: Thought content normal.           Additional Data:     Labs:    Results from last 7 days   Lab Units 03/02/24  0538   WBC Thousand/uL 7.21   HEMOGLOBIN g/dL 10.3*   HEMATOCRIT % 35.3   PLATELETS Thousands/uL 194   NEUTROS PCT % 77*   LYMPHS PCT % 12*   MONOS PCT % 9   EOS PCT % 1     Results from last 7 days   Lab Units 03/02/24  0538 02/29/24  0555 02/28/24  2030   POTASSIUM mmol/L 5.0   < > 4.4   CHLORIDE mmol/L 100   < > 97   CO2 mmol/L 42*   < > 38*   BUN mg/dL 22   < > 18   CREATININE mg/dL 1.01   < > 0.92   CALCIUM mg/dL 9.3   < > 9.7   ALK PHOS U/L  --   --  96   ALT U/L  --   --  13   AST U/L  --   --  23    < > = values in this interval not displayed.           * I Have Reviewed All Lab Data Listed Above.  * Additional Pertinent Lab Tests Reviewed: All Labs Within Last 24 Hours Reviewed    Imaging:    Imaging Reports Reviewed Today Include: CT chest    Recent Cultures (last 7 days):           Last 24 Hours Medication List:   Current Facility-Administered Medications   Medication Dose Route Frequency Provider Last Rate    acetaminophen  975 mg Oral Q8H PRN Alka Sovak, DO      albuterol  2 puff Inhalation Q6H PRN Caro Sostorecz, DO      aspirin  81 mg Oral Daily Alka Sovak, DO      atorvastatin  40 mg Oral Daily With Dinner Alka Sovak, DO      benzonatate  100 mg Oral TID Caro Sostorecz, DO      brimonidine tartrate  1 drop Both Eyes Q8H [facility] Caro Sostorecz, DO      budesonide-formoterol  2 puff Inhalation BID  Caro Johanntorecz, DO      calcium carbonate  500 mg Oral Daily PRN Alka Sovak, DO      enoxaparin  40 mg Subcutaneous Daily Caro Sostorecz, DO      ferrous sulfate  325 mg Oral Daily With Breakfast Caro Sostorecz, DO      fluticasone  2 spray Each Nare Daily Caro Sostorecz, DO      ipratropium-albuterol  3 mL Nebulization Q8H PRN Caro Sostorecz, DO      labetalol  10 mg Intravenous Q6H PRN Caro Sostorecz, DO      levothyroxine  75 mcg Oral Early Morning Caro Sostorecz, DO      losartan  25 mg Oral Daily Alka Nicki, DO      montelukast  10 mg Oral HS Caro Johanntorecz, DO      pantoprazole  40 mg Oral BID AC Clifton Paredes MD      sertraline  25 mg Oral HS Caro Crowley, DO      sucralfate  1 g Oral BID AC Clifton Paredes MD      timolol  1 drop Both Eyes BID Caro Crowley, DO          Today, Patient Was Seen By: Julio C Cooley MD    ** Please Note: This note has been constructed using a voice recognition system. **

## 2024-03-03 LAB
ANION GAP SERPL CALCULATED.3IONS-SCNC: 3 MMOL/L
ATRIAL RATE: 83 BPM
BASOPHILS # BLD AUTO: 0.03 THOUSANDS/ÂΜL (ref 0–0.1)
BASOPHILS NFR BLD AUTO: 0 % (ref 0–1)
BUN SERPL-MCNC: 17 MG/DL (ref 5–25)
CALCIUM SERPL-MCNC: 9.2 MG/DL (ref 8.4–10.2)
CHLORIDE SERPL-SCNC: 98 MMOL/L (ref 96–108)
CO2 SERPL-SCNC: 39 MMOL/L (ref 21–32)
CREAT SERPL-MCNC: 0.85 MG/DL (ref 0.6–1.3)
EOSINOPHIL # BLD AUTO: 0.05 THOUSAND/ÂΜL (ref 0–0.61)
EOSINOPHIL NFR BLD AUTO: 1 % (ref 0–6)
ERYTHROCYTE [DISTWIDTH] IN BLOOD BY AUTOMATED COUNT: 14.5 % (ref 11.6–15.1)
GFR SERPL CREATININE-BSD FRML MDRD: 70 ML/MIN/1.73SQ M
GLUCOSE SERPL-MCNC: 98 MG/DL (ref 65–140)
HCT VFR BLD AUTO: 37.1 % (ref 34.8–46.1)
HGB BLD-MCNC: 10.5 G/DL (ref 11.5–15.4)
IMM GRANULOCYTES # BLD AUTO: 0.03 THOUSAND/UL (ref 0–0.2)
IMM GRANULOCYTES NFR BLD AUTO: 0 % (ref 0–2)
LYMPHOCYTES # BLD AUTO: 0.66 THOUSANDS/ÂΜL (ref 0.6–4.47)
LYMPHOCYTES NFR BLD AUTO: 8 % (ref 14–44)
MAGNESIUM SERPL-MCNC: 1.8 MG/DL (ref 1.9–2.7)
MCH RBC QN AUTO: 26.1 PG (ref 26.8–34.3)
MCHC RBC AUTO-ENTMCNC: 28.3 G/DL (ref 31.4–37.4)
MCV RBC AUTO: 92 FL (ref 82–98)
MONOCYTES # BLD AUTO: 0.84 THOUSAND/ÂΜL (ref 0.17–1.22)
MONOCYTES NFR BLD AUTO: 11 % (ref 4–12)
NEUTROPHILS # BLD AUTO: 6.25 THOUSANDS/ÂΜL (ref 1.85–7.62)
NEUTS SEG NFR BLD AUTO: 80 % (ref 43–75)
NRBC BLD AUTO-RTO: 0 /100 WBCS
P AXIS: 75 DEGREES
PLATELET # BLD AUTO: 201 THOUSANDS/UL (ref 149–390)
PMV BLD AUTO: 9.7 FL (ref 8.9–12.7)
POTASSIUM SERPL-SCNC: 4.3 MMOL/L (ref 3.5–5.3)
PR INTERVAL: 146 MS
QRS AXIS: 25 DEGREES
QRSD INTERVAL: 86 MS
QT INTERVAL: 386 MS
QTC INTERVAL: 453 MS
RBC # BLD AUTO: 4.03 MILLION/UL (ref 3.81–5.12)
SODIUM SERPL-SCNC: 140 MMOL/L (ref 135–147)
T WAVE AXIS: -61 DEGREES
VENTRICULAR RATE: 83 BPM
WBC # BLD AUTO: 7.86 THOUSAND/UL (ref 4.31–10.16)

## 2024-03-03 PROCEDURE — 83735 ASSAY OF MAGNESIUM: CPT

## 2024-03-03 PROCEDURE — 80048 BASIC METABOLIC PNL TOTAL CA: CPT

## 2024-03-03 PROCEDURE — 94640 AIRWAY INHALATION TREATMENT: CPT

## 2024-03-03 PROCEDURE — 99232 SBSQ HOSP IP/OBS MODERATE 35: CPT | Performed by: FAMILY MEDICINE

## 2024-03-03 PROCEDURE — 93010 ELECTROCARDIOGRAM REPORT: CPT | Performed by: INTERNAL MEDICINE

## 2024-03-03 PROCEDURE — 94760 N-INVAS EAR/PLS OXIMETRY 1: CPT

## 2024-03-03 PROCEDURE — 85025 COMPLETE CBC W/AUTO DIFF WBC: CPT

## 2024-03-03 PROCEDURE — 99232 SBSQ HOSP IP/OBS MODERATE 35: CPT | Performed by: INTERNAL MEDICINE

## 2024-03-03 RX ORDER — IPRATROPIUM BROMIDE AND ALBUTEROL SULFATE 2.5; .5 MG/3ML; MG/3ML
3 SOLUTION RESPIRATORY (INHALATION) DAILY
Status: DISCONTINUED | OUTPATIENT
Start: 2024-03-03 | End: 2024-03-03

## 2024-03-03 RX ORDER — SUCRALFATE 1 G/1
1 TABLET ORAL
Status: DISCONTINUED | OUTPATIENT
Start: 2024-03-04 | End: 2024-03-05 | Stop reason: HOSPADM

## 2024-03-03 RX ORDER — ENOXAPARIN SODIUM 100 MG/ML
40 INJECTION SUBCUTANEOUS DAILY
Status: DISCONTINUED | OUTPATIENT
Start: 2024-03-05 | End: 2024-03-05 | Stop reason: HOSPADM

## 2024-03-03 RX ORDER — IPRATROPIUM BROMIDE AND ALBUTEROL SULFATE 2.5; .5 MG/3ML; MG/3ML
3 SOLUTION RESPIRATORY (INHALATION) 3 TIMES DAILY
Status: DISCONTINUED | OUTPATIENT
Start: 2024-03-03 | End: 2024-03-03

## 2024-03-03 RX ORDER — LANOLIN ALCOHOL/MO/W.PET/CERES
800 CREAM (GRAM) TOPICAL ONCE
Status: COMPLETED | OUTPATIENT
Start: 2024-03-03 | End: 2024-03-03

## 2024-03-03 RX ORDER — LEVALBUTEROL INHALATION SOLUTION 1.25 MG/3ML
1.25 SOLUTION RESPIRATORY (INHALATION)
Status: DISCONTINUED | OUTPATIENT
Start: 2024-03-03 | End: 2024-03-05 | Stop reason: HOSPADM

## 2024-03-03 RX ADMIN — BRIMONIDINE TARTRATE 1 DROP: 2 SOLUTION/ DROPS OPHTHALMIC at 21:46

## 2024-03-03 RX ADMIN — BRIMONIDINE TARTRATE 1 DROP: 2 SOLUTION/ DROPS OPHTHALMIC at 16:22

## 2024-03-03 RX ADMIN — SERTRALINE HYDROCHLORIDE 25 MG: 25 TABLET ORAL at 21:45

## 2024-03-03 RX ADMIN — MONTELUKAST 10 MG: 10 TABLET, FILM COATED ORAL at 21:45

## 2024-03-03 RX ADMIN — PANTOPRAZOLE SODIUM 40 MG: 40 TABLET, DELAYED RELEASE ORAL at 05:07

## 2024-03-03 RX ADMIN — BENZONATATE 100 MG: 100 CAPSULE ORAL at 16:22

## 2024-03-03 RX ADMIN — ASPIRIN 81 MG 81 MG: 81 TABLET ORAL at 08:25

## 2024-03-03 RX ADMIN — ENOXAPARIN SODIUM 40 MG: 40 INJECTION SUBCUTANEOUS at 08:25

## 2024-03-03 RX ADMIN — LOSARTAN POTASSIUM 25 MG: 25 TABLET, FILM COATED ORAL at 08:25

## 2024-03-03 RX ADMIN — BENZONATATE 100 MG: 100 CAPSULE ORAL at 21:45

## 2024-03-03 RX ADMIN — CALCIUM CARBONATE (ANTACID) CHEW TAB 500 MG 500 MG: 500 CHEW TAB at 05:07

## 2024-03-03 RX ADMIN — CALCIUM CARBONATE (ANTACID) CHEW TAB 500 MG 500 MG: 500 CHEW TAB at 16:21

## 2024-03-03 RX ADMIN — IPRATROPIUM BROMIDE 0.5 MG: 0.5 SOLUTION RESPIRATORY (INHALATION) at 15:27

## 2024-03-03 RX ADMIN — LEVALBUTEROL HYDROCHLORIDE 1.25 MG: 1.25 SOLUTION RESPIRATORY (INHALATION) at 15:27

## 2024-03-03 RX ADMIN — Medication 800 MG: at 08:28

## 2024-03-03 RX ADMIN — TIMOLOL MALEATE 1 DROP: 5 SOLUTION/ DROPS OPHTHALMIC at 21:45

## 2024-03-03 RX ADMIN — TIMOLOL MALEATE 1 DROP: 5 SOLUTION/ DROPS OPHTHALMIC at 08:26

## 2024-03-03 RX ADMIN — BUDESONIDE AND FORMOTEROL FUMARATE DIHYDRATE 2 PUFF: 160; 4.5 AEROSOL RESPIRATORY (INHALATION) at 21:45

## 2024-03-03 RX ADMIN — IPRATROPIUM BROMIDE AND ALBUTEROL SULFATE 3 ML: .5; 3 SOLUTION RESPIRATORY (INHALATION) at 09:00

## 2024-03-03 RX ADMIN — PANTOPRAZOLE SODIUM 40 MG: 40 TABLET, DELAYED RELEASE ORAL at 16:22

## 2024-03-03 RX ADMIN — SUCRALFATE 1 G: 1 TABLET ORAL at 08:28

## 2024-03-03 RX ADMIN — IPRATROPIUM BROMIDE 0.5 MG: 0.5 SOLUTION RESPIRATORY (INHALATION) at 20:02

## 2024-03-03 RX ADMIN — BUDESONIDE AND FORMOTEROL FUMARATE DIHYDRATE 2 PUFF: 160; 4.5 AEROSOL RESPIRATORY (INHALATION) at 08:25

## 2024-03-03 RX ADMIN — BRIMONIDINE TARTRATE 1 DROP: 2 SOLUTION/ DROPS OPHTHALMIC at 05:08

## 2024-03-03 RX ADMIN — BENZONATATE 100 MG: 100 CAPSULE ORAL at 08:25

## 2024-03-03 RX ADMIN — LEVALBUTEROL HYDROCHLORIDE 1.25 MG: 1.25 SOLUTION RESPIRATORY (INHALATION) at 20:02

## 2024-03-03 RX ADMIN — LEVOTHYROXINE SODIUM 75 MCG: 75 TABLET ORAL at 05:07

## 2024-03-03 RX ADMIN — FLUTICASONE PROPIONATE 2 SPRAY: 50 SPRAY, METERED NASAL at 08:26

## 2024-03-03 RX ADMIN — ATORVASTATIN CALCIUM 40 MG: 40 TABLET, FILM COATED ORAL at 16:22

## 2024-03-03 RX ADMIN — FERROUS SULFATE TAB 325 MG (65 MG ELEMENTAL FE) 325 MG: 325 (65 FE) TAB at 08:25

## 2024-03-03 NOTE — PROGRESS NOTES
[facility]  Progress Note  Name: Noreen Alvarez I  MRN: [id number]  Unit/Bed#: S -01 I Date of Admission: 2/28/2024   Date of Service: 3/3/2024 I Hospital Day: 4    Assessment/Plan   * Atypical chest pain  Assessment & Plan  Lab Results   Component Value Date    HSTNI0 256 (H) 02/29/2024    HSTNI2 236 (H) 02/29/2024    HSTNID2 -20 02/29/2024    HSTNI4 220 (H) 02/29/2024    HSTNID4 -36 02/29/2024     · CXR: Result Date: 2/29/2024 Impression: There is hazy bibasilar opacity which may represent a combination of layered effusion and parenchymal opacity.     · EKG: In ED, NSR without ST elevations.    · RAYA Score: 4  · Lipid panel showed LDL of 117  · ECHO showed EF of 60% on 1/29  · A1c 5.9  · Stress test 3/1: There is an inferior perfusion defect, but it appears to mostly improve with prone imaging, suggesting probably related to artifact. But a small amount of ischemia in the inferoapical region cannot be completely excluded. Clinical correlation suggested.   -Appreciate input from our pulmonology colleagues, who do not feel her lung nodules are causing her current symptoms.  Current working diagnosis is gastritis.    Overall Assessment: In light of unremarkable stress test, and less likely pulmonary etiology of chest discomfort, current working diagnosis is gastritis.     Plan:   · STAT EKG and troponin if chest pain, Telemetry for arrhythmias  · Started atorvastatin 40 mg daily during this admission  · Started losartan 25mg daily during this admission  · Received  on 2/29, starting on 3/1 she will get 81 mg daily  · Consulted cardiology for further workup. Added Imdur 30 mg daily. Patient should f/u outpatient, if symptoms persist they may want to do a left heart cath.  · Pantoprazole increased to BID 3/1. Carafate added as well.   -          Patient told pulmonologist on 3/1 that she spontaneously regurgitates food and that it comes out her nostrils. GI consulted, will  appreciate their input.  - GI has plan for EGD on 3/4, patient NPO at midnight      Elevated blood pressure reading  Assessment & Plan  When patient came in, bp was 184/81. Patient not on current blood pressure medications at home    Plan:  -started losartan 25mg daily  - avoid beta blockers pre cardio in case her chest pain is cardiac in etiology        SOB (shortness of breath)  Assessment & Plan  Patient SOB with exertion. Currently doing well at her usual home O2 level of 2L. Patient also states she has what feels like blockage of her right nostril which sounds chronic according to her daughter since she was a child, but it is bothering patient more.  Chest x-ray 2/28: hazy bibasilar opacity may represent a combination of layered effusion and parenchymal opacity  Plan:  - Monitor O2 needs  - Flonase ordered  -Steroids discontinued by our pulmonology colleagues.  Increased duonebs treatments from PRN to daily, then to TID as she was desaturating with walking  -PT/OT    Hypothyroidism  Assessment & Plan  Home levothyroxine is 75 mcg daily. Continued.  -TSH wnl as of 2/29    Hyperlipidemia  Assessment & Plan  Patient not on any home med for HLD. Ordered lipid panel which showed LDL of 117    Plan:  -started patient on atorvastatin 40 mg daily    Multiple lung nodules  Assessment & Plan  Assessment:   - Patient presented with 3 days of sudden onset SOB and atypical chest pressure with fatigue and declining ADLs  - Wt loss of 12 pounds in the last month; wt loss of 14 in the last 3 months   - Given symptoms and presentation, I suspect she may have an undiagnosed cancer and was lost to follow up outpatient  - Multiple lung nodules first seen on CT imaging on 12/29/2020  - Prior to hospitalization, patient was being worked up for lung cancer etiology of multiple lung nodules   - Last seen Dr. Muñoz, hem/onc specialist, on 8/7/2023; per chart review, patient was referred to Dr. Muñoz for non FDG avid lung nodules and  "some mediastinal and mesenteric adenopathy which had low grade activity but was never sampled  - Biopsy per Dr. Muñoz in 2023 revealed concerns for atypical adenomatous hyperplasia, adenocarcinoma in-situ, or a low-grade invasive adenocarcinoma   - 1/21/2024 CT AP 1.4 cm groundglass nodules in RLL, 1.2 cm lobulated nodule LLL, no significant changes according to CT AP  - During this hospitalization, ED ordered CT AP which showed stability of lung nodules; however, PET-CT would be preferable imaging for assessment of lung cancer; thus would like to obtain recommendations per pulmonology   - CXR 2/29 result: hazy bibasilar opacity which may represent layered effusion and parenchymal opacity on recent imaging, with lymphocytes 11%, I recommend further investigation into etiology of patient's presenting symptoms    - Per chart review, \"CAT scan showed no evidence of labial cancer, stable bilateral pulmonary nodule, there is a 3 to 4 mm left lower lobe lung nodule and this is a new from before we will repeat CAT scan of the chest in 6 months\"  - Differential diagnoses: secondary cancer (remote history of head and neck malignancy) with mets to lungs vs primary lung cancer. Per lung biopsy from prior, there was concern for atypical adenomatous hyperplasia vs adenocarcinoma in-situ vs low-grade invasive adenocarcinoma, Suspect atypical chest pain could be related to possible undiagnosed lung cancer.     Plan:   -CT of chest repeated by our pulmonology colleagues during this admission, nodules are believed to be likely premalignant.  Will be followed as an outpatient.    Severe persistent asthma without complication  Assessment & Plan  Currently doing well on her home O2 of 2L. No wheezing appreciated thus far.  Continue regimen as ordered.    Depression with anxiety  Assessment & Plan  Continuing home Zoloft.    Centrilobular emphysema (HCC)  Assessment & Plan  Chronic. Patient's chest pain less likely related to " exacerbation of COPD, but could be impacting dyspnea.  Continue nebulizer treatments.  Patient currently on her home O2 needs of 2L.   Changed patient's home nebulizer from PRN to once daily as she was not asking for it but feels she needs it    Chronic kidney disease, stage 3 (moderate)  Assessment & Plan  Lab Results   Component Value Date    EGFR 57 2024    EGFR 54 2024    EGFR 70 2024    CREATININE 1.01 2024    CREATININE 1.05 2024    CREATININE 0.85 2024    Creatinine at baseline. Will monitor with morning labs.            VTE Pharmacologic Prophylaxis: VTE Score: 6 High Risk (Score >/= 5) - Pharmacological DVT Prophylaxis Ordered: enoxaparin (Lovenox). Sequential Compression Devices Ordered.    Mobility:   Basic Mobility Inpatient Raw Score: 24  JH-HLM Goal: 8: Walk 250 feet or more  JH-HLM Achieved: 7: Walk 25 feet or more  HLM Goal NOT achieved. Continue with multidisciplinary rounding and encourage appropriate mobility to improve upon HLM goals.    Patient Centered Rounds: I performed bedside rounds with nursing staff today.  Discussions with Specialists or Other Care Team Provider: attending, GI, cardio, pulm    Education and Discussions with Family / Patient: Updated  (son) via phone.    Current Length of Stay: 4 day(s)  Current Patient Status: Inpatient   Discharge Plan: Anticipate discharge in 24-48 hrs to TBD    Code Status: Level 1 - Full Code    Subjective:   Patient seen this morning at bedside. She states she still has the pain and SOB on exertion. Son was talked to on the phone and updated.     Objective:     Vitals:   Temp (24hrs), Av.4 °F (36.9 °C), Min:98.1 °F (36.7 °C), Max:98.6 °F (37 °C)    Temp:  [98.1 °F (36.7 °C)-98.6 °F (37 °C)] 98.4 °F (36.9 °C)  HR:  [78-79] 79  Resp:  [16-18] 18  BP: (106-126)/(57-61) 126/61  SpO2:  [94 %-95 %] 95 %  Body mass index is 31.83 kg/m².     Input and Output Summary (last 24 hours):     Intake/Output  Summary (Last 24 hours) at 3/3/2024 1050  Last data filed at 3/3/2024 0900  Gross per 24 hour   Intake 480 ml   Output --   Net 480 ml         Physical Exam:   Physical Exam  Constitutional:       General: She is not in acute distress.     Appearance: Normal appearance. She is not ill-appearing.      Comments: Patient was just awoken from sleep but appears like her pain is still bothering her   HENT:      Head: Normocephalic.      Nose: Nose normal.      Comments: Nasal cannula in place     Mouth/Throat:      Mouth: Mucous membranes are moist.      Pharynx: Oropharynx is clear.   Cardiovascular:      Rate and Rhythm: Normal rate and regular rhythm.      Pulses: Normal pulses.      Heart sounds: Normal heart sounds.   Pulmonary:      Effort: Pulmonary effort is normal.      Breath sounds: Normal breath sounds.      Comments: Patient on 3 L nasal cannula compared to her usual baseline of 2L  Abdominal:      General: Abdomen is flat. Bowel sounds are normal.      Palpations: Abdomen is soft.   Skin:     General: Skin is warm and dry.      Capillary Refill: Capillary refill takes less than 2 seconds.   Neurological:      General: No focal deficit present.      Mental Status: She is alert and oriented to person, place, and time.   Psychiatric:         Mood and Affect: Mood normal.         Behavior: Behavior normal.          Additional Data:     Labs:  Results from last 7 days   Lab Units 03/03/24  0610   WBC Thousand/uL 7.86   HEMOGLOBIN g/dL 10.5*   HEMATOCRIT % 37.1   PLATELETS Thousands/uL 201   NEUTROS PCT % 80*   LYMPHS PCT % 8*   MONOS PCT % 11   EOS PCT % 1     Results from last 7 days   Lab Units 03/03/24  0610 02/29/24  0555 02/28/24  2030   SODIUM mmol/L 140   < > 141   POTASSIUM mmol/L 4.3   < > 4.4   CHLORIDE mmol/L 98   < > 97   CO2 mmol/L 39*   < > 38*   BUN mg/dL 17   < > 18   CREATININE mg/dL 0.85   < > 0.92   ANION GAP mmol/L 3   < > 6   CALCIUM mg/dL 9.2   < > 9.7   ALBUMIN g/dL  --   --  4.3   TOTAL  BILIRUBIN mg/dL  --   --  0.36   ALK PHOS U/L  --   --  96   ALT U/L  --   --  13   AST U/L  --   --  23   GLUCOSE RANDOM mg/dL 98   < > 134    < > = values in this interval not displayed.             Results from last 7 days   Lab Units 03/01/24  0539   HEMOGLOBIN A1C % 5.9*           Lines/Drains:  Invasive Devices       Peripheral Intravenous Line  Duration             Peripheral IV 03/03/24 Dorsal (posterior);Left Forearm <1 day                          Imaging: Reviewed radiology reports from this admission including: chest xray, chest CT scan, and ECHO    Recent Cultures (last 7 days):         Last 24 Hours Medication List:   Current Facility-Administered Medications   Medication Dose Route Frequency Provider Last Rate    acetaminophen  975 mg Oral Q8H PRN Alka Nicki, DO      albuterol  2 puff Inhalation Q6H PRN Caro Jose Gecz, DO      aspirin  81 mg Oral Daily Alka Caceres, DO      atorvastatin  40 mg Oral Daily With Dinner Alka Caceres, DO      benzonatate  100 mg Oral TID Caro Crowley, DO      brimonidine tartrate  1 drop Both Eyes Q8H ESTEVAN Caro Crowley, DO      budesonide-formoterol  2 puff Inhalation BID Caro Crowley, DO      calcium carbonate  500 mg Oral Daily PRN Alka Caceres, DO      [START ON 3/5/2024] enoxaparin  40 mg Subcutaneous Daily GERTRUDIS Vasquez      ferrous sulfate  325 mg Oral Daily With Breakfast Caro Crowley, DO      fluticasone  2 spray Each Nare Daily Caor Crowley, DO      ipratropium-albuterol  3 mL Nebulization TID Alka Caceres, DO      labetalol  10 mg Intravenous Q6H PRN Caro Crowley, DO      levothyroxine  75 mcg Oral Early Morning Caromarcos Crowley, DO      losartan  25 mg Oral Daily Alka Caceres, DO      montelukast  10 mg Oral HS Caro Crowley, DO      pantoprazole  40 mg Oral BID JOEY Paredes MD      sertraline  25 mg Oral HS Caro Crowley, DO      sucralfate  1 g Oral BID JOEY Lynn  MD Lena      timolol  1 drop Both Eyes BID Caro Crowley DO          Today, Patient Was Seen By: Alka Caceres DO    **Please Note: This note may have been constructed using a voice recognition system.**

## 2024-03-03 NOTE — PROGRESS NOTES
Progress Note - Pulmonary   Noreen Alvarez 68 y.o. female MRN: [id number]  Unit/Bed#: S -01 Encounter: [id number]    Assessment & Plan:  Acute on chronic hypoxic respiratory failure, unclear etiology  Severe COPD with possible acute exacerbation  Obstructive sleep apnea  History of laryngeal cancer status post radiation and chemotherapy  Pulmonary nodules  Atypical chest pain  Involuntary regurgitation of food    Maintain pulse ox greater than 88%  Reviewed chest CT with resolved 4 mm left lower lobe pulmonary nodule and other lung nodules are stable. No pleural effusion or pneumothorax  Patient cleared for GI procedures that are medically necessary  Continue scheduled nebs. Would continue to hold on steroids.   Symbicort 160-4.5 2 puffs BID, Singulair 10 mg QHS  Continue annual CT scans for lung nodule monitoring    Subjective:   Patient says she is feeling better. Does not feel short of breath today. Less epigastric discomfort.    Objective:     Vitals: Blood pressure 126/61, pulse 79, temperature 98.4 °F (36.9 °C), temperature source Oral, resp. rate 18, weight 78.9 kg (174 lb), SpO2 95%.,Body mass index is 31.83 kg/m².      Intake/Output Summary (Last 24 hours) at 3/3/2024 1137  Last data filed at 3/3/2024 0900  Gross per 24 hour   Intake 480 ml   Output --   Net 480 ml       Invasive Devices       Peripheral Intravenous Line  Duration             Peripheral IV 03/03/24 Dorsal (posterior);Left Forearm <1 day                    Physical Exam:   General appearance: Alert and awake, in no acute distress  Head: Normocephalic, without obvious abnormality, atraumatic  Eyes: No scleral icterus   Lungs: clear to auscultation  Heart: Regular rate  Abdomen:  No appreciable distension or tenderness  Extremities: No deformity  Skin: Warm and dry  Neurologic: No acute focal deficits are noted        Labs: I have personally reviewed pertinent lab results.  Imaging and other studies: I have personally reviewed pertinent  reports.

## 2024-03-03 NOTE — ASSESSMENT & PLAN NOTE
Patient SOB with exertion. Currently doing well at her usual home O2 level of 2L. Patient also states she has what feels like blockage of her right nostril which sounds chronic according to her daughter since she was a child, but it is bothering patient more.  Chest x-ray 2/28: hazy bibasilar opacity may represent a combination of layered effusion and parenchymal opacity  Plan:  - Monitor O2 needs  - Flonase ordered  -Steroids discontinued by our pulmonology colleagues.  Increased duonebs treatments from PRN to daily, then to TID as she was desaturating with walking  -PT/OT

## 2024-03-03 NOTE — DISCHARGE SUMMARY
[facility]  Discharge- Noreen Alvarez 1955, 68 y.o. female MRN: [id number]  Unit/Bed#: S -01 Encounter: [id number]  Primary Care Provider: Samantha Hernandez MD   Date and time admitted to hospital: 2/28/2024  8:06 PM    * Atypical chest pain  Assessment & Plan  Lab Results   Component Value Date    HSTNI0 256 (H) 02/29/2024    HSTNI2 236 (H) 02/29/2024    HSTNID2 -20 02/29/2024    HSTNI4 220 (H) 02/29/2024    HSTNID4 -36 02/29/2024     · CXR: Result Date: 2/29/2024 Impression: There is hazy bibasilar opacity which may represent a combination of layered effusion and parenchymal opacity.     · EKG: In ED, NSR without ST elevations.    · RAYA Score: 4  · Lipid panel showed LDL of 117  · ECHO showed EF of 60% on 1/29  · A1c 5.9  · Stress test 3/1: There is an inferior perfusion defect, but it appears to mostly improve with prone imaging, suggesting probably related to artifact. But a small amount of ischemia in the inferoapical region cannot be completely excluded. Clinical correlation suggested.   -Appreciate input from our pulmonology colleagues, who do not feel her lung nodules are causing her current symptoms.  Current working diagnosis is gastritis.  - EGD 3/4:   -    2 cm hiatal hernia  Esophagus-normal mucosa status post biopsies from the distal part empirically passed 52 [place] bougie down the upper esophageal sphincter without any difficulty noted the mucosal trauma.  The stomach and duodenum appeared normal.    Overall Assessment: In light of unremarkable stress test, and less likely pulmonary etiology of chest discomfort, current working diagnosis is gastritis.     Plan:   · STAT EKG and troponin if chest pain, Telemetry for arrhythmias  · Started atorvastatin 40 mg daily during this admission  · Started losartan 25mg daily during this admission  · Received  on 2/29, starting on 3/1 she got 81 mg daily. As her workup became less cardiac, stopped it on 3/4.  · Consulted  cardiology for further workup. Added Imdur 30 mg daily. Patient should f/u outpatient, if symptoms persist they may want to do a left heart cath.  · Pantoprazole increased to BID 3/1. Carafate added as well.   -          Patient told pulmonologist on 3/1 that she spontaneously regurgitates food and that it comes out her nostrils. GI consulted, will appreciate their input.  -         GI performed EGD on 3/4, patient on liquid diet, can be advanced as tolerated. F/u tissue exam from distal esophagus biopsy  For DC: patient will f/u with cardio in 1-2 weeks, they will call her. Patient to call her pulmonologist outpatient for f/u. Amb pulse ox before she leaves, no needs per PT, can f/u up with hem/onc with her appointment already scheduled for 3/20.      Elevated blood pressure reading  Assessment & Plan  When patient came in, bp was 184/81. Patient not on current blood pressure medications at home    Plan:  -started losartan 25mg daily  - avoid beta blockers pre cardio in case her chest pain is cardiac in etiology        SOB (shortness of breath)  Assessment & Plan  Patient SOB with exertion. Currently doing well at her usual home O2 level of 2L. Patient also states she has what feels like blockage of her right nostril which sounds chronic according to her daughter since she was a child, but it is bothering patient more.  Chest x-ray 2/28: hazy bibasilar opacity may represent a combination of layered effusion and parenchymal opacity  Plan:  - Monitor O2 needs  - Flonase ordered  -Steroids discontinued by our pulmonology colleagues.  Increased duonebs treatments from PRN to daily, then to TID as she was desaturating with walking  -PT/OT    Hypothyroidism  Assessment & Plan  Home levothyroxine is 75 mcg daily. Continued.  -TSH wnl as of 2/29    Hyperlipidemia  Assessment & Plan  Patient not on any home med for HLD. Ordered lipid panel which showed LDL of 117    Plan:  -started patient on atorvastatin 40 mg  "daily    Multiple lung nodules  Assessment & Plan  Assessment:   - Patient presented with 3 days of sudden onset SOB and atypical chest pressure with fatigue and declining ADLs  - Wt loss of 12 pounds in the last month; wt loss of 14 in the last 3 months   - Given symptoms and presentation, I suspect she may have an undiagnosed cancer and was lost to follow up outpatient  - Multiple lung nodules first seen on CT imaging on 12/29/2020  - Prior to hospitalization, patient was being worked up for lung cancer etiology of multiple lung nodules   - Last seen Dr. Muñoz, hem/onc specialist, on 8/7/2023; per chart review, patient was referred to Dr. Muñoz for non FDG avid lung nodules and some mediastinal and mesenteric adenopathy which had low grade activity but was never sampled  - Biopsy per Dr. Muñoz in 2023 revealed concerns for atypical adenomatous hyperplasia, adenocarcinoma in-situ, or a low-grade invasive adenocarcinoma   - 1/21/2024 CT AP 1.4 cm groundglass nodules in RLL, 1.2 cm lobulated nodule LLL, no significant changes according to CT AP  - During this hospitalization, ED ordered CT AP which showed stability of lung nodules; however, PET-CT would be preferable imaging for assessment of lung cancer; thus would like to obtain recommendations per pulmonology   - CXR 2/29 result: hazy bibasilar opacity which may represent layered effusion and parenchymal opacity on recent imaging, with lymphocytes 11%, I recommend further investigation into etiology of patient's presenting symptoms    - Per chart review, \"CAT scan showed no evidence of labial cancer, stable bilateral pulmonary nodule, there is a 3 to 4 mm left lower lobe lung nodule and this is a new from before we will repeat CAT scan of the chest in 6 months\"  - Differential diagnoses: secondary cancer (remote history of head and neck malignancy) with mets to lungs vs primary lung cancer. Per lung biopsy from prior, there was concern for atypical adenomatous " hyperplasia vs adenocarcinoma in-situ vs low-grade invasive adenocarcinoma, Suspect atypical chest pain could be related to possible undiagnosed lung cancer.     Plan:   -CT of chest repeated by our pulmonology colleagues during this admission, nodules are believed to be likely premalignant.  Will be followed as an outpatient.    Severe persistent asthma without complication  Assessment & Plan  Currently doing well on her home O2 of 2L. No wheezing appreciated thus far.  Continue regimen as ordered.    Depression with anxiety  Assessment & Plan  Continuing home Zoloft.    Centrilobular emphysema (HCC)  Assessment & Plan  Chronic. Patient's chest pain less likely related to exacerbation of COPD, but could be impacting dyspnea.  Continue nebulizer treatments.  Patient currently on her home O2 needs of 2L.   Changed patient's home nebulizer from once daily to TID    Chronic kidney disease, stage 3 (moderate)  Assessment & Plan  Lab Results   Component Value Date    EGFR 66 03/05/2024    EGFR 67 03/04/2024    EGFR 70 03/03/2024    CREATININE 0.89 03/05/2024    CREATININE 0.88 03/04/2024    CREATININE 0.85 03/03/2024    Creatinine at baseline. Will monitor with morning labs.       Medical Problems       Resolved Problems  Date Reviewed: 3/5/2024   None       Discharging Resident: Alka Caceres DO  Discharging Attending: Marcos Gonzalez MD  PCP: Samantha Hernandez MD  Admission Date:   Admission Orders (From admission, onward)       Ordered        02/28/24 2216  INPATIENT ADMISSION  Once                          Discharge Date: 03/05/24    Consultations During Hospital Stay:  Cardio, pulm, GI    Procedures Performed:   EGD on 3/4    Significant Findings / Test Results:   Hemoglobin 11.1 on admission, remained stable throughout stay   CO2 38 on admission >40,42,39, 37  Trops: 10,89,157,256,236  chest x-ray 2/28: hazy bibasilar opacity may represent a combination of layered effusion and parenchymal opacity  Hba1c: 5.9  Stress  test 3/1: EF 65% small amount of ischemia in inferoapical region  CT chest 3/1: Resolved 4 mm left lower lobe pulmonary nodule compared to 7/31/2023, likely infectious/inflammatory on that study. Other pulmonary nodules are stable. Continued annual surveillance recommended.   Stress test 3/1: There is an inferior perfusion defect, but it appears to mostly improve with prone imaging, suggesting probably related to artifact. But a small amount of ischemia in the inferoapical region cannot be completely excluded. Clinical correlation suggested.   -EGD 3/4:   2 cm hiatal hernia  Esophagus-normal mucosa status post biopsies from the distal part empirically passed 52 [place] bougie down the upper esophageal sphincter without any difficulty noted the mucosal trauma.  The stomach and duodenum appeared normal.    Incidental Findings:   None     Test Results Pending at Discharge (will require follow up):  Esophagus biopsy result     Outpatient Tests Requested:  Patient should get yearly CT chest to watch pulmonary nodules  Sleep study to be rescheduled per pulm outpatient    Complications:  None    Reason for Admission: atypical chest pain    Hospital Course:   Noreen Alvarez is a 68 y.o. female patient who originally presented to the hospital on 2/28/2024 due to squeezing chest pain and SOB. EMS had given her 2 additional duo-nebs on top of the one she took at home which helped a little. Patient on 2L oxygen via nasal cannula at home. In ED, blood pressure was elevated at 184/81. She had no home blood pressure medications. Her CO2 appears chronically high and she has chronic anemia that is stable. First EKG in ED was normal but second one showed nonspecific T wave abnormalities in inferior leads and T wave inversion in lateral leads. Trops trended up from 10, 89, 157, 256, and 236, were stable afterwards. Due to potential cardiac etiology, patient was admitted and cardio was consulted. Patient was given a one time aspirin dose  of 325 mg that morning which made her pain worse. Cardio saw the patient and did an ischemic workup with stress test the next day with results above. The atypical chest pain workup seemed less cardiac after this so pulmonology was consulted due to patient having history of undiagnosed lung nodules, and emphysema. Pulmonary workup was negative but patient's home nebulizer treatments were changed from PRN to daily, then to TID because she seemed to feel they helped. Patient did have desaturation to the 80s during her stay with walking. Pulm repeated her chest CT which showed no acute findings and stable lung nodules. As the atypical chest pain was seeming less pulmonary related, GI was consulted as patient told the lung doctor that she spontaneously regurgitates food and it comes out her nostrils. GI saw the patient and performed EGD on 3/4. No abnormalities seen but they did take a biopsy at the distal esophagus, result still pending. PT also saw patient during her stay who felt she was independent and had no further needs. As the cardio, pulm, and GI teams had no further inpatient workup for her, they cleared her via their standpoints and she is to be further worked up in the outpatient setting. Her chest pain improved as her stay progressed. Aspirin had been stopped as it seemed to make her pain worse and was not cardiac related. She had ambulatory pulse ox before discharge, cardio is going to call her for appointment in 1-2 weeks, pulm wants her to call them to set up appointment, and GI referral placed who should also be calling patient in the next week. Patient should reschedule her sleep study with pulm and have a yearly chest CT to check on her lung nodules. As patient was not on home medications for blood pressure or HLD, she was started on losartan 25 mg daily and atorvastatin 40 mg daily on her first day of hospitalization. She should continue these as well as protonix 40 mg BID, which was originally only  once a day. Patient ready and stable for discharge on 3/5.     Please see above list of diagnoses and related plan for additional information.     Condition at Discharge: stable    Discharge Day Visit / Exam:   Subjective:  patient seen at bedside this morning with son Leroy on the phone. She understands that her workup no longer warrants her needing to be in the hospital and will be continued outpatient. She feels better and ready to go home.   Vitals: Blood Pressure: 117/56 (03/04/24 2243)  Pulse: 83 (03/04/24 1426)  Temperature: 98.1 °F (36.7 °C) (03/04/24 2243)  Temp Source: Temporal (03/04/24 0857)  Respirations: 18 (03/04/24 1426)  Weight - Scale: 78.9 kg (174 lb) (02/28/24 2008)  SpO2: 95 % (03/04/24 1921)  Exam:   Physical Exam  Constitutional:       Appearance: Normal appearance.   HENT:      Head: Normocephalic and atraumatic.      Nose: Nose normal.      Comments: Nasal cannula in place  Cardiovascular:      Rate and Rhythm: Normal rate and regular rhythm.      Pulses: Normal pulses.      Heart sounds: Normal heart sounds.   Pulmonary:      Effort: Pulmonary effort is normal.      Comments: Patient getting nebulizer treatment during interview and exam. Saturating in the 90s.   Abdominal:      General: Abdomen is flat. Bowel sounds are normal.      Palpations: Abdomen is soft.      Tenderness: There is no abdominal tenderness.   Skin:     General: Skin is warm and dry.      Capillary Refill: Capillary refill takes less than 2 seconds.   Neurological:      General: No focal deficit present.      Mental Status: She is alert and oriented to person, place, and time.   Psychiatric:         Mood and Affect: Mood normal.         Behavior: Behavior normal.          Discussion with Family: Updated  (son) via phone.    Discharge instructions/Information to patient and family:   See after visit summary for information provided to patient and family.      Provisions for Follow-Up Care:  See after visit  summary for information related to follow-up care and any pertinent home health orders.      Mobility at time of Discharge:   Basic Mobility Inpatient Raw Score: 24  JH-HLM Goal: 8: Walk 250 feet or more  JH-HLM Achieved: 7: Walk 25 feet or more  HLM Goal NOT achieved. Continue to encourage mobility in post discharge setting.     Disposition:   Home    Planned Readmission: No    Discharge Medications:  See after visit summary for reconciled discharge medications provided to patient and/or family.      **Please Note: This note may have been constructed using a voice recognition system**

## 2024-03-03 NOTE — ASSESSMENT & PLAN NOTE
Lab Results   Component Value Date    EGFR 57 03/02/2024    EGFR 54 03/01/2024    EGFR 70 02/29/2024    CREATININE 1.01 03/02/2024    CREATININE 1.05 03/01/2024    CREATININE 0.85 02/29/2024    Creatinine at baseline. Will monitor with morning labs.

## 2024-03-03 NOTE — ASSESSMENT & PLAN NOTE
Chronic. Patient's chest pain less likely related to exacerbation of COPD, but could be impacting dyspnea.  Continue nebulizer treatments.  Patient currently on her home O2 needs of 2L.   Changed patient's home nebulizer from PRN to once daily as she was not asking for it but feels she needs it

## 2024-03-03 NOTE — ASSESSMENT & PLAN NOTE
Lab Results   Component Value Date    HSTNI0 256 (H) 02/29/2024    HSTNI2 236 (H) 02/29/2024    HSTNID2 -20 02/29/2024    HSTNI4 220 (H) 02/29/2024    HSTNID4 -36 02/29/2024     · CXR: Result Date: 2/29/2024 Impression: There is hazy bibasilar opacity which may represent a combination of layered effusion and parenchymal opacity.     · EKG: In ED, NSR without ST elevations.    · RAYA Score: 4  · Lipid panel showed LDL of 117  · ECHO showed EF of 60% on 1/29  · A1c 5.9  · Stress test 3/1: There is an inferior perfusion defect, but it appears to mostly improve with prone imaging, suggesting probably related to artifact. But a small amount of ischemia in the inferoapical region cannot be completely excluded. Clinical correlation suggested.   -Appreciate input from our pulmonology colleagues, who do not feel her lung nodules are causing her current symptoms.  Current working diagnosis is gastritis.    Overall Assessment: In light of unremarkable stress test, and less likely pulmonary etiology of chest discomfort, current working diagnosis is gastritis.     Plan:   · STAT EKG and troponin if chest pain, Telemetry for arrhythmias  · Started atorvastatin 40 mg daily during this admission  · Started losartan 25mg daily during this admission  · Received  on 2/29, starting on 3/1 she will get 81 mg daily  · Consulted cardiology for further workup. Added Imdur 30 mg daily. Patient should f/u outpatient, if symptoms persist they may want to do a left heart cath.  · Pantoprazole increased to BID 3/1. Carafate added as well.   -          Patient told pulmonologist on 3/1 that she spontaneously regurgitates food and that it comes out her nostrils. GI consulted, will appreciate their input.  - GI has plan for EGD on 3/4, patient NPO at midnight

## 2024-03-04 ENCOUNTER — APPOINTMENT (INPATIENT)
Dept: GASTROENTEROLOGY | Facility: HOSPITAL | Age: 69
DRG: 391 | End: 2024-03-04
Payer: COMMERCIAL

## 2024-03-04 ENCOUNTER — ANESTHESIA (INPATIENT)
Dept: GASTROENTEROLOGY | Facility: HOSPITAL | Age: 69
DRG: 391 | End: 2024-03-04
Payer: COMMERCIAL

## 2024-03-04 ENCOUNTER — ANESTHESIA EVENT (INPATIENT)
Dept: GASTROENTEROLOGY | Facility: HOSPITAL | Age: 69
DRG: 391 | End: 2024-03-04
Payer: COMMERCIAL

## 2024-03-04 LAB
ANION GAP SERPL CALCULATED.3IONS-SCNC: 2 MMOL/L
ATRIAL RATE: 93 BPM
ATRIAL RATE: 95 BPM
BASOPHILS # BLD AUTO: 0.02 THOUSANDS/ÂΜL (ref 0–0.1)
BASOPHILS NFR BLD AUTO: 0 % (ref 0–1)
BUN SERPL-MCNC: 17 MG/DL (ref 5–25)
CALCIUM SERPL-MCNC: 9.2 MG/DL (ref 8.4–10.2)
CHLORIDE SERPL-SCNC: 99 MMOL/L (ref 96–108)
CO2 SERPL-SCNC: 41 MMOL/L (ref 21–32)
CREAT SERPL-MCNC: 0.88 MG/DL (ref 0.6–1.3)
EOSINOPHIL # BLD AUTO: 0.05 THOUSAND/ÂΜL (ref 0–0.61)
EOSINOPHIL NFR BLD AUTO: 1 % (ref 0–6)
ERYTHROCYTE [DISTWIDTH] IN BLOOD BY AUTOMATED COUNT: 14.6 % (ref 11.6–15.1)
GFR SERPL CREATININE-BSD FRML MDRD: 67 ML/MIN/1.73SQ M
GLUCOSE SERPL-MCNC: 99 MG/DL (ref 65–140)
HCT VFR BLD AUTO: 34.6 % (ref 34.8–46.1)
HGB BLD-MCNC: 10.3 G/DL (ref 11.5–15.4)
IMM GRANULOCYTES # BLD AUTO: 0.03 THOUSAND/UL (ref 0–0.2)
IMM GRANULOCYTES NFR BLD AUTO: 1 % (ref 0–2)
LYMPHOCYTES # BLD AUTO: 0.8 THOUSANDS/ÂΜL (ref 0.6–4.47)
LYMPHOCYTES NFR BLD AUTO: 14 % (ref 14–44)
MAGNESIUM SERPL-MCNC: 2 MG/DL (ref 1.9–2.7)
MCH RBC QN AUTO: 27.2 PG (ref 26.8–34.3)
MCHC RBC AUTO-ENTMCNC: 29.8 G/DL (ref 31.4–37.4)
MCV RBC AUTO: 91 FL (ref 82–98)
MONOCYTES # BLD AUTO: 0.61 THOUSAND/ÂΜL (ref 0.17–1.22)
MONOCYTES NFR BLD AUTO: 11 % (ref 4–12)
NEUTROPHILS # BLD AUTO: 4.04 THOUSANDS/ÂΜL (ref 1.85–7.62)
NEUTS SEG NFR BLD AUTO: 73 % (ref 43–75)
NRBC BLD AUTO-RTO: 0 /100 WBCS
P AXIS: 83 DEGREES
P AXIS: 88 DEGREES
PLATELET # BLD AUTO: 168 THOUSANDS/UL (ref 149–390)
PMV BLD AUTO: 9.9 FL (ref 8.9–12.7)
POTASSIUM SERPL-SCNC: 4.1 MMOL/L (ref 3.5–5.3)
PR INTERVAL: 156 MS
PR INTERVAL: 196 MS
QRS AXIS: 15 DEGREES
QRS AXIS: 58 DEGREES
QRSD INTERVAL: 84 MS
QRSD INTERVAL: 86 MS
QT INTERVAL: 352 MS
QT INTERVAL: 372 MS
QTC INTERVAL: 442 MS
QTC INTERVAL: 462 MS
RBC # BLD AUTO: 3.79 MILLION/UL (ref 3.81–5.12)
SODIUM SERPL-SCNC: 142 MMOL/L (ref 135–147)
T WAVE AXIS: -18 DEGREES
T WAVE AXIS: 85 DEGREES
VENTRICULAR RATE: 93 BPM
VENTRICULAR RATE: 95 BPM
WBC # BLD AUTO: 5.55 THOUSAND/UL (ref 4.31–10.16)

## 2024-03-04 PROCEDURE — 94760 N-INVAS EAR/PLS OXIMETRY 1: CPT

## 2024-03-04 PROCEDURE — 85025 COMPLETE CBC W/AUTO DIFF WBC: CPT

## 2024-03-04 PROCEDURE — 93010 ELECTROCARDIOGRAM REPORT: CPT | Performed by: INTERNAL MEDICINE

## 2024-03-04 PROCEDURE — 99232 SBSQ HOSP IP/OBS MODERATE 35: CPT | Performed by: NURSE PRACTITIONER

## 2024-03-04 PROCEDURE — 43239 EGD BIOPSY SINGLE/MULTIPLE: CPT | Performed by: INTERNAL MEDICINE

## 2024-03-04 PROCEDURE — 80048 BASIC METABOLIC PNL TOTAL CA: CPT

## 2024-03-04 PROCEDURE — 97162 PT EVAL MOD COMPLEX 30 MIN: CPT

## 2024-03-04 PROCEDURE — 83735 ASSAY OF MAGNESIUM: CPT

## 2024-03-04 PROCEDURE — 0DB38ZX EXCISION OF LOWER ESOPHAGUS, VIA NATURAL OR ARTIFICIAL OPENING ENDOSCOPIC, DIAGNOSTIC: ICD-10-PCS | Performed by: INTERNAL MEDICINE

## 2024-03-04 PROCEDURE — 99232 SBSQ HOSP IP/OBS MODERATE 35: CPT | Performed by: FAMILY MEDICINE

## 2024-03-04 PROCEDURE — 88305 TISSUE EXAM BY PATHOLOGIST: CPT | Performed by: PATHOLOGY

## 2024-03-04 PROCEDURE — 43450 DILATE ESOPHAGUS 1/MULT PASS: CPT | Performed by: INTERNAL MEDICINE

## 2024-03-04 PROCEDURE — 94640 AIRWAY INHALATION TREATMENT: CPT

## 2024-03-04 RX ORDER — SODIUM CHLORIDE, SODIUM LACTATE, POTASSIUM CHLORIDE, CALCIUM CHLORIDE 600; 310; 30; 20 MG/100ML; MG/100ML; MG/100ML; MG/100ML
INJECTION, SOLUTION INTRAVENOUS CONTINUOUS PRN
Status: DISCONTINUED | OUTPATIENT
Start: 2024-03-04 | End: 2024-03-04

## 2024-03-04 RX ORDER — PROPOFOL 10 MG/ML
INJECTION, EMULSION INTRAVENOUS AS NEEDED
Status: DISCONTINUED | OUTPATIENT
Start: 2024-03-04 | End: 2024-03-04

## 2024-03-04 RX ORDER — LIDOCAINE HYDROCHLORIDE 20 MG/ML
10 SOLUTION OROPHARYNGEAL 4 TIMES DAILY PRN
Status: DISCONTINUED | OUTPATIENT
Start: 2024-03-04 | End: 2024-03-05 | Stop reason: HOSPADM

## 2024-03-04 RX ORDER — LIDOCAINE HYDROCHLORIDE 20 MG/ML
INJECTION, SOLUTION EPIDURAL; INFILTRATION; INTRACAUDAL; PERINEURAL AS NEEDED
Status: DISCONTINUED | OUTPATIENT
Start: 2024-03-04 | End: 2024-03-04

## 2024-03-04 RX ORDER — EPHEDRINE SULFATE 50 MG/ML
INJECTION INTRAVENOUS AS NEEDED
Status: DISCONTINUED | OUTPATIENT
Start: 2024-03-04 | End: 2024-03-04

## 2024-03-04 RX ADMIN — PROPOFOL 20 MG: 10 INJECTION, EMULSION INTRAVENOUS at 08:47

## 2024-03-04 RX ADMIN — LEVALBUTEROL HYDROCHLORIDE 1.25 MG: 1.25 SOLUTION RESPIRATORY (INHALATION) at 14:33

## 2024-03-04 RX ADMIN — PHENOL 1 SPRAY: 1.4 SPRAY ORAL at 14:07

## 2024-03-04 RX ADMIN — PANTOPRAZOLE SODIUM 40 MG: 40 TABLET, DELAYED RELEASE ORAL at 17:11

## 2024-03-04 RX ADMIN — BENZONATATE 100 MG: 100 CAPSULE ORAL at 07:49

## 2024-03-04 RX ADMIN — TIMOLOL MALEATE 1 DROP: 5 SOLUTION/ DROPS OPHTHALMIC at 07:50

## 2024-03-04 RX ADMIN — BENZONATATE 100 MG: 100 CAPSULE ORAL at 17:11

## 2024-03-04 RX ADMIN — IPRATROPIUM BROMIDE 0.5 MG: 0.5 SOLUTION RESPIRATORY (INHALATION) at 07:39

## 2024-03-04 RX ADMIN — MONTELUKAST 10 MG: 10 TABLET, FILM COATED ORAL at 21:47

## 2024-03-04 RX ADMIN — FLUTICASONE PROPIONATE 2 SPRAY: 50 SPRAY, METERED NASAL at 07:50

## 2024-03-04 RX ADMIN — BRIMONIDINE TARTRATE 1 DROP: 2 SOLUTION/ DROPS OPHTHALMIC at 14:07

## 2024-03-04 RX ADMIN — LEVALBUTEROL HYDROCHLORIDE 1.25 MG: 1.25 SOLUTION RESPIRATORY (INHALATION) at 07:40

## 2024-03-04 RX ADMIN — LOSARTAN POTASSIUM 25 MG: 25 TABLET, FILM COATED ORAL at 07:49

## 2024-03-04 RX ADMIN — BUDESONIDE AND FORMOTEROL FUMARATE DIHYDRATE 2 PUFF: 160; 4.5 AEROSOL RESPIRATORY (INHALATION) at 17:11

## 2024-03-04 RX ADMIN — PROPOFOL 10 MG: 10 INJECTION, EMULSION INTRAVENOUS at 08:49

## 2024-03-04 RX ADMIN — LEVOTHYROXINE SODIUM 75 MCG: 75 TABLET ORAL at 05:32

## 2024-03-04 RX ADMIN — BRIMONIDINE TARTRATE 1 DROP: 2 SOLUTION/ DROPS OPHTHALMIC at 05:32

## 2024-03-04 RX ADMIN — SODIUM CHLORIDE, SODIUM LACTATE, POTASSIUM CHLORIDE, AND CALCIUM CHLORIDE: .6; .31; .03; .02 INJECTION, SOLUTION INTRAVENOUS at 08:34

## 2024-03-04 RX ADMIN — LEVALBUTEROL HYDROCHLORIDE 1.25 MG: 1.25 SOLUTION RESPIRATORY (INHALATION) at 19:19

## 2024-03-04 RX ADMIN — SERTRALINE HYDROCHLORIDE 25 MG: 25 TABLET ORAL at 21:47

## 2024-03-04 RX ADMIN — PROPOFOL 10 MG: 10 INJECTION, EMULSION INTRAVENOUS at 08:45

## 2024-03-04 RX ADMIN — TOPICAL ANESTHETIC 1 SPRAY: 200 SPRAY DENTAL; PERIODONTAL at 08:38

## 2024-03-04 RX ADMIN — ASPIRIN 81 MG 81 MG: 81 TABLET ORAL at 07:49

## 2024-03-04 RX ADMIN — TIMOLOL MALEATE 1 DROP: 5 SOLUTION/ DROPS OPHTHALMIC at 17:11

## 2024-03-04 RX ADMIN — ATORVASTATIN CALCIUM 40 MG: 40 TABLET, FILM COATED ORAL at 17:11

## 2024-03-04 RX ADMIN — BUDESONIDE AND FORMOTEROL FUMARATE DIHYDRATE 2 PUFF: 160; 4.5 AEROSOL RESPIRATORY (INHALATION) at 07:50

## 2024-03-04 RX ADMIN — BENZONATATE 100 MG: 100 CAPSULE ORAL at 21:47

## 2024-03-04 RX ADMIN — IPRATROPIUM BROMIDE 0.5 MG: 0.5 SOLUTION RESPIRATORY (INHALATION) at 19:19

## 2024-03-04 RX ADMIN — IPRATROPIUM BROMIDE 0.5 MG: 0.5 SOLUTION RESPIRATORY (INHALATION) at 14:33

## 2024-03-04 RX ADMIN — EPHEDRINE SULFATE 10 MG: 50 INJECTION INTRAVENOUS at 08:43

## 2024-03-04 RX ADMIN — LIDOCAINE HYDROCHLORIDE 100 MG: 20 INJECTION, SOLUTION EPIDURAL; INFILTRATION; INTRACAUDAL; PERINEURAL at 08:43

## 2024-03-04 RX ADMIN — BRIMONIDINE TARTRATE 1 DROP: 2 SOLUTION/ DROPS OPHTHALMIC at 21:47

## 2024-03-04 RX ADMIN — PROPOFOL 100 MG: 10 INJECTION, EMULSION INTRAVENOUS at 08:43

## 2024-03-04 NOTE — ASSESSMENT & PLAN NOTE
Lab Results   Component Value Date    HSTNI0 256 (H) 02/29/2024    HSTNI2 236 (H) 02/29/2024    HSTNID2 -20 02/29/2024    HSTNI4 220 (H) 02/29/2024    HSTNID4 -36 02/29/2024     · CXR: Result Date: 2/29/2024 Impression: There is hazy bibasilar opacity which may represent a combination of layered effusion and parenchymal opacity.     · EKG: In ED, NSR without ST elevations.    · RAYA Score: 4  · Lipid panel showed LDL of 117  · ECHO showed EF of 60% on 1/29  · A1c 5.9  · Stress test 3/1: There is an inferior perfusion defect, but it appears to mostly improve with prone imaging, suggesting probably related to artifact. But a small amount of ischemia in the inferoapical region cannot be completely excluded. Clinical correlation suggested.   -Appreciate input from our pulmonology colleagues, who do not feel her lung nodules are causing her current symptoms.  Current working diagnosis is gastritis.  - EGD 3/4:   -    2 cm hiatal hernia  Esophagus-normal mucosa status post biopsies from the distal part empirically passed 52 [place] bougie down the upper esophageal sphincter without any difficulty noted the mucosal trauma.  The stomach and duodenum appeared normal.    Overall Assessment: In light of unremarkable stress test, and less likely pulmonary etiology of chest discomfort, current working diagnosis is gastritis.     Plan:   · STAT EKG and troponin if chest pain, Telemetry for arrhythmias  · Started atorvastatin 40 mg daily during this admission  · Started losartan 25mg daily during this admission  · Received  on 2/29, starting on 3/1 she got 81 mg daily. As her workup became less cardiac, stopped it on 3/4.  · Consulted cardiology for further workup. Added Imdur 30 mg daily. Patient should f/u outpatient, if symptoms persist they may want to do a left heart cath.  · Pantoprazole increased to BID 3/1. Carafate added as well.   -          Patient told pulmonologist on 3/1 that she spontaneously regurgitates food  and that it comes out her nostrils. GI consulted, will appreciate their input.  -         GI performed EGD on 3/4, patient on liquid diet, can be advanced as tolerated

## 2024-03-04 NOTE — ANESTHESIA POSTPROCEDURE EVALUATION
Post-Op Assessment Note    CV Status:  Stable  Pain Score: 0    Pain management: adequate       Mental Status:  Sleepy   Hydration Status:  Stable   PONV Controlled:  None   Airway Patency:  Patent     Post Op Vitals Reviewed: Yes    No anethesia notable event occurred.    Staff: PHIL               /62 (03/04/24 0857)    Temp 98.1 °F (36.7 °C) (03/04/24 0857)    Pulse 80 (03/04/24 0857)   Resp 18 (03/04/24 0857)    SpO2 98 % (03/04/24 0857)

## 2024-03-04 NOTE — PLAN OF CARE
Problem: RESPIRATORY - ADULT  Goal: Achieves optimal ventilation and oxygenation  Description: INTERVENTIONS:  - Assess for changes in respiratory status  - Assess for changes in mentation and behavior  - Position to facilitate oxygenation and minimize respiratory effort  - Oxygen administered by appropriate delivery if ordered  - Initiate smoking cessation education as indicated  - Encourage broncho-pulmonary hygiene including cough, deep breathe, Incentive Spirometry  - Assess the need for suctioning and aspirate as needed  - Assess and instruct to report SOB or any respiratory difficulty  - Respiratory Therapy support as indicated  Outcome: Progressing     Problem: CARDIOVASCULAR - ADULT  Goal: Maintains optimal cardiac output and hemodynamic stability  Description: INTERVENTIONS:  - Monitor I/O, vital signs and rhythm  - Monitor for S/S and trends of decreased cardiac output  - Administer and titrate ordered vasoactive medications to optimize hemodynamic stability  - Assess quality of pulses, skin color and temperature  - Assess for signs of decreased coronary artery perfusion  - Instruct patient to report change in severity of symptoms  Outcome: Progressing  Goal: Absence of cardiac dysrhythmias or at baseline rhythm  Description: INTERVENTIONS:  - Continuous cardiac monitoring, vital signs, obtain 12 lead EKG if ordered  - Administer antiarrhythmic and heart rate control medications as ordered  - Monitor electrolytes and administer replacement therapy as ordered  Outcome: Progressing     Problem: PAIN - ADULT  Goal: Verbalizes/displays adequate comfort level or baseline comfort level  Description: Interventions:  - Encourage patient to monitor pain and request assistance  - Assess pain using appropriate pain scale  - Administer analgesics based on type and severity of pain and evaluate response  - Implement non-pharmacological measures as appropriate and evaluate response  - Consider cultural and social  influences on pain and pain management  - Notify physician/advanced practitioner if interventions unsuccessful or patient reports new pain  Outcome: Progressing

## 2024-03-04 NOTE — ASSESSMENT & PLAN NOTE
Lab Results   Component Value Date    HSTNI0 256 (H) 02/29/2024    HSTNI2 236 (H) 02/29/2024    HSTNID2 -20 02/29/2024    HSTNI4 220 (H) 02/29/2024    HSTNID4 -36 02/29/2024     · CXR: Result Date: 2/29/2024 Impression: There is hazy bibasilar opacity which may represent a combination of layered effusion and parenchymal opacity.     · EKG: In ED, NSR without ST elevations.    · RAYA Score: 4  · Lipid panel showed LDL of 117  · ECHO showed EF of 60% on 1/29  · A1c 5.9  · Stress test 3/1: There is an inferior perfusion defect, but it appears to mostly improve with prone imaging, suggesting probably related to artifact. But a small amount of ischemia in the inferoapical region cannot be completely excluded. Clinical correlation suggested.   -Appreciate input from our pulmonology colleagues, who do not feel her lung nodules are causing her current symptoms.  Current working diagnosis is gastritis.  - EGD 3/4:   -    2 cm hiatal hernia  Esophagus-normal mucosa status post biopsies from the distal part empirically passed 52 [place] bougie down the upper esophageal sphincter without any difficulty noted the mucosal trauma.  The stomach and duodenum appeared normal.    Overall Assessment: In light of unremarkable stress test, and less likely pulmonary etiology of chest discomfort, current working diagnosis is gastritis.     Plan:   · STAT EKG and troponin if chest pain, Telemetry for arrhythmias  · Started atorvastatin 40 mg daily during this admission  · Started losartan 25mg daily during this admission  · Received  on 2/29, starting on 3/1 she got 81 mg daily. As her workup became less cardiac, stopped it on 3/4.  · Consulted cardiology for further workup. Added Imdur 30 mg daily. Patient should f/u outpatient, if symptoms persist they may want to do a left heart cath.  · Pantoprazole increased to BID 3/1. Carafate added as well.   -          Patient told pulmonologist on 3/1 that she spontaneously regurgitates food  and that it comes out her nostrils. GI consulted, will appreciate their input.  -         GI performed EGD on 3/4, patient on liquid diet, can be advanced as tolerated. F/u tissue exam from distal esophagus biopsy  For DC: patient will f/u with cardio in 1-2 weeks, they will call her. Patient to call her pulmonologist outpatient for f/u. Amb pulse ox before she leaves, no needs per PT, can f/u up with hem/onc with her appointment already scheduled for 3/20.

## 2024-03-04 NOTE — QUICK NOTE
Came to see patient while making rounds on the floor.    Spoke to patient's daughter Ms. Ramirez on the speaker phone and she interpreted for me.  Patient reports having pain while swallowing. Neck exam-nontender and no evidence of any swelling.  Will try viscous lidocaine as needed to swallow

## 2024-03-04 NOTE — ASSESSMENT & PLAN NOTE
Chronic. Patient's chest pain less likely related to exacerbation of COPD, but could be impacting dyspnea.  Continue nebulizer treatments.  Patient currently on her home O2 needs of 2L.   Changed patient's home nebulizer from once daily to TID

## 2024-03-04 NOTE — ANESTHESIA PREPROCEDURE EVALUATION
Procedure:  EGD    Relevant Problems   CARDIO   (+) Atypical chest pain   (+) Hyperlipidemia      ENDO   (+) Hypothyroidism      GI/HEPATIC   (+) Gastroesophageal reflux disease without esophagitis      /RENAL   (+) Chronic kidney disease, stage 3 (moderate)      HEMATOLOGY   (+) Iron deficiency anemia      MUSCULOSKELETAL   (+) Sacroiliac inflammation (HCC)      NEURO/PSYCH   (+) Current mild episode of major depressive disorder without prior episode (HCC)   (+) Depression with anxiety   (+) Panic attack   (+) Tension type headache      PULMONARY   (+) Centrilobular emphysema (HCC)   (+) Chronic respiratory failure with hypoxia, on home O2 therapy    (+) ELIUD (obstructive sleep apnea)   (+) SOB (shortness of breath)   (+) Severe persistent asthma without complication      Other   (+) Mediastinal lymphadenopathy      Severe COPD; h/o laryngeal CA (chemo/rad)    1/2024: EF 60, normal RV systolic function         Anesthesia Plan  ASA Score- 3     Anesthesia Type- IV sedation with anesthesia with ASA Monitors.         Additional Monitors:     Airway Plan:     Comment: IV sedation,  standard ASA monitors. Risks and benefits discussed with patient; patient consented and agrees to proceed.    I saw and evaluated the patient. If seen with CRNA, we have discussed the anesthetic plan and I am in agreement that the plan is appropriate for the patient.  .       Plan Factors-    Chart reviewed.   Existing labs reviewed.                   Induction- intravenous.    Postoperative Plan-     Informed Consent- Anesthetic plan and risks discussed with patient.  I personally reviewed this patient with the CRNA. Discussed and agreed on the Anesthesia Plan with the CRNA..

## 2024-03-04 NOTE — PROGRESS NOTES
"Progress Note - Pulmonary   Noreen Alvarez 68 y.o. female MRN: [id number]  Unit/Bed#: S -01 Encounter: [id number]    Assessment/Plan:    Acute on chronic hypoxic respiratory failure   Baseline oxygen requirement is 2 L via nasal cannula.   Titrate oxygen to maintain POX > or = 89%.    Ambulatory pulse ox prior to discharge.    Severe COPD with possible mild acute exacerbation   Home regimen is Breztri with ipratropium/albuterol as needed and azithromycin prophylaxis.    Obstructive sleep apnea with underlying morbid obesity   Outpatient sleep study as previously ordered by Dr. Thompson.    History of laryngeal cancer   Status post radiation and chemotherapy.    Abnormal chest imaging with pulmonary nodules   Outpatient follow-up with Dr. Thompson.    Outpatient follow-up as per discharge instructions.  Discussed with primary team.  Will sign off.  Please call with further questions or concerns.    Chief Complaint:    \"I feel much better.\"    Subjective:    Patient was seen with the use of AIS  #[id number].  She reports she feels much better.  She has some chronic sore throat from her previous radiation.  Breathing has improved.  No other complaints.    Objective:    Vitals: Blood pressure (!) 115/49, pulse 78, temperature 97.6 °F (36.4 °C), resp. rate 18, weight 78.9 kg (174 lb), SpO2 96%.2L NC,Body mass index is 31.83 kg/m².      Intake/Output Summary (Last 24 hours) at 3/4/2024 1331  Last data filed at 3/4/2024 0853  Gross per 24 hour   Intake 520 ml   Output --   Net 520 ml       Invasive Devices       Peripheral Intravenous Line  Duration             Peripheral IV 03/03/24 Dorsal (posterior);Left Forearm 1 day                    Physical Exam:     Physical Exam  Vitals reviewed.   Constitutional:       General: She is not in acute distress.     Appearance: She is well-developed. She is obese. She is not toxic-appearing or diaphoretic.   HENT:      Head: Normocephalic and atraumatic.   Eyes:      " General: No scleral icterus.     Extraocular Movements: Extraocular movements intact.   Neck:      Trachea: No tracheal deviation.   Cardiovascular:      Rate and Rhythm: Normal rate and regular rhythm.      Heart sounds: S1 normal and S2 normal. No murmur heard.     No friction rub. No gallop.   Pulmonary:      Effort: Pulmonary effort is normal. No tachypnea, accessory muscle usage or respiratory distress.      Breath sounds: Normal breath sounds. No stridor. No decreased breath sounds, wheezing, rhonchi or rales.   Chest:      Chest wall: No tenderness.   Abdominal:      General: Bowel sounds are normal. There is no distension.      Palpations: Abdomen is soft.      Tenderness: There is no abdominal tenderness.   Musculoskeletal:      Cervical back: Neck supple.      Right lower leg: No edema.      Left lower leg: No edema.   Skin:     General: Skin is warm and dry.      Findings: No rash.   Neurological:      Mental Status: She is alert and oriented to person, place, and time.      GCS: GCS eye subscore is 4. GCS verbal subscore is 5. GCS motor subscore is 6.   Psychiatric:         Speech: Speech normal.         Behavior: Behavior normal. Behavior is cooperative.         Labs: CBC:   Lab Results   Component Value Date    WBC 5.55 03/04/2024    HGB 10.3 (L) 03/04/2024    HCT 34.6 (L) 03/04/2024    MCV 91 03/04/2024     03/04/2024    RBC 3.79 (L) 03/04/2024    MCH 27.2 03/04/2024    MCHC 29.8 (L) 03/04/2024    RDW 14.6 03/04/2024    MPV 9.9 03/04/2024    NRBC 0 03/04/2024   , CMP:   Lab Results   Component Value Date    SODIUM 142 03/04/2024    K 4.1 03/04/2024    CL 99 03/04/2024    CO2 41 (H) 03/04/2024    BUN 17 03/04/2024    CREATININE 0.88 03/04/2024    CALCIUM 9.2 03/04/2024    EGFR 67 03/04/2024     Imaging and other studies:  None today

## 2024-03-04 NOTE — PROGRESS NOTES
[facility]  Progress Note  Name: Noreen Alvarez I  MRN: [id number]  Unit/Bed#: S -01 I Date of Admission: 2/28/2024   Date of Service: 3/4/2024 I Hospital Day: 5    Assessment/Plan   * Atypical chest pain  Assessment & Plan  Lab Results   Component Value Date    HSTNI0 256 (H) 02/29/2024    HSTNI2 236 (H) 02/29/2024    HSTNID2 -20 02/29/2024    HSTNI4 220 (H) 02/29/2024    HSTNID4 -36 02/29/2024     · CXR: Result Date: 2/29/2024 Impression: There is hazy bibasilar opacity which may represent a combination of layered effusion and parenchymal opacity.     · EKG: In ED, NSR without ST elevations.    · RAYA Score: 4  · Lipid panel showed LDL of 117  · ECHO showed EF of 60% on 1/29  · A1c 5.9  · Stress test 3/1: There is an inferior perfusion defect, but it appears to mostly improve with prone imaging, suggesting probably related to artifact. But a small amount of ischemia in the inferoapical region cannot be completely excluded. Clinical correlation suggested.   -Appreciate input from our pulmonology colleagues, who do not feel her lung nodules are causing her current symptoms.  Current working diagnosis is gastritis.  - EGD 3/4:   -    2 cm hiatal hernia  Esophagus-normal mucosa status post biopsies from the distal part empirically passed 52 Turkmen bougie down the upper esophageal sphincter without any difficulty noted the mucosal trauma.  The stomach and duodenum appeared normal.    Overall Assessment: In light of unremarkable stress test, and less likely pulmonary etiology of chest discomfort, current working diagnosis is gastritis.     Plan:   · STAT EKG and troponin if chest pain, Telemetry for arrhythmias  · Started atorvastatin 40 mg daily during this admission  · Started losartan 25mg daily during this admission  · Received  on 2/29, starting on 3/1 she got 81 mg daily. As her workup became less cardiac, stopped it on 3/4.  · Consulted cardiology for further workup. Added  Imdur 30 mg daily. Patient should f/u outpatient, if symptoms persist they may want to do a left heart cath.  · Pantoprazole increased to BID 3/1. Carafate added as well.   -          Patient told pulmonologist on 3/1 that she spontaneously regurgitates food and that it comes out her nostrils. GI consulted, will appreciate their input.  -         GI performed EGD on 3/4, patient on liquid diet, can be advanced as tolerated. F/u tissue exam from distal esophagus biopsy      Elevated blood pressure reading  Assessment & Plan  When patient came in, bp was 184/81. Patient not on current blood pressure medications at home    Plan:  -started losartan 25mg daily  - avoid beta blockers pre cardio in case her chest pain is cardiac in etiology        SOB (shortness of breath)  Assessment & Plan  Patient SOB with exertion. Currently doing well at her usual home O2 level of 2L. Patient also states she has what feels like blockage of her right nostril which sounds chronic according to her daughter since she was a child, but it is bothering patient more.  Chest x-ray 2/28: hazy bibasilar opacity may represent a combination of layered effusion and parenchymal opacity  Plan:  - Monitor O2 needs  - Flonase ordered  -Steroids discontinued by our pulmonology colleagues.  Increased duonebs treatments from PRN to daily, then to TID as she was desaturating with walking  -PT/OT    Hypothyroidism  Assessment & Plan  Home levothyroxine is 75 mcg daily. Continued.  -TSH wnl as of 2/29    Hyperlipidemia  Assessment & Plan  Patient not on any home med for HLD. Ordered lipid panel which showed LDL of 117    Plan:  -started patient on atorvastatin 40 mg daily    Multiple lung nodules  Assessment & Plan  Assessment:   - Patient presented with 3 days of sudden onset SOB and atypical chest pressure with fatigue and declining ADLs  - Wt loss of 12 pounds in the last month; wt loss of 14 in the last 3 months   - Given symptoms and presentation, I  "suspect she may have an undiagnosed cancer and was lost to follow up outpatient  - Multiple lung nodules first seen on CT imaging on 12/29/2020  - Prior to hospitalization, patient was being worked up for lung cancer etiology of multiple lung nodules   - Last seen Dr. Muñoz, hem/onc specialist, on 8/7/2023; per chart review, patient was referred to Dr. Muñoz for non FDG avid lung nodules and some mediastinal and mesenteric adenopathy which had low grade activity but was never sampled  - Biopsy per Dr. Muñoz in 2023 revealed concerns for atypical adenomatous hyperplasia, adenocarcinoma in-situ, or a low-grade invasive adenocarcinoma   - 1/21/2024 CT AP 1.4 cm groundglass nodules in RLL, 1.2 cm lobulated nodule LLL, no significant changes according to CT AP  - During this hospitalization, ED ordered CT AP which showed stability of lung nodules; however, PET-CT would be preferable imaging for assessment of lung cancer; thus would like to obtain recommendations per pulmonology   - CXR 2/29 result: hazy bibasilar opacity which may represent layered effusion and parenchymal opacity on recent imaging, with lymphocytes 11%, I recommend further investigation into etiology of patient's presenting symptoms    - Per chart review, \"CAT scan showed no evidence of labial cancer, stable bilateral pulmonary nodule, there is a 3 to 4 mm left lower lobe lung nodule and this is a new from before we will repeat CAT scan of the chest in 6 months\"  - Differential diagnoses: secondary cancer (remote history of head and neck malignancy) with mets to lungs vs primary lung cancer. Per lung biopsy from prior, there was concern for atypical adenomatous hyperplasia vs adenocarcinoma in-situ vs low-grade invasive adenocarcinoma, Suspect atypical chest pain could be related to possible undiagnosed lung cancer.     Plan:   -CT of chest repeated by our pulmonology colleagues during this admission, nodules are believed to be likely premalignant.  " Will be followed as an outpatient.    Severe persistent asthma without complication  Assessment & Plan  Currently doing well on her home O2 of 2L. No wheezing appreciated thus far.  Continue regimen as ordered.    Depression with anxiety  Assessment & Plan  Continuing home Zoloft.    Centrilobular emphysema (HCC)  Assessment & Plan  Chronic. Patient's chest pain less likely related to exacerbation of COPD, but could be impacting dyspnea.  Continue nebulizer treatments.  Patient currently on her home O2 needs of 2L.   Changed patient's home nebulizer from once daily to TID    Chronic kidney disease, stage 3 (moderate)  Assessment & Plan  Lab Results   Component Value Date    EGFR 67 03/04/2024    EGFR 70 03/03/2024    EGFR 57 03/02/2024    CREATININE 0.88 03/04/2024    CREATININE 0.85 03/03/2024    CREATININE 1.01 03/02/2024    Creatinine at baseline. Will monitor with morning labs.            VTE Pharmacologic Prophylaxis: VTE Score: 6 High Risk (Score >/= 5) - Pharmacological DVT Prophylaxis Contraindicated. Sequential Compression Devices Ordered. Lovenox held for EGD today    Mobility:   Basic Mobility Inpatient Raw Score: 24  JH-HLM Goal: 8: Walk 250 feet or more  JH-HLM Achieved: 6: Walk 10 steps or more  HLM Goal NOT achieved. Continue with multidisciplinary rounding and encourage appropriate mobility to improve upon HLM goals.    Patient Centered Rounds: I performed bedside rounds with nursing staff today.  Discussions with Specialists or Other Care Team Provider: GI, attending, case management    Education and Discussions with Family / Patient: Updated  (son) at bedside.    Current Length of Stay: 5 day(s)  Current Patient Status: Inpatient   Discharge Plan: Anticipate discharge in 24-48 hrs to TBD    Code Status: Level 1 - Full Code    Subjective:   Patient seen at bedside this morning with her son. I informed them that her EGD will be at 8:30. She asked about her lung nodules and why they were  not being treated which I explained that they do not have an official diagnosis and need to be further worked up with hem/onc outpatient who she has seen. I explained in detail with the son reasons why she may have oxygen desaturation with walking including and relating to her medical conditions such as emphysema,, asthma, and anemia.    Objective:     Vitals:   Temp (24hrs), Av.1 °F (36.7 °C), Min:97.2 °F (36.2 °C), Max:98.9 °F (37.2 °C)    Temp:  [97.2 °F (36.2 °C)-98.9 °F (37.2 °C)] 97.6 °F (36.4 °C)  HR:  [60-80] 78  Resp:  [16-18] 18  BP: ()/(48-62) 115/49  SpO2:  [94 %-98 %] 96 %  Body mass index is 31.83 kg/m².     Input and Output Summary (last 24 hours):     Intake/Output Summary (Last 24 hours) at 3/4/2024 1322  Last data filed at 3/4/2024 0853  Gross per 24 hour   Intake 520 ml   Output --   Net 520 ml       Physical Exam:   Physical Exam  Constitutional:       Appearance: Normal appearance.   HENT:      Head: Normocephalic and atraumatic.   Cardiovascular:      Rate and Rhythm: Normal rate and regular rhythm.      Pulses: Normal pulses.      Heart sounds: Normal heart sounds.   Pulmonary:      Effort: Pulmonary effort is normal.      Breath sounds: Normal breath sounds.      Comments: Patient was on breathing treatment with respiratory therapy during interview and exam  Abdominal:      General: Abdomen is flat. Bowel sounds are normal.      Palpations: Abdomen is soft.      Tenderness: There is no abdominal tenderness.   Skin:     General: Skin is warm and dry.      Capillary Refill: Capillary refill takes less than 2 seconds.   Neurological:      Mental Status: She is alert.          Additional Data:     Labs:  Results from last 7 days   Lab Units 24  0524   WBC Thousand/uL 5.55   HEMOGLOBIN g/dL 10.3*   HEMATOCRIT % 34.6*   PLATELETS Thousands/uL 168   NEUTROS PCT % 73   LYMPHS PCT % 14   MONOS PCT % 11   EOS PCT % 1     Results from last 7 days   Lab Units 24  0524 24  0555  02/28/24 2030   SODIUM mmol/L 142   < > 141   POTASSIUM mmol/L 4.1   < > 4.4   CHLORIDE mmol/L 99   < > 97   CO2 mmol/L 41*   < > 38*   BUN mg/dL 17   < > 18   CREATININE mg/dL 0.88   < > 0.92   ANION GAP mmol/L 2   < > 6   CALCIUM mg/dL 9.2   < > 9.7   ALBUMIN g/dL  --   --  4.3   TOTAL BILIRUBIN mg/dL  --   --  0.36   ALK PHOS U/L  --   --  96   ALT U/L  --   --  13   AST U/L  --   --  23   GLUCOSE RANDOM mg/dL 99   < > 134    < > = values in this interval not displayed.             Results from last 7 days   Lab Units 03/01/24  0539   HEMOGLOBIN A1C % 5.9*           Lines/Drains:  Invasive Devices       Peripheral Intravenous Line  Duration             Peripheral IV 03/03/24 Dorsal (posterior);Left Forearm 1 day                          Imaging: Reviewed radiology reports from this admission including: chest xray, chest CT scan, and ECHO    Recent Cultures (last 7 days):         Last 24 Hours Medication List:   Current Facility-Administered Medications   Medication Dose Route Frequency Provider Last Rate    acetaminophen  975 mg Oral Q8H PRN Sidra Raymundo MD      albuterol  2 puff Inhalation Q6H PRN Sidra Raymundo MD      atorvastatin  40 mg Oral Daily With Dinner Sidra Raymundo MD      benzonatate  100 mg Oral TID Sidra Raymundo MD      brimonidine tartrate  1 drop Both Eyes Q8H ESTEVAN Sidra Raymundo MD      budesonide-formoterol  2 puff Inhalation BID Sidra Raymundo MD      calcium carbonate  500 mg Oral Daily PRN Sidra Raymundo MD      [START ON 3/5/2024] enoxaparin  40 mg Subcutaneous Daily Sidra Raymundo MD      ferrous sulfate  325 mg Oral Daily With Breakfast Sidra Raymundo MD      fluticasone  2 spray Each Nare Daily Sidra Raymundo MD      ipratropium  0.5 mg Nebulization TID Sidra Raymundo MD      labetalol  10 mg Intravenous Q6H PRN Sidra Raymundo MD      levalbuterol  1.25 mg Nebulization TID Sidra Raymundo MD      levothyroxine  75 mcg Oral Early Morning Sidra Raymundo MD      losartan  25 mg Oral  Daily Sidra Raymundo MD      montelukast  10 mg Oral HS Sidra Raymundo MD      pantoprazole  40 mg Oral BID AC Sidra Raymundo MD      sertraline  25 mg Oral HS Sidra Raymundo MD      sucralfate  1 g Oral BID AC Sidra Raymundo MD      timolol  1 drop Both Eyes BID Sidra Raymundo MD          Today, Patient Was Seen By: Alka Caceres DO    **Please Note: This note may have been constructed using a voice recognition system.**

## 2024-03-04 NOTE — ASSESSMENT & PLAN NOTE
Lab Results   Component Value Date    EGFR 66 03/05/2024    EGFR 67 03/04/2024    EGFR 70 03/03/2024    CREATININE 0.89 03/05/2024    CREATININE 0.88 03/04/2024    CREATININE 0.85 03/03/2024    Creatinine at baseline. Will monitor with morning labs.

## 2024-03-04 NOTE — PHYSICAL THERAPY NOTE
PHYSICAL THERAPY EVALUATION  NAME: Noreen Alvarez  AGE:   68 y.o.  MRN:  [id number]  ADMIT DX: SOB (shortness of breath) [R06.02]  COPD exacerbation (HCC) [J44.1]    PMH:   Past Medical History:   Diagnosis Date    Acute kidney failure (HCC)     Allergic     Allergies     Anemia     Asthma     Cancer (HCC)     Cataract     Chronic kidney disease (CKD), stage III (moderate) (HCC)     COPD (chronic obstructive pulmonary disease) (HCC)     COVID-19     Covid + 0-7-78-cough at that time now fully resolved    Disease of thyroid gland     Essential hypertension     GERD (gastroesophageal reflux disease)     Glaucoma     High blood pressure     HTN (hypertension) 02/16/2021    Hyperlipidemia     Hypothyroidism     Mesenteric lymphadenopathy 07/13/2021    Pulmonary hypertension (HCC)     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    Throat cancer (HCC) 2014    chemo and rad therapy 2014     LENGTH OF STAY: 5        03/04/24 1515   PT Last Visit   PT Visit Date 03/04/24   Note Type   Note type Evaluation   Pain Assessment   Pain Assessment Tool FLACC   Pain Location/Orientation Other (Comment)  (throat)   Hospital Pain Intervention(s) Repositioned;Ambulation/increased activity   Pain Rating: FLACC (Rest) - Face 1   Pain Rating: FLACC (Rest) - Legs 1   Pain Rating: FLACC (Rest) - Activity 0   Pain Rating: FLACC (Rest) - Cry 1   Pain Rating: FLACC (Rest) - Consolability 1   Score: FLACC (Rest) 4   Pain Rating: FLACC (Activity) - Face 1   Pain Rating: FLACC (Activity) - Legs 1   Pain Rating: FLACC (Activity) - Activity 0   Pain Rating: FLACC (Activity) - Cry 1   Pain Rating: FLACC (Activity) - Consolability 1   Score: FLACC (Activity) 4   Restrictions/Precautions   Weight Bearing Precautions Per Order No   Other Precautions O2;Pain  (Bhutanese speaking; 2 L O2 NC)   Home Living   Type of Home House   Home Layout One level  (0 RAMONA)   Bathroom Shower/Tub Tub/shower unit   Bathroom Equipment Grab bars in shower   Additional  Comments Ambulates independently without AD at baseline.   Prior Function   Level of [place] Independent with functional mobility;Needs assistance with IADLS  (pt has supervision for bathing from daughters)   Lives With Spouse  (works during the day)   Receives Help From Family   IADLs Family/Friend/Other provides transportation;Independent with meal prep   Falls in the last 6 months 0   General   Family/Caregiver Present Yes  (son assisting with translation)   Cognition   Overall Cognitive Status WFL   Arousal/Participation Cooperative   Orientation Level Oriented X4   Memory Within functional limits   Following Commands Follows all commands and directions without difficulty   Comments Pt identified by name and  on hospital bracelet.   Subjective   Subjective Agrees to PT evaluation and is pleasant and cooperative throughout session.   RLE Assessment   RLE Assessment X   Strength RLE   RLE Overall Strength 4+/5  (functionally)   LLE Assessment   LLE Assessment X   Strength LLE   LLE Overall Strength 4+/5  (functionally)   Bed Mobility   Supine to Sit 7  Independent   Sit to Supine 7  Independent   Transfers   Sit to Stand 7  Independent   Stand to Sit 7  Independent   Ambulation/Elevation   Gait pattern Decreased foot clearance;Short stride;Excessively slow;Wide MEGHAN  (increased lateral sway)   Gait Assistance 6  Modified independent   Assistive Device None   Distance ~50` x1 + 20` x1   Balance   Static Sitting Normal   Dynamic Sitting Good   Static Standing Fair +   Dynamic Standing Fair   Ambulatory Fair   Endurance Deficit   Endurance Deficit Yes   Endurance Deficit Description slow gait speed and mild SOB; SpO2 maintained ~93%   Activity Tolerance   Activity Tolerance Patient limited by fatigue   Nurse Made Aware Per BANDAR Cristobal pt appropriate to evaluate   Assessment   Problem List Decreased strength;Decreased endurance;Impaired balance;Decreased mobility;Pain   Assessment Pt is a 68 y.o. female seen for  PT evaluation s/p admit to [facility] on 8/18/2022 w/ Atypical chest pain.  Order placed for PT. Comorbidities affecting pt's physical performance at time of assessment include: obesity and exercise hypoxemia . Personal factors affecting pt at time of IE include: anxiety and depression. Prior to admission, pt was was independent w/ all functional mobility w/ out AD, lived in one floor environment, and lived with  . Upon evaluation: Pt requires independent for bed mobility, independent for transfers, and mod I for ambulation without AD.   (Please find full objective findings from PT assessment regarding body systems outlined above). Impairments and limitations also listed above, especially due to  decreased endurance, gait deviations, pain, decreased activity tolerance, and SOB upon exertion.  Pt's clinical presentation is currently evolving seen in pt's presentation of multiple lines and SOB with mobility.  No acute skilled PT needs at this time as pt is at an independent level for mobility.  Will discharge pt from PT caseload.   Discharge Recommendation   Rehab Resource Intensity Level, PT III (Minimum Resource Intensity)  (may benefit from OP pulmonary rehab post discharge)   AM-PAC Basic Mobility Inpatient   Turning in Flat Bed Without Bedrails 4   Lying on Back to Sitting on Edge of Flat Bed Without Bedrails 4   Moving Bed to Chair 4   Standing Up From Chair Using Arms 4   Walk in Room 4   Climb 3-5 Stairs With Railing 4   Basic Mobility Inpatient Raw Score 24   Basic Mobility Standardized Score 57.68   Highest Level Of Mobility   JH-HLM Goal 8: Walk 250 feet or more   JH-HLM Achieved 7: Walk 25 feet or more   End of Consult   Patient Position at End of Consult Supine;Bed/Chair alarm activated;All needs within reach     The patient's AM-PAC Basic Mobility Inpatient Short Form Raw Score is 24, Standardized Score is 57.68.  A Raw Score of greater than or equal to 16 suggests the patient may benefit  from discharge to home. However please refer to therapist recommendation for discharge planning given other factors that may influence destination.     Adapted from Ravi PUENTES, Rhonda J, Daysi J, Guerline J. Association of -PAC “6-Clicks” Basic Mobility and Daily Activity Scores With Discharge Destination. Physical Therapy, 2021;101:1-9. DOI: [email or web address]      Mely Ram PT,CAROLANNT

## 2024-03-04 NOTE — ASSESSMENT & PLAN NOTE
Lab Results   Component Value Date    EGFR 67 03/04/2024    EGFR 70 03/03/2024    EGFR 57 03/02/2024    CREATININE 0.88 03/04/2024    CREATININE 0.85 03/03/2024    CREATININE 1.01 03/02/2024    Creatinine at baseline. Will monitor with morning labs.

## 2024-03-05 VITALS
OXYGEN SATURATION: 94 % | SYSTOLIC BLOOD PRESSURE: 110 MMHG | WEIGHT: 174 LBS | RESPIRATION RATE: 18 BRPM | BODY MASS INDEX: 31.83 KG/M2 | TEMPERATURE: 98.2 F | DIASTOLIC BLOOD PRESSURE: 54 MMHG | HEART RATE: 80 BPM

## 2024-03-05 PROBLEM — R03.0 ELEVATED BLOOD PRESSURE READING: Status: RESOLVED | Noted: 2024-01-28 | Resolved: 2024-03-05

## 2024-03-05 PROBLEM — R07.89 ATYPICAL CHEST PAIN: Status: RESOLVED | Noted: 2022-10-28 | Resolved: 2024-03-05

## 2024-03-05 LAB
ANION GAP SERPL CALCULATED.3IONS-SCNC: 5 MMOL/L
BASOPHILS # BLD AUTO: 0.02 THOUSANDS/ÂΜL (ref 0–0.1)
BASOPHILS NFR BLD AUTO: 0 % (ref 0–1)
BUN SERPL-MCNC: 13 MG/DL (ref 5–25)
CALCIUM SERPL-MCNC: 9.3 MG/DL (ref 8.4–10.2)
CHLORIDE SERPL-SCNC: 99 MMOL/L (ref 96–108)
CO2 SERPL-SCNC: 37 MMOL/L (ref 21–32)
CREAT SERPL-MCNC: 0.89 MG/DL (ref 0.6–1.3)
EOSINOPHIL # BLD AUTO: 0.06 THOUSAND/ÂΜL (ref 0–0.61)
EOSINOPHIL NFR BLD AUTO: 1 % (ref 0–6)
ERYTHROCYTE [DISTWIDTH] IN BLOOD BY AUTOMATED COUNT: 14.6 % (ref 11.6–15.1)
GFR SERPL CREATININE-BSD FRML MDRD: 66 ML/MIN/1.73SQ M
GLUCOSE SERPL-MCNC: 94 MG/DL (ref 65–140)
HCT VFR BLD AUTO: 37.1 % (ref 34.8–46.1)
HGB BLD-MCNC: 10.7 G/DL (ref 11.5–15.4)
IMM GRANULOCYTES # BLD AUTO: 0.02 THOUSAND/UL (ref 0–0.2)
IMM GRANULOCYTES NFR BLD AUTO: 0 % (ref 0–2)
LYMPHOCYTES # BLD AUTO: 0.67 THOUSANDS/ÂΜL (ref 0.6–4.47)
LYMPHOCYTES NFR BLD AUTO: 11 % (ref 14–44)
MAGNESIUM SERPL-MCNC: 1.9 MG/DL (ref 1.9–2.7)
MCH RBC QN AUTO: 26.3 PG (ref 26.8–34.3)
MCHC RBC AUTO-ENTMCNC: 28.8 G/DL (ref 31.4–37.4)
MCV RBC AUTO: 91 FL (ref 82–98)
MONOCYTES # BLD AUTO: 0.5 THOUSAND/ÂΜL (ref 0.17–1.22)
MONOCYTES NFR BLD AUTO: 8 % (ref 4–12)
NEUTROPHILS # BLD AUTO: 4.87 THOUSANDS/ÂΜL (ref 1.85–7.62)
NEUTS SEG NFR BLD AUTO: 80 % (ref 43–75)
NRBC BLD AUTO-RTO: 0 /100 WBCS
PLATELET # BLD AUTO: 177 THOUSANDS/UL (ref 149–390)
PMV BLD AUTO: 9.9 FL (ref 8.9–12.7)
POTASSIUM SERPL-SCNC: 4 MMOL/L (ref 3.5–5.3)
RBC # BLD AUTO: 4.07 MILLION/UL (ref 3.81–5.12)
SODIUM SERPL-SCNC: 141 MMOL/L (ref 135–147)
WBC # BLD AUTO: 6.14 THOUSAND/UL (ref 4.31–10.16)

## 2024-03-05 PROCEDURE — 94640 AIRWAY INHALATION TREATMENT: CPT

## 2024-03-05 PROCEDURE — 83735 ASSAY OF MAGNESIUM: CPT

## 2024-03-05 PROCEDURE — 85025 COMPLETE CBC W/AUTO DIFF WBC: CPT

## 2024-03-05 PROCEDURE — 99232 SBSQ HOSP IP/OBS MODERATE 35: CPT | Performed by: INTERNAL MEDICINE

## 2024-03-05 PROCEDURE — 99239 HOSP IP/OBS DSCHRG MGMT >30: CPT | Performed by: INTERNAL MEDICINE

## 2024-03-05 PROCEDURE — 80048 BASIC METABOLIC PNL TOTAL CA: CPT

## 2024-03-05 PROCEDURE — 94760 N-INVAS EAR/PLS OXIMETRY 1: CPT

## 2024-03-05 RX ORDER — LANOLIN ALCOHOL/MO/W.PET/CERES
800 CREAM (GRAM) TOPICAL ONCE
Status: COMPLETED | OUTPATIENT
Start: 2024-03-05 | End: 2024-03-05

## 2024-03-05 RX ORDER — LOSARTAN POTASSIUM 25 MG/1
25 TABLET ORAL DAILY
Qty: 30 TABLET | Refills: 0 | Status: SHIPPED | OUTPATIENT
Start: 2024-03-05 | End: 2024-04-04

## 2024-03-05 RX ORDER — ATORVASTATIN CALCIUM 40 MG/1
40 TABLET, FILM COATED ORAL
Qty: 30 TABLET | Refills: 0 | Status: SHIPPED | OUTPATIENT
Start: 2024-03-05 | End: 2024-04-04

## 2024-03-05 RX ORDER — SUCRALFATE 1 G/1
1 TABLET ORAL
Qty: 28 TABLET | Refills: 0 | Status: SHIPPED | OUTPATIENT
Start: 2024-03-05 | End: 2024-03-19

## 2024-03-05 RX ORDER — PANTOPRAZOLE SODIUM 40 MG/1
40 TABLET, DELAYED RELEASE ORAL 2 TIMES DAILY
Qty: 60 TABLET | Refills: 0 | Status: SHIPPED | OUTPATIENT
Start: 2024-03-05 | End: 2024-04-04

## 2024-03-05 RX ORDER — PANTOPRAZOLE SODIUM 40 MG/1
40 TABLET, DELAYED RELEASE ORAL 2 TIMES DAILY
Qty: 30 TABLET | Refills: 0 | Status: SHIPPED | OUTPATIENT
Start: 2024-03-05

## 2024-03-05 RX ADMIN — SUCRALFATE 1 G: 1 TABLET ORAL at 07:13

## 2024-03-05 RX ADMIN — TIMOLOL MALEATE 1 DROP: 5 SOLUTION/ DROPS OPHTHALMIC at 09:55

## 2024-03-05 RX ADMIN — IPRATROPIUM BROMIDE 0.5 MG: 0.5 SOLUTION RESPIRATORY (INHALATION) at 07:35

## 2024-03-05 RX ADMIN — ENOXAPARIN SODIUM 40 MG: 40 INJECTION SUBCUTANEOUS at 09:52

## 2024-03-05 RX ADMIN — BRIMONIDINE TARTRATE 1 DROP: 2 SOLUTION/ DROPS OPHTHALMIC at 06:02

## 2024-03-05 RX ADMIN — FLUTICASONE PROPIONATE 2 SPRAY: 50 SPRAY, METERED NASAL at 09:54

## 2024-03-05 RX ADMIN — BRIMONIDINE TARTRATE 1 DROP: 2 SOLUTION/ DROPS OPHTHALMIC at 14:00

## 2024-03-05 RX ADMIN — LEVALBUTEROL HYDROCHLORIDE 1.25 MG: 1.25 SOLUTION RESPIRATORY (INHALATION) at 07:35

## 2024-03-05 RX ADMIN — FERROUS SULFATE TAB 325 MG (65 MG ELEMENTAL FE) 325 MG: 325 (65 FE) TAB at 09:52

## 2024-03-05 RX ADMIN — BUDESONIDE AND FORMOTEROL FUMARATE DIHYDRATE 2 PUFF: 160; 4.5 AEROSOL RESPIRATORY (INHALATION) at 09:54

## 2024-03-05 RX ADMIN — BENZONATATE 100 MG: 100 CAPSULE ORAL at 09:52

## 2024-03-05 RX ADMIN — LEVALBUTEROL HYDROCHLORIDE 1.25 MG: 1.25 SOLUTION RESPIRATORY (INHALATION) at 14:08

## 2024-03-05 RX ADMIN — Medication 800 MG: at 07:12

## 2024-03-05 RX ADMIN — IPRATROPIUM BROMIDE 0.5 MG: 0.5 SOLUTION RESPIRATORY (INHALATION) at 14:08

## 2024-03-05 RX ADMIN — LOSARTAN POTASSIUM 25 MG: 25 TABLET, FILM COATED ORAL at 09:52

## 2024-03-05 RX ADMIN — LEVOTHYROXINE SODIUM 75 MCG: 75 TABLET ORAL at 06:01

## 2024-03-05 RX ADMIN — PANTOPRAZOLE SODIUM 40 MG: 40 TABLET, DELAYED RELEASE ORAL at 07:13

## 2024-03-05 NOTE — CASE MANAGEMENT
Case Management Discharge Planning Note    Patient name Noreen Alvarez  Location S /S - MRN [id number]  : 1955 Date 3/5/2024       Current Admission Date: 2024  Current Admission Diagnosis:SOB (shortness of breath)   Patient Active Problem List    Diagnosis Date Noted    ELIUD (obstructive sleep apnea) 2024    Sacroiliac inflammation (HCC) 2024    SOB (shortness of breath) 2024    Epigastric pain 01/10/2024    Gastroesophageal reflux disease without esophagitis 01/10/2024    Pain of right sacroiliac joint 2023    Hypomagnesemia 2023    Chronic respiratory failure with hypoxia, on home O2 therapy  2023    Iron deficiency anemia 10/30/2022    Dyspepsia 10/30/2022    Preop examination 2022    Preoperative cardiovascular examination 2022    Mediastinal lymphadenopathy 2021    Class 1 obesity due to excess calories with serious comorbidity and body mass index (BMI) of 32.0 to 32.9 in adult 2021    Exercise hypoxemia 2021    Post-nasal drip 2021    Neck pain on left side 2021    History of COVID-19 2021    Persistent dyspnea after COVID-19 2021    Current mild episode of major depressive disorder without prior episode (HCC) 2021    Hyperlipidemia 2021    Hypothyroidism 2021    COVID-19 2021    Trigger finger of right thumb 2021    Myalgia 2021    Tension type headache 2021    Severe persistent asthma without complication 2020    Multiple lung nodules 2020    Elevated alkaline phosphatase level 2020    Dupuytren contracture 2020    Personal history of radiation therapy 2019    History of laryngeal cancer 2019    Loss of hearing 2019    Cancer of pharynx (HCC) 2019    Chronic kidney disease, stage 3 (moderate) 2019    Centrilobular emphysema (HCC) 2019    Depression with anxiety 2019    Panic attack  08/27/2019    Cataract 02/19/2015    Numbness 02/19/2015    Vitamin D deficiency 12/10/2013    Extrinsic obstruction of cartilagenous portion of eustachian tube 11/12/2013      LOS (days): 6  Geometric Mean LOS (GMLOS) (days): 2.2  Days to GMLOS:-3.4     OBJECTIVE:  Risk of Unplanned Readmission Score: 21.49         Current admission status: Inpatient   Preferred Pharmacy:   [facility] #[id number] - [place] - [address]  [address] [facility]  [place] [address]  Phone: [phone] Fax: [phone]    [id number] - [facility] [place] - [address] [facility]  [address]  [facility] [place] [address]  Phone: [phone] Fax: [phone]    Primary Care Provider: Samantha Hernandez MD    [facility]  Secondary Insurance:     DISCHARGE DETAILS:    Discharge planning discussed with:: Patient  Freedom of Choice: Yes  Comments - Freedom of Choice: Cm discussed discharge plans per therapy team recs, patient aware out-patient pulm rehab is recommended and [facility] will provide her with script.  CM contacted family/caregiver?: No- see comments (Oriented)  Were Treatment Team discharge recommendations reviewed with patient/caregiver?: Yes  Did patient/caregiver verbalize understanding of patient care needs?: Yes  Were patient/caregiver advised of the risks associated with not following Treatment Team discharge recommendations?: Yes    Contacts  Patient Contacts: Ashley (dtr)  Relationship to Patient:: Family  Contact Method: Phone  Phone Number: [phone]  Reason/Outcome: Discharge Planning    Requested Home Health Care         Is the patient interested in HHC at discharge?: No    DME Referral Provided  Referral made for DME?: No    Other Referral/Resources/Interventions Provided:  Interventions: None Indicated  Referral Comments: No CM needs at this time.    Would you like to participate in our [facility] service  program?  : No - Declined    Treatment Team Recommendation: Home  Discharge Destination Plan:: Home  Transport at Discharge : Family           ETA of Transport (Date): 03/05/24  ETA of Transport (Time): 1600     Transfer Mode: Wheelchair        IMM Given (Date):: 03/05/24  IMM Given to:: Patient   ..IMM reviewed with patient, patient agrees with discharge determination.     Patient has small portable oxygen for transport. Patient reports family will pick her up between 3-4pm. CM informed nurse.

## 2024-03-05 NOTE — OCCUPATIONAL THERAPY NOTE
Occupational Therapy Screen    Patient Name: Noreen Alvarez  Today's Date: 3/5/2024     03/05/24 1524   OT Last Visit   OT Visit Date 03/05/24   Note Type   Note type Screen   Additional Comments OT orders received and chart review performed. Pt admitted w/ SOB. Per PT Mely and RN, pt is (I) w/ ADLs and does not demonstrate need for skilled IPOT services at this time. Will screen from caseload. Please reconsult if functional status significantly declines.     Jasmyne Reese, OTD, OTR/L  [place] License [id number]  [place] License [id number]

## 2024-03-05 NOTE — DISCHARGE INSTR - AVS FIRST PAGE
Dear Noreen Alvarez,     It was our pleasure to care for you here at [facility].  It is our hope that we were always able to exceed the expected standards for your care during your stay.  You were hospitalized due to atypical chest pain.  You were cared for on the 2nd floor by Alka Caceres DO under the service of Marcos Gonzalez MD with the [facility] who covers for your primary care physician (PCP), Samantha Hernandez MD, while you were hospitalized.  If you have any questions or concerns related to this hospitalization, you may contact us at .  For follow up as well as any medication refills, we recommend that you follow up with your primary care physician.  A registered nurse will reach out to you by phone within a few days after your discharge to answer any additional questions that you may have after going home.  However, at this time we provide for you here, the most important instructions / recommendations at discharge:     Notable Medication Adjustments -   Start taking Losartan 25 mg once a day by mouth for high blood pressure  Start taking atorvastatin 40 mg by mouth daily with dinner for high cholesterol   Increase home pantoprazole 40 mg by mouth from once a day to twice a day for acid reflux. Do this for 30 days, then change back to daily.   Can continue Carafate for 14 days  Testing Required after Discharge -   Sleep study with outpatient pulmonology should be rescheduled  You should get a yearly CT chest to check on lung nodules  ** Please contact your PCP to request testing orders for any of the testing recommended here **  Important follow up information -   Cardiology will call you in 1-2 weeks for outpatient appointment  Please call your outpatient pulmonologist for a sooner appointment, also pulmonary rehab should be started, referral placed  I placed a GI referral for you, they should be calling you for an appointment within  1 week  Follow up with your PCP in 1 week  Other Instructions -   If chest pain returns and is intense, you have trouble breathing, or your oxygen needs go up, return to ED.   Please review this entire after visit summary as additional general instructions including medication list, appointments, activity, diet, any pertinent wound care, and other additional recommendations from your care team that may be provided for you.      Sincerely,     Alka Caceres, DO

## 2024-03-05 NOTE — PROGRESS NOTES
Progress Note - Noreen Alvarez 68 y.o. female MRN: [id number]    Unit/Bed#: S -01 Encounter: [id number]      Reason for Consult / Principal Problem:      Atypical chest pain, regurgitation        ASSESSMENT AND PLAN:       68 y.o. female Urdu-speaking female with history of HTN, HLD, CKD 3, hypothyroidism, laryngeal cancer s/p radiation/chemo, lung nodules representative of adenocarcinoma spectrum lesions (biopsied 2022), severe COPD who presented 2/28 with shortness of breath and chest pressure ongoing for 3 days.     #1 Atypical chest pain w/ regurgitation  #2 GERD  #3 History of laryngeal cancer s/p chemo/rad (2014)  - EGD on Monday, March 4, 2024 2 cm hiatal hernia and was otherwise normal.  An empiric 52 Uzbek bougie dilator was passed without any difficulty noted.  Normal post dilation mucosal trauma noted.    - Postprocedural rounding patient noted some odynophagia and viscous lidocaine was ordered for her.  -Her diet has been advanced to regular diet  -Will continue pantoprazole 40 mg p.o. twice daily for a month and then can switch to pantoprazole once daily which she was maintained on as an outpatient  -Can continue Carafate for 14 days    I [place] connected with the primary team and she is likely for discharge today with the plan above.  Will have her follow-up with Dr. Raymundo as an outpatient.  She has an appointment on April 11, 2024 for colonoscopy with Dr. Raymundo.  ----------------------------------------------------------------------------------------------------------------    Subjective:     68-year-old female with history of hypertension, hyperlipidemia, stage III CKD, hypothyroidism, laryngeal cancer status postradiation and chemo, lung nodules, severe COPD who has been admitted with shortness of breath and chest pressure for the past 3 days.  GI has been consulted with concerns for GERD and atypical chest pains symptoms.  Patient reports that she has been having reflux and feels that she has  been regurgitating her food more recently.  Patient was seen by Dr. Raymundo several months ago and was schedule EGD and colonoscopy however has not had this done.  Patient has been prescribed PPI in the past however tells me that she does not take this.  Denies hematemesis, coffee ground emesis or melena.  No change in bowel habits.    Interval History: Patient underwent an EGD on Monday, March 4, 2024 2 cm hiatal hernia and was otherwise normal.  An empiric 52 Finnish bougie dilator was passed without any difficulty noted.  Normal post dilation mucosal trauma noted.  Postprocedural rounding patient noted some odynophagia and viscous lidocaine was ordered for her.  Today she notes that her epigastric and atypical chest pain is improved and only very mild.  She offers no other complaints.    Objective:     Vitals: Blood pressure 135/59, pulse 72, temperature 98.5 °F (36.9 °C), resp. rate 18, weight 78.9 kg (174 lb), SpO2 97%.,Body mass index is 31.83 kg/m².    No intake or output data in the 24 hours ending 03/05/24 0858    Physical Exam:     General Appearance: Mild distress alert, appears stated age and cooperative  HEENT: NCAT, sclera anicteric  Lungs: Clear to auscultation bilaterally, no rales or rhonchi, no labored breathing/accessory muscle use  Heart: Regular rate and rhythm, S1, S2 normal, no murmur, click, rub or gallop  Abdomen: Soft, non-tender, non-distended; bowel sounds normal; no masses or no organomegaly  Extremities: No cyanosis, clubbing, or edema  Neuro: Alert and oriented x 3  Psych: Normal behavior    Invasive Devices       Peripheral Intravenous Line  Duration             Peripheral IV 03/03/24 Dorsal (posterior);Left Forearm 2 days                    Lab Results:  Results from last 7 days   Lab Units 03/05/24  0434   WBC Thousand/uL 6.14   HEMOGLOBIN g/dL 10.7*   HEMATOCRIT % 37.1   PLATELETS Thousands/uL 177   NEUTROS PCT % 80*   LYMPHS PCT % 11*   MONOS PCT % 8   EOS PCT % 1     Results from  "last 7 days   Lab Units 03/05/24  0434 02/29/24  0555 02/28/24 2030   POTASSIUM mmol/L 4.0   < > 4.4   CHLORIDE mmol/L 99   < > 97   CO2 mmol/L 37*   < > 38*   BUN mg/dL 13   < > 18   CREATININE mg/dL 0.89   < > 0.92   CALCIUM mg/dL 9.3   < > 9.7   ALK PHOS U/L  --   --  96   ALT U/L  --   --  13   AST U/L  --   --  23    < > = values in this interval not displayed.     Invalid input(s): \"BILI\"            Imaging Studies: I have personally reviewed pertinent imaging studies.    EGD    Result Date: 3/4/2024  Impression: 2 cm hiatal hernia Esophagus-normal mucosa status post biopsies from the distal part empirically passed 52 Telugu bougie down the upper esophageal sphincter without any difficulty noted the mucosal trauma. The stomach and duodenum appeared normal. RECOMMENDATION:  Await pathology results  Liquid diet and advance as tolerated.  Advised to chew well before swallowing    Chatargy MD Breanne     CT chest wo contrast    Result Date: 3/2/2024  Impression: Resolved 4 mm left lower lobe pulmonary nodule compared to 7/31/2023, likely infectious/inflammatory on that study. Other pulmonary nodules are stable. Continued annual surveillance recommended. Workstation performed: [id number]     XR chest 1 view portable    Result Date: 2/29/2024  Impression: There is hazy bibasilar opacity which may represent a combination of layered effusion and parenchymal opacity. Workstation performed: [id number]         Lico Humphrey PA-C  Gastroenterology      ]  "

## 2024-03-06 PROCEDURE — 88305 TISSUE EXAM BY PATHOLOGIST: CPT | Performed by: PATHOLOGY

## 2024-03-08 ENCOUNTER — OFFICE VISIT (OUTPATIENT)
Dept: PULMONOLOGY | Facility: CLINIC | Age: 69
End: 2024-03-08
Payer: COMMERCIAL

## 2024-03-08 ENCOUNTER — TRANSITIONAL CARE MANAGEMENT (OUTPATIENT)
Dept: FAMILY MEDICINE CLINIC | Facility: CLINIC | Age: 69
End: 2024-03-08

## 2024-03-08 VITALS
BODY MASS INDEX: 34.23 KG/M2 | HEART RATE: 84 BPM | OXYGEN SATURATION: 96 % | TEMPERATURE: 98.2 F | SYSTOLIC BLOOD PRESSURE: 130 MMHG | DIASTOLIC BLOOD PRESSURE: 70 MMHG | HEIGHT: 62 IN | WEIGHT: 186 LBS

## 2024-03-08 DIAGNOSIS — R91.8 MULTIPLE LUNG NODULES: Primary | ICD-10-CM

## 2024-03-08 DIAGNOSIS — G47.33 OSA (OBSTRUCTIVE SLEEP APNEA): ICD-10-CM

## 2024-03-08 DIAGNOSIS — Z85.21 HISTORY OF LARYNGEAL CANCER: ICD-10-CM

## 2024-03-08 DIAGNOSIS — Z99.81 CHRONIC RESPIRATORY FAILURE WITH HYPOXIA, ON HOME O2 THERAPY: ICD-10-CM

## 2024-03-08 DIAGNOSIS — J96.11 CHRONIC RESPIRATORY FAILURE WITH HYPOXIA, ON HOME O2 THERAPY: ICD-10-CM

## 2024-03-08 DIAGNOSIS — J43.2 CENTRILOBULAR EMPHYSEMA (HCC): ICD-10-CM

## 2024-03-08 PROCEDURE — 99214 OFFICE O/P EST MOD 30 MIN: CPT | Performed by: INTERNAL MEDICINE

## 2024-03-08 RX ORDER — BUDESONIDE, GLYCOPYRROLATE, AND FORMOTEROL FUMARATE 160; 9; 4.8 UG/1; UG/1; UG/1
2 AEROSOL, METERED RESPIRATORY (INHALATION) 2 TIMES DAILY
Qty: 32.1 G | Refills: 0 | Status: SHIPPED | OUTPATIENT
Start: 2024-03-08

## 2024-03-11 ENCOUNTER — TELEPHONE (OUTPATIENT)
Dept: GASTROENTEROLOGY | Facility: AMBULARY SURGERY CENTER | Age: 69
End: 2024-03-11

## 2024-03-11 NOTE — TELEPHONE ENCOUNTER
----- Message from Lico Humphrey PA-C sent at 3/5/2024 10:49 AM EST -----  Has an excellent question.  I would actually reach out to Dr. Raymundo and see if he wants to repeat the EGD.  He seemed a bit concerned about some post dilation mucosal damage.  Please reach out to him to see if he wants you to leave the procedure on.  ----- Message -----  From: Lenak Iglesias  Sent: 3/5/2024  10:31 AM EST  To: Lico Humphrey PA-C    Patient is scheduled for EGD/Colonoscopy for 04/02/24 with Dr Raymundo.  So its safe to say I can take the EGD off being that was just done yesterday?  ----- Message -----  From: Lico Humphrey PA-C  Sent: 3/4/2024   3:28 PM EST  To: Gastroenterology [facility]    Patient for discharge Tuesday 3/5/34.  Needs OV in 6-12 weeks with any provider

## 2024-03-15 ENCOUNTER — NURSE TRIAGE (OUTPATIENT)
Age: 69
End: 2024-03-15

## 2024-03-15 NOTE — TELEPHONE ENCOUNTER
Regarding: constant chest pain  ----- Message from Leah Rubio sent at 3/15/2024  3:09 PM EDT -----  Pt's daughter calling stating pt is constantly experiencing pain in breast bone area and does not feel good at all. Pt was recently diagnosed with a small hernia.

## 2024-03-15 NOTE — TELEPHONE ENCOUNTER
"Inpatient [facility] 2/28/24-3/5/24, EGD 3/4/24, Labs/imaging while inpatient. Patient currently scheduled for colonoscopy 4/11/24      I spoke with patient's daughter Ashley (on communication sheet). She states her mother is complaining of same symptoms as when hospitalized and worried about hiatal hernia feeling she needs surgery (Ashley told her she does not) and concerned since she had a sister pass away from cancer. I reviewed that she is taking medications as ordered at discharge. She is watching her diet and I reviewed to avoid spicy, fried, acidic foods, do not lay down for 1-2 hours after eating, when going to bed sleep with head elevated, keep hydrated.  I also recommended she have her mother add Pepcid 20 mg before lunch and at bedtime for two weeks to see if that helps alleviate some discomfort. She did state her mother is very sedentary and I recommended she walk/exercise a little since that may help with digestion. I also reviewed that constant worrying/stress could increase acid production.Recommendation to report back to ER if pain level severe.  If something could be ordered for patient's nausea that would be appreciated by her daughter please advise.      Reason for Disposition   Mild abdominal pain    Answer Assessment - Initial Assessment Questions  1. LOCATION: \"Where does it hurt?\"       Feels same pain, nausea as when in hospital    Protocols used: Abdominal Pain - Female-ADULT-[place]    "

## 2024-03-15 NOTE — TELEPHONE ENCOUNTER
Inpatient [facility] 2/28/24-3/5/24, EGD 3/4/24, Labs/imaging while inpatient.    Returned call to daughter Ashley (on communication sheet), no answer, left message to call back.

## 2024-03-18 NOTE — PROGRESS NOTES
"Cardiology  Hospital follow Up   Office Visit Note  Noreen Alvarez   68 y.o.   female   MRN: [id number]  [facility]  [address] [address]  [address]  [facility] [address] [phone]    PCP: Samantha Hernandez MD  Cardiologist: will be Dr. Maguire            Summary of recommendation  A heart healthy diet is recommended  A follow up lipid profile has been ordered by her PCP  She can stop losartan.  She  was dizzy while on it; her blood pressure is normal at home without it, and normal in the office.  Follow up will be scheduled with Dr Maguire 3 months        Assessment/plan  Chest pain, atypical.  Recent hospital admission 2/29-3/1/24   SPECT: inferior perfusion defect probably artifact.  Unable to exclude small amount of ischemia  Inferoapical  region  TTE: normal EF.  NO RWMA.   This may be related to a hiatal hernia.  Moderate coronary artery calcification by CT 3/1/24.   /66.-Prescribed losartan 25 mg daily at hospital discharge,, however has not taking it recently at home, because \" I do not have high BP\"   Hyperlipidemia, on atorvastatin 40 mg daily.  2/29/24: LDL  117 follow-up lipids have been ordered by her PCP.   Latest Reference Range & Units 02/25/15 06:39 09/08/20 07:48 02/29/24 05:55   Cholesterol See Comment mg/dL 201 185 207 (H)   Triglycerides See Comment mg/dL 47 74 61   HDL >=50 mg/dL 79 64 78   Non-HDL Cholesterol mg/dl  121 129   LDL Calculated 0 - 100 mg/dL 113 (H) 106 (H) 117 (H)   CKD stage IIIa.  stable  Severe COPD/ emphysema fropm tobacco. Following with pulm  PFT from 4/21/2022 showed severe airflow obstruction with severe diffusion defect air-trapping and hyperinflation consistent with emphysema.   On azithromycin chronically, prophylactically  History of laryngeal cancer 2015, treated with radiation therapy and chemotherapy.  Postradiation changes in the neck.  Severe persistent asthma without complication  Chronic respiratory failure with hypoxia, on " home O2  ELIUD, suspected. an overnight diagnostic sleep study was negative.   Former tobacco, in remission since 2014  Obesity, BMI 34  Cardiac testing  TTE 7/2021.EF 60%. No RWMA. Gr 1 DD. RV normal size and fxn. Aortic valve was trileaflet. Leaflets exhibited normal thickness and normal cuspal separation. There was no evidence for stenosis. There was no regurgitation.  TTE 1/29/2024 LV function normal.  Aortic valve not well-visualized cannot rule out bicuspid.  No significant stenosis  SPECT 3/1/24. No diagnostic evidence of moderate to large amount of ischemia or infarct.LVEF normal - 65%.There is an inferior perfusion defect, but it appears to mostly improve with prone imaging, suggesting probably related to artifact.  But a small amount of ischemia in the inferoapical region cannot be completely excluded.  Clinical correlation suggested.                        DIOGENES Alvarez is a 68-year-old female with tobacco abuse, severe COPD,  chronic hypoxic respiratory failure  with oxygen dependency, hypertension,CKD  ELIUD,  and laryngeal cancer S/P radiation and chemotherapy, and hyperlipidemia      ADM 1/23-1/29/24  Dx AECOPD, + RSV  BNP 73  Trop 4  Given nebs, prednisone, OP sleep med follow up  Elevated BP readings without a dx of HTN      Adm 2/29-3/5/24  CC:squeezing chest pain and SOB  Nebulizer Rx per EMS did help a little  BP on arrival: 184/81  ECG: NSR Non specific STTW changes inferiorally, TWI laterally  Trop peaked at 236  Followed by Cardiology  SPECT:inferior perfusion defect, but it appears to mostly improve with prone imaging, suggesting probably related to artifact. But a small amount of ischemia in the inferoapical region cannot be completely excluded   CP felt to be atypical by rounding cardiology team  Pulmnology consulted   home nebulizer treatments were changed from PRN to TID  CT Chest- no acute findings  Also evaluated by GI- reported spontaneous regurgitation of food  As patient was not on  "home medications for blood pressure or HLD, she was started on losartan 25 mg daily and atorvastatin 40 mg daily on her first day of hospitalization.  Also DCd on protonix 40 mg BID,   Etiology of symptoms not clear however felt to be noncardiac.    3/19/24  Hospital follow-up; she is accompanied by her .   [id number]  ROS: she has epigastric discomfort when she bends over and when she eats. She feels this is from a hiatal hernia.  She has no chest pain with exertion.  She denies shortness of breath.  She has chronic dyspnea, controlled.  She is wearing her oxygen per her usual.  She has not been taking her losartan at home.  She tells me \"my blood pressure has been good\".  She had occasional dizziness when she was taking it, improved off of it.  Her blood pressure is satisfactory 121/66, in the office.  She has been adherent to her atorvastatin; without side effects.  Follow-up lipids have been ordered by her PCP.  She can stop the losartan.  She will follow-up with her cardiologist in a short interval        Assessment  Diagnoses and all orders for this visit:    Atypical chest pain  -     Ambulatory Referral to Cardiology    Class 1 obesity due to excess calories with serious comorbidity and body mass index (BMI) of 32.0 to 32.9 in adult    Pure hypercholesterolemia    Stage 3 chronic kidney disease, unspecified whether stage 3a or 3b CKD (HCC)    Hyperlipidemia, unspecified hyperlipidemia type  -     atorvastatin (LIPITOR) 40 mg tablet; Take 1 tablet (40 mg total) by mouth daily with dinner          Past Medical History:   Diagnosis Date    Acute kidney failure (HCC)     Allergic     Allergies     Anemia     Asthma     Cancer (HCC)     Cataract     Chronic kidney disease (CKD), stage III (moderate) (HCC)     COPD (chronic obstructive pulmonary disease) (HCC)     COVID-19     Covid + 6-9-24-cough at that time now fully resolved    Disease of thyroid gland     Essential hypertension     GERD " (gastroesophageal reflux disease)     Glaucoma     High blood pressure     HTN (hypertension) 02/16/2021    Hyperlipidemia     Hypothyroidism     Mesenteric lymphadenopathy 07/13/2021    Pulmonary hypertension (HCC)     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   Cardiovascular:  Negative for chest pain, claudication, cyanosis, dyspnea on exertion, irregular heartbeat, leg swelling, near-syncope, orthopnea, palpitations, paroxysmal nocturnal dyspnea and syncope.   Respiratory:  Negative for cough and shortness of breath.    Gastrointestinal:  Negative for heartburn and nausea.        See HPI   Neurological:  Negative for dizziness, focal weakness, headaches, light-headedness and weakness.   All other systems reviewed and are negative.      Allergies   Allergen Reactions    Cefdinir Hives    Amifostine Rash    Pollen Extract Allergic Rhinitis     .    Current Outpatient Medications:     albuterol (PROVENTIL HFA,VENTOLIN HFA) 90 mcg/act inhaler, Inhale 2 puffs every 6 (six) hours as needed for wheezing or shortness of breath, Disp: 18 g, Rfl: 5    atorvastatin (LIPITOR) 40 mg tablet, Take 1 tablet (40 mg total) by mouth daily with dinner, Disp: 90 tablet, Rfl: 3    pantoprazole (PROTONIX) 40 mg tablet, Take 1 tablet (40 mg total) by mouth 2 (two) times a day, Disp: 30 tablet, Rfl: 0    azithromycin (ZITHROMAX) 250 mg tablet, Take 1 tablet (250 mg total) by mouth every other day for 45 doses, Disp: 45 tablet, Rfl: 0    benzonatate (TESSALON PERLES) 100 mg capsule, Take 1 capsule (100 mg total) by mouth 3 (three) times a day, Disp: 20 capsule, Rfl: 0    brimonidine tartrate 0.2 % ophthalmic solution, , Disp: , Rfl:     Budeson-Glycopyrrol-Formoterol (Breztri Aerosphere) 160-9-4.8 MCG/ACT AERO, Inhale 2 puffs 2 (two) times a day Rinse mouth after use., Disp: 32.1 g, Rfl: 0    Diclofenac Sodium  (VOLTAREN) 1 %, APPLY 2 GRAMS TO AFFECTED AREA 4 TIMES A DAY, Disp: 100 g, Rfl: 5    ferrous sulfate 324 (65 Fe) mg, Take 1 tablet (324 mg total) by mouth daily after lunch, Disp: 90 tablet, Rfl: 1    fluticasone (FLONASE) 50 mcg/act nasal spray, SPRAY 1 SPRAY INTO EACH NOSTRIL EVERY DAY, Disp: 24 mL, Rfl: 2    ipratropium (ATROVENT) 0.06 % nasal spray, 2 SPRAYS INTO EACH NOSTRIL 3 (THREE) TIMES A DAY FOR INCREASED NASAL SECRETION, Disp: 45 mL, Rfl: 1    ipratropium-albuterol (DUO-NEB) 0.5-2.5 mg/3 mL nebulizer solution, Take 3 mL by nebulization every 8 (eight) hours as needed for wheezing or shortness of breath, Disp: 270 mL, Rfl: 1    levothyroxine 75 mcg tablet, Take 1 tablet (75 mcg total) by mouth daily in the early morning, Disp: 90 tablet, Rfl: 0    montelukast (SINGULAIR) 10 mg tablet, Take 1 tablet (10 mg total) by mouth daily at bedtime, Disp: 90 tablet, Rfl: 1    pantoprazole (PROTONIX) 40 mg tablet, Take 1 tablet (40 mg total) by mouth 2 (two) times a day, Disp: 60 tablet, Rfl: 0    polyethylene glycol (GLYCOLAX) 17 GM/SCOOP powder, DISSOLVE 17 GRAMS INTO WATER AND DRINK BY MOUTH EVERY DAY, Disp: 510 g, Rfl: 0    sertraline (ZOLOFT) 25 mg tablet, Take 1 tablet (25 mg total) by mouth daily at bedtime, Disp: 90 tablet, Rfl: 1    sucralfate (CARAFATE) 1 g tablet, Take 1 tablet (1 g total) by mouth 2 (two) times a day before meals for 14 days, Disp: 28 tablet, Rfl: 0    timolol (TIMOPTIC) 0.5 % ophthalmic solution, INSTILL 1 DROP INTO EACH EYE TWO TIMES A DAY, Disp: , Rfl:         Social History     Socioeconomic History    Marital status: /Civil Union     Spouse name: Not on file    Number of children: Not on file    Years of education: Not on file    Highest education level: Not on file   Occupational History    Not on file   Tobacco Use    Smoking status: Former     Current packs/day: 0.00     Average packs/day: 1 pack/day for 40.0 years (40.0 ttl pk-yrs)     Types: Cigarettes     Start date:  1974     Quit date: 2014     Years since quitting: 10.2    Smokeless tobacco: Never   Vaping Use    Vaping status: Never Used   Substance and Sexual Activity    Alcohol use: Never     Comment: None    Drug use: Never     Comment: Denies    Sexual activity: Not Currently     Partners: Female     Birth control/protection: Surgical   Other Topics Concern    Not on file   Social History Narrative    Most recent tobacco use screenin2020    Do you currently or have you served in the  [employer]: No    Were you activated, into active duty, as a member of the [employer] or as a Reservist: No    Marital status:     Sexual orientation: Heterosexual    Exercise level: Occasional    Diet: Regular    General stress level: Low    Has smoked since age: 19    Alcohol intake: None    Caffeine intake: Occasional    Chewing tobacco: none    Illicit drugs: Denies    Guns present in home: No    Seat belts used routinely: Yes    Sunscreen used routinely: Yes    Smoke alarm in home: Yes    Advance directive: No    Salt Intake: HTN Diet    Has the Patient had a mammogram to screen for breast cancer within 24 months: Yes    Would the patient like to schedule a Mammogram: No    Is the patient interested in a colorectal cancer screening: No    Live alone or with others: with others    Sexually active: No    Do you feel safe at home: Yes     Social Determinants of Health     Financial Resource Strain: Medium Risk (2024)    Overall Financial Resource Strain (CARDIA)     Difficulty of Paying Living Expenses: Somewhat hard   Food Insecurity: No Food Insecurity (3/1/2024)    Hunger Vital Sign     Worried About Running Out of Food in the Last Year: Never true     Ran Out of Food in the Last Year: Never true   Transportation Needs: No Transportation Needs (3/1/2024)    PRAPARE - Transportation     Lack of Transportation (Medical): No     Lack of Transportation (Non-Medical): No   Physical Activity: Not on file    Stress: Not on file   Social Connections: Not on file   Intimate Partner Violence: Not on file   Housing Stability: Low Risk  (3/1/2024)    Housing Stability Vital Sign     Unable to Pay for Housing in the Last Year: No     Number of Places Lived in the Last Year: 1     Unstable Housing in the Last Year: No       Family History   Problem Relation Age of Onset    Other Mother         respiratory disorder    Sudden death Father         shot himself    Suicidality Father     Brain cancer Sister     Diabetes Sister     Breast cancer Sister     Cancer Sister 62        pancreatic cancer    No Known Problems Sister     No Known Problems Sister     No Known Problems Sister     No Known Problems Sister     No Known Problems Sister     No Known Problems Sister     No Known Problems Daughter     No Known Problems Daughter     No Known Problems Maternal Grandmother     No Known Problems Maternal Grandfather     No Known Problems Paternal Grandmother     No Known Problems Paternal Grandfather     No Known Problems Maternal Aunt     No Known Problems Maternal Aunt     No Known Problems Maternal Aunt     No Known Problems Maternal Aunt     No Known Problems Maternal Aunt     No Known Problems Paternal Aunt     No Known Problems Paternal Aunt     No Known Problems Paternal Aunt     No Known Problems Paternal Aunt        Physical Exam  Vitals and nursing note reviewed.   Constitutional:       General: She is not in acute distress.     Appearance: She is not diaphoretic.   HENT:      Head: Normocephalic and atraumatic.   Eyes:      Conjunctiva/sclera: Conjunctivae normal.   Cardiovascular:      Rate and Rhythm: Normal rate and regular rhythm.      Pulses: Intact distal pulses.      Heart sounds: Normal heart sounds.   Pulmonary:      Effort: Pulmonary effort is normal.      Breath sounds: Normal breath sounds.   Abdominal:      General: Bowel sounds are normal.      Palpations: Abdomen is soft.   Musculoskeletal:         General:  "Normal range of motion.      Cervical back: Normal range of motion and neck supple.   Skin:     General: Skin is warm and dry.   Neurological:      Mental Status: She is alert and oriented to person, place, and time.         Vitals: Blood pressure 121/66, pulse 78, height 5' 2\" (1.575 m), weight 84.4 kg (186 lb), SpO2 96%.   Wt Readings from Last 3 Encounters:   03/19/24 84.4 kg (186 lb)   03/08/24 84.4 kg (186 lb)   02/28/24 78.9 kg (174 lb)         Labs & Results:  Lab Results   Component Value Date    WBC 6.14 03/05/2024    HGB 10.7 (L) 03/05/2024    HCT 37.1 03/05/2024    MCV 91 03/05/2024     03/05/2024     BNP   Date Value Ref Range Status   01/23/2024 73 0 - 100 pg/mL Final   01/01/2024 44 0 - 100 pg/mL Final     No components found for: \"CHEM\"  Troponin I   Date Value Ref Range Status   07/02/2021 <0.02 <=0.04 ng/mL Final     Comment:     Autovalidation override  S[employer]ens Chemistry analyzer 99% cutoff is > 0.04 ng/mL in network labs     o cTnI 99% cutoff is useful only when applied to patients in the clinical setting of myocardial ischemia   o cTnI 99% cutoff should be interpreted in the context of clinical history, ECG findings and possibly cardiac imaging to establish correct diagnosis.   o cTnI 99% cutoff may be suggestive but clearly not indicative of a coronary event without the clinical setting of myocardial ischemia.     07/02/2021 <0.02 <=0.04 ng/mL Final     Comment:     Autovalidation override  Siemens Chemistry analyzer 99% cutoff is > 0.04 ng/mL in network labs     o cTnI 99% cutoff is useful only when applied to patients in the clinical setting of myocardial ischemia   o cTnI 99% cutoff should be interpreted in the context of clinical history, ECG findings and possibly cardiac imaging to establish correct diagnosis.   o cTnI 99% cutoff may be suggestive but clearly not indicative of a coronary event without the clinical setting of myocardial ischemia.     07/01/2021 <0.02 <=0.04 ng/mL " Final     Comment:     Autovalidation override  Siemens Chemistry analyzer 99% cutoff is > 0.04 ng/mL in network labs     o cTnI 99% cutoff is useful only when applied to patients in the clinical setting of myocardial ischemia   o cTnI 99% cutoff should be interpreted in the context of clinical history, ECG findings and possibly cardiac imaging to establish correct diagnosis.   o cTnI 99% cutoff may be suggestive but clearly not indicative of a coronary event without the clinical setting of myocardial ischemia.       Results for orders placed during the hospital encounter of 21    Echo complete with contrast if indicated    [facility]  [address] [place] [address]  [phone]    Transthoracic Echocardiogram  2D, M-mode, Doppler, and Color Doppler    Study date:  2021    Patient: SAGAR PICKETT  MR number: [id number]  Account number: [id number]  : 1955  Age: 66 years  Gender: Female  Status: Outpatient  Location: Bedside  Height: 62 in  Weight: 181 lb  BP: 169/ 78 mmHg    Indications: Shortness of breath.    Diagnoses: R06.02 - Shortness of breath    Sonographer:  DEVIN Christianson  Primary Physician:  Samantha Hernandez MD  Referring Physician:  GERTRUDIS Gonzales  Group:  [facility]  Interpreting Physician:  Althea Mariee DO    SUMMARY    LEFT VENTRICLE:  Systolic function was normal. Ejection fraction was estimated to be 60 %.  There were no regional wall motion abnormalities.  Wall thickness was at the upper limits of normal.  Doppler parameters were consistent with abnormal left ventricular relaxation (grade 1 diastolic dysfunction).    RIGHT VENTRICLE:  The size was normal.  Systolic function was normal.    HISTORY: PRIOR HISTORY: Covid-19 , H/o Cancer, CKD, Emphysema, Dyspnea.    PROCEDURE: The procedure was performed at the bedside. This was a routine study. The transthoracic approach was used. The study included complete 2D  imaging, M-mode, complete spectral Doppler, and color Doppler. The heart rate was 61 bpm,  at the start of the study. Images were obtained from the parasternal, apical, subcostal, and suprasternal notch acoustic windows. Image quality was adequate.    LEFT VENTRICLE: Size was normal. Systolic function was normal. Ejection fraction was estimated to be 60 %. There were no regional wall motion abnormalities. Wall thickness was at the upper limits of normal. DOPPLER: Doppler parameters were  consistent with abnormal left ventricular relaxation (grade 1 diastolic dysfunction). There was no evidence of elevated ventricular filling pressure by Doppler parameters.    RIGHT VENTRICLE: The size was normal. Systolic function was normal. Wall thickness was normal.    LEFT ATRIUM: Size was normal.    RIGHT ATRIUM: Size was normal.    MITRAL VALVE: Valve structure was normal. There was normal leaflet separation. DOPPLER: The transmitral velocity was within the normal range. There was no evidence for stenosis. There was trace regurgitation.    AORTIC VALVE: The valve was trileaflet. Leaflets exhibited normal thickness and normal cuspal separation. DOPPLER: Transaortic velocity was within the normal range. There was no evidence for stenosis. There was no regurgitation.    TRICUSPID VALVE: The valve structure was normal. There was normal leaflet separation. DOPPLER: The transtricuspid velocity was within the normal range. There was no evidence for stenosis. There was trace regurgitation. The tricuspid jet  envelope definition was inadequate for estimation of RV systolic pressure. There are no indirect findings (abnormal RV volume or geometry, altered pulmonary flow velocity profile, or leftward septal displacement) which would suggest  moderate or severe pulmonary hypertension.    PULMONIC VALVE: Leaflets exhibited normal thickness, no calcification, and normal cuspal separation. DOPPLER: The transpulmonic velocity was within the  normal range. There was trace regurgitation.    PERICARDIUM: There was no pericardial effusion. The pericardium was normal in appearance.    AORTA: The root exhibited normal size.    SYSTEMIC VEINS: IVC: The inferior vena cava was normal in size and course. Respirophasic changes were normal.    SYSTEM MEASUREMENT TABLES    2D  %FS: 31.19 %  Ao Diam: 3.25 cm  EDV(Teich): 92.41 ml  EF(Teich): 59.1 %  ESV(Teich): 37.79 ml  IVSd: 0.92 cm  LA Area: 13.47 cm2  LA Diam: 3.49 cm  LVEDV MOD A4C: 62.25 ml  LVEF MOD A4C: 58.87 %  LVESV MOD A4C: 25.6 ml  LVIDd: 4.5 cm  LVIDs: 3.1 cm  LVLd A4C: 7.5 cm  LVLs A4C: 6.11 cm  LVPWd: 0.91 cm  RA Area: 15.09 cm2  RVIDd: 4.19 cm  SV MOD A4C: 36.65 ml  SV(Teich): 54.61 ml    CW  AV MaxP.11 mmHg  AV Vmax: 1.13 m/s    MM  TAPSE: 2.57 cm    PW  E' Sept: 0.09 m/s  E/E' Sept: 8.54  LVOT Vmax: 0.81 m/s  LVOT maxP.62 mmHg  MV A [place]: 0.91 m/s  MV Dec [place]: 4.61 m/s2  MV DecT: 175.25 ms  MV E Khang: 0.81 m/s  MV E/A Ratio: 0.89  MV PHT: 50.82 ms  MVA By PHT: 4.33 cm2    [employer] Accredited Echocardiography Laboratory    Prepared and electronically signed by    Althea Mariee DO  Signed 2021 13:44:30    No results found for this or any previous visit.      This note was completed in part utilizing [employer] direct voice recognition software.   Grammatical errors, random word insertion, spelling mistakes, and incomplete sentences may be an occasional consequence of the system secondary to software limitations, ambient noise and hardware issues. At the time of dictation, efforts were made to edit, clarify and /or correct errors.  Please read the chart carefully and recognize, using context, where substitutions have occurred.  If you have any questions or concerns about the context, text or information contained within the body of this dictation, please contact myself, the provider, for further clarification

## 2024-03-18 NOTE — TELEPHONE ENCOUNTER
Called and left detailed message with provider recommendations. Advised patient to please return call with any further questions or concerns. Call back number left with message.

## 2024-03-19 ENCOUNTER — OFFICE VISIT (OUTPATIENT)
Dept: CARDIOLOGY CLINIC | Facility: CLINIC | Age: 69
End: 2024-03-19
Payer: COMMERCIAL

## 2024-03-19 VITALS
BODY MASS INDEX: 34.23 KG/M2 | OXYGEN SATURATION: 96 % | SYSTOLIC BLOOD PRESSURE: 121 MMHG | HEIGHT: 62 IN | DIASTOLIC BLOOD PRESSURE: 66 MMHG | HEART RATE: 78 BPM | WEIGHT: 186 LBS

## 2024-03-19 DIAGNOSIS — N18.30 STAGE 3 CHRONIC KIDNEY DISEASE, UNSPECIFIED WHETHER STAGE 3A OR 3B CKD (HCC): ICD-10-CM

## 2024-03-19 DIAGNOSIS — R07.89 ATYPICAL CHEST PAIN: Primary | ICD-10-CM

## 2024-03-19 DIAGNOSIS — E78.5 HYPERLIPIDEMIA, UNSPECIFIED HYPERLIPIDEMIA TYPE: ICD-10-CM

## 2024-03-19 DIAGNOSIS — E66.09 CLASS 1 OBESITY DUE TO EXCESS CALORIES WITH SERIOUS COMORBIDITY AND BODY MASS INDEX (BMI) OF 32.0 TO 32.9 IN ADULT: ICD-10-CM

## 2024-03-19 DIAGNOSIS — E78.00 PURE HYPERCHOLESTEROLEMIA: ICD-10-CM

## 2024-03-19 PROCEDURE — 99214 OFFICE O/P EST MOD 30 MIN: CPT | Performed by: NURSE PRACTITIONER

## 2024-03-19 RX ORDER — ATORVASTATIN CALCIUM 40 MG/1
40 TABLET, FILM COATED ORAL
Qty: 90 TABLET | Refills: 3 | Status: SHIPPED | OUTPATIENT
Start: 2024-03-19 | End: 2025-03-14

## 2024-03-19 NOTE — LETTER
"March 19, 2024     Samantha Hernandez MD  [address] [facility]  [address]  [place] [address]    Patient: Noreen Alvarez   YOB: 1955   Date of Visit: 3/19/2024       Dear Dr. Hernandez:    Thank you for referring Noreen Alvarez to me for evaluation. Below are my notes for this consultation.    If you have questions, please do not hesitate to call me. I look forward to following your patient along with you.         Sincerely,        GERTRUDIS Fernandez        CC: No Recipients    GERTRUDIS Fernandez  3/19/2024  4:56 PM  Sign when Signing Visit  Cardiology  Hospital follow Up   Office Visit Note  Noreen Alvarez   68 y.o.   female   MRN: [id number]  [facility] [place]  [address]  [address]  [place] [address]  [phone]    PCP: Samantha Hernandez MD  Cardiologist: will be Dr. Maguire            Summary of recommendation  A heart healthy diet is recommended  A follow up lipid profile has been ordered by her PCP  She can stop losartan.  She  was dizzy while on it; her blood pressure is normal at home without it, and normal in the office.  Follow up will be scheduled with Dr Maguire 3 months        Assessment/plan  Chest pain, atypical.  Recent hospital admission 2/29-3/1/24   SPECT: inferior perfusion defect probably artifact.  Unable to exclude small amount of ischemia  Inferoapical  region  TTE: normal EF.  NO RWMA.   This may be related to a hiatal hernia.  Moderate coronary artery calcification by CT 3/1/24.   /66.-Prescribed losartan 25 mg daily at hospital discharge,, however has not taking it recently at home, because \" I do not have high BP\"   Hyperlipidemia, on atorvastatin 40 mg daily.  2/29/24: LDL  117 follow-up lipids have been ordered by her PCP.   Latest Reference Range & Units 02/25/15 06:39 09/08/20 07:48 02/29/24 05:55   Cholesterol See Comment mg/dL 201 185 207 (H)   Triglycerides See Comment mg/dL 47 74 61   HDL >=50 mg/dL 79 64 78   Non-HDL Cholesterol " mg/dl  121 129   LDL Calculated 0 - 100 mg/dL 113 (H) 106 (H) 117 (H)   CKD stage IIIa.  stable  Severe COPD/ emphysema fropm tobacco. Following with pulm  PFT from 4/21/2022 showed severe airflow obstruction with severe diffusion defect air-trapping and hyperinflation consistent with emphysema.   On azithromycin chronically, prophylactically  History of laryngeal cancer 2015, treated with radiation therapy and chemotherapy.  Postradiation changes in the neck.  Severe persistent asthma without complication  Chronic respiratory failure with hypoxia, on home O2  ELIUD, suspected. an overnight diagnostic sleep study was negative.   Former tobacco, in remission since 2014  Obesity, BMI 34  Cardiac testing  TTE 7/2021.EF 60%. No RWMA. Gr 1 DD. RV normal size and fxn. Aortic valve was trileaflet. Leaflets exhibited normal thickness and normal cuspal separation. There was no evidence for stenosis. There was no regurgitation.  TTE 1/29/2024 LV function normal.  Aortic valve not well-visualized cannot rule out bicuspid.  No significant stenosis  SPECT 3/1/24. No diagnostic evidence of moderate to large amount of ischemia or infarct.LVEF normal - 65%.There is an inferior perfusion defect, but it appears to mostly improve with prone imaging, suggesting probably related to artifact.  But a small amount of ischemia in the inferoapical region cannot be completely excluded.  Clinical correlation suggested.                        DIOGENES Alvarez is a 68-year-old female with tobacco abuse, severe COPD,  chronic hypoxic respiratory failure  with oxygen dependency, hypertension,CKD  ELIUD,  and laryngeal cancer S/P radiation and chemotherapy, and hyperlipidemia      ADM 1/23-1/29/24  Dx AECOPD, + RSV  BNP 73  Trop 4  Given nebs, prednisone, OP sleep med follow up  Elevated BP readings without a dx of HTN      Adm 2/29-3/5/24  CC:squeezing chest pain and SOB  Nebulizer Rx per EMS did help a little  BP on arrival: 184/81  ECG: NSR Non  "specific STTW changes inferiorally, TWI laterally  Trop peaked at 236  Followed by Cardiology  SPECT:inferior perfusion defect, but it appears to mostly improve with prone imaging, suggesting probably related to artifact. But a small amount of ischemia in the inferoapical region cannot be completely excluded   CP felt to be atypical by rounding cardiology team  Pulmnology consulted   home nebulizer treatments were changed from PRN to TID  CT Chest- no acute findings  Also evaluated by GI- reported spontaneous regurgitation of food  As patient was not on home medications for blood pressure or HLD, she was started on losartan 25 mg daily and atorvastatin 40 mg daily on her first day of hospitalization.  Also DCd on protonix 40 mg BID,   Etiology of symptoms not clear however felt to be noncardiac.    3/19/24  Hospital follow-up; she is accompanied by her .   [id number]  ROS: she has epigastric discomfort when she bends over and when she eats. She feels this is from a hiatal hernia.  She has no chest pain with exertion.  She denies shortness of breath.  She has chronic dyspnea, controlled.  She is wearing her oxygen per her usual.  She has not been taking her losartan at home.  She tells me \"my blood pressure has been good\".  She had occasional dizziness when she was taking it, improved off of it.  Her blood pressure is satisfactory 121/66, in the office.  She has been adherent to her atorvastatin; without side effects.  Follow-up lipids have been ordered by her PCP.  She can stop the losartan.  She will follow-up with her cardiologist in a short interval        Assessment  Diagnoses and all orders for this visit:    Atypical chest pain  -     Ambulatory Referral to Cardiology    Class 1 obesity due to excess calories with serious comorbidity and body mass index (BMI) of 32.0 to 32.9 in adult    Pure hypercholesterolemia    Stage 3 chronic kidney disease, unspecified whether stage 3a or 3b CKD " (HCC)    Hyperlipidemia, unspecified hyperlipidemia type  -     atorvastatin (LIPITOR) 40 mg tablet; Take 1 tablet (40 mg total) by mouth daily with dinner          Past Medical History:   Diagnosis Date   • Acute kidney failure (HCC)    • Allergic    • Allergies    • Anemia    • Asthma    • Cancer (HCC)    • Cataract    • Chronic kidney disease (CKD), stage III (moderate) (HCC)    • COPD (chronic obstructive pulmonary disease) (HCC)    • COVID-19     Covid + 9-7-56-cough at that time now fully resolved   • Disease of thyroid gland    • Essential hypertension    • GERD (gastroesophageal reflux disease)    • Glaucoma    • High blood pressure    • HTN (hypertension) 02/16/2021   • Hyperlipidemia    • Hypothyroidism    • Mesenteric lymphadenopathy 07/13/2021   • Pulmonary hypertension (HCC)    • 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   Cardiovascular:  Negative for chest pain, claudication, cyanosis, dyspnea on exertion, irregular heartbeat, leg swelling, near-syncope, orthopnea, palpitations, paroxysmal nocturnal dyspnea and syncope.   Respiratory:  Negative for cough and shortness of breath.    Gastrointestinal:  Negative for heartburn and nausea.        See HPI   Neurological:  Negative for dizziness, focal weakness, headaches, light-headedness and weakness.   All other systems reviewed and are negative.      Allergies   Allergen Reactions   • Cefdinir Hives   • Amifostine Rash   • Pollen Extract Allergic Rhinitis     .    Current Outpatient Medications:   •  albuterol (PROVENTIL HFA,VENTOLIN HFA) 90 mcg/act inhaler, Inhale 2 puffs every 6 (six) hours as needed for wheezing or shortness of breath, Disp: 18 g, Rfl: 5  •  atorvastatin (LIPITOR) 40 mg tablet, Take 1 tablet (40 mg total) by mouth daily with dinner, Disp: 90 tablet, Rfl: 3  •  pantoprazole (PROTONIX) 40 mg tablet, Take 1  tablet (40 mg total) by mouth 2 (two) times a day, Disp: 30 tablet, Rfl: 0  •  azithromycin (ZITHROMAX) 250 mg tablet, Take 1 tablet (250 mg total) by mouth every other day for 45 doses, Disp: 45 tablet, Rfl: 0  •  benzonatate (TESSALON PERLES) 100 mg capsule, Take 1 capsule (100 mg total) by mouth 3 (three) times a day, Disp: 20 capsule, Rfl: 0  •  brimonidine tartrate 0.2 % ophthalmic solution, , Disp: , Rfl:   •  Budeson-Glycopyrrol-Formoterol (Breztri Aerosphere) 160-9-4.8 MCG/ACT AERO, Inhale 2 puffs 2 (two) times a day Rinse mouth after use., Disp: 32.1 g, Rfl: 0  •  Diclofenac Sodium (VOLTAREN) 1 %, APPLY 2 GRAMS TO AFFECTED AREA 4 TIMES A DAY, Disp: 100 g, Rfl: 5  •  ferrous sulfate 324 (65 Fe) mg, Take 1 tablet (324 mg total) by mouth daily after lunch, Disp: 90 tablet, Rfl: 1  •  fluticasone (FLONASE) 50 mcg/act nasal spray, SPRAY 1 SPRAY INTO EACH NOSTRIL EVERY DAY, Disp: 24 mL, Rfl: 2  •  ipratropium (ATROVENT) 0.06 % nasal spray, 2 SPRAYS INTO EACH NOSTRIL 3 (THREE) TIMES A DAY FOR INCREASED NASAL SECRETION, Disp: 45 mL, Rfl: 1  •  ipratropium-albuterol (DUO-NEB) 0.5-2.5 mg/3 mL nebulizer solution, Take 3 mL by nebulization every 8 (eight) hours as needed for wheezing or shortness of breath, Disp: 270 mL, Rfl: 1  •  levothyroxine 75 mcg tablet, Take 1 tablet (75 mcg total) by mouth daily in the early morning, Disp: 90 tablet, Rfl: 0  •  montelukast (SINGULAIR) 10 mg tablet, Take 1 tablet (10 mg total) by mouth daily at bedtime, Disp: 90 tablet, Rfl: 1  •  pantoprazole (PROTONIX) 40 mg tablet, Take 1 tablet (40 mg total) by mouth 2 (two) times a day, Disp: 60 tablet, Rfl: 0  •  polyethylene glycol (GLYCOLAX) 17 GM/SCOOP powder, DISSOLVE 17 GRAMS INTO WATER AND DRINK BY MOUTH EVERY DAY, Disp: 510 g, Rfl: 0  •  sertraline (ZOLOFT) 25 mg tablet, Take 1 tablet (25 mg total) by mouth daily at bedtime, Disp: 90 tablet, Rfl: 1  •  sucralfate (CARAFATE) 1 g tablet, Take 1 tablet (1 g total) by mouth 2 (two)  times a day before meals for 14 days, Disp: 28 tablet, Rfl: 0  •  timolol (TIMOPTIC) 0.5 % ophthalmic solution, INSTILL 1 DROP INTO EACH EYE TWO TIMES A DAY, Disp: , Rfl:         Social History     Socioeconomic History   • Marital status: /Civil Union     Spouse name: Not on file   • Number of children: Not on file   • Years of education: Not on file   • Highest education level: Not on file   Occupational History   • Not on file   Tobacco Use   • Smoking status: Former     Current packs/day: 0.00     Average packs/day: 1 pack/day for 40.0 years (40.0 ttl pk-yrs)     Types: Cigarettes     Start date: 1974     Quit date: 2014     Years since quitting: 10.2   • Smokeless tobacco: Never   Vaping Use   • Vaping status: Never Used   Substance and Sexual Activity   • Alcohol use: Never     Comment: None   • Drug use: Never     Comment: Denies   • Sexual activity: Not Currently     Partners: Female     Birth control/protection: Surgical   Other Topics Concern   • Not on file   Social History Narrative    Most recent tobacco use screenin2020    Do you currently or have you served in the [employer]: No    Were you activated, into active duty, as a member of the [employer] or as a Reservist: No    Marital status:     Sexual orientation: Heterosexual    Exercise level: Occasional    Diet: Regular    General stress level: Low    Has smoked since age: 19    Alcohol intake: None    Caffeine intake: Occasional    Chewing tobacco: none    Illicit drugs: Denies    Guns present in home: No    Seat belts used routinely: Yes    Sunscreen used routinely: Yes    Smoke alarm in home: Yes    Advance directive: No    Salt Intake: HTN Diet    Has the Patient had a mammogram to screen for breast cancer within 24 months: Yes    Would the patient like to schedule a Mammogram: No    Is the patient interested in a colorectal cancer screening: No    Live alone or with others: with others    Sexually active:  No    Do you feel safe at home: Yes     Social Determinants of Health     Financial Resource Strain: Medium Risk (1/30/2024)    Overall Financial Resource Strain (CARDIA)    • Difficulty of Paying Living Expenses: Somewhat hard   Food Insecurity: No Food Insecurity (3/1/2024)    Hunger Vital Sign    • Worried About Running Out of Food in the Last Year: Never true    • Ran Out of Food in the Last Year: Never true   Transportation Needs: No Transportation Needs (3/1/2024)    PRAPARE - Transportation    • Lack of Transportation (Medical): No    • Lack of Transportation (Non-Medical): No   Physical Activity: Not on file   Stress: Not on file   Social Connections: Not on file   Intimate Partner Violence: Not on file   Housing Stability: Low Risk  (3/1/2024)    Housing Stability Vital Sign    • Unable to Pay for Housing in the Last Year: No    • Number of Places Lived in the Last Year: 1    • Unstable Housing in the Last Year: No       Family History   Problem Relation Age of Onset   • Other Mother         respiratory disorder   • Sudden death Father         shot himself   • Suicidality Father    • Brain cancer Sister    • Diabetes Sister    • Breast cancer Sister    • Cancer Sister 62        pancreatic cancer   • No Known Problems Sister    • No Known Problems Sister    • No Known Problems Sister    • No Known Problems Sister    • No Known Problems Sister    • No Known Problems Sister    • No Known Problems Daughter    • No Known Problems Daughter    • No Known Problems Maternal Grandmother    • No Known Problems Maternal Grandfather    • No Known Problems Paternal Grandmother    • No Known Problems Paternal Grandfather    • No Known Problems Maternal Aunt    • No Known Problems Maternal Aunt    • No Known Problems Maternal Aunt    • No Known Problems Maternal Aunt    • No Known Problems Maternal Aunt    • No Known Problems Paternal Aunt    • No Known Problems Paternal Aunt    • No Known Problems Paternal Aunt    • No  "Known Problems Paternal Aunt        Physical Exam  Vitals and nursing note reviewed.   Constitutional:       General: She is not in acute distress.     Appearance: She is not diaphoretic.   HENT:      Head: Normocephalic and atraumatic.   Eyes:      Conjunctiva/sclera: Conjunctivae normal.   Cardiovascular:      Rate and Rhythm: Normal rate and regular rhythm.      Pulses: Intact distal pulses.      Heart sounds: Normal heart sounds.   Pulmonary:      Effort: Pulmonary effort is normal.      Breath sounds: Normal breath sounds.   Abdominal:      General: Bowel sounds are normal.      Palpations: Abdomen is soft.   Musculoskeletal:         General: Normal range of motion.      Cervical back: Normal range of motion and neck supple.   Skin:     General: Skin is warm and dry.   Neurological:      Mental Status: She is alert and oriented to person, place, and time.         Vitals: Blood pressure 121/66, pulse 78, height 5' 2\" (1.575 m), weight 84.4 kg (186 lb), SpO2 96%.   Wt Readings from Last 3 Encounters:   03/19/24 84.4 kg (186 lb)   03/08/24 84.4 kg (186 lb)   02/28/24 78.9 kg (174 lb)         Labs & Results:  Lab Results   Component Value Date    WBC 6.14 03/05/2024    HGB 10.7 (L) 03/05/2024    HCT 37.1 03/05/2024    MCV 91 03/05/2024     03/05/2024     BNP   Date Value Ref Range Status   01/23/2024 73 0 - 100 pg/mL Final   01/01/2024 44 0 - 100 pg/mL Final     No components found for: \"CHEM\"  Troponin I   Date Value Ref Range Status   07/02/2021 <0.02 <=0.04 ng/mL Final     Comment:     Autovalidation override  Siemens Chemistry analyzer 99% cutoff is > 0.04 ng/mL in network labs     o cTnI 99% cutoff is useful only when applied to patients in the clinical setting of myocardial ischemia   o cTnI 99% cutoff should be interpreted in the context of clinical history, ECG findings and possibly cardiac imaging to establish correct diagnosis.   o cTnI 99% cutoff may be suggestive but clearly not indicative of a " coronary event without the clinical setting of myocardial ischemia.     2021 <0.02 <=0.04 ng/mL Final     Comment:     Autovalidation override  Siemens Chemistry analyzer 99% cutoff is > 0.04 ng/mL in network labs     o cTnI 99% cutoff is useful only when applied to patients in the clinical setting of myocardial ischemia   o cTnI 99% cutoff should be interpreted in the context of clinical history, ECG findings and possibly cardiac imaging to establish correct diagnosis.   o cTnI 99% cutoff may be suggestive but clearly not indicative of a coronary event without the clinical setting of myocardial ischemia.     2021 <0.02 <=0.04 ng/mL Final     Comment:     Autovalidation override  Siemens Chemistry analyzer 99% cutoff is > 0.04 ng/mL in network labs     o cTnI 99% cutoff is useful only when applied to patients in the clinical setting of myocardial ischemia   o cTnI 99% cutoff should be interpreted in the context of clinical history, ECG findings and possibly cardiac imaging to establish correct diagnosis.   o cTnI 99% cutoff may be suggestive but clearly not indicative of a coronary event without the clinical setting of myocardial ischemia.       Results for orders placed during the hospital encounter of 21    Echo complete with contrast if indicated    [id number] [place], [place] [id number] [phone]    Transthoracic Echocardiogram  2D, M-mode, Doppler, and Color Doppler    Study date:  2021    Patient: SAGAR PICKETT  MR number: [id number]  Account number: [id number]  : 1955  Age: 66 years  Gender: Female  Status: Outpatient  Location: Bedside  Height: 62 in  Weight: 181 lb  BP: 169/ 78 mmHg    Indications: Shortness of breath.    Diagnoses: R06.02 - Shortness of breath    Sonographer:  DEVIN Christianson  Primary Physician:  Samantha Hernandez MD  Referring Physician:  GERTRUDIS Gonzaels  Group:  [facility]  Interpreting  Physician:  Althea Mariee DO    SUMMARY    LEFT VENTRICLE:  Systolic function was normal. Ejection fraction was estimated to be 60 %.  There were no regional wall motion abnormalities.  Wall thickness was at the upper limits of normal.  Doppler parameters were consistent with abnormal left ventricular relaxation (grade 1 diastolic dysfunction).    RIGHT VENTRICLE:  The size was normal.  Systolic function was normal.    HISTORY: PRIOR HISTORY: Covid-19 1/21, H/o Cancer, CKD, Emphysema, Dyspnea.    PROCEDURE: The procedure was performed at the bedside. This was a routine study. The transthoracic approach was used. The study included complete 2D imaging, M-mode, complete spectral Doppler, and color Doppler. The heart rate was 61 bpm,  at the start of the study. Images were obtained from the parasternal, apical, subcostal, and suprasternal notch acoustic windows. Image quality was adequate.    LEFT VENTRICLE: Size was normal. Systolic function was normal. Ejection fraction was estimated to be 60 %. There were no regional wall motion abnormalities. Wall thickness was at the upper limits of normal. DOPPLER: Doppler parameters were  consistent with abnormal left ventricular relaxation (grade 1 diastolic dysfunction). There was no evidence of elevated ventricular filling pressure by Doppler parameters.    RIGHT VENTRICLE: The size was normal. Systolic function was normal. Wall thickness was normal.    LEFT ATRIUM: Size was normal.    RIGHT ATRIUM: Size was normal.    MITRAL VALVE: Valve structure was normal. There was normal leaflet separation. DOPPLER: The transmitral velocity was within the normal range. There was no evidence for stenosis. There was trace regurgitation.    AORTIC VALVE: The valve was trileaflet. Leaflets exhibited normal thickness and normal cuspal separation. DOPPLER: Transaortic velocity was within the normal range. There was no evidence for stenosis. There was no regurgitation.    TRICUSPID VALVE: The  valve structure was normal. There was normal leaflet separation. DOPPLER: The transtricuspid velocity was within the normal range. There was no evidence for stenosis. There was trace regurgitation. The tricuspid jet  envelope definition was inadequate for estimation of RV systolic pressure. There are no indirect findings (abnormal RV volume or geometry, altered pulmonary flow velocity profile, or leftward septal displacement) which would suggest  moderate or severe pulmonary hypertension.    PULMONIC VALVE: Leaflets exhibited normal thickness, no calcification, and normal cuspal separation. DOPPLER: The transpulmonic velocity was within the normal range. There was trace regurgitation.    PERICARDIUM: There was no pericardial effusion. The pericardium was normal in appearance.    AORTA: The root exhibited normal size.    SYSTEMIC VEINS: IVC: The inferior vena cava was normal in size and course. Respirophasic changes were normal.    SYSTEM MEASUREMENT TABLES    2D  %FS: 31.19 %  Ao Diam: 3.25 cm  EDV(Teich): 92.41 ml  EF(Teich): 59.1 %  ESV(Teich): 37.79 ml  IVSd: 0.92 cm  LA Area: 13.47 cm2  LA Diam: 3.49 cm  LVEDV MOD A4C: 62.25 ml  LVEF MOD A4C: 58.87 %  LVESV MOD A4C: 25.6 ml  LVIDd: 4.5 cm  LVIDs: 3.1 cm  LVLd A4C: 7.5 cm  LVLs A4C: 6.11 cm  LVPWd: 0.91 cm  RA Area: 15.09 cm2  RVIDd: 4.19 cm  SV MOD A4C: 36.65 ml  SV(Teich): 54.61 ml    CW  AV MaxP.11 mmHg  AV Vmax: 1.13 m/s    MM  TAPSE: 2.57 cm    PW  E' Sept: 0.09 m/s  E/E' Sept: 8.54  LVOT Vmax: 0.81 m/s  LVOT maxP.62 mmHg  MV A Khang: 0.91 m/s  MV Dec Pershing: 4.61 m/s2  MV DecT: 175.25 ms  MV E Khang: 0.81 m/s  MV E/A Ratio: 0.89  MV PHT: 50.82 ms  MVA By PHT: 4.33 cm2    [employer] Accredited Echocardiography Laboratory    Prepared and electronically signed by    Althea Mariee DO  Signed 02-Jul-2021 13:44:30    No results found for this or any previous visit.      This note was completed in part utilizing m-modal fluency direct voice  recognition software.   Grammatical errors, random word insertion, spelling mistakes, and incomplete sentences may be an occasional consequence of the system secondary to software limitations, ambient noise and hardware issues. At the time of dictation, efforts were made to edit, clarify and /or correct errors.  Please read the chart carefully and recognize, using context, where substitutions have occurred.  If you have any questions or concerns about the context, text or information contained within the body of this dictation, please contact myself, the provider, for further clarification

## 2024-03-20 ENCOUNTER — TELEPHONE (OUTPATIENT)
Age: 69
End: 2024-03-20

## 2024-03-20 ENCOUNTER — OFFICE VISIT (OUTPATIENT)
Dept: HEMATOLOGY ONCOLOGY | Facility: CLINIC | Age: 69
End: 2024-03-20
Payer: COMMERCIAL

## 2024-03-20 VITALS
HEIGHT: 62 IN | OXYGEN SATURATION: 92 % | TEMPERATURE: 97.3 F | HEART RATE: 71 BPM | RESPIRATION RATE: 17 BRPM | SYSTOLIC BLOOD PRESSURE: 122 MMHG | WEIGHT: 185.5 LBS | BODY MASS INDEX: 34.14 KG/M2 | DIASTOLIC BLOOD PRESSURE: 76 MMHG

## 2024-03-20 DIAGNOSIS — Z85.21 HISTORY OF LARYNGEAL CANCER: Primary | ICD-10-CM

## 2024-03-20 PROCEDURE — 99213 OFFICE O/P EST LOW 20 MIN: CPT | Performed by: INTERNAL MEDICINE

## 2024-03-20 NOTE — PROGRESS NOTES
Hematology Outpatient Follow - Up Note  Noreen Alvarez 68 y.o. female MRN: @ Encounter: [id number]        Date:  3/20/2024        Assessment/ Plan:    68-year-old  female with remote history of head and neck cancer in 2016 treated with radiation and chemotherapy, subsequent CT scan showed nodules in the left upper lobe, right lower lobe, AP window however PET scan does not show any active uptake this is most likely reactive/aspiration    Most recent repeat CAT scan of the chest on 8/3/2024 showed stable right lower, left lower nodule, disappearance of another left nodule    She follows up with pulmonary, she is on oxygen, we suggest repeat CAT scan of the chest in 6 to 12-month        Labs and imaging studies are reviewed by ordering provider once results are available. If there are findings that need immediate attention, you will be contacted when results available.   Discussing results and the implication on your healthcare is best discussed in person at your follow-up visit.       HPI:    68-year-old  female with COPD, previous smoking, hiatal hernia, she had a history of head and neck cancer in 2016 treated with chemotherapy and radiation therapy by Dr. Brooks without any evidence of recurrence thereafter    Subsequently she had lung nodules as such CAT scan in 2021 showed left upper lobe lung nodule measuring 1.2 cm and AP window lymph node, PET scan did not show any active uptake favoring benign etiology versus aspiration    CT scan of the chest on 3/1/2024 showed moderate upper lobe emphysema, unchanged 1.1 cm right lower lobe  groundglass nodule unchanged 1 cm left lower lobe solid nodule no new enlarging or suspicious pulmonary nodules      Interval History:        Previous Treatment:         Test Results:    Imaging: EGD    Result Date: 3/4/2024  Narrative: Table formatting from the original result was not included. [facility] [address] [place]  [id number] [phone] DATE OF SERVICE: 3/04/24 PHYSICIAN(S): Attending: Sidra Raymundo MD Fellow: No Staff Documented INDICATION: Atypical chest pain POST-OP DIAGNOSIS: See the impression below. PREPROCEDURE: Informed consent was obtained for the procedure, including sedation.  Risks of perforation, hemorrhage, adverse drug reaction and aspiration were discussed. The patient was placed in the left lateral decubitus position. Patient was explained about the risks and benefits of the procedure. Risks including but not limited to bleeding, infection, and perforation were explained in detail. Also explained about less than 100% sensitivity with the exam and other alternatives. PROCEDURE: EGD DETAILS OF PROCEDURE: Patient was taken to the procedure room where a time out was performed to confirm correct patient and correct procedure. The patient underwent monitored anesthesia care, which was administered by an anesthesia professional. The patient's blood pressure, heart rate, level of consciousness, respirations and oxygen were monitored throughout the procedure. The scope was advanced to the second part of the duodenum. Retroflexion was performed in the fundus. The patient experienced no blood loss. The procedure was not difficult. The patient tolerated the procedure well. There were no apparent adverse events. ANESTHESIA INFORMATION: ASA: III Anesthesia Type: IV Sedation with Anesthesia MEDICATIONS: No administrations occurring from 0835 to 0853 on 03/04/24 FINDINGS: 2 cm hiatal hernia Esophagus-normal mucosa status post biopsies from the distal part empirically passed 52 [place] bougie down the upper esophageal sphincter without any difficulty noted the mucosal trauma. The stomach and duodenum appeared normal. SPECIMENS: ID Type Source Tests Collected by Time Destination 1 : Cold BX Distal esophagus Tissue Esophagus TISSUE EXAM Sidra Raymundo MD 3/4/2024  8:45 AM      Impression: 2 cm hiatal hernia Esophagus-normal mucosa  status post biopsies from the distal part empirically passed 52 [place] bougie down the upper esophageal sphincter without any difficulty noted the mucosal trauma. The stomach and duodenum appeared normal. RECOMMENDATION:  Await pathology results  Liquid diet and advance as tolerated.  Advised to chew well before swallowing    Sidra Raymundo MD     CT chest wo contrast    Result Date: 3/2/2024  Narrative: CT CHEST WITHOUT IV CONTRAST INDICATION: Pleural effusion & underlying infiltrate; known adenocarcinoma spectrum lesions, overdue for follow up.  Hypoxic.. COMPARISON: CT of the chest abdomen and pelvis 7/31/2023. TECHNIQUE: CT examination of the chest was performed without intravenous contrast. Multiplanar 2D reformatted images were created from the source data. This examination, like all CT scans performed in the [facility], was performed utilizing techniques to minimize radiation dose exposure, including the use of iterative reconstruction and automated exposure control. Radiation dose length product (DLP) for this visit: 349 mGy-cm FINDINGS: LUNGS: Respiratory motion artifact in the lower lobes. Moderate upper lobe predominant centrilobular emphysema. Mild scarring/atelectasis in the right middle lobe and lingula. Unchanged 1.1 cm right lower lobe groundglass nodule (series 302, image 149). Unchanged 1.0 cm lobulated left lower lobe solid nodule. Previously seen medial left lower lobe nodule has resolved. No new or enlarging suspicious pulmonary nodules. Patent central airways. PLEURA: No pleural effusion. No pneumothorax. HEART/GREAT VESSELS: Normal heart size. Moderate coronary artery calcification. No thoracic aortic aneurysm. MEDIASTINUM AND KAYY: Unremarkable. CHEST WALL AND LOWER NECK: Unremarkable. 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 OSSEOUS STRUCTURES: No acute fracture or destructive osseous lesion.      Impression: Resolved 4 mm left lower lobe pulmonary nodule compared to 7/31/2023, likely infectious/inflammatory on that study. Other pulmonary nodules are stable. Continued annual surveillance recommended. Workstation performed: [id number]     NM myocardial perfusion spect (rx stress and/or rest)    Result Date: 3/1/2024  Narrative:   Stress ECG: No new ST deviation is noted. The ECG was not diagnostic due to pharmacological (vasodilator) stress.   Stress Function: Left ventricular function post-stress is normal. Stress ejection fraction is 65%.   Stress Combined Conclusion: Left ventricular perfusion is probably normal with artifact.   Perfusion: There is a left ventricular perfusion defect that is medium in size with mild reduction in uptake present in the entire inferior location(s) that is partially reversible.  It appears to mostly resolve with prone imaging, although a very small area in the apex persists. The defect appears to be probable artifact caused by diaphragmatic activity. No diagnostic evidence of moderate to large amount of ischemia or infarct. LVEF normal - 65% There is an inferior perfusion defect, but it appears to mostly improve with prone imaging, suggesting probably related to artifact.  But a small amount of ischemia in the inferoapical region cannot be completely excluded.  Clinical correlation suggested.     Stress strip    Result Date: 3/1/2024  Narrative: Confirmed by VALENTIN LAUGHLIN ([id number]),  Lakshmi Romero ([id number]) on 3/1/2024 10:54:42 AM    XR chest 1 view portable    Result Date: 2/29/2024  Narrative: XR CHEST PORTABLE INDICATION: shortness of breath. COMPARISON: 1/27/2024 FINDINGS: There is hazy bibasilar opacity which may represent a combination of layered effusion and parenchymal opacity. No pneumothorax. Normal cardiomediastinal silhouette. No acute osseous abnormality within the limitations of portable radiography. Normal upper abdomen.     Impression: There is hazy  bibasilar opacity which may represent a combination of layered effusion and parenchymal opacity. Workstation performed: [id number]       Labs:   Lab Results   Component Value Date    WBC 6.14 03/05/2024    HGB 10.7 (L) 03/05/2024    HCT 37.1 03/05/2024    MCV 91 03/05/2024     03/05/2024     Lab Results   Component Value Date     02/25/2015    K 4.0 03/05/2024    CL 99 03/05/2024    CO2 37 (H) 03/05/2024    ANIONGAP 3 (L) 02/25/2015    BUN 13 03/05/2024    CREATININE 0.89 03/05/2024    GLUCOSE 83 02/25/2015    GLUF 97 02/23/2022    CALCIUM 9.3 03/05/2024    AST 23 02/28/2024    ALT 13 02/28/2024    ALKPHOS 96 02/28/2024    PROT 6.9 02/25/2015    BILITOT 0.3 02/25/2015    EGFR 66 03/05/2024       Lab Results   Component Value Date    IRON 65 04/27/2023    TIBC 336 04/27/2023    FERRITIN 57 04/27/2023       Lab Results   Component Value Date    UOFICGSM65 1,273 (H) 02/25/2015         ROS: Review of Systems   Constitutional:  Negative for appetite change, chills, diaphoresis, fatigue and unexpected weight change.   HENT:   Negative for mouth sores, nosebleeds, sore throat, trouble swallowing and voice change.    Eyes:  Negative for eye problems and icterus.   Respiratory:  Negative for chest tightness, cough, hemoptysis and wheezing.    Cardiovascular:  Negative for chest pain, leg swelling and palpitations.   Gastrointestinal:  Positive for abdominal distention and constipation. Negative for abdominal pain, blood in stool, diarrhea, nausea and vomiting.   Endocrine: Negative for hot flashes.   Genitourinary:  Negative for bladder incontinence, difficulty urinating, dyspareunia, dysuria and frequency.    Musculoskeletal:  Positive for arthralgias and back pain. Negative for gait problem, neck pain and neck stiffness.   Skin:  Negative for itching and rash.   Neurological:  Negative for dizziness, gait problem, headaches, numbness, seizures and speech difficulty.   Hematological:  Does not bruise/bleed  easily.   Psychiatric/Behavioral:  Negative for decreased concentration, depression, sleep disturbance and suicidal ideas. The patient is not nervous/anxious.           Current Medications: Reviewed  Allergies: Reviewed  PMH/FH/SH:  Reviewed      Physical Exam:    Body surface area is 1.85 meters squared.    Wt Readings from Last 3 Encounters:   03/20/24 84.1 kg (185 lb 8 oz)   03/19/24 84.4 kg (186 lb)   03/08/24 84.4 kg (186 lb)        Temp Readings from Last 3 Encounters:   03/20/24 (!) 97.3 °F (36.3 °C) (Temporal)   03/08/24 98.2 °F (36.8 °C) (Tympanic)   03/05/24 98.2 °F (36.8 °C)        BP Readings from Last 3 Encounters:   03/20/24 122/76   03/19/24 121/66   03/08/24 130/70         Pulse Readings from Last 3 Encounters:   03/20/24 71   03/19/24 78   03/08/24 84        Physical Exam  Vitals reviewed.   Constitutional:       General: She is not in acute distress.     Appearance: She is well-developed. She is obese. She is not diaphoretic.   HENT:      Head: Normocephalic and atraumatic.   Eyes:      Conjunctiva/sclera: Conjunctivae normal.   Neck:      Trachea: No tracheal deviation.      Comments: Subcutaneous radiation fibrosis of the cervical area  Cardiovascular:      Rate and Rhythm: Normal rate and regular rhythm.      Heart sounds: No murmur heard.     No friction rub. No gallop.   Pulmonary:      Effort: Pulmonary effort is normal. No respiratory distress.      Breath sounds: Normal breath sounds. No wheezing or rales.   Chest:      Chest wall: No tenderness.   Abdominal:      General: There is no distension.      Palpations: Abdomen is soft.      Tenderness: There is no abdominal tenderness.   Musculoskeletal:      Cervical back: Normal range of motion and neck supple.   Lymphadenopathy:      Cervical: No cervical adenopathy.   Skin:     General: Skin is warm and dry.      Coloration: Skin is not pale.      Findings: No erythema.   Neurological:      Mental Status: She is alert and oriented to person,  place, and time.   Psychiatric:         Behavior: Behavior normal.         Thought Content: Thought content normal.         Judgment: Judgment normal.         ECOG:    Goals and Barriers:  Current Goal: Minimize effects of disease.   Barriers: None.      Patient's Capacity to Self Care:  Patient is able to self care.    Code Status: [unfilled]

## 2024-03-21 DIAGNOSIS — J30.1 SEASONAL ALLERGIC RHINITIS DUE TO POLLEN: ICD-10-CM

## 2024-03-21 RX ORDER — FLUTICASONE PROPIONATE 50 MCG
SPRAY, SUSPENSION (ML) NASAL
Qty: 48 ML | Refills: 1 | Status: SHIPPED | OUTPATIENT
Start: 2024-03-21

## 2024-03-24 DIAGNOSIS — J47.9 BRONCHIECTASIS WITHOUT COMPLICATION (HCC): ICD-10-CM

## 2024-03-24 RX ORDER — IPRATROPIUM BROMIDE AND ALBUTEROL SULFATE 2.5; .5 MG/3ML; MG/3ML
3 SOLUTION RESPIRATORY (INHALATION) EVERY 8 HOURS PRN
Qty: 270 ML | Refills: 1 | Status: SHIPPED | OUTPATIENT
Start: 2024-03-24

## 2024-03-27 ENCOUNTER — TELEPHONE (OUTPATIENT)
Age: 69
End: 2024-03-27

## 2024-03-27 NOTE — TELEPHONE ENCOUNTER
Jonatan from [facility] pharmacy    will be faxing over a request for O2 testing clinical notes from 9/18/2023 - to present

## 2024-04-01 ENCOUNTER — TELEPHONE (OUTPATIENT)
Dept: GASTROENTEROLOGY | Facility: AMBULARY SURGERY CENTER | Age: 69
End: 2024-04-01

## 2024-04-01 ENCOUNTER — TELEPHONE (OUTPATIENT)
Dept: CARDIAC REHAB | Facility: CLINIC | Age: 69
End: 2024-04-01

## 2024-04-01 ENCOUNTER — TRANSCRIBE ORDERS (OUTPATIENT)
Dept: CARDIAC REHAB | Facility: CLINIC | Age: 69
End: 2024-04-01

## 2024-04-01 DIAGNOSIS — J44.9 CHRONIC OBSTRUCTIVE PULMONARY DISEASE, UNSPECIFIED COPD TYPE (HCC): Primary | ICD-10-CM

## 2024-04-01 DIAGNOSIS — Z12.11 COLON CANCER SCREENING: Primary | ICD-10-CM

## 2024-04-01 RX ORDER — BISACODYL 5 MG/1
20 TABLET, DELAYED RELEASE ORAL ONCE
Qty: 4 TABLET | Refills: 0 | Status: SHIPPED | OUTPATIENT
Start: 2024-04-01 | End: 2024-04-01

## 2024-04-02 ENCOUNTER — CLINICAL SUPPORT (OUTPATIENT)
Dept: PULMONOLOGY | Facility: CLINIC | Age: 69
End: 2024-04-02
Payer: COMMERCIAL

## 2024-04-02 ENCOUNTER — OFFICE VISIT (OUTPATIENT)
Dept: FAMILY MEDICINE CLINIC | Facility: CLINIC | Age: 69
End: 2024-04-02
Payer: COMMERCIAL

## 2024-04-02 VITALS
RESPIRATION RATE: 20 BRPM | TEMPERATURE: 96.5 F | OXYGEN SATURATION: 90 % | BODY MASS INDEX: 31.51 KG/M2 | HEIGHT: 64 IN | SYSTOLIC BLOOD PRESSURE: 126 MMHG | WEIGHT: 184.6 LBS | HEART RATE: 74 BPM | DIASTOLIC BLOOD PRESSURE: 58 MMHG

## 2024-04-02 DIAGNOSIS — K21.9 GASTROESOPHAGEAL REFLUX DISEASE WITHOUT ESOPHAGITIS: ICD-10-CM

## 2024-04-02 DIAGNOSIS — J44.9 CHRONIC OBSTRUCTIVE PULMONARY DISEASE, UNSPECIFIED COPD TYPE (HCC): Primary | ICD-10-CM

## 2024-04-02 DIAGNOSIS — R07.89 ATYPICAL CHEST PAIN: ICD-10-CM

## 2024-04-02 DIAGNOSIS — K59.09 CHRONIC CONSTIPATION: ICD-10-CM

## 2024-04-02 DIAGNOSIS — Z12.31 ENCOUNTER FOR SCREENING MAMMOGRAM FOR BREAST CANCER: ICD-10-CM

## 2024-04-02 DIAGNOSIS — Z71.89 ADVANCED CARE PLANNING/COUNSELING DISCUSSION: ICD-10-CM

## 2024-04-02 DIAGNOSIS — F41.1 GAD (GENERALIZED ANXIETY DISORDER): ICD-10-CM

## 2024-04-02 DIAGNOSIS — D50.8 IRON DEFICIENCY ANEMIA DUE TO DIETARY CAUSES: Primary | ICD-10-CM

## 2024-04-02 DIAGNOSIS — F32.0 CURRENT MILD EPISODE OF MAJOR DEPRESSIVE DISORDER WITHOUT PRIOR EPISODE (HCC): ICD-10-CM

## 2024-04-02 DIAGNOSIS — Z12.11 COLON CANCER SCREENING: ICD-10-CM

## 2024-04-02 DIAGNOSIS — Z00.00 MEDICARE ANNUAL WELLNESS VISIT, SUBSEQUENT: ICD-10-CM

## 2024-04-02 DIAGNOSIS — J43.2 CENTRILOBULAR EMPHYSEMA (HCC): ICD-10-CM

## 2024-04-02 DIAGNOSIS — B37.2 CANDIDAL INTERTRIGO: ICD-10-CM

## 2024-04-02 PROCEDURE — 99497 ADVNCD CARE PLAN 30 MIN: CPT | Performed by: FAMILY MEDICINE

## 2024-04-02 PROCEDURE — 94625 PHY/QHP OP PULM RHB W/O MNTR: CPT

## 2024-04-02 PROCEDURE — 99215 OFFICE O/P EST HI 40 MIN: CPT | Performed by: FAMILY MEDICINE

## 2024-04-02 PROCEDURE — G0439 PPPS, SUBSEQ VISIT: HCPCS | Performed by: FAMILY MEDICINE

## 2024-04-02 RX ORDER — ONDANSETRON 4 MG/1
4 TABLET, FILM COATED ORAL EVERY 8 HOURS PRN
Qty: 20 TABLET | Refills: 0 | Status: SHIPPED | OUTPATIENT
Start: 2024-04-02

## 2024-04-02 RX ORDER — CLOTRIMAZOLE AND BETAMETHASONE DIPROPIONATE 10; .64 MG/G; MG/G
CREAM TOPICAL 2 TIMES DAILY
Qty: 45 G | Refills: 1 | Status: SHIPPED | OUTPATIENT
Start: 2024-04-02

## 2024-04-02 RX ORDER — PANTOPRAZOLE SODIUM 40 MG/1
40 TABLET, DELAYED RELEASE ORAL DAILY
Qty: 30 TABLET | Refills: 2 | Status: SHIPPED | OUTPATIENT
Start: 2024-04-02 | End: 2024-07-01

## 2024-04-02 RX ORDER — DOCUSATE SODIUM 100 MG/1
100 CAPSULE, LIQUID FILLED ORAL 2 TIMES DAILY PRN
Qty: 10 CAPSULE | Refills: 0 | Status: SHIPPED | OUTPATIENT
Start: 2024-04-02

## 2024-04-02 NOTE — PATIENT INSTRUCTIONS
Medicare Preventive Visit Patient Instructions  Thank you for completing your Welcome to Medicare Visit or Medicare Annual Wellness Visit today. Your next wellness visit will be due in one year (4/3/2025).  The screening/preventive services that you may require over the next 5-10 years are detailed below. Some tests may not apply to you based off risk factors and/or age. Screening tests ordered at today's visit but not completed yet may show as past due. Also, please note that scanned in results may not display below.  Preventive Screenings:  Service Recommendations Previous Testing/Comments   Colorectal Cancer Screening  * Colonoscopy    * Fecal Occult Blood Test (FOBT)/Fecal Immunochemical Test (FIT)  * Fecal DNA/Cologuard Test  * Flexible Sigmoidoscopy Age: 45-75 years old   Colonoscopy: every 10 years (may be performed more frequently if at higher risk)  OR  FOBT/FIT: every 1 year  OR  Cologuard: every 3 years  OR  Sigmoidoscopy: every 5 years  Screening may be recommended earlier than age 45 if at higher risk for colorectal cancer. Also, an individualized decision between you and your healthcare provider will decide whether screening between the ages of 76-85 would be appropriate. Colonoscopy: Not on file  FOBT/FIT: Not on file  Cologuard: Not on file  Sigmoidoscopy: Not on file          Breast Cancer Screening Age: 40+ years old  Frequency: every 1-2 years  Not required if history of left and right mastectomy Mammogram: 04/25/2023    Screening Current   Cervical Cancer Screening Between the ages of 21-29, pap smear recommended once every 3 years.   Between the ages of 30-65, can perform pap smear with HPV co-testing every 5 years.   Recommendations may differ for women with a history of total hysterectomy, cervical cancer, or abnormal pap smears in past. Pap Smear: 09/01/2020    Screening Not Indicated   Hepatitis C Screening Once for adults born between 1945 and 1965  More frequently in patients at high risk  for Hepatitis C Hep C Antibody: 09/08/2020    Screening Current   Diabetes Screening 1-2 times per year if you're at risk for diabetes or have pre-diabetes Fasting glucose: 97 mg/dL (2/23/2022)  A1C: 5.9 % (3/1/2024)  Screening Current   Cholesterol Screening Once every 5 years if you don't have a lipid disorder. May order more often based on risk factors. Lipid panel: 02/29/2024    Screening Not Indicated  History Lipid Disorder     Other Preventive Screenings Covered by Medicare:  Abdominal Aortic Aneurysm (AAA) Screening: covered once if your at risk. You're considered to be at risk if you have a family history of AAA.  Lung Cancer Screening: covers low dose CT scan once per year if you meet all of the following conditions: (1) Age 55-77; (2) No signs or symptoms of lung cancer; (3) Current smoker or have quit smoking within the last 15 years; (4) You have a tobacco smoking history of at least 20 pack years (packs per day multiplied by number of years you smoked); (5) You get a written order from a healthcare provider.  Glaucoma Screening: covered annually if you're considered high risk: (1) You have diabetes OR (2) Family history of glaucoma OR (3)  aged 50 and older OR (4)  American aged 65 and older  Osteoporosis Screening: covered every 2 years if you meet one of the following conditions: (1) You're estrogen deficient and at risk for osteoporosis based off medical history and other findings; (2) Have a vertebral abnormality; (3) On glucocorticoid therapy for more than 3 months; (4) Have primary hyperparathyroidism; (5) On osteoporosis medications and need to assess response to drug therapy.   Last bone density test (DXA Scan): 12/29/2020.  HIV Screening: covered annually if you're between the age of 15-65. Also covered annually if you are younger than 15 and older than 65 with risk factors for HIV infection. For pregnant patients, it is covered up to 3 times per  pregnancy.    Immunizations:  Immunization Recommendations   Influenza Vaccine Annual influenza vaccination during flu season is recommended for all persons aged >= 6 months who do not have contraindications   Pneumococcal Vaccine   * Pneumococcal conjugate vaccine = PCV13 (Prevnar 13), PCV15 (Vaxneuvance), PCV20 (Prevnar 20)  * Pneumococcal polysaccharide vaccine = PPSV23 (Pneumovax) Adults 19-65 yo with certain risk factors or if 65+ yo  If never received any pneumonia vaccine: recommend Prevnar 20 (PCV20)  Give PCV20 if previously received 1 dose of PCV13 or PPSV23   Hepatitis B Vaccine 3 dose series if at intermediate or high risk (ex: diabetes, end stage renal disease, liver disease)   Respiratory syncytial virus (RSV) Vaccine - COVERED BY MEDICARE PART D  * RSVPreF3 (Arexvy) CDC recommends that adults 60 years of age and older may receive a single dose of RSV vaccine using shared clinical decision-making (SCDM)   Tetanus (Td) Vaccine - COST NOT COVERED BY MEDICARE PART B Following completion of primary series, a booster dose should be given every 10 years to maintain immunity against tetanus. Td may also be given as tetanus wound prophylaxis.   Tdap Vaccine - COST NOT COVERED BY MEDICARE PART B Recommended at least once for all adults. For pregnant patients, recommended with each pregnancy.   Shingles Vaccine (Shingrix) - COST NOT COVERED BY MEDICARE PART B  2 shot series recommended in those 19 years and older who have or will have weakened immune systems or those 50 years and older     Health Maintenance Due:      Topic Date Due   • Colorectal Cancer Screening  09/16/2023   • Breast Cancer Screening: Mammogram  04/25/2024   • Lung Cancer Screening  03/01/2025   • Hepatitis C Screening  Completed     Immunizations Due:      Topic Date Due   • Influenza Vaccine (1) 09/01/2023   • COVID-19 Vaccine (4 - 2023-24 season) 09/01/2023     Advance Directives   What are advance directives?  Advance directives are legal  documents that state your wishes and plans for medical care. These plans are made ahead of time in case you lose your ability to make decisions for yourself. Advance directives can apply to any medical decision, such as the treatments you want, and if you want to donate organs.   What are the types of advance directives?  There are many types of advance directives, and each state has rules about how to use them. You may choose a combination of any of the following:  Living will:  This is a written record of the treatment you want. You can also choose which treatments you do not want, which to limit, and which to stop at a certain time. This includes surgery, medicine, IV fluid, and tube feedings.   Durable power of  for healthcare (DPAHC):  This is a written record that states who you want to make healthcare choices for you when you are unable to make them for yourself. This person, called a proxy, is usually a family member or a friend. You may choose more than 1 proxy.  Do not resuscitate (DNR) order:  A DNR order is used in case your heart stops beating or you stop breathing. It is a request not to have certain forms of treatment, such as CPR. A DNR order may be included in other types of advance directives.  Medical directive:  This covers the care that you want if you are in a coma, near death, or unable to make decisions for yourself. You can list the treatments you want for each condition. Treatment may include pain medicine, surgery, blood transfusions, dialysis, IV or tube feedings, and a ventilator (breathing machine).  Values history:  This document has questions about your views, beliefs, and how you feel and think about life. This information can help others choose the care that you would choose.  Why are advance directives important?  An advance directive helps you control your care. Although spoken wishes may be used, it is better to have your wishes written down. Spoken wishes can be  misunderstood, or not followed. Treatments may be given even if you do not want them. An advance directive may make it easier for your family to make difficult choices about your care.   Weight Management   Why it is important to manage your weight:  Being overweight increases your risk of health conditions such as heart disease, high blood pressure, type 2 diabetes, and certain types of cancer. It can also increase your risk for osteoarthritis, sleep apnea, and other respiratory problems. Aim for a slow, steady weight loss. Even a small amount of weight loss can lower your risk of health problems.  How to lose weight safely:  A safe and healthy way to lose weight is to eat fewer calories and get regular exercise. You can lose up about 1 pound a week by decreasing the number of calories you eat by 500 calories each day.   Healthy meal plan for weight management:  A healthy meal plan includes a variety of foods, contains fewer calories, and helps you stay healthy. A healthy meal plan includes the following:  Eat whole-grain foods more often.  A healthy meal plan should contain fiber. Fiber is the part of grains, fruits, and vegetables that is not broken down by your body. Whole-grain foods are healthy and provide extra fiber in your diet. Some examples of whole-grain foods are whole-wheat breads and pastas, oatmeal, brown rice, and bulgur.  Eat a variety of vegetables every day.  Include dark, leafy greens such as spinach, kale, ludin greens, and mustard greens. Eat yellow and orange vegetables such as carrots, sweet potatoes, and winter squash.   Eat a variety of fruits every day.  Choose fresh or canned fruit (canned in its own juice or light syrup) instead of juice. Fruit juice has very little or no fiber.  Eat low-fat dairy foods.  Drink fat-free (skim) milk or 1% milk. Eat fat-free yogurt and low-fat cottage cheese. Try low-fat cheeses such as mozzarella and other reduced-fat cheeses.  Choose meat and other  protein foods that are low in fat.  Choose beans or other legumes such as split peas or lentils. Choose fish, skinless poultry (chicken or turkey), or lean cuts of red meat (beef or pork). Before you cook meat or poultry, cut off any visible fat.   Use less fat and oil.  Try baking foods instead of frying them. Add less fat, such as margarine, sour cream, regular salad dressing and mayonnaise to foods. Eat fewer high-fat foods. Some examples of high-fat foods include french fries, doughnuts, ice cream, and cakes.  Eat fewer sweets.  Limit foods and drinks that are high in sugar. This includes candy, cookies, regular soda, and sweetened drinks.  Exercise:  Exercise at least 30 minutes per day on most days of the week. Some examples of exercise include walking, biking, dancing, and swimming. You can also fit in more physical activity by taking the stairs instead of the elevator or parking farther away from stores. Ask your healthcare provider about the best exercise plan for you.      © Copyright [employer] 2018 Information is for End User's use only and may not be sold, redistributed or otherwise used for commercial purposes. All illustrations and images included in CareNotes® are the copyrighted property of [employer] or [employer]

## 2024-04-02 NOTE — PROGRESS NOTES
"PULMONARY REHABILITATION  ASSESSMENT AND INDIVIDUALIZED TREATMENT PLAN  INITIAL       Today's date: 2024  # of Exercise Sessions Completed: 1- initial eval  Patient name: Noreen Alvarez     : 1955       MRN: [id number]  PCP: Samantha Hernandez MD  Referring Physician: Alka Caceres DO  Pulmonologist: Shannan Thompson MD     Provider: Kraig  Clinician: Verenice Sher MS     ASSESSMENT    PULMONARY HISTORY:  Dx:   Encounter Diagnoses   Name Primary?    COPD exacerbation (HCC)     Atypical chest pain        Date of onset: 2019  Description of Diagnosis: Severe COPD  Other Pulmonary History: Centrilobular Emphysema, history of laryngeal cancer, severe persistent asthma w/o complication, multiple lung nodules, chronic respiratory failure with hypoxia on home O2 therapy, ELIUD        Weight:   Wt Readings from Last 1 Encounters:   24 83.7 kg (184 lb 9.6 oz)        Height:   Ht Readings from Last 1 Encounters:   24 5' 4\" (1.626 m)       Medical History:   Past Medical History:   Diagnosis Date    Acute kidney failure (HCC)     Allergic     Allergies     Anemia     Anxiety     Asthma     Cancer (HCC)     Cataract     Chronic kidney disease (CKD), stage III (moderate) (HCC)     COPD (chronic obstructive pulmonary disease) (HCC)     COVID-19     Covid + 9-3-33-cough at that time now fully resolved    Depression     Disease of thyroid gland     Essential hypertension     GERD (gastroesophageal reflux disease)     Glaucoma     High blood pressure     HTN (hypertension) 2021    Hyperlipidemia     Hypothyroidism     Memory loss     Mesenteric lymphadenopathy 2021    Pulmonary hypertension (HCC)     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    Throat cancer (HCC) 2014    chemo and rad therapy 2014       Family History:   Family History   Problem Relation Age of Onset    Other Mother         respiratory disorder    Asthma Mother     COPD Mother     Depression Mother     Sudden " death Father         shot himself    Suicidality Father     Brain cancer Sister     Diabetes Sister     Breast cancer Sister     Cancer Sister 62        pancreatic cancer    No Known Problems Sister     No Known Problems Sister     No Known Problems Sister     No Known Problems Sister     No Known Problems Sister     No Known Problems Sister     No Known Problems Daughter     No Known Problems Daughter     No Known Problems Maternal Grandmother     No Known Problems Maternal Grandfather     No Known Problems Paternal Grandmother     No Known Problems Paternal Grandfather     No Known Problems Maternal Aunt     No Known Problems Maternal Aunt     No Known Problems Maternal Aunt     No Known Problems Maternal Aunt     No Known Problems Maternal Aunt     No Known Problems Paternal Aunt     No Known Problems Paternal Aunt     No Known Problems Paternal Aunt     No Known Problems Paternal Aunt        Allergies: Cefdinir, Amifostine, and Pollen extract    ETOH:   Social History     Substance and Sexual Activity   Alcohol Use Never    Comment: None       Pulmonary Disease Risk Factors:  none  smoking  asthma    CAD Risk Factors:   Cholesterol: Yes  Smoking: Former user  HTN: No  DM: No  Obesity: Yes   Inactivity: Yes  Stress:  perceived  stress: 10/10   Stressors: negative thoughts about her health   Goals for Stress Management: Practice Relaxation Techniques, Exercise, and Keep a positive mindset    CURRENT MEDICATIONS:   Current Outpatient Medications   Medication Sig Dispense Refill    albuterol (PROVENTIL HFA,VENTOLIN HFA) 90 mcg/act inhaler Inhale 2 puffs every 6 (six) hours as needed for wheezing or shortness of breath 18 g 5    atorvastatin (LIPITOR) 40 mg tablet Take 1 tablet (40 mg total) by mouth daily with dinner 90 tablet 3    azithromycin (ZITHROMAX) 250 mg tablet Take 1 tablet (250 mg total) by mouth every other day for 45 doses 45 tablet 0    benzonatate (TESSALON PERLES) 100 mg capsule Take 1 capsule (100  mg total) by mouth 3 (three) times a day 20 capsule 0    bisacodyl (DULCOLAX) 5 mg EC tablet Take 4 tablets (20 mg total) by mouth 1 (one) time for 1 dose Take as instructed by office for bowel prep. 4 tablet 0    brimonidine tartrate 0.2 % ophthalmic solution       Budeson-Glycopyrrol-Formoterol (Breztri Aerosphere) 160-9-4.8 MCG/ACT AERO Inhale 2 puffs 2 (two) times a day Rinse mouth after use. 32.1 g 0    clotrimazole-betamethasone (LOTRISONE) 1-0.05 % cream Apply topically 2 (two) times a day 45 g 1    Diclofenac Sodium (VOLTAREN) 1 % APPLY 2 GRAMS TO AFFECTED AREA 4 TIMES A  g 5    docusate sodium (COLACE) 100 mg capsule Take 1 capsule (100 mg total) by mouth 2 (two) times a day as needed for constipation Hold for lose stool 10 capsule 0    ferrous sulfate 324 (65 Fe) mg Take 1 tablet (324 mg total) by mouth daily after lunch 90 tablet 1    fluticasone (FLONASE) 50 mcg/act nasal spray SPRAY 1 SPRAY INTO EACH NOSTRIL EVERY DAY 48 mL 1    ipratropium (ATROVENT) 0.06 % nasal spray 2 SPRAYS INTO EACH NOSTRIL 3 (THREE) TIMES A DAY FOR INCREASED NASAL SECRETION 45 mL 1    ipratropium-albuterol (DUO-NEB) 0.5-2.5 mg/3 mL nebulizer solution TAKE 3 ML BY NEBULIZATION EVERY 8 (EIGHT) HOURS AS NEEDED FOR WHEEZING OR SHORTNESS OF BREATH 270 mL 1    levothyroxine 75 mcg tablet Take 1 tablet (75 mcg total) by mouth daily in the early morning 90 tablet 0    montelukast (SINGULAIR) 10 mg tablet Take 1 tablet (10 mg total) by mouth daily at bedtime 90 tablet 1    ondansetron (ZOFRAN) 4 mg tablet Take 1 tablet (4 mg total) by mouth every 8 (eight) hours as needed for nausea or vomiting 20 tablet 0    pantoprazole (PROTONIX) 40 mg tablet Take 1 tablet (40 mg total) by mouth daily 30 tablet 2    polyethylene glycol (GLYCOLAX) 17 GM/SCOOP powder DISSOLVE 17 GRAMS INTO WATER AND DRINK BY MOUTH EVERY  g 0    polyethylene glycol (GOLYTELY) 4000 mL solution Take 4,000 mL by mouth once for 1 dose 4000 mL 0    sertraline  (ZOLOFT) 25 mg tablet Take 1 tablet (25 mg total) by mouth daily at bedtime 90 tablet 1    sucralfate (CARAFATE) 1 g tablet Take 1 tablet (1 g total) by mouth 2 (two) times a day before meals for 14 days 28 tablet 0    timolol (TIMOPTIC) 0.5 % ophthalmic solution INSTILL 1 DROP INTO EACH EYE TWO TIMES A DAY       No current facility-administered medications for this visit.       Rescue Inhaler: Yes as needed    Maintenance Inhaler: Yes:  2 times per day as needed     Nebulizer Treatments:  Yes:  every 6 hours times per day    Medication compliance: Yes   Comments: Pt reports to be compliant with medications    OXYGEN NEEDS:   Rest: supplemental O2 via nasal cannula @ 2L/min   Exercise/physical activity: supplemental O2 via nasal cannula @ 2L/min   Sleep: supplemental O2 via nasal cannula @ 2L/min     Does Pt monitor home SpO2? yes   Average SpO2 at rest:  92%   Average SpO2 with ADLs/physical activity:  80%    Patient practices breathing techniques at home:  Yes    Cultural needs: [place] speaking, daughter was on the phone for the duration of the eval and 6MWT.  was present too.     FUNCTIONAL STATUS:  Current Status:  Occupation: unemployed  Recreation: watch tv   ADL’s:No limitations able to perform self-care  [place]: No limitations able to perform self-care  Exercise: none  Home exercise equipment: none  Other: none    Physical Limitations: none    Fall Risk: Low   Comments: Ambulates with a steady gait with no assist device and Denies a fall in the past 6 months    NUTRITIONAL:  Patient's current dietary habits include:  Eats fruits and veggies, watches red meat intake, and hydrates well    PATIENT SPECIFIC GOALS:     Exercise Goals: (home exercise, ADLs)  Start ND  Add exercise at home as tolerated   Nutrition Goals: (wt control, diabetes management, dietary modifications)  Continue to make dietary changes  Manage wt   Psychocosocial Goals: (stress, emotional well being, social support)  Maintain  emotional health   Take help when offered   Core Component Goals: (smoking, BP control, Oxygen use/needs, medication)  Abstain from smoking  Take medications as prescribed     Ability to reach goals/rehabilitation potential:  Excellent    Projected return to function: 12 weeks  Objective tests: 6 MWT completed on 4/2/2024      INDIVIDUALIZED TREATMENT PLAN    SUMMARY OF PROGRESS:    Today is Noreen's initial evaluation to begin Pulmonary Rehab for the diagnosis of COPD. Patient does have a PFT on file, revealing an FEV1/FVC ratio of 39% and an FEV1 of 37% predicted. This is suggestive of severeobstruction. The patient has been experiencing increased dyspnea, increased sitting time, decreased physical activity, and increased fatigue. They report dyspnea when completing ADLs. The patient currently does not follow a home exercise program. Depression screening using the PHQ-9 interprets the patient's score of 9 5-9 = Mild Depression. Anxiety screening using the TIMMY-7 interprets the patients score of 5  5-9 = Mild anxiety. When addressed, the patient denies having depression/anxiety. Patient reports excellent social/emotional support from her family.  Information to begin using [employer] was provided as well as contact information for counseling through  [facility]. PHQ-9 score will be reassessed in 30 days. The patient is a non-smoker. Patient admits to 100% medication compliance. At rest, the patient rated dyspnea 0/10 with SpO2 94% on supplemental O2 2L/min via nasal canula. They completed an initial 6MWT, walking 300 ft, 1.43 METs on supplemental O2 2L/min via nasal canula. The patient’s rating of perceived dyspnea during the 6MWT was 6/10 with SpO2 80%. They took 1 rest break for 3 minutes total. Telemetry revealed NSR. Resting /62 with appropriate hemodynamic response to exercise reaching 138/70. Patient will exercise on supplemental O2 2L/min via nasal canula. Group and individualized education  will be provided on smoking cessation, oxygen use, breathing techniques, pulmonary anatomy, exercise for the pulmonary patient, healthy eating, stress, and relaxation. Patient specific goals include: increase stamina, strength, energy, and endurance. Noreen will attend 24 exercise sessions beginning  at 3pm. Will progress patient as tolerated over the next 30 days. See outlined plan of care below for specific patient goals in each component of care.         EXERCISE ASSESSMENT AND PLAN    Assessment of progression of lung disease and functional status:  CAT: na/40  Shortness of breath questionnaire: na/120    Home Exercise: none    Group and Individual Education: pursed lipped breathing, diaphragmatic breathing, fall prevention while using nasal canula tubing, relaxation breathing, home exercise guidelines, benefits of exercise for pulmonary disease, RPE scale, RPD scale, O2 saturation monitoring, and appropriate O2 response to exercise    Readiness to change: Preparation:  (Getting ready to change)     Oxygen Goal: Maintain SpO2>88% during exercise or as advised by pulmonolgist    Current Exercise Program:       Frequency: 2 days/week   Supplement with home exercise 2+ days/wk as tolerated        Minutes: 20-40         METS: 1.5-2.5              SpO2: >88%              RPD: 4-6                      HR: RHR +30   RPE: 4-5         Modalities: UBE, NuStep, and Recumbent bike      Exercise Progression 30 Day Goals :    Frequency: 2 days/week    Supplement with home exercise 2+ days/wk as tolerated       Minutes: 30-40        METS: 3.5-4.0              SpO2: >88%              RPD: 4-6                      HR: RHR +30   RPE: 4-5        Modalities: Treadmill, UBE, NuStep, and Recumbent bike     Strength trainin-3 days / week  12-15 repetitions  1-2 sets per modality   Will be added following 2-3 weeks of monitored exercise sessions   Modalities: Lateral Raise, Arm Curl, Sit to Stands, and Front  Raises    SMART EXERCISE:  Goals:   reduced score on CAT, improved 6MWT distance, reduced dyspnea during exercise, improved exercise tolerance based on peak METs tolerated in pulmonary rehab exercise session, SpO2 >88% during exercise, and reduced RPD at rest  Progress:  No progression noted at initial eval    PATIENT SPECIFIC EXERCISE   Goals: (home exercise, ADLs, recreation)   reduced dependence on supplemental O2, attend pulmonary rehab regularly, decrease sitting time at home, start a walking program, begin a consistent exercise regimen , increased muscular strength, increased energy, increased stamina with ADLs, and more independence at home  Progress:   No progress noted at initial eval     PLAN FOR THE NEXT 30 DAYS:   learn to conserve energy with ADLs , practice diaphragmatic breathing, reduce time sitting at home, increased strength of respiratory muscles, utilize PLB with physical activity, and begin a home exercise program     Exercise workloads will be progressed gradually as tolerated, within limits of patient's ability, and according to the patient's response to the exercise program.      NUTRITION ASSESSMENT AND PLAN    Weight control:    Starting weight: 184 lbs   Current weight:       Diabetes: N/A  A1c: 5.9     Last Measured: 3/1/2024  Medication Compliance: compliant all of the time  Diabetic Diet Compliance: compliant all of the time  Home Glucose Monitoring:  n/a    Lipid management: Discussed diet and lipid management and Last lipid profile 2/29/2024  Chol 207  TRG 61  HDL 78      Group and Individual Education: heart healthy eating principles  weight loss and management strategies  low sodium diet  maintaining hydration    Readiness to change: Preparation:  (Getting ready to change)     Reviewed patient's Rate your Plate. Reviewed patient goals for dietary modifications and their clinical implications.  Reviewed most recent lipid profile and A1C.    SMART NUTRITIONAL:  Goals:   LDL  <100, HDL >40, TRG <150, CHOL <200, and Improved Rate Your Plate score  >64  Progress:   No progression noted at initial eval      PATIENT SPECIFIC NUTRITIONAL:  Goals: (wt control, diabetes management, dietary modifications)  eat smaller, more frequent meals improve hydration  Progress:  No progression noted at initial eval     PLAN FOR THE NEXT 30 DAYS:   group class: Reading Food Labels and group Class: Heart Healthy Eating    Measurable goals were based Rate Your Plate Dietary Self-Assessment. These are the areas in which the patient could score higher on the assessment. Goals include recommendations for a heart healthy diet based on American Heart Association.      PSYCHOSOCIAL ASSESSMENT AND PLAN    Emotional Health Assessment:    Depression Measure: PHQ-9 = 9  5-9 = Mild Depression  Anxiety Measure: TIMMY-7 = 5  5-9 = Mild anxiety  Assessment of depression and anxiety    Patient reports they are coping well with good social support and denies depression or anxiety    Self-reported stress level:  10  Stress Management: Read, Exercise, Keep a positive mindset, Enjoy a hobby, and Spend time with family     Patient's rating of Social support: Excellent   Social Support Network: spouse and children    Psychosocial Assessment as it relates to rehabilitation: Patient denies issues with his/her family or home life that may affect their rehabilitation efforts.     Group and Individual Education: benefits of a positive support system and stress management techniques    Readiness to change: Action:  (Changing behavior)    SMART PSYCHOSOCIAL:  Goals:   Reduce perceived stress to 1-3/10 and improved [facility] QOL < 27    Progress:  No progression noted at initial eval will continue to maintain emotional health    PATIENT SPECIFIC PSYCHOSOCIAL:  Goals:  (stress, emotional well being, social support):    begin using [employer] and spend time with family  Progress:   Maintaining emotional health    PLAN FOR NEXT 30 DAYS:    Class: Stress and Your Health, Practice relaxation techniques, Exercise, Keep a positive mindset, and Enjoy family    Resources for emotional health and wellbeing are provided to patient. Patient's PCP are notified if PHQ-9 and/or TIMMY-7 are >5. Patient is referred to PCP/behavioral health services if requested.      OTHER CORE COMPONENTS     Tobacco:   Social History     Tobacco Use   Smoking Status Former    Current packs/day: 0.00    Average packs/day: 1 pack/day for 40.0 years (40.0 ttl pk-yrs)    Types: Cigarettes    Start date: 1974    Quit date: 2014    Years since quitting: 10.2   Smokeless Tobacco Never       Tobacco Use Intervention: N/A: Pt has a remote history of smoking    Blood pressure:    Restin/62   Exercise: 138/70   Recovery: 130/80    Education:  pathophysiology of pulmonary disease and dangers of smoking    Readiness to change: Preparation:  (Getting ready to change)     SMART   Goals: reduced dietary sodium <2000mg, assess daily wt to report an increase greater than 3lbs in a day or 5lbs in a week, medication compliance, and Abstain from smoking  Progress:   Watch sodium intake and hydrate daily    PATIENT SPECIFIC CORE COMPONENT   Goals:  (smoking, BP control, medication)   reduced dietary sodium <2000mg, assess daily wt to report an increase greater than 3lbs in a day or 5lbs in a week, medication compliance, and Abstain from smoking  Progress:  No progression noted at initial eval    PLAN FOR NEXT 30 DAYS:   medication compliance, avoid processed foods, engage in regular exercise, eliminate salt shaker at the table, check labels for sodium content, and monitor home BP

## 2024-04-02 NOTE — PROGRESS NOTES
Assessment and Plan:     Problem List Items Addressed This Visit          Respiratory    Centrilobular emphysema (HCC)       Digestive    Gastroesophageal reflux disease without esophagitis    Relevant Medications    pantoprazole (PROTONIX) 40 mg tablet    ondansetron (ZOFRAN) 4 mg tablet    Chronic constipation    Relevant Medications    docusate sodium (COLACE) 100 mg capsule       Musculoskeletal and Integument    Candidal intertrigo    Relevant Medications    clotrimazole-betamethasone (LOTRISONE) 1-0.05 % cream       Behavioral Health    Current mild episode of major depressive disorder without prior episode (HCC)    TIMMY (generalized anxiety disorder)       Blood    Iron deficiency anemia due to dietary causes - Primary    Relevant Orders    Ambulatory Referral to Hematology / Oncology     Other Visit Diagnoses       Encounter for screening mammogram for breast cancer        Relevant Orders    Mammo screening bilateral w 3d & cad    Colon cancer screening        Atypical chest pain        Relevant Medications    pantoprazole (PROTONIX) 40 mg tablet    Medicare annual wellness visit, subsequent        Advanced care planning/counseling discussion               F/u with GI for colon cancer screening and endoscopy  Mammogram ordered  Recommend topical clotrimazole for candidal intertrigo  Pantoprazole 40 mg to be continued  Stop Ferrous sulfate due to constipation, no gastritis on recent endoscopy  Zofran as needed for nausea and vomiting  Reviewed labs on file  Agree with continuing statin  F/u in 3 months or sooner if not improved    Serious Illness Conversation    1. What is your understanding now of where you are with your illness?  Prognostic Understanding: appropriate understanding of prognosis     2. How much information about what is likely to be ahead with your illness would you like to have?  Information: patient wants to be informed of big picture, but not details     3. What did you (clinician)  communicate to the patient?  Prognostic Communication: Uncertain - It can be difficult to predict what will happen with your illness. I hope you will continue to live well for a long time but I’m worried that you could get sick quickly, and I think it is important to prepare for that possibility.     4. If your health situation worsens, what are your most important goals?  Goals: be at home, be physically comfortable, be spiritually and emotionally at peace     5. What are the biggest fears and worries about the future and your health?  Fears/Worries: pain, being a physical burden, spiritual distress  Breathing, nausea     6. What abilities are so critical to your life that you cannot imagine living without them?  Unacceptable Function: being unable to interact with others, not being able to care for myself, including toileting and feeding, being unable to talk     7. What gives you strength as you think about the future with your illness?  Family, GOD     8. If you become sicker, how much are you willing to go through for the possibility of gaining more time?  Be in the hospital: Yes Have a feeding tube: Yes   Be in the ICU: Yes Live in a nursing home: No   Be on a ventilator: Yes Be uncomfortable: Yes   Be on dialysis: Yes Undergo aggressive test and/or procedures: Yes   9. How much does your proxy and family know about your priorities and wishes?  Discussion Discussion: extensive discussion with family about goals and wishes     I’ve heard you say that being independent is really important to you. Keeping that in mind, and what we know about your illness, I recommend that we monitor breathing, control chronic obstructive pulmonary disease, continue oxygen, address your abdominal pain. This will help us make sure that your treatment plans reflect what’s important to you.     How does this plan sound to you? I will do everything I can to help you through this.  Patient verbalized understanding of the plan     I  have spent 25 minutes speaking with my patient ,  and daughter Ashley on advanced care planning today or during this visit     Advanced directives  Five Wishes: Patient has Five Wishes, not in chart         Preventive health issues were discussed with patient, and age appropriate screening tests were ordered as noted in patient's After Visit Summary.  Personalized health advice and appropriate referrals for health education or preventive services given if needed, as noted in patient's After Visit Summary.     History of Present Illness:     Patient presents for a Medicare Wellness Visit    Here for ED follow up, also has Medicare annual wellness visit due  Cc- nausea, , abdominal pain, increased anxiety  Has difficulty doing IADL, ADLs, needs help with showers, cooking, cleaning,  handles most things along with daughter Ashley       Patient Care Team:  Samantha Hernandez MD as PCP - General (Family Medicine)  Samantha Hernandez MD as PCP - PCP-[facility] ([facility])  Fan Valles DO as PCP - PCP-[facility] ([facility])     Review of Systems:     Review of Systems   Constitutional:  Negative for fatigue and fever.   HENT:  Negative for congestion, facial swelling, mouth sores, rhinorrhea, sore throat and trouble swallowing.    Eyes:  Negative for pain and redness.   Respiratory:  Negative for cough, shortness of breath and wheezing.    Cardiovascular:  Negative for chest pain, palpitations and leg swelling.   Gastrointestinal:  Positive for abdominal pain, constipation and nausea. Negative for blood in stool, diarrhea and vomiting.   Genitourinary:  Negative for dysuria, hematuria and urgency.   Musculoskeletal:  Negative for arthralgias, back pain and myalgias.   Skin:  Negative for rash and wound.   Neurological:  Negative for seizures, syncope and headaches.   Hematological:  Negative for adenopathy.   Psychiatric/Behavioral:  Negative for agitation and behavioral problems.         Problem List:      Patient Active Problem List   Diagnosis    Cancer of pharynx (HCC)    Cataract    Chronic kidney disease, stage 3 (moderate)    Centrilobular emphysema (HCC)    Extrinsic obstruction of cartilagenous portion of eustachian tube    Personal history of radiation therapy    History of laryngeal cancer    Loss of hearing    Depression with anxiety    Numbness    Panic attack    Vitamin D deficiency    Dupuytren contracture    Elevated alkaline phosphatase level    Severe persistent asthma without complication    Multiple lung nodules    Myalgia    Tension type headache    Hyperlipidemia    Hypothyroidism    COVID-19    Trigger finger of right thumb    Current mild episode of major depressive disorder without prior episode (HCC)    Persistent dyspnea after COVID-19    History of COVID-19    Class 1 obesity due to excess calories with serious comorbidity and body mass index (BMI) of 32.0 to 32.9 in adult    Exercise hypoxemia    Post-nasal drip    Neck pain on left side    Mediastinal lymphadenopathy    Preop examination    Preoperative cardiovascular examination    Iron deficiency anemia    Dyspepsia    Pain of right sacroiliac joint    Hypomagnesemia    Chronic respiratory failure with hypoxia, on home O2 therapy  (HCC)    Epigastric pain    Gastroesophageal reflux disease without esophagitis    SOB (shortness of breath)    Sacroiliac inflammation (HCC)    ELIUD (obstructive sleep apnea)    Chronic obstructive pulmonary disease (HCC)    Chronic constipation    Candidal intertrigo    TIMMY (generalized anxiety disorder)    Iron deficiency anemia due to dietary causes      Past Medical and Surgical History:     Past Medical History:   Diagnosis Date    Acute kidney failure (HCC)     Allergic     Allergies     Anemia     Anxiety     Asthma     Cancer (HCC)     Cataract     Chronic kidney disease (CKD), stage III (moderate) (HCC)     COPD (chronic obstructive pulmonary disease) (HCC)     COVID-19     Covid + 8-4-92-cough at  that time now fully resolved    Depression     Disease of thyroid gland     Essential hypertension     GERD (gastroesophageal reflux disease)     Glaucoma     High blood pressure     HTN (hypertension) 02/16/2021    Hyperlipidemia     Hypothyroidism     Memory loss     Mesenteric lymphadenopathy 07/13/2021    Pulmonary hypertension (HCC)     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     ME TENDON SHEATH INCISION Right 02/16/2021    Procedure: THUMB TRIGGER FINGER RELEASE;  Surgeon: Angeles Denton MD;  Location: [facility];  Service: Orthopedics    TUBAL LIGATION        Family History:     Family History   Problem Relation Age of Onset    Other Mother         respiratory disorder    Asthma Mother     COPD Mother     Depression Mother     Sudden death Father         shot himself    Suicidality Father     Brain cancer Sister     Diabetes Sister     Breast cancer Sister     Cancer Sister 62        pancreatic cancer    No Known Problems Sister     No Known Problems Sister     No Known Problems Sister     No Known Problems Sister     No Known Problems Sister     No Known Problems Sister     No Known Problems Daughter     No Known Problems Daughter     No Known Problems Maternal Grandmother     No Known Problems Maternal Grandfather     No Known Problems Paternal Grandmother     No Known Problems Paternal Grandfather     No Known Problems Maternal Aunt     No Known Problems Maternal Aunt     No Known Problems Maternal Aunt     No Known Problems Maternal Aunt     No Known Problems Maternal Aunt     No Known Problems Paternal Aunt     No Known Problems Paternal Aunt     No Known Problems Paternal Aunt     No Known Problems Paternal Aunt       Social History:      Social History     Socioeconomic History    Marital status: /Civil Union     Spouse name: None    Number of children: None    Years of education: None    Highest education level: None   Occupational History    None   Tobacco Use    Smoking status: Former     Current packs/day: 0.00     Average packs/day: 1 pack/day for 40.0 years (40.0 ttl pk-yrs)     Types: Cigarettes     Start date: 1974     Quit date: 2014     Years since quitting: 10.2    Smokeless tobacco: Never   Vaping Use    Vaping status: Never Used   Substance and Sexual Activity    Alcohol use: Never     Comment: None    Drug use: Never     Comment: Denies    Sexual activity: Not Currently     Partners: Female     Birth control/protection: Surgical   Other Topics Concern    None   Social History Narrative    Most recent tobacco use screenin2020    Do you currently or have you served in the [employer]: No    Were you activated, into active duty, as a member of the [employer] or as a Reservist: No    Marital status:     Sexual orientation: Heterosexual    Exercise level: Occasional    Diet: Regular    General stress level: Low    Has smoked since age: 19    Alcohol intake: None    Caffeine intake: Occasional    Chewing tobacco: none    Illicit drugs: Denies    Guns present in home: No    Seat belts used routinely: Yes    Sunscreen used routinely: Yes    Smoke alarm in home: Yes    Advance directive: No    Salt Intake: HTN Diet    Has the Patient had a mammogram to screen for breast cancer within 24 months: Yes    Would the patient like to schedule a Mammogram: No    Is the patient interested in a colorectal cancer screening: No    Live alone or with others: with others    Sexually active: No    Do you feel safe at home: Yes     Social Determinants of Health     Financial Resource Strain: Medium Risk (2024)    Overall Financial Resource Strain (CARDIA)     Difficulty of Paying Living Expenses: Somewhat hard    Food Insecurity: No Food Insecurity (4/2/2024)    Hunger Vital Sign     Worried About Running Out of Food in the Last Year: Never true     Ran Out of Food in the Last Year: Never true   Transportation Needs: No Transportation Needs (4/2/2024)    PRAPARE - Transportation     Lack of Transportation (Medical): No     Lack of Transportation (Non-Medical): No   Physical Activity: Not on file   Stress: Not on file   Social Connections: Not on file   Intimate Partner Violence: Not on file   Housing Stability: Low Risk  (4/2/2024)    Housing Stability Vital Sign     Unable to Pay for Housing in the Last Year: No     Number of Places Lived in the Last Year: 1     Unstable Housing in the Last Year: No      Medications and Allergies:     Current Outpatient Medications   Medication Sig Dispense Refill    albuterol (PROVENTIL HFA,VENTOLIN HFA) 90 mcg/act inhaler Inhale 2 puffs every 6 (six) hours as needed for wheezing or shortness of breath 18 g 5    atorvastatin (LIPITOR) 40 mg tablet Take 1 tablet (40 mg total) by mouth daily with dinner 90 tablet 3    azithromycin (ZITHROMAX) 250 mg tablet Take 1 tablet (250 mg total) by mouth every other day for 45 doses 45 tablet 0    benzonatate (TESSALON PERLES) 100 mg capsule Take 1 capsule (100 mg total) by mouth 3 (three) times a day 20 capsule 0    brimonidine tartrate 0.2 % ophthalmic solution       Budeson-Glycopyrrol-Formoterol (Breztri Aerosphere) 160-9-4.8 MCG/ACT AERO Inhale 2 puffs 2 (two) times a day Rinse mouth after use. 32.1 g 0    clotrimazole-betamethasone (LOTRISONE) 1-0.05 % cream Apply topically 2 (two) times a day 45 g 1    Diclofenac Sodium (VOLTAREN) 1 % APPLY 2 GRAMS TO AFFECTED AREA 4 TIMES A  g 5    docusate sodium (COLACE) 100 mg capsule Take 1 capsule (100 mg total) by mouth 2 (two) times a day as needed for constipation Hold for lose stool 10 capsule 0    ferrous sulfate 324 (65 Fe) mg Take 1 tablet (324 mg total) by mouth daily after lunch 90 tablet  1    fluticasone (FLONASE) 50 mcg/act nasal spray SPRAY 1 SPRAY INTO EACH NOSTRIL EVERY DAY 48 mL 1    ipratropium (ATROVENT) 0.06 % nasal spray 2 SPRAYS INTO EACH NOSTRIL 3 (THREE) TIMES A DAY FOR INCREASED NASAL SECRETION 45 mL 1    ipratropium-albuterol (DUO-NEB) 0.5-2.5 mg/3 mL nebulizer solution TAKE 3 ML BY NEBULIZATION EVERY 8 (EIGHT) HOURS AS NEEDED FOR WHEEZING OR SHORTNESS OF BREATH 270 mL 1    levothyroxine 75 mcg tablet Take 1 tablet (75 mcg total) by mouth daily in the early morning 90 tablet 0    montelukast (SINGULAIR) 10 mg tablet Take 1 tablet (10 mg total) by mouth daily at bedtime 90 tablet 1    ondansetron (ZOFRAN) 4 mg tablet Take 1 tablet (4 mg total) by mouth every 8 (eight) hours as needed for nausea or vomiting 20 tablet 0    pantoprazole (PROTONIX) 40 mg tablet Take 1 tablet (40 mg total) by mouth daily 30 tablet 2    polyethylene glycol (GLYCOLAX) 17 GM/SCOOP powder DISSOLVE 17 GRAMS INTO WATER AND DRINK BY MOUTH EVERY  g 0    sertraline (ZOLOFT) 25 mg tablet Take 1 tablet (25 mg total) by mouth daily at bedtime 90 tablet 1    timolol (TIMOPTIC) 0.5 % ophthalmic solution INSTILL 1 DROP INTO EACH EYE TWO TIMES A DAY      bisacodyl (DULCOLAX) 5 mg EC tablet Take 4 tablets (20 mg total) by mouth 1 (one) time for 1 dose Take as instructed by office for bowel prep. 4 tablet 0    polyethylene glycol (GOLYTELY) 4000 mL solution Take 4,000 mL by mouth once for 1 dose 4000 mL 0    sucralfate (CARAFATE) 1 g tablet Take 1 tablet (1 g total) by mouth 2 (two) times a day before meals for 14 days 28 tablet 0     No current facility-administered medications for this visit.     Allergies   Allergen Reactions    Cefdinir Hives    Amifostine Rash    Pollen Extract Allergic Rhinitis      Immunizations:     Immunization History   Administered Date(s) Administered    COVID-19 MODERNA VACC 0.5 ML IM 03/17/2021, 04/14/2021    COVID-19 PFIZER VACCINE 0.3 ML IM 12/04/2021    INFLUENZA 02/06/2019     Influenza, high dose seasonal 0.7 mL 10/07/2020, 11/10/2021, 09/21/2022    Pneumococcal Conjugate 13-Valent 10/07/2020    Pneumococcal Polysaccharide PPV23 06/14/2021    Tdap 09/13/2021      Health Maintenance:         Topic Date Due    Colorectal Cancer Screening  09/16/2023    Breast Cancer Screening: Mammogram  04/25/2024    Lung Cancer Screening  03/01/2025    Hepatitis C Screening  Completed         Topic Date Due    Influenza Vaccine (1) 09/01/2023    COVID-19 Vaccine (4 - 2023-24 season) 09/01/2023      Medicare Screening Tests and Risk Assessments:     Noreen is here for her Subsequent Wellness visit. Last Medicare Wellness visit information reviewed, patient interviewed, no change since last AWV.     Health Risk Assessment:   Patient rates overall health as fair. Patient feels that their physical health rating is slightly worse. Patient is satisfied with their life. Eyesight was rated as same. Hearing was rated as same. Patient feels that their emotional and mental health rating is slightly worse. Patients states they are never, rarely angry. Patient states they are sometimes unusually tired/fatigued. Pain experienced in the last 7 days has been a lot. Patient's pain rating has been 8/10. Patient states that she has experienced no weight loss or gain in last 6 months.     Depression Screening:   PHQ-9 Score: 0      Fall Risk Screening:   In the past year, patient has experienced: no history of falling in past year      Urinary Incontinence Screening:   Patient has not leaked urine accidently in the last six months.     Home Safety:  Patient has trouble with stairs inside or outside of their home. Patient has working smoke alarms and has working carbon monoxide detector. Home safety hazards include: none.     Nutrition:   Current diet is Regular.     Medications:   Patient is currently taking over-the-counter supplements. OTC medications include: see medication list. Patient is able to manage medications.      Activities of Daily Living (ADLs)/Instrumental Activities of Daily Living (IADLs):   Walk and transfer into and out of bed and chair?: Yes  Dress and groom yourself?: Yes    Bathe or shower yourself?: Yes    Feed yourself? Yes  Do your laundry/housekeeping?: Yes  Manage your money, pay your bills and track your expenses?: Yes  Make your own meals?: Yes    Do your own shopping?: Yes    Previous Hospitalizations:   Any hospitalizations or ED visits within the last 12 months?: Yes    How many hospitalizations have you had in the last year?: more than 4    Advance Care Planning:   Living will: No    Durable POA for healthcare: No      PREVENTIVE SCREENINGS      Cardiovascular Screening:    General: Screening Not Indicated and History Lipid Disorder      Diabetes Screening:     General: Screening Current      Breast Cancer Screening:     General: Screening Current      Cervical Cancer Screening:    General: Screening Not Indicated      Lung Cancer Screening:     General: Screening Current      Hepatitis C Screening:    General: Screening Current    Screening, Brief Intervention, and Referral to Treatment (SBIRT)    Screening      AUDIT-C Screenin) How often did you have a drink containing alcohol in the past year? never  2) How many drinks did you have on a typical day when you were drinking in the past year? 0  3) How often did you have 6 or more drinks on one occasion in the past year? never    AUDIT-C Score: 0  Interpretation: Score 0-2 (female): Negative screen for alcohol misuse    Single Item Drug Screening:  How often have you used an illegal drug (including marijuana) or a prescription medication for non-medical reasons in the past year? never    Single Item Drug Screen Score: 0  Interpretation: Negative screen for possible drug use disorder    No results found.     Physical Exam:     /58 (BP Location: Right arm, Patient Position: Sitting, Cuff Size: Adult)   Pulse 74   Temp (!) 96.5 °F (35.8 °C)  "(Tympanic)   Resp 20   Ht 5' 4\" (1.626 m)   Wt 83.7 kg (184 lb 9.6 oz)   SpO2 90% Comment: on 2 liters O2  BMI 31.69 kg/m²     Physical Exam  Vitals and nursing note reviewed.   Constitutional:       Appearance: She is well-developed.   HENT:      Head: Normocephalic and atraumatic.      Right Ear: Tympanic membrane, ear canal and external ear normal.      Left Ear: Tympanic membrane, ear canal and external ear normal.      Nose: Nose normal.      Mouth/Throat:      Pharynx: No oropharyngeal exudate.   Eyes:      General: No scleral icterus.        Right eye: No discharge.         Left eye: No discharge.      Conjunctiva/sclera: Conjunctivae normal.      Pupils: Pupils are equal, round, and reactive to light.   Neck:      Thyroid: No thyromegaly.   Cardiovascular:      Rate and Rhythm: Normal rate and regular rhythm.      Heart sounds: No murmur heard.     No gallop.   Pulmonary:      Effort: Pulmonary effort is normal. No respiratory distress.      Breath sounds: Examination of the right-upper field reveals decreased breath sounds. Examination of the left-upper field reveals decreased breath sounds. Examination of the right-middle field reveals decreased breath sounds. Examination of the left-middle field reveals decreased breath sounds. Examination of the right-lower field reveals decreased breath sounds. Examination of the left-lower field reveals decreased breath sounds. Decreased breath sounds and wheezing present. No rales.   Abdominal:      Palpations: Abdomen is soft.      Tenderness: There is no abdominal tenderness.   Musculoskeletal:         General: No tenderness or deformity.      Cervical back: Normal range of motion.      Right lower leg: No edema.      Left lower leg: No edema.   Lymphadenopathy:      Cervical: No cervical adenopathy.   Skin:     General: Skin is warm.      Capillary Refill: Capillary refill takes less than 2 seconds.      Findings: No erythema or rash.   Neurological:      " Mental Status: She is alert and oriented to person, place, and time.      Deep Tendon Reflexes: Reflexes normal.   Psychiatric:         Behavior: Behavior normal.         Thought Content: Thought content normal.         Judgment: Judgment normal.          Samantha Hernandez MD

## 2024-04-03 ENCOUNTER — TELEPHONE (OUTPATIENT)
Dept: HEMATOLOGY ONCOLOGY | Facility: CLINIC | Age: 69
End: 2024-04-03

## 2024-04-03 ENCOUNTER — TELEPHONE (OUTPATIENT)
Age: 69
End: 2024-04-03

## 2024-04-03 PROBLEM — K59.09 CHRONIC CONSTIPATION: Status: ACTIVE | Noted: 2024-04-03

## 2024-04-03 PROBLEM — F41.1 GAD (GENERALIZED ANXIETY DISORDER): Status: ACTIVE | Noted: 2024-04-03

## 2024-04-03 PROBLEM — B37.2 CANDIDAL INTERTRIGO: Status: ACTIVE | Noted: 2024-04-03

## 2024-04-03 PROBLEM — D50.8 IRON DEFICIENCY ANEMIA DUE TO DIETARY CAUSES: Status: ACTIVE | Noted: 2024-04-03

## 2024-04-03 NOTE — TELEPHONE ENCOUNTER
[facility] calling stating patient needs a 6MW test from September 2023 to present for O2 recert. Advised that she was seen 3 different times but did not have a 6MW test. He is going to reach out to the patient and have her call our office to set up an appt to have it done.

## 2024-04-03 NOTE — TELEPHONE ENCOUNTER
Patient is acurrent established patient with Dr. Muñoz was seen last for a HFU 3/20/24.  I closed the referral.

## 2024-04-08 ENCOUNTER — CLINICAL SUPPORT (OUTPATIENT)
Dept: PULMONOLOGY | Facility: CLINIC | Age: 69
End: 2024-04-08
Payer: COMMERCIAL

## 2024-04-08 DIAGNOSIS — J44.9 CHRONIC OBSTRUCTIVE PULMONARY DISEASE, UNSPECIFIED COPD TYPE (HCC): Primary | ICD-10-CM

## 2024-04-08 PROCEDURE — G0239 OTH RESP PROC, GROUP: HCPCS

## 2024-04-10 ENCOUNTER — CLINICAL SUPPORT (OUTPATIENT)
Dept: PULMONOLOGY | Facility: CLINIC | Age: 69
End: 2024-04-10
Payer: COMMERCIAL

## 2024-04-10 DIAGNOSIS — J44.9 CHRONIC OBSTRUCTIVE PULMONARY DISEASE, UNSPECIFIED COPD TYPE (HCC): Primary | ICD-10-CM

## 2024-04-10 PROCEDURE — G0239 OTH RESP PROC, GROUP: HCPCS

## 2024-04-10 NOTE — TELEPHONE ENCOUNTER
[facility] calling regarding patient and O2 recert. Advised them that patient has an appt on 5/6 and will have the testing done then.    Fax #: [phone]

## 2024-04-11 ENCOUNTER — ANESTHESIA EVENT (OUTPATIENT)
Dept: GASTROENTEROLOGY | Facility: HOSPITAL | Age: 69
End: 2024-04-11

## 2024-04-11 ENCOUNTER — ANESTHESIA (OUTPATIENT)
Dept: GASTROENTEROLOGY | Facility: HOSPITAL | Age: 69
End: 2024-04-11

## 2024-04-11 ENCOUNTER — HOSPITAL ENCOUNTER (OUTPATIENT)
Dept: GASTROENTEROLOGY | Facility: HOSPITAL | Age: 69
Setting detail: OUTPATIENT SURGERY
End: 2024-04-11
Attending: INTERNAL MEDICINE
Payer: COMMERCIAL

## 2024-04-11 VITALS
TEMPERATURE: 97.8 F | DIASTOLIC BLOOD PRESSURE: 63 MMHG | OXYGEN SATURATION: 99 % | HEART RATE: 66 BPM | RESPIRATION RATE: 16 BRPM | SYSTOLIC BLOOD PRESSURE: 141 MMHG

## 2024-04-11 DIAGNOSIS — Z12.11 COLON CANCER SCREENING: ICD-10-CM

## 2024-04-11 DIAGNOSIS — K21.9 GASTROESOPHAGEAL REFLUX DISEASE WITHOUT ESOPHAGITIS: ICD-10-CM

## 2024-04-11 DIAGNOSIS — R10.13 EPIGASTRIC PAIN: ICD-10-CM

## 2024-04-11 PROCEDURE — 88305 TISSUE EXAM BY PATHOLOGIST: CPT | Performed by: PATHOLOGY

## 2024-04-11 RX ORDER — PROPOFOL 10 MG/ML
INJECTION, EMULSION INTRAVENOUS CONTINUOUS PRN
Status: DISCONTINUED | OUTPATIENT
Start: 2024-04-11 | End: 2024-04-11

## 2024-04-11 RX ORDER — LIDOCAINE HYDROCHLORIDE 10 MG/ML
INJECTION, SOLUTION EPIDURAL; INFILTRATION; INTRACAUDAL; PERINEURAL AS NEEDED
Status: DISCONTINUED | OUTPATIENT
Start: 2024-04-11 | End: 2024-04-11

## 2024-04-11 RX ORDER — SODIUM CHLORIDE, SODIUM LACTATE, POTASSIUM CHLORIDE, CALCIUM CHLORIDE 600; 310; 30; 20 MG/100ML; MG/100ML; MG/100ML; MG/100ML
125 INJECTION, SOLUTION INTRAVENOUS CONTINUOUS
OUTPATIENT
Start: 2024-04-11

## 2024-04-11 RX ORDER — LIDOCAINE HYDROCHLORIDE 10 MG/ML
0.5 INJECTION, SOLUTION EPIDURAL; INFILTRATION; INTRACAUDAL; PERINEURAL ONCE AS NEEDED
OUTPATIENT
Start: 2024-04-11

## 2024-04-11 RX ORDER — SODIUM CHLORIDE, SODIUM LACTATE, POTASSIUM CHLORIDE, CALCIUM CHLORIDE 600; 310; 30; 20 MG/100ML; MG/100ML; MG/100ML; MG/100ML
INJECTION, SOLUTION INTRAVENOUS CONTINUOUS PRN
Status: DISCONTINUED | OUTPATIENT
Start: 2024-04-11 | End: 2024-04-11

## 2024-04-11 RX ORDER — PROPOFOL 10 MG/ML
INJECTION, EMULSION INTRAVENOUS AS NEEDED
Status: DISCONTINUED | OUTPATIENT
Start: 2024-04-11 | End: 2024-04-11

## 2024-04-11 RX ADMIN — LIDOCAINE HYDROCHLORIDE 50 MG: 10 INJECTION, SOLUTION EPIDURAL; INFILTRATION; INTRACAUDAL; PERINEURAL at 08:21

## 2024-04-11 RX ADMIN — PROPOFOL 100 MCG/KG/MIN: 10 INJECTION, EMULSION INTRAVENOUS at 08:21

## 2024-04-11 RX ADMIN — SODIUM CHLORIDE, SODIUM LACTATE, POTASSIUM CHLORIDE, CALCIUM CHLORIDE: 600; 310; 30; 20 INJECTION, SOLUTION INTRAVENOUS at 08:14

## 2024-04-11 RX ADMIN — PROPOFOL 100 MG: 10 INJECTION, EMULSION INTRAVENOUS at 08:21

## 2024-04-11 NOTE — ANESTHESIA PREPROCEDURE EVALUATION
Procedure:  COLONOSCOPY  EGD    Relevant Problems   CARDIO   (+) Hyperlipidemia      ENDO   (+) Hypothyroidism      GI/HEPATIC   (+) Gastroesophageal reflux disease without esophagitis      /RENAL   (+) Chronic kidney disease, stage 3 (moderate)      HEMATOLOGY   (+) Iron deficiency anemia   (+) Iron deficiency anemia due to dietary causes      MUSCULOSKELETAL   (+) Sacroiliac inflammation (HCC)      NEURO/PSYCH   (+) Current mild episode of major depressive disorder without prior episode (HCC)   (+) Depression with anxiety   (+) TIMMY (generalized anxiety disorder)   (+) Panic attack   (+) Tension type headache      PULMONARY   (+) Centrilobular emphysema (HCC)   (+) Chronic obstructive pulmonary disease (HCC)   (+) Chronic respiratory failure with hypoxia, on home O2 therapy  (HCC)   (+) ELIUD (obstructive sleep apnea) (NO Cpap Use)   (+) SOB (shortness of breath)   (+) Severe persistent asthma without complication      Oncology   (+) History of laryngeal cancer (Radiation and Chemo)      Other   (+) Mediastinal lymphadenopathy   (+) Persistent dyspnea after COVID-19 (Nasal O2 2l/m)   3/24    Stress ECG: No new ST deviation is noted. The ECG was not diagnostic due to pharmacological (vasodilator) stress.    Stress Function: Left ventricular function post-stress is normal. Stress ejection fraction is 65%.    Stress Combined Conclusion: Left ventricular perfusion is probably normal with artifact.    Perfusion: There is a left ventricular perfusion defect that is medium in size with mild reduction in uptake present in the entire inferior location(s) that is partially reversible.  It appears to mostly resolve with prone imaging, although a very small area in the apex persists. The defect appears to be probable artifact caused by diaphragmatic activity.     No diagnostic evidence of moderate to large amount of ischemia or infarct.  LVEF normal - 65%     There is an inferior perfusion defect, but it appears to mostly  improve with prone imaging, suggesting probably related to artifact.  But a small amount of ischemia in the inferoapical region cannot be completely excluded     Physical Exam    Airway    Mallampati score: II  TM Distance: >3 FB  Neck ROM: full     Dental    lower dentures and upper dentures    Cardiovascular      Pulmonary  Comment: 2 puffs Albuterol preop Breath sounds clear to auscultation    Other Findings  post-pubertal.      Anesthesia Plan  ASA Score- 3     Anesthesia Type- IV sedation with anesthesia with ASA Monitors.         Additional Monitors:     Airway Plan:            Plan Factors-Exercise tolerance (METS): <4 METS.    Chart reviewed.        Patient is not a current smoker.              Induction- intravenous.    Postoperative Plan-     Informed Consent- Anesthetic plan and risks discussed with patient.  I personally reviewed this patient with the CRNA. Discussed and agreed on the Anesthesia Plan with the CRNA..

## 2024-04-11 NOTE — ANESTHESIA POSTPROCEDURE EVALUATION
Post-Op Assessment Note    CV Status:  Stable    Pain management: adequate       Mental Status:  Alert and awake   Hydration Status:  Euvolemic   PONV Controlled:  Controlled   Airway Patency:  Patent     Post Op Vitals Reviewed: Yes    No anethesia notable event occurred.    Staff: PHIL           BP   96/52   Temp      Pulse  63   Resp      SpO2   100

## 2024-04-11 NOTE — H&P
History and Physical - [facility]  Noreen Alvarez 68 y.o. female MRN: [id number]        HPI: 68-year-old female was referred for screening colonoscopy.  Regular bowel movements.    Historical Information   Past Medical History:   Diagnosis Date    Acute kidney failure (HCC)     Allergic     Allergies     Anemia     Anxiety     Asthma     Cancer (HCC)     Cataract     Chronic kidney disease (CKD), stage III (moderate) (HCC)     COPD (chronic obstructive pulmonary disease) (HCC)     COVID-19     Covid + 6-3-05-cough at that time now fully resolved    Depression     Disease of thyroid gland     Essential hypertension     GERD (gastroesophageal reflux disease)     Glaucoma     High blood pressure     HTN (hypertension) 02/16/2021    Hyperlipidemia     Hypothyroidism     Memory loss     Mesenteric lymphadenopathy 07/13/2021    Pulmonary hypertension (HCC)     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     NH TENDON SHEATH INCISION Right 02/16/2021    Procedure: THUMB TRIGGER FINGER RELEASE;  Surgeon: Angeles Detnon MD;  Location: [facility];  Service: Orthopedics    TUBAL LIGATION       Social History   Social History     Substance and Sexual Activity   Alcohol Use Never    Comment: None     Social History     Substance and Sexual Activity   Drug Use Never    Comment: Denies     Social History     Tobacco Use   Smoking Status Former    Current packs/day: 0.00    Average packs/day: 1 pack/day for 40.0 years (40.0 ttl pk-yrs)    Types: Cigarettes    Start date: 1/1/1974    Quit date: 1/1/2014    Years since quitting: 10.2   Smokeless Tobacco Never     Family History   Problem Relation Age of Onset    Other Mother         respiratory disorder     Asthma Mother     COPD Mother     Depression Mother     Sudden death Father         shot himself    Suicidality Father     Brain cancer Sister     Diabetes Sister     Breast cancer Sister     Cancer Sister 62        pancreatic cancer    No Known Problems Sister     No Known Problems Sister     No Known Problems Sister     No Known Problems Sister     No Known Problems Sister     No Known Problems Sister     No Known Problems Daughter     No Known Problems Daughter     No Known Problems Maternal Grandmother     No Known Problems Maternal Grandfather     No Known Problems Paternal Grandmother     No Known Problems Paternal Grandfather     No Known Problems Maternal Aunt     No Known Problems Maternal Aunt     No Known Problems Maternal Aunt     No Known Problems Maternal Aunt     No Known Problems Maternal Aunt     No Known Problems Paternal Aunt     No Known Problems Paternal Aunt     No Known Problems Paternal Aunt     No Known Problems Paternal Aunt        Meds/Allergies     (Not in a hospital admission)      Allergies   Allergen Reactions    Cefdinir Hives    Amifostine Rash    Pollen Extract Allergic Rhinitis       Objective     Blood pressure 121/53, pulse 68, temperature (!) 96.8 °F (36 °C), temperature source Temporal, resp. rate 16, SpO2 96%.    Physical Exam:    Chest- CTA  Heart- RRR  Abdomen- NT/ND  Extremities- No edema    ASSESSMENT:     Screening for colon cancer    PLAN:    Colonoscopy

## 2024-04-15 ENCOUNTER — CLINICAL SUPPORT (OUTPATIENT)
Dept: PULMONOLOGY | Facility: CLINIC | Age: 69
End: 2024-04-15
Payer: COMMERCIAL

## 2024-04-15 DIAGNOSIS — J44.9 CHRONIC OBSTRUCTIVE PULMONARY DISEASE, UNSPECIFIED COPD TYPE (HCC): Primary | ICD-10-CM

## 2024-04-15 PROCEDURE — G0239 OTH RESP PROC, GROUP: HCPCS

## 2024-04-15 PROCEDURE — 88305 TISSUE EXAM BY PATHOLOGIST: CPT | Performed by: PATHOLOGY

## 2024-04-16 ENCOUNTER — OFFICE VISIT (OUTPATIENT)
Dept: FAMILY MEDICINE CLINIC | Facility: CLINIC | Age: 69
End: 2024-04-16
Payer: COMMERCIAL

## 2024-04-16 ENCOUNTER — NURSE TRIAGE (OUTPATIENT)
Age: 69
End: 2024-04-16

## 2024-04-16 VITALS
BODY MASS INDEX: 30.87 KG/M2 | SYSTOLIC BLOOD PRESSURE: 110 MMHG | HEIGHT: 64 IN | TEMPERATURE: 97.5 F | RESPIRATION RATE: 20 BRPM | DIASTOLIC BLOOD PRESSURE: 50 MMHG | OXYGEN SATURATION: 96 % | HEART RATE: 68 BPM | WEIGHT: 180.8 LBS

## 2024-04-16 DIAGNOSIS — M54.16 LUMBAR RADICULOPATHY, RIGHT: ICD-10-CM

## 2024-04-16 DIAGNOSIS — E78.5 HYPERLIPIDEMIA, UNSPECIFIED HYPERLIPIDEMIA TYPE: ICD-10-CM

## 2024-04-16 DIAGNOSIS — D12.6 TUBULAR ADENOMA OF COLON: Primary | ICD-10-CM

## 2024-04-16 PROCEDURE — 99214 OFFICE O/P EST MOD 30 MIN: CPT | Performed by: FAMILY MEDICINE

## 2024-04-16 PROCEDURE — G2211 COMPLEX E/M VISIT ADD ON: HCPCS | Performed by: FAMILY MEDICINE

## 2024-04-16 RX ORDER — BACLOFEN 10 MG/1
10 TABLET ORAL
Qty: 30 TABLET | Refills: 0 | Status: SHIPPED | OUTPATIENT
Start: 2024-04-16

## 2024-04-16 RX ORDER — ATORVASTATIN CALCIUM 40 MG/1
40 TABLET, FILM COATED ORAL
Qty: 90 TABLET | Refills: 3 | Status: SHIPPED | OUTPATIENT
Start: 2024-04-16 | End: 2025-04-11

## 2024-04-16 NOTE — TELEPHONE ENCOUNTER
Patient daughter calls in Joanna- no communication consent with Joanna listed.  Daughter not with the patient at this time.  I encouraged daughter to add her mom as 3rd party so I could get consent.  Daughter disconnected the call.         Answer Assessment - Initial Assessment Questions  See my note    Protocols used: No Contact or Duplicate Contact Call-ADULT-[place]

## 2024-04-17 ENCOUNTER — CLINICAL SUPPORT (OUTPATIENT)
Dept: PULMONOLOGY | Facility: CLINIC | Age: 69
End: 2024-04-17
Payer: COMMERCIAL

## 2024-04-17 DIAGNOSIS — J44.9 CHRONIC OBSTRUCTIVE PULMONARY DISEASE, UNSPECIFIED COPD TYPE (HCC): Primary | ICD-10-CM

## 2024-04-17 PROCEDURE — G0239 OTH RESP PROC, GROUP: HCPCS

## 2024-04-17 NOTE — PROGRESS NOTES
Subjective:      Patient ID: Noreen Alvarez is a 68 y.o. female.    Has LE swelling   used  Also has difficulty sleep and right low back pain        Past Medical History:   Diagnosis Date    Acute kidney failure (HCC)     Allergic     Allergies     Anemia     Anxiety     Asthma     Cancer (HCC)     Cataract     Chronic kidney disease (CKD), stage III (moderate) (HCC)     COPD (chronic obstructive pulmonary disease) (HCC)     COVID-19     Covid + 6-6-54-cough at that time now fully resolved    Depression     Disease of thyroid gland     Essential hypertension     GERD (gastroesophageal reflux disease)     Glaucoma     High blood pressure     HTN (hypertension) 02/16/2021    Hyperlipidemia     Hypothyroidism     Memory loss     Mesenteric lymphadenopathy 07/13/2021    Pulmonary hypertension (HCC)     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    Throat cancer (HCC) 2014    chemo and rad therapy 2014       Family History   Problem Relation Age of Onset    Other Mother         respiratory disorder    Asthma Mother     COPD Mother     Depression Mother     Sudden death Father         shot himself    Suicidality Father     Brain cancer Sister     Diabetes Sister     Breast cancer Sister     Cancer Sister 62        pancreatic cancer    No Known Problems Sister     No Known Problems Sister     No Known Problems Sister     No Known Problems Sister     No Known Problems Sister     No Known Problems Sister     No Known Problems Daughter     No Known Problems Daughter     No Known Problems Maternal Grandmother     No Known Problems Maternal Grandfather     No Known Problems Paternal Grandmother     No Known Problems Paternal Grandfather     No Known Problems Maternal Aunt     No Known Problems Maternal Aunt     No Known Problems Maternal Aunt     No Known Problems Maternal Aunt     No Known Problems Maternal Aunt     No Known Problems Paternal Aunt     No Known Problems Paternal Aunt     No Known  Problems Paternal Aunt     No Known Problems Paternal Aunt        Past Surgical History:   Procedure Laterality Date    COLONOSCOPY  2016    ESOPHAGOGASTRODUODENOSCOPY  2016    EYE SURGERY      IR BIOPSY LUNG  05/18/2022    LARYNGOSCOPY      w/ Bx.     AL TENDON SHEATH INCISION Right 02/16/2021    Procedure: THUMB TRIGGER FINGER RELEASE;  Surgeon: Angeles Denton MD;  Location: [facility];  Service: Orthopedics    TUBAL LIGATION          reports that she quit smoking about 10 years ago. Her smoking use included cigarettes. She started smoking about 50 years ago. She has a 40 pack-year smoking history. She has never used smokeless tobacco. She reports that she does not drink alcohol and does not use drugs.      Current Outpatient Medications:     albuterol (PROVENTIL HFA,VENTOLIN HFA) 90 mcg/act inhaler, Inhale 2 puffs every 6 (six) hours as needed for wheezing or shortness of breath, Disp: 18 g, Rfl: 5    atorvastatin (LIPITOR) 40 mg tablet, Take 1 tablet (40 mg total) by mouth daily with dinner, Disp: 90 tablet, Rfl: 3    azithromycin (ZITHROMAX) 250 mg tablet, Take 1 tablet (250 mg total) by mouth every other day for 45 doses, Disp: 45 tablet, Rfl: 0    baclofen 10 mg tablet, Take 1 tablet (10 mg total) by mouth daily at bedtime as needed for muscle spasms, Disp: 30 tablet, Rfl: 0    benzonatate (TESSALON PERLES) 100 mg capsule, Take 1 capsule (100 mg total) by mouth 3 (three) times a day, Disp: 20 capsule, Rfl: 0    brimonidine tartrate 0.2 % ophthalmic solution, , Disp: , Rfl:     Budeson-Glycopyrrol-Formoterol (Breztri Aerosphere) 160-9-4.8 MCG/ACT AERO, Inhale 2 puffs 2 (two) times a day Rinse mouth after use., Disp: 32.1 g, Rfl: 0    clotrimazole-betamethasone (LOTRISONE) 1-0.05 % cream, Apply topically 2 (two) times a day, Disp: 45 g, Rfl: 1    Diclofenac Sodium (VOLTAREN) 1 %, APPLY 2 GRAMS TO AFFECTED AREA 4 TIMES A DAY, Disp: 100 g, Rfl: 5    docusate sodium (COLACE) 100 mg capsule, Take 1 capsule  (100 mg total) by mouth 2 (two) times a day as needed for constipation Hold for lose stool, Disp: 10 capsule, Rfl: 0    ferrous sulfate 324 (65 Fe) mg, Take 1 tablet (324 mg total) by mouth daily after lunch, Disp: 90 tablet, Rfl: 1    fluticasone (FLONASE) 50 mcg/act nasal spray, SPRAY 1 SPRAY INTO EACH NOSTRIL EVERY DAY, Disp: 48 mL, Rfl: 1    ipratropium (ATROVENT) 0.06 % nasal spray, 2 SPRAYS INTO EACH NOSTRIL 3 (THREE) TIMES A DAY FOR INCREASED NASAL SECRETION, Disp: 45 mL, Rfl: 1    ipratropium-albuterol (DUO-NEB) 0.5-2.5 mg/3 mL nebulizer solution, TAKE 3 ML BY NEBULIZATION EVERY 8 (EIGHT) HOURS AS NEEDED FOR WHEEZING OR SHORTNESS OF BREATH, Disp: 270 mL, Rfl: 1    levothyroxine 75 mcg tablet, Take 1 tablet (75 mcg total) by mouth daily in the early morning, Disp: 90 tablet, Rfl: 0    montelukast (SINGULAIR) 10 mg tablet, Take 1 tablet (10 mg total) by mouth daily at bedtime, Disp: 90 tablet, Rfl: 1    ondansetron (ZOFRAN) 4 mg tablet, Take 1 tablet (4 mg total) by mouth every 8 (eight) hours as needed for nausea or vomiting, Disp: 20 tablet, Rfl: 0    pantoprazole (PROTONIX) 40 mg tablet, Take 1 tablet (40 mg total) by mouth daily, Disp: 30 tablet, Rfl: 2    polyethylene glycol (GLYCOLAX) 17 GM/SCOOP powder, DISSOLVE 17 GRAMS INTO WATER AND DRINK BY MOUTH EVERY DAY, Disp: 510 g, Rfl: 0    sertraline (ZOLOFT) 25 mg tablet, Take 1 tablet (25 mg total) by mouth daily at bedtime, Disp: 90 tablet, Rfl: 1    timolol (TIMOPTIC) 0.5 % ophthalmic solution, INSTILL 1 DROP INTO EACH EYE TWO TIMES A DAY, Disp: , Rfl:     bisacodyl (DULCOLAX) 5 mg EC tablet, Take 4 tablets (20 mg total) by mouth 1 (one) time for 1 dose Take as instructed by office for bowel prep., Disp: 4 tablet, Rfl: 0    polyethylene glycol (GOLYTELY) 4000 mL solution, Take 4,000 mL by mouth once for 1 dose, Disp: 4000 mL, Rfl: 0    sucralfate (CARAFATE) 1 g tablet, Take 1 tablet (1 g total) by mouth 2 (two) times a day before meals for 14 days, Disp:  28 tablet, Rfl: 0    The following portions of the patient's history were reviewed and updated as appropriate: allergies, current medications, past family history, past medical history, past social history, past surgical history and problem list.    Review of Systems   Constitutional:  Negative for fatigue and fever.   HENT:  Negative for congestion, facial swelling, mouth sores, rhinorrhea, sore throat and trouble swallowing.    Eyes:  Negative for pain and redness.   Respiratory:  Positive for shortness of breath. Negative for cough and wheezing.    Cardiovascular:  Positive for leg swelling. Negative for chest pain and palpitations.   Gastrointestinal:  Negative for abdominal pain, blood in stool, constipation, diarrhea and nausea.   Genitourinary:  Negative for dysuria, hematuria and urgency.   Musculoskeletal:  Positive for back pain. Negative for arthralgias and myalgias.   Skin:  Negative for rash and wound.   Neurological:  Negative for seizures, syncope and headaches.   Hematological:  Negative for adenopathy.   Psychiatric/Behavioral:  Negative for agitation and behavioral problems.          PHQ-2/9 Depression Screening    Little interest or pleasure in doing things: 0 - not at all  Feeling down, depressed, or hopeless: 0 - not at all  Trouble falling or staying asleep, or sleeping too much: 0 - not at all  Feeling tired or having little energy: 0 - not at all  Poor appetite or overeatin - not at all  Feeling bad about yourself - or that you are a failure or have let yourself or your family down: 0 - not at all  Trouble concentrating on things, such as reading the newspaper or watching television: 0 - not at all  Moving or speaking so slowly that other people could have noticed. Or the opposite - being so fidgety or restless that you have been moving around a lot more than usual: 0 - not at all  Thoughts that you would be better off dead, or of hurting yourself in some way: 0 - not at all  PHQ-9 Score:  "0  PHQ-9 Interpretation: No or Minimal depression             Objective:    /50 (BP Location: Right arm, Patient Position: Sitting, Cuff Size: Adult)   Pulse 68   Temp 97.5 °F (36.4 °C) (Tympanic)   Resp 20   Ht 5' 4\" (1.626 m)   Wt 82 kg (180 lb 12.8 oz)   SpO2 96% Comment: on 2 liters O2  BMI 31.03 kg/m²      Physical Exam  Vitals and nursing note reviewed.   Constitutional:       Appearance: Normal appearance. She is well-developed. She is not ill-appearing.   HENT:      Head: Normocephalic and atraumatic.      Right Ear: External ear normal.      Left Ear: External ear normal.      Nose: Nose normal.      Mouth/Throat:      Mouth: Mucous membranes are moist.      Pharynx: No oropharyngeal exudate or posterior oropharyngeal erythema.   Eyes:      General: No scleral icterus.        Right eye: No discharge.         Left eye: No discharge.      Conjunctiva/sclera: Conjunctivae normal.   Cardiovascular:      Rate and Rhythm: Normal rate.      Heart sounds: No murmur heard.     No gallop.   Pulmonary:      Effort: Pulmonary effort is normal. No respiratory distress.      Breath sounds: Normal breath sounds. No stridor. No wheezing, rhonchi or rales.   Abdominal:      Palpations: Abdomen is soft.      Tenderness: There is no abdominal tenderness.   Musculoskeletal:         General: Tenderness (right lumbar spine) present. No deformity.      Right lower leg: Edema (trace pitting ankle) present.      Left lower leg: Edema (trace, pitting ankle) present.   Skin:     Findings: No erythema or rash.   Neurological:      Mental Status: She is alert. Mental status is at baseline.   Psychiatric:         Behavior: Behavior normal.         Judgment: Judgment normal.           Recent Results (from the past 8736 hour(s))   CBC and differential    Collection Time: 04/27/23 12:12 PM   Result Value Ref Range    WBC 8.37 4.31 - 10.16 Thousand/uL    RBC 4.63 3.81 - 5.12 Million/uL    Hemoglobin 12.5 11.5 - 15.4 g/dL    " Hematocrit 41.4 34.8 - 46.1 %    MCV 89 82 - 98 fL    MCH 27.0 26.8 - 34.3 pg    MCHC 30.2 (L) 31.4 - 37.4 g/dL    RDW 14.5 11.6 - 15.1 %    MPV 9.7 8.9 - 12.7 fL    Platelets 189 149 - 390 Thousands/uL    nRBC 0 /100 WBCs    Segmented % 79 (H) 43 - 75 %    Immature Grans % 1 0 - 2 %    Lymphocytes % 11 (L) 14 - 44 %    Monocytes % 7 4 - 12 %    Eosinophils Relative 1 0 - 6 %    Basophils Relative 1 0 - 1 %    Absolute Neutrophils 6.69 1.85 - 7.62 Thousands/µL    Absolute Immature Grans 0.04 0.00 - 0.20 Thousand/uL    Absolute Lymphocytes 0.88 0.60 - 4.47 Thousands/µL    Absolute Monocytes 0.59 0.17 - 1.22 Thousand/µL    Eosinophils Absolute 0.12 0.00 - 0.61 Thousand/µL    Basophils Absolute 0.05 0.00 - 0.10 Thousands/µL   Comprehensive metabolic panel    Collection Time: 04/27/23 12:12 PM   Result Value Ref Range    Sodium 140 135 - 147 mmol/L    Potassium 4.5 3.5 - 5.3 mmol/L    Chloride 103 96 - 108 mmol/L    CO2 31 21 - 32 mmol/L    ANION GAP 6 4 - 13 mmol/L    BUN 28 (H) 5 - 25 mg/dL    Creatinine 1.18 0.60 - 1.30 mg/dL    Glucose 80 65 - 140 mg/dL    Calcium 9.5 8.4 - 10.2 mg/dL    AST 22 13 - 39 U/L    ALT 13 7 - 52 U/L    Alkaline Phosphatase 114 (H) 34 - 104 U/L    Total Protein 7.0 6.4 - 8.4 g/dL    Albumin 4.3 3.5 - 5.0 g/dL    Total Bilirubin 0.41 0.20 - 1.00 mg/dL    eGFR 47 ml/min/1.73sq m   TSH, 3rd generation    Collection Time: 04/27/23 12:12 PM   Result Value Ref Range    TSH 3RD GENERATON 3.635 0.450 - 4.500 uIU/mL   Iron Saturation %    Collection Time: 04/27/23 12:12 PM   Result Value Ref Range    Iron Saturation 19 15 - 50 %    TIBC 336 250 - 450 ug/dL    Iron 65 50 - 170 ug/dL   Ferritin    Collection Time: 04/27/23 12:12 PM   Result Value Ref Range    Ferritin 57 8 - 388 ng/mL   ECG 12 lead    Collection Time: 05/21/23  3:55 PM   Result Value Ref Range    Ventricular Rate 78 BPM    Atrial Rate 78 BPM    ND Interval 154 ms    QRSD Interval 82 ms    QT Interval 354 ms    QTC Interval 403 ms     "P Axis 84 degrees    QRS Axis 34 degrees    T Wave Axis 88 degrees   CBC and differential    Collection Time: 05/21/23  4:33 PM   Result Value Ref Range    WBC 14.17 (H) 4.31 - 10.16 Thousand/uL    RBC 4.66 3.81 - 5.12 Million/uL    Hemoglobin 12.5 11.5 - 15.4 g/dL    Hematocrit 41.7 34.8 - 46.1 %    MCV 90 82 - 98 fL    MCH 26.8 26.8 - 34.3 pg    MCHC 30.0 (L) 31.4 - 37.4 g/dL    RDW 13.9 11.6 - 15.1 %    MPV 10.1 8.9 - 12.7 fL    Platelets 189 149 - 390 Thousands/uL   Protime-INR    Collection Time: 05/21/23  4:33 PM   Result Value Ref Range    Protime 12.5 11.6 - 14.5 seconds    INR 0.92 0.84 - 1.19   APTT    Collection Time: 05/21/23  4:33 PM   Result Value Ref Range    PTT 26 23 - 37 seconds   FLU/RSV/COVID - if FLU/RSV clinically relevant    Collection Time: 05/21/23  4:33 PM    Specimen: Nose; Nares   Result Value Ref Range    SARS-CoV-2 Negative Negative    INFLUENZA A PCR Negative Negative    INFLUENZA B PCR Negative Negative    RSV PCR Negative Negative   Comprehensive metabolic panel    Collection Time: 05/21/23  4:33 PM   Result Value Ref Range    Sodium 139 135 - 147 mmol/L    Potassium 4.4 3.5 - 5.3 mmol/L    Chloride 100 96 - 108 mmol/L    CO2 34 (H) 21 - 32 mmol/L    ANION GAP 5 4 - 13 mmol/L    BUN 25 5 - 25 mg/dL    Creatinine 1.02 0.60 - 1.30 mg/dL    Glucose 102 65 - 140 mg/dL    Calcium 9.0 8.4 - 10.2 mg/dL    AST 24 13 - 39 U/L    ALT 15 7 - 52 U/L    Alkaline Phosphatase 117 (H) 34 - 104 U/L    Total Protein 7.0 6.4 - 8.4 g/dL    Albumin 4.0 3.5 - 5.0 g/dL    Total Bilirubin 0.48 0.20 - 1.00 mg/dL    eGFR 56 ml/min/1.73sq m   Magnesium    Collection Time: 05/21/23  4:33 PM   Result Value Ref Range    Magnesium 1.8 (L) 1.9 - 2.7 mg/dL   HS Troponin 0hr (reflex protocol)    Collection Time: 05/21/23  4:33 PM   Result Value Ref Range    hs TnI 0hr 3 \"Refer to ACS Flowchart\"- see link ng/L   Manual Differential(PHLEBS Do Not Order)    Collection Time: 05/21/23  4:33 PM   Result Value Ref Range    " Segmented % 87 (H) 43 - 75 %    Bands % 1 0 - 8 %    Lymphocytes % 5 (L) 14 - 44 %    Monocytes % 5 4 - 12 %    Eosinophils % 2 0 - 6 %    Basophils % 0 0 - 1 %    Absolute Neutrophils 12.47 (H) 1.85 - 7.62 Thousand/uL    Absolute Lymphocytes 0.71 0.60 - 4.47 Thousand/uL    Absolute Monocytes 0.71 0.00 - 1.22 Thousand/uL    Absolute Eosinophils 0.28 0.00 - 0.40 Thousand/uL    Absolute Basophils 0.00 0.00 - 0.10 Thousand/uL    Total Counted      RBC Morphology Normal     Platelet Estimate Adequate Adequate   ECG 12 lead    Collection Time: 01/01/24  9:24 PM   Result Value Ref Range    Ventricular Rate 73 BPM    Atrial Rate 73 BPM    NE Interval 142 ms    QRSD Interval 62 ms    QT Interval 450 ms    QTC Interval 495 ms    P Axis 90 degrees    QRS Axis 19 degrees    T Wave Axis 247 degrees   CBC and differential    Collection Time: 01/01/24  9:25 PM   Result Value Ref Range    WBC 6.40 4.31 - 10.16 Thousand/uL    RBC 4.33 3.81 - 5.12 Million/uL    Hemoglobin 11.2 (L) 11.5 - 15.4 g/dL    Hematocrit 38.7 34.8 - 46.1 %    MCV 89 82 - 98 fL    MCH 25.9 (L) 26.8 - 34.3 pg    MCHC 28.9 (L) 31.4 - 37.4 g/dL    RDW 15.1 11.6 - 15.1 %    MPV 9.7 8.9 - 12.7 fL    Platelets 234 149 - 390 Thousands/uL    nRBC 0 /100 WBCs    Segmented % 74 43 - 75 %    Immature Grans % 1 0 - 2 %    Lymphocytes % 10 (L) 14 - 44 %    Monocytes % 13 (H) 4 - 12 %    Eosinophils Relative 1 0 - 6 %    Basophils Relative 1 0 - 1 %    Absolute Neutrophils 4.81 1.85 - 7.62 Thousands/µL    Absolute Immature Grans 0.03 0.00 - 0.20 Thousand/uL    Absolute Lymphocytes 0.64 0.60 - 4.47 Thousands/µL    Absolute Monocytes 0.81 0.17 - 1.22 Thousand/µL    Eosinophils Absolute 0.08 0.00 - 0.61 Thousand/µL    Basophils Absolute 0.03 0.00 - 0.10 Thousands/µL   Comprehensive metabolic panel    Collection Time: 01/01/24  9:25 PM   Result Value Ref Range    Sodium 142 135 - 147 mmol/L    Potassium 4.6 3.5 - 5.3 mmol/L    Chloride 104 96 - 108 mmol/L    CO2 33 (H) 21 - 32  "mmol/L    ANION GAP 5 mmol/L    BUN 28 (H) 5 - 25 mg/dL    Creatinine 1.16 0.60 - 1.30 mg/dL    Glucose 98 65 - 140 mg/dL    Calcium 9.4 8.4 - 10.2 mg/dL    AST 24 13 - 39 U/L    ALT 16 7 - 52 U/L    Alkaline Phosphatase 95 34 - 104 U/L    Total Protein 7.0 6.4 - 8.4 g/dL    Albumin 4.0 3.5 - 5.0 g/dL    Total Bilirubin 0.28 0.20 - 1.00 mg/dL    eGFR 48 ml/min/1.73sq m   HS Troponin 0hr (reflex protocol)    Collection Time: 01/01/24  9:25 PM   Result Value Ref Range    hs TnI 0hr 4 \"Refer to ACS Flowchart\"- see link ng/L   B-Type Natriuretic Peptide(BNP)    Collection Time: 01/01/24  9:25 PM   Result Value Ref Range    BNP 44 0 - 100 pg/mL   FLU/RSV/COVID - if FLU/RSV clinically relevant    Collection Time: 01/01/24  9:25 PM    Specimen: Nose; Nares   Result Value Ref Range    SARS-CoV-2 Negative Negative    INFLUENZA A PCR Positive (A) Negative    INFLUENZA B PCR Negative Negative    RSV PCR Negative Negative   ECG 12 lead    Collection Time: 01/21/24  2:34 PM   Result Value Ref Range    Ventricular Rate 74 BPM    Atrial Rate 74 BPM    FL Interval 146 ms    QRSD Interval 80 ms    QT Interval 396 ms    QTC Interval 439 ms    P Axis 86 degrees    QRS Axis 40 degrees    T Wave Axis 84 degrees   CBC and differential    Collection Time: 01/21/24  2:57 PM   Result Value Ref Range    WBC 5.99 4.31 - 10.16 Thousand/uL    RBC 4.17 3.81 - 5.12 Million/uL    Hemoglobin 11.0 (L) 11.5 - 15.4 g/dL    Hematocrit 37.0 34.8 - 46.1 %    MCV 89 82 - 98 fL    MCH 26.4 (L) 26.8 - 34.3 pg    MCHC 29.7 (L) 31.4 - 37.4 g/dL    RDW 14.6 11.6 - 15.1 %    MPV 9.5 8.9 - 12.7 fL    Platelets 175 149 - 390 Thousands/uL    nRBC 0 /100 WBCs    Segmented % 73 43 - 75 %    Immature Grans % 0 0 - 2 %    Lymphocytes % 11 (L) 14 - 44 %    Monocytes % 12 4 - 12 %    Eosinophils Relative 4 0 - 6 %    Basophils Relative 0 0 - 1 %    Absolute Neutrophils 4.36 1.85 - 7.62 Thousands/µL    Absolute Immature Grans 0.02 0.00 - 0.20 Thousand/uL    Absolute " "Lymphocytes 0.63 0.60 - 4.47 Thousands/µL    Absolute Monocytes 0.73 0.17 - 1.22 Thousand/µL    Eosinophils Absolute 0.23 0.00 - 0.61 Thousand/µL    Basophils Absolute 0.02 0.00 - 0.10 Thousands/µL   Comprehensive metabolic panel    Collection Time: 01/21/24  2:57 PM   Result Value Ref Range    Sodium 138 135 - 147 mmol/L    Potassium 5.1 3.5 - 5.3 mmol/L    Chloride 100 96 - 108 mmol/L    CO2 34 (H) 21 - 32 mmol/L    ANION GAP 4 mmol/L    BUN 21 5 - 25 mg/dL    Creatinine 1.18 0.60 - 1.30 mg/dL    Glucose 86 65 - 140 mg/dL    Calcium 9.3 8.4 - 10.2 mg/dL    AST 21 13 - 39 U/L    ALT 12 7 - 52 U/L    Alkaline Phosphatase 90 34 - 104 U/L    Total Protein 6.9 6.4 - 8.4 g/dL    Albumin 3.8 3.5 - 5.0 g/dL    Total Bilirubin 0.42 0.20 - 1.00 mg/dL    eGFR 47 ml/min/1.73sq m   Lipase    Collection Time: 01/21/24  2:57 PM   Result Value Ref Range    Lipase 17 11 - 82 u/L   HS Troponin 0hr (reflex protocol)    Collection Time: 01/21/24  2:57 PM   Result Value Ref Range    hs TnI 0hr 3 \"Refer to ACS Flowchart\"- see link ng/L   ECG 12 lead    Collection Time: 01/23/24  2:32 PM   Result Value Ref Range    Ventricular Rate 87 BPM    Atrial Rate 87 BPM    AK Interval 150 ms    QRSD Interval 82 ms    QT Interval 382 ms    QTC Interval 459 ms    P Axis 88 degrees    QRS Axis 50 degrees    T Wave Axis 81 degrees   CBC and differential    Collection Time: 01/23/24  3:02 PM   Result Value Ref Range    WBC 6.86 4.31 - 10.16 Thousand/uL    RBC 4.28 3.81 - 5.12 Million/uL    Hemoglobin 11.4 (L) 11.5 - 15.4 g/dL    Hematocrit 38.6 34.8 - 46.1 %    MCV 90 82 - 98 fL    MCH 26.6 (L) 26.8 - 34.3 pg    MCHC 29.5 (L) 31.4 - 37.4 g/dL    RDW 14.3 11.6 - 15.1 %    MPV 10.0 8.9 - 12.7 fL    Platelets 222 149 - 390 Thousands/uL    nRBC 0 /100 WBCs    Segmented % 79 (H) 43 - 75 %    Immature Grans % 0 0 - 2 %    Lymphocytes % 8 (L) 14 - 44 %    Monocytes % 10 4 - 12 %    Eosinophils Relative 3 0 - 6 %    Basophils Relative 0 0 - 1 %    Absolute " "Neutrophils 5.33 1.85 - 7.62 Thousands/µL    Absolute Immature Grans 0.03 0.00 - 0.20 Thousand/uL    Absolute Lymphocytes 0.56 (L) 0.60 - 4.47 Thousands/µL    Absolute Monocytes 0.70 0.17 - 1.22 Thousand/µL    Eosinophils Absolute 0.21 0.00 - 0.61 Thousand/µL    Basophils Absolute 0.03 0.00 - 0.10 Thousands/µL   Comprehensive metabolic panel    Collection Time: 01/23/24  3:02 PM   Result Value Ref Range    Sodium 136 135 - 147 mmol/L    Potassium 4.4 3.5 - 5.3 mmol/L    Chloride 96 96 - 108 mmol/L    CO2 34 (H) 21 - 32 mmol/L    ANION GAP 6 mmol/L    BUN 19 5 - 25 mg/dL    Creatinine 0.92 0.60 - 1.30 mg/dL    Glucose 117 65 - 140 mg/dL    Calcium 9.6 8.4 - 10.2 mg/dL    AST 18 13 - 39 U/L    ALT 11 7 - 52 U/L    Alkaline Phosphatase 95 34 - 104 U/L    Total Protein 7.4 6.4 - 8.4 g/dL    Albumin 4.1 3.5 - 5.0 g/dL    Total Bilirubin 0.39 0.20 - 1.00 mg/dL    eGFR 64 ml/min/1.73sq m   HS Troponin 0hr (reflex protocol)    Collection Time: 01/23/24  3:02 PM   Result Value Ref Range    hs TnI 0hr 4 \"Refer to ACS Flowchart\"- see link ng/L   Lipase    Collection Time: 01/23/24  3:02 PM   Result Value Ref Range    Lipase 9 (L) 11 - 82 u/L   B-Type Natriuretic Peptide(BNP)    Collection Time: 01/23/24  3:02 PM   Result Value Ref Range    BNP 73 0 - 100 pg/mL   Magnesium    Collection Time: 01/23/24  3:02 PM   Result Value Ref Range    Magnesium 1.9 1.9 - 2.7 mg/dL   FLU/RSV/COVID - if FLU/RSV clinically relevant    Collection Time: 01/23/24  3:02 PM    Specimen: Nose; Nares   Result Value Ref Range    SARS-CoV-2 Negative Negative    INFLUENZA A PCR Negative Negative    INFLUENZA B PCR Negative Negative    RSV PCR Positive (A) Negative   Procalcitonin    Collection Time: 01/23/24  3:02 PM   Result Value Ref Range    Procalcitonin <0.05 <=0.25 ng/ml   HS Troponin I 2hr    Collection Time: 01/23/24  5:50 PM   Result Value Ref Range    hs TnI 2hr 3 \"Refer to ACS Flowchart\"- see link ng/L    Delta 2hr hsTnI -1 <20 ng/L   CBC and " differential    Collection Time: 01/24/24  4:46 AM   Result Value Ref Range    WBC 5.42 4.31 - 10.16 Thousand/uL    RBC 4.19 3.81 - 5.12 Million/uL    Hemoglobin 11.1 (L) 11.5 - 15.4 g/dL    Hematocrit 37.4 34.8 - 46.1 %    MCV 89 82 - 98 fL    MCH 26.5 (L) 26.8 - 34.3 pg    MCHC 29.7 (L) 31.4 - 37.4 g/dL    RDW 14.1 11.6 - 15.1 %    MPV 9.9 8.9 - 12.7 fL    Platelets 213 149 - 390 Thousands/uL    nRBC 0 /100 WBCs    Segmented % 90 (H) 43 - 75 %    Immature Grans % 1 0 - 2 %    Lymphocytes % 7 (L) 14 - 44 %    Monocytes % 2 (L) 4 - 12 %    Eosinophils Relative 0 0 - 6 %    Basophils Relative 0 0 - 1 %    Absolute Neutrophils 4.90 1.85 - 7.62 Thousands/µL    Absolute Immature Grans 0.04 0.00 - 0.20 Thousand/uL    Absolute Lymphocytes 0.38 (L) 0.60 - 4.47 Thousands/µL    Absolute Monocytes 0.09 (L) 0.17 - 1.22 Thousand/µL    Eosinophils Absolute 0.00 0.00 - 0.61 Thousand/µL    Basophils Absolute 0.01 0.00 - 0.10 Thousands/µL   Basic metabolic panel    Collection Time: 01/24/24  4:46 AM   Result Value Ref Range    Sodium 138 135 - 147 mmol/L    Potassium 5.2 3.5 - 5.3 mmol/L    Chloride 99 96 - 108 mmol/L    CO2 34 (H) 21 - 32 mmol/L    ANION GAP 5 mmol/L    BUN 19 5 - 25 mg/dL    Creatinine 0.93 0.60 - 1.30 mg/dL    Glucose 142 (H) 65 - 140 mg/dL    Calcium 9.7 8.4 - 10.2 mg/dL    eGFR 63 ml/min/1.73sq m   Smear Review(Phlebs Do Not Order)    Collection Time: 01/24/24  4:46 AM   Result Value Ref Range    RBC Morphology Normal     Platelet Estimate Adequate Adequate   Basic metabolic panel    Collection Time: 01/25/24  4:45 AM   Result Value Ref Range    Sodium 138 135 - 147 mmol/L    Potassium 5.5 (H) 3.5 - 5.3 mmol/L    Chloride 99 96 - 108 mmol/L    CO2 32 21 - 32 mmol/L    ANION GAP 7 mmol/L    BUN 35 (H) 5 - 25 mg/dL    Creatinine 1.02 0.60 - 1.30 mg/dL    Glucose 128 65 - 140 mg/dL    Calcium 10.0 8.4 - 10.2 mg/dL    eGFR 56 ml/min/1.73sq m   CBC and differential    Collection Time: 01/25/24  4:45 AM   Result  "Value Ref Range    WBC 15.11 (H) 4.31 - 10.16 Thousand/uL    RBC 4.32 3.81 - 5.12 Million/uL    Hemoglobin 11.6 11.5 - 15.4 g/dL    Hematocrit 39.0 34.8 - 46.1 %    MCV 90 82 - 98 fL    MCH 26.9 26.8 - 34.3 pg    MCHC 29.7 (L) 31.4 - 37.4 g/dL    RDW 14.2 11.6 - 15.1 %    MPV 10.6 8.9 - 12.7 fL    Platelets 280 149 - 390 Thousands/uL   Manual Differential(PHLEBS Do Not Order)    Collection Time: 01/25/24  4:45 AM   Result Value Ref Range    Segmented % 90 (H) 43 - 75 %    Lymphocytes % 7 (L) 14 - 44 %    Monocytes % 3 (L) 4 - 12 %    Eosinophils % 0 0 - 6 %    Basophils % 0 0 - 1 %    Absolute Neutrophils 13.60 (H) 1.85 - 7.62 Thousand/uL    Absolute Lymphocytes 1.06 0.60 - 4.47 Thousand/uL    Absolute Monocytes 0.45 0.00 - 1.22 Thousand/uL    Absolute Eosinophils 0.00 0.00 - 0.40 Thousand/uL    Absolute Basophils 0.00 0.00 - 0.10 Thousand/uL    Total Counted      RBC Morphology Normal     Platelet Estimate Adequate Adequate   Wheeled Walker    Collection Time: 01/25/24  9:34 AM   Result Value Ref Range    Supplier Name [email or web address]     Supplier Phone Number [phone]     Order Status Delivery Successful     Delivery Note      Delivery Request Date 01/25/2024     Date Delivered  01/25/2024     Item Description Wheeled Walker, Adult    ECG 12 lead    Collection Time: 01/25/24 11:18 AM   Result Value Ref Range    Ventricular Rate 70 BPM    Atrial Rate 70 BPM    ID Interval 150 ms    QRSD Interval 84 ms    QT Interval 386 ms    QTC Interval 416 ms    P Axis 79 degrees    QRS Axis 29 degrees    T Wave [place] 55 degrees   HS Troponin 0hr (reflex protocol)    Collection Time: 01/25/24 11:22 AM   Result Value Ref Range    hs TnI 0hr 5 \"Refer to ACS Flowchart\"- see link ng/L   HS Troponin I 4hr    Collection Time: 01/25/24  3:35 PM   Result Value Ref Range    hs TnI 4hr 3 \"Refer to ACS Flowchart\"- see link ng/L    Delta 4hr hsTnI -2 <20 ng/L   Basic metabolic panel    Collection Time: 01/26/24  4:40 " AM   Result Value Ref Range    Sodium 139 135 - 147 mmol/L    Potassium 5.1 3.5 - 5.3 mmol/L    Chloride 100 96 - 108 mmol/L    CO2 35 (H) 21 - 32 mmol/L    ANION GAP 4 mmol/L    BUN 37 (H) 5 - 25 mg/dL    Creatinine 1.05 0.60 - 1.30 mg/dL    Glucose 107 65 - 140 mg/dL    Calcium 9.4 8.4 - 10.2 mg/dL    eGFR 54 ml/min/1.73sq m   CBC and Platelet    Collection Time: 01/26/24  4:40 AM   Result Value Ref Range    WBC 10.45 (H) 4.31 - 10.16 Thousand/uL    RBC 4.00 3.81 - 5.12 Million/uL    Hemoglobin 10.6 (L) 11.5 - 15.4 g/dL    Hematocrit 35.9 34.8 - 46.1 %    MCV 90 82 - 98 fL    MCH 26.5 (L) 26.8 - 34.3 pg    MCHC 29.5 (L) 31.4 - 37.4 g/dL    RDW 14.6 11.6 - 15.1 %    Platelets 256 149 - 390 Thousands/uL    MPV 9.8 8.9 - 12.7 fL   CBC and differential    Collection Time: 01/27/24  5:52 AM   Result Value Ref Range    WBC 7.96 4.31 - 10.16 Thousand/uL    RBC 4.15 3.81 - 5.12 Million/uL    Hemoglobin 11.0 (L) 11.5 - 15.4 g/dL    Hematocrit 37.7 34.8 - 46.1 %    MCV 91 82 - 98 fL    MCH 26.5 (L) 26.8 - 34.3 pg    MCHC 29.2 (L) 31.4 - 37.4 g/dL    RDW 14.7 11.6 - 15.1 %    MPV 9.7 8.9 - 12.7 fL    Platelets 258 149 - 390 Thousands/uL    nRBC 0 /100 WBCs    Segmented % 87 (H) 43 - 75 %    Immature Grans % 2 0 - 2 %    Lymphocytes % 8 (L) 14 - 44 %    Monocytes % 3 (L) 4 - 12 %    Eosinophils Relative 0 0 - 6 %    Basophils Relative 0 0 - 1 %    Absolute Neutrophils 6.93 1.85 - 7.62 Thousands/µL    Absolute Immature Grans 0.15 0.00 - 0.20 Thousand/uL    Absolute Lymphocytes 0.65 0.60 - 4.47 Thousands/µL    Absolute Monocytes 0.21 0.17 - 1.22 Thousand/µL    Eosinophils Absolute 0.00 0.00 - 0.61 Thousand/µL    Basophils Absolute 0.02 0.00 - 0.10 Thousands/µL   Basic metabolic panel    Collection Time: 01/27/24  5:52 AM   Result Value Ref Range    Sodium 139 135 - 147 mmol/L    Potassium 5.3 3.5 - 5.3 mmol/L    Chloride 100 96 - 108 mmol/L    CO2 33 (H) 21 - 32 mmol/L    ANION GAP 6 mmol/L    BUN 39 (H) 5 - 25 mg/dL     Creatinine 1.11 0.60 - 1.30 mg/dL    Glucose 127 65 - 140 mg/dL    Calcium 9.2 8.4 - 10.2 mg/dL    eGFR 51 ml/min/1.73sq m   CBC and differential    Collection Time: 01/28/24  4:31 AM   Result Value Ref Range    WBC 8.61 4.31 - 10.16 Thousand/uL    RBC 4.14 3.81 - 5.12 Million/uL    Hemoglobin 11.0 (L) 11.5 - 15.4 g/dL    Hematocrit 36.8 34.8 - 46.1 %    MCV 89 82 - 98 fL    MCH 26.6 (L) 26.8 - 34.3 pg    MCHC 29.9 (L) 31.4 - 37.4 g/dL    RDW 14.5 11.6 - 15.1 %    MPV 9.6 8.9 - 12.7 fL    Platelets 281 149 - 390 Thousands/uL   Basic metabolic panel    Collection Time: 01/28/24  4:31 AM   Result Value Ref Range    Sodium 139 135 - 147 mmol/L    Potassium 5.0 3.5 - 5.3 mmol/L    Chloride 99 96 - 108 mmol/L    CO2 35 (H) 21 - 32 mmol/L    ANION GAP 5 mmol/L    BUN 38 (H) 5 - 25 mg/dL    Creatinine 0.99 0.60 - 1.30 mg/dL    Glucose 136 65 - 140 mg/dL    Calcium 9.3 8.4 - 10.2 mg/dL    eGFR 58 ml/min/1.73sq m   Manual Differential(PHLEBS Do Not Order)    Collection Time: 01/28/24  4:31 AM   Result Value Ref Range    Segmented % 89 (H) 43 - 75 %    Lymphocytes % 8 (L) 14 - 44 %    Monocytes % 2 (L) 4 - 12 %    Eosinophils % 0 0 - 6 %    Basophils % 0 0 - 1 %    Metamyelocytes % 1 0 - 1 %    Absolute Neutrophils 7.66 (H) 1.85 - 7.62 Thousand/uL    Absolute Lymphocytes 0.69 0.60 - 4.47 Thousand/uL    Absolute Monocytes 0.17 0.00 - 1.22 Thousand/uL    Absolute Eosinophils 0.00 0.00 - 0.40 Thousand/uL    Absolute Basophils 0.00 0.00 - 0.10 Thousand/uL    Total Counted      RBC Morphology Normal     Platelet Estimate Adequate Adequate   ECG 12 lead    Collection Time: 01/28/24  1:51 PM   Result Value Ref Range    Ventricular Rate 74 BPM    Atrial Rate 74 BPM    CA Interval 136 ms    QRSD Interval 88 ms    QT Interval 378 ms    QTC Interval 419 ms    P Axis 87 degrees    QRS Axis 41 degrees    T Wave [place] 76 degrees   HS Troponin 0hr (reflex protocol)    Collection Time: 01/28/24  2:29 PM   Result Value Ref Range    hs TnI 0hr  "3 \"Refer to ACS Flowchart\"- see link ng/L   HS Troponin I 2hr    Collection Time: 01/28/24  4:22 PM   Result Value Ref Range    hs TnI 2hr 6 \"Refer to ACS Flowchart\"- see link ng/L    Delta 2hr hsTnI 3 <20 ng/L   HS Troponin I 4hr    Collection Time: 01/28/24  6:22 PM   Result Value Ref Range    hs TnI 4hr 5 \"Refer to ACS Flowchart\"- see link ng/L    Delta 4hr hsTnI 2 <20 ng/L   CBC and differential    Collection Time: 01/29/24  4:29 AM   Result Value Ref Range    WBC 9.38 4.31 - 10.16 Thousand/uL    RBC 4.17 3.81 - 5.12 Million/uL    Hemoglobin 10.9 (L) 11.5 - 15.4 g/dL    Hematocrit 37.4 34.8 - 46.1 %    MCV 90 82 - 98 fL    MCH 26.1 (L) 26.8 - 34.3 pg    MCHC 29.1 (L) 31.4 - 37.4 g/dL    RDW 14.9 11.6 - 15.1 %    MPV 9.5 8.9 - 12.7 fL    Platelets 262 149 - 390 Thousands/uL    nRBC 0 /100 WBCs    Segmented % 73 43 - 75 %    Immature Grans % 4 (H) 0 - 2 %    Lymphocytes % 14 14 - 44 %    Monocytes % 9 4 - 12 %    Eosinophils Relative 0 0 - 6 %    Basophils Relative 0 0 - 1 %    Absolute Neutrophils 6.85 1.85 - 7.62 Thousands/µL    Absolute Immature Grans 0.37 (H) 0.00 - 0.20 Thousand/uL    Absolute Lymphocytes 1.30 0.60 - 4.47 Thousands/µL    Absolute Monocytes 0.81 0.17 - 1.22 Thousand/µL    Eosinophils Absolute 0.03 0.00 - 0.61 Thousand/µL    Basophils Absolute 0.02 0.00 - 0.10 Thousands/µL   Basic metabolic panel    Collection Time: 01/29/24  4:29 AM   Result Value Ref Range    Sodium 139 135 - 147 mmol/L    Potassium 4.5 3.5 - 5.3 mmol/L    Chloride 99 96 - 108 mmol/L    CO2 35 (H) 21 - 32 mmol/L    ANION GAP 5 mmol/L    BUN 32 (H) 5 - 25 mg/dL    Creatinine 1.08 0.60 - 1.30 mg/dL    Glucose 89 65 - 140 mg/dL    Calcium 9.1 8.4 - 10.2 mg/dL    eGFR 52 ml/min/1.73sq m   Procalcitonin    Collection Time: 01/29/24  4:29 AM   Result Value Ref Range    Procalcitonin <0.05 <=0.25 ng/ml   Echo complete w/ contrast if indicated    Collection Time: 01/29/24 10:49 AM   Result Value Ref Range    BSA 1.85 m2    A4C EF 61 " %    LVIDd 4.60 cm    LVIDS 3.10 cm    IVSd 0.90 cm    LVPWd 1.00 cm    FS 33 28 - 44    MV E' Tissue Velocity Septal 9 cm/s    LA Volume Index (BP) 17.8 mL/m2    E/A ratio 0.93     E wave deceleration time 161 ms    MV Peak E K[place] 66 cm/s    MV Peak A [place]hang 0.71 m/s    RVID d 2.9 cm    Tricuspid annular plane systolic excursion 2.00 cm    LA size 3 cm    LA length (A2C) 4.10 cm    LA volume (BP) 33 mL    RAA A4C 13.8 cm2    MV stenosis pressure 1/2 time 47 ms    MV valve area p 1/2 method 4.68     Ao root 2.90 cm    Left ventricular stroke volume (2D) 61.00 mL    IVS 0.9 cm    LEFT VENTRICLE SYSTOLIC VOLUME (MOD BIPLANE) 2D 37 mL    LV DIASTOLIC VOLUME (MOD BIPLANE) 2D 98 mL    Left Atrium Area-systolic Four Chamber 12.5 cm2    Left Atrium Area-systolic Apical Two Chamber 12.5 cm2    LVSV, 2D 61 mL    LV EF 60    CBC and differential    Collection Time: 02/28/24  8:30 PM   Result Value Ref Range    WBC 7.99 4.31 - 10.16 Thousand/uL    RBC 4.18 3.81 - 5.12 Million/uL    Hemoglobin 11.1 (L) 11.5 - 15.4 g/dL    Hematocrit 38.3 34.8 - 46.1 %    MCV 92 82 - 98 fL    MCH 26.6 (L) 26.8 - 34.3 pg    MCHC 29.0 (L) 31.4 - 37.4 g/dL    RDW 14.5 11.6 - 15.1 %    MPV 9.8 8.9 - 12.7 fL    Platelets 231 149 - 390 Thousands/uL    nRBC 0 /100 WBCs    Segmented % 77 (H) 43 - 75 %    Immature Grans % 1 0 - 2 %    Lymphocytes % 13 (L) 14 - 44 %    Monocytes % 8 4 - 12 %    Eosinophils Relative 1 0 - 6 %    Basophils Relative 0 0 - 1 %    Absolute Neutrophils 6.23 1.85 - 7.62 Thousands/µL    Absolute Immature Grans 0.05 0.00 - 0.20 Thousand/uL    Absolute Lymphocytes 1.02 0.60 - 4.47 Thousands/µL    Absolute Monocytes 0.60 0.17 - 1.22 Thousand/µL    Eosinophils Absolute 0.07 0.00 - 0.61 Thousand/µL    Basophils Absolute 0.02 0.00 - 0.10 Thousands/µL   Comprehensive metabolic panel    Collection Time: 02/28/24  8:30 PM   Result Value Ref Range    Sodium 141 135 - 147 mmol/L    Potassium 4.4 3.5 - 5.3 mmol/L    Chloride 97 96 - 108 mmol/L  "   CO2 38 (H) 21 - 32 mmol/L    ANION GAP 6 mmol/L    BUN 18 5 - 25 mg/dL    Creatinine 0.92 0.60 - 1.30 mg/dL    Glucose 134 65 - 140 mg/dL    Calcium 9.7 8.4 - 10.2 mg/dL    AST 23 13 - 39 U/L    ALT 13 7 - 52 U/L    Alkaline Phosphatase 96 34 - 104 U/L    Total Protein 7.4 6.4 - 8.4 g/dL    Albumin 4.3 3.5 - 5.0 g/dL    Total Bilirubin 0.36 0.20 - 1.00 mg/dL    eGFR 64 ml/min/1.73sq m   HS Troponin 0hr (reflex protocol)    Collection Time: 02/28/24  8:30 PM   Result Value Ref Range    hs TnI 0hr 10 \"Refer to ACS Flowchart\"- see link ng/L   FLU/RSV/COVID - if FLU/RSV clinically relevant    Collection Time: 02/28/24  8:30 PM    Specimen: Nose; Nares   Result Value Ref Range    SARS-CoV-2 Negative Negative    INFLUENZA A PCR Negative Negative    INFLUENZA B PCR Negative Negative    RSV PCR Negative Negative   ECG 12 lead    Collection Time: 02/28/24  8:30 PM   Result Value Ref Range    Ventricular Rate 93 BPM    Atrial Rate 93 BPM    [place] Interval 156 ms    QRSD Interval 84 ms    QT Interval 372 ms    QTC Interval 462 ms    P Axis 88 degrees    QRS Axis 58 degrees    T Wave [place] 85 degrees   HS Troponin I 2hr    Collection Time: 02/28/24 10:40 PM   Result Value Ref Range    hs TnI 2hr 89 (H) \"Refer to ACS Flowchart\"- see link ng/L    Delta 2hr hsTnI 79 (H) <20 ng/L   HS Troponin I 4hr    Collection Time: 02/29/24  1:27 AM   Result Value Ref Range    hs TnI 4hr 157 (H) \"Refer to ACS Flowchart\"- see link ng/L    Delta 4hr hsTnI 147 (H) <20 ng/L   Platelet count    Collection Time: 02/29/24  1:27 AM   Result Value Ref Range    Platelets 190 149 - 390 Thousands/uL    MPV 9.6 8.9 - 12.7 fL   ECG 12 lead    Collection Time: 02/29/24  3:24 AM   Result Value Ref Range    Ventricular Rate 95 BPM    Atrial Rate 95 BPM    [place] Interval 196 ms    QRSD Interval 86 ms    QT Interval 352 ms    QTC Interval 442 ms    P Axis 83 degrees    QRS Axis 15 degrees    T Wave Axis -18 degrees   HS Troponin 0hr (reflex protocol)    Collection " "Time: 02/29/24  5:55 AM   Result Value Ref Range    hs TnI 0hr 256 (H) \"Refer to ACS Flowchart\"- see link ng/L   CBC and differential    Collection Time: 02/29/24  5:55 AM   Result Value Ref Range    WBC 6.69 4.31 - 10.16 Thousand/uL    RBC 4.05 3.81 - 5.12 Million/uL    Hemoglobin 10.7 (L) 11.5 - 15.4 g/dL    Hematocrit 36.3 34.8 - 46.1 %    MCV 90 82 - 98 fL    MCH 26.4 (L) 26.8 - 34.3 pg    MCHC 29.5 (L) 31.4 - 37.4 g/dL    RDW 14.2 11.6 - 15.1 %    MPV 9.6 8.9 - 12.7 fL    Platelets 207 149 - 390 Thousands/uL   Basic metabolic panel    Collection Time: 02/29/24  5:55 AM   Result Value Ref Range    Sodium 140 135 - 147 mmol/L    Potassium 4.8 3.5 - 5.3 mmol/L    Chloride 98 96 - 108 mmol/L    CO2 38 (H) 21 - 32 mmol/L    ANION GAP 4 mmol/L    BUN 17 5 - 25 mg/dL    Creatinine 0.85 0.60 - 1.30 mg/dL    Glucose 150 (H) 65 - 140 mg/dL    Calcium 9.5 8.4 - 10.2 mg/dL    eGFR 70 ml/min/1.73sq m   Magnesium    Collection Time: 02/29/24  5:55 AM   Result Value Ref Range    Magnesium 2.0 1.9 - 2.7 mg/dL   Manual Differential(PHLEBS Do Not Order)    Collection Time: 02/29/24  5:55 AM   Result Value Ref Range    Segmented % 89 (H) 43 - 75 %    Lymphocytes % 11 (L) 14 - 44 %    Monocytes % 0 (L) 4 - 12 %    Eosinophils % 0 0 - 6 %    Basophils % 0 0 - 1 %    Absolute Neutrophils 5.95 1.85 - 7.62 Thousand/uL    Absolute Lymphocytes 0.74 0.60 - 4.47 Thousand/uL    Absolute Monocytes 0.00 0.00 - 1.22 Thousand/uL    Absolute Eosinophils 0.00 0.00 - 0.40 Thousand/uL    Absolute Basophils 0.00 0.00 - 0.10 Thousand/uL    Total Counted      RBC Morphology Normal     Platelet Estimate Adequate Adequate   Lipid panel    Collection Time: 02/29/24  5:55 AM   Result Value Ref Range    Cholesterol 207 (H) See Comment mg/dL    Triglycerides 61 See Comment mg/dL    HDL, Direct 78 >=50 mg/dL    LDL Calculated 117 (H) 0 - 100 mg/dL    Non-HDL-Chol (CHOL-HDL) 129 mg/dl   TSH, 3rd generation with Free T4 reflex    Collection Time: 02/29/24  " "5:55 AM   Result Value Ref Range    TSH 3RD GENERATON 1.767 0.450 - 4.500 uIU/mL   HS Troponin I 2hr    Collection Time: 02/29/24  7:57 AM   Result Value Ref Range    hs TnI 2hr 236 (H) \"Refer to ACS Flowchart\"- see link ng/L    Delta 2hr hsTnI -20 <20 ng/L   ECG 12 lead    Collection Time: 02/29/24  8:22 AM   Result Value Ref Range    Ventricular Rate 83 BPM    Atrial Rate 83 BPM    [place] Interval 146 ms    QRSD Interval 86 ms    QT Interval 386 ms    QTC Interval 453 ms    P Axis 75 degrees    QRS Axis 25 degrees    T Wave Axis -61 degrees   HS Troponin I 4hr    Collection Time: 02/29/24  9:56 AM   Result Value Ref Range    hs TnI 4hr 220 (H) \"Refer to ACS Flowchart\"- see link ng/L    Delta 4hr hsTnI -36 <20 ng/L   Hemoglobin A1C    Collection Time: 03/01/24  5:39 AM   Result Value Ref Range    Hemoglobin A1C 5.9 (H) Normal 4.0-5.6%; PreDiabetic 5.7-6.4%; Diabetic >=6.5%; Glycemic control for adults with diabetes <7.0% %     mg/dl   CBC and differential    Collection Time: 03/01/24  5:39 AM   Result Value Ref Range    WBC 8.08 4.31 - 10.16 Thousand/uL    RBC 3.98 3.81 - 5.12 Million/uL    Hemoglobin 10.6 (L) 11.5 - 15.4 g/dL    Hematocrit 36.5 34.8 - 46.1 %    MCV 92 82 - 98 fL    MCH 26.6 (L) 26.8 - 34.3 pg    MCHC 29.0 (L) 31.4 - 37.4 g/dL    RDW 14.7 11.6 - 15.1 %    MPV 9.9 8.9 - 12.7 fL    Platelets 208 149 - 390 Thousands/uL    nRBC 0 /100 WBCs    Segmented % 78 (H) 43 - 75 %    Immature Grans % 0 0 - 2 %    Lymphocytes % 13 (L) 14 - 44 %    Monocytes % 8 4 - 12 %    Eosinophils Relative 0 0 - 6 %    Basophils Relative 1 0 - 1 %    Absolute Neutrophils 6.27 1.85 - 7.62 Thousands/µL    Absolute Immature Grans 0.03 0.00 - 0.20 Thousand/uL    Absolute Lymphocytes 1.04 0.60 - 4.47 Thousands/µL    Absolute Monocytes 0.68 0.17 - 1.22 Thousand/µL    Eosinophils Absolute 0.02 0.00 - 0.61 Thousand/µL    Basophils Absolute 0.04 0.00 - 0.10 Thousands/µL   Basic metabolic panel    Collection Time: 03/01/24  5:39 AM "   Result Value Ref Range    Sodium 143 135 - 147 mmol/L    Potassium 4.9 3.5 - 5.3 mmol/L    Chloride 101 96 - 108 mmol/L    CO2 40 (H) 21 - 32 mmol/L    ANION GAP 2 mmol/L    BUN 24 5 - 25 mg/dL    Creatinine 1.05 0.60 - 1.30 mg/dL    Glucose 96 65 - 140 mg/dL    Calcium 9.4 8.4 - 10.2 mg/dL    eGFR 54 ml/min/1.73sq m   Magnesium    Collection Time: 03/01/24  5:39 AM   Result Value Ref Range    Magnesium 2.0 1.9 - 2.7 mg/dL   Stress strip    Collection Time: 03/01/24  9:18 AM   Result Value Ref Range    Protocol Name EMMA SIT     Exercise duration (min) 3 min    Exercise duration (sec) 0 sec    Post Peak Systolic  mmHg    Max Diastolic Bp 74 mmHg    Peak HR 90 BPM    Max Predicted Heart Rate 152 BPM    Reason for Termination Protocol Complete     Test Indication CHEST PAIN     Target Hr Formular (220 - Age)*100%     Arrhy During Ex      ECG Interp Before Ex      ECG Interp during Ex      Ex Summary Comment      Chest Pain Statement none     Overall Hr Response To Exercise      Overall BP Response To Exercise     Stress strip    Collection Time: 03/01/24  9:18 AM   Result Value Ref Range    Protocol Name EMMA SIT     Exercise duration (min) 3 min    Exercise duration (sec) 0 sec    Post Peak Systolic  mmHg    Max Diastolic Bp 74 mmHg    Peak HR 90 BPM    Max Predicted Heart Rate 152 BPM    Reason for Termination Protocol Complete     Test Indication CHEST PAIN     Target Hr Formular (220 - Age)*100%     Arrhy During Ex      ECG Interp Before Ex      ECG Interp during Ex      Ex Summary Comment      Chest Pain Statement none     Overall Hr Response To Exercise      Overall BP Response To Exercise     NM myocardial perfusion spect (rx stress and/or rest)    Collection Time: 03/01/24 10:33 AM   Result Value Ref Range    Baseline HR 72 bpm    Baseline /65 mmHg    O2 sat rest 100 %    Stress peak HR 90 bpm    Post peak  mmHg    O2 sat peak 100 %    Recovery HR 82 bpm    Recovery /70 mmHg     O2 sat recovery 100 %    Rate Pressure Product 16,020.0     Angina Index 0     Rest Nuclear Isotope Dose 10.80 mCi    Stress Nuclear Isotope Dose 32.60 mCi    EF (%) 65 %   CBC and differential    Collection Time: 03/02/24  5:38 AM   Result Value Ref Range    WBC 7.21 4.31 - 10.16 Thousand/uL    RBC 3.87 3.81 - 5.12 Million/uL    Hemoglobin 10.3 (L) 11.5 - 15.4 g/dL    Hematocrit 35.3 34.8 - 46.1 %    MCV 91 82 - 98 fL    MCH 26.6 (L) 26.8 - 34.3 pg    MCHC 29.2 (L) 31.4 - 37.4 g/dL    RDW 14.8 11.6 - 15.1 %    MPV 9.6 8.9 - 12.7 fL    Platelets 194 149 - 390 Thousands/uL    nRBC 0 /100 WBCs    Segmented % 77 (H) 43 - 75 %    Immature Grans % 0 0 - 2 %    Lymphocytes % 12 (L) 14 - 44 %    Monocytes % 9 4 - 12 %    Eosinophils Relative 1 0 - 6 %    Basophils Relative 1 0 - 1 %    Absolute Neutrophils 5.56 1.85 - 7.62 Thousands/µL    Absolute Immature Grans 0.02 0.00 - 0.20 Thousand/uL    Absolute Lymphocytes 0.85 0.60 - 4.47 Thousands/µL    Absolute Monocytes 0.66 0.17 - 1.22 Thousand/µL    Eosinophils Absolute 0.08 0.00 - 0.61 Thousand/µL    Basophils Absolute 0.04 0.00 - 0.10 Thousands/µL   Basic metabolic panel    Collection Time: 03/02/24  5:38 AM   Result Value Ref Range    Sodium 143 135 - 147 mmol/L    Potassium 5.0 3.5 - 5.3 mmol/L    Chloride 100 96 - 108 mmol/L    CO2 42 (H) 21 - 32 mmol/L    ANION GAP 1 mmol/L    BUN 22 5 - 25 mg/dL    Creatinine 1.01 0.60 - 1.30 mg/dL    Glucose 93 65 - 140 mg/dL    Calcium 9.3 8.4 - 10.2 mg/dL    eGFR 57 ml/min/1.73sq m   Magnesium    Collection Time: 03/02/24  5:38 AM   Result Value Ref Range    Magnesium 1.9 1.9 - 2.7 mg/dL   CBC and differential    Collection Time: 03/03/24  6:10 AM   Result Value Ref Range    WBC 7.86 4.31 - 10.16 Thousand/uL    RBC 4.03 3.81 - 5.12 Million/uL    Hemoglobin 10.5 (L) 11.5 - 15.4 g/dL    Hematocrit 37.1 34.8 - 46.1 %    MCV 92 82 - 98 fL    MCH 26.1 (L) 26.8 - 34.3 pg    MCHC 28.3 (L) 31.4 - 37.4 g/dL    RDW 14.5 11.6 - 15.1 %    MPV  9.7 8.9 - 12.7 fL    Platelets 201 149 - 390 Thousands/uL    nRBC 0 /100 WBCs    Segmented % 80 (H) 43 - 75 %    Immature Grans % 0 0 - 2 %    Lymphocytes % 8 (L) 14 - 44 %    Monocytes % 11 4 - 12 %    Eosinophils Relative 1 0 - 6 %    Basophils Relative 0 0 - 1 %    Absolute Neutrophils 6.25 1.85 - 7.62 Thousands/µL    Absolute Immature Grans 0.03 0.00 - 0.20 Thousand/uL    Absolute Lymphocytes 0.66 0.60 - 4.47 Thousands/µL    Absolute Monocytes 0.84 0.17 - 1.22 Thousand/µL    Eosinophils Absolute 0.05 0.00 - 0.61 Thousand/µL    Basophils Absolute 0.03 0.00 - 0.10 Thousands/µL   Basic metabolic panel    Collection Time: 03/03/24  6:10 AM   Result Value Ref Range    Sodium 140 135 - 147 mmol/L    Potassium 4.3 3.5 - 5.3 mmol/L    Chloride 98 96 - 108 mmol/L    CO2 39 (H) 21 - 32 mmol/L    ANION GAP 3 mmol/L    BUN 17 5 - 25 mg/dL    Creatinine 0.85 0.60 - 1.30 mg/dL    Glucose 98 65 - 140 mg/dL    Calcium 9.2 8.4 - 10.2 mg/dL    eGFR 70 ml/min/1.73sq m   Magnesium    Collection Time: 03/03/24  6:10 AM   Result Value Ref Range    Magnesium 1.8 (L) 1.9 - 2.7 mg/dL   CBC and differential    Collection Time: 03/04/24  5:24 AM   Result Value Ref Range    WBC 5.55 4.31 - 10.16 Thousand/uL    RBC 3.79 (L) 3.81 - 5.12 Million/uL    Hemoglobin 10.3 (L) 11.5 - 15.4 g/dL    Hematocrit 34.6 (L) 34.8 - 46.1 %    MCV 91 82 - 98 fL    MCH 27.2 26.8 - 34.3 pg    MCHC 29.8 (L) 31.4 - 37.4 g/dL    RDW 14.6 11.6 - 15.1 %    MPV 9.9 8.9 - 12.7 fL    Platelets 168 149 - 390 Thousands/uL    nRBC 0 /100 WBCs    Segmented % 73 43 - 75 %    Immature Grans % 1 0 - 2 %    Lymphocytes % 14 14 - 44 %    Monocytes % 11 4 - 12 %    Eosinophils Relative 1 0 - 6 %    Basophils Relative 0 0 - 1 %    Absolute Neutrophils 4.04 1.85 - 7.62 Thousands/µL    Absolute Immature Grans 0.03 0.00 - 0.20 Thousand/uL    Absolute Lymphocytes 0.80 0.60 - 4.47 Thousands/µL    Absolute Monocytes 0.61 0.17 - 1.22 Thousand/µL    Eosinophils Absolute 0.05 0.00 - 0.61  Thousand/µL    Basophils Absolute 0.02 0.00 - 0.10 Thousands/µL   Basic metabolic panel    Collection Time: 03/04/24  5:24 AM   Result Value Ref Range    Sodium 142 135 - 147 mmol/L    Potassium 4.1 3.5 - 5.3 mmol/L    Chloride 99 96 - 108 mmol/L    CO2 41 (H) 21 - 32 mmol/L    ANION GAP 2 mmol/L    BUN 17 5 - 25 mg/dL    Creatinine 0.88 0.60 - 1.30 mg/dL    Glucose 99 65 - 140 mg/dL    Calcium 9.2 8.4 - 10.2 mg/dL    eGFR 67 ml/min/1.73sq m   Magnesium    Collection Time: 03/04/24  5:24 AM   Result Value Ref Range    Magnesium 2.0 1.9 - 2.7 mg/dL   Tissue Exam    Collection Time: 03/04/24  8:45 AM   Result Value Ref Range    Case Report       Surgical Pathology Report                         Case: [id number]                                  Authorizing Provider:  Sidra Raymundo MD          Collected:           03/04/2024 0845              Ordering Location:     [facility]        Received:            03/04/2024 [address] [place] [address] 2nd Floor Med                                                                              Surg Unit                                                                    Pathologist:           Stacy Fowler MD                                                       Specimen:    Esophagus, Cold BX Distal esophagus                                                        Final Diagnosis       A. Esophagus, Distal, biopsy:  - Benign squamous mucosa with mild reactive changes  - Negative for intestinal metaplasia, eosinophilic esophagitis, dysplasia and malignancy         Additional Information       All reported additional testing was performed with appropriately reactive controls.  These tests were developed and their performance characteristics determined by [facility] or appropriate performing facility, though some tests may be performed on tissues which have not been validated for performance characteristics (such as staining  "performed on alcohol exposed cell blocks and decalcified tissues).  Results should be interpreted with caution and in the context of the patients’ clinical condition. These tests may not be cleared or approved by the U.S. Food and Drug Administration, though the FDA has determined that such clearance or approval is not necessary. These tests are used for clinical purposes and they should not be regarded as investigational or for research. This laboratory has been approved by [place] [id number], designated as a high-complexity laboratory and is qualified to perform these tests.    Interpretation performed at [facility] [address] [place] [address] [place] [address]   .      Gross Description       A. The specimen is received in formalin, labeled with the patient's name and hospital number, and is designated \" biopsy distal esophagus\".  The specimen consists of 3 white to red soft tissue fragments each measuring 0.3 to 0.5 cm.  Entirely submitted. Screened cassette.    Note: The estimated total formalin fixation time based upon information provided by the submitting clinician and the standard processing schedule is under 72 hours.    [facility]     CBC and differential    Collection Time: 03/05/24  4:34 AM   Result Value Ref Range    WBC 6.14 4.31 - 10.16 Thousand/uL    RBC 4.07 3.81 - 5.12 Million/uL    Hemoglobin 10.7 (L) 11.5 - 15.4 g/dL    Hematocrit 37.1 34.8 - 46.1 %    MCV 91 82 - 98 fL    MCH 26.3 (L) 26.8 - 34.3 pg    MCHC 28.8 (L) 31.4 - 37.4 g/dL    RDW 14.6 11.6 - 15.1 %    MPV 9.9 8.9 - 12.7 fL    Platelets 177 149 - 390 Thousands/uL    nRBC 0 /100 WBCs    Segmented % 80 (H) 43 - 75 %    Immature Grans % 0 0 - 2 %    Lymphocytes % 11 (L) 14 - 44 %    Monocytes % 8 4 - 12 %    Eosinophils Relative 1 0 - 6 %    Basophils Relative 0 0 - 1 %    Absolute Neutrophils 4.87 1.85 - 7.62 Thousands/µL    Absolute Immature Grans 0.02 0.00 - 0.20 Thousand/uL    Absolute Lymphocytes 0.67 0.60 - 4.47 Thousands/µL    Absolute " Monocytes 0.50 0.17 - 1.22 Thousand/µL    Eosinophils Absolute 0.06 0.00 - 0.61 Thousand/µL    Basophils Absolute 0.02 0.00 - 0.10 Thousands/µL   Basic metabolic panel    Collection Time: 03/05/24  4:34 AM   Result Value Ref Range    Sodium 141 135 - 147 mmol/L    Potassium 4.0 3.5 - 5.3 mmol/L    Chloride 99 96 - 108 mmol/L    CO2 37 (H) 21 - 32 mmol/L    ANION GAP 5 mmol/L    BUN 13 5 - 25 mg/dL    Creatinine 0.89 0.60 - 1.30 mg/dL    Glucose 94 65 - 140 mg/dL    Calcium 9.3 8.4 - 10.2 mg/dL    eGFR 66 ml/min/1.73sq m   Magnesium    Collection Time: 03/05/24  4:34 AM   Result Value Ref Range    Magnesium 1.9 1.9 - 2.7 mg/dL   Tissue Exam    Collection Time: 04/11/24  8:28 AM   Result Value Ref Range    Case Report       Surgical Pathology Report                         Case: [id number]                                  Authorizing Provider:  Sidra Raymundo MD          Collected:           04/11/2024 0828              Ordering Location:     [facility]        Received:            04/11/2024 [address] [place] [address] Endoscopy                                                           Pathologist:           Jasiel Sosa MD                                                                Specimens:   A) - Polyp, Colorectal, ascending colon polyp hot snare                                             B) - Polyp, Colorectal, sigmoid colon polyp cold snare                                     Final Diagnosis       A.  Ascending colon polyp (hot snare):     - Tubular adenoma; negative for high-grade dysplasia.    B.  Sigmoid colon polyp (cold snare):     - Tubular adenoma; negative for high-grade dysplasia.       Additional Information       All reported additional testing was performed with appropriately reactive controls.  These tests were developed and their performance characteristics determined by [facility] or appropriate performing facility, though some tests may be  "performed on tissues which have not been validated for performance characteristics (such as staining performed on alcohol exposed cell blocks and decalcified tissues).  Results should be interpreted with caution and in the context of the patients’ clinical condition. These tests may not be cleared or approved by the U.S. Food and Drug Administration, though the FDA has determined that such clearance or approval is not necessary. These tests are used for clinical purposes and they should not be regarded as investigational or for research. This laboratory has been approved by [place] 88, designated as a high-complexity laboratory and is qualified to perform these tests. Interpretation performed at [facility], [address] [place] [id number]..      Synoptic Checklist          COLON/RECTUM POLYP FORM - GI - All Specimens          :    Adenoma(s)      Gross Description       A. The specimen is received in formalin, labeled with the patient's name and hospital number, and is designated \" ascending colon polyp\".  The specimen consists of a single tan-pink polypoid tissue fragment measuring 0.6 x 0.4 x 0.4 cm.  The presumed margins of resection is inked blue and the specimen is bisected revealing grossly unremarkable cut surfaces.  Entirely submitted.  One screened cassette.    B. The specimen is received in formalin, labeled with the patient's name and hospital number, and is designated \" sigmoid colon polyp\".  The specimen consists of a single tan tissue fragment measuring 0.2 cm in greatest dimension.  The specimen is entirely submitted in a screened cassette.    Note: The estimated total formalin fixation time based upon information provided by the submitting clinician and the standard processing schedule is under 72 hours. Rocky Calderon         Laboratory Results: I have personally reviewed the pertinent laboratory results/reports     Radiology/Other Diagnostic Testing Results: I have personally reviewed " pertinent reports.      Colonoscopy    Result Date: 4/15/2024  Table formatting from the original result was not included. [facility] [address] [facility] [place] [address] [phone] DATE OF SERVICE: 4/11/24 PHYSICIAN(S): Attending: Sidra Raymundo MD Fellow: No Staff Documented INDICATION: Colon cancer screening POST-OP DIAGNOSIS: See the impression below. HISTORY: Prior colonoscopy: No prior colonoscopy. BOWEL PREPARATION:  PREPROCEDURE: Informed consent was obtained for the procedure, including sedation. Risks including but not limited to bleeding, infection, perforation, adverse drug reaction and aspiration were explained in detail. Also explained about less than 100% sensitivity with the exam and other alternatives. The patient was placed in the left lateral decubitus position. Procedure: Colonoscopy DETAILS OF PROCEDURE: Patient was taken to the procedure room where a time out was performed to confirm correct patient and correct procedure. The patient underwent monitored anesthesia care, which was administered by an anesthesia professional. The patient's blood pressure, heart rate, level of consciousness, oxygen, respirations, ECG and ETCO2 were monitored throughout the procedure. A digital rectal exam was performed. The scope was introduced through the anus and advanced to the cecum. Retroflexion was performed in the rectum. The quality of bowel preparation was evaluated using the [place] Bowel Preparation Scale with scores of: right colon = 2, transverse colon = 2, left colon = 2. The total BBPS score was 6. Bowel prep was adequate. The patient experienced no blood loss. The procedure was not difficult. The patient tolerated the procedure well. There were no apparent adverse events. ANESTHESIA INFORMATION: ASA: III Anesthesia Type: IV Sedation with Anesthesia MEDICATIONS: No administrations occurring from 0815 to 0834 on 04/11/24 FINDINGS: The cecum appeared normal. One polyp  "measuring 5-9 mm in the ascending colon; performed hot snare removal One polyp measuring 5-9 mm in the sigmoid colon; performed cold snare removal Internal hemorrhoids EVENTS: Procedure Events Event Event Time ENDO CECUM REACHED 4/11/2024  8:24 AM ENDO SCOPE OUT TIME 4/11/2024  8:32 AM SPECIMENS: ID Type Source Tests Collected by Time Destination 1 : ascending colon polyp hot snare Tissue Polyp, Colorectal TISSUE EXAM Sidra Raymundo MD 4/11/2024  8:28 AM  2 : sigmoid colon polyp cold snare Tissue Polyp, Colorectal TISSUE EXAM Sidra Raymundo MD 4/11/2024  8:30 AM  EQUIPMENT: Colonoscope -[id number]     The cecum appeared normal. 2 subcentimeter polyps were removed Hemorrhoids RECOMMENDATION:  Repeat colonoscopy in 5 years  Personal history of colon polyps    Sidra Raymundo MD        Assessment/Plan:  Problem List Items Addressed This Visit          Digestive    Tubular adenoma of colon - Primary       Other    Hyperlipidemia    Relevant Medications    atorvastatin (LIPITOR) 40 mg tablet     Other Visit Diagnoses       Lumbar radiculopathy, right        Relevant Medications    baclofen 10 mg tablet            Discussed to start baclofen hs, which will help with back pain and insomnia    Recommend start atorvastatin 40 mg daily  Discussed tubilar adenoma on colonoscopy, repeat colonoscopy in 5 years  LE swelling likely due to dependant edema due to recurrent steroids, no fluid over load, wear compression stockings and elevate the legs, BP is 110/50, AVOID Furosemide at this time.             Read package inserts for all medications before starting a new medications, call me if you have any questions.    Patient was given opportunity to ask questions and all questions were answered.    Disclaimer: Portions of the record may have been created with voice recognition software. Occasional wrong word or \"sound a like\" substitutions may have occurred due to the inherent limitations of voice recognition software. Read the chart " carefully and recognize, using context, where substitutions have occurred. I have used the Epic copy/forward function to compose this note. I have reviewed my current note to ensure it reflects the current patient status, exam, assessment and plan.

## 2024-04-22 ENCOUNTER — CLINICAL SUPPORT (OUTPATIENT)
Dept: PULMONOLOGY | Facility: CLINIC | Age: 69
End: 2024-04-22
Payer: COMMERCIAL

## 2024-04-22 DIAGNOSIS — J44.9 CHRONIC OBSTRUCTIVE PULMONARY DISEASE, UNSPECIFIED COPD TYPE (HCC): Primary | ICD-10-CM

## 2024-04-22 PROCEDURE — G0239 OTH RESP PROC, GROUP: HCPCS

## 2024-04-24 ENCOUNTER — CLINICAL SUPPORT (OUTPATIENT)
Dept: PULMONOLOGY | Facility: CLINIC | Age: 69
End: 2024-04-24
Payer: COMMERCIAL

## 2024-04-24 DIAGNOSIS — J44.9 CHRONIC OBSTRUCTIVE PULMONARY DISEASE, UNSPECIFIED COPD TYPE (HCC): Primary | ICD-10-CM

## 2024-04-24 PROCEDURE — G0239 OTH RESP PROC, GROUP: HCPCS

## 2024-04-29 ENCOUNTER — CLINICAL SUPPORT (OUTPATIENT)
Dept: PULMONOLOGY | Facility: CLINIC | Age: 69
End: 2024-04-29
Payer: COMMERCIAL

## 2024-04-29 DIAGNOSIS — J44.9 CHRONIC OBSTRUCTIVE PULMONARY DISEASE, UNSPECIFIED COPD TYPE (HCC): Primary | ICD-10-CM

## 2024-04-29 PROCEDURE — G0239 OTH RESP PROC, GROUP: HCPCS

## 2024-04-30 ENCOUNTER — CLINICAL SUPPORT (OUTPATIENT)
Dept: PULMONOLOGY | Facility: CLINIC | Age: 69
End: 2024-04-30
Payer: COMMERCIAL

## 2024-04-30 DIAGNOSIS — J44.9 CHRONIC OBSTRUCTIVE PULMONARY DISEASE, UNSPECIFIED COPD TYPE (HCC): Primary | ICD-10-CM

## 2024-04-30 PROCEDURE — G0239 OTH RESP PROC, GROUP: HCPCS

## 2024-04-30 NOTE — PROGRESS NOTES
"PULMONARY REHABILITATION  ASSESSMENT AND INDIVIDUALIZED TREATMENT PLAN  30 DAY      Today's date: 2024  # of Exercise Sessions Completed: 8  Patient name: Noreen Alvarez     : 1955       MRN: [id number]  PCP: Samantha Hernandez MD  Referring Physician: Mariaa Magaña DO  Pulmonologist: Shannan Thompson MD     Provider: Kraig  Clinician: Khanh Hong MS,CEP,EPC     ASSESSMENT    PULMONARY HISTORY:  Dx:   Encounter Diagnosis   Name Primary?    Chronic obstructive pulmonary disease, unspecified COPD type (HCC) Yes       Date of onset: 2019  Description of Diagnosis: Severe COPD  Other Pulmonary History: Centrilobular Emphysema, history of laryngeal cancer, severe persistent asthma w/o complication, multiple lung nodules, chronic respiratory failure with hypoxia on home O2 therapy, ELIUD        Weight:   Wt Readings from Last 1 Encounters:   24 82 kg (180 lb 12.8 oz)        Height:   Ht Readings from Last 1 Encounters:   24 5' 4\" (1.626 m)       Medical History:   Past Medical History:   Diagnosis Date    Acute kidney failure (HCC)     Allergic     Allergies     Anemia     Anxiety     Asthma     Cancer (HCC)     Cataract     Chronic kidney disease (CKD), stage III (moderate) (HCC)     COPD (chronic obstructive pulmonary disease) (HCC)     COVID-19     Covid + 1-9-85-cough at that time now fully resolved    Depression     Disease of thyroid gland     Essential hypertension     GERD (gastroesophageal reflux disease)     Glaucoma     High blood pressure     HTN (hypertension) 2021    Hyperlipidemia     Hypothyroidism     Memory loss     Mesenteric lymphadenopathy 2021    Pulmonary hypertension (HCC)     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 Letter received from Jay Pace placed on Dr. Eliane Ballard desangela for review. Please advise regarding new inhaler. Sudden death Father         shot himself    Suicidality Father     Brain cancer Sister     Diabetes Sister     Breast cancer Sister     Cancer Sister 62        pancreatic cancer    No Known Problems Sister     No Known Problems Sister     No Known Problems Sister     No Known Problems Sister     No Known Problems Sister     No Known Problems Sister     No Known Problems Daughter     No Known Problems Daughter     No Known Problems Maternal Grandmother     No Known Problems Maternal Grandfather     No Known Problems Paternal Grandmother     No Known Problems Paternal Grandfather     No Known Problems Maternal Aunt     No Known Problems Maternal Aunt     No Known Problems Maternal Aunt     No Known Problems Maternal Aunt     No Known Problems Maternal Aunt     No Known Problems Paternal Aunt     No Known Problems Paternal Aunt     No Known Problems Paternal Aunt     No Known Problems Paternal Aunt        Allergies: Cefdinir, Amifostine, and Pollen extract    ETOH:   Social History     Substance and Sexual Activity   Alcohol Use Never    Comment: None       Pulmonary Disease Risk Factors:  none  smoking  asthma    CAD Risk Factors:   Cholesterol: Yes  Smoking: Former user  HTN: No  DM: No  Obesity: Yes   Inactivity: Yes  Stress:  perceived  stress: 10/10   Stressors: negative thoughts about her health   Goals for Stress Management: Practice Relaxation Techniques, Exercise, and Keep a positive mindset    CURRENT MEDICATIONS:   Current Outpatient Medications   Medication Sig Dispense Refill    albuterol (PROVENTIL HFA,VENTOLIN HFA) 90 mcg/act inhaler Inhale 2 puffs every 6 (six) hours as needed for wheezing or shortness of breath 18 g 5    atorvastatin (LIPITOR) 40 mg tablet Take 1 tablet (40 mg total) by mouth daily with dinner 90 tablet 3    azithromycin (ZITHROMAX) 250 mg tablet Take 1 tablet (250 mg total) by mouth every other day for 45 doses 45 tablet 0    baclofen 10 mg tablet Take 1 tablet (10 mg total) by mouth  daily at bedtime as needed for muscle spasms 30 tablet 0    benzonatate (TESSALON PERLES) 100 mg capsule Take 1 capsule (100 mg total) by mouth 3 (three) times a day 20 capsule 0    bisacodyl (DULCOLAX) 5 mg EC tablet Take 4 tablets (20 mg total) by mouth 1 (one) time for 1 dose Take as instructed by office for bowel prep. 4 tablet 0    brimonidine tartrate 0.2 % ophthalmic solution       Budeson-Glycopyrrol-Formoterol (Breztri Aerosphere) 160-9-4.8 MCG/ACT AERO Inhale 2 puffs 2 (two) times a day Rinse mouth after use. 32.1 g 0    clotrimazole-betamethasone (LOTRISONE) 1-0.05 % cream Apply topically 2 (two) times a day 45 g 1    Diclofenac Sodium (VOLTAREN) 1 % APPLY 2 GRAMS TO AFFECTED AREA 4 TIMES A  g 5    docusate sodium (COLACE) 100 mg capsule Take 1 capsule (100 mg total) by mouth 2 (two) times a day as needed for constipation Hold for lose stool 10 capsule 0    ferrous sulfate 324 (65 Fe) mg Take 1 tablet (324 mg total) by mouth daily after lunch 90 tablet 1    fluticasone (FLONASE) 50 mcg/act nasal spray SPRAY 1 SPRAY INTO EACH NOSTRIL EVERY DAY 48 mL 1    ipratropium (ATROVENT) 0.06 % nasal spray 2 SPRAYS INTO EACH NOSTRIL 3 (THREE) TIMES A DAY FOR INCREASED NASAL SECRETION 45 mL 1    ipratropium-albuterol (DUO-NEB) 0.5-2.5 mg/3 mL nebulizer solution TAKE 3 ML BY NEBULIZATION EVERY 8 (EIGHT) HOURS AS NEEDED FOR WHEEZING OR SHORTNESS OF BREATH 270 mL 1    levothyroxine 75 mcg tablet Take 1 tablet (75 mcg total) by mouth daily in the early morning 90 tablet 0    montelukast (SINGULAIR) 10 mg tablet Take 1 tablet (10 mg total) by mouth daily at bedtime 90 tablet 1    ondansetron (ZOFRAN) 4 mg tablet Take 1 tablet (4 mg total) by mouth every 8 (eight) hours as needed for nausea or vomiting 20 tablet 0    pantoprazole (PROTONIX) 40 mg tablet Take 1 tablet (40 mg total) by mouth daily 30 tablet 2    polyethylene glycol (GLYCOLAX) 17 GM/SCOOP powder DISSOLVE 17 GRAMS INTO WATER AND DRINK BY MOUTH EVERY DAY  510 g 0    polyethylene glycol (GOLYTELY) 4000 mL solution Take 4,000 mL by mouth once for 1 dose 4000 mL 0    sertraline (ZOLOFT) 25 mg tablet Take 1 tablet (25 mg total) by mouth daily at bedtime 90 tablet 1    sucralfate (CARAFATE) 1 g tablet Take 1 tablet (1 g total) by mouth 2 (two) times a day before meals for 14 days 28 tablet 0    timolol (TIMOPTIC) 0.5 % ophthalmic solution INSTILL 1 DROP INTO EACH EYE TWO TIMES A DAY       No current facility-administered medications for this visit.       Rescue Inhaler: Yes as needed    Maintenance Inhaler: Yes:  2 times per day as needed     Nebulizer Treatments:  Yes:  every 6 hours times per day    Medication compliance: Yes   Comments: Pt reports to be compliant with medications    OXYGEN NEEDS:   Rest: supplemental O2 via nasal cannula @ 2L/min   Exercise/physical activity: supplemental O2 via nasal cannula @ 2L/min   Sleep: supplemental O2 via nasal cannula @ 2L/min     Does Pt monitor home SpO2? yes   Average SpO2 at rest:  92%   Average SpO2 with ADLs/physical activity:  80%    Patient practices breathing techniques at home:  Yes    Cultural needs: Turkish speaking, daughter was on the phone for the duration of the eval and 6MWT.  was present too.     FUNCTIONAL STATUS:  Current Status:  Occupation: unemployed  Recreation: watch tv   ADL’s:No limitations able to perform self-care  Barron: No limitations able to perform self-care  Exercise: none  Home exercise equipment: none  Other: none    Physical Limitations: none    Fall Risk: Low   Comments: Ambulates with a steady gait with no assist device and Denies a fall in the past 6 months    NUTRITIONAL:  Patient's current dietary habits include:  Eats fruits and veggies, watches red meat intake, and hydrates well    PATIENT SPECIFIC GOALS:     Exercise Goals: (home exercise, ADLs)  Start MS  Add exercise at home as tolerated   Nutrition Goals: (wt control, diabetes management, dietary  modifications)  Continue to make dietary changes  Manage wt   Psychocosocial Goals: (stress, emotional well being, social support)  Maintain emotional health   Take help when offered   Core Component Goals: (smoking, BP control, Oxygen use/needs, medication)  Abstain from smoking  Take medications as prescribed     Ability to reach goals/rehabilitation potential:  Excellent    Projected return to function: 12 weeks  Objective tests: 6 MWT completed on 4/2/2024      INDIVIDUALIZED TREATMENT PLAN    SUMMARY OF PROGRESS:    4/30/2024 Kayce has completed 8 session of Pulmonary Rehab for the diagnosis of COPD. Patient does have a PFT on file, revealing an FEV1/FVC ratio of 39% and an FEV1 of 37% predicted. This is suggestive of severeobstruction. Noreen reports an increase in energy and a decrease in SOBOE. She states she is able to cook and wash dishes with less fatigue. Reports that her O2 maintains in the low 90's at home but has dropped below 90's with over exertion. The patient currently does not follow a home exercise program. Would like to exercise at home but is nervous about her O2 dropping. Depression screening using the PHQ-9 interprets the patient's score of 4. Anxiety screening using the TIMMY-7 interprets the patients score of.  When addressed, the patient denies having depression/anxiety. PHQ-9 score will be reassessed in 30 days. The patient is a non-smoker. Patient admits to 100% medication compliance. At rest, the patient rated dyspnea 0/10 with SpO2 93-94% on supplemental O2 2L/min via nasal canula. They completed 26-37 min of exercise at 3.1-3.2 METS. The patient’s rating of perceived dyspnea during exercise was 2- 6/10 with SpO2 88-94%. Resting BP 78//76, HR 62-79 with appropriate hemodynamic response to exercise reaching 92//56,HR 79-92.      4/02/24 Today is Noreen's initial evaluation to begin Pulmonary Rehab for the diagnosis of COPD. Patient does have a PFT on file, revealing an  FEV1/FVC ratio of 39% and an FEV1 of 37% predicted. This is suggestive of severeobstruction. The patient has been experiencing increased dyspnea, increased sitting time, decreased physical activity, and increased fatigue. They report dyspnea when completing ADLs. The patient currently does not follow a home exercise program. Depression screening using the PHQ-9 interprets the patient's score of 9 5-9 = Mild Depression. Anxiety screening using the TIMMY-7 interprets the patients score of 5  5-9 = Mild anxiety. When addressed, the patient denies having depression/anxiety. Patient reports excellent social/emotional support from her family.  Information to begin using [employer] was provided as well as contact information for counseling through  [facility]. PHQ-9 score will be reassessed in 30 days. The patient is a non-smoker. Patient admits to 100% medication compliance. At rest, the patient rated dyspnea 0/10 with SpO2 94% on supplemental O2 2L/min via nasal canula. They completed an initial 6MWT, walking 300 ft, 1.43 METs on supplemental O2 2L/min via nasal canula. The patient’s rating of perceived dyspnea during the 6MWT was 6/10 with SpO2 80%. They took 1 rest break for 3 minutes total. Telemetry revealed NSR. Resting /62 with appropriate hemodynamic response to exercise reaching 138/70. Patient will exercise on supplemental O2 2L/min via nasal canula. Group and individualized education will be provided on smoking cessation, oxygen use, breathing techniques, pulmonary anatomy, exercise for the pulmonary patient, healthy eating, stress, and relaxation. Patient specific goals include: increase stamina, strength, energy, and endurance. Noreen will attend 24 exercise sessions beginning April 8th at 3pm. Will progress patient as tolerated over the next 30 days. See outlined plan of care below for specific patient goals in each component of care.         EXERCISE ASSESSMENT AND PLAN    Assessment of  progression of lung disease and functional status:  CAT: na/40  Shortness of breath questionnaire: na/120    Home Exercise: none    Group and Individual Education: pursed lipped breathing, diaphragmatic breathing, fall prevention while using nasal canula tubing, relaxation breathing, home exercise guidelines, benefits of exercise for pulmonary disease, RPE scale, RPD scale, O2 saturation monitoring, and appropriate O2 response to exercise    Readiness to change: Preparation:  (Getting ready to change)     Oxygen Goal: Maintain SpO2>88% during exercise or as advised by pulmonolgist    Current Exercise Program:       Frequency: 2 days/week   Supplement with home exercise 2+ days/wk as tolerated        Minutes: 26-37         METS: 3.1-3.2              SpO2: 88-94              RPD: 2-6                      HR:79-92   RPE: 4-6         Modalities: UBE, NuStep, and Recumbent bike      Exercise Progression 30 Day Goals :    Frequency: 2 days/week    Supplement with home exercise 2+ days/wk as tolerated       Minutes: 30-40        METS: 3.5-4.0              SpO2: >88%              RPD: 4-6                      HR: RHR +30   RPE: 4-5        Modalities: Treadmill, UBE, NuStep, and Recumbent bike     Strength trainin-3 days / week  12-15 repetitions  1-2 sets per modality   Will be added following 2-3 weeks of monitored exercise sessions   Modalities: Lateral Raise, Arm Curl, Sit to Stands, and Front Raises    SMART EXERCISE:  Goals:   reduced score on CAT, improved 6MWT distance, reduced dyspnea during exercise, improved exercise tolerance based on peak METs tolerated in pulmonary rehab exercise session, SpO2 >88% during exercise, and reduced RPD at rest  Progress:  Pt states a decrease in SOBOE, increase in energyl    PATIENT SPECIFIC EXERCISE   Goals: (home exercise, ADLs, recreation)   reduced dependence on supplemental O2, attend pulmonary rehab regularly, decrease sitting time at home, start a walking program, begin  a consistent exercise regimen , increased muscular strength, increased energy, increased stamina with ADLs, and more independence at home  Progress:   Increase energy,increase stamina while cooking and washing    PLAN FOR THE NEXT 30 DAYS:   learn to conserve energy with ADLs , practice diaphragmatic breathing, reduce time sitting at home, increased strength of respiratory muscles, utilize PLB with physical activity, and begin a home exercise program     Exercise workloads will be progressed gradually as tolerated, within limits of patient's ability, and according to the patient's response to the exercise program.      NUTRITION ASSESSMENT AND PLAN    Weight control:    Starting weight: 184 lbs   Current weight:  177.8     Diabetes: N/A  A1c: 5.9     Last Measured: 3/1/2024  Medication Compliance: compliant all of the time  Diabetic Diet Compliance: compliant all of the time  Home Glucose Monitoring:  n/a    Lipid management: Discussed diet and lipid management and Last lipid profile 2/29/2024  Chol 207  TRG 61  HDL 78      Group and Individual Education: heart healthy eating principles  weight loss and management strategies  low sodium diet  maintaining hydration    Readiness to change: Preparation:  (Getting ready to change)     Reviewed patient's Rate your Plate. Reviewed patient goals for dietary modifications and their clinical implications.  Reviewed most recent lipid profile and A1C.    SMART NUTRITIONAL:  Goals:   LDL <100, HDL >40, TRG <150, CHOL <200, and Improved Rate Your Plate score  >64  Progress:   No progression noted at initial eval      PATIENT SPECIFIC NUTRITIONAL:  Goals: (wt control, diabetes management, dietary modifications)  eat smaller, more frequent meals improve hydration  Progress:  Down 7 lbs in the last 30 daysl     PLAN FOR THE NEXT 30 DAYS:   group class: Reading Food Labels and group Class: Heart Healthy Eating    Measurable goals were based Rate Your Plate Dietary  Self-Assessment. These are the areas in which the patient could score higher on the assessment. Goals include recommendations for a heart healthy diet based on American Heart Association.      PSYCHOSOCIAL ASSESSMENT AND PLAN    Emotional Health Assessment:    Depression Measure: PHQ-9 = 4  1-4 = Minimal Depression  Anxiety Measure: TIMMY-7 = 4  0-4  = Not anxious  Assessment of depression and anxiety    Patient reports they are coping well with good social support and denies depression or anxiety    Self-reported stress level:  10  Stress Management: Read, Exercise, Keep a positive mindset, Enjoy a hobby, and Spend time with family     Patient's rating of Social support: Excellent   Social Support Network: spouse and children    Psychosocial Assessment as it relates to rehabilitation: Patient denies issues with his/her family or home life that may affect their rehabilitation efforts.     Group and Individual Education: benefits of a positive support system and stress management techniques    Readiness to change: Action:  (Changing behavior)    SMART PSYCHOSOCIAL:  Goals:   Reduce perceived stress to 1-3/10 and improved [facility] QOL < 27    Progress:  will continue to maintain emotional health    PATIENT SPECIFIC PSYCHOSOCIAL:  Goals:  (stress, emotional well being, social support):    begin using [employer] and spend time with family  Progress:   Maintaining emotional health    PLAN FOR NEXT 30 DAYS:   Class: Stress and Your Health, Practice relaxation techniques, Exercise, Keep a positive mindset, and Enjoy family    Resources for emotional health and wellbeing are provided to patient. Patient's PCP are notified if PHQ-9 and/or TIMMY-7 are >5. Patient is referred to PCP/behavioral health services if requested.      OTHER CORE COMPONENTS     Tobacco:   Social History     Tobacco Use   Smoking Status Former    Current packs/day: 0.00    Average packs/day: 1 pack/day for 40.0 years (40.0 ttl pk-yrs)    Types:  Cigarettes    Start date: 1974    Quit date: 2014    Years since quitting: 10.3   Smokeless Tobacco Never       Tobacco Use Intervention: N/A: Pt has a remote history of smoking    Blood pressure:    Restin//76   Exercise: 92//56   Recovery: 126//58    Education:  pathophysiology of pulmonary disease and dangers of smoking    Readiness to change: Preparation:  (Getting ready to change)     SMART   Goals: reduced dietary sodium <2000mg, assess daily wt to report an increase greater than 3lbs in a day or 5lbs in a week, medication compliance, and Abstain from smoking  Progress:   Watch sodium intake and hydrate daily    PATIENT SPECIFIC CORE COMPONENT   Goals:  (smoking, BP control, medication)   reduced dietary sodium <2000mg, assess daily wt to report an increase greater than 3lbs in a day or 5lbs in a week, medication compliance, and Abstain from smoking  Progress:  No progression noted at initial eval    PLAN FOR NEXT 30 DAYS:   medication compliance, avoid processed foods, engage in regular exercise, eliminate salt shaker at the table, check labels for sodium content, and monitor home BP

## 2024-05-01 ENCOUNTER — APPOINTMENT (OUTPATIENT)
Dept: PULMONOLOGY | Facility: CLINIC | Age: 69
End: 2024-05-01
Payer: COMMERCIAL

## 2024-05-06 ENCOUNTER — CLINICAL SUPPORT (OUTPATIENT)
Dept: PULMONOLOGY | Facility: CLINIC | Age: 69
End: 2024-05-06
Payer: COMMERCIAL

## 2024-05-06 DIAGNOSIS — J44.9 CHRONIC OBSTRUCTIVE PULMONARY DISEASE, UNSPECIFIED COPD TYPE (HCC): Primary | ICD-10-CM

## 2024-05-06 PROCEDURE — G0239 OTH RESP PROC, GROUP: HCPCS

## 2024-05-08 ENCOUNTER — CLINICAL SUPPORT (OUTPATIENT)
Dept: PULMONOLOGY | Facility: CLINIC | Age: 69
End: 2024-05-08
Payer: COMMERCIAL

## 2024-05-08 DIAGNOSIS — J44.9 CHRONIC OBSTRUCTIVE PULMONARY DISEASE, UNSPECIFIED COPD TYPE (HCC): Primary | ICD-10-CM

## 2024-05-08 PROCEDURE — G0239 OTH RESP PROC, GROUP: HCPCS

## 2024-05-11 DIAGNOSIS — M54.16 LUMBAR RADICULOPATHY, RIGHT: ICD-10-CM

## 2024-05-11 DIAGNOSIS — R07.89 ATYPICAL CHEST PAIN: ICD-10-CM

## 2024-05-11 DIAGNOSIS — K21.9 GASTROESOPHAGEAL REFLUX DISEASE WITHOUT ESOPHAGITIS: ICD-10-CM

## 2024-05-13 ENCOUNTER — CLINICAL SUPPORT (OUTPATIENT)
Dept: PULMONOLOGY | Facility: CLINIC | Age: 69
End: 2024-05-13
Payer: COMMERCIAL

## 2024-05-13 DIAGNOSIS — J44.9 CHRONIC OBSTRUCTIVE PULMONARY DISEASE, UNSPECIFIED COPD TYPE (HCC): Primary | ICD-10-CM

## 2024-05-13 PROCEDURE — G0239 OTH RESP PROC, GROUP: HCPCS

## 2024-05-13 RX ORDER — PANTOPRAZOLE SODIUM 40 MG/1
40 TABLET, DELAYED RELEASE ORAL DAILY
Qty: 90 TABLET | Refills: 0 | Status: SHIPPED | OUTPATIENT
Start: 2024-05-13

## 2024-05-13 RX ORDER — BACLOFEN 10 MG/1
10 TABLET ORAL
Qty: 90 TABLET | Refills: 1 | Status: SHIPPED | OUTPATIENT
Start: 2024-05-13

## 2024-05-13 NOTE — TELEPHONE ENCOUNTER
Refill must be reviewed and completed by the office or provider. The refill is unable to be approved or denied by the medication management team.    Cannot be delegated  Pharmacy requesting 90 day script

## 2024-05-15 ENCOUNTER — HOSPITAL ENCOUNTER (OUTPATIENT)
Dept: MAMMOGRAPHY | Facility: HOSPITAL | Age: 69
Discharge: HOME | End: 2024-05-15
Payer: COMMERCIAL

## 2024-05-15 ENCOUNTER — CLINICAL SUPPORT (OUTPATIENT)
Dept: PULMONOLOGY | Facility: CLINIC | Age: 69
End: 2024-05-15
Payer: COMMERCIAL

## 2024-05-15 VITALS — HEIGHT: 64 IN | BODY MASS INDEX: 30.86 KG/M2 | WEIGHT: 180.78 LBS

## 2024-05-15 DIAGNOSIS — J44.9 CHRONIC OBSTRUCTIVE PULMONARY DISEASE, UNSPECIFIED COPD TYPE (HCC): Primary | ICD-10-CM

## 2024-05-15 DIAGNOSIS — Z12.31 ENCOUNTER FOR SCREENING MAMMOGRAM FOR BREAST CANCER: ICD-10-CM

## 2024-05-15 PROCEDURE — 77063 BREAST TOMOSYNTHESIS BI: CPT

## 2024-05-15 PROCEDURE — 77067 SCR MAMMO BI INCL CAD: CPT

## 2024-05-15 PROCEDURE — G0239 OTH RESP PROC, GROUP: HCPCS

## 2024-05-20 ENCOUNTER — TELEPHONE (OUTPATIENT)
Age: 69
End: 2024-05-20

## 2024-05-20 ENCOUNTER — CLINICAL SUPPORT (OUTPATIENT)
Dept: PULMONOLOGY | Facility: CLINIC | Age: 69
End: 2024-05-20
Payer: COMMERCIAL

## 2024-05-20 DIAGNOSIS — J44.9 CHRONIC OBSTRUCTIVE PULMONARY DISEASE, UNSPECIFIED COPD TYPE (HCC): Primary | ICD-10-CM

## 2024-05-20 PROCEDURE — G0239 OTH RESP PROC, GROUP: HCPCS

## 2024-05-20 NOTE — TELEPHONE ENCOUNTER
Jonatan from [facility] would be faxing over a request for O2 testing and clinical notes

## 2024-05-22 ENCOUNTER — CLINICAL SUPPORT (OUTPATIENT)
Dept: PULMONOLOGY | Facility: CLINIC | Age: 69
End: 2024-05-22
Payer: COMMERCIAL

## 2024-05-22 DIAGNOSIS — J44.9 CHRONIC OBSTRUCTIVE PULMONARY DISEASE, UNSPECIFIED COPD TYPE (HCC): Primary | ICD-10-CM

## 2024-05-22 DIAGNOSIS — J47.9 BRONCHIECTASIS WITHOUT COMPLICATION (HCC): ICD-10-CM

## 2024-05-22 PROCEDURE — G0239 OTH RESP PROC, GROUP: HCPCS

## 2024-05-22 RX ORDER — IPRATROPIUM BROMIDE AND ALBUTEROL SULFATE 2.5; .5 MG/3ML; MG/3ML
3 SOLUTION RESPIRATORY (INHALATION) EVERY 8 HOURS PRN
Qty: 270 ML | Refills: 1 | Status: SHIPPED | OUTPATIENT
Start: 2024-05-22

## 2024-05-29 ENCOUNTER — CLINICAL SUPPORT (OUTPATIENT)
Dept: PULMONOLOGY | Facility: CLINIC | Age: 69
End: 2024-05-29
Payer: COMMERCIAL

## 2024-05-29 DIAGNOSIS — J44.9 CHRONIC OBSTRUCTIVE PULMONARY DISEASE, UNSPECIFIED COPD TYPE (HCC): Primary | ICD-10-CM

## 2024-05-29 PROCEDURE — G0239 OTH RESP PROC, GROUP: HCPCS

## 2024-05-29 NOTE — PROGRESS NOTES
"PULMONARY REHABILITATION  ASSESSMENT AND INDIVIDUALIZED TREATMENT PLAN  60 DAY      Today's date: May 29, 2024  # of Exercise Sessions Completed: 16  Patient name: Noreen Alvarez     : 1955       MRN: [id number]  PCP: Samantha Hernandez MD  Referring Physician: Mariaa Magaña DO  Pulmonologist: Shannan Thompson MD   Provider: Kraig  Clinician: Florentin Michel MS, CEP     ASSESSMENT    PULMONARY HISTORY:  Dx:   Encounter Diagnosis   Name Primary?    Chronic obstructive pulmonary disease, unspecified COPD type (HCC) Yes       Date of onset: 2019  Description of Diagnosis: Severe COPD  Other Pulmonary History: Centrilobular Emphysema, history of laryngeal cancer, severe persistent asthma w/o complication, multiple lung nodules, chronic respiratory failure with hypoxia on home O2 therapy, ELIUD        Weight:   Wt Readings from Last 1 Encounters:   05/15/24 82 kg (180 lb 12.4 oz)        Height:   Ht Readings from Last 1 Encounters:   05/15/24 5' 4\" (1.626 m)       Medical History:   Past Medical History:   Diagnosis Date    Acute kidney failure (HCC)     Allergic     Allergies     Anemia     Anxiety     Asthma     Cancer (HCC)     Cataract     Chronic kidney disease (CKD), stage III (moderate) (HCC)     COPD (chronic obstructive pulmonary disease) (HCC)     COVID-19     Covid + 1-0-37-cough at that time now fully resolved    Depression     Disease of thyroid gland     Essential hypertension     GERD (gastroesophageal reflux disease)     Glaucoma     High blood pressure     HTN (hypertension) 2021    Hyperlipidemia     Hypothyroidism     Memory loss     Mesenteric lymphadenopathy 2021    Pulmonary hypertension (HCC)     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    Throat cancer (HCC) 2014    chemo and rad therapy 2014       Family History:   Family History   Problem Relation Age of Onset    Other Mother         respiratory disorder    Asthma Mother     COPD Mother     Depression Mother     Sudden " death Father         shot himself    Suicidality Father     Brain cancer Sister     Diabetes Sister     Breast cancer Sister     Cancer Sister 62        pancreatic cancer    No Known Problems Sister     No Known Problems Sister     No Known Problems Sister     No Known Problems Sister     No Known Problems Sister     No Known Problems Sister     No Known Problems Daughter     No Known Problems Daughter     No Known Problems Maternal Grandmother     No Known Problems Maternal Grandfather     No Known Problems Paternal Grandmother     No Known Problems Paternal Grandfather     No Known Problems Maternal Aunt     No Known Problems Maternal Aunt     No Known Problems Maternal Aunt     No Known Problems Maternal Aunt     No Known Problems Maternal Aunt     No Known Problems Paternal Aunt     No Known Problems Paternal Aunt     No Known Problems Paternal Aunt     No Known Problems Paternal Aunt     No Known Problems Son        Allergies: Cefdinir, Amifostine, and Pollen extract    ETOH:   Social History     Substance and Sexual Activity   Alcohol Use Never    Comment: None       Pulmonary Disease Risk Factors:  none  smoking  asthma    CAD Risk Factors:   Cholesterol: Yes  Smoking: Former user  HTN: No  DM: No  Obesity: Yes   Inactivity: Yes  Stress:  perceived  stress: 10/10   Stressors: negative thoughts about her health   Goals for Stress Management: Practice Relaxation Techniques, Exercise, and Keep a positive mindset    CURRENT MEDICATIONS:   Current Outpatient Medications   Medication Sig Dispense Refill    albuterol (PROVENTIL HFA,VENTOLIN HFA) 90 mcg/act inhaler Inhale 2 puffs every 6 (six) hours as needed for wheezing or shortness of breath 18 g 5    atorvastatin (LIPITOR) 40 mg tablet Take 1 tablet (40 mg total) by mouth daily with dinner 90 tablet 3    baclofen 10 mg tablet TAKE 1 TABLET (10 MG TOTAL) BY MOUTH DAILY AT BEDTIME AS NEEDED FOR MUSCLE SPASMS 90 tablet 1    benzonatate (TESSALON PERLES) 100 mg  capsule Take 1 capsule (100 mg total) by mouth 3 (three) times a day 20 capsule 0    bisacodyl (DULCOLAX) 5 mg EC tablet Take 4 tablets (20 mg total) by mouth 1 (one) time for 1 dose Take as instructed by office for bowel prep. 4 tablet 0    brimonidine tartrate 0.2 % ophthalmic solution       Budeson-Glycopyrrol-Formoterol (Breztri Aerosphere) 160-9-4.8 MCG/ACT AERO Inhale 2 puffs 2 (two) times a day Rinse mouth after use. 32.1 g 0    clotrimazole-betamethasone (LOTRISONE) 1-0.05 % cream Apply topically 2 (two) times a day 45 g 1    Diclofenac Sodium (VOLTAREN) 1 % APPLY 2 GRAMS TO AFFECTED AREA 4 TIMES A  g 5    docusate sodium (COLACE) 100 mg capsule Take 1 capsule (100 mg total) by mouth 2 (two) times a day as needed for constipation Hold for lose stool 10 capsule 0    ferrous sulfate 324 (65 Fe) mg Take 1 tablet (324 mg total) by mouth daily after lunch 90 tablet 1    fluticasone (FLONASE) 50 mcg/act nasal spray SPRAY 1 SPRAY INTO EACH NOSTRIL EVERY DAY 48 mL 1    ipratropium (ATROVENT) 0.06 % nasal spray 2 SPRAYS INTO EACH NOSTRIL 3 (THREE) TIMES A DAY FOR INCREASED NASAL SECRETION 45 mL 1    ipratropium-albuterol (DUO-NEB) 0.5-2.5 mg/3 mL nebulizer solution TAKE 3 ML BY NEBULIZATION EVERY 8 (EIGHT) HOURS AS NEEDED FOR WHEEZING OR SHORTNESS OF BREATH 270 mL 1    levothyroxine 75 mcg tablet Take 1 tablet (75 mcg total) by mouth daily in the early morning 90 tablet 0    montelukast (SINGULAIR) 10 mg tablet Take 1 tablet (10 mg total) by mouth daily at bedtime 90 tablet 1    ondansetron (ZOFRAN) 4 mg tablet Take 1 tablet (4 mg total) by mouth every 8 (eight) hours as needed for nausea or vomiting 20 tablet 0    pantoprazole (PROTONIX) 40 mg tablet TAKE 1 TABLET BY MOUTH EVERY DAY 90 tablet 0    polyethylene glycol (GLYCOLAX) 17 GM/SCOOP powder DISSOLVE 17 GRAMS INTO WATER AND DRINK BY MOUTH EVERY  g 0    polyethylene glycol (GOLYTELY) 4000 mL solution Take 4,000 mL by mouth once for 1 dose 4000 mL 0     sertraline (ZOLOFT) 25 mg tablet Take 1 tablet (25 mg total) by mouth daily at bedtime 90 tablet 1    sucralfate (CARAFATE) 1 g tablet Take 1 tablet (1 g total) by mouth 2 (two) times a day before meals for 14 days 28 tablet 0    timolol (TIMOPTIC) 0.5 % ophthalmic solution INSTILL 1 DROP INTO EACH EYE TWO TIMES A DAY       No current facility-administered medications for this visit.       Rescue Inhaler: Yes as needed    Maintenance Inhaler: Yes:  2 times per day as needed     Nebulizer Treatments:  Yes:  every 6 hours times per day    Medication compliance: Yes   Comments: Pt reports to be compliant with medications    OXYGEN NEEDS:   Rest: supplemental O2 via nasal cannula @ 2L/min   Exercise/physical activity: supplemental O2 via nasal cannula @ 2L/min   Sleep: supplemental O2 via nasal cannula @ 2L/min     Does Pt monitor home SpO2? yes   Average SpO2 at rest:  92%   Average SpO2 with ADLs/physical activity:  80%    Patient practices breathing techniques at home:  Yes    Cultural needs: Mongolian speaking, daughter was on the phone for the duration of the eval and 6MWT.  was present too.     FUNCTIONAL STATUS:  Current Status:  Occupation: unemployed  Recreation: watch tv   ADL’s:No limitations able to perform self-care  [place]: No limitations able to perform self-care  Exercise: none  Home exercise equipment: none  Other: none    Physical Limitations: none    Fall Risk: Low   Comments: Ambulates with a steady gait with no assist device and Denies a fall in the past 6 months    NUTRITIONAL:  Patient's current dietary habits include:  Eats fruits and veggies, watches red meat intake, and hydrates well    PATIENT SPECIFIC GOALS:     Exercise Goals: (home exercise, ADLs)  Start PA  Add exercise at home as tolerated   Nutrition Goals: (wt control, diabetes management, dietary modifications)  Continue to make dietary changes  Manage wt   Psychocosocial Goals: (stress, emotional well being, social  support)  Maintain emotional health   Take help when offered   Core Component Goals: (smoking, BP control, Oxygen use/needs, medication)  Abstain from smoking  Take medications as prescribed     Ability to reach goals/rehabilitation potential:  Excellent    Projected return to function: 12 weeks  Objective tests: 6 MWT completed on 4/2/2024      INDIVIDUALIZED TREATMENT PLAN    SUMMARY OF PROGRESS:    5/29/2024 Kayce has completed 16 session of Pulmonary Rehab for the diagnosis of COPD. Patient does have a PFT on file, revealing an FEV1/FVC ratio of 39% and an FEV1 of 37% predicted. This is suggestive of severeobstruction. Noreen reports an increase in energy and a decrease in SOBOE. She states she is able to cook and wash dishes with less fatigue. Reports that her O2 maintains in the low 90's at home but has dropped below 90's with over exertion. The patient currently does not follow a home exercise program. Would like to exercise at home but is nervous about her O2 dropping. When addressed, the patient denies having depression/anxiety. Patient admits to 100% medication compliance. At rest, the patient rated dyspnea 0/10 with SpO2 93-94% on supplemental O2 2L/min via nasal canula. They completed 25-45 min of exercise at 2.0-3.2 METS. The patient’s rating of perceived dyspnea during exercise was 2- 6/10 with SpO2 88-94%. Resting BP 84//68, HR 61-75 with appropriate hemodynamic response to exercise reaching 110//72,HR 79-94.      4/02/24 Today is Noreen's initial evaluation to begin Pulmonary Rehab for the diagnosis of COPD. Patient does have a PFT on file, revealing an FEV1/FVC ratio of 39% and an FEV1 of 37% predicted. This is suggestive of severeobstruction. The patient has been experiencing increased dyspnea, increased sitting time, decreased physical activity, and increased fatigue. They report dyspnea when completing ADLs. The patient currently does not follow a home exercise program. Depression  screening using the PHQ-9 interprets the patient's score of 9 5-9 = Mild Depression. Anxiety screening using the TIMMY-7 interprets the patients score of 5  5-9 = Mild anxiety. When addressed, the patient denies having depression/anxiety. Patient reports excellent social/emotional support from her family.  Information to begin using [employer] was provided as well as contact information for counseling through [facility]. PHQ-9 score will be reassessed in 30 days. The patient is a non-smoker. Patient admits to 100% medication compliance. At rest, the patient rated dyspnea 0/10 with SpO2 94% on supplemental O2 2L/min via nasal canula. They completed an initial 6MWT, walking 300 ft, 1.43 METs on supplemental O2 2L/min via nasal canula. The patient’s rating of perceived dyspnea during the 6MWT was 6/10 with SpO2 80%. They took 1 rest break for 3 minutes total. Telemetry revealed NSR. Resting /62 with appropriate hemodynamic response to exercise reaching 138/70. Patient will exercise on supplemental O2 2L/min via nasal canula. Group and individualized education will be provided on smoking cessation, oxygen use, breathing techniques, pulmonary anatomy, exercise for the pulmonary patient, healthy eating, stress, and relaxation. Patient specific goals include: increase stamina, strength, energy, and endurance. Noreen will attend 24 exercise sessions beginning April 8th at 3pm. Will progress patient as tolerated over the next 30 days. See outlined plan of care below for specific patient goals in each component of care.         EXERCISE ASSESSMENT AND PLAN    Assessment of progression of lung disease and functional status:  CAT: na/40  Shortness of breath questionnaire: na/120    Home Exercise: none    Group and Individual Education: pursed lipped breathing, diaphragmatic breathing, fall prevention while using nasal canula tubing, relaxation breathing, home exercise guidelines, benefits of exercise for  pulmonary disease, RPE scale, RPD scale, O2 saturation monitoring, and appropriate O2 response to exercise    Readiness to change: Action:  (Changing behavior)    Oxygen Goal: Maintain SpO2>88% during exercise or as advised by pulmonolgist    Current Exercise Program:       Frequency: 2 days/week   Supplement with home exercise 2+ days/wk as tolerated        Minutes: 25-45         METS: 3.1-3.2              SpO2: 88-94% on supplemental 2L/min via NC              RPD: 2-6                      HR: 79-94   RPE: 4-6         Modalities: Treadmill, UBE, NuStep, and Recumbent bike      Exercise Progression 30 Day Goals :    Frequency: 2 days/week    Supplement with home exercise 2+ days/wk as tolerated       Minutes: 45-50        METS: 3.3-3.6              SpO2: >88% on supplemental O2 2L/min via NC              RPD: 4-6                      HR: RHR +30   RPE: 4-5        Modalities: Treadmill, UBE, NuStep, and Recumbent bike     Strength trainin-3 days / week  12-15 repetitions  1-2 sets per modality   Will be added following 2-3 weeks of monitored exercise sessions   Modalities: Lateral Raise, Arm Curl, Sit to Stands, and Front Raises    SMART EXERCISE:  Goals:   reduced score on CAT, improved 6MWT distance, reduced dyspnea during exercise, improved exercise tolerance based on peak METs tolerated in pulmonary rehab exercise session, SpO2 >88% during exercise, and reduced RPD at rest    PATIENT SPECIFIC EXERCISE   Goals: (home exercise, ADLs, recreation)   reduced dependence on supplemental O2, attend pulmonary rehab regularly, decrease sitting time at home, start a walking program, begin a consistent exercise regimen , increased muscular strength, increased energy, increased stamina with ADLs, and more independence at home    PROGRESS:   Increase energy, increase stamina while cooking and washing, increasing functional METs, started using TRM in rehab, and reduced GUTIERREZ     PLAN FOR THE NEXT 30 DAYS:   learn to  conserve energy with ADLs , practice diaphragmatic breathing, reduce time sitting at home, increased strength of respiratory muscles, utilize PLB with physical activity, and begin a home exercise program     Exercise workloads will be progressed gradually as tolerated, within limits of patient's ability, and according to the patient's response to the exercise program.      NUTRITION ASSESSMENT AND PLAN    Weight control:    Starting weight: 184 lbs   Current weight:   177.0 lbs     Diabetes: N/A  A1c: 5.9     Last Measured: 3/1/2024  Medication Compliance: compliant all of the time  Diabetic Diet Compliance: compliant all of the time  Home Glucose Monitoring:  n/a    Lipid management: Discussed diet and lipid management and Last lipid profile 2/29/2024  Chol 207  TRG 61  HDL 78      Group and Individual Education: heart healthy eating principles  weight loss and management strategies  low sodium diet  maintaining hydration    Readiness to change: Action:  (Changing behavior)    Reviewed patient's Rate your Plate. Reviewed patient goals for dietary modifications and their clinical implications.  Reviewed most recent lipid profile and A1C.    SMART NUTRITIONAL:  Goals:   LDL <100, HDL >40, TRG <150, CHOL <200, and Improved Rate Your Plate score  >64      PATIENT SPECIFIC NUTRITIONAL:  Goals: (wt control, diabetes management, dietary modifications)  eat smaller, more frequent meals improve hydration    PROGRESS:  Weight loss, increasing hydration, and no diet changes noted    PLAN FOR THE NEXT 30 DAYS:   group class: Reading Food Labels and group Class: Heart Healthy Eating    Measurable goals were based Rate Your Plate Dietary Self-Assessment. These are the areas in which the patient could score higher on the assessment. Goals include recommendations for a heart healthy diet based on American Heart Association.      PSYCHOSOCIAL ASSESSMENT AND PLAN    Emotional Health Assessment:    Depression Measure: PHQ-9 =  4  1-4 = Minimal Depression  Anxiety Measure: TIMMY-7 = 4  0-4  = Not anxious  Assessment of depression and anxiety    Patient reports they are coping well with good social support and denies depression or anxiety    Self-reported stress level:  10  Stress Management: Read, Exercise, Keep a positive mindset, Enjoy a hobby, and Spend time with family     Patient's rating of Social support: Excellent   Social Support Network: spouse and children    Psychosocial Assessment as it relates to rehabilitation: Patient denies issues with his/her family or home life that may affect their rehabilitation efforts.     Group and Individual Education: benefits of a positive support system and stress management techniques    Readiness to change: Action:  (Changing behavior)    SMART PSYCHOSOCIAL:  Goals:   Reduce perceived stress to 1-3/10 and improved [facility] QOL < 27      PATIENT SPECIFIC PSYCHOSOCIAL:  Goals:  (stress, emotional well being, social support):    begin using [employer] and spend time with family    PROGRESS:   Maintaining emotional health    PLAN FOR NEXT 30 DAYS:   Class: Stress and Your Health, Practice relaxation techniques, Exercise, Keep a positive mindset, and Enjoy family    Resources for emotional health and wellbeing are provided to patient. Patient's PCP are notified if PHQ-9 and/or TIMMY-7 are >5. Patient is referred to PCP/behavioral health services if requested.      OTHER CORE COMPONENTS     Tobacco:   Social History     Tobacco Use   Smoking Status Former    Current packs/day: 0.00    Average packs/day: 1 pack/day for 40.0 years (40.0 ttl pk-yrs)    Types: Cigarettes    Start date: 1974    Quit date: 2014    Years since quitting: 10.4   Smokeless Tobacco Never       Tobacco Use Intervention: N/A: Pt has a remote history of smoking    Blood pressure:    Restin//68   Exercise: 110//72   Recovery: 90//68    Education:  pathophysiology of pulmonary disease and dangers of  smoking    Readiness to change: Preparation:  (Getting ready to change)     SMART   Goals: reduced dietary sodium <2000mg, assess daily wt to report an increase greater than 3lbs in a day or 5lbs in a week, medication compliance, and Abstain from smoking    PATIENT SPECIFIC CORE COMPONENT   Goals:  (smoking, BP control, medication)   reduced dietary sodium <2000mg, assess daily wt to report an increase greater than 3lbs in a day or 5lbs in a week, medication compliance, and Abstain from smoking    PROGRESS:  Blood pressures within normal limits mostly with rest and exercise (can run hypotensive or hypertensive, increasing hydration, and monitoring weight     PLAN FOR NEXT 30 DAYS:   medication compliance, avoid processed foods, engage in regular exercise, eliminate salt shaker at the table, check labels for sodium content, and monitor home BP

## 2024-06-03 ENCOUNTER — CLINICAL SUPPORT (OUTPATIENT)
Dept: PULMONOLOGY | Facility: CLINIC | Age: 69
End: 2024-06-03
Payer: COMMERCIAL

## 2024-06-03 DIAGNOSIS — J44.9 CHRONIC OBSTRUCTIVE PULMONARY DISEASE, UNSPECIFIED COPD TYPE (HCC): Primary | ICD-10-CM

## 2024-06-03 PROCEDURE — G0239 OTH RESP PROC, GROUP: HCPCS

## 2024-06-05 ENCOUNTER — CLINICAL SUPPORT (OUTPATIENT)
Dept: PULMONOLOGY | Facility: CLINIC | Age: 69
End: 2024-06-05
Payer: COMMERCIAL

## 2024-06-05 ENCOUNTER — TELEPHONE (OUTPATIENT)
Age: 69
End: 2024-06-05

## 2024-06-05 DIAGNOSIS — J44.9 CHRONIC OBSTRUCTIVE PULMONARY DISEASE, UNSPECIFIED COPD TYPE (HCC): Primary | ICD-10-CM

## 2024-06-05 PROCEDURE — G0239 OTH RESP PROC, GROUP: HCPCS

## 2024-06-05 NOTE — TELEPHONE ENCOUNTER
"Ema calling from [employer] asking for the office visiti note from 6/6/2024 to show that the patient has O2 level less than 88% and the ov note faxed to them at Fax\"[phone] please and thank you   "

## 2024-06-06 ENCOUNTER — OFFICE VISIT (OUTPATIENT)
Dept: PULMONOLOGY | Facility: CLINIC | Age: 69
End: 2024-06-06
Payer: COMMERCIAL

## 2024-06-06 VITALS
OXYGEN SATURATION: 83 % | SYSTOLIC BLOOD PRESSURE: 108 MMHG | BODY MASS INDEX: 30.73 KG/M2 | DIASTOLIC BLOOD PRESSURE: 66 MMHG | HEIGHT: 64 IN | HEART RATE: 72 BPM | WEIGHT: 180 LBS | TEMPERATURE: 97.7 F

## 2024-06-06 DIAGNOSIS — R91.8 MULTIPLE LUNG NODULES: ICD-10-CM

## 2024-06-06 DIAGNOSIS — J43.2 CENTRILOBULAR EMPHYSEMA (HCC): Primary | ICD-10-CM

## 2024-06-06 DIAGNOSIS — G47.33 OSA (OBSTRUCTIVE SLEEP APNEA): ICD-10-CM

## 2024-06-06 PROCEDURE — 99213 OFFICE O/P EST LOW 20 MIN: CPT | Performed by: INTERNAL MEDICINE

## 2024-06-06 RX ORDER — PREDNISOLONE SODIUM PHOSPHATE 10 MG/1
10 TABLET, ORALLY DISINTEGRATING ORAL DAILY
COMMUNITY
End: 2024-06-06 | Stop reason: ALTCHOICE

## 2024-06-06 NOTE — PATIENT INSTRUCTIONS
Breztri inhaler twice daily.     Recommend RSV vaccine from any pharmacy  I was unclear if she still has prednisolone- I assume not

## 2024-06-06 NOTE — ASSESSMENT & PLAN NOTE
Nodules stable as of CAT scan March 2024.  Patient presumably qualifies for repeat lung cancer screening.  This will be organized next visit.

## 2024-06-06 NOTE — PROGRESS NOTES
Assessment/Plan:    Centrilobular emphysema (HCC)  Still severe dyspnea on exertion.  Patient uses Breztri twice daily on a daily basis and nebulizes DuoNeb on a frequent basis.  Uses oxygen 2 L/min continuously.  No major change since last visit here in November.  Azithromycin over the winter did control her cough and sputum.  Plan to repeat next winter.  I advised RSV vaccine.  Electronic record suggested that patient is taking prednisolone but I confirm that she is not taking this.    ELIUD (obstructive sleep apnea)  Diagnosis not confirmed by sleep study in February of this year marked as resolved.    Multiple lung nodules  Nodules stable as of CAT scan March 2024.  Patient presumably qualifies for repeat lung cancer screening.  This will be organized next visit.     Diagnoses and all orders for this visit:    Centrilobular emphysema (HCC)  -     Nebulizer    ELIUD (obstructive sleep apnea)    Multiple lung nodules    Other orders  -     cyanocobalamin (VITAMIN B-12) 500 MCG tablet; Take 500 mcg by mouth daily  -     Cholecalciferol (VITAMIN D3) 1,000 units tablet; Take 1,000 Units by mouth daily  -     Discontinue: prednisoLONE (ORAPRED ODT) 10 MG disintegrating tablet; Take 10 mg by mouth daily          Subjective:      Patient ID: Noreen Alvarez is a 69 y.o. female.    This patient returns for reevaluation of emphysema.  She was interviewed through a .  I also spoke to her daughter because there was some question about her medications.  Her electronic record records that she was taking prednisolone I had no clear record of that from previous visits and the daughter confirms that she is not taking this drug.  I could not see that this was prescribed after she left the hospital in March.  Patient thinks it has something to do with thyroid disease many years ago.  Patient has significant dyspnea on exertion at least grade 3.  Uses oxygen 2 L/min 24 hours/day.  Notes more dyspnea when she does not  "use the oxygen.  Previously had trouble with cough and sputum but that is not an issue now.  She no longer takes azithromycin.  Using Breztri twice daily.  Uses a DuoNeb and nebulizer 3-4 times daily on a regular basis.  Able to leave her house and do brief shopping.  Previous hospitalization for chest pain but no heart disease was documented.  Oxygen saturation 95% with 2 L of oxygen on at rest.  Saturation 83% with oxygen off for 10 minutes (room air).    Shortness of Breath  Associated symptoms include wheezing. Pertinent negatives include no chest pain, coughing, leg swelling or palpitations.       The following portions of the patient's history were reviewed and updated as appropriate: allergies, current medications, past family history, past medical history, past social history, past surgical history and problem list.    Review of Systems   Constitutional:  Negative for activity change and unexpected weight change.   Respiratory:  Positive for shortness of breath and wheezing. Negative for apnea and cough.    Cardiovascular:  Negative for chest pain, palpitations and leg swelling.         Objective:      /66 (BP Location: Left arm, Patient Position: Sitting, Cuff Size: Standard)   Pulse 72   Temp 97.7 °F (36.5 °C) (Tympanic)   Ht 5' 4\" (1.626 m)   Wt 81.6 kg (180 lb)   SpO2 (!) 83% Comment: room air at rest  BMI 30.90 kg/m²          Physical Exam  Vitals reviewed.   Constitutional:       General: She is not in acute distress.     Appearance: She is normal weight. She is not ill-appearing.   Cardiovascular:      Rate and Rhythm: Normal rate and regular rhythm.      Pulses:           Radial pulses are 2+ on the right side and 2+ on the left side.      Heart sounds: Normal heart sounds.   Pulmonary:      Effort: Pulmonary effort is normal.      Breath sounds: Examination of the right-middle field reveals decreased breath sounds. Examination of the left-middle field reveals decreased breath sounds. " Decreased breath sounds present. No wheezing or rhonchi.   Musculoskeletal:         General: No swelling.      Cervical back: No rigidity or tenderness.      Right lower leg: No edema.      Left lower leg: No edema.   Lymphadenopathy:      Cervical: No cervical adenopathy.   Skin:     General: Skin is warm and dry.   Neurological:      Mental Status: She is alert and oriented to person, place, and time.   Psychiatric:         Mood and Affect: Mood normal.         Behavior: Behavior normal.

## 2024-06-06 NOTE — ASSESSMENT & PLAN NOTE
Still severe dyspnea on exertion.  Patient uses Breztri twice daily on a daily basis and nebulizes DuoNeb on a frequent basis.  Uses oxygen 2 L/min continuously.  No major change since last visit here in November.  Azithromycin over the winter did control her cough and sputum.  Plan to repeat next winter.  I advised RSV vaccine.  Electronic record suggested that patient is taking prednisolone but I confirm that she is not taking this.

## 2024-06-07 LAB
DME PARACHUTE DELIVERY DATE REQUESTED: NORMAL
DME PARACHUTE ITEM DESCRIPTION: NORMAL
DME PARACHUTE ORDER STATUS: NORMAL
DME PARACHUTE SUPPLIER NAME: NORMAL
DME PARACHUTE SUPPLIER PHONE: NORMAL

## 2024-06-10 ENCOUNTER — CLINICAL SUPPORT (OUTPATIENT)
Dept: PULMONOLOGY | Facility: CLINIC | Age: 69
End: 2024-06-10
Payer: COMMERCIAL

## 2024-06-10 DIAGNOSIS — J44.9 CHRONIC OBSTRUCTIVE PULMONARY DISEASE, UNSPECIFIED COPD TYPE (HCC): Primary | ICD-10-CM

## 2024-06-10 PROCEDURE — G0239 OTH RESP PROC, GROUP: HCPCS

## 2024-06-11 DIAGNOSIS — E03.9 HYPOTHYROIDISM, UNSPECIFIED TYPE: ICD-10-CM

## 2024-06-11 DIAGNOSIS — K21.9 GASTROESOPHAGEAL REFLUX DISEASE WITHOUT ESOPHAGITIS: ICD-10-CM

## 2024-06-11 DIAGNOSIS — J43.2 CENTRILOBULAR EMPHYSEMA (HCC): ICD-10-CM

## 2024-06-11 DIAGNOSIS — R07.89 ATYPICAL CHEST PAIN: ICD-10-CM

## 2024-06-11 RX ORDER — LEVOTHYROXINE SODIUM 0.07 MG/1
75 TABLET ORAL
Qty: 7 TABLET | Refills: 0 | Status: SHIPPED | OUTPATIENT
Start: 2024-06-11 | End: 2024-06-12 | Stop reason: SDUPTHER

## 2024-06-11 NOTE — TELEPHONE ENCOUNTER
Medication Refill Request     Name Levothyroxine    Dose/Frequency 75 mcg   Quantity 7   Verified pharmacy   [x] [facility]  Verified ordering Provider   [x]  Does patient have enough for the next 3 days? Yes [] No [x]

## 2024-06-12 ENCOUNTER — TELEPHONE (OUTPATIENT)
Age: 69
End: 2024-06-12

## 2024-06-12 DIAGNOSIS — E03.9 HYPOTHYROIDISM, UNSPECIFIED TYPE: ICD-10-CM

## 2024-06-12 DIAGNOSIS — E53.8 B12 DEFICIENCY: Primary | ICD-10-CM

## 2024-06-12 RX ORDER — BUDESONIDE, GLYCOPYRROLATE, AND FORMOTEROL FUMARATE 160; 9; 4.8 UG/1; UG/1; UG/1
AEROSOL, METERED RESPIRATORY (INHALATION)
Qty: 10.7 G | Refills: 5 | Status: ON HOLD | OUTPATIENT
Start: 2024-06-12

## 2024-06-12 RX ORDER — AZITHROMYCIN 250 MG/1
250 TABLET, FILM COATED ORAL 3 TIMES WEEKLY
Qty: 15 TABLET | Refills: 5 | Status: ON HOLD | OUTPATIENT
Start: 2024-06-12 | End: 2024-12-10

## 2024-06-12 RX ORDER — LEVOTHYROXINE SODIUM 0.07 MG/1
75 TABLET ORAL
Qty: 90 TABLET | Refills: 1 | Status: ON HOLD | OUTPATIENT
Start: 2024-06-12

## 2024-06-12 RX ORDER — PANTOPRAZOLE SODIUM 40 MG/1
40 TABLET, DELAYED RELEASE ORAL DAILY
Qty: 90 TABLET | Refills: 1 | Status: ON HOLD | OUTPATIENT
Start: 2024-06-12

## 2024-06-12 RX ORDER — LEVOTHYROXINE SODIUM 0.07 MG/1
75 TABLET ORAL
Qty: 7 TABLET | Refills: 0 | Status: SHIPPED | OUTPATIENT
Start: 2024-06-12 | End: 2024-06-12 | Stop reason: SDUPTHER

## 2024-06-12 RX ORDER — LEVOTHYROXINE SODIUM 0.07 MG/1
75 TABLET ORAL
Qty: 90 TABLET | Refills: 0 | OUTPATIENT
Start: 2024-06-12

## 2024-06-12 NOTE — TELEPHONE ENCOUNTER
Reason for call:   [x] Refill   [] Prior Auth  [x] Other: pt was given a 7 day supply last night but is asking can a 90 day be called instead. Please advise.      Office:   [x] PCP/Provider - [facility]   [] Specialty/Provider -     Medication: Levothyroxine     Dose/Frequency: 75mcg - daily     Quantity: 90    Pharmacy: [facility] #[id number]     Does the patient have enough for 3 days?   [] Yes   [x] No - Send as HP to POD

## 2024-06-12 NOTE — TELEPHONE ENCOUNTER
Please review to see if the refill is appropriate.     Last ordered yesterday 06.11.2024 for 7 tablets given can patient get 90D or 30D supply?

## 2024-06-12 NOTE — TELEPHONE ENCOUNTER
Patient called the RX Refill Line. Message is being forwarded to the office.     Patient is requesting that the office try calling the pharmacy - she has called them numerous time leaving a message and no one will call her back. Her mom has had no meds for 3 days and she is upset.     Please contact patient at

## 2024-06-17 ENCOUNTER — APPOINTMENT (OUTPATIENT)
Dept: PULMONOLOGY | Facility: CLINIC | Age: 69
End: 2024-06-17
Payer: COMMERCIAL

## 2024-06-19 ENCOUNTER — APPOINTMENT (EMERGENCY)
Dept: CT IMAGING | Facility: HOSPITAL | Age: 69
DRG: 865 | End: 2024-06-19
Payer: COMMERCIAL

## 2024-06-19 ENCOUNTER — APPOINTMENT (OUTPATIENT)
Dept: PULMONOLOGY | Facility: CLINIC | Age: 69
End: 2024-06-19
Payer: COMMERCIAL

## 2024-06-19 ENCOUNTER — NURSE TRIAGE (OUTPATIENT)
Age: 69
End: 2024-06-19

## 2024-06-19 ENCOUNTER — HOSPITAL ENCOUNTER (INPATIENT)
Facility: HOSPITAL | Age: 69
LOS: 7 days | Discharge: HOME | DRG: 865 | End: 2024-06-26
Attending: EMERGENCY MEDICINE | Admitting: INTERNAL MEDICINE
Payer: COMMERCIAL

## 2024-06-19 ENCOUNTER — APPOINTMENT (EMERGENCY)
Dept: RADIOLOGY | Facility: HOSPITAL | Age: 69
DRG: 865 | End: 2024-06-19
Payer: COMMERCIAL

## 2024-06-19 DIAGNOSIS — G47.33 OSA (OBSTRUCTIVE SLEEP APNEA): ICD-10-CM

## 2024-06-19 DIAGNOSIS — I10 HTN (HYPERTENSION): ICD-10-CM

## 2024-06-19 DIAGNOSIS — J44.1 COPD EXACERBATION (HCC): ICD-10-CM

## 2024-06-19 DIAGNOSIS — J96.22 ACUTE ON CHRONIC RESPIRATORY FAILURE WITH HYPOXIA AND HYPERCAPNIA (HCC): ICD-10-CM

## 2024-06-19 DIAGNOSIS — J96.21 ACUTE ON CHRONIC RESPIRATORY FAILURE WITH HYPOXIA AND HYPERCAPNIA (HCC): ICD-10-CM

## 2024-06-19 DIAGNOSIS — J44.1 COPD WITH ACUTE EXACERBATION (HCC): ICD-10-CM

## 2024-06-19 DIAGNOSIS — R06.2 WHEEZING: Primary | ICD-10-CM

## 2024-06-19 LAB
ALBUMIN SERPL BCG-MCNC: 4.4 G/DL (ref 3.5–5)
ALP SERPL-CCNC: 126 U/L (ref 34–104)
ALT SERPL W P-5'-P-CCNC: 12 U/L (ref 7–52)
ANION GAP SERPL CALCULATED.3IONS-SCNC: 5 MMOL/L (ref 4–13)
AST SERPL W P-5'-P-CCNC: 21 U/L (ref 13–39)
BASE EX.OXY STD BLDV CALC-SCNC: 73.6 % (ref 60–80)
BASE EXCESS BLDV CALC-SCNC: 8.7 MMOL/L
BASO STIPL BLD QL SMEAR: PRESENT
BASOPHILS # BLD AUTO: 0.01 THOUSANDS/ÂΜL (ref 0–0.1)
BASOPHILS NFR BLD AUTO: 0 % (ref 0–1)
BILIRUB SERPL-MCNC: 0.42 MG/DL (ref 0.2–1)
BNP SERPL-MCNC: 144 PG/ML (ref 0–100)
BUN SERPL-MCNC: 18 MG/DL (ref 5–25)
CALCIUM SERPL-MCNC: 9.3 MG/DL (ref 8.4–10.2)
CARDIAC TROPONIN I PNL SERPL HS: 5 NG/L (ref 8–18)
CHLORIDE SERPL-SCNC: 96 MMOL/L (ref 96–108)
CO2 SERPL-SCNC: 40 MMOL/L (ref 21–32)
CREAT SERPL-MCNC: 0.98 MG/DL (ref 0.6–1.3)
EOSINOPHIL # BLD AUTO: 0 THOUSAND/ÂΜL (ref 0–0.61)
EOSINOPHIL NFR BLD AUTO: 0 % (ref 0–6)
ERYTHROCYTE [DISTWIDTH] IN BLOOD BY AUTOMATED COUNT: 13.1 % (ref 11.6–15.1)
FLUAV RNA RESP QL NAA+PROBE: NEGATIVE
FLUBV RNA RESP QL NAA+PROBE: NEGATIVE
GFR SERPL CREATININE-BSD FRML MDRD: 59 ML/MIN/1.73SQ M
GLUCOSE SERPL-MCNC: 129 MG/DL (ref 65–140)
HCO3 BLDV-SCNC: 39.6 MMOL/L (ref 24–30)
HCT VFR BLD AUTO: 36.1 % (ref 34.8–46.1)
HGB BLD-MCNC: 10.6 G/DL (ref 11.5–15.4)
IMM GRANULOCYTES # BLD AUTO: 0.08 THOUSAND/UL (ref 0–0.2)
IMM GRANULOCYTES NFR BLD AUTO: 1 % (ref 0–2)
LYMPHOCYTES # BLD AUTO: 0.27 THOUSANDS/ÂΜL (ref 0.6–4.47)
LYMPHOCYTES NFR BLD AUTO: 4 % (ref 14–44)
MCH RBC QN AUTO: 27.1 PG (ref 26.8–34.3)
MCHC RBC AUTO-ENTMCNC: 29.4 G/DL (ref 31.4–37.4)
MCV RBC AUTO: 92 FL (ref 82–98)
MONOCYTES # BLD AUTO: 0.37 THOUSAND/ÂΜL (ref 0.17–1.22)
MONOCYTES NFR BLD AUTO: 5 % (ref 4–12)
NEUTROPHILS # BLD AUTO: 6.73 THOUSANDS/ÂΜL (ref 1.85–7.62)
NEUTS SEG NFR BLD AUTO: 90 % (ref 43–75)
NRBC BLD AUTO-RTO: 0 /100 WBCS
O2 CT BLDV-SCNC: 12.1 ML/DL
PCO2 BLDV: 99.1 MM HG (ref 42–50)
PH BLDV: 7.22 [PH] (ref 7.3–7.4)
PLATELET # BLD AUTO: 137 THOUSANDS/UL (ref 149–390)
PLATELET BLD QL SMEAR: ADEQUATE
PMV BLD AUTO: 10 FL (ref 8.9–12.7)
PO2 BLDV: 42.2 MM HG (ref 35–45)
POTASSIUM SERPL-SCNC: 4.5 MMOL/L (ref 3.5–5.3)
PROT SERPL-MCNC: 7.2 G/DL (ref 6.4–8.4)
RBC # BLD AUTO: 3.91 MILLION/UL (ref 3.81–5.12)
RBC MORPH BLD: PRESENT
RSV RNA RESP QL NAA+PROBE: NEGATIVE
SARS-COV-2 RNA RESP QL NAA+PROBE: NEGATIVE
SODIUM SERPL-SCNC: 141 MMOL/L (ref 135–147)
WBC # BLD AUTO: 7.46 THOUSAND/UL (ref 4.31–10.16)

## 2024-06-19 PROCEDURE — 85025 COMPLETE CBC W/AUTO DIFF WBC: CPT | Performed by: EMERGENCY MEDICINE

## 2024-06-19 PROCEDURE — 84145 PROCALCITONIN (PCT): CPT | Performed by: INTERNAL MEDICINE

## 2024-06-19 PROCEDURE — 83880 ASSAY OF NATRIURETIC PEPTIDE: CPT | Performed by: EMERGENCY MEDICINE

## 2024-06-19 PROCEDURE — 71045 X-RAY EXAM CHEST 1 VIEW: CPT

## 2024-06-19 PROCEDURE — 36415 COLL VENOUS BLD VENIPUNCTURE: CPT | Performed by: EMERGENCY MEDICINE

## 2024-06-19 PROCEDURE — 80053 COMPREHEN METABOLIC PANEL: CPT | Performed by: EMERGENCY MEDICINE

## 2024-06-19 PROCEDURE — 94760 N-INVAS EAR/PLS OXIMETRY 1: CPT

## 2024-06-19 PROCEDURE — 71275 CT ANGIOGRAPHY CHEST: CPT

## 2024-06-19 PROCEDURE — 96374 THER/PROPH/DIAG INJ IV PUSH: CPT

## 2024-06-19 PROCEDURE — 99285 EMERGENCY DEPT VISIT HI MDM: CPT

## 2024-06-19 PROCEDURE — 94640 AIRWAY INHALATION TREATMENT: CPT

## 2024-06-19 PROCEDURE — 93005 ELECTROCARDIOGRAM TRACING: CPT

## 2024-06-19 PROCEDURE — 82805 BLOOD GASES W/O2 SATURATION: CPT | Performed by: EMERGENCY MEDICINE

## 2024-06-19 PROCEDURE — 94664 DEMO&/EVAL PT USE INHALER: CPT

## 2024-06-19 PROCEDURE — 84484 ASSAY OF TROPONIN QUANT: CPT | Performed by: EMERGENCY MEDICINE

## 2024-06-19 PROCEDURE — 0241U HB NFCT DS VIR RESP RNA 4 TRGT: CPT | Performed by: EMERGENCY MEDICINE

## 2024-06-19 PROCEDURE — 99223 1ST HOSP IP/OBS HIGH 75: CPT

## 2024-06-19 PROCEDURE — 99285 EMERGENCY DEPT VISIT HI MDM: CPT | Performed by: EMERGENCY MEDICINE

## 2024-06-19 RX ORDER — BENZONATATE 100 MG/1
100 CAPSULE ORAL 3 TIMES DAILY
Status: DISCONTINUED | OUTPATIENT
Start: 2024-06-19 | End: 2024-06-26 | Stop reason: HOSPADM

## 2024-06-19 RX ORDER — ALBUTEROL SULFATE 90 UG/1
2 AEROSOL, METERED RESPIRATORY (INHALATION) EVERY 6 HOURS PRN
Status: DISCONTINUED | OUTPATIENT
Start: 2024-06-19 | End: 2024-06-26 | Stop reason: HOSPADM

## 2024-06-19 RX ORDER — ACETAMINOPHEN 325 MG/1
650 TABLET ORAL EVERY 6 HOURS PRN
Status: DISCONTINUED | OUTPATIENT
Start: 2024-06-19 | End: 2024-06-26 | Stop reason: HOSPADM

## 2024-06-19 RX ORDER — METHYLPREDNISOLONE SODIUM SUCCINATE 125 MG/2ML
125 INJECTION, POWDER, LYOPHILIZED, FOR SOLUTION INTRAMUSCULAR; INTRAVENOUS ONCE
Status: COMPLETED | OUTPATIENT
Start: 2024-06-19 | End: 2024-06-19

## 2024-06-19 RX ORDER — ONDANSETRON 2 MG/ML
4 INJECTION INTRAMUSCULAR; INTRAVENOUS EVERY 6 HOURS PRN
Status: DISCONTINUED | OUTPATIENT
Start: 2024-06-19 | End: 2024-06-26 | Stop reason: HOSPADM

## 2024-06-19 RX ORDER — IPRATROPIUM BROMIDE AND ALBUTEROL SULFATE 2.5; .5 MG/3ML; MG/3ML
3 SOLUTION RESPIRATORY (INHALATION) EVERY 8 HOURS PRN
Status: DISCONTINUED | OUTPATIENT
Start: 2024-06-19 | End: 2024-06-19

## 2024-06-19 RX ORDER — FLUTICASONE PROPIONATE 50 MCG
2 SPRAY, SUSPENSION (ML) NASAL DAILY
Status: DISCONTINUED | OUTPATIENT
Start: 2024-06-19 | End: 2024-06-26 | Stop reason: HOSPADM

## 2024-06-19 RX ORDER — IPRATROPIUM BROMIDE AND ALBUTEROL SULFATE 2.5; .5 MG/3ML; MG/3ML
3 SOLUTION RESPIRATORY (INHALATION) ONCE
Status: COMPLETED | OUTPATIENT
Start: 2024-06-19 | End: 2024-06-19

## 2024-06-19 RX ORDER — ALBUTEROL SULFATE 2.5 MG/3ML
2.5 SOLUTION RESPIRATORY (INHALATION) ONCE
Status: COMPLETED | OUTPATIENT
Start: 2024-06-19 | End: 2024-06-19

## 2024-06-19 RX ORDER — SERTRALINE HYDROCHLORIDE 25 MG/1
25 TABLET, FILM COATED ORAL
Status: DISCONTINUED | OUTPATIENT
Start: 2024-06-19 | End: 2024-06-26 | Stop reason: HOSPADM

## 2024-06-19 RX ORDER — LEVOTHYROXINE SODIUM 0.07 MG/1
75 TABLET ORAL
Status: DISCONTINUED | OUTPATIENT
Start: 2024-06-20 | End: 2024-06-26 | Stop reason: HOSPADM

## 2024-06-19 RX ORDER — GUAIFENESIN 600 MG/1
600 TABLET, EXTENDED RELEASE ORAL 2 TIMES DAILY
Status: DISCONTINUED | OUTPATIENT
Start: 2024-06-19 | End: 2024-06-26 | Stop reason: HOSPADM

## 2024-06-19 RX ORDER — ATORVASTATIN CALCIUM 40 MG/1
40 TABLET, FILM COATED ORAL
Status: DISCONTINUED | OUTPATIENT
Start: 2024-06-20 | End: 2024-06-26 | Stop reason: HOSPADM

## 2024-06-19 RX ORDER — ENOXAPARIN SODIUM 100 MG/ML
40 INJECTION SUBCUTANEOUS DAILY
Status: DISCONTINUED | OUTPATIENT
Start: 2024-06-20 | End: 2024-06-26 | Stop reason: HOSPADM

## 2024-06-19 RX ORDER — MAGNESIUM HYDROXIDE/ALUMINUM HYDROXICE/SIMETHICONE 120; 1200; 1200 MG/30ML; MG/30ML; MG/30ML
30 SUSPENSION ORAL EVERY 6 HOURS PRN
Status: DISCONTINUED | OUTPATIENT
Start: 2024-06-19 | End: 2024-06-26 | Stop reason: HOSPADM

## 2024-06-19 RX ORDER — LEVALBUTEROL INHALATION SOLUTION 1.25 MG/3ML
1.25 SOLUTION RESPIRATORY (INHALATION)
Status: DISCONTINUED | OUTPATIENT
Start: 2024-06-20 | End: 2024-06-21

## 2024-06-19 RX ORDER — MONTELUKAST SODIUM 10 MG/1
10 TABLET ORAL
Status: DISCONTINUED | OUTPATIENT
Start: 2024-06-19 | End: 2024-06-26 | Stop reason: HOSPADM

## 2024-06-19 RX ORDER — METHYLPREDNISOLONE SODIUM SUCCINATE 40 MG/ML
40 INJECTION, POWDER, LYOPHILIZED, FOR SOLUTION INTRAMUSCULAR; INTRAVENOUS EVERY 8 HOURS
Status: DISCONTINUED | OUTPATIENT
Start: 2024-06-19 | End: 2024-06-20

## 2024-06-19 RX ORDER — DOCUSATE SODIUM 100 MG/1
100 CAPSULE, LIQUID FILLED ORAL 2 TIMES DAILY
Status: DISCONTINUED | OUTPATIENT
Start: 2024-06-19 | End: 2024-06-26 | Stop reason: HOSPADM

## 2024-06-19 RX ORDER — BUDESONIDE AND FORMOTEROL FUMARATE DIHYDRATE 160; 4.5 UG/1; UG/1
2 AEROSOL RESPIRATORY (INHALATION) 2 TIMES DAILY
Status: DISCONTINUED | OUTPATIENT
Start: 2024-06-19 | End: 2024-06-22

## 2024-06-19 RX ADMIN — BUDESONIDE AND FORMOTEROL FUMARATE DIHYDRATE 2 PUFF: 160; 4.5 AEROSOL RESPIRATORY (INHALATION) at 23:50

## 2024-06-19 RX ADMIN — FLUTICASONE PROPIONATE 2 SPRAY: 50 SPRAY, METERED NASAL at 23:50

## 2024-06-19 RX ADMIN — GUAIFENESIN 600 MG: 600 TABLET ORAL at 23:22

## 2024-06-19 RX ADMIN — METHYLPREDNISOLONE SODIUM SUCCINATE 125 MG: 125 INJECTION, POWDER, FOR SOLUTION INTRAMUSCULAR; INTRAVENOUS at 17:49

## 2024-06-19 RX ADMIN — IPRATROPIUM BROMIDE AND ALBUTEROL SULFATE 3 ML: 2.5; .5 SOLUTION RESPIRATORY (INHALATION) at 17:52

## 2024-06-19 RX ADMIN — IPRATROPIUM BROMIDE AND ALBUTEROL SULFATE 3 ML: 2.5; .5 SOLUTION RESPIRATORY (INHALATION) at 18:40

## 2024-06-19 RX ADMIN — METHYLPREDNISOLONE SODIUM SUCCINATE 40 MG: 40 INJECTION, POWDER, FOR SOLUTION INTRAMUSCULAR; INTRAVENOUS at 23:23

## 2024-06-19 RX ADMIN — IOHEXOL 85 ML: 350 INJECTION, SOLUTION INTRAVENOUS at 19:32

## 2024-06-19 RX ADMIN — IPRATROPIUM BROMIDE AND ALBUTEROL SULFATE 3 ML: 2.5; .5 SOLUTION RESPIRATORY (INHALATION) at 18:14

## 2024-06-19 RX ADMIN — AZITHROMYCIN MONOHYDRATE 500 MG: 500 INJECTION, POWDER, LYOPHILIZED, FOR SOLUTION INTRAVENOUS at 21:33

## 2024-06-19 RX ADMIN — BENZONATATE 100 MG: 100 CAPSULE ORAL at 23:22

## 2024-06-19 RX ADMIN — ALBUTEROL SULFATE 2.5 MG: 2.5 SOLUTION RESPIRATORY (INHALATION) at 20:10

## 2024-06-19 RX ADMIN — ALBUTEROL SULFATE 2.5 MG: 2.5 SOLUTION RESPIRATORY (INHALATION) at 20:43

## 2024-06-19 RX ADMIN — DOCUSATE SODIUM 100 MG: 100 CAPSULE, LIQUID FILLED ORAL at 23:22

## 2024-06-19 RX ADMIN — MONTELUKAST 10 MG: 10 TABLET, FILM COATED ORAL at 23:22

## 2024-06-19 RX ADMIN — IPRATROPIUM BROMIDE 0.5 MG: 0.5 SOLUTION RESPIRATORY (INHALATION) at 23:05

## 2024-06-19 RX ADMIN — SERTRALINE HYDROCHLORIDE 25 MG: 25 TABLET ORAL at 23:22

## 2024-06-19 NOTE — TELEPHONE ENCOUNTER
"  Patient's daughter is calling in for patient. Patient c/o productive cough, SOB, and headache since Sunday. Patient with hx of COPD she is on 2L on oxygen NC. She is taking Mucinex for her symptoms, and her prescribed inhalers/ nebulizer. Denies fever.   Daughter would like to have her and her father seen today together if possible. Please follow up.   Reason for Disposition   [1] MILD difficulty breathing (e.g., minimal/no SOB at rest, SOB with walking, pulse <100) AND [2] still present when not coughing    Answer Assessment - Initial Assessment Questions  1. ONSET: \"When did the symptoms start?\"       Sunday    3. COUGH: \"Do you have a cough?\" If yes, ask: \"Describe the color of your sputum\" (clear, white, yellow, green)      Productive cough yellow- clear    4. RESPIRATORY DISTRESS: \"Describe your breathing.\"       SOB- hx of COPD    5. FEVER: \"Do you have a fever?\" If Yes, ask: \"What is your temperature, how was it measured, and when did it start?\"      Denies    6. SEVERITY: \"Overall, how bad are you feeling right now?\" (e.g., doesn't interfere with normal activities, staying home from school/work, staying in bed)         7. OTHER SYMPTOMS: \"Do you have any other symptoms?\" (e.g., sore throat, earache, wheezing, vomiting)      Headache    8. PREGNANCY: \"Is there any chance you are pregnant?\" \"When was your last menstrual period?\"      N/A    Protocols used: Common Cold-ADULT-AH, Cough - Acute Productive-ADULT-AH    "

## 2024-06-20 PROBLEM — J44.1 COPD WITH ACUTE EXACERBATION (HCC): Status: ACTIVE | Noted: 2024-04-02

## 2024-06-20 LAB
ATRIAL RATE: 91 BPM
ATRIAL RATE: 94 BPM
B PARAP IS1001 DNA NPH QL NAA+NON-PROBE: NOT DETECTED
B PERT.PT PRMT NPH QL NAA+NON-PROBE: NOT DETECTED
BASE EX.OXY STD BLDV CALC-SCNC: 80.6 % (ref 60–80)
BASE EXCESS BLDV CALC-SCNC: 7.4 MMOL/L
C PNEUM DNA NPH QL NAA+NON-PROBE: NOT DETECTED
FLUAV RNA NPH QL NAA+NON-PROBE: NOT DETECTED
FLUBV RNA NPH QL NAA+NON-PROBE: NOT DETECTED
HADV DNA NPH QL NAA+NON-PROBE: NOT DETECTED
HCO3 BLDV-SCNC: 36.5 MMOL/L (ref 24–30)
HCOV 229E RNA NPH QL NAA+NON-PROBE: NOT DETECTED
HCOV HKU1 RNA NPH QL NAA+NON-PROBE: NOT DETECTED
HCOV NL63 RNA NPH QL NAA+NON-PROBE: NOT DETECTED
HCOV OC43 RNA NPH QL NAA+NON-PROBE: NOT DETECTED
HMPV RNA NPH QL NAA+NON-PROBE: NOT DETECTED
HPIV1 RNA NPH QL NAA+NON-PROBE: NOT DETECTED
HPIV2 RNA NPH QL NAA+NON-PROBE: NOT DETECTED
HPIV3 RNA NPH QL NAA+NON-PROBE: DETECTED
HPIV4 RNA NPH QL NAA+NON-PROBE: NOT DETECTED
M PNEUMO DNA NPH QL NAA+NON-PROBE: NOT DETECTED
O2 CT BLDV-SCNC: 12.6 ML/DL
P AXIS: 84 DEGREES
P AXIS: 88 DEGREES
PCO2 BLDV: 80.3 MM HG (ref 42–50)
PH BLDV: 7.28 [PH] (ref 7.3–7.4)
PO2 BLDV: 46.3 MM HG (ref 35–45)
PR INTERVAL: 156 MS
PR INTERVAL: 174 MS
PROCALCITONIN SERPL-MCNC: <0.05 NG/ML
QRS AXIS: 63 DEGREES
QRS AXIS: 73 DEGREES
QRSD INTERVAL: 90 MS
QRSD INTERVAL: 90 MS
QT INTERVAL: 360 MS
QT INTERVAL: 370 MS
QTC INTERVAL: 442 MS
QTC INTERVAL: 462 MS
RSV RNA NPH QL NAA+NON-PROBE: NOT DETECTED
RV+EV RNA NPH QL NAA+NON-PROBE: NOT DETECTED
SARS-COV-2 RNA NPH QL NAA+NON-PROBE: NOT DETECTED
T WAVE AXIS: 75 DEGREES
T WAVE AXIS: 76 DEGREES
VENTRICULAR RATE: 91 BPM
VENTRICULAR RATE: 94 BPM

## 2024-06-20 PROCEDURE — 94668 MNPJ CHEST WALL SBSQ: CPT

## 2024-06-20 PROCEDURE — 82805 BLOOD GASES W/O2 SATURATION: CPT | Performed by: INTERNAL MEDICINE

## 2024-06-20 PROCEDURE — 99223 1ST HOSP IP/OBS HIGH 75: CPT | Performed by: STUDENT IN AN ORGANIZED HEALTH CARE EDUCATION/TRAINING PROGRAM

## 2024-06-20 PROCEDURE — 0202U NFCT DS 22 TRGT SARS-COV-2: CPT | Performed by: INTERNAL MEDICINE

## 2024-06-20 PROCEDURE — 94760 N-INVAS EAR/PLS OXIMETRY 1: CPT

## 2024-06-20 PROCEDURE — 99232 SBSQ HOSP IP/OBS MODERATE 35: CPT | Performed by: INTERNAL MEDICINE

## 2024-06-20 PROCEDURE — 94664 DEMO&/EVAL PT USE INHALER: CPT

## 2024-06-20 PROCEDURE — 94640 AIRWAY INHALATION TREATMENT: CPT

## 2024-06-20 PROCEDURE — 93010 ELECTROCARDIOGRAM REPORT: CPT | Performed by: INTERNAL MEDICINE

## 2024-06-20 RX ORDER — METHYLPREDNISOLONE SODIUM SUCCINATE 40 MG/ML
40 INJECTION, POWDER, LYOPHILIZED, FOR SOLUTION INTRAMUSCULAR; INTRAVENOUS EVERY 12 HOURS SCHEDULED
Status: DISCONTINUED | OUTPATIENT
Start: 2024-06-20 | End: 2024-06-21

## 2024-06-20 RX ORDER — AMLODIPINE BESYLATE 2.5 MG/1
2.5 TABLET ORAL DAILY
Status: DISCONTINUED | OUTPATIENT
Start: 2024-06-20 | End: 2024-06-21

## 2024-06-20 RX ORDER — AZITHROMYCIN 250 MG/1
500 TABLET, FILM COATED ORAL EVERY 24 HOURS
Status: COMPLETED | OUTPATIENT
Start: 2024-06-20 | End: 2024-06-23

## 2024-06-20 RX ADMIN — MONTELUKAST 10 MG: 10 TABLET, FILM COATED ORAL at 21:31

## 2024-06-20 RX ADMIN — ENOXAPARIN SODIUM 40 MG: 40 INJECTION SUBCUTANEOUS at 08:05

## 2024-06-20 RX ADMIN — BENZONATATE 100 MG: 100 CAPSULE ORAL at 08:05

## 2024-06-20 RX ADMIN — GUAIFENESIN 600 MG: 600 TABLET ORAL at 08:05

## 2024-06-20 RX ADMIN — METHYLPREDNISOLONE SODIUM SUCCINATE 40 MG: 40 INJECTION, POWDER, FOR SOLUTION INTRAMUSCULAR; INTRAVENOUS at 05:35

## 2024-06-20 RX ADMIN — LEVOTHYROXINE SODIUM 75 MCG: 75 TABLET ORAL at 05:35

## 2024-06-20 RX ADMIN — METHYLPREDNISOLONE SODIUM SUCCINATE 40 MG: 40 INJECTION, POWDER, FOR SOLUTION INTRAMUSCULAR; INTRAVENOUS at 13:56

## 2024-06-20 RX ADMIN — IPRATROPIUM BROMIDE 0.5 MG: 0.5 SOLUTION RESPIRATORY (INHALATION) at 07:11

## 2024-06-20 RX ADMIN — LEVALBUTEROL HYDROCHLORIDE 1.25 MG: 1.25 SOLUTION RESPIRATORY (INHALATION) at 13:20

## 2024-06-20 RX ADMIN — IPRATROPIUM BROMIDE 0.5 MG: 0.5 SOLUTION RESPIRATORY (INHALATION) at 19:29

## 2024-06-20 RX ADMIN — IPRATROPIUM BROMIDE 0.5 MG: 0.5 SOLUTION RESPIRATORY (INHALATION) at 13:29

## 2024-06-20 RX ADMIN — FLUTICASONE PROPIONATE 2 SPRAY: 50 SPRAY, METERED NASAL at 08:10

## 2024-06-20 RX ADMIN — BUDESONIDE AND FORMOTEROL FUMARATE DIHYDRATE 2 PUFF: 160; 4.5 AEROSOL RESPIRATORY (INHALATION) at 17:03

## 2024-06-20 RX ADMIN — DOCUSATE SODIUM 100 MG: 100 CAPSULE, LIQUID FILLED ORAL at 17:03

## 2024-06-20 RX ADMIN — AMLODIPINE BESYLATE 2.5 MG: 2.5 TABLET ORAL at 13:56

## 2024-06-20 RX ADMIN — SERTRALINE HYDROCHLORIDE 25 MG: 25 TABLET ORAL at 21:31

## 2024-06-20 RX ADMIN — BENZONATATE 100 MG: 100 CAPSULE ORAL at 21:31

## 2024-06-20 RX ADMIN — LEVALBUTEROL HYDROCHLORIDE 1.25 MG: 1.25 SOLUTION RESPIRATORY (INHALATION) at 19:29

## 2024-06-20 RX ADMIN — ACETAMINOPHEN 650 MG: 325 TABLET, FILM COATED ORAL at 21:43

## 2024-06-20 RX ADMIN — ACETAMINOPHEN 650 MG: 325 TABLET, FILM COATED ORAL at 08:05

## 2024-06-20 RX ADMIN — METHYLPREDNISOLONE SODIUM SUCCINATE 40 MG: 40 INJECTION, POWDER, FOR SOLUTION INTRAMUSCULAR; INTRAVENOUS at 21:31

## 2024-06-20 RX ADMIN — DOCUSATE SODIUM 100 MG: 100 CAPSULE, LIQUID FILLED ORAL at 08:05

## 2024-06-20 RX ADMIN — ATORVASTATIN CALCIUM 40 MG: 40 TABLET, FILM COATED ORAL at 17:03

## 2024-06-20 RX ADMIN — LEVALBUTEROL HYDROCHLORIDE 1.25 MG: 1.25 SOLUTION RESPIRATORY (INHALATION) at 07:11

## 2024-06-20 RX ADMIN — BENZONATATE 100 MG: 100 CAPSULE ORAL at 17:03

## 2024-06-20 RX ADMIN — AZITHROMYCIN 500 MG: 250 TABLET, FILM COATED ORAL at 21:31

## 2024-06-20 RX ADMIN — ALBUTEROL SULFATE 2 PUFF: 90 AEROSOL, METERED RESPIRATORY (INHALATION) at 05:56

## 2024-06-20 RX ADMIN — GUAIFENESIN 600 MG: 600 TABLET ORAL at 17:03

## 2024-06-20 RX ADMIN — BUDESONIDE AND FORMOTEROL FUMARATE DIHYDRATE 2 PUFF: 160; 4.5 AEROSOL RESPIRATORY (INHALATION) at 08:10

## 2024-06-20 NOTE — ASSESSMENT & PLAN NOTE
Patient has a history of RHETT-3 lobular emphysema, obstructive sleep apnea and multiple lung nodules.  She does wear oxygen 2 L nasal cannula at baseline.  Reports the last 48 hours she has become extremely short of breath and finds difficulty breathing  Noted on vital signs review patient was hypoxic and required an increment of supplemental oxygen, on assessment the patient is back to baseline after nebulizers and steroids, the patient is in no acute distress.  Venous blood gas shows hypercapnia.  The patient states she does not wear CPAP at home and also she does not have one prescribed  Respiratory panel is negative  CTA ED chest PE study shows negative for pulmonary emboli, groundglass nodule in the lateral right lower lobe measures up to 1.6 cm this nodule has been unchanged from her most recent CAT scan on March 2024, there is a slow-growing adenocarcinoma spectrum lesion is not entirely excluded.  New focus on tree-in-bud nodularity in the posterior right lower lobe is favored to represent an infections or inflammatory infiltrate and increase in mediastinal/hilar nodes infiltrate.  Continue with albuterol, Solu-Medrol every 8 hours.  Azithromycin first dose was given in the ER, continue with azithromycin on the floors  Monitor O2 sats per unit protocol or when needed  The patient follows with [facility] pulmonologist last visit was June 6, 2024.  Per Dr. Hastings note the patient presented on that day with severe dyspnea on exertion.  Repeat VBG showed acute on chronic hypercapnic respiratory failure however patient does not have increased work of breathing, persistent hypoxia or altered mentation.  Discussed with pulmonology.  Will hold off on BiPAP for now unless patient has increased work of breathing, worsening hypoxia or altered mentation.  Continue to wean oxygen as tolerated.  Of note patient is on 2 L of supplemental oxygen at baseline.  Respiratory and airway protocol in place

## 2024-06-20 NOTE — ASSESSMENT & PLAN NOTE
Has severe emphysema with acute exacerbation likely in the setting of recent viral URI.  Admits to sick family contacts.  COVID RSV and influenza panel was negative.  Follow-up on RP-2 panel.  Continue with IV Solu-Medrol 40 mg every 8 hourly.  Continue with azithromycin daily to complete total 5 days of treatment.  Procalcitonin negative.  CT chest reviewed and noted.  Continue with scheduled nebulization treatment.  Continue Symbicort.  Pulmonology input appreciated.

## 2024-06-20 NOTE — ASSESSMENT & PLAN NOTE
Blood pressure elevated on admission.  Started on Norvasc.  May need titration of dose further based on blood pressure control.

## 2024-06-20 NOTE — ASSESSMENT & PLAN NOTE
BMI 30.90 kg  Education on the importance of healthy eating habits and exercising daily was provided at bedside greater than 3 minutes

## 2024-06-20 NOTE — UTILIZATION REVIEW
Initial Clinical Review    Admission: Date/Time/Statement:   Admission Orders (From admission, onward)       Ordered        06/19/24 2123  INPATIENT ADMISSION  Once                          Orders Placed This Encounter   Procedures    INPATIENT ADMISSION     Standing Status:   Standing     Number of Occurrences:   1     Order Specific Question:   Level of Care     Answer:   Med Surg [16]     Order Specific Question:   Estimated length of stay     Answer:   More than 2 Midnights     Order Specific Question:   Certification     Answer:   I certify that inpatient services are medically necessary for this patient for a duration of greater than two midnights. See H&P and MD Progress Notes for additional information about the patient's course of treatment.     ED Arrival Information       Expected   -    Arrival   6/19/2024 16:52    Acuity   Emergent              Means of arrival   Walk-In    Escorted by   Spouse    Service   Hospitalist    Admission type   Emergency              Arrival complaint   Shortness of breath, back pain, cough             Chief Complaint   Patient presents with    Shortness of Breath     Pt states she has been short of breath since Monday. Also states mid sternal chest pain. Wears 2L oxygen at baseline. Hx COPD.       Initial Presentation: 69 y.o. female who presented self from home to [facility] ED. Inpatient admission for evaluation and treatment of COPD exacerbation, acute on chronic respiratory failure w/ hypoxia and hypercapnia. PMHx: anemia, astham, CKD S3, COPD, COVID-19, psychiatric disorder, GERD, HTN, glaucoma, HLD, hypothyroidism, throat cancer. Presented w/ SOB for past 2 days. 2L O2 baseline. Notes inability to catch her breath at rest and on exertion. On exam, wheezing. Imaging without acute abnormalities. Respiratory panel negative. Plan: check sputum culture, Supplemental O2, weaned as tolerated to baseline, continue PTA meds, Trend labs, replete electrolytes as needed.  Pulmonology consulted.    Anticipated Length of Stay/Certification Statement: Patient will be admitted on an inpatient basis with an anticipated length of stay of greater than 2 midnights secondary to acute on chronic respiratory failure with hypoxia and hypercapnia.       Date: 06/20/24   Day 2: Pt reports nonproductive cough, SOB s/t prolonged coughing. On exam, rhinorrhea, ill-appearing, diminished breath sounds, expiratory wheezing. Plan: wean O2 as able to baseline 2L, IV solu-medrol q8h, continue azithromycin, check extended respiratory panel, nebs, continue current meds, start Norvasc, Trend labs, replete electrolytes as needed.    Pulmonology: Pt w/ groundglass nodule in lateral R lower lobe, unchanged from recent March 2024 comparison imaging. Wean nebs, IV solu-medrol q12h, Supplemental O2, weaned as tolerated; recommend BiPAP qHS, overnight pulse oximetry test.       Date: 06/21/24  Day 3: Has surpassed a 2nd midnight with active treatments and services. Pt requiring 3L O2 NC. Continues with fatigue, SOB, increased coughing. Notes feeling generally unwell. On exam, diminished breath sounds w/ wheezing b/l. Expanded respiratory panel positive for parainfluenza. Plan:continue current meds, azithromycin, IV solumedrol, BiPAP qHS, overnight pulse oximetry w/ AM ABG, supportive care, increase Norvasc dose. Trend labs, replete electrolytes as needed.      ED Triage Vitals   Temperature Pulse Respirations Blood Pressure SpO2 Pain Score   06/19/24 1707 06/19/24 1704 06/19/24 1704 06/19/24 1704 06/19/24 1704 06/19/24 2330   99.4 °F (37.4 °C) 100 (!) 23 (!) 172/74 (S) (!) 84 % No Pain     Weight (last 2 days)       Date/Time Weight    06/21/24 0600 81.3 (179.23)    06/20/24 0538 79.9 (176.1)            Vital Signs (last 3 days)       Date/Time Temp Pulse Resp BP MAP (mmHg) SpO2 Calculated FIO2 (%) - Nasal Cannula Nasal Cannula O2 Flow Rate (L/min) O2 Device Patient Position - Orthostatic VS Pain    06/21/24 0900 --  -- -- -- -- -- -- -- -- -- No Pain    06/21/24 0726 -- -- -- -- -- 98 % 32 3 L/min Nasal cannula -- --    06/21/24 06:52:46 97.8 °F (36.6 °C) -- 17 157/86 110 -- -- -- -- -- --    06/20/24 22:07:05 98.7 °F (37.1 °C) 89 18 155/81 106 96 % 32 3 L/min Nasal cannula Sitting --    06/20/24 2143 -- -- -- -- -- -- -- -- -- -- 2    06/20/24 2000 -- -- -- -- -- -- -- -- -- -- 2    06/20/24 1931 -- -- -- -- -- 96 % 28 2 L/min Nasal cannula -- --    06/20/24 16:47:17 98.5 °F (36.9 °C) 86 16 166/84 111 96 % 28 2 L/min Nasal cannula Sitting --    06/20/24 1548 -- -- -- -- -- 90 % 36 4 L/min -- -- --    06/20/24 1546 -- -- -- 185/77 -- 86 % 28 2 L/min -- Lying --    06/20/24 1532 98.7 °F (37.1 °C) 87 -- 198/85 122 89 % -- -- Nasal cannula -- --    06/20/24 1500 -- -- -- -- -- -- -- -- -- -- No Pain    06/20/24 0813 -- -- -- -- -- -- 32 3 L/min Nasal cannula -- --    06/20/24 0805 -- -- -- -- -- -- -- -- -- -- 4    06/20/24 0759 -- -- -- -- -- -- 36 4 L/min -- -- 4    06/20/24 0712 98 °F (36.7 °C) 96 17 161/93 116 100 % 36 4 L/min Nasal cannula Lying --    06/20/24 0538 -- -- -- -- -- 94 % 36 4 L/min Nasal cannula -- --    06/20/24 0100 -- -- -- 182/85 -- -- -- -- -- -- --    06/19/24 2355 -- -- -- -- -- -- -- -- -- -- No Pain    06/19/24 2330 98.6 °F (37 °C) 93 22 187/80 115 91 % 28 2 L/min Nasal cannula Sitting No Pain    06/19/24 2306 -- -- -- -- -- 92 % 28 2 L/min Nasal cannula -- --    06/19/24 1754 -- -- -- -- -- -- -- -- Nasal cannula -- --    06/19/24 1707 99.4 °F (37.4 °C) -- -- -- -- -- -- -- -- -- --    06/19/24 1705 -- -- -- -- -- 94 % 40 5 L/min Nasal cannula -- --    06/19/24 1704 -- 100 23 172/74 -- 84 %  28 2 L/min Nasal cannula -- --              Pertinent Labs/Diagnostic Test Results:   Radiology:  CTA ED chest PE Study   Final Interpretation by Pedrito Kaur MD (06/19 2055)      No pulmonary embolism.      A groundglass nodule in the lateral right lower lobe measures up to 1.6 cm, unchanged from most  recent March 2024 comparison, however increased from December 2022 exam.   A slow-growing/adenocarcinoma spectrum lesion is not entirely excluded.      New focus of tree-in-bud nodularity in the posterior right lower lobe is favored to represent an infectious/inflammatory infiltrate.      Overall interval increase in mediastinal/hilar nodes as described.      Continued follow-up is recommended.                           Workstation performed: [id number]         XR chest 1 view portable   Final Interpretation by Anshul Roberto MD (06/20 0828)      No focal consolidation, pleural effusion, or pneumothorax.            Workstation performed: [id number]           Cardiology:  ECG 12 lead   Final Result by Althea Mariee DO (06/20 1645)   Normal sinus rhythm   Nonspecific ST abnormality   Abnormal ECG   When compared with ECG of 19-JUN-2024 17:15, (unconfirmed)   No significant change was found   Confirmed by Althea Mariee ([id number]) on 6/20/2024 4:45:57 PM      ECG 12 lead   Final Result by Althea Mariee DO (06/20 1645)   Normal sinus rhythm   Normal ECG   When compared with ECG of 29-FEB-2024 08:22,   Criteria for Septal infarct are no longer Present   T wave inversion no longer evident in Inferior leads   Nonspecific T wave abnormality, improved in Anterolateral leads   Confirmed by Althea Mariee ([id number]) on 6/20/2024 4:45:54 PM          Results from last 7 days   Lab Units 06/20/24  1249 06/19/24 2010   SARS-COV-2  Not Detected Negative     Results from last 7 days   Lab Units 06/21/24  0803 06/19/24  1748   WBC Thousand/uL 12.98* 7.46   HEMOGLOBIN g/dL 10.5* 10.6*   HEMATOCRIT % 36.9 36.1   PLATELETS Thousands/uL 177 137*   TOTAL NEUT ABS Thousands/µL  --  6.73         Results from last 7 days   Lab Units 06/21/24  0803 06/19/24  1748   SODIUM mmol/L 142 141   POTASSIUM mmol/L 4.8 4.5   CHLORIDE mmol/L 97 96   CO2 mmol/L 44* 40*   ANION GAP mmol/L 1* 5   BUN mg/dL 25 18   CREATININE mg/dL 1.12 0.98   EGFR ml/min/1.73sq m 50  59   CALCIUM mg/dL 9.7 9.3     Results from last 7 days   Lab Units 06/19/24  1748   AST U/L 21   ALT U/L 12   ALK PHOS U/L 126*   TOTAL PROTEIN g/dL 7.2   ALBUMIN g/dL 4.4   TOTAL BILIRUBIN mg/dL 0.42         Results from last 7 days   Lab Units 06/21/24  0803 06/19/24  1748   GLUCOSE RANDOM mg/dL 120 129      Results from last 7 days   Lab Units 06/20/24  1021 06/19/24  1802   PH ROXANA  7.275* 7.220*   PCO2 ROXANA mm Hg 80.3* 99.1*   PO2 ROXANA mm Hg 46.3* 42.2   HCO3 ROXANA mmol/L 36.5* 39.6*   BASE EXC ROXANA mmol/L 7.4 8.7   O2 CONTENT ROXANA ml/dL 12.6 12.1   O2 HGB, VENOUS % 80.6* 73.6     Results from last 7 days   Lab Units 06/21/24  0803 06/19/24  1748   PROCALCITONIN ng/ml <0.05 <0.05      Results from last 7 days   Lab Units 06/19/24  1748   BNP pg/mL 144*     Results from last 7 days   Lab Units 06/20/24  1249 06/19/24 2010   INFLUENZA A PCR   --  Negative   INFLUENZA B PCR   --  Negative   INFLUENZA B  Not Detected  --    RSV PCR   --  Negative   RESPIRATORY SYNCYTIAL VIRUS  Not Detected  --      Results from last 7 days   Lab Units 06/20/24  1249   ADENOVIRUS  Not Detected   BORDETELLA PARAPERTUSSIS  Not Detected   BORDETELLA PERTUSSIS  Not Detected   CHLAMYDIA PNEUMONIAE  Not Detected   CORONAVIRUS 229E  Not Detected   CORONAVIRUS HKU1  Not Detected   CORONAVIRUS NL63  Not Detected   CORONAVIRUS OC43  Not Detected   METAPNEUMOVIRUS  Not Detected   RHINOVIRUS  Not Detected   MYCOPLASMA PNEUMONIAE  Not Detected   PARAINFLUENZA 1  Not Detected   PARAINFLUENZA 2  Not Detected   PARAINFLUENZA 3  Detected*   PARAINFLUENZA 4  Not Detected         ED Treatment-Medication Administration from 06/19/2024 1652 to 06/19/2024 2301         Date/Time Order Dose Route Action     06/19/2024 1814 ipratropium-albuterol (DUO-NEB) 0.5-2.5 mg/3 mL inhalation solution 3 mL 3 mL Nebulization Given     06/19/2024 1752 ipratropium-albuterol (DUO-NEB) 0.5-2.5 mg/3 mL inhalation solution 3 mL 3 mL Nebulization Given     06/19/2024 0593  methylPREDNISolone sodium succinate (Solu-MEDROL) injection 125 mg 125 mg Intravenous Given     06/19/2024 1840 ipratropium-albuterol (DUO-NEB) 0.5-2.5 mg/3 mL inhalation solution 3 mL 3 mL Nebulization Given     06/19/2024 1932 iohexol (OMNIPAQUE) 350 MG/ML injection (MULTI-DOSE) 85 mL 85 mL Intravenous Given     06/19/2024 2010 albuterol inhalation solution 2.5 mg 2.5 mg Nebulization Given     06/19/2024 2043 albuterol inhalation solution 2.5 mg 2.5 mg Nebulization Given     06/19/2024 2133 azithromycin (ZITHROMAX) 500 mg in sodium chloride 0.9% 250mL IVPB 500 mg 500 mg Intravenous New Bag            Past Medical History:   Diagnosis Date    Acute kidney failure (HCC)     Allergic     Allergies     Anemia     Anxiety     Asthma     Cancer (HCC)     Cataract     Chronic kidney disease (CKD), stage III (moderate) (HCC)     COPD (chronic obstructive pulmonary disease) (HCC)     COVID-19     Covid + 5-0-18-cough at that time now fully resolved    Depression     Disease of thyroid gland     Essential hypertension     GERD (gastroesophageal reflux disease)     Glaucoma     High blood pressure     HTN (hypertension) 02/16/2021    Hyperlipidemia     Hypothyroidism     Memory loss     Mesenteric lymphadenopathy 07/13/2021    Pulmonary hypertension (HCC)     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 female  Admission Orders:  Cardiac Diet.  Up & OOB as tolerated.   Overnight pulse oximetry.  DW. I&O. SCDs.    Scheduled Medications:  amLODIPine, 2.5 mg, Oral, Daily  atorvastatin, 40 mg, Oral,  Daily With Dinner  azithromycin, 500 mg, Oral, Q24H  benzonatate, 100 mg, Oral, TID  budesonide-formoterol, 2 puff, Inhalation, BID  docusate sodium, 100 mg, Oral, BID  enoxaparin, 40 mg, Subcutaneous, Daily  fluticasone, 2 spray, Each Nare, Daily  guaiFENesin, 600 mg, Oral, BID  ipratropium, 0.5 mg, Nebulization, TID  levalbuterol, 1.25 mg, Nebulization, TID  levothyroxine, 75 mcg, Oral, Early Morning  methylPREDNISolone sodium succinate, 40 mg, Intravenous, Q12H ESTEVAN  montelukast, 10 mg, Oral, HS  pantoprazole, 40 mg, Oral, Daily Before Breakfast  sertraline, 25 mg, Oral, HS  timolol, 1 drop, Both Eyes, BID    Continuous IV Infusions: none    PRN Meds:  acetaminophen, 650 mg, Oral, Q6H PRN; 6/20 x2  albuterol, 2 puff, Inhalation, Q6H PRN; 6/20 x1  aluminum-magnesium hydroxide-simethicone, 30 mL, Oral, Q6H PRN  ondansetron, 4 mg, Intravenous, Q6H PRN    methylPREDNISolone sodium succinate (Solu-MEDROL) injection 40 mg  Dose: 40 mg  Freq: Every 8 hours Route: IV  Start: 06/19/24 2230 End: 06/20/24 1417    IP CONSULT TO PULMONOLOGY    Network Utilization Review Department  ATTENTION: Please call with any questions or concerns to [phone] and carefully listen to the prompts so that you are directed to the right person. All voicemails are confidential.   For Discharge needs, contact Care Management DC Support Team at [phone] opt. 2  Send all requests for admission clinical reviews, approved or denied determinations and any other requests to dedicated fax number below belonging to the campus where the patient is receiving treatment. List of dedicated fax numbers for the Facilities:  FACILITY NAME UR FAX NUMBER   ADMISSION DENIALS (Administrative/Medical Necessity) [phone]   DISCHARGE SUPPORT TEAM (NETWORK) [phone]   PARENT CHILD HEALTH (Maternity/NICU/Pediatrics) [phone]   [facility] [phone]   [facility] [phone]   Community Health  [facility] [phone]   [facility] [phone]   [facility] [phone]   [facility] [phone]   [facility] [phone]   [facility] [phone]   [facility] [phone]   [facility] [phone]   [facility] [phone]   [facility] [phone]

## 2024-06-20 NOTE — ED PROVIDER NOTES
History  Chief Complaint   Patient presents with    Shortness of Breath     Pt states she has been short of breath since Monday. Also states mid sternal chest pain. Wears 2L oxygen at baseline. Hx COPD.     Patient is a 69-year-old female presents with several days of wheezing, cough and shortness of breath.  Patient denies any chest pain.  No nausea vomiting.  No abdominal pain.  No leg swelling.  No palpitations.  No back pain.      Shortness of Breath  Worsened by:  Coughing  Associated symptoms: cough and wheezing    Associated symptoms: no diaphoresis, no ear pain, no headaches, no neck pain, no rash, no sore throat and no syncope        Prior to Admission Medications   Prescriptions Last Dose Informant Patient Reported? Taking?   Budeson-Glycopyrrol-Formoterol (Breztri Aerosphere) 160-9-4.8 MCG/ACT AERO   No No   Sig: INHALE 2 PUFFS BY MOUTH 2 TIMES A DAY RINSE MOUTH AFTER USE.   Cholecalciferol (VITAMIN D3) 1,000 units tablet   Yes No   Sig: Take 1,000 Units by mouth daily   Diclofenac Sodium (VOLTAREN) 1 %   No No   Sig: APPLY 2 GRAMS TO AFFECTED AREA 4 TIMES A DAY   Patient not taking: Reported on 6/6/2024   albuterol (PROVENTIL HFA,VENTOLIN HFA) 90 mcg/act inhaler   No No   Sig: Inhale 2 puffs every 6 (six) hours as needed for wheezing or shortness of breath   atorvastatin (LIPITOR) 40 mg tablet   No No   Sig: Take 1 tablet (40 mg total) by mouth daily with dinner   azithromycin (ZITHROMAX) 250 mg tablet   No No   Sig: Take 1 tablet (250 mg total) by mouth 3 (three) times a week for 78 doses   baclofen 10 mg tablet   No No   Sig: TAKE 1 TABLET (10 MG TOTAL) BY MOUTH DAILY AT BEDTIME AS NEEDED FOR MUSCLE SPASMS   Patient not taking: Reported on 6/6/2024   benzonatate (TESSALON PERLES) 100 mg capsule   No No   Sig: Take 1 capsule (100 mg total) by mouth 3 (three) times a day   bisacodyl (DULCOLAX) 5 mg EC tablet   No No   Sig: Take 4 tablets (20 mg total) by mouth 1 (one) time for 1 dose Take as instructed  by office for bowel prep.   brimonidine tartrate 0.2 % ophthalmic solution  Self Yes No   Patient not taking: Reported on 2024   clotrimazole-betamethasone (LOTRISONE) 1-0.05 % cream   No No   Sig: Apply topically 2 (two) times a day   Patient not taking: Reported on 2024   cyanocobalamin (VITAMIN B-12) 500 MCG tablet   No No   Sig: Take 1 tablet (500 mcg total) by mouth daily   docusate sodium (COLACE) 100 mg capsule   No No   Sig: Take 1 capsule (100 mg total) by mouth 2 (two) times a day as needed for constipation Hold for lose stool   Patient not taking: Reported on 2024   ferrous sulfate 324 (65 Fe) mg   No No   Sig: Take 1 tablet (324 mg total) by mouth daily after lunch   Patient not taking: Reported on 2024   fluticasone (FLONASE) 50 mcg/act nasal spray   No No   Sig: SPRAY 1 SPRAY INTO EACH NOSTRIL EVERY DAY   ipratropium (ATROVENT) 0.06 % nasal spray   No No   Si SPRAYS INTO EACH NOSTRIL 3 (THREE) TIMES A DAY FOR INCREASED NASAL SECRETION   Patient not taking: Reported on 2024   ipratropium-albuterol (DUO-NEB) 0.5-2.5 mg/3 mL nebulizer solution   No No   Sig: TAKE 3 ML BY NEBULIZATION EVERY 8 (EIGHT) HOURS AS NEEDED FOR WHEEZING OR SHORTNESS OF BREATH   levothyroxine 75 mcg tablet   No No   Sig: Take 1 tablet (75 mcg total) by mouth daily in the early morning   montelukast (SINGULAIR) 10 mg tablet   No No   Sig: Take 1 tablet (10 mg total) by mouth daily at bedtime   ondansetron (ZOFRAN) 4 mg tablet   No No   Sig: Take 1 tablet (4 mg total) by mouth every 8 (eight) hours as needed for nausea or vomiting   Patient not taking: Reported on 2024   pantoprazole (PROTONIX) 40 mg tablet   No No   Sig: TAKE 1 TABLET BY MOUTH EVERY DAY   polyethylene glycol (GLYCOLAX) 17 GM/SCOOP powder   No No   Sig: DISSOLVE 17 GRAMS INTO WATER AND DRINK BY MOUTH EVERY DAY   Patient not taking: Reported on 2024   polyethylene glycol (GOLYTELY) 4000 mL solution   No No   Sig: Take 4,000 mL by mouth  once for 1 dose   sertraline (ZOLOFT) 25 mg tablet   No No   Sig: Take 1 tablet (25 mg total) by mouth daily at bedtime   sucralfate (CARAFATE) 1 g tablet   No No   Sig: Take 1 tablet (1 g total) by mouth 2 (two) times a day before meals for 14 days   timolol (TIMOPTIC) 0.5 % ophthalmic solution  Self Yes No   Sig: INSTILL 1 DROP INTO EACH EYE TWO TIMES A DAY   Patient not taking: Reported on 6/6/2024      Facility-Administered Medications: None       Past Medical History:   Diagnosis Date    Acute kidney failure (HCC)     Allergic     Allergies     Anemia     Anxiety     Asthma     Cancer (HCC)     Cataract     Chronic kidney disease (CKD), stage III (moderate) ([facility])     COPD (chronic obstructive pulmonary disease) ([facility])     COVID-19     Covid + 1-6-83-cough at that time now fully resolved    Depression     Disease of thyroid gland     Essential hypertension     GERD (gastroesophageal reflux disease)     Glaucoma     High blood pressure     HTN (hypertension) 02/16/2021    Hyperlipidemia     Hypothyroidism     Memory loss     Mesenteric lymphadenopathy 07/13/2021    Pulmonary hypertension (HCC)     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     OH TENDON SHEATH INCISION Right 02/16/2021    Procedure: THUMB TRIGGER FINGER RELEASE;  Surgeon: Angeles Denton MD;  Location: [facility];  Service: Orthopedics    TUBAL LIGATION         Family History   Problem Relation Age of Onset    Other Mother         respiratory disorder    Asthma Mother     COPD Mother     Depression Mother     Sudden death Father         shot himself    Suicidality Father     Brain cancer Sister     Diabetes Sister     Breast cancer Sister     Cancer Sister 62        pancreatic cancer    No  Known Problems Sister     No Known Problems Sister     No Known Problems Sister     No Known Problems Sister     No Known Problems Sister     No Known Problems Sister     No Known Problems Daughter     No Known Problems Daughter     No Known Problems Maternal Grandmother     No Known Problems Maternal Grandfather     No Known Problems Paternal Grandmother     No Known Problems Paternal Grandfather     No Known Problems Maternal Aunt     No Known Problems Maternal Aunt     No Known Problems Maternal Aunt     No Known Problems Maternal Aunt     No Known Problems Maternal Aunt     No Known Problems Paternal Aunt     No Known Problems Paternal Aunt     No Known Problems Paternal Aunt     No Known Problems Paternal Aunt     No Known Problems Son      I have reviewed and agree with the history as documented.    E-Cigarette/Vaping    E-Cigarette Use Never User      E-Cigarette/Vaping Substances    Nicotine No     THC No     CBD No      Social History     Tobacco Use    Smoking status: Former     Current packs/day: 0.00     Average packs/day: 1 pack/day for 40.0 years (40.0 ttl pk-yrs)     Types: Cigarettes     Start date: 1/1/1974     Quit date: 1/1/2014     Years since quitting: 10.4    Smokeless tobacco: Never   Vaping Use    Vaping status: Never Used   Substance Use Topics    Alcohol use: Never     Comment: None    Drug use: Never     Comment: Denies       Review of Systems   Constitutional:  Negative for activity change and diaphoresis.   HENT:  Negative for congestion, ear pain, mouth sores and sore throat.    Eyes:  Negative for pain, discharge and itching.   Respiratory:  Positive for cough, shortness of breath and wheezing.    Cardiovascular:  Negative for syncope.   Endocrine: Negative for cold intolerance and polyphagia.   Genitourinary:  Negative for difficulty urinating and dysuria.   Musculoskeletal:  Negative for arthralgias, joint swelling and neck pain.   Skin:  Negative for rash.   Allergic/Immunologic:  Negative for environmental allergies and food allergies.   Neurological:  Negative for dizziness, speech difficulty, light-headedness and headaches.   Hematological:  Negative for adenopathy.   Psychiatric/Behavioral:  Negative for agitation and confusion.        Physical Exam  Physical Exam  Vitals and nursing note reviewed.   Constitutional:       General: She is not in acute distress.     Appearance: She is well-developed.   HENT:      Head: Normocephalic and atraumatic.   Eyes:      Conjunctiva/sclera: Conjunctivae normal.   Cardiovascular:      Rate and Rhythm: Normal rate and regular rhythm.      Heart sounds: No murmur heard.  Pulmonary:      Effort: Pulmonary effort is normal. No respiratory distress.      Breath sounds: Examination of the right-upper field reveals wheezing. Examination of the left-upper field reveals wheezing. Examination of the right-middle field reveals wheezing. Examination of the left-middle field reveals wheezing. Examination of the right-lower field reveals wheezing. Examination of the left-lower field reveals wheezing. Wheezing present.   Chest:      Chest wall: No deformity or crepitus.   Abdominal:      Palpations: Abdomen is soft.      Tenderness: There is no abdominal tenderness.   Musculoskeletal:         General: No swelling. Normal range of motion.      Cervical back: Normal range of motion and neck supple.      Right lower leg: No tenderness. No edema.      Left lower leg: No edema.   Skin:     General: Skin is warm and dry.      Capillary Refill: Capillary refill takes less than 2 seconds.      Findings: No ecchymosis, erythema or rash.   Neurological:      General: No focal deficit present.      Mental Status: She is alert.   Psychiatric:         Mood and Affect: Mood normal.         Vital Signs  ED Triage Vitals   Temperature Pulse Respirations Blood Pressure SpO2   06/19/24 1707 06/19/24 1704 06/19/24 1704 06/19/24 1704 06/19/24 1704   99.4 °F (37.4 °C) 100 (!) 23 (!)  172/74 (S) (!) 84 %      Temp Source Heart Rate Source Patient Position - Orthostatic VS BP Location FiO2 (%)   06/19/24 1707 06/19/24 1704 -- -- --   Oral Monitor         Pain Score       --                  Vitals:    06/19/24 1704   BP: (!) 172/74   Pulse: 100         Visual Acuity      ED Medications  Medications   azithromycin (ZITHROMAX) 500 mg in sodium chloride 0.9% 250mL IVPB 500 mg (has no administration in time range)   ipratropium-albuterol (DUO-NEB) 0.5-2.5 mg/3 mL inhalation solution 3 mL (3 mL Nebulization Given 6/19/24 1814)   ipratropium-albuterol (DUO-NEB) 0.5-2.5 mg/3 mL inhalation solution 3 mL (3 mL Nebulization Given 6/19/24 1752)   methylPREDNISolone sodium succinate (Solu-MEDROL) injection 125 mg (125 mg Intravenous Given 6/19/24 1749)   ipratropium-albuterol (DUO-NEB) 0.5-2.5 mg/3 mL inhalation solution 3 mL (3 mL Nebulization Given 6/19/24 1840)   iohexol (OMNIPAQUE) 350 MG/ML injection (MULTI-DOSE) 85 mL (85 mL Intravenous Given 6/19/24 1932)   albuterol inhalation solution 2.5 mg (2.5 mg Nebulization Given 6/19/24 2010)   albuterol inhalation solution 2.5 mg (2.5 mg Nebulization Given 6/19/24 2043)       Diagnostic Studies  Results Reviewed       Procedure Component Value Units Date/Time    COVID/FLU/RSV [[id number]]  (Normal) Collected: 06/19/24 2010    Lab Status: Final result Specimen: Nares from Nose Updated: 06/19/24 2100     SARS-CoV-2 Negative     INFLUENZA A PCR Negative     INFLUENZA B PCR Negative     RSV PCR Negative    Narrative:      FOR PEDIATRIC PATIENTS - copy/paste COVID Guidelines URL to browser: [email or web address]    SARS-CoV-2 assay is a Nucleic Acid Amplification assay intended for the  qualitative detection of nucleic acid from SARS-CoV-2 in nasopharyngeal  swabs. Results are for the presumptive identification of SARS-CoV-2 RNA.    Positive results are indicative of infection with SARS-CoV-2, the virus  causing  COVID-19, but do not rule out bacterial infection or co-infection  with other viruses. Laboratories within the [place] and its  territories are required to report all positive results to the appropriate  public health authorities. Negative results do not preclude SARS-CoV-2  infection and should not be used as the sole basis for treatment or other  patient management decisions. Negative results must be combined with  clinical observations, patient history, and epidemiological information.  This test has not been FDA cleared or approved.    This test has been authorized by FDA under an Emergency Use Authorization  (EUA). This test is only authorized for the duration of time the  declaration that circumstances exist justifying the authorization of the  emergency use of an in vitro diagnostic tests for detection of SARS-CoV-2  virus and/or diagnosis of COVID-19 infection under section 564(b)(1) of  the Act, 21 U.S.C. 360bbb-3(b)(1), unless the authorization is terminated  or revoked sooner. The test has been validated but independent review by FDA  and CLIA is pending.    Test performed using [employer] GeneXpert: This RT-PCR assay targets N2,  a region unique to SARS-CoV-2. A conserved region in the E-gene was chosen  for pan-Sarbecovirus detection which includes SARS-CoV-2.    According to CMS-2020-01-R, this platform meets the definition of high-throughput technology.    CBC and differential [[id number]]  (Abnormal) Collected: 06/19/24 1748    Lab Status: Final result Specimen: Blood from Arm, Left Updated: 06/19/24 1827     WBC 7.46 Thousand/uL      RBC 3.91 Million/uL      Hemoglobin 10.6 g/dL      Hematocrit 36.1 %      MCV 92 fL      MCH 27.1 pg      MCHC 29.4 g/dL      RDW 13.1 %      MPV 10.0 fL      Platelets 137 Thousands/uL      nRBC 0 /100 WBCs      Segmented % 90 %      Immature Grans % 1 %      Lymphocytes % 4 %      Monocytes % 5 %      Eosinophils Relative 0 %      Basophils Relative 0 %       Absolute Neutrophils 6.73 Thousands/µL      Absolute Immature Grans 0.08 Thousand/uL      Absolute Lymphocytes 0.27 Thousands/µL      Absolute Monocytes 0.37 Thousand/µL      Eosinophils Absolute 0.00 Thousand/µL      Basophils Absolute 0.01 Thousands/µL     Narrative:      This is an appended report.  These results have been appended to a previously verified report.    Smear Review(Phlebs Do Not Order) [[id number]] Collected: 06/19/24 1748    Lab Status: Final result Specimen: Blood from Arm, Left Updated: 06/19/24 1827     RBC Morphology Present     Platelet Estimate Adequate     Basophilic Stippling Present    B-Type Natriuretic Peptide(BNP) [[id number]]  (Abnormal) Collected: 06/19/24 1748    Lab Status: Final result Specimen: Blood from Arm, Left Updated: 06/19/24 1824      pg/mL     High Sensitivity Troponin I Random [[id number]]  (Abnormal) Collected: 06/19/24 1748    Lab Status: Final result Specimen: Blood from Arm, Left Updated: 06/19/24 1823     HS TnI random 5 ng/L     Comprehensive metabolic panel [[id number]]  (Abnormal) Collected: 06/19/24 1748    Lab Status: Final result Specimen: Blood from Arm, Left Updated: 06/19/24 1818     Sodium 141 mmol/L      Potassium 4.5 mmol/L      Chloride 96 mmol/L      CO2 40 mmol/L      ANION GAP 5 mmol/L      BUN 18 mg/dL      Creatinine 0.98 mg/dL      Glucose 129 mg/dL      Calcium 9.3 mg/dL      AST 21 U/L      ALT 12 U/L      Alkaline Phosphatase 126 U/L      Total Protein 7.2 g/dL      Albumin 4.4 g/dL      Total Bilirubin 0.42 mg/dL      eGFR 59 ml/min/1.73sq m     Narrative:      National Kidney Disease Foundation guidelines for Chronic Kidney Disease (CKD):     Stage 1 with normal or high GFR (GFR > 90 mL/min/1.73 square meters)    Stage 2 Mild CKD (GFR = 60-89 mL/min/1.73 square meters)    Stage 3A Moderate CKD (GFR = 45-59 mL/min/1.73 square meters)    Stage 3B Moderate CKD (GFR = 30-44 mL/min/1.73 square meters)    Stage 4 Severe CKD (GFR = 15-29  mL/min/1.73 square meters)    Stage 5 End Stage CKD (GFR <15 mL/min/1.73 square meters)  Note: GFR calculation is accurate only with a steady state creatinine    Blood gas, venous [[id number]]  (Abnormal) Collected: 06/19/24 1802    Lab Status: Final result Specimen: Blood from Arm, Left Updated: 06/19/24 1815     pH, Jonas 7.220     pCO2, Jonas 99.1 mm Hg      pO2, Jonas 42.2 mm Hg      HCO3, Jonas 39.6 mmol/L      Base Excess, Jonas 8.7 mmol/L      O2 Content, Jonas 12.1 ml/dL      O2 HGB, VENOUS 73.6 %                    CTA ED chest PE Study   Final Result by Pedrito Kaur MD (06/19 2055)      No pulmonary embolism.      A groundglass nodule in the lateral right lower lobe measures up to 1.6 cm, unchanged from most recent March 2024 comparison, however increased from December 2022 exam.   A slow-growing/adenocarcinoma spectrum lesion is not entirely excluded.      New focus of tree-in-bud nodularity in the posterior right lower lobe is favored to represent an infectious/inflammatory infiltrate.      Overall interval increase in mediastinal/hilar nodes as described.      Continued follow-up is recommended.                           Workstation performed: [id number]         XR chest 1 view portable    (Results Pending)              Procedures  Procedures         ED Course             69-year-old female presents with wheezing and shortness of breath.  Several DuoNebs and steroids were administered.  Patient felt slightly better but still wheezing.  CT chest obtained.  No PE on imaging.  Will admit the patient for further management and care. Care accepted by Dr. Caceres                                   Medical Decision Making  69-year-old female presents with wheezing and shortness of breath.  Several DuoNebs and steroids were administered.  Patient felt slightly better but still wheezing.  CT chest obtained.  No PE on imaging.  Will admit the patient for further management and care. Care accepted by Dr. Caceres      Amount and/or Complexity of Data Reviewed  External Data Reviewed: labs and radiology.  Labs: ordered.  Radiology: ordered.    Risk  Prescription drug management.  Decision regarding hospitalization.             Disposition  Final diagnoses:   Wheezing   COPD exacerbation (HCC)     Time reflects when diagnosis was documented in both MDM as applicable and the Disposition within this note       Time User Action Codes Description Comment    6/19/2024  9:23 PM Bruno Nguyen [R06.2] Wheezing     6/19/2024  9:23 PM Bruno Nguyen [J44.1] COPD exacerbation (HCC)           ED Disposition       ED Disposition   Admit    Condition   Stable    Date/Time   Wed Jun 19, 2024  9:22 PM    Comment   Case was discussed with Dr. Caceres and the patient's admission status was agreed to be Admission Status: inpatient status to the service of Dr. Caceres .               Follow-up Information    None         Patient's Medications   Discharge Prescriptions    No medications on file       No discharge procedures on file.    PDMP Review         Value Time User    PDMP Reviewed  Yes 7/11/2023 10:38 AM Samantha Hernandez MD            ED Provider  Electronically Signed by             Bruno Nguyen DO  06/19/24 0070

## 2024-06-20 NOTE — H&P
[facility]  H&P  Name: Noreen Alvarez 69 y.o. female I MRN: [id number]  Unit/Bed#: -01 I Date of Admission: 6/19/2024   Date of Service: 6/20/2024 I Hospital Day: 1      Assessment & Plan   * Acute on chronic respiratory failure with hypoxia and hypercapnia (HCC)  Assessment & Plan  Patient has a history of RHETT-3 lobular emphysema, obstructive sleep apnea and multiple lung nodules.  She does wear oxygen 2 L nasal cannula at baseline.  Reports the last 48 hours she has become extremely short of breath and finds difficulty breathing  Noted on vital signs review patient was hypoxic and required an increment of supplemental oxygen, on assessment the patient is back to baseline after nebulizers and steroids, the patient is in no acute distress.  Venous blood gas shows hypercapnia.  The patient states she does not wear CPAP at home and also she does not have one prescribed  Respiratory panel is negative  CTA ED chest PE study shows negative for pulmonary emboli, groundglass nodule in the lateral right lower lobe measures up to 1.6 cm this nodule has been unchanged from her most recent CAT scan on March 2024, there is a slow-growing adenocarcinoma spectrum lesion is not entirely excluded.  New focus on tree-in-bud nodularity in the posterior right lower lobe is favored to represent an infections or inflammatory infiltrate and increase in mediastinal/hilar nodes infiltrate.  Continue with albuterol, Solu-Medrol every 8 hours.  Azithromycin first dose was given in the ER, continue with azithromycin on the floors  Monitor O2 sats per unit protocol or when needed  The patient follows with [facility] pulmonologist last visit was June 6, 2024.  Per Dr. Hastings note the patient presented on that day with severe dyspnea on exertion.  Sputum culture ordered  Respiratory and airway protocol in place    Centrilobular emphysema (HCC)  Assessment & Plan  Patient reports dyspnea on exertion and at rest has  become worse in the last couple of weeks.  Patient worse 2 L nasal cannula at baseline  She does follow with pulmonology closely last visit found on chart 6/6/2024  On Symbicort, montelukast, Proventil and albuterol as needed, continue with regimen  Refer to the plan above    Chronic kidney disease, stage 3 (moderate)  Assessment & Plan  Lab Results   Component Value Date    EGFR 59 06/19/2024    EGFR 66 03/05/2024    EGFR 67 03/04/2024    CREATININE 0.98 06/19/2024    CREATININE 0.89 03/05/2024    CREATININE 0.88 03/04/2024   Baseline creatinine 0.80-1.0  Avoid nephrotoxic medications, NSAIDs and contrast dye if possible  Monitor kidney function with daily BMPs    Hyperlipidemia  Assessment & Plan  Last lipid panel 3 months ago showing evaded LDL  On Lipitor 40 mg daily, continue with regimen    Class 1 obesity due to excess calories with serious comorbidity and body mass index (BMI) of 32.0 to 32.9 in adult  Assessment & Plan  BMI 30.90 kg  Education on the importance of healthy eating habits and exercising daily was provided at bedside greater than 3 minutes    Depression with anxiety  Assessment & Plan  On Zoloft, continue regimen  Stable for now per patient report           VTE Pharmacologic Prophylaxis: VTE Score: 7 High Risk (Score >/= 5) - Pharmacological DVT Prophylaxis Ordered: enoxaparin (Lovenox). Sequential Compression Devices Ordered.  Code Status: Level 1 - Full Code   Discussion with family:  Daughter Ashley was attempted 3 times at bedside through the patient's phone, unfortunately unable to reach.     Anticipated Length of Stay: Patient will be admitted on an inpatient basis with an anticipated length of stay of greater than 2 midnights secondary to acute on chronic respiratory failure with hypoxia and hypercapnia.    Total Time Spent on Date of Encounter in care of patient: 75 mins. This time was spent on one or more of the following: performing physical exam; counseling and coordination of care;  obtaining or reviewing history; documenting in the medical record; reviewing/ordering tests, medications or procedures; communicating with other healthcare professionals and discussing with patient's family/caregivers.    Chief Complaint: Shortness of breath and chest tightness    History of Present Illness:  Noreen Alvarez is a 69 y.o. female with a PMH of any contacts, loss of hearing, personal history of radiation therapy, cataracts, headaches, COVID-19, CKD, mediastinal lymphadenopathy, hyperlipidemia and centrilobular emphysema who presents with shortness of breath.  Patient reports at bedside that her signs and symptoms started 48 hours ago where her shortness of breath became worse and chest tightness.  The patient has a history of centrilobular emphysema and is currently followed by pulmonologist closely, she wears 2 L of oxygen at home and she reports the last 48 hours she feels like she cannot catch her breath at rest and on exertion.  CT scan shows no pulmonary emboli, a groundglass nodule in the lateral right lower lobe that is unchanged for her from her recent March 2024 CT scan, but he has increased from December 2022.  CTA PE study also shows a slow-growing/adenocarcinoma spectrum lesion is not entirely excluded.  Blood work shows CO2 of 99.1, pH 7.22, elevated BNP, respiratory panel is negative, EKG shows normal sinus rhythm with a heart rate in the 90s.  Nebulizing treatments and steroids administered in the ER, patient reports intervention has been effective but still feels some short of breath.  Azithromycin first dose was given in the ER, continue on the floors.  The patient reports shortness of breath on exertion and at rest, she denies nausea, vomiting, chills, fever, change in mental status, or any other symptoms than the stated above.    Review of Systems:  Review of Systems   Constitutional:  Negative for chills and fever.   HENT:  Negative for ear pain and sore throat.    Eyes:  Negative for  pain and visual disturbance.   Respiratory:  Positive for chest tightness and shortness of breath. Negative for cough.    Cardiovascular:  Negative for chest pain and palpitations.   Gastrointestinal:  Negative for abdominal pain and vomiting.   Genitourinary:  Negative for dysuria and hematuria.   Musculoskeletal:  Negative for arthralgias and back pain.   Skin:  Negative for color change and rash.   Neurological:  Negative for seizures and syncope.   All other systems reviewed and are negative.      Past Medical and Surgical History:   Past Medical History:   Diagnosis Date    Acute kidney failure (HCC)     Allergic     Allergies     Anemia     Anxiety     Asthma     Cancer (HCC)     Cataract     Chronic kidney disease (CKD), stage III (moderate) (HCC)     COPD (chronic obstructive pulmonary disease) (HCC)     COVID-19     Covid + 3-9-73-cough at that time now fully resolved    Depression     Disease of thyroid gland     Essential hypertension     GERD (gastroesophageal reflux disease)     Glaucoma     High blood pressure     HTN (hypertension) 02/16/2021    Hyperlipidemia     Hypothyroidism     Memory loss     Mesenteric lymphadenopathy 07/13/2021    Pulmonary hypertension (HCC)     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     KS TENDON SHEATH INCISION Right 02/16/2021    Procedure: THUMB TRIGGER FINGER RELEASE;  Surgeon: Angeles Denton MD;  Location: [facility]  MAIN [facility];  Service: Orthopedics    TUBAL LIGATION         Meds/Allergies:  Prior to Admission medications    Medication Sig Start Date End Date Taking? Authorizing Provider   albuterol (PROVENTIL HFA,VENTOLIN HFA) 90 mcg/act inhaler Inhale 2 puffs every 6 (six) hours as needed for wheezing or shortness  of breath 1/30/24   Samantha Hernandez MD   atorvastatin (LIPITOR) 40 mg tablet Take 1 tablet (40 mg total) by mouth daily with dinner 4/16/24 4/11/25  Samantha Hernandez MD   azithromycin (ZITHROMAX) 250 mg tablet Take 1 tablet (250 mg total) by mouth 3 (three) times a week for 78 doses 6/12/24 12/10/24  Wade Hastings MD   baclofen 10 mg tablet TAKE 1 TABLET (10 MG TOTAL) BY MOUTH DAILY AT BEDTIME AS NEEDED FOR MUSCLE SPASMS  Patient not taking: Reported on 6/6/2024 5/13/24   Samantha Hernandez MD   benzonatate (TESSALON PERLES) 100 mg capsule Take 1 capsule (100 mg total) by mouth 3 (three) times a day 1/29/24   Mya Rivera DO   bisacodyl (DULCOLAX) 5 mg EC tablet Take 4 tablets (20 mg total) by mouth 1 (one) time for 1 dose Take as instructed by office for bowel prep. 4/1/24 4/1/24  Brooke Craft PA-C   brimonidine tartrate 0.2 % ophthalmic solution  12/23/21   Historical Provider, MD   Budeson-Glycopyrrol-Formoterol (Breztri Aerosphere) 160-9-4.8 MCG/ACT AERO INHALE 2 PUFFS BY MOUTH 2 TIMES A DAY RINSE MOUTH AFTER USE. 6/12/24   Wade Hastings MD   Cholecalciferol (VITAMIN D3) 1,000 units tablet Take 1,000 Units by mouth daily    Historical Provider, MD   clotrimazole-betamethasone (LOTRISONE) 1-0.05 % cream Apply topically 2 (two) times a day  Patient not taking: Reported on 6/6/2024 4/2/24   Samantha Hernandez MD   cyanocobalamin (VITAMIN B-12) 500 MCG tablet Take 1 tablet (500 mcg total) by mouth daily 6/12/24   Samantha Hernandez MD   Diclofenac Sodium (VOLTAREN) 1 % APPLY 2 GRAMS TO AFFECTED AREA 4 TIMES A DAY  Patient not taking: Reported on 6/6/2024 2/13/24   Samantha Hernandez MD   docusate sodium (COLACE) 100 mg capsule Take 1 capsule (100 mg total) by mouth 2 (two) times a day as needed for constipation Hold for lose stool  Patient not taking: Reported on 6/6/2024 4/2/24   Samantha Hernandez MD   ferrous sulfate 324 (65 Fe) mg Take 1 tablet (324 mg total) by mouth daily after lunch  Patient not taking: Reported on  6/6/2024 2/2/24   Samantha Hernandez MD   fluticasone (FLONASE) 50 mcg/act nasal spray SPRAY 1 SPRAY INTO EACH NOSTRIL EVERY DAY 3/21/24   Samantha Hernandez MD   ipratropium (ATROVENT) 0.06 % nasal spray 2 SPRAYS INTO EACH NOSTRIL 3 (THREE) TIMES A DAY FOR INCREASED NASAL SECRETION  Patient not taking: Reported on 6/6/2024 2/13/24   Josephine Anderson MD   ipratropium-albuterol (DUO-NEB) 0.5-2.5 mg/3 mL nebulizer solution TAKE 3 ML BY NEBULIZATION EVERY 8 (EIGHT) HOURS AS NEEDED FOR WHEEZING OR SHORTNESS OF BREATH 5/22/24   Samantha Hernandez MD   levothyroxine 75 mcg tablet Take 1 tablet (75 mcg total) by mouth daily in the early morning 6/12/24   Bettina Stroud MD   montelukast (SINGULAIR) 10 mg tablet Take 1 tablet (10 mg total) by mouth daily at bedtime 1/30/24   Samantha Hernandez MD   ondansetron (ZOFRAN) 4 mg tablet Take 1 tablet (4 mg total) by mouth every 8 (eight) hours as needed for nausea or vomiting  Patient not taking: Reported on 6/6/2024 4/2/24   Samantha Hernandez MD   pantoprazole (PROTONIX) 40 mg tablet TAKE 1 TABLET BY MOUTH EVERY DAY 6/12/24   Samantha Hernandez MD   polyethylene glycol (GLYCOLAX) 17 GM/SCOOP powder DISSOLVE 17 GRAMS INTO WATER AND DRINK BY MOUTH EVERY DAY  Patient not taking: Reported on 6/6/2024 2/13/24   Samantha Hernandez MD   polyethylene glycol (GOLYTELY) 4000 mL solution Take 4,000 mL by mouth once for 1 dose 4/1/24 4/1/24  Brooke Craft PA-C   sertraline (ZOLOFT) 25 mg tablet Take 1 tablet (25 mg total) by mouth daily at bedtime 1/30/24 7/28/24  Samantha Hernandez MD   sucralfate (CARAFATE) 1 g tablet Take 1 tablet (1 g total) by mouth 2 (two) times a day before meals for 14 days 3/5/24 3/19/24  Alka Caceres DO   timolol (TIMOPTIC) 0.5 % ophthalmic solution INSTILL 1 DROP INTO EACH EYE TWO TIMES A DAY  Patient not taking: Reported on 6/6/2024 3/23/22   Historical Provider, MD     I have reviewed home medications with patient personally.  At bedside in Kinyarwanda language.    Allergies:   Allergies    Allergen Reactions    Cefdinir Hives    Amifostine Rash    Pollen Extract Allergic Rhinitis       Social History:  Marital Status: /Civil Union   Occupation: Retired  Patient Pre-hospital Living Situation: Home  Patient Pre-hospital Level of Mobility: walks  Patient Pre-hospital Diet Restrictions: None  Substance Use History:   Social History     Substance and Sexual Activity   Alcohol Use Never    Comment: None     Social History     Tobacco Use   Smoking Status Former    Current packs/day: 0.00    Average packs/day: 1 pack/day for 40.0 years (40.0 ttl pk-yrs)    Types: Cigarettes    Start date: 1/1/1974    Quit date: 1/1/2014    Years since quitting: 10.4   Smokeless Tobacco Never     Social History     Substance and Sexual Activity   Drug Use Never    Comment: Denies       Family History:  Family History   Problem Relation Age of Onset    Other Mother         respiratory disorder    Asthma Mother     COPD Mother     Depression Mother     Sudden death Father         shot himself    Suicidality Father     Brain cancer Sister     Diabetes Sister     Breast cancer Sister     Cancer Sister 62        pancreatic cancer    No Known Problems Sister     No Known Problems Sister     No Known Problems Sister     No Known Problems Sister     No Known Problems Sister     No Known Problems Sister     No Known Problems Daughter     No Known Problems Daughter     No Known Problems Maternal Grandmother     No Known Problems Maternal Grandfather     No Known Problems Paternal Grandmother     No Known Problems Paternal Grandfather     No Known Problems Maternal Aunt     No Known Problems Maternal Aunt     No Known Problems Maternal Aunt     No Known Problems Maternal Aunt     No Known Problems Maternal Aunt     No Known Problems Paternal Aunt     No Known Problems Paternal Aunt     No Known Problems Paternal Aunt     No Known Problems Paternal Aunt     No Known Problems Son        Physical Exam:     Vitals:   Blood  Pressure: (!) 187/80 (06/19/24 2330)  Pulse: 93 (06/19/24 2330)  Temperature: 98.6 °F (37 °C) (06/19/24 2330)  Temp Source: Oral (06/19/24 2330)  Respirations: 20 (06/19/24 2330)  SpO2: 91 % (06/19/24 2330)    Physical Exam  Vitals and nursing note reviewed.   Constitutional:       General: She is not in acute distress.     Appearance: Normal appearance. She is well-developed. She is obese.   HENT:      Head: Normocephalic and atraumatic.      Mouth/Throat:      Mouth: Mucous membranes are moist.      Pharynx: Oropharynx is clear.   Eyes:      Conjunctiva/sclera: Conjunctivae normal.      Pupils: Pupils are equal, round, and reactive to light.   Cardiovascular:      Rate and Rhythm: Normal rate and regular rhythm.      Heart sounds: No murmur heard.  Pulmonary:      Effort: Pulmonary effort is normal.      Breath sounds: Wheezing present.   Abdominal:      General: Bowel sounds are normal.      Palpations: Abdomen is soft.      Tenderness: There is no abdominal tenderness.   Musculoskeletal:         General: No swelling.      Cervical back: Neck supple.   Skin:     General: Skin is warm and dry.      Capillary Refill: Capillary refill takes less than 2 seconds.   Neurological:      General: No focal deficit present.      Mental Status: She is alert and oriented to person, place, and time. Mental status is at baseline.   Psychiatric:         Mood and Affect: Mood normal.          Additional Data:     Lab Results:  Results from last 7 days   Lab Units 06/19/24  1748   WBC Thousand/uL 7.46   HEMOGLOBIN g/dL 10.6*   HEMATOCRIT % 36.1   PLATELETS Thousands/uL 137*   SEGS PCT % 90*   LYMPHO PCT % 4*   MONO PCT % 5   EOS PCT % 0     Results from last 7 days   Lab Units 06/19/24  1748   SODIUM mmol/L 141   POTASSIUM mmol/L 4.5   CHLORIDE mmol/L 96   CO2 mmol/L 40*   BUN mg/dL 18   CREATININE mg/dL 0.98   ANION GAP mmol/L 5   CALCIUM mg/dL 9.3   ALBUMIN g/dL 4.4   TOTAL BILIRUBIN mg/dL 0.42   ALK PHOS U/L 126*   ALT U/L 12    AST U/L 21   GLUCOSE RANDOM mg/dL 129             Lab Results   Component Value Date    HGBA1C 5.9 (H) 03/01/2024           Lines/Drains:  Invasive Devices       Peripheral Intravenous Line  Duration             Peripheral IV 06/19/24 Left;Proximal;Ventral (anterior) Forearm <1 day                        Imaging: I have personally review all imaging pertaining this admission.  CTA ED chest PE Study   Final Result by Pedrito Kaur MD (06/19 2055)      No pulmonary embolism.      A groundglass nodule in the lateral right lower lobe measures up to 1.6 cm, unchanged from most recent March 2024 comparison, however increased from December 2022 exam.   A slow-growing/adenocarcinoma spectrum lesion is not entirely excluded.      New focus of tree-in-bud nodularity in the posterior right lower lobe is favored to represent an infectious/inflammatory infiltrate.      Overall interval increase in mediastinal/hilar nodes as described.      Continued follow-up is recommended.                           Workstation performed: [id number]         XR chest 1 view portable    (Results Pending)       EKG and Other Studies Reviewed on Admission:   EKG: NSR. HR 94.    ** Please Note: This note has been constructed using a voice recognition system. **

## 2024-06-20 NOTE — ASSESSMENT & PLAN NOTE
Lab Results   Component Value Date    EGFR 59 06/19/2024    EGFR 66 03/05/2024    EGFR 67 03/04/2024    CREATININE 0.98 06/19/2024    CREATININE 0.89 03/05/2024    CREATININE 0.88 03/04/2024   Baseline creatinine 0.80-1.0  Avoid nephrotoxic medications, NSAIDs and contrast dye if possible  Monitor kidney function with daily BMPs

## 2024-06-20 NOTE — PROGRESS NOTES
[facility]  Progress Note  Name: Noreen Alvarez I  MRN: [id number]  Unit/Bed#: -01 I Date of Admission: 6/19/2024   Date of Service: 6/20/2024 I Hospital Day: 1    Assessment & Plan   * Acute on chronic respiratory failure with hypoxia and hypercapnia (HCC)  Assessment & Plan  Patient has a history of RHETT-3 lobular emphysema, obstructive sleep apnea and multiple lung nodules.  She does wear oxygen 2 L nasal cannula at baseline.  Reports the last 48 hours she has become extremely short of breath and finds difficulty breathing  Noted on vital signs review patient was hypoxic and required an increment of supplemental oxygen, on assessment the patient is back to baseline after nebulizers and steroids, the patient is in no acute distress.  Venous blood gas shows hypercapnia.  The patient states she does not wear CPAP at home and also she does not have one prescribed  Respiratory panel is negative  CTA ED chest PE study shows negative for pulmonary emboli, groundglass nodule in the lateral right lower lobe measures up to 1.6 cm this nodule has been unchanged from her most recent CAT scan on March 2024, there is a slow-growing adenocarcinoma spectrum lesion is not entirely excluded.  New focus on tree-in-bud nodularity in the posterior right lower lobe is favored to represent an infections or inflammatory infiltrate and increase in mediastinal/hilar nodes infiltrate.  Continue with albuterol, Solu-Medrol every 8 hours.  Azithromycin first dose was given in the ER, continue with azithromycin on the floors  Monitor O2 sats per unit protocol or when needed  The patient follows with [facility] pulmonologist last visit was June 6, 2024.  Per Dr. Hastings note the patient presented on that day with severe dyspnea on exertion.  Repeat VBG showed acute on chronic hypercapnic respiratory failure however patient does not have increased work of breathing, persistent hypoxia or altered mentation.   Discussed with pulmonology.  Will hold off on BiPAP for now unless patient has increased work of breathing, worsening hypoxia or altered mentation.  Continue to wean oxygen as tolerated.  Of note patient is on 2 L of supplemental oxygen at baseline.  Respiratory and airway protocol in place    COPD with acute exacerbation (HCC)  Assessment & Plan  Has severe emphysema with acute exacerbation likely in the setting of recent viral URI.  Admits to sick family contacts.  COVID RSV and influenza panel was negative.  Follow-up on RP-2 panel.  Continue with IV Solu-Medrol 40 mg every 8 hourly.  Continue with azithromycin daily to complete total 5 days of treatment.  Procalcitonin negative.  CT chest reviewed and noted.  Continue with scheduled nebulization treatment.  Continue Symbicort.  Pulmonology input appreciated.    Class 1 obesity due to excess calories with serious comorbidity and body mass index (BMI) of 32.0 to 32.9 in adult  Assessment & Plan  BMI 30.90 kg  Education on the importance of healthy eating habits and exercising daily was provided at bedside greater than 3 minutes    Hyperlipidemia  Assessment & Plan  Last lipid panel 3 months ago showing evaded LDL  On Lipitor 40 mg daily, continue with regimen    HTN (hypertension)  Assessment & Plan  Blood pressure elevated on admission.  Started on Norvasc.  May need titration of dose further based on blood pressure control.    Depression with anxiety  Assessment & Plan  On Zoloft, continue regimen  Stable for now per patient report    Centrilobular emphysema (HCC)  Assessment & Plan  Patient reports dyspnea on exertion and at rest has become worse in the last couple of weeks.  Patient worse 2 L nasal cannula at baseline  She does follow with pulmonology closely last visit found on chart 6/6/2024  FEV1 of 27% per pulmonology note.  On Symbicort, montelukast, Proventil and albuterol as needed, continue with regimen  Patient can follow-up as an outpatient for  possible chronic azithromycin versus Daliresp versus lung volume reduction considerations upon discharge    Chronic kidney disease, stage 3 (moderate)  Assessment & Plan  Lab Results   Component Value Date    EGFR 59 06/19/2024    EGFR 66 03/05/2024    EGFR 67 03/04/2024    CREATININE 0.98 06/19/2024    CREATININE 0.89 03/05/2024    CREATININE 0.88 03/04/2024   Baseline creatinine 0.80-1.0  Avoid nephrotoxic medications, NSAIDs and contrast dye if possible  Monitor kidney function with daily BMPs               VTE Pharmacologic Prophylaxis: VTE Score: 7 High Risk (Score >/= 5) - Pharmacological DVT Prophylaxis Ordered: enoxaparin (Lovenox). Sequential Compression Devices Ordered.    Mobility:   Basic Mobility Inpatient Raw Score: 23  JH-HLM Goal: 7: Walk 25 feet or more  JH-HLM Achieved: 7: Walk 25 feet or more  JH-HLM Goal achieved. Continue to encourage appropriate mobility.    Patient Centered Rounds: I performed bedside rounds with nursing staff today.   Discussions with Specialists or Other Care Team Provider: Discussed with pulmonology    Education and Discussions with Family / Patient: Updated  (daughter) via phone.    Total Time Spent on Date of Encounter in care of patient: 20 mins. This time was spent on one or more of the following: performing physical exam; counseling and coordination of care; obtaining or reviewing history; documenting in the medical record; reviewing/ordering tests, medications or procedures; communicating with other healthcare professionals and discussing with patient's family/caregivers.    Current Length of Stay: 1 day(s)  Current Patient Status: Inpatient   Certification Statement: The patient will continue to require additional inpatient hospital stay due to ongoing management of COPD exacerbation  Discharge Plan: Anticipate discharge in 24-48 hrs to home.    Code Status: Level 1 - Full Code    Subjective:   Patient predominantly Indonesian-speaking.  Translation  provided by nurse at bedside continues to complain of cough which is nonproductive.  Admits to shortness of breath with prolonged coughing.  Denies any chest discomfort.  Admits to having sick contacts at home.    Objective:     Vitals:   Temp (24hrs), Av.7 °F (37.1 °C), Min:98 °F (36.7 °C), Max:99.4 °F (37.4 °C)    Temp:  [98 °F (36.7 °C)-99.4 °F (37.4 °C)] 98 °F (36.7 °C)  HR:  [] 96  Resp:  [17-23] 17  BP: (161-187)/(74-93) 161/93  SpO2:  [84 %-100 %] 100 %  Body mass index is 30.23 kg/m².     Input and Output Summary (last 24 hours):     Intake/Output Summary (Last 24 hours) at 2024 1349  Last data filed at 2024 0814  Gross per 24 hour   Intake 970 ml   Output 600 ml   Net 370 ml       Physical Exam:   Physical Exam  Constitutional:       General: She is not in acute distress.     Appearance: She is obese. She is ill-appearing.   HENT:      Head: Normocephalic.      Nose: Rhinorrhea present.   Eyes:      Extraocular Movements: Extraocular movements intact.      Pupils: Pupils are equal, round, and reactive to light.   Cardiovascular:      Rate and Rhythm: Normal rate and regular rhythm.   Pulmonary:      Effort: Pulmonary effort is normal.      Breath sounds: Normal breath sounds.      Comments: No tachypnea or increased work of breathing.  Markedly diminished breath sounds bilaterally with scattered expiratory wheezing.  Abdominal:      General: Abdomen is flat. Bowel sounds are normal.      Palpations: Abdomen is soft.   Musculoskeletal:      Cervical back: Neck supple.      Right lower leg: No edema.      Left lower leg: No edema.   Neurological:      General: No focal deficit present.      Mental Status: She is oriented to person, place, and time. Mental status is at baseline.          Additional Data:     Labs:  Results from last 7 days   Lab Units 24  1748   WBC Thousand/uL 7.46   HEMOGLOBIN g/dL 10.6*   HEMATOCRIT % 36.1   PLATELETS Thousands/uL 137*   SEGS PCT % 90*   LYMPHO PCT  % 4*   MONO PCT % 5   EOS PCT % 0     Results from last 7 days   Lab Units 06/19/24  1748   SODIUM mmol/L 141   POTASSIUM mmol/L 4.5   CHLORIDE mmol/L 96   CO2 mmol/L 40*   BUN mg/dL 18   CREATININE mg/dL 0.98   ANION GAP mmol/L 5   CALCIUM mg/dL 9.3   ALBUMIN g/dL 4.4   TOTAL BILIRUBIN mg/dL 0.42   ALK PHOS U/L 126*   ALT U/L 12   AST U/L 21   GLUCOSE RANDOM mg/dL 129                 Results from last 7 days   Lab Units 06/19/24  1748   PROCALCITONIN ng/ml <0.05       Lines/Drains:  Invasive Devices       Peripheral Intravenous Line  Duration             Peripheral IV 06/19/24 Left;Proximal;Ventral (anterior) Forearm <1 day                          Imaging: Reviewed radiology reports from this admission including: chest CT scan    Recent Cultures (last 7 days):         Last 24 Hours Medication List:   Current Facility-Administered Medications   Medication Dose Route Frequency Provider Last Rate    acetaminophen  650 mg Oral Q6H PRN GERTRUDIS Yañez      albuterol  2 puff Inhalation Q6H PRN GERTRUDIS Yañez      aluminum-magnesium hydroxide-simethicone  30 mL Oral Q6H PRN GERTRUDIS Yañez      amLODIPine  2.5 mg Oral Daily Nallely Carney MD      atorvastatin  40 mg Oral Daily With Dinner GERTRUDIS Yañez      azithromycin  500 mg Oral Q24H Nallely Carney MD      benzonatate  100 mg Oral TID GERTRUDIS Yañez      budesonide-formoterol  2 puff Inhalation BID GERTRUDIS Yañez      docusate sodium  100 mg Oral BID GERTRUDIS Yañez      enoxaparin  40 mg Subcutaneous Daily GERTRUDIS Yañez      fluticasone  2 spray Each Nare Daily GERTRUDIS Yañez      guaiFENesin  600 mg Oral BID GERTRUDIS Yañez      ipratropium  0.5 mg Nebulization TID GERTRUDIS Yañez      levalbuterol  1.25 mg Nebulization TID Tavon Werner MD      levothyroxine  75 mcg Oral Early Morning GERTRUDIS Yañez      methylPREDNISolone sodium succinate  40 mg Intravenous Q8H GERTRUDIS Yañez      montelukast  10 mg Oral  HS GERTRUDIS Yañez      ondansetron  4 mg Intravenous Q6H PRN GERTRUDIS Yañez      sertraline  25 mg Oral HS GERTRUDIS Yañez          Today, Patient Was Seen By: Nallely Carney MD    **Please Note: This note may have been constructed using a voice recognition system.**

## 2024-06-20 NOTE — PLAN OF CARE
Problem: PAIN - ADULT  Goal: Verbalizes/displays adequate comfort level or baseline comfort level  Description: Interventions:  - Encourage patient to monitor pain and request assistance  - Assess pain using appropriate pain scale  - Administer analgesics based on type and severity of pain and evaluate response  - Implement non-pharmacological measures as appropriate and evaluate response  - Consider cultural and social influences on pain and pain management  - Notify physician/advanced practitioner if interventions unsuccessful or patient reports new pain  Outcome: Progressing     Problem: INFECTION - ADULT  Goal: Absence or prevention of progression during hospitalization  Description: INTERVENTIONS:  - Assess and monitor for signs and symptoms of infection  - Monitor lab/diagnostic results  - Monitor all insertion sites, i.e. indwelling lines, tubes, and drains  - Monitor endotracheal if appropriate and nasal secretions for changes in amount and color  - [place] appropriate cooling/warming therapies per order  - Administer medications as ordered  - Instruct and encourage patient and family to use good hand hygiene technique  - Identify and instruct in appropriate isolation precautions for identified infection/condition  Outcome: Progressing  Goal: Absence of fever/infection during neutropenic period  Description: INTERVENTIONS:  - Monitor WBC    Outcome: Progressing     Problem: SAFETY ADULT  Goal: Patient will remain free of falls  Description: INTERVENTIONS:  - Educate patient/family on patient safety including physical limitations  - Instruct patient to call for assistance with activity   - Consult OT/PT to assist with strengthening/mobility   - Keep Call bell within reach  - Keep bed low and locked with side rails adjusted as appropriate  - Keep care items and personal belongings within reach  - Initiate and maintain comfort rounds  - Make Fall Risk Sign visible to staff  - Offer Toileting every ***  Hours, in advance of need  - Initiate/Maintain ***alarm  - Obtain necessary fall risk management equipment: ***  - Apply yellow socks and bracelet for high fall risk patients  - Consider moving patient to room near nurses station  Outcome: Progressing  Goal: Maintain or return to baseline ADL function  Description: INTERVENTIONS:  -  Assess patient's ability to carry out ADLs; assess patient's baseline for ADL function and identify physical deficits which impact ability to perform ADLs (bathing, care of mouth/teeth, toileting, grooming, dressing, etc.)  - Assess/evaluate cause of self-care deficits   - Assess range of motion  - Assess patient's mobility; develop plan if impaired  - Assess patient's need for assistive devices and provide as appropriate  - Encourage maximum independence but intervene and supervise when necessary  - Involve family in performance of ADLs  - Assess for home care needs following discharge   - Consider OT consult to assist with ADL evaluation and planning for discharge  - Provide patient education as appropriate  Outcome: Progressing  Goal: Maintains/Returns to pre admission functional level  Description: INTERVENTIONS:  - Perform AM-PAC 6 Click Basic Mobility/ Daily Activity assessment daily.  - Set and communicate daily mobility goal to care team and patient/family/caregiver.   - Collaborate with rehabilitation services on mobility goals if consulted  - Perform Range of Motion *** times a day.  - Reposition patient every *** hours.  - Dangle patient *** times a day  - Stand patient *** times a day  - Ambulate patient *** times a day  - Out of bed to chair *** times a day   - Out of bed for meals *** times a day  - Out of bed for toileting  - Record patient progress and toleration of activity level   Outcome: Progressing     Problem: DISCHARGE PLANNING  Goal: Discharge to home or other facility with appropriate resources  Description: INTERVENTIONS:  - Identify barriers to discharge  w/patient and caregiver  - Arrange for needed discharge resources and transportation as appropriate  - Identify discharge learning needs (meds, wound care, etc.)  - Arrange for interpretive services to assist at discharge as needed  - Refer to Case Management Department for coordinating discharge planning if the patient needs post-hospital services based on physician/advanced practitioner order or complex needs related to functional status, cognitive ability, or social support system  Outcome: Progressing     Problem: Knowledge Deficit  Goal: Patient/family/caregiver demonstrates understanding of disease process, treatment plan, medications, and discharge instructions  Description: Complete learning assessment and assess knowledge base.  Interventions:  - Provide teaching at level of understanding  - Provide teaching via preferred learning methods  Outcome: Progressing

## 2024-06-20 NOTE — UTILIZATION REVIEW
NOTIFICATION OF INPATIENT ADMISSION   AUTHORIZATION REQUEST   SERVICING FACILITY:   [place] [address]  Tax ID: [id number]  NPI: [id number]   ATTENDING PROVIDER:  Attending Name and NPI#: Nallely Carney Md [[id number]]  Address: [address] [place] [address]  Phone: [phone]     ADMISSION INFORMATION:  Place of Service: Inpatient [facility]  Place of Service Code: 21  Inpatient Admission Date/Time: 6/19/24  9:23 PM  Discharge Date/Time: No discharge date for patient encounter.  Admitting Diagnosis Code/Description:  Wheezing [R06.2]  COPD exacerbation (HCC) [J44.1]     UTILIZATION REVIEW CONTACT:  Leah Degroot Utilization   Network Utilization Review Department  Phone: [phone]  Fax: [phone]  Email: [email or web address]  Contact for approvals/pending authorizations, clinical reviews, and discharge.     PHYSICIAN ADVISORY SERVICES:  Medical Necessity Denial & [employer] Review  Phone: [phone]  Fax: [phone]  Email: [email or web address]     DISCHARGE SUPPORT TEAM:  For Patients Discharge Needs & Updates  Phone: [phone] opt. 2 Fax: [phone]  Email: [email or web address]

## 2024-06-20 NOTE — RESPIRATORY THERAPY NOTE
RT Protocol Note  Noreen Alvarez 69 y.o. female MRN: [id number]  Unit/Bed#: -01 Encounter: [id number]    Assessment    Principal Problem:    Acute on chronic respiratory failure with hypoxia and hypercapnia (HCC)  Active Problems:    Depression with anxiety    Hyperlipidemia    Class 1 obesity due to excess calories with serious comorbidity and body mass index (BMI) of 32.0 to 32.9 in adult    Chronic obstructive pulmonary disease (HCC)      Home Pulmonary Medications:  DuoNeb neblizer, albuterol inhaler, Breztri aeroshpere, Singulair    Home Devices/Therapy: (P) Home O2 (2L baseline)      Subjective         Objective    Physical Exam:   Assessment Type: (P) During-treatment  General Appearance: (P) Awake, Alert  Respiratory Pattern: (P) Dyspnea at rest, Symmetrical  Chest Assessment: (P) Chest expansion symmetrical  Bilateral Breath Sounds: (P) Diminished  Cough: (P) None  O2 Device: (P) 2L NC baseline home use    Vitals:  Blood pressure (!) 187/80, pulse 93, temperature 98.6 °F (37 °C), temperature source Oral, resp. rate (!) 23, SpO2 91%.          Imaging and other studies: I have personally reviewed pertinent reports.      O2 Device: (P) 2L NC baseline home use     Plan             Resp Comments: (P) Patient with hx or COPD and Home O2 at 2L NC. Patient is ordered TID Duoneb, Breztri aerosphere and albuterol rescue inhaler for home use. Continue with Home O2 and home treatment schedule at this time.

## 2024-06-20 NOTE — PLAN OF CARE
Problem: PAIN - ADULT  Goal: Verbalizes/displays adequate comfort level or baseline comfort level  Description: Interventions:  - Encourage patient to monitor pain and request assistance  - Assess pain using appropriate pain scale  - Administer analgesics based on type and severity of pain and evaluate response  - Implement non-pharmacological measures as appropriate and evaluate response  - Consider cultural and social influences on pain and pain management  - Notify physician/advanced practitioner if interventions unsuccessful or patient reports new pain  Outcome: Progressing     Problem: INFECTION - ADULT  Goal: Absence or prevention of progression during hospitalization  Description: INTERVENTIONS:  - Assess and monitor for signs and symptoms of infection  - Monitor lab/diagnostic results  - Monitor all insertion sites, i.e. indwelling lines, tubes, and drains  - Monitor endotracheal if appropriate and nasal secretions for changes in amount and color  - [place] appropriate cooling/warming therapies per order  - Administer medications as ordered  - Instruct and encourage patient and family to use good hand hygiene technique  - Identify and instruct in appropriate isolation precautions for identified infection/condition  Outcome: Progressing  Goal: Absence of fever/infection during neutropenic period  Description: INTERVENTIONS:  - Monitor WBC    Outcome: Progressing     Problem: SAFETY ADULT  Goal: Patient will remain free of falls  Description: INTERVENTIONS:  - Educate patient/family on patient safety including physical limitations  - Instruct patient to call for assistance with activity   - Consult OT/PT to assist with strengthening/mobility   - Keep Call bell within reach  - Keep bed low and locked with side rails adjusted as appropriate  - Keep care items and personal belongings within reach  - Initiate and maintain comfort rounds  - Make Fall Risk Sign visible to staff  - Apply yellow socks and bracelet  for high fall risk patients  - Consider moving patient to room near nurses station  Outcome: Progressing  Goal: Maintain or return to baseline ADL function  Description: INTERVENTIONS:  -  Assess patient's ability to carry out ADLs; assess patient's baseline for ADL function and identify physical deficits which impact ability to perform ADLs (bathing, care of mouth/teeth, toileting, grooming, dressing, etc.)  - Assess/evaluate cause of self-care deficits   - Assess range of motion  - Assess patient's mobility; develop plan if impaired  - Assess patient's need for assistive devices and provide as appropriate  - Encourage maximum independence but intervene and supervise when necessary  - Involve family in performance of ADLs  - Assess for home care needs following discharge   - Consider OT consult to assist with ADL evaluation and planning for discharge  - Provide patient education as appropriate  Outcome: Progressing  Goal: Maintains/Returns to pre admission functional level  Description: INTERVENTIONS:  - Perform AM-PAC 6 Click Basic Mobility/ Daily Activity assessment daily.  - Set and communicate daily mobility goal to care team and patient/family/caregiver.   - Collaborate with rehabilitation services on mobility goals if consulted  - Out of bed for toileting  - Record patient progress and toleration of activity level   Outcome: Progressing     Problem: DISCHARGE PLANNING  Goal: Discharge to home or other facility with appropriate resources  Description: INTERVENTIONS:  - Identify barriers to discharge w/patient and caregiver  - Arrange for needed discharge resources and transportation as appropriate  - Identify discharge learning needs (meds, wound care, etc.)  - Arrange for interpretive services to assist at discharge as needed  - Refer to Case Management Department for coordinating discharge planning if the patient needs post-hospital services based on physician/advanced practitioner order or complex needs  related to functional status, cognitive ability, or social support system  Outcome: Progressing     Problem: Knowledge Deficit  Goal: Patient/family/caregiver demonstrates understanding of disease process, treatment plan, medications, and discharge instructions  Description: Complete learning assessment and assess knowledge base.  Interventions:  - Provide teaching at level of understanding  - Provide teaching via preferred learning methods  Outcome: Progressing

## 2024-06-20 NOTE — ASSESSMENT & PLAN NOTE
Patient reports dyspnea on exertion and at rest has become worse in the last couple of weeks.  Patient worse 2 L nasal cannula at baseline  She does follow with pulmonology closely last visit found on chart 6/6/2024  On Symbicort, montelukast, Proventil and albuterol as needed, continue with regimen  Refer to the plan above

## 2024-06-20 NOTE — ASSESSMENT & PLAN NOTE
Patient reports dyspnea on exertion and at rest has become worse in the last couple of weeks.  Patient worse 2 L nasal cannula at baseline  She does follow with pulmonology closely last visit found on chart 6/6/2024  FEV1 of 27% per pulmonology note.  On Symbicort, montelukast, Proventil and albuterol as needed, continue with regimen  Patient can follow-up as an outpatient for possible chronic azithromycin versus Daliresp versus lung volume reduction considerations upon discharge

## 2024-06-20 NOTE — CONSULTS
PULMONOLOGY CONSULT NOTE     Name: Noreen Alvarez   Age & Sex: 69 y.o. 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  Diagnosed with an acute exacerbation of COPD.  Pulmonology was consulted for further recommendations.    Acute on chronic hypoxemic/hypercapnic respiratory failure  Severe COPD with an FEV1 of 37% in acute exacerbation likely secondary to sick contacts  Centrilobular emphysema  Previous tobacco abuse in remission  Anxiety/depression  CKD stage III  Hypertension  Hyperlipidemia  Hypothyroidism  Previous squamous cell carcinoma of the larynx status post chemo and radiation in 2014    Plan:  Continue to wean oxygen for SpO2 greater than 88%, patient is back down to her baseline 2 L  CTA PE study negative for pulmonary embolism, showing groundglass nodule in the right lower lobe that has been stable since March 2024, some tree-in-bud nodularity in the right lower lobe as well possibly infectious or inflammatory  Patient's outpatient regimen includes Breztri, nebulized ipratropium, and nebulized albuterol  At this time, continue with IV Solu-Medrol 40 mg every 8 hours, nebulized Atrovent/Xopenex, and Symbicort  When able plan to wean steroids - can go to Q12H today, switch back to triple therapy inhalers (can use Symbicort and Incruse)  Consider nebulized steroids if the patient worsens  Respiratory protocol, flutter valve, out of bed to chair  Follow-up RP 2  panel  Continue azithromycin for 5 days total  Patient likely came in contact with a virus causing her acute exacerbation of COPD  Patient can follow-up as an outpatient for possible chronic azithromycin versus Daliresp versus lung volume reduction considerations  Patient had a negative sleep study earlier this year, hypercapnia likely chronic -continue to monitor patient's mentation and breathing status, BiPAP nightly while inpatient  Can attempt to qualify for Triology as an OP  Overngiht pulse ox prior to DC  Clinic message for follow-up  Continue smoking cessation  Rest of care per primary    History of Present Illness   HPI:  Noreen Alvarez is a 69 y.o. 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  Diagnosed with an acute exacerbation of COPD.  Pulmonology was consulted for further recommendations.    Labs personally reviewed.    Imaging personally reviewed.    PCP note reviewed.    Review of systems:  12 point review of systems was completed and was otherwise negative except as listed in HPI.      Historical Information   Past Medical History:   Diagnosis Date    Acute kidney failure (HCC)     Allergic     Allergies     Anemia     Anxiety     Asthma     Cancer (HCC)     Cataract     Chronic kidney disease (CKD), stage III (moderate) (HCC)     COPD (chronic obstructive pulmonary disease) (HCC)     COVID-19     Covid + 4-8-25-cough at that time now fully resolved    Depression     Disease of thyroid gland     Essential hypertension     GERD (gastroesophageal reflux disease)     Glaucoma     High blood pressure     HTN (hypertension) 02/16/2021    Hyperlipidemia      Hypothyroidism     Memory loss     Mesenteric lymphadenopathy 07/13/2021    Pulmonary hypertension (HCC)     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     FL TENDON SHEATH INCISION Right 02/16/2021    Procedure: THUMB TRIGGER FINGER RELEASE;  Surgeon: Angeles Denton MD;  Location: [facility];  Service: Orthopedics    TUBAL LIGATION       Family History   Problem Relation Age of Onset    Other Mother         respiratory disorder    Asthma Mother     COPD Mother     Depression Mother     Sudden death Father         shot himself    Suicidality Father     Brain cancer Sister     Diabetes Sister     Breast cancer Sister     Cancer Sister 62        pancreatic cancer    No Known Problems Sister     No Known Problems Sister     No Known Problems Sister     No Known Problems Sister     No Known Problems Sister     No Known Problems Sister     No Known Problems Daughter     No Known Problems Daughter     No Known Problems Maternal Grandmother     No Known Problems Maternal Grandfather     No Known Problems Paternal Grandmother     No Known Problems Paternal Grandfather     No Known Problems Maternal Aunt     No Known Problems Maternal Aunt     No Known Problems Maternal Aunt     No Known Problems Maternal Aunt     No Known Problems Maternal Aunt     No Known Problems Paternal Aunt     No Known Problems Paternal Aunt     No Known Problems Paternal Aunt     No Known Problems Paternal Aunt     No Known Problems Son        Social History    Social History:   Social History     Socioeconomic History    Marital status: /Civil Union     Spouse name: Not on file    Number of children: Not on file    Years of education: Not on file    Highest education level: Not on file    Occupational History    Not on file   Tobacco Use    Smoking status: Former     Current packs/day: 0.00     Average packs/day: 1 pack/day for 40.0 years (40.0 ttl pk-yrs)     Types: Cigarettes     Start date: 1974     Quit date: 2014     Years since quitting: 10.4    Smokeless tobacco: Never   Vaping Use    Vaping status: Never Used   Substance and Sexual Activity    Alcohol use: Never     Comment: None    Drug use: Never     Comment: Denies    Sexual activity: Not Currently     Partners: Female     Birth control/protection: Surgical   Other Topics Concern    Not on file   Social History Narrative    Most recent tobacco use screenin2020    Do you currently or have you served in the [employer]: No    Were you activated, into active duty, as a member of the [employer] or as a Reservist: No    Marital status:     Sexual orientation: Heterosexual    Exercise level: Occasional    Diet: Regular    General stress level: Low    Has smoked since age: 19    Alcohol intake: None    Caffeine intake: Occasional    Chewing tobacco: none    Illicit drugs: Denies    Guns present in home: No    Seat belts used routinely: Yes    Sunscreen used routinely: Yes    Smoke alarm in home: Yes    Advance directive: No    Salt Intake: HTN Diet    Has the Patient had a mammogram to screen for breast cancer within 24 months: Yes    Would the patient like to schedule a Mammogram: No    Is the patient interested in a colorectal cancer screening: No    Live alone or with others: with others    Sexually active: No    Do you feel safe at home: Yes     Social Determinants of Health     Financial Resource Strain: Medium Risk (2024)    Overall Financial Resource Strain (CARDIA)     Difficulty of Paying Living Expenses: Somewhat hard   Food Insecurity: No Food Insecurity (2024)    Hunger Vital Sign     Worried About Running Out of Food in the Last Year: Never true     Ran Out of Food in the Last Year: Never  true   Transportation Needs: No Transportation Needs (4/2/2024)    PRAPARE - Transportation     Lack of Transportation (Medical): No     Lack of Transportation (Non-Medical): No   Physical Activity: Not on file   Stress: Not on file   Social Connections: Not on file   Intimate Partner Violence: Not on file   Housing Stability: Low Risk  (4/2/2024)    Housing Stability Vital Sign     Unable to Pay for Housing in the Last Year: No     Number of Times Moved in the Last Year: 1     Homeless in the Last Year: No       Occupational History: Non contributory    Meds/Allergies   Current Facility-Administered Medications   Medication Dose Route Frequency    acetaminophen (TYLENOL) tablet 650 mg  650 mg Oral Q6H PRN    albuterol (PROVENTIL HFA,VENTOLIN HFA) inhaler 2 puff  2 puff Inhalation Q6H PRN    aluminum-magnesium hydroxide-simethicone (MAALOX) oral suspension 30 mL  30 mL Oral Q6H PRN    atorvastatin (LIPITOR) tablet 40 mg  40 mg Oral Daily With Dinner    azithromycin (ZITHROMAX) tablet 500 mg  500 mg Oral Q24H    benzonatate (TESSALON PERLES) capsule 100 mg  100 mg Oral TID    budesonide-formoterol (SYMBICORT) 160-4.5 mcg/act inhaler 2 puff  2 puff Inhalation BID    docusate sodium (COLACE) capsule 100 mg  100 mg Oral BID    enoxaparin (LOVENOX) subcutaneous injection 40 mg  40 mg Subcutaneous Daily    fluticasone (FLONASE) 50 mcg/act nasal spray 2 spray  2 spray Each Nare Daily    guaiFENesin (MUCINEX) 12 hr tablet 600 mg  600 mg Oral BID    ipratropium (ATROVENT) 0.02 % inhalation solution 0.5 mg  0.5 mg Nebulization TID    levalbuterol (XOPENEX) inhalation solution 1.25 mg  1.25 mg Nebulization TID    levothyroxine tablet 75 mcg  75 mcg Oral Early Morning    methylPREDNISolone sodium succinate (Solu-MEDROL) injection 40 mg  40 mg Intravenous Q8H    montelukast (SINGULAIR) tablet 10 mg  10 mg Oral HS    ondansetron (ZOFRAN) injection 4 mg  4 mg Intravenous Q6H PRN    sertraline (ZOLOFT) tablet 25 mg  25 mg Oral HS        Medications Prior to Admission:     albuterol (PROVENTIL HFA,VENTOLIN HFA) 90 mcg/act inhaler    atorvastatin (LIPITOR) 40 mg tablet    azithromycin (ZITHROMAX) 250 mg tablet    baclofen 10 mg tablet    benzonatate (TESSALON PERLES) 100 mg capsule    bisacodyl (DULCOLAX) 5 mg EC tablet    brimonidine tartrate 0.2 % ophthalmic solution    Budeson-Glycopyrrol-Formoterol (Breztri Aerosphere) 160-9-4.8 MCG/ACT AERO    Cholecalciferol (VITAMIN D3) 1,000 units tablet    clotrimazole-betamethasone (LOTRISONE) 1-0.05 % cream    cyanocobalamin (VITAMIN B-12) 500 MCG tablet    Diclofenac Sodium (VOLTAREN) 1 %    docusate sodium (COLACE) 100 mg capsule    ferrous sulfate 324 (65 Fe) mg    fluticasone (FLONASE) 50 mcg/act nasal spray    ipratropium (ATROVENT) 0.06 % nasal spray    ipratropium-albuterol (DUO-NEB) 0.5-2.5 mg/3 mL nebulizer solution    levothyroxine 75 mcg tablet    montelukast (SINGULAIR) 10 mg tablet    ondansetron (ZOFRAN) 4 mg tablet    pantoprazole (PROTONIX) 40 mg tablet    polyethylene glycol (GLYCOLAX) 17 GM/SCOOP powder    polyethylene glycol (GOLYTELY) 4000 mL solution    sertraline (ZOLOFT) 25 mg tablet    sucralfate (CARAFATE) 1 g tablet    timolol (TIMOPTIC) 0.5 % ophthalmic solution  Allergies   Allergen Reactions    Cefdinir Hives    Amifostine Rash    Pollen Extract Allergic Rhinitis       Objective    Vitals: Blood pressure 161/93, pulse 96, temperature 98 °F (36.7 °C), temperature source Oral, resp. rate 17, weight 79.9 kg (176 lb 1.6 oz), SpO2 100%., Body mass index is 30.23 kg/m².      Intake/Output Summary (Last 24 hours) at 6/20/2024 1313  Last data filed at 6/20/2024 0814  Gross per 24 hour   Intake 970 ml   Output 600 ml   Net 370 ml       Physical Exam  Vitals reviewed.   Constitutional:       Appearance: She is obese.   HENT:      Head: Normocephalic and atraumatic.   Eyes:      Extraocular Movements: Extraocular movements intact.      Pupils: Pupils are equal, round, and reactive  to light.   Cardiovascular:      Rate and Rhythm: Normal rate and regular rhythm.   Pulmonary:      Effort: Pulmonary effort is normal.      Breath sounds: Examination of the right-middle field reveals decreased breath sounds. Examination of the left-middle field reveals decreased breath sounds. Examination of the right-lower field reveals decreased breath sounds and wheezing. Examination of the left-lower field reveals decreased breath sounds and wheezing. Decreased breath sounds and wheezing present.   Abdominal:      General: Bowel sounds are normal.      Palpations: Abdomen is soft.   Musculoskeletal:         General: Normal range of motion.      Cervical back: Normal range of motion and neck supple.   Skin:     General: Skin is warm and dry.      Capillary Refill: Capillary refill takes less than 2 seconds.   Neurological:      General: No focal deficit present.      Mental Status: She is alert.   Psychiatric:         Mood and Affect: Mood normal.         Behavior: Behavior normal.         Labs: I have personally reviewed pertinent lab results.  Laboratory and Diagnostics  Results from last 7 days   Lab Units 24  1748   WBC Thousand/uL 7.46   HEMOGLOBIN g/dL 10.6*   HEMATOCRIT % 36.1   PLATELETS Thousands/uL 137*   SEGS PCT % 90*   MONO PCT % 5   EOS PCT % 0     Results from last 7 days   Lab Units 24  1748   SODIUM mmol/L 141   POTASSIUM mmol/L 4.5   CHLORIDE mmol/L 96   CO2 mmol/L 40*   ANION GAP mmol/L 5   BUN mg/dL 18   CREATININE mg/dL 0.98   CALCIUM mg/dL 9.3   GLUCOSE RANDOM mg/dL 129   ALT U/L 12   AST U/L 21   ALK PHOS U/L 126*   ALBUMIN g/dL 4.4   TOTAL BILIRUBIN mg/dL 0.42                                       Results from last 7 days   Lab Units 24  1748   PROCALCITONIN ng/ml <0.05       ABG:       Micro:        Imaging and other studies: I have personally reviewed pertinent reports.      Pulmonary function testing:     Date of Testin2022     Date of Interpretation:  "6/3/2022     Requesting Provider: Dr. Thompson     Reason for Testing: Emphysema     Reference set for interpretation: [id number]     Procedure: The patient was taken to pulmonary function testing laboratory.  The patient demonstrated good effort and cooperation.  The results of this test meet ATS standards for acceptability and repeatability.  Data set appears appropriate for interpretation.     Post bronchodilator testing performed after the administration of 2.5mg albuterol in 3cc normal saline administered via nebulizer per bronchodilator protocol.     Results:  FEV1/FVC Ratio: 39 %  Forced Vital Capacity: 2.03 L    74 % predicted  FEV1: 0.8 L37 % predicted  After administration of bronchodilator FEV1: reduced 10%     Lung volumes by body plethysmography: Total Lung Capacity 141 % predicted Residual volume 221 % predicted     DLCO corrected for patients hemoglobin level: 51 %     6MWT on RA - total walk distance 259.25 meters, initial SpO2 96%, natanael SpO2 93%, initial HR 73bpm, maximal HR 80bpm, Beatrice Dyspnea Scale 3/10-->5/10     Interpretation:     Severe obstructive airflow defect with mildly reduced vital capacity     No response to the administration to bronchodilator per ATS Standards     Increased lung volumes indicative of air trapping     Normal Lung volumes     Moderately Reduced Diffusion     Scooped expiratory flow volume loops     Total 6MWT distance on .25 meters with natanael SpO2 93%    EKG, Pathology, and Other Studies: I have personally reviewed pertinent reports.      Code Status: Level 1 - Full Code    VTE Pharmacologic Prophylaxis: Enoxaparin (Lovenox)  VTE Mechanical Prophylaxis: sequential compression device    Disclaimer: Portions of the record may have been created with voice recognition software. Occasional wrong word or \"sound a like\" substitutions may have occurred due to the inherent limitations of voice recognition software. Careful consideration should be taken to recognize, using " context, where substitutions have occurred.    Fan Valles DO, MS  Pulmonary/Critical Care Fellowship PGY-IV  [facility]

## 2024-06-20 NOTE — ASSESSMENT & PLAN NOTE
Patient has a history of RHETT-3 lobular emphysema, obstructive sleep apnea and multiple lung nodules.  She does wear oxygen 2 L nasal cannula at baseline.  Reports the last 48 hours she has become extremely short of breath and finds difficulty breathing  Noted on vital signs review patient was hypoxic and required an increment of supplemental oxygen, on assessment the patient is back to baseline after nebulizers and steroids, the patient is in no acute distress.  Venous blood gas shows hypercapnia.  The patient states she does not wear CPAP at home and also she does not have one prescribed  Respiratory panel is negative  CTA ED chest PE study shows negative for pulmonary emboli, groundglass nodule in the lateral right lower lobe measures up to 1.6 cm this nodule has been unchanged from her most recent CAT scan on March 2024, there is a slow-growing adenocarcinoma spectrum lesion is not entirely excluded.  New focus on tree-in-bud nodularity in the posterior right lower lobe is favored to represent an infections or inflammatory infiltrate and increase in mediastinal/hilar nodes infiltrate.  Continue with albuterol, Solu-Medrol every 8 hours.  Azithromycin first dose was given in the ER, continue with azithromycin on the floors  Monitor O2 sats per unit protocol or when needed  The patient follows with [facility] pulmonologist last visit was June 6, 2024.  Per Dr. Hastings note the patient presented on that day with severe dyspnea on exertion.  Sputum culture ordered  Respiratory and airway protocol in place

## 2024-06-21 ENCOUNTER — APPOINTMENT (INPATIENT)
Dept: RADIOLOGY | Facility: HOSPITAL | Age: 69
DRG: 865 | End: 2024-06-21
Payer: COMMERCIAL

## 2024-06-21 LAB
ANION GAP SERPL CALCULATED.3IONS-SCNC: 1 MMOL/L (ref 4–13)
ARTERIAL PATENCY WRIST A: YES
BASE EXCESS BLDA CALC-SCNC: 11 MMOL/L (ref -2–3)
BASE EXCESS BLDA CALC-SCNC: 7.6 MMOL/L
BASO STIPL BLD QL SMEAR: PRESENT
BASOPHILS # BLD AUTO: 0.01 THOUSANDS/ÂΜL (ref 0–0.1)
BASOPHILS NFR BLD AUTO: 0 % (ref 0–1)
BUN SERPL-MCNC: 25 MG/DL (ref 5–25)
CA-I BLD-SCNC: 1.27 MMOL/L (ref 1.12–1.32)
CALCIUM SERPL-MCNC: 9.7 MG/DL (ref 8.4–10.2)
CHLORIDE SERPL-SCNC: 97 MMOL/L (ref 96–108)
CO2 SERPL-SCNC: 44 MMOL/L (ref 21–32)
CREAT SERPL-MCNC: 1.12 MG/DL (ref 0.6–1.3)
EOSINOPHIL # BLD AUTO: 0 THOUSAND/ÂΜL (ref 0–0.61)
EOSINOPHIL NFR BLD AUTO: 0 % (ref 0–6)
ERYTHROCYTE [DISTWIDTH] IN BLOOD BY AUTOMATED COUNT: 13.2 % (ref 11.6–15.1)
GFR SERPL CREATININE-BSD FRML MDRD: 50 ML/MIN/1.73SQ M
GLUCOSE SERPL-MCNC: 120 MG/DL (ref 65–140)
GLUCOSE SERPL-MCNC: 130 MG/DL (ref 65–140)
HCO3 BLDA-SCNC: 37 MMOL/L (ref 22–28)
HCO3 BLDA-SCNC: 40.8 MMOL/L (ref 22–28)
HCT VFR BLD AUTO: 36.9 % (ref 34.8–46.1)
HCT VFR BLD CALC: 34 % (ref 34.8–46.1)
HGB BLD-MCNC: 10.5 G/DL (ref 11.5–15.4)
HGB BLDA-MCNC: 11.6 G/DL (ref 11.5–15.4)
IMM GRANULOCYTES # BLD AUTO: 0.1 THOUSAND/UL (ref 0–0.2)
IMM GRANULOCYTES NFR BLD AUTO: 1 % (ref 0–2)
IPAP: 12
LYMPHOCYTES # BLD AUTO: 0.37 THOUSANDS/ÂΜL (ref 0.6–4.47)
LYMPHOCYTES NFR BLD AUTO: 3 % (ref 14–44)
MCH RBC QN AUTO: 26.3 PG (ref 26.8–34.3)
MCHC RBC AUTO-ENTMCNC: 28.5 G/DL (ref 31.4–37.4)
MCV RBC AUTO: 93 FL (ref 82–98)
MONOCYTES # BLD AUTO: 0.8 THOUSAND/ÂΜL (ref 0.17–1.22)
MONOCYTES NFR BLD AUTO: 6 % (ref 4–12)
NEUTROPHILS # BLD AUTO: 11.7 THOUSANDS/ÂΜL (ref 1.85–7.62)
NEUTS SEG NFR BLD AUTO: 90 % (ref 43–75)
NON VENT- BIPAP: ABNORMAL
NRBC BLD AUTO-RTO: 0 /100 WBCS
O2 CT BLDA-SCNC: 16.6 ML/DL (ref 16–23)
OXYHGB MFR BLDA: 96.9 % (ref 94–97)
PCO2 BLD: 44 MMOL/L (ref 21–32)
PCO2 BLD: 91.8 MM HG (ref 36–44)
PCO2 BLDA: 81.4 MM HG (ref 36–44)
PEEP MAX SETTING VENT: 6 CM[H2O]
PH BLD: 7.26 [PH] (ref 7.35–7.45)
PH BLDA: 7.28 [PH] (ref 7.35–7.45)
PLATELET # BLD AUTO: 177 THOUSANDS/UL (ref 149–390)
PLATELET BLD QL SMEAR: ADEQUATE
PMV BLD AUTO: 9.5 FL (ref 8.9–12.7)
PO2 BLD: 74 MM HG (ref 75–129)
PO2 BLDA: 95.9 MM HG (ref 75–129)
POTASSIUM BLD-SCNC: 4.7 MMOL/L (ref 3.5–5.3)
POTASSIUM SERPL-SCNC: 4.8 MMOL/L (ref 3.5–5.3)
PROCALCITONIN SERPL-MCNC: <0.05 NG/ML
RBC # BLD AUTO: 3.99 MILLION/UL (ref 3.81–5.12)
RBC MORPH BLD: PRESENT
SAO2 % BLD FROM PO2: 91 % (ref 60–85)
SODIUM BLD-SCNC: 137 MMOL/L (ref 136–145)
SODIUM SERPL-SCNC: 142 MMOL/L (ref 135–147)
SPECIMEN SOURCE: ABNORMAL
SPECIMEN SOURCE: ABNORMAL
VENT BIPAP FIO2: 40 %
WBC # BLD AUTO: 12.98 THOUSAND/UL (ref 4.31–10.16)

## 2024-06-21 PROCEDURE — 82805 BLOOD GASES W/O2 SATURATION: CPT | Performed by: INTERNAL MEDICINE

## 2024-06-21 PROCEDURE — 84145 PROCALCITONIN (PCT): CPT | Performed by: INTERNAL MEDICINE

## 2024-06-21 PROCEDURE — 71045 X-RAY EXAM CHEST 1 VIEW: CPT

## 2024-06-21 PROCEDURE — 84132 ASSAY OF SERUM POTASSIUM: CPT

## 2024-06-21 PROCEDURE — 94640 AIRWAY INHALATION TREATMENT: CPT

## 2024-06-21 PROCEDURE — 94760 N-INVAS EAR/PLS OXIMETRY 1: CPT

## 2024-06-21 PROCEDURE — 94002 VENT MGMT INPAT INIT DAY: CPT

## 2024-06-21 PROCEDURE — 99232 SBSQ HOSP IP/OBS MODERATE 35: CPT | Performed by: STUDENT IN AN ORGANIZED HEALTH CARE EDUCATION/TRAINING PROGRAM

## 2024-06-21 PROCEDURE — 82803 BLOOD GASES ANY COMBINATION: CPT

## 2024-06-21 PROCEDURE — 85014 HEMATOCRIT: CPT

## 2024-06-21 PROCEDURE — 99232 SBSQ HOSP IP/OBS MODERATE 35: CPT | Performed by: INTERNAL MEDICINE

## 2024-06-21 PROCEDURE — 85025 COMPLETE CBC W/AUTO DIFF WBC: CPT | Performed by: INTERNAL MEDICINE

## 2024-06-21 PROCEDURE — 80048 BASIC METABOLIC PNL TOTAL CA: CPT | Performed by: INTERNAL MEDICINE

## 2024-06-21 PROCEDURE — 82330 ASSAY OF CALCIUM: CPT

## 2024-06-21 PROCEDURE — 36600 WITHDRAWAL OF ARTERIAL BLOOD: CPT

## 2024-06-21 PROCEDURE — 84295 ASSAY OF SERUM SODIUM: CPT

## 2024-06-21 PROCEDURE — 82947 ASSAY GLUCOSE BLOOD QUANT: CPT

## 2024-06-21 RX ORDER — AMLODIPINE BESYLATE 5 MG/1
5 TABLET ORAL DAILY
Status: DISCONTINUED | OUTPATIENT
Start: 2024-06-22 | End: 2024-06-26 | Stop reason: HOSPADM

## 2024-06-21 RX ORDER — LEVALBUTEROL INHALATION SOLUTION 1.25 MG/3ML
1.25 SOLUTION RESPIRATORY (INHALATION) EVERY 4 HOURS
Status: DISCONTINUED | OUTPATIENT
Start: 2024-06-21 | End: 2024-06-22

## 2024-06-21 RX ORDER — METHYLPREDNISOLONE SODIUM SUCCINATE 40 MG/ML
40 INJECTION, POWDER, LYOPHILIZED, FOR SOLUTION INTRAMUSCULAR; INTRAVENOUS EVERY 12 HOURS SCHEDULED
Status: DISCONTINUED | OUTPATIENT
Start: 2024-06-21 | End: 2024-06-22

## 2024-06-21 RX ORDER — PREDNISONE 10 MG/1
10 TABLET ORAL DAILY
Status: DISCONTINUED | OUTPATIENT
Start: 2024-06-30 | End: 2024-06-21

## 2024-06-21 RX ORDER — PREDNISONE 20 MG/1
40 TABLET ORAL DAILY
Status: DISCONTINUED | OUTPATIENT
Start: 2024-06-21 | End: 2024-06-21

## 2024-06-21 RX ORDER — PANTOPRAZOLE SODIUM 40 MG/1
40 TABLET, DELAYED RELEASE ORAL
Status: DISCONTINUED | OUTPATIENT
Start: 2024-06-21 | End: 2024-06-26 | Stop reason: HOSPADM

## 2024-06-21 RX ORDER — FUROSEMIDE 10 MG/ML
20 INJECTION INTRAMUSCULAR; INTRAVENOUS ONCE
Status: COMPLETED | OUTPATIENT
Start: 2024-06-21 | End: 2024-06-21

## 2024-06-21 RX ORDER — PREDNISONE 20 MG/1
20 TABLET ORAL DAILY
Status: DISCONTINUED | OUTPATIENT
Start: 2024-06-27 | End: 2024-06-21

## 2024-06-21 RX ORDER — TIMOLOL MALEATE 5 MG/ML
1 SOLUTION/ DROPS OPHTHALMIC 2 TIMES DAILY
Status: DISCONTINUED | OUTPATIENT
Start: 2024-06-21 | End: 2024-06-26 | Stop reason: HOSPADM

## 2024-06-21 RX ORDER — LEVALBUTEROL INHALATION SOLUTION 1.25 MG/3ML
1.25 SOLUTION RESPIRATORY (INHALATION) EVERY 6 HOURS PRN
Status: DISCONTINUED | OUTPATIENT
Start: 2024-06-21 | End: 2024-06-26 | Stop reason: HOSPADM

## 2024-06-21 RX ADMIN — MONTELUKAST 10 MG: 10 TABLET, FILM COATED ORAL at 20:28

## 2024-06-21 RX ADMIN — TIMOLOL MALEATE 1 DROP: 5 SOLUTION OPHTHALMIC at 17:22

## 2024-06-21 RX ADMIN — AZITHROMYCIN 500 MG: 250 TABLET, FILM COATED ORAL at 20:28

## 2024-06-21 RX ADMIN — IPRATROPIUM BROMIDE 0.5 MG: 0.5 SOLUTION RESPIRATORY (INHALATION) at 17:53

## 2024-06-21 RX ADMIN — ALBUTEROL SULFATE 2 PUFF: 90 AEROSOL, METERED RESPIRATORY (INHALATION) at 10:57

## 2024-06-21 RX ADMIN — IPRATROPIUM BROMIDE 0.5 MG: 0.5 SOLUTION RESPIRATORY (INHALATION) at 15:44

## 2024-06-21 RX ADMIN — UMECLIDINIUM 1 PUFF: 62.5 AEROSOL, POWDER ORAL at 10:57

## 2024-06-21 RX ADMIN — ENOXAPARIN SODIUM 40 MG: 40 INJECTION SUBCUTANEOUS at 09:22

## 2024-06-21 RX ADMIN — ATORVASTATIN CALCIUM 40 MG: 40 TABLET, FILM COATED ORAL at 17:08

## 2024-06-21 RX ADMIN — GUAIFENESIN 600 MG: 600 TABLET ORAL at 09:22

## 2024-06-21 RX ADMIN — ALBUTEROL SULFATE 2 PUFF: 90 AEROSOL, METERED RESPIRATORY (INHALATION) at 17:17

## 2024-06-21 RX ADMIN — LEVOTHYROXINE SODIUM 75 MCG: 75 TABLET ORAL at 05:19

## 2024-06-21 RX ADMIN — LEVALBUTEROL HYDROCHLORIDE 1.25 MG: 1.25 SOLUTION RESPIRATORY (INHALATION) at 17:52

## 2024-06-21 RX ADMIN — PANTOPRAZOLE SODIUM 40 MG: 40 TABLET, DELAYED RELEASE ORAL at 07:30

## 2024-06-21 RX ADMIN — BUDESONIDE AND FORMOTEROL FUMARATE DIHYDRATE 2 PUFF: 160; 4.5 AEROSOL RESPIRATORY (INHALATION) at 17:03

## 2024-06-21 RX ADMIN — DOCUSATE SODIUM 100 MG: 100 CAPSULE, LIQUID FILLED ORAL at 09:22

## 2024-06-21 RX ADMIN — BENZONATATE 100 MG: 100 CAPSULE ORAL at 20:28

## 2024-06-21 RX ADMIN — LEVALBUTEROL HYDROCHLORIDE 1.25 MG: 1.25 SOLUTION RESPIRATORY (INHALATION) at 20:26

## 2024-06-21 RX ADMIN — LEVALBUTEROL HYDROCHLORIDE 1.25 MG: 1.25 SOLUTION RESPIRATORY (INHALATION) at 07:25

## 2024-06-21 RX ADMIN — FLUTICASONE PROPIONATE 2 SPRAY: 50 SPRAY, METERED NASAL at 09:22

## 2024-06-21 RX ADMIN — AMLODIPINE BESYLATE 2.5 MG: 2.5 TABLET ORAL at 09:22

## 2024-06-21 RX ADMIN — FUROSEMIDE 20 MG: 10 INJECTION, SOLUTION INTRAMUSCULAR; INTRAVENOUS at 18:25

## 2024-06-21 RX ADMIN — SERTRALINE HYDROCHLORIDE 25 MG: 25 TABLET ORAL at 20:28

## 2024-06-21 RX ADMIN — TIMOLOL MALEATE 1 DROP: 5 SOLUTION OPHTHALMIC at 07:30

## 2024-06-21 RX ADMIN — BENZONATATE 100 MG: 100 CAPSULE ORAL at 15:20

## 2024-06-21 RX ADMIN — METHYLPREDNISOLONE SODIUM SUCCINATE 40 MG: 40 INJECTION, POWDER, FOR SOLUTION INTRAMUSCULAR; INTRAVENOUS at 09:23

## 2024-06-21 RX ADMIN — PREDNISONE 40 MG: 20 TABLET ORAL at 10:59

## 2024-06-21 RX ADMIN — DOCUSATE SODIUM 100 MG: 100 CAPSULE, LIQUID FILLED ORAL at 17:08

## 2024-06-21 RX ADMIN — BENZONATATE 100 MG: 100 CAPSULE ORAL at 09:22

## 2024-06-21 RX ADMIN — IPRATROPIUM BROMIDE 0.5 MG: 0.5 SOLUTION RESPIRATORY (INHALATION) at 07:25

## 2024-06-21 RX ADMIN — LEVALBUTEROL HYDROCHLORIDE 1.25 MG: 1.25 SOLUTION RESPIRATORY (INHALATION) at 15:44

## 2024-06-21 RX ADMIN — BUDESONIDE AND FORMOTEROL FUMARATE DIHYDRATE 2 PUFF: 160; 4.5 AEROSOL RESPIRATORY (INHALATION) at 09:22

## 2024-06-21 RX ADMIN — GUAIFENESIN 600 MG: 600 TABLET ORAL at 17:08

## 2024-06-21 RX ADMIN — METHYLPREDNISOLONE SODIUM SUCCINATE 40 MG: 40 INJECTION, POWDER, FOR SOLUTION INTRAMUSCULAR; INTRAVENOUS at 18:19

## 2024-06-21 NOTE — RESPIRATORY THERAPY NOTE
06/21/24 1740   Respiratory Assessment   Assessment Type Assess only   General Appearance Alert;Awake   Respiratory Pattern Dyspnea at rest   Chest Assessment Chest expansion symmetrical   Bilateral Breath Sounds (S)  Diminished;Expiratory wheezes   Resp Comments Patient ordered STAT ABG for new onset increasing in oxygen demands. Patient at baseline is 2L, Patient found on 6L NC. Upon arrival patient was sitting up in bed eating dinner did not appear to be in distress. Per RN gave symbicort inhaler and albuterol inhaler. Food Tray was removed at this time. ISTAT ran and results shown to Dr. Mejia who was present in room and to Dr. Carney when she arrived to patients room. Patient was placed on bipap for CO2 in the 90s. Per Dr. ARNULFO carbone patient nebulizers, MD is aware that patient recieved xopenex/atrovent at 1544 and the albuterol inhaler at 1717. Patient given xopenex and atrovent through the biapap. BBS diminished with exp wheezing. Per Dr. Carney will order repeat ABG in 4 hours.   O2 Device bipap   Non-Invasive Information   O2 Interface Device Face mask   Non-Invasive Ventilation Mode BiPAP   $ Continous NIV Initial   SpO2 98 %   $ Pulse Oximetry Spot Check Charge Completed   Non-Invasive Settings   IPAP (cm) 12 cm   EPAP (cm) 6 cm   Rate (Set) 16   FiO2 (%) 40   Rise Time 3   Inspiratory Time (Set) 0.8   Non-Invasive Readings   Skin Intervention Skin intact   Total Rate 20   MV (Mech) 7.1   Peak Pressure (Obs) 12   Spontaneous Vt (mL) 358   Leak (lpm) 14   Non-Invasive Alarms   Insp Pressure High (cm H20) 20   Insp Pressure Low (cm H20) 5   Low Insp Pressure Time (sec) 20 sec   MV Low (L/min) 3   Vt High (mL) 1000   Vt Low (mL) 250   High Resp Rate (BPM) 40 BPM   Low Resp Rate (BPM) 8 BPM

## 2024-06-21 NOTE — ASSESSMENT & PLAN NOTE
Has severe emphysema with acute exacerbation likely in the setting of recent viral URI.  Admits to sick family contacts.  COVID RSV and influenza panel was negative.  P2 panel positive for parainfluenza.  Initially started on continue with IV Solu-Medrol 40 mg every 8 hourly.  Wean to Solu-Medrol every 12 hourly per pulmonary recommendations.  Continue with azithromycin daily to complete total 5 days of treatment.  Procalcitonin x2 negative.  CT chest reviewed and noted.  Continue with scheduled nebulization treatment.  Continue Symbicort.  Pulmonology input appreciated.  Likely can transition to oral steroids in the next 24 hours pending improvement in symptoms.  Continues to have ongoing cough and diminished breath sounds in bilateral lung fields today.  Patient can follow-up as an outpatient for possible chronic azithromycin versus Daliresp versus lung volume reduction considerations  Patient had a negative sleep study earlier this year, hypercapnia likely chronic -continue to monitor patient's mentation and breathing status, BiPAP nightly while inpatient  Can attempt to qualify for Triology as an OP  Overngiht pulse ox prior to DC

## 2024-06-21 NOTE — ASSESSMENT & PLAN NOTE
Patient has a history of RHETT-3 lobular emphysema, obstructive sleep apnea and multiple lung nodules.  She does wear oxygen 2 L nasal cannula at baseline.  Reports the last 48 hours she has become extremely short of breath and finds difficulty breathing  Noted on vital signs review patient was hypoxic and required an increment of supplemental oxygen, on assessment the patient is back to baseline after nebulizers and steroids, the patient is in no acute distress.  Venous blood gas shows hypercapnia.  The patient states she does not wear CPAP at home and also she does not have one prescribed  RP 2 panel positive for parainfluenza   CTA ED chest PE study shows negative for pulmonary emboli, groundglass nodule in the lateral right lower lobe measures up to 1.6 cm this nodule has been unchanged from her most recent CAT scan on March 2024, there is a slow-growing adenocarcinoma spectrum lesion is not entirely excluded.  New focus on tree-in-bud nodularity in the posterior right lower lobe is favored to represent an infections or inflammatory infiltrate and increase in mediastinal/hilar nodes infiltrate.  Continue with albuterol, Solu-Medrol every 12 hours.  Azithromycin first dose was given in the ER, continue with azithromycin to complete 5 days of treatment  Monitor O2 sats per unit protocol or when needed  The patient follows with [facility] pulmonologist last visit was June 6, 2024.  Per Dr. Hastings note the patient presented on that day with severe dyspnea on exertion.  Repeat VBG showed acute on chronic hypercapnic respiratory failure however patient does not have increased work of breathing, persistent hypoxia or altered mentation.  Discussed with pulmonology.  Will hold off on BiPAP for now unless patient has increased work of breathing, worsening hypoxia or altered mentation.  Continue to wean oxygen as tolerated.  Of note patient is on 2 L of supplemental oxygen at baseline.  Respiratory and airway protocol in  place  Use BiPAP nightly as needed.  Overnight pulse oximetry with a.m. ABG.  Will likely need trilogy as an outpatient.  Pulmonary outpatient follow-up scheduled for 7 /1.

## 2024-06-21 NOTE — ASSESSMENT & PLAN NOTE
Lab Results   Component Value Date    EGFR 50 06/21/2024    EGFR 59 06/19/2024    EGFR 66 03/05/2024    CREATININE 1.12 06/21/2024    CREATININE 0.98 06/19/2024    CREATININE 0.89 03/05/2024   Baseline creatinine 0.80-1.0  Avoid nephrotoxic medications, NSAIDs and contrast dye if possible  Monitor kidney function with daily BMPs

## 2024-06-21 NOTE — QUICK NOTE
Notified by nursing regarding patient complaining of increasing shortness of breath.  Also saturating 87% on 3 L/min  Physical exam shows diminished breath sounds with wheezing bilaterally.  Stat ABG with acute on chronic hypoxic and hypercapnic respiratory failure  Chest x-ray with no well-defined infiltrate  Impression:  Acute on Chronic hypercapnic and hypoxic respiratory failure  COPD exacerbation secondary to parainfluenza virus  Patient started on BiPAP for hypoxia, hypercapnia and increased work of breathing.  Solu-Medrol dose increased by pulmonology.  Nebulization frequency increased by pulmonology.  Continue to monitor symptoms closely and repeat ABG in 4 hours of BiPAP.  If no improvement in work of breathing, hypoxia or hypercapnia, patient will need to be upgraded to higher level of care.  Will give 1 dose of IV Lasix 20 mg as well.  Daughter updated at length over the phone.

## 2024-06-21 NOTE — PROGRESS NOTES
[facility]  Progress Note  Name: Noreen Alvarez I  MRN: [id number]  Unit/Bed#: W -01 I Date of Admission: 6/19/2024   Date of Service: 6/21/2024 I Hospital Day: 2    Assessment & Plan   * Acute on chronic respiratory failure with hypoxia and hypercapnia (HCC)  Assessment & Plan  Patient has a history of RHETT-3 lobular emphysema, obstructive sleep apnea and multiple lung nodules.  She does wear oxygen 2 L nasal cannula at baseline.  Reports the last 48 hours she has become extremely short of breath and finds difficulty breathing  Noted on vital signs review patient was hypoxic and required an increment of supplemental oxygen, on assessment the patient is back to baseline after nebulizers and steroids, the patient is in no acute distress.  Venous blood gas shows hypercapnia.  The patient states she does not wear CPAP at home and also she does not have one prescribed  RP 2 panel positive for parainfluenza   CTA ED chest PE study shows negative for pulmonary emboli, groundglass nodule in the lateral right lower lobe measures up to 1.6 cm this nodule has been unchanged from her most recent CAT scan on March 2024, there is a slow-growing adenocarcinoma spectrum lesion is not entirely excluded.  New focus on tree-in-bud nodularity in the posterior right lower lobe is favored to represent an infections or inflammatory infiltrate and increase in mediastinal/hilar nodes infiltrate.  Continue with albuterol, Solu-Medrol every 12 hours.  Azithromycin first dose was given in the ER, continue with azithromycin to complete 5 days of treatment  Monitor O2 sats per unit protocol or when needed  The patient follows with [facility] pulmonologist last visit was June 6, 2024.  Per Dr. Hastings note the patient presented on that day with severe dyspnea on exertion.  Repeat VBG showed acute on chronic hypercapnic respiratory failure however patient does not have increased work of breathing, persistent hypoxia  or altered mentation.  Discussed with pulmonology.  Will hold off on BiPAP for now unless patient has increased work of breathing, worsening hypoxia or altered mentation.  Continue to wean oxygen as tolerated.  Of note patient is on 2 L of supplemental oxygen at baseline.  Respiratory and airway protocol in place  Use BiPAP nightly as needed.  Overnight pulse oximetry with a.m. ABG.  Will likely need trilogy as an outpatient.  Pulmonary outpatient follow-up scheduled for 7 /1.    COPD with acute exacerbation (HCC)  Assessment & Plan  Has severe emphysema with acute exacerbation likely in the setting of recent viral URI.  Admits to sick family contacts.  COVID RSV and influenza panel was negative.  P2 panel positive for parainfluenza.  Initially started on continue with IV Solu-Medrol 40 mg every 8 hourly.  Wean to Solu-Medrol every 12 hourly per pulmonary recommendations.  Continue with azithromycin daily to complete total 5 days of treatment.  Procalcitonin x2 negative.  CT chest reviewed and noted.  Continue with scheduled nebulization treatment.  Continue Symbicort.  Pulmonology input appreciated.  Likely can transition to oral steroids in the next 24 hours pending improvement in symptoms.  Continues to have ongoing cough and diminished breath sounds in bilateral lung fields today.  Patient can follow-up as an outpatient for possible chronic azithromycin versus Daliresp versus lung volume reduction considerations  Patient had a negative sleep study earlier this year, hypercapnia likely chronic -continue to monitor patient's mentation and breathing status, BiPAP nightly while inpatient  Can attempt to qualify for Triology as an OP  Overngiht pulse ox prior to DC    Class 1 obesity due to excess calories with serious comorbidity and body mass index (BMI) of 32.0 to 32.9 in adult  Assessment & Plan  BMI 30.90 kg  Education on the importance of healthy eating habits and exercising daily was provided at bedside  greater than 3 minutes    Hyperlipidemia  Assessment & Plan  Last lipid panel 3 months ago showing evaded LDL  On Lipitor 40 mg daily, continue with regimen    HTN (hypertension)  Assessment & Plan  Blood pressure elevated on admission.  Started on Norvasc.  May need titration of dose further based on blood pressure control.  Increase Norvasc to 5 mg daily.    Depression with anxiety  Assessment & Plan  On Zoloft, continue regimen  Stable for now per patient report    Centrilobular emphysema (HCC)  Assessment & Plan  Patient reports dyspnea on exertion and at rest has become worse in the last couple of weeks.  Patient worse 2 L nasal cannula at baseline  She does follow with pulmonology closely last visit found on chart 6/6/2024  FEV1 of 27% per pulmonology note.  On Symbicort, montelukast, Proventil and albuterol as needed, continue with regimen  Patient can follow-up as an outpatient for possible chronic azithromycin versus Daliresp versus lung volume reduction considerations upon discharge    Chronic kidney disease, stage 3 (moderate)  Assessment & Plan  Lab Results   Component Value Date    EGFR 50 06/21/2024    EGFR 59 06/19/2024    EGFR 66 03/05/2024    CREATININE 1.12 06/21/2024    CREATININE 0.98 06/19/2024    CREATININE 0.89 03/05/2024   Baseline creatinine 0.80-1.0  Avoid nephrotoxic medications, NSAIDs and contrast dye if possible  Monitor kidney function with daily BMPs               VTE Pharmacologic Prophylaxis: VTE Score: 7 High Risk (Score >/= 5) - Pharmacological DVT Prophylaxis Ordered: enoxaparin (Lovenox). Sequential Compression Devices Ordered.    Mobility:   Basic Mobility Inpatient Raw Score: 23  JH-HLM Goal: 7: Walk 25 feet or more  JH-HLM Achieved: 6: Walk 10 steps or more  JH-HLM Goal achieved. Continue to encourage appropriate mobility.    Patient Centered Rounds: I performed bedside rounds with nursing staff today.   Discussions with Specialists or Other Care Team Provider: Discussed with  pulmonology    Education and Discussions with Family / Patient: Updated  (daughter) via phone.    Total Time Spent on Date of Encounter in care of patient: 20 mins. This time was spent on one or more of the following: performing physical exam; counseling and coordination of care; obtaining or reviewing history; documenting in the medical record; reviewing/ordering tests, medications or procedures; communicating with other healthcare professionals and discussing with patient's family/caregivers.    Current Length of Stay: 2 day(s)  Current Patient Status: Inpatient   Certification Statement: The patient will continue to require additional inpatient hospital stay due to ongoing management of symptoms  Discharge Plan: Anticipate discharge tomorrow to home.    Code Status: Level 1 - Full Code    Subjective:   Patient seen and examined at bedside.  Used  #[id number].  Continues to complain of fatigue, shortness of breath and increased cough.  Has not required increased oxygen.  Continues to complain of nonproductive cough.  States that she feels generally unwell and is not ready for discharge today.    Objective:     Vitals:   Temp (24hrs), Av.4 °F (36.9 °C), Min:97.8 °F (36.6 °C), Max:98.7 °F (37.1 °C)    Temp:  [97.8 °F (36.6 °C)-98.7 °F (37.1 °C)] 97.8 °F (36.6 °C)  HR:  [86-89] 89  Resp:  [16-18] 17  BP: (155-198)/(77-86) 157/86  SpO2:  [86 %-98 %] 98 %  Body mass index is 30.77 kg/m².     Input and Output Summary (last 24 hours):     Intake/Output Summary (Last 24 hours) at 2024 1229  Last data filed at 2024 0803  Gross per 24 hour   Intake 830 ml   Output 200 ml   Net 630 ml       Physical Exam:   Physical Exam  HENT:      Head: Normocephalic and atraumatic.      Mouth/Throat:      Mouth: Mucous membranes are moist.   Eyes:      Pupils: Pupils are equal, round, and reactive to light.   Cardiovascular:      Rate and Rhythm: Normal rate and regular rhythm.   Pulmonary:       Comments: No tachypnea or increased work of breathing.  Diminished breath sounds with scattered wheezing bilateral lung fields.  Abdominal:      General: Abdomen is flat. Bowel sounds are normal.      Palpations: Abdomen is soft.   Musculoskeletal:      Cervical back: Neck supple.      Right lower leg: No edema.      Left lower leg: No edema.   Neurological:      General: No focal deficit present.      Mental Status: She is alert and oriented to person, place, and time.          Additional Data:     Labs:  Results from last 7 days   Lab Units 06/21/24  0803 06/19/24  1748   WBC Thousand/uL 12.98* 7.46   HEMOGLOBIN g/dL 10.5* 10.6*   HEMATOCRIT % 36.9 36.1   PLATELETS Thousands/uL 177 137*   SEGS PCT %  --  90*   LYMPHO PCT %  --  4*   MONO PCT %  --  5   EOS PCT %  --  0     Results from last 7 days   Lab Units 06/21/24  0803 06/19/24  1748   SODIUM mmol/L 142 141   POTASSIUM mmol/L 4.8 4.5   CHLORIDE mmol/L 97 96   CO2 mmol/L 44* 40*   BUN mg/dL 25 18   CREATININE mg/dL 1.12 0.98   ANION GAP mmol/L 1* 5   CALCIUM mg/dL 9.7 9.3   ALBUMIN g/dL  --  4.4   TOTAL BILIRUBIN mg/dL  --  0.42   ALK PHOS U/L  --  126*   ALT U/L  --  12   AST U/L  --  21   GLUCOSE RANDOM mg/dL 120 129                 Results from last 7 days   Lab Units 06/21/24  0803 06/19/24  1748   PROCALCITONIN ng/ml <0.05 <0.05       Lines/Drains:  Invasive Devices       Peripheral Intravenous Line  Duration             Peripheral IV 06/19/24 Left;Proximal;Ventral (anterior) Forearm 1 day                          Imaging: No pertinent imaging reviewed.    Recent Cultures (last 7 days):         Last 24 Hours Medication List:   Current Facility-Administered Medications   Medication Dose Route Frequency Provider Last Rate    acetaminophen  650 mg Oral Q6H PRN GERTRUDIS Yañez      albuterol  2 puff Inhalation Q6H PRN GERTRUDIS Yañez      aluminum-magnesium hydroxide-simethicone  30 mL Oral Q6H PRN GERTRUDIS Yañez      amLODIPine  2.5 mg Oral Daily  Nallely Carney MD      atorvastatin  40 mg Oral Daily With Dinner GERTRUDIS Yañez      azithromycin  500 mg Oral Q24H Nallely Carney MD      benzonatate  100 mg Oral TID GERTRUDIS Yañez      budesonide-formoterol  2 puff Inhalation BID GERTRUDIS Yañez      docusate sodium  100 mg Oral BID GERTRUDIS Yañez      enoxaparin  40 mg Subcutaneous Daily GERTRUDIS Yañez      fluticasone  2 spray Each Nare Daily GERTRUDIS Yañez      guaiFENesin  600 mg Oral BID GERTRUDIS Yañez      ipratropium  0.5 mg Nebulization Q6H PRN Fan P Valles, DO      levalbuterol  1.25 mg Nebulization Q6H PRN Fan P Valles, DO      levothyroxine  75 mcg Oral Early Morning GERTRUDIS Yañez      montelukast  10 mg Oral HS GERTRUDIS Yañez      ondansetron  4 mg Intravenous Q6H PRN GERTRUDIS Yañez      pantoprazole  40 mg Oral Daily Before Breakfast GERTRUDIS Yañez      predniSONE  40 mg Oral Daily Fan P Valles, DO      Followed by    [START ON 6/24/2024] predniSONE  30 mg Oral Daily Fan P Valles, DO      Followed by    [START ON 6/27/2024] predniSONE  20 mg Oral Daily Fan P Valles, DO      Followed by    [START ON 6/30/2024] predniSONE  10 mg Oral Daily Fan P Valles, DO      sertraline  25 mg Oral HS GERTRUDIS Yañez      timolol  1 drop Both Eyes BID GERTRUDIS Yañez      umeclidinium  1 puff Inhalation Daily Fan P Valles, DO          Today, Patient Was Seen By: Nallely Carney MD    **Please Note: This note may have been constructed using a voice recognition system.**

## 2024-06-21 NOTE — PROGRESS NOTES
PULMONOLOGY PROGRESS NOTE     Name: Noreen Alvarez   Age & Sex: 69 y.o. female   MRN: [id number]  Unit/Bed#: W -01   Encounter: [id number]    PATIENT INFORMATION     Name: Noreen Alvarez   Age & Sex: 69 y.o. 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  Diagnosed with an acute exacerbation of COPD.  Pulmonology was consulted for further recommendations.     Acute on chronic hypoxemic/hypercapnic respiratory failure  Severe COPD with an FEV1 of 37% in acute exacerbation likely secondary to sick contacts  Centrilobular emphysema  Previous tobacco abuse in remission  Anxiety/depression  CKD stage III  Hypertension  Hyperlipidemia  Hypothyroidism  Previous squamous cell carcinoma of the larynx status post chemo and radiation in 2014     Plan:  Continue to wean oxygen for SpO2 greater than 88%, patient is back down to her baseline 2 L  CTA PE study negative for pulmonary embolism, showing groundglass nodule in the right lower lobe that has been stable since March 2024, some tree-in-bud nodularity in the right lower lobe as well possibly infectious or inflammatory  Patient's outpatient regimen includes Breztri, nebulized ipratropium, and nebulized albuterol  At this time, continue to wean nebs and steroids, can likely go to oral today, restart triple therapy  Respiratory protocol, flutter valve, out of bed to chair  Exacerbation likely secondary to parainfluenza virus 3  Continue azithromycin for 5 days total  Patient can follow-up as an outpatient for possible  chronic azithromycin versus Daliresp versus lung volume reduction considerations  Patient had a negative sleep study earlier this year, hypercapnia likely chronic -continue to monitor patient's mentation and breathing status, BiPAP nightly while inpatient  Can attempt to qualify for Triology as an OP  Overngiht pulse ox prior to DC  Clinic message for follow-up, scheduled for 2024  Continue smoking cessation  Rest of care per primary      SUBJECTIVE     Patient seen and examined. No acute events overnight.  Respiratory status is improving.  All other review systems negative at this time.    OBJECTIVE     Vitals:    24 2207 24 0600 24 0652 24 0726   BP: 155/81  157/86    BP Location: Right arm      Pulse: 89      Resp: 18  17    Temp: 98.7 °F (37.1 °C)  97.8 °F (36.6 °C)    TempSrc: Oral      SpO2: 96%   98%   Weight:  81.3 kg (179 lb 3.7 oz)        Temperature:   Temp (24hrs), Av.4 °F (36.9 °C), Min:97.8 °F (36.6 °C), Max:98.7 °F (37.1 °C)    Temperature: 97.8 °F (36.6 °C)  Intake & Output:  I/O          0701   0700  0701   0700  0701   0700    P.O. 240 830     IV Piggyback 250      Total Intake(mL/kg) 490 (6.1) 830 (10.2)     Urine (mL/kg/hr) 200 600 (0.3)     Total Output 200 600     Net +290 +230            Unmeasured Urine Occurrence  1 x     Unmeasured Stool Occurrence  0 x           Weights:        Body mass index is 30.77 kg/m².  Weight (last 2 days)       Date/Time Weight    24 0600 81.3 (179.23)    24 0538 79.9 (176.1)          Physical Exam  Vitals reviewed.   Constitutional:       General: She is not in acute distress.  HENT:      Head: Normocephalic and atraumatic.      Mouth/Throat:      Mouth: Mucous membranes are moist.      Pharynx: Oropharynx is clear.   Eyes:      Extraocular Movements: Extraocular movements intact.      Pupils: Pupils are equal, round, and reactive to light.   Cardiovascular:      Rate and Rhythm: Normal  rate and regular rhythm.   Pulmonary:      Effort: Pulmonary effort is normal.      Breath sounds: Examination of the right-lower field reveals decreased breath sounds and wheezing. Examination of the left-lower field reveals decreased breath sounds and wheezing. Decreased breath sounds and wheezing present.   Abdominal:      General: Bowel sounds are normal.      Palpations: Abdomen is soft.   Musculoskeletal:      Cervical back: Normal range of motion and neck supple.      Right lower leg: No edema.      Left lower leg: No edema.   Skin:     General: Skin is warm and dry.      Capillary Refill: Capillary refill takes less than 2 seconds.   Neurological:      General: No focal deficit present.      Mental Status: She is alert.   Psychiatric:         Mood and Affect: Mood normal.         Behavior: Behavior normal.           LABORATORY DATA     Labs: I have personally reviewed pertinent reports.  Results from last 7 days   Lab Units 06/21/24  0803 06/19/24  1748   WBC Thousand/uL 12.98* 7.46   HEMOGLOBIN g/dL 10.5* 10.6*   HEMATOCRIT % 36.9 36.1   PLATELETS Thousands/uL 177 137*   SEGS PCT %  --  90*   MONO PCT %  --  5   EOS PCT %  --  0      Results from last 7 days   Lab Units 06/21/24  0803 06/19/24  1748   POTASSIUM mmol/L 4.8 4.5   CHLORIDE mmol/L 97 96   CO2 mmol/L 44* 40*   BUN mg/dL 25 18   CREATININE mg/dL 1.12 0.98   CALCIUM mg/dL 9.7 9.3   ALK PHOS U/L  --  126*   ALT U/L  --  12   AST U/L  --  21         IMAGING & DIAGNOSTIC TESTING     Radiology Results: I have personally reviewed pertinent reports.  XR chest 1 view portable    Result Date: 6/20/2024  Impression: No focal consolidation, pleural effusion, or pneumothorax. Workstation performed: [id number]     CTA ED chest PE Study    Result Date: 6/19/2024  Impression: No pulmonary embolism. A groundglass nodule in the lateral right lower lobe measures up to 1.6 cm, unchanged from most recent March 2024 comparison, however increased from December 2022  exam. A slow-growing/adenocarcinoma spectrum lesion is not entirely excluded. New focus of tree-in-bud nodularity in the posterior right lower lobe is favored to represent an infectious/inflammatory infiltrate. Overall interval increase in mediastinal/hilar nodes as described. Continued follow-up is recommended. Workstation performed: [id number]     Other Diagnostic Testing: I have personally reviewed pertinent reports.    ACTIVE MEDICATIONS     Current Facility-Administered Medications   Medication Dose Route Frequency    acetaminophen (TYLENOL) tablet 650 mg  650 mg Oral Q6H PRN    albuterol (PROVENTIL HFA,VENTOLIN HFA) inhaler 2 puff  2 puff Inhalation Q6H PRN    aluminum-magnesium hydroxide-simethicone (MAALOX) oral suspension 30 mL  30 mL Oral Q6H PRN    amLODIPine (NORVASC) tablet 2.5 mg  2.5 mg Oral Daily    atorvastatin (LIPITOR) tablet 40 mg  40 mg Oral Daily With Dinner    azithromycin (ZITHROMAX) tablet 500 mg  500 mg Oral Q24H    benzonatate (TESSALON PERLES) capsule 100 mg  100 mg Oral TID    budesonide-formoterol (SYMBICORT) 160-4.5 mcg/act inhaler 2 puff  2 puff Inhalation BID    docusate sodium (COLACE) capsule 100 mg  100 mg Oral BID    enoxaparin (LOVENOX) subcutaneous injection 40 mg  40 mg Subcutaneous Daily    fluticasone (FLONASE) 50 mcg/act nasal spray 2 spray  2 spray Each Nare Daily    guaiFENesin (MUCINEX) 12 hr tablet 600 mg  600 mg Oral BID    ipratropium (ATROVENT) 0.02 % inhalation solution 0.5 mg  0.5 mg Nebulization TID    levalbuterol (XOPENEX) inhalation solution 1.25 mg  1.25 mg Nebulization TID    levothyroxine tablet 75 mcg  75 mcg Oral Early Morning    methylPREDNISolone sodium succinate (Solu-MEDROL) injection 40 mg  40 mg Intravenous Q12H ESTEVAN    montelukast (SINGULAIR) tablet 10 mg  10 mg Oral HS    ondansetron (ZOFRAN) injection 4 mg  4 mg Intravenous Q6H PRN    pantoprazole (PROTONIX) EC tablet 40 mg  40 mg Oral Daily Before Breakfast    sertraline (ZOLOFT) tablet 25 mg  25  "mg Oral HS    timolol (TIMOPTIC) 0.5 % ophthalmic solution 1 drop  1 drop Both Eyes BID         Disclaimer: Portions of the record may have been created with voice recognition software. Occasional wrong word or \"sound a like\" substitutions may have occurred due to the inherent limitations of voice recognition software. Careful consideration should be taken to recognize, using context, where substitutions have occurred.    Fan Valles DO, MS  Pulmonary and Critical Care Fellow, PGY-IV  [facility]    "

## 2024-06-21 NOTE — PLAN OF CARE
Problem: PAIN - ADULT  Goal: Verbalizes/displays adequate comfort level or baseline comfort level  Description: Interventions:  - Encourage patient to monitor pain and request assistance  - Assess pain using appropriate pain scale  - Administer analgesics based on type and severity of pain and evaluate response  - Implement non-pharmacological measures as appropriate and evaluate response  - Consider cultural and social influences on pain and pain management  - Notify physician/advanced practitioner if interventions unsuccessful or patient reports new pain  Outcome: Progressing     Problem: INFECTION - ADULT  Goal: Absence or prevention of progression during hospitalization  Description: INTERVENTIONS:  - Assess and monitor for signs and symptoms of infection  - Monitor lab/diagnostic results  - Monitor all insertion sites, i.e. indwelling lines, tubes, and drains  - Monitor endotracheal if appropriate and nasal secretions for changes in amount and color  - [place] appropriate cooling/warming therapies per order  - Administer medications as ordered  - Instruct and encourage patient and family to use good hand hygiene technique  - Identify and instruct in appropriate isolation precautions for identified infection/condition  Outcome: Progressing  Goal: Absence of fever/infection during neutropenic period  Description: INTERVENTIONS:  - Monitor WBC    Outcome: Progressing     Problem: SAFETY ADULT  Goal: Patient will remain free of falls  Description: INTERVENTIONS:  - Educate patient/family on patient safety including physical limitations  - Instruct patient to call for assistance with activity   - Consult OT/PT to assist with strengthening/mobility   - Keep Call bell within reach  - Keep bed low and locked with side rails adjusted as appropriate  - Keep care items and personal belongings within reach  - Initiate and maintain comfort rounds  - Make Fall Risk Sign visible to staff  - Offer Toileting every  Hours,  in advance of need  - Initiate/Maintain alarm  - Obtain necessary fall risk management equipment:  - Apply yellow socks and bracelet for high fall risk patients  - Consider moving patient to room near nurses station  Outcome: Progressing  Goal: Maintain or return to baseline ADL function  Description: INTERVENTIONS:  -  Assess patient's ability to carry out ADLs; assess patient's baseline for ADL function and identify physical deficits which impact ability to perform ADLs (bathing, care of mouth/teeth, toileting, grooming, dressing, etc.)  - Assess/evaluate cause of self-care deficits   - Assess range of motion  - Assess patient's mobility; develop plan if impaired  - Assess patient's need for assistive devices and provide as appropriate  - Encourage maximum independence but intervene and supervise when necessary  - Involve family in performance of ADLs  - Assess for home care needs following discharge   - Consider OT consult to assist with ADL evaluation and planning for discharge  - Provide patient education as appropriate  Outcome: Progressing  Goal: Maintains/Returns to pre admission functional level  Description: INTERVENTIONS:  - Perform AM-PAC 6 Click Basic Mobility/ Daily Activity assessment daily.  - Set and communicate daily mobility goal to care team and patient/family/caregiver.   - Collaborate with rehabilitation services on mobility goals if consulted  - Perform Range of Motion  times a day.  - Reposition patient every  hours.  - Dangle patient times a day  - Stand patient times a day  - Ambulate patient  times a day  - Out of bed to [place] times a day   - Out of bed for meals  times a day  - Out of bed for toileting  - Record patient progress and toleration of activity level   Outcome: Progressing     Problem: DISCHARGE PLANNING  Goal: Discharge to home or other facility with appropriate resources  Description: INTERVENTIONS:  - Identify barriers to discharge w/patient and caregiver  - Arrange for  needed discharge resources and transportation as appropriate  - Identify discharge learning needs (meds, wound care, etc.)  - Arrange for interpretive services to assist at discharge as needed  - Refer to Case Management Department for coordinating discharge planning if the patient needs post-hospital services based on physician/advanced practitioner order or complex needs related to functional status, cognitive ability, or social support system  Outcome: Progressing     Problem: Knowledge Deficit  Goal: Patient/family/caregiver demonstrates understanding of disease process, treatment plan, medications, and discharge instructions  Description: Complete learning assessment and assess knowledge base.  Interventions:  - Provide teaching at level of understanding  - Provide teaching via preferred learning methods  Outcome: Progressing

## 2024-06-21 NOTE — ASSESSMENT & PLAN NOTE
Blood pressure elevated on admission.  Started on Norvasc.  May need titration of dose further based on blood pressure control.  Increase Norvasc to 5 mg daily.

## 2024-06-22 LAB
ANION GAP SERPL CALCULATED.3IONS-SCNC: 4 MMOL/L (ref 4–13)
ARTERIAL PATENCY WRIST A: YES
BASE EXCESS BLDA CALC-SCNC: 11.1 MMOL/L
BASE EXCESS BLDA CALC-SCNC: 11.5 MMOL/L
BASE EXCESS BLDA CALC-SCNC: 7.8 MMOL/L
BUN SERPL-MCNC: 34 MG/DL (ref 5–25)
CALCIUM SERPL-MCNC: 9.3 MG/DL (ref 8.4–10.2)
CHLORIDE SERPL-SCNC: 93 MMOL/L (ref 96–108)
CO2 SERPL-SCNC: 43 MMOL/L (ref 21–32)
CREAT SERPL-MCNC: 1.16 MG/DL (ref 0.6–1.3)
GFR SERPL CREATININE-BSD FRML MDRD: 48 ML/MIN/1.73SQ M
GLUCOSE SERPL-MCNC: 114 MG/DL (ref 65–140)
HCO3 BLDA-SCNC: 36.3 MMOL/L (ref 22–28)
HCO3 BLDA-SCNC: 40 MMOL/L (ref 22–28)
HCO3 BLDA-SCNC: 41.8 MMOL/L (ref 22–28)
IPAP: 12
IPAP: 14
NON VENT- BIPAP: ABNORMAL
NON VENT- BIPAP: ABNORMAL
O2 CT BLDA-SCNC: 15.5 ML/DL (ref 16–23)
O2 CT BLDA-SCNC: 16.2 ML/DL (ref 16–23)
O2 CT BLDA-SCNC: 16.5 ML/DL (ref 16–23)
OXYHGB MFR BLDA: 96 % (ref 94–97)
OXYHGB MFR BLDA: 96.7 % (ref 94–97)
OXYHGB MFR BLDA: 96.9 % (ref 94–97)
PCO2 BLDA: 74.4 MM HG (ref 36–44)
PCO2 BLDA: 79.5 MM HG (ref 36–44)
PCO2 BLDA: 92.7 MM HG (ref 36–44)
PEEP MAX SETTING VENT: 6 CM[H2O]
PEEP MAX SETTING VENT: 6 CM[H2O]
PH BLDA: 7.27 [PH] (ref 7.35–7.45)
PH BLDA: 7.31 [PH] (ref 7.35–7.45)
PH BLDA: 7.32 [PH] (ref 7.35–7.45)
PO2 BLDA: 105.7 MM HG (ref 75–129)
PO2 BLDA: 93.5 MM HG (ref 75–129)
PO2 BLDA: 99.6 MM HG (ref 75–129)
POTASSIUM SERPL-SCNC: 4.2 MMOL/L (ref 3.5–5.3)
SODIUM SERPL-SCNC: 140 MMOL/L (ref 135–147)
SPECIMEN SOURCE: ABNORMAL
SPECIMEN SOURCE: ABNORMAL
VENT BIPAP FIO2: 40 %
VENT BIPAP FIO2: 40 %

## 2024-06-22 PROCEDURE — 99232 SBSQ HOSP IP/OBS MODERATE 35: CPT | Performed by: INTERNAL MEDICINE

## 2024-06-22 PROCEDURE — 82805 BLOOD GASES W/O2 SATURATION: CPT

## 2024-06-22 PROCEDURE — 82805 BLOOD GASES W/O2 SATURATION: CPT | Performed by: INTERNAL MEDICINE

## 2024-06-22 PROCEDURE — 94640 AIRWAY INHALATION TREATMENT: CPT

## 2024-06-22 PROCEDURE — 94003 VENT MGMT INPAT SUBQ DAY: CPT

## 2024-06-22 PROCEDURE — 36600 WITHDRAWAL OF ARTERIAL BLOOD: CPT

## 2024-06-22 PROCEDURE — 94760 N-INVAS EAR/PLS OXIMETRY 1: CPT

## 2024-06-22 PROCEDURE — 94644 CONT INHLJ TX 1ST HOUR: CPT

## 2024-06-22 PROCEDURE — 80048 BASIC METABOLIC PNL TOTAL CA: CPT | Performed by: INTERNAL MEDICINE

## 2024-06-22 RX ORDER — METHYLPREDNISOLONE SODIUM SUCCINATE 40 MG/ML
40 INJECTION, POWDER, LYOPHILIZED, FOR SOLUTION INTRAMUSCULAR; INTRAVENOUS EVERY 8 HOURS SCHEDULED
Status: DISCONTINUED | OUTPATIENT
Start: 2024-06-22 | End: 2024-06-22

## 2024-06-22 RX ORDER — BUDESONIDE 0.5 MG/2ML
0.5 INHALANT ORAL
Status: DISCONTINUED | OUTPATIENT
Start: 2024-06-22 | End: 2024-06-26 | Stop reason: HOSPADM

## 2024-06-22 RX ORDER — FORMOTEROL FUMARATE DIHYDRATE 20 UG/2ML
20 SOLUTION RESPIRATORY (INHALATION)
Status: DISCONTINUED | OUTPATIENT
Start: 2024-06-22 | End: 2024-06-26 | Stop reason: HOSPADM

## 2024-06-22 RX ORDER — METHYLPREDNISOLONE SODIUM SUCCINATE 125 MG/2ML
60 INJECTION, POWDER, LYOPHILIZED, FOR SOLUTION INTRAMUSCULAR; INTRAVENOUS EVERY 6 HOURS SCHEDULED
Status: DISCONTINUED | OUTPATIENT
Start: 2024-06-22 | End: 2024-06-23

## 2024-06-22 RX ORDER — LABETALOL HYDROCHLORIDE 5 MG/ML
10 INJECTION, SOLUTION INTRAVENOUS EVERY 6 HOURS PRN
Status: DISCONTINUED | OUTPATIENT
Start: 2024-06-22 | End: 2024-06-26 | Stop reason: HOSPADM

## 2024-06-22 RX ORDER — LEVALBUTEROL INHALATION SOLUTION 1.25 MG/3ML
1.25 SOLUTION RESPIRATORY (INHALATION)
Status: DISCONTINUED | OUTPATIENT
Start: 2024-06-22 | End: 2024-06-26 | Stop reason: HOSPADM

## 2024-06-22 RX ORDER — ALBUTEROL SULFATE 2.5 MG/3ML
10 SOLUTION RESPIRATORY (INHALATION) ONCE
Status: COMPLETED | OUTPATIENT
Start: 2024-06-22 | End: 2024-06-22

## 2024-06-22 RX ORDER — LEVALBUTEROL INHALATION SOLUTION 1.25 MG/3ML
1.25 SOLUTION RESPIRATORY (INHALATION)
Status: DISCONTINUED | OUTPATIENT
Start: 2024-06-22 | End: 2024-06-22

## 2024-06-22 RX ADMIN — LEVALBUTEROL HYDROCHLORIDE 1.25 MG: 1.25 SOLUTION RESPIRATORY (INHALATION) at 00:28

## 2024-06-22 RX ADMIN — IPRATROPIUM BROMIDE 0.5 MG: 0.5 SOLUTION RESPIRATORY (INHALATION) at 20:26

## 2024-06-22 RX ADMIN — METHYLPREDNISOLONE SODIUM SUCCINATE 60 MG: 125 INJECTION, POWDER, FOR SOLUTION INTRAMUSCULAR; INTRAVENOUS at 17:10

## 2024-06-22 RX ADMIN — TIMOLOL MALEATE 1 DROP: 5 SOLUTION OPHTHALMIC at 17:11

## 2024-06-22 RX ADMIN — BENZONATATE 100 MG: 100 CAPSULE ORAL at 21:11

## 2024-06-22 RX ADMIN — LEVALBUTEROL HYDROCHLORIDE 1.25 MG: 1.25 SOLUTION RESPIRATORY (INHALATION) at 04:12

## 2024-06-22 RX ADMIN — LEVALBUTEROL HYDROCHLORIDE 1.25 MG: 1.25 SOLUTION RESPIRATORY (INHALATION) at 07:06

## 2024-06-22 RX ADMIN — LEVALBUTEROL HYDROCHLORIDE 1.25 MG: 1.25 SOLUTION RESPIRATORY (INHALATION) at 20:26

## 2024-06-22 RX ADMIN — LEVALBUTEROL HYDROCHLORIDE 1.25 MG: 1.25 SOLUTION RESPIRATORY (INHALATION) at 14:54

## 2024-06-22 RX ADMIN — METHYLPREDNISOLONE SODIUM SUCCINATE 60 MG: 125 INJECTION, POWDER, FOR SOLUTION INTRAMUSCULAR; INTRAVENOUS at 11:43

## 2024-06-22 RX ADMIN — PANTOPRAZOLE SODIUM 40 MG: 40 TABLET, DELAYED RELEASE ORAL at 04:32

## 2024-06-22 RX ADMIN — TIMOLOL MALEATE 1 DROP: 5 SOLUTION OPHTHALMIC at 08:20

## 2024-06-22 RX ADMIN — BUDESONIDE 0.5 MG: 0.5 INHALANT ORAL at 20:26

## 2024-06-22 RX ADMIN — FORMOTEROL FUMARATE DIHYDRATE 20 MCG: 20 SOLUTION RESPIRATORY (INHALATION) at 20:26

## 2024-06-22 RX ADMIN — IPRATROPIUM BROMIDE 0.5 MG: 0.5 SOLUTION RESPIRATORY (INHALATION) at 14:54

## 2024-06-22 RX ADMIN — IPRATROPIUM BROMIDE 0.5 MG: 0.5 SOLUTION RESPIRATORY (INHALATION) at 07:06

## 2024-06-22 RX ADMIN — AZITHROMYCIN 500 MG: 250 TABLET, FILM COATED ORAL at 21:11

## 2024-06-22 RX ADMIN — MONTELUKAST 10 MG: 10 TABLET, FILM COATED ORAL at 21:11

## 2024-06-22 RX ADMIN — SERTRALINE HYDROCHLORIDE 25 MG: 25 TABLET ORAL at 21:11

## 2024-06-22 RX ADMIN — ENOXAPARIN SODIUM 40 MG: 40 INJECTION SUBCUTANEOUS at 08:12

## 2024-06-22 RX ADMIN — METHYLPREDNISOLONE SODIUM SUCCINATE 40 MG: 40 INJECTION, POWDER, FOR SOLUTION INTRAMUSCULAR; INTRAVENOUS at 08:16

## 2024-06-22 RX ADMIN — LEVOTHYROXINE SODIUM 75 MCG: 75 TABLET ORAL at 04:31

## 2024-06-22 RX ADMIN — ALBUTEROL SULFATE 10 MG: 2.5 SOLUTION RESPIRATORY (INHALATION) at 11:20

## 2024-06-22 RX ADMIN — LABETALOL HYDROCHLORIDE 10 MG: 5 INJECTION, SOLUTION INTRAVENOUS at 21:11

## 2024-06-22 NOTE — ASSESSMENT & PLAN NOTE
Patient reports dyspnea on exertion and at rest has become worse in the last couple of weeks.  Patient worse 2 L nasal cannula at baseline  She does follow with pulmonology closely last visit found on chart 6/6/2024  FEV1 of 27% per pulmonology note.  On Symbicort, montelukast, Proventil and albuterol as needed, inhalers held as patient is getting scheduled nebulization treatment.  Patient can follow-up as an outpatient for possible chronic azithromycin versus Daliresp versus lung volume reduction considerations upon discharge

## 2024-06-22 NOTE — PLAN OF CARE
Problem: PAIN - ADULT  Goal: Verbalizes/displays adequate comfort level or baseline comfort level  Description: Interventions:  - Encourage patient to monitor pain and request assistance  - Assess pain using appropriate pain scale  - Administer analgesics based on type and severity of pain and evaluate response  - Implement non-pharmacological measures as appropriate and evaluate response  - Consider cultural and social influences on pain and pain management  - Notify physician/advanced practitioner if interventions unsuccessful or patient reports new pain  Outcome: Progressing     Problem: INFECTION - ADULT  Goal: Absence or prevention of progression during hospitalization  Description: INTERVENTIONS:  - Assess and monitor for signs and symptoms of infection  - Monitor lab/diagnostic results  - Monitor all insertion sites, i.e. indwelling lines, tubes, and drains  - Monitor endotracheal if appropriate and nasal secretions for changes in amount and color  - [place] appropriate cooling/warming therapies per order  - Administer medications as ordered  - Instruct and encourage patient and family to use good hand hygiene technique  - Identify and instruct in appropriate isolation precautions for identified infection/condition  Outcome: Progressing  Goal: Absence of fever/infection during neutropenic period  Description: INTERVENTIONS:  - Monitor WBC    Outcome: Progressing     Problem: SAFETY ADULT  Goal: Patient will remain free of falls  Description: INTERVENTIONS:  - Educate patient/family on patient safety including physical limitations  - Instruct patient to call for assistance with activity   - Consult OT/PT to assist with strengthening/mobility   - Keep Call bell within reach  - Keep bed low and locked with side rails adjusted as appropriate  - Keep care items and personal belongings within reach  - Initiate and maintain comfort rounds  - Make Fall Risk Sign visible to staff  - Apply yellow socks and bracelet  for high fall risk patients  - Consider moving patient to room near nurses station  Outcome: Progressing  Goal: Maintain or return to baseline ADL function  Description: INTERVENTIONS:  -  Assess patient's ability to carry out ADLs; assess patient's baseline for ADL function and identify physical deficits which impact ability to perform ADLs (bathing, care of mouth/teeth, toileting, grooming, dressing, etc.)  - Assess/evaluate cause of self-care deficits   - Assess range of motion  - Assess patient's mobility; develop plan if impaired  - Assess patient's need for assistive devices and provide as appropriate  - Encourage maximum independence but intervene and supervise when necessary  - Involve family in performance of ADLs  - Assess for home care needs following discharge   - Consider OT consult to assist with ADL evaluation and planning for discharge  - Provide patient education as appropriate  Outcome: Progressing  Goal: Maintains/Returns to pre admission functional level  Description: INTERVENTIONS:  - Perform AM-PAC 6 Click Basic Mobility/ Daily Activity assessment daily.  - Set and communicate daily mobility goal to care team and patient/family/caregiver.   - Collaborate with rehabilitation services on mobility goals if consulted  - Out of bed for toileting  - Record patient progress and toleration of activity level   Outcome: Progressing     Problem: DISCHARGE PLANNING  Goal: Discharge to home or other facility with appropriate resources  Description: INTERVENTIONS:  - Identify barriers to discharge w/patient and caregiver  - Arrange for needed discharge resources and transportation as appropriate  - Identify discharge learning needs (meds, wound care, etc.)  - Arrange for interpretive services to assist at discharge as needed  - Refer to Case Management Department for coordinating discharge planning if the patient needs post-hospital services based on physician/advanced practitioner order or complex needs  related to functional status, cognitive ability, or social support system  Outcome: Progressing     Problem: Knowledge Deficit  Goal: Patient/family/caregiver demonstrates understanding of disease process, treatment plan, medications, and discharge instructions  Description: Complete learning assessment and assess knowledge base.  Interventions:  - Provide teaching at level of understanding  - Provide teaching via preferred learning methods  Outcome: Progressing     Problem: Prexisting or High Potential for Compromised Skin Integrity  Goal: Skin integrity is maintained or improved  Description: INTERVENTIONS:  - Identify patients at risk for skin breakdown  - Assess and monitor skin integrity  - Assess and monitor nutrition and hydration status  - Monitor labs   - Assess for incontinence   - Turn and reposition patient  - Assist with mobility/ambulation  - Relieve pressure over bony prominences  - Avoid friction and shearing  - Provide appropriate hygiene as needed including keeping skin clean and dry  - Evaluate need for skin moisturizer/barrier cream  - Collaborate with interdisciplinary team   - Patient/family teaching  - Consider wound care consult   Outcome: Progressing

## 2024-06-22 NOTE — PROGRESS NOTES
[facility]  Progress Note  Name: Noreen Alvarez I  MRN: [id number]  Unit/Bed#: S -01 I Date of Admission: 6/19/2024   Date of Service: 6/22/2024 I Hospital Day: 3    Assessment & Plan   * Acute on chronic respiratory failure with hypoxia and hypercapnia (HCC)  Assessment & Plan  Patient has a history of RHETT-3 lobular emphysema, obstructive sleep apnea and multiple lung nodules.  She does wear oxygen 2 L nasal cannula at baseline.  Reports the last 48 hours she has become extremely short of breath and finds difficulty breathing  Noted on vital signs review patient was hypoxic and required an increment of supplemental oxygen, on assessment the patient is back to baseline after nebulizers and steroids, the patient is in no acute distress.  Venous blood gas shows hypercapnia.  The patient states she does not wear CPAP at home and also she does not have one prescribed  RP 2 panel positive for parainfluenza   CTA ED chest PE study shows negative for pulmonary emboli, groundglass nodule in the lateral right lower lobe measures up to 1.6 cm this nodule has been unchanged from her most recent CAT scan on March 2024, there is a slow-growing adenocarcinoma spectrum lesion is not entirely excluded.  New focus on tree-in-bud nodularity in the posterior right lower lobe is favored to represent an infections or inflammatory infiltrate and increase in mediastinal/hilar nodes infiltrate.  Had escalating oxygen requirement since yesterday evening with persistent hypoxia and hypercapnia.  Started on BiPAP and has remained on BiPAP overnight.  A.m. ABG still with hypercapnic respiratory failure.  Findings discussed with pulmonology/critical care team.  Steroids dose and nebulization frequency increased by pulmonology.  Follow-up on repeat ABG at 1500  If no improvement then patient may need to be upgraded to critical care for closer monitoring and or possible need for intubation.  Continue with scheduled  nebulization, 60 mg Solu-Medrol every 6 hours.  Continue with azithromycin to complete 5 days of treatment in view of COPD exacerbation   Monitor O2 sats per unit protocol   Will likely need trilogy as an outpatient.  Pulmonary outpatient follow-up scheduled for 7 /1.    COPD with acute exacerbation (HCC)  Assessment & Plan  Has severe emphysema with acute exacerbation likely in the setting of recent viral URI.  Admits to sick family contacts.  COVID RSV and influenza panel was negative.  RP2 panel positive for parainfluenza.    Initially started on IV Solu-Medrol 40 mg every 8 hourly.  Wean to Solu-Medrol every 12 hourly per pulmonary yesterday   Continue with azithromycin daily to complete total 5 days of treatment.    Procalcitonin x2 negative.    CT chest reviewed and noted.   Had escalating oxygen requirement and worsening hypercapnia since last evening.  [facility] and acute on chronic hypercapnic respiratory failure  Increase Solu-Medrol to 60 mg every 6 hourly by pulmonology.  Continue with scheduled nebulization treatment.  Continue with close monitoring of respiratory status oxygen saturation and ABG.  If no improvement on above, will need to be transferred to critical care service for further management.       Class 1 obesity due to excess calories with serious comorbidity and body mass index (BMI) of 32.0 to 32.9 in adult  Assessment & Plan  BMI 30.90 kg  Education on the importance of healthy eating habits and exercising daily was provided at bedside greater than 3 minutes    Hyperlipidemia  Assessment & Plan  Last lipid panel 3 months ago showing evaded LDL  On Lipitor 40 mg daily, continue with regimen    HTN (hypertension)  Assessment & Plan  Blood pressure elevated on admission.  Started on Norvasc.  May need titration of dose further based on blood pressure control.  Increase Norvasc to 5 mg daily.    Depression with anxiety  Assessment & Plan  On Zoloft,  continue regimen  Stable for now per patient report    Centrilobular emphysema (HCC)  Assessment & Plan  Patient reports dyspnea on exertion and at rest has become worse in the last couple of weeks.  Patient worse 2 L nasal cannula at baseline  She does follow with pulmonology closely last visit found on chart 6/6/2024  FEV1 of 27% per pulmonology note.  On Symbicort, montelukast, Proventil and albuterol as needed, inhalers held as patient is getting scheduled nebulization treatment.  Patient can follow-up as an outpatient for possible chronic azithromycin versus Daliresp versus lung volume reduction considerations upon discharge    Chronic kidney disease, stage 3 (moderate)  Assessment & Plan  Lab Results   Component Value Date    EGFR 48 06/22/2024    EGFR 50 06/21/2024    EGFR 59 06/19/2024    CREATININE 1.16 06/22/2024    CREATININE 1.12 06/21/2024    CREATININE 0.98 06/19/2024   Baseline creatinine 0.80-1.0  Avoid nephrotoxic medications, NSAIDs and contrast dye if possible  Monitor kidney function with daily BMPs               VTE Pharmacologic Prophylaxis: VTE Score: 7 High Risk (Score >/= 5) - Pharmacological DVT Prophylaxis Ordered: enoxaparin (Lovenox). Sequential Compression Devices Ordered.    Mobility:   Basic Mobility Inpatient Raw Score: 20  JH-HLM Goal: 6: Walk 10 steps or more  JH-HLM Achieved: 6: Walk 10 steps or more  JH-HLM Goal achieved. Continue to encourage appropriate mobility.    Patient Centered Rounds: I performed bedside rounds with nursing staff today.   Discussions with Specialists or Other Care Team Provider: Discussed with pulmonologist    Education and Discussions with Family / Patient: Updated  (daughter) via phone.    Total Time Spent on Date of Encounter in care of patient: 20 mins. This time was spent on one or more of the following: performing physical exam; counseling and coordination of care; obtaining or reviewing history; documenting in the medical record;  "reviewing/ordering tests, medications or procedures; communicating with other healthcare professionals and discussing with patient's family/caregivers.    Current Length of Stay: 3 day(s)  Current Patient Status: Inpatient   Certification Statement: The patient will continue to require additional inpatient hospital stay due to ongoing monitoring of respiratory status and BiPAP  Discharge Plan:  Currently not medically stable for discharge    Code Status: Level 1 - Full Code    Subjective:   Patient seen and examined at bedside.  Remained on BiPAP overnight and upset about not being able to eat.  Continues to complain of shortness of breath.  Per nursing patient was continually saying earlier\" I cannot breathe\".  Used  to discuss plan of care with the patient.  She wants BiPAP to be removed but understands the need for that.  Denies any chest pain, worsening productive cough.    Objective:     Vitals:   Temp (24hrs), Av.5 °F (36.4 °C), Min:97.2 °F (36.2 °C), Max:97.8 °F (36.6 °C)    Temp:  [97.2 °F (36.2 °C)-97.8 °F (36.6 °C)] 97.8 °F (36.6 °C)  HR:  [] 82  Resp:  [16-18] 16  BP: (121-200)/(64-99) 169/79  SpO2:  [74 %-99 %] 99 %  Body mass index is 30.88 kg/m².     Input and Output Summary (last 24 hours):   No intake or output data in the 24 hours ending 24 1346    Physical Exam:   Physical Exam  Constitutional:       Appearance: She is obese. She is ill-appearing. She is not diaphoretic.   HENT:      Head: Normocephalic.      Mouth/Throat:      Mouth: Mucous membranes are moist.   Eyes:      Extraocular Movements: Extraocular movements intact.      Pupils: Pupils are equal, round, and reactive to light.   Cardiovascular:      Rate and Rhythm: Normal rate and regular rhythm.   Pulmonary:      Effort: No respiratory distress.      Breath sounds: No stridor.      Comments: Diminished breath sounds bilaterally with diffuse wheezing in all lung fields.  Abdominal:      General: Abdomen " is flat. Bowel sounds are normal.      Palpations: Abdomen is soft.   Musculoskeletal:      Cervical back: Neck supple.      Right lower leg: No edema.      Left lower leg: No edema.   Skin:     General: Skin is warm.   Neurological:      General: No focal deficit present.      Mental Status: She is alert and oriented to person, place, and time. Mental status is at baseline.          Additional Data:     Labs:  Results from last 7 days   Lab Units 06/21/24  1733 06/21/24  0803   WBC Thousand/uL  --  12.98*   HEMOGLOBIN g/dL  --  10.5*   I STAT HEMOGLOBIN g/dl 11.6  --    HEMATOCRIT %  --  36.9   HEMATOCRIT, ISTAT % 34*  --    PLATELETS Thousands/uL  --  177   SEGS PCT %  --  90*   LYMPHO PCT %  --  3*   MONO PCT %  --  6   EOS PCT %  --  0     Results from last 7 days   Lab Units 06/22/24  0500 06/21/24  0803 06/19/24  1748   SODIUM mmol/L 140   < > 141   POTASSIUM mmol/L 4.2   < > 4.5   CHLORIDE mmol/L 93*   < > 96   CO2 mmol/L 43*   < > 40*   CO2, I-STAT   --    < >  --    BUN mg/dL 34*   < > 18   CREATININE mg/dL 1.16   < > 0.98   ANION GAP mmol/L 4   < > 5   CALCIUM mg/dL 9.3   < > 9.3   ALBUMIN g/dL  --   --  4.4   TOTAL BILIRUBIN mg/dL  --   --  0.42   ALK PHOS U/L  --   --  126*   ALT U/L  --   --  12   AST U/L  --   --  21   GLUCOSE RANDOM mg/dL 114   < > 129    < > = values in this interval not displayed.                 Results from last 7 days   Lab Units 06/21/24  0803 06/19/24  1748   PROCALCITONIN ng/ml <0.05 <0.05       Lines/Drains:  Invasive Devices       Peripheral Intravenous Line  Duration             Peripheral IV 06/19/24 Left;Proximal;Ventral (anterior) Forearm 2 days                      Telemetry:  Telemetry Orders (From admission, onward)               24 Hour Telemetry Monitoring  Continuous x 24 Hours (Telem)        Question:  Reason for 24 Hour Telemetry  Answer:  Acute respiratory failure on BiPAP                     Telemetry Reviewed: Normal Sinus Rhythm  Indication for Continued  Telemetry Use: Acute respiratory failure on Bipap             Imaging: Reviewed radiology reports from this admission including: chest xray    Recent Cultures (last 7 days):         Last 24 Hours Medication List:   Current Facility-Administered Medications   Medication Dose Route Frequency Provider Last Rate    acetaminophen  650 mg Oral Q6H PRN GERTRUDIS Yañez      albuterol  2 puff Inhalation Q6H PRN GERTRUDIS Yañez      aluminum-magnesium hydroxide-simethicone  30 mL Oral Q6H PRN GERTRUDIS Yañez      amLODIPine  5 mg Oral Daily Nallely Carney MD      atorvastatin  40 mg Oral Daily With Dinner GERTRUDIS Yañez      azithromycin  500 mg Oral Q24H Nallely Carney MD      benzonatate  100 mg Oral TID GERTRUDIS Yañez      budesonide  0.5 mg Nebulization Q12H Chet Aparicio, DO      docusate sodium  100 mg Oral BID GERTRUDIS Yañez      enoxaparin  40 mg Subcutaneous Daily GERTRUDIS Yañez      fluticasone  2 spray Each Nare Daily GERTRUDIS Yañez      formoterol  20 mcg Nebulization Q12H Chet Aparicio, DO      guaiFENesin  600 mg Oral BID GERTRUDIS Yañez      ipratropium  0.5 mg Nebulization Q6H PRN Fannael Valles, DO      ipratropium  0.5 mg Nebulization TID Nallely Carney MD      levalbuterol  1.25 mg Nebulization Q6H PRN Fannael Valles, DO      levalbuterol  1.25 mg Nebulization TID Nallely Carney MD      levothyroxine  75 mcg Oral Early Morning GERTRUDIS Yañez      methylPREDNISolone sodium succinate  60 mg Intravenous Q6H [facility] Chet Aparicio, DO      montelukast  10 mg Oral HS GERTRUDIS Yañez      ondansetron  4 mg Intravenous Q6H PRN GERTRUDIS Yañez      pantoprazole  40 mg Oral Daily Before Breakfast GERTRUDIS Yañez      sertraline  25 mg Oral HS GERTRUDIS Yañez      timolol  1 drop Both Eyes BID GERTRUDIS Yañez          Today, Patient Was Seen By: Nallely Carney MD    **Please Note: This note may have been constructed using a voice recognition  system.**

## 2024-06-22 NOTE — ASSESSMENT & PLAN NOTE
Has severe emphysema with acute exacerbation likely in the setting of recent viral URI.  Admits to sick family contacts.  COVID RSV and influenza panel was negative.  RP2 panel positive for parainfluenza.    Initially started on IV Solu-Medrol 40 mg every 8 hourly.  Wean to Solu-Medrol every 12 hourly per pulmonary yesterday   Continue with azithromycin daily to complete total 5 days of treatment.    Procalcitonin x2 negative.    CT chest reviewed and noted.   Had escalating oxygen requirement and worsening hypercapnia since last evening.  Transfer to S stepdown 2 for ongoing BiPAP needs and acute on chronic hypercapnic respiratory failure  Increase Solu-Medrol to 60 mg every 6 hourly by pulmonology.  Continue with scheduled nebulization treatment.  Continue with close monitoring of respiratory status oxygen saturation and ABG.  If no improvement on above, will need to be transferred to critical care service for further management.

## 2024-06-22 NOTE — ASSESSMENT & PLAN NOTE
Lab Results   Component Value Date    EGFR 48 06/22/2024    EGFR 50 06/21/2024    EGFR 59 06/19/2024    CREATININE 1.16 06/22/2024    CREATININE 1.12 06/21/2024    CREATININE 0.98 06/19/2024   Baseline creatinine 0.80-1.0  Avoid nephrotoxic medications, NSAIDs and contrast dye if possible  Monitor kidney function with daily BMPs

## 2024-06-22 NOTE — ASSESSMENT & PLAN NOTE
Patient has a history of RHETT-3 lobular emphysema, obstructive sleep apnea and multiple lung nodules.  She does wear oxygen 2 L nasal cannula at baseline.  Reports the last 48 hours she has become extremely short of breath and finds difficulty breathing  Noted on vital signs review patient was hypoxic and required an increment of supplemental oxygen, on assessment the patient is back to baseline after nebulizers and steroids, the patient is in no acute distress.  Venous blood gas shows hypercapnia.  The patient states she does not wear CPAP at home and also she does not have one prescribed  RP 2 panel positive for parainfluenza   CTA ED chest PE study shows negative for pulmonary emboli, groundglass nodule in the lateral right lower lobe measures up to 1.6 cm this nodule has been unchanged from her most recent CAT scan on March 2024, there is a slow-growing adenocarcinoma spectrum lesion is not entirely excluded.  New focus on tree-in-bud nodularity in the posterior right lower lobe is favored to represent an infections or inflammatory infiltrate and increase in mediastinal/hilar nodes infiltrate.  Had escalating oxygen requirement since yesterday evening with persistent hypoxia and hypercapnia.  Started on BiPAP and has remained on BiPAP overnight.  A.m. ABG still with hypercapnic respiratory failure.  Findings discussed with pulmonology/critical care team.  Steroids dose and nebulization frequency increased by pulmonology.  Follow-up on repeat ABG at 1500  If no improvement then patient may need to be upgraded to critical care for closer monitoring and or possible need for intubation.  Continue with scheduled nebulization, 60 mg Solu-Medrol every 6 hours.  Continue with azithromycin to complete 5 days of treatment in view of COPD exacerbation   Monitor O2 sats per unit protocol   Will likely need trilogy as an outpatient.  Pulmonary outpatient follow-up scheduled for 7 /1.

## 2024-06-22 NOTE — PROGRESS NOTES
Progress Note - Pulmonary   Noreen Alvarez 69 y.o. female MRN: [id number]  Unit/Bed#: S -01 Encounter: [id number]      Assessment & Recommendations:  Acute/chronic hypoxic/hypercarbic respiratory failure  Titrate O2 to keep SpO2 90-94%  Tolerating BiPAP 14/6 well with Vt 450-500cc  Will escalate COPD regimen as below  Recheck ABG in 4 hours, based upon course may need escalation of care    Severe COPD with acute exacerbation  Escalate therapy with solumedrol 60mg q6hrs for now  Nebulized only regimen - xopenex/atrovent q6hrs, pulmicort/perforomist BID  10mg albuterol neb x 1 now  Continue azithromycin for now    Acute parainfluenza infection - supportive care    Abnormal CT chest - will need outpatient CT tracking, timing to be determined by clinical course    Prior SCC Layrnx    Discussed with Dr. Rommel MOORE    Subjective:   69 year old woman with severe COPD (FEV1 37%), chronic hypoxic respiratory failure, CKD III, SCC larynx post remote chemo/XRT presented for increased dyspnea, found to have AECOPD secondary to parainfluenza infection.      24hrs - noted hypercarbia and placed on BiPAP, upgraded SDU2 status.  Denied added dyspnea this am, wants BiPAP off to eat, denied sputum production, no chest pain.      Objective:       Vitals: Blood pressure 169/79, pulse 82, temperature 97.8 °F (36.6 °C), resp. rate 16, weight 81.6 kg (179 lb 14.3 oz), SpO2 99%., BiPAP 14/6, 0.4FiO2 Body mass index is 30.88 kg/m².    No intake or output data in the 24 hours ending 06/22/24 1208      Physical Exam  Gen: older woman, awake, alert, oriented x 3, no acute distress  HEENT: Mucous membranes moist, no oral lesions, no thrush  NECK: No accessory muscle use, JVP not elevated  Cardiac: Regular, single S1, single S2, no murmurs, no rubs, no gallops  Lungs: BiPAP BS, mild diffuse expiratory wheeze, no rales or rhonchi, conversant with BiPAP in place  Abdomen: normoactive bowel sounds, soft nontender, nondistended, no rebound  or rigidity, no guarding  Extremities: no cyanosis, no clubbing, no edema    Labs: I have personally reviewed pertinent lab results.  Laboratory and Diagnostics  Results from last 7 days   Lab Units 06/21/24  1733 06/21/24  0803 06/19/24  1748   WBC Thousand/uL  --  12.98* 7.46   HEMOGLOBIN g/dL  --  10.5* 10.6*   I STAT HEMOGLOBIN g/dl 11.6  --   --    HEMATOCRIT %  --  36.9 36.1   HEMATOCRIT, ISTAT % 34*  --   --    PLATELETS Thousands/uL  --  177 137*   SEGS PCT %  --  90* 90*   MONO PCT %  --  6 5   EOS PCT %  --  0 0     Results from last 7 days   Lab Units 06/22/24  0500 06/21/24 1733 06/21/24 0803 06/19/24  1748   SODIUM mmol/L 140  --  142 141   POTASSIUM mmol/L 4.2  --  4.8 4.5   CHLORIDE mmol/L 93*  --  97 96   CO2 mmol/L 43*  --  44* 40*   CO2, I-STAT mmol/L  --  44*  --   --    ANION GAP mmol/L 4  --  1* 5   BUN mg/dL 34*  --  25 18   CREATININE mg/dL 1.16  --  1.12 0.98   CALCIUM mg/dL 9.3  --  9.7 9.3   GLUCOSE RANDOM mg/dL 114  --  120 129   ALT U/L  --   --   --  12   AST U/L  --   --   --  21   ALK PHOS U/L  --   --   --  126*   ALBUMIN g/dL  --   --   --  4.4   TOTAL BILIRUBIN mg/dL  --   --   --  0.42                                       Results from last 7 days   Lab Units 06/21/24  0803 06/19/24  1748   PROCALCITONIN ng/ml <0.05 <0.05       ABG:   Results from last 7 days   Lab Units 06/22/24  0436 06/21/24 2023   PH ART  7.272* 7.276*   PCO2 ART mm Hg 92.7* 81.4*   PO2 ART mm Hg 99.6 95.9   HCO3 ART mmol/L 41.8* 37.0*   BASE EXC ART mmol/L 11.5 7.6   ABG SOURCE   --  Radial, Left     ABG 7.27/92/99    Microbiology:  RP2 (+) Parainfluenza 3  FLU/RSV/COVID neg    Imaging and other studies: I have personally reviewed pertinent reports.   and I have personally reviewed pertinent films in PACS  CXR 6/21 - no focal infiltrates, no effusions, no PTX    CT angio 6/19 - no PE, emphysematous changes, stable 1.3cm LLL nodule and RLL GGO, RLL TIB infilatates, mild adenopathy      Chet Aparicio DO,  FACP  . [facility]

## 2024-06-22 NOTE — PLAN OF CARE
Problem: PAIN - ADULT  Goal: Verbalizes/displays adequate comfort level or baseline comfort level  Description: Interventions:  - Encourage patient to monitor pain and request assistance  - Assess pain using appropriate pain scale  - Administer analgesics based on type and severity of pain and evaluate response  - Implement non-pharmacological measures as appropriate and evaluate response  - Consider cultural and social influences on pain and pain management  - Notify physician/advanced practitioner if interventions unsuccessful or patient reports new pain  Outcome: Progressing     Problem: INFECTION - ADULT  Goal: Absence or prevention of progression during hospitalization  Description: INTERVENTIONS:  - Assess and monitor for signs and symptoms of infection  - Monitor lab/diagnostic results  - Monitor all insertion sites, i.e. indwelling lines, tubes, and drains  - Monitor endotracheal if appropriate and nasal secretions for changes in amount and color  - [place] appropriate cooling/warming therapies per order  - Administer medications as ordered  - Instruct and encourage patient and family to use good hand hygiene technique  - Identify and instruct in appropriate isolation precautions for identified infection/condition  Outcome: Progressing  Goal: Absence of fever/infection during neutropenic period  Description: INTERVENTIONS:  - Monitor WBC    Outcome: Progressing     Problem: SAFETY ADULT  Goal: Patient will remain free of falls  Description: INTERVENTIONS:  - Educate patient/family on patient safety including physical limitations  - Instruct patient to call for assistance with activity   - Consult OT/PT to assist with strengthening/mobility   - Keep Call bell within reach  - Keep bed low and locked with side rails adjusted as appropriate  - Keep care items and personal belongings within reach  - Initiate and maintain comfort rounds  - Make Fall Risk Sign visible to staff  - Offer Toileting every  Hours,  in advance of need  - Initiate/Maintain alarm  - Obtain necessary fall risk management equipment:   - Apply yellow socks and bracelet for high fall risk patients  - Consider moving patient to room near nurses station  Outcome: Progressing  Goal: Maintain or return to baseline ADL function  Description: INTERVENTIONS:  -  Assess patient's ability to carry out ADLs; assess patient's baseline for ADL function and identify physical deficits which impact ability to perform ADLs (bathing, care of mouth/teeth, toileting, grooming, dressing, etc.)  - Assess/evaluate cause of self-care deficits   - Assess range of motion  - Assess patient's mobility; develop plan if impaired  - Assess patient's need for assistive devices and provide as appropriate  - Encourage maximum independence but intervene and supervise when necessary  - Involve family in performance of ADLs  - Assess for home care needs following discharge   - Consider OT consult to assist with ADL evaluation and planning for discharge  - Provide patient education as appropriate  Outcome: Progressing  Goal: Maintains/Returns to pre admission functional level  Description: INTERVENTIONS:  - Perform AM-PAC 6 Click Basic Mobility/ Daily Activity assessment daily.  - Set and communicate daily mobility goal to care team and patient/family/caregiver.   - Collaborate with rehabilitation services on mobility goals if consulted  - Perform Range of Motion  times a day.  - Reposition patient every  hours.  - Dangle patient  times a day  - Stand patient  times a day  - Ambulate patient  times a day  - Out of bed to chair  times a day   - Out of bed for meals  times a day  - Out of bed for toileting  - Record patient progress and toleration of activity level   Outcome: Progressing     Problem: DISCHARGE PLANNING  Goal: Discharge to home or other facility with appropriate resources  Description: INTERVENTIONS:  - Identify barriers to discharge w/patient and caregiver  - Arrange for  needed discharge resources and transportation as appropriate  - Identify discharge learning needs (meds, wound care, etc.)  - Arrange for interpretive services to assist at discharge as needed  - Refer to Case Management Department for coordinating discharge planning if the patient needs post-hospital services based on physician/advanced practitioner order or complex needs related to functional status, cognitive ability, or social support system  Outcome: Progressing     Problem: Knowledge Deficit  Goal: Patient/family/caregiver demonstrates understanding of disease process, treatment plan, medications, and discharge instructions  Description: Complete learning assessment and assess knowledge base.  Interventions:  - Provide teaching at level of understanding  - Provide teaching via preferred learning methods  Outcome: Progressing

## 2024-06-23 LAB
ANION GAP SERPL CALCULATED.3IONS-SCNC: 4 MMOL/L (ref 4–13)
BASE EXCESS BLDA CALC-SCNC: 9.5 MMOL/L
BASOPHILS # BLD AUTO: 0 THOUSANDS/ÂΜL (ref 0–0.1)
BASOPHILS NFR BLD AUTO: 0 % (ref 0–1)
BUN SERPL-MCNC: 40 MG/DL (ref 5–25)
CALCIUM SERPL-MCNC: 9.1 MG/DL (ref 8.4–10.2)
CHLORIDE SERPL-SCNC: 94 MMOL/L (ref 96–108)
CO2 SERPL-SCNC: 43 MMOL/L (ref 21–32)
CREAT SERPL-MCNC: 1.13 MG/DL (ref 0.6–1.3)
EOSINOPHIL # BLD AUTO: 0 THOUSAND/ÂΜL (ref 0–0.61)
EOSINOPHIL NFR BLD AUTO: 0 % (ref 0–6)
ERYTHROCYTE [DISTWIDTH] IN BLOOD BY AUTOMATED COUNT: 13.3 % (ref 11.6–15.1)
GFR SERPL CREATININE-BSD FRML MDRD: 49 ML/MIN/1.73SQ M
GLUCOSE SERPL-MCNC: 125 MG/DL (ref 65–140)
HCO3 BLDA-SCNC: 38.1 MMOL/L (ref 22–28)
HCT VFR BLD AUTO: 34.2 % (ref 34.8–46.1)
HGB BLD-MCNC: 9.9 G/DL (ref 11.5–15.4)
IMM GRANULOCYTES # BLD AUTO: 0.03 THOUSAND/UL (ref 0–0.2)
IMM GRANULOCYTES NFR BLD AUTO: 1 % (ref 0–2)
LYMPHOCYTES # BLD AUTO: 0.4 THOUSANDS/ÂΜL (ref 0.6–4.47)
LYMPHOCYTES NFR BLD AUTO: 7 % (ref 14–44)
MCH RBC QN AUTO: 26.8 PG (ref 26.8–34.3)
MCHC RBC AUTO-ENTMCNC: 28.9 G/DL (ref 31.4–37.4)
MCV RBC AUTO: 92 FL (ref 82–98)
MONOCYTES # BLD AUTO: 0.19 THOUSAND/ÂΜL (ref 0.17–1.22)
MONOCYTES NFR BLD AUTO: 3 % (ref 4–12)
NASAL CANNULA: ABNORMAL
NEUTROPHILS # BLD AUTO: 5.5 THOUSANDS/ÂΜL (ref 1.85–7.62)
NEUTS SEG NFR BLD AUTO: 89 % (ref 43–75)
NRBC BLD AUTO-RTO: 0 /100 WBCS
O2 CT BLDA-SCNC: 16.5 ML/DL (ref 16–23)
OXYHGB MFR BLDA: 93.5 % (ref 94–97)
PCO2 BLDA: 74.1 MM HG (ref 36–44)
PH BLDA: 7.33 [PH] (ref 7.35–7.45)
PLATELET # BLD AUTO: 153 THOUSANDS/UL (ref 149–390)
PMV BLD AUTO: 9.8 FL (ref 8.9–12.7)
PO2 BLDA: 76.5 MM HG (ref 75–129)
POTASSIUM SERPL-SCNC: 4.7 MMOL/L (ref 3.5–5.3)
PROCALCITONIN SERPL-MCNC: <0.05 NG/ML
RBC # BLD AUTO: 3.7 MILLION/UL (ref 3.81–5.12)
SODIUM SERPL-SCNC: 141 MMOL/L (ref 135–147)
SPECIMEN SOURCE: ABNORMAL
WBC # BLD AUTO: 6.12 THOUSAND/UL (ref 4.31–10.16)

## 2024-06-23 PROCEDURE — 36600 WITHDRAWAL OF ARTERIAL BLOOD: CPT

## 2024-06-23 PROCEDURE — 85025 COMPLETE CBC W/AUTO DIFF WBC: CPT | Performed by: INTERNAL MEDICINE

## 2024-06-23 PROCEDURE — 80048 BASIC METABOLIC PNL TOTAL CA: CPT | Performed by: INTERNAL MEDICINE

## 2024-06-23 PROCEDURE — 94640 AIRWAY INHALATION TREATMENT: CPT

## 2024-06-23 PROCEDURE — 94003 VENT MGMT INPAT SUBQ DAY: CPT

## 2024-06-23 PROCEDURE — 94760 N-INVAS EAR/PLS OXIMETRY 1: CPT

## 2024-06-23 PROCEDURE — 82805 BLOOD GASES W/O2 SATURATION: CPT | Performed by: INTERNAL MEDICINE

## 2024-06-23 PROCEDURE — 99232 SBSQ HOSP IP/OBS MODERATE 35: CPT | Performed by: INTERNAL MEDICINE

## 2024-06-23 PROCEDURE — 84145 PROCALCITONIN (PCT): CPT | Performed by: INTERNAL MEDICINE

## 2024-06-23 RX ORDER — METHYLPREDNISOLONE SODIUM SUCCINATE 40 MG/ML
40 INJECTION, POWDER, LYOPHILIZED, FOR SOLUTION INTRAMUSCULAR; INTRAVENOUS EVERY 8 HOURS SCHEDULED
Status: DISCONTINUED | OUTPATIENT
Start: 2024-06-23 | End: 2024-06-24

## 2024-06-23 RX ADMIN — BENZONATATE 100 MG: 100 CAPSULE ORAL at 09:32

## 2024-06-23 RX ADMIN — GUAIFENESIN 600 MG: 600 TABLET ORAL at 09:31

## 2024-06-23 RX ADMIN — LEVOTHYROXINE SODIUM 75 MCG: 75 TABLET ORAL at 09:30

## 2024-06-23 RX ADMIN — FLUTICASONE PROPIONATE 2 SPRAY: 50 SPRAY, METERED NASAL at 09:34

## 2024-06-23 RX ADMIN — LEVALBUTEROL HYDROCHLORIDE 1.25 MG: 1.25 SOLUTION RESPIRATORY (INHALATION) at 20:38

## 2024-06-23 RX ADMIN — PANTOPRAZOLE SODIUM 40 MG: 40 TABLET, DELAYED RELEASE ORAL at 09:30

## 2024-06-23 RX ADMIN — BUDESONIDE 0.5 MG: 0.5 INHALANT ORAL at 07:20

## 2024-06-23 RX ADMIN — LEVALBUTEROL HYDROCHLORIDE 1.25 MG: 1.25 SOLUTION RESPIRATORY (INHALATION) at 07:20

## 2024-06-23 RX ADMIN — FORMOTEROL FUMARATE DIHYDRATE 20 MCG: 20 SOLUTION RESPIRATORY (INHALATION) at 07:20

## 2024-06-23 RX ADMIN — TIMOLOL MALEATE 1 DROP: 5 SOLUTION OPHTHALMIC at 16:57

## 2024-06-23 RX ADMIN — IPRATROPIUM BROMIDE 0.5 MG: 0.5 SOLUTION RESPIRATORY (INHALATION) at 14:19

## 2024-06-23 RX ADMIN — AZITHROMYCIN 500 MG: 250 TABLET, FILM COATED ORAL at 21:16

## 2024-06-23 RX ADMIN — BENZONATATE 100 MG: 100 CAPSULE ORAL at 21:16

## 2024-06-23 RX ADMIN — IPRATROPIUM BROMIDE 0.5 MG: 0.5 SOLUTION RESPIRATORY (INHALATION) at 20:38

## 2024-06-23 RX ADMIN — SERTRALINE HYDROCHLORIDE 25 MG: 25 TABLET ORAL at 21:16

## 2024-06-23 RX ADMIN — METHYLPREDNISOLONE SODIUM SUCCINATE 60 MG: 125 INJECTION, POWDER, FOR SOLUTION INTRAMUSCULAR; INTRAVENOUS at 00:36

## 2024-06-23 RX ADMIN — DOCUSATE SODIUM 100 MG: 100 CAPSULE, LIQUID FILLED ORAL at 09:30

## 2024-06-23 RX ADMIN — TIMOLOL MALEATE 1 DROP: 5 SOLUTION OPHTHALMIC at 09:34

## 2024-06-23 RX ADMIN — METHYLPREDNISOLONE SODIUM SUCCINATE 60 MG: 125 INJECTION, POWDER, FOR SOLUTION INTRAMUSCULAR; INTRAVENOUS at 05:26

## 2024-06-23 RX ADMIN — FORMOTEROL FUMARATE DIHYDRATE 20 MCG: 20 SOLUTION RESPIRATORY (INHALATION) at 20:38

## 2024-06-23 RX ADMIN — IPRATROPIUM BROMIDE 0.5 MG: 0.5 SOLUTION RESPIRATORY (INHALATION) at 07:20

## 2024-06-23 RX ADMIN — MONTELUKAST 10 MG: 10 TABLET, FILM COATED ORAL at 21:16

## 2024-06-23 RX ADMIN — ENOXAPARIN SODIUM 40 MG: 40 INJECTION SUBCUTANEOUS at 09:32

## 2024-06-23 RX ADMIN — ATORVASTATIN CALCIUM 40 MG: 40 TABLET, FILM COATED ORAL at 16:54

## 2024-06-23 RX ADMIN — GUAIFENESIN 600 MG: 600 TABLET ORAL at 16:55

## 2024-06-23 RX ADMIN — METHYLPREDNISOLONE SODIUM SUCCINATE 40 MG: 40 INJECTION, POWDER, FOR SOLUTION INTRAMUSCULAR; INTRAVENOUS at 21:16

## 2024-06-23 RX ADMIN — BUDESONIDE 0.5 MG: 0.5 INHALANT ORAL at 20:37

## 2024-06-23 RX ADMIN — LEVALBUTEROL HYDROCHLORIDE 1.25 MG: 1.25 SOLUTION RESPIRATORY (INHALATION) at 14:19

## 2024-06-23 RX ADMIN — BENZONATATE 100 MG: 100 CAPSULE ORAL at 16:54

## 2024-06-23 NOTE — PROGRESS NOTES
[facility]  [facility] Note  Name: Noreen Alvarez I  MRN: [id number]  Unit/Bed#: S -01 I Date of Admission: 6/19/2024   Date of Service: 6/23/2024 I Hospital Day: 4    Assessment & Plan   * Acute on chronic respiratory failure with hypoxia and hypercapnia (HCC)  Assessment & Plan  Patient has a history of RHETT-3 lobular emphysema, obstructive sleep apnea and multiple lung nodules.  She does wear oxygen 2 L nasal cannula at baseline.  Presented with  worsening shortness of breath for 48 hours .  Venous blood gas shows hypercapnia.  The patient states she does not wear CPAP at home and also she does not have one prescribed  RP 2 panel positive for parainfluenza   CTA ED chest PE study shows negative for pulmonary emboli, groundglass nodule in the lateral right lower lobe measures up to 1.6 cm this nodule has been unchanged from her most recent CAT scan on March 2024, there is a slow-growing adenocarcinoma spectrum lesion is not entirely excluded.  New focus on tree-in-bud nodularity in the posterior right lower lobe is favored to represent an infections or inflammatory infiltrate and increase in mediastinal/hilar nodes infiltrate.  Had escalating oxygen requirement since yesterday evening with persistent hypoxia and hypercapnia.  Started on BiPAP and has remained on BiPAP overnight.  ABG last evening showed improvement in hypercapnia.  Removed BiPAP this morning.  Follow-up on ABG today.   Will prescribe BiPAP nightly.  Patient will need it prescribed upon discharge.  Will do overnight pulse oximetry and ABG in a.m. once respiratory status stabilizes for home DME set up.    Findings discussed with pulmonology/critical care team.    Steroids dose and nebulization frequency increased by pulmonology on 622.  Likely can start decreasing steroids today..  Continue with scheduled nebulization,IV Solu-Medrol .  Continue with azithromycin to complete 5 days of treatment in view of COPD exacerbation    Monitor O2 sats per unit protocol   Pulmonary outpatient follow-up scheduled for 7 /1.    COPD with acute exacerbation (HCC)  Assessment & Plan  Has severe emphysema with acute exacerbation likely in the setting of recent viral URI.  Admits to sick family contacts.  COVID RSV and influenza panel was negative.    RP2 panel positive for parainfluenza.      Continue with azithromycin daily to complete total 5 days of treatment.    Procalcitonin x2 negative.    CT chest reviewed and noted.   Had escalating oxygen requirement and worsening hypercapnia on 6/21   Transfered to Stepdown 2 for ongoing BiPAP needs and acute on chronic hypercapnic respiratory failure  Increased Solu-Medrol to 60 mg every 6 hourly by pulmonology.  Continue with scheduled nebulization treatment.  Continue with close monitoring of respiratory status oxygen saturation and ABG.  Likely can start weaning off IV steroids today per pulmonary recommendations.  Continue with current nebulizer treatment.  Follow-up on a.m. ABG.  BiPAP nightly       Class 1 obesity due to excess calories with serious comorbidity and body mass index (BMI) of 32.0 to 32.9 in adult  Assessment & Plan  BMI 30.90 kg  Education on the importance of healthy eating habits and exercising daily was provided at bedside greater than 3 minutes    Hyperlipidemia  Assessment & Plan  Last lipid panel 3 months ago showing evaded LDL  On Lipitor 40 mg daily, continue with regimen    HTN (hypertension)  Assessment & Plan  Blood pressure elevated on admission.  Started on Norvasc.  May need titration of dose further based on blood pressure control.  Increase Norvasc to 5 mg daily.    Depression with anxiety  Assessment & Plan  On Zoloft, continue regimen  Stable for now per patient report    Centrilobular emphysema (HCC)  Assessment & Plan  Patient reports dyspnea on exertion and at rest has become worse in the last couple of weeks.  Patient uses 2 L nasal cannula at baseline  She does  follow with pulmonology closely last visit found on chart 6/6/2024  FEV1 of 27% per pulmonology note.  On Symbicort, montelukast, Proventil and albuterol as needed, inhalers held as patient is getting scheduled nebulization treatment.  Patient can follow-up as an outpatient for possible chronic azithromycin versus Daliresp versus lung volume reduction considerations upon discharge    Chronic kidney disease, stage 3 (moderate)  Assessment & Plan  Lab Results   Component Value Date    EGFR 49 06/23/2024    EGFR 48 06/22/2024    EGFR 50 06/21/2024    CREATININE 1.13 06/23/2024    CREATININE 1.16 06/22/2024    CREATININE 1.12 06/21/2024   Baseline creatinine 0.80-1.0  Avoid nephrotoxic medications, NSAIDs and contrast dye if possible  Monitor kidney function with daily BMPs               VTE Pharmacologic Prophylaxis: VTE Score: 7 High Risk (Score >/= 5) - Pharmacological DVT Prophylaxis Ordered: enoxaparin (Lovenox). Sequential Compression Devices Ordered.    Mobility:   Basic Mobility Inpatient Raw Score: 21  JH-HLM Goal: 6: Walk 10 steps or more  JH-HLM Achieved: 6: Walk 10 steps or more  JH-HLM Goal achieved. Continue to encourage appropriate mobility.    Patient Centered Rounds: I performed bedside rounds with nursing staff today.   Discussions with Specialists or Other Care Team Provider: Discussed with pulmonology    Education and Discussions with Family / Patient: Updated  (daughter) at bedside.    Total Time Spent on Date of Encounter in care of patient: 20 mins. This time was spent on one or more of the following: performing physical exam; counseling and coordination of care; obtaining or reviewing history; documenting in the medical record; reviewing/ordering tests, medications or procedures; communicating with other healthcare professionals and discussing with patient's family/caregivers.    Current Length of Stay: 4 day(s)  Current Patient Status: Inpatient   Certification Statement: The  patient will continue to require additional inpatient hospital stay due to ongoing monitoring and management of respiratory status  Discharge Plan: Anticipate discharge in 48 hrs to home.    Code Status: Level 1 - Full Code    Subjective:   Patient seen and examined at bedside.  Vitals taken off BiPAP this morning.  Reports some improvement in breathing than yesterday.  Complains of productive cough with green phlegm.  Denies any hemoptysis.  Denies any chest pain.  Remains afebrile.  Currently on 2 L of supplemental oxygen via nasal cannula.    Objective:     Vitals:   Temp (24hrs), Av.4 °F (36.9 °C), Min:97.8 °F (36.6 °C), Max:99.2 °F (37.3 °C)    Temp:  [97.8 °F (36.6 °C)-99.2 °F (37.3 °C)] 99.2 °F (37.3 °C)  HR:  [] 79  Resp:  [14-19] 14  BP: ()/() 99/64  SpO2:  [89 %-99 %] 92 %  Body mass index is 30.88 kg/m².     Input and Output Summary (last 24 hours):   No intake or output data in the 24 hours ending 24 1103    Physical Exam:   Physical Exam  Constitutional:       General: She is not in acute distress.     Appearance: She is obese. She is not ill-appearing.   HENT:      Head: Normocephalic.      Nose: Nose normal.      Mouth/Throat:      Mouth: Mucous membranes are moist.   Eyes:      Extraocular Movements: Extraocular movements intact.      Pupils: Pupils are equal, round, and reactive to light.   Cardiovascular:      Rate and Rhythm: Normal rate and regular rhythm.   Pulmonary:      Effort: Pulmonary effort is normal.      Comments: Improved work of breathing.   Improved air entry bilateral lung fields with scattered wheezing bilateral lung fields.  Abdominal:      General: Abdomen is flat.   Musculoskeletal:      Cervical back: Neck supple.      Right lower leg: No edema.      Left lower leg: No edema.   Neurological:      General: No focal deficit present.      Mental Status: She is alert and oriented to person, place, and time. Mental status is at baseline.   Psychiatric:          Mood and Affect: Mood normal.          Additional Data:     Labs:  Results from last 7 days   Lab Units 24  0459   WBC Thousand/uL 6.12   HEMOGLOBIN g/dL 9.9*   HEMATOCRIT % 34.2*   PLATELETS Thousands/uL 153   SEGS PCT % 89*   LYMPHO PCT % 7*   MONO PCT % 3*   EOS PCT % 0     Results from last 7 days   Lab Units 24  0459 24  0803 24  1748   SODIUM mmol/L 141   < > 141   POTASSIUM mmol/L 4.7   < > 4.5   CHLORIDE mmol/L 94*   < > 96   CO2 mmol/L 43*   < > 40*   CO2, I-STAT   --    < >  --    BUN mg/dL 40*   < > 18   CREATININE mg/dL 1.13   < > 0.98   ANION GAP mmol/L 4   < > 5   CALCIUM mg/dL 9.1   < > 9.3   ALBUMIN g/dL  --   --  4.4   TOTAL BILIRUBIN mg/dL  --   --  0.42   ALK PHOS U/L  --   --  126*   ALT U/L  --   --  12   AST U/L  --   --  21   GLUCOSE RANDOM mg/dL 125   < > 129    < > = values in this interval not displayed.                 Results from last 7 days   Lab Units 24  0459 24  0803 24  1748   PROCALCITONIN ng/ml <0.05 <0.05 <0.05       Lines/Drains:  Invasive Devices       Peripheral Intravenous Line  Duration             Peripheral IV 24 Right Antecubital <1 day                      Telemetry:  Telemetry Orders (From admission, onward)               24 Hour Telemetry Monitoring  Continuous x 24 Hours (Telem)           Question:  Reason for 24 Hour Telemetry  Answer:  Acute respiratory failure on BiPAP                     Telemetry Reviewed: Normal Sinus Rhythm  Indication for Continued Telemetry Use: No indication for continued use. Will discontinue.              Imaging: No pertinent imaging reviewed.    Recent Cultures (last 7 days):         Last 24 Hours Medication List:   Current Facility-Administered Medications   Medication Dose Route Frequency Provider Last Rate    acetaminophen  650 mg Oral Q6H PRN GERTRUDIS Yañez      albuterol  2 puff Inhalation Q6H PRN GERTRUDIS Yañez      aluminum-magnesium hydroxide-simethicone  30 mL  Oral Q6H PRN GERTRUDIS Yañez      amLODIPine  5 mg Oral Daily Nallely Carney MD      atorvastatin  40 mg Oral Daily With Dinner GERTRUDIS Yañez      azithromycin  500 mg Oral Q24H Nallely Carney MD      benzonatate  100 mg Oral TID GERTRUDIS Yañez      budesonide  0.5 mg Nebulization Q12H Chet Aparicio, DO      docusate sodium  100 mg Oral BID GERTRUDIS Yañez      enoxaparin  40 mg Subcutaneous Daily GERTRUDIS Yañez      fluticasone  2 spray Each Nare Daily GERTRUDIS Yañez      formoterol  20 mcg Nebulization Q12H Chet Aparicio, DO      guaiFENesin  600 mg Oral BID GERTRUDIS Yañez      ipratropium  0.5 mg Nebulization Q6H PRN Fan Valles, DO      ipratropium  0.5 mg Nebulization TID Nallely Carney MD      labetalol  10 mg Intravenous Q6H PRN Cristy Owen, DO      levalbuterol  1.25 mg Nebulization Q6H PRN Fan Valles, DO      levalbuterol  1.25 mg Nebulization TID Nallely Carney MD      levothyroxine  75 mcg Oral Early Morning GERTRUDIS Yañez      methylPREDNISolone sodium succinate  60 mg Intravenous Q6H ESTEVAN Chet Aparicio, DO      montelukast  10 mg Oral HS GERTRUDIS Yañez      ondansetron  4 mg Intravenous Q6H PRN GERTRUDIS Yañez      pantoprazole  40 mg Oral Daily Before Breakfast GERTRUDIS Yañez      sertraline  25 mg Oral HS GERTRUDIS Yañez      timolol  1 drop Both Eyes BID GERTRUDIS Yañez          Today, Patient Was Seen By: Nallely Carney MD    **Please Note: This note may have been constructed using a voice recognition system.**

## 2024-06-23 NOTE — ASSESSMENT & PLAN NOTE
Lab Results   Component Value Date    EGFR 49 06/23/2024    EGFR 48 06/22/2024    EGFR 50 06/21/2024    CREATININE 1.13 06/23/2024    CREATININE 1.16 06/22/2024    CREATININE 1.12 06/21/2024   Baseline creatinine 0.80-1.0  Avoid nephrotoxic medications, NSAIDs and contrast dye if possible  Monitor kidney function with daily BMPs

## 2024-06-23 NOTE — ASSESSMENT & PLAN NOTE
Patient reports dyspnea on exertion and at rest has become worse in the last couple of weeks.  Patient uses 2 L nasal cannula at baseline  She does follow with pulmonology closely last visit found on chart 6/6/2024  FEV1 of 27% per pulmonology note.  On Symbicort, montelukast, Proventil and albuterol as needed, inhalers held as patient is getting scheduled nebulization treatment.  Patient can follow-up as an outpatient for possible chronic azithromycin versus Daliresp versus lung volume reduction considerations upon discharge

## 2024-06-23 NOTE — PLAN OF CARE
Problem: PAIN - ADULT  Goal: Verbalizes/displays adequate comfort level or baseline comfort level  Description: Interventions:  - Encourage patient to monitor pain and request assistance  - Assess pain using appropriate pain scale  - Administer analgesics based on type and severity of pain and evaluate response  - Implement non-pharmacological measures as appropriate and evaluate response  - Consider cultural and social influences on pain and pain management  - Notify physician/advanced practitioner if interventions unsuccessful or patient reports new pain  Outcome: Progressing     Problem: INFECTION - ADULT  Goal: Absence or prevention of progression during hospitalization  Description: INTERVENTIONS:  - Assess and monitor for signs and symptoms of infection  - Monitor lab/diagnostic results  - Monitor all insertion sites, i.e. indwelling lines, tubes, and drains  - Monitor endotracheal if appropriate and nasal secretions for changes in amount and color  - [place] appropriate cooling/warming therapies per order  - Administer medications as ordered  - Instruct and encourage patient and family to use good hand hygiene technique  - Identify and instruct in appropriate isolation precautions for identified infection/condition  Outcome: Progressing

## 2024-06-23 NOTE — ASSESSMENT & PLAN NOTE
Patient has a history of RHETT-3 lobular emphysema, obstructive sleep apnea and multiple lung nodules.  She does wear oxygen 2 L nasal cannula at baseline.  Presented with  worsening shortness of breath for 48 hours .  Venous blood gas shows hypercapnia.  The patient states she does not wear CPAP at home and also she does not have one prescribed  RP 2 panel positive for parainfluenza   CTA ED chest PE study shows negative for pulmonary emboli, groundglass nodule in the lateral right lower lobe measures up to 1.6 cm this nodule has been unchanged from her most recent CAT scan on March 2024, there is a slow-growing adenocarcinoma spectrum lesion is not entirely excluded.  New focus on tree-in-bud nodularity in the posterior right lower lobe is favored to represent an infections or inflammatory infiltrate and increase in mediastinal/hilar nodes infiltrate.  Had escalating oxygen requirement since yesterday evening with persistent hypoxia and hypercapnia.  Started on BiPAP and has remained on BiPAP overnight.  ABG last evening showed improvement in hypercapnia.  Removed BiPAP this morning.  Follow-up on ABG today.   Will prescribe BiPAP nightly.  Patient will need it prescribed upon discharge.  Will do overnight pulse oximetry and ABG in a.m. once respiratory status stabilizes for home DME set up.    Findings discussed with pulmonology/critical care team.    Steroids dose and nebulization frequency increased by pulmonology on 622.  Likely can start decreasing steroids today..  Continue with scheduled nebulization,IV Solu-Medrol .  Continue with azithromycin to complete 5 days of treatment in view of COPD exacerbation   Monitor O2 sats per unit protocol   Pulmonary outpatient follow-up scheduled for 7 /1.

## 2024-06-23 NOTE — ASSESSMENT & PLAN NOTE
Has severe emphysema with acute exacerbation likely in the setting of recent viral URI.  Admits to sick family contacts.  COVID RSV and influenza panel was negative.    RP2 panel positive for parainfluenza.      Continue with azithromycin daily to complete total 5 days of treatment.    Procalcitonin x2 negative.    CT chest reviewed and noted.   Had escalating oxygen requirement and worsening hypercapnia on 6/21   Transfered to Stepdown 2 for ongoing BiPAP needs and acute on chronic hypercapnic respiratory failure  Increased Solu-Medrol to 60 mg every 6 hourly by pulmonology.  Continue with scheduled nebulization treatment.  Continue with close monitoring of respiratory status oxygen saturation and ABG.  Likely can start weaning off IV steroids today per pulmonary recommendations.  Continue with current nebulizer treatment.  Follow-up on a.m. ABG.  BiPAP nightly

## 2024-06-23 NOTE — PLAN OF CARE
Problem: PAIN - ADULT  Goal: Verbalizes/displays adequate comfort level or baseline comfort level  Description: Interventions:  - Encourage patient to monitor pain and request assistance  - Assess pain using appropriate pain scale  - Administer analgesics based on type and severity of pain and evaluate response  - Implement non-pharmacological measures as appropriate and evaluate response  - Consider cultural and social influences on pain and pain management  - Notify physician/advanced practitioner if interventions unsuccessful or patient reports new pain  Outcome: Progressing     Problem: INFECTION - ADULT  Goal: Absence or prevention of progression during hospitalization  Description: INTERVENTIONS:  - Assess and monitor for signs and symptoms of infection  - Monitor lab/diagnostic results  - Monitor all insertion sites, i.e. indwelling lines, tubes, and drains  - Monitor endotracheal if appropriate and nasal secretions for changes in amount and color  - [place] appropriate cooling/warming therapies per order  - Administer medications as ordered  - Instruct and encourage patient and family to use good hand hygiene technique  - Identify and instruct in appropriate isolation precautions for identified infection/condition  Outcome: Progressing  Goal: Absence of fever/infection during neutropenic period  Description: INTERVENTIONS:  - Monitor WBC    Outcome: Progressing     Problem: SAFETY ADULT  Goal: Patient will remain free of falls  Description: INTERVENTIONS:  - Educate patient/family on patient safety including physical limitations  - Instruct patient to call for assistance with activity   - Consult OT/PT to assist with strengthening/mobility   - Keep Call bell within reach  - Keep bed low and locked with side rails adjusted as appropriate  - Keep care items and personal belongings within reach  - Initiate and maintain comfort rounds  - Apply yellow socks and bracelet for high fall risk patients  - Consider  moving patient to room near nurses station  Outcome: Progressing  Goal: Maintain or return to baseline ADL function  Description: INTERVENTIONS:  -  Assess patient's ability to carry out ADLs; assess patient's baseline for ADL function and identify physical deficits which impact ability to perform ADLs (bathing, care of mouth/teeth, toileting, grooming, dressing, etc.)  - Assess/evaluate cause of self-care deficits   - Assess range of motion  - Assess patient's mobility; develop plan if impaired  - Assess patient's need for assistive devices and provide as appropriate  - Encourage maximum independence but intervene and supervise when necessary  - Involve family in performance of ADLs  - Assess for home care needs following discharge   - Consider OT consult to assist with ADL evaluation and planning for discharge  - Provide patient education as appropriate  Outcome: Progressing  Goal: Maintains/Returns to pre admission functional level  Description: INTERVENTIONS:  - Perform AM-PAC 6 Click Basic Mobility/ Daily Activity assessment daily.  - Set and communicate daily mobility goal to care team and patient/family/caregiver.   - Collaborate with rehabilitation services on mobility goals if consulted  - Out of bed for toileting  - Record patient progress and toleration of activity level   Outcome: Progressing     Problem: DISCHARGE PLANNING  Goal: Discharge to home or other facility with appropriate resources  Description: INTERVENTIONS:  - Identify barriers to discharge w/patient and caregiver  - Arrange for needed discharge resources and transportation as appropriate  - Identify discharge learning needs (meds, wound care, etc.)  - Arrange for interpretive services to assist at discharge as needed  - Refer to Case Management Department for coordinating discharge planning if the patient needs post-hospital services based on physician/advanced practitioner order or complex needs related to functional status, cognitive  ability, or social support system  Outcome: Progressing     Problem: Knowledge Deficit  Goal: Patient/family/caregiver demonstrates understanding of disease process, treatment plan, medications, and discharge instructions  Description: Complete learning assessment and assess knowledge base.  Interventions:  - Provide teaching at level of understanding  - Provide teaching via preferred learning methods  Outcome: Progressing

## 2024-06-23 NOTE — PROGRESS NOTES
Progress Note - Pulmonary   Noreenradha Alvarez 69 y.o. female MRN: [id number]  Unit/Bed#: S -01 Encounter: [id number]      Assessment & Recommendations:  Acute/chronic hypoxic/hypercarbic respiratory failure  Titrate O2 to keep SpO2 90-94%  Tolerating BiPAP 14/6 well with Vt 450-500cc  Will continue with BiPAP qhs and prn during day for WOB  Will plan to qualify for home PAP therapy closer to discharge with overnight SpO2 and AM ABG    Severe COPD with acute exacerbation  Wean solumedrol 40mg q8hrs for now  Cont Nebulized only regimen - xopenex/atrovent q6hrs, pulmicort/perforomist BID  Complete azithromycin today  Triple therapy at discharge, consider nebulized only regimen at d/c    Acute parainfluenza infection - supportive care    Abnormal CT chest - will need outpatient CT tracking, timing to be determined by clinical course    Prior SCC Layrnx    Discussed with Dr. Rommel MOORE    Subjective:   69 year old woman with severe COPD (FEV1 37%), chronic hypoxic respiratory failure, CKD III, SCC larynx post remote chemo/XRT presented for increased dyspnea, found to have AECOPD secondary to parainfluenza infection.      24hrs - interviewed with daughter at bedside, came off BiPAP this am, feels improved, scant sputum production, tolerated BiPAP well and slept well.  Improved dyspnea      Objective:       Vitals: Blood pressure 160/81, pulse 80, temperature 98.2 °F (36.8 °C), resp. rate 18, weight 81.6 kg (179 lb 14.3 oz), SpO2 (!) 89%., 92% 2LNC during exam Body mass index is 30.88 kg/m².    No intake or output data in the 24 hours ending 06/23/24 0818      Physical Exam  GEN older woman in bed, conversant, nontoxic  HEENT MMM, no thrush, no oral lesions  Neck No accessory muscle use, JVP not elevated  CV reg, single s1/2, no m/r  Pulm diminished BS diffusely but resolved wheeze, no rales, minimally prolonged expiratory phase  ABD +BS soft NTND, no rebound  EXT warm, brisk cap refill, no edema      Labs: I have  personally reviewed pertinent lab results.  Laboratory and Diagnostics  Results from last 7 days   Lab Units 06/23/24 0459 06/21/24 1733 06/21/24 0803 06/19/24  1748   WBC Thousand/uL 6.12  --  12.98* 7.46   HEMOGLOBIN g/dL 9.9*  --  10.5* 10.6*   I STAT HEMOGLOBIN g/dl  --  11.6  --   --    HEMATOCRIT % 34.2*  --  36.9 36.1   HEMATOCRIT, ISTAT %  --  34*  --   --    PLATELETS Thousands/uL 153  --  177 137*   SEGS PCT % 89*  --  90* 90*   MONO PCT % 3*  --  6 5   EOS PCT % 0  --  0 0     Results from last 7 days   Lab Units 06/23/24 0459 06/22/24  0500 06/21/24 1733 06/21/24 0803 06/19/24  1748   SODIUM mmol/L 141 140  --  142 141   POTASSIUM mmol/L 4.7 4.2  --  4.8 4.5   CHLORIDE mmol/L 94* 93*  --  97 96   CO2 mmol/L 43* 43*  --  44* 40*   CO2, I-STAT mmol/L  --   --  44*  --   --    ANION GAP mmol/L 4 4  --  1* 5   BUN mg/dL 40* 34*  --  25 18   CREATININE mg/dL 1.13 1.16  --  1.12 0.98   CALCIUM mg/dL 9.1 9.3  --  9.7 9.3   GLUCOSE RANDOM mg/dL 125 114  --  120 129   ALT U/L  --   --   --   --  12   AST U/L  --   --   --   --  21   ALK PHOS U/L  --   --   --   --  126*   ALBUMIN g/dL  --   --   --   --  4.4   TOTAL BILIRUBIN mg/dL  --   --   --   --  0.42                                       Results from last 7 days   Lab Units 06/23/24 0459 06/21/24 0803 06/19/24  1748   PROCALCITONIN ng/ml <0.05 <0.05 <0.05       ABG:   Results from last 7 days   Lab Units 06/22/24  1955   PH ART  7.320*   PCO2 ART mm Hg 79.5*   PO2 ART mm Hg 105.7   HCO3 ART mmol/L 40.0*   BASE EXC ART mmol/L 11.1   ABG SOURCE  Radial, Left     ABG 7.27/92/99 ---> 7.32/79/106--->7.33/74/76    Microbiology:  RP2 (+) Parainfluenza 3  FLU/RSV/COVID neg    Imaging and other studies: I have personally reviewed pertinent reports.   and I have personally reviewed pertinent films in PACS  CXR 6/21 - no focal infiltrates, no effusions, no PTX    CT angio 6/19 - no PE, emphysematous changes, stable 1.3cm LLL nodule and RLL GGO, RLL TIB  infilatates, mild adenopathy      Chet Aparicio DO, FACP  [facility]

## 2024-06-24 ENCOUNTER — APPOINTMENT (OUTPATIENT)
Dept: PULMONOLOGY | Facility: CLINIC | Age: 69
End: 2024-06-24
Payer: COMMERCIAL

## 2024-06-24 LAB
BASOPHILS # BLD AUTO: 0.01 THOUSANDS/ÂΜL (ref 0–0.1)
BASOPHILS NFR BLD AUTO: 0 % (ref 0–1)
EOSINOPHIL # BLD AUTO: 0 THOUSAND/ÂΜL (ref 0–0.61)
EOSINOPHIL NFR BLD AUTO: 0 % (ref 0–6)
ERYTHROCYTE [DISTWIDTH] IN BLOOD BY AUTOMATED COUNT: 13.2 % (ref 11.6–15.1)
HCT VFR BLD AUTO: 37.6 % (ref 34.8–46.1)
HGB BLD-MCNC: 10.9 G/DL (ref 11.5–15.4)
IMM GRANULOCYTES # BLD AUTO: 0.06 THOUSAND/UL (ref 0–0.2)
IMM GRANULOCYTES NFR BLD AUTO: 1 % (ref 0–2)
LYMPHOCYTES # BLD AUTO: 0.63 THOUSANDS/ÂΜL (ref 0.6–4.47)
LYMPHOCYTES NFR BLD AUTO: 7 % (ref 14–44)
MCH RBC QN AUTO: 26.3 PG (ref 26.8–34.3)
MCHC RBC AUTO-ENTMCNC: 29 G/DL (ref 31.4–37.4)
MCV RBC AUTO: 91 FL (ref 82–98)
MONOCYTES # BLD AUTO: 0.49 THOUSAND/ÂΜL (ref 0.17–1.22)
MONOCYTES NFR BLD AUTO: 6 % (ref 4–12)
NEUTROPHILS # BLD AUTO: 7.78 THOUSANDS/ÂΜL (ref 1.85–7.62)
NEUTS SEG NFR BLD AUTO: 86 % (ref 43–75)
NRBC BLD AUTO-RTO: 0 /100 WBCS
PLATELET # BLD AUTO: 182 THOUSANDS/UL (ref 149–390)
PMV BLD AUTO: 9.5 FL (ref 8.9–12.7)
RBC # BLD AUTO: 4.14 MILLION/UL (ref 3.81–5.12)
WBC # BLD AUTO: 8.97 THOUSAND/UL (ref 4.31–10.16)

## 2024-06-24 PROCEDURE — 99232 SBSQ HOSP IP/OBS MODERATE 35: CPT | Performed by: INTERNAL MEDICINE

## 2024-06-24 PROCEDURE — 94760 N-INVAS EAR/PLS OXIMETRY 1: CPT

## 2024-06-24 PROCEDURE — 85025 COMPLETE CBC W/AUTO DIFF WBC: CPT | Performed by: INTERNAL MEDICINE

## 2024-06-24 PROCEDURE — 94640 AIRWAY INHALATION TREATMENT: CPT

## 2024-06-24 RX ORDER — METHYLPREDNISOLONE SODIUM SUCCINATE 40 MG/ML
40 INJECTION, POWDER, LYOPHILIZED, FOR SOLUTION INTRAMUSCULAR; INTRAVENOUS EVERY 12 HOURS SCHEDULED
Status: DISCONTINUED | OUTPATIENT
Start: 2024-06-24 | End: 2024-06-25

## 2024-06-24 RX ADMIN — METHYLPREDNISOLONE SODIUM SUCCINATE 40 MG: 40 INJECTION, POWDER, FOR SOLUTION INTRAMUSCULAR; INTRAVENOUS at 22:21

## 2024-06-24 RX ADMIN — LEVOTHYROXINE SODIUM 75 MCG: 75 TABLET ORAL at 06:00

## 2024-06-24 RX ADMIN — TIMOLOL MALEATE 1 DROP: 5 SOLUTION OPHTHALMIC at 17:59

## 2024-06-24 RX ADMIN — IPRATROPIUM BROMIDE 0.5 MG: 0.5 SOLUTION RESPIRATORY (INHALATION) at 13:51

## 2024-06-24 RX ADMIN — GUAIFENESIN 600 MG: 600 TABLET ORAL at 17:59

## 2024-06-24 RX ADMIN — ENOXAPARIN SODIUM 40 MG: 40 INJECTION SUBCUTANEOUS at 08:39

## 2024-06-24 RX ADMIN — BENZONATATE 100 MG: 100 CAPSULE ORAL at 17:59

## 2024-06-24 RX ADMIN — BUDESONIDE 0.5 MG: 0.5 INHALANT ORAL at 20:05

## 2024-06-24 RX ADMIN — SERTRALINE HYDROCHLORIDE 25 MG: 25 TABLET ORAL at 22:21

## 2024-06-24 RX ADMIN — BENZONATATE 100 MG: 100 CAPSULE ORAL at 22:21

## 2024-06-24 RX ADMIN — FORMOTEROL FUMARATE DIHYDRATE 20 MCG: 20 SOLUTION RESPIRATORY (INHALATION) at 07:24

## 2024-06-24 RX ADMIN — BUDESONIDE 0.5 MG: 0.5 INHALANT ORAL at 07:29

## 2024-06-24 RX ADMIN — DOCUSATE SODIUM 100 MG: 100 CAPSULE, LIQUID FILLED ORAL at 08:39

## 2024-06-24 RX ADMIN — LEVALBUTEROL HYDROCHLORIDE 1.25 MG: 1.25 SOLUTION RESPIRATORY (INHALATION) at 13:51

## 2024-06-24 RX ADMIN — BENZONATATE 100 MG: 100 CAPSULE ORAL at 08:39

## 2024-06-24 RX ADMIN — LEVALBUTEROL HYDROCHLORIDE 1.25 MG: 1.25 SOLUTION RESPIRATORY (INHALATION) at 07:29

## 2024-06-24 RX ADMIN — DOCUSATE SODIUM 100 MG: 100 CAPSULE, LIQUID FILLED ORAL at 17:59

## 2024-06-24 RX ADMIN — GUAIFENESIN 600 MG: 600 TABLET ORAL at 08:39

## 2024-06-24 RX ADMIN — FORMOTEROL FUMARATE DIHYDRATE 20 MCG: 20 SOLUTION RESPIRATORY (INHALATION) at 20:03

## 2024-06-24 RX ADMIN — MONTELUKAST 10 MG: 10 TABLET, FILM COATED ORAL at 22:21

## 2024-06-24 RX ADMIN — IPRATROPIUM BROMIDE 0.5 MG: 0.5 SOLUTION RESPIRATORY (INHALATION) at 20:03

## 2024-06-24 RX ADMIN — METHYLPREDNISOLONE SODIUM SUCCINATE 40 MG: 40 INJECTION, POWDER, FOR SOLUTION INTRAMUSCULAR; INTRAVENOUS at 05:59

## 2024-06-24 RX ADMIN — FLUTICASONE PROPIONATE 2 SPRAY: 50 SPRAY, METERED NASAL at 08:39

## 2024-06-24 RX ADMIN — IPRATROPIUM BROMIDE 0.5 MG: 0.5 SOLUTION RESPIRATORY (INHALATION) at 07:29

## 2024-06-24 RX ADMIN — ATORVASTATIN CALCIUM 40 MG: 40 TABLET, FILM COATED ORAL at 17:59

## 2024-06-24 RX ADMIN — TIMOLOL MALEATE 1 DROP: 5 SOLUTION OPHTHALMIC at 08:39

## 2024-06-24 RX ADMIN — LEVALBUTEROL HYDROCHLORIDE 1.25 MG: 1.25 SOLUTION RESPIRATORY (INHALATION) at 20:10

## 2024-06-24 RX ADMIN — AMLODIPINE BESYLATE 5 MG: 5 TABLET ORAL at 08:39

## 2024-06-24 RX ADMIN — PANTOPRAZOLE SODIUM 40 MG: 40 TABLET, DELAYED RELEASE ORAL at 06:00

## 2024-06-24 NOTE — PROGRESS NOTES
Critical access hospital  Progress Note  Name: Noreen Alvarez I  MRN: [id number]  Unit/Bed#: S -01 I Date of Admission: 6/19/2024   Date of Service: 6/24/2024 I Hospital Day: 5    Assessment & Plan   * Acute on chronic respiratory failure with hypoxia and hypercapnia (HCC)  Assessment & Plan  Patient has a history of RHETT-3 lobular emphysema, obstructive sleep apnea and multiple lung nodules.  She does wear oxygen 2 L nasal cannula at baseline.  Presented with  worsening shortness of breath for 48 hours .  Venous blood gas shows hypercapnia.  The patient states she does not wear CPAP at home and also she does not have one prescribed  RP 2 panel positive for parainfluenza   CTA ED chest PE study shows negative for pulmonary emboli, groundglass nodule in the lateral right lower lobe measures up to 1.6 cm this nodule has been unchanged from her most recent CAT scan on March 2024, there is a slow-growing adenocarcinoma spectrum lesion is not entirely excluded.  New focus on tree-in-bud nodularity in the posterior right lower lobe is favored to represent an infections or inflammatory infiltrate and increase in mediastinal/hilar nodes infiltrate.  Had escalating oxygen requirement since yesterday evening with persistent hypoxia and hypercapnia.  Started on BiPAP and has remained on BiPAP overnight.  ABG last evening showed improvement in hypercapnia.  Removed BiPAP this morning.  Follow-up on ABG today.   Will prescribe BiPAP nightly.  Patient will need it prescribed upon discharge.  Will do overnight pulse oximetry and ABG in a.m. once respiratory status stabilizes for home DME set up.    Findings discussed with pulmonology/critical care team.    Steroids dose and nebulization frequency increased by pulmonology on 6/22.  Likely can start decreasing steroids today..  Continue with scheduled nebulization,IV Solu-Medrol .  Continue with azithromycin to complete 5 days of treatment in view of COPD  exacerbation Solu-Medrol dose decreased to 40 mg every 12 hourly today.  Overnight pulse oximetry and ABG ordered to assess qualification for BiPAP tonight.  Monitor O2 sats per unit protocol   Pulmonary outpatient follow-up scheduled for 7 /1.    COPD with acute exacerbation (HCC)  Assessment & Plan  Has severe emphysema with acute exacerbation likely in the setting of recent viral URI.  Admits to sick family contacts.  COVID RSV and influenza panel was negative.    RP2 panel positive for parainfluenza.      Continue with azithromycin daily to complete total 5 days of treatment.    Procalcitonin x2 negative.    CT chest reviewed and noted.   Had escalating oxygen requirement and worsening hypercapnia on 6/21   Transfered to Stepdown 2 for ongoing BiPAP needs and acute on chronic hypercapnic respiratory failure  Over the weekend was maintained initially on high-dose Solu-Medrol.  Subsequently upon improvement  in respiratory status, is being weaned down to 40 mg every 12 hourly per pulmonary recommendations today.  Continue with current inhaler treatment.    BiPAP nightly       Class 1 obesity due to excess calories with serious comorbidity and body mass index (BMI) of 32.0 to 32.9 in adult  Assessment & Plan  BMI 30.90 kg  Education on the importance of healthy eating habits and exercising daily was provided at bedside greater than 3 minutes    Hyperlipidemia  Assessment & Plan  Last lipid panel 3 months ago showing evaded LDL  On Lipitor 40 mg daily, continue with regimen    HTN (hypertension)  Assessment & Plan  Blood pressure elevated on admission.  Started on Norvasc.  May need titration of dose further based on blood pressure control.  Increase Norvasc to 5 mg daily.    Depression with anxiety  Assessment & Plan  On Zoloft, continue regimen  Stable for now per patient report    Centrilobular emphysema (HCC)  Assessment & Plan  Patient reports dyspnea on exertion and at rest has become worse in the last couple  of weeks.  Patient uses 2 L nasal cannula at baseline  She does follow with pulmonology closely last visit found on chart 6/6/2024  FEV1 of 27% per pulmonology note.  On Symbicort, montelukast, Proventil and albuterol as needed, inhalers held as patient is getting scheduled nebulization treatment.  Patient can follow-up as an outpatient for possible chronic azithromycin versus Daliresp versus lung volume reduction considerations upon discharge    Chronic kidney disease, stage 3 (moderate)  Assessment & Plan  Lab Results   Component Value Date    EGFR 49 06/23/2024    EGFR 48 06/22/2024    EGFR 50 06/21/2024    CREATININE 1.13 06/23/2024    CREATININE 1.16 06/22/2024    CREATININE 1.12 06/21/2024   Baseline creatinine 0.80-1.0  Avoid nephrotoxic medications, NSAIDs and contrast dye if possible  Monitor kidney function with daily BMPs               VTE Pharmacologic Prophylaxis: VTE Score: 7 High Risk (Score >/= 5) - Pharmacological DVT Prophylaxis Ordered: enoxaparin (Lovenox). Sequential Compression Devices Ordered.    Mobility:   Basic Mobility Inpatient Raw Score: 21  JH-HLM Goal: 6: Walk 10 steps or more  JH-HLM Achieved: 5: Stand (1 or more minutes)  JH-HLM Goal achieved. Continue to encourage appropriate mobility.    Patient Centered Rounds: I performed bedside rounds with nursing staff today.   Discussions with Specialists or Other Care Team Provider: Discussed with pulmonology    Education and Discussions with Family / Patient: Attempted to update  (daughter) via phone. Unable to contact.    Total Time Spent on Date of Encounter in care of patient: 20 mins. This time was spent on one or more of the following: performing physical exam; counseling and coordination of care; obtaining or reviewing history; documenting in the medical record; reviewing/ordering tests, medications or procedures; communicating with other healthcare professionals and discussing with patient's  family/caregivers.    Current Length of Stay: 5 day(s)  Current Patient Status: Inpatient   Certification Statement: The patient will continue to require additional inpatient hospital stay due to ongoing monitoring of respiratory status and tapering off IV steroids  Discharge Plan: Anticipate discharge in 24-48 hrs to home.    Code Status: Level 1 - Full Code    Subjective:   Seen and examined at bedside.  Communicated via  [id number].  Patient reports improvement in breathing.  Still with productive cough.  Remains afebrile.  Tolerated BiPAP well overnight.    Objective:     Vitals:   Temp (24hrs), Av.1 °F (36.7 °C), Min:97.7 °F (36.5 °C), Max:98.8 °F (37.1 °C)    Temp:  [97.7 °F (36.5 °C)-98.8 °F (37.1 °C)] 98.8 °F (37.1 °C)  HR:  [74-81] 74  Resp:  [16-18] 17  BP: (102-164)/(68-84) 132/84  SpO2:  [85 %-99 %] 99 %  Body mass index is 30.64 kg/m².     Input and Output Summary (last 24 hours):   No intake or output data in the 24 hours ending 24 1343    Physical Exam:   Physical Exam  Constitutional:       General: She is not in acute distress.  HENT:      Head: Normocephalic and atraumatic.      Mouth/Throat:      Mouth: Mucous membranes are moist.   Eyes:      Pupils: Pupils are equal, round, and reactive to light.   Cardiovascular:      Rate and Rhythm: Normal rate and regular rhythm.   Pulmonary:      Comments: Improved air entry bilateral lung fields with scattered wheezing bilateral lung fields noted.  No tachypnea or increased work of breathing.  Abdominal:      General: Abdomen is flat. Bowel sounds are normal.      Palpations: Abdomen is soft.   Musculoskeletal:      Cervical back: Neck supple.      Right lower leg: No edema.      Left lower leg: No edema.   Skin:     General: Skin is warm.   Neurological:      General: No focal deficit present.      Mental Status: She is alert and oriented to person, place, and time. Mental status is at baseline.   Psychiatric:         Mood and  Affect: Mood normal.          Additional Data:     Labs:  Results from last 7 days   Lab Units 06/24/24  0609   WBC Thousand/uL 8.97   HEMOGLOBIN g/dL 10.9*   HEMATOCRIT % 37.6   PLATELETS Thousands/uL 182   SEGS PCT % 86*   LYMPHO PCT % 7*   MONO PCT % 6   EOS PCT % 0     Results from last 7 days   Lab Units 06/23/24  0459 06/21/24  0803 06/19/24  1748   SODIUM mmol/L 141   < > 141   POTASSIUM mmol/L 4.7   < > 4.5   CHLORIDE mmol/L 94*   < > 96   CO2 mmol/L 43*   < > 40*   CO2, I-STAT   --    < >  --    BUN mg/dL 40*   < > 18   CREATININE mg/dL 1.13   < > 0.98   ANION GAP mmol/L 4   < > 5   CALCIUM mg/dL 9.1   < > 9.3   ALBUMIN g/dL  --   --  4.4   TOTAL BILIRUBIN mg/dL  --   --  0.42   ALK PHOS U/L  --   --  126*   ALT U/L  --   --  12   AST U/L  --   --  21   GLUCOSE RANDOM mg/dL 125   < > 129    < > = values in this interval not displayed.                 Results from last 7 days   Lab Units 06/23/24  0459 06/21/24  0803 06/19/24  1748   PROCALCITONIN ng/ml <0.05 <0.05 <0.05       Lines/Drains:  Invasive Devices       Peripheral Intravenous Line  Duration             Peripheral IV 06/23/24 Right Antecubital 1 day                          Imaging: No pertinent imaging reviewed.    Recent Cultures (last 7 days):         Last 24 Hours Medication List:   Current Facility-Administered Medications   Medication Dose Route Frequency Provider Last Rate    acetaminophen  650 mg Oral Q6H PRN GERTRUDIS Yañez      albuterol  2 puff Inhalation Q6H PRN GERTRUDIS Yañze      aluminum-magnesium hydroxide-simethicone  30 mL Oral Q6H PRN GERTRUDIS Yañez      amLODIPine  5 mg Oral Daily Nallely Carney MD      atorvastatin  40 mg Oral Daily With Dinner GERTRUDIS Yañez      benzonatate  100 mg Oral TID GERTRUDIS Yañez      budesonide  0.5 mg Nebulization Q12H Chet Aparicio DO      docusate sodium  100 mg Oral BID GERTRUDIS Yañez      enoxaparin  40 mg Subcutaneous Daily GERTRUDIS Yañez      fluticasone  2  spray Each Nare Daily GERTRUDIS Yañez      formoterol  20 mcg Nebulization Q12H Chet Aparicio, DO      guaiFENesin  600 mg Oral BID GERTRUDIS Yañez      ipratropium  0.5 mg Nebulization Q6H PRN Fan Valles, DO      ipratropium  0.5 mg Nebulization TID Nallely Carney MD      labetalol  10 mg Intravenous Q6H PRN Cristy Owen, DO      levalbuterol  1.25 mg Nebulization Q6H PRN Fan Valles, DO      levalbuterol  1.25 mg Nebulization TID Nallely Carney MD      levothyroxine  75 mcg Oral Early Morning GERTRUDIS Yañez      methylPREDNISolone sodium succinate  40 mg Intravenous Q12H [facility] Fan Valles, DO      montelukast  10 mg Oral HS GERTRUDIS Yañez      ondansetron  4 mg Intravenous Q6H PRN GERTRUDIS Yañez      pantoprazole  40 mg Oral Daily Before Breakfast GERTRUDIS Yañez      sertraline  25 mg Oral HS GERTRUDIS Yañez      timolol  1 drop Both Eyes BID GERTRUDIS Yañez          Today, Patient Was Seen By: Nallely Carney MD    **Please Note: This note may have been constructed using a voice recognition system.**

## 2024-06-24 NOTE — PLAN OF CARE
Problem: Knowledge Deficit  Goal: Patient/family/caregiver demonstrates understanding of disease process, treatment plan, medications, and discharge instructions  Description: Complete learning assessment and assess knowledge base.  Interventions:  - Provide teaching at level of understanding  - Provide teaching via preferred learning methods  Outcome: Progressing     Problem: RESPIRATORY - ADULT  Goal: Achieves optimal ventilation and oxygenation  Description: INTERVENTIONS:  - Assess for changes in respiratory status  - Assess for changes in mentation and behavior  - Position to facilitate oxygenation and minimize respiratory effort  - Oxygen administered by appropriate delivery if ordered  - Initiate smoking cessation education as indicated  - Encourage broncho-pulmonary hygiene including cough, deep breathe, Incentive Spirometry  - Assess the need for suctioning and aspirate as needed  - Assess and instruct to report SOB or any respiratory difficulty  - Respiratory Therapy support as indicated  Outcome: Progressing     Problem: MUSCULOSKELETAL - ADULT  Goal: Maintain or return mobility to safest level of function  Description: INTERVENTIONS:  - Assess patient's ability to carry out ADLs; assess patient's baseline for ADL function and identify physical deficits which impact ability to perform ADLs (bathing, care of mouth/teeth, toileting, grooming, dressing, etc.)  - Assess/evaluate cause of self-care deficits   - Assess range of motion  - Assess patient's mobility  - Assess patient's need for assistive devices and provide as appropriate  - Encourage maximum independence but intervene and supervise when necessary  - Involve family in performance of ADLs  - Assess for home care needs following discharge   - Consider OT consult to assist with ADL evaluation and planning for discharge  - Provide patient education as appropriate  Outcome: Progressing  Goal: Maintain proper alignment of affected body  part  Description: INTERVENTIONS:  - Support, maintain and protect limb and body alignment  - Provide patient/ family with appropriate education  Outcome: Progressing

## 2024-06-24 NOTE — PROGRESS NOTES
PULMONOLOGY PROGRESS NOTE     Name: Noreen Alvarez   Age & Sex: 69 y.o. female   MRN: [id number]  Unit/Bed#: S -01   Encounter: [id number]    PATIENT INFORMATION     Name: Noreen Alvarez   Age & Sex: 69 y.o. 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  Diagnosed with an acute exacerbation of COPD.  Pulmonology was consulted for further recommendations.     Acute on chronic hypoxemic/hypercapnic respiratory failure  Severe COPD with an FEV1 of 37% in acute exacerbation likely secondary to sick contacts  Centrilobular emphysema  Previous tobacco abuse in remission  Anxiety/depression  CKD stage III  Hypertension  Hyperlipidemia  Hypothyroidism  Previous squamous cell carcinoma of the larynx status post chemo and radiation in 2014     Plan:  Continue to wean oxygen for SpO2 greater than 88%, patient is back down to her baseline 2 L  CTA PE study negative for pulmonary embolism, showing groundglass nodule in the right lower lobe that has been stable since March 2024, some tree-in-bud nodularity in the right lower lobe as well possibly infectious or inflammatory  Patient's outpatient regimen includes Breztri, nebulized ipratropium, and nebulized albuterol  After worsening over the weekend and, as steroids increase, can start to decrease to every 12 hours today  Respiratory protocol, flutter valve, out of bed to chair  Exacerbation likely secondary to parainfluenza virus 3  Status post 5 days of azithromycin  Patient can follow-up as an outpatient for possible chronic  azithromycin versus Daliresp versus lung volume reduction considerations  Patient had a negative sleep study earlier this year, hypercapnia likely chronic -continue to monitor patient's mentation and breathing status, BiPAP nightly while inpatient  Can attempt to qualify for [facility] as an OP  Overngiht pulse ox prior to DC  Clinic message for follow-up, scheduled for 2024  Continue smoking cessation  Rest of care per primary      SUBJECTIVE     Patient seen and examined. No acute events overnight.  Respiratory status is improving.  All other review systems negative at this time.    OBJECTIVE     Vitals:    24 0600 24 0724 24 0731 24 0759   BP:    139/73   Pulse:       Resp:    16   Temp:    97.7 °F (36.5 °C)   TempSrc:       SpO2:  99% 99%    Weight: 81 kg (178 lb 8 oz)         Temperature:   Temp (24hrs), Av.2 °F (36.8 °C), Min:97.7 °F (36.5 °C), Max:99.2 °F (37.3 °C)    Temperature: 97.7 °F (36.5 °C)  Intake & Output:  I/O          0701   0700  0701   0700  0701   0700    P.O. 240 830     IV Piggyback 250      Total Intake(mL/kg) 490 (6.1) 830 (10.2)     Urine (mL/kg/hr) 200 600 (0.3)     Total Output 200 600     Net +290 +230            Unmeasured Urine Occurrence  1 x     Unmeasured Stool Occurrence  0 x           Weights:        Body mass index is 30.64 kg/m².  Weight (last 2 days)       Date/Time Weight    24 0600 81 (178.5)    24 0600 81.6 (179.9)          Physical Exam  Vitals reviewed.   Constitutional:       General: She is not in acute distress.  HENT:      Head: Normocephalic and atraumatic.      Right Ear: External ear normal.      Left Ear: External ear normal.      Nose: Nose normal.      Mouth/Throat:      Mouth: Mucous membranes are moist.      Pharynx: Oropharynx is clear.   Eyes:      Extraocular Movements: Extraocular movements intact.      Pupils: Pupils are equal, round, and reactive to light.   Cardiovascular:      Rate  and Rhythm: Normal rate and regular rhythm.      Pulses: Normal pulses.      Heart sounds: Normal heart sounds.   Pulmonary:      Effort: Pulmonary effort is normal.      Comments: Decreased breath sounds bilaterally  Abdominal:      General: Abdomen is flat. Bowel sounds are normal. There is no distension.      Tenderness: There is no abdominal tenderness.   Musculoskeletal:      Right lower leg: No edema.      Left lower leg: No edema.   Skin:     General: Skin is warm and dry.      Capillary Refill: Capillary refill takes less than 2 seconds.   Neurological:      General: No focal deficit present.      Mental Status: She is alert and oriented to person, place, and time.   Psychiatric:         Mood and Affect: Mood normal.         Behavior: Behavior normal.           LABORATORY DATA     Labs: I have personally reviewed pertinent reports.  Results from last 7 days   Lab Units 06/24/24  0609 06/23/24  0459 06/21/24  1733 06/21/24  0803   WBC Thousand/uL 8.97 6.12  --  12.98*   HEMOGLOBIN g/dL 10.9* 9.9*  --  10.5*   I STAT HEMOGLOBIN g/dl  --   --  11.6  --    HEMATOCRIT % 37.6 34.2*  --  36.9   HEMATOCRIT, ISTAT %  --   --  34*  --    PLATELETS Thousands/uL 182 153  --  177   SEGS PCT % 86* 89*  --  90*   MONO PCT % 6 3*  --  6   EOS PCT % 0 0  --  0      Results from last 7 days   Lab Units 06/23/24  0459 06/22/24  0500 06/21/24  1733 06/21/24  0803 06/19/24  1748   POTASSIUM mmol/L 4.7 4.2  --  4.8 4.5   CHLORIDE mmol/L 94* 93*  --  97 96   CO2 mmol/L 43* 43*  --  44* 40*   CO2, I-STAT mmol/L  --   --  44*  --   --    BUN mg/dL 40* 34*  --  25 18   CREATININE mg/dL 1.13 1.16  --  1.12 0.98   CALCIUM mg/dL 9.1 9.3  --  9.7 9.3   ALK PHOS U/L  --   --   --   --  126*   ALT U/L  --   --   --   --  12   AST U/L  --   --   --   --  21   GLUCOSE, ISTAT mg/dl  --   --  130  --   --          IMAGING & DIAGNOSTIC TESTING     Radiology Results: I have personally reviewed pertinent reports.  XR chest 1 view  portable    Result Date: 6/20/2024  Impression: No focal consolidation, pleural effusion, or pneumothorax. Workstation performed: [id number]     CTA ED chest PE Study    Result Date: 6/19/2024  Impression: No pulmonary embolism. A groundglass nodule in the lateral right lower lobe measures up to 1.6 cm, unchanged from most recent March 2024 comparison, however increased from December 2022 exam. A slow-growing/adenocarcinoma spectrum lesion is not entirely excluded. New focus of tree-in-bud nodularity in the posterior right lower lobe is favored to represent an infectious/inflammatory infiltrate. Overall interval increase in mediastinal/hilar nodes as described. Continued follow-up is recommended. Workstation performed: [id number]     Other Diagnostic Testing: I have personally reviewed pertinent reports.    ACTIVE MEDICATIONS     Current Facility-Administered Medications   Medication Dose Route Frequency    acetaminophen (TYLENOL) tablet 650 mg  650 mg Oral Q6H PRN    albuterol (PROVENTIL HFA,VENTOLIN HFA) inhaler 2 puff  2 puff Inhalation Q6H PRN    aluminum-magnesium hydroxide-simethicone (MAALOX) oral suspension 30 mL  30 mL Oral Q6H PRN    amLODIPine (NORVASC) tablet 5 mg  5 mg Oral Daily    atorvastatin (LIPITOR) tablet 40 mg  40 mg Oral Daily With Dinner    benzonatate (TESSALON PERLES) capsule 100 mg  100 mg Oral TID    budesonide (PULMICORT) inhalation solution 0.5 mg  0.5 mg Nebulization Q12H    docusate sodium (COLACE) capsule 100 mg  100 mg Oral BID    enoxaparin (LOVENOX) subcutaneous injection 40 mg  40 mg Subcutaneous Daily    fluticasone (FLONASE) 50 mcg/act nasal spray 2 spray  2 spray Each Nare Daily    formoterol (PERFOROMIST) nebulizer solution 20 mcg  20 mcg Nebulization Q12H    guaiFENesin (MUCINEX) 12 hr tablet 600 mg  600 mg Oral BID    ipratropium (ATROVENT) 0.02 % inhalation solution 0.5 mg  0.5 mg Nebulization Q6H PRN    ipratropium (ATROVENT) 0.02 % inhalation solution 0.5 mg  0.5 mg  "Nebulization TID    labetalol (NORMODYNE) injection 10 mg  10 mg Intravenous Q6H PRN    levalbuterol (XOPENEX) inhalation solution 1.25 mg  1.25 mg Nebulization Q6H PRN    levalbuterol (XOPENEX) inhalation solution 1.25 mg  1.25 mg Nebulization TID    levothyroxine tablet 75 mcg  75 mcg Oral Early Morning    methylPREDNISolone sodium succinate (Solu-MEDROL) injection 40 mg  40 mg Intravenous Q8H ESTEVAN    montelukast (SINGULAIR) tablet 10 mg  10 mg Oral HS    ondansetron (ZOFRAN) injection 4 mg  4 mg Intravenous Q6H PRN    pantoprazole (PROTONIX) EC tablet 40 mg  40 mg Oral Daily Before Breakfast    sertraline (ZOLOFT) tablet 25 mg  25 mg Oral HS    timolol (TIMOPTIC) 0.5 % ophthalmic solution 1 drop  1 drop Both Eyes BID         Disclaimer: Portions of the record may have been created with voice recognition software. Occasional wrong word or \"sound a like\" substitutions may have occurred due to the inherent limitations of voice recognition software. Careful consideration should be taken to recognize, using context, where substitutions have occurred.    Fan Valles DO, MS  Pulmonary and Critical Care Fellow, PGY-IV  [facility]    "

## 2024-06-24 NOTE — PLAN OF CARE
Problem: PAIN - ADULT  Goal: Verbalizes/displays adequate comfort level or baseline comfort level  Description: Interventions:  - Encourage patient to monitor pain and request assistance  - Assess pain using appropriate pain scale  - Administer analgesics based on type and severity of pain and evaluate response  - Implement non-pharmacological measures as appropriate and evaluate response  - Consider cultural and social influences on pain and pain management  - Notify physician/advanced practitioner if interventions unsuccessful or patient reports new pain  Outcome: Progressing     Problem: INFECTION - ADULT  Goal: Absence or prevention of progression during hospitalization  Description: INTERVENTIONS:  - Assess and monitor for signs and symptoms of infection  - Monitor lab/diagnostic results  - Monitor all insertion sites, i.e. indwelling lines, tubes, and drains  - Monitor endotracheal if appropriate and nasal secretions for changes in amount and color  - [place] appropriate cooling/warming therapies per order  - Administer medications as ordered  - Instruct and encourage patient and family to use good hand hygiene technique  - Identify and instruct in appropriate isolation precautions for identified infection/condition  Outcome: Progressing  Goal: Absence of fever/infection during neutropenic period  Description: INTERVENTIONS:  - Monitor WBC    Outcome: Progressing     Problem: SAFETY ADULT  Goal: Patient will remain free of falls  Description: INTERVENTIONS:  - Educate patient/family on patient safety including physical limitations  - Instruct patient to call for assistance with activity   - Consult OT/PT to assist with strengthening/mobility   - Keep Call bell within reach  - Keep bed low and locked with side rails adjusted as appropriate  - Keep care items and personal belongings within reach  - Apply yellow socks and bracelet for high fall risk patients  - Consider moving patient to room near nurses  station  Outcome: Progressing  Goal: Maintain or return to baseline ADL function  Description: INTERVENTIONS:  -  Assess patient's ability to carry out ADLs; assess patient's baseline for ADL function and identify physical deficits which impact ability to perform ADLs (bathing, care of mouth/teeth, toileting, grooming, dressing, etc.)  - Assess/evaluate cause of self-care deficits   - Assess range of motion  - Assess patient's mobility; develop plan if impaired  - Assess patient's need for assistive devices and provide as appropriate  - Encourage maximum independence but intervene and supervise when necessary  - Involve family in performance of ADLs  - Assess for home care needs following discharge   - Consider OT consult to assist with ADL evaluation and planning for discharge  - Provide patient education as appropriate  Outcome: Progressing  Goal: Maintains/Returns to pre admission functional level  Description: INTERVENTIONS:  - Perform AM-PAC 6 Click Basic Mobility/ Daily Activity assessment daily.  - Set and communicate daily mobility goal to care team and patient/family/caregiver.   - Collaborate with rehabilitation services on mobility goals if consulted  - Out of bed for toileting  - Record patient progress and toleration of activity level   Outcome: Progressing     Problem: DISCHARGE PLANNING  Goal: Discharge to home or other facility with appropriate resources  Description: INTERVENTIONS:  - Identify barriers to discharge w/patient and caregiver  - Arrange for needed discharge resources and transportation as appropriate  - Identify discharge learning needs (meds, wound care, etc.)  - Arrange for interpretive services to assist at discharge as needed  - Refer to Case Management Department for coordinating discharge planning if the patient needs post-hospital services based on physician/advanced practitioner order or complex needs related to functional status, cognitive ability, or social support  system  Outcome: Progressing     Problem: Knowledge Deficit  Goal: Patient/family/caregiver demonstrates understanding of disease process, treatment plan, medications, and discharge instructions  Description: Complete learning assessment and assess knowledge base.  Interventions:  - Provide teaching at level of understanding  - Provide teaching via preferred learning methods  Outcome: Progressing     Problem: Prexisting or High Potential for Compromised Skin Integrity  Goal: Skin integrity is maintained or improved  Description: INTERVENTIONS:  - Identify patients at risk for skin breakdown  - Assess and monitor skin integrity  - Assess and monitor nutrition and hydration status  - Monitor labs   - Assess for incontinence   - Turn and reposition patient  - Assist with mobility/ambulation  - Relieve pressure over bony prominences  - Avoid friction and shearing  - Provide appropriate hygiene as needed including keeping skin clean and dry  - Evaluate need for skin moisturizer/barrier cream  - Collaborate with interdisciplinary team   - Patient/family teaching  - Consider wound care consult   Outcome: Progressing     Problem: RESPIRATORY - ADULT  Goal: Achieves optimal ventilation and oxygenation  Description: INTERVENTIONS:  - Assess for changes in respiratory status  - Assess for changes in mentation and behavior  - Position to facilitate oxygenation and minimize respiratory effort  - Oxygen administered by appropriate delivery if ordered  - Initiate smoking cessation education as indicated  - Encourage broncho-pulmonary hygiene including cough, deep breathe, Incentive Spirometry  - Assess the need for suctioning and aspirate as needed  - Assess and instruct to report SOB or any respiratory difficulty  - Respiratory Therapy support as indicated  Outcome: Progressing     Problem: MUSCULOSKELETAL - ADULT  Goal: Maintain or return mobility to safest level of function  Description: INTERVENTIONS:  - Assess patient's  ability to carry out ADLs; assess patient's baseline for ADL function and identify physical deficits which impact ability to perform ADLs (bathing, care of mouth/teeth, toileting, grooming, dressing, etc.)  - Assess/evaluate cause of self-care deficits   - Assess range of motion  - Assess patient's mobility  - Assess patient's need for assistive devices and provide as appropriate  - Encourage maximum independence but intervene and supervise when necessary  - Involve family in performance of ADLs  - Assess for home care needs following discharge   - Consider OT consult to assist with ADL evaluation and planning for discharge  - Provide patient education as appropriate  Outcome: Progressing  Goal: Maintain proper alignment of affected body part  Description: INTERVENTIONS:  - Support, maintain and protect limb and body alignment  - Provide patient/ family with appropriate education  Outcome: Progressing

## 2024-06-24 NOTE — ASSESSMENT & PLAN NOTE
Patient has a history of RHETT-3 lobular emphysema, obstructive sleep apnea and multiple lung nodules.  She does wear oxygen 2 L nasal cannula at baseline.  Presented with  worsening shortness of breath for 48 hours .  Venous blood gas shows hypercapnia.  The patient states she does not wear CPAP at home and also she does not have one prescribed  RP 2 panel positive for parainfluenza   CTA ED chest PE study shows negative for pulmonary emboli, groundglass nodule in the lateral right lower lobe measures up to 1.6 cm this nodule has been unchanged from her most recent CAT scan on March 2024, there is a slow-growing adenocarcinoma spectrum lesion is not entirely excluded.  New focus on tree-in-bud nodularity in the posterior right lower lobe is favored to represent an infections or inflammatory infiltrate and increase in mediastinal/hilar nodes infiltrate.  Had escalating oxygen requirement since yesterday evening with persistent hypoxia and hypercapnia.  Started on BiPAP and has remained on BiPAP overnight.  ABG last evening showed improvement in hypercapnia.  Removed BiPAP this morning.  Follow-up on ABG today.   Will prescribe BiPAP nightly.  Patient will need it prescribed upon discharge.  Will do overnight pulse oximetry and ABG in a.m. once respiratory status stabilizes for home DME set up.    Findings discussed with pulmonology/critical care team.    Steroids dose and nebulization frequency increased by pulmonology on 6/22.  Likely can start decreasing steroids today..  Continue with scheduled nebulization,IV Solu-Medrol .  Continue with azithromycin to complete 5 days of treatment in view of COPD exacerbation Solu-Medrol dose decreased to 40 mg every 12 hourly today.  Overnight pulse oximetry and ABG ordered to assess qualification for BiPAP tonight.  Monitor O2 sats per unit protocol   Pulmonary outpatient follow-up scheduled for 7 /1.

## 2024-06-24 NOTE — CASE MANAGEMENT
Case Management Assessment & Discharge Planning Note    Patient name Noreen Alvarez  Location S /S -01 MRN [id number]  : 1955 Date 2024       Current Admission Date: 2024  Current Admission Diagnosis:Acute on chronic respiratory failure with hypoxia and hypercapnia (HCC)   Patient Active Problem List    Diagnosis Date Noted Date Diagnosed    Acute on chronic respiratory failure with hypoxia and hypercapnia (HCC) 2024     Tubular adenoma of colon 2024     Chronic constipation 2024     Candidal intertrigo 2024     TIMMY (generalized anxiety disorder) 2024     Iron deficiency anemia due to dietary causes 2024     COPD with acute exacerbation (HCC) 2024     Sacroiliac inflammation (HCC) 2024     Epigastric pain 01/10/2024     Gastroesophageal reflux disease without esophagitis 01/10/2024     Pain of right sacroiliac joint 2023     Hypomagnesemia 2023     Chronic respiratory failure with hypoxia, on home O2 therapy  (HCC) 2023     Iron deficiency anemia 10/30/2022     Dyspepsia 10/30/2022     Preop examination 2022     Preoperative cardiovascular examination 2022     Mediastinal lymphadenopathy 2021     Class 1 obesity due to excess calories with serious comorbidity and body mass index (BMI) of 32.0 to 32.9 in adult 2021     Exercise hypoxemia 2021     Post-nasal drip 2021     Neck pain on left side 2021     History of COVID-19 2021     Persistent dyspnea after COVID-19 2021     Current mild episode of major depressive disorder without prior episode (HCC) 2021     HTN (hypertension) 2021     Hyperlipidemia 2021     Hypothyroidism 2021     COVID-19 2021     Trigger finger of right thumb 2021     Myalgia 2021     Tension type headache 2021     Multiple lung nodules 2020     Elevated alkaline phosphatase level 2020      Dupuytren contracture 09/03/2020     Personal history of radiation therapy 12/07/2019     History of laryngeal cancer 12/07/2019     Loss of hearing 12/07/2019     Cancer of pharynx (HCC) 08/27/2019     Chronic kidney disease, stage 3 (moderate) 08/27/2019     Centrilobular emphysema (HCC) 08/27/2019     Depression with anxiety 08/27/2019     Panic attack 08/27/2019     Cataract 02/19/2015     Numbness 02/19/2015     Vitamin D deficiency 12/10/2013     Extrinsic obstruction of cartilagenous portion of eustachian tube 11/12/2013       LOS (days): 5  Geometric Mean LOS (GMLOS) (days): 3.6  Days to GMLOS:-1.2     OBJECTIVE:    Risk of Unplanned Readmission Score: 31.89         Current admission status: Inpatient       Preferred Pharmacy:   [id number] [place] - [address]  [address] [address]  Phone: [phone] Fax: [phone]    [id number] - [address]  [address] [address]  Phone: [phone] Fax: [phone]    Primary Care Provider: Samantha Hernandez MD    Primary Insurance: [facility]  Secondary Insurance:     ASSESSMENT:  Active Health Care Proxies       [facility] Representative - Daughter   Primary Phone: [phone] (Mobile)  Home Phone: [phone]                 Patient Information  Admitted from:: Home  Mental Status: Alert  During Assessment patient was accompanied by: Not accompanied during assessment  Assessment information provided by:: Patient  Primary Caregiver: Self  Support Systems: Self, Spouse/significant other, Son, Daughter, Family members  County of Residence: [place]  What city do you live in?: [place]  Home entry access options. Select all that apply.: No steps to enter home  Type of Current Residence: Apartment  Floor Level: 1  Upon entering residence, is there a bedroom on the main floor (no  further steps)?: Yes  Upon entering residence, is there a bathroom on the main floor (no further steps)?: Yes  Living Arrangements: Lives w/ Spouse/significant other    Activities of Daily Living Prior to Admission  Functional Status: Independent  Completes ADLs independently?: Yes  Ambulates independently?: Yes  Does patient use assisted devices?: Yes  Assisted Devices (DME) used: Home Oxygen concentrator, Portable Oxygen tanks  DME Company Name (respiratory supplies): [employer]  O2 Rate(s): 2L  Does patient currently own DME?: Yes  What DME does the patient currently own?: Walker, Shower Chair, Home Oxygen concentrator, Portable Oxygen tanks  Does patient have a history of Outpatient Therapy (PT/OT)?: No  Does the patient have a history of Short-Term Rehab?: No  Does patient have a history of HHC?: No  Does patient currently have HHC?: No    Patient Information Continued  Income Source: Pension/FDC  Does patient have prescription coverage?: Yes  Does patient receive dialysis treatments?: No  Does patient have a history of substance abuse?: No  Does patient have a history of Mental Health Diagnosis?: No    Means of Transportation  Means of Transport to [facility]:: Family transport      Social Determinants of Health (SDOH)      Flowsheet Row Most Recent Value   Housing Stability    In the last 12 months, was there a time when you were not able to pay the mortgage or rent on time? N   At any time in the past 12 months, were you homeless or living in a shelter (including now)? N   Transportation Needs    In the past 12 months, has lack of transportation kept you from medical appointments or from getting medications? no   In the past 12 months, has lack of transportation kept you from meetings, work, or from getting things needed for daily living? No   Food Insecurity    Within the past 12 months, you worried that your food would run out before you got the money to buy more. Never true   Within the past 12 months, the  food you bought just didn't last and you didn't have money to get more. Never true   Utilities    In the past 12 months has the electric, gas, oil, or water company threatened to shut off services in your home? No            DISCHARGE DETAILS:    Discharge planning discussed with:: Pt    Other Referral/Resources/Interventions Provided:  Interventions: DME  Referral Comments: CM met with pt at bedside using  [id number]. Pt resides with her  in an apartment. Pt IPTA. Pt aware CM will order BiPap for dc if pt qualifies when they do the testing. Aware CM will f/u tomorrow.

## 2024-06-25 PROBLEM — G47.33 OSA (OBSTRUCTIVE SLEEP APNEA): Status: ACTIVE | Noted: 2024-06-25

## 2024-06-25 PROBLEM — J44.1 COPD EXACERBATION (HCC): Status: ACTIVE | Noted: 2024-06-25

## 2024-06-25 LAB
ARTERIAL PATENCY WRIST A: YES
BASE EXCESS BLDA CALC-SCNC: 12.5 MMOL/L
HCO3 BLDA-SCNC: 40.6 MMOL/L (ref 22–28)
NASAL CANNULA: ABNORMAL
O2 CT BLDA-SCNC: 16.4 ML/DL (ref 16–23)
OXYHGB MFR BLDA: 98.3 % (ref 94–97)
PCO2 BLDA: 73.1 MM HG (ref 36–44)
PH BLDA: 7.36 [PH] (ref 7.35–7.45)
PO2 BLDA: 124.1 MM HG (ref 75–129)
SPECIMEN SOURCE: ABNORMAL

## 2024-06-25 PROCEDURE — 99232 SBSQ HOSP IP/OBS MODERATE 35: CPT | Performed by: INTERNAL MEDICINE

## 2024-06-25 PROCEDURE — 94640 AIRWAY INHALATION TREATMENT: CPT

## 2024-06-25 PROCEDURE — 99232 SBSQ HOSP IP/OBS MODERATE 35: CPT | Performed by: PHYSICIAN ASSISTANT

## 2024-06-25 PROCEDURE — 82805 BLOOD GASES W/O2 SATURATION: CPT | Performed by: INTERNAL MEDICINE

## 2024-06-25 PROCEDURE — 94760 N-INVAS EAR/PLS OXIMETRY 1: CPT

## 2024-06-25 RX ORDER — PREDNISONE 20 MG/1
20 TABLET ORAL DAILY
Status: DISCONTINUED | OUTPATIENT
Start: 2024-07-01 | End: 2024-06-26 | Stop reason: HOSPADM

## 2024-06-25 RX ORDER — PREDNISONE 20 MG/1
40 TABLET ORAL DAILY
Status: DISCONTINUED | OUTPATIENT
Start: 2024-06-25 | End: 2024-06-26 | Stop reason: HOSPADM

## 2024-06-25 RX ORDER — PREDNISONE 10 MG/1
10 TABLET ORAL DAILY
Status: DISCONTINUED | OUTPATIENT
Start: 2024-07-04 | End: 2024-06-26 | Stop reason: HOSPADM

## 2024-06-25 RX ADMIN — FLUTICASONE PROPIONATE 2 SPRAY: 50 SPRAY, METERED NASAL at 10:11

## 2024-06-25 RX ADMIN — IPRATROPIUM BROMIDE 0.5 MG: 0.5 SOLUTION RESPIRATORY (INHALATION) at 07:09

## 2024-06-25 RX ADMIN — TIMOLOL MALEATE 1 DROP: 5 SOLUTION OPHTHALMIC at 10:11

## 2024-06-25 RX ADMIN — PANTOPRAZOLE SODIUM 40 MG: 40 TABLET, DELAYED RELEASE ORAL at 05:41

## 2024-06-25 RX ADMIN — LEVALBUTEROL HYDROCHLORIDE 1.25 MG: 1.25 SOLUTION RESPIRATORY (INHALATION) at 20:05

## 2024-06-25 RX ADMIN — BUDESONIDE 0.5 MG: 0.5 INHALANT ORAL at 20:05

## 2024-06-25 RX ADMIN — GUAIFENESIN 600 MG: 600 TABLET ORAL at 08:31

## 2024-06-25 RX ADMIN — AMLODIPINE BESYLATE 5 MG: 5 TABLET ORAL at 08:31

## 2024-06-25 RX ADMIN — LEVALBUTEROL HYDROCHLORIDE 1.25 MG: 1.25 SOLUTION RESPIRATORY (INHALATION) at 13:36

## 2024-06-25 RX ADMIN — ATORVASTATIN CALCIUM 40 MG: 40 TABLET, FILM COATED ORAL at 17:07

## 2024-06-25 RX ADMIN — BUDESONIDE 0.5 MG: 0.5 INHALANT ORAL at 07:09

## 2024-06-25 RX ADMIN — METHYLPREDNISOLONE SODIUM SUCCINATE 40 MG: 40 INJECTION, POWDER, FOR SOLUTION INTRAMUSCULAR; INTRAVENOUS at 08:31

## 2024-06-25 RX ADMIN — FORMOTEROL FUMARATE DIHYDRATE 20 MCG: 20 SOLUTION RESPIRATORY (INHALATION) at 20:05

## 2024-06-25 RX ADMIN — LEVALBUTEROL HYDROCHLORIDE 1.25 MG: 1.25 SOLUTION RESPIRATORY (INHALATION) at 07:09

## 2024-06-25 RX ADMIN — IPRATROPIUM BROMIDE 0.5 MG: 0.5 SOLUTION RESPIRATORY (INHALATION) at 13:36

## 2024-06-25 RX ADMIN — DOCUSATE SODIUM 100 MG: 100 CAPSULE, LIQUID FILLED ORAL at 17:07

## 2024-06-25 RX ADMIN — BENZONATATE 100 MG: 100 CAPSULE ORAL at 22:53

## 2024-06-25 RX ADMIN — PREDNISONE 40 MG: 20 TABLET ORAL at 12:58

## 2024-06-25 RX ADMIN — LEVOTHYROXINE SODIUM 75 MCG: 75 TABLET ORAL at 05:41

## 2024-06-25 RX ADMIN — BENZONATATE 100 MG: 100 CAPSULE ORAL at 17:07

## 2024-06-25 RX ADMIN — TIMOLOL MALEATE 1 DROP: 5 SOLUTION OPHTHALMIC at 17:10

## 2024-06-25 RX ADMIN — ENOXAPARIN SODIUM 40 MG: 40 INJECTION SUBCUTANEOUS at 08:31

## 2024-06-25 RX ADMIN — BENZONATATE 100 MG: 100 CAPSULE ORAL at 08:31

## 2024-06-25 RX ADMIN — MONTELUKAST 10 MG: 10 TABLET, FILM COATED ORAL at 22:53

## 2024-06-25 RX ADMIN — SERTRALINE HYDROCHLORIDE 25 MG: 25 TABLET ORAL at 22:53

## 2024-06-25 RX ADMIN — GUAIFENESIN 600 MG: 600 TABLET ORAL at 17:07

## 2024-06-25 RX ADMIN — IPRATROPIUM BROMIDE 0.5 MG: 0.5 SOLUTION RESPIRATORY (INHALATION) at 20:05

## 2024-06-25 RX ADMIN — DOCUSATE SODIUM 100 MG: 100 CAPSULE, LIQUID FILLED ORAL at 08:31

## 2024-06-25 RX ADMIN — FORMOTEROL FUMARATE DIHYDRATE 20 MCG: 20 SOLUTION RESPIRATORY (INHALATION) at 07:09

## 2024-06-25 NOTE — PROGRESS NOTES
"[facility]  Progress Note  Name: Noreen Alvarez I  MRN: [id number]  Unit/Bed#: S -01 I Date of Admission: 6/19/2024   Date of Service: 6/25/2024 I Hospital Day: 6    Assessment & Plan   * Acute on chronic respiratory failure with hypoxia and hypercapnia (HCC)  Assessment & Plan  Patient has a history of RHETT-3 lobular emphysema, obstructive sleep apnea and multiple lung nodules.  She does wear oxygen 2 L nasal cannula at baseline.  Presented with  worsening shortness of breath for 48 hours due to COPD with exacerbation triggered by viral illness.  Serial blood gases show hypercapnia.  Qualifies for BiPAP.  Appreciate pulmonology assistance in arranging this for discharge  CTA ED chest PE study \"negative for pulmonary emboli, groundglass nodule in the lateral right lower lobe measures up to 1.6 cm this nodule has been unchanged from her most recent CAT scan on March 2024, there is a slow-growing adenocarcinoma spectrum lesion is not entirely excluded.  New focus on tree-in-bud nodularity in the posterior right lower lobe is favored to represent an infections or inflammatory infiltrate and increase in mediastinal/hilar nodes infiltrate.\"  Continue treatment for COPD as noted below  Pulmonary outpatient follow-up scheduled for 7 /1.    COPD with acute exacerbation (HCC)  Assessment & Plan  Underlying severe emphysema with acute exacerbation likely in the setting of recent viral URI.  Admits to sick family contacts.  RP2 panel positive for parainfluenza.    Procalcitonin x2 negative, but received a 5-day course of Zithromax nonetheless  Continue respiratory protocol  Appreciate pulmonology input, wean steroids as directed         HTN (hypertension)  Assessment & Plan  Blood pressure elevated on admission.  Started on Norvasc.  May need titration of dose further based on blood pressure control.     Chronic kidney disease, stage 3 (moderate)  Assessment & Plan  Lab Results   Component Value " Date    EGFR 49 2024    EGFR 48 2024    EGFR 50 2024    CREATININE 1.13 2024    CREATININE 1.16 2024    CREATININE 1.12 2024   Baseline creatinine 0.80-1.0  Avoid nephrotoxic medications, NSAIDs and contrast dye if possible           VTE Pharmacologic Prophylaxis: VTE Score: 7     Mobility:   Basic Mobility Inpatient Raw Score: 21  JH-HLM Goal: 6: Walk 10 steps or more  JH-HLM Achieved: 3: Sit at edge of bed  JH-HLM Goal NOT achieved. Continue with multidisciplinary rounding and encourage appropriate mobility to improve upon JH-HLM goals.  Courage patient to ambulate more    Patient Centered Rounds: I performed bedside rounds with nursing staff today.   Discussions with Specialists or Other Care Team Provider: case mgmt, pulm    Education and Discussions with Family / Patient:     Total Time Spent on Date of Encounter in care of patient: 25 mins. This time was spent on one or more of the following: performing physical exam; counseling and coordination of care; obtaining or reviewing history; documenting in the medical record; reviewing/ordering tests, medications or procedures; communicating with other healthcare professionals and discussing with patient's family/caregivers.    Current Length of Stay: 6 day(s)  Current Patient Status: Inpatient   Certification Statement: The patient will continue to require additional inpatient hospital stay due to transitioning from IV to p.o. steroids  Discharge Plan: Anticipate discharge tomorrow to home.    Code Status: Level 1 - Full Code    Subjective:   History obtained from patient in Maltese.  She reports she is doing better.  Cough is improving.  Not getting out of bed much because of short tubing with oxygen.  Otherwise denies any complaints or concerns    Objective:     Vitals:   Temp (24hrs), Av.3 °F (36.8 °C), Min:97.7 °F (36.5 °C), Max:98.8 °F (37.1 °C)    Temp:  [97.7 °F (36.5 °C)-98.8 °F (37.1 °C)] 97.7 °F (36.5 °C)  HR:   [71-75] 71  Resp:  [16-18] 18  BP: (149-175)/(73-83) 175/83  SpO2:  [91 %-99 %] 99 %  Body mass index is 30.64 kg/m².     Input and Output Summary (last 24 hours):   No intake or output data in the 24 hours ending 06/25/24 1144    Physical Exam:   Physical Exam  Vitals reviewed.   Constitutional:       General: She is not in acute distress.     Appearance: She is obese. She is not ill-appearing, toxic-appearing or diaphoretic.   HENT:      Nose: No congestion.   Eyes:      General: No scleral icterus.        Right eye: No discharge.         Left eye: No discharge.      Conjunctiva/sclera: Conjunctivae normal.   Cardiovascular:      Rate and Rhythm: Normal rate and regular rhythm.      Heart sounds: No murmur heard.  Pulmonary:      Effort: No respiratory distress.      Breath sounds: No stridor. Wheezing (Faint scattered expiratory wheezes) present. No rhonchi.      Comments: Occasional tight cough  Abdominal:      General: There is no distension.      Tenderness: There is no guarding.   Musculoskeletal:      Right lower leg: No edema.      Left lower leg: No edema.   Skin:     General: Skin is dry.      Coloration: Skin is not jaundiced or pale.      Findings: No bruising, erythema, lesion or rash.   Neurological:      General: No focal deficit present.      Mental Status: She is alert.      Comments: Awake alert interactive no confusion   Psychiatric:         Mood and Affect: Mood normal.          Additional Data:     Labs:  Results from last 7 days   Lab Units 06/24/24  0609   WBC Thousand/uL 8.97   HEMOGLOBIN g/dL 10.9*   HEMATOCRIT % 37.6   PLATELETS Thousands/uL 182   SEGS PCT % 86*   LYMPHO PCT % 7*   MONO PCT % 6   EOS PCT % 0     Results from last 7 days   Lab Units 06/23/24  0459 06/21/24  0803 06/19/24  1748   SODIUM mmol/L 141   < > 141   POTASSIUM mmol/L 4.7   < > 4.5   CHLORIDE mmol/L 94*   < > 96   CO2 mmol/L 43*   < > 40*   CO2, I-STAT   --    < >  --    BUN mg/dL 40*   < > 18   CREATININE mg/dL 1.13    < > 0.98   ANION GAP mmol/L 4   < > 5   CALCIUM mg/dL 9.1   < > 9.3   ALBUMIN g/dL  --   --  4.4   TOTAL BILIRUBIN mg/dL  --   --  0.42   ALK PHOS U/L  --   --  126*   ALT U/L  --   --  12   AST U/L  --   --  21   GLUCOSE RANDOM mg/dL 125   < > 129    < > = values in this interval not displayed.                 Results from last 7 days   Lab Units 06/23/24  0459 06/21/24  0803 06/19/24  1748   PROCALCITONIN ng/ml <0.05 <0.05 <0.05       Lines/Drains:  Invasive Devices       Peripheral Intravenous Line  Duration             Peripheral IV 06/23/24 Right Antecubital 2 days                    Imaging:     Recent Cultures (last 7 days):         Last 24 Hours Medication List:   Current Facility-Administered Medications   Medication Dose Route Frequency Provider Last Rate    acetaminophen  650 mg Oral Q6H PRN GERTRUDIS Yañez      albuterol  2 puff Inhalation Q6H PRN GERTRUDIS Yañez      aluminum-magnesium hydroxide-simethicone  30 mL Oral Q6H PRN GERTRUDIS Yañez      amLODIPine  5 mg Oral Daily Nallely Carney MD      atorvastatin  40 mg Oral Daily With Dinner GERTRUDIS Yañez      benzonatate  100 mg Oral TID GERTRUDIS Yañez      budesonide  0.5 mg Nebulization Q12H Chet Aparicio, DO      docusate sodium  100 mg Oral BID GERTRUDIS Yañez      enoxaparin  40 mg Subcutaneous Daily GERTRUDIS Yañez      fluticasone  2 spray Each Nare Daily GERTRUDIS Yañez      formoterol  20 mcg Nebulization Q12H Chet Aparicio, DO      guaiFENesin  600 mg Oral BID GERTRUDIS Yañez      ipratropium  0.5 mg Nebulization Q6H PRN Fan Valles, DO      ipratropium  0.5 mg Nebulization TID Nallely Carney MD      labetalol  10 mg Intravenous Q6H PRN Cristy Owen, DO      levalbuterol  1.25 mg Nebulization Q6H PRN Fan Valles, DO      levalbuterol  1.25 mg Nebulization TID Nallely Carney MD      levothyroxine  75 mcg Oral Early Morning GERTRUDIS Yañez      methylPREDNISolone sodium succinate  40  mg Intravenous Q12H ESTEVAN Fan Valles DO      montelukast  10 mg Oral HS GERTRUDIS Yañez      ondansetron  4 mg Intravenous Q6H PRN GERTRUDIS Yañez      pantoprazole  40 mg Oral Daily Before Breakfast GERTRUDIS Yañez      sertraline  25 mg Oral HS GERTRUDIS Yañez      timolol  1 drop Both Eyes BID GERTRUDIS Yañez          Today, Patient Was Seen By: Zari Robertson PA-C    **Please Note: This note may have been constructed using a voice recognition system.**

## 2024-06-25 NOTE — ASSESSMENT & PLAN NOTE
Lab Results   Component Value Date    EGFR 49 06/23/2024    EGFR 48 06/22/2024    EGFR 50 06/21/2024    CREATININE 1.13 06/23/2024    CREATININE 1.16 06/22/2024    CREATININE 1.12 06/21/2024   Baseline creatinine 0.80-1.0  Avoid nephrotoxic medications, NSAIDs and contrast dye if possible

## 2024-06-25 NOTE — PLAN OF CARE
Problem: PAIN - ADULT  Goal: Verbalizes/displays adequate comfort level or baseline comfort level  Description: Interventions:  - Encourage patient to monitor pain and request assistance  - Assess pain using appropriate pain scale  - Administer analgesics based on type and severity of pain and evaluate response  - Implement non-pharmacological measures as appropriate and evaluate response  - Consider cultural and social influences on pain and pain management  - Notify physician/advanced practitioner if interventions unsuccessful or patient reports new pain  Outcome: Progressing     Problem: INFECTION - ADULT  Goal: Absence or prevention of progression during hospitalization  Description: INTERVENTIONS:  - Assess and monitor for signs and symptoms of infection  - Monitor lab/diagnostic results  - Monitor all insertion sites, i.e. indwelling lines, tubes, and drains  - Monitor endotracheal if appropriate and nasal secretions for changes in amount and color  - Hodge appropriate cooling/warming therapies per order  - Administer medications as ordered  - Instruct and encourage patient and family to use good hand hygiene technique  - Identify and instruct in appropriate isolation precautions for identified infection/condition  Outcome: Progressing  Goal: Absence of fever/infection during neutropenic period  Description: INTERVENTIONS:  - Monitor WBC    Outcome: Progressing     Problem: SAFETY ADULT  Goal: Patient will remain free of falls  Description: INTERVENTIONS:  - Educate patient/family on patient safety including physical limitations  - Instruct patient to call for assistance with activity   - Consult OT/PT to assist with strengthening/mobility   - Keep Call bell within reach  - Keep bed low and locked with side rails adjusted as appropriate  - Keep care items and personal belongings within reach  - Initiate and maintain comfort rounds  - Make Fall Risk Sign visible to staff  - Apply yellow socks and bracelet  for high fall risk patients  - Consider moving patient to room near nurses station  Outcome: Progressing  Goal: Maintain or return to baseline ADL function  Description: INTERVENTIONS:  -  Assess patient's ability to carry out ADLs; assess patient's baseline for ADL function and identify physical deficits which impact ability to perform ADLs (bathing, care of mouth/teeth, toileting, grooming, dressing, etc.)  - Assess/evaluate cause of self-care deficits   - Assess range of motion  - Assess patient's mobility; develop plan if impaired  - Assess patient's need for assistive devices and provide as appropriate  - Encourage maximum independence but intervene and supervise when necessary  - Involve family in performance of ADLs  - Assess for home care needs following discharge   - Consider OT consult to assist with ADL evaluation and planning for discharge  - Provide patient education as appropriate  Outcome: Progressing  Goal: Maintains/Returns to pre admission functional level  Description: INTERVENTIONS:  - Perform AM-PAC 6 Click Basic Mobility/ Daily Activity assessment daily.  - Set and communicate daily mobility goal to care team and patient/family/caregiver.   - Collaborate with rehabilitation services on mobility goals if consulted  - Out of bed for toileting  - Record patient progress and toleration of activity level   Outcome: Progressing     Problem: DISCHARGE PLANNING  Goal: Discharge to home or other facility with appropriate resources  Description: INTERVENTIONS:  - Identify barriers to discharge w/patient and caregiver  - Arrange for needed discharge resources and transportation as appropriate  - Identify discharge learning needs (meds, wound care, etc.)  - Arrange for interpretive services to assist at discharge as needed  - Refer to Case Management Department for coordinating discharge planning if the patient needs post-hospital services based on physician/advanced practitioner order or complex needs  related to functional status, cognitive ability, or social support system  Outcome: Progressing     Problem: Knowledge Deficit  Goal: Patient/family/caregiver demonstrates understanding of disease process, treatment plan, medications, and discharge instructions  Description: Complete learning assessment and assess knowledge base.  Interventions:  - Provide teaching at level of understanding  - Provide teaching via preferred learning methods  Outcome: Progressing     Problem: Prexisting or High Potential for Compromised Skin Integrity  Goal: Skin integrity is maintained or improved  Description: INTERVENTIONS:  - Identify patients at risk for skin breakdown  - Assess and monitor skin integrity  - Assess and monitor nutrition and hydration status  - Monitor labs   - Assess for incontinence   - Turn and reposition patient  - Assist with mobility/ambulation  - Relieve pressure over bony prominences  - Avoid friction and shearing  - Provide appropriate hygiene as needed including keeping skin clean and dry  - Evaluate need for skin moisturizer/barrier cream  - Collaborate with interdisciplinary team   - Patient/family teaching  - Consider wound care consult   Outcome: Progressing     Problem: RESPIRATORY - ADULT  Goal: Achieves optimal ventilation and oxygenation  Description: INTERVENTIONS:  - Assess for changes in respiratory status  - Assess for changes in mentation and behavior  - Position to facilitate oxygenation and minimize respiratory effort  - Oxygen administered by appropriate delivery if ordered  - Initiate smoking cessation education as indicated  - Encourage broncho-pulmonary hygiene including cough, deep breathe, Incentive Spirometry  - Assess the need for suctioning and aspirate as needed  - Assess and instruct to report SOB or any respiratory difficulty  - Respiratory Therapy support as indicated  Outcome: Progressing     Problem: MUSCULOSKELETAL - ADULT  Goal: Maintain or return mobility to safest level  of function  Description: INTERVENTIONS:  - Assess patient's ability to carry out ADLs; assess patient's baseline for ADL function and identify physical deficits which impact ability to perform ADLs (bathing, care of mouth/teeth, toileting, grooming, dressing, etc.)  - Assess/evaluate cause of self-care deficits   - Assess range of motion  - Assess patient's mobility  - Assess patient's need for assistive devices and provide as appropriate  - Encourage maximum independence but intervene and supervise when necessary  - Involve family in performance of ADLs  - Assess for home care needs following discharge   - Consider OT consult to assist with ADL evaluation and planning for discharge  - Provide patient education as appropriate  Outcome: Progressing  Goal: Maintain proper alignment of affected body part  Description: INTERVENTIONS:  - Support, maintain and protect limb and body alignment  - Provide patient/ family with appropriate education  Outcome: Progressing

## 2024-06-25 NOTE — QUICK NOTE
BiPAP order for COPD to treat hypercapnia. Overnight oximetry done on 2L nasal cannula. CPAP will not treat this.    Fan Valles,   PGY-4, Pulmonary/Critical Care  [facility]

## 2024-06-25 NOTE — PROGRESS NOTES
PULMONOLOGY PROGRESS NOTE     Name: Noreen Alvarez   Age & Sex: 69 y.o. female   MRN: [id number]  Unit/Bed#: S -01   Encounter: [id number]    PATIENT INFORMATION     Name: Noreen Alvarez   Age & Sex: 69 y.o. 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  Diagnosed with an acute exacerbation of COPD.  Pulmonology was consulted for further recommendations.     Acute on chronic hypoxemic/hypercapnic respiratory failure  Severe COPD with an FEV1 of 37% in acute exacerbation likely secondary to sick contacts  Centrilobular emphysema  Previous tobacco abuse in remission  Anxiety/depression  CKD stage III  Hypertension  Hyperlipidemia  Hypothyroidism  Previous squamous cell carcinoma of the larynx status post chemo and radiation in 2014     Plan:  Continue to wean oxygen for SpO2 greater than 88%, patient is back down to her baseline 2 L  CTA PE study negative for pulmonary embolism, showing groundglass nodule in the right lower lobe that has been stable since March 2024, some tree-in-bud nodularity in the right lower lobe as well possibly infectious or inflammatory  Patient's outpatient regimen includes Breztri, nebulized ipratropium, and nebulized albuterol  Plan to transition to oral steroids today  Respiratory protocol, flutter valve, out of bed to chair  Exacerbation likely secondary to parainfluenza virus 3  Status post 5 days of azithromycin  Patient can follow-up as an outpatient for possible chronic azithromycin versus Daliresp versus lung volume reduction  considerations  Patient had a negative sleep study earlier this year, hypercapnia likely chronic -continue to monitor patient's mentation and breathing status, BiPAP nightly while inpatient  Can attempt to qualify for [facility] as an OP  Overngiht pulse ox prior to DC  Clinic message for follow-up, scheduled for 2024  Continue smoking cessation  Rest of care per primary      SUBJECTIVE     Patient seen and examined. No acute events overnight.  Respiratory status is improving.  All other review systems negative at this time.    OBJECTIVE     Vitals:    24 1531 24 0710 24 0720   BP: 149/73   (!) 175/83   Pulse: 75   71   Resp: 16   18   Temp: 98.8 °F (37.1 °C)   97.7 °F (36.5 °C)   TempSrc:       SpO2: 91% 95% 97% 99%   Weight:          Temperature:   Temp (24hrs), Av.4 °F (36.9 °C), Min:97.7 °F (36.5 °C), Max:98.8 °F (37.1 °C)    Temperature: 97.7 °F (36.5 °C)  Intake & Output:  I/O          0701   0700  0701   0700  0701   0700    P.O. 240 830     IV Piggyback 250      Total Intake(mL/kg) 490 (6.1) 830 (10.2)     Urine (mL/kg/hr) 200 600 (0.3)     Total Output 200 600     Net +290 +230            Unmeasured Urine Occurrence  1 x     Unmeasured Stool Occurrence  0 x           Weights:        Body mass index is 30.64 kg/m².  Weight (last 2 days)       Date/Time Weight    24 0600 81 (178.5)          Physical Exam  Vitals reviewed.   Constitutional:       General: She is not in acute distress.  HENT:      Head: Normocephalic and atraumatic.      Right Ear: External ear normal.      Left Ear: External ear normal.      Nose: Nose normal.      Mouth/Throat:      Mouth: Mucous membranes are moist.      Pharynx: Oropharynx is clear.   Eyes:      Extraocular Movements: Extraocular movements intact.      Pupils: Pupils are equal, round, and reactive to light.   Cardiovascular:      Rate and Rhythm: Normal rate and regular rhythm.      Pulses: Normal  pulses.      Heart sounds: Normal heart sounds.   Pulmonary:      Effort: Pulmonary effort is normal.      Comments: Decreased breath sounds bilaterally  Abdominal:      General: Bowel sounds are normal. There is no distension.      Tenderness: There is no abdominal tenderness.   Musculoskeletal:      Right lower leg: No edema.      Left lower leg: No edema.   Skin:     General: Skin is warm and dry.      Capillary Refill: Capillary refill takes less than 2 seconds.   Neurological:      General: No focal deficit present.      Mental Status: She is alert and oriented to person, place, and time.   Psychiatric:         Mood and Affect: Mood normal.         Behavior: Behavior normal.           LABORATORY DATA     Labs: I have personally reviewed pertinent reports.  Results from last 7 days   Lab Units 06/24/24  0609 06/23/24  0459 06/21/24  1733 06/21/24  0803   WBC Thousand/uL 8.97 6.12  --  12.98*   HEMOGLOBIN g/dL 10.9* 9.9*  --  10.5*   I STAT HEMOGLOBIN g/dl  --   --  11.6  --    HEMATOCRIT % 37.6 34.2*  --  36.9   HEMATOCRIT, ISTAT %  --   --  34*  --    PLATELETS Thousands/uL 182 153  --  177   SEGS PCT % 86* 89*  --  90*   MONO PCT % 6 3*  --  6   EOS PCT % 0 0  --  0      Results from last 7 days   Lab Units 06/23/24  0459 06/22/24  0500 06/21/24  1733 06/21/24  0803 06/19/24  1748   POTASSIUM mmol/L 4.7 4.2  --  4.8 4.5   CHLORIDE mmol/L 94* 93*  --  97 96   CO2 mmol/L 43* 43*  --  44* 40*   CO2, I-STAT mmol/L  --   --  44*  --   --    BUN mg/dL 40* 34*  --  25 18   CREATININE mg/dL 1.13 1.16  --  1.12 0.98   CALCIUM mg/dL 9.1 9.3  --  9.7 9.3   ALK PHOS U/L  --   --   --   --  126*   ALT U/L  --   --   --   --  12   AST U/L  --   --   --   --  21   GLUCOSE, ISTAT mg/dl  --   --  130  --   --          IMAGING & DIAGNOSTIC TESTING     Radiology Results: I have personally reviewed pertinent reports.  XR chest 1 view portable    Result Date: 6/20/2024  Impression: No focal consolidation, pleural effusion, or  pneumothorax. Workstation performed: [id number]     CTA ED chest PE Study    Result Date: 6/19/2024  Impression: No pulmonary embolism. A groundglass nodule in the lateral right lower lobe measures up to 1.6 cm, unchanged from most recent March 2024 comparison, however increased from December 2022 exam. A slow-growing/adenocarcinoma spectrum lesion is not entirely excluded. New focus of tree-in-bud nodularity in the posterior right lower lobe is favored to represent an infectious/inflammatory infiltrate. Overall interval increase in mediastinal/hilar nodes as described. Continued follow-up is recommended. Workstation performed: [id number]     Other Diagnostic Testing: I have personally reviewed pertinent reports.    ACTIVE MEDICATIONS     Current Facility-Administered Medications   Medication Dose Route Frequency    acetaminophen (TYLENOL) tablet 650 mg  650 mg Oral Q6H PRN    albuterol (PROVENTIL HFA,VENTOLIN HFA) inhaler 2 puff  2 puff Inhalation Q6H PRN    aluminum-magnesium hydroxide-simethicone (MAALOX) oral suspension 30 mL  30 mL Oral Q6H PRN    amLODIPine (NORVASC) tablet 5 mg  5 mg Oral Daily    atorvastatin (LIPITOR) tablet 40 mg  40 mg Oral Daily With Dinner    benzonatate (TESSALON PERLES) capsule 100 mg  100 mg Oral TID    budesonide (PULMICORT) inhalation solution 0.5 mg  0.5 mg Nebulization Q12H    docusate sodium (COLACE) capsule 100 mg  100 mg Oral BID    enoxaparin (LOVENOX) subcutaneous injection 40 mg  40 mg Subcutaneous Daily    fluticasone (FLONASE) 50 mcg/act nasal spray 2 spray  2 spray Each Nare Daily    formoterol (PERFOROMIST) nebulizer solution 20 mcg  20 mcg Nebulization Q12H    guaiFENesin (MUCINEX) 12 hr tablet 600 mg  600 mg Oral BID    ipratropium (ATROVENT) 0.02 % inhalation solution 0.5 mg  0.5 mg Nebulization Q6H PRN    ipratropium (ATROVENT) 0.02 % inhalation solution 0.5 mg  0.5 mg Nebulization TID    labetalol (NORMODYNE) injection 10 mg  10 mg Intravenous Q6H PRN     "levalbuterol (XOPENEX) inhalation solution 1.25 mg  1.25 mg Nebulization Q6H PRN    levalbuterol (XOPENEX) inhalation solution 1.25 mg  1.25 mg Nebulization TID    levothyroxine tablet 75 mcg  75 mcg Oral Early Morning    methylPREDNISolone sodium succinate (Solu-MEDROL) injection 40 mg  40 mg Intravenous Q12H ESTEVAN    montelukast (SINGULAIR) tablet 10 mg  10 mg Oral HS    ondansetron (ZOFRAN) injection 4 mg  4 mg Intravenous Q6H PRN    pantoprazole (PROTONIX) EC tablet 40 mg  40 mg Oral Daily Before Breakfast    sertraline (ZOLOFT) tablet 25 mg  25 mg Oral HS    timolol (TIMOPTIC) 0.5 % ophthalmic solution 1 drop  1 drop Both Eyes BID         Disclaimer: Portions of the record may have been created with voice recognition software. Occasional wrong word or \"sound a like\" substitutions may have occurred due to the inherent limitations of voice recognition software. Careful consideration should be taken to recognize, using context, where substitutions have occurred.    Fan Valles DO, MS  Pulmonary and Critical Care Fellow, PGY-IV  [facility]  "

## 2024-06-25 NOTE — CASE MANAGEMENT
Case Management Discharge Planning Note    Patient name Noreen Alvarez  Location S /S -01 MRN [id number]  : 1955 Date 2024       Current Admission Date: 2024  Current Admission Diagnosis:Acute on chronic respiratory failure with hypoxia and hypercapnia (HCC)   Patient Active Problem List    Diagnosis Date Noted Date Diagnosed    ELIUD (obstructive sleep apnea) 2024     COPD exacerbation (HCC) 2024     Acute on chronic respiratory failure with hypoxia and hypercapnia (HCC) 2024     Tubular adenoma of colon 2024     Chronic constipation 2024     Candidal intertrigo 2024     TIMMY (generalized anxiety disorder) 2024     Iron deficiency anemia due to dietary causes 2024     COPD with acute exacerbation (HCC) 2024     Sacroiliac inflammation (HCC) 2024     Epigastric pain 01/10/2024     Gastroesophageal reflux disease without esophagitis 01/10/2024     Pain of right sacroiliac joint 2023     Hypomagnesemia 2023     Chronic respiratory failure with hypoxia, on home O2 therapy  (HCC) 2023     Iron deficiency anemia 10/30/2022     Dyspepsia 10/30/2022     Preop examination 2022     Preoperative cardiovascular examination 2022     Mediastinal lymphadenopathy 2021     Class 1 obesity due to excess calories with serious comorbidity and body mass index (BMI) of 32.0 to 32.9 in adult 2021     Exercise hypoxemia 2021     Post-nasal drip 2021     Neck pain on left side 2021     History of COVID-19 2021     Persistent dyspnea after COVID-19 2021     Current mild episode of major depressive disorder without prior episode (HCC) 2021     HTN (hypertension) 2021     Hyperlipidemia 2021     Hypothyroidism 2021     COVID-19 2021     Trigger finger of right thumb 2021     Myalgia 2021     Tension type headache 2021     Multiple lung  nodules 12/29/2020     Elevated alkaline phosphatase level 09/09/2020     Dupuytren contracture 09/03/2020     Personal history of radiation therapy 12/07/2019     History of laryngeal cancer 12/07/2019     Loss of hearing 12/07/2019     Cancer of pharynx (HCC) 08/27/2019     Chronic kidney disease, stage 3 (moderate) 08/27/2019     Centrilobular emphysema (HCC) 08/27/2019     Depression with anxiety 08/27/2019     Panic attack 08/27/2019     Cataract 02/19/2015     Numbness 02/19/2015     Vitamin D deficiency 12/10/2013     Extrinsic obstruction of cartilagenous portion of eustachian tube 11/12/2013       LOS (days): 6  Geometric Mean LOS (GMLOS) (days): 3.6  Days to GMLOS:-2.2     OBJECTIVE:  Risk of Unplanned Readmission Score: 32.82         Current admission status: Inpatient   Preferred Pharmacy:   [facility]/pharmacy #[id number] - [place], [place] - [address]  [address] [address]  Phone: [phone] Fax: [phone]    [id number] [place], [place] - [address]  [address] [address]  Phone: [phone] Fax: [phone]    Primary Care Provider: Samantha Hernandez MD    Primary Insurance: "[employer] [facility]  Secondary Insurance:     DISCHARGE DETAILS:    DME Referral Provided  Referral made for DME?: Yes  DME referral completed for the following items:: BiPAP    Other Referral/Resources/Interventions Provided:  Interventions: DME  Referral Comments: Overnight pulse ox completed. Order for BiPAP placed via [facility]; all requested DME ordered. [employer] as supplier- supplier reviewing and processing order. CM to follow up.

## 2024-06-25 NOTE — ASSESSMENT & PLAN NOTE
Underlying severe emphysema with acute exacerbation likely in the setting of recent viral URI.  Admits to sick family contacts.  RP2 panel positive for parainfluenza.    Procalcitonin x2 negative, but received a 5-day course of Zithromax nonetheless  Continue respiratory protocol  Appreciate pulmonology input, wean steroids as directed

## 2024-06-25 NOTE — ASSESSMENT & PLAN NOTE
"Patient has a history of RHETT-3 lobular emphysema, obstructive sleep apnea and multiple lung nodules.  She does wear oxygen 2 L nasal cannula at baseline.  Presented with  worsening shortness of breath for 48 hours due to COPD with exacerbation triggered by viral illness.  Serial blood gases show hypercapnia.  Qualifies for BiPAP.  Appreciate pulmonology assistance in arranging this for discharge  CTA ED chest PE study \"negative for pulmonary emboli, groundglass nodule in the lateral right lower lobe measures up to 1.6 cm this nodule has been unchanged from her most recent CAT scan on March 2024, there is a slow-growing adenocarcinoma spectrum lesion is not entirely excluded.  New focus on tree-in-bud nodularity in the posterior right lower lobe is favored to represent an infections or inflammatory infiltrate and increase in mediastinal/hilar nodes infiltrate.\"  Continue treatment for COPD as noted below  Pulmonary outpatient follow-up scheduled for 7 /1.  "

## 2024-06-26 ENCOUNTER — APPOINTMENT (OUTPATIENT)
Dept: PULMONOLOGY | Facility: CLINIC | Age: 69
End: 2024-06-26
Payer: COMMERCIAL

## 2024-06-26 VITALS
OXYGEN SATURATION: 96 % | SYSTOLIC BLOOD PRESSURE: 166 MMHG | HEART RATE: 79 BPM | TEMPERATURE: 98.7 F | WEIGHT: 178.5 LBS | RESPIRATION RATE: 17 BRPM | BODY MASS INDEX: 30.64 KG/M2 | DIASTOLIC BLOOD PRESSURE: 72 MMHG

## 2024-06-26 LAB

## 2024-06-26 PROCEDURE — 94640 AIRWAY INHALATION TREATMENT: CPT

## 2024-06-26 PROCEDURE — 94760 N-INVAS EAR/PLS OXIMETRY 1: CPT

## 2024-06-26 PROCEDURE — 94660 CPAP INITIATION&MGMT: CPT

## 2024-06-26 PROCEDURE — 99239 HOSP IP/OBS DSCHRG MGMT >30: CPT | Performed by: PHYSICIAN ASSISTANT

## 2024-06-26 RX ORDER — AMLODIPINE BESYLATE 5 MG/1
5 TABLET ORAL DAILY
Qty: 30 TABLET | Refills: 0 | Status: SHIPPED | OUTPATIENT
Start: 2024-06-27 | End: 2024-07-03 | Stop reason: SDUPTHER

## 2024-06-26 RX ORDER — PREDNISONE 10 MG/1
TABLET ORAL
Qty: 22 TABLET | Refills: 0 | Status: SHIPPED | OUTPATIENT
Start: 2024-06-27 | End: 2024-07-06

## 2024-06-26 RX ORDER — ACETAMINOPHEN 325 MG/1
650 TABLET ORAL EVERY 6 HOURS PRN
Start: 2024-06-26

## 2024-06-26 RX ORDER — GUAIFENESIN 600 MG/1
600 TABLET, EXTENDED RELEASE ORAL 2 TIMES DAILY
Start: 2024-06-26 | End: 2024-07-03

## 2024-06-26 RX ADMIN — LEVALBUTEROL HYDROCHLORIDE 1.25 MG: 1.25 SOLUTION RESPIRATORY (INHALATION) at 13:23

## 2024-06-26 RX ADMIN — PREDNISONE 40 MG: 20 TABLET ORAL at 09:27

## 2024-06-26 RX ADMIN — LEVALBUTEROL HYDROCHLORIDE 1.25 MG: 1.25 SOLUTION RESPIRATORY (INHALATION) at 07:33

## 2024-06-26 RX ADMIN — IPRATROPIUM BROMIDE 0.5 MG: 0.5 SOLUTION RESPIRATORY (INHALATION) at 13:23

## 2024-06-26 RX ADMIN — BENZONATATE 100 MG: 100 CAPSULE ORAL at 09:27

## 2024-06-26 RX ADMIN — GUAIFENESIN 600 MG: 600 TABLET ORAL at 09:27

## 2024-06-26 RX ADMIN — IPRATROPIUM BROMIDE 0.5 MG: 0.5 SOLUTION RESPIRATORY (INHALATION) at 07:33

## 2024-06-26 RX ADMIN — ENOXAPARIN SODIUM 40 MG: 40 INJECTION SUBCUTANEOUS at 09:27

## 2024-06-26 RX ADMIN — FORMOTEROL FUMARATE DIHYDRATE 20 MCG: 20 SOLUTION RESPIRATORY (INHALATION) at 07:33

## 2024-06-26 RX ADMIN — AMLODIPINE BESYLATE 5 MG: 5 TABLET ORAL at 09:27

## 2024-06-26 RX ADMIN — TIMOLOL MALEATE 1 DROP: 5 SOLUTION OPHTHALMIC at 09:28

## 2024-06-26 RX ADMIN — FLUTICASONE PROPIONATE 2 SPRAY: 50 SPRAY, METERED NASAL at 09:28

## 2024-06-26 RX ADMIN — BUDESONIDE 0.5 MG: 0.5 INHALANT ORAL at 07:33

## 2024-06-26 RX ADMIN — DOCUSATE SODIUM 100 MG: 100 CAPSULE, LIQUID FILLED ORAL at 09:27

## 2024-06-26 NOTE — PLAN OF CARE
Problem: PAIN - ADULT  Goal: Verbalizes/displays adequate comfort level or baseline comfort level  Description: Interventions:  - Encourage patient to monitor pain and request assistance  - Assess pain using appropriate pain scale  - Administer analgesics based on type and severity of pain and evaluate response  - Implement non-pharmacological measures as appropriate and evaluate response  - Consider cultural and social influences on pain and pain management  - Notify physician/advanced practitioner if interventions unsuccessful or patient reports new pain  Outcome: Progressing     Problem: INFECTION - ADULT  Goal: Absence or prevention of progression during hospitalization  Description: INTERVENTIONS:  - Assess and monitor for signs and symptoms of infection  - Monitor lab/diagnostic results  - Monitor all insertion sites, i.e. indwelling lines, tubes, and drains  - Monitor endotracheal if appropriate and nasal secretions for changes in amount and color  - [place] appropriate cooling/warming therapies per order  - Administer medications as ordered  - Instruct and encourage patient and family to use good hand hygiene technique  - Identify and instruct in appropriate isolation precautions for identified infection/condition  Outcome: Progressing

## 2024-06-26 NOTE — CASE MANAGEMENT
Case Management Discharge Planning Note    Patient name Noreen Alvarez  Location S /S -01 MRN [id number]  : 1955 Date 2024       Current Admission Date: 2024  Current Admission Diagnosis:Acute on chronic respiratory failure with hypoxia and hypercapnia (HCC)   Patient Active Problem List    Diagnosis Date Noted Date Diagnosed    ELIUD (obstructive sleep apnea) 2024     COPD exacerbation (HCC) 2024     Acute on chronic respiratory failure with hypoxia and hypercapnia (HCC) 2024     Tubular adenoma of colon 2024     Chronic constipation 2024     Candidal intertrigo 2024     TIMMY (generalized anxiety disorder) 2024     Iron deficiency anemia due to dietary causes 2024     COPD with acute exacerbation (HCC) 2024     Sacroiliac inflammation (HCC) 2024     Epigastric pain 01/10/2024     Gastroesophageal reflux disease without esophagitis 01/10/2024     Pain of right sacroiliac joint 2023     Hypomagnesemia 2023     Chronic respiratory failure with hypoxia, on home O2 therapy  (HCC) 2023     Iron deficiency anemia 10/30/2022     Dyspepsia 10/30/2022     Preop examination 2022     Preoperative cardiovascular examination 2022     Mediastinal lymphadenopathy 2021     Class 1 obesity due to excess calories with serious comorbidity and body mass index (BMI) of 32.0 to 32.9 in adult 2021     Exercise hypoxemia 2021     Post-nasal drip 2021     Neck pain on left side 2021     History of COVID-19 2021     Persistent dyspnea after COVID-19 2021     Current mild episode of major depressive disorder without prior episode (HCC) 2021     HTN (hypertension) 2021     Hyperlipidemia 2021     Hypothyroidism 2021     COVID-19 2021     Trigger finger of right thumb 2021     Myalgia 2021     Tension type headache 2021     Multiple lung  nodules 12/29/2020     Elevated alkaline phosphatase level 09/09/2020     Dupuytren contracture 09/03/2020     Personal history of radiation therapy 12/07/2019     History of laryngeal cancer 12/07/2019     Loss of hearing 12/07/2019     Cancer of pharynx (HCC) 08/27/2019     Chronic kidney disease, stage 3 (moderate) 08/27/2019     Centrilobular emphysema (HCC) 08/27/2019     Depression with anxiety 08/27/2019     Panic attack 08/27/2019     Cataract 02/19/2015     Numbness 02/19/2015     Vitamin D deficiency 12/10/2013     Extrinsic obstruction of cartilagenous portion of eustachian tube 11/12/2013       LOS (days): 7  Geometric Mean LOS (GMLOS) (days): 4.3  Days to GMLOS:-2.5     OBJECTIVE:  Risk of Unplanned Readmission Score: 31.17         Current admission status: Inpatient   Preferred Pharmacy:   [facility]/pharmacy #[id number] - [place] - [address]  [address] [address]  Phone: [phone] Fax: [phone]    [facility] #[id number] [place], [place] - [address]  [address] [address]  Phone: [phone] Fax: [phone]    Primary Care Provider: Samantha Hernandez MD    [facility]  Secondary Insurance:     DISCHARGE DETAILS:    Other Referral/Resources/Interventions Provided:  Interventions: DME  Referral Comments: BiPap approved and delivered to pt for dc today.

## 2024-06-26 NOTE — DISCHARGE SUMMARY
"[facility]  Discharge- Noreen Alvarez 1955, 69 y.o. female MRN: [id number]  Unit/Bed#: S -Andrew Encounter: [id number]  Primary Care Provider: Samantha Hernandez MD   Date and time admitted to hospital: 6/19/2024  4:58 PM    * Acute on chronic respiratory failure with hypoxia and hypercapnia (HCC)  Assessment & Plan  Patient has a history of RHETT-3 lobular emphysema, obstructive sleep apnea and multiple lung nodules.  She does wear oxygen 2 L nasal cannula at baseline.  Presented with  worsening shortness of breath for 48 hours due to COPD with exacerbation triggered by viral illness.  Serial blood gases show hypercapnia.  Qualifies for BiPAP.  Appreciate pulmonology assistance in arranging this for discharge  CTA ED chest PE study \"negative for pulmonary emboli, groundglass nodule in the lateral right lower lobe measures up to 1.6 cm this nodule has been unchanged from her most recent CAT scan on March 2024, there is a slow-growing adenocarcinoma spectrum lesion is not entirely excluded.  New focus on tree-in-bud nodularity in the posterior right lower lobe is favored to represent an infections or inflammatory infiltrate and increase in mediastinal/hilar nodes infiltrate.\"  Continue treatment for COPD as noted below  Pulmonary outpatient follow-up scheduled for 7 /1.    COPD with acute exacerbation (HCC)  Assessment & Plan  Underlying severe emphysema with acute exacerbation likely in the setting of recent viral URI.  Admits to sick family contacts.  RP2 panel positive for parainfluenza.    Procalcitonin x2 negative, but received a 5-day course of Zithromax nonetheless  Continue respiratory protocol  Appreciate pulmonology input, wean steroids as directed         ELIUD (obstructive sleep apnea)  Assessment & Plan  Qualifies for BiPAP    HTN (hypertension)  Assessment & Plan  Blood pressure elevated on admission.  Started on Norvasc.      Chronic kidney disease, stage 3 (moderate)  Assessment " & Plan  Lab Results   Component Value Date    EGFR 49 06/23/2024    EGFR 48 06/22/2024    EGFR 50 06/21/2024    CREATININE 1.13 06/23/2024    CREATININE 1.16 06/22/2024    CREATININE 1.12 06/21/2024   Baseline creatinine 0.80-1.0  Avoid nephrotoxic medications, NSAIDs and contrast dye if possible        Medical Problems       Resolved Problems  Date Reviewed: 6/26/2024   None       Discharging Physician / Practitioner: Zari Robertson PA-C  PCP: Samantha Hernandez MD  Admission Date:   Admission Orders (From admission, onward)       Ordered        06/19/24 2123  INPATIENT ADMISSION  Once                          Discharge Date: 06/26/24    Consultations During Hospital Stay:  pulmonology    Procedures Performed:   CXR  CTA chest    Significant Findings / Test Results:   As above    Incidental Findings:   none     Test Results Pending at Discharge (will require follow up):   none     Outpatient Tests Requested:  none    Complications:  none    Reason for Admission: shortness of breath    Hospital Course:   Noreen Alvarez is a 69 y.o. female patient with underlying history of severe COPD with oxygen dependency who originally presented to the hospital on 6/19/2024 due to increased shortness of breath and cough.  She was diagnosed with COPD exacerbation and RP 2 panel was noted to be positive for parainfluenza which was felt to be the likely trigger.  She was placed on IV Solu-Medrol and respiratory protocol with eventual p.o. taper.  She did receive 5 days of Zithromax for anti-inflammatory purposes.  Over the course of her hospitalization she was also noted to have hypercapnia on serial ABGs and was qualified for BiPAP.  By time of discharge she was stable on her home amount of oxygen and clinically improved with cough and dyspnea.  She was deemed to be stable for discharge home and will have close follow-up with pulmonology as already arranged.    Please also note that her blood pressure was not well-controlled and she  required addition of Norvasc while here.    Please see above list of diagnoses and related plan for additional information.     Condition at Discharge: stable    Discharge Day Visit / Exam:   Subjective: History obtained from patient in [place].  She tells me she is feeling better at this time  Vitals: Blood Pressure: (!) 141/108 (06/26/24 0814)  Pulse: 71 (06/26/24 0814)  Temperature: 97.9 °F (36.6 °C) (06/26/24 0814)  Temp Source: Axillary (06/22/24 0544)  Respirations: 17 (06/26/24 0814)  Weight - Scale: 81 kg (178 lb 8 oz) (06/24/24 0600)  SpO2: 98 % (06/26/24 0814)  Exam:   Physical Exam  Vitals reviewed.   Constitutional:       General: She is not in acute distress.     Appearance: She is obese. She is not ill-appearing, toxic-appearing or diaphoretic.   HENT:      Nose: Congestion present.   Eyes:      General: No scleral icterus.        Right eye: No discharge.         Left eye: No discharge.      Conjunctiva/sclera: Conjunctivae normal.   Cardiovascular:      Rate and Rhythm: Normal rate and regular rhythm.      Heart sounds: No murmur heard.  Pulmonary:      Effort: No respiratory distress.      Breath sounds: No stridor. No wheezing or rhonchi.   Abdominal:      General: There is no distension.      Tenderness: There is no guarding.   Musculoskeletal:      Right lower leg: No edema.      Left lower leg: No edema.   Skin:     General: Skin is warm and dry.      Coloration: Skin is not jaundiced or pale.      Findings: No bruising, erythema, lesion or rash.   Neurological:      General: No focal deficit present.      Mental Status: She is alert. Mental status is at baseline.      Comments: Good historian.  No confusion noted.   Psychiatric:         Mood and Affect: Mood normal.         Thought Content: Thought content normal.          Discussion with Family: Updated  (son) via phone.    Discharge instructions/Information to patient and family:   See after visit summary for information provided  to patient and family.      Provisions for Follow-Up Care:  See after visit summary for information related to follow-up care and any pertinent home health orders.      Mobility at time of Discharge:   Basic Mobility Inpatient Raw Score: 24  JH-HLM Goal: 8: Walk 250 feet or more  JH-HLM Achieved: 3: Sit at edge of bed  HLM Goal NOT achieved. Continue to encourage mobility in post discharge setting.     Disposition:   Home    Planned Readmission: none     Discharge Statement:  I spent 35 minutes discharging the patient. This time was spent on the day of discharge. I had direct contact with the patient on the day of discharge. Greater than 50% of the total time was spent examining patient, answering all patient questions, arranging and discussing plan of care with patient as well as directly providing post-discharge instructions.  Additional time then spent on discharge activities.  Case was discussed with nursing and case management    Discharge Medications:  See after visit summary for reconciled discharge medications provided to patient and/or family.      **Please Note: This note may have been constructed using a voice recognition system**

## 2024-06-26 NOTE — DISCHARGE INSTR - AVS FIRST PAGE
Dear Noreen Alvarez,     It was our pleasure to care for you here at [facility].  It is our hope that we were always able to exceed the expected standards for your care during your stay.  You were hospitalized due to COPD exacerbation due to a virus.  You were cared for on the 2nd floor by Zari Robertson PA-C under the service of Faisal Muniz MD with the [facility] who covers for your primary care physician (PCP), Samantha Hernandez MD, while you were hospitalized.  If you have any questions or concerns related to this hospitalization, you may contact us at .  For follow up as well as any medication refills, we recommend that you follow up with your primary care physician.  A registered nurse will reach out to you by phone within a few days after your discharge to answer any additional questions that you may have after going home.  However, at this time we provide for you here, the most important instructions / recommendations at discharge:     Notable Medication Adjustments -   Prednisone taper as directed  Norvasc was added for your blood pressure  Testing Required after Discharge -   Monitor blood pressure.  Your family doctor might need to increase the blood pressure medicine  ** Please contact your PCP to request testing orders for any of the testing recommended here **  Important follow up information -   Pcp  Lung doctor  Other Instructions -   You will be set up with a BiPAP mask to wear at night  Please review this entire after visit summary as additional general instructions including medication list, appointments, activity, diet, any pertinent wound care, and other additional recommendations from your care team that may be provided for you.      Sincerely,     Zari Robertson PA-C

## 2024-06-27 NOTE — UTILIZATION REVIEW
NOTIFICATION OF ADMISSION DISCHARGE   This is a Notification of Discharge from [facility]. Please be advised that this patient has been discharge from our facility. Below you will find the admission and discharge date and time including the patient’s disposition.   UTILIZATION REVIEW CONTACT:  Leah Degroot  Utilization   Network Utilization Review Department  Phone: [phone] carefully listen to the prompts. All voicemails are confidential.  Email: [email or web address]     ADMISSION INFORMATION  PRESENTATION DATE: 6/19/2024  4:58 PM  OBERVATION ADMISSION DATE:   INPATIENT ADMISSION DATE: 6/19/24  9:23 PM   DISCHARGE DATE: 6/26/2024  3:29 PM   DISPOSITION:Home/Self Care    Network Utilization Review Department  ATTENTION: Please call with any questions or concerns to [phone] and carefully listen to the prompts so that you are directed to the right person. All voicemails are confidential.   For Discharge needs, contact Care Management DC Support Team at [phone] opt. [phone]  Send all requests for admission clinical reviews, approved or denied determinations and any other requests to dedicated fax number below belonging to the campus where the patient is receiving treatment. List of dedicated fax numbers for the Facilities:  FACILITY NAME UR FAX NUMBER   ADMISSION DENIALS (Administrative/Medical Necessity) [phone]   DISCHARGE SUPPORT TEAM ([facility]) [phone]   PARENT CHILD HEALTH (Maternity/NICU/Pediatrics) [phone]   [facility] [phone]   [facility] [phone]   [facility] [phone]   [facility] [phone]   [facility] [phone]   [facility] [phone]   [facility] [phone]   [facility] [phone]    [facility] [phone]   [facility] [phone]   [facility] [phone]   [facility] [phone]

## 2024-07-01 ENCOUNTER — TRANSITIONAL CARE MANAGEMENT (OUTPATIENT)
Dept: FAMILY MEDICINE CLINIC | Facility: CLINIC | Age: 69
End: 2024-07-01

## 2024-07-03 ENCOUNTER — OFFICE VISIT (OUTPATIENT)
Dept: FAMILY MEDICINE CLINIC | Facility: CLINIC | Age: 69
End: 2024-07-03
Payer: COMMERCIAL

## 2024-07-03 VITALS
HEIGHT: 64 IN | RESPIRATION RATE: 20 BRPM | BODY MASS INDEX: 29.91 KG/M2 | OXYGEN SATURATION: 95 % | SYSTOLIC BLOOD PRESSURE: 110 MMHG | DIASTOLIC BLOOD PRESSURE: 42 MMHG | TEMPERATURE: 97.1 F | WEIGHT: 175.2 LBS | HEART RATE: 66 BPM

## 2024-07-03 DIAGNOSIS — J43.2 CENTRILOBULAR EMPHYSEMA (HCC): Primary | ICD-10-CM

## 2024-07-03 DIAGNOSIS — J44.1 COPD EXACERBATION (HCC): ICD-10-CM

## 2024-07-03 DIAGNOSIS — I10 HTN (HYPERTENSION): ICD-10-CM

## 2024-07-03 DIAGNOSIS — Z99.81 CHRONIC RESPIRATORY FAILURE WITH HYPOXIA, ON HOME O2 THERAPY  (HCC): ICD-10-CM

## 2024-07-03 DIAGNOSIS — R91.1 LUNG NODULE SEEN ON IMAGING STUDY: ICD-10-CM

## 2024-07-03 DIAGNOSIS — J96.22 ACUTE ON CHRONIC RESPIRATORY FAILURE WITH HYPOXIA AND HYPERCAPNIA (HCC): ICD-10-CM

## 2024-07-03 DIAGNOSIS — J96.11 CHRONIC RESPIRATORY FAILURE WITH HYPOXIA, ON HOME O2 THERAPY  (HCC): ICD-10-CM

## 2024-07-03 DIAGNOSIS — J96.21 ACUTE ON CHRONIC RESPIRATORY FAILURE WITH HYPOXIA AND HYPERCAPNIA (HCC): ICD-10-CM

## 2024-07-03 DIAGNOSIS — D12.6 TUBULAR ADENOMA OF COLON: ICD-10-CM

## 2024-07-03 PROCEDURE — 99496 TRANSJ CARE MGMT HIGH F2F 7D: CPT | Performed by: FAMILY MEDICINE

## 2024-07-03 RX ORDER — AZITHROMYCIN 250 MG/1
250 TABLET, FILM COATED ORAL 3 TIMES WEEKLY
Qty: 12 TABLET | Refills: 2 | Status: SHIPPED | OUTPATIENT
Start: 2024-07-03 | End: 2024-09-24

## 2024-07-03 RX ORDER — PREDNISONE 5 MG/1
5 TABLET ORAL DAILY
Qty: 30 TABLET | Refills: 0 | Status: SHIPPED | OUTPATIENT
Start: 2024-07-03

## 2024-07-03 RX ORDER — AMLODIPINE BESYLATE 2.5 MG/1
2.5 TABLET ORAL DAILY
Qty: 90 TABLET | Refills: 1 | Status: SHIPPED | OUTPATIENT
Start: 2024-07-03

## 2024-07-03 RX ORDER — GUAIFENESIN AND DEXTROMETHORPHAN HYDROBROMIDE 100; 10 MG/5ML; MG/5ML
5 SOLUTION ORAL EVERY 12 HOURS
Qty: 354 ML | Refills: 0 | Status: SHIPPED | OUTPATIENT
Start: 2024-07-03

## 2024-07-03 NOTE — PROGRESS NOTES
FAMILY MEDICINE TRANSITION OF CARE OFFICE VISIT  [facility] FAMILY MEDICINE    NAME: Noreen Alvarez  AGE: 69 y.o. SEX: female  : 1955       DATE: 7/3/2024    Assessment and Plan     Diagnoses and all orders for this visit:    Centrilobular emphysema (HCC)  -     Ambulatory Referral to Pulmonology; Future  -     Complete PFT with post bronchodilator; Future  -     Pulse Oximetry with exercise; Future  -     azithromycin (ZITHROMAX) 250 mg tablet; Take 1 tablet (250 mg total) by mouth 3 (three) times a week for 36 doses  -     dextromethorphan-guaiFENesin (ROBITUSSIN DM)  mg/5 mL oral syrup; Take 5 mL by mouth every 12 (twelve) hours    Chronic respiratory failure with hypoxia, on home O2 therapy  (HCC)  -     predniSONE 5 mg tablet; Take 1 tablet (5 mg total) by mouth daily  -     azithromycin (ZITHROMAX) 250 mg tablet; Take 1 tablet (250 mg total) by mouth 3 (three) times a week for 36 doses  -     dextromethorphan-guaiFENesin (ROBITUSSIN DM)  mg/5 mL oral syrup; Take 5 mL by mouth every 12 (twelve) hours    COPD exacerbation (HCC)  -     Ambulatory Referral to Pulmonology; Future  -     Complete PFT with post bronchodilator; Future  -     Pulse Oximetry with exercise; Future  -     predniSONE 5 mg tablet; Take 1 tablet (5 mg total) by mouth daily  -     azithromycin (ZITHROMAX) 250 mg tablet; Take 1 tablet (250 mg total) by mouth 3 (three) times a week for 36 doses  -     dextromethorphan-guaiFENesin (ROBITUSSIN DM)  mg/5 mL oral syrup; Take 5 mL by mouth every 12 (twelve) hours    Acute on chronic respiratory failure with hypoxia and hypercapnia (HCC)  -     Ambulatory Referral to Pulmonology; Future  -     Complete PFT with post bronchodilator; Future  -     Pulse Oximetry with exercise; Future  -     predniSONE 5 mg tablet; Take 1 tablet (5 mg total) by mouth daily  -     azithromycin (ZITHROMAX) 250 mg tablet; Take 1 tablet (250 mg total) by mouth  3 (three) times a week for 36 doses  -     dextromethorphan-guaiFENesin (ROBITUSSIN DM)  mg/5 mL oral syrup; Take 5 mL by mouth every 12 (twelve) hours    HTN (hypertension)  -     amLODIPine (NORVASC) 2.5 mg tablet; Take 1 tablet (2.5 mg total) by mouth daily    Lung nodule seen on imaging study  -     NM PET CT skull base to mid thigh; Future    Tubular adenoma of colon        - Counseling Documentation: patient was counseled regarding: diagnostic results, instructions for management, risk factor reductions, prognosis, patient and family education, impressions, risks and benefits of treatment options, and importance of compliance with treatment  - Counseling Time: counseling time more than 50% of visit: 45 minutes  - Barriers to treatment: complex care  - Medication Side Effects: Adverse side effects of medications were reviewed with the patient/guardian today.  - Self Referrals: No      Start azithromycin 250 mg three times a week, Monday, Wednesday and Friday-chronically    Complete prednisone 10 mg taper-   40 mg, Daily For 1 day,   THEN 30 mg, Daily  For 3 days,   THEN 20 mg, Daily For 3 days,   THEN 10 mg, Daily For 3 days   After this you will continue 5 mg prednisone 5 mg daily      Refer to [facility] in [place] for second opinion    Bp is low,on amlodipine 5 mg,start 2.5 mg daily     After 3 weeks - obtain PFTs and 6 mwt, PET CT as there is suspicion for adenocarinoma of lung    Schedule follow up with Dr Thompson (lungs) and cancer Dr Walker.  Transitional Care Management Review     Noreen Alvarez is a 69 y.o. female here for TCM follow-up    During the TCM phone call patient stated:    TCM Call       Date and time call was made  7/2/2024  4:28 PM    Hospital care reviewed  Records reviewed    Patient was hospitialized at  [facility]    Date of Admission  06/19/24    Date of discharge  06/26/24    Diagnosis  Acute on chronic respiratory failure with hypoxia and hypercapnia     Disposition  Home    Were the patients medications reviewed and updated  Yes    Current Symptoms  Weakness; Fatigue    Shortness of breath severity  Moderate  while walking    Weakness severity  Moderate    Fatigue severity  Moderate          TCM Call       Post hospital issues  None    Should patient be enrolled in anticoag monitoring?  No    Scheduled for follow up?  Yes    Patients specialists  Pulmonlolgist    Did you obtain your prescribed medications  Yes    Do you need help managing your prescriptions or medications  No    Is transportation to your appointment needed  No    I have advised the patient to call PCP with any new or worsening symptoms  Destiny Salamanca MA            History of Present Illness     Was admitted with parainfluenza infection, respiratory failure, states they never explained to her how to take prednisone taper and has been taking 1 tablet twice a day  States she has mucus but cannot bring it up  [place] interpter service used for entire visit          The following portions of the patient's history were reviewed and updated as appropriate: allergies, current medications, past family history, past medical history, past social history, past surgical history and problem list.    Review of Systems       Review of Systems   Constitutional:  Positive for fatigue. Negative for fever.   HENT:  Negative for congestion, facial swelling, mouth sores, rhinorrhea, sore throat and trouble swallowing.    Eyes:  Negative for pain and redness.   Respiratory:  Positive for cough and shortness of breath. Negative for wheezing.    Cardiovascular:  Negative for chest pain, palpitations and leg swelling.   Gastrointestinal:  Negative for abdominal pain, blood in stool, constipation, diarrhea and nausea.   Genitourinary:  Negative for dysuria, hematuria and urgency.   Musculoskeletal:  Negative for arthralgias, back pain and myalgias.   Skin:  Negative for rash and wound.   Neurological:  Negative for  seizures, syncope and headaches.   Hematological:  Negative for adenopathy.   Psychiatric/Behavioral:  Negative for agitation and behavioral problems.        Active Problem List     Patient Active Problem List   Diagnosis    Cancer of pharynx (HCC)    Cataract    Chronic kidney disease, stage 3 (moderate)    Centrilobular emphysema (HCC)    Extrinsic obstruction of cartilagenous portion of eustachian tube    Personal history of radiation therapy    History of laryngeal cancer    Loss of hearing    Depression with anxiety    Numbness    Panic attack    Vitamin D deficiency    Dupuytren contracture    Elevated alkaline phosphatase level    Multiple lung nodules    Myalgia    Tension type headache    HTN (hypertension)    Hyperlipidemia    Hypothyroidism    COVID-19    Trigger finger of right thumb    Current mild episode of major depressive disorder without prior episode (HCC)    Persistent dyspnea after COVID-19    History of COVID-19    Class 1 obesity due to excess calories with serious comorbidity and body mass index (BMI) of 32.0 to 32.9 in adult    Exercise hypoxemia    Post-nasal drip    Neck pain on left side    Mediastinal lymphadenopathy    Preop examination    Preoperative cardiovascular examination    Iron deficiency anemia    Dyspepsia    Pain of right sacroiliac joint    Hypomagnesemia    Chronic respiratory failure with hypoxia, on home O2 therapy  (HCC)    Epigastric pain    Gastroesophageal reflux disease without esophagitis    Sacroiliac inflammation (HCC)    COPD with acute exacerbation (HCC)    Chronic constipation    Candidal intertrigo    TIMMY (generalized anxiety disorder)    Iron deficiency anemia due to dietary causes    Tubular adenoma of colon    Acute on chronic respiratory failure with hypoxia and hypercapnia (HCC)    ELIUD (obstructive sleep apnea)    COPD exacerbation (HCC)       Objective     BP (!) 110/42 (BP Location: Right arm, Patient Position: Sitting, Cuff Size: Adult)   Pulse 66    "Temp (!) 97.1 °F (36.2 °C) (Tympanic)   Resp 20   Ht 5' 4\" (1.626 m)   Wt 79.5 kg (175 lb 3.2 oz)   SpO2 95% Comment: on 2liters O2  BMI 30.07 kg/m²     Physical Exam  Vitals and nursing note reviewed.   Constitutional:       Appearance: Normal appearance. She is well-developed. She is not ill-appearing.   HENT:      Head: Normocephalic and atraumatic.      Right Ear: External ear normal.      Left Ear: External ear normal.      Nose: Nose normal.      Mouth/Throat:      Mouth: Mucous membranes are moist.      Pharynx: No oropharyngeal exudate or posterior oropharyngeal erythema.   Eyes:      General: No scleral icterus.        Right eye: No discharge.         Left eye: No discharge.      Conjunctiva/sclera: Conjunctivae normal.   Cardiovascular:      Rate and Rhythm: Normal rate.      Heart sounds: No murmur heard.     No gallop.   Pulmonary:      Effort: Pulmonary effort is normal. Tachypnea present. No respiratory distress.      Breath sounds: Decreased air movement present. No stridor. Examination of the right-upper field reveals decreased breath sounds. Examination of the left-upper field reveals decreased breath sounds. Examination of the right-middle field reveals decreased breath sounds. Examination of the left-middle field reveals decreased breath sounds. Examination of the right-lower field reveals decreased breath sounds. Examination of the left-lower field reveals decreased breath sounds. Decreased breath sounds present. No wheezing, rhonchi or rales.   Abdominal:      Palpations: Abdomen is soft.      Tenderness: There is no abdominal tenderness.   Musculoskeletal:         General: No tenderness or deformity.   Skin:     Findings: No erythema or rash.   Neurological:      Mental Status: She is alert. Mental status is at baseline.   Psychiatric:         Behavior: Behavior normal.         Judgment: Judgment normal.             Laboratory Results: I have personally reviewed the pertinent laboratory " results/reports   XR chest 1 view portable    Result Date: 6/20/2024  XR CHEST PORTABLE INDICATION: sob. COMPARISON: Chest radiograph February 28, 2024 FINDINGS: No focal consolidation. No pneumothorax or pleural effusion. Normal cardiomediastinal silhouette. Bones are unremarkable for age. Normal upper abdomen.     No focal consolidation, pleural effusion, or pneumothorax. Workstation performed: [id number]     CTA ED chest PE Study    Result Date: 6/19/2024  CTA - CHEST WITH IV CONTRAST - PULMONARY ANGIOGRAM INDICATION: r/o PE. SOB. History of laryngeal and pharynx cancer. COMPARISON: CT chest without contrast on 3/1/2024 CT chest on 12/15/2022 PET/CT on 8/10/2021 TECHNIQUE: CTA examination of the chest was performed using angiographic technique according to a protocol specifically tailored to evaluate for pulmonary embolism. Multiplanar 2D reformatted images were created from the source data. In addition, coronal  3D MIP postprocessing was performed on the acquisition scanner. Radiation dose length product (DLP) for this visit: 396 mGy-cm . This examination, like all CT scans performed in the [facility], was performed utilizing techniques to minimize radiation dose exposure, including the use of iterative reconstruction and automated exposure control. IV Contrast: 85 mL of iohexol (OMNIPAQUE) FINDINGS: PULMONARY ARTERIAL TREE:  No pulmonary embolus. LUNGS: Emphysematous change. Unchanged lingular and anterior right middle lobe atelectasis. Stable 1.3 cm left lower lobe lobulated solid nodule, prior PET negative. Unchanged lateral right lower lobe groundglass nodule measuring up to 1.6 cm (304:150) compared to most recent exam, however increased in size from December 2022 study. A nodular opacity in the inferior-most right lower lobe immediately above the diaphragm measures 2 x 1 cm (304:190), stable from most recent exam, however new from priors. Tree-in-bud nodularity in the posterior right  lower lobe is new from March 2024 prior (304:120). Redemonstrated bronchial wall thickening, with interval worsening in the right middle lobe. PLEURA: Unremarkable. HEART/GREAT VESSELS: Heart is unremarkable for patient's age. No thoracic aortic aneurysm. MEDIASTINUM AND KAYY: Interval increase in size of a subcarinal node, currently measuring 1 cm in short axis, previously 6 mm. A node situated anterior to the right main bronchus measures 1.1 cm, previously 8 mm. Stable AP window node measuring up to 1 cm in short axis (301:87). A right lateral paraesophageal nodes measures 1.1cm (305:175), unchanged. Bilateral hilar nodes have slightly increased in size as well. On the left, measuring up to 1 cm, previously 7 mm. On the right measuring up to 1 cm, previously 8 mm. CHEST WALL AND LOWER NECK: Unremarkable. VISUALIZED STRUCTURES IN THE UPPER ABDOMEN: Stable findings of mesenteric panniculitis. OSSEOUS STRUCTURES: No acute fracture or destructive osseous lesion.     No pulmonary embolism. A groundglass nodule in the lateral right lower lobe measures up to 1.6 cm, unchanged from most recent March 2024 comparison, however increased from December 2022 exam. A slow-growing/adenocarcinoma spectrum lesion is not entirely excluded. New focus of tree-in-bud nodularity in the posterior right lower lobe is favored to represent an infectious/inflammatory infiltrate. Overall interval increase in mediastinal/hilar nodes as described. Continued follow-up is recommended. Workstation performed: [id number]         Radiology/Other Diagnostic Testing Results: I have personally reviewed pertinent reports.        Recent Results (from the past 8736 hour(s))   ECG 12 lead    Collection Time: 01/01/24  9:24 PM   Result Value Ref Range    Ventricular Rate 73 BPM    Atrial Rate 73 BPM    CT Interval 142 ms    QRSD Interval 62 ms    QT Interval 450 ms    QTC Interval 495 ms    P Axis 90 degrees    QRS Axis 19 degrees    T Wave Axis 247 degrees  "  CBC and differential    Collection Time: 01/01/24  9:25 PM   Result Value Ref Range    WBC 6.40 4.31 - 10.16 Thousand/uL    RBC 4.33 3.81 - 5.12 Million/uL    Hemoglobin 11.2 (L) 11.5 - 15.4 g/dL    Hematocrit 38.7 34.8 - 46.1 %    MCV 89 82 - 98 fL    MCH 25.9 (L) 26.8 - 34.3 pg    MCHC 28.9 (L) 31.4 - 37.4 g/dL    RDW 15.1 11.6 - 15.1 %    MPV 9.7 8.9 - 12.7 fL    Platelets 234 149 - 390 Thousands/uL    nRBC 0 /100 WBCs    Segmented % 74 43 - 75 %    Immature Grans % 1 0 - 2 %    Lymphocytes % 10 (L) 14 - 44 %    Monocytes % 13 (H) 4 - 12 %    Eosinophils Relative 1 0 - 6 %    Basophils Relative 1 0 - 1 %    Absolute Neutrophils 4.81 1.85 - 7.62 Thousands/µL    Absolute Immature Grans 0.03 0.00 - 0.20 Thousand/uL    Absolute Lymphocytes 0.64 0.60 - 4.47 Thousands/µL    Absolute Monocytes 0.81 0.17 - 1.22 Thousand/µL    Eosinophils Absolute 0.08 0.00 - 0.61 Thousand/µL    Basophils Absolute 0.03 0.00 - 0.10 Thousands/µL   Comprehensive metabolic panel    Collection Time: 01/01/24  9:25 PM   Result Value Ref Range    Sodium 142 135 - 147 mmol/L    Potassium 4.6 3.5 - 5.3 mmol/L    Chloride 104 96 - 108 mmol/L    CO2 33 (H) 21 - 32 mmol/L    ANION GAP 5 mmol/L    BUN 28 (H) 5 - 25 mg/dL    Creatinine 1.16 0.60 - 1.30 mg/dL    Glucose 98 65 - 140 mg/dL    Calcium 9.4 8.4 - 10.2 mg/dL    AST 24 13 - 39 U/L    ALT 16 7 - 52 U/L    Alkaline Phosphatase 95 34 - 104 U/L    Total Protein 7.0 6.4 - 8.4 g/dL    Albumin 4.0 3.5 - 5.0 g/dL    Total Bilirubin 0.28 0.20 - 1.00 mg/dL    eGFR 48 ml/min/1.73sq m   HS Troponin 0hr (reflex protocol)    Collection Time: 01/01/24  9:25 PM   Result Value Ref Range    hs TnI 0hr 4 \"Refer to ACS Flowchart\"- see link ng/L   B-Type Natriuretic Peptide(BNP)    Collection Time: 01/01/24  9:25 PM   Result Value Ref Range    BNP 44 0 - 100 pg/mL   FLU/RSV/COVID - if FLU/RSV clinically relevant    Collection Time: 01/01/24  9:25 PM    Specimen: Nose; Nares   Result Value Ref Range    SARS-CoV-2 " Negative Negative    INFLUENZA A PCR Positive (A) Negative    INFLUENZA B PCR Negative Negative    RSV PCR Negative Negative   ECG 12 lead    Collection Time: 01/21/24  2:34 PM   Result Value Ref Range    Ventricular Rate 74 BPM    Atrial Rate 74 BPM    HI Interval 146 ms    QRSD Interval 80 ms    QT Interval 396 ms    QTC Interval 439 ms    P Axis 86 degrees    QRS Axis 40 degrees    T Wave Axis 84 degrees   CBC and differential    Collection Time: 01/21/24  2:57 PM   Result Value Ref Range    WBC 5.99 4.31 - 10.16 Thousand/uL    RBC 4.17 3.81 - 5.12 Million/uL    Hemoglobin 11.0 (L) 11.5 - 15.4 g/dL    Hematocrit 37.0 34.8 - 46.1 %    MCV 89 82 - 98 fL    MCH 26.4 (L) 26.8 - 34.3 pg    MCHC 29.7 (L) 31.4 - 37.4 g/dL    RDW 14.6 11.6 - 15.1 %    MPV 9.5 8.9 - 12.7 fL    Platelets 175 149 - 390 Thousands/uL    nRBC 0 /100 WBCs    Segmented % 73 43 - 75 %    Immature Grans % 0 0 - 2 %    Lymphocytes % 11 (L) 14 - 44 %    Monocytes % 12 4 - 12 %    Eosinophils Relative 4 0 - 6 %    Basophils Relative 0 0 - 1 %    Absolute Neutrophils 4.36 1.85 - 7.62 Thousands/µL    Absolute Immature Grans 0.02 0.00 - 0.20 Thousand/uL    Absolute Lymphocytes 0.63 0.60 - 4.47 Thousands/µL    Absolute Monocytes 0.73 0.17 - 1.22 Thousand/µL    Eosinophils Absolute 0.23 0.00 - 0.61 Thousand/µL    Basophils Absolute 0.02 0.00 - 0.10 Thousands/µL   Comprehensive metabolic panel    Collection Time: 01/21/24  2:57 PM   Result Value Ref Range    Sodium 138 135 - 147 mmol/L    Potassium 5.1 3.5 - 5.3 mmol/L    Chloride 100 96 - 108 mmol/L    CO2 34 (H) 21 - 32 mmol/L    ANION GAP 4 mmol/L    BUN 21 5 - 25 mg/dL    Creatinine 1.18 0.60 - 1.30 mg/dL    Glucose 86 65 - 140 mg/dL    Calcium 9.3 8.4 - 10.2 mg/dL    AST 21 13 - 39 U/L    ALT 12 7 - 52 U/L    Alkaline Phosphatase 90 34 - 104 U/L    Total Protein 6.9 6.4 - 8.4 g/dL    Albumin 3.8 3.5 - 5.0 g/dL    Total Bilirubin 0.42 0.20 - 1.00 mg/dL    eGFR 47 ml/min/1.73sq m   Lipase    Collection  "Time: 01/21/24  2:57 PM   Result Value Ref Range    Lipase 17 11 - 82 u/L   HS Troponin 0hr (reflex protocol)    Collection Time: 01/21/24  2:57 PM   Result Value Ref Range    hs TnI 0hr 3 \"Refer to ACS Flowchart\"- see link ng/L   ECG 12 lead    Collection Time: 01/23/24  2:32 PM   Result Value Ref Range    Ventricular Rate 87 BPM    Atrial Rate 87 BPM    AK Interval 150 ms    QRSD Interval 82 ms    QT Interval 382 ms    QTC Interval 459 ms    P Axis 88 degrees    QRS Axis 50 degrees    T Wave Axis 81 degrees   CBC and differential    Collection Time: 01/23/24  3:02 PM   Result Value Ref Range    WBC 6.86 4.31 - 10.16 Thousand/uL    RBC 4.28 3.81 - 5.12 Million/uL    Hemoglobin 11.4 (L) 11.5 - 15.4 g/dL    Hematocrit 38.6 34.8 - 46.1 %    MCV 90 82 - 98 fL    MCH 26.6 (L) 26.8 - 34.3 pg    MCHC 29.5 (L) 31.4 - 37.4 g/dL    RDW 14.3 11.6 - 15.1 %    MPV 10.0 8.9 - 12.7 fL    Platelets 222 149 - 390 Thousands/uL    nRBC 0 /100 WBCs    Segmented % 79 (H) 43 - 75 %    Immature Grans % 0 0 - 2 %    Lymphocytes % 8 (L) 14 - 44 %    Monocytes % 10 4 - 12 %    Eosinophils Relative 3 0 - 6 %    Basophils Relative 0 0 - 1 %    Absolute Neutrophils 5.33 1.85 - 7.62 Thousands/µL    Absolute Immature Grans 0.03 0.00 - 0.20 Thousand/uL    Absolute Lymphocytes 0.56 (L) 0.60 - 4.47 Thousands/µL    Absolute Monocytes 0.70 0.17 - 1.22 Thousand/µL    Eosinophils Absolute 0.21 0.00 - 0.61 Thousand/µL    Basophils Absolute 0.03 0.00 - 0.10 Thousands/µL   Comprehensive metabolic panel    Collection Time: 01/23/24  3:02 PM   Result Value Ref Range    Sodium 136 135 - 147 mmol/L    Potassium 4.4 3.5 - 5.3 mmol/L    Chloride 96 96 - 108 mmol/L    CO2 34 (H) 21 - 32 mmol/L    ANION GAP 6 mmol/L    BUN 19 5 - 25 mg/dL    Creatinine 0.92 0.60 - 1.30 mg/dL    Glucose 117 65 - 140 mg/dL    Calcium 9.6 8.4 - 10.2 mg/dL    AST 18 13 - 39 U/L    ALT 11 7 - 52 U/L    Alkaline Phosphatase 95 34 - 104 U/L    Total Protein 7.4 6.4 - 8.4 g/dL    Albumin " "4.1 3.5 - 5.0 g/dL    Total Bilirubin 0.39 0.20 - 1.00 mg/dL    eGFR 64 ml/min/1.73sq m   HS Troponin 0hr (reflex protocol)    Collection Time: 01/23/24  3:02 PM   Result Value Ref Range    hs TnI 0hr 4 \"Refer to ACS Flowchart\"- see link ng/L   Lipase    Collection Time: 01/23/24  3:02 PM   Result Value Ref Range    Lipase 9 (L) 11 - 82 u/L   B-Type Natriuretic Peptide(BNP)    Collection Time: 01/23/24  3:02 PM   Result Value Ref Range    BNP 73 0 - 100 pg/mL   Magnesium    Collection Time: 01/23/24  3:02 PM   Result Value Ref Range    Magnesium 1.9 1.9 - 2.7 mg/dL   FLU/RSV/COVID - if FLU/RSV clinically relevant    Collection Time: 01/23/24  3:02 PM    Specimen: Nose; Nares   Result Value Ref Range    SARS-CoV-2 Negative Negative    INFLUENZA A PCR Negative Negative    INFLUENZA B PCR Negative Negative    RSV PCR Positive (A) Negative   Procalcitonin    Collection Time: 01/23/24  3:02 PM   Result Value Ref Range    Procalcitonin <0.05 <=0.25 ng/ml   HS Troponin I 2hr    Collection Time: 01/23/24  5:50 PM   Result Value Ref Range    hs TnI 2hr 3 \"Refer to ACS Flowchart\"- see link ng/L    Delta 2hr hsTnI -1 <20 ng/L   CBC and differential    Collection Time: 01/24/24  4:46 AM   Result Value Ref Range    WBC 5.42 4.31 - 10.16 Thousand/uL    RBC 4.19 3.81 - 5.12 Million/uL    Hemoglobin 11.1 (L) 11.5 - 15.4 g/dL    Hematocrit 37.4 34.8 - 46.1 %    MCV 89 82 - 98 fL    MCH 26.5 (L) 26.8 - 34.3 pg    MCHC 29.7 (L) 31.4 - 37.4 g/dL    RDW 14.1 11.6 - 15.1 %    MPV 9.9 8.9 - 12.7 fL    Platelets 213 149 - 390 Thousands/uL    nRBC 0 /100 WBCs    Segmented % 90 (H) 43 - 75 %    Immature Grans % 1 0 - 2 %    Lymphocytes % 7 (L) 14 - 44 %    Monocytes % 2 (L) 4 - 12 %    Eosinophils Relative 0 0 - 6 %    Basophils Relative 0 0 - 1 %    Absolute Neutrophils 4.90 1.85 - 7.62 Thousands/µL    Absolute Immature Grans 0.04 0.00 - 0.20 Thousand/uL    Absolute Lymphocytes 0.38 (L) 0.60 - 4.47 Thousands/µL    Absolute Monocytes 0.09 " (L) 0.17 - 1.22 Thousand/µL    Eosinophils Absolute 0.00 0.00 - 0.61 Thousand/µL    Basophils Absolute 0.01 0.00 - 0.10 Thousands/µL   Basic metabolic panel    Collection Time: 01/24/24  4:46 AM   Result Value Ref Range    Sodium 138 135 - 147 mmol/L    Potassium 5.2 3.5 - 5.3 mmol/L    Chloride 99 96 - 108 mmol/L    CO2 34 (H) 21 - 32 mmol/L    ANION GAP 5 mmol/L    BUN 19 5 - 25 mg/dL    Creatinine 0.93 0.60 - 1.30 mg/dL    Glucose 142 (H) 65 - 140 mg/dL    Calcium 9.7 8.4 - 10.2 mg/dL    eGFR 63 ml/min/1.73sq m   Smear Review(Phlebs Do Not Order)    Collection Time: 01/24/24  4:46 AM   Result Value Ref Range    RBC Morphology Normal     Platelet Estimate Adequate Adequate   Basic metabolic panel    Collection Time: 01/25/24  4:45 AM   Result Value Ref Range    Sodium 138 135 - 147 mmol/L    Potassium 5.5 (H) 3.5 - 5.3 mmol/L    Chloride 99 96 - 108 mmol/L    CO2 32 21 - 32 mmol/L    ANION GAP 7 mmol/L    BUN 35 (H) 5 - 25 mg/dL    Creatinine 1.02 0.60 - 1.30 mg/dL    Glucose 128 65 - 140 mg/dL    Calcium 10.0 8.4 - 10.2 mg/dL    eGFR 56 ml/min/1.73sq m   CBC and differential    Collection Time: 01/25/24  4:45 AM   Result Value Ref Range    WBC 15.11 (H) 4.31 - 10.16 Thousand/uL    RBC 4.32 3.81 - 5.12 Million/uL    Hemoglobin 11.6 11.5 - 15.4 g/dL    Hematocrit 39.0 34.8 - 46.1 %    MCV 90 82 - 98 fL    MCH 26.9 26.8 - 34.3 pg    MCHC 29.7 (L) 31.4 - 37.4 g/dL    RDW 14.2 11.6 - 15.1 %    MPV 10.6 8.9 - 12.7 fL    Platelets 280 149 - 390 Thousands/uL   Manual Differential(PHLEBS Do Not Order)    Collection Time: 01/25/24  4:45 AM   Result Value Ref Range    Segmented % 90 (H) 43 - 75 %    Lymphocytes % 7 (L) 14 - 44 %    Monocytes % 3 (L) 4 - 12 %    Eosinophils % 0 0 - 6 %    Basophils % 0 0 - 1 %    Absolute Neutrophils 13.60 (H) 1.85 - 7.62 Thousand/uL    Absolute Lymphocytes 1.06 0.60 - 4.47 Thousand/uL    Absolute Monocytes 0.45 0.00 - 1.22 Thousand/uL    Absolute Eosinophils 0.00 0.00 - 0.40 Thousand/uL     "Absolute Basophils 0.00 0.00 - 0.10 Thousand/uL    Total Counted      RBC Morphology Normal     Platelet Estimate Adequate Adequate   Wheeled Walker    Collection Time: 01/25/24  9:34 AM   Result Value Ref Range    Supplier Name [employer]     Supplier Phone Number [phone]     Order Status Delivery Successful     Delivery Note      Delivery Request Date 01/25/2024     Date Delivered  01/25/2024     Item Description Wheeled Walker, Adult    ECG 12 lead    Collection Time: 01/25/24 11:18 AM   Result Value Ref Range    Ventricular Rate 70 BPM    Atrial Rate 70 BPM    VA Interval 150 ms    QRSD Interval 84 ms    QT Interval 386 ms    QTC Interval 416 ms    P Axis 79 degrees    QRS Axis 29 degrees    T Wave [place] 55 degrees   HS Troponin 0hr (reflex protocol)    Collection Time: 01/25/24 11:22 AM   Result Value Ref Range    hs TnI 0hr 5 \"Refer to ACS Flowchart\"- see link ng/L   HS Troponin I 4hr    Collection Time: 01/25/24  3:35 PM   Result Value Ref Range    hs TnI 4hr 3 \"Refer to ACS Flowchart\"- see link ng/L    Delta 4hr hsTnI -2 <20 ng/L   Basic metabolic panel    Collection Time: 01/26/24  4:40 AM   Result Value Ref Range    Sodium 139 135 - 147 mmol/L    Potassium 5.1 3.5 - 5.3 mmol/L    Chloride 100 96 - 108 mmol/L    CO2 35 (H) 21 - 32 mmol/L    ANION GAP 4 mmol/L    BUN 37 (H) 5 - 25 mg/dL    Creatinine 1.05 0.60 - 1.30 mg/dL    Glucose 107 65 - 140 mg/dL    Calcium 9.4 8.4 - 10.2 mg/dL    eGFR 54 ml/min/1.73sq m   CBC and Platelet    Collection Time: 01/26/24  4:40 AM   Result Value Ref Range    WBC 10.45 (H) 4.31 - 10.16 Thousand/uL    RBC 4.00 3.81 - 5.12 Million/uL    Hemoglobin 10.6 (L) 11.5 - 15.4 g/dL    Hematocrit 35.9 34.8 - 46.1 %    MCV 90 82 - 98 fL    MCH 26.5 (L) 26.8 - 34.3 pg    MCHC 29.5 (L) 31.4 - 37.4 g/dL    RDW 14.6 11.6 - 15.1 %    Platelets 256 149 - 390 Thousands/uL    MPV 9.8 8.9 - 12.7 fL   CBC and differential    Collection Time: 01/27/24  5:52 AM "   Result Value Ref Range    WBC 7.96 4.31 - 10.16 Thousand/uL    RBC 4.15 3.81 - 5.12 Million/uL    Hemoglobin 11.0 (L) 11.5 - 15.4 g/dL    Hematocrit 37.7 34.8 - 46.1 %    MCV 91 82 - 98 fL    MCH 26.5 (L) 26.8 - 34.3 pg    MCHC 29.2 (L) 31.4 - 37.4 g/dL    RDW 14.7 11.6 - 15.1 %    MPV 9.7 8.9 - 12.7 fL    Platelets 258 149 - 390 Thousands/uL    nRBC 0 /100 WBCs    Segmented % 87 (H) 43 - 75 %    Immature Grans % 2 0 - 2 %    Lymphocytes % 8 (L) 14 - 44 %    Monocytes % 3 (L) 4 - 12 %    Eosinophils Relative 0 0 - 6 %    Basophils Relative 0 0 - 1 %    Absolute Neutrophils 6.93 1.85 - 7.62 Thousands/µL    Absolute Immature Grans 0.15 0.00 - 0.20 Thousand/uL    Absolute Lymphocytes 0.65 0.60 - 4.47 Thousands/µL    Absolute Monocytes 0.21 0.17 - 1.22 Thousand/µL    Eosinophils Absolute 0.00 0.00 - 0.61 Thousand/µL    Basophils Absolute 0.02 0.00 - 0.10 Thousands/µL   Basic metabolic panel    Collection Time: 01/27/24  5:52 AM   Result Value Ref Range    Sodium 139 135 - 147 mmol/L    Potassium 5.3 3.5 - 5.3 mmol/L    Chloride 100 96 - 108 mmol/L    CO2 33 (H) 21 - 32 mmol/L    ANION GAP 6 mmol/L    BUN 39 (H) 5 - 25 mg/dL    Creatinine 1.11 0.60 - 1.30 mg/dL    Glucose 127 65 - 140 mg/dL    Calcium 9.2 8.4 - 10.2 mg/dL    eGFR 51 ml/min/1.73sq m   CBC and differential    Collection Time: 01/28/24  4:31 AM   Result Value Ref Range    WBC 8.61 4.31 - 10.16 Thousand/uL    RBC 4.14 3.81 - 5.12 Million/uL    Hemoglobin 11.0 (L) 11.5 - 15.4 g/dL    Hematocrit 36.8 34.8 - 46.1 %    MCV 89 82 - 98 fL    MCH 26.6 (L) 26.8 - 34.3 pg    MCHC 29.9 (L) 31.4 - 37.4 g/dL    RDW 14.5 11.6 - 15.1 %    MPV 9.6 8.9 - 12.7 fL    Platelets 281 149 - 390 Thousands/uL   Basic metabolic panel    Collection Time: 01/28/24  4:31 AM   Result Value Ref Range    Sodium 139 135 - 147 mmol/L    Potassium 5.0 3.5 - 5.3 mmol/L    Chloride 99 96 - 108 mmol/L    CO2 35 (H) 21 - 32 mmol/L    ANION GAP 5 mmol/L    BUN 38 (H) 5 - 25 mg/dL    Creatinine  "0.99 0.60 - 1.30 mg/dL    Glucose 136 65 - 140 mg/dL    Calcium 9.3 8.4 - 10.2 mg/dL    eGFR 58 ml/min/1.73sq m   Manual Differential(PHLEBS Do Not Order)    Collection Time: 01/28/24  4:31 AM   Result Value Ref Range    Segmented % 89 (H) 43 - 75 %    Lymphocytes % 8 (L) 14 - 44 %    Monocytes % 2 (L) 4 - 12 %    Eosinophils % 0 0 - 6 %    Basophils % 0 0 - 1 %    Metamyelocytes % 1 0 - 1 %    Absolute Neutrophils 7.66 (H) 1.85 - 7.62 Thousand/uL    Absolute Lymphocytes 0.69 0.60 - 4.47 Thousand/uL    Absolute Monocytes 0.17 0.00 - 1.22 Thousand/uL    Absolute Eosinophils 0.00 0.00 - 0.40 Thousand/uL    Absolute Basophils 0.00 0.00 - 0.10 Thousand/uL    Total Counted      RBC Morphology Normal     Platelet Estimate Adequate Adequate   ECG 12 lead    Collection Time: 01/28/24  1:51 PM   Result Value Ref Range    Ventricular Rate 74 BPM    Atrial Rate 74 BPM    [place] Interval 136 ms    QRSD Interval 88 ms    QT Interval 378 ms    QTC Interval 419 ms    P Axis 87 degrees    QRS Axis 41 degrees    T Wave [place] 76 degrees   HS Troponin 0hr (reflex protocol)    Collection Time: 01/28/24  2:29 PM   Result Value Ref Range    hs TnI 0hr 3 \"Refer to ACS Flowchart\"- see link ng/L   HS Troponin I 2hr    Collection Time: 01/28/24  4:22 PM   Result Value Ref Range    hs TnI 2hr 6 \"Refer to ACS Flowchart\"- see link ng/L    Delta 2hr hsTnI 3 <20 ng/L   HS Troponin I 4hr    Collection Time: 01/28/24  6:22 PM   Result Value Ref Range    hs TnI 4hr 5 \"Refer to ACS Flowchart\"- see link ng/L    Delta 4hr hsTnI 2 <20 ng/L   CBC and differential    Collection Time: 01/29/24  4:29 AM   Result Value Ref Range    WBC 9.38 4.31 - 10.16 Thousand/uL    RBC 4.17 3.81 - 5.12 Million/uL    Hemoglobin 10.9 (L) 11.5 - 15.4 g/dL    Hematocrit 37.4 34.8 - 46.1 %    MCV 90 82 - 98 fL    MCH 26.1 (L) 26.8 - 34.3 pg    MCHC 29.1 (L) 31.4 - 37.4 g/dL    RDW 14.9 11.6 - 15.1 %    MPV 9.5 8.9 - 12.7 fL    Platelets 262 149 - 390 Thousands/uL    nRBC 0 /100 " WBCs    Segmented % 73 43 - 75 %    Immature Grans % 4 (H) 0 - 2 %    Lymphocytes % 14 14 - 44 %    Monocytes % 9 4 - 12 %    Eosinophils Relative 0 0 - 6 %    Basophils Relative 0 0 - 1 %    Absolute Neutrophils 6.85 1.85 - 7.62 Thousands/µL    Absolute Immature Grans 0.37 (H) 0.00 - 0.20 Thousand/uL    Absolute Lymphocytes 1.30 0.60 - 4.47 Thousands/µL    Absolute Monocytes 0.81 0.17 - 1.22 Thousand/µL    Eosinophils Absolute 0.03 0.00 - 0.61 Thousand/µL    Basophils Absolute 0.02 0.00 - 0.10 Thousands/µL   Basic metabolic panel    Collection Time: 01/29/24  4:29 AM   Result Value Ref Range    Sodium 139 135 - 147 mmol/L    Potassium 4.5 3.5 - 5.3 mmol/L    Chloride 99 96 - 108 mmol/L    CO2 35 (H) 21 - 32 mmol/L    ANION GAP 5 mmol/L    BUN 32 (H) 5 - 25 mg/dL    Creatinine 1.08 0.60 - 1.30 mg/dL    Glucose 89 65 - 140 mg/dL    Calcium 9.1 8.4 - 10.2 mg/dL    eGFR 52 ml/min/1.73sq m   Procalcitonin    Collection Time: 01/29/24  4:29 AM   Result Value Ref Range    Procalcitonin <0.05 <=0.25 ng/ml   Echo complete w/ contrast if indicated    Collection Time: 01/29/24 10:49 AM   Result Value Ref Range    BSA 1.85 m2    A4C EF 61 %    LVIDd 4.60 cm    LVIDS 3.10 cm    IVSd 0.90 cm    LVPWd 1.00 cm    FS 33 28 - 44    MV E' Tissue Velocity Septal 9 cm/s    LA Volume Index (BP) 17.8 mL/m2    E/A ratio 0.93     E wave deceleration time 161 ms    MV Peak E [place] 66 cm/s    MV Peak A [place] 0.71 m/s    RVID d 2.9 cm    Tricuspid annular plane systolic excursion 2.00 cm    LA size 3 cm    LA length (A2C) 4.10 cm    LA volume (BP) 33 mL    RAA A4C 13.8 cm2    MV stenosis pressure 1/2 time 47 ms    MV valve area p 1/2 method 4.68     Ao root 2.90 cm    Left ventricular stroke volume (2D) 61.00 mL    IVS 0.9 cm    LEFT VENTRICLE SYSTOLIC VOLUME (MOD BIPLANE) 2D 37 mL    LV DIASTOLIC VOLUME (MOD BIPLANE) 2D 98 mL    Left Atrium Area-systolic Four Chamber 12.5 cm2    Left Atrium Area-systolic Apical Two Chamber 12.5 cm2    LVSV, 2D  "61 mL    LV EF 60    CBC and differential    Collection Time: 02/28/24  8:30 PM   Result Value Ref Range    WBC 7.99 4.31 - 10.16 Thousand/uL    RBC 4.18 3.81 - 5.12 Million/uL    Hemoglobin 11.1 (L) 11.5 - 15.4 g/dL    Hematocrit 38.3 34.8 - 46.1 %    MCV 92 82 - 98 fL    MCH 26.6 (L) 26.8 - 34.3 pg    MCHC 29.0 (L) 31.4 - 37.4 g/dL    RDW 14.5 11.6 - 15.1 %    MPV 9.8 8.9 - 12.7 fL    Platelets 231 149 - 390 Thousands/uL    nRBC 0 /100 WBCs    Segmented % 77 (H) 43 - 75 %    Immature Grans % 1 0 - 2 %    Lymphocytes % 13 (L) 14 - 44 %    Monocytes % 8 4 - 12 %    Eosinophils Relative 1 0 - 6 %    Basophils Relative 0 0 - 1 %    Absolute Neutrophils 6.23 1.85 - 7.62 Thousands/µL    Absolute Immature Grans 0.05 0.00 - 0.20 Thousand/uL    Absolute Lymphocytes 1.02 0.60 - 4.47 Thousands/µL    Absolute Monocytes 0.60 0.17 - 1.22 Thousand/µL    Eosinophils Absolute 0.07 0.00 - 0.61 Thousand/µL    Basophils Absolute 0.02 0.00 - 0.10 Thousands/µL   Comprehensive metabolic panel    Collection Time: 02/28/24  8:30 PM   Result Value Ref Range    Sodium 141 135 - 147 mmol/L    Potassium 4.4 3.5 - 5.3 mmol/L    Chloride 97 96 - 108 mmol/L    CO2 38 (H) 21 - 32 mmol/L    ANION GAP 6 mmol/L    BUN 18 5 - 25 mg/dL    Creatinine 0.92 0.60 - 1.30 mg/dL    Glucose 134 65 - 140 mg/dL    Calcium 9.7 8.4 - 10.2 mg/dL    AST 23 13 - 39 U/L    ALT 13 7 - 52 U/L    Alkaline Phosphatase 96 34 - 104 U/L    Total Protein 7.4 6.4 - 8.4 g/dL    Albumin 4.3 3.5 - 5.0 g/dL    Total Bilirubin 0.36 0.20 - 1.00 mg/dL    eGFR 64 ml/min/1.73sq m   HS Troponin 0hr (reflex protocol)    Collection Time: 02/28/24  8:30 PM   Result Value Ref Range    hs TnI 0hr 10 \"Refer to ACS Flowchart\"- see link ng/L   FLU/RSV/COVID - if FLU/RSV clinically relevant    Collection Time: 02/28/24  8:30 PM    Specimen: Nose; Nares   Result Value Ref Range    SARS-CoV-2 Negative Negative    INFLUENZA A PCR Negative Negative    INFLUENZA B PCR Negative Negative    RSV PCR " "Negative Negative   ECG 12 lead    Collection Time: 02/28/24  8:30 PM   Result Value Ref Range    Ventricular Rate 93 BPM    Atrial Rate 93 BPM    [place] Interval 156 ms    QRSD Interval 84 ms    QT Interval 372 ms    QTC Interval 462 ms    P Axis 88 degrees    QRS Axis 58 degrees    T Wave [place] 85 degrees   HS Troponin I 2hr    Collection Time: 02/28/24 10:40 PM   Result Value Ref Range    hs TnI 2hr 89 (H) \"Refer to ACS Flowchart\"- see link ng/L    Delta 2hr hsTnI 79 (H) <20 ng/L   HS Troponin I 4hr    Collection Time: 02/29/24  1:27 AM   Result Value Ref Range    hs TnI 4hr 157 (H) \"Refer to ACS Flowchart\"- see link ng/L    Delta 4hr hsTnI 147 (H) <20 ng/L   Platelet count    Collection Time: 02/29/24  1:27 AM   Result Value Ref Range    Platelets 190 149 - 390 Thousands/uL    MPV 9.6 8.9 - 12.7 fL   ECG 12 lead    Collection Time: 02/29/24  3:24 AM   Result Value Ref Range    Ventricular Rate 95 BPM    Atrial Rate 95 BPM    WA Interval 196 ms    QRSD Interval 86 ms    QT Interval 352 ms    QTC Interval 442 ms    P Axis 83 degrees    QRS Axis 15 degrees    T Wave Axis -18 degrees   HS Troponin 0hr (reflex protocol)    Collection Time: 02/29/24  5:55 AM   Result Value Ref Range    hs TnI 0hr 256 (H) \"Refer to ACS Flowchart\"- see link ng/L   CBC and differential    Collection Time: 02/29/24  5:55 AM   Result Value Ref Range    WBC 6.69 4.31 - 10.16 Thousand/uL    RBC 4.05 3.81 - 5.12 Million/uL    Hemoglobin 10.7 (L) 11.5 - 15.4 g/dL    Hematocrit 36.3 34.8 - 46.1 %    MCV 90 82 - 98 fL    MCH 26.4 (L) 26.8 - 34.3 pg    MCHC 29.5 (L) 31.4 - 37.4 g/dL    RDW 14.2 11.6 - 15.1 %    MPV 9.6 8.9 - 12.7 fL    Platelets 207 149 - 390 Thousands/uL   Basic metabolic panel    Collection Time: 02/29/24  5:55 AM   Result Value Ref Range    Sodium 140 135 - 147 mmol/L    Potassium 4.8 3.5 - 5.3 mmol/L    Chloride 98 96 - 108 mmol/L    CO2 38 (H) 21 - 32 mmol/L    ANION GAP 4 mmol/L    BUN 17 5 - 25 mg/dL    Creatinine 0.85 0.60 - " "1.30 mg/dL    Glucose 150 (H) 65 - 140 mg/dL    Calcium 9.5 8.4 - 10.2 mg/dL    eGFR 70 ml/min/1.73sq m   Magnesium    Collection Time: 02/29/24  5:55 AM   Result Value Ref Range    Magnesium 2.0 1.9 - 2.7 mg/dL   Manual Differential(PHLEBS Do Not Order)    Collection Time: 02/29/24  5:55 AM   Result Value Ref Range    Segmented % 89 (H) 43 - 75 %    Lymphocytes % 11 (L) 14 - 44 %    Monocytes % 0 (L) 4 - 12 %    Eosinophils % 0 0 - 6 %    Basophils % 0 0 - 1 %    Absolute Neutrophils 5.95 1.85 - 7.62 Thousand/uL    Absolute Lymphocytes 0.74 0.60 - 4.47 Thousand/uL    Absolute Monocytes 0.00 0.00 - 1.22 Thousand/uL    Absolute Eosinophils 0.00 0.00 - 0.40 Thousand/uL    Absolute Basophils 0.00 0.00 - 0.10 Thousand/uL    Total Counted      RBC Morphology Normal     Platelet Estimate Adequate Adequate   Lipid panel    Collection Time: 02/29/24  5:55 AM   Result Value Ref Range    Cholesterol 207 (H) See Comment mg/dL    Triglycerides 61 See Comment mg/dL    HDL, Direct 78 >=50 mg/dL    LDL Calculated 117 (H) 0 - 100 mg/dL    Non-HDL-Chol (CHOL-HDL) 129 mg/dl   TSH, 3rd generation with Free T4 reflex    Collection Time: 02/29/24  5:55 AM   Result Value Ref Range    TSH 3RD GENERATON 1.767 0.450 - 4.500 uIU/mL   HS Troponin I 2hr    Collection Time: 02/29/24  7:57 AM   Result Value Ref Range    hs TnI 2hr 236 (H) \"Refer to ACS Flowchart\"- see link ng/L    Delta 2hr hsTnI -20 <20 ng/L   ECG 12 lead    Collection Time: 02/29/24  8:22 AM   Result Value Ref Range    Ventricular Rate 83 BPM    Atrial Rate 83 BPM    NC Interval 146 ms    QRSD Interval 86 ms    QT Interval 386 ms    QTC Interval 453 ms    P Axis 75 degrees    QRS Axis 25 degrees    T Wave Axis -61 degrees   HS Troponin I 4hr    Collection Time: 02/29/24  9:56 AM   Result Value Ref Range    hs TnI 4hr 220 (H) \"Refer to ACS Flowchart\"- see link ng/L    Delta 4hr hsTnI -36 <20 ng/L   Hemoglobin A1C    Collection Time: 03/01/24  5:39 AM   Result Value Ref Range    " Hemoglobin A1C 5.9 (H) Normal 4.0-5.6%; PreDiabetic 5.7-6.4%; Diabetic >=6.5%; Glycemic control for adults with diabetes <7.0% %     mg/dl   CBC and differential    Collection Time: 03/01/24  5:39 AM   Result Value Ref Range    WBC 8.08 4.31 - 10.16 Thousand/uL    RBC 3.98 3.81 - 5.12 Million/uL    Hemoglobin 10.6 (L) 11.5 - 15.4 g/dL    Hematocrit 36.5 34.8 - 46.1 %    MCV 92 82 - 98 fL    MCH 26.6 (L) 26.8 - 34.3 pg    MCHC 29.0 (L) 31.4 - 37.4 g/dL    RDW 14.7 11.6 - 15.1 %    MPV 9.9 8.9 - 12.7 fL    Platelets 208 149 - 390 Thousands/uL    nRBC 0 /100 WBCs    Segmented % 78 (H) 43 - 75 %    Immature Grans % 0 0 - 2 %    Lymphocytes % 13 (L) 14 - 44 %    Monocytes % 8 4 - 12 %    Eosinophils Relative 0 0 - 6 %    Basophils Relative 1 0 - 1 %    Absolute Neutrophils 6.27 1.85 - 7.62 Thousands/µL    Absolute Immature Grans 0.03 0.00 - 0.20 Thousand/uL    Absolute Lymphocytes 1.04 0.60 - 4.47 Thousands/µL    Absolute Monocytes 0.68 0.17 - 1.22 Thousand/µL    Eosinophils Absolute 0.02 0.00 - 0.61 Thousand/µL    Basophils Absolute 0.04 0.00 - 0.10 Thousands/µL   Basic metabolic panel    Collection Time: 03/01/24  5:39 AM   Result Value Ref Range    Sodium 143 135 - 147 mmol/L    Potassium 4.9 3.5 - 5.3 mmol/L    Chloride 101 96 - 108 mmol/L    CO2 40 (H) 21 - 32 mmol/L    ANION GAP 2 mmol/L    BUN 24 5 - 25 mg/dL    Creatinine 1.05 0.60 - 1.30 mg/dL    Glucose 96 65 - 140 mg/dL    Calcium 9.4 8.4 - 10.2 mg/dL    eGFR 54 ml/min/1.73sq m   Magnesium    Collection Time: 03/01/24  5:39 AM   Result Value Ref Range    Magnesium 2.0 1.9 - 2.7 mg/dL   Stress strip    Collection Time: 03/01/24  9:18 AM   Result Value Ref Range    Protocol Name EMMA SIT     Exercise duration (min) 3 min    Exercise duration (sec) 0 sec    Post Peak Systolic  mmHg    Max Diastolic Bp 74 mmHg    Peak HR 90 BPM    Max Predicted Heart Rate 152 BPM    Reason for Termination Protocol Complete     Test Indication CHEST PAIN     Target Hr  Formular (220 - Age)*100%     Arrhy During Ex      ECG Interp Before Ex      ECG Interp during Ex      Ex Summary Comment      Chest Pain Statement none     Overall Hr Response To Exercise      Overall BP Response To Exercise     Stress strip    Collection Time: 03/01/24  9:18 AM   Result Value Ref Range    Protocol Name EMMA SIT     Exercise duration (min) 3 min    Exercise duration (sec) 0 sec    Post Peak Systolic  mmHg    Max Diastolic Bp 74 mmHg    Peak HR 90 BPM    Max Predicted Heart Rate 152 BPM    Reason for Termination Protocol Complete     Test Indication CHEST PAIN     Target Hr Formular (220 - Age)*100%     Arrhy During Ex      ECG Interp Before Ex      ECG Interp during Ex      Ex Summary Comment      Chest Pain Statement none     Overall Hr Response To Exercise      Overall BP Response To Exercise     NM myocardial perfusion spect (rx stress and/or rest)    Collection Time: 03/01/24 10:33 AM   Result Value Ref Range    Baseline HR 72 bpm    Baseline /65 mmHg    O2 sat rest 100 %    Stress peak HR 90 bpm    Post peak  mmHg    O2 sat peak 100 %    Recovery HR 82 bpm    Recovery /70 mmHg    O2 sat recovery 100 %    Rate Pressure Product 16,020.0     Angina Index 0     Rest Nuclear Isotope Dose 10.80 mCi    Stress Nuclear Isotope Dose 32.60 mCi    EF (%) 65 %   CBC and differential    Collection Time: 03/02/24  5:38 AM   Result Value Ref Range    WBC 7.21 4.31 - 10.16 Thousand/uL    RBC 3.87 3.81 - 5.12 Million/uL    Hemoglobin 10.3 (L) 11.5 - 15.4 g/dL    Hematocrit 35.3 34.8 - 46.1 %    MCV 91 82 - 98 fL    MCH 26.6 (L) 26.8 - 34.3 pg    MCHC 29.2 (L) 31.4 - 37.4 g/dL    RDW 14.8 11.6 - 15.1 %    MPV 9.6 8.9 - 12.7 fL    Platelets 194 149 - 390 Thousands/uL    nRBC 0 /100 WBCs    Segmented % 77 (H) 43 - 75 %    Immature Grans % 0 0 - 2 %    Lymphocytes % 12 (L) 14 - 44 %    Monocytes % 9 4 - 12 %    Eosinophils Relative 1 0 - 6 %    Basophils Relative 1 0 - 1 %    Absolute  Neutrophils 5.56 1.85 - 7.62 Thousands/µL    Absolute Immature Grans 0.02 0.00 - 0.20 Thousand/uL    Absolute Lymphocytes 0.85 0.60 - 4.47 Thousands/µL    Absolute Monocytes 0.66 0.17 - 1.22 Thousand/µL    Eosinophils Absolute 0.08 0.00 - 0.61 Thousand/µL    Basophils Absolute 0.04 0.00 - 0.10 Thousands/µL   Basic metabolic panel    Collection Time: 03/02/24  5:38 AM   Result Value Ref Range    Sodium 143 135 - 147 mmol/L    Potassium 5.0 3.5 - 5.3 mmol/L    Chloride 100 96 - 108 mmol/L    CO2 42 (H) 21 - 32 mmol/L    ANION GAP 1 mmol/L    BUN 22 5 - 25 mg/dL    Creatinine 1.01 0.60 - 1.30 mg/dL    Glucose 93 65 - 140 mg/dL    Calcium 9.3 8.4 - 10.2 mg/dL    eGFR 57 ml/min/1.73sq m   Magnesium    Collection Time: 03/02/24  5:38 AM   Result Value Ref Range    Magnesium 1.9 1.9 - 2.7 mg/dL   CBC and differential    Collection Time: 03/03/24  6:10 AM   Result Value Ref Range    WBC 7.86 4.31 - 10.16 Thousand/uL    RBC 4.03 3.81 - 5.12 Million/uL    Hemoglobin 10.5 (L) 11.5 - 15.4 g/dL    Hematocrit 37.1 34.8 - 46.1 %    MCV 92 82 - 98 fL    MCH 26.1 (L) 26.8 - 34.3 pg    MCHC 28.3 (L) 31.4 - 37.4 g/dL    RDW 14.5 11.6 - 15.1 %    MPV 9.7 8.9 - 12.7 fL    Platelets 201 149 - 390 Thousands/uL    nRBC 0 /100 WBCs    Segmented % 80 (H) 43 - 75 %    Immature Grans % 0 0 - 2 %    Lymphocytes % 8 (L) 14 - 44 %    Monocytes % 11 4 - 12 %    Eosinophils Relative 1 0 - 6 %    Basophils Relative 0 0 - 1 %    Absolute Neutrophils 6.25 1.85 - 7.62 Thousands/µL    Absolute Immature Grans 0.03 0.00 - 0.20 Thousand/uL    Absolute Lymphocytes 0.66 0.60 - 4.47 Thousands/µL    Absolute Monocytes 0.84 0.17 - 1.22 Thousand/µL    Eosinophils Absolute 0.05 0.00 - 0.61 Thousand/µL    Basophils Absolute 0.03 0.00 - 0.10 Thousands/µL   Basic metabolic panel    Collection Time: 03/03/24  6:10 AM   Result Value Ref Range    Sodium 140 135 - 147 mmol/L    Potassium 4.3 3.5 - 5.3 mmol/L    Chloride 98 96 - 108 mmol/L    CO2 39 (H) 21 - 32 mmol/L     ANION GAP 3 mmol/L    BUN 17 5 - 25 mg/dL    Creatinine 0.85 0.60 - 1.30 mg/dL    Glucose 98 65 - 140 mg/dL    Calcium 9.2 8.4 - 10.2 mg/dL    eGFR 70 ml/min/1.73sq m   Magnesium    Collection Time: 03/03/24  6:10 AM   Result Value Ref Range    Magnesium 1.8 (L) 1.9 - 2.7 mg/dL   CBC and differential    Collection Time: 03/04/24  5:24 AM   Result Value Ref Range    WBC 5.55 4.31 - 10.16 Thousand/uL    RBC 3.79 (L) 3.81 - 5.12 Million/uL    Hemoglobin 10.3 (L) 11.5 - 15.4 g/dL    Hematocrit 34.6 (L) 34.8 - 46.1 %    MCV 91 82 - 98 fL    MCH 27.2 26.8 - 34.3 pg    MCHC 29.8 (L) 31.4 - 37.4 g/dL    RDW 14.6 11.6 - 15.1 %    MPV 9.9 8.9 - 12.7 fL    Platelets 168 149 - 390 Thousands/uL    nRBC 0 /100 WBCs    Segmented % 73 43 - 75 %    Immature Grans % 1 0 - 2 %    Lymphocytes % 14 14 - 44 %    Monocytes % 11 4 - 12 %    Eosinophils Relative 1 0 - 6 %    Basophils Relative 0 0 - 1 %    Absolute Neutrophils 4.04 1.85 - 7.62 Thousands/µL    Absolute Immature Grans 0.03 0.00 - 0.20 Thousand/uL    Absolute Lymphocytes 0.80 0.60 - 4.47 Thousands/µL    Absolute Monocytes 0.61 0.17 - 1.22 Thousand/µL    Eosinophils Absolute 0.05 0.00 - 0.61 Thousand/µL    Basophils Absolute 0.02 0.00 - 0.10 Thousands/µL   Basic metabolic panel    Collection Time: 03/04/24  5:24 AM   Result Value Ref Range    Sodium 142 135 - 147 mmol/L    Potassium 4.1 3.5 - 5.3 mmol/L    Chloride 99 96 - 108 mmol/L    CO2 41 (H) 21 - 32 mmol/L    ANION GAP 2 mmol/L    BUN 17 5 - 25 mg/dL    Creatinine 0.88 0.60 - 1.30 mg/dL    Glucose 99 65 - 140 mg/dL    Calcium 9.2 8.4 - 10.2 mg/dL    eGFR 67 ml/min/1.73sq m   Magnesium    Collection Time: 03/04/24  5:24 AM   Result Value Ref Range    Magnesium 2.0 1.9 - 2.7 mg/dL   Tissue Exam    Collection Time: 03/04/24  8:45 AM   Result Value Ref Range    Case Report       Surgical Pathology Report                         Case: [id number]                                  Authorizing Provider:  Sidra Raymundo MD           Collected:           03/04/2024 0845              Ordering Location:     [facility]        Received:            03/04/2024 1149                                     [place] [facility] [facility]                                                                              Surg Unit                                                                    Pathologist:           Stacy Fowler MD                                                       Specimen:    Esophagus, Cold BX Distal esophagus                                                        Final Diagnosis       A. Esophagus, Distal, biopsy:  - Benign squamous mucosa with mild reactive changes  - Negative for intestinal metaplasia, eosinophilic esophagitis, dysplasia and malignancy         Additional Information       All reported additional testing was performed with appropriately reactive controls.  These tests were developed and their performance characteristics determined by [facility] or appropriate performing facility, though some tests may be performed on tissues which have not been validated for performance characteristics (such as staining performed on alcohol exposed cell blocks and decalcified tissues).  Results should be interpreted with caution and in the context of the patients’ clinical condition. These tests may not be cleared or approved by the U.S. Food and Drug Administration, though the FDA has determined that such clearance or approval is not necessary. These tests are used for clinical purposes and they should not be regarded as investigational or for research. This laboratory has been approved by CLIA 88, designated as a high-complexity laboratory and is qualified to perform these tests.    Interpretation performed at [facility] [address] [place] [address] [place] [address]   .      Gross Description       A. The specimen is received in formalin, labeled with the patient's name and hospital number, and is  "designated \" biopsy distal esophagus\".  The specimen consists of 3 white to red soft tissue fragments each measuring 0.3 to 0.5 cm.  Entirely submitted. Screened cassette.    Note: The estimated total formalin fixation time based upon information provided by the submitting clinician and the standard processing schedule is under 72 hours.    [facility]     CBC and differential    Collection Time: 03/05/24  4:34 AM   Result Value Ref Range    WBC 6.14 4.31 - 10.16 Thousand/uL    RBC 4.07 3.81 - 5.12 Million/uL    Hemoglobin 10.7 (L) 11.5 - 15.4 g/dL    Hematocrit 37.1 34.8 - 46.1 %    MCV 91 82 - 98 fL    MCH 26.3 (L) 26.8 - 34.3 pg    MCHC 28.8 (L) 31.4 - 37.4 g/dL    RDW 14.6 11.6 - 15.1 %    MPV 9.9 8.9 - 12.7 fL    Platelets 177 149 - 390 Thousands/uL    nRBC 0 /100 WBCs    Segmented % 80 (H) 43 - 75 %    Immature Grans % 0 0 - 2 %    Lymphocytes % 11 (L) 14 - 44 %    Monocytes % 8 4 - 12 %    Eosinophils Relative 1 0 - 6 %    Basophils Relative 0 0 - 1 %    Absolute Neutrophils 4.87 1.85 - 7.62 Thousands/µL    Absolute Immature Grans 0.02 0.00 - 0.20 Thousand/uL    Absolute Lymphocytes 0.67 0.60 - 4.47 Thousands/µL    Absolute Monocytes 0.50 0.17 - 1.22 Thousand/µL    Eosinophils Absolute 0.06 0.00 - 0.61 Thousand/µL    Basophils Absolute 0.02 0.00 - 0.10 Thousands/µL   Basic metabolic panel    Collection Time: 03/05/24  4:34 AM   Result Value Ref Range    Sodium 141 135 - 147 mmol/L    Potassium 4.0 3.5 - 5.3 mmol/L    Chloride 99 96 - 108 mmol/L    CO2 37 (H) 21 - 32 mmol/L    ANION GAP 5 mmol/L    BUN 13 5 - 25 mg/dL    Creatinine 0.89 0.60 - 1.30 mg/dL    Glucose 94 65 - 140 mg/dL    Calcium 9.3 8.4 - 10.2 mg/dL    eGFR 66 ml/min/1.73sq m   Magnesium    Collection Time: 03/05/24  4:34 AM   Result Value Ref Range    Magnesium 1.9 1.9 - 2.7 mg/dL   Tissue Exam    Collection Time: 04/11/24  8:28 AM   Result Value Ref Range    Case Report       Surgical Pathology Report                         Case: [id number]       "                            Authorizing Provider:  Sidra Raymundo MD          Collected:           04/11/2024 0828              Ordering Location:     [facility]        Received:            04/11/2024 [address] [place] [address] Endoscopy                                                           Pathologist:           Jasiel Sosa MD                                                                Specimens:   A) - Polyp, Colorectal, ascending colon polyp hot snare                                             B) - Polyp, Colorectal, sigmoid colon polyp cold snare                                     Final Diagnosis       A.  Ascending colon polyp (hot snare):     - Tubular adenoma; negative for high-grade dysplasia.    B.  Sigmoid colon polyp (cold snare):     - Tubular adenoma; negative for high-grade dysplasia.       Additional Information       All reported additional testing was performed with appropriately reactive controls.  These tests were developed and their performance characteristics determined by [facility] or appropriate performing facility, though some tests may be performed on tissues which have not been validated for performance characteristics (such as staining performed on alcohol exposed cell blocks and decalcified tissues).  Results should be interpreted with caution and in the context of the patients’ clinical condition. These tests may not be cleared or approved by the U.S. Food and Drug Administration, though the FDA has determined that such clearance or approval is not necessary. These tests are used for clinical purposes and they should not be regarded as investigational or for research. This laboratory has been approved by CLIA 88, designated as a high-complexity laboratory and is qualified to perform these tests. Interpretation performed at [facility], [address] [place] [id number]..      Synoptic Checklist          COLON/RECTUM  "POLYP FORM - GI - All Specimens          :    Adenoma(s)      Gross Description       A. The specimen is received in formalin, labeled with the patient's name and hospital number, and is designated \" ascending colon polyp\".  The specimen consists of a single tan-pink polypoid tissue fragment measuring 0.6 x 0.4 x 0.4 cm.  The presumed margins of resection is inked blue and the specimen is bisected revealing grossly unremarkable cut surfaces.  Entirely submitted.  One screened cassette.    B. The specimen is received in formalin, labeled with the patient's name and hospital number, and is designated \" sigmoid colon polyp\".  The specimen consists of a single tan tissue fragment measuring 0.2 cm in greatest dimension.  The specimen is entirely submitted in a screened cassette.    Note: The estimated total formalin fixation time based upon information provided by the submitting clinician and the standard processing schedule is under 72 hours. Rocky Calderon     Adult Nebulizer Package    Collection Time: 06/07/24  9:05 AM   Result Value Ref Range    Supplier Name [employer]     Supplier Phone Number [phone]     Order Status Delivery Successful     Delivery Note      Delivery Request Date 06/07/2024     Date Delivered  07/02/2024     Item Description Nebulizer Compressor with Mask     Item Description Nebulizer Set, Reusable     Item Description       Adult Nebulizer Mask, 1 per 1 month a€“ Use as directed and discard 1 month after first use    Item Description Disposable Nebulizer Compressor Filter    ECG 12 lead    Collection Time: 06/19/24  5:15 PM   Result Value Ref Range    Ventricular Rate 91 BPM    Atrial Rate 91 BPM    AL Interval 156 ms    QRSD Interval 90 ms    QT Interval 360 ms    QTC Interval 442 ms    P Axis 88 degrees    QRS Axis 73 degrees    T Wave Axis 75 degrees   CBC and differential    Collection Time: 06/19/24  5:48 PM   Result Value Ref Range    WBC 7.46 4.31 - " 10.16 Thousand/uL    RBC 3.91 3.81 - 5.12 Million/uL    Hemoglobin 10.6 (L) 11.5 - 15.4 g/dL    Hematocrit 36.1 34.8 - 46.1 %    MCV 92 82 - 98 fL    MCH 27.1 26.8 - 34.3 pg    MCHC 29.4 (L) 31.4 - 37.4 g/dL    RDW 13.1 11.6 - 15.1 %    MPV 10.0 8.9 - 12.7 fL    Platelets 137 (L) 149 - 390 Thousands/uL    nRBC 0 /100 WBCs    Segmented % 90 (H) 43 - 75 %    Immature Grans % 1 0 - 2 %    Lymphocytes % 4 (L) 14 - 44 %    Monocytes % 5 4 - 12 %    Eosinophils Relative 0 0 - 6 %    Basophils Relative 0 0 - 1 %    Absolute Neutrophils 6.73 1.85 - 7.62 Thousands/µL    Absolute Immature Grans 0.08 0.00 - 0.20 Thousand/uL    Absolute Lymphocytes 0.27 (L) 0.60 - 4.47 Thousands/µL    Absolute Monocytes 0.37 0.17 - 1.22 Thousand/µL    Eosinophils Absolute 0.00 0.00 - 0.61 Thousand/µL    Basophils Absolute 0.01 0.00 - 0.10 Thousands/µL   Comprehensive metabolic panel    Collection Time: 06/19/24  5:48 PM   Result Value Ref Range    Sodium 141 135 - 147 mmol/L    Potassium 4.5 3.5 - 5.3 mmol/L    Chloride 96 96 - 108 mmol/L    CO2 40 (H) 21 - 32 mmol/L    ANION GAP 5 4 - 13 mmol/L    BUN 18 5 - 25 mg/dL    Creatinine 0.98 0.60 - 1.30 mg/dL    Glucose 129 65 - 140 mg/dL    Calcium 9.3 8.4 - 10.2 mg/dL    AST 21 13 - 39 U/L    ALT 12 7 - 52 U/L    Alkaline Phosphatase 126 (H) 34 - 104 U/L    Total Protein 7.2 6.4 - 8.4 g/dL    Albumin 4.4 3.5 - 5.0 g/dL    Total Bilirubin 0.42 0.20 - 1.00 mg/dL    eGFR 59 ml/min/1.73sq m   B-Type Natriuretic Peptide(BNP)    Collection Time: 06/19/24  5:48 PM   Result Value Ref Range     (H) 0 - 100 pg/mL   High Sensitivity Troponin I Random    Collection Time: 06/19/24  5:48 PM   Result Value Ref Range    HS TnI random 5 (L) 8 - 18 ng/L   Smear Review(Phlebs Do Not Order)    Collection Time: 06/19/24  5:48 PM   Result Value Ref Range    RBC Morphology Present     Platelet Estimate Adequate Adequate    Basophilic Stippling Present    Procalcitonin    Collection Time: 06/19/24  5:48 PM    Result Value Ref Range    Procalcitonin <0.05 <=0.25 ng/ml   Blood gas, venous    Collection Time: 06/19/24  6:02 PM   Result Value Ref Range    pH, Jonas 7.220 (L) 7.300 - 7.400    pCO2, Jonas 99.1 (HH) 42.0 - 50.0 mm Hg    pO2, Jonas 42.2 35.0 - 45.0 mm Hg    HCO3, Jonas 39.6 (H) 24 - 30 mmol/L    Base Excess, Jonas 8.7 mmol/L    O2 Content, Jonas 12.1 ml/dL    O2 HGB, VENOUS 73.6 60.0 - 80.0 %   COVID/FLU/RSV    Collection Time: 06/19/24  8:10 PM    Specimen: Nose; Nares   Result Value Ref Range    SARS-CoV-2 Negative Negative    INFLUENZA A PCR Negative Negative    INFLUENZA B PCR Negative Negative    RSV PCR Negative Negative   ECG 12 lead    Collection Time: 06/19/24 10:40 PM   Result Value Ref Range    Ventricular Rate 94 BPM    Atrial Rate 94 BPM    IA Interval 174 ms    QRSD Interval 90 ms    QT Interval 370 ms    QTC Interval 462 ms    P Axis 84 degrees    QRS Axis 63 degrees    T Wave Axis 76 degrees   Blood gas, venous    Collection Time: 06/20/24 10:21 AM   Result Value Ref Range    pH, Jonas 7.275 (L) 7.300 - 7.400    pCO2, Jonas 80.3 (HH) 42.0 - 50.0 mm Hg    pO2, Jonas 46.3 (H) 35.0 - 45.0 mm Hg    HCO3, Jonas 36.5 (H) 24 - 30 mmol/L    Base Excess, Jonas 7.4 mmol/L    O2 Content, Jonas 12.6 ml/dL    O2 HGB, VENOUS 80.6 (H) 60.0 - 80.0 %   Respiratory Panel 2.1(RP2)with COVID19    Collection Time: 06/20/24 12:49 PM    Specimen: Nasopharyngeal Swab   Result Value Ref Range    Adenovirus Not Detected Not Detected    Bordetella parapertussis Not Detected Not Detected    Bordetella pertussis Not Detected Not Detected    Chlamydia pneumoniae Not Detected Not detected    SARS-CoV-2 Not Detected Not Detected    Coronavirus 229E Not Detected Not Detected    Coronavirus HKU1 Not Detected Not Detected    Coronavirus NL63 Not Detected Not Detected    Coronavirus OC43 Not Detected Not Detected    Human Metapneumovirus Not Detected Not Detected    Rhino/Enterovirus Not Detected Not Detected    Influenza A Not Detected Not Detected     Influenza B Not Detected No Detected    Mycoplasma pneumoniae Not Detected Not Detected    Parainfluenza 1 Not Detected Not Detected    Parainfluenza 2 Not Detected Not Detected    Parainfluenza 3 Detected (A) Not Detected    Parainfluenza 4 Not Detected Not Detected    Respiratory Syncytial Virus Not Detected Not Detected   Procalcitonin    Collection Time: 06/21/24  8:03 AM   Result Value Ref Range    Procalcitonin <0.05 <=0.25 ng/ml   CBC and differential    Collection Time: 06/21/24  8:03 AM   Result Value Ref Range    WBC 12.98 (H) 4.31 - 10.16 Thousand/uL    RBC 3.99 3.81 - 5.12 Million/uL    Hemoglobin 10.5 (L) 11.5 - 15.4 g/dL    Hematocrit 36.9 34.8 - 46.1 %    MCV 93 82 - 98 fL    MCH 26.3 (L) 26.8 - 34.3 pg    MCHC 28.5 (L) 31.4 - 37.4 g/dL    RDW 13.2 11.6 - 15.1 %    MPV 9.5 8.9 - 12.7 fL    Platelets 177 149 - 390 Thousands/uL    nRBC 0 /100 WBCs    Segmented % 90 (H) 43 - 75 %    Immature Grans % 1 0 - 2 %    Lymphocytes % 3 (L) 14 - 44 %    Monocytes % 6 4 - 12 %    Eosinophils Relative 0 0 - 6 %    Basophils Relative 0 0 - 1 %    Absolute Neutrophils 11.70 (H) 1.85 - 7.62 Thousands/µL    Absolute Immature Grans 0.10 0.00 - 0.20 Thousand/uL    Absolute Lymphocytes 0.37 (L) 0.60 - 4.47 Thousands/µL    Absolute Monocytes 0.80 0.17 - 1.22 Thousand/µL    Eosinophils Absolute 0.00 0.00 - 0.61 Thousand/µL    Basophils Absolute 0.01 0.00 - 0.10 Thousands/µL   Basic metabolic panel    Collection Time: 06/21/24  8:03 AM   Result Value Ref Range    Sodium 142 135 - 147 mmol/L    Potassium 4.8 3.5 - 5.3 mmol/L    Chloride 97 96 - 108 mmol/L    CO2 44 (H) 21 - 32 mmol/L    ANION GAP 1 (L) 4 - 13 mmol/L    BUN 25 5 - 25 mg/dL    Creatinine 1.12 0.60 - 1.30 mg/dL    Glucose 120 65 - 140 mg/dL    Calcium 9.7 8.4 - 10.2 mg/dL    eGFR 50 ml/min/1.73sq m   Smear Review(Phlebs Do Not Order)    Collection Time: 06/21/24  8:03 AM   Result Value Ref Range    RBC Morphology Present     Platelet Estimate Adequate Adequate     Basophilic Stippling Present    POCT Blood Gas (CG8+)    Collection Time: 06/21/24  5:33 PM   Result Value Ref Range    pH, Art i-STAT 7.256 (L) 7.350 - 7.450    pCO2, Art i-STAT 91.8 (HH) 36.0 - 44.0 mm HG    pO2, ART i-STAT 74.0 (L) 75.0 - 129.0 mm HG    BE, i-STAT 11 (H) -2 - 3 mmol/L    HCO3, Art i-STAT 40.8 (HH) 22.0 - 28.0 mmol/L    CO2, i-STAT 44 (H) 21 - 32 mmol/L    O2 Sat, i-STAT 91 (H) 60 - 85 %    SODIUM, I-STAT 137 136 - 145 mmol/l    Potassium, i-STAT 4.7 3.5 - 5.3 mmol/L    Calcium, Ionized i-STAT 1.27 1.12 - 1.32 mmol/L    Hct, i-STAT 34 (L) 34.8 - 46.1 %    Hgb, i-STAT 11.6 11.5 - 15.4 g/dl    Glucose, i-STAT 130 65 - 140 mg/dl    Specimen Type ARTERIAL    Blood gas, arterial    Collection Time: 06/21/24  8:23 PM   Result Value Ref Range    pH, Arterial 7.276 (L) 7.350 - 7.450    pCO2, Arterial 81.4 (HH) 36.0 - 44.0 mm Hg    pO2, Arterial 95.9 75.0 - 129.0 mm Hg    HCO3, Arterial 37.0 (H) 22.0 - 28.0 mmol/L    Base Excess, Arterial 7.6 mmol/L    O2 Content, Arterial 16.6 16.0 - 23.0 mL/dL    O2 HGB,Arterial  96.9 94.0 - 97.0 %    SOURCE Radial, Left     SRAVAN TEST Yes     Non Vent type BIPAP BIPAP     IPAP 12     EPAP 6     BIPAP fio2 40 %   Blood gas, arterial    Collection Time: 06/22/24  4:36 AM   Result Value Ref Range    pH, Arterial 7.272 (L) 7.350 - 7.450    pCO2, Arterial 92.7 (HH) 36.0 - 44.0 mm Hg    pO2, Arterial 99.6 75.0 - 129.0 mm Hg    HCO3, Arterial 41.8 (H) 22.0 - 28.0 mmol/L    Base Excess, Arterial 11.5 mmol/L    O2 Content, Arterial 16.5 16.0 - 23.0 mL/dL    O2 HGB,Arterial  96.9 94.0 - 97.0 %    SRAVAN TEST Yes     Non Vent type BIPAP BIPAP     IPAP 12     EPAP 6     BIPAP fio2 40 %   Basic metabolic panel    Collection Time: 06/22/24  5:00 AM   Result Value Ref Range    Sodium 140 135 - 147 mmol/L    Potassium 4.2 3.5 - 5.3 mmol/L    Chloride 93 (L) 96 - 108 mmol/L    CO2 43 (H) 21 - 32 mmol/L    ANION GAP 4 4 - 13 mmol/L    BUN 34 (H) 5 - 25 mg/dL    Creatinine 1.16 0.60 -  1.30 mg/dL    Glucose 114 65 - 140 mg/dL    Calcium 9.3 8.4 - 10.2 mg/dL    eGFR 48 ml/min/1.73sq m   Blood gas, arterial    Collection Time: 06/22/24  3:03 PM   Result Value Ref Range    pH, Arterial 7.306 (L) 7.350 - 7.450    pCO2, Arterial 74.4 (HH) 36.0 - 44.0 mm Hg    pO2, Arterial 93.5 75.0 - 129.0 mm Hg    HCO3, Arterial 36.3 (H) 22.0 - 28.0 mmol/L    Base Excess, Arterial 7.8 mmol/L    O2 Content, Arterial 15.5 (L) 16.0 - 23.0 mL/dL    O2 HGB,Arterial  96.0 94.0 - 97.0 %    SOURCE Radial, Right     SRAVAN TEST Yes     Non Vent type BIPAP BIPAP     IPAP 14     EPAP 6     BIPAP fio2 40 %   Blood gas, arterial    Collection Time: 06/22/24  7:55 PM   Result Value Ref Range    pH, Arterial 7.320 (L) 7.350 - 7.450    pCO2, Arterial 79.5 (HH) 36.0 - 44.0 mm Hg    pO2, Arterial 105.7 75.0 - 129.0 mm Hg    HCO3, Arterial 40.0 (H) 22.0 - 28.0 mmol/L    Base Excess, Arterial 11.1 mmol/L    O2 Content, Arterial 16.2 16.0 - 23.0 mL/dL    O2 HGB,Arterial  96.7 94.0 - 97.0 %    SOURCE Radial, Left     SRAVAN TEST Yes     Non Vent type BIPAP     CBC and differential    Collection Time: 06/23/24  4:59 AM   Result Value Ref Range    WBC 6.12 4.31 - 10.16 Thousand/uL    RBC 3.70 (L) 3.81 - 5.12 Million/uL    Hemoglobin 9.9 (L) 11.5 - 15.4 g/dL    Hematocrit 34.2 (L) 34.8 - 46.1 %    MCV 92 82 - 98 fL    MCH 26.8 26.8 - 34.3 pg    MCHC 28.9 (L) 31.4 - 37.4 g/dL    RDW 13.3 11.6 - 15.1 %    MPV 9.8 8.9 - 12.7 fL    Platelets 153 149 - 390 Thousands/uL    nRBC 0 /100 WBCs    Segmented % 89 (H) 43 - 75 %    Immature Grans % 1 0 - 2 %    Lymphocytes % 7 (L) 14 - 44 %    Monocytes % 3 (L) 4 - 12 %    Eosinophils Relative 0 0 - 6 %    Basophils Relative 0 0 - 1 %    Absolute Neutrophils 5.50 1.85 - 7.62 Thousands/µL    Absolute Immature Grans 0.03 0.00 - 0.20 Thousand/uL    Absolute Lymphocytes 0.40 (L) 0.60 - 4.47 Thousands/µL    Absolute Monocytes 0.19 0.17 - 1.22 Thousand/µL    Eosinophils Absolute 0.00 0.00 - 0.61 Thousand/µL     Basophils Absolute 0.00 0.00 - 0.10 Thousands/µL     *Note: Due to a large number of results and/or encounters for the requested time period, some results have not been displayed. A complete set of results can be found in Results Review.         Current Medications     Current Outpatient Medications:     acetaminophen (TYLENOL) 325 mg tablet, Take 2 tablets (650 mg total) by mouth every 6 (six) hours as needed for mild pain, moderate pain, headaches or fever, Disp: , Rfl:     albuterol (PROVENTIL HFA,VENTOLIN HFA) 90 mcg/act inhaler, Inhale 2 puffs every 6 (six) hours as needed for wheezing or shortness of breath, Disp: 18 g, Rfl: 5    amLODIPine (NORVASC) 2.5 mg tablet, Take 1 tablet (2.5 mg total) by mouth daily, Disp: 90 tablet, Rfl: 1    atorvastatin (LIPITOR) 40 mg tablet, Take 1 tablet (40 mg total) by mouth daily with dinner, Disp: 90 tablet, Rfl: 3    azithromycin (ZITHROMAX) 250 mg tablet, Take 1 tablet (250 mg total) by mouth 3 (three) times a week for 36 doses, Disp: 12 tablet, Rfl: 2    benzonatate (TESSALON PERLES) 100 mg capsule, Take 1 capsule (100 mg total) by mouth 3 (three) times a day, Disp: 20 capsule, Rfl: 0    Budeson-Glycopyrrol-Formoterol (Breztri Aerosphere) 160-9-4.8 MCG/ACT AERO, INHALE 2 PUFFS BY MOUTH 2 TIMES A DAY RINSE MOUTH AFTER USE., Disp: 10.7 g, Rfl: 5    Cholecalciferol (VITAMIN D3) 1,000 units tablet, Take 1,000 Units by mouth daily, Disp: , Rfl:     cyanocobalamin (VITAMIN B-12) 500 MCG tablet, Take 1 tablet (500 mcg total) by mouth daily, Disp: 90 tablet, Rfl: 3    dextromethorphan-guaiFENesin (ROBITUSSIN DM)  mg/5 mL oral syrup, Take 5 mL by mouth every 12 (twelve) hours, Disp: 354 mL, Rfl: 0    fluticasone (FLONASE) 50 mcg/act nasal spray, SPRAY 1 SPRAY INTO EACH NOSTRIL EVERY DAY, Disp: 48 mL, Rfl: 1    ipratropium-albuterol (DUO-NEB) 0.5-2.5 mg/3 mL nebulizer solution, TAKE 3 ML BY NEBULIZATION EVERY 8 (EIGHT) HOURS AS NEEDED FOR WHEEZING OR SHORTNESS OF BREATH,  Disp: 270 mL, Rfl: 1    levothyroxine 75 mcg tablet, Take 1 tablet (75 mcg total) by mouth daily in the early morning, Disp: 90 tablet, Rfl: 1    montelukast (SINGULAIR) 10 mg tablet, Take 1 tablet (10 mg total) by mouth daily at bedtime, Disp: 90 tablet, Rfl: 1    pantoprazole (PROTONIX) 40 mg tablet, TAKE 1 TABLET BY MOUTH EVERY DAY, Disp: 90 tablet, Rfl: 1    predniSONE 10 mg tablet, Take 4 tablets (40 mg total) by mouth daily for 1 day, THEN 3 tablets (30 mg total) daily for 3 days, THEN 2 tablets (20 mg total) daily for 3 days, THEN 1 tablet (10 mg total) daily for 3 days., Disp: 22 tablet, Rfl: 0    predniSONE 5 mg tablet, Take 1 tablet (5 mg total) by mouth daily, Disp: 30 tablet, Rfl: 0    sertraline (ZOLOFT) 25 mg tablet, Take 1 tablet (25 mg total) by mouth daily at bedtime, Disp: 90 tablet, Rfl: 1    Samantha Hernandez MD  [facility]

## 2024-07-03 NOTE — PATIENT INSTRUCTIONS
24-Dec-2023 04:23 Start azithromycin 250 mg three times a week, Monday, Wednesday and Friday-chronically    Complete prednisone 10 mg taper-   40 mg, Daily For 1 day,   THEN 30 mg, Daily  For 3 days,   THEN 20 mg, Daily For 3 days,   THEN 10 mg, Daily For 3 days   After this you will continue 5 mg prednisone 5 mg daily      You will follow up with [facility] in [place] for second opinion    Bp medicine amlodipine 5 mg needs to be cut half and take once daily.  The new script for amlodipine is for 2.5 mg daily, which you do not have to cut.    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       24-Dec-2023 07:16 24-Dec-2023 10:55 24-Dec-2023 15:50

## 2024-07-16 DIAGNOSIS — J47.9 BRONCHIECTASIS WITHOUT COMPLICATION (HCC): ICD-10-CM

## 2024-07-16 RX ORDER — IPRATROPIUM BROMIDE AND ALBUTEROL SULFATE 2.5; .5 MG/3ML; MG/3ML
3 SOLUTION RESPIRATORY (INHALATION) EVERY 8 HOURS PRN
Qty: 270 ML | Refills: 5 | Status: SHIPPED | OUTPATIENT
Start: 2024-07-16 | End: 2024-07-23 | Stop reason: SDUPTHER

## 2024-07-23 ENCOUNTER — HOSPITAL ENCOUNTER (OUTPATIENT)
Dept: PULMONOLOGY | Facility: HOSPITAL | Age: 69
Discharge: HOME | End: 2024-07-23
Payer: COMMERCIAL

## 2024-07-23 ENCOUNTER — OFFICE VISIT (OUTPATIENT)
Dept: PULMONOLOGY | Facility: CLINIC | Age: 69
End: 2024-07-23
Payer: COMMERCIAL

## 2024-07-23 VITALS
SYSTOLIC BLOOD PRESSURE: 132 MMHG | HEIGHT: 64 IN | TEMPERATURE: 97.3 F | WEIGHT: 178 LBS | BODY MASS INDEX: 30.39 KG/M2 | OXYGEN SATURATION: 92 % | DIASTOLIC BLOOD PRESSURE: 70 MMHG | HEART RATE: 76 BPM

## 2024-07-23 DIAGNOSIS — J96.21 ACUTE ON CHRONIC RESPIRATORY FAILURE WITH HYPOXIA AND HYPERCAPNIA (HCC): ICD-10-CM

## 2024-07-23 DIAGNOSIS — J47.9 BRONCHIECTASIS WITHOUT COMPLICATION (HCC): ICD-10-CM

## 2024-07-23 DIAGNOSIS — J96.22 ACUTE ON CHRONIC RESPIRATORY FAILURE WITH HYPOXIA AND HYPERCAPNIA (HCC): ICD-10-CM

## 2024-07-23 DIAGNOSIS — J43.2 CENTRILOBULAR EMPHYSEMA (HCC): Primary | ICD-10-CM

## 2024-07-23 DIAGNOSIS — J43.2 CENTRILOBULAR EMPHYSEMA (HCC): ICD-10-CM

## 2024-07-23 DIAGNOSIS — G47.33 OSA (OBSTRUCTIVE SLEEP APNEA): ICD-10-CM

## 2024-07-23 DIAGNOSIS — J44.1 COPD EXACERBATION (HCC): ICD-10-CM

## 2024-07-23 DIAGNOSIS — R91.8 MULTIPLE LUNG NODULES: ICD-10-CM

## 2024-07-23 PROCEDURE — 94621 CARDIOPULM EXERCISE TESTING: CPT

## 2024-07-23 PROCEDURE — 94729 DIFFUSING CAPACITY: CPT

## 2024-07-23 PROCEDURE — 94726 PLETHYSMOGRAPHY LUNG VOLUMES: CPT

## 2024-07-23 PROCEDURE — 94060 EVALUATION OF WHEEZING: CPT

## 2024-07-23 PROCEDURE — 99215 OFFICE O/P EST HI 40 MIN: CPT | Performed by: INTERNAL MEDICINE

## 2024-07-23 RX ORDER — ALBUTEROL SULFATE 2.5 MG/3ML
2.5 SOLUTION RESPIRATORY (INHALATION) ONCE
Status: COMPLETED | OUTPATIENT
Start: 2024-07-23 | End: 2024-07-23

## 2024-07-23 RX ORDER — IPRATROPIUM BROMIDE AND ALBUTEROL SULFATE 2.5; .5 MG/3ML; MG/3ML
3 SOLUTION RESPIRATORY (INHALATION) EVERY 8 HOURS PRN
Qty: 360 ML | Refills: 3 | Status: SHIPPED | OUTPATIENT
Start: 2024-07-23

## 2024-07-23 RX ORDER — BUDESONIDE, GLYCOPYRROLATE, AND FORMOTEROL FUMARATE 160; 9; 4.8 UG/1; UG/1; UG/1
2 AEROSOL, METERED RESPIRATORY (INHALATION) 2 TIMES DAILY
Qty: 10.7 G | Refills: 3 | Status: SHIPPED | OUTPATIENT
Start: 2024-07-23

## 2024-07-23 RX ADMIN — ALBUTEROL SULFATE 2.5 MG: 2.5 SOLUTION RESPIRATORY (INHALATION) at 12:46

## 2024-07-23 NOTE — PROGRESS NOTES
"Ambulatory Visit  Name: Noreen Alvarez      : 1955      MRN: [id number]  Encounter Provider: Shannan Thompson MD  Encounter Date: 2024   Encounter department: [facility] PULMONARY & [facility]    Assessment & Plan   1. Centrilobular emphysema (HCC)  Assessment & Plan:  She has severe COPD emphysema from her previous smoking.  On clinical examination her chest auscultation was clear.  She is currently on treatment with Breztri, and nebulized ipratropium and albuterol.  She is on oxygen supplementation at 2 liters/minute.  Her PFT from 2022 showed severe airflow obstruction with severe diffusion defect air-trapping and hyperinflation consistent with emphysema.  I have advised her to continue with Breztri regularly and albuterol as needed.  I have also advised her to continue with azithromycin 3 times a week to prevent exacerbations.  I had a long discussion with her and her daughter and answered all their questions.    Orders:  -     Budeson-Glycopyrrol-Formoterol (Breztri Aerosphere) 160-9-4.8 MCG/ACT AERO; Inhale 2 puffs 2 (two) times a day Rinse mouth after use.  2. ELIUD (obstructive sleep apnea)  Assessment & Plan:  She has history of daytime sleepiness and tiredness. Her sleep is interrupted and she snores severely. Her daughter has noticed apneic events.  She underwent a overnight diagnostic sleep study which surprisingly came negative.  She is currently doing well.  We will observe her for now.  We will do a repeat sleep study later.    3. Multiple lung nodules  Assessment & Plan:  She has multiple lung nodules on her CT scan which have been stable.  Her previous CT scan was unremarkable. However, she has an enlarging 1.6 x 0.8 cm ovoid groundglass right lower lobe nodule on her CT scan from  2022.    Her most recent CT scan from 2024 showed  \"A groundglass nodule in the lateral right lower lobe measures up to 1.6 cm, unchanged from most recent 2024 " "comparison, however increased from December 2022 exam. A slow-growing/adenocarcinoma spectrum lesion is not entirely excluded\".  She is awaiting a CT PET scan.  She had laryngeal cancer in 2015 which was treated with radiation therapy and chemo therapy.  She has post radiation changes in the neck..  She used to follow with Dr. Brooks.    4. Bronchiectasis without complication (HCC)  -     ipratropium-albuterol (DUO-NEB) 0.5-2.5 mg/3 mL nebulizer solution; Take 3 mL by nebulization every 8 (eight) hours as needed for wheezing or shortness of breath      History of Present Illness     Noreen Alvarez is a 69 y.o. female who scented for follow-up for her COPD emphysema, chronic respiratory failure and multiple lung nodules.  The patient does not speak any English and has difficulty understanding even Marshallese sometimes.  We tried multiple interpreters without much success. Finally we tried to talk to her through her daughter on the phone as well as Shayla hernandez Marshallese-speaking MA.  The patient stated that she has occasional cough and has shortness of breath on exertion.  She has been using 2 L/min of oxygen supplementation.  She uses Breztri regularly and albuterol and ipratropium nebulizer as needed.  She uses the nebulizer 3-4 times a day.  She has history of head and neck cancer.  She has some hoarseness of voice.  She had a CT scan which showed multiple lung nodules.  A CT PET scan has been ordered by her primary care physician.  This is waiting to be done.  It was ordered for 7/3/2024.  Our MA is going to contact the scheduling to help her get the CT PET scan.  She denied any weight loss or anorexia.  Interpretors: [id number] opal  [id number]  [id number] Solomon    Review of Systems   Constitutional:  Positive for fatigue. Negative for appetite change, chills and fever.   HENT:  Positive for rhinorrhea. Negative for hearing loss, sneezing and trouble swallowing.    Respiratory:  Positive for shortness of breath. Negative for " "chest tightness and wheezing.    Cardiovascular:  Negative for chest pain and palpitations.   Gastrointestinal:  Negative for abdominal pain, constipation, diarrhea, nausea and vomiting.   Genitourinary:  Negative for dysuria, frequency and urgency.   Musculoskeletal:  Positive for back pain. Negative for arthralgias.   Allergic/Immunologic: Positive for environmental allergies.   Neurological:  Negative for dizziness, light-headedness and headaches.   Psychiatric/Behavioral:  Negative for agitation, confusion and sleep disturbance. The patient is nervous/anxious.        Objective     /70 (BP Location: Left arm, Patient Position: Sitting, Cuff Size: Standard)   Pulse 76   Temp (!) 97.3 °F (36.3 °C) (Tympanic)   Ht 5' 4\" (1.626 m)   Wt 80.7 kg (178 lb)   SpO2 92%   BMI 30.55 kg/m²     Physical Exam  Vitals reviewed.   Constitutional:       General: She is not in acute distress.     Appearance: She is obese. She is not ill-appearing, toxic-appearing or diaphoretic.   HENT:      Head: Normocephalic.      Mouth/Throat:      Mouth: Mucous membranes are moist.   Eyes:      General: No scleral icterus.     Conjunctiva/sclera: Conjunctivae normal.   Cardiovascular:      Rate and Rhythm: Normal rate and regular rhythm.      Heart sounds: Normal heart sounds. No murmur heard.  Pulmonary:      Effort: Pulmonary effort is normal. No respiratory distress.      Breath sounds: Normal breath sounds. No stridor. No wheezing, rhonchi or rales.   Chest:      Chest wall: No tenderness.   Abdominal:      General: Bowel sounds are normal.      Palpations: Abdomen is soft.      Tenderness: There is no abdominal tenderness. There is no guarding.   Musculoskeletal:      Cervical back: Neck supple. No rigidity.      Right lower leg: No edema.      Left lower leg: No edema.   Lymphadenopathy:      Cervical: No cervical adenopathy.   Skin:     Coloration: Skin is not jaundiced or pale.      Findings: No rash.   Neurological:      " Mental Status: She is alert and oriented to person, place, and time.      Gait: Gait normal.   Psychiatric:         Mood and Affect: Mood normal.         Behavior: Behavior normal.         Thought Content: Thought content normal.         Judgment: Judgment normal.       Administrative Statements       I spent 40 minutes of time during care of this patient with complex medical issues and has difficulty understanding and is Czech-speaking.  The majority of this time was spent directly with the patient counseling as well as coordinating care

## 2024-07-24 ENCOUNTER — TELEPHONE (OUTPATIENT)
Dept: PULMONOLOGY | Facility: CLINIC | Age: 69
End: 2024-07-24

## 2024-07-25 DIAGNOSIS — J31.1 POST-NASAL CATARRH: ICD-10-CM

## 2024-07-26 NOTE — ASSESSMENT & PLAN NOTE
"She has multiple lung nodules on her CT scan which have been stable.  Her previous CT scan was unremarkable. However, she has an enlarging 1.6 x 0.8 cm ovoid groundglass right lower lobe nodule on her CT scan from  February 2022.    Her most recent CT scan from 6/19/2024 showed  \"A groundglass nodule in the lateral right lower lobe measures up to 1.6 cm, unchanged from most recent March 2024 comparison, however increased from December 2022 exam. A slow-growing/adenocarcinoma spectrum lesion is not entirely excluded\".  She is awaiting a CT PET scan.  She had laryngeal cancer in 2015 which was treated with radiation therapy and chemo therapy.  She has post radiation changes in the neck..  She used to follow with Dr. Brooks.    "

## 2024-07-26 NOTE — ASSESSMENT & PLAN NOTE
She has history of daytime sleepiness and tiredness. Her sleep is interrupted and she snores severely. Her daughter has noticed apneic events.  She underwent a overnight diagnostic sleep study which surprisingly came negative.  She is currently doing well.  We will observe her for now.  We will do a repeat sleep study later.

## 2024-07-26 NOTE — ASSESSMENT & PLAN NOTE
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  On clinical examination her chest auscultation was clear.  She is currently on treatment with Breztri, and nebulized ipratropium and albuterol.  She is on oxygen supplementation at 2 liters/minute.  Her PFT from 4/21/2022 showed severe airflow obstruction with severe diffusion defect air-trapping and hyperinflation consistent with emphysema.  I have advised her to continue with Breztri regularly and albuterol as needed.  I have also advised her to continue with azithromycin 3 times a week to prevent exacerbations.  I had a long discussion with her and her daughter and answered all their questions.

## 2024-07-26 NOTE — TELEPHONE ENCOUNTER
Refill must be reviewed and completed by the office or provider. The refill is unable to be approved or denied by the medication management team.    Off protocol

## 2024-07-29 RX ORDER — IPRATROPIUM BROMIDE 42 UG/1
2 SPRAY, METERED NASAL 3 TIMES DAILY
Qty: 15 ML | Refills: 2 | Status: SHIPPED | OUTPATIENT
Start: 2024-07-29

## 2024-08-01 ENCOUNTER — TELEPHONE (OUTPATIENT)
Dept: FAMILY MEDICINE CLINIC | Facility: CLINIC | Age: 69
End: 2024-08-01

## 2024-08-01 ENCOUNTER — HOSPITAL ENCOUNTER (OUTPATIENT)
Dept: RADIOLOGY | Age: 69
Discharge: HOME | End: 2024-08-01
Payer: COMMERCIAL

## 2024-08-01 DIAGNOSIS — R91.1 LUNG NODULE SEEN ON IMAGING STUDY: ICD-10-CM

## 2024-08-01 LAB — GLUCOSE SERPL-MCNC: 101 MG/DL (ref 65–140)

## 2024-08-01 PROCEDURE — 82948 REAGENT STRIP/BLOOD GLUCOSE: CPT

## 2024-08-01 PROCEDURE — A9552 F18 FDG: HCPCS

## 2024-08-01 PROCEDURE — 78815 PET IMAGE W/CT SKULL-THIGH: CPT

## 2024-08-01 NOTE — LETTER
[facility]  [address] [place] [address]      August 6, 2024    MRN: [id number]     Phone: [phone]     Dear Ms. Alvarez,    You recently had a(n) Nuclear Medicine performed on 8/1/2024 at  [facility] that was requested by Samantha Hernandez MD. The study was reviewed by a radiologist, which is a physician who specializes in medical imaging. The radiologist issued a report describing his or her findings. In that report there was a finding that the radiologist felt warranted further discussion with your health care provider and that discussion would be beneficial to you.      The results were sent to Samantha Hernandez MD on 08/01/2024 12:18 PM. We recommend that you contact Samantha Hernandez MD at [phone] or set up an appointment to discuss the results of the imaging test. If you have already heard from Samantha Hernandez MD regarding the results of your study, you can disregard this letter.     This letter is not meant to alarm you, but intended to encourage you to follow-up on your results with the provider that sent you for the imaging study. In addition, we have enclosed answers to frequently asked questions by other patients who have also received a letter to review results with their health care provider (see page two).      Thank you for choosing [facility] for your medical imaging needs.                                                                                                                                                        FREQUENTLY ASKED QUESTIONS    Why am I receiving this letter?  [place] State Law requires us to notify patients who have findings on imaging exams that may require more testing or follow-up with a health professional within the next 3 months.        How serious is the finding on the imaging test?  This letter is sent to all patients who may need follow-up or more testing within the next 3 months.   Receiving this letter does not necessarily mean you have a life-threatening imaging finding or disease.  Recommendations in the radiologist’s imaging report are general in nature and it is up to your healthcare provider to say whether those recommendations make sense for your situation.  You are strongly encouraged to talk to your health care provider about the results and ask whether additional steps need to be taken.    Where can I get a copy of the final report for my recent radiology exam?  To get a full copy of the report you can access your records online at [email or web address] or please contact [facility]’s Medical Records Department at [phone] Monday through Friday between 8 am and 6 pm.         What do I need to do now?           Please contact your health care provider who requested the imaging study to discuss what further actions (if any) are needed.  You may have already heard from (your ordering provider) in regard to this test in which case you can disregard this letter.        NOTICE IN ACCORDANCE WITH THE [place] STATE “PATIENT TEST RESULT INFORMATION ACT OF 2018”    You are receiving this notice as a result of a determination by your diagnostic imaging service that further discussions of your test results are warranted and would be beneficial to you.    The complete results of your test or tests have been or will be sent to the health care practitioner that ordered the test or tests. It is recommended that you contact your health care practitioner to discuss your results as soon as possible.

## 2024-08-01 NOTE — TELEPHONE ENCOUNTER
"Chuckie with [facility], calling to make you aware \"there are significant findings of Pet Ct Scan\" that are in Epic.  "

## 2024-08-02 ENCOUNTER — TELEPHONE (OUTPATIENT)
Dept: HEMATOLOGY ONCOLOGY | Facility: CLINIC | Age: 69
End: 2024-08-02

## 2024-08-02 DIAGNOSIS — R91.8 MULTIPLE LUNG NODULES: Primary | ICD-10-CM

## 2024-08-02 NOTE — TELEPHONE ENCOUNTER
VM left for daughter Ashley, requested a call back to discuss her mothers recent imaging test and further testing being recommenced. Call back number provided.

## 2024-08-02 NOTE — TELEPHONE ENCOUNTER
Attempted to call daughter back, went to .     If she returns call please have nursing staff notify her of IR biopsy being requested.

## 2024-08-02 NOTE — TELEPHONE ENCOUNTER
Ashley called back to talk to Kait.  I tried to call the office, but there was no answer.  Please call her back to discuss results.

## 2024-08-02 NOTE — TELEPHONE ENCOUNTER
----- Message from Denise Muñoz MD sent at 8/2/2024  7:38 AM EDT -----  Kait can we send this for IR for biopsy and then follow-up  ----- Message -----  From: Samantha Hernandez MD  Sent: 8/1/2024   3:17 PM EDT  To: Denise Muñoz MD; Wade Hastings MD; #    She had a suspicious nodule in the lung which lights up on the PET CT, advise her to follow upwith Dr Walker and Dr Hastings to see if they want to biopsy it . 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  I am glad to discuss at length with them at the visit    I will forward the results to Dr Walker and Dr Hastings so their office can coordinate a visit

## 2024-08-02 NOTE — TELEPHONE ENCOUNTER
Spoke with patient's daughter Ashley and advised her of Dr. Hernandez's PET scan results review as stated below. I informed her Dr. Chery recommends an IR guided biopsy of the nodule for further testing. Pt's daughter in agreement and would like IR biopsy referral to be placed. She is aware it can take 1-3 business days to receive a call to schedule biopsy.   I let her know a follow up appointment should be scheduled for pt ~ 1-2 weeks after biopsy is scheduled .     FU appt with Dr. Hernandez 8/14

## 2024-08-06 ENCOUNTER — TELEPHONE (OUTPATIENT)
Age: 69
End: 2024-08-06

## 2024-08-06 ENCOUNTER — TELEPHONE (OUTPATIENT)
Dept: FAMILY MEDICINE CLINIC | Facility: CLINIC | Age: 69
End: 2024-08-06

## 2024-08-06 NOTE — TELEPHONE ENCOUNTER
Ashley (daughter) returned call gave message from Dr Hernandez, will wait for call to schedule biopsy. Has no further questions

## 2024-08-06 NOTE — TELEPHONE ENCOUNTER
Received call from patients daughter. Stating she has not received a call from IR to schedule biopsy for patient, referral was sent. Also would like to discuss risks for biopsy. Requesting a call back to discuss.

## 2024-08-06 NOTE — TELEPHONE ENCOUNTER
----- Message from Samantha Hernandez MD sent at 8/1/2024  3:17 PM EDT -----  She had a suspicious nodule in the lung which lights up on the PET CT, advise her to follow upwith Dr Walker and Dr Hastings to see if they want to biopsy it . 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  I am glad to discuss at length with them at the visit    I will forward the results to Dr Walker and Dr Hastings so their office can coordinate a visit

## 2024-08-07 ENCOUNTER — TELEPHONE (OUTPATIENT)
Age: 69
End: 2024-08-07

## 2024-08-07 DIAGNOSIS — R91.1 LUNG NODULE SEEN ON IMAGING STUDY: Primary | ICD-10-CM

## 2024-08-07 DIAGNOSIS — Z85.21 HISTORY OF LARYNGEAL CANCER: ICD-10-CM

## 2024-08-07 DIAGNOSIS — R91.1 LUNG NODULE: ICD-10-CM

## 2024-08-07 DIAGNOSIS — R91.8 MULTIPLE LUNG NODULES: ICD-10-CM

## 2024-08-07 NOTE — TELEPHONE ENCOUNTER
Pt daughter, Ashley, called. Note by Kait PORTILLO was relay to her. She has additional questions about the biopsy and its potential risk due to the location of the nodule. Ashley would appreciate if a call can be made back to her during her work break today at , 11:30am -12:45pm.

## 2024-08-07 NOTE — TELEPHONE ENCOUNTER
Spoke with daughter reviewed referral for thoracic surgery. Stated the surgeon will see her in the office for consultation prior to any surgery. At that time they can discuss risks. All questions answered. She was appreciative of my call.

## 2024-08-07 NOTE — TELEPHONE ENCOUNTER
Reviewed with Dr. Muñoz, per him IR cannot perform biopsy due to location. Referral made to thoracis surgery for biopsy of lung nodule.     VM left for daughter requesting a call back to discuss.

## 2024-08-08 ENCOUNTER — PREP FOR PROCEDURE (OUTPATIENT)
Dept: INTERVENTIONAL RADIOLOGY/VASCULAR | Facility: CLINIC | Age: 69
End: 2024-08-08

## 2024-08-08 ENCOUNTER — DOCUMENTATION (OUTPATIENT)
Dept: VASCULAR SURGERY | Facility: HOSPITAL | Age: 69
End: 2024-08-08

## 2024-08-08 DIAGNOSIS — R91.8 MULTIPLE LUNG NODULES: Primary | ICD-10-CM

## 2024-08-08 RX ORDER — SODIUM CHLORIDE 9 MG/ML
75 INJECTION, SOLUTION INTRAVENOUS CONTINUOUS
OUTPATIENT
Start: 2024-08-08

## 2024-08-08 NOTE — PROGRESS NOTES
Request was placed for biopsy of lung mass.  Mass is almost entirely groundglass.  The solid component is on the order of 4 mm.  This is not a lesion we could successfully biopsy percutaneously.  From the radiology standpoint, we be happy to offer short-term follow-up with CT scan in 3 months.  If biopsy is to be pursued, the only other thing I can think of, would be a wedge resection?

## 2024-08-14 ENCOUNTER — OFFICE VISIT (OUTPATIENT)
Dept: FAMILY MEDICINE CLINIC | Facility: CLINIC | Age: 69
End: 2024-08-14
Payer: COMMERCIAL

## 2024-08-14 VITALS
TEMPERATURE: 96.8 F | RESPIRATION RATE: 22 BRPM | WEIGHT: 181 LBS | DIASTOLIC BLOOD PRESSURE: 78 MMHG | HEIGHT: 64 IN | OXYGEN SATURATION: 85 % | SYSTOLIC BLOOD PRESSURE: 160 MMHG | BODY MASS INDEX: 30.9 KG/M2 | HEART RATE: 78 BPM

## 2024-08-14 DIAGNOSIS — I10 HTN (HYPERTENSION): Primary | ICD-10-CM

## 2024-08-14 DIAGNOSIS — F41.8 DEPRESSION WITH ANXIETY: ICD-10-CM

## 2024-08-14 DIAGNOSIS — F41.1 GAD (GENERALIZED ANXIETY DISORDER): ICD-10-CM

## 2024-08-14 DIAGNOSIS — E03.9 ACQUIRED HYPOTHYROIDISM: ICD-10-CM

## 2024-08-14 DIAGNOSIS — C14.0 CANCER OF PHARYNX (HCC): ICD-10-CM

## 2024-08-14 DIAGNOSIS — J43.2 CENTRILOBULAR EMPHYSEMA (HCC): ICD-10-CM

## 2024-08-14 DIAGNOSIS — R91.8 MULTIPLE SUBSOLID LUNG NODULES GREATER THAN 6 MM IN DIAMETER: ICD-10-CM

## 2024-08-14 DIAGNOSIS — R91.8 MULTIPLE LUNG NODULES: ICD-10-CM

## 2024-08-14 DIAGNOSIS — J45.50 SEVERE PERSISTENT ASTHMA WITHOUT COMPLICATION: ICD-10-CM

## 2024-08-14 PROCEDURE — G2211 COMPLEX E/M VISIT ADD ON: HCPCS | Performed by: FAMILY MEDICINE

## 2024-08-14 PROCEDURE — 99214 OFFICE O/P EST MOD 30 MIN: CPT | Performed by: FAMILY MEDICINE

## 2024-08-14 RX ORDER — SERTRALINE HYDROCHLORIDE 25 MG/1
25 TABLET, FILM COATED ORAL
Qty: 90 TABLET | Refills: 1 | Status: SHIPPED | OUTPATIENT
Start: 2024-08-14

## 2024-08-14 RX ORDER — ALBUTEROL SULFATE 90 UG/1
2 AEROSOL, METERED RESPIRATORY (INHALATION) EVERY 6 HOURS PRN
Qty: 18 G | Refills: 5 | Status: SHIPPED | OUTPATIENT
Start: 2024-08-14

## 2024-08-14 RX ORDER — BUDESONIDE, GLYCOPYRROLATE, AND FORMOTEROL FUMARATE 160; 9; 4.8 UG/1; UG/1; UG/1
2 AEROSOL, METERED RESPIRATORY (INHALATION) 2 TIMES DAILY
Qty: 10.7 G | Refills: 3 | Status: SHIPPED | OUTPATIENT
Start: 2024-08-14

## 2024-08-14 RX ORDER — AMLODIPINE BESYLATE 5 MG/1
5 TABLET ORAL DAILY
Qty: 90 TABLET | Refills: 0 | Status: SHIPPED | OUTPATIENT
Start: 2024-08-14

## 2024-08-14 NOTE — PROGRESS NOTES
Subjective:      Patient ID: Noreen Alvarez is a 69 y.o. female.    Here for follow-up.  She is anxious about the lung nodule.  She does say that she has chest pain in that area.  She also states that she has some cough and would like to see ENT and is concerned about her ENT laryngeal cancer.  Her previous ENT does not take her medical insurance.  Ashley her daughter is on the phone and she prefers her to translate instead of voice .        Past Medical History:   Diagnosis Date    Acute kidney failure (HCC)     Allergic     Allergies     Anemia     Anxiety     Asthma     Cancer (HCC)     Cataract     Chronic kidney disease (CKD), stage III (moderate) (HCC)     COPD (chronic obstructive pulmonary disease) (HCC)     COVID-19     Covid + 6-0-55-cough at that time now fully resolved    Depression     Disease of thyroid gland     Essential hypertension     GERD (gastroesophageal reflux disease)     Glaucoma     High blood pressure     HTN (hypertension) 02/16/2021    Hyperlipidemia     Hypothyroidism     Memory loss     Mesenteric lymphadenopathy 07/13/2021    Pulmonary hypertension (HCC)     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    Throat cancer (HCC) 2014    chemo and rad therapy 2014       Family History   Problem Relation Age of Onset    Other Mother         respiratory disorder    Asthma Mother     COPD Mother     Depression Mother     Sudden death Father         shot himself    Suicidality Father     Brain cancer Sister     Diabetes Sister     Breast cancer Sister     Cancer Sister 62        pancreatic cancer    No Known Problems Sister     No Known Problems Sister     No Known Problems Sister     No Known Problems Sister     No Known Problems Sister     No Known Problems Sister     No Known Problems Daughter     No Known Problems Daughter     No Known Problems Maternal Grandmother     No Known Problems Maternal Grandfather     No Known Problems Paternal Grandmother     No Known Problems  Paternal Grandfather     No Known Problems Maternal Aunt     No Known Problems Maternal Aunt     No Known Problems Maternal Aunt     No Known Problems Maternal Aunt     No Known Problems Maternal Aunt     No Known Problems Paternal Aunt     No Known Problems Paternal Aunt     No Known Problems Paternal Aunt     No Known Problems Paternal Aunt     No Known Problems Son        Past Surgical History:   Procedure Laterality Date    COLONOSCOPY  2016    ESOPHAGOGASTRODUODENOSCOPY  2016    EYE SURGERY      IR BIOPSY LUNG  05/18/2022    LARYNGOSCOPY      w/ Bx.     SC TENDON SHEATH INCISION Right 02/16/2021    Procedure: THUMB TRIGGER FINGER RELEASE;  Surgeon: Angeles Denton MD;  Location: [facility];  Service: Orthopedics    TUBAL LIGATION          reports that she quit smoking about 10 years ago. Her smoking use included cigarettes. She started smoking about 50 years ago. She has a 40 pack-year smoking history. She has never used smokeless tobacco. She reports that she does not drink alcohol and does not use drugs.      Current Outpatient Medications:     acetaminophen (TYLENOL) 325 mg tablet, Take 2 tablets (650 mg total) by mouth every 6 (six) hours as needed for mild pain, moderate pain, headaches or fever, Disp: , Rfl:     albuterol (PROVENTIL HFA,VENTOLIN HFA) 90 mcg/act inhaler, Inhale 2 puffs every 6 (six) hours as needed for wheezing or shortness of breath, Disp: 18 g, Rfl: 5    amLODIPine (NORVASC) 5 mg tablet, Take 1 tablet (5 mg total) by mouth daily, Disp: 90 tablet, Rfl: 0    atorvastatin (LIPITOR) 40 mg tablet, Take 1 tablet (40 mg total) by mouth daily with dinner, Disp: 90 tablet, Rfl: 3    azithromycin (ZITHROMAX) 250 mg tablet, Take 1 tablet (250 mg total) by mouth 3 (three) times a week for 36 doses, Disp: 12 tablet, Rfl: 2    benzonatate (TESSALON PERLES) 100 mg capsule, Take 1 capsule (100 mg total) by mouth 3 (three) times a day, Disp: 20 capsule, Rfl: 0    Budeson-Glycopyrrol-Formoterol  (Breztri Aerosphere) 160-9-4.8 MCG/ACT AERO, Inhale 2 puffs 2 (two) times a day Rinse mouth after use., Disp: 10.7 g, Rfl: 3    Cholecalciferol (VITAMIN D3) 1,000 units tablet, Take 1,000 Units by mouth daily, Disp: , Rfl:     cyanocobalamin (VITAMIN B-12) 500 MCG tablet, Take 1 tablet (500 mcg total) by mouth daily, Disp: 90 tablet, Rfl: 3    dextromethorphan-guaiFENesin (ROBITUSSIN DM)  mg/5 mL oral syrup, Take 5 mL by mouth every 12 (twelve) hours, Disp: 354 mL, Rfl: 0    fluticasone (FLONASE) 50 mcg/act nasal spray, SPRAY 1 SPRAY INTO EACH NOSTRIL EVERY DAY, Disp: 48 mL, Rfl: 1    ipratropium (ATROVENT) 0.06 % nasal spray, 2 SPRAYS INTO EACH NOSTRIL 3 (THREE) TIMES A DAY FOR INCREASED NASAL SECRETION, Disp: 15 mL, Rfl: 2    ipratropium-albuterol (DUO-NEB) 0.5-2.5 mg/3 mL nebulizer solution, Take 3 mL by nebulization every 8 (eight) hours as needed for wheezing or shortness of breath, Disp: 360 mL, Rfl: 3    levothyroxine 75 mcg tablet, Take 1 tablet (75 mcg total) by mouth daily in the early morning, Disp: 90 tablet, Rfl: 1    montelukast (SINGULAIR) 10 mg tablet, TAKE 1 TABLET BY MOUTH DAILY AT BEDTIME, Disp: 100 tablet, Rfl: 1    pantoprazole (PROTONIX) 40 mg tablet, TAKE 1 TABLET BY MOUTH EVERY DAY, Disp: 90 tablet, Rfl: 1    predniSONE 5 mg tablet, Take 1 tablet (5 mg total) by mouth daily, Disp: 30 tablet, Rfl: 0    sertraline (ZOLOFT) 25 mg tablet, Take 1 tablet (25 mg total) by mouth daily at bedtime, Disp: 90 tablet, Rfl: 1    The following portions of the patient's history were reviewed and updated as appropriate: allergies, current medications, past family history, past medical history, past social history, past surgical history and problem list.    Review of Systems   Constitutional:  Negative for fatigue and fever.   HENT:  Negative for congestion, facial swelling, mouth sores, rhinorrhea, sore throat and trouble swallowing.    Eyes:  Negative for pain and redness.   Respiratory:  Negative for  "cough, shortness of breath and wheezing.    Cardiovascular:  Negative for chest pain, palpitations and leg swelling.   Gastrointestinal:  Negative for abdominal pain, blood in stool, constipation, diarrhea and nausea.   Genitourinary:  Negative for dysuria, hematuria and urgency.   Musculoskeletal:  Negative for arthralgias, back pain and myalgias.   Skin:  Negative for rash and wound.   Neurological:  Negative for seizures, syncope and headaches.   Hematological:  Negative for adenopathy.   Psychiatric/Behavioral:  Negative for agitation and behavioral problems. The patient is nervous/anxious.          PHQ-2/9 Depression Screening    Little interest or pleasure in doing things: 0 - not at all  Feeling down, depressed, or hopeless: 0 - not at all  Trouble falling or staying asleep, or sleeping too much: 0 - not at all  Feeling tired or having little energy: 0 - not at all  Poor appetite or overeatin - not at all  Feeling bad about yourself - or that you are a failure or have let yourself or your family down: 0 - not at all  Trouble concentrating on things, such as reading the newspaper or watching television: 0 - not at all  Moving or speaking so slowly that other people could have noticed. Or the opposite - being so fidgety or restless that you have been moving around a lot more than usual: 0 - not at all  Thoughts that you would be better off dead, or of hurting yourself in some way: 0 - not at all  PHQ-9 Score: 0  PHQ-9 Interpretation: No or Minimal depression             Objective:    /78 (BP Location: Right arm, Patient Position: Sitting, Cuff Size: Adult)   Pulse 78   Temp (!) 96.8 °F (36 °C) (Tympanic)   Resp 22   Ht 5' 4\" (1.626 m)   Wt 82.1 kg (181 lb)   SpO2 (!) 85% Comment: on 2 liters of O2  BMI 31.07 kg/m²      Physical Exam  Vitals and nursing note reviewed.   Constitutional:       Appearance: Normal appearance. She is well-developed. She is not ill-appearing.   HENT:      Head: " Normocephalic and atraumatic.      Right Ear: External ear normal.      Left Ear: External ear normal.      Nose: Nose normal.      Mouth/Throat:      Mouth: Mucous membranes are moist.      Pharynx: No oropharyngeal exudate or posterior oropharyngeal erythema.   Eyes:      General: No scleral icterus.        Right eye: No discharge.         Left eye: No discharge.      Conjunctiva/sclera: Conjunctivae normal.   Cardiovascular:      Rate and Rhythm: Normal rate.      Heart sounds: No murmur heard.     No gallop.   Pulmonary:      Effort: Pulmonary effort is normal. No respiratory distress.      Breath sounds: Decreased air movement present. No stridor. Examination of the right-upper field reveals decreased breath sounds. Examination of the left-upper field reveals decreased breath sounds. Decreased breath sounds present. No wheezing, rhonchi or rales.   Abdominal:      Palpations: Abdomen is soft.      Tenderness: There is no abdominal tenderness.   Musculoskeletal:         General: No tenderness or deformity.   Skin:     Findings: No erythema or rash.   Neurological:      Mental Status: She is alert. Mental status is at baseline.   Psychiatric:         Behavior: Behavior normal.         Judgment: Judgment normal.           Recent Results (from the past 8736 hour(s))   ECG 12 lead    Collection Time: 01/01/24  9:24 PM   Result Value Ref Range    Ventricular Rate 73 BPM    Atrial Rate 73 BPM    MS Interval 142 ms    QRSD Interval 62 ms    QT Interval 450 ms    QTC Interval 495 ms    P Axis 90 degrees    QRS Axis 19 degrees    T Wave Axis 247 degrees   CBC and differential    Collection Time: 01/01/24  9:25 PM   Result Value Ref Range    WBC 6.40 4.31 - 10.16 Thousand/uL    RBC 4.33 3.81 - 5.12 Million/uL    Hemoglobin 11.2 (L) 11.5 - 15.4 g/dL    Hematocrit 38.7 34.8 - 46.1 %    MCV 89 82 - 98 fL    MCH 25.9 (L) 26.8 - 34.3 pg    MCHC 28.9 (L) 31.4 - 37.4 g/dL    RDW 15.1 11.6 - 15.1 %    MPV 9.7 8.9 - 12.7 fL     "Platelets 234 149 - 390 Thousands/uL    nRBC 0 /100 WBCs    Segmented % 74 43 - 75 %    Immature Grans % 1 0 - 2 %    Lymphocytes % 10 (L) 14 - 44 %    Monocytes % 13 (H) 4 - 12 %    Eosinophils Relative 1 0 - 6 %    Basophils Relative 1 0 - 1 %    Absolute Neutrophils 4.81 1.85 - 7.62 Thousands/µL    Absolute Immature Grans 0.03 0.00 - 0.20 Thousand/uL    Absolute Lymphocytes 0.64 0.60 - 4.47 Thousands/µL    Absolute Monocytes 0.81 0.17 - 1.22 Thousand/µL    Eosinophils Absolute 0.08 0.00 - 0.61 Thousand/µL    Basophils Absolute 0.03 0.00 - 0.10 Thousands/µL   Comprehensive metabolic panel    Collection Time: 01/01/24  9:25 PM   Result Value Ref Range    Sodium 142 135 - 147 mmol/L    Potassium 4.6 3.5 - 5.3 mmol/L    Chloride 104 96 - 108 mmol/L    CO2 33 (H) 21 - 32 mmol/L    ANION GAP 5 mmol/L    BUN 28 (H) 5 - 25 mg/dL    Creatinine 1.16 0.60 - 1.30 mg/dL    Glucose 98 65 - 140 mg/dL    Calcium 9.4 8.4 - 10.2 mg/dL    AST 24 13 - 39 U/L    ALT 16 7 - 52 U/L    Alkaline Phosphatase 95 34 - 104 U/L    Total Protein 7.0 6.4 - 8.4 g/dL    Albumin 4.0 3.5 - 5.0 g/dL    Total Bilirubin 0.28 0.20 - 1.00 mg/dL    eGFR 48 ml/min/1.73sq m   HS Troponin 0hr (reflex protocol)    Collection Time: 01/01/24  9:25 PM   Result Value Ref Range    hs TnI 0hr 4 \"Refer to ACS Flowchart\"- see link ng/L   B-Type Natriuretic Peptide(BNP)    Collection Time: 01/01/24  9:25 PM   Result Value Ref Range    BNP 44 0 - 100 pg/mL   FLU/RSV/COVID - if FLU/RSV clinically relevant    Collection Time: 01/01/24  9:25 PM    Specimen: Nose; Nares   Result Value Ref Range    SARS-CoV-2 Negative Negative    INFLUENZA A PCR Positive (A) Negative    INFLUENZA B PCR Negative Negative    RSV PCR Negative Negative   ECG 12 lead    Collection Time: 01/21/24  2:34 PM   Result Value Ref Range    Ventricular Rate 74 BPM    Atrial Rate 74 BPM    WI Interval 146 ms    QRSD Interval 80 ms    QT Interval 396 ms    QTC Interval 439 ms    P Axis 86 degrees    QRS " "Axis 40 degrees    T Wave Axis 84 degrees   CBC and differential    Collection Time: 01/21/24  2:57 PM   Result Value Ref Range    WBC 5.99 4.31 - 10.16 Thousand/uL    RBC 4.17 3.81 - 5.12 Million/uL    Hemoglobin 11.0 (L) 11.5 - 15.4 g/dL    Hematocrit 37.0 34.8 - 46.1 %    MCV 89 82 - 98 fL    MCH 26.4 (L) 26.8 - 34.3 pg    MCHC 29.7 (L) 31.4 - 37.4 g/dL    RDW 14.6 11.6 - 15.1 %    MPV 9.5 8.9 - 12.7 fL    Platelets 175 149 - 390 Thousands/uL    nRBC 0 /100 WBCs    Segmented % 73 43 - 75 %    Immature Grans % 0 0 - 2 %    Lymphocytes % 11 (L) 14 - 44 %    Monocytes % 12 4 - 12 %    Eosinophils Relative 4 0 - 6 %    Basophils Relative 0 0 - 1 %    Absolute Neutrophils 4.36 1.85 - 7.62 Thousands/µL    Absolute Immature Grans 0.02 0.00 - 0.20 Thousand/uL    Absolute Lymphocytes 0.63 0.60 - 4.47 Thousands/µL    Absolute Monocytes 0.73 0.17 - 1.22 Thousand/µL    Eosinophils Absolute 0.23 0.00 - 0.61 Thousand/µL    Basophils Absolute 0.02 0.00 - 0.10 Thousands/µL   Comprehensive metabolic panel    Collection Time: 01/21/24  2:57 PM   Result Value Ref Range    Sodium 138 135 - 147 mmol/L    Potassium 5.1 3.5 - 5.3 mmol/L    Chloride 100 96 - 108 mmol/L    CO2 34 (H) 21 - 32 mmol/L    ANION GAP 4 mmol/L    BUN 21 5 - 25 mg/dL    Creatinine 1.18 0.60 - 1.30 mg/dL    Glucose 86 65 - 140 mg/dL    Calcium 9.3 8.4 - 10.2 mg/dL    AST 21 13 - 39 U/L    ALT 12 7 - 52 U/L    Alkaline Phosphatase 90 34 - 104 U/L    Total Protein 6.9 6.4 - 8.4 g/dL    Albumin 3.8 3.5 - 5.0 g/dL    Total Bilirubin 0.42 0.20 - 1.00 mg/dL    eGFR 47 ml/min/1.73sq m   Lipase    Collection Time: 01/21/24  2:57 PM   Result Value Ref Range    Lipase 17 11 - 82 u/L   HS Troponin 0hr (reflex protocol)    Collection Time: 01/21/24  2:57 PM   Result Value Ref Range    hs TnI 0hr 3 \"Refer to ACS Flowchart\"- see link ng/L   ECG 12 lead    Collection Time: 01/23/24  2:32 PM   Result Value Ref Range    Ventricular Rate 87 BPM    Atrial Rate 87 BPM    FL Interval " "150 ms    QRSD Interval 82 ms    QT Interval 382 ms    QTC Interval 459 ms    P Axis 88 degrees    QRS Axis 50 degrees    T Wave Axis 81 degrees   CBC and differential    Collection Time: 01/23/24  3:02 PM   Result Value Ref Range    WBC 6.86 4.31 - 10.16 Thousand/uL    RBC 4.28 3.81 - 5.12 Million/uL    Hemoglobin 11.4 (L) 11.5 - 15.4 g/dL    Hematocrit 38.6 34.8 - 46.1 %    MCV 90 82 - 98 fL    MCH 26.6 (L) 26.8 - 34.3 pg    MCHC 29.5 (L) 31.4 - 37.4 g/dL    RDW 14.3 11.6 - 15.1 %    MPV 10.0 8.9 - 12.7 fL    Platelets 222 149 - 390 Thousands/uL    nRBC 0 /100 WBCs    Segmented % 79 (H) 43 - 75 %    Immature Grans % 0 0 - 2 %    Lymphocytes % 8 (L) 14 - 44 %    Monocytes % 10 4 - 12 %    Eosinophils Relative 3 0 - 6 %    Basophils Relative 0 0 - 1 %    Absolute Neutrophils 5.33 1.85 - 7.62 Thousands/µL    Absolute Immature Grans 0.03 0.00 - 0.20 Thousand/uL    Absolute Lymphocytes 0.56 (L) 0.60 - 4.47 Thousands/µL    Absolute Monocytes 0.70 0.17 - 1.22 Thousand/µL    Eosinophils Absolute 0.21 0.00 - 0.61 Thousand/µL    Basophils Absolute 0.03 0.00 - 0.10 Thousands/µL   Comprehensive metabolic panel    Collection Time: 01/23/24  3:02 PM   Result Value Ref Range    Sodium 136 135 - 147 mmol/L    Potassium 4.4 3.5 - 5.3 mmol/L    Chloride 96 96 - 108 mmol/L    CO2 34 (H) 21 - 32 mmol/L    ANION GAP 6 mmol/L    BUN 19 5 - 25 mg/dL    Creatinine 0.92 0.60 - 1.30 mg/dL    Glucose 117 65 - 140 mg/dL    Calcium 9.6 8.4 - 10.2 mg/dL    AST 18 13 - 39 U/L    ALT 11 7 - 52 U/L    Alkaline Phosphatase 95 34 - 104 U/L    Total Protein 7.4 6.4 - 8.4 g/dL    Albumin 4.1 3.5 - 5.0 g/dL    Total Bilirubin 0.39 0.20 - 1.00 mg/dL    eGFR 64 ml/min/1.73sq m   HS Troponin 0hr (reflex protocol)    Collection Time: 01/23/24  3:02 PM   Result Value Ref Range    hs TnI 0hr 4 \"Refer to ACS Flowchart\"- see link ng/L   Lipase    Collection Time: 01/23/24  3:02 PM   Result Value Ref Range    Lipase 9 (L) 11 - 82 u/L   B-Type Natriuretic " "Peptide(BNP)    Collection Time: 01/23/24  3:02 PM   Result Value Ref Range    BNP 73 0 - 100 pg/mL   Magnesium    Collection Time: 01/23/24  3:02 PM   Result Value Ref Range    Magnesium 1.9 1.9 - 2.7 mg/dL   FLU/RSV/COVID - if FLU/RSV clinically relevant    Collection Time: 01/23/24  3:02 PM    Specimen: Nose; Nares   Result Value Ref Range    SARS-CoV-2 Negative Negative    INFLUENZA A PCR Negative Negative    INFLUENZA B PCR Negative Negative    RSV PCR Positive (A) Negative   Procalcitonin    Collection Time: 01/23/24  3:02 PM   Result Value Ref Range    Procalcitonin <0.05 <=0.25 ng/ml   HS Troponin I 2hr    Collection Time: 01/23/24  5:50 PM   Result Value Ref Range    hs TnI 2hr 3 \"Refer to ACS Flowchart\"- see link ng/L    Delta 2hr hsTnI -1 <20 ng/L   CBC and differential    Collection Time: 01/24/24  4:46 AM   Result Value Ref Range    WBC 5.42 4.31 - 10.16 Thousand/uL    RBC 4.19 3.81 - 5.12 Million/uL    Hemoglobin 11.1 (L) 11.5 - 15.4 g/dL    Hematocrit 37.4 34.8 - 46.1 %    MCV 89 82 - 98 fL    MCH 26.5 (L) 26.8 - 34.3 pg    MCHC 29.7 (L) 31.4 - 37.4 g/dL    RDW 14.1 11.6 - 15.1 %    MPV 9.9 8.9 - 12.7 fL    Platelets 213 149 - 390 Thousands/uL    nRBC 0 /100 WBCs    Segmented % 90 (H) 43 - 75 %    Immature Grans % 1 0 - 2 %    Lymphocytes % 7 (L) 14 - 44 %    Monocytes % 2 (L) 4 - 12 %    Eosinophils Relative 0 0 - 6 %    Basophils Relative 0 0 - 1 %    Absolute Neutrophils 4.90 1.85 - 7.62 Thousands/µL    Absolute Immature Grans 0.04 0.00 - 0.20 Thousand/uL    Absolute Lymphocytes 0.38 (L) 0.60 - 4.47 Thousands/µL    Absolute Monocytes 0.09 (L) 0.17 - 1.22 Thousand/µL    Eosinophils Absolute 0.00 0.00 - 0.61 Thousand/µL    Basophils Absolute 0.01 0.00 - 0.10 Thousands/µL   Basic metabolic panel    Collection Time: 01/24/24  4:46 AM   Result Value Ref Range    Sodium 138 135 - 147 mmol/L    Potassium 5.2 3.5 - 5.3 mmol/L    Chloride 99 96 - 108 mmol/L    CO2 34 (H) 21 - 32 mmol/L    ANION GAP 5 mmol/L "    BUN 19 5 - 25 mg/dL    Creatinine 0.93 0.60 - 1.30 mg/dL    Glucose 142 (H) 65 - 140 mg/dL    Calcium 9.7 8.4 - 10.2 mg/dL    eGFR 63 ml/min/1.73sq m   Smear Review(Phlebs Do Not Order)    Collection Time: 01/24/24  4:46 AM   Result Value Ref Range    RBC Morphology Normal     Platelet Estimate Adequate Adequate   Basic metabolic panel    Collection Time: 01/25/24  4:45 AM   Result Value Ref Range    Sodium 138 135 - 147 mmol/L    Potassium 5.5 (H) 3.5 - 5.3 mmol/L    Chloride 99 96 - 108 mmol/L    CO2 32 21 - 32 mmol/L    ANION GAP 7 mmol/L    BUN 35 (H) 5 - 25 mg/dL    Creatinine 1.02 0.60 - 1.30 mg/dL    Glucose 128 65 - 140 mg/dL    Calcium 10.0 8.4 - 10.2 mg/dL    eGFR 56 ml/min/1.73sq m   CBC and differential    Collection Time: 01/25/24  4:45 AM   Result Value Ref Range    WBC 15.11 (H) 4.31 - 10.16 Thousand/uL    RBC 4.32 3.81 - 5.12 Million/uL    Hemoglobin 11.6 11.5 - 15.4 g/dL    Hematocrit 39.0 34.8 - 46.1 %    MCV 90 82 - 98 fL    MCH 26.9 26.8 - 34.3 pg    MCHC 29.7 (L) 31.4 - 37.4 g/dL    RDW 14.2 11.6 - 15.1 %    MPV 10.6 8.9 - 12.7 fL    Platelets 280 149 - 390 Thousands/uL   Manual Differential(PHLEBS Do Not Order)    Collection Time: 01/25/24  4:45 AM   Result Value Ref Range    Segmented % 90 (H) 43 - 75 %    Lymphocytes % 7 (L) 14 - 44 %    Monocytes % 3 (L) 4 - 12 %    Eosinophils % 0 0 - 6 %    Basophils % 0 0 - 1 %    Absolute Neutrophils 13.60 (H) 1.85 - 7.62 Thousand/uL    Absolute Lymphocytes 1.06 0.60 - 4.47 Thousand/uL    Absolute Monocytes 0.45 0.00 - 1.22 Thousand/uL    Absolute Eosinophils 0.00 0.00 - 0.40 Thousand/uL    Absolute Basophils 0.00 0.00 - 0.10 Thousand/uL    Total Counted      RBC Morphology Normal     Platelet Estimate Adequate Adequate   Wheeled Walker    Collection Time: 01/25/24  9:34 AM   Result Value Ref Range    Supplier Name [employer]     Supplier Phone Number [phone]     Order Status Delivery Successful     Delivery Note       "Delivery Request Date 01/25/2024     Date Delivered  01/25/2024     Item Description Wheeled Walker, Adult    ECG 12 lead    Collection Time: 01/25/24 11:18 AM   Result Value Ref Range    Ventricular Rate 70 BPM    Atrial Rate 70 BPM    FL Interval 150 ms    QRSD Interval 84 ms    QT Interval 386 ms    QTC Interval 416 ms    P Axis 79 degrees    QRS Axis 29 degrees    T Wave [place] 55 degrees   HS Troponin 0hr (reflex protocol)    Collection Time: 01/25/24 11:22 AM   Result Value Ref Range    hs TnI 0hr 5 \"Refer to ACS Flowchart\"- see link ng/L   HS Troponin I 4hr    Collection Time: 01/25/24  3:35 PM   Result Value Ref Range    hs TnI 4hr 3 \"Refer to ACS Flowchart\"- see link ng/L    Delta 4hr hsTnI -2 <20 ng/L   Basic metabolic panel    Collection Time: 01/26/24  4:40 AM   Result Value Ref Range    Sodium 139 135 - 147 mmol/L    Potassium 5.1 3.5 - 5.3 mmol/L    Chloride 100 96 - 108 mmol/L    CO2 35 (H) 21 - 32 mmol/L    ANION GAP 4 mmol/L    BUN 37 (H) 5 - 25 mg/dL    Creatinine 1.05 0.60 - 1.30 mg/dL    Glucose 107 65 - 140 mg/dL    Calcium 9.4 8.4 - 10.2 mg/dL    eGFR 54 ml/min/1.73sq m   CBC and Platelet    Collection Time: 01/26/24  4:40 AM   Result Value Ref Range    WBC 10.45 (H) 4.31 - 10.16 Thousand/uL    RBC 4.00 3.81 - 5.12 Million/uL    Hemoglobin 10.6 (L) 11.5 - 15.4 g/dL    Hematocrit 35.9 34.8 - 46.1 %    MCV 90 82 - 98 fL    MCH 26.5 (L) 26.8 - 34.3 pg    MCHC 29.5 (L) 31.4 - 37.4 g/dL    RDW 14.6 11.6 - 15.1 %    Platelets 256 149 - 390 Thousands/uL    MPV 9.8 8.9 - 12.7 fL   CBC and differential    Collection Time: 01/27/24  5:52 AM   Result Value Ref Range    WBC 7.96 4.31 - 10.16 Thousand/uL    RBC 4.15 3.81 - 5.12 Million/uL    Hemoglobin 11.0 (L) 11.5 - 15.4 g/dL    Hematocrit 37.7 34.8 - 46.1 %    MCV 91 82 - 98 fL    MCH 26.5 (L) 26.8 - 34.3 pg    MCHC 29.2 (L) 31.4 - 37.4 g/dL    RDW 14.7 11.6 - 15.1 %    MPV 9.7 8.9 - 12.7 fL    Platelets 258 149 - 390 Thousands/uL    nRBC 0 /100 WBCs    " Segmented % 87 (H) 43 - 75 %    Immature Grans % 2 0 - 2 %    Lymphocytes % 8 (L) 14 - 44 %    Monocytes % 3 (L) 4 - 12 %    Eosinophils Relative 0 0 - 6 %    Basophils Relative 0 0 - 1 %    Absolute Neutrophils 6.93 1.85 - 7.62 Thousands/µL    Absolute Immature Grans 0.15 0.00 - 0.20 Thousand/uL    Absolute Lymphocytes 0.65 0.60 - 4.47 Thousands/µL    Absolute Monocytes 0.21 0.17 - 1.22 Thousand/µL    Eosinophils Absolute 0.00 0.00 - 0.61 Thousand/µL    Basophils Absolute 0.02 0.00 - 0.10 Thousands/µL   Basic metabolic panel    Collection Time: 01/27/24  5:52 AM   Result Value Ref Range    Sodium 139 135 - 147 mmol/L    Potassium 5.3 3.5 - 5.3 mmol/L    Chloride 100 96 - 108 mmol/L    CO2 33 (H) 21 - 32 mmol/L    ANION GAP 6 mmol/L    BUN 39 (H) 5 - 25 mg/dL    Creatinine 1.11 0.60 - 1.30 mg/dL    Glucose 127 65 - 140 mg/dL    Calcium 9.2 8.4 - 10.2 mg/dL    eGFR 51 ml/min/1.73sq m   CBC and differential    Collection Time: 01/28/24  4:31 AM   Result Value Ref Range    WBC 8.61 4.31 - 10.16 Thousand/uL    RBC 4.14 3.81 - 5.12 Million/uL    Hemoglobin 11.0 (L) 11.5 - 15.4 g/dL    Hematocrit 36.8 34.8 - 46.1 %    MCV 89 82 - 98 fL    MCH 26.6 (L) 26.8 - 34.3 pg    MCHC 29.9 (L) 31.4 - 37.4 g/dL    RDW 14.5 11.6 - 15.1 %    MPV 9.6 8.9 - 12.7 fL    Platelets 281 149 - 390 Thousands/uL   Basic metabolic panel    Collection Time: 01/28/24  4:31 AM   Result Value Ref Range    Sodium 139 135 - 147 mmol/L    Potassium 5.0 3.5 - 5.3 mmol/L    Chloride 99 96 - 108 mmol/L    CO2 35 (H) 21 - 32 mmol/L    ANION GAP 5 mmol/L    BUN 38 (H) 5 - 25 mg/dL    Creatinine 0.99 0.60 - 1.30 mg/dL    Glucose 136 65 - 140 mg/dL    Calcium 9.3 8.4 - 10.2 mg/dL    eGFR 58 ml/min/1.73sq m   Manual Differential(PHLEBS Do Not Order)    Collection Time: 01/28/24  4:31 AM   Result Value Ref Range    Segmented % 89 (H) 43 - 75 %    Lymphocytes % 8 (L) 14 - 44 %    Monocytes % 2 (L) 4 - 12 %    Eosinophils % 0 0 - 6 %    Basophils % 0 0 - 1 %     "Metamyelocytes % 1 0 - 1 %    Absolute Neutrophils 7.66 (H) 1.85 - 7.62 Thousand/uL    Absolute Lymphocytes 0.69 0.60 - 4.47 Thousand/uL    Absolute Monocytes 0.17 0.00 - 1.22 Thousand/uL    Absolute Eosinophils 0.00 0.00 - 0.40 Thousand/uL    Absolute Basophils 0.00 0.00 - 0.10 Thousand/uL    Total Counted      RBC Morphology Normal     Platelet Estimate Adequate Adequate   ECG 12 lead    Collection Time: 01/28/24  1:51 PM   Result Value Ref Range    Ventricular Rate 74 BPM    Atrial Rate 74 BPM    RI Interval 136 ms    QRSD Interval 88 ms    QT Interval 378 ms    QTC Interval 419 ms    P Axis 87 degrees    QRS Axis 41 degrees    T Wave [place] 76 degrees   HS Troponin 0hr (reflex protocol)    Collection Time: 01/28/24  2:29 PM   Result Value Ref Range    hs TnI 0hr 3 \"Refer to ACS Flowchart\"- see link ng/L   HS Troponin I 2hr    Collection Time: 01/28/24  4:22 PM   Result Value Ref Range    hs TnI 2hr 6 \"Refer to ACS Flowchart\"- see link ng/L    Delta 2hr hsTnI 3 <20 ng/L   HS Troponin I 4hr    Collection Time: 01/28/24  6:22 PM   Result Value Ref Range    hs TnI 4hr 5 \"Refer to ACS Flowchart\"- see link ng/L    Delta 4hr hsTnI 2 <20 ng/L   CBC and differential    Collection Time: 01/29/24  4:29 AM   Result Value Ref Range    WBC 9.38 4.31 - 10.16 Thousand/uL    RBC 4.17 3.81 - 5.12 Million/uL    Hemoglobin 10.9 (L) 11.5 - 15.4 g/dL    Hematocrit 37.4 34.8 - 46.1 %    MCV 90 82 - 98 fL    MCH 26.1 (L) 26.8 - 34.3 pg    MCHC 29.1 (L) 31.4 - 37.4 g/dL    RDW 14.9 11.6 - 15.1 %    MPV 9.5 8.9 - 12.7 fL    Platelets 262 149 - 390 Thousands/uL    nRBC 0 /100 WBCs    Segmented % 73 43 - 75 %    Immature Grans % 4 (H) 0 - 2 %    Lymphocytes % 14 14 - 44 %    Monocytes % 9 4 - 12 %    Eosinophils Relative 0 0 - 6 %    Basophils Relative 0 0 - 1 %    Absolute Neutrophils 6.85 1.85 - 7.62 Thousands/µL    Absolute Immature Grans 0.37 (H) 0.00 - 0.20 Thousand/uL    Absolute Lymphocytes 1.30 0.60 - 4.47 Thousands/µL    Absolute " Monocytes 0.81 0.17 - 1.22 Thousand/µL    Eosinophils Absolute 0.03 0.00 - 0.61 Thousand/µL    Basophils Absolute 0.02 0.00 - 0.10 Thousands/µL   Basic metabolic panel    Collection Time: 01/29/24  4:29 AM   Result Value Ref Range    Sodium 139 135 - 147 mmol/L    Potassium 4.5 3.5 - 5.3 mmol/L    Chloride 99 96 - 108 mmol/L    CO2 35 (H) 21 - 32 mmol/L    ANION GAP 5 mmol/L    BUN 32 (H) 5 - 25 mg/dL    Creatinine 1.08 0.60 - 1.30 mg/dL    Glucose 89 65 - 140 mg/dL    Calcium 9.1 8.4 - 10.2 mg/dL    eGFR 52 ml/min/1.73sq m   Procalcitonin    Collection Time: 01/29/24  4:29 AM   Result Value Ref Range    Procalcitonin <0.05 <=0.25 ng/ml   Echo complete w/ contrast if indicated    Collection Time: 01/29/24 10:49 AM   Result Value Ref Range    BSA 1.85 m2    A4C EF 61 %    LVIDd 4.60 cm    LVIDS 3.10 cm    IVSd 0.90 cm    LVPWd 1.00 cm    FS 33 28 - 44    MV E' Tissue Velocity Septal 9 cm/s    LA Volume Index (BP) 17.8 mL/m2    E/A ratio 0.93     E wave deceleration time 161 ms    MV Peak E [place] 66 cm/s    MV Peak A [place] 0.71 m/s    RVID d 2.9 cm    Tricuspid annular plane systolic excursion 2.00 cm    LA size 3 cm    LA length (A2C) 4.10 cm    LA volume (BP) 33 mL    RAA A4C 13.8 cm2    MV stenosis pressure 1/2 time 47 ms    MV valve area p 1/2 method 4.68     Ao root 2.90 cm    Left ventricular stroke volume (2D) 61.00 mL    IVS 0.9 cm    LEFT VENTRICLE SYSTOLIC VOLUME (MOD BIPLANE) 2D 37 mL    LV DIASTOLIC VOLUME (MOD BIPLANE) 2D 98 mL    Left Atrium Area-systolic Four Chamber 12.5 cm2    Left Atrium Area-systolic Apical Two Chamber 12.5 cm2    LVSV, 2D 61 mL    LV EF 60    CBC and differential    Collection Time: 02/28/24  8:30 PM   Result Value Ref Range    WBC 7.99 4.31 - 10.16 Thousand/uL    RBC 4.18 3.81 - 5.12 Million/uL    Hemoglobin 11.1 (L) 11.5 - 15.4 g/dL    Hematocrit 38.3 34.8 - 46.1 %    MCV 92 82 - 98 fL    MCH 26.6 (L) 26.8 - 34.3 pg    MCHC 29.0 (L) 31.4 - 37.4 g/dL    RDW 14.5 11.6 - 15.1 %    MPV  "9.8 8.9 - 12.7 fL    Platelets 231 149 - 390 Thousands/uL    nRBC 0 /100 WBCs    Segmented % 77 (H) 43 - 75 %    Immature Grans % 1 0 - 2 %    Lymphocytes % 13 (L) 14 - 44 %    Monocytes % 8 4 - 12 %    Eosinophils Relative 1 0 - 6 %    Basophils Relative 0 0 - 1 %    Absolute Neutrophils 6.23 1.85 - 7.62 Thousands/µL    Absolute Immature Grans 0.05 0.00 - 0.20 Thousand/uL    Absolute Lymphocytes 1.02 0.60 - 4.47 Thousands/µL    Absolute Monocytes 0.60 0.17 - 1.22 Thousand/µL    Eosinophils Absolute 0.07 0.00 - 0.61 Thousand/µL    Basophils Absolute 0.02 0.00 - 0.10 Thousands/µL   Comprehensive metabolic panel    Collection Time: 02/28/24  8:30 PM   Result Value Ref Range    Sodium 141 135 - 147 mmol/L    Potassium 4.4 3.5 - 5.3 mmol/L    Chloride 97 96 - 108 mmol/L    CO2 38 (H) 21 - 32 mmol/L    ANION GAP 6 mmol/L    BUN 18 5 - 25 mg/dL    Creatinine 0.92 0.60 - 1.30 mg/dL    Glucose 134 65 - 140 mg/dL    Calcium 9.7 8.4 - 10.2 mg/dL    AST 23 13 - 39 U/L    ALT 13 7 - 52 U/L    Alkaline Phosphatase 96 34 - 104 U/L    Total Protein 7.4 6.4 - 8.4 g/dL    Albumin 4.3 3.5 - 5.0 g/dL    Total Bilirubin 0.36 0.20 - 1.00 mg/dL    eGFR 64 ml/min/1.73sq m   HS Troponin 0hr (reflex protocol)    Collection Time: 02/28/24  8:30 PM   Result Value Ref Range    hs TnI 0hr 10 \"Refer to ACS Flowchart\"- see link ng/L   FLU/RSV/COVID - if FLU/RSV clinically relevant    Collection Time: 02/28/24  8:30 PM    Specimen: Nose; Nares   Result Value Ref Range    SARS-CoV-2 Negative Negative    INFLUENZA A PCR Negative Negative    INFLUENZA B PCR Negative Negative    RSV PCR Negative Negative   ECG 12 lead    Collection Time: 02/28/24  8:30 PM   Result Value Ref Range    Ventricular Rate 93 BPM    Atrial Rate 93 BPM    OR Interval 156 ms    QRSD Interval 84 ms    QT Interval 372 ms    QTC Interval 462 ms    P Axis 88 degrees    QRS Axis 58 degrees    T Wave [place] 85 degrees   HS Troponin I 2hr    Collection Time: 02/28/24 10:40 PM   Result " "Value Ref Range    hs TnI 2hr 89 (H) \"Refer to ACS Flowchart\"- see link ng/L    Delta 2hr hsTnI 79 (H) <20 ng/L   HS Troponin I 4hr    Collection Time: 02/29/24  1:27 AM   Result Value Ref Range    hs TnI 4hr 157 (H) \"Refer to ACS Flowchart\"- see link ng/L    Delta 4hr hsTnI 147 (H) <20 ng/L   Platelet count    Collection Time: 02/29/24  1:27 AM   Result Value Ref Range    Platelets 190 149 - 390 Thousands/uL    MPV 9.6 8.9 - 12.7 fL   ECG 12 lead    Collection Time: 02/29/24  3:24 AM   Result Value Ref Range    Ventricular Rate 95 BPM    Atrial Rate 95 BPM    NM Interval 196 ms    QRSD Interval 86 ms    QT Interval 352 ms    QTC Interval 442 ms    P Axis 83 degrees    QRS Axis 15 degrees    T Wave Axis -18 degrees   HS Troponin 0hr (reflex protocol)    Collection Time: 02/29/24  5:55 AM   Result Value Ref Range    hs TnI 0hr 256 (H) \"Refer to ACS Flowchart\"- see link ng/L   CBC and differential    Collection Time: 02/29/24  5:55 AM   Result Value Ref Range    WBC 6.69 4.31 - 10.16 Thousand/uL    RBC 4.05 3.81 - 5.12 Million/uL    Hemoglobin 10.7 (L) 11.5 - 15.4 g/dL    Hematocrit 36.3 34.8 - 46.1 %    MCV 90 82 - 98 fL    MCH 26.4 (L) 26.8 - 34.3 pg    MCHC 29.5 (L) 31.4 - 37.4 g/dL    RDW 14.2 11.6 - 15.1 %    MPV 9.6 8.9 - 12.7 fL    Platelets 207 149 - 390 Thousands/uL   Basic metabolic panel    Collection Time: 02/29/24  5:55 AM   Result Value Ref Range    Sodium 140 135 - 147 mmol/L    Potassium 4.8 3.5 - 5.3 mmol/L    Chloride 98 96 - 108 mmol/L    CO2 38 (H) 21 - 32 mmol/L    ANION GAP 4 mmol/L    BUN 17 5 - 25 mg/dL    Creatinine 0.85 0.60 - 1.30 mg/dL    Glucose 150 (H) 65 - 140 mg/dL    Calcium 9.5 8.4 - 10.2 mg/dL    eGFR 70 ml/min/1.73sq m   Magnesium    Collection Time: 02/29/24  5:55 AM   Result Value Ref Range    Magnesium 2.0 1.9 - 2.7 mg/dL   Manual Differential(PHLEBS Do Not Order)    Collection Time: 02/29/24  5:55 AM   Result Value Ref Range    Segmented % 89 (H) 43 - 75 %    Lymphocytes % 11 " "(L) 14 - 44 %    Monocytes % 0 (L) 4 - 12 %    Eosinophils % 0 0 - 6 %    Basophils % 0 0 - 1 %    Absolute Neutrophils 5.95 1.85 - 7.62 Thousand/uL    Absolute Lymphocytes 0.74 0.60 - 4.47 Thousand/uL    Absolute Monocytes 0.00 0.00 - 1.22 Thousand/uL    Absolute Eosinophils 0.00 0.00 - 0.40 Thousand/uL    Absolute Basophils 0.00 0.00 - 0.10 Thousand/uL    Total Counted      RBC Morphology Normal     Platelet Estimate Adequate Adequate   Lipid panel    Collection Time: 02/29/24  5:55 AM   Result Value Ref Range    Cholesterol 207 (H) See Comment mg/dL    Triglycerides 61 See Comment mg/dL    HDL, Direct 78 >=50 mg/dL    LDL Calculated 117 (H) 0 - 100 mg/dL    Non-HDL-Chol (CHOL-HDL) 129 mg/dl   TSH, 3rd generation with Free T4 reflex    Collection Time: 02/29/24  5:55 AM   Result Value Ref Range    TSH 3RD GENERATON 1.767 0.450 - 4.500 uIU/mL   HS Troponin I 2hr    Collection Time: 02/29/24  7:57 AM   Result Value Ref Range    hs TnI 2hr 236 (H) \"Refer to ACS Flowchart\"- see link ng/L    Delta 2hr hsTnI -20 <20 ng/L   ECG 12 lead    Collection Time: 02/29/24  8:22 AM   Result Value Ref Range    Ventricular Rate 83 BPM    Atrial Rate 83 BPM    HI Interval 146 ms    QRSD Interval 86 ms    QT Interval 386 ms    QTC Interval 453 ms    P Axis 75 degrees    QRS Axis 25 degrees    T Wave Ax[place] -61 degrees   HS Troponin I 4hr    Collection Time: 02/29/24  9:56 AM   Result Value Ref Range    hs TnI 4hr 220 (H) \"Refer to ACS Flowchart\"- see link ng/L    Delta 4hr hsTnI -36 <20 ng/L   Hemoglobin A1C    Collection Time: 03/01/24  5:39 AM   Result Value Ref Range    Hemoglobin A1C 5.9 (H) Normal 4.0-5.6%; PreDiabetic 5.7-6.4%; Diabetic >=6.5%; Glycemic control for adults with diabetes <7.0% %     mg/dl   CBC and differential    Collection Time: 03/01/24  5:39 AM   Result Value Ref Range    WBC 8.08 4.31 - 10.16 Thousand/uL    RBC 3.98 3.81 - 5.12 Million/uL    Hemoglobin 10.6 (L) 11.5 - 15.4 g/dL    Hematocrit 36.5 34.8 - " 46.1 %    MCV 92 82 - 98 fL    MCH 26.6 (L) 26.8 - 34.3 pg    MCHC 29.0 (L) 31.4 - 37.4 g/dL    RDW 14.7 11.6 - 15.1 %    MPV 9.9 8.9 - 12.7 fL    Platelets 208 149 - 390 Thousands/uL    nRBC 0 /100 WBCs    Segmented % 78 (H) 43 - 75 %    Immature Grans % 0 0 - 2 %    Lymphocytes % 13 (L) 14 - 44 %    Monocytes % 8 4 - 12 %    Eosinophils Relative 0 0 - 6 %    Basophils Relative 1 0 - 1 %    Absolute Neutrophils 6.27 1.85 - 7.62 Thousands/µL    Absolute Immature Grans 0.03 0.00 - 0.20 Thousand/uL    Absolute Lymphocytes 1.04 0.60 - 4.47 Thousands/µL    Absolute Monocytes 0.68 0.17 - 1.22 Thousand/µL    Eosinophils Absolute 0.02 0.00 - 0.61 Thousand/µL    Basophils Absolute 0.04 0.00 - 0.10 Thousands/µL   Basic metabolic panel    Collection Time: 03/01/24  5:39 AM   Result Value Ref Range    Sodium 143 135 - 147 mmol/L    Potassium 4.9 3.5 - 5.3 mmol/L    Chloride 101 96 - 108 mmol/L    CO2 40 (H) 21 - 32 mmol/L    ANION GAP 2 mmol/L    BUN 24 5 - 25 mg/dL    Creatinine 1.05 0.60 - 1.30 mg/dL    Glucose 96 65 - 140 mg/dL    Calcium 9.4 8.4 - 10.2 mg/dL    eGFR 54 ml/min/1.73sq m   Magnesium    Collection Time: 03/01/24  5:39 AM   Result Value Ref Range    Magnesium 2.0 1.9 - 2.7 mg/dL   Stress strip    Collection Time: 03/01/24  9:18 AM   Result Value Ref Range    Protocol Name EMMA SIT     Exercise duration (min) 3 min    Exercise duration (sec) 0 sec    Post Peak Systolic  mmHg    Max Diastolic Bp 74 mmHg    Peak HR 90 BPM    Max Predicted Heart Rate 152 BPM    Reason for Termination Protocol Complete     Test Indication CHEST PAIN     Target Hr Formular (220 - Age)*100%     Arrhy During Ex      ECG Interp Before Ex      ECG Interp during Ex      Ex Summary Comment      Chest Pain Statement none     Overall Hr Response To Exercise      Overall BP Response To Exercise     Stress strip    Collection Time: 03/01/24  9:18 AM   Result Value Ref Range    Protocol Name EMMA SIT     Exercise duration (min) 3 min     Exercise duration (sec) 0 sec    Post Peak Systolic  mmHg    Max Diastolic Bp 74 mmHg    Peak HR 90 BPM    Max Predicted Heart Rate 152 BPM    Reason for Termination Protocol Complete     Test Indication CHEST PAIN     Target Hr Formular (220 - Age)*100%     Arrhy During Ex      ECG Interp Before Ex      ECG Interp during Ex      Ex Summary Comment      Chest Pain Statement none     Overall Hr Response To Exercise      Overall BP Response To Exercise     NM myocardial perfusion spect (rx stress and/or rest)    Collection Time: 03/01/24 10:33 AM   Result Value Ref Range    Baseline HR 72 bpm    Baseline /65 mmHg    O2 sat rest 100 %    Stress peak HR 90 bpm    Post peak  mmHg    O2 sat peak 100 %    Recovery HR 82 bpm    Recovery /70 mmHg    O2 sat recovery 100 %    Rate Pressure Product 16,020.0     Angina Index 0     Rest Nuclear Isotope Dose 10.80 mCi    Stress Nuclear Isotope Dose 32.60 mCi    EF (%) 65 %   CBC and differential    Collection Time: 03/02/24  5:38 AM   Result Value Ref Range    WBC 7.21 4.31 - 10.16 Thousand/uL    RBC 3.87 3.81 - 5.12 Million/uL    Hemoglobin 10.3 (L) 11.5 - 15.4 g/dL    Hematocrit 35.3 34.8 - 46.1 %    MCV 91 82 - 98 fL    MCH 26.6 (L) 26.8 - 34.3 pg    MCHC 29.2 (L) 31.4 - 37.4 g/dL    RDW 14.8 11.6 - 15.1 %    MPV 9.6 8.9 - 12.7 fL    Platelets 194 149 - 390 Thousands/uL    nRBC 0 /100 WBCs    Segmented % 77 (H) 43 - 75 %    Immature Grans % 0 0 - 2 %    Lymphocytes % 12 (L) 14 - 44 %    Monocytes % 9 4 - 12 %    Eosinophils Relative 1 0 - 6 %    Basophils Relative 1 0 - 1 %    Absolute Neutrophils 5.56 1.85 - 7.62 Thousands/µL    Absolute Immature Grans 0.02 0.00 - 0.20 Thousand/uL    Absolute Lymphocytes 0.85 0.60 - 4.47 Thousands/µL    Absolute Monocytes 0.66 0.17 - 1.22 Thousand/µL    Eosinophils Absolute 0.08 0.00 - 0.61 Thousand/µL    Basophils Absolute 0.04 0.00 - 0.10 Thousands/µL   Basic metabolic panel    Collection Time: 03/02/24  5:38 AM    Result Value Ref Range    Sodium 143 135 - 147 mmol/L    Potassium 5.0 3.5 - 5.3 mmol/L    Chloride 100 96 - 108 mmol/L    CO2 42 (H) 21 - 32 mmol/L    ANION GAP 1 mmol/L    BUN 22 5 - 25 mg/dL    Creatinine 1.01 0.60 - 1.30 mg/dL    Glucose 93 65 - 140 mg/dL    Calcium 9.3 8.4 - 10.2 mg/dL    eGFR 57 ml/min/1.73sq m   Magnesium    Collection Time: 03/02/24  5:38 AM   Result Value Ref Range    Magnesium 1.9 1.9 - 2.7 mg/dL   CBC and differential    Collection Time: 03/03/24  6:10 AM   Result Value Ref Range    WBC 7.86 4.31 - 10.16 Thousand/uL    RBC 4.03 3.81 - 5.12 Million/uL    Hemoglobin 10.5 (L) 11.5 - 15.4 g/dL    Hematocrit 37.1 34.8 - 46.1 %    MCV 92 82 - 98 fL    MCH 26.1 (L) 26.8 - 34.3 pg    MCHC 28.3 (L) 31.4 - 37.4 g/dL    RDW 14.5 11.6 - 15.1 %    MPV 9.7 8.9 - 12.7 fL    Platelets 201 149 - 390 Thousands/uL    nRBC 0 /100 WBCs    Segmented % 80 (H) 43 - 75 %    Immature Grans % 0 0 - 2 %    Lymphocytes % 8 (L) 14 - 44 %    Monocytes % 11 4 - 12 %    Eosinophils Relative 1 0 - 6 %    Basophils Relative 0 0 - 1 %    Absolute Neutrophils 6.25 1.85 - 7.62 Thousands/µL    Absolute Immature Grans 0.03 0.00 - 0.20 Thousand/uL    Absolute Lymphocytes 0.66 0.60 - 4.47 Thousands/µL    Absolute Monocytes 0.84 0.17 - 1.22 Thousand/µL    Eosinophils Absolute 0.05 0.00 - 0.61 Thousand/µL    Basophils Absolute 0.03 0.00 - 0.10 Thousands/µL   Basic metabolic panel    Collection Time: 03/03/24  6:10 AM   Result Value Ref Range    Sodium 140 135 - 147 mmol/L    Potassium 4.3 3.5 - 5.3 mmol/L    Chloride 98 96 - 108 mmol/L    CO2 39 (H) 21 - 32 mmol/L    ANION GAP 3 mmol/L    BUN 17 5 - 25 mg/dL    Creatinine 0.85 0.60 - 1.30 mg/dL    Glucose 98 65 - 140 mg/dL    Calcium 9.2 8.4 - 10.2 mg/dL    eGFR 70 ml/min/1.73sq m   Magnesium    Collection Time: 03/03/24  6:10 AM   Result Value Ref Range    Magnesium 1.8 (L) 1.9 - 2.7 mg/dL   CBC and differential    Collection Time: 03/04/24  5:24 AM   Result Value Ref Range     WBC 5.55 4.31 - 10.16 Thousand/uL    RBC 3.79 (L) 3.81 - 5.12 Million/uL    Hemoglobin 10.3 (L) 11.5 - 15.4 g/dL    Hematocrit 34.6 (L) 34.8 - 46.1 %    MCV 91 82 - 98 fL    MCH 27.2 26.8 - 34.3 pg    MCHC 29.8 (L) 31.4 - 37.4 g/dL    RDW 14.6 11.6 - 15.1 %    MPV 9.9 8.9 - 12.7 fL    Platelets 168 149 - 390 Thousands/uL    nRBC 0 /100 WBCs    Segmented % 73 43 - 75 %    Immature Grans % 1 0 - 2 %    Lymphocytes % 14 14 - 44 %    Monocytes % 11 4 - 12 %    Eosinophils Relative 1 0 - 6 %    Basophils Relative 0 0 - 1 %    Absolute Neutrophils 4.04 1.85 - 7.62 Thousands/µL    Absolute Immature Grans 0.03 0.00 - 0.20 Thousand/uL    Absolute Lymphocytes 0.80 0.60 - 4.47 Thousands/µL    Absolute Monocytes 0.61 0.17 - 1.22 Thousand/µL    Eosinophils Absolute 0.05 0.00 - 0.61 Thousand/µL    Basophils Absolute 0.02 0.00 - 0.10 Thousands/µL   Basic metabolic panel    Collection Time: 03/04/24  5:24 AM   Result Value Ref Range    Sodium 142 135 - 147 mmol/L    Potassium 4.1 3.5 - 5.3 mmol/L    Chloride 99 96 - 108 mmol/L    CO2 41 (H) 21 - 32 mmol/L    ANION GAP 2 mmol/L    BUN 17 5 - 25 mg/dL    Creatinine 0.88 0.60 - 1.30 mg/dL    Glucose 99 65 - 140 mg/dL    Calcium 9.2 8.4 - 10.2 mg/dL    eGFR 67 ml/min/1.73sq m   Magnesium    Collection Time: 03/04/24  5:24 AM   Result Value Ref Range    Magnesium 2.0 1.9 - 2.7 mg/dL   Tissue Exam    Collection Time: 03/04/24  8:45 AM   Result Value Ref Range    Case Report       Surgical Pathology Report                         Case: [id number]                                  Authorizing Provider:  Sidra Raymundo MD          Collected:           03/04/2024 0845              Ordering Location:     [facility]        Received:            03/04/2024 [address] [place] [address] 2nd Floor Med                                                                              Surg Unit                                                                    Pathologist:       "     Stacy Fowler MD                                                       Specimen:    Esophagus, Cold BX Distal esophagus                                                        Final Diagnosis       A. Esophagus, Distal, biopsy:  - Benign squamous mucosa with mild reactive changes  - Negative for intestinal metaplasia, eosinophilic esophagitis, dysplasia and malignancy         Additional Information       All reported additional testing was performed with appropriately reactive controls.  These tests were developed and their performance characteristics determined by [facility] or appropriate performing facility, though some tests may be performed on tissues which have not been validated for performance characteristics (such as staining performed on alcohol exposed cell blocks and decalcified tissues).  Results should be interpreted with caution and in the context of the patients’ clinical condition. These tests may not be cleared or approved by the U.S. Food and Drug Administration, though the FDA has determined that such clearance or approval is not necessary. These tests are used for clinical purposes and they should not be regarded as investigational or for research. This laboratory has been approved by CLIA 88, designated as a high-complexity laboratory and is qualified to perform these tests.    Interpretation performed at [facility] [address] [place] [address]   .      Gross Description       A. The specimen is received in formalin, labeled with the patient's name and hospital number, and is designated \" biopsy distal esophagus\".  The specimen consists of 3 white to red soft tissue fragments each measuring 0.3 to 0.5 cm.  Entirely submitted. Screened cassette.    Note: The estimated total formalin fixation time based upon information provided by the submitting clinician and the standard processing schedule is under 72 hours.    MCrites     CBC and differential "    Collection Time: 03/05/24  4:34 AM   Result Value Ref Range    WBC 6.14 4.31 - 10.16 Thousand/uL    RBC 4.07 3.81 - 5.12 Million/uL    Hemoglobin 10.7 (L) 11.5 - 15.4 g/dL    Hematocrit 37.1 34.8 - 46.1 %    MCV 91 82 - 98 fL    MCH 26.3 (L) 26.8 - 34.3 pg    MCHC 28.8 (L) 31.4 - 37.4 g/dL    RDW 14.6 11.6 - 15.1 %    MPV 9.9 8.9 - 12.7 fL    Platelets 177 149 - 390 Thousands/uL    nRBC 0 /100 WBCs    Segmented % 80 (H) 43 - 75 %    Immature Grans % 0 0 - 2 %    Lymphocytes % 11 (L) 14 - 44 %    Monocytes % 8 4 - 12 %    Eosinophils Relative 1 0 - 6 %    Basophils Relative 0 0 - 1 %    Absolute Neutrophils 4.87 1.85 - 7.62 Thousands/µL    Absolute Immature Grans 0.02 0.00 - 0.20 Thousand/uL    Absolute Lymphocytes 0.67 0.60 - 4.47 Thousands/µL    Absolute Monocytes 0.50 0.17 - 1.22 Thousand/µL    Eosinophils Absolute 0.06 0.00 - 0.61 Thousand/µL    Basophils Absolute 0.02 0.00 - 0.10 Thousands/µL   Basic metabolic panel    Collection Time: 03/05/24  4:34 AM   Result Value Ref Range    Sodium 141 135 - 147 mmol/L    Potassium 4.0 3.5 - 5.3 mmol/L    Chloride 99 96 - 108 mmol/L    CO2 37 (H) 21 - 32 mmol/L    ANION GAP 5 mmol/L    BUN 13 5 - 25 mg/dL    Creatinine 0.89 0.60 - 1.30 mg/dL    Glucose 94 65 - 140 mg/dL    Calcium 9.3 8.4 - 10.2 mg/dL    eGFR 66 ml/min/1.73sq m   Magnesium    Collection Time: 03/05/24  4:34 AM   Result Value Ref Range    Magnesium 1.9 1.9 - 2.7 mg/dL   Tissue Exam    Collection Time: 04/11/24  8:28 AM   Result Value Ref Range    Case Report       Surgical Pathology Report                         Case: [id number]                                  Authorizing Provider:  Sidra Raymundo MD          Collected:           04/11/2024 0828              Ordering Location[facility]     [facility]        Received:            04/11/2024 [address] [place] [address] Endoscopy                                                           Pathologist:           Jasiel Sosa MD              "                                                   Specimens:   A) - Polyp, Colorectal, ascending colon polyp hot snare                                             B) - Polyp, Colorectal, sigmoid colon polyp cold snare                                     Final Diagnosis       A.  Ascending colon polyp (hot snare):     - Tubular adenoma; negative for high-grade dysplasia.    B.  Sigmoid colon polyp (cold snare):     - Tubular adenoma; negative for high-grade dysplasia.       Additional Information       All reported additional testing was performed with appropriately reactive controls.  These tests were developed and their performance characteristics determined by [facility] or appropriate performing facility, though some tests may be performed on tissues which have not been validated for performance characteristics (such as staining performed on alcohol exposed cell blocks and decalcified tissues).  Results should be interpreted with caution and in the context of the patients’ clinical condition. These tests may not be cleared or approved by the U.S. Food and Drug Administration, though the FDA has determined that such clearance or approval is not necessary. These tests are used for clinical purposes and they should not be regarded as investigational or for research. This laboratory has been approved by [place] [id number], designated as a high-complexity laboratory and is qualified to perform these tests. Interpretation performed at [facility], [address] [place] [id number]..      Synoptic Checklist          COLON/RECTUM POLYP FORM - GI - All Specimens          :    Adenoma(s)      Gross Description       A. The specimen is received in formalin, labeled with the patient's name and hospital number, and is designated \" ascending colon polyp\".  The specimen consists of a single tan-pink polypoid tissue fragment measuring 0.6 x 0.4 x 0.4 cm.  The presumed margins of resection is inked blue and " "the specimen is bisected revealing grossly unremarkable cut surfaces.  Entirely submitted.  One screened cassette.    B. The specimen is received in formalin, labeled with the patient's name and hospital number, and is designated \" sigmoid colon polyp\".  The specimen consists of a single tan tissue fragment measuring 0.2 cm in greatest dimension.  The specimen is entirely submitted in a screened cassette.    Note: The estimated total formalin fixation time based upon information provided by the submitting clinician and the standard processing schedule is under 72 hours. Rocky Calderon     Adult Nebulizer Package    Collection Time: 06/07/24  9:05 AM   Result Value Ref Range    Supplier Name [employer]     Supplier Phone Number [phone]     Order Status Delivery Successful     Delivery Note      Delivery Request Date 06/07/2024     Date Delivered  07/02/2024     Item Description Nebulizer Compressor with Mask     Item Description Nebulizer Set, Reusable     Item Description       Adult Nebulizer Mask, 1 per 1 month a€“ Use as directed and discard 1 month after first use    Item Description Disposable Nebulizer Compressor Filter    ECG 12 lead    Collection Time: 06/19/24  5:15 PM   Result Value Ref Range    Ventricular Rate 91 BPM    Atrial Rate 91 BPM    LA Interval 156 ms    QRSD Interval 90 ms    QT Interval 360 ms    QTC Interval 442 ms    P Axis 88 degrees    QRS Axis 73 degrees    T Wave Axis 75 degrees   CBC and differential    Collection Time: 06/19/24  5:48 PM   Result Value Ref Range    WBC 7.46 4.31 - 10.16 Thousand/uL    RBC 3.91 3.81 - 5.12 Million/uL    Hemoglobin 10.6 (L) 11.5 - 15.4 g/dL    Hematocrit 36.1 34.8 - 46.1 %    MCV 92 82 - 98 fL    MCH 27.1 26.8 - 34.3 pg    MCHC 29.4 (L) 31.4 - 37.4 g/dL    RDW 13.1 11.6 - 15.1 %    MPV 10.0 8.9 - 12.7 fL    Platelets 137 (L) 149 - 390 Thousands/uL    nRBC 0 /100 WBCs    Segmented % 90 (H) 43 - 75 %    Immature Grans % 1 0 " - 2 %    Lymphocytes % 4 (L) 14 - 44 %    Monocytes % 5 4 - 12 %    Eosinophils Relative 0 0 - 6 %    Basophils Relative 0 0 - 1 %    Absolute Neutrophils 6.73 1.85 - 7.62 Thousands/µL    Absolute Immature Grans 0.08 0.00 - 0.20 Thousand/uL    Absolute Lymphocytes 0.27 (L) 0.60 - 4.47 Thousands/µL    Absolute Monocytes 0.37 0.17 - 1.22 Thousand/µL    Eosinophils Absolute 0.00 0.00 - 0.61 Thousand/µL    Basophils Absolute 0.01 0.00 - 0.10 Thousands/µL   Comprehensive metabolic panel    Collection Time: 06/19/24  5:48 PM   Result Value Ref Range    Sodium 141 135 - 147 mmol/L    Potassium 4.5 3.5 - 5.3 mmol/L    Chloride 96 96 - 108 mmol/L    CO2 40 (H) 21 - 32 mmol/L    ANION GAP 5 4 - 13 mmol/L    BUN 18 5 - 25 mg/dL    Creatinine 0.98 0.60 - 1.30 mg/dL    Glucose 129 65 - 140 mg/dL    Calcium 9.3 8.4 - 10.2 mg/dL    AST 21 13 - 39 U/L    ALT 12 7 - 52 U/L    Alkaline Phosphatase 126 (H) 34 - 104 U/L    Total Protein 7.2 6.4 - 8.4 g/dL    Albumin 4.4 3.5 - 5.0 g/dL    Total Bilirubin 0.42 0.20 - 1.00 mg/dL    eGFR 59 ml/min/1.73sq m   B-Type Natriuretic Peptide(BNP)    Collection Time: 06/19/24  5:48 PM   Result Value Ref Range     (H) 0 - 100 pg/mL   High Sensitivity Troponin I Random    Collection Time: 06/19/24  5:48 PM   Result Value Ref Range    HS TnI random 5 (L) 8 - 18 ng/L   Smear Review(Phlebs Do Not Order)    Collection Time: 06/19/24  5:48 PM   Result Value Ref Range    RBC Morphology Present     Platelet Estimate Adequate Adequate    Basophilic Stippling Present    Procalcitonin    Collection Time: 06/19/24  5:48 PM   Result Value Ref Range    Procalcitonin <0.05 <=0.25 ng/ml   Blood gas, venous    Collection Time: 06/19/24  6:02 PM   Result Value Ref Range    pH, Jonas 7.220 (L) 7.300 - 7.400    pCO2, Jonas 99.1 (HH) 42.0 - 50.0 mm Hg    pO2, Jonas 42.2 35.0 - 45.0 mm Hg    HCO3, Jonas 39.6 (H) 24 - 30 mmol/L    Base Excess, Jonas 8.7 mmol/L    O2 Content, Jonas 12.1 ml/dL    O2 HGB, VENOUS 73.6 60.0 - 80.0  %   COVID/FLU/RSV    Collection Time: 06/19/24  8:10 PM    Specimen: Nose; Nares   Result Value Ref Range    SARS-CoV-2 Negative Negative    INFLUENZA A PCR Negative Negative    INFLUENZA B PCR Negative Negative    RSV PCR Negative Negative   ECG 12 lead    Collection Time: 06/19/24 10:40 PM   Result Value Ref Range    Ventricular Rate 94 BPM    Atrial Rate 94 BPM    SD Interval 174 ms    QRSD Interval 90 ms    QT Interval 370 ms    QTC Interval 462 ms    P Axis 84 degrees    QRS Axis 63 degrees    T Wave Axis 76 degrees   Blood gas, venous    Collection Time: 06/20/24 10:21 AM   Result Value Ref Range    pH, Jonas 7.275 (L) 7.300 - 7.400    pCO2, Jonas 80.3 (HH) 42.0 - 50.0 mm Hg    pO2, Jonas 46.3 (H) 35.0 - 45.0 mm Hg    HCO3, Jonas 36.5 (H) 24 - 30 mmol/L    Base Excess, Jnoas 7.4 mmol/L    O2 Content, Jonas 12.6 ml/dL    O2 HGB, VENOUS 80.6 (H) 60.0 - 80.0 %   Respiratory Panel 2.1(RP2)with COVID19    Collection Time: 06/20/24 12:49 PM    Specimen: Nasopharyngeal Swab   Result Value Ref Range    Adenovirus Not Detected Not Detected    Bordetella parapertussis Not Detected Not Detected    Bordetella pertussis Not Detected Not Detected    Chlamydia pneumoniae Not Detected Not detected    SARS-CoV-2 Not Detected Not Detected    Coronavirus 229E Not Detected Not Detected    Coronavirus HKU1 Not Detected Not Detected    Coronavirus NL63 Not Detected Not Detected    Coronavirus OC43 Not Detected Not Detected    Human Metapneumovirus Not Detected Not Detected    Rhino/Enterovirus Not Detected Not Detected    Influenza A Not Detected Not Detected    Influenza B Not Detected No Detected    Mycoplasma pneumoniae Not Detected Not Detected    Parainfluenza 1 Not Detected Not Detected    Parainfluenza 2 Not Detected Not Detected    Parainfluenza 3 Detected (A) Not Detected    Parainfluenza 4 Not Detected Not Detected    Respiratory Syncytial Virus Not Detected Not Detected   Procalcitonin    Collection Time: 06/21/24  8:03 AM   Result  Value Ref Range    Procalcitonin <0.05 <=0.25 ng/ml   CBC and differential    Collection Time: 06/21/24  8:03 AM   Result Value Ref Range    WBC 12.98 (H) 4.31 - 10.16 Thousand/uL    RBC 3.99 3.81 - 5.12 Million/uL    Hemoglobin 10.5 (L) 11.5 - 15.4 g/dL    Hematocrit 36.9 34.8 - 46.1 %    MCV 93 82 - 98 fL    MCH 26.3 (L) 26.8 - 34.3 pg    MCHC 28.5 (L) 31.4 - 37.4 g/dL    RDW 13.2 11.6 - 15.1 %    MPV 9.5 8.9 - 12.7 fL    Platelets 177 149 - 390 Thousands/uL    nRBC 0 /100 WBCs    Segmented % 90 (H) 43 - 75 %    Immature Grans % 1 0 - 2 %    Lymphocytes % 3 (L) 14 - 44 %    Monocytes % 6 4 - 12 %    Eosinophils Relative 0 0 - 6 %    Basophils Relative 0 0 - 1 %    Absolute Neutrophils 11.70 (H) 1.85 - 7.62 Thousands/µL    Absolute Immature Grans 0.10 0.00 - 0.20 Thousand/uL    Absolute Lymphocytes 0.37 (L) 0.60 - 4.47 Thousands/µL    Absolute Monocytes 0.80 0.17 - 1.22 Thousand/µL    Eosinophils Absolute 0.00 0.00 - 0.61 Thousand/µL    Basophils Absolute 0.01 0.00 - 0.10 Thousands/µL   Basic metabolic panel    Collection Time: 06/21/24  8:03 AM   Result Value Ref Range    Sodium 142 135 - 147 mmol/L    Potassium 4.8 3.5 - 5.3 mmol/L    Chloride 97 96 - 108 mmol/L    CO2 44 (H) 21 - 32 mmol/L    ANION GAP 1 (L) 4 - 13 mmol/L    BUN 25 5 - 25 mg/dL    Creatinine 1.12 0.60 - 1.30 mg/dL    Glucose 120 65 - 140 mg/dL    Calcium 9.7 8.4 - 10.2 mg/dL    eGFR 50 ml/min/1.73sq m   Smear Review(Phlebs Do Not Order)    Collection Time: 06/21/24  8:03 AM   Result Value Ref Range    RBC Morphology Present     Platelet Estimate Adequate Adequate    Basophilic Stippling Present    POCT Blood Gas (CG8+)    Collection Time: 06/21/24  5:33 PM   Result Value Ref Range    pH, Art i-STAT 7.256 (L) 7.350 - 7.450    pCO2, Art i-STAT 91.8 (HH) 36.0 - 44.0 mm HG    pO2, ART i-STAT 74.0 (L) 75.0 - 129.0 mm HG    BE, i-STAT 11 (H) -2 - 3 mmol/L    HCO3, Art i-STAT 40.8 (HH) 22.0 - 28.0 mmol/L    CO2, i-STAT 44 (H) 21 - 32 mmol/L    O2 Sat,  i-STAT 91 (H) 60 - 85 %    SODIUM, I-STAT 137 136 - 145 mmol/l    Potassium, i-STAT 4.7 3.5 - 5.3 mmol/L    Calcium, Ionized i-STAT 1.27 1.12 - 1.32 mmol/L    Hct, i-STAT 34 (L) 34.8 - 46.1 %    Hgb, i-STAT 11.6 11.5 - 15.4 g/dl    Glucose, i-STAT 130 65 - 140 mg/dl    Specimen Type ARTERIAL    Blood gas, arterial    Collection Time: 06/21/24  8:23 PM   Result Value Ref Range    pH, Arterial 7.276 (L) 7.350 - 7.450    pCO2, Arterial 81.4 (HH) 36.0 - 44.0 mm Hg    pO2, Arterial 95.9 75.0 - 129.0 mm Hg    HCO3, Arterial 37.0 (H) 22.0 - 28.0 mmol/L    Base Excess, Arterial 7.6 mmol/L    O2 Content, Arterial 16.6 16.0 - 23.0 mL/dL    O2 HGB,Arterial  96.9 94.0 - 97.0 %    SOURCE Radial, Left     SRAVAN TEST Yes     Non Vent type BIPAP BIPAP     IPAP 12     EPAP 6     BIPAP fio2 40 %   Blood gas, arterial    Collection Time: 06/22/24  4:36 AM   Result Value Ref Range    pH, Arterial 7.272 (L) 7.350 - 7.450    pCO2, Arterial 92.7 (HH) 36.0 - 44.0 mm Hg    pO2, Arterial 99.6 75.0 - 129.0 mm Hg    HCO3, Arterial 41.8 (H) 22.0 - 28.0 mmol/L    Base Excess, Arterial 11.5 mmol/L    O2 Content, Arterial 16.5 16.0 - 23.0 mL/dL    O2 HGB,Arterial  96.9 94.0 - 97.0 %    SRAVAN TEST Yes     Non Vent type BIPAP BIPAP     IPAP 12     EPAP 6     BIPAP fio2 40 %   Basic metabolic panel    Collection Time: 06/22/24  5:00 AM   Result Value Ref Range    Sodium 140 135 - 147 mmol/L    Potassium 4.2 3.5 - 5.3 mmol/L    Chloride 93 (L) 96 - 108 mmol/L    CO2 43 (H) 21 - 32 mmol/L    ANION GAP 4 4 - 13 mmol/L    BUN 34 (H) 5 - 25 mg/dL    Creatinine 1.16 0.60 - 1.30 mg/dL    Glucose 114 65 - 140 mg/dL    Calcium 9.3 8.4 - 10.2 mg/dL    eGFR 48 ml/min/1.73sq m   Blood gas, arterial    Collection Time: 06/22/24  3:03 PM   Result Value Ref Range    pH, Arterial 7.306 (L) 7.350 - 7.450    pCO2, Arterial 74.4 (HH) 36.0 - 44.0 mm Hg    pO2, Arterial 93.5 75.0 - 129.0 mm Hg    HCO3, Arterial 36.3 (H) 22.0 - 28.0 mmol/L    Base Excess, Arterial 7.8  mmol/L    O2 Content, Arterial 15.5 (L) 16.0 - 23.0 mL/dL    O2 HGB,Arterial  96.0 94.0 - 97.0 %    SOURCE Radial, Right     SRAVAN TEST Yes     Non Vent type BIPAP BIPAP     IPAP 14     EPAP 6     BIPAP fio2 40 %   Blood gas, arterial    Collection Time: 06/22/24  7:55 PM   Result Value Ref Range    pH, Arterial 7.320 (L) 7.350 - 7.450    pCO2, Arterial 79.5 (HH) 36.0 - 44.0 mm Hg    pO2, Arterial 105.7 75.0 - 129.0 mm Hg    HCO3, Arterial 40.0 (H) 22.0 - 28.0 mmol/L    Base Excess, Arterial 11.1 mmol/L    O2 Content, Arterial 16.2 16.0 - 23.0 mL/dL    O2 HGB,Arterial  96.7 94.0 - 97.0 %    SOURCE Radial, Left     SRAVAN TEST Yes     Non Vent type BIPAP     CBC and differential    Collection Time: 06/23/24  4:59 AM   Result Value Ref Range    WBC 6.12 4.31 - 10.16 Thousand/uL    RBC 3.70 (L) 3.81 - 5.12 Million/uL    Hemoglobin 9.9 (L) 11.5 - 15.4 g/dL    Hematocrit 34.2 (L) 34.8 - 46.1 %    MCV 92 82 - 98 fL    MCH 26.8 26.8 - 34.3 pg    MCHC 28.9 (L) 31.4 - 37.4 g/dL    RDW 13.3 11.6 - 15.1 %    MPV 9.8 8.9 - 12.7 fL    Platelets 153 149 - 390 Thousands/uL    nRBC 0 /100 WBCs    Segmented % 89 (H) 43 - 75 %    Immature Grans % 1 0 - 2 %    Lymphocytes % 7 (L) 14 - 44 %    Monocytes % 3 (L) 4 - 12 %    Eosinophils Relative 0 0 - 6 %    Basophils Relative 0 0 - 1 %    Absolute Neutrophils 5.50 1.85 - 7.62 Thousands/µL    Absolute Immature Grans 0.03 0.00 - 0.20 Thousand/uL    Absolute Lymphocytes 0.40 (L) 0.60 - 4.47 Thousands/µL    Absolute Monocytes 0.19 0.17 - 1.22 Thousand/µL    Eosinophils Absolute 0.00 0.00 - 0.61 Thousand/µL    Basophils Absolute 0.00 0.00 - 0.10 Thousands/µL     *Note: Due to a large number of results and/or encounters for the requested time period, some results have not been displayed. A complete set of results can be found in Results Review.       Laboratory Results: I have personally reviewed the pertinent laboratory results/reports     Radiology/Other Diagnostic Testing Results: I have  personally reviewed pertinent reports.      NM PET CT skull base to mid thigh    Result Date: 8/1/2024  PET/CT SCAN INDICATION: Pulmonary nodule. 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 R91.1: Solitary pulmonary nodule MODIFIER: PI COMPARISON: PET/CT 8/10/2021, CT chest 6/19/2024 CELL TYPE: N/A TECHNIQUE:   10.5 mCi F-18-FDG administered IV. Multiplanar attenuation corrected and non attenuation corrected PET images are available for interpretation, and contiguous, low dose, axial CT sections were obtained from the skull base through the femurs.  Intravenous contrast material was not utilized. This examination, like all CT scans performed in the [facility], was performed utilizing techniques to minimize radiation dose exposure, including the use of iterative reconstruction and automated exposure control. Fasting serum glucose: 101 mg/dl FINDINGS: VISUALIZED BRAIN: No acute abnormalities are seen. HEAD/NECK: There is a physiologic distribution of FDG. No FDG avid cervical adenopathy is seen. CT images: Unremarkable. CHEST: Focal FDG uptake in the right mid lung anteromedially, SUV max of 4.4. This corresponds to a new area of somewhat nodular consolidation on CT measuring up to 1.5 cm in size image 99 series 3. No suspicious focal uptake in the mediastinum or perihilar regions. Stable or smaller mediastinal lymph nodes. Left subcarinal lymph node now measures 7 mm short axis, previously 1 cm. AP window lymph node measures 1 cm short axis, unchanged. The 1.6 cm right lower lobe groundglass nodule does not demonstrate focal uptake. There is a 1.1 cm lobular nodule in the left lower lobe posteriorly without focal uptake, stable from prior imaging. This was also previously negative on prior PET/CT. CT images: Scattered linear versus scarring bilaterally. Minimal chronic consolidation in the right middle lobe anteriorly. Small hiatal hernia. ABDOMEN: No FDG avid  soft tissue lesions are seen. Variable bowel activity likely physiologic. CT images: Haziness of the central mesenteric fat with prominent lymph nodes, not FDG avid, also seen on prior PET/CT. A central mesenteric lymph node measures 0.8 cm short axis image 158 series 3, smaller previously 1.4 cm. There may be a few tiny 1 to 2 mm intrarenal calculi on the right. PELVIS: New small focus of increased radiotracer uptake at the skin of the right mons pubis, SUV max of 6. Slight infiltrative changes and focal skin thickening here on CT. No FDG avid lymph nodes. CT images: Small calcified granuloma in the bilateral buttock subcutaneous tissues. OSSEOUS STRUCTURES: No FDG avid lesions are seen. CT images: No significant findings.     1. The 1.6 cm right lower lobe groundglass nodule is not FDG avid. Please note that low-grade adenocarcinoma/adenocarcinoma spectrum lesions may not demonstrate significant FDG uptake. Given interval increase in size, consider tissue sampling versus continued follow-up. 2. Focal FDG uptake in the right mid lung anteromedially corresponds to a new area of somewhat nodular consolidation on CT measuring up to 1.5 cm in size. This is probably postinfectious or inflammatory but recommend reassessment on follow-up CT in 1 to 3 months. 3. New small focus of increased radiotracer uptake at the skin of the right mons pubis, SUV max of 6. Slight infiltrative changes and focal skin thickening here on CT. This is probably inflammatory but correlate clinically. The study was marked in EPIC for significant notification. Workstation performed: [id number]        Assessment/Plan:  1. HTN (hypertension)  -     amLODIPine (NORVASC) 5 mg tablet; Take 1 tablet (5 mg total) by mouth daily  2. Multiple lung nodules  3. Centrilobular emphysema (HCC)  -     Budeson-Glycopyrrol-Formoterol (Breztri Aerosphere) 160-9-4.8 MCG/ACT AERO; Inhale 2 puffs 2 (two) times a day Rinse mouth after use.  4. Acquired  "hypothyroidism  5. Multiple subsolid lung nodules greater than 6 mm in diameter  -     CT chest wo contrast; Future; Expected date: 09/14/2024  6. Cancer of pharynx (HCC)  -     Ambulatory Referral to Otolaryngology; Future  7. TIMMY (generalized anxiety disorder)  -     sertraline (ZOLOFT) 25 mg tablet; Take 1 tablet (25 mg total) by mouth daily at bedtime  8. Depression with anxiety  -     sertraline (ZOLOFT) 25 mg tablet; Take 1 tablet (25 mg total) by mouth daily at bedtime  9. Severe persistent asthma without complication  -     albuterol (PROVENTIL HFA,VENTOLIN HFA) 90 mcg/act inhaler; Inhale 2 puffs every 6 (six) hours as needed for wheezing or shortness of breath    I discussed the case with radiologist and the pulmonologist and oncologist and everybody is in agreement with observing the lung nodule given the solid component per the radiologist is less than 4 mm.  Will repeat CT chest in 1 month.  Increase amlodipine to 5 mg daily.   put in a referral for ENT follow-up for the previous laryngeal cancer follow-up.        Depression Screening and Follow-up Plan: Patient was screened for depression during today's encounter. They screened negative with a PHQ-9 score of 0.          Read package inserts for all medications before starting a new medications, call me if you have any questions.    Patient was given opportunity to ask questions and all questions were answered.    Disclaimer: Portions of the record may have been created with voice recognition software. Occasional wrong word or \"sound a like\" substitutions may have occurred due to the inherent limitations of voice recognition software. Read the chart carefully and recognize, using context, where substitutions have occurred. I have used the Epic copy/forward function to compose this note. I have reviewed my current note to ensure it reflects the current patient status, exam, assessment and plan.    "

## 2024-08-14 NOTE — PATIENT INSTRUCTIONS
Increase amlodipine to 5 mg daily.  Obtain the CT of the lungs in 4 weeks.  And follow-up with me thereafter.  I put in a referral for ENT follow-up for the previous laryngeal cancer follow-up.

## 2024-08-22 ENCOUNTER — TELEPHONE (OUTPATIENT)
Dept: HEMATOLOGY ONCOLOGY | Facility: CLINIC | Age: 69
End: 2024-08-22

## 2024-08-22 NOTE — TELEPHONE ENCOUNTER
Spoke with daughter, she is aware follow up is to review CT scan. She will follow up with PCP for thyroid testing.

## 2024-08-22 NOTE — TELEPHONE ENCOUNTER
Per Dr. Muñoz follow up needed for after CT scan.     Pt scheduled with Yessenia Dumont PA-C on 10/03/2024 at 2:20 PM. Clerical team, can you please notify daughter of appointment.

## 2024-08-27 ENCOUNTER — HOSPITAL ENCOUNTER (OUTPATIENT)
Dept: RADIOLOGY | Facility: HOSPITAL | Age: 69
Discharge: HOME | End: 2024-08-27
Attending: RADIOLOGY

## 2024-09-03 NOTE — ASSESSMENT & PLAN NOTE
Has severe emphysema with acute exacerbation likely in the setting of recent viral URI.  Admits to sick family contacts.  COVID RSV and influenza panel was negative.    RP2 panel positive for parainfluenza.      Continue with azithromycin daily to complete total 5 days of treatment.    Procalcitonin x2 negative.    CT chest reviewed and noted.   Had escalating oxygen requirement and worsening hypercapnia on 6/21   Transfered to Stepdown 2 for ongoing BiPAP needs and acute on chronic hypercapnic respiratory failure  Over the weekend was maintained initially on high-dose Solu-Medrol.  Subsequently upon improvement  in respiratory status, is being weaned down to 40 mg every 12 hourly per pulmonary recommendations today.  Continue with current inhaler treatment.    BiPAP nightly      [] : Resident

## 2024-09-04 DIAGNOSIS — I10 HTN (HYPERTENSION): ICD-10-CM

## 2024-09-04 DIAGNOSIS — J96.21 ACUTE ON CHRONIC RESPIRATORY FAILURE WITH HYPOXIA AND HYPERCAPNIA (HCC): ICD-10-CM

## 2024-09-04 DIAGNOSIS — J96.11 CHRONIC RESPIRATORY FAILURE WITH HYPOXIA, ON HOME O2 THERAPY  (HCC): ICD-10-CM

## 2024-09-04 DIAGNOSIS — J96.22 ACUTE ON CHRONIC RESPIRATORY FAILURE WITH HYPOXIA AND HYPERCAPNIA (HCC): ICD-10-CM

## 2024-09-04 DIAGNOSIS — J44.1 COPD EXACERBATION (HCC): ICD-10-CM

## 2024-09-04 DIAGNOSIS — Z99.81 CHRONIC RESPIRATORY FAILURE WITH HYPOXIA, ON HOME O2 THERAPY  (HCC): ICD-10-CM

## 2024-09-05 RX ORDER — AMLODIPINE BESYLATE 5 MG/1
5 TABLET ORAL DAILY
Qty: 90 TABLET | Refills: 1 | Status: SHIPPED | OUTPATIENT
Start: 2024-09-05

## 2024-09-05 RX ORDER — PREDNISONE 5 MG/1
5 TABLET ORAL DAILY
Qty: 30 TABLET | Refills: 0 | Status: SHIPPED | OUTPATIENT
Start: 2024-09-05

## 2024-09-13 ENCOUNTER — HOSPITAL ENCOUNTER (OUTPATIENT)
Dept: CT IMAGING | Facility: HOSPITAL | Age: 69
End: 2024-09-13
Attending: FAMILY MEDICINE
Payer: COMMERCIAL

## 2024-09-13 DIAGNOSIS — R91.8 MULTIPLE SUBSOLID LUNG NODULES GREATER THAN 6 MM IN DIAMETER: ICD-10-CM

## 2024-09-13 PROCEDURE — 71250 CT THORAX DX C-: CPT

## 2024-09-23 ENCOUNTER — OFFICE VISIT (OUTPATIENT)
Dept: PULMONOLOGY | Facility: CLINIC | Age: 69
End: 2024-09-23
Payer: COMMERCIAL

## 2024-09-23 VITALS
DIASTOLIC BLOOD PRESSURE: 46 MMHG | WEIGHT: 181 LBS | BODY MASS INDEX: 30.9 KG/M2 | SYSTOLIC BLOOD PRESSURE: 122 MMHG | TEMPERATURE: 97.5 F | OXYGEN SATURATION: 96 % | HEART RATE: 64 BPM | HEIGHT: 64 IN

## 2024-09-23 DIAGNOSIS — Z99.81 CHRONIC RESPIRATORY FAILURE WITH HYPOXIA, ON HOME O2 THERAPY  (HCC): ICD-10-CM

## 2024-09-23 DIAGNOSIS — J96.11 CHRONIC RESPIRATORY FAILURE WITH HYPOXIA, ON HOME O2 THERAPY  (HCC): ICD-10-CM

## 2024-09-23 DIAGNOSIS — J43.2 CENTRILOBULAR EMPHYSEMA (HCC): Primary | ICD-10-CM

## 2024-09-23 DIAGNOSIS — R91.8 MULTIPLE LUNG NODULES: ICD-10-CM

## 2024-09-23 PROCEDURE — 99214 OFFICE O/P EST MOD 30 MIN: CPT | Performed by: INTERNAL MEDICINE

## 2024-09-23 RX ORDER — BRIMONIDINE TARTRATE 2 MG/ML
1 SOLUTION/ DROPS OPHTHALMIC 3 TIMES DAILY
COMMUNITY
Start: 2024-08-19

## 2024-09-23 RX ORDER — BUDESONIDE, GLYCOPYRROLATE, AND FORMOTEROL FUMARATE 160; 9; 4.8 UG/1; UG/1; UG/1
2 AEROSOL, METERED RESPIRATORY (INHALATION) 2 TIMES DAILY
Qty: 10.7 G | Refills: 3 | Status: SHIPPED | OUTPATIENT
Start: 2024-09-23

## 2024-09-23 RX ORDER — ALBUTEROL SULFATE 0.83 MG/ML
2.5 SOLUTION RESPIRATORY (INHALATION) EVERY 6 HOURS PRN
Qty: 360 ML | Refills: 2 | Status: SHIPPED | OUTPATIENT
Start: 2024-09-23

## 2024-09-23 NOTE — ASSESSMENT & PLAN NOTE
"She has multiple lung nodules on her CT scan which have been stable.  Her previous CT scan was unremarkable. However, she had an enlarging 1.6 x 0.8 cm ovoid groundglass right lower lobe nodule on her CT scan from  February 2022.  Her most recent CT scan from 6/19/2024 showed  \"A groundglass nodule in the lateral right lower lobe measures up to 1.6 cm, unchanged from most recent March 2024 comparison, however increased from December 2022 exam. A slow-growing/adenocarcinoma spectrum lesion  not entirely excluded\".  She had a CT PET scan and IR biopsy was ordered.  This was deferred subsequently per IR.  He had a repeat CT scan which showed stability of the lung nodules..She had laryngeal cancer in 2015 which was treated with radiation therapy and chemo therapy.  She has post radiation changes in the neck..       "

## 2024-09-23 NOTE — PROGRESS NOTES
"Ambulatory Visit  Name: Noreen Alvarez      : 1955      MRN: [id number]  Encounter Provider: Shannan Thompson MD  Encounter Date: 2024   Encounter department: [facility] PULMONARY & [facility]    Assessment & Plan  Multiple lung nodules  She has multiple lung nodules on her CT scan which have been stable.  Her previous CT scan was unremarkable. However, she had an enlarging 1.6 x 0.8 cm ovoid groundglass right lower lobe nodule on her CT scan from  2022.  Her most recent CT scan from 2024 showed  \"A groundglass nodule in the lateral right lower lobe measures up to 1.6 cm, unchanged from most recent 2024 comparison, however increased from 2022 exam. A slow-growing/adenocarcinoma spectrum lesion  not entirely excluded\".  She had a CT PET scan and IR biopsy was ordered.  This was deferred subsequently per IR.  He had a repeat CT scan which showed stability of the lung nodules..She had laryngeal cancer in  which was treated with radiation therapy and chemo therapy.  She has post radiation changes in the neck..       Centrilobular emphysema (HCC)  She has severe COPD emphysema from her previous smoking.  On clinical examination her chest auscultation was clear.  She is currently on treatment with Breztri, and nebulized ipratropium and albuterol.  She is on oxygen supplementation at 2 liters/minute.  Her PFT from 2022 showed severe airflow obstruction with severe diffusion defect air-trapping and hyperinflation consistent with emphysema.  I have advised her to continue with Breztri regularly and albuterol as needed.  I had a long discussion with her and her daughter and answered all their questions.      Orders:    albuterol (2.5 mg/3 mL) 0.083 % nebulizer solution; Take 3 mL (2.5 mg total) by nebulization every 6 (six) hours as needed for wheezing or shortness of breath    Budeson-Glycopyrrol-Formoterol (Breztri Aerosphere) 160-9-4.8 MCG/ACT AERO; " Inhale 2 puffs 2 (two) times a day Rinse mouth after use.    Chronic respiratory failure with hypoxia, on home O2 therapy  ([facility])  She has chronic respiratory failure with hypoxia and has been on supplementation at 2 L/min       History of Present Illness       Noreen Alvarez is a 69 y.o. female with past medical history of COPD emphysema, chronic respiratory failure on oxygen supplementation at 2 L/min and multiple lung nodules who has come for follow-up.  She had a sleep study before which was negative for any significant sleep apnea.  She has been on treatment with Breztri regularly and albuterol as needed.  She has no hoarseness of voice or dysphagia.  Her exercise tolerance is about half a block.  She has no significant cough or phlegm.  No wheeze or chest pain.  No swelling of feet.  Her appetite has been good.  She has had multiple lung nodules and was considered for biopsy.  Her recent CT scan showed stability of the lung nodules and a repeat study in 1 year was advised.  Has previous history of cancer of pharynx.  She has daytime tiredness.  But she denied any significant daytime sleepiness.      Review of Systems   Constitutional:  Positive for fatigue. Negative for appetite change.   HENT:  Negative for hearing loss, rhinorrhea, sneezing, sore throat and trouble swallowing.    Respiratory:  Positive for shortness of breath (ET half a block). Negative for cough, chest tightness, wheezing and stridor.    Cardiovascular:  Negative for chest pain, palpitations and leg swelling.   Gastrointestinal:  Negative for abdominal pain, constipation, diarrhea, nausea and vomiting.   Genitourinary:  Negative for dysuria, frequency and urgency.   Musculoskeletal:  Positive for back pain. Negative for arthralgias and gait problem.   Skin:  Negative for rash.   Allergic/Immunologic: Positive for environmental allergies.   Neurological:  Negative for dizziness, syncope, light-headedness and headaches.  "  Psychiatric/Behavioral:  Negative for agitation and confusion. The patient is not nervous/anxious.            Objective     BP (!) 122/46 (BP Location: Left arm, Patient Position: Sitting, Cuff Size: Standard)   Pulse 64   Temp 97.5 °F (36.4 °C) (Tympanic)   Ht 5' 4\" (1.626 m)   Wt 82.1 kg (181 lb)   SpO2 96% Comment: with OX at 2  BMI 31.07 kg/m²     Physical Exam  Constitutional:       General: She is not in acute distress.     Appearance: She is obese. She is not ill-appearing, toxic-appearing or diaphoretic.   HENT:      Head: Normocephalic.      Mouth/Throat:      Mouth: Mucous membranes are moist.   Eyes:      General: No scleral icterus.     Conjunctiva/sclera: Conjunctivae normal.   Cardiovascular:      Heart sounds: Normal heart sounds. No murmur heard.  Pulmonary:      Effort: Pulmonary effort is normal. No respiratory distress.      Breath sounds: No stridor. No wheezing, rhonchi or rales.   Chest:      Chest wall: No tenderness.   Abdominal:      Tenderness: There is no abdominal tenderness.   Musculoskeletal:      Cervical back: Neck supple. No rigidity.      Right lower leg: No edema.      Left lower leg: No edema.   Lymphadenopathy:      Cervical: No cervical adenopathy.   Skin:     Coloration: Skin is not jaundiced or pale.      Findings: No rash.   Neurological:      Mental Status: She is alert.      Gait: Gait normal.   Psychiatric:         Mood and Affect: Mood normal.         Behavior: Behavior normal.         Thought Content: Thought content normal.         Judgment: Judgment normal.         "

## 2024-09-23 NOTE — ASSESSMENT & PLAN NOTE
"She has multiple lung nodules on her CT scan which have been stable.  Her previous CT scan was unremarkable. However, she had an enlarging 1.6 x 0.8 cm ovoid groundglass right lower lobe nodule on her CT scan from  February 2022.  Her most recent CT scan from 6/19/2024 showed  \"A groundglass nodule in the lateral right lower lobe measures up to 1.6 cm, unchanged from most recent March 2024 comparison, however increased from December 2022 exam. A slow-growing/adenocarcinoma spectrum lesion  not entirely excluded\".  She had a CT PET scan and IR biopsy was ordered.  This was deferred subsequently per IR.  He had a repeat CT scan which showed stability of the lung nodules..  "

## 2024-09-23 NOTE — ASSESSMENT & PLAN NOTE
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  On clinical examination her chest auscultation was clear.  She is currently on treatment with Breztri, and nebulized ipratropium and albuterol.  She is on oxygen supplementation at 2 liters/minute.  Her PFT from 4/21/2022 showed severe airflow obstruction with severe diffusion defect air-trapping and hyperinflation consistent with emphysema.  I have advised her to continue with Breztri regularly and albuterol as needed.  I had a long discussion with her and her daughter and answered all their questions.      Orders:    albuterol (2.5 mg/3 mL) 0.083 % nebulizer solution; Take 3 mL (2.5 mg total) by nebulization every 6 (six) hours as needed for wheezing or shortness of breath    Budeson-Glycopyrrol-Formoterol (Breztri Aerosphere) 160-9-4.8 MCG/ACT AERO; Inhale 2 puffs 2 (two) times a day Rinse mouth after use.

## 2024-09-24 ENCOUNTER — OFFICE VISIT (OUTPATIENT)
Dept: FAMILY MEDICINE CLINIC | Facility: CLINIC | Age: 69
End: 2024-09-24
Payer: COMMERCIAL

## 2024-09-24 VITALS
HEIGHT: 64 IN | WEIGHT: 180.2 LBS | DIASTOLIC BLOOD PRESSURE: 60 MMHG | HEART RATE: 88 BPM | RESPIRATION RATE: 22 BRPM | BODY MASS INDEX: 30.77 KG/M2 | SYSTOLIC BLOOD PRESSURE: 150 MMHG | OXYGEN SATURATION: 94 %

## 2024-09-24 DIAGNOSIS — L73.9 FOLLICULITIS: ICD-10-CM

## 2024-09-24 DIAGNOSIS — I10 PRIMARY HYPERTENSION: ICD-10-CM

## 2024-09-24 DIAGNOSIS — D50.8 IRON DEFICIENCY ANEMIA DUE TO DIETARY CAUSES: ICD-10-CM

## 2024-09-24 DIAGNOSIS — G89.29 CHRONIC RIGHT-SIDED LOW BACK PAIN WITH RIGHT-SIDED SCIATICA: ICD-10-CM

## 2024-09-24 DIAGNOSIS — E78.2 MIXED HYPERLIPIDEMIA: ICD-10-CM

## 2024-09-24 DIAGNOSIS — J43.2 CENTRILOBULAR EMPHYSEMA (HCC): ICD-10-CM

## 2024-09-24 DIAGNOSIS — Z13.1 ENCOUNTER FOR SCREENING FOR DIABETES MELLITUS: ICD-10-CM

## 2024-09-24 DIAGNOSIS — J44.1 COPD EXACERBATION (HCC): Primary | ICD-10-CM

## 2024-09-24 DIAGNOSIS — Z99.81 CHRONIC RESPIRATORY FAILURE WITH HYPOXIA, ON HOME O2 THERAPY  (HCC): ICD-10-CM

## 2024-09-24 DIAGNOSIS — M54.41 CHRONIC RIGHT-SIDED LOW BACK PAIN WITH RIGHT-SIDED SCIATICA: ICD-10-CM

## 2024-09-24 DIAGNOSIS — J96.11 CHRONIC RESPIRATORY FAILURE WITH HYPOXIA, ON HOME O2 THERAPY  (HCC): ICD-10-CM

## 2024-09-24 DIAGNOSIS — N17.9 AKI (ACUTE KIDNEY INJURY) (HCC): ICD-10-CM

## 2024-09-24 PROCEDURE — 99214 OFFICE O/P EST MOD 30 MIN: CPT | Performed by: FAMILY MEDICINE

## 2024-09-24 PROCEDURE — G2211 COMPLEX E/M VISIT ADD ON: HCPCS | Performed by: FAMILY MEDICINE

## 2024-09-24 RX ORDER — TIZANIDINE 2 MG/1
2 TABLET ORAL
Qty: 30 TABLET | Refills: 0 | Status: SHIPPED | OUTPATIENT
Start: 2024-09-24

## 2024-09-24 RX ORDER — CLINDAMYCIN PHOSPHATE 10 MG/G
GEL TOPICAL 2 TIMES DAILY
Qty: 60 G | Refills: 0 | Status: SHIPPED | OUTPATIENT
Start: 2024-09-24

## 2024-09-24 NOTE — PROGRESS NOTES
Subjective:      Patient ID: Noreen lAvarez is a 69 y.o. female.    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 laryngeal cancer follows ENT, abdominal nodule ,lung nodule,COPD, hypoxic resp failure, home oxygen use, chronic low back pain presents for follow up   refused as she wants her grand daughter to translate  Complains of low back pain and rash in groin and buttock-gets boils ,uses a shaver for pubic hair        Past Medical History:   Diagnosis Date    Acute kidney failure (HCC)     Allergic     Allergies     Anemia     Anxiety     Asthma     Cancer (HCC)     Cataract     Chronic kidney disease (CKD), stage III (moderate) (HCC)     COPD (chronic obstructive pulmonary disease) (HCC)     COVID-19     Covid + 7-2-75-cough at that time now fully resolved    Depression     Disease of thyroid gland     Essential hypertension     GERD (gastroesophageal reflux disease)     Glaucoma     High blood pressure     HTN (hypertension) 02/16/2021    Hyperlipidemia     Hypothyroidism     Memory loss     Mesenteric lymphadenopathy 07/13/2021    Pulmonary hypertension (HCC)     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    Throat cancer (HCC) 2014    chemo and rad therapy 2014       Family History   Problem Relation Age of Onset    Other Mother         respiratory disorder    Asthma Mother     COPD Mother     Depression Mother     Sudden death Father         shot himself    Suicidality Father     Brain cancer Sister     Diabetes Sister     Breast cancer Sister     Cancer Sister 62        pancreatic cancer    No Known Problems Sister     No Known Problems Sister     No Known Problems Sister     No Known Problems Sister     No Known Problems Sister     No Known Problems Sister     No Known Problems Daughter     No Known Problems Daughter     No Known Problems Maternal Grandmother     No Known Problems Maternal Grandfather     No Known  Problems Paternal Grandmother     No Known Problems Paternal Grandfather     No Known Problems Maternal Aunt     No Known Problems Maternal Aunt     No Known Problems Maternal Aunt     No Known Problems Maternal Aunt     No Known Problems Maternal Aunt     No Known Problems Paternal Aunt     No Known Problems Paternal Aunt     No Known Problems Paternal Aunt     No Known Problems Paternal Aunt     No Known Problems Son        Past Surgical History:   Procedure Laterality Date    COLONOSCOPY  2016    ESOPHAGOGASTRODUODENOSCOPY  2016    EYE SURGERY      IR BIOPSY LUNG  05/18/2022    LARYNGOSCOPY      w/ Bx.     OH TENDON SHEATH INCISION Right 02/16/2021    Procedure: THUMB TRIGGER FINGER RELEASE;  Surgeon: Angeles Denton MD;  Location: [facility];  Service: Orthopedics    TUBAL LIGATION          reports that she quit smoking about 10 years ago. Her smoking use included cigarettes. She started smoking about 50 years ago. She has a 40 pack-year smoking history. She has never used smokeless tobacco. She reports that she does not drink alcohol and does not use drugs.      Current Outpatient Medications:     acetaminophen (TYLENOL) 325 mg tablet, Take 2 tablets (650 mg total) by mouth every 6 (six) hours as needed for mild pain, moderate pain, headaches or fever, Disp: , Rfl:     albuterol (2.5 mg/3 mL) 0.083 % nebulizer solution, Take 3 mL (2.5 mg total) by nebulization every 6 (six) hours as needed for wheezing or shortness of breath, Disp: 360 mL, Rfl: 2    albuterol (PROVENTIL HFA,VENTOLIN HFA) 90 mcg/act inhaler, Inhale 2 puffs every 6 (six) hours as needed for wheezing or shortness of breath, Disp: 18 g, Rfl: 5    amLODIPine (NORVASC) 5 mg tablet, TAKE 1 TABLET (5 MG TOTAL) BY MOUTH DAILY., Disp: 90 tablet, Rfl: 1    atorvastatin (LIPITOR) 40 mg tablet, Take 1 tablet (40 mg total) by mouth daily with dinner, Disp: 90 tablet, Rfl: 3    azithromycin (ZITHROMAX) 250 mg tablet, Take 1 tablet (250 mg total) by  mouth 3 (three) times a week for 36 doses, Disp: 12 tablet, Rfl: 2    benzonatate (TESSALON PERLES) 100 mg capsule, Take 1 capsule (100 mg total) by mouth 3 (three) times a day, Disp: 20 capsule, Rfl: 0    brimonidine tartrate 0.2 % ophthalmic solution, Administer 1 drop to both eyes 3 (three) times a day, Disp: , Rfl:     Budeson-Glycopyrrol-Formoterol (Breztri Aerosphere) 160-9-4.8 MCG/ACT AERO, Inhale 2 puffs 2 (two) times a day Rinse mouth after use., Disp: 10.7 g, Rfl: 3    Cholecalciferol (VITAMIN D3) 1,000 units tablet, Take 1,000 Units by mouth daily, Disp: , Rfl:     clindamycin 1 % gel, Apply topically 2 (two) times a day, Disp: 60 g, Rfl: 0    cyanocobalamin (VITAMIN B-12) 500 MCG tablet, Take 1 tablet (500 mcg total) by mouth daily, Disp: 90 tablet, Rfl: 3    Diclofenac Sodium (VOLTAREN) 1 %, Apply 2 g topically 4 (four) times a day, Disp: 350 g, Rfl: 0    fluticasone (FLONASE) 50 mcg/act nasal spray, SPRAY 1 SPRAY INTO EACH NOSTRIL EVERY DAY, Disp: 48 mL, Rfl: 1    guaiFENesin (ROBITUSSIN) 100 mg/5 mL syrup, Take 5 mL (100 mg total) by mouth 3 (three) times a day as needed for cough for up to 10 days, Disp: 473 mL, Rfl: 0    ipratropium (ATROVENT) 0.06 % nasal spray, 2 SPRAYS INTO EACH NOSTRIL 3 (THREE) TIMES A DAY FOR INCREASED NASAL SECRETION, Disp: 15 mL, Rfl: 2    levothyroxine 75 mcg tablet, Take 1 tablet (75 mcg total) by mouth daily in the early morning, Disp: 90 tablet, Rfl: 1    montelukast (SINGULAIR) 10 mg tablet, TAKE 1 TABLET BY MOUTH DAILY AT BEDTIME, Disp: 100 tablet, Rfl: 1    pantoprazole (PROTONIX) 40 mg tablet, TAKE 1 TABLET BY MOUTH EVERY DAY, Disp: 90 tablet, Rfl: 1    predniSONE 5 mg tablet, TAKE 1 TABLET (5 MG TOTAL) BY MOUTH DAILY., Disp: 30 tablet, Rfl: 0    sertraline (ZOLOFT) 25 mg tablet, Take 1 tablet (25 mg total) by mouth daily at bedtime, Disp: 90 tablet, Rfl: 1    tiZANidine (ZANAFLEX) 2 mg tablet, Take 1 tablet (2 mg total) by mouth daily at bedtime as needed for muscle  spasms (back pain), Disp: 30 tablet, Rfl: 0    The following portions of the patient's history were reviewed and updated as appropriate: allergies, current medications, past family history, past medical history, past social history, past surgical history and problem list.    Review of Systems   Constitutional:  Negative for fatigue and fever.   HENT:  Negative for congestion, facial swelling, mouth sores, rhinorrhea, sore throat and trouble swallowing.    Eyes:  Negative for pain and redness.   Respiratory:  Positive for shortness of breath. Negative for cough and wheezing.    Cardiovascular:  Negative for chest pain, palpitations and leg swelling.   Gastrointestinal:  Negative for abdominal pain, blood in stool, constipation, diarrhea and nausea.   Genitourinary:  Negative for dysuria, hematuria and urgency.   Musculoskeletal:  Positive for back pain. Negative for arthralgias and myalgias.   Skin:  Positive for rash. Negative for wound.   Neurological:  Negative for seizures, syncope and headaches.   Hematological:  Negative for adenopathy.   Psychiatric/Behavioral:  Negative for agitation and behavioral problems.          PHQ-2/9 Depression Screening    Little interest or pleasure in doing things: 0 - not at all  Feeling down, depressed, or hopeless: 0 - not at all  Trouble falling or staying asleep, or sleeping too much: 0 - not at all  Feeling tired or having little energy: 0 - not at all  Poor appetite or overeatin - not at all  Feeling bad about yourself - or that you are a failure or have let yourself or your family down: 0 - not at all  Trouble concentrating on things, such as reading the newspaper or watching television: 0 - not at all  Moving or speaking so slowly that other people could have noticed. Or the opposite - being so fidgety or restless that you have been moving around a lot more than usual: 0 - not at all  Thoughts that you would be better off dead, or of hurting yourself in some way: 0 -  "not at all  PHQ-9 Score: 0  PHQ-9 Interpretation: No or Minimal depression             Objective:    /60 (BP Location: Left arm, Patient Position: Sitting, Cuff Size: Adult)   Pulse 88   Resp 22   Ht 5' 4\" (1.626 m)   Wt 81.7 kg (180 lb 3.2 oz)   SpO2 94% Comment: on 2 liters O2  BMI 30.93 kg/m²      Physical Exam  Vitals and nursing note reviewed.   Constitutional:       Appearance: Normal appearance. She is well-developed. She is not ill-appearing.   HENT:      Head: Normocephalic and atraumatic.      Right Ear: External ear normal.      Left Ear: External ear normal.      Nose: Nose normal.      Mouth/Throat:      Mouth: Mucous membranes are moist.      Pharynx: No oropharyngeal exudate or posterior oropharyngeal erythema.   Eyes:      General: No scleral icterus.        Right eye: No discharge.         Left eye: No discharge.      Conjunctiva/sclera: Conjunctivae normal.   Cardiovascular:      Rate and Rhythm: Normal rate.      Heart sounds: No murmur heard.     No gallop.   Pulmonary:      Effort: Pulmonary effort is normal. No respiratory distress.      Breath sounds: Normal breath sounds. No stridor. No wheezing, rhonchi or rales.   Abdominal:      Palpations: Abdomen is soft.      Tenderness: There is no abdominal tenderness.   Musculoskeletal:         General: No tenderness or deformity.   Skin:     Findings: Lesion and rash present. No erythema.      Comments: Ingrown hair and folliculitis in pubic area   Neurological:      Mental Status: She is alert. Mental status is at baseline.   Psychiatric:         Behavior: Behavior normal.         Judgment: Judgment normal.           Recent Results (from the past 8736 hour(s))   ECG 12 lead    Collection Time: 01/01/24  9:24 PM   Result Value Ref Range    Ventricular Rate 73 BPM    Atrial Rate 73 BPM    KS Interval 142 ms    QRSD Interval 62 ms    QT Interval 450 ms    QTC Interval 495 ms    P Axis 90 degrees    QRS Axis 19 degrees    T Wave Axis 247 " "degrees   CBC and differential    Collection Time: 01/01/24  9:25 PM   Result Value Ref Range    WBC 6.40 4.31 - 10.16 Thousand/uL    RBC 4.33 3.81 - 5.12 Million/uL    Hemoglobin 11.2 (L) 11.5 - 15.4 g/dL    Hematocrit 38.7 34.8 - 46.1 %    MCV 89 82 - 98 fL    MCH 25.9 (L) 26.8 - 34.3 pg    MCHC 28.9 (L) 31.4 - 37.4 g/dL    RDW 15.1 11.6 - 15.1 %    MPV 9.7 8.9 - 12.7 fL    Platelets 234 149 - 390 Thousands/uL    nRBC 0 /100 WBCs    Segmented % 74 43 - 75 %    Immature Grans % 1 0 - 2 %    Lymphocytes % 10 (L) 14 - 44 %    Monocytes % 13 (H) 4 - 12 %    Eosinophils Relative 1 0 - 6 %    Basophils Relative 1 0 - 1 %    Absolute Neutrophils 4.81 1.85 - 7.62 Thousands/µL    Absolute Immature Grans 0.03 0.00 - 0.20 Thousand/uL    Absolute Lymphocytes 0.64 0.60 - 4.47 Thousands/µL    Absolute Monocytes 0.81 0.17 - 1.22 Thousand/µL    Eosinophils Absolute 0.08 0.00 - 0.61 Thousand/µL    Basophils Absolute 0.03 0.00 - 0.10 Thousands/µL   Comprehensive metabolic panel    Collection Time: 01/01/24  9:25 PM   Result Value Ref Range    Sodium 142 135 - 147 mmol/L    Potassium 4.6 3.5 - 5.3 mmol/L    Chloride 104 96 - 108 mmol/L    CO2 33 (H) 21 - 32 mmol/L    ANION GAP 5 mmol/L    BUN 28 (H) 5 - 25 mg/dL    Creatinine 1.16 0.60 - 1.30 mg/dL    Glucose 98 65 - 140 mg/dL    Calcium 9.4 8.4 - 10.2 mg/dL    AST 24 13 - 39 U/L    ALT 16 7 - 52 U/L    Alkaline Phosphatase 95 34 - 104 U/L    Total Protein 7.0 6.4 - 8.4 g/dL    Albumin 4.0 3.5 - 5.0 g/dL    Total Bilirubin 0.28 0.20 - 1.00 mg/dL    eGFR 48 ml/min/1.73sq m   HS Troponin 0hr (reflex protocol)    Collection Time: 01/01/24  9:25 PM   Result Value Ref Range    hs TnI 0hr 4 \"Refer to ACS Flowchart\"- see link ng/L   B-Type Natriuretic Peptide(BNP)    Collection Time: 01/01/24  9:25 PM   Result Value Ref Range    BNP 44 0 - 100 pg/mL   FLU/RSV/COVID - if FLU/RSV clinically relevant    Collection Time: 01/01/24  9:25 PM    Specimen: Nose; Nares   Result Value Ref Range    " SARS-CoV-2 Negative Negative    INFLUENZA A PCR Positive (A) Negative    INFLUENZA B PCR Negative Negative    RSV PCR Negative Negative   ECG 12 lead    Collection Time: 01/21/24  2:34 PM   Result Value Ref Range    Ventricular Rate 74 BPM    Atrial Rate 74 BPM    VT Interval 146 ms    QRSD Interval 80 ms    QT Interval 396 ms    QTC Interval 439 ms    P Axis 86 degrees    QRS Axis 40 degrees    T Wave Axis 84 degrees   CBC and differential    Collection Time: 01/21/24  2:57 PM   Result Value Ref Range    WBC 5.99 4.31 - 10.16 Thousand/uL    RBC 4.17 3.81 - 5.12 Million/uL    Hemoglobin 11.0 (L) 11.5 - 15.4 g/dL    Hematocrit 37.0 34.8 - 46.1 %    MCV 89 82 - 98 fL    MCH 26.4 (L) 26.8 - 34.3 pg    MCHC 29.7 (L) 31.4 - 37.4 g/dL    RDW 14.6 11.6 - 15.1 %    MPV 9.5 8.9 - 12.7 fL    Platelets 175 149 - 390 Thousands/uL    nRBC 0 /100 WBCs    Segmented % 73 43 - 75 %    Immature Grans % 0 0 - 2 %    Lymphocytes % 11 (L) 14 - 44 %    Monocytes % 12 4 - 12 %    Eosinophils Relative 4 0 - 6 %    Basophils Relative 0 0 - 1 %    Absolute Neutrophils 4.36 1.85 - 7.62 Thousands/µL    Absolute Immature Grans 0.02 0.00 - 0.20 Thousand/uL    Absolute Lymphocytes 0.63 0.60 - 4.47 Thousands/µL    Absolute Monocytes 0.73 0.17 - 1.22 Thousand/µL    Eosinophils Absolute 0.23 0.00 - 0.61 Thousand/µL    Basophils Absolute 0.02 0.00 - 0.10 Thousands/µL   Comprehensive metabolic panel    Collection Time: 01/21/24  2:57 PM   Result Value Ref Range    Sodium 138 135 - 147 mmol/L    Potassium 5.1 3.5 - 5.3 mmol/L    Chloride 100 96 - 108 mmol/L    CO2 34 (H) 21 - 32 mmol/L    ANION GAP 4 mmol/L    BUN 21 5 - 25 mg/dL    Creatinine 1.18 0.60 - 1.30 mg/dL    Glucose 86 65 - 140 mg/dL    Calcium 9.3 8.4 - 10.2 mg/dL    AST 21 13 - 39 U/L    ALT 12 7 - 52 U/L    Alkaline Phosphatase 90 34 - 104 U/L    Total Protein 6.9 6.4 - 8.4 g/dL    Albumin 3.8 3.5 - 5.0 g/dL    Total Bilirubin 0.42 0.20 - 1.00 mg/dL    eGFR 47 ml/min/1.73sq m   Lipase     "Collection Time: 01/21/24  2:57 PM   Result Value Ref Range    Lipase 17 11 - 82 u/L   HS Troponin 0hr (reflex protocol)    Collection Time: 01/21/24  2:57 PM   Result Value Ref Range    hs TnI 0hr 3 \"Refer to ACS Flowchart\"- see link ng/L   ECG 12 lead    Collection Time: 01/23/24  2:32 PM   Result Value Ref Range    Ventricular Rate 87 BPM    Atrial Rate 87 BPM    AK Interval 150 ms    QRSD Interval 82 ms    QT Interval 382 ms    QTC Interval 459 ms    P Axis 88 degrees    QRS Axis 50 degrees    T Wave Axis 81 degrees   CBC and differential    Collection Time: 01/23/24  3:02 PM   Result Value Ref Range    WBC 6.86 4.31 - 10.16 Thousand/uL    RBC 4.28 3.81 - 5.12 Million/uL    Hemoglobin 11.4 (L) 11.5 - 15.4 g/dL    Hematocrit 38.6 34.8 - 46.1 %    MCV 90 82 - 98 fL    MCH 26.6 (L) 26.8 - 34.3 pg    MCHC 29.5 (L) 31.4 - 37.4 g/dL    RDW 14.3 11.6 - 15.1 %    MPV 10.0 8.9 - 12.7 fL    Platelets 222 149 - 390 Thousands/uL    nRBC 0 /100 WBCs    Segmented % 79 (H) 43 - 75 %    Immature Grans % 0 0 - 2 %    Lymphocytes % 8 (L) 14 - 44 %    Monocytes % 10 4 - 12 %    Eosinophils Relative 3 0 - 6 %    Basophils Relative 0 0 - 1 %    Absolute Neutrophils 5.33 1.85 - 7.62 Thousands/µL    Absolute Immature Grans 0.03 0.00 - 0.20 Thousand/uL    Absolute Lymphocytes 0.56 (L) 0.60 - 4.47 Thousands/µL    Absolute Monocytes 0.70 0.17 - 1.22 Thousand/µL    Eosinophils Absolute 0.21 0.00 - 0.61 Thousand/µL    Basophils Absolute 0.03 0.00 - 0.10 Thousands/µL   Comprehensive metabolic panel    Collection Time: 01/23/24  3:02 PM   Result Value Ref Range    Sodium 136 135 - 147 mmol/L    Potassium 4.4 3.5 - 5.3 mmol/L    Chloride 96 96 - 108 mmol/L    CO2 34 (H) 21 - 32 mmol/L    ANION GAP 6 mmol/L    BUN 19 5 - 25 mg/dL    Creatinine 0.92 0.60 - 1.30 mg/dL    Glucose 117 65 - 140 mg/dL    Calcium 9.6 8.4 - 10.2 mg/dL    AST 18 13 - 39 U/L    ALT 11 7 - 52 U/L    Alkaline Phosphatase 95 34 - 104 U/L    Total Protein 7.4 6.4 - 8.4 g/dL " "   Albumin 4.1 3.5 - 5.0 g/dL    Total Bilirubin 0.39 0.20 - 1.00 mg/dL    eGFR 64 ml/min/1.73sq m   HS Troponin 0hr (reflex protocol)    Collection Time: 01/23/24  3:02 PM   Result Value Ref Range    hs TnI 0hr 4 \"Refer to ACS Flowchart\"- see link ng/L   Lipase    Collection Time: 01/23/24  3:02 PM   Result Value Ref Range    Lipase 9 (L) 11 - 82 u/L   B-Type Natriuretic Peptide(BNP)    Collection Time: 01/23/24  3:02 PM   Result Value Ref Range    BNP 73 0 - 100 pg/mL   Magnesium    Collection Time: 01/23/24  3:02 PM   Result Value Ref Range    Magnesium 1.9 1.9 - 2.7 mg/dL   FLU/RSV/COVID - if FLU/RSV clinically relevant    Collection Time: 01/23/24  3:02 PM    Specimen: Nose; Nares   Result Value Ref Range    SARS-CoV-2 Negative Negative    INFLUENZA A PCR Negative Negative    INFLUENZA B PCR Negative Negative    RSV PCR Positive (A) Negative   Procalcitonin    Collection Time: 01/23/24  3:02 PM   Result Value Ref Range    Procalcitonin <0.05 <=0.25 ng/ml   HS Troponin I 2hr    Collection Time: 01/23/24  5:50 PM   Result Value Ref Range    hs TnI 2hr 3 \"Refer to ACS Flowchart\"- see link ng/L    Delta 2hr hsTnI -1 <20 ng/L   CBC and differential    Collection Time: 01/24/24  4:46 AM   Result Value Ref Range    WBC 5.42 4.31 - 10.16 Thousand/uL    RBC 4.19 3.81 - 5.12 Million/uL    Hemoglobin 11.1 (L) 11.5 - 15.4 g/dL    Hematocrit 37.4 34.8 - 46.1 %    MCV 89 82 - 98 fL    MCH 26.5 (L) 26.8 - 34.3 pg    MCHC 29.7 (L) 31.4 - 37.4 g/dL    RDW 14.1 11.6 - 15.1 %    MPV 9.9 8.9 - 12.7 fL    Platelets 213 149 - 390 Thousands/uL    nRBC 0 /100 WBCs    Segmented % 90 (H) 43 - 75 %    Immature Grans % 1 0 - 2 %    Lymphocytes % 7 (L) 14 - 44 %    Monocytes % 2 (L) 4 - 12 %    Eosinophils Relative 0 0 - 6 %    Basophils Relative 0 0 - 1 %    Absolute Neutrophils 4.90 1.85 - 7.62 Thousands/µL    Absolute Immature Grans 0.04 0.00 - 0.20 Thousand/uL    Absolute Lymphocytes 0.38 (L) 0.60 - 4.47 Thousands/µL    Absolute " Monocytes 0.09 (L) 0.17 - 1.22 Thousand/µL    Eosinophils Absolute 0.00 0.00 - 0.61 Thousand/µL    Basophils Absolute 0.01 0.00 - 0.10 Thousands/µL   Basic metabolic panel    Collection Time: 01/24/24  4:46 AM   Result Value Ref Range    Sodium 138 135 - 147 mmol/L    Potassium 5.2 3.5 - 5.3 mmol/L    Chloride 99 96 - 108 mmol/L    CO2 34 (H) 21 - 32 mmol/L    ANION GAP 5 mmol/L    BUN 19 5 - 25 mg/dL    Creatinine 0.93 0.60 - 1.30 mg/dL    Glucose 142 (H) 65 - 140 mg/dL    Calcium 9.7 8.4 - 10.2 mg/dL    eGFR 63 ml/min/1.73sq m   Smear Review(Phlebs Do Not Order)    Collection Time: 01/24/24  4:46 AM   Result Value Ref Range    RBC Morphology Normal     Platelet Estimate Adequate Adequate   Basic metabolic panel    Collection Time: 01/25/24  4:45 AM   Result Value Ref Range    Sodium 138 135 - 147 mmol/L    Potassium 5.5 (H) 3.5 - 5.3 mmol/L    Chloride 99 96 - 108 mmol/L    CO2 32 21 - 32 mmol/L    ANION GAP 7 mmol/L    BUN 35 (H) 5 - 25 mg/dL    Creatinine 1.02 0.60 - 1.30 mg/dL    Glucose 128 65 - 140 mg/dL    Calcium 10.0 8.4 - 10.2 mg/dL    eGFR 56 ml/min/1.73sq m   CBC and differential    Collection Time: 01/25/24  4:45 AM   Result Value Ref Range    WBC 15.11 (H) 4.31 - 10.16 Thousand/uL    RBC 4.32 3.81 - 5.12 Million/uL    Hemoglobin 11.6 11.5 - 15.4 g/dL    Hematocrit 39.0 34.8 - 46.1 %    MCV 90 82 - 98 fL    MCH 26.9 26.8 - 34.3 pg    MCHC 29.7 (L) 31.4 - 37.4 g/dL    RDW 14.2 11.6 - 15.1 %    MPV 10.6 8.9 - 12.7 fL    Platelets 280 149 - 390 Thousands/uL   Manual Differential(PHLEBS Do Not Order)    Collection Time: 01/25/24  4:45 AM   Result Value Ref Range    Segmented % 90 (H) 43 - 75 %    Lymphocytes % 7 (L) 14 - 44 %    Monocytes % 3 (L) 4 - 12 %    Eosinophils % 0 0 - 6 %    Basophils % 0 0 - 1 %    Absolute Neutrophils 13.60 (H) 1.85 - 7.62 Thousand/uL    Absolute Lymphocytes 1.06 0.60 - 4.47 Thousand/uL    Absolute Monocytes 0.45 0.00 - 1.22 Thousand/uL    Absolute Eosinophils 0.00 0.00 - 0.40  "Thousand/uL    Absolute Basophils 0.00 0.00 - 0.10 Thousand/uL    Total Counted      RBC Morphology Normal     Platelet Estimate Adequate Adequate   Wheeled Walker    Collection Time: 01/25/24  9:34 AM   Result Value Ref Range    Supplier Name [employer]     Supplier Phone Number [phone]     Order Status Delivery Successful     Delivery Note      Delivery Request Date 01/25/2024     Date Delivered  01/25/2024     Item Description Wheeled Walker, Adult    ECG 12 lead    Collection Time: 01/25/24 11:18 AM   Result Value Ref Range    Ventricular Rate 70 BPM    Atrial Rate 70 BPM    CO Interval 150 ms    QRSD Interval 84 ms    QT Interval 386 ms    QTC Interval 416 ms    P Axis 79 degrees    QRS Axis 29 degrees    T Wave [place] 55 degrees   HS Troponin 0hr (reflex protocol)    Collection Time: 01/25/24 11:22 AM   Result Value Ref Range    hs TnI 0hr 5 \"Refer to ACS Flowchart\"- see link ng/L   HS Troponin I 4hr    Collection Time: 01/25/24  3:35 PM   Result Value Ref Range    hs TnI 4hr 3 \"Refer to ACS Flowchart\"- see link ng/L    Delta 4hr hsTnI -2 <20 ng/L   Basic metabolic panel    Collection Time: 01/26/24  4:40 AM   Result Value Ref Range    Sodium 139 135 - 147 mmol/L    Potassium 5.1 3.5 - 5.3 mmol/L    Chloride 100 96 - 108 mmol/L    CO2 35 (H) 21 - 32 mmol/L    ANION GAP 4 mmol/L    BUN 37 (H) 5 - 25 mg/dL    Creatinine 1.05 0.60 - 1.30 mg/dL    Glucose 107 65 - 140 mg/dL    Calcium 9.4 8.4 - 10.2 mg/dL    eGFR 54 ml/min/1.73sq m   CBC and Platelet    Collection Time: 01/26/24  4:40 AM   Result Value Ref Range    WBC 10.45 (H) 4.31 - 10.16 Thousand/uL    RBC 4.00 3.81 - 5.12 Million/uL    Hemoglobin 10.6 (L) 11.5 - 15.4 g/dL    Hematocrit 35.9 34.8 - 46.1 %    MCV 90 82 - 98 fL    MCH 26.5 (L) 26.8 - 34.3 pg    MCHC 29.5 (L) 31.4 - 37.4 g/dL    RDW 14.6 11.6 - 15.1 %    Platelets 256 149 - 390 Thousands/uL    MPV 9.8 8.9 - 12.7 fL   CBC and differential    Collection Time: 01/27/24  " 5:52 AM   Result Value Ref Range    WBC 7.96 4.31 - 10.16 Thousand/uL    RBC 4.15 3.81 - 5.12 Million/uL    Hemoglobin 11.0 (L) 11.5 - 15.4 g/dL    Hematocrit 37.7 34.8 - 46.1 %    MCV 91 82 - 98 fL    MCH 26.5 (L) 26.8 - 34.3 pg    MCHC 29.2 (L) 31.4 - 37.4 g/dL    RDW 14.7 11.6 - 15.1 %    MPV 9.7 8.9 - 12.7 fL    Platelets 258 149 - 390 Thousands/uL    nRBC 0 /100 WBCs    Segmented % 87 (H) 43 - 75 %    Immature Grans % 2 0 - 2 %    Lymphocytes % 8 (L) 14 - 44 %    Monocytes % 3 (L) 4 - 12 %    Eosinophils Relative 0 0 - 6 %    Basophils Relative 0 0 - 1 %    Absolute Neutrophils 6.93 1.85 - 7.62 Thousands/µL    Absolute Immature Grans 0.15 0.00 - 0.20 Thousand/uL    Absolute Lymphocytes 0.65 0.60 - 4.47 Thousands/µL    Absolute Monocytes 0.21 0.17 - 1.22 Thousand/µL    Eosinophils Absolute 0.00 0.00 - 0.61 Thousand/µL    Basophils Absolute 0.02 0.00 - 0.10 Thousands/µL   Basic metabolic panel    Collection Time: 01/27/24  5:52 AM   Result Value Ref Range    Sodium 139 135 - 147 mmol/L    Potassium 5.3 3.5 - 5.3 mmol/L    Chloride 100 96 - 108 mmol/L    CO2 33 (H) 21 - 32 mmol/L    ANION GAP 6 mmol/L    BUN 39 (H) 5 - 25 mg/dL    Creatinine 1.11 0.60 - 1.30 mg/dL    Glucose 127 65 - 140 mg/dL    Calcium 9.2 8.4 - 10.2 mg/dL    eGFR 51 ml/min/1.73sq m   CBC and differential    Collection Time: 01/28/24  4:31 AM   Result Value Ref Range    WBC 8.61 4.31 - 10.16 Thousand/uL    RBC 4.14 3.81 - 5.12 Million/uL    Hemoglobin 11.0 (L) 11.5 - 15.4 g/dL    Hematocrit 36.8 34.8 - 46.1 %    MCV 89 82 - 98 fL    MCH 26.6 (L) 26.8 - 34.3 pg    MCHC 29.9 (L) 31.4 - 37.4 g/dL    RDW 14.5 11.6 - 15.1 %    MPV 9.6 8.9 - 12.7 fL    Platelets 281 149 - 390 Thousands/uL   Basic metabolic panel    Collection Time: 01/28/24  4:31 AM   Result Value Ref Range    Sodium 139 135 - 147 mmol/L    Potassium 5.0 3.5 - 5.3 mmol/L    Chloride 99 96 - 108 mmol/L    CO2 35 (H) 21 - 32 mmol/L    ANION GAP 5 mmol/L    BUN 38 (H) 5 - 25 mg/dL     "Creatinine 0.99 0.60 - 1.30 mg/dL    Glucose 136 65 - 140 mg/dL    Calcium 9.3 8.4 - 10.2 mg/dL    eGFR 58 ml/min/1.73sq m   Manual Differential(PHLEBS Do Not Order)    Collection Time: 01/28/24  4:31 AM   Result Value Ref Range    Segmented % 89 (H) 43 - 75 %    Lymphocytes % 8 (L) 14 - 44 %    Monocytes % 2 (L) 4 - 12 %    Eosinophils % 0 0 - 6 %    Basophils % 0 0 - 1 %    Metamyelocytes % 1 0 - 1 %    Absolute Neutrophils 7.66 (H) 1.85 - 7.62 Thousand/uL    Absolute Lymphocytes 0.69 0.60 - 4.47 Thousand/uL    Absolute Monocytes 0.17 0.00 - 1.22 Thousand/uL    Absolute Eosinophils 0.00 0.00 - 0.40 Thousand/uL    Absolute Basophils 0.00 0.00 - 0.10 Thousand/uL    Total Counted      RBC Morphology Normal     Platelet Estimate Adequate Adequate   ECG 12 lead    Collection Time: 01/28/24  1:51 PM   Result Value Ref Range    Ventricular Rate 74 BPM    Atrial Rate 74 BPM    DE Interval 136 ms    QRSD Interval 88 ms    QT Interval 378 ms    QTC Interval 419 ms    P Axis 87 degrees    QRS Axis 41 degrees    T Wave [place] 76 degrees   HS Troponin 0hr (reflex protocol)    Collection Time: 01/28/24  2:29 PM   Result Value Ref Range    hs TnI 0hr 3 \"Refer to ACS Flowchart\"- see link ng/L   HS Troponin I 2hr    Collection Time: 01/28/24  4:22 PM   Result Value Ref Range    hs TnI 2hr 6 \"Refer to ACS Flowchart\"- see link ng/L    Delta 2hr hsTnI 3 <20 ng/L   HS Troponin I 4hr    Collection Time: 01/28/24  6:22 PM   Result Value Ref Range    hs TnI 4hr 5 \"Refer to ACS Flowchart\"- see link ng/L    Delta 4hr hsTnI 2 <20 ng/L   CBC and differential    Collection Time: 01/29/24  4:29 AM   Result Value Ref Range    WBC 9.38 4.31 - 10.16 Thousand/uL    RBC 4.17 3.81 - 5.12 Million/uL    Hemoglobin 10.9 (L) 11.5 - 15.4 g/dL    Hematocrit 37.4 34.8 - 46.1 %    MCV 90 82 - 98 fL    MCH 26.1 (L) 26.8 - 34.3 pg    MCHC 29.1 (L) 31.4 - 37.4 g/dL    RDW 14.9 11.6 - 15.1 %    MPV 9.5 8.9 - 12.7 fL    Platelets 262 149 - 390 Thousands/uL    nRBC " 0 /100 WBCs    Segmented % 73 43 - 75 %    Immature Grans % 4 (H) 0 - 2 %    Lymphocytes % 14 14 - 44 %    Monocytes % 9 4 - 12 %    Eosinophils Relative 0 0 - 6 %    Basophils Relative 0 0 - 1 %    Absolute Neutrophils 6.85 1.85 - 7.62 Thousands/µL    Absolute Immature Grans 0.37 (H) 0.00 - 0.20 Thousand/uL    Absolute Lymphocytes 1.30 0.60 - 4.47 Thousands/µL    Absolute Monocytes 0.81 0.17 - 1.22 Thousand/µL    Eosinophils Absolute 0.03 0.00 - 0.61 Thousand/µL    Basophils Absolute 0.02 0.00 - 0.10 Thousands/µL   Basic metabolic panel    Collection Time: 01/29/24  4:29 AM   Result Value Ref Range    Sodium 139 135 - 147 mmol/L    Potassium 4.5 3.5 - 5.3 mmol/L    Chloride 99 96 - 108 mmol/L    CO2 35 (H) 21 - 32 mmol/L    ANION GAP 5 mmol/L    BUN 32 (H) 5 - 25 mg/dL    Creatinine 1.08 0.60 - 1.30 mg/dL    Glucose 89 65 - 140 mg/dL    Calcium 9.1 8.4 - 10.2 mg/dL    eGFR 52 ml/min/1.73sq m   Procalcitonin    Collection Time: 01/29/24  4:29 AM   Result Value Ref Range    Procalcitonin <0.05 <=0.25 ng/ml   Echo complete w/ contrast if indicated    Collection Time: 01/29/24 10:49 AM   Result Value Ref Range    BSA 1.85 m2    A4C EF 61 %    LVIDd 4.60 cm    LVIDS 3.10 cm    IVSd 0.90 cm    LVPWd 1.00 cm    FS 33 28 - 44    MV E' Tissue Velocity Septal 9 cm/s    LA Volume Index (BP) 17.8 mL/m2    E/A ratio 0.93     E wave deceleration time 161 ms    MV Peak E [place] 66 cm/s    MV Peak A [place] 0.71 m/s    RVID d 2.9 cm    Tricuspid annular plane systolic excursion 2.00 cm    LA size 3 cm    LA length (A2C) 4.10 cm    LA volume (BP) 33 mL    RAA A4C 13.8 cm2    MV stenosis pressure 1/2 time 47 ms    MV valve area p 1/2 method 4.68     Ao root 2.90 cm    Left ventricular stroke volume (2D) 61.00 mL    IVS 0.9 cm    LEFT VENTRICLE SYSTOLIC VOLUME (MOD BIPLANE) 2D 37 mL    LV DIASTOLIC VOLUME (MOD BIPLANE) 2D 98 mL    Left Atrium Area-systolic Four Chamber 12.5 cm2    Left Atrium Area-systolic Apical Two Chamber 12.5 cm2     "LVSV, 2D 61 mL    LV EF 60    CBC and differential    Collection Time: 02/28/24  8:30 PM   Result Value Ref Range    WBC 7.99 4.31 - 10.16 Thousand/uL    RBC 4.18 3.81 - 5.12 Million/uL    Hemoglobin 11.1 (L) 11.5 - 15.4 g/dL    Hematocrit 38.3 34.8 - 46.1 %    MCV 92 82 - 98 fL    MCH 26.6 (L) 26.8 - 34.3 pg    MCHC 29.0 (L) 31.4 - 37.4 g/dL    RDW 14.5 11.6 - 15.1 %    MPV 9.8 8.9 - 12.7 fL    Platelets 231 149 - 390 Thousands/uL    nRBC 0 /100 WBCs    Segmented % 77 (H) 43 - 75 %    Immature Grans % 1 0 - 2 %    Lymphocytes % 13 (L) 14 - 44 %    Monocytes % 8 4 - 12 %    Eosinophils Relative 1 0 - 6 %    Basophils Relative 0 0 - 1 %    Absolute Neutrophils 6.23 1.85 - 7.62 Thousands/µL    Absolute Immature Grans 0.05 0.00 - 0.20 Thousand/uL    Absolute Lymphocytes 1.02 0.60 - 4.47 Thousands/µL    Absolute Monocytes 0.60 0.17 - 1.22 Thousand/µL    Eosinophils Absolute 0.07 0.00 - 0.61 Thousand/µL    Basophils Absolute 0.02 0.00 - 0.10 Thousands/µL   Comprehensive metabolic panel    Collection Time: 02/28/24  8:30 PM   Result Value Ref Range    Sodium 141 135 - 147 mmol/L    Potassium 4.4 3.5 - 5.3 mmol/L    Chloride 97 96 - 108 mmol/L    CO2 38 (H) 21 - 32 mmol/L    ANION GAP 6 mmol/L    BUN 18 5 - 25 mg/dL    Creatinine 0.92 0.60 - 1.30 mg/dL    Glucose 134 65 - 140 mg/dL    Calcium 9.7 8.4 - 10.2 mg/dL    AST 23 13 - 39 U/L    ALT 13 7 - 52 U/L    Alkaline Phosphatase 96 34 - 104 U/L    Total Protein 7.4 6.4 - 8.4 g/dL    Albumin 4.3 3.5 - 5.0 g/dL    Total Bilirubin 0.36 0.20 - 1.00 mg/dL    eGFR 64 ml/min/1.73sq m   HS Troponin 0hr (reflex protocol)    Collection Time: 02/28/24  8:30 PM   Result Value Ref Range    hs TnI 0hr 10 \"Refer to ACS Flowchart\"- see link ng/L   FLU/RSV/COVID - if FLU/RSV clinically relevant    Collection Time: 02/28/24  8:30 PM    Specimen: Nose; Nares   Result Value Ref Range    SARS-CoV-2 Negative Negative    INFLUENZA A PCR Negative Negative    INFLUENZA B PCR Negative Negative    " "RSV PCR Negative Negative   ECG 12 lead    Collection Time: 02/28/24  8:30 PM   Result Value Ref Range    Ventricular Rate 93 BPM    Atrial Rate 93 BPM    AR Interval 156 ms    QRSD Interval 84 ms    QT Interval 372 ms    QTC Interval 462 ms    P Axis 88 degrees    QRS Axis 58 degrees    T Wave [place] 85 degrees   HS Troponin I 2hr    Collection Time: 02/28/24 10:40 PM   Result Value Ref Range    hs TnI 2hr 89 (H) \"Refer to ACS Flowchart\"- see link ng/L    Delta 2hr hsTnI 79 (H) <20 ng/L   HS Troponin I 4hr    Collection Time: 02/29/24  1:27 AM   Result Value Ref Range    hs TnI 4hr 157 (H) \"Refer to ACS Flowchart\"- see link ng/L    Delta 4hr hsTnI 147 (H) <20 ng/L   Platelet count    Collection Time: 02/29/24  1:27 AM   Result Value Ref Range    Platelets 190 149 - 390 Thousands/uL    MPV 9.6 8.9 - 12.7 fL   ECG 12 lead    Collection Time: 02/29/24  3:24 AM   Result Value Ref Range    Ventricular Rate 95 BPM    Atrial Rate 95 BPM    AR Interval 196 ms    QRSD Interval 86 ms    QT Interval 352 ms    QTC Interval 442 ms    P Axis 83 degrees    QRS Axis 15 degrees    T Wave Axis -18 degrees   HS Troponin 0hr (reflex protocol)    Collection Time: 02/29/24  5:55 AM   Result Value Ref Range    hs TnI 0hr 256 (H) \"Refer to ACS Flowchart\"- see link ng/L   CBC and differential    Collection Time: 02/29/24  5:55 AM   Result Value Ref Range    WBC 6.69 4.31 - 10.16 Thousand/uL    RBC 4.05 3.81 - 5.12 Million/uL    Hemoglobin 10.7 (L) 11.5 - 15.4 g/dL    Hematocrit 36.3 34.8 - 46.1 %    MCV 90 82 - 98 fL    MCH 26.4 (L) 26.8 - 34.3 pg    MCHC 29.5 (L) 31.4 - 37.4 g/dL    RDW 14.2 11.6 - 15.1 %    MPV 9.6 8.9 - 12.7 fL    Platelets 207 149 - 390 Thousands/uL   Basic metabolic panel    Collection Time: 02/29/24  5:55 AM   Result Value Ref Range    Sodium 140 135 - 147 mmol/L    Potassium 4.8 3.5 - 5.3 mmol/L    Chloride 98 96 - 108 mmol/L    CO2 38 (H) 21 - 32 mmol/L    ANION GAP 4 mmol/L    BUN 17 5 - 25 mg/dL    Creatinine 0.85 " "0.60 - 1.30 mg/dL    Glucose 150 (H) 65 - 140 mg/dL    Calcium 9.5 8.4 - 10.2 mg/dL    eGFR 70 ml/min/1.73sq m   Magnesium    Collection Time: 02/29/24  5:55 AM   Result Value Ref Range    Magnesium 2.0 1.9 - 2.7 mg/dL   Manual Differential(PHLEBS Do Not Order)    Collection Time: 02/29/24  5:55 AM   Result Value Ref Range    Segmented % 89 (H) 43 - 75 %    Lymphocytes % 11 (L) 14 - 44 %    Monocytes % 0 (L) 4 - 12 %    Eosinophils % 0 0 - 6 %    Basophils % 0 0 - 1 %    Absolute Neutrophils 5.95 1.85 - 7.62 Thousand/uL    Absolute Lymphocytes 0.74 0.60 - 4.47 Thousand/uL    Absolute Monocytes 0.00 0.00 - 1.22 Thousand/uL    Absolute Eosinophils 0.00 0.00 - 0.40 Thousand/uL    Absolute Basophils 0.00 0.00 - 0.10 Thousand/uL    Total Counted      RBC Morphology Normal     Platelet Estimate Adequate Adequate   Lipid panel    Collection Time: 02/29/24  5:55 AM   Result Value Ref Range    Cholesterol 207 (H) See Comment mg/dL    Triglycerides 61 See Comment mg/dL    HDL, Direct 78 >=50 mg/dL    LDL Calculated 117 (H) 0 - 100 mg/dL    Non-HDL-Chol (CHOL-HDL) 129 mg/dl   TSH, 3rd generation with Free T4 reflex    Collection Time: 02/29/24  5:55 AM   Result Value Ref Range    TSH 3RD GENERATON 1.767 0.450 - 4.500 uIU/mL   HS Troponin I 2hr    Collection Time: 02/29/24  7:57 AM   Result Value Ref Range    hs TnI 2hr 236 (H) \"Refer to ACS Flowchart\"- see link ng/L    Delta 2hr hsTnI -20 <20 ng/L   ECG 12 lead    Collection Time: 02/29/24  8:22 AM   Result Value Ref Range    Ventricular Rate 83 BPM    Atrial Rate 83 BPM    DE Interval 146 ms    QRSD Interval 86 ms    QT Interval 386 ms    QTC Interval 453 ms    P Axis 75 degrees    QRS Axis 25 degrees    T Wave Axis -61 degrees   HS Troponin I 4hr    Collection Time: 02/29/24  9:56 AM   Result Value Ref Range    hs TnI 4hr 220 (H) \"Refer to ACS Flowchart\"- see link ng/L    Delta 4hr hsTnI -36 <20 ng/L   Hemoglobin A1C    Collection Time: 03/01/24  5:39 AM   Result Value Ref " Range    Hemoglobin A1C 5.9 (H) Normal 4.0-5.6%; PreDiabetic 5.7-6.4%; Diabetic >=6.5%; Glycemic control for adults with diabetes <7.0% %     mg/dl   CBC and differential    Collection Time: 03/01/24  5:39 AM   Result Value Ref Range    WBC 8.08 4.31 - 10.16 Thousand/uL    RBC 3.98 3.81 - 5.12 Million/uL    Hemoglobin 10.6 (L) 11.5 - 15.4 g/dL    Hematocrit 36.5 34.8 - 46.1 %    MCV 92 82 - 98 fL    MCH 26.6 (L) 26.8 - 34.3 pg    MCHC 29.0 (L) 31.4 - 37.4 g/dL    RDW 14.7 11.6 - 15.1 %    MPV 9.9 8.9 - 12.7 fL    Platelets 208 149 - 390 Thousands/uL    nRBC 0 /100 WBCs    Segmented % 78 (H) 43 - 75 %    Immature Grans % 0 0 - 2 %    Lymphocytes % 13 (L) 14 - 44 %    Monocytes % 8 4 - 12 %    Eosinophils Relative 0 0 - 6 %    Basophils Relative 1 0 - 1 %    Absolute Neutrophils 6.27 1.85 - 7.62 Thousands/µL    Absolute Immature Grans 0.03 0.00 - 0.20 Thousand/uL    Absolute Lymphocytes 1.04 0.60 - 4.47 Thousands/µL    Absolute Monocytes 0.68 0.17 - 1.22 Thousand/µL    Eosinophils Absolute 0.02 0.00 - 0.61 Thousand/µL    Basophils Absolute 0.04 0.00 - 0.10 Thousands/µL   Basic metabolic panel    Collection Time: 03/01/24  5:39 AM   Result Value Ref Range    Sodium 143 135 - 147 mmol/L    Potassium 4.9 3.5 - 5.3 mmol/L    Chloride 101 96 - 108 mmol/L    CO2 40 (H) 21 - 32 mmol/L    ANION GAP 2 mmol/L    BUN 24 5 - 25 mg/dL    Creatinine 1.05 0.60 - 1.30 mg/dL    Glucose 96 65 - 140 mg/dL    Calcium 9.4 8.4 - 10.2 mg/dL    eGFR 54 ml/min/1.73sq m   Magnesium    Collection Time: 03/01/24  5:39 AM   Result Value Ref Range    Magnesium 2.0 1.9 - 2.7 mg/dL   Stress strip    Collection Time: 03/01/24  9:18 AM   Result Value Ref Range    Protocol Name EMMA SIT     Exercise duration (min) 3 min    Exercise duration (sec) 0 sec    Post Peak Systolic  mmHg    Max Diastolic Bp 74 mmHg    Peak HR 90 BPM    Max Predicted Heart Rate 152 BPM    Reason for Termination Protocol Complete     Test Indication CHEST PAIN      Target Hr Formular (220 - Age)*100%     Arrhy During Ex      ECG Interp Before Ex      ECG Interp during Ex      Ex Summary Comment      Chest Pain Statement none     Overall Hr Response To Exercise      Overall BP Response To Exercise     Stress strip    Collection Time: 03/01/24  9:18 AM   Result Value Ref Range    Protocol Name EMMA SIT     Exercise duration (min) 3 min    Exercise duration (sec) 0 sec    Post Peak Systolic  mmHg    Max Diastolic Bp 74 mmHg    Peak HR 90 BPM    Max Predicted Heart Rate 152 BPM    Reason for Termination Protocol Complete     Test Indication CHEST PAIN     Target Hr Formular (220 - Age)*100%     Arrhy During Ex      ECG Interp Before Ex      ECG Interp during Ex      Ex Summary Comment      Chest Pain Statement none     Overall Hr Response To Exercise      Overall BP Response To Exercise     NM myocardial perfusion spect (rx stress and/or rest)    Collection Time: 03/01/24 10:33 AM   Result Value Ref Range    Baseline HR 72 bpm    Baseline /65 mmHg    O2 sat rest 100 %    Stress peak HR 90 bpm    Post peak  mmHg    O2 sat peak 100 %    Recovery HR 82 bpm    Recovery /70 mmHg    O2 sat recovery 100 %    Rate Pressure Product 16,020.0     Angina Index 0     Rest Nuclear Isotope Dose 10.80 mCi    Stress Nuclear Isotope Dose 32.60 mCi    EF (%) 65 %   CBC and differential    Collection Time: 03/02/24  5:38 AM   Result Value Ref Range    WBC 7.21 4.31 - 10.16 Thousand/uL    RBC 3.87 3.81 - 5.12 Million/uL    Hemoglobin 10.3 (L) 11.5 - 15.4 g/dL    Hematocrit 35.3 34.8 - 46.1 %    MCV 91 82 - 98 fL    MCH 26.6 (L) 26.8 - 34.3 pg    MCHC 29.2 (L) 31.4 - 37.4 g/dL    RDW 14.8 11.6 - 15.1 %    MPV 9.6 8.9 - 12.7 fL    Platelets 194 149 - 390 Thousands/uL    nRBC 0 /100 WBCs    Segmented % 77 (H) 43 - 75 %    Immature Grans % 0 0 - 2 %    Lymphocytes % 12 (L) 14 - 44 %    Monocytes % 9 4 - 12 %    Eosinophils Relative 1 0 - 6 %    Basophils Relative 1 0 - 1 %     Absolute Neutrophils 5.56 1.85 - 7.62 Thousands/µL    Absolute Immature Grans 0.02 0.00 - 0.20 Thousand/uL    Absolute Lymphocytes 0.85 0.60 - 4.47 Thousands/µL    Absolute Monocytes 0.66 0.17 - 1.22 Thousand/µL    Eosinophils Absolute 0.08 0.00 - 0.61 Thousand/µL    Basophils Absolute 0.04 0.00 - 0.10 Thousands/µL   Basic metabolic panel    Collection Time: 03/02/24  5:38 AM   Result Value Ref Range    Sodium 143 135 - 147 mmol/L    Potassium 5.0 3.5 - 5.3 mmol/L    Chloride 100 96 - 108 mmol/L    CO2 42 (H) 21 - 32 mmol/L    ANION GAP 1 mmol/L    BUN 22 5 - 25 mg/dL    Creatinine 1.01 0.60 - 1.30 mg/dL    Glucose 93 65 - 140 mg/dL    Calcium 9.3 8.4 - 10.2 mg/dL    eGFR 57 ml/min/1.73sq m   Magnesium    Collection Time: 03/02/24  5:38 AM   Result Value Ref Range    Magnesium 1.9 1.9 - 2.7 mg/dL   CBC and differential    Collection Time: 03/03/24  6:10 AM   Result Value Ref Range    WBC 7.86 4.31 - 10.16 Thousand/uL    RBC 4.03 3.81 - 5.12 Million/uL    Hemoglobin 10.5 (L) 11.5 - 15.4 g/dL    Hematocrit 37.1 34.8 - 46.1 %    MCV 92 82 - 98 fL    MCH 26.1 (L) 26.8 - 34.3 pg    MCHC 28.3 (L) 31.4 - 37.4 g/dL    RDW 14.5 11.6 - 15.1 %    MPV 9.7 8.9 - 12.7 fL    Platelets 201 149 - 390 Thousands/uL    nRBC 0 /100 WBCs    Segmented % 80 (H) 43 - 75 %    Immature Grans % 0 0 - 2 %    Lymphocytes % 8 (L) 14 - 44 %    Monocytes % 11 4 - 12 %    Eosinophils Relative 1 0 - 6 %    Basophils Relative 0 0 - 1 %    Absolute Neutrophils 6.25 1.85 - 7.62 Thousands/µL    Absolute Immature Grans 0.03 0.00 - 0.20 Thousand/uL    Absolute Lymphocytes 0.66 0.60 - 4.47 Thousands/µL    Absolute Monocytes 0.84 0.17 - 1.22 Thousand/µL    Eosinophils Absolute 0.05 0.00 - 0.61 Thousand/µL    Basophils Absolute 0.03 0.00 - 0.10 Thousands/µL   Basic metabolic panel    Collection Time: 03/03/24  6:10 AM   Result Value Ref Range    Sodium 140 135 - 147 mmol/L    Potassium 4.3 3.5 - 5.3 mmol/L    Chloride 98 96 - 108 mmol/L    CO2 39 (H) 21 - 32  mmol/L    ANION GAP 3 mmol/L    BUN 17 5 - 25 mg/dL    Creatinine 0.85 0.60 - 1.30 mg/dL    Glucose 98 65 - 140 mg/dL    Calcium 9.2 8.4 - 10.2 mg/dL    eGFR 70 ml/min/1.73sq m   Magnesium    Collection Time: 03/03/24  6:10 AM   Result Value Ref Range    Magnesium 1.8 (L) 1.9 - 2.7 mg/dL   CBC and differential    Collection Time: 03/04/24  5:24 AM   Result Value Ref Range    WBC 5.55 4.31 - 10.16 Thousand/uL    RBC 3.79 (L) 3.81 - 5.12 Million/uL    Hemoglobin 10.3 (L) 11.5 - 15.4 g/dL    Hematocrit 34.6 (L) 34.8 - 46.1 %    MCV 91 82 - 98 fL    MCH 27.2 26.8 - 34.3 pg    MCHC 29.8 (L) 31.4 - 37.4 g/dL    RDW 14.6 11.6 - 15.1 %    MPV 9.9 8.9 - 12.7 fL    Platelets 168 149 - 390 Thousands/uL    nRBC 0 /100 WBCs    Segmented % 73 43 - 75 %    Immature Grans % 1 0 - 2 %    Lymphocytes % 14 14 - 44 %    Monocytes % 11 4 - 12 %    Eosinophils Relative 1 0 - 6 %    Basophils Relative 0 0 - 1 %    Absolute Neutrophils 4.04 1.85 - 7.62 Thousands/µL    Absolute Immature Grans 0.03 0.00 - 0.20 Thousand/uL    Absolute Lymphocytes 0.80 0.60 - 4.47 Thousands/µL    Absolute Monocytes 0.61 0.17 - 1.22 Thousand/µL    Eosinophils Absolute 0.05 0.00 - 0.61 Thousand/µL    Basophils Absolute 0.02 0.00 - 0.10 Thousands/µL   Basic metabolic panel    Collection Time: 03/04/24  5:24 AM   Result Value Ref Range    Sodium 142 135 - 147 mmol/L    Potassium 4.1 3.5 - 5.3 mmol/L    Chloride 99 96 - 108 mmol/L    CO2 41 (H) 21 - 32 mmol/L    ANION GAP 2 mmol/L    BUN 17 5 - 25 mg/dL    Creatinine 0.88 0.60 - 1.30 mg/dL    Glucose 99 65 - 140 mg/dL    Calcium 9.2 8.4 - 10.2 mg/dL    eGFR 67 ml/min/1.73sq m   Magnesium    Collection Time: 03/04/24  5:24 AM   Result Value Ref Range    Magnesium 2.0 1.9 - 2.7 mg/dL   Tissue Exam    Collection Time: 03/04/24  8:45 AM   Result Value Ref Range    Case Report       Surgical Pathology Report                         Case: [id number]                                  Authorizing Provider:  Sidra Raymundo  MD          Collected:           03/04/2024 0845              Ordering Location:     [facility]        Received:            03/04/2024 [facility]                                     [place] [facility] Med                                                                              Surg Unit                                                                    Pathologist:           Stacy Fowler MD                                                       Specimen:    Esophagus, Cold BX Distal esophagus                                                        Final Diagnosis       A. Esophagus, Distal, biopsy:  - Benign squamous mucosa with mild reactive changes  - Negative for intestinal metaplasia, eosinophilic esophagitis, dysplasia and malignancy         Additional Information       All reported additional testing was performed with appropriately reactive controls.  These tests were developed and their performance characteristics determined by [facility] or appropriate performing facility, though some tests may be performed on tissues which have not been validated for performance characteristics (such as staining performed on alcohol exposed cell blocks and decalcified tissues).  Results should be interpreted with caution and in the context of the patients’ clinical condition. These tests may not be cleared or approved by the U.S. Food and Drug Administration, though the FDA has determined that such clearance or approval is not necessary. These tests are used for clinical purposes and they should not be regarded as investigational or for research. This laboratory has been approved by CLIA 88, designated as a high-complexity laboratory and is qualified to perform these tests.    Interpretation performed at [facility] [address] [place] [address] Way, [place] [address]   .      Gross Description       A. The specimen is received in formalin, labeled with the patient's name and hospital number, and  "is designated \" biopsy distal esophagus\".  The specimen consists of 3 white to red soft tissue fragments each measuring 0.3 to 0.5 cm.  Entirely submitted. Screened cassette.    Note: The estimated total formalin fixation time based upon information provided by the submitting clinician and the standard processing schedule is under 72 hours.    [facility]     CBC and differential    Collection Time: 03/05/24  4:34 AM   Result Value Ref Range    WBC 6.14 4.31 - 10.16 Thousand/uL    RBC 4.07 3.81 - 5.12 Million/uL    Hemoglobin 10.7 (L) 11.5 - 15.4 g/dL    Hematocrit 37.1 34.8 - 46.1 %    MCV 91 82 - 98 fL    MCH 26.3 (L) 26.8 - 34.3 pg    MCHC 28.8 (L) 31.4 - 37.4 g/dL    RDW 14.6 11.6 - 15.1 %    MPV 9.9 8.9 - 12.7 fL    Platelets 177 149 - 390 Thousands/uL    nRBC 0 /100 WBCs    Segmented % 80 (H) 43 - 75 %    Immature Grans % 0 0 - 2 %    Lymphocytes % 11 (L) 14 - 44 %    Monocytes % 8 4 - 12 %    Eosinophils Relative 1 0 - 6 %    Basophils Relative 0 0 - 1 %    Absolute Neutrophils 4.87 1.85 - 7.62 Thousands/µL    Absolute Immature Grans 0.02 0.00 - 0.20 Thousand/uL    Absolute Lymphocytes 0.67 0.60 - 4.47 Thousands/µL    Absolute Monocytes 0.50 0.17 - 1.22 Thousand/µL    Eosinophils Absolute 0.06 0.00 - 0.61 Thousand/µL    Basophils Absolute 0.02 0.00 - 0.10 Thousands/µL   Basic metabolic panel    Collection Time: 03/05/24  4:34 AM   Result Value Ref Range    Sodium 141 135 - 147 mmol/L    Potassium 4.0 3.5 - 5.3 mmol/L    Chloride 99 96 - 108 mmol/L    CO2 37 (H) 21 - 32 mmol/L    ANION GAP 5 mmol/L    BUN 13 5 - 25 mg/dL    Creatinine 0.89 0.60 - 1.30 mg/dL    Glucose 94 65 - 140 mg/dL    Calcium 9.3 8.4 - 10.2 mg/dL    eGFR 66 ml/min/1.73sq m   Magnesium    Collection Time: 03/05/24  4:34 AM   Result Value Ref Range    Magnesium 1.9 1.9 - 2.7 mg/dL   Tissue Exam    Collection Time: 04/11/24  8:28 AM   Result Value Ref Range    Case Report       Surgical Pathology Report                         Case: [id number]    "                               Authorizing Provider:  Sidra Raymundo MD          Collected:           04/11/2024 0828              Ordering Location:     [facility]        Received:            04/11/2024 [address] [place] [address] Endoscopy                                                           Pathologist:           Jasiel Sosa MD                                                                Specimens:   A) - Polyp, Colorectal, ascending colon polyp hot snare                                             B) - Polyp, Colorectal, sigmoid colon polyp cold snare                                     Final Diagnosis       A.  Ascending colon polyp (hot snare):     - Tubular adenoma; negative for high-grade dysplasia.    B.  Sigmoid colon polyp (cold snare):     - Tubular adenoma; negative for high-grade dysplasia.       Additional Information       All reported additional testing was performed with appropriately reactive controls.  These tests were developed and their performance characteristics determined by [facility] or appropriate performing facility, though some tests may be performed on tissues which have not been validated for performance characteristics (such as staining performed on alcohol exposed cell blocks and decalcified tissues).  Results should be interpreted with caution and in the context of the patients’ clinical condition. These tests may not be cleared or approved by the U.S. Food and Drug Administration, though the FDA has determined that such clearance or approval is not necessary. These tests are used for clinical purposes and they should not be regarded as investigational or for research. This laboratory has been approved by CLIA 88, designated as a high-complexity laboratory and is qualified to perform these tests. Interpretation performed at [facility], [address] [place] [id number]      Synoptic Checklist          COLON/RECTUM  "POLYP FORM - GI - All Specimens          :    Adenoma(s)      Gross Description       A. The specimen is received in formalin, labeled with the patient's name and hospital number, and is designated \" ascending colon polyp\".  The specimen consists of a single tan-pink polypoid tissue fragment measuring 0.6 x 0.4 x 0.4 cm.  The presumed margins of resection is inked blue and the specimen is bisected revealing grossly unremarkable cut surfaces.  Entirely submitted.  One screened cassette.    B. The specimen is received in formalin, labeled with the patient's name and hospital number, and is designated \" sigmoid colon polyp\".  The specimen consists of a single tan tissue fragment measuring 0.2 cm in greatest dimension.  The specimen is entirely submitted in a screened cassette.    Note: The estimated total formalin fixation time based upon information provided by the submitting clinician and the standard processing schedule is under 72 hours. Rocky Calderon     Adult Nebulizer Package    Collection Time: 06/07/24  9:05 AM   Result Value Ref Range    Supp[facility] Name [facility]     Supplier Phone Number [phone]     Order Status Delivery Successful     Delivery Note      Delivery Request Date 06/07/2024     Date Delivered  07/02/2024     Item Description Nebulizer Compressor with Mask     Item Description Nebulizer Set, Reusable     Item Description       Adult Nebulizer Mask, 1 per 1 month a€“ Use as directed and discard 1 month after first use    Item Description Disposable Nebulizer Compressor Filter    ECG 12 lead    Collection Time: 06/19/24  5:15 PM   Result Value Ref Range    Ventricular Rate 91 BPM    Atrial Rate 91 BPM    CA Interval 156 ms    QRSD Interval 90 ms    QT Interval 360 ms    QTC Interval 442 ms    P Axis 88 degrees    QRS Axis 73 degrees    T Wave Axis 75 degrees   CBC and differential    Collection Time: 06/19/24  5:48 PM   Result Value Ref Range    WBC 7.46 4.31 - " 10.16 Thousand/uL    RBC 3.91 3.81 - 5.12 Million/uL    Hemoglobin 10.6 (L) 11.5 - 15.4 g/dL    Hematocrit 36.1 34.8 - 46.1 %    MCV 92 82 - 98 fL    MCH 27.1 26.8 - 34.3 pg    MCHC 29.4 (L) 31.4 - 37.4 g/dL    RDW 13.1 11.6 - 15.1 %    MPV 10.0 8.9 - 12.7 fL    Platelets 137 (L) 149 - 390 Thousands/uL    nRBC 0 /100 WBCs    Segmented % 90 (H) 43 - 75 %    Immature Grans % 1 0 - 2 %    Lymphocytes % 4 (L) 14 - 44 %    Monocytes % 5 4 - 12 %    Eosinophils Relative 0 0 - 6 %    Basophils Relative 0 0 - 1 %    Absolute Neutrophils 6.73 1.85 - 7.62 Thousands/µL    Absolute Immature Grans 0.08 0.00 - 0.20 Thousand/uL    Absolute Lymphocytes 0.27 (L) 0.60 - 4.47 Thousands/µL    Absolute Monocytes 0.37 0.17 - 1.22 Thousand/µL    Eosinophils Absolute 0.00 0.00 - 0.61 Thousand/µL    Basophils Absolute 0.01 0.00 - 0.10 Thousands/µL   Comprehensive metabolic panel    Collection Time: 06/19/24  5:48 PM   Result Value Ref Range    Sodium 141 135 - 147 mmol/L    Potassium 4.5 3.5 - 5.3 mmol/L    Chloride 96 96 - 108 mmol/L    CO2 40 (H) 21 - 32 mmol/L    ANION GAP 5 4 - 13 mmol/L    BUN 18 5 - 25 mg/dL    Creatinine 0.98 0.60 - 1.30 mg/dL    Glucose 129 65 - 140 mg/dL    Calcium 9.3 8.4 - 10.2 mg/dL    AST 21 13 - 39 U/L    ALT 12 7 - 52 U/L    Alkaline Phosphatase 126 (H) 34 - 104 U/L    Total Protein 7.2 6.4 - 8.4 g/dL    Albumin 4.4 3.5 - 5.0 g/dL    Total Bilirubin 0.42 0.20 - 1.00 mg/dL    eGFR 59 ml/min/1.73sq m   B-Type Natriuretic Peptide(BNP)    Collection Time: 06/19/24  5:48 PM   Result Value Ref Range     (H) 0 - 100 pg/mL   High Sensitivity Troponin I Random    Collection Time: 06/19/24  5:48 PM   Result Value Ref Range    HS TnI random 5 (L) 8 - 18 ng/L   Smear Review(Phlebs Do Not Order)    Collection Time: 06/19/24  5:48 PM   Result Value Ref Range    RBC Morphology Present     Platelet Estimate Adequate Adequate    Basophilic Stippling Present    Procalcitonin    Collection Time: 06/19/24  5:48 PM    Result Value Ref Range    Procalcitonin <0.05 <=0.25 ng/ml   Blood gas, venous    Collection Time: 06/19/24  6:02 PM   Result Value Ref Range    pH, Jonas 7.220 (L) 7.300 - 7.400    pCO2, Jonas 99.1 (HH) 42.0 - 50.0 mm Hg    pO2, Jonas 42.2 35.0 - 45.0 mm Hg    HCO3, Jonas 39.6 (H) 24 - 30 mmol/L    Base Excess, Jonas 8.7 mmol/L    O2 Content, Jonas 12.1 ml/dL    O2 HGB, VENOUS 73.6 60.0 - 80.0 %   COVID/FLU/RSV    Collection Time: 06/19/24  8:10 PM    Specimen: Nose; Nares   Result Value Ref Range    SARS-CoV-2 Negative Negative    INFLUENZA A PCR Negative Negative    INFLUENZA B PCR Negative Negative    RSV PCR Negative Negative   ECG 12 lead    Collection Time: 06/19/24 10:40 PM   Result Value Ref Range    Ventricular Rate 94 BPM    Atrial Rate 94 BPM    NE Interval 174 ms    QRSD Interval 90 ms    QT Interval 370 ms    QTC Interval 462 ms    P Axis 84 degrees    QRS Axis 63 degrees    T Wave Axis 76 degrees   Blood gas, venous    Collection Time: 06/20/24 10:21 AM   Result Value Ref Range    pH, Jonas 7.275 (L) 7.300 - 7.400    pCO2, Jonas 80.3 (HH) 42.0 - 50.0 mm Hg    pO2, Jonas 46.3 (H) 35.0 - 45.0 mm Hg    HCO3, Jonas 36.5 (H) 24 - 30 mmol/L    Base Excess, Jonas 7.4 mmol/L    O2 Content, Jonas 12.6 ml/dL    O2 HGB, VENOUS 80.6 (H) 60.0 - 80.0 %   Respiratory Panel 2.1(RP2)with COVID19    Collection Time: 06/20/24 12:49 PM    Specimen: Nasopharyngeal Swab   Result Value Ref Range    Adenovirus Not Detected Not Detected    Bordetella parapertussis Not Detected Not Detected    Bordetella pertussis Not Detected Not Detected    Chlamydia pneumoniae Not Detected Not detected    SARS-CoV-2 Not Detected Not Detected    Coronavirus 229E Not Detected Not Detected    Coronavirus HKU1 Not Detected Not Detected    Coronavirus NL63 Not Detected Not Detected    Coronavirus OC43 Not Detected Not Detected    Human Metapneumovirus Not Detected Not Detected    Rhino/Enterovirus Not Detected Not Detected    Influenza A Not Detected Not Detected     Influenza B Not Detected No Detected    Mycoplasma pneumoniae Not Detected Not Detected    Parainfluenza 1 Not Detected Not Detected    Parainfluenza 2 Not Detected Not Detected    Parainfluenza 3 Detected (A) Not Detected    Parainfluenza 4 Not Detected Not Detected    Respiratory Syncytial Virus Not Detected Not Detected   Procalcitonin    Collection Time: 06/21/24  8:03 AM   Result Value Ref Range    Procalcitonin <0.05 <=0.25 ng/ml   CBC and differential    Collection Time: 06/21/24  8:03 AM   Result Value Ref Range    WBC 12.98 (H) 4.31 - 10.16 Thousand/uL    RBC 3.99 3.81 - 5.12 Million/uL    Hemoglobin 10.5 (L) 11.5 - 15.4 g/dL    Hematocrit 36.9 34.8 - 46.1 %    MCV 93 82 - 98 fL    MCH 26.3 (L) 26.8 - 34.3 pg    MCHC 28.5 (L) 31.4 - 37.4 g/dL    RDW 13.2 11.6 - 15.1 %    MPV 9.5 8.9 - 12.7 fL    Platelets 177 149 - 390 Thousands/uL    nRBC 0 /100 WBCs    Segmented % 90 (H) 43 - 75 %    Immature Grans % 1 0 - 2 %    Lymphocytes % 3 (L) 14 - 44 %    Monocytes % 6 4 - 12 %    Eosinophils Relative 0 0 - 6 %    Basophils Relative 0 0 - 1 %    Absolute Neutrophils 11.70 (H) 1.85 - 7.62 Thousands/µL    Absolute Immature Grans 0.10 0.00 - 0.20 Thousand/uL    Absolute Lymphocytes 0.37 (L) 0.60 - 4.47 Thousands/µL    Absolute Monocytes 0.80 0.17 - 1.22 Thousand/µL    Eosinophils Absolute 0.00 0.00 - 0.61 Thousand/µL    Basophils Absolute 0.01 0.00 - 0.10 Thousands/µL   Basic metabolic panel    Collection Time: 06/21/24  8:03 AM   Result Value Ref Range    Sodium 142 135 - 147 mmol/L    Potassium 4.8 3.5 - 5.3 mmol/L    Chloride 97 96 - 108 mmol/L    CO2 44 (H) 21 - 32 mmol/L    ANION GAP 1 (L) 4 - 13 mmol/L    BUN 25 5 - 25 mg/dL    Creatinine 1.12 0.60 - 1.30 mg/dL    Glucose 120 65 - 140 mg/dL    Calcium 9.7 8.4 - 10.2 mg/dL    eGFR 50 ml/min/1.73sq m   Smear Review(Phlebs Do Not Order)    Collection Time: 06/21/24  8:03 AM   Result Value Ref Range    RBC Morphology Present     Platelet Estimate Adequate Adequate     Basophilic Stippling Present    POCT Blood Gas (CG8+)    Collection Time: 06/21/24  5:33 PM   Result Value Ref Range    pH, Art i-STAT 7.256 (L) 7.350 - 7.450    pCO2, Art i-STAT 91.8 (HH) 36.0 - 44.0 mm HG    pO2, ART i-STAT 74.0 (L) 75.0 - 129.0 mm HG    BE, i-STAT 11 (H) -2 - 3 mmol/L    HCO3, Art i-STAT 40.8 (HH) 22.0 - 28.0 mmol/L    CO2, i-STAT 44 (H) 21 - 32 mmol/L    O2 Sat, i-STAT 91 (H) 60 - 85 %    SODIUM, I-STAT 137 136 - 145 mmol/l    Potassium, i-STAT 4.7 3.5 - 5.3 mmol/L    Calcium, Ionized i-STAT 1.27 1.12 - 1.32 mmol/L    Hct, i-STAT 34 (L) 34.8 - 46.1 %    Hgb, i-STAT 11.6 11.5 - 15.4 g/dl    Glucose, i-STAT 130 65 - 140 mg/dl    Specimen Type ARTERIAL    Blood gas, arterial    Collection Time: 06/21/24  8:23 PM   Result Value Ref Range    pH, Arterial 7.276 (L) 7.350 - 7.450    pCO2, Arterial 81.4 (HH) 36.0 - 44.0 mm Hg    pO2, Arterial 95.9 75.0 - 129.0 mm Hg    HCO3, Arterial 37.0 (H) 22.0 - 28.0 mmol/L    Base Excess, Arterial 7.6 mmol/L    O2 Content, Arterial 16.6 16.0 - 23.0 mL/dL    O2 HGB,Arterial  96.9 94.0 - 97.0 %    SOURCE Radial, Left     SRAVAN TEST Yes     Non Vent type BIPAP BIPAP     IPAP 12     EPAP 6     BIPAP fio2 40 %   Blood gas, arterial    Collection Time: 06/22/24  4:36 AM   Result Value Ref Range    pH, Arterial 7.272 (L) 7.350 - 7.450    pCO2, Arterial 92.7 (HH) 36.0 - 44.0 mm Hg    pO2, Arterial 99.6 75.0 - 129.0 mm Hg    HCO3, Arterial 41.8 (H) 22.0 - 28.0 mmol/L    Base Excess, Arterial 11.5 mmol/L    O2 Content, Arterial 16.5 16.0 - 23.0 mL/dL    O2 HGB,Arterial  96.9 94.0 - 97.0 %    SRAVAN TEST Yes     Non Vent type BIPAP BIPAP     IPAP 12     EPAP 6     BIPAP fio2 40 %   Basic metabolic panel    Collection Time: 06/22/24  5:00 AM   Result Value Ref Range    Sodium 140 135 - 147 mmol/L    Potassium 4.2 3.5 - 5.3 mmol/L    Chloride 93 (L) 96 - 108 mmol/L    CO2 43 (H) 21 - 32 mmol/L    ANION GAP 4 4 - 13 mmol/L    BUN 34 (H) 5 - 25 mg/dL    Creatinine 1.16 0.60 -  1.30 mg/dL    Glucose 114 65 - 140 mg/dL    Calcium 9.3 8.4 - 10.2 mg/dL    eGFR 48 ml/min/1.73sq m   Blood gas, arterial    Collection Time: 06/22/24  3:03 PM   Result Value Ref Range    pH, Arterial 7.306 (L) 7.350 - 7.450    pCO2, Arterial 74.4 (HH) 36.0 - 44.0 mm Hg    pO2, Arterial 93.5 75.0 - 129.0 mm Hg    HCO3, Arterial 36.3 (H) 22.0 - 28.0 mmol/L    Base Excess, Arterial 7.8 mmol/L    O2 Content, Arterial 15.5 (L) 16.0 - 23.0 mL/dL    O2 HGB,Arterial  96.0 94.0 - 97.0 %    SOURCE Radial, Right     SRAVAN TEST Yes     Non Vent type BIPAP BIPAP     IPAP 14     EPAP 6     BIPAP fio2 40 %   Blood gas, arterial    Collection Time: 06/22/24  7:55 PM   Result Value Ref Range    pH, Arterial 7.320 (L) 7.350 - 7.450    pCO2, Arterial 79.5 (HH) 36.0 - 44.0 mm Hg    pO2, Arterial 105.7 75.0 - 129.0 mm Hg    HCO3, Arterial 40.0 (H) 22.0 - 28.0 mmol/L    Base Excess, Arterial 11.1 mmol/L    O2 Content, Arterial 16.2 16.0 - 23.0 mL/dL    O2 HGB,Arterial  96.7 94.0 - 97.0 %    SOURCE Radial, Left     SRAVAN TEST Yes     Non Vent type BIPAP     CBC and differential    Collection Time: 06/23/24  4:59 AM   Result Value Ref Range    WBC 6.12 4.31 - 10.16 Thousand/uL    RBC 3.70 (L) 3.81 - 5.12 Million/uL    Hemoglobin 9.9 (L) 11.5 - 15.4 g/dL    Hematocrit 34.2 (L) 34.8 - 46.1 %    MCV 92 82 - 98 fL    MCH 26.8 26.8 - 34.3 pg    MCHC 28.9 (L) 31.4 - 37.4 g/dL    RDW 13.3 11.6 - 15.1 %    MPV 9.8 8.9 - 12.7 fL    Platelets 153 149 - 390 Thousands/uL    nRBC 0 /100 WBCs    Segmented % 89 (H) 43 - 75 %    Immature Grans % 1 0 - 2 %    Lymphocytes % 7 (L) 14 - 44 %    Monocytes % 3 (L) 4 - 12 %    Eosinophils Relative 0 0 - 6 %    Basophils Relative 0 0 - 1 %    Absolute Neutrophils 5.50 1.85 - 7.62 Thousands/µL    Absolute Immature Grans 0.03 0.00 - 0.20 Thousand/uL    Absolute Lymphocytes 0.40 (L) 0.60 - 4.47 Thousands/µL    Absolute Monocytes 0.19 0.17 - 1.22 Thousand/µL    Eosinophils Absolute 0.00 0.00 - 0.61 Thousand/µL     Basophils Absolute 0.00 0.00 - 0.10 Thousands/µL     *Note: Due to a large number of results and/or encounters for the requested time period, some results have not been displayed. A complete set of results can be found in Results Review.       Laboratory Results: I have personally reviewed the pertinent laboratory results/reports     Radiology/Other Diagnostic Testing Results:     CT chest wo contrast    Result Date: 9/17/2024  CT CHEST WITHOUT IV CONTRAST INDICATION: R91.8: Other nonspecific abnormal finding of lung field. COMPARISON: PET/CT 8/1/2024, CTA chest 6/19/2024, CT chest 2/26/2022, 6/16/2017 TECHNIQUE: CT examination of the chest was performed without intravenous contrast. Multiplanar 2D reformatted images were created from the source data. This examination, like all CT scans performed in the [facility], was performed utilizing techniques to minimize radiation dose exposure, including the use of iterative reconstruction and automated exposure control. Radiation dose length product (DLP) for this visit: 115.61 mGy-cm FINDINGS: LUNGS: There is no tracheal or endobronchial lesion. Biapical pleural-parenchymal scarring. Emphysematous changes of lungs. Medial basilar right upper lobe, right middle lobe, and lingular atelectasis versus scarring. Pulmonary nodules as noted on series  302: -Stable 1.6 x 1.0 cm right lower lobe groundglass nodule (image 107) -Stable 1.5 x 1.1 cm right lower lobe groundglass nodule (image 137) -Resolution of previously seen posterior right lower lobe tree-in-bud nodularity and right basilar nodular opacity -Stable 1.2 x 1.1 cm left lower lobe nodule (image 186) PLEURA: Unremarkable. HEART/GREAT VESSELS: Coronary artery calcifications. No thoracic aortic aneurysm. MEDIASTINUM AND KAYY: Unremarkable. CHEST WALL AND LOWER NECK: Unremarkable. VISUALIZED STRUCTURES IN THE UPPER ABDOMEN: Unremarkable. OSSEOUS STRUCTURES: No acute fracture or destructive osseous lesion.      Stable bilateral pulmonary nodules with resolution of previously seen infectious/inflammatory nodules of right lower lobe. Recommend follow-up CT in 12 months to assess for long-term stability. Workstation performed: [id number]        Assessment/Plan:  1. COPD exacerbation (HCC)  2. Chronic respiratory failure with hypoxia, on home O2 therapy  (HCC)  -     Comprehensive metabolic panel; Future  3. Centrilobular emphysema (HCC)  -     guaiFENesin (ROBITUSSIN) 100 mg/5 mL syrup; Take 5 mL (100 mg total) by mouth 3 (three) times a day as needed for cough for up to 10 days  4. DOMINIC (acute kidney injury) (HCC)  5. Chronic right-sided low back pain with right-sided sciatica  -     Diclofenac Sodium (VOLTAREN) 1 %; Apply 2 g topically 4 (four) times a day  -     tiZANidine (ZANAFLEX) 2 mg tablet; Take 1 tablet (2 mg total) by mouth daily at bedtime as needed for muscle spasms (back pain)  6. Folliculitis  -     clindamycin 1 % gel; Apply topically 2 (two) times a day  7. Primary hypertension  8. Iron deficiency anemia due to dietary causes  -     CBC and differential; Future  -     Iron Panel (Includes Ferritin, Iron Sat%, Iron, and TIBC); Future  9. Mixed hyperlipidemia  -     Lipid panel; Future  10. Encounter for screening for diabetes mellitus  -     Hemoglobin A1C; Future      I have discussed the CT findings with patient clinically stable lung nodules, follow-up CT in 1 year.    Can apply topical diclofenac gel for low back pain.  Avoid any NSAIDs because the kidney number was elevated.  Obtain the blood work so that I can prescribe any other pain relief medications.  To then use Tylenol 650 mg as needed every 6 to 8 hours for pain relief    Stop using Robitussin DM which is guaifenesin and dextromethorphan combination as this is making you sleepy the whole day.  Instead of this you will use the plain Robitussin which is guaifenesin 5 mL every 4 hours as needed for cough relief.  You can continue using Tessalon  "Perles as well.    Use topical clindamycin gel twice a day as needed for folliculitis, avoid shaving the area.  Instructed  if it gets very painful or infected please call the office           Read package inserts for all medications before starting a new medications, call me if you have any questions.    Patient was given opportunity to ask questions and all questions were answered.    Disclaimer: Portions of the record may have been created with voice recognition software. Occasional wrong word or \"sound a like\" substitutions may have occurred due to the inherent limitations of voice recognition software. Read the chart carefully and recognize, using context, where substitutions have occurred. I have used the Epic copy/forward function to compose this note. I have reviewed my current note to ensure it reflects the current patient status, exam, assessment and plan.    "

## 2024-09-24 NOTE — PATIENT INSTRUCTIONS
Can apply topical diclofenac gel for low back pain.  Avoid any NSAIDs because the kidney number was elevated.  Obtain the blood work so that I can prescribe any other pain relief medications.  To then use Tylenol 650 mg as needed every 6 to 8 hours for pain relief    Stop using Robitussin DM which is guaifenesin and dextromethorphan combination as this is making you sleepy the whole day.  Instead of this you will use the plain Robitussin which is guaifenesin 5 mL every 4 hours as needed for cough relief.  You can continue using Tessalon Perles as well.    You can take multivitamin 1 a day for women which has iron in it generally natures made or centrum are okay    The boils in the groin area are called folliculitis and they are secondary to ingrown hair.  Preferably do not shave the hair.  Use topical clindamycin gel twice a day as needed on these areas whenever the boil appears.  If it gets very painful or infected please let me know I will send in a noral antibiotic    Use sertraline 25 mg at bedtime.

## 2024-10-03 ENCOUNTER — OFFICE VISIT (OUTPATIENT)
Dept: HEMATOLOGY ONCOLOGY | Facility: CLINIC | Age: 69
End: 2024-10-03
Payer: COMMERCIAL

## 2024-10-03 VITALS
DIASTOLIC BLOOD PRESSURE: 58 MMHG | TEMPERATURE: 97.3 F | WEIGHT: 178 LBS | OXYGEN SATURATION: 93 % | HEART RATE: 73 BPM | HEIGHT: 64 IN | SYSTOLIC BLOOD PRESSURE: 118 MMHG | BODY MASS INDEX: 30.39 KG/M2 | RESPIRATION RATE: 20 BRPM

## 2024-10-03 DIAGNOSIS — R53.82 CHRONIC FATIGUE, UNSPECIFIED: ICD-10-CM

## 2024-10-03 DIAGNOSIS — R91.1 LUNG NODULE: Primary | ICD-10-CM

## 2024-10-03 DIAGNOSIS — Z85.21 HISTORY OF LARYNGEAL CANCER: ICD-10-CM

## 2024-10-03 DIAGNOSIS — R91.8 MULTIPLE LUNG NODULES: ICD-10-CM

## 2024-10-03 PROCEDURE — 99215 OFFICE O/P EST HI 40 MIN: CPT | Performed by: PHYSICIAN ASSISTANT

## 2024-10-03 PROCEDURE — G2211 COMPLEX E/M VISIT ADD ON: HCPCS | Performed by: PHYSICIAN ASSISTANT

## 2024-10-03 NOTE — PROGRESS NOTES
Hematology/Oncology Outpatient Follow- up Note  Noreen Alvarez 69 y.o. female MRN: @ Encounter: [id number]        Date:  10/3/2024      Assessment / Plan:    68-year-old  female with remote history of head and neck cancer in 2016 treated with radiation and chemotherapy.       9/13/24 CT chest compared to PET/CT 8/1/2024, CTA chest 6/19/2024, CT chest 2/26/2022, 6/16/2017 -     There is no tracheal or endobronchial lesion. Biapical pleural-parenchymal scarring. Emphysematous changes of lungs. Medial basilar right upper lobe, right middle lobe, and lingular atelectasis versus scarring. Pulmonary nodules as noted on series  302:  -Stable 1.6 x 1.0 cm right lower lobe groundglass nodule (image 107)  -Stable 1.5 x 1.1 cm right lower lobe groundglass nodule (image 137)  -Resolution of previously seen posterior right lower lobe tree-in-bud nodularity and right basilar nodular opacity  -Stable 1.2 x 1.1 cm left lower lobe nodule (image 186)       2.  Intermittent dysphagia.  Patient did have a EGD 3/2024-2 cm hiatal hernia  She was referred to ENT by her PCP August 2024.  She did not yet make this appointment.  Will reach out to ENT to assist with this appointment.        F/U in 6 months with repeat labs, CT neck and chest      HPI:   Noreen Alvarez is a 69-year-old  female seen for initial consultation 8/2021 by Dr. Julio re: Nonspecific lung findings and mesenteric adenopathy on a PET-CT scan with history of head and neck malignancy in 2016.    History  COPD, previous smoking, hiatal hernia, she had a history of head and neck cancer in 2016 treated with chemotherapy and radiation therapy by Dr. Brooks without any evidence of recurrence thereafter     Subsequently she had lung nodules as such CAT scan in 2021 showed left upper lobe lung nodule measuring 1.2 cm and AP window lymph node. PET scan did not show any active uptake favoring benign etiology versus aspiration    5/18/22 bx of right pulmonary nodule-  Minute foci of atypical glandular proliferations.  differential includes atypical adenomatous hyperplasia, adenocarcinoma in-situ, or a low-grade invasive adenocarcinoma.     CT scan of the chest on 3/1/2024 showed moderate upper lobe emphysema, unchanged 1.1 cm right lower lobe groundglass nodule unchanged 1 cm left lower lobe solid nodule, no new enlarging or suspicious pulmonary nodules    3/4/24 EGD pathology - - Negative for intestinal metaplasia, eosinophilic esophagitis, dysplasia and malignancy    4/2024 colonoscopy-  polyps removed.  No dysplasia or malignancy.     8/1/24 pet/ct - Focal FDG uptake in the right mid lung anteromedially, SUV max of 4.4. This corresponds to a new area of somewhat nodular consolidation on CT measuring up to 1.5 cm in size.    Stable or smaller mediastinal lymph nodes. Left subcarinal lymph node now measures 7 mm short axis, previously 1 cm. AP window lymph node measures 1 cm short axis, unchanged.     The 1.6 cm right lower lobe groundglass nodule does not demonstrate focal uptake.     There is a 1.1 cm lobular nodule in the left lower lobe posteriorly without focal uptake, stable from prior imaging. This was also previously negative on prior PET/CT.      IR cannot perform biopsy due to location.     8/7/24 Referral made to thoracic surgery for biopsy of lung nodule.    8/14/24 Saw PCP-  referred to ENT as prior ENT no longer takes her insurance.     8/22/24 f/u appointment made to review pet/ct    9/13/24 CT chest compared to PET/CT 8/1/2024, CTA chest 6/19/2024, CT chest 2/26/2022, 6/16/2017 -     There is no tracheal or endobronchial lesion. Biapical pleural-parenchymal scarring. Emphysematous changes of lungs. Medial basilar right upper lobe, right middle lobe, and lingular atelectasis versus scarring. Pulmonary nodules as noted on series  302:  -Stable 1.6 x 1.0 cm right lower lobe groundglass nodule (image 107)  -Stable 1.5 x 1.1 cm right lower lobe groundglass nodule  (image 137)  -Resolution of previously seen posterior right lower lobe tree-in-bud nodularity and right basilar nodular opacity  -Stable 1.2 x 1.1 cm left lower lobe nodule (image 186)             Review of Systems   Constitutional:  Negative for appetite change, chills, diaphoresis, fatigue, fever and unexpected weight change.   HENT:   Negative for mouth sores, nosebleeds, sore throat, tinnitus and voice change.    Eyes:  Negative for eye problems.   Respiratory:  Negative for chest tightness, cough, shortness of breath and wheezing.    Cardiovascular:  Negative for chest pain, leg swelling and palpitations.   Gastrointestinal:  Negative for abdominal distention, abdominal pain, blood in stool, constipation, diarrhea, nausea, rectal pain and vomiting.   Endocrine: Negative for hot flashes.   Genitourinary: Negative.     Musculoskeletal:  Negative for gait problem and myalgias.   Skin:  Negative for itching and rash.   Neurological:  Negative for dizziness, gait problem, headaches, light-headedness and numbness.   Hematological:  Negative for adenopathy.   Psychiatric/Behavioral:  Negative for confusion and sleep disturbance. The patient is not nervous/anxious.         Test Results:        Labs:   Lab Results   Component Value Date    HGB 10.9 (L) 06/24/2024    HCT 37.6 06/24/2024    MCV 91 06/24/2024     06/24/2024    WBC 8.97 06/24/2024    NRBC 0 06/24/2024    BANDSPCT 1 05/21/2023     Lab Results   Component Value Date     02/25/2015    K 4.7 06/23/2024    CL 94 (L) 06/23/2024    CO2 43 (H) 06/23/2024    ANIONGAP 3 (L) 02/25/2015    BUN 40 (H) 06/23/2024    CREATININE 1.13 06/23/2024    GLUCOSE 130 06/21/2024    GLUF 97 02/23/2022    CALCIUM 9.1 06/23/2024    AST 21 06/19/2024    ALT 12 06/19/2024    ALKPHOS 126 (H) 06/19/2024    PROT 6.9 02/25/2015    BILITOT 0.3 02/25/2015    EGFR 49 06/23/2024           Imaging: CT chest wo contrast    Result Date: 9/17/2024  Narrative: CT CHEST WITHOUT IV  CONTRAST INDICATION: R91.8: Other nonspecific abnormal finding of lung field. COMPARISON: PET/CT 8/1/2024, CTA chest 6/19/2024, CT chest 2/26/2022, 6/16/2017 TECHNIQUE: CT examination of the chest was performed without intravenous contrast. Multiplanar 2D reformatted images were created from the source data. This examination, like all CT scans performed in the [facility], was performed utilizing techniques to minimize radiation dose exposure, including the use of iterative reconstruction and automated exposure control. Radiation dose length product (DLP) for this visit: 115.61 mGy-cm FINDINGS: LUNGS: There is no tracheal or endobronchial lesion. Biapical pleural-parenchymal scarring. Emphysematous changes of lungs. Medial basilar right upper lobe, right middle lobe, and lingular atelectasis versus scarring. Pulmonary nodules as noted on series  302: -Stable 1.6 x 1.0 cm right lower lobe groundglass nodule (image 107) -Stable 1.5 x 1.1 cm right lower lobe groundglass nodule (image 137) -Resolution of previously seen posterior right lower lobe tree-in-bud nodularity and right basilar nodular opacity -Stable 1.2 x 1.1 cm left lower lobe nodule (image 186) PLEURA: Unremarkable. HEART/GREAT VESSELS: Coronary artery calcifications. No thoracic aortic aneurysm. MEDIASTINUM AND KAYY: Unremarkable. CHEST WALL AND LOWER NECK: Unremarkable. VISUALIZED STRUCTURES IN THE UPPER ABDOMEN: Unremarkable. OSSEOUS STRUCTURES: No acute fracture or destructive osseous lesion.     Impression: Stable bilateral pulmonary nodules with resolution of previously seen infectious/inflammatory nodules of right lower lobe. Recommend follow-up CT in 12 months to assess for long-term stability. Workstation performed: [id number]             Allergies:   Allergies   Allergen Reactions    Cefdinir Hives    Amifostine Rash    Pollen Extract Allergic Rhinitis     Current Medications: Reviewed  PMH/FH/SH:  Reviewed      Physical  "Exam:    1.86 meters squared    Ht Readings from Last 3 Encounters:   10/03/24 5' 4\" (1.626 m)   24 5' 4\" (1.626 m)   24 5' 4\" (1.626 m)        Wt Readings from Last 3 Encounters:   10/03/24 80.7 kg (178 lb)   24 81.7 kg (180 lb 3.2 oz)   24 82.1 kg (181 lb)        Temp Readings from Last 3 Encounters:   10/03/24 (!) 97.3 °F (36.3 °C) (Temporal)   24 97.5 °F (36.4 °C) (Tympanic)   24 (!) 96.8 °F (36 °C) (Tympanic)        BP Readings from Last 3 Encounters:   10/03/24 118/58   24 150/60   24 (!) 122/46             Physical Exam  Constitutional:       Appearance: She is well-developed.   HENT:      Head: Normocephalic and atraumatic.   Neck:      Comments: Radiation changes neck  Cardiovascular:      Rate and Rhythm: Regular rhythm.   Pulmonary:      Effort: Pulmonary effort is normal. No respiratory distress.      Breath sounds: Normal breath sounds.      Comments: On oxygen  Abdominal:      General: Abdomen is flat.      Palpations: Abdomen is soft.   Musculoskeletal:      Cervical back: Neck supple.   Neurological:      Mental Status: She is alert.   Psychiatric:         Behavior: Behavior normal.         ECO      Emergency Contacts:    Extended Emergency Contact Information  Primary Emergency Contact: Ashley De Dios  Home Phone: [phone]  Mobile Phone: [phone]  Relation: Daughter  Secondary Emergency Contact: VaLeroy  Mobile Phone: [phone]  Relation: Son   "

## 2024-10-16 DIAGNOSIS — M54.41 CHRONIC RIGHT-SIDED LOW BACK PAIN WITH RIGHT-SIDED SCIATICA: ICD-10-CM

## 2024-10-16 DIAGNOSIS — G89.29 CHRONIC RIGHT-SIDED LOW BACK PAIN WITH RIGHT-SIDED SCIATICA: ICD-10-CM

## 2024-10-17 RX ORDER — TIZANIDINE 2 MG/1
TABLET ORAL
Qty: 90 TABLET | Refills: 1 | Status: SHIPPED | OUTPATIENT
Start: 2024-10-17

## 2024-10-28 LAB

## 2024-10-30 DIAGNOSIS — M54.41 CHRONIC RIGHT-SIDED LOW BACK PAIN WITH RIGHT-SIDED SCIATICA: ICD-10-CM

## 2024-10-30 DIAGNOSIS — G89.29 CHRONIC RIGHT-SIDED LOW BACK PAIN WITH RIGHT-SIDED SCIATICA: ICD-10-CM

## 2024-10-30 DIAGNOSIS — E53.8 B12 DEFICIENCY: ICD-10-CM

## 2024-10-31 DIAGNOSIS — E53.8 B12 DEFICIENCY: ICD-10-CM

## 2024-10-31 DIAGNOSIS — M54.41 CHRONIC RIGHT-SIDED LOW BACK PAIN WITH RIGHT-SIDED SCIATICA: ICD-10-CM

## 2024-10-31 DIAGNOSIS — G89.29 CHRONIC RIGHT-SIDED LOW BACK PAIN WITH RIGHT-SIDED SCIATICA: ICD-10-CM

## 2024-11-04 ENCOUNTER — IMMUNIZATIONS (OUTPATIENT)
Dept: FAMILY MEDICINE CLINIC | Facility: CLINIC | Age: 69
End: 2024-11-04
Payer: COMMERCIAL

## 2024-11-04 DIAGNOSIS — Z23 ENCOUNTER FOR IMMUNIZATION: Primary | ICD-10-CM

## 2024-11-04 PROCEDURE — 90673 RIV3 VACCINE NO PRESERV IM: CPT

## 2024-11-04 PROCEDURE — G0008 ADMIN INFLUENZA VIRUS VAC: HCPCS

## 2024-12-19 ENCOUNTER — OFFICE VISIT (OUTPATIENT)
Dept: FAMILY MEDICINE CLINIC | Facility: CLINIC | Age: 69
End: 2024-12-19
Payer: COMMERCIAL

## 2024-12-19 VITALS
HEIGHT: 64 IN | WEIGHT: 177.8 LBS | SYSTOLIC BLOOD PRESSURE: 110 MMHG | OXYGEN SATURATION: 90 % | DIASTOLIC BLOOD PRESSURE: 50 MMHG | BODY MASS INDEX: 30.35 KG/M2 | RESPIRATION RATE: 20 BRPM | TEMPERATURE: 99.2 F | HEART RATE: 69 BPM

## 2024-12-19 DIAGNOSIS — J44.1 COPD EXACERBATION (HCC): ICD-10-CM

## 2024-12-19 DIAGNOSIS — J06.9 VIRAL UPPER RESPIRATORY ILLNESS: ICD-10-CM

## 2024-12-19 DIAGNOSIS — J96.21 ACUTE ON CHRONIC RESPIRATORY FAILURE WITH HYPOXIA AND HYPERCAPNIA (HCC): ICD-10-CM

## 2024-12-19 DIAGNOSIS — J44.1 ACUTE EXACERBATION OF CHRONIC OBSTRUCTIVE PULMONARY DISEASE (HCC): ICD-10-CM

## 2024-12-19 DIAGNOSIS — J96.22 ACUTE ON CHRONIC RESPIRATORY FAILURE WITH HYPOXIA AND HYPERCAPNIA (HCC): ICD-10-CM

## 2024-12-19 DIAGNOSIS — J96.11 CHRONIC RESPIRATORY FAILURE WITH HYPOXIA, ON HOME O2 THERAPY  (HCC): ICD-10-CM

## 2024-12-19 DIAGNOSIS — Z99.81 CHRONIC RESPIRATORY FAILURE WITH HYPOXIA, ON HOME O2 THERAPY  (HCC): ICD-10-CM

## 2024-12-19 DIAGNOSIS — R68.89 FLU-LIKE SYMPTOMS: Primary | ICD-10-CM

## 2024-12-19 DIAGNOSIS — U07.1 COVID-19: ICD-10-CM

## 2024-12-19 LAB
SARS-COV-2 AG UPPER RESP QL IA: POSITIVE
SL AMB POCT RAPID FLU A: NORMAL
SL AMB POCT RAPID FLU B: NORMAL
VALID CONTROL: ABNORMAL

## 2024-12-19 PROCEDURE — 87804 INFLUENZA ASSAY W/OPTIC: CPT | Performed by: FAMILY MEDICINE

## 2024-12-19 PROCEDURE — 99214 OFFICE O/P EST MOD 30 MIN: CPT | Performed by: FAMILY MEDICINE

## 2024-12-19 PROCEDURE — 87811 SARS-COV-2 COVID19 W/OPTIC: CPT | Performed by: FAMILY MEDICINE

## 2024-12-19 RX ORDER — TIMOLOL MALEATE 5 MG/ML
SOLUTION/ DROPS OPHTHALMIC
COMMUNITY
Start: 2024-12-16

## 2024-12-19 RX ORDER — NIRMATRELVIR AND RITONAVIR 150-100 MG
2 KIT ORAL 2 TIMES DAILY
Qty: 20 TABLET | Refills: 0 | Status: SHIPPED | OUTPATIENT
Start: 2024-12-19 | End: 2024-12-24

## 2024-12-19 RX ORDER — PREDNISONE 5 MG/1
TABLET ORAL
Qty: 54 TABLET | Refills: 0 | Status: SHIPPED | OUTPATIENT
Start: 2024-12-19 | End: 2025-01-24

## 2024-12-19 RX ORDER — IPRATROPIUM BROMIDE AND ALBUTEROL SULFATE 2.5; .5 MG/3ML; MG/3ML
SOLUTION RESPIRATORY (INHALATION)
COMMUNITY
Start: 2024-12-16

## 2024-12-19 RX ORDER — AZITHROMYCIN 250 MG/1
TABLET, FILM COATED ORAL
Qty: 6 TABLET | Refills: 0 | Status: SHIPPED | OUTPATIENT
Start: 2024-12-19 | End: 2024-12-19

## 2024-12-19 RX ORDER — BENZONATATE 200 MG/1
200 CAPSULE ORAL 3 TIMES DAILY PRN
Qty: 90 CAPSULE | Refills: 0 | Status: SHIPPED | OUTPATIENT
Start: 2024-12-19

## 2024-12-19 RX ORDER — AZITHROMYCIN 250 MG/1
TABLET, FILM COATED ORAL
COMMUNITY
Start: 2024-12-16

## 2024-12-19 NOTE — PROGRESS NOTES
Subjective:      Patient ID: Noreen Alvarez is a 69 y.o. female.    Worsening cough, sore throat, headache,worsening shortness of breath for 1 day  No fever         Past Medical History:   Diagnosis Date    Acute kidney failure (HCC)     Allergic     Allergies     Anemia     Anxiety     Asthma     Cancer (HCC)     Cataract     Chronic kidney disease (CKD), stage III (moderate) (HCC)     COPD (chronic obstructive pulmonary disease) (HCC)     COVID-19     Covid + 0-0-92-cough at that time now fully resolved    Depression     Disease of thyroid gland     Essential hypertension     GERD (gastroesophageal reflux disease)     Glaucoma     High blood pressure     HTN (hypertension) 02/16/2021    Hyperlipidemia     Hypothyroidism     Memory loss     Mesenteric lymphadenopathy 07/13/2021    Pulmonary hypertension (HCC)     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    Throat cancer (HCC) 2014    chemo and rad therapy 2014       Family History   Problem Relation Age of Onset    Other Mother         respiratory disorder    Asthma Mother     COPD Mother     Depression Mother     Sudden death Father         shot himself    Suicidality Father     Brain cancer Sister     Diabetes Sister     Breast cancer Sister     Cancer Sister 62        pancreatic cancer    No Known Problems Sister     No Known Problems Sister     No Known Problems Sister     No Known Problems Sister     No Known Problems Sister     No Known Problems Sister     No Known Problems Daughter     No Known Problems Daughter     No Known Problems Maternal Grandmother     No Known Problems Maternal Grandfather     No Known Problems Paternal Grandmother     No Known Problems Paternal Grandfather     No Known Problems Maternal Aunt     No Known Problems Maternal Aunt     No Known Problems Maternal Aunt     No Known Problems Maternal Aunt     No Known Problems Maternal Aunt     No Known Problems Paternal Aunt     No Known Problems Paternal Aunt     No Known  Problems Paternal Aunt     No Known Problems Paternal Aunt     No Known Problems Son        Past Surgical History:   Procedure Laterality Date    COLONOSCOPY  2016    ESOPHAGOGASTRODUODENOSCOPY  2016    EYE SURGERY      IR BIOPSY LUNG  05/18/2022    LARYNGOSCOPY      w/ Bx.     WA TENDON SHEATH INCISION Right 02/16/2021    Procedure: THUMB TRIGGER FINGER RELEASE;  Surgeon: Angeles Denton MD;  Location: [facility];  Service: Orthopedics    TUBAL LIGATION          reports that she quit smoking about 10 years ago. Her smoking use included cigarettes. She started smoking about 51 years ago. She has a 40 pack-year smoking history. She has never used smokeless tobacco. She reports that she does not drink alcohol and does not use drugs.      Current Outpatient Medications:     acetaminophen (TYLENOL) 325 mg tablet, Take 2 tablets (650 mg total) by mouth every 6 (six) hours as needed for mild pain, moderate pain, headaches or fever, Disp: , Rfl:     albuterol (2.5 mg/3 mL) 0.083 % nebulizer solution, Take 3 mL (2.5 mg total) by nebulization every 6 (six) hours as needed for wheezing or shortness of breath, Disp: 360 mL, Rfl: 2    albuterol (PROVENTIL HFA,VENTOLIN HFA) 90 mcg/act inhaler, Inhale 2 puffs every 6 (six) hours as needed for wheezing or shortness of breath, Disp: 18 g, Rfl: 5    amLODIPine (NORVASC) 5 mg tablet, TAKE 1 TABLET (5 MG TOTAL) BY MOUTH DAILY., Disp: 90 tablet, Rfl: 1    atorvastatin (LIPITOR) 40 mg tablet, Take 1 tablet (40 mg total) by mouth daily with dinner, Disp: 90 tablet, Rfl: 3    azithromycin (ZITHROMAX) 250 mg tablet, TAKE 1 TABLET (250 MG TOTAL) BY MOUTH 3 (THREE) TIMES A WEEK FOR 36 DOSES, Disp: , Rfl:     benzonatate (TESSALON) 200 MG capsule, Take 1 capsule (200 mg total) by mouth 3 (three) times a day as needed for cough, Disp: 90 capsule, Rfl: 0    brimonidine tartrate 0.2 % ophthalmic solution, Administer 1 drop to both eyes 3 (three) times a day, Disp: , Rfl:      Budeson-Glycopyrrol-Formoterol (Breztri Aerosphere) 160-9-4.8 MCG/ACT AERO, Inhale 2 puffs 2 (two) times a day Rinse mouth after use., Disp: 10.7 g, Rfl: 3    Cholecalciferol (VITAMIN D3) 1,000 units tablet, Take 1,000 Units by mouth daily, Disp: , Rfl:     clindamycin 1 % gel, Apply topically 2 (two) times a day, Disp: 60 g, Rfl: 0    cyanocobalamin (VITAMIN B-12) 500 MCG tablet, Take 1 tablet (500 mcg total) by mouth daily, Disp: 90 tablet, Rfl: 1    Diclofenac Sodium (VOLTAREN) 1 %, Apply 2 g topically 4 (four) times a day, Disp: 300 g, Rfl: 5    fluticasone (FLONASE) 50 mcg/act nasal spray, SPRAY 1 SPRAY INTO EACH NOSTRIL EVERY DAY, Disp: 48 mL, Rfl: 1    ipratropium (ATROVENT) 0.06 % nasal spray, 2 SPRAYS INTO EACH NOSTRIL 3 (THREE) TIMES A DAY FOR INCREASED NASAL SECRETION, Disp: 15 mL, Rfl: 2    ipratropium-albuterol (DUO-NEB) 0.5-2.5 mg/3 mL nebulizer solution, TAKE 3 ML BY NEBULIZATION EVERY 8 HOURS AS NEEDED FOR WHEEZING OR SHORTNESS OF BREATH, Disp: , Rfl:     levothyroxine 75 mcg tablet, Take 1 tablet (75 mcg total) by mouth daily in the early morning, Disp: 90 tablet, Rfl: 1    montelukast (SINGULAIR) 10 mg tablet, TAKE 1 TABLET BY MOUTH DAILY AT BEDTIME, Disp: 100 tablet, Rfl: 1    nirmatrelvir & ritonavir (Paxlovid, 150/100,) tablet therapy pack, Take 2 tablets by mouth 2 (two) times a day for 5 days Take 1 nirmatrelvir tablet + 1 ritonavir tablet together per dose, Disp: 20 tablet, Rfl: 0    pantoprazole (PROTONIX) 40 mg tablet, TAKE 1 TABLET BY MOUTH EVERY DAY, Disp: 90 tablet, Rfl: 1    predniSONE 5 mg tablet, Take 4 tablets (20 mg total) by mouth 2 (two) times a day with meals for 2 days, THEN 2 tablets (10 mg total) daily for 2 days, THEN 1 tablet (5 mg total) 2 (two) times a day with meals for 2 days, THEN 1 tablet (5 mg total) daily., Disp: 54 tablet, Rfl: 0    sertraline (ZOLOFT) 25 mg tablet, Take 1 tablet (25 mg total) by mouth daily at bedtime, Disp: 90 tablet, Rfl: 1    timolol (TIMOPTIC)  "0.5 % ophthalmic solution, INSTILL ONE DROP TO BOTH EYES TWICE A DAY, Disp: , Rfl:     tiZANidine (ZANAFLEX) 2 mg tablet, TAKE 1 TABLET BY MOUTH AT BEDTIME AS NEEDED FOR MUSCLE SPASMS OR BACK PAIN, Disp: 90 tablet, Rfl: 1    The following portions of the patient's history were reviewed and updated as appropriate: allergies, current medications, past family history, past medical history, past social history, past surgical history and problem list.    Review of Systems   Constitutional:  Positive for activity change, appetite change and fatigue. Negative for fever.   HENT:  Positive for congestion. Negative for facial swelling, mouth sores, rhinorrhea, sore throat and trouble swallowing.    Eyes:  Negative for pain and redness.   Respiratory:  Positive for cough, chest tightness, shortness of breath and wheezing.    Cardiovascular:  Negative for chest pain, palpitations and leg swelling.   Gastrointestinal:  Negative for abdominal pain, blood in stool, constipation, diarrhea and nausea.   Genitourinary:  Negative for dysuria, hematuria and urgency.   Musculoskeletal:  Negative for arthralgias, back pain and myalgias.   Skin:  Negative for rash and wound.   Neurological:  Negative for seizures, syncope and headaches.   Hematological:  Negative for adenopathy.   Psychiatric/Behavioral:  Negative for agitation and behavioral problems.          PHQ-2/9 Depression Screening                 Objective:    /50 (BP Location: Left arm, Patient Position: Sitting, Cuff Size: Adult)   Pulse 69   Temp 99.2 °F (37.3 °C) (Tympanic)   Resp 20   Ht 5' 4\" (1.626 m)   Wt 80.6 kg (177 lb 12.8 oz)   SpO2 90%   PF (!) 2 L/min   BMI 30.52 kg/m²      Physical Exam  Vitals and nursing note reviewed.   Constitutional:       Appearance: Normal appearance. She is well-developed. She is not ill-appearing.   HENT:      Head: Normocephalic and atraumatic.      Right Ear: External ear normal.      Left Ear: External ear normal.      " Nose: Nose normal.      Mouth/Throat:      Mouth: Mucous membranes are moist.      Pharynx: No oropharyngeal exudate or posterior oropharyngeal erythema.   Eyes:      General: No scleral icterus.        Right eye: No discharge.         Left eye: No discharge.      Conjunctiva/sclera: Conjunctivae normal.   Cardiovascular:      Rate and Rhythm: Normal rate.      Heart sounds: No murmur heard.     No gallop.   Pulmonary:      Effort: Tachypnea, accessory muscle usage and prolonged expiration present. No respiratory distress.      Breath sounds: Decreased air movement present. No stridor. Decreased breath sounds, wheezing and rhonchi present. No rales.   Abdominal:      Palpations: Abdomen is soft.      Tenderness: There is no abdominal tenderness.   Musculoskeletal:         General: No tenderness or deformity.   Skin:     Findings: No erythema or rash.   Neurological:      Mental Status: She is alert. Mental status is at baseline.   Psychiatric:         Behavior: Behavior normal.         Judgment: Judgment normal.           Recent Results (from the past 52 weeks)   ECG 12 lead    Collection Time: 01/01/24  9:24 PM   Result Value Ref Range    Ventricular Rate 73 BPM    Atrial Rate 73 BPM    GA Interval 142 ms    QRSD Interval 62 ms    QT Interval 450 ms    QTC Interval 495 ms    P Axis 90 degrees    QRS Axis 19 degrees    T Wave Axis 247 degrees   CBC and differential    Collection Time: 01/01/24  9:25 PM   Result Value Ref Range    WBC 6.40 4.31 - 10.16 Thousand/uL    RBC 4.33 3.81 - 5.12 Million/uL    Hemoglobin 11.2 (L) 11.5 - 15.4 g/dL    Hematocrit 38.7 34.8 - 46.1 %    MCV 89 82 - 98 fL    MCH 25.9 (L) 26.8 - 34.3 pg    MCHC 28.9 (L) 31.4 - 37.4 g/dL    RDW 15.1 11.6 - 15.1 %    MPV 9.7 8.9 - 12.7 fL    Platelets 234 149 - 390 Thousands/uL    nRBC 0 /100 WBCs    Segmented % 74 43 - 75 %    Immature Grans % 1 0 - 2 %    Lymphocytes % 10 (L) 14 - 44 %    Monocytes % 13 (H) 4 - 12 %    Eosinophils Relative 1 0 - 6 %  "   Basophils Relative 1 0 - 1 %    Absolute Neutrophils 4.81 1.85 - 7.62 Thousands/µL    Absolute Immature Grans 0.03 0.00 - 0.20 Thousand/uL    Absolute Lymphocytes 0.64 0.60 - 4.47 Thousands/µL    Absolute Monocytes 0.81 0.17 - 1.22 Thousand/µL    Eosinophils Absolute 0.08 0.00 - 0.61 Thousand/µL    Basophils Absolute 0.03 0.00 - 0.10 Thousands/µL   Comprehensive metabolic panel    Collection Time: 01/01/24  9:25 PM   Result Value Ref Range    Sodium 142 135 - 147 mmol/L    Potassium 4.6 3.5 - 5.3 mmol/L    Chloride 104 96 - 108 mmol/L    CO2 33 (H) 21 - 32 mmol/L    ANION GAP 5 mmol/L    BUN 28 (H) 5 - 25 mg/dL    Creatinine 1.16 0.60 - 1.30 mg/dL    Glucose 98 65 - 140 mg/dL    Calcium 9.4 8.4 - 10.2 mg/dL    AST 24 13 - 39 U/L    ALT 16 7 - 52 U/L    Alkaline Phosphatase 95 34 - 104 U/L    Total Protein 7.0 6.4 - 8.4 g/dL    Albumin 4.0 3.5 - 5.0 g/dL    Total Bilirubin 0.28 0.20 - 1.00 mg/dL    eGFR 48 ml/min/1.73sq m   HS Troponin 0hr (reflex protocol)    Collection Time: 01/01/24  9:25 PM   Result Value Ref Range    hs TnI 0hr 4 \"Refer to ACS Flowchart\"- see link ng/L   B-Type Natriuretic Peptide(BNP)    Collection Time: 01/01/24  9:25 PM   Result Value Ref Range    BNP 44 0 - 100 pg/mL   FLU/RSV/COVID - if FLU/RSV clinically relevant    Collection Time: 01/01/24  9:25 PM    Specimen: Nose; Nares   Result Value Ref Range    SARS-CoV-2 Negative Negative    INFLUENZA A PCR Positive (A) Negative    INFLUENZA B PCR Negative Negative    RSV PCR Negative Negative   ECG 12 lead    Collection Time: 01/21/24  2:34 PM   Result Value Ref Range    Ventricular Rate 74 BPM    Atrial Rate 74 BPM    VA Interval 146 ms    QRSD Interval 80 ms    QT Interval 396 ms    QTC Interval 439 ms    P Axis 86 degrees    QRS Axis 40 degrees    T Wave Axis 84 degrees   CBC and differential    Collection Time: 01/21/24  2:57 PM   Result Value Ref Range    WBC 5.99 4.31 - 10.16 Thousand/uL    RBC 4.17 3.81 - 5.12 Million/uL    Hemoglobin " "11.0 (L) 11.5 - 15.4 g/dL    Hematocrit 37.0 34.8 - 46.1 %    MCV 89 82 - 98 fL    MCH 26.4 (L) 26.8 - 34.3 pg    MCHC 29.7 (L) 31.4 - 37.4 g/dL    RDW 14.6 11.6 - 15.1 %    MPV 9.5 8.9 - 12.7 fL    Platelets 175 149 - 390 Thousands/uL    nRBC 0 /100 WBCs    Segmented % 73 43 - 75 %    Immature Grans % 0 0 - 2 %    Lymphocytes % 11 (L) 14 - 44 %    Monocytes % 12 4 - 12 %    Eosinophils Relative 4 0 - 6 %    Basophils Relative 0 0 - 1 %    Absolute Neutrophils 4.36 1.85 - 7.62 Thousands/µL    Absolute Immature Grans 0.02 0.00 - 0.20 Thousand/uL    Absolute Lymphocytes 0.63 0.60 - 4.47 Thousands/µL    Absolute Monocytes 0.73 0.17 - 1.22 Thousand/µL    Eosinophils Absolute 0.23 0.00 - 0.61 Thousand/µL    Basophils Absolute 0.02 0.00 - 0.10 Thousands/µL   Comprehensive metabolic panel    Collection Time: 01/21/24  2:57 PM   Result Value Ref Range    Sodium 138 135 - 147 mmol/L    Potassium 5.1 3.5 - 5.3 mmol/L    Chloride 100 96 - 108 mmol/L    CO2 34 (H) 21 - 32 mmol/L    ANION GAP 4 mmol/L    BUN 21 5 - 25 mg/dL    Creatinine 1.18 0.60 - 1.30 mg/dL    Glucose 86 65 - 140 mg/dL    Calcium 9.3 8.4 - 10.2 mg/dL    AST 21 13 - 39 U/L    ALT 12 7 - 52 U/L    Alkaline Phosphatase 90 34 - 104 U/L    Total Protein 6.9 6.4 - 8.4 g/dL    Albumin 3.8 3.5 - 5.0 g/dL    Total Bilirubin 0.42 0.20 - 1.00 mg/dL    eGFR 47 ml/min/1.73sq m   Lipase    Collection Time: 01/21/24  2:57 PM   Result Value Ref Range    Lipase 17 11 - 82 u/L   HS Troponin 0hr (reflex protocol)    Collection Time: 01/21/24  2:57 PM   Result Value Ref Range    hs TnI 0hr 3 \"Refer to ACS Flowchart\"- see link ng/L   ECG 12 lead    Collection Time: 01/23/24  2:32 PM   Result Value Ref Range    Ventricular Rate 87 BPM    Atrial Rate 87 BPM    ND Interval 150 ms    QRSD Interval 82 ms    QT Interval 382 ms    QTC Interval 459 ms    P Axis 88 degrees    QRS Axis 50 degrees    T Wave Axis 81 degrees   CBC and differential    Collection Time: 01/23/24  3:02 PM " "  Result Value Ref Range    WBC 6.86 4.31 - 10.16 Thousand/uL    RBC 4.28 3.81 - 5.12 Million/uL    Hemoglobin 11.4 (L) 11.5 - 15.4 g/dL    Hematocrit 38.6 34.8 - 46.1 %    MCV 90 82 - 98 fL    MCH 26.6 (L) 26.8 - 34.3 pg    MCHC 29.5 (L) 31.4 - 37.4 g/dL    RDW 14.3 11.6 - 15.1 %    MPV 10.0 8.9 - 12.7 fL    Platelets 222 149 - 390 Thousands/uL    nRBC 0 /100 WBCs    Segmented % 79 (H) 43 - 75 %    Immature Grans % 0 0 - 2 %    Lymphocytes % 8 (L) 14 - 44 %    Monocytes % 10 4 - 12 %    Eosinophils Relative 3 0 - 6 %    Basophils Relative 0 0 - 1 %    Absolute Neutrophils 5.33 1.85 - 7.62 Thousands/µL    Absolute Immature Grans 0.03 0.00 - 0.20 Thousand/uL    Absolute Lymphocytes 0.56 (L) 0.60 - 4.47 Thousands/µL    Absolute Monocytes 0.70 0.17 - 1.22 Thousand/µL    Eosinophils Absolute 0.21 0.00 - 0.61 Thousand/µL    Basophils Absolute 0.03 0.00 - 0.10 Thousands/µL   Comprehensive metabolic panel    Collection Time: 01/23/24  3:02 PM   Result Value Ref Range    Sodium 136 135 - 147 mmol/L    Potassium 4.4 3.5 - 5.3 mmol/L    Chloride 96 96 - 108 mmol/L    CO2 34 (H) 21 - 32 mmol/L    ANION GAP 6 mmol/L    BUN 19 5 - 25 mg/dL    Creatinine 0.92 0.60 - 1.30 mg/dL    Glucose 117 65 - 140 mg/dL    Calcium 9.6 8.4 - 10.2 mg/dL    AST 18 13 - 39 U/L    ALT 11 7 - 52 U/L    Alkaline Phosphatase 95 34 - 104 U/L    Total Protein 7.4 6.4 - 8.4 g/dL    Albumin 4.1 3.5 - 5.0 g/dL    Total Bilirubin 0.39 0.20 - 1.00 mg/dL    eGFR 64 ml/min/1.73sq m   HS Troponin 0hr (reflex protocol)    Collection Time: 01/23/24  3:02 PM   Result Value Ref Range    hs TnI 0hr 4 \"Refer to ACS Flowchart\"- see link ng/L   Lipase    Collection Time: 01/23/24  3:02 PM   Result Value Ref Range    Lipase 9 (L) 11 - 82 u/L   B-Type Natriuretic Peptide(BNP)    Collection Time: 01/23/24  3:02 PM   Result Value Ref Range    BNP 73 0 - 100 pg/mL   Magnesium    Collection Time: 01/23/24  3:02 PM   Result Value Ref Range    Magnesium 1.9 1.9 - 2.7 mg/dL " "  FLU/RSV/COVID - if FLU/RSV clinically relevant    Collection Time: 01/23/24  3:02 PM    Specimen: Nose; Nares   Result Value Ref Range    SARS-CoV-2 Negative Negative    INFLUENZA A PCR Negative Negative    INFLUENZA B PCR Negative Negative    RSV PCR Positive (A) Negative   Procalcitonin    Collection Time: 01/23/24  3:02 PM   Result Value Ref Range    Procalcitonin <0.05 <=0.25 ng/ml   HS Troponin I 2hr    Collection Time: 01/23/24  5:50 PM   Result Value Ref Range    hs TnI 2hr 3 \"Refer to ACS Flowchart\"- see link ng/L    Delta 2hr hsTnI -1 <20 ng/L   CBC and differential    Collection Time: 01/24/24  4:46 AM   Result Value Ref Range    WBC 5.42 4.31 - 10.16 Thousand/uL    RBC 4.19 3.81 - 5.12 Million/uL    Hemoglobin 11.1 (L) 11.5 - 15.4 g/dL    Hematocrit 37.4 34.8 - 46.1 %    MCV 89 82 - 98 fL    MCH 26.5 (L) 26.8 - 34.3 pg    MCHC 29.7 (L) 31.4 - 37.4 g/dL    RDW 14.1 11.6 - 15.1 %    MPV 9.9 8.9 - 12.7 fL    Platelets 213 149 - 390 Thousands/uL    nRBC 0 /100 WBCs    Segmented % 90 (H) 43 - 75 %    Immature Grans % 1 0 - 2 %    Lymphocytes % 7 (L) 14 - 44 %    Monocytes % 2 (L) 4 - 12 %    Eosinophils Relative 0 0 - 6 %    Basophils Relative 0 0 - 1 %    Absolute Neutrophils 4.90 1.85 - 7.62 Thousands/µL    Absolute Immature Grans 0.04 0.00 - 0.20 Thousand/uL    Absolute Lymphocytes 0.38 (L) 0.60 - 4.47 Thousands/µL    Absolute Monocytes 0.09 (L) 0.17 - 1.22 Thousand/µL    Eosinophils Absolute 0.00 0.00 - 0.61 Thousand/µL    Basophils Absolute 0.01 0.00 - 0.10 Thousands/µL   Basic metabolic panel    Collection Time: 01/24/24  4:46 AM   Result Value Ref Range    Sodium 138 135 - 147 mmol/L    Potassium 5.2 3.5 - 5.3 mmol/L    Chloride 99 96 - 108 mmol/L    CO2 34 (H) 21 - 32 mmol/L    ANION GAP 5 mmol/L    BUN 19 5 - 25 mg/dL    Creatinine 0.93 0.60 - 1.30 mg/dL    Glucose 142 (H) 65 - 140 mg/dL    Calcium 9.7 8.4 - 10.2 mg/dL    eGFR 63 ml/min/1.73sq m   Smear Review(Phlebs Do Not Order)    Collection " Time: 01/24/24  4:46 AM   Result Value Ref Range    RBC Morphology Normal     Platelet Estimate Adequate Adequate   Basic metabolic panel    Collection Time: 01/25/24  4:45 AM   Result Value Ref Range    Sodium 138 135 - 147 mmol/L    Potassium 5.5 (H) 3.5 - 5.3 mmol/L    Chloride 99 96 - 108 mmol/L    CO2 32 21 - 32 mmol/L    ANION GAP 7 mmol/L    BUN 35 (H) 5 - 25 mg/dL    Creatinine 1.02 0.60 - 1.30 mg/dL    Glucose 128 65 - 140 mg/dL    Calcium 10.0 8.4 - 10.2 mg/dL    eGFR 56 ml/min/1.73sq m   CBC and differential    Collection Time: 01/25/24  4:45 AM   Result Value Ref Range    WBC 15.11 (H) 4.31 - 10.16 Thousand/uL    RBC 4.32 3.81 - 5.12 Million/uL    Hemoglobin 11.6 11.5 - 15.4 g/dL    Hematocrit 39.0 34.8 - 46.1 %    MCV 90 82 - 98 fL    MCH 26.9 26.8 - 34.3 pg    MCHC 29.7 (L) 31.4 - 37.4 g/dL    RDW 14.2 11.6 - 15.1 %    MPV 10.6 8.9 - 12.7 fL    Platelets 280 149 - 390 Thousands/uL   Manual Differential(PHLEBS Do Not Order)    Collection Time: 01/25/24  4:45 AM   Result Value Ref Range    Segmented % 90 (H) 43 - 75 %    Lymphocytes % 7 (L) 14 - 44 %    Monocytes % 3 (L) 4 - 12 %    Eosinophils % 0 0 - 6 %    Basophils % 0 0 - 1 %    Absolute Neutrophils 13.60 (H) 1.85 - 7.62 Thousand/uL    Absolute Lymphocytes 1.06 0.60 - 4.47 Thousand/uL    Absolute Monocytes 0.45 0.00 - 1.22 Thousand/uL    Absolute Eosinophils 0.00 0.00 - 0.40 Thousand/uL    Absolute Basophils 0.00 0.00 - 0.10 Thousand/uL    Total Counted      RBC Morphology Normal     Platelet Estimate Adequate Adequate   Wheeled Walker    Collection Time: 01/25/24  9:34 AM   Result Value Ref Range    Supplier Name [employer]     Supplier Phone Number [phone]     Order Status Delivery Successful     Delivery Note      Delivery Request Date 01/25/2024     Date Delivered  01/25/2024     Item Description Wheeled Walker, Adult    ECG 12 lead    Collection Time: 01/25/24 11:18 AM   Result Value Ref Range    Ventricular Rate  "70 BPM    Atrial Rate 70 BPM    ID Interval 150 ms    QRSD Interval 84 ms    QT Interval 386 ms    QTC Interval 416 ms    P Axis 79 degrees    QRS Axis 29 degrees    T Wave [place] 55 degrees   HS Troponin 0hr (reflex protocol)    Collection Time: 01/25/24 11:22 AM   Result Value Ref Range    hs TnI 0hr 5 \"Refer to ACS Flowchart\"- see link ng/L   HS Troponin I 4hr    Collection Time: 01/25/24  3:35 PM   Result Value Ref Range    hs TnI 4hr 3 \"Refer to ACS Flowchart\"- see link ng/L    Delta 4hr hsTnI -2 <20 ng/L   Basic metabolic panel    Collection Time: 01/26/24  4:40 AM   Result Value Ref Range    Sodium 139 135 - 147 mmol/L    Potassium 5.1 3.5 - 5.3 mmol/L    Chloride 100 96 - 108 mmol/L    CO2 35 (H) 21 - 32 mmol/L    ANION GAP 4 mmol/L    BUN 37 (H) 5 - 25 mg/dL    Creatinine 1.05 0.60 - 1.30 mg/dL    Glucose 107 65 - 140 mg/dL    Calcium 9.4 8.4 - 10.2 mg/dL    eGFR 54 ml/min/1.73sq m   CBC and Platelet    Collection Time: 01/26/24  4:40 AM   Result Value Ref Range    WBC 10.45 (H) 4.31 - 10.16 Thousand/uL    RBC 4.00 3.81 - 5.12 Million/uL    Hemoglobin 10.6 (L) 11.5 - 15.4 g/dL    Hematocrit 35.9 34.8 - 46.1 %    MCV 90 82 - 98 fL    MCH 26.5 (L) 26.8 - 34.3 pg    MCHC 29.5 (L) 31.4 - 37.4 g/dL    RDW 14.6 11.6 - 15.1 %    Platelets 256 149 - 390 Thousands/uL    MPV 9.8 8.9 - 12.7 fL   CBC and differential    Collection Time: 01/27/24  5:52 AM   Result Value Ref Range    WBC 7.96 4.31 - 10.16 Thousand/uL    RBC 4.15 3.81 - 5.12 Million/uL    Hemoglobin 11.0 (L) 11.5 - 15.4 g/dL    Hematocrit 37.7 34.8 - 46.1 %    MCV 91 82 - 98 fL    MCH 26.5 (L) 26.8 - 34.3 pg    MCHC 29.2 (L) 31.4 - 37.4 g/dL    RDW 14.7 11.6 - 15.1 %    MPV 9.7 8.9 - 12.7 fL    Platelets 258 149 - 390 Thousands/uL    nRBC 0 /100 WBCs    Segmented % 87 (H) 43 - 75 %    Immature Grans % 2 0 - 2 %    Lymphocytes % 8 (L) 14 - 44 %    Monocytes % 3 (L) 4 - 12 %    Eosinophils Relative 0 0 - 6 %    Basophils Relative 0 0 - 1 %    Absolute " Neutrophils 6.93 1.85 - 7.62 Thousands/µL    Absolute Immature Grans 0.15 0.00 - 0.20 Thousand/uL    Absolute Lymphocytes 0.65 0.60 - 4.47 Thousands/µL    Absolute Monocytes 0.21 0.17 - 1.22 Thousand/µL    Eosinophils Absolute 0.00 0.00 - 0.61 Thousand/µL    Basophils Absolute 0.02 0.00 - 0.10 Thousands/µL   Basic metabolic panel    Collection Time: 01/27/24  5:52 AM   Result Value Ref Range    Sodium 139 135 - 147 mmol/L    Potassium 5.3 3.5 - 5.3 mmol/L    Chloride 100 96 - 108 mmol/L    CO2 33 (H) 21 - 32 mmol/L    ANION GAP 6 mmol/L    BUN 39 (H) 5 - 25 mg/dL    Creatinine 1.11 0.60 - 1.30 mg/dL    Glucose 127 65 - 140 mg/dL    Calcium 9.2 8.4 - 10.2 mg/dL    eGFR 51 ml/min/1.73sq m   CBC and differential    Collection Time: 01/28/24  4:31 AM   Result Value Ref Range    WBC 8.61 4.31 - 10.16 Thousand/uL    RBC 4.14 3.81 - 5.12 Million/uL    Hemoglobin 11.0 (L) 11.5 - 15.4 g/dL    Hematocrit 36.8 34.8 - 46.1 %    MCV 89 82 - 98 fL    MCH 26.6 (L) 26.8 - 34.3 pg    MCHC 29.9 (L) 31.4 - 37.4 g/dL    RDW 14.5 11.6 - 15.1 %    MPV 9.6 8.9 - 12.7 fL    Platelets 281 149 - 390 Thousands/uL   Basic metabolic panel    Collection Time: 01/28/24  4:31 AM   Result Value Ref Range    Sodium 139 135 - 147 mmol/L    Potassium 5.0 3.5 - 5.3 mmol/L    Chloride 99 96 - 108 mmol/L    CO2 35 (H) 21 - 32 mmol/L    ANION GAP 5 mmol/L    BUN 38 (H) 5 - 25 mg/dL    Creatinine 0.99 0.60 - 1.30 mg/dL    Glucose 136 65 - 140 mg/dL    Calcium 9.3 8.4 - 10.2 mg/dL    eGFR 58 ml/min/1.73sq m   Manual Differential(PHLEBS Do Not Order)    Collection Time: 01/28/24  4:31 AM   Result Value Ref Range    Segmented % 89 (H) 43 - 75 %    Lymphocytes % 8 (L) 14 - 44 %    Monocytes % 2 (L) 4 - 12 %    Eosinophils % 0 0 - 6 %    Basophils % 0 0 - 1 %    Metamyelocytes % 1 0 - 1 %    Absolute Neutrophils 7.66 (H) 1.85 - 7.62 Thousand/uL    Absolute Lymphocytes 0.69 0.60 - 4.47 Thousand/uL    Absolute Monocytes 0.17 0.00 - 1.22 Thousand/uL    Absolute  "Eosinophils 0.00 0.00 - 0.40 Thousand/uL    Absolute Basophils 0.00 0.00 - 0.10 Thousand/uL    Total Counted      RBC Morphology Normal     Platelet Estimate Adequate Adequate   ECG 12 lead    Collection Time: 01/28/24  1:51 PM   Result Value Ref Range    Ventricular Rate 74 BPM    Atrial Rate 74 BPM    ME Interval 136 ms    QRSD Interval 88 ms    QT Interval 378 ms    QTC Interval 419 ms    P Axis 87 degrees    QRS Axis 41 degrees    T Wave [place] 76 degrees   HS Troponin 0hr (reflex protocol)    Collection Time: 01/28/24  2:29 PM   Result Value Ref Range    hs TnI 0hr 3 \"Refer to ACS Flowchart\"- see link ng/L   HS Troponin I 2hr    Collection Time: 01/28/24  4:22 PM   Result Value Ref Range    hs TnI 2hr 6 \"Refer to ACS Flowchart\"- see link ng/L    Delta 2hr hsTnI 3 <20 ng/L   HS Troponin I 4hr    Collection Time: 01/28/24  6:22 PM   Result Value Ref Range    hs TnI 4hr 5 \"Refer to ACS Flowchart\"- see link ng/L    Delta 4hr hsTnI 2 <20 ng/L   CBC and differential    Collection Time: 01/29/24  4:29 AM   Result Value Ref Range    WBC 9.38 4.31 - 10.16 Thousand/uL    RBC 4.17 3.81 - 5.12 Million/uL    Hemoglobin 10.9 (L) 11.5 - 15.4 g/dL    Hematocrit 37.4 34.8 - 46.1 %    MCV 90 82 - 98 fL    MCH 26.1 (L) 26.8 - 34.3 pg    MCHC 29.1 (L) 31.4 - 37.4 g/dL    RDW 14.9 11.6 - 15.1 %    MPV 9.5 8.9 - 12.7 fL    Platelets 262 149 - 390 Thousands/uL    nRBC 0 /100 WBCs    Segmented % 73 43 - 75 %    Immature Grans % 4 (H) 0 - 2 %    Lymphocytes % 14 14 - 44 %    Monocytes % 9 4 - 12 %    Eosinophils Relative 0 0 - 6 %    Basophils Relative 0 0 - 1 %    Absolute Neutrophils 6.85 1.85 - 7.62 Thousands/µL    Absolute Immature Grans 0.37 (H) 0.00 - 0.20 Thousand/uL    Absolute Lymphocytes 1.30 0.60 - 4.47 Thousands/µL    Absolute Monocytes 0.81 0.17 - 1.22 Thousand/µL    Eosinophils Absolute 0.03 0.00 - 0.61 Thousand/µL    Basophils Absolute 0.02 0.00 - 0.10 Thousands/µL   Basic metabolic panel    Collection Time: 01/29/24  4:29 " AM   Result Value Ref Range    Sodium 139 135 - 147 mmol/L    Potassium 4.5 3.5 - 5.3 mmol/L    Chloride 99 96 - 108 mmol/L    CO2 35 (H) 21 - 32 mmol/L    ANION GAP 5 mmol/L    BUN 32 (H) 5 - 25 mg/dL    Creatinine 1.08 0.60 - 1.30 mg/dL    Glucose 89 65 - 140 mg/dL    Calcium 9.1 8.4 - 10.2 mg/dL    eGFR 52 ml/min/1.73sq m   Procalcitonin    Collection Time: 01/29/24  4:29 AM   Result Value Ref Range    Procalcitonin <0.05 <=0.25 ng/ml   Echo complete w/ contrast if indicated    Collection Time: 01/29/24 10:49 AM   Result Value Ref Range    BSA 1.85 m2    A4C EF 61 %    LVIDd 4.60 cm    LVIDS 3.10 cm    IVSd 0.90 cm    LVPWd 1.00 cm    FS 33 28 - 44    MV E' Tissue Velocity Septal 9 cm/s    LA Volume Index (BP) 17.8 mL/m2    E/A ratio 0.93     E wave deceleration time 161 ms    MV Peak E Khang 66 cm/s    MV Peak A Khang 0.71 m/s    RVID d 2.9 cm    Tricuspid annular plane systolic excursion 2.00 cm    LA size 3 cm    LA length (A2C) 4.10 cm    LA volume (BP) 33 mL    RAA A4C 13.8 cm2    MV stenosis pressure 1/2 time 47 ms    MV valve area p 1/2 method 4.68     Ao root 2.90 cm    Left ventricular stroke volume (2D) 61.00 mL    IVS 0.9 cm    LEFT VENTRICLE SYSTOLIC VOLUME (MOD BIPLANE) 2D 37 mL    LV DIASTOLIC VOLUME (MOD BIPLANE) 2D 98 mL    Left Atrium Area-systolic Four Chamber 12.5 cm2    Left Atrium Area-systolic Apical Two Chamber 12.5 cm2    LVSV, 2D 61 mL    LV EF 60    CBC and differential    Collection Time: 02/28/24  8:30 PM   Result Value Ref Range    WBC 7.99 4.31 - 10.16 Thousand/uL    RBC 4.18 3.81 - 5.12 Million/uL    Hemoglobin 11.1 (L) 11.5 - 15.4 g/dL    Hematocrit 38.3 34.8 - 46.1 %    MCV 92 82 - 98 fL    MCH 26.6 (L) 26.8 - 34.3 pg    MCHC 29.0 (L) 31.4 - 37.4 g/dL    RDW 14.5 11.6 - 15.1 %    MPV 9.8 8.9 - 12.7 fL    Platelets 231 149 - 390 Thousands/uL    nRBC 0 /100 WBCs    Segmented % 77 (H) 43 - 75 %    Immature Grans % 1 0 - 2 %    Lymphocytes % 13 (L) 14 - 44 %    Monocytes % 8 4 - 12 %     "Eosinophils Relative 1 0 - 6 %    Basophils Relative 0 0 - 1 %    Absolute Neutrophils 6.23 1.85 - 7.62 Thousands/µL    Absolute Immature Grans 0.05 0.00 - 0.20 Thousand/uL    Absolute Lymphocytes 1.02 0.60 - 4.47 Thousands/µL    Absolute Monocytes 0.60 0.17 - 1.22 Thousand/µL    Eosinophils Absolute 0.07 0.00 - 0.61 Thousand/µL    Basophils Absolute 0.02 0.00 - 0.10 Thousands/µL   Comprehensive metabolic panel    Collection Time: 02/28/24  8:30 PM   Result Value Ref Range    Sodium 141 135 - 147 mmol/L    Potassium 4.4 3.5 - 5.3 mmol/L    Chloride 97 96 - 108 mmol/L    CO2 38 (H) 21 - 32 mmol/L    ANION GAP 6 mmol/L    BUN 18 5 - 25 mg/dL    Creatinine 0.92 0.60 - 1.30 mg/dL    Glucose 134 65 - 140 mg/dL    Calcium 9.7 8.4 - 10.2 mg/dL    AST 23 13 - 39 U/L    ALT 13 7 - 52 U/L    Alkaline Phosphatase 96 34 - 104 U/L    Total Protein 7.4 6.4 - 8.4 g/dL    Albumin 4.3 3.5 - 5.0 g/dL    Total Bilirubin 0.36 0.20 - 1.00 mg/dL    eGFR 64 ml/min/1.73sq m   HS Troponin 0hr (reflex protocol)    Collection Time: 02/28/24  8:30 PM   Result Value Ref Range    hs TnI 0hr 10 \"Refer to ACS Flowchart\"- see link ng/L   FLU/RSV/COVID - if FLU/RSV clinically relevant    Collection Time: 02/28/24  8:30 PM    Specimen: Nose; Nares   Result Value Ref Range    SARS-CoV-2 Negative Negative    INFLUENZA A PCR Negative Negative    INFLUENZA B PCR Negative Negative    RSV PCR Negative Negative   ECG 12 lead    Collection Time: 02/28/24  8:30 PM   Result Value Ref Range    Ventricular Rate 93 BPM    Atrial Rate 93 BPM    AZ Interval 156 ms    QRSD Interval 84 ms    QT Interval 372 ms    QTC Interval 462 ms    P Axis 88 degrees    QRS Axis 58 degrees    T Wave [place] 85 degrees   HS Troponin I 2hr    Collection Time: 02/28/24 10:40 PM   Result Value Ref Range    hs TnI 2hr 89 (H) \"Refer to ACS Flowchart\"- see link ng/L    Delta 2hr hsTnI 79 (H) <20 ng/L   HS Troponin I 4hr    Collection Time: 02/29/24  1:27 AM   Result Value Ref Range    hs " "TnI 4hr 157 (H) \"Refer to ACS Flowchart\"- see link ng/L    Delta 4hr hsTnI 147 (H) <20 ng/L   Platelet count    Collection Time: 02/29/24  1:27 AM   Result Value Ref Range    Platelets 190 149 - 390 Thousands/uL    MPV 9.6 8.9 - 12.7 fL   ECG 12 lead    Collection Time: 02/29/24  3:24 AM   Result Value Ref Range    Ventricular Rate 95 BPM    Atrial Rate 95 BPM    NV Interval 196 ms    QRSD Interval 86 ms    QT Interval 352 ms    QTC Interval 442 ms    P Axis 83 degrees    QRS Axis 15 degrees    T Wave Axis -18 degrees   HS Troponin 0hr (reflex protocol)    Collection Time: 02/29/24  5:55 AM   Result Value Ref Range    hs TnI 0hr 256 (H) \"Refer to ACS Flowchart\"- see link ng/L   CBC and differential    Collection Time: 02/29/24  5:55 AM   Result Value Ref Range    WBC 6.69 4.31 - 10.16 Thousand/uL    RBC 4.05 3.81 - 5.12 Million/uL    Hemoglobin 10.7 (L) 11.5 - 15.4 g/dL    Hematocrit 36.3 34.8 - 46.1 %    MCV 90 82 - 98 fL    MCH 26.4 (L) 26.8 - 34.3 pg    MCHC 29.5 (L) 31.4 - 37.4 g/dL    RDW 14.2 11.6 - 15.1 %    MPV 9.6 8.9 - 12.7 fL    Platelets 207 149 - 390 Thousands/uL   Basic metabolic panel    Collection Time: 02/29/24  5:55 AM   Result Value Ref Range    Sodium 140 135 - 147 mmol/L    Potassium 4.8 3.5 - 5.3 mmol/L    Chloride 98 96 - 108 mmol/L    CO2 38 (H) 21 - 32 mmol/L    ANION GAP 4 mmol/L    BUN 17 5 - 25 mg/dL    Creatinine 0.85 0.60 - 1.30 mg/dL    Glucose 150 (H) 65 - 140 mg/dL    Calcium 9.5 8.4 - 10.2 mg/dL    eGFR 70 ml/min/1.73sq m   Magnesium    Collection Time: 02/29/24  5:55 AM   Result Value Ref Range    Magnesium 2.0 1.9 - 2.7 mg/dL   Manual Differential(PHLEBS Do Not Order)    Collection Time: 02/29/24  5:55 AM   Result Value Ref Range    Segmented % 89 (H) 43 - 75 %    Lymphocytes % 11 (L) 14 - 44 %    Monocytes % 0 (L) 4 - 12 %    Eosinophils % 0 0 - 6 %    Basophils % 0 0 - 1 %    Absolute Neutrophils 5.95 1.85 - 7.62 Thousand/uL    Absolute Lymphocytes 0.74 0.60 - 4.47 Thousand/uL " "   Absolute Monocytes 0.00 0.00 - 1.22 Thousand/uL    Absolute Eosinophils 0.00 0.00 - 0.40 Thousand/uL    Absolute Basophils 0.00 0.00 - 0.10 Thousand/uL    Total Counted      RBC Morphology Normal     Platelet Estimate Adequate Adequate   Lipid panel    Collection Time: 02/29/24  5:55 AM   Result Value Ref Range    Cholesterol 207 (H) See Comment mg/dL    Triglycerides 61 See Comment mg/dL    HDL, Direct 78 >=50 mg/dL    LDL Calculated 117 (H) 0 - 100 mg/dL    Non-HDL-Chol (CHOL-HDL) 129 mg/dl   TSH, 3rd generation with Free T4 reflex    Collection Time: 02/29/24  5:55 AM   Result Value Ref Range    TSH 3RD GENERATON 1.767 0.450 - 4.500 uIU/mL   HS Troponin I 2hr    Collection Time: 02/29/24  7:57 AM   Result Value Ref Range    hs TnI 2hr 236 (H) \"Refer to ACS Flowchart\"- see link ng/L    Delta 2hr hsTnI -20 <20 ng/L   ECG 12 lead    Collection Time: 02/29/24  8:22 AM   Result Value Ref Range    Ventricular Rate 83 BPM    Atrial Rate 83 BPM    MA Interval 146 ms    QRSD Interval 86 ms    QT Interval 386 ms    QTC Interval 453 ms    P Axis 75 degrees    QRS Axis 25 degrees    T Wave Axis -61 degrees   HS Troponin I 4hr    Collection Time: 02/29/24  9:56 AM   Result Value Ref Range    hs TnI 4hr 220 (H) \"Refer to ACS Flowchart\"- see link ng/L    Delta 4hr hsTnI -36 <20 ng/L   Hemoglobin A1C    Collection Time: 03/01/24  5:39 AM   Result Value Ref Range    Hemoglobin A1C 5.9 (H) Normal 4.0-5.6%; PreDiabetic 5.7-6.4%; Diabetic >=6.5%; Glycemic control for adults with diabetes <7.0% %     mg/dl   CBC and differential    Collection Time: 03/01/24  5:39 AM   Result Value Ref Range    WBC 8.08 4.31 - 10.16 Thousand/uL    RBC 3.98 3.81 - 5.12 Million/uL    Hemoglobin 10.6 (L) 11.5 - 15.4 g/dL    Hematocrit 36.5 34.8 - 46.1 %    MCV 92 82 - 98 fL    MCH 26.6 (L) 26.8 - 34.3 pg    MCHC 29.0 (L) 31.4 - 37.4 g/dL    RDW 14.7 11.6 - 15.1 %    MPV 9.9 8.9 - 12.7 fL    Platelets 208 149 - 390 Thousands/uL    nRBC 0 /100 " WBCs    Segmented % 78 (H) 43 - 75 %    Immature Grans % 0 0 - 2 %    Lymphocytes % 13 (L) 14 - 44 %    Monocytes % 8 4 - 12 %    Eosinophils Relative 0 0 - 6 %    Basophils Relative 1 0 - 1 %    Absolute Neutrophils 6.27 1.85 - 7.62 Thousands/µL    Absolute Immature Grans 0.03 0.00 - 0.20 Thousand/uL    Absolute Lymphocytes 1.04 0.60 - 4.47 Thousands/µL    Absolute Monocytes 0.68 0.17 - 1.22 Thousand/µL    Eosinophils Absolute 0.02 0.00 - 0.61 Thousand/µL    Basophils Absolute 0.04 0.00 - 0.10 Thousands/µL   Basic metabolic panel    Collection Time: 03/01/24  5:39 AM   Result Value Ref Range    Sodium 143 135 - 147 mmol/L    Potassium 4.9 3.5 - 5.3 mmol/L    Chloride 101 96 - 108 mmol/L    CO2 40 (H) 21 - 32 mmol/L    ANION GAP 2 mmol/L    BUN 24 5 - 25 mg/dL    Creatinine 1.05 0.60 - 1.30 mg/dL    Glucose 96 65 - 140 mg/dL    Calcium 9.4 8.4 - 10.2 mg/dL    eGFR 54 ml/min/1.73sq m   Magnesium    Collection Time: 03/01/24  5:39 AM   Result Value Ref Range    Magnesium 2.0 1.9 - 2.7 mg/dL   Stress strip    Collection Time: 03/01/24  9:18 AM   Result Value Ref Range    Protocol Name EMMA SIT     Exercise duration (min) 3 min    Exercise duration (sec) 0 sec    Post Peak Systolic  mmHg    Max Diastolic Bp 74 mmHg    Peak HR 90 BPM    Max Predicted Heart Rate 152 BPM    Reason for Termination Protocol Complete     Test Indication CHEST PAIN     Target Hr Formular (220 - Age)*100%     Arrhy During Ex      ECG Interp Before Ex      ECG Interp during Ex      Ex Summary Comment      Chest Pain Statement none     Overall Hr Response To Exercise      Overall BP Response To Exercise     Stress strip    Collection Time: 03/01/24  9:18 AM   Result Value Ref Range    Protocol Name EMMA SIT     Exercise duration (min) 3 min    Exercise duration (sec) 0 sec    Post Peak Systolic  mmHg    Max Diastolic Bp 74 mmHg    Peak HR 90 BPM    Max Predicted Heart Rate 152 BPM    Reason for Termination Protocol Complete     Test  Indication CHEST PAIN     Target Hr Formular (220 - Age)*100%     Arrhy During Ex      ECG Interp Before Ex      ECG Interp during Ex      Ex Summary Comment      Chest Pain Statement none     Overall Hr Response To Exercise      Overall BP Response To Exercise     NM myocardial perfusion spect (rx stress and/or rest)    Collection Time: 03/01/24 10:33 AM   Result Value Ref Range    Baseline HR 72 bpm    Baseline /65 mmHg    O2 sat rest 100 %    Stress peak HR 90 bpm    Post peak  mmHg    O2 sat peak 100 %    Recovery HR 82 bpm    Recovery /70 mmHg    O2 sat recovery 100 %    Rate Pressure Product 16,020.0     Angina Index 0     Rest Nuclear Isotope Dose 10.80 mCi    Stress Nuclear Isotope Dose 32.60 mCi    EF (%) 65 %   CBC and differential    Collection Time: 03/02/24  5:38 AM   Result Value Ref Range    WBC 7.21 4.31 - 10.16 Thousand/uL    RBC 3.87 3.81 - 5.12 Million/uL    Hemoglobin 10.3 (L) 11.5 - 15.4 g/dL    Hematocrit 35.3 34.8 - 46.1 %    MCV 91 82 - 98 fL    MCH 26.6 (L) 26.8 - 34.3 pg    MCHC 29.2 (L) 31.4 - 37.4 g/dL    RDW 14.8 11.6 - 15.1 %    MPV 9.6 8.9 - 12.7 fL    Platelets 194 149 - 390 Thousands/uL    nRBC 0 /100 WBCs    Segmented % 77 (H) 43 - 75 %    Immature Grans % 0 0 - 2 %    Lymphocytes % 12 (L) 14 - 44 %    Monocytes % 9 4 - 12 %    Eosinophils Relative 1 0 - 6 %    Basophils Relative 1 0 - 1 %    Absolute Neutrophils 5.56 1.85 - 7.62 Thousands/µL    Absolute Immature Grans 0.02 0.00 - 0.20 Thousand/uL    Absolute Lymphocytes 0.85 0.60 - 4.47 Thousands/µL    Absolute Monocytes 0.66 0.17 - 1.22 Thousand/µL    Eosinophils Absolute 0.08 0.00 - 0.61 Thousand/µL    Basophils Absolute 0.04 0.00 - 0.10 Thousands/µL   Basic metabolic panel    Collection Time: 03/02/24  5:38 AM   Result Value Ref Range    Sodium 143 135 - 147 mmol/L    Potassium 5.0 3.5 - 5.3 mmol/L    Chloride 100 96 - 108 mmol/L    CO2 42 (H) 21 - 32 mmol/L    ANION GAP 1 mmol/L    BUN 22 5 - 25 mg/dL     Creatinine 1.01 0.60 - 1.30 mg/dL    Glucose 93 65 - 140 mg/dL    Calcium 9.3 8.4 - 10.2 mg/dL    eGFR 57 ml/min/1.73sq m   Magnesium    Collection Time: 03/02/24  5:38 AM   Result Value Ref Range    Magnesium 1.9 1.9 - 2.7 mg/dL   CBC and differential    Collection Time: 03/03/24  6:10 AM   Result Value Ref Range    WBC 7.86 4.31 - 10.16 Thousand/uL    RBC 4.03 3.81 - 5.12 Million/uL    Hemoglobin 10.5 (L) 11.5 - 15.4 g/dL    Hematocrit 37.1 34.8 - 46.1 %    MCV 92 82 - 98 fL    MCH 26.1 (L) 26.8 - 34.3 pg    MCHC 28.3 (L) 31.4 - 37.4 g/dL    RDW 14.5 11.6 - 15.1 %    MPV 9.7 8.9 - 12.7 fL    Platelets 201 149 - 390 Thousands/uL    nRBC 0 /100 WBCs    Segmented % 80 (H) 43 - 75 %    Immature Grans % 0 0 - 2 %    Lymphocytes % 8 (L) 14 - 44 %    Monocytes % 11 4 - 12 %    Eosinophils Relative 1 0 - 6 %    Basophils Relative 0 0 - 1 %    Absolute Neutrophils 6.25 1.85 - 7.62 Thousands/µL    Absolute Immature Grans 0.03 0.00 - 0.20 Thousand/uL    Absolute Lymphocytes 0.66 0.60 - 4.47 Thousands/µL    Absolute Monocytes 0.84 0.17 - 1.22 Thousand/µL    Eosinophils Absolute 0.05 0.00 - 0.61 Thousand/µL    Basophils Absolute 0.03 0.00 - 0.10 Thousands/µL   Basic metabolic panel    Collection Time: 03/03/24  6:10 AM   Result Value Ref Range    Sodium 140 135 - 147 mmol/L    Potassium 4.3 3.5 - 5.3 mmol/L    Chloride 98 96 - 108 mmol/L    CO2 39 (H) 21 - 32 mmol/L    ANION GAP 3 mmol/L    BUN 17 5 - 25 mg/dL    Creatinine 0.85 0.60 - 1.30 mg/dL    Glucose 98 65 - 140 mg/dL    Calcium 9.2 8.4 - 10.2 mg/dL    eGFR 70 ml/min/1.73sq m   Magnesium    Collection Time: 03/03/24  6:10 AM   Result Value Ref Range    Magnesium 1.8 (L) 1.9 - 2.7 mg/dL   CBC and differential    Collection Time: 03/04/24  5:24 AM   Result Value Ref Range    WBC 5.55 4.31 - 10.16 Thousand/uL    RBC 3.79 (L) 3.81 - 5.12 Million/uL    Hemoglobin 10.3 (L) 11.5 - 15.4 g/dL    Hematocrit 34.6 (L) 34.8 - 46.1 %    MCV 91 82 - 98 fL    MCH 27.2 26.8 - 34.3 pg     MCHC 29.8 (L) 31.4 - 37.4 g/dL    RDW 14.6 11.6 - 15.1 %    MPV 9.9 8.9 - 12.7 fL    Platelets 168 149 - 390 Thousands/uL    nRBC 0 /100 WBCs    Segmented % 73 43 - 75 %    Immature Grans % 1 0 - 2 %    Lymphocytes % 14 14 - 44 %    Monocytes % 11 4 - 12 %    Eosinophils Relative 1 0 - 6 %    Basophils Relative 0 0 - 1 %    Absolute Neutrophils 4.04 1.85 - 7.62 Thousands/µL    Absolute Immature Grans 0.03 0.00 - 0.20 Thousand/uL    Absolute Lymphocytes 0.80 0.60 - 4.47 Thousands/µL    Absolute Monocytes 0.61 0.17 - 1.22 Thousand/µL    Eosinophils Absolute 0.05 0.00 - 0.61 Thousand/µL    Basophils Absolute 0.02 0.00 - 0.10 Thousands/µL   Basic metabolic panel    Collection Time: 03/04/24  5:24 AM   Result Value Ref Range    Sodium 142 135 - 147 mmol/L    Potassium 4.1 3.5 - 5.3 mmol/L    Chloride 99 96 - 108 mmol/L    CO2 41 (H) 21 - 32 mmol/L    ANION GAP 2 mmol/L    BUN 17 5 - 25 mg/dL    Creatinine 0.88 0.60 - 1.30 mg/dL    Glucose 99 65 - 140 mg/dL    Calcium 9.2 8.4 - 10.2 mg/dL    eGFR 67 ml/min/1.73sq m   Magnesium    Collection Time: 03/04/24  5:24 AM   Result Value Ref Range    Magnesium 2.0 1.9 - 2.7 mg/dL   Tissue Exam    Collection Time: 03/04/24  8:45 AM   Result Value Ref Range    Case Report       Surgical Pathology Report                         Case: [id number]                                  Authorizing Provider:  Sidra Raymundo MD          Collected:           03/04/2024 0845              Ordering Location:     Replaced by [facility]        Received:            03/04/2024 [address] [place] [address] 2nd Floor Med                                                                              Surg Unit                                                                    Pathologist:           Stacy Fowler MD                                                       Specimen:    Esophagus, Cold BX Distal esophagus                                                        Final  "Diagnosis       A. Esophagus, Distal, biopsy:  - Benign squamous mucosa with mild reactive changes  - Negative for intestinal metaplasia, eosinophilic esophagitis, dysplasia and malignancy         Additional Information       All reported additional testing was performed with appropriately reactive controls.  These tests were developed and their performance characteristics determined by [facility] or appropriate performing facility, though some tests may be performed on tissues which have not been validated for performance characteristics (such as staining performed on alcohol exposed cell blocks and decalcified tissues).  Results should be interpreted with caution and in the context of the patients’ clinical condition. These tests may not be cleared or approved by the U.S. Food and Drug Administration, though the FDA has determined that such clearance or approval is not necessary. These tests are used for clinical purposes and they should not be regarded as investigational or for research. This laboratory has been approved by CLIA 88, designated as a high-complexity laboratory and is qualified to perform these tests.    Interpretation performed at [facility] [address] [place] [address]   .      Gross Description       A. The specimen is received in formalin, labeled with the patient's name and hospital number, and is designated \" biopsy distal esophagus\".  The specimen consists of 3 white to red soft tissue fragments each measuring 0.3 to 0.5 cm.  Entirely submitted. Screened cassette.    Note: The estimated total formalin fixation time based upon information provided by the submitting clinician and the standard processing schedule is under 72 hours.    [facility]     CBC and differential    Collection Time: 03/05/24  4:34 AM   Result Value Ref Range    WBC 6.14 4.31 - 10.16 Thousand/uL    RBC 4.07 3.81 - 5.12 Million/uL    Hemoglobin 10.7 (L) 11.5 - 15.4 g/dL    Hematocrit " 37.1 34.8 - 46.1 %    MCV 91 82 - 98 fL    MCH 26.3 (L) 26.8 - 34.3 pg    MCHC 28.8 (L) 31.4 - 37.4 g/dL    RDW 14.6 11.6 - 15.1 %    MPV 9.9 8.9 - 12.7 fL    Platelets 177 149 - 390 Thousands/uL    nRBC 0 /100 WBCs    Segmented % 80 (H) 43 - 75 %    Immature Grans % 0 0 - 2 %    Lymphocytes % 11 (L) 14 - 44 %    Monocytes % 8 4 - 12 %    Eosinophils Relative 1 0 - 6 %    Basophils Relative 0 0 - 1 %    Absolute Neutrophils 4.87 1.85 - 7.62 Thousands/µL    Absolute Immature Grans 0.02 0.00 - 0.20 Thousand/uL    Absolute Lymphocytes 0.67 0.60 - 4.47 Thousands/µL    Absolute Monocytes 0.50 0.17 - 1.22 Thousand/µL    Eosinophils Absolute 0.06 0.00 - 0.61 Thousand/µL    Basophils Absolute 0.02 0.00 - 0.10 Thousands/µL   Basic metabolic panel    Collection Time: 03/05/24  4:34 AM   Result Value Ref Range    Sodium 141 135 - 147 mmol/L    Potassium 4.0 3.5 - 5.3 mmol/L    Chloride 99 96 - 108 mmol/L    CO2 37 (H) 21 - 32 mmol/L    ANION GAP 5 mmol/L    BUN 13 5 - 25 mg/dL    Creatinine 0.89 0.60 - 1.30 mg/dL    Glucose 94 65 - 140 mg/dL    Calcium 9.3 8.4 - 10.2 mg/dL    eGFR 66 ml/min/1.73sq m   Magnesium    Collection Time: 03/05/24  4:34 AM   Result Value Ref Range    Magnesium 1.9 1.9 - 2.7 mg/dL   Tissue Exam    Collection Time: 04/11/24  8:28 AM   Result Value Ref Range    Case Report       Surgical Pathology Report                         Case: [id number]                                  Authorizing Provider:  Sidra Raymundo MD          Collected:           04/11/2024 0828              Ordering Location:     [facility]        Received:            04/11/2024 [address] [place] [address] Endoscopy                                                           Pathologist:           Jasiel Sosa MD                                                                Specimens:   A) - Polyp, Colorectal, ascending colon polyp hot snare                                             B) - Polyp, Colorectal,  "sigmoid colon polyp cold snare                                     Final Diagnosis       A.  Ascending colon polyp (hot snare):     - Tubular adenoma; negative for high-grade dysplasia.    B.  Sigmoid colon polyp (cold snare):     - Tubular adenoma; negative for high-grade dysplasia.       Additional Information       All reported additional testing was performed with appropriately reactive controls.  These tests were developed and their performance characteristics determined by [facility] or appropriate performing facility, though some tests may be performed on tissues which have not been validated for performance characteristics (such as staining performed on alcohol exposed cell blocks and decalcified tissues).  Results should be interpreted with caution and in the context of the patients’ clinical condition. These tests may not be cleared or approved by the U.S. Food and Drug Administration, though the FDA has determined that such clearance or approval is not necessary. These tests are used for clinical purposes and they should not be regarded as investigational or for research. This laboratory has been approved by [place] [id number], designated as a high-complexity laboratory and is qualified to perform these tests. Interpretation performed at [facility], [address] [place], [place] [id number]..      Synoptic Checklist          COLON/RECTUM POLYP FORM - GI - All Specimens          :    Adenoma(s)      Gross Description       A. The specimen is received in formalin, labeled with the patient's name and hospital number, and is designated \" ascending colon polyp\".  The specimen consists of a single tan-pink polypoid tissue fragment measuring 0.6 x 0.4 x 0.4 cm.  The presumed margins of resection is inked blue and the specimen is bisected revealing grossly unremarkable cut surfaces.  Entirely submitted.  One screened cassette.    B. The specimen is received in formalin, labeled with the patient's name " "and hospital number, and is designated \" sigmoid colon polyp\".  The specimen consists of a single tan tissue fragment measuring 0.2 cm in greatest dimension.  The specimen is entirely submitted in a screened cassette.    Note: The estimated total formalin fixation time based upon information provided by the submitting clinician and the standard processing schedule is under 72 hours. Rocky Calderon     Adult Nebulizer Package    Collection Time: 06/07/24  9:05 AM   Result Value Ref Range    Supplier Name [facility]     Supplier Phone Number [phone]     Order Status Delivery Successful     Delivery Note      Delivery Request Date 06/07/2024     Date Delivered  07/02/2024     Item Description Nebulizer Compressor with Mask     Item Description Nebulizer Set, Reusable     Item Description       Adult Nebulizer Mask, 1 per 1 month a€“ Use as directed and discard 1 month after first use    Item Description Disposable Nebulizer Compressor Filter    ECG 12 lead    Collection Time: 06/19/24  5:15 PM   Result Value Ref Range    Ventricular Rate 91 BPM    Atrial Rate 91 BPM    IL Interval 156 ms    QRSD Interval 90 ms    QT Interval 360 ms    QTC Interval 442 ms    P Axis 88 degrees    QRS Axis 73 degrees    T Wave Axis 75 degrees   CBC and differential    Collection Time: 06/19/24  5:48 PM   Result Value Ref Range    WBC 7.46 4.31 - 10.16 Thousand/uL    RBC 3.91 3.81 - 5.12 Million/uL    Hemoglobin 10.6 (L) 11.5 - 15.4 g/dL    Hematocrit 36.1 34.8 - 46.1 %    MCV 92 82 - 98 fL    MCH 27.1 26.8 - 34.3 pg    MCHC 29.4 (L) 31.4 - 37.4 g/dL    RDW 13.1 11.6 - 15.1 %    MPV 10.0 8.9 - 12.7 fL    Platelets 137 (L) 149 - 390 Thousands/uL    nRBC 0 /100 WBCs    Segmented % 90 (H) 43 - 75 %    Immature Grans % 1 0 - 2 %    Lymphocytes % 4 (L) 14 - 44 %    Monocytes % 5 4 - 12 %    Eosinophils Relative 0 0 - 6 %    Basophils Relative 0 0 - 1 %    Absolute Neutrophils 6.73 1.85 - 7.62 Thousands/µL    " Absolute Immature Grans 0.08 0.00 - 0.20 Thousand/uL    Absolute Lymphocytes 0.27 (L) 0.60 - 4.47 Thousands/µL    Absolute Monocytes 0.37 0.17 - 1.22 Thousand/µL    Eosinophils Absolute 0.00 0.00 - 0.61 Thousand/µL    Basophils Absolute 0.01 0.00 - 0.10 Thousands/µL   Comprehensive metabolic panel    Collection Time: 06/19/24  5:48 PM   Result Value Ref Range    Sodium 141 135 - 147 mmol/L    Potassium 4.5 3.5 - 5.3 mmol/L    Chloride 96 96 - 108 mmol/L    CO2 40 (H) 21 - 32 mmol/L    ANION GAP 5 4 - 13 mmol/L    BUN 18 5 - 25 mg/dL    Creatinine 0.98 0.60 - 1.30 mg/dL    Glucose 129 65 - 140 mg/dL    Calcium 9.3 8.4 - 10.2 mg/dL    AST 21 13 - 39 U/L    ALT 12 7 - 52 U/L    Alkaline Phosphatase 126 (H) 34 - 104 U/L    Total Protein 7.2 6.4 - 8.4 g/dL    Albumin 4.4 3.5 - 5.0 g/dL    Total Bilirubin 0.42 0.20 - 1.00 mg/dL    eGFR 59 ml/min/1.73sq m   B-Type Natriuretic Peptide(BNP)    Collection Time: 06/19/24  5:48 PM   Result Value Ref Range     (H) 0 - 100 pg/mL   High Sensitivity Troponin I Random    Collection Time: 06/19/24  5:48 PM   Result Value Ref Range    HS TnI random 5 (L) 8 - 18 ng/L   Smear Review(Phlebs Do Not Order)    Collection Time: 06/19/24  5:48 PM   Result Value Ref Range    RBC Morphology Present     Platelet Estimate Adequate Adequate    Basophilic Stippling Present    Procalcitonin    Collection Time: 06/19/24  5:48 PM   Result Value Ref Range    Procalcitonin <0.05 <=0.25 ng/ml   Blood gas, venous    Collection Time: 06/19/24  6:02 PM   Result Value Ref Range    pH, Jonas 7.220 (L) 7.300 - 7.400    pCO2, Jonas 99.1 (HH) 42.0 - 50.0 mm Hg    pO2, Jonas 42.2 35.0 - 45.0 mm Hg    HCO3, Jonas 39.6 (H) 24 - 30 mmol/L    Base Excess, Jonas 8.7 mmol/L    O2 Content, Jonas 12.1 ml/dL    O2 HGB, VENOUS 73.6 60.0 - 80.0 %   COVID/FLU/RSV    Collection Time: 06/19/24  8:10 PM    Specimen: Nose; Nares   Result Value Ref Range    SARS-CoV-2 Negative Negative    INFLUENZA A PCR Negative Negative    INFLUENZA  B PCR Negative Negative    RSV PCR Negative Negative   ECG 12 lead    Collection Time: 06/19/24 10:40 PM   Result Value Ref Range    Ventricular Rate 94 BPM    Atrial Rate 94 BPM    NE Interval 174 ms    QRSD Interval 90 ms    QT Interval 370 ms    QTC Interval 462 ms    P Axis 84 degrees    QRS Axis 63 degrees    T Wave Axis 76 degrees   Blood gas, venous    Collection Time: 06/20/24 10:21 AM   Result Value Ref Range    pH, Jonas 7.275 (L) 7.300 - 7.400    pCO2, Jonas 80.3 (HH) 42.0 - 50.0 mm Hg    pO2, Jonas 46.3 (H) 35.0 - 45.0 mm Hg    HCO3, Jonas 36.5 (H) 24 - 30 mmol/L    Base Excess, Jonas 7.4 mmol/L    O2 Content, Jonas 12.6 ml/dL    O2 HGB, VENOUS 80.6 (H) 60.0 - 80.0 %   Respiratory Panel 2.1(RP2)with COVID19    Collection Time: 06/20/24 12:49 PM    Specimen: Nasopharyngeal Swab   Result Value Ref Range    Adenovirus Not Detected Not Detected    Bordetella parapertussis Not Detected Not Detected    Bordetella pertussis Not Detected Not Detected    Chlamydia pneumoniae Not Detected Not detected    SARS-CoV-2 Not Detected Not Detected    Coronavirus 229E Not Detected Not Detected    Coronavirus HKU1 Not Detected Not Detected    Coronavirus NL63 Not Detected Not Detected    Coronavirus OC43 Not Detected Not Detected    Human Metapneumovirus Not Detected Not Detected    Rhino/Enterovirus Not Detected Not Detected    Influenza A Not Detected Not Detected    Influenza B Not Detected No Detected    Mycoplasma pneumoniae Not Detected Not Detected    Parainfluenza 1 Not Detected Not Detected    Parainfluenza 2 Not Detected Not Detected    Parainfluenza 3 Detected (A) Not Detected    Parainfluenza 4 Not Detected Not Detected    Respiratory Syncytial Virus Not Detected Not Detected   Procalcitonin    Collection Time: 06/21/24  8:03 AM   Result Value Ref Range    Procalcitonin <0.05 <=0.25 ng/ml   CBC and differential    Collection Time: 06/21/24  8:03 AM   Result Value Ref Range    WBC 12.98 (H) 4.31 - 10.16 Thousand/uL    RBC  3.99 3.81 - 5.12 Million/uL    Hemoglobin 10.5 (L) 11.5 - 15.4 g/dL    Hematocrit 36.9 34.8 - 46.1 %    MCV 93 82 - 98 fL    MCH 26.3 (L) 26.8 - 34.3 pg    MCHC 28.5 (L) 31.4 - 37.4 g/dL    RDW 13.2 11.6 - 15.1 %    MPV 9.5 8.9 - 12.7 fL    Platelets 177 149 - 390 Thousands/uL    nRBC 0 /100 WBCs    Segmented % 90 (H) 43 - 75 %    Immature Grans % 1 0 - 2 %    Lymphocytes % 3 (L) 14 - 44 %    Monocytes % 6 4 - 12 %    Eosinophils Relative 0 0 - 6 %    Basophils Relative 0 0 - 1 %    Absolute Neutrophils 11.70 (H) 1.85 - 7.62 Thousands/µL    Absolute Immature Grans 0.10 0.00 - 0.20 Thousand/uL    Absolute Lymphocytes 0.37 (L) 0.60 - 4.47 Thousands/µL    Absolute Monocytes 0.80 0.17 - 1.22 Thousand/µL    Eosinophils Absolute 0.00 0.00 - 0.61 Thousand/µL    Basophils Absolute 0.01 0.00 - 0.10 Thousands/µL   Basic metabolic panel    Collection Time: 06/21/24  8:03 AM   Result Value Ref Range    Sodium 142 135 - 147 mmol/L    Potassium 4.8 3.5 - 5.3 mmol/L    Chloride 97 96 - 108 mmol/L    CO2 44 (H) 21 - 32 mmol/L    ANION GAP 1 (L) 4 - 13 mmol/L    BUN 25 5 - 25 mg/dL    Creatinine 1.12 0.60 - 1.30 mg/dL    Glucose 120 65 - 140 mg/dL    Calcium 9.7 8.4 - 10.2 mg/dL    eGFR 50 ml/min/1.73sq m   Smear Review(Phlebs Do Not Order)    Collection Time: 06/21/24  8:03 AM   Result Value Ref Range    RBC Morphology Present     Platelet Estimate Adequate Adequate    Basophilic Stippling Present    POCT Blood Gas (CG8+)    Collection Time: 06/21/24  5:33 PM   Result Value Ref Range    pH, Art i-STAT 7.256 (L) 7.350 - 7.450    pCO2, Art i-STAT 91.8 (HH) 36.0 - 44.0 mm HG    pO2, ART i-STAT 74.0 (L) 75.0 - 129.0 mm HG    BE, i-STAT 11 (H) -2 - 3 mmol/L    HCO3, Art i-STAT 40.8 (HH) 22.0 - 28.0 mmol/L    CO2, i-STAT 44 (H) 21 - 32 mmol/L    O2 Sat, i-STAT 91 (H) 60 - 85 %    SODIUM, I-STAT 137 136 - 145 mmol/l    Potassium, i-STAT 4.7 3.5 - 5.3 mmol/L    Calcium, Ionized i-STAT 1.27 1.12 - 1.32 mmol/L    Hct, i-STAT 34 (L) 34.8 -  46.1 %    Hgb, i-STAT 11.6 11.5 - 15.4 g/dl    Glucose, i-STAT 130 65 - 140 mg/dl    Specimen Type ARTERIAL    Blood gas, arterial    Collection Time: 06/21/24  8:23 PM   Result Value Ref Range    pH, Arterial 7.276 (L) 7.350 - 7.450    pCO2, Arterial 81.4 (HH) 36.0 - 44.0 mm Hg    pO2, Arterial 95.9 75.0 - 129.0 mm Hg    HCO3, Arterial 37.0 (H) 22.0 - 28.0 mmol/L    Base Excess, Arterial 7.6 mmol/L    O2 Content, Arterial 16.6 16.0 - 23.0 mL/dL    O2 HGB,Arterial  96.9 94.0 - 97.0 %    SOURCE Radial, Left     SRAVAN TEST Yes     Non Vent type BIPAP BIPAP     IPAP 12     EPAP 6     BIPAP fio2 40 %   Blood gas, arterial    Collection Time: 06/22/24  4:36 AM   Result Value Ref Range    pH, Arterial 7.272 (L) 7.350 - 7.450    pCO2, Arterial 92.7 (HH) 36.0 - 44.0 mm Hg    pO2, Arterial 99.6 75.0 - 129.0 mm Hg    HCO3, Arterial 41.8 (H) 22.0 - 28.0 mmol/L    Base Excess, Arterial 11.5 mmol/L    O2 Content, Arterial 16.5 16.0 - 23.0 mL/dL    O2 HGB,Arterial  96.9 94.0 - 97.0 %    SRAVAN TEST Yes     Non Vent type BIPAP BIPAP     IPAP 12     EPAP 6     BIPAP fio2 40 %   Basic metabolic panel    Collection Time: 06/22/24  5:00 AM   Result Value Ref Range    Sodium 140 135 - 147 mmol/L    Potassium 4.2 3.5 - 5.3 mmol/L    Chloride 93 (L) 96 - 108 mmol/L    CO2 43 (H) 21 - 32 mmol/L    ANION GAP 4 4 - 13 mmol/L    BUN 34 (H) 5 - 25 mg/dL    Creatinine 1.16 0.60 - 1.30 mg/dL    Glucose 114 65 - 140 mg/dL    Calcium 9.3 8.4 - 10.2 mg/dL    eGFR 48 ml/min/1.73sq m   Blood gas, arterial    Collection Time: 06/22/24  3:03 PM   Result Value Ref Range    pH, Arterial 7.306 (L) 7.350 - 7.450    pCO2, Arterial 74.4 (HH) 36.0 - 44.0 mm Hg    pO2, Arterial 93.5 75.0 - 129.0 mm Hg    HCO3, Arterial 36.3 (H) 22.0 - 28.0 mmol/L    Base Excess, Arterial 7.8 mmol/L    O2 Content, Arterial 15.5 (L) 16.0 - 23.0 mL/dL    O2 HGB,Arterial  96.0 94.0 - 97.0 %    SOURCE Radial, Right     SRAVAN TEST Yes     Non Vent type BIPAP BIPAP     IPAP 14      EPAP 6     BIPAP fio2 40 %   Blood gas, arterial    Collection Time: 06/22/24  7:55 PM   Result Value Ref Range    pH, Arterial 7.320 (L) 7.350 - 7.450    pCO2, Arterial 79.5 (HH) 36.0 - 44.0 mm Hg    pO2, Arterial 105.7 75.0 - 129.0 mm Hg    HCO3, Arterial 40.0 (H) 22.0 - 28.0 mmol/L    Base Excess, Arterial 11.1 mmol/L    O2 Content, Arterial 16.2 16.0 - 23.0 mL/dL    O2 HGB,Arterial  96.7 94.0 - 97.0 %    SOURCE Radial, Left     SRAVAN TEST Yes     Non Vent type BIPAP     CBC and differential    Collection Time: 06/23/24  4:59 AM   Result Value Ref Range    WBC 6.12 4.31 - 10.16 Thousand/uL    RBC 3.70 (L) 3.81 - 5.12 Million/uL    Hemoglobin 9.9 (L) 11.5 - 15.4 g/dL    Hematocrit 34.2 (L) 34.8 - 46.1 %    MCV 92 82 - 98 fL    MCH 26.8 26.8 - 34.3 pg    MCHC 28.9 (L) 31.4 - 37.4 g/dL    RDW 13.3 11.6 - 15.1 %    MPV 9.8 8.9 - 12.7 fL    Platelets 153 149 - 390 Thousands/uL    nRBC 0 /100 WBCs    Segmented % 89 (H) 43 - 75 %    Immature Grans % 1 0 - 2 %    Lymphocytes % 7 (L) 14 - 44 %    Monocytes % 3 (L) 4 - 12 %    Eosinophils Relative 0 0 - 6 %    Basophils Relative 0 0 - 1 %    Absolute Neutrophils 5.50 1.85 - 7.62 Thousands/µL    Absolute Immature Grans 0.03 0.00 - 0.20 Thousand/uL    Absolute Lymphocytes 0.40 (L) 0.60 - 4.47 Thousands/µL    Absolute Monocytes 0.19 0.17 - 1.22 Thousand/µL    Eosinophils Absolute 0.00 0.00 - 0.61 Thousand/µL    Basophils Absolute 0.00 0.00 - 0.10 Thousands/µL     *Note: Due to a large number of results and/or encounters for the requested time period, some results have not been displayed. A complete set of results can be found in Results Review.       Laboratory Results: I have personally reviewed the pertinent laboratory results/reports     Radiology/Other Diagnostic Testing Results: I have reviewed the following imaging and agree with the interpretation below.    CT chest wo contrast  Result Date: 9/17/2024  CT CHEST WITHOUT IV CONTRAST INDICATION: R91.8: Other nonspecific  abnormal finding of lung field. COMPARISON: PET/CT 8/1/2024, CTA chest 6/19/2024, CT chest 2/26/2022, 6/16/2017 TECHNIQUE: CT examination of the chest was performed without intravenous contrast. Multiplanar 2D reformatted images were created from the source data. This examination, like all CT scans performed in the [facility], was performed utilizing techniques to minimize radiation dose exposure, including the use of iterative reconstruction and automated exposure control. Radiation dose length product (DLP) for this visit: 115.61 mGy-cm FINDINGS: LUNGS: There is no tracheal or endobronchial lesion. Biapical pleural-parenchymal scarring. Emphysematous changes of lungs. Medial basilar right upper lobe, right middle lobe, and lingular atelectasis versus scarring. Pulmonary nodules as noted on series  302: -Stable 1.6 x 1.0 cm right lower lobe groundglass nodule (image 107) -Stable 1.5 x 1.1 cm right lower lobe groundglass nodule (image 137) -Resolution of previously seen posterior right lower lobe tree-in-bud nodularity and right basilar nodular opacity -Stable 1.2 x 1.1 cm left lower lobe nodule (image 186) PLEURA: Unremarkable. HEART/GREAT VESSELS: Coronary artery calcifications. No thoracic aortic aneurysm. MEDIASTINUM AND KAYY: Unremarkable. CHEST WALL AND LOWER NECK: Unremarkable. VISUALIZED STRUCTURES IN THE UPPER ABDOMEN: Unremarkable. OSSEOUS STRUCTURES: No acute fracture or destructive osseous lesion.     Stable bilateral pulmonary nodules with resolution of previously seen infectious/inflammatory nodules of right lower lobe. Recommend follow-up CT in 12 months to assess for long-term stability. Workstation performed: [id number]        Assessment/Plan:  1. Flu-like symptoms  -     POCT Rapid Covid Ag  -     POCT rapid flu A and B  2. Viral upper respiratory illness  -     POCT Rapid Covid Ag  -     POCT rapid flu A and B  3. Chronic respiratory failure with hypoxia, on home O2 therapy   "(HCC)  -     predniSONE 5 mg tablet; Take 4 tablets (20 mg total) by mouth 2 (two) times a day with meals for 2 days, THEN 2 tablets (10 mg total) daily for 2 days, THEN 1 tablet (5 mg total) 2 (two) times a day with meals for 2 days, THEN 1 tablet (5 mg total) daily.  4. COPD exacerbation (HCC)  -     predniSONE 5 mg tablet; Take 4 tablets (20 mg total) by mouth 2 (two) times a day with meals for 2 days, THEN 2 tablets (10 mg total) daily for 2 days, THEN 1 tablet (5 mg total) 2 (two) times a day with meals for 2 days, THEN 1 tablet (5 mg total) daily.  5. Acute on chronic respiratory failure with hypoxia and hypercapnia (HCC)  -     predniSONE 5 mg tablet; Take 4 tablets (20 mg total) by mouth 2 (two) times a day with meals for 2 days, THEN 2 tablets (10 mg total) daily for 2 days, THEN 1 tablet (5 mg total) 2 (two) times a day with meals for 2 days, THEN 1 tablet (5 mg total) daily.  6. Acute exacerbation of chronic obstructive pulmonary disease (HCC)  -     benzonatate (TESSALON) 200 MG capsule; Take 1 capsule (200 mg total) by mouth 3 (three) times a day as needed for cough  7. COVID-19  -     nirmatrelvir & ritonavir (Paxlovid, 150/100,) tablet therapy pack; Take 2 tablets by mouth 2 (two) times a day for 5 days Take 1 nirmatrelvir tablet + 1 ritonavir tablet together per dose        Rapid COVID-19 test was positive.  Flu test was negative.  Tessalon Perles as needed for cough.  Will start prednisone taper.  Will start Paxlovid was renally dosed.  Advised to stop atorvastatin while starting Paxlovid.  Continue azithromycin 3 times a week.  ED precautions given for worsening symptoms.           Read package inserts for all medications before starting a new medications, call me if you have any questions.    Patient was given opportunity to ask questions and all questions were answered.    Disclaimer: Portions of the record may have been created with voice recognition software. Occasional wrong word or \"sound a " "like\" substitutions may have occurred due to the inherent limitations of voice recognition software. Read the chart carefully and recognize, using context, where substitutions have occurred. I have used the Epic copy/forward function to compose this note. I have reviewed my current note to ensure it reflects the current patient status, exam, assessment and plan.    "

## 2024-12-19 NOTE — PATIENT INSTRUCTIONS
You diagnosed with COVID-19.  start taking Paxlovid 2 tablet twice a day for 5 days.  start prednisone taper that I sent today.  Continue azithromycin 3 times a week.  If your oxygen saturation drops under 87% to go to the emergency room.  If you are unable to breathe have dizziness or chest pain go to the emergency room.  Hold atorvastatin for 5 days while you are on Paxlovid.

## 2024-12-21 DIAGNOSIS — E03.9 HYPOTHYROIDISM, UNSPECIFIED TYPE: ICD-10-CM

## 2024-12-23 RX ORDER — LEVOTHYROXINE SODIUM 75 UG/1
75 TABLET ORAL
Qty: 90 TABLET | Refills: 1 | Status: SHIPPED | OUTPATIENT
Start: 2024-12-23

## 2025-01-22 ENCOUNTER — TELEPHONE (OUTPATIENT)
Dept: HEMATOLOGY ONCOLOGY | Facility: CLINIC | Age: 70
End: 2025-01-22

## 2025-01-22 NOTE — TELEPHONE ENCOUNTER
Due to Provider Schedule change, PT has been scheduled for 4/10/25 at 1:40 with Dr. Muñoz.  All confirmed!

## 2025-02-23 ENCOUNTER — APPOINTMENT (EMERGENCY)
Dept: RADIOLOGY | Facility: HOSPITAL | Age: 70
End: 2025-02-23
Payer: COMMERCIAL

## 2025-02-23 ENCOUNTER — HOSPITAL ENCOUNTER (INPATIENT)
Facility: HOSPITAL | Age: 70
LOS: 3 days | Discharge: HOME | End: 2025-02-27
Attending: STUDENT IN AN ORGANIZED HEALTH CARE EDUCATION/TRAINING PROGRAM | Admitting: STUDENT IN AN ORGANIZED HEALTH CARE EDUCATION/TRAINING PROGRAM
Payer: COMMERCIAL

## 2025-02-23 DIAGNOSIS — R10.13 EPIGASTRIC PAIN: ICD-10-CM

## 2025-02-23 DIAGNOSIS — J44.1 COPD EXACERBATION (HCC): Primary | ICD-10-CM

## 2025-02-23 PROBLEM — D64.9 ANEMIA: Status: ACTIVE | Noted: 2025-02-23

## 2025-02-23 LAB
ALBUMIN SERPL BCG-MCNC: 4 G/DL (ref 3.5–5)
ALP SERPL-CCNC: 131 U/L (ref 34–104)
ALT SERPL W P-5'-P-CCNC: 8 U/L (ref 7–52)
ANION GAP SERPL CALCULATED.3IONS-SCNC: 5 MMOL/L (ref 4–13)
AST SERPL W P-5'-P-CCNC: 24 U/L (ref 13–39)
BASOPHILS # BLD AUTO: 0.04 THOUSANDS/ÂΜL (ref 0–0.1)
BASOPHILS NFR BLD AUTO: 1 % (ref 0–1)
BILIRUB DIRECT SERPL-MCNC: 0.08 MG/DL (ref 0–0.2)
BILIRUB SERPL-MCNC: 0.41 MG/DL (ref 0.2–1)
BUN SERPL-MCNC: 24 MG/DL (ref 5–25)
CALCIUM SERPL-MCNC: 9.3 MG/DL (ref 8.4–10.2)
CARDIAC TROPONIN I PNL SERPL HS: 3 NG/L (ref 8–18)
CARDIAC TROPONIN I PNL SERPL HS: 3 NG/L (ref ?–50)
CHLORIDE SERPL-SCNC: 98 MMOL/L (ref 96–108)
CO2 SERPL-SCNC: 36 MMOL/L (ref 21–32)
CREAT SERPL-MCNC: 1.18 MG/DL (ref 0.6–1.3)
EOSINOPHIL # BLD AUTO: 0.26 THOUSAND/ÂΜL (ref 0–0.61)
EOSINOPHIL NFR BLD AUTO: 4 % (ref 0–6)
ERYTHROCYTE [DISTWIDTH] IN BLOOD BY AUTOMATED COUNT: 14.2 % (ref 11.6–15.1)
FLUAV RNA RESP QL NAA+PROBE: NEGATIVE
FLUBV RNA RESP QL NAA+PROBE: NEGATIVE
GFR SERPL CREATININE-BSD FRML MDRD: 47 ML/MIN/1.73SQ M
GLUCOSE SERPL-MCNC: 94 MG/DL (ref 65–140)
HCT VFR BLD AUTO: 35.5 % (ref 34.8–46.1)
HGB BLD-MCNC: 10.3 G/DL (ref 11.5–15.4)
IMM GRANULOCYTES # BLD AUTO: 0.02 THOUSAND/UL (ref 0–0.2)
IMM GRANULOCYTES NFR BLD AUTO: 0 % (ref 0–2)
IRON SATN MFR SERPL: 23 % (ref 15–50)
IRON SERPL-MCNC: 88 UG/DL (ref 50–212)
LIPASE SERPL-CCNC: 21 U/L (ref 11–82)
LYMPHOCYTES # BLD AUTO: 0.72 THOUSANDS/ÂΜL (ref 0.6–4.47)
LYMPHOCYTES NFR BLD AUTO: 11 % (ref 14–44)
MCH RBC QN AUTO: 26.3 PG (ref 26.8–34.3)
MCHC RBC AUTO-ENTMCNC: 29 G/DL (ref 31.4–37.4)
MCV RBC AUTO: 91 FL (ref 82–98)
MONOCYTES # BLD AUTO: 0.49 THOUSAND/ÂΜL (ref 0.17–1.22)
MONOCYTES NFR BLD AUTO: 7 % (ref 4–12)
NEUTROPHILS # BLD AUTO: 5.1 THOUSANDS/ÂΜL (ref 1.85–7.62)
NEUTS SEG NFR BLD AUTO: 77 % (ref 43–75)
NRBC BLD AUTO-RTO: 0 /100 WBCS
PLATELET # BLD AUTO: 180 THOUSANDS/UL (ref 149–390)
PMV BLD AUTO: 10.2 FL (ref 8.9–12.7)
POTASSIUM SERPL-SCNC: 5.1 MMOL/L (ref 3.5–5.3)
PROT SERPL-MCNC: 7.2 G/DL (ref 6.4–8.4)
RBC # BLD AUTO: 3.92 MILLION/UL (ref 3.81–5.12)
RSV RNA RESP QL NAA+PROBE: NEGATIVE
SARS-COV-2 RNA RESP QL NAA+PROBE: NEGATIVE
SODIUM SERPL-SCNC: 139 MMOL/L (ref 135–147)
TIBC SERPL-MCNC: 376.6 UG/DL (ref 250–450)
TRANSFERRIN SERPL-MCNC: 269 MG/DL (ref 203–362)
UIBC SERPL-MCNC: 289 UG/DL (ref 155–355)
WBC # BLD AUTO: 6.63 THOUSAND/UL (ref 4.31–10.16)

## 2025-02-23 PROCEDURE — 80076 HEPATIC FUNCTION PANEL: CPT | Performed by: NURSE PRACTITIONER

## 2025-02-23 PROCEDURE — 80048 BASIC METABOLIC PNL TOTAL CA: CPT

## 2025-02-23 PROCEDURE — 84484 ASSAY OF TROPONIN QUANT: CPT | Performed by: NURSE PRACTITIONER

## 2025-02-23 PROCEDURE — 93005 ELECTROCARDIOGRAM TRACING: CPT

## 2025-02-23 PROCEDURE — 99285 EMERGENCY DEPT VISIT HI MDM: CPT

## 2025-02-23 PROCEDURE — 84484 ASSAY OF TROPONIN QUANT: CPT

## 2025-02-23 PROCEDURE — 36415 COLL VENOUS BLD VENIPUNCTURE: CPT

## 2025-02-23 PROCEDURE — 99222 1ST HOSP IP/OBS MODERATE 55: CPT | Performed by: NURSE PRACTITIONER

## 2025-02-23 PROCEDURE — 83550 IRON BINDING TEST: CPT | Performed by: NURSE PRACTITIONER

## 2025-02-23 PROCEDURE — 0241U HB NFCT DS VIR RESP RNA 4 TRGT: CPT | Performed by: NURSE PRACTITIONER

## 2025-02-23 PROCEDURE — 83540 ASSAY OF IRON: CPT | Performed by: NURSE PRACTITIONER

## 2025-02-23 PROCEDURE — 94640 AIRWAY INHALATION TREATMENT: CPT

## 2025-02-23 PROCEDURE — 85025 COMPLETE CBC W/AUTO DIFF WBC: CPT

## 2025-02-23 PROCEDURE — 83690 ASSAY OF LIPASE: CPT

## 2025-02-23 PROCEDURE — 71045 X-RAY EXAM CHEST 1 VIEW: CPT

## 2025-02-23 PROCEDURE — 99285 EMERGENCY DEPT VISIT HI MDM: CPT | Performed by: STUDENT IN AN ORGANIZED HEALTH CARE EDUCATION/TRAINING PROGRAM

## 2025-02-23 PROCEDURE — 82728 ASSAY OF FERRITIN: CPT | Performed by: NURSE PRACTITIONER

## 2025-02-23 RX ORDER — FAMOTIDINE 20 MG/1
20 TABLET, FILM COATED ORAL
Status: DISCONTINUED | OUTPATIENT
Start: 2025-02-24 | End: 2025-02-27 | Stop reason: HOSPADM

## 2025-02-23 RX ORDER — IPRATROPIUM BROMIDE 42 UG/1
2 SPRAY, METERED NASAL 3 TIMES DAILY
Status: DISCONTINUED | OUTPATIENT
Start: 2025-02-23 | End: 2025-02-23

## 2025-02-23 RX ORDER — HEPARIN SODIUM 5000 [USP'U]/ML
5000 INJECTION, SOLUTION INTRAVENOUS; SUBCUTANEOUS EVERY 8 HOURS SCHEDULED
Status: DISCONTINUED | OUTPATIENT
Start: 2025-02-23 | End: 2025-02-27 | Stop reason: HOSPADM

## 2025-02-23 RX ORDER — LEVOTHYROXINE SODIUM 75 UG/1
75 TABLET ORAL
Status: DISCONTINUED | OUTPATIENT
Start: 2025-02-24 | End: 2025-02-27 | Stop reason: HOSPADM

## 2025-02-23 RX ORDER — ACETAMINOPHEN 325 MG/1
650 TABLET ORAL EVERY 6 HOURS PRN
Status: DISCONTINUED | OUTPATIENT
Start: 2025-02-23 | End: 2025-02-27 | Stop reason: HOSPADM

## 2025-02-23 RX ORDER — ALBUTEROL SULFATE 5 MG/ML
5 SOLUTION RESPIRATORY (INHALATION) ONCE
Status: COMPLETED | OUTPATIENT
Start: 2025-02-23 | End: 2025-02-23

## 2025-02-23 RX ORDER — SERTRALINE HYDROCHLORIDE 25 MG/1
25 TABLET, FILM COATED ORAL
Status: DISCONTINUED | OUTPATIENT
Start: 2025-02-23 | End: 2025-02-27 | Stop reason: HOSPADM

## 2025-02-23 RX ORDER — BUDESONIDE AND FORMOTEROL FUMARATE DIHYDRATE 160; 4.5 UG/1; UG/1
2 AEROSOL RESPIRATORY (INHALATION) 2 TIMES DAILY
Status: DISCONTINUED | OUTPATIENT
Start: 2025-02-23 | End: 2025-02-27 | Stop reason: HOSPADM

## 2025-02-23 RX ORDER — POLYETHYLENE GLYCOL 3350 17 G/17G
17 POWDER, FOR SOLUTION ORAL 2 TIMES DAILY
Status: DISCONTINUED | OUTPATIENT
Start: 2025-02-23 | End: 2025-02-26

## 2025-02-23 RX ORDER — FAMOTIDINE 20 MG/1
20 TABLET, FILM COATED ORAL ONCE
Status: COMPLETED | OUTPATIENT
Start: 2025-02-23 | End: 2025-02-23

## 2025-02-23 RX ORDER — MONTELUKAST SODIUM 10 MG/1
10 TABLET ORAL
Status: DISCONTINUED | OUTPATIENT
Start: 2025-02-23 | End: 2025-02-27 | Stop reason: HOSPADM

## 2025-02-23 RX ORDER — PANTOPRAZOLE SODIUM 40 MG/1
40 TABLET, DELAYED RELEASE ORAL
Status: DISCONTINUED | OUTPATIENT
Start: 2025-02-23 | End: 2025-02-26

## 2025-02-23 RX ORDER — ALBUTEROL SULFATE 0.83 MG/ML
2.5 SOLUTION RESPIRATORY (INHALATION) EVERY 6 HOURS PRN
Status: DISCONTINUED | OUTPATIENT
Start: 2025-02-23 | End: 2025-02-27 | Stop reason: HOSPADM

## 2025-02-23 RX ORDER — PREDNISONE 20 MG/1
60 TABLET ORAL ONCE
Status: COMPLETED | OUTPATIENT
Start: 2025-02-23 | End: 2025-02-23

## 2025-02-23 RX ORDER — HYDRALAZINE HYDROCHLORIDE 20 MG/ML
5 INJECTION INTRAMUSCULAR; INTRAVENOUS EVERY 6 HOURS PRN
Status: DISCONTINUED | OUTPATIENT
Start: 2025-02-23 | End: 2025-02-27 | Stop reason: HOSPADM

## 2025-02-23 RX ORDER — AMLODIPINE BESYLATE 5 MG/1
5 TABLET ORAL DAILY
Status: DISCONTINUED | OUTPATIENT
Start: 2025-02-24 | End: 2025-02-27 | Stop reason: HOSPADM

## 2025-02-23 RX ORDER — MAGNESIUM HYDROXIDE/ALUMINUM HYDROXICE/SIMETHICONE 120; 1200; 1200 MG/30ML; MG/30ML; MG/30ML
30 SUSPENSION ORAL EVERY 6 HOURS PRN
Status: DISCONTINUED | OUTPATIENT
Start: 2025-02-23 | End: 2025-02-27 | Stop reason: HOSPADM

## 2025-02-23 RX ORDER — BRIMONIDINE TARTRATE 2 MG/ML
1 SOLUTION/ DROPS OPHTHALMIC 3 TIMES DAILY
Status: DISCONTINUED | OUTPATIENT
Start: 2025-02-23 | End: 2025-02-27 | Stop reason: HOSPADM

## 2025-02-23 RX ORDER — ATORVASTATIN CALCIUM 40 MG/1
40 TABLET, FILM COATED ORAL
Status: DISCONTINUED | OUTPATIENT
Start: 2025-02-24 | End: 2025-02-27 | Stop reason: HOSPADM

## 2025-02-23 RX ORDER — SUCRALFATE 1 G/1
1 TABLET ORAL ONCE
Status: COMPLETED | OUTPATIENT
Start: 2025-02-23 | End: 2025-02-23

## 2025-02-23 RX ADMIN — PREDNISONE 60 MG: 20 TABLET ORAL at 18:09

## 2025-02-23 RX ADMIN — IPRATROPIUM BROMIDE 0.5 MG: 0.5 SOLUTION RESPIRATORY (INHALATION) at 18:09

## 2025-02-23 RX ADMIN — BRIMONIDINE TARTRATE 1 DROP: 2 SOLUTION/ DROPS OPHTHALMIC at 22:41

## 2025-02-23 RX ADMIN — SUCRALFATE 1 G: 1 TABLET ORAL at 18:09

## 2025-02-23 RX ADMIN — BUDESONIDE AND FORMOTEROL FUMARATE DIHYDRATE 2 PUFF: 160; 4.5 AEROSOL RESPIRATORY (INHALATION) at 22:41

## 2025-02-23 RX ADMIN — POLYETHYLENE GLYCOL 3350 17 G: 17 POWDER, FOR SOLUTION ORAL at 22:41

## 2025-02-23 RX ADMIN — MONTELUKAST 10 MG: 10 TABLET, FILM COATED ORAL at 22:42

## 2025-02-23 RX ADMIN — ALBUTEROL SULFATE 5 MG: 2.5 SOLUTION RESPIRATORY (INHALATION) at 18:09

## 2025-02-23 RX ADMIN — DICLOFENAC SODIUM 2 G: 10 GEL TOPICAL at 22:41

## 2025-02-23 RX ADMIN — HEPARIN SODIUM 5000 UNITS: 5000 INJECTION INTRAVENOUS; SUBCUTANEOUS at 22:41

## 2025-02-23 RX ADMIN — SERTRALINE HYDROCHLORIDE 25 MG: 25 TABLET ORAL at 22:41

## 2025-02-23 RX ADMIN — FAMOTIDINE 20 MG: 20 TABLET, FILM COATED ORAL at 18:09

## 2025-02-23 RX ADMIN — PANTOPRAZOLE SODIUM 40 MG: 40 TABLET, DELAYED RELEASE ORAL at 22:42

## 2025-02-24 ENCOUNTER — APPOINTMENT (INPATIENT)
Dept: ULTRASOUND IMAGING | Facility: HOSPITAL | Age: 70
End: 2025-02-24
Payer: COMMERCIAL

## 2025-02-24 ENCOUNTER — APPOINTMENT (INPATIENT)
Dept: NON INVASIVE DIAGNOSTICS | Facility: HOSPITAL | Age: 70
End: 2025-02-24
Payer: COMMERCIAL

## 2025-02-24 LAB
ALBUMIN SERPL BCG-MCNC: 4 G/DL (ref 3.5–5)
ALP SERPL-CCNC: 114 U/L (ref 34–104)
ALT SERPL W P-5'-P-CCNC: 7 U/L (ref 7–52)
ANION GAP SERPL CALCULATED.3IONS-SCNC: 5 MMOL/L (ref 4–13)
AORTIC ROOT: 2.6 CM
AST SERPL W P-5'-P-CCNC: 16 U/L (ref 13–39)
BASOPHILS # BLD AUTO: 0.02 THOUSANDS/ÂΜL (ref 0–0.1)
BASOPHILS NFR BLD AUTO: 0 % (ref 0–1)
BILIRUB SERPL-MCNC: 0.32 MG/DL (ref 0.2–1)
BILIRUB UR QL STRIP: NEGATIVE
BSA FOR ECHO PROCEDURE: 1.86 M2
BUN SERPL-MCNC: 27 MG/DL (ref 5–25)
CALCIUM SERPL-MCNC: 9.3 MG/DL (ref 8.4–10.2)
CHLORIDE SERPL-SCNC: 99 MMOL/L (ref 96–108)
CLARITY UR: CLEAR
CO2 SERPL-SCNC: 36 MMOL/L (ref 21–32)
COLOR UR: NORMAL
CREAT SERPL-MCNC: 1.11 MG/DL (ref 0.6–1.3)
E WAVE DECELERATION TIME: 172 MS
E/A RATIO: 1.03
EOSINOPHIL # BLD AUTO: 0.01 THOUSAND/ÂΜL (ref 0–0.61)
EOSINOPHIL NFR BLD AUTO: 0 % (ref 0–6)
ERYTHROCYTE [DISTWIDTH] IN BLOOD BY AUTOMATED COUNT: 13.9 % (ref 11.6–15.1)
FERRITIN SERPL-MCNC: 32 NG/ML (ref 11–307)
FRACTIONAL SHORTENING: 38 (ref 28–44)
GFR SERPL CREATININE-BSD FRML MDRD: 50 ML/MIN/1.73SQ M
GLUCOSE SERPL-MCNC: 120 MG/DL (ref 65–140)
GLUCOSE UR STRIP-MCNC: NEGATIVE MG/DL
HCT VFR BLD AUTO: 33.4 % (ref 34.8–46.1)
HGB BLD-MCNC: 10 G/DL (ref 11.5–15.4)
HGB UR QL STRIP.AUTO: NEGATIVE
IMM GRANULOCYTES # BLD AUTO: 0.04 THOUSAND/UL (ref 0–0.2)
IMM GRANULOCYTES NFR BLD AUTO: 1 % (ref 0–2)
INTERVENTRICULAR SEPTUM IN DIASTOLE (PARASTERNAL SHORT AXIS VIEW): 1.2 CM
INTERVENTRICULAR SEPTUM: 1.2 CM (ref 0.6–1.1)
KETONES UR STRIP-MCNC: NEGATIVE MG/DL
LAAS-AP2: 16.3 CM2
LAAS-AP4: 16 CM2
LEFT ATRIUM SIZE: 3.8 CM
LEFT ATRIUM VOLUME (MOD BIPLANE): 43 ML
LEFT ATRIUM VOLUME INDEX (MOD BIPLANE): 23.1 ML/M2
LEFT INTERNAL DIMENSION IN SYSTOLE: 2.8 CM (ref 2.1–4)
LEFT VENTRICLE DIASTOLIC VOLUME (MOD BIPLANE): 73 ML
LEFT VENTRICLE DIASTOLIC VOLUME INDEX (MOD BIPLANE): 39.2 ML/M2
LEFT VENTRICLE SYSTOLIC VOLUME (MOD BIPLANE): 31 ML
LEFT VENTRICLE SYSTOLIC VOLUME INDEX (MOD BIPLANE): 16.7 ML/M2
LEFT VENTRICULAR INTERNAL DIMENSION IN DIASTOLE: 4.5 CM (ref 3.5–6)
LEFT VENTRICULAR POSTERIOR WALL IN END DIASTOLE: 1.3 CM
LEFT VENTRICULAR STROKE VOLUME: 65 ML
LEUKOCYTE ESTERASE UR QL STRIP: NEGATIVE
LV EF BIPLANE MOD: 58 %
LV EF US.2D.A4C+ESTIMATED: 58 %
LVSV (TEICH): 65 ML
LYMPHOCYTES # BLD AUTO: 0.53 THOUSANDS/ÂΜL (ref 0.6–4.47)
LYMPHOCYTES NFR BLD AUTO: 9 % (ref 14–44)
MAGNESIUM SERPL-MCNC: 2.1 MG/DL (ref 1.9–2.7)
MCH RBC QN AUTO: 26.1 PG (ref 26.8–34.3)
MCHC RBC AUTO-ENTMCNC: 29.9 G/DL (ref 31.4–37.4)
MCV RBC AUTO: 87 FL (ref 82–98)
MONOCYTES # BLD AUTO: 0.1 THOUSAND/ÂΜL (ref 0.17–1.22)
MONOCYTES NFR BLD AUTO: 2 % (ref 4–12)
MV E'TISSUE VEL-SEP: 8 CM/S
MV PEAK A VEL: 0.79 M/S
MV PEAK E VEL: 81 CM/S
MV STENOSIS PRESSURE HALF TIME: 50 MS
MV VALVE AREA P 1/2 METHOD: 4.4
NEUTROPHILS # BLD AUTO: 4.97 THOUSANDS/ÂΜL (ref 1.85–7.62)
NEUTS SEG NFR BLD AUTO: 88 % (ref 43–75)
NITRITE UR QL STRIP: NEGATIVE
NRBC BLD AUTO-RTO: 0 /100 WBCS
PH UR STRIP.AUTO: 5.5 [PH]
PLATELET # BLD AUTO: 181 THOUSANDS/UL (ref 149–390)
PMV BLD AUTO: 10.4 FL (ref 8.9–12.7)
POTASSIUM SERPL-SCNC: 4.9 MMOL/L (ref 3.5–5.3)
PROT SERPL-MCNC: 6.8 G/DL (ref 6.4–8.4)
PROT UR STRIP-MCNC: NEGATIVE MG/DL
RBC # BLD AUTO: 3.83 MILLION/UL (ref 3.81–5.12)
RIGHT ATRIUM AREA SYSTOLE A4C: 12.2 CM2
RIGHT VENTRICLE ID DIMENSION: 3.5 CM
SL CV LEFT ATRIUM LENGTH A2C: 4.9 CM
SL CV LV EF: 60
SL CV PED ECHO LEFT VENTRICLE DIASTOLIC VOLUME (MOD BIPLANE) 2D: 94 ML
SL CV PED ECHO LEFT VENTRICLE SYSTOLIC VOLUME (MOD BIPLANE) 2D: 28 ML
SODIUM SERPL-SCNC: 140 MMOL/L (ref 135–147)
SP GR UR STRIP.AUTO: 1.02 (ref 1–1.03)
TRICUSPID ANNULAR PLANE SYSTOLIC EXCURSION: 2.6 CM
UROBILINOGEN UR STRIP-ACNC: <2 MG/DL
WBC # BLD AUTO: 5.67 THOUSAND/UL (ref 4.31–10.16)

## 2025-02-24 PROCEDURE — 85025 COMPLETE CBC W/AUTO DIFF WBC: CPT | Performed by: NURSE PRACTITIONER

## 2025-02-24 PROCEDURE — 76705 ECHO EXAM OF ABDOMEN: CPT

## 2025-02-24 PROCEDURE — 93306 TTE W/DOPPLER COMPLETE: CPT

## 2025-02-24 PROCEDURE — 94760 N-INVAS EAR/PLS OXIMETRY 1: CPT

## 2025-02-24 PROCEDURE — 93306 TTE W/DOPPLER COMPLETE: CPT | Performed by: INTERNAL MEDICINE

## 2025-02-24 PROCEDURE — 83735 ASSAY OF MAGNESIUM: CPT | Performed by: NURSE PRACTITIONER

## 2025-02-24 PROCEDURE — 99232 SBSQ HOSP IP/OBS MODERATE 35: CPT | Performed by: STUDENT IN AN ORGANIZED HEALTH CARE EDUCATION/TRAINING PROGRAM

## 2025-02-24 PROCEDURE — 81003 URINALYSIS AUTO W/O SCOPE: CPT

## 2025-02-24 PROCEDURE — 80053 COMPREHEN METABOLIC PANEL: CPT | Performed by: NURSE PRACTITIONER

## 2025-02-24 PROCEDURE — 94640 AIRWAY INHALATION TREATMENT: CPT

## 2025-02-24 RX ORDER — TIMOLOL MALEATE 2.5 MG/ML
1 SOLUTION/ DROPS OPHTHALMIC 2 TIMES DAILY
Status: DISCONTINUED | OUTPATIENT
Start: 2025-02-24 | End: 2025-02-27 | Stop reason: HOSPADM

## 2025-02-24 RX ORDER — IPRATROPIUM BROMIDE AND ALBUTEROL SULFATE 2.5; .5 MG/3ML; MG/3ML
3 SOLUTION RESPIRATORY (INHALATION)
Status: DISCONTINUED | OUTPATIENT
Start: 2025-02-24 | End: 2025-02-24

## 2025-02-24 RX ORDER — FUROSEMIDE 10 MG/ML
40 INJECTION INTRAMUSCULAR; INTRAVENOUS ONCE
Status: COMPLETED | OUTPATIENT
Start: 2025-02-24 | End: 2025-02-24

## 2025-02-24 RX ORDER — IPRATROPIUM BROMIDE AND ALBUTEROL SULFATE 2.5; .5 MG/3ML; MG/3ML
3 SOLUTION RESPIRATORY (INHALATION)
Status: DISCONTINUED | OUTPATIENT
Start: 2025-02-24 | End: 2025-02-27 | Stop reason: HOSPADM

## 2025-02-24 RX ADMIN — FUROSEMIDE 40 MG: 10 INJECTION, SOLUTION INTRAMUSCULAR; INTRAVENOUS at 14:46

## 2025-02-24 RX ADMIN — POLYETHYLENE GLYCOL 3350 17 G: 17 POWDER, FOR SOLUTION ORAL at 22:14

## 2025-02-24 RX ADMIN — CYANOCOBALAMIN TAB 500 MCG 500 MCG: 500 TAB at 11:36

## 2025-02-24 RX ADMIN — BRIMONIDINE TARTRATE 1 DROP: 2 SOLUTION/ DROPS OPHTHALMIC at 18:36

## 2025-02-24 RX ADMIN — IPRATROPIUM BROMIDE AND ALBUTEROL SULFATE 3 ML: .5; 3 SOLUTION RESPIRATORY (INHALATION) at 21:53

## 2025-02-24 RX ADMIN — DICLOFENAC SODIUM 2 G: 10 GEL TOPICAL at 11:37

## 2025-02-24 RX ADMIN — DICLOFENAC SODIUM 2 G: 10 GEL TOPICAL at 18:37

## 2025-02-24 RX ADMIN — TIMOLOL MALEATE 1 DROP: 2.5 SOLUTION/ DROPS OPHTHALMIC at 18:36

## 2025-02-24 RX ADMIN — HEPARIN SODIUM 5000 UNITS: 5000 INJECTION INTRAVENOUS; SUBCUTANEOUS at 13:29

## 2025-02-24 RX ADMIN — POLYETHYLENE GLYCOL 3350 17 G: 17 POWDER, FOR SOLUTION ORAL at 11:36

## 2025-02-24 RX ADMIN — HEPARIN SODIUM 5000 UNITS: 5000 INJECTION INTRAVENOUS; SUBCUTANEOUS at 06:01

## 2025-02-24 RX ADMIN — BRIMONIDINE TARTRATE 1 DROP: 2 SOLUTION/ DROPS OPHTHALMIC at 22:32

## 2025-02-24 RX ADMIN — BUDESONIDE AND FORMOTEROL FUMARATE DIHYDRATE 2 PUFF: 160; 4.5 AEROSOL RESPIRATORY (INHALATION) at 11:37

## 2025-02-24 RX ADMIN — FAMOTIDINE 20 MG: 20 TABLET, FILM COATED ORAL at 22:15

## 2025-02-24 RX ADMIN — ATORVASTATIN CALCIUM 40 MG: 40 TABLET, FILM COATED ORAL at 18:37

## 2025-02-24 RX ADMIN — DICLOFENAC SODIUM 2 G: 10 GEL TOPICAL at 22:35

## 2025-02-24 RX ADMIN — MONTELUKAST 10 MG: 10 TABLET, FILM COATED ORAL at 22:14

## 2025-02-24 RX ADMIN — PANTOPRAZOLE SODIUM 40 MG: 40 TABLET, DELAYED RELEASE ORAL at 18:37

## 2025-02-24 RX ADMIN — BUDESONIDE AND FORMOTEROL FUMARATE DIHYDRATE 2 PUFF: 160; 4.5 AEROSOL RESPIRATORY (INHALATION) at 18:35

## 2025-02-24 RX ADMIN — SERTRALINE HYDROCHLORIDE 25 MG: 25 TABLET ORAL at 22:14

## 2025-02-24 RX ADMIN — HEPARIN SODIUM 5000 UNITS: 5000 INJECTION INTRAVENOUS; SUBCUTANEOUS at 22:14

## 2025-02-24 RX ADMIN — Medication 1000 UNITS: at 11:36

## 2025-02-24 RX ADMIN — LEVOTHYROXINE SODIUM 75 MCG: 75 TABLET ORAL at 06:01

## 2025-02-24 RX ADMIN — IPRATROPIUM BROMIDE AND ALBUTEROL SULFATE 3 ML: .5; 3 SOLUTION RESPIRATORY (INHALATION) at 15:48

## 2025-02-24 RX ADMIN — AMLODIPINE BESYLATE 5 MG: 5 TABLET ORAL at 11:36

## 2025-02-24 RX ADMIN — PANTOPRAZOLE SODIUM 40 MG: 40 TABLET, DELAYED RELEASE ORAL at 06:00

## 2025-02-24 NOTE — H&P
H&P - Hospitalist   Name: Noreen Alvarez 69 y.o. female I MRN: [id number]  Unit/Bed#: W -01 I Date of Admission: 2/23/2025   Date of Service: 2/23/2025 I Hospital Day: 0     Assessment & Plan  Acute on chronic respiratory failure with hypoxia and hypercapnia (HCC)  Noted 85% on 2 liters with ambulation  Titrate oxygen as able. Stable while at rest.    Plan  Check flu/covid/rsv  Epigastric pain  Ongoing epigastric pain and right upper quadrant pain.  Worsens with food at times.  Describes as  burning sensation.   EGD 3/2024: 2 cm hiatal hernia.  Biopsies: squamous mucosa with mild reactive changes.  Negative intestinal metaplasia, eosinophilic esophagitis, dysplasia, malignancy.  Troponin x 1 normal   EKG: NSR, nonspecific T wave abnormality.     Plan  Check LFTs  Check RUQ US  Increase protonix from daily to BID  Add schedule pepcid    Centrilobular emphysema (HCC)  Reports shortness of breath x 3 days though this sounds like it occurs simultaneously when she develops epigastric/ruq pain.  Does not appear to be in acute exacerbation.     Plan  Continue home medications  Given prednisone x 1 in ED, hold further.  History of laryngeal cancer  History of head and neck cancer in 2016 treated with radiation and chemotherapy.  Op follow up with ent  Multiple lung nodules  8/2024 PET CT   1.5 cm right lower lobe ground glass nodule not FDG avid.  Note that low-grade adenocarcinoma/adenocarcinoma spectrum lesions may not demonstrate significant FDG uptake.   Focal FDG uptake right mid lung corresponds to new area of nodule consolidation on CT.  Probably post infectious or inflammatory.  Initially referred to thoracic surgery for biopsy of the lung nodule but due to solid component of <4 mm, it is under observation.  9/2024 Chest CT:   Stable right lower lobe groundglass nodule 1.6 x 1 cm  Stable right lower lobe groundglass nodule 1.5 x 1.1 cm  Resolution of previously seen posterior right lower lobe tree in bud  nodularity and right basilar nodular opacity  Stable left lower lobe nodule 1.2 x 1.1 cm    Plan  Op follow up with [facility], next in April  HTN (hypertension)  Elevated currently, possibly in setting of pain    Plan  Continue home amlodipine  Add prn hydralazine   Chronic constipation  Constipation x 3 days. Only having small BM per daughter.     Plan  Start miralax bid  Anemia  Hgb stable in the 10s.  Similar to prior labs.   Does not tolerate oral iron supplements due to constipation and abdominal pain  Iron panel pending  Op follow up      VTE Pharmacologic Prophylaxis: VTE Score: 4 Moderate Risk (Score 3-4) - Pharmacological DVT Prophylaxis Ordered: enoxaparin (Lovenox).  Code Status: Level 1 - Full Code   Discussion with family: Updated  (daughter) via phone.    Anticipated Length of Stay: Patient will be admitted on an observation basis with an anticipated length of stay of less than 2 midnights secondary to acute on chronic respiratory failure.    History of Present Illness   Chief Complaint: shortness of breath, epigastric pain, right upper quadrant pain    Noreen Alvarez is a 69 y.o. 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  Pain can be worse after eating.  She last underwent EGD last year which showed a hiatal hernia.  She is currently on protonix daily.  She has had no fevers, chills, chest pain, palpitations, n/v/d, abdominal pain, urinary symptoms.  She has chronic constipation.  She notes some mild leg swelling a few days ago which has since resolved.  She is not on diuretics as op.  She was also noted to have oxygen saturation of 85% on room air with exertion.     Review of Systems   Constitutional:   Negative for appetite change, diaphoresis and fever.   Respiratory:  Positive for shortness of breath. Negative for cough, chest tightness, wheezing and stridor.    Gastrointestinal:  Positive for abdominal pain and constipation. Negative for abdominal distention, anal bleeding, blood in stool, diarrhea, nausea, rectal pain and vomiting.        + abd bloating   All other systems reviewed and are negative.      Historical Information   Past Medical History:   Diagnosis Date    Acute kidney failure (HCC)     Allergic     Allergies     Anemia     Anxiety     Asthma     Cancer (HCC)     Cataract     Chronic kidney disease (CKD), stage III (moderate) (HCC)     COPD (chronic obstructive pulmonary disease) (HCC)     COVID-19     Covid + 1-0-79-cough at that time now fully resolved    Depression     Disease of thyroid gland     Essential hypertension     GERD (gastroesophageal reflux disease)     Glaucoma     High blood pressure     HTN (hypertension) 2021    Hyperlipidemia     Hypothyroidism     Memory loss     Mesenteric lymphadenopathy 2021    Pulmonary hypertension (HCC)     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    Throat cancer (HCC) 2014    chemo and rad therapy      Past Surgical History:   Procedure Laterality Date    COLONOSCOPY  2016    ESOPHAGOGASTRODUODENOSCOPY  2016    EYE SURGERY      IR BIOPSY LUNG  2022    LARYNGOSCOPY      w/ Bx.     MI TENDON SHEATH INCISION Right 2021    Procedure: THUMB TRIGGER FINGER RELEASE;  Surgeon: Angeles Denton MD;  Location: [facility];  Service: Orthopedics    TUBAL LIGATION       Social History     Tobacco Use    Smoking status: Former     Current packs/day: 0.00     Average packs/day: 1 pack/day for 40.0 years (40.0 ttl pk-yrs)     Types: Cigarettes     Start date: 1974     Quit date: 2014     Years since quittin.1    Smokeless tobacco: Never   Vaping Use    Vaping status: Never Used   Substance and Sexual Activity     Alcohol use: Never     Comment: None    Drug use: Never     Comment: Denies    Sexual activity: Not Currently     Partners: Female     Birth control/protection: Surgical     E-Cigarette/Vaping    E-Cigarette Use Never User      E-Cigarette/Vaping Substances    Nicotine No     THC No     CBD No      Family History   Problem Relation Age of Onset    Other Mother         respiratory disorder    Asthma Mother     COPD Mother     Depression Mother     Sudden death Father         shot himself    Suicidality Father     Brain cancer Sister     Diabetes Sister     Breast cancer Sister     Cancer Sister 62        pancreatic cancer    No Known Problems Sister     No Known Problems Sister     No Known Problems Sister     No Known Problems Sister     No Known Problems Sister     No Known Problems Sister     No Known Problems Daughter     No Known Problems Daughter     No Known Problems Maternal Grandmother     No Known Problems Maternal Grandfather     No Known Problems Paternal Grandmother     No Known Problems Paternal Grandfather     No Known Problems Maternal Aunt     No Known Problems Maternal Aunt     No Known Problems Maternal Aunt     No Known Problems Maternal Aunt     No Known Problems Maternal Aunt     No Known Problems Paternal Aunt     No Known Problems Paternal Aunt     No Known Problems Paternal Aunt     No Known Problems Paternal Aunt     No Known Problems Son      Social History:  Marital Status: /Civil Union   Occupation:   Patient Pre-hospital Living Situation: Home  Patient Pre-hospital Level of Mobility:   Patient Pre-hospital Diet Restrictions:     Meds/Allergies   I have reviewed home medications with a medical source (PCP, Pharmacy, other).  Prior to Admission medications    Medication Sig Start Date End Date Taking? Authorizing Provider   acetaminophen (TYLENOL) 325 mg tablet Take 2 tablets (650 mg total) by mouth every 6 (six) hours as needed for mild pain, moderate pain, headaches or fever  6/26/24   Zari Robertson PA-C   albuterol (2.5 mg/3 mL) 0.083 % nebulizer solution Take 3 mL (2.5 mg total) by nebulization every 6 (six) hours as needed for wheezing or shortness of breath 9/23/24   Shannan Thompson MD   albuterol (PROVENTIL HFA,VENTOLIN HFA) 90 mcg/act inhaler Inhale 2 puffs every 6 (six) hours as needed for wheezing or shortness of breath 8/14/24   Samantha Hernandez MD   amLODIPine (NORVASC) 5 mg tablet TAKE 1 TABLET (5 MG TOTAL) BY MOUTH DAILY. 9/5/24   Samantha Hernandez MD   atorvastatin (LIPITOR) 40 mg tablet Take 1 tablet (40 mg total) by mouth daily with dinner 4/16/24 4/11/25  Samantha Hernandez MD   azithromycin (ZITHROMAX) 250 mg tablet TAKE 1 TABLET (250 MG TOTAL) BY MOUTH 3 (THREE) TIMES A WEEK FOR 36 DOSES 12/16/24   Historical Provider, MD   benzonatate (TESSALON) 200 MG capsule Take 1 capsule (200 mg total) by mouth 3 (three) times a day as needed for cough 12/19/24   Samantha Hernandez MD   brimonidine tartrate 0.2 % ophthalmic solution Administer 1 drop to both eyes 3 (three) times a day 8/19/24   Historical Provider, MD   Budeson-Glycopyrrol-Formoterol (Breztri Aerosphere) 160-9-4.8 MCG/ACT AERO Inhale 2 puffs 2 (two) times a day Rinse mouth after use. 9/23/24   Shannan Thompson MD   Cholecalciferol (VITAMIN D3) 1,000 units tablet Take 1,000 Units by mouth daily    Historical Provider, MD   clindamycin 1 % gel Apply topically 2 (two) times a day 9/24/24   Samantha Hernandez MD   cyanocobalamin (VITAMIN B-12) 500 MCG tablet Take 1 tablet (500 mcg total) by mouth daily 10/31/24   Samantha Hernandez MD   Diclofenac Sodium (VOLTAREN) 1 % Apply 2 g topically 4 (four) times a day 10/31/24   Samantha Hernandez MD   fluticasone (FLONASE) 50 mcg/act nasal spray SPRAY 1 SPRAY INTO EACH NOSTRIL EVERY DAY 3/21/24   Samantha Hernandez MD   ipratropium (ATROVENT) 0.06 % nasal spray 2 SPRAYS INTO EACH NOSTRIL 3 (THREE) TIMES A DAY FOR INCREASED NASAL SECRETION 7/29/24   Samantha Hernandez MD   ipratropium-albuterol (DUO-NEB) 0.5-2.5 mg/3  mL nebulizer solution TAKE 3 ML BY NEBULIZATION EVERY 8 HOURS AS NEEDED FOR WHEEZING OR SHORTNESS OF BREATH 12/16/24   Historical Provider, MD   levothyroxine 75 mcg tablet Take 1 tablet (75 mcg total) by mouth daily in the early morning 12/23/24   Bettina Stroud MD   montelukast (SINGULAIR) 10 mg tablet TAKE 1 TABLET BY MOUTH DAILY AT BEDTIME 7/26/24   Samantha Hernandez MD   pantoprazole (PROTONIX) 40 mg tablet TAKE 1 TABLET BY MOUTH EVERY DAY 6/12/24   Samantha Hernandez MD   sertraline (ZOLOFT) 25 mg tablet Take 1 tablet (25 mg total) by mouth daily at bedtime 8/14/24   Samantha Hernandez MD   timolol (TIMOPTIC) 0.5 % ophthalmic solution INSTILL ONE DROP TO BOTH EYES TWICE A DAY 12/16/24   Historical Provider, MD   tiZANidine (ZANAFLEX) 2 mg tablet TAKE 1 TABLET BY MOUTH AT BEDTIME AS NEEDED FOR MUSCLE SPASMS OR BACK PAIN 10/17/24   Samantha Hernandez MD     Allergies   Allergen Reactions    Cefdinir Hives    Amifostine Rash    Pollen Extract Allergic Rhinitis       Objective :  Temp:  [97.8 °F (36.6 °C)-98.1 °F (36.7 °C)] 97.8 °F (36.6 °C)  HR:  [61-70] 61  BP: (138-182)/(62-86) 182/86  Resp:  [18-20] 18  SpO2:  [93 %-100 %] 100 %  O2 Device: None (Room air)  Nasal Cannula O2 Flow Rate (L/min):  [2 L/min] 2 L/min    Physical Exam  Constitutional:       General: She is not in acute distress.     Appearance: Normal appearance. She is obese. She is not ill-appearing.   HENT:      Head: Normocephalic and atraumatic.      Right Ear: External ear normal.      Left Ear: External ear normal.      Nose: Nose normal.      Mouth/Throat:      Pharynx: Oropharynx is clear.   Eyes:      Extraocular Movements: Extraocular movements intact.      Conjunctiva/sclera: Conjunctivae normal.   Cardiovascular:      Rate and Rhythm: Normal rate and regular rhythm.      Pulses: Normal pulses.      Heart sounds: Normal heart sounds.   Pulmonary:      Effort: Pulmonary effort is normal.      Comments: Diminished lung sounds  Abdominal:      General: Bowel  sounds are normal. There is no distension.      Palpations: Abdomen is soft.      Tenderness: There is abdominal tenderness (RUQ, epigastric to deep palpation). There is no right CVA tenderness, left CVA tenderness, guarding or rebound.   Musculoskeletal:         General: Normal range of motion.      Cervical back: Normal range of motion.      Right lower leg: No edema.      Left lower leg: No edema.   Skin:     General: Skin is warm.      Capillary Refill: Capillary refill takes less than 2 seconds.   Neurological:      General: No focal deficit present.      Mental Status: She is alert and oriented to person, place, and time.   Psychiatric:         Mood and Affect: Mood normal.         Behavior: Behavior normal.          Lines/Drains:            Lab Results: I have reviewed the following results:  Results from last 7 days   Lab Units 02/23/25  1813   WBC Thousand/uL 6.63   HEMOGLOBIN g/dL 10.3*   HEMATOCRIT % 35.5   PLATELETS Thousands/uL 180   SEGS PCT % 77*   LYMPHO PCT % 11*   MONO PCT % 7   EOS PCT % 4     Results from last 7 days   Lab Units 02/23/25  1813   SODIUM mmol/L 139   POTASSIUM mmol/L 5.1   CHLORIDE mmol/L 98   CO2 mmol/L 36*   BUN mg/dL 24   CREATININE mg/dL 1.18   ANION GAP mmol/L 5   CALCIUM mg/dL 9.3   GLUCOSE RANDOM mg/dL 94             Lab Results   Component Value Date    HGBA1C 5.9 (H) 03/01/2024           Imaging Results Review: I reviewed radiology reports from this admission including: chest xray.  Other Study Results Review: EKG was reviewed.  EKG was personally reviewed and my interpretation is: NSR. HR 62, nonspecific t ave..    Administrative Statements   I have spent a total time of   minutes in caring for this patient on the day of the visit/encounter including Diagnostic results, Prognosis, Risks and benefits of tx options, Instructions for management, Patient and family education, Importance of tx compliance, Risk factor reductions, Impressions, Counseling / Coordination of care,  Documenting in the medical record, Reviewing/placing orders in the medical record (including tests, medications, and/or procedures), Obtaining or reviewing history  , and Communicating with other healthcare professionals .    ** Please Note: This note has been constructed using a voice recognition system. **

## 2025-02-24 NOTE — ASSESSMENT & PLAN NOTE
Elevated currently, possibly in setting of pain    Plan  Continue home amlodipine  Add prn hydralazine

## 2025-02-24 NOTE — ED NOTES
Patient ambulated while monitoring oxygen levels. Patient wears 2L of oxygen chronically. Patient's oxygen saturation was at 99% on 2L while resting. Upon ambulating, the patient's oxygen saturation level decreased to 85% on 2L of oxygen.     Ellis Lombardo  02/23/25 [id number]

## 2025-02-24 NOTE — ASSESSMENT & PLAN NOTE
Hgb stable in the 10s.  Similar to prior labs.   Does not tolerate oral iron supplements due to constipation and abdominal pain  Iron panel pending  Op follow up

## 2025-02-24 NOTE — UTILIZATION REVIEW
Initial Clinical Review  OBSERVATION  2/23/25 @ 1915  CONVERTED TO INPATIENT ADMISSION 2/24/25 @ 0839  DUE TO CONTINUED STAY REQUIRED TO EVALUATE AND TREAT PATIENT WITH ACUTE ON CHRONIC RESP FAILURE W/ HYPOXIA and HYPERCAPNIA ,  and WITH EPIGASTRIC PAIN WITH ONGOING TX AND WORKUP .       Admission: Date/Time/Statement:   Admission Orders (From admission, onward)       Ordered        02/24/25 0839  INPATIENT ADMISSION  Once            02/23/25 1915  Place in Observation  Once                          Orders Placed This Encounter   Procedures    INPATIENT ADMISSION     Standing Status:   Standing     Number of Occurrences:   1     Level of Care:   Med Surg [16]     Estimated length of stay:   More than 2 Midnights     Certification:   I certify that inpatient services are medically necessary for this patient for a duration of greater than two midnights. See H&P and MD Progress Notes for additional information about the patient's course of treatment.     ED Arrival Information       Expected   -    Arrival   2/23/2025 17:09    Acuity   Emergent              Means of arrival   Wheelchair    Escorted by   Family Member    Service   Hospitalist    Admission type   Emergency              Arrival complaint   SOB and abd pain             Chief Complaint   Patient presents with    Shortness of Breath     Pt c/o SOB constipation x a few days. Hx of COPD. Pt daughter reports pts SPO2 was in the 40s and HR in 50s while ambulating the other day. Pt 02 93% on 2L home 02 in triage       Initial Presentation: 69 y.o. 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 Constipation x 3 days. Only having small BM per daughter.   Oxygen saturation of 85% on 2 L O2 nc with exertion. Diminished breath sounds , Abdominal tenderness on exam in RUQ, epigastric area to deep palpation .BP elevated .  ECG- NSR , nonspecific T wave abnormality. . CXR shows trace pleural effusions, known L lower lobe nodule . Pt given prednisone in ED. Admitted as OBS with acute obn chronic resp failure w/ hypoxia and hypercapnia .Epigastric pain .  Plan- Supplemental O2- titrate as able . Obtain US RUQ. Trend LFT's . Increase home PPI from daily to BID. Add scheduled pepcid . Monitor BP- continue home Amlodipine, add prn IV Hydralazine . Start Miralax .   Anticipated Length of Stay/Certification Statement: Patient will be admitted on an observation basis with an anticipated length of stay of less than 2 midnights secondary to acute on chronic respiratory failure.       Date: 2/24 Converted to IP    Pt reports abdominal pain improved today, has not tired eating yet. Advance diet as arleen. F/U US RUQ . Pt titrated down to RA. Viral testing neg.  Obtain echo . Ordered IV lasix 40 mg  x1 with CXR showing trace pleural effusions. Ordered Dounebs TID . CBC, CMP, Mag, phos in am .     Date: 2/25   Day 3: Has surpassed a 2nd midnight with active treatments and services.  US RUQ 2/24 showed gallbladder sludge . Echo 2/24 with LVEF 60 %, normal wall motion . Pt with bilat expiratory wheezes ,Duoneb TID .  On O2 @2 L with o2 sat 97- 100 % at rest . Pt had 950 ml urine output   + 2 unmeasured voids s/p lasix yesterday afternoon .       ED Treatment-Medication Administration from 02/23/2025 1709 to 02/23/2025 2030         Date/Time Order Dose Route Action     02/23/2025 1809 famotidine (PEPCID) tablet 20 mg 20 mg Oral Given     02/23/2025 1809 sucralfate (CARAFATE) tablet 1 g 1 g Oral Given     02/23/2025 1809 albuterol inhalation solution 5 mg 5 mg Nebulization Given     02/23/2025 1809 ipratropium (ATROVENT) 0.02 % inhalation solution 0.5 mg 0.5 mg Nebulization Given      02/23/2025 1809 predniSONE tablet 60 mg 60 mg Oral Given            Scheduled Medications:  amLODIPine, 5 mg, Oral, Daily  atorvastatin, 40 mg, Oral, Daily With Dinner  brimonidine tartrate, 1 drop, Both Eyes, TID  budesonide-formoterol, 2 puff, Inhalation, BID  Cholecalciferol, 1,000 Units, Oral, Daily  cyanocobalamin, 500 mcg, Oral, Daily  Diclofenac Sodium, 2 g, Topical, 4x Daily  famotidine, 20 mg, Oral, HS  heparin (porcine), 5,000 Units, Subcutaneous, Q8H ESTEVAN  ipratropium-albuterol, 3 mL, Nebulization, TID  levothyroxine, 75 mcg, Oral, Early Morning  montelukast, 10 mg, Oral, HS  pantoprazole, 40 mg, Oral, BID AC  polyethylene glycol, 17 g, Oral, BID  sertraline, 25 mg, Oral, HS  timolol, 1 drop, Both Eyes, BID    furosemide (LASIX) injection 40 mg  Dose: 40 mg  Freq: Once Route: IV  Start: 02/24/25 1415 End: 02/24/25 1446  Continuous IV Infusions:     PRN Meds:  acetaminophen, 650 mg, Oral, Q6H PRN  albuterol, 2.5 mg, Nebulization, Q6H PRN  aluminum-magnesium hydroxide-simethicone, 30 mL, Oral, Q6H PRN  hydrALAZINE, 5 mg, Intravenous, Q6H PRN      ED Triage Vitals   Temperature Pulse Respirations Blood Pressure SpO2 Pain Score   02/23/25 1718 02/23/25 1716 02/23/25 1716 02/23/25 1716 02/23/25 1716 02/23/25 2100   98.1 °F (36.7 °C) 70 20 138/62 93 % No Pain     Weight (last 2 days)       Date/Time Weight    02/24/25 1523 80.3 (177)            Vital Signs (last 3 days)       Date/Time Temp Pulse Resp BP MAP (mmHg) SpO2 Calculated FIO2 (%) - Nasal Cannula Nasal Cannula O2 Flow Rate (L/min) O2 Device Patient Position - Orthostatic VS Pain    02/25/25 1331 -- -- -- -- -- 97 % 28 2 L/min Nasal cannula -- --    02/25/25 08:12:27 97.6 °F (36.4 °C) 71 -- 149/72 98 96 % -- -- -- -- --    02/25/25 0705 -- -- -- -- -- 100 % 28 2 L/min Nasal cannula -- --    02/25/25 00:21:32 98.2 °F (36.8 °C) 62 -- 124/66 85 99 % -- -- -- -- --    02/24/25 2213 -- -- -- -- -- -- 24 1 L/min Nasal cannula -- No Pain    02/24/25 2153 -- --  -- -- -- 98 % 24 1 L/min Nasal cannula -- --    02/24/25 17:51:58 98.4 °F (36.9 °C) 84 -- 139/67 91 98 % -- -- None (Room air) Sitting --    02/24/25 1549 -- -- -- -- -- 98 % 28 2 L/min Nasal cannula -- --    02/24/25 1523 -- 75 -- 137/67 -- -- -- -- -- -- --    02/24/25 11:38:33 -- 75 -- 137/67 90 96 % -- -- -- -- --    02/24/25 1136 -- -- -- 137/67 -- -- -- -- -- -- --    02/24/25 0945 -- -- -- -- -- -- 28 2 L/min Nasal cannula -- No Pain    02/24/25 08:24:24 98.3 °F (36.8 °C) 85 -- 162/98 119 94 % -- -- -- -- --    02/23/25 2300 -- 93 18 170/62 -- -- -- -- -- Sitting --    02/23/25 22:55:17 -- 71 18 184/92 123 95 % -- -- -- -- --    02/23/25 2100 -- -- -- -- -- -- -- -- -- -- No Pain    02/23/25 1945 97.8 °F (36.6 °C) 61 18 182/86 -- 100 % 28 2 L/min None (Room air) Sitting --    02/23/25 1718 98.1 °F (36.7 °C) -- -- -- -- -- -- -- -- -- --    02/23/25 1716 -- 70 20 138/62 -- 93 % 28 2 L/min Nasal cannula Sitting --              Pertinent Labs/Diagnostic Test Results:   Radiology:  US right upper quadrant   Final Interpretation by Jamin Fuentes MD (02/25 0723)      Gallbladder sludge. No cholelithiasis or evidence of cholecystitis.      Remainder of the examination is normal.      Workstation performed: [id number]         XR chest 1 view portable   ED Interpretation by Isaac Toribio MD (02/23 1817)   No acute cardiopulmonary findings.      Final Interpretation by Gemma Rosales MD (02/24 0651)      Trace pleural effusions.      Left lower lobe nodule corresponding with CT.            Workstation performed: [id number]           Cardiology:    Echo complete w/ contrast if indicated    by Ana Cardoza (02/24 1518)  Left Ventricle: Left ventricular cavity size is normal. Wall thickness is mildly increased. There is mild concentric hypertrophy. The left ventricular ejection fraction is 60%. Systolic function is normal. Wall motion is normal. Diastolic function is normal.    Mitral Valve: There  is trace regurgitation.            ECG 12 lead    by Interface, Ris Results In (02/23 1843)      2/23 ECG- ED read   nterpretation: normal    Rate:     ECG rate assessment: normal    Rhythm:     Rhythm: sinus rhythm    Ectopy:     Ectopy: none       GI:  No orders to display       Results from last 7 days   Lab Units 02/23/25 2054   SARS-COV-2  Negative     Results from last 7 days   Lab Units 02/25/25  0737 02/24/25  0431 02/23/25  1813   WBC Thousand/uL 5.94 5.67 6.63   HEMOGLOBIN g/dL 10.4* 10.0* 10.3*   HEMATOCRIT % 34.4* 33.4* 35.5   PLATELETS Thousands/uL 187 181 180   TOTAL NEUT ABS Thousands/µL  --  4.97 5.10         Results from last 7 days   Lab Units 02/25/25  0737 02/24/25  0431 02/23/25  1813   SODIUM mmol/L 140 140 139   POTASSIUM mmol/L 4.0 4.9 5.1   CHLORIDE mmol/L 95* 99 98   CO2 mmol/L 40* 36* 36*   ANION GAP mmol/L 5 5 5   BUN mg/dL 32* 27* 24   CREATININE mg/dL 1.37* 1.11 1.18   EGFR ml/min/1.73sq m 39 50 47   CALCIUM mg/dL 9.3 9.3 9.3   MAGNESIUM mg/dL 2.1 2.1  --    PHOSPHORUS mg/dL 3.9  --   --      Results from last 7 days   Lab Units 02/25/25  0737 02/24/25  0431 02/23/25  1813   AST U/L 17 16 24   ALT U/L 8 7 8   ALK PHOS U/L 113* 114* 131*   TOTAL PROTEIN g/dL 7.1 6.8 7.2   ALBUMIN g/dL 4.3 4.0 4.0   TOTAL BILIRUBIN mg/dL 0.43 0.32 0.41   BILIRUBIN DIRECT mg/dL  --   --  0.08         Results from last 7 days   Lab Units 02/25/25  0737 02/24/25  0431 02/23/25  1813   GLUCOSE RANDOM mg/dL 92 120 94                 Results from last 7 days   Lab Units 02/23/25  1813   HS TNI 0HR ng/L 3                   Results from last 7 days   Lab Units 02/23/25  1813   FERRITIN ng/mL 32   IRON SATURATION % 23   IRON ug/dL 88   TIBC ug/dL 376.6     Results from last 7 days   Lab Units 02/23/25  1813   TRANSFERRIN mg/dL 269             Results from last 7 days   Lab Units 02/23/25  1813   LIPASE u/L 21                 Results from last 7 days   Lab Units 02/24/25  1334   CLARITY UA  Clear   COLOR UA  Light  Yellow   SPEC GRAV UA  1.025   PH UA  5.5   GLUCOSE UA mg/dl Negative   KETONES UA mg/dl Negative   BLOOD UA  Negative   PROTEIN UA mg/dl Negative   NITRITE UA  Negative   BILIRUBIN UA  Negative   UROBILINOGEN UA (BE) mg/dl <2.0   LEUKOCYTES UA  Negative     Results from last 7 days   Lab Units 02/23/25 2054   INFLUENZA A PCR  Negative   INFLUENZA B PCR  Negative   RSV PCR  Negative                     Past Medical History:   Diagnosis Date    Acute kidney failure (HCC)     Allergic     Allergies     Anemia     Anxiety     Asthma     Cancer (HCC)     Cataract     Chronic kidney disease (CKD), stage III (moderate) (HCC)     COPD (chronic obstructive pulmonary disease) (HCC)     COVID-19     Covid + 9-5-23-cough at that time now fully resolved    Depression     Disease of thyroid gland     Essential hypertension     GERD (gastroesophageal reflux disease)     Glaucoma     High blood pressure     HTN (hypertension) 02/16/2021    Hyperlipidemia     Hypothyroidism     Memory loss     Mesenteric lymphadenopathy 07/13/2021    Pulmonary hypertension (HCC)     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    Throat cancer (HCC) 2014    chemo and rad therapy 2014     Present on Admission:   Acute on chronic respiratory failure with hypoxia and hypercapnia (HCC)   (Resolved) COPD exacerbation (HCC)   (Resolved) COPD with acute exacerbation (HCC)   Multiple lung nodules   Epigastric pain   HTN (hypertension)   Chronic constipation   Centrilobular emphysema (HCC)      Admitting Diagnosis: COPD exacerbation (HCC) [J44.1]  Age/Sex: 69 y.o. female    Network Utilization Review Department  ATTENTION: Please call with any questions or concerns to [phone] and carefully listen to the prompts so that you are directed to the right person. All voicemails are confidential.   For Discharge needs, contact Care Management DC Support Team at [phone] opt. 2  Send all requests for admission clinical reviews, approved or denied  determinations and any other requests to dedicated fax number below belonging to the campus where the patient is receiving treatment. List of dedicated fax numbers for the Facilities:  FACILITY NAME UR FAX NUMBER   ADMISSION DENIALS (Administrative/Medical Necessity) [phone]   DISCHARGE SUPPORT TEAM (NETWORK) [phone]   PARENT CHILD HEALTH (Maternity/NICU/Pediatrics) [phone]   [facility] [phone]   General acute hospital [phone]   [facility] [phone]   [facility] [phone]   [facility] [phone]   [facility] [phone]   [facility] [phone]   [facility] [phone]   [facility] [phone]   [facility] [phone]   [facility] [phone]   [facility] [phone]

## 2025-02-24 NOTE — ASSESSMENT & PLAN NOTE
8/2024 PET CT   1.5 cm right lower lobe ground glass nodule not FDG avid.  Note that low-grade adenocarcinoma/adenocarcinoma spectrum lesions may not demonstrate significant FDG uptake.   Focal FDG uptake right mid lung corresponds to new area of nodule consolidation on CT.  Probably post infectious or inflammatory.  Initially referred to thoracic surgery for biopsy of the lung nodule but due to solid component of <4 mm, it is under observation.  9/2024 Chest CT:   Stable right lower lobe groundglass nodule 1.6 x 1 cm  Stable right lower lobe groundglass nodule 1.5 x 1.1 cm  Resolution of previously seen posterior right lower lobe tree in bud nodularity and right basilar nodular opacity  Stable left lower lobe nodule 1.2 x 1.1 cm    Plan  Op follow up with prabhu, next in April

## 2025-02-24 NOTE — ASSESSMENT & PLAN NOTE
Reports shortness of breath x 3 days though this sounds like it occurs simultaneously when she develops epigastric/ruq pain.  Does not appear to be in acute exacerbation.     Plan  Continue home medications  Given prednisone x 1 in ED, hold further.

## 2025-02-24 NOTE — PLAN OF CARE
Problem: PAIN - ADULT  Goal: Verbalizes/displays adequate comfort level or baseline comfort level  Description: Interventions:  - Encourage patient to monitor pain and request assistance  - Assess pain using appropriate pain scale  - Administer analgesics based on type and severity of pain and evaluate response  - Implement non-pharmacological measures as appropriate and evaluate response  - Consider cultural and social influences on pain and pain management  - Notify physician/advanced practitioner if interventions unsuccessful or patient reports new pain  Outcome: Progressing     Problem: INFECTION - ADULT  Goal: Absence or prevention of progression during hospitalization  Description: INTERVENTIONS:  - Assess and monitor for signs and symptoms of infection  - Monitor lab/diagnostic results  - Monitor all insertion sites, i.e. indwelling lines, tubes, and drains  - Monitor endotracheal if appropriate and nasal secretions for changes in amount and color  - [place] appropriate cooling/warming therapies per order  - Administer medications as ordered  - Instruct and encourage patient and family to use good hand hygiene technique  - Identify and instruct in appropriate isolation precautions for identified infection/condition  Outcome: Progressing  Goal: Absence of fever/infection during neutropenic period  Description: INTERVENTIONS:  - Monitor WBC    Outcome: Progressing     Problem: SAFETY ADULT  Goal: Patient will remain free of falls  Description: INTERVENTIONS:  - Educate patient/family on patient safety including physical limitations  - Instruct patient to call for assistance with activity   - Consult OT/PT to assist with strengthening/mobility   - Keep Call bell within reach  - Keep bed low and locked with side rails adjusted as appropriate  - Keep care items and personal belongings within reach  - Initiate and maintain comfort rounds  - Make Fall Risk Sign visible to staff  - Offer Toileting every  Hours,  in advance of need  - Initiate/Maintain alarm  - Obtain necessary fall risk management equipment:   - Apply yellow socks and bracelet for high fall risk patients  - Consider moving patient to room near nurses station  Outcome: Progressing  Goal: Maintain or return to baseline ADL function  Description: INTERVENTIONS:  -  Assess patient's ability to carry out ADLs; assess patient's baseline for ADL function and identify physical deficits which impact ability to perform ADLs (bathing, care of mouth/teeth, toileting, grooming, dressing, etc.)  - Assess/evaluate cause of self-care deficits   - Assess range of motion  - Assess patient's mobility; develop plan if impaired  - Assess patient's need for assistive devices and provide as appropriate  - Encourage maximum independence but intervene and supervise when necessary  - Involve family in performance of ADLs  - Assess for home care needs following discharge   - Consider OT consult to assist with ADL evaluation and planning for discharge  - Provide patient education as appropriate  Outcome: Progressing  Goal: Maintains/Returns to pre admission functional level  Description: INTERVENTIONS:  - Perform AM-PAC 6 Click Basic Mobility/ Daily Activity assessment daily.  - Set and communicate daily mobility goal to care team and patient/family/caregiver.   - Collaborate with rehabilitation services on mobility goals if consulted  - Perform Range of Motion  times a day.  - Reposition patient every  hours.  - Dangle patient  times a day  - Stand patient  times a day  - Ambulate patient  times a day  - Out of bed to chair  times a day   - Out of bed for meals  times a day  - Out of bed for toileting  - Record patient progress and toleration of activity level   Outcome: Progressing     Problem: DISCHARGE PLANNING  Goal: Discharge to home or other facility with appropriate resources  Description: INTERVENTIONS:  - Identify barriers to discharge w/patient and caregiver  - Arrange for  needed discharge resources and transportation as appropriate  - Identify discharge learning needs (meds, wound care, etc.)  - Arrange for interpretive services to assist at discharge as needed  - Refer to Case Management Department for coordinating discharge planning if the patient needs post-hospital services based on physician/advanced practitioner order or complex needs related to functional status, cognitive ability, or social support system  Outcome: Progressing     Problem: Knowledge Deficit  Goal: Patient/family/caregiver demonstrates understanding of disease process, treatment plan, medications, and discharge instructions  Description: Complete learning assessment and assess knowledge base.  Interventions:  - Provide teaching at level of understanding  - Provide teaching via preferred learning methods  Outcome: Progressing

## 2025-02-24 NOTE — ASSESSMENT & PLAN NOTE
Ongoing epigastric pain and right upper quadrant pain.  Worsens with food at times.  Describes as  burning sensation.   EGD 3/2024: 2 cm hiatal hernia.  Biopsies: squamous mucosa with mild reactive changes.  Negative intestinal metaplasia, eosinophilic esophagitis, dysplasia, malignancy.  Troponin x 1 normal   EKG: NSR, nonspecific T wave abnormality.     Plan  F/U RUQ US  Increase protonix from daily to BID  Add schedule pepcid  Symptoms improving advance diet as tolerated

## 2025-02-24 NOTE — PROGRESS NOTES
Progress Note - Hospitalist   Name: Noreen Alvarez 69 y.o. female I MRN: [id number]  Unit/Bed#: W -01 I Date of Admission: 2/23/2025   Date of Service: 2/24/2025 I Hospital Day: 0    Assessment & Plan  Acute on chronic respiratory failure with hypoxia and hypercapnia (HCC)  Noted 85% on 2 liters with ambulation  CXR showing Trace pleural effusions.   Ordered lasix 40 mg IV  F/U Echo  Titrated to room air  Viral testing negative  Epigastric pain  Ongoing epigastric pain and right upper quadrant pain.  Worsens with food at times.  Describes as  burning sensation.   EGD 3/2024: 2 cm hiatal hernia.  Biopsies: squamous mucosa with mild reactive changes.  Negative intestinal metaplasia, eosinophilic esophagitis, dysplasia, malignancy.  Troponin x 1 normal   EKG: NSR, nonspecific T wave abnormality.     Plan  F/U RUQ US  Increase protonix from daily to BID  Add schedule pepcid  Symptoms improving advance diet as tolerated  Centrilobular emphysema (HCC)  Reports shortness of breath x 3 days though this sounds like it occurs simultaneously when she develops epigastric/ruq pain.  Does not appear to be in acute exacerbation.     Plan  Continue home medications  Given prednisone x 1 in ED, hold further.  History of laryngeal cancer  History of head and neck cancer in 2016 treated with radiation and chemotherapy.  Op follow up with ent  Multiple lung nodules  8/2024 PET CT   1.5 cm right lower lobe ground glass nodule not FDG avid.  Note that low-grade adenocarcinoma/adenocarcinoma spectrum lesions may not demonstrate significant FDG uptake.   Focal FDG uptake right mid lung corresponds to new area of nodule consolidation on CT.  Probably post infectious or inflammatory.  Initially referred to thoracic surgery for biopsy of the lung nodule but due to solid component of <4 mm, it is under observation.  9/2024 Chest CT:   Stable right lower lobe groundglass nodule 1.6 x 1 cm  Stable right lower lobe groundglass nodule 1.5 x  1.1 cm  Resolution of previously seen posterior right lower lobe tree in bud nodularity and right basilar nodular opacity  Stable left lower lobe nodule 1.2 x 1.1 cm    Plan  Op follow up with hemonc, next in April  HTN (hypertension)  Elevated currently, possibly in setting of pain    Plan  Continue home amlodipine  Add prn hydralazine   Chronic constipation  Constipation x 3 days. Only having small BM per daughter.     Plan  Start miralax bid  Anemia  Hgb stable in the 10s.  Similar to prior labs.   Does not tolerate oral iron supplements due to constipation and abdominal pain  Iron panel pending  Op follow up  COPD with acute exacerbation (HCC) (Resolved: 2/23/2025)  Was noted to have shortness of breath x 3 days      Plan:   Continue with short course of prednisone  Nebulizers     VTE Pharmacologic Prophylaxis: VTE Score: 4 Moderate Risk (Score 3-4) - Pharmacological DVT Prophylaxis Ordered: heparin.    Mobility:   Basic Mobility Inpatient Raw Score: 23  JH-HLM Goal: 7: Walk 25 feet or more  JH-HLM Achieved: 7: Walk 25 feet or more  JH-HLM Goal achieved. Continue to encourage appropriate mobility.    Patient Centered Rounds: I performed bedside rounds with nursing staff today.   Discussions with Specialists or Other Care Team Provider: case managemeng    Education and Discussions with Family / Patient: Updated  (sister) via phone.    Current Length of Stay: 0 day(s)  Current Patient Status: Inpatient   Certification Statement: The patient will continue to require additional inpatient hospital stay due to pending RUQ US, pain control  Discharge Plan: Anticipate discharge tomorrow to home.    Code Status: Level 1 - Full Code    Subjective   Pt states pain is improved today, has not tried eating yet    Objective :  Temp:  [97.8 °F (36.6 °C)-98.3 °F (36.8 °C)] 98.3 °F (36.8 °C)  HR:  [61-93] 85  BP: (138-184)/(62-98) 162/98  Resp:  [18-20] 18  SpO2:  [93 %-100 %] 94 %  O2 Device: None (Room air)  Nasal  Cannula O2 Flow Rate (L/min):  [2 L/min] 2 L/min    There is no height or weight on file to calculate BMI.     Input and Output Summary (last 24 hours):   No intake or output data in the 24 hours ending 02/24/25 0839    Physical Exam  Vitals and nursing note reviewed.   Constitutional:       General: She is not in acute distress.     Appearance: She is well-developed.   HENT:      Head: Normocephalic and atraumatic.   Eyes:      Conjunctiva/sclera: Conjunctivae normal.   Cardiovascular:      Rate and Rhythm: Normal rate and regular rhythm.      Heart sounds: No murmur heard.  Pulmonary:      Effort: Pulmonary effort is normal. No respiratory distress.      Breath sounds: Normal breath sounds.   Abdominal:      Palpations: Abdomen is soft.      Tenderness: There is no abdominal tenderness.   Musculoskeletal:         General: No swelling.   Skin:     General: Skin is warm and dry.   Neurological:      Mental Status: She is alert. Mental status is at baseline.   Psychiatric:         Mood and Affect: Mood normal.           Lines/Drains:              Lab Results: I have reviewed the following results:   Results from last 7 days   Lab Units 02/24/25  0431   WBC Thousand/uL 5.67   HEMOGLOBIN g/dL 10.0*   HEMATOCRIT % 33.4*   PLATELETS Thousands/uL 181   SEGS PCT % 88*   LYMPHO PCT % 9*   MONO PCT % 2*   EOS PCT % 0     Results from last 7 days   Lab Units 02/24/25  0431   SODIUM mmol/L 140   POTASSIUM mmol/L 4.9   CHLORIDE mmol/L 99   CO2 mmol/L 36*   BUN mg/dL 27*   CREATININE mg/dL 1.11   ANION GAP mmol/L 5   CALCIUM mg/dL 9.3   ALBUMIN g/dL 4.0   TOTAL BILIRUBIN mg/dL 0.32   ALK PHOS U/L 114*   ALT U/L 7   AST U/L 16   GLUCOSE RANDOM mg/dL 120                       Recent Cultures (last 7 days):         Imaging Results Review: I reviewed radiology reports from this admission including: chest xray.  Other Study Results Review: EKG was reviewed.     Last 24 Hours Medication List:     Current Facility-Administered  Medications:     acetaminophen (TYLENOL) tablet 650 mg, Q6H PRN    albuterol inhalation solution 2.5 mg, Q6H PRN    aluminum-magnesium hydroxide-simethicone (MAALOX) oral suspension 30 mL, Q6H PRN    amLODIPine (NORVASC) tablet 5 mg, Daily    atorvastatin (LIPITOR) tablet 40 mg, Daily With Dinner    brimonidine tartrate 0.2 % ophthalmic solution 1 drop, TID    budesonide-formoterol (SYMBICORT) 160-4.5 mcg/act inhaler 2 puff, BID    Cholecalciferol (VITAMIN D3) tablet 1,000 Units, Daily    cyanocobalamin (VITAMIN B-12) tablet 500 mcg, Daily    Diclofenac Sodium (VOLTAREN) 1 % topical gel 2 g, 4x Daily    famotidine (PEPCID) tablet 20 mg, HS    heparin (porcine) subcutaneous injection 5,000 Units, Q8H ESTEVAN    hydrALAZINE (APRESOLINE) injection 5 mg, Q6H PRN    levothyroxine tablet 75 mcg, Early Morning    montelukast (SINGULAIR) tablet 10 mg, HS    pantoprazole (PROTONIX) EC tablet 40 mg, BID AC    polyethylene glycol (MIRALAX) packet 17 g, BID    sertraline (ZOLOFT) tablet 25 mg, HS    Administrative Statements   Today, Patient Was Seen By: Barrie Ruffin DO      **Please Note: This note may have been constructed using a voice recognition system.**

## 2025-02-24 NOTE — PLAN OF CARE
Problem: PAIN - ADULT  Goal: Verbalizes/displays adequate comfort level or baseline comfort level  Description: Interventions:  - Encourage patient to monitor pain and request assistance  - Assess pain using appropriate pain scale  - Administer analgesics based on type and severity of pain and evaluate response  - Implement non-pharmacological measures as appropriate and evaluate response  - Consider cultural and social influences on pain and pain management  - Notify physician/advanced practitioner if interventions unsuccessful or patient reports new pain  Outcome: Progressing     Problem: INFECTION - ADULT  Goal: Absence or prevention of progression during hospitalization  Description: INTERVENTIONS:  - Assess and monitor for signs and symptoms of infection  - Monitor lab/diagnostic results  - Monitor all insertion sites, i.e. indwelling lines, tubes, and drains  - Monitor endotracheal if appropriate and nasal secretions for changes in amount and color  - Jeremiah appropriate cooling/warming therapies per order  - Administer medications as ordered  - Instruct and encourage patient and family to use good hand hygiene technique  - Identify and instruct in appropriate isolation precautions for identified infection/condition  Outcome: Progressing  Goal: Absence of fever/infection during neutropenic period  Description: INTERVENTIONS:  - Monitor WBC    Outcome: Progressing     Problem: SAFETY ADULT  Goal: Patient will remain free of falls  Description: INTERVENTIONS:  - Educate patient/family on patient safety including physical limitations  - Instruct patient to call for assistance with activity   - Consult OT/PT to assist with strengthening/mobility   - Keep Call bell within reach  - Keep bed low and locked with side rails adjusted as appropriate  - Keep care items and personal belongings within reach  - Initiate and maintain comfort rounds  - Make Fall Risk Sign visible to staff  - Offer Toileting every  Hours,  in advance of need  - Initiate/Maintain alarm  - Obtain necessary fall risk management equipment:   - Apply yellow socks and bracelet for high fall risk patients  - Consider moving patient to room near nurses station  Outcome: Progressing  Goal: Maintain or return to baseline ADL function  Description: INTERVENTIONS:  -  Assess patient's ability to carry out ADLs; assess patient's baseline for ADL function and identify physical deficits which impact ability to perform ADLs (bathing, care of mouth/teeth, toileting, grooming, dressing, etc.)  - Assess/evaluate cause of self-care deficits   - Assess range of motion  - Assess patient's mobility; develop plan if impaired  - Assess patient's need for assistive devices and provide as appropriate  - Encourage maximum independence but intervene and supervise when necessary  - Involve family in performance of ADLs  - Assess for home care needs following discharge   - Consider OT consult to assist with ADL evaluation and planning for discharge  - Provide patient education as appropriate  Outcome: Progressing  Goal: Maintains/Returns to pre admission functional level  Description: INTERVENTIONS:  - Perform AM-PAC 6 Click Basic Mobility/ Daily Activity assessment daily.  - Set and communicate daily mobility goal to care team and patient/family/caregiver.   - Collaborate with rehabilitation services on mobility goals if consulted  - Perform Range of Motion  times a day.  - Reposition patient every  hours.  - Dangle patient  times a day  - Stand patient  times a day  - Ambulate patient  times a day  - Out of bed to chair  times a day   - Out of bed for meals times a day  - Out of bed for toileting  - Record patient progress and toleration of activity level   Outcome: Progressing     Problem: DISCHARGE PLANNING  Goal: Discharge to home or other facility with appropriate resources  Description: INTERVENTIONS:  - Identify barriers to discharge w/patient and caregiver  - Arrange for  needed discharge resources and transportation as appropriate  - Identify discharge learning needs (meds, wound care, etc.)  - Arrange for interpretive services to assist at discharge as needed  - Refer to Case Management Department for coordinating discharge planning if the patient needs post-hospital services based on physician/advanced practitioner order or complex needs related to functional status, cognitive ability, or social support system  Outcome: Progressing     Problem: Knowledge Deficit  Goal: Patient/family/caregiver demonstrates understanding of disease process, treatment plan, medications, and discharge instructions  Description: Complete learning assessment and assess knowledge base.  Interventions:  - Provide teaching at level of understanding  - Provide teaching via preferred learning methods  Outcome: Progressing

## 2025-02-24 NOTE — ASSESSMENT & PLAN NOTE
Ongoing epigastric pain and right upper quadrant pain.  Worsens with food at times.  Describes as  burning sensation.   EGD 3/2024: 2 cm hiatal hernia.  Biopsies: squamous mucosa with mild reactive changes.  Negative intestinal metaplasia, eosinophilic esophagitis, dysplasia, malignancy.  Troponin x 1 normal   EKG: NSR, nonspecific T wave abnormality.     Plan  Check LFTs  Check RUQ US  Increase protonix from daily to BID  Add schedule pepcid

## 2025-02-24 NOTE — ED PROVIDER NOTES
Time reflects when diagnosis was documented in both MDM as applicable and the Disposition within this note       Time User Action Codes Description Comment    2/23/2025  7:14 PM CatarinoIsaac Add [J44.1] COPD exacerbation (HCC)           ED Disposition       ED Disposition   Admit    Condition   Stable    Date/Time   Sun Feb 23, 2025  7:15 PM    Comment   Case was discussed with Dr. Mitchell and the patient's admission status was agreed to be Admission Status: observation status to the service of Dr. Negro.               Assessment & Plan       Medical Decision Making  See ED course    Amount and/or Complexity of Data Reviewed  Labs: ordered. Decision-making details documented in ED Course.  Radiology: ordered and independent interpretation performed.    Risk  Prescription drug management.  Decision regarding hospitalization.        ED Course as of 02/23/25 2343   Sun Feb 23, 2025   1911 69-year-old history of COPD on 2 L oxygen presenting for increasing dyspnea.  At baseline, short of breath while ambulating to her bathroom.  However, over the past 3 days has been increasingly short of breath with this and noted her oxygen at home has been dropping to the 50s to 60%.  Separately notes epigastric abdominal pain which may worsen her oxygenation.  Also notes she has been constipated.  No fevers, headache, chest pain, change in bladder function.   2331 COVID/FLU/RSV  Negative   2331 hs TnI 0hr: 3  Negative doubt ACS.   2332 CBC, BMP grossly baseline.   2334 Suspect patient has progressive course of COPD and will require increased oxygen at baseline.  However, cannot rule out COPD exacerbation.  Although, patient denies any infectious symptoms.  Also notes epigastric pain likely secondary to known history of GERD.  Symptoms improved on Carafate, Pepcid.   2343 Patient was ambulated and noted to desat to the 80s on 2 L nasal cannula.  Discussed internal medicine who will admit for further management.       Medications    famotidine (PEPCID) tablet 20 mg (20 mg Oral Given 2/23/25 1809)   sucralfate (CARAFATE) tablet 1 g (1 g Oral Given 2/23/25 1809)   albuterol inhalation solution 5 mg (5 mg Nebulization Given 2/23/25 1809)   ipratropium (ATROVENT) 0.02 % inhalation solution 0.5 mg (0.5 mg Nebulization Given 2/23/25 1809)   predniSONE tablet 60 mg (60 mg Oral Given 2/23/25 1809)       ED Risk Strat Scores                                                History of Present Illness       Chief Complaint   Patient presents with    Shortness of Breath     Pt c/o SOB constipation x a few days. Hx of COPD. Pt daughter reports pts SPO2 was in the 40s and HR in 50s while ambulating the other day. Pt 02 93% on 2L home 02 in triage       Past Medical History:   Diagnosis Date    Acute kidney failure (HCC)     Allergic     Allergies     Anemia     Anxiety     Asthma     Cancer (HCC)     Cataract     Chronic kidney disease (CKD), stage III (moderate) (HCC)     COPD (chronic obstructive pulmonary disease) (HCC)     COVID-19     Covid + 9-5-74-cough at that time now fully resolved    Depression     Disease of thyroid gland     Essential hypertension     GERD (gastroesophageal reflux disease)     Glaucoma     High blood pressure     HTN (hypertension) 02/16/2021    Hyperlipidemia     Hypothyroidism     Memory loss     Mesenteric lymphadenopathy 07/13/2021    Pulmonary hypertension (HCC)     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     DC TENDON SHEATH INCISION Right 02/16/2021    Procedure: THUMB TRIGGER FINGER RELEASE;  Surgeon: Angeles Denton MD;  Location: [facility];  Service: Orthopedics    TUBAL LIGATION        Family History   Problem Relation Age of Onset    Other Mother          respiratory disorder    Asthma Mother     COPD Mother     Depression Mother     Sudden death Father         shot himself    Suicidality Father     Brain cancer Sister     Diabetes Sister     Breast cancer Sister     Cancer Sister 62        pancreatic cancer    No Known Problems Sister     No Known Problems Sister     No Known Problems Sister     No Known Problems Sister     No Known Problems Sister     No Known Problems Sister     No Known Problems Daughter     No Known Problems Daughter     No Known Problems Maternal Grandmother     No Known Problems Maternal Grandfather     No Known Problems Paternal Grandmother     No Known Problems Paternal Grandfather     No Known Problems Maternal Aunt     No Known Problems Maternal Aunt     No Known Problems Maternal Aunt     No Known Problems Maternal Aunt     No Known Problems Maternal Aunt     No Known Problems Paternal Aunt     No Known Problems Paternal Aunt     No Known Problems Paternal Aunt     No Known Problems Paternal Aunt     No Known Problems Son       Social History     Tobacco Use    Smoking status: Former     Current packs/day: 0.00     Average packs/day: 1 pack/day for 40.0 years (40.0 ttl pk-yrs)     Types: Cigarettes     Start date: 1974     Quit date: 2014     Years since quittin.1    Smokeless tobacco: Never   Vaping Use    Vaping status: Never Used   Substance Use Topics    Alcohol use: Never     Comment: None    Drug use: Never     Comment: Denies      E-Cigarette/Vaping    E-Cigarette Use Never User       E-Cigarette/Vaping Substances    Nicotine No     THC No     CBD No       I have reviewed and agree with the history as documented.     69-year-old history of COPD on 2 L oxygen presenting for increasing dyspnea.  At baseline, short of breath while ambulating to her bathroom.  However, over the past 3 days has been increasingly short of breath with this and noted her oxygen at home has been dropping to the 50s to 60%.  Separately notes  epigastric abdominal pain which may worsen her oxygenation.  Also notes she has been constipated.  No fevers, headache, chest pain, change in bladder function.      Shortness of Breath  Associated symptoms: abdominal pain    Associated symptoms: no chest pain, no cough, no fever, no headaches, no rash, no sore throat and no vomiting        Review of Systems   Constitutional:  Positive for fatigue. Negative for fever.   HENT:  Negative for congestion and sore throat.    Eyes:  Negative for pain and visual disturbance.   Respiratory:  Positive for shortness of breath. Negative for cough.    Cardiovascular:  Negative for chest pain and palpitations.   Gastrointestinal:  Positive for abdominal pain. Negative for constipation and vomiting.   Genitourinary:  Negative for hematuria.   Musculoskeletal:  Negative for arthralgias and back pain.   Skin:  Negative for color change and rash.   Neurological:  Negative for syncope and headaches.   All other systems reviewed and are negative.          Objective       ED Triage Vitals   Temperature Pulse Blood Pressure Respirations SpO2 Patient Position - Orthostatic VS   02/23/25 1718 02/23/25 1716 02/23/25 1716 02/23/25 1716 02/23/25 1716 02/23/25 1716   98.1 °F (36.7 °C) 70 138/62 20 93 % Sitting      Temp Source Heart Rate Source BP Location FiO2 (%) Pain Score    02/23/25 1718 02/23/25 1716 02/23/25 1716 -- --    Oral Monitor Right arm        Vitals      Date and Time Temp Pulse SpO2 Resp BP Pain Score FACES Pain Rating User   02/23/25 2255 -- 71 95 % 18 184/92 -- -- DII   02/23/25 1945 -- 61 100 % -- -- -- -- ND   02/23/25 1945 -- -- -- 18 -- -- -- MB   02/23/25 1945 97.8 °F (36.6 °C) -- -- -- 182/86 -- -- SC   02/23/25 1718 98.1 °F (36.7 °C) -- -- -- -- -- --    02/23/25 1716 -- 70 93 % 20 138/62 -- --             Physical Exam  Vitals and nursing note reviewed.   Constitutional:       General: She is not in acute distress.     Appearance: She is well-developed.   HENT:       Head: Normocephalic and atraumatic.   Eyes:      Conjunctiva/sclera: Conjunctivae normal.   Cardiovascular:      Rate and Rhythm: Normal rate and regular rhythm.      Heart sounds: No murmur heard.  Pulmonary:      Effort: Pulmonary effort is normal. No respiratory distress.      Breath sounds: Decreased breath sounds present. No wheezing, rhonchi or rales.   Abdominal:      Palpations: Abdomen is soft.      Tenderness: There is abdominal tenderness (epigastric). There is no guarding or rebound.   Musculoskeletal:         General: No swelling.      Cervical back: Neck supple.      Right lower leg: No edema.      Left lower leg: No edema.   Skin:     General: Skin is warm and dry.      Capillary Refill: Capillary refill takes less than 2 seconds.   Neurological:      Mental Status: She is alert.   Psychiatric:         Mood and Affect: Mood normal.         Results Reviewed       Procedure Component Value Units Date/Time    TIBC Panel (incl. Iron, TIBC, % Iron Saturation) [[id number]]  (Normal) Collected: 02/23/25 1813    Lab Status: Final result Specimen: Blood from Arm, Right Updated: 02/23/25 2326     Iron Saturation 23 %      TIBC 376.6 ug/dL      Iron 88 ug/dL      Transferrin 269 mg/dL      UIBC 289 ug/dL     Hepatic function panel [[id number]]  (Abnormal) Collected: 02/23/25 1813    Lab Status: Final result Specimen: Blood from Arm, Right Updated: 02/23/25 2326     Total Bilirubin 0.41 mg/dL      Bilirubin, Direct 0.08 mg/dL      Alkaline Phosphatase 131 U/L      AST 24 U/L      ALT 8 U/L      Total Protein 7.2 g/dL      Albumin 4.0 g/dL     COVID/FLU/RSV [[id number]]  (Normal) Collected: 02/23/25 2054    Lab Status: Final result Specimen: Nares from Nose Updated: 02/23/25 2149     SARS-CoV-2 Negative     INFLUENZA A PCR Negative     INFLUENZA B PCR Negative     RSV PCR Negative    Narrative:      This test has been performed using the CoV-2/Flu/RSV plus assay on the [employer] GeneXpert platform. This test has  been validated by the  and verified by the performing laboratory.     This test is designed to amplify and detect the following: nucleocapsid (N), envelope (E), and RNA-dependent RNA polymerase (RdRP) genes of the SARS-CoV-2 genome; matrix (M), basic polymerase (PB2), and acidic protein (PA) segments of the influenza A genome; matrix (M) and non-structural protein (NS) segments of the influenza B genome, and the nucleocapsid genes of RSV A and RSV B.     Positive results are indicative of the presence of Flu A, Flu B, RSV, and/or SARS-CoV-2 RNA. Positive results for SARS-CoV-2 or suspected novel influenza should be reported to state, local, or federal health departments according to local reporting requirements.      All results should be assessed in conjunction with clinical presentation and other laboratory markers for clinical management.     FOR PEDIATRIC PATIENTS - copy/paste COVID Guidelines URL to browser: [email or web address]       Ferritin [[id number]] Collected: 02/23/25 1813    Lab Status: In process Specimen: Blood from Arm, Right Updated: 02/23/25 2045    HS Troponin 0hr (reflex protocol) [[id number]]  (Normal) Collected: 02/23/25 1813    Lab Status: Final result Specimen: Blood from Arm, Right Updated: 02/23/25 1851     hs TnI 0hr 3 ng/L     Basic metabolic panel [[id number]]  (Abnormal) Collected: 02/23/25 1813    Lab Status: Final result Specimen: Blood from Arm, Right Updated: 02/23/25 1845     Sodium 139 mmol/L      Potassium 5.1 mmol/L      Chloride 98 mmol/L      CO2 36 mmol/L      ANION GAP 5 mmol/L      BUN 24 mg/dL      Creatinine 1.18 mg/dL      Glucose 94 mg/dL      Calcium 9.3 mg/dL      eGFR 47 ml/min/1.73sq m     Narrative:      National Kid[employer] Foundation guidelines for Chronic Kidney Disease (CKD):     Stage 1 with normal or high GFR (GFR > 90 mL/min/1.73 square meters)    Stage 2 Mild CKD (GFR = 60-89 mL/min/1.73  square meters)    Stage 3A Moderate CKD (GFR = 45-59 mL/min/1.73 square meters)    Stage 3B Moderate CKD (GFR = 30-44 mL/min/1.73 square meters)    Stage 4 Severe CKD (GFR = 15-29 mL/min/1.73 square meters)    Stage 5 End Stage CKD (GFR <15 mL/min/1.73 square meters)  Note: GFR calculation is accurate only with a steady state creatinine    Lipase [[id number]]  (Normal) Collected: 02/23/25 1813    Lab Status: Final result Specimen: Blood from Arm, Right Updated: 02/23/25 1845     Lipase 21 u/L     CBC and differential [[id number]]  (Abnormal) Collected: 02/23/25 1813    Lab Status: Final result Specimen: Blood from Arm, Right Updated: 02/23/25 1831     WBC 6.63 Thousand/uL      RBC 3.92 Million/uL      Hemoglobin 10.3 g/dL      Hematocrit 35.5 %      MCV 91 fL      MCH 26.3 pg      MCHC 29.0 g/dL      RDW 14.2 %      MPV 10.2 fL      Platelets 180 Thousands/uL      nRBC 0 /100 WBCs      Segmented % 77 %      Immature Grans % 0 %      Lymphocytes % 11 %      Monocytes % 7 %      Eosinophils Relative 4 %      Basophils Relative 1 %      Absolute Neutrophils 5.10 Thousands/µL      Absolute Immature Grans 0.02 Thousand/uL      Absolute Lymphocytes 0.72 Thousands/µL      Absolute Monocytes 0.49 Thousand/µL      Eosinophils Absolute 0.26 Thousand/µL      Basophils Absolute 0.04 Thousands/µL     UA w Reflex to Microscopic w Reflex to Culture [[id number]]     Lab Status: No result Specimen: Urine             XR chest 1 view portable   ED Interpretation by Isaac Toribio MD (02/23 1817)   No acute cardiopulmonary findings.      US right upper quadrant    (Results Pending)       ECG 12 Lead Documentation Only    Date/Time: 2/23/2025 11:31 PM    Performed by: Isaac Toribio MD  Authorized by: Isaac Toribio MD    ECG reviewed by me, the ED Provider: yes    Patient location:  ED  Previous ECG:     Previous ECG:  Compared to current    Similarity:  No change  Interpretation:     Interpretation: normal    Rate:     ECG rate assessment:  normal    Rhythm:     Rhythm: sinus rhythm    Ectopy:     Ectopy: none    QRS:     QRS axis:  Normal    QRS intervals:  Normal  Conduction:     Conduction: normal    ST segments:     ST segments:  Normal  T waves:     T waves: normal        ED Medication and Procedure Management   Prior to Admission Medications   Prescriptions Last Dose Informant Patient Reported? Taking?   Budeson-Glycopyrrol-Formoterol (Breztri Aerosphere) 160-9-4.8 MCG/ACT AERO   No No   Sig: Inhale 2 puffs 2 (two) times a day Rinse mouth after use.   Cholecalciferol (VITAMIN D3) 1,000 units tablet   Yes No   Sig: Take 1,000 Units by mouth daily   Diclofenac Sodium (VOLTAREN) 1 %   No No   Sig: Apply 2 g topically 4 (four) times a day   acetaminophen (TYLENOL) 325 mg tablet   No No   Sig: Take 2 tablets (650 mg total) by mouth every 6 (six) hours as needed for mild pain, moderate pain, headaches or fever   albuterol (2.5 mg/3 mL) 0.083 % nebulizer solution   No No   Sig: Take 3 mL (2.5 mg total) by nebulization every 6 (six) hours as needed for wheezing or shortness of breath   albuterol (PROVENTIL HFA,VENTOLIN HFA) 90 mcg/act inhaler   No No   Sig: Inhale 2 puffs every 6 (six) hours as needed for wheezing or shortness of breath   amLODIPine (NORVASC) 5 mg tablet   No No   Sig: TAKE 1 TABLET (5 MG TOTAL) BY MOUTH DAILY.   atorvastatin (LIPITOR) 40 mg tablet   No No   Sig: Take 1 tablet (40 mg total) by mouth daily with dinner   azithromycin (ZITHROMAX) 250 mg tablet   Yes No   Sig: TAKE 1 TABLET (250 MG TOTAL) BY MOUTH 3 (THREE) TIMES A WEEK FOR 36 DOSES   benzonatate (TESSALON) 200 MG capsule   No No   Sig: Take 1 capsule (200 mg total) by mouth 3 (three) times a day as needed for cough   brimonidine tartrate 0.2 % ophthalmic solution   Yes No   Sig: Administer 1 drop to both eyes 3 (three) times a day   clindamycin 1 % gel   No No   Sig: Apply topically 2 (two) times a day   cyanocobalamin (VITAMIN B-12) 500 MCG tablet   No No   Sig: Take 1  tablet (500 mcg total) by mouth daily   fluticasone (FLONASE) 50 mcg/act nasal spray   No No   Sig: SPRAY 1 SPRAY INTO EACH NOSTRIL EVERY DAY   ipratropium (ATROVENT) 0.06 % nasal spray   No No   Si SPRAYS INTO EACH NOSTRIL 3 (THREE) TIMES A DAY FOR INCREASED NASAL SECRETION   ipratropium-albuterol (DUO-NEB) 0.5-2.5 mg/3 mL nebulizer solution   Yes No   Sig: TAKE 3 ML BY NEBULIZATION EVERY 8 HOURS AS NEEDED FOR WHEEZING OR SHORTNESS OF BREATH   levothyroxine 75 mcg tablet   No No   Sig: Take 1 tablet (75 mcg total) by mouth daily in the early morning   montelukast (SINGULAIR) 10 mg tablet   No No   Sig: TAKE 1 TABLET BY MOUTH DAILY AT BEDTIME   pantoprazole (PROTONIX) 40 mg tablet   No No   Sig: TAKE 1 TABLET BY MOUTH EVERY DAY   sertraline (ZOLOFT) 25 mg tablet   No No   Sig: Take 1 tablet (25 mg total) by mouth daily at bedtime   tiZANidine (ZANAFLEX) 2 mg tablet   No No   Sig: TAKE 1 TABLET BY MOUTH AT BEDTIME AS NEEDED FOR MUSCLE SPASMS OR BACK PAIN   timolol (TIMOPTIC) 0.5 % ophthalmic solution   Yes No   Sig: INSTILL ONE DROP TO BOTH EYES TWICE A DAY      Facility-Administered Medications: None     Current Discharge Medication List        CONTINUE these medications which have NOT CHANGED    Details   acetaminophen (TYLENOL) 325 mg tablet Take 2 tablets (650 mg total) by mouth every 6 (six) hours as needed for mild pain, moderate pain, headaches or fever    Associated Diagnoses: Acute on chronic respiratory failure with hypoxia and hypercapnia (HCC)      albuterol (2.5 mg/3 mL) 0.083 % nebulizer solution Take 3 mL (2.5 mg total) by nebulization every 6 (six) hours as needed for wheezing or shortness of breath  Qty: 360 mL, Refills: 2    Associated Diagnoses: Centrilobular emphysema (HCC)      albuterol (PROVENTIL HFA,VENTOLIN HFA) 90 mcg/act inhaler Inhale 2 puffs every 6 (six) hours as needed for wheezing or shortness of breath  Qty: 18 g, Refills: 5    Comments: Substitution to a formulary equivalent  within the same pharmaceutical class is authorized.  Associated Diagnoses: Severe persistent asthma without complication      amLODIPine (NORVASC) 5 mg tablet TAKE 1 TABLET (5 MG TOTAL) BY MOUTH DAILY.  Qty: 90 tablet, Refills: 1    Associated Diagnoses: HTN (hypertension)      atorvastatin (LIPITOR) 40 mg tablet Take 1 tablet (40 mg total) by mouth daily with dinner  Qty: 90 tablet, Refills: 3    Associated Diagnoses: Hyperlipidemia, unspecified hyperlipidemia type      azithromycin (ZITHROMAX) 250 mg tablet TAKE 1 TABLET (250 MG TOTAL) BY MOUTH 3 (THREE) TIMES A WEEK FOR 36 DOSES      benzonatate (TESSALON) 200 MG capsule Take 1 capsule (200 mg total) by mouth 3 (three) times a day as needed for cough  Qty: 90 capsule, Refills: 0    Associated Diagnoses: Acute exacerbation of chronic obstructive pulmonary disease (HCC)      brimonidine tartrate 0.2 % ophthalmic solution Administer 1 drop to both eyes 3 (three) times a day      Budeson-Glycopyrrol-Formoterol (Breztri Aerosphere) 160-9-4.8 MCG/ACT AERO Inhale 2 puffs 2 (two) times a day Rinse mouth after use.  Qty: 10.7 g, Refills: 3    Associated Diagnoses: Centrilobular emphysema (HCC)      Cholecalciferol (VITAMIN D3) 1,000 units tablet Take 1,000 Units by mouth daily      clindamycin 1 % gel Apply topically 2 (two) times a day  Qty: 60 g, Refills: 0    Associated Diagnoses: Folliculitis      cyanocobalamin (VITAMIN B-12) 500 MCG tablet Take 1 tablet (500 mcg total) by mouth daily  Qty: 90 tablet, Refills: 1    Associated Diagnoses: B12 deficiency      Diclofenac Sodium (VOLTAREN) 1 % Apply 2 g topically 4 (four) times a day  Qty: 300 g, Refills: 5    Associated Diagnoses: Chronic right-sided low back pain with right-sided sciatica      fluticasone (FLONASE) 50 mcg/act nasal spray SPRAY 1 SPRAY INTO EACH NOSTRIL EVERY DAY  Qty: 48 mL, Refills: 1    Associated Diagnoses: Seasonal allergic rhinitis due to pollen      ipratropium (ATROVENT) 0.06 % nasal spray 2  SPRAYS INTO EACH NOSTRIL 3 (THREE) TIMES A DAY FOR INCREASED NASAL SECRETION  Qty: 15 mL, Refills: 2    Associated Diagnoses: Post-nasal catarrh      ipratropium-albuterol (DUO-NEB) 0.5-2.5 mg/3 mL nebulizer solution TAKE 3 ML BY NEBULIZATION EVERY 8 HOURS AS NEEDED FOR WHEEZING OR SHORTNESS OF BREATH      levothyroxine 75 mcg tablet Take 1 tablet (75 mcg total) by mouth daily in the early morning  Qty: 90 tablet, Refills: 1    Associated Diagnoses: Hypothyroidism, unspecified type      montelukast (SINGULAIR) 10 mg tablet TAKE 1 TABLET BY MOUTH DAILY AT BEDTIME  Qty: 100 tablet, Refills: 1    Associated Diagnoses: Centrilobular emphysema (HCC)      pantoprazole (PROTONIX) 40 mg tablet TAKE 1 TABLET BY MOUTH EVERY DAY  Qty: 90 tablet, Refills: 1    Associated Diagnoses: Atypical chest pain; Gastroesophageal reflux disease without esophagitis      sertraline (ZOLOFT) 25 mg tablet Take 1 tablet (25 mg total) by mouth daily at bedtime  Qty: 90 tablet, Refills: 1    Associated Diagnoses: TIMMY (generalized anxiety disorder); Depression with anxiety      timolol (TIMOPTIC) 0.5 % ophthalmic solution INSTILL ONE DROP TO BOTH EYES TWICE A DAY      tiZANidine (ZANAFLEX) 2 mg tablet TAKE 1 TABLET BY MOUTH AT BEDTIME AS NEEDED FOR MUSCLE SPASMS OR BACK PAIN  Qty: 90 tablet, Refills: 1    Associated Diagnoses: Chronic right-sided low back pain with right-sided sciatica           No discharge procedures on file.  ED SEPSIS DOCUMENTATION   Time reflects when diagnosis was documented in both MDM as applicable and the Disposition within this note       Time User Action Codes Description Comment    2/23/2025  7:14 PM Isaac Toribio Add [J44.1] COPD exacerbation (HCC)                  Isaac Toribio MD  02/23/25 1896

## 2025-02-24 NOTE — ASSESSMENT & PLAN NOTE
History of head and neck cancer in 2016 treated with radiation and chemotherapy.  Op follow up with ent

## 2025-02-24 NOTE — ASSESSMENT & PLAN NOTE
Noted 85% on 2 liters with ambulation  Titrate oxygen as able. Stable while at rest.    Plan  Check flu/covid/rsv

## 2025-02-24 NOTE — ASSESSMENT & PLAN NOTE
Noted 85% on 2 liters with ambulation  CXR showing Trace pleural effusions.   Ordered lasix 40 mg IV  F/U Echo  Titrated to room air  Viral testing negative

## 2025-02-24 NOTE — ASSESSMENT & PLAN NOTE
Was noted to have shortness of breath x 3 days      Plan:   Continue with short course of prednisone  Nebulizers

## 2025-02-25 LAB
ALBUMIN SERPL BCG-MCNC: 4.3 G/DL (ref 3.5–5)
ALP SERPL-CCNC: 113 U/L (ref 34–104)
ALT SERPL W P-5'-P-CCNC: 8 U/L (ref 7–52)
ANION GAP SERPL CALCULATED.3IONS-SCNC: 5 MMOL/L (ref 4–13)
AST SERPL W P-5'-P-CCNC: 17 U/L (ref 13–39)
BILIRUB SERPL-MCNC: 0.43 MG/DL (ref 0.2–1)
BUN SERPL-MCNC: 32 MG/DL (ref 5–25)
CALCIUM SERPL-MCNC: 9.3 MG/DL (ref 8.4–10.2)
CARDIAC TROPONIN I PNL SERPL HS: 3 NG/L (ref 8–18)
CHLORIDE SERPL-SCNC: 95 MMOL/L (ref 96–108)
CO2 SERPL-SCNC: 40 MMOL/L (ref 21–32)
CREAT SERPL-MCNC: 1.37 MG/DL (ref 0.6–1.3)
ERYTHROCYTE [DISTWIDTH] IN BLOOD BY AUTOMATED COUNT: 14.2 % (ref 11.6–15.1)
GFR SERPL CREATININE-BSD FRML MDRD: 39 ML/MIN/1.73SQ M
GLUCOSE SERPL-MCNC: 92 MG/DL (ref 65–140)
HCT VFR BLD AUTO: 34.4 % (ref 34.8–46.1)
HGB BLD-MCNC: 10.4 G/DL (ref 11.5–15.4)
LIPASE SERPL-CCNC: 34 U/L (ref 11–82)
MAGNESIUM SERPL-MCNC: 2.1 MG/DL (ref 1.9–2.7)
MCH RBC QN AUTO: 26.4 PG (ref 26.8–34.3)
MCHC RBC AUTO-ENTMCNC: 30.2 G/DL (ref 31.4–37.4)
MCV RBC AUTO: 87 FL (ref 82–98)
PHOSPHATE SERPL-MCNC: 3.9 MG/DL (ref 2.3–4.1)
PLATELET # BLD AUTO: 187 THOUSANDS/UL (ref 149–390)
PMV BLD AUTO: 9.7 FL (ref 8.9–12.7)
POTASSIUM SERPL-SCNC: 4 MMOL/L (ref 3.5–5.3)
PROT SERPL-MCNC: 7.1 G/DL (ref 6.4–8.4)
RBC # BLD AUTO: 3.94 MILLION/UL (ref 3.81–5.12)
SODIUM SERPL-SCNC: 140 MMOL/L (ref 135–147)
WBC # BLD AUTO: 5.94 THOUSAND/UL (ref 4.31–10.16)

## 2025-02-25 PROCEDURE — 83690 ASSAY OF LIPASE: CPT | Performed by: INTERNAL MEDICINE

## 2025-02-25 PROCEDURE — 84100 ASSAY OF PHOSPHORUS: CPT | Performed by: STUDENT IN AN ORGANIZED HEALTH CARE EDUCATION/TRAINING PROGRAM

## 2025-02-25 PROCEDURE — 83735 ASSAY OF MAGNESIUM: CPT | Performed by: STUDENT IN AN ORGANIZED HEALTH CARE EDUCATION/TRAINING PROGRAM

## 2025-02-25 PROCEDURE — 99232 SBSQ HOSP IP/OBS MODERATE 35: CPT | Performed by: INTERNAL MEDICINE

## 2025-02-25 PROCEDURE — 94640 AIRWAY INHALATION TREATMENT: CPT

## 2025-02-25 PROCEDURE — 80053 COMPREHEN METABOLIC PANEL: CPT | Performed by: STUDENT IN AN ORGANIZED HEALTH CARE EDUCATION/TRAINING PROGRAM

## 2025-02-25 PROCEDURE — 94760 N-INVAS EAR/PLS OXIMETRY 1: CPT

## 2025-02-25 PROCEDURE — 93005 ELECTROCARDIOGRAM TRACING: CPT

## 2025-02-25 PROCEDURE — 84484 ASSAY OF TROPONIN QUANT: CPT | Performed by: INTERNAL MEDICINE

## 2025-02-25 PROCEDURE — 85027 COMPLETE CBC AUTOMATED: CPT | Performed by: STUDENT IN AN ORGANIZED HEALTH CARE EDUCATION/TRAINING PROGRAM

## 2025-02-25 RX ORDER — SODIUM CHLORIDE 9 MG/ML
75 INJECTION, SOLUTION INTRAVENOUS CONTINUOUS
Status: DISCONTINUED | OUTPATIENT
Start: 2025-02-25 | End: 2025-02-27 | Stop reason: HOSPADM

## 2025-02-25 RX ADMIN — Medication 1000 UNITS: at 10:42

## 2025-02-25 RX ADMIN — DICLOFENAC SODIUM 2 G: 10 GEL TOPICAL at 21:38

## 2025-02-25 RX ADMIN — ALUMINUM HYDROXIDE, MAGNESIUM HYDROXIDE, AND DIMETHICONE 30 ML: 200; 20; 200 SUSPENSION ORAL at 21:46

## 2025-02-25 RX ADMIN — DICLOFENAC SODIUM 2 G: 10 GEL TOPICAL at 19:15

## 2025-02-25 RX ADMIN — DICLOFENAC SODIUM 2 G: 10 GEL TOPICAL at 10:42

## 2025-02-25 RX ADMIN — HEPARIN SODIUM 5000 UNITS: 5000 INJECTION INTRAVENOUS; SUBCUTANEOUS at 07:25

## 2025-02-25 RX ADMIN — TIMOLOL MALEATE 1 DROP: 2.5 SOLUTION/ DROPS OPHTHALMIC at 19:15

## 2025-02-25 RX ADMIN — HEPARIN SODIUM 5000 UNITS: 5000 INJECTION INTRAVENOUS; SUBCUTANEOUS at 21:38

## 2025-02-25 RX ADMIN — ATORVASTATIN CALCIUM 40 MG: 40 TABLET, FILM COATED ORAL at 19:19

## 2025-02-25 RX ADMIN — BRIMONIDINE TARTRATE 1 DROP: 2 SOLUTION/ DROPS OPHTHALMIC at 10:42

## 2025-02-25 RX ADMIN — BUDESONIDE AND FORMOTEROL FUMARATE DIHYDRATE 2 PUFF: 160; 4.5 AEROSOL RESPIRATORY (INHALATION) at 10:42

## 2025-02-25 RX ADMIN — PANTOPRAZOLE SODIUM 40 MG: 40 TABLET, DELAYED RELEASE ORAL at 07:25

## 2025-02-25 RX ADMIN — BUDESONIDE AND FORMOTEROL FUMARATE DIHYDRATE 2 PUFF: 160; 4.5 AEROSOL RESPIRATORY (INHALATION) at 19:15

## 2025-02-25 RX ADMIN — DICLOFENAC SODIUM 2 G: 10 GEL TOPICAL at 13:12

## 2025-02-25 RX ADMIN — SODIUM CHLORIDE 75 ML/HR: 0.9 INJECTION, SOLUTION INTRAVENOUS at 19:19

## 2025-02-25 RX ADMIN — LEVOTHYROXINE SODIUM 75 MCG: 75 TABLET ORAL at 07:25

## 2025-02-25 RX ADMIN — MONTELUKAST 10 MG: 10 TABLET, FILM COATED ORAL at 21:38

## 2025-02-25 RX ADMIN — TIMOLOL MALEATE 1 DROP: 2.5 SOLUTION/ DROPS OPHTHALMIC at 10:42

## 2025-02-25 RX ADMIN — HEPARIN SODIUM 5000 UNITS: 5000 INJECTION INTRAVENOUS; SUBCUTANEOUS at 13:13

## 2025-02-25 RX ADMIN — CYANOCOBALAMIN TAB 500 MCG 500 MCG: 500 TAB at 10:42

## 2025-02-25 RX ADMIN — POLYETHYLENE GLYCOL 3350 17 G: 17 POWDER, FOR SOLUTION ORAL at 21:38

## 2025-02-25 RX ADMIN — IPRATROPIUM BROMIDE AND ALBUTEROL SULFATE 3 ML: .5; 3 SOLUTION RESPIRATORY (INHALATION) at 07:04

## 2025-02-25 RX ADMIN — POLYETHYLENE GLYCOL 3350 17 G: 17 POWDER, FOR SOLUTION ORAL at 10:42

## 2025-02-25 RX ADMIN — IPRATROPIUM BROMIDE AND ALBUTEROL SULFATE 3 ML: .5; 3 SOLUTION RESPIRATORY (INHALATION) at 13:31

## 2025-02-25 RX ADMIN — IPRATROPIUM BROMIDE AND ALBUTEROL SULFATE 3 ML: .5; 3 SOLUTION RESPIRATORY (INHALATION) at 19:54

## 2025-02-25 RX ADMIN — AMLODIPINE BESYLATE 5 MG: 5 TABLET ORAL at 10:42

## 2025-02-25 RX ADMIN — FAMOTIDINE 20 MG: 20 TABLET, FILM COATED ORAL at 21:38

## 2025-02-25 RX ADMIN — PANTOPRAZOLE SODIUM 40 MG: 40 TABLET, DELAYED RELEASE ORAL at 19:19

## 2025-02-25 RX ADMIN — SERTRALINE HYDROCHLORIDE 25 MG: 25 TABLET ORAL at 21:38

## 2025-02-25 RX ADMIN — BRIMONIDINE TARTRATE 1 DROP: 2 SOLUTION/ DROPS OPHTHALMIC at 19:15

## 2025-02-25 NOTE — ASSESSMENT & PLAN NOTE
Hgb stable in the 10s.  Similar to prior labs.   Does not tolerate oral iron supplements due to constipation and abdominal pain  Ferritin is 32  May benefit from venofer   Need to follow up on cancer screening

## 2025-02-25 NOTE — PROGRESS NOTES
Progress Note - Hospitalist   Name: Noreen Alvarez 69 y.o. female I MRN: [id number]  Unit/Bed#: W -01 I Date of Admission: 2/23/2025   Date of Service: 2/25/2025 I Hospital Day: 1    Assessment & Plan  Acute on chronic respiratory failure with hypoxia and hypercapnia (HCC)  Now on room air.   Epigastric pain  Ongoing epigastric pain and right upper quadrant pain.  Worsens with food at times.  Describes as  burning sensation.   EGD 3/2024: 2 cm hiatal hernia.  Biopsies: squamous mucosa with mild reactive changes.  Negative intestinal metaplasia, eosinophilic esophagitis, dysplasia, malignancy.  Troponin x 1 normal   EKG: NSR, nonspecific T wave abnormality.     Plan  RUQ shows sludge  Increase protonix from daily to BID  Add schedule pepcid  Still complaining of epigastric pain - will give IVF to improve RF and likely get CT scan tomorrow   Centrilobular emphysema (HCC)  Reports shortness of breath x 3 days though this sounds like it occurs simultaneously when she develops epigastric/ruq pain.  Does not appear to be in acute exacerbation.     Plan  Continue home medications  Given prednisone x 1 in ED, hold further.  History of laryngeal cancer  History of head and neck cancer in 2016 treated with radiation and chemotherapy.  Op follow up with ent  Multiple lung nodules  8/2024 PET CT   1.5 cm right lower lobe ground glass nodule not FDG avid.  Note that low-grade adenocarcinoma/adenocarcinoma spectrum lesions may not demonstrate significant FDG uptake.   Focal FDG uptake right mid lung corresponds to new area of nodule consolidation on CT.  Probably post infectious or inflammatory.  Initially referred to thoracic surgery for biopsy of the lung nodule but due to solid component of <4 mm, it is under observation.  9/2024 Chest CT:   Stable right lower lobe groundglass nodule 1.6 x 1 cm  Stable right lower lobe groundglass nodule 1.5 x 1.1 cm  Resolution of previously seen posterior right lower lobe tree in bud  "nodularity and right basilar nodular opacity  Stable left lower lobe nodule 1.2 x 1.1 cm    Plan  Op follow up with hemonc, next in April  HTN (hypertension)  Elevated currently, possibly in setting of pain    Plan  Continue home amlodipine  Add prn hydralazine   /71  Chronic constipation  Constipation x 3 days. Only having small BM per daughter.     Plan  Will give more aggressive bowel regimen .  Anemia  Hgb stable in the 10s.  Similar to prior labs.   Does not tolerate oral iron supplements due to constipation and abdominal pain  Ferritin is 32  May benefit from venofer   Need to follow up on cancer screening     VTE Pharmacologic Prophylaxis: VTE Score: 4 Moderate Risk (Score 3-4) - Pharmacological DVT Prophylaxis Ordered: heparin.    Mobility:   Basic Mobility Inpatient Raw Score: 24  JH-HLM Goal: 8: Walk 250 feet or more  JH-HLM Achieved: 7: Walk 25 feet or more      Patient Centered Rounds: I performed bedside rounds with nursing staff today.   Discussions with Specialists or Other Care Team Provider: Discussed with CM .    Education and Discussions with Family / Patient:  called Ashley - left voicemail. .     Current Length of Stay: 1 day(s)  Current Patient Status: Inpatient   Certification Statement: The patient will continue to require additional inpatient hospital stay due to epigastric pain.   Discharge Plan: Anticipate discharge in 24-48 hrs to home.    Code Status: Level 1 - Full Code    Subjective   Patient seen and examined   Feeling \"the same\"  Complaining of epigastric pain.     Objective :  Temp:  [97.6 °F (36.4 °C)-98.7 °F (37.1 °C)] 98.7 °F (37.1 °C)  HR:  [62-71] 68  BP: (124-149)/(66-72) 146/71  Resp:  [18] 18  SpO2:  [96 %-100 %] 98 %  O2 Device: Nasal cannula  Nasal Cannula O2 Flow Rate (L/min):  [1 L/min-2 L/min] 2 L/min    Body mass index is 30.38 kg/m².     Input and Output Summary (last 24 hours):     Intake/Output Summary (Last 24 hours) at 2/25/2025 7469  Last data filed at " 2/25/2025 1737  Gross per 24 hour   Intake 1200 ml   Output 0 ml   Net 1200 ml       Physical Exam  Constitutional:       General: She is not in acute distress.     Appearance: She is not ill-appearing, toxic-appearing or diaphoretic.   HENT:      Head: Normocephalic.      Mouth/Throat:      Mouth: Mucous membranes are moist.   Eyes:      General: No scleral icterus.        Right eye: No discharge.         Left eye: No discharge.      Pupils: Pupils are equal, round, and reactive to light.   Cardiovascular:      Rate and Rhythm: Normal rate.      Heart sounds: No murmur heard.     No friction rub. No gallop.   Pulmonary:      Effort: Pulmonary effort is normal. No respiratory distress.      Breath sounds: No stridor. No wheezing, rhonchi or rales.   Chest:      Chest wall: No tenderness.   Abdominal:      General: There is no distension.      Palpations: There is no mass.      Tenderness: There is no abdominal tenderness. There is no right CVA tenderness, left CVA tenderness, guarding or rebound.      Hernia: No hernia is present.   Musculoskeletal:      Right lower leg: No edema.      Left lower leg: No edema.   Skin:     Capillary Refill: Capillary refill takes less than 2 seconds.      Coloration: Skin is pale. Skin is not jaundiced.      Findings: No bruising, erythema, lesion or rash.   Neurological:      General: No focal deficit present.      Mental Status: She is alert.      Cranial Nerves: No cranial nerve deficit.      Sensory: No sensory deficit.      Motor: No weakness.      Coordination: Coordination normal.      Gait: Gait normal.   Psychiatric:         Mood and Affect: Mood normal.           Lines/Drains:              Lab Results: I have reviewed the following results:   Results from last 7 days   Lab Units 02/25/25  0737 02/24/25  0431   WBC Thousand/uL 5.94 5.67   HEMOGLOBIN g/dL 10.4* 10.0*   HEMATOCRIT % 34.4* 33.4*   PLATELETS Thousands/uL 187 181   SEGS PCT %  --  88*   LYMPHO PCT %  --  9*    MONO PCT %  --  2*   EOS PCT %  --  0     Results from last 7 days   Lab Units 02/25/25  0737   SODIUM mmol/L 140   POTASSIUM mmol/L 4.0   CHLORIDE mmol/L 95*   CO2 mmol/L 40*   BUN mg/dL 32*   CREATININE mg/dL 1.37*   ANION GAP mmol/L 5   CALCIUM mg/dL 9.3   ALBUMIN g/dL 4.3   TOTAL BILIRUBIN mg/dL 0.43   ALK PHOS U/L 113*   ALT U/L 8   AST U/L 17   GLUCOSE RANDOM mg/dL 92                       Recent Cultures (last 7 days):         Imaging Results Review: No pertinent imaging studies reviewed.  Other Study Results Review: No additional pertinent studies reviewed.    Last 24 Hours Medication List:     Current Facility-Administered Medications:     acetaminophen (TYLENOL) tablet 650 mg, Q6H PRN    albuterol inhalation solution 2.5 mg, Q6H PRN    aluminum-magnesium hydroxide-simethicone (MAALOX) oral suspension 30 mL, Q6H PRN    amLODIPine (NORVASC) tablet 5 mg, Daily    atorvastatin (LIPITOR) tablet 40 mg, Daily With Dinner    brimonidine tartrate 0.2 % ophthalmic solution 1 drop, TID    budesonide-formoterol (SYMBICORT) 160-4.5 mcg/act inhaler 2 puff, BID    Cholecalciferol (VITAMIN D3) tablet 1,000 Units, Daily    cyanocobalamin (VITAMIN B-12) tablet 500 mcg, Daily    Diclofenac Sodium (VOLTAREN) 1 % topical gel 2 g, 4x Daily    famotidine (PEPCID) tablet 20 mg, HS    heparin (porcine) subcutaneous injection 5,000 Units, Q8H ESTEVAN    hydrALAZINE (APRESOLINE) injection 5 mg, Q6H PRN    ipratropium-albuterol (DUO-NEB) 0.5-2.5 mg/3 mL inhalation solution 3 mL, TID    levothyroxine tablet 75 mcg, Early Morning    montelukast (SINGULAIR) tablet 10 mg, HS    pantoprazole (PROTONIX) EC tablet 40 mg, BID AC    polyethylene glycol (MIRALAX) packet 17 g, BID    sertraline (ZOLOFT) tablet 25 mg, HS    sodium chloride 0.9 % infusion, Continuous    timolol (TIMOPTIC) 0.25 % ophthalmic solution 1 drop, BID    Administrative Statements   Today, Patient Was Seen By: Zainab Negro MD  I have spent a total time of 30 minutes in  caring for this patient on the day of the visit/encounter including Diagnostic results, Prognosis, Risks and benefits of tx options, Instructions for management, Patient and family education, Importance of tx compliance, Risk factor reductions, Impressions, Counseling / Coordination of care, Documenting in the medical record, Reviewing/placing orders in the medical record (including tests, medications, and/or procedures), Obtaining or reviewing history  , and Communicating with other healthcare professionals .    **Please Note: This note may have been constructed using a voice recognition system.**

## 2025-02-25 NOTE — ASSESSMENT & PLAN NOTE
Constipation x 3 days. Only having small BM per daughter.     Plan  Will give more aggressive bowel regimen .

## 2025-02-25 NOTE — ED ATTENDING ATTESTATION
I, Liliana Marcos MD, saw and evaluated the patient. I have discussed the patient with the resident/non-physician practitioner and agree with the resident's/non-physician practitioner's findings, Plan of Care, and MDM as documented in the resident's/non-physician practitioner's note, except where noted. All available labs and Radiology studies were reviewed.  I was present for key portions of any procedure(s) performed by the resident/non-physician practitioner and I was immediately available to provide assistance.       At this point I agree with the current assessment done in the Emergency Department.  I have conducted an independent evaluation of this patient a history and physical is as follows:    Subjective: 69-year-old female with history of COPD on 2 L home O2 presenting with progressive dyspnea on exertion in the last 3 days.  She reports cough productive of clear sputum as well as epigastric abdominal pain without hemoptysis, chest pain, nausea/vomiting, leg swelling, or any other complaints.  She noted her pulse ox was 50 to 60s while at rest intermittently.    Objective: Vital signs stable and afebrile in no acute distress on room air.  Decreased air movement bilaterally without wheezing/rales/rhonchi.  Cardiac auscultation without murmurs/rubs/gallops.  Mild epigastric tenderness to palpation without rebound/guarding.    Assessment/Plan: Elderly female with history of COPD on 2 L home O2 presenting with worsening dyspnea on exertion and epigastric abdominal pain.  Provided with DuoNeb, steroids, sucralfate and famotidine with improvement in symptoms.  ECG reassuring and chest x-ray without pneumonia.  Viral swabs reassuring and initial troponin negative.  However, when ambulated patient desatted into low 80s with significant increased work of breathing on her home oxygen.  Given her respiratory distress while ambulating will admit for COPD exacerbation.    Patient admitted to [facility]

## 2025-02-25 NOTE — ASSESSMENT & PLAN NOTE
Elevated currently, possibly in setting of pain    Plan  Continue home amlodipine  Add prn hydralazine   /71

## 2025-02-25 NOTE — PLAN OF CARE
Problem: PAIN - ADULT  Goal: Verbalizes/displays adequate comfort level or baseline comfort level  Description: Interventions:  - Encourage patient to monitor pain and request assistance  - Assess pain using appropriate pain scale  - Administer analgesics based on type and severity of pain and evaluate response  - Implement non-pharmacological measures as appropriate and evaluate response  - Consider cultural and social influences on pain and pain management  - Notify physician/advanced practitioner if interventions unsuccessful or patient reports new pain  Outcome: Progressing     Problem: INFECTION - ADULT  Goal: Absence or prevention of progression during hospitalization  Description: INTERVENTIONS:  - Assess and monitor for signs and symptoms of infection  - Monitor lab/diagnostic results  - Monitor all insertion sites, i.e. indwelling lines, tubes, and drains  - Monitor endotracheal if appropriate and nasal secretions for changes in amount and color  - [place] appropriate cooling/warming therapies per order  - Administer medications as ordered  - Instruct and encourage patient and family to use good hand hygiene technique  - Identify and instruct in appropriate isolation precautions for identified infection/condition  Outcome: Progressing  Goal: Absence of fever/infection during neutropenic period  Description: INTERVENTIONS:  - Monitor WBC    Outcome: Progressing     Problem: SAFETY ADULT  Goal: Patient will remain free of falls  Description: INTERVENTIONS:  - Educate patient/family on patient safety including physical limitations  - Instruct patient to call for assistance with activity   - Consult OT/PT to assist with strengthening/mobility   - Keep Call bell within reach  - Keep bed low and locked with side rails adjusted as appropriate  - Keep care items and personal belongings within reach  - Initiate and maintain comfort rounds  - Make Fall Risk Sign visible to staff  - Offer Toileting every  Hours,  in advance of need  - Initiate/Maintain alarm  - Obtain necessary fall risk management equipment:   - Apply yellow socks and bracelet for high fall risk patients  - Consider moving patient to room near nurses station  Outcome: Progressing  Goal: Maintain or return to baseline ADL function  Description: INTERVENTIONS:  -  Assess patient's ability to carry out ADLs; assess patient's baseline for ADL function and identify physical deficits which impact ability to perform ADLs (bathing, care of mouth/teeth, toileting, grooming, dressing, etc.)  - Assess/evaluate cause of self-care deficits   - Assess range of motion  - Assess patient's mobility; develop plan if impaired  - Assess patient's need for assistive devices and provide as appropriate  - Encourage maximum independence but intervene and supervise when necessary  - Involve family in performance of ADLs  - Assess for home care needs following discharge   - Consider OT consult to assist with ADL evaluation and planning for discharge  - Provide patient education as appropriate  Outcome: Progressing  Goal: Maintains/Returns to pre admission functional level  Description: INTERVENTIONS:  - Perform AM-PAC 6 Click Basic Mobility/ Daily Activity assessment daily.  - Set and communicate daily mobility goal to care team and patient/family/caregiver.   - Collaborate with rehabilitation services on mobility goals if consulted  - Perform Range of Motion  times a day.  - Reposition patient every  hours.  - Dangle patient  times a day  - Stand patient  times a day  - Ambulate patient  times a day  - Out of bed to chair  times a day   - Out of bed for meals  times a day  - Out of bed for toileting  - Record patient progress and toleration of activity level   Outcome: Progressing     Problem: DISCHARGE PLANNING  Goal: Discharge to home or other facility with appropriate resources  Description: INTERVENTIONS:  - Identify barriers to discharge w/patient and caregiver  - Arrange for  needed discharge resources and transportation as appropriate  - Identify discharge learning needs (meds, wound care, etc.)  - Arrange for interpretive services to assist at discharge as needed  - Refer to Case Management Department for coordinating discharge planning if the patient needs post-hospital services based on physician/advanced practitioner order or complex needs related to functional status, cognitive ability, or social support system  Outcome: Progressing     Problem: Knowledge Deficit  Goal: Patient/family/caregiver demonstrates understanding of disease process, treatment plan, medications, and discharge instructions  Description: Complete learning assessment and assess knowledge base.  Interventions:  - Provide teaching at level of understanding  - Provide teaching via preferred learning methods  Outcome: Progressing

## 2025-02-25 NOTE — UTILIZATION REVIEW
NOTIFICATION OF INPATIENT ADMISSION   AUTHORIZATION REQUEST   SERVICING FACILITY:   [place] [address]  Tax ID: [id number]  NPI: [id number]   ATTENDING PROVIDER:  Attending Name and NPI#: Zainab Negro Md [[id number]]  Address: [address] [place] [address]  Phone: [phone]     ADMISSION INFORMATION:  Place of Service: Inpatient [facility]  Place of Service Code: 21  Inpatient Admission Date/Time: 2/24/25  8:39 AM  Discharge Date/Time: No discharge date for patient encounter.  Admitting Diagnosis Code/Description:  COPD exacerbation (HCC) [J44.1]     UTILIZATION REVIEW CONTACT:  Leah Degroot Utilization   Network Utilization Review Department  Phone: [phone]  Fax: [phone]  Email: [email or web address]  Contact for approvals/pending authorizations, clinical reviews, and discharge.     PHYSICIAN ADVISORY SERVICES:  Medical Necessity Denial & [employer] Review  Phone: [phone]  Fax: [phone]  Email: [email or web address]     DISCHARGE SUPPORT TEAM:  For Patients Discharge Needs & Updates  Phone: [phone] opt. 2 Fax: [phone]  Email: [email or web address]

## 2025-02-26 ENCOUNTER — APPOINTMENT (INPATIENT)
Dept: RADIOLOGY | Facility: HOSPITAL | Age: 70
End: 2025-02-26
Payer: COMMERCIAL

## 2025-02-26 PROBLEM — Z86.0101 HISTORY OF ADENOMATOUS POLYP OF COLON: Status: ACTIVE | Noted: 2025-02-26

## 2025-02-26 LAB
ALBUMIN SERPL BCG-MCNC: 4.1 G/DL (ref 3.5–5)
ALP SERPL-CCNC: 109 U/L (ref 34–104)
ALT SERPL W P-5'-P-CCNC: 7 U/L (ref 7–52)
ANION GAP SERPL CALCULATED.3IONS-SCNC: 5 MMOL/L (ref 4–13)
AST SERPL W P-5'-P-CCNC: 16 U/L (ref 13–39)
BILIRUB SERPL-MCNC: 0.44 MG/DL (ref 0.2–1)
BUN SERPL-MCNC: 29 MG/DL (ref 5–25)
CALCIUM SERPL-MCNC: 9.1 MG/DL (ref 8.4–10.2)
CHLORIDE SERPL-SCNC: 98 MMOL/L (ref 96–108)
CO2 SERPL-SCNC: 37 MMOL/L (ref 21–32)
CREAT SERPL-MCNC: 1.28 MG/DL (ref 0.6–1.3)
ERYTHROCYTE [DISTWIDTH] IN BLOOD BY AUTOMATED COUNT: 14.2 % (ref 11.6–15.1)
GFR SERPL CREATININE-BSD FRML MDRD: 42 ML/MIN/1.73SQ M
GLUCOSE SERPL-MCNC: 92 MG/DL (ref 65–140)
HCT VFR BLD AUTO: 35 % (ref 34.8–46.1)
HGB BLD-MCNC: 10.6 G/DL (ref 11.5–15.4)
MAGNESIUM SERPL-MCNC: 2.1 MG/DL (ref 1.9–2.7)
MCH RBC QN AUTO: 26.9 PG (ref 26.8–34.3)
MCHC RBC AUTO-ENTMCNC: 30.3 G/DL (ref 31.4–37.4)
MCV RBC AUTO: 89 FL (ref 82–98)
PHOSPHATE SERPL-MCNC: 4 MG/DL (ref 2.3–4.1)
PLATELET # BLD AUTO: 165 THOUSANDS/UL (ref 149–390)
PMV BLD AUTO: 9.8 FL (ref 8.9–12.7)
POTASSIUM SERPL-SCNC: 4.5 MMOL/L (ref 3.5–5.3)
PROT SERPL-MCNC: 6.9 G/DL (ref 6.4–8.4)
RBC # BLD AUTO: 3.94 MILLION/UL (ref 3.81–5.12)
SODIUM SERPL-SCNC: 140 MMOL/L (ref 135–147)
WBC # BLD AUTO: 6.06 THOUSAND/UL (ref 4.31–10.16)

## 2025-02-26 PROCEDURE — 84100 ASSAY OF PHOSPHORUS: CPT | Performed by: STUDENT IN AN ORGANIZED HEALTH CARE EDUCATION/TRAINING PROGRAM

## 2025-02-26 PROCEDURE — 74018 RADEX ABDOMEN 1 VIEW: CPT

## 2025-02-26 PROCEDURE — 83735 ASSAY OF MAGNESIUM: CPT | Performed by: STUDENT IN AN ORGANIZED HEALTH CARE EDUCATION/TRAINING PROGRAM

## 2025-02-26 PROCEDURE — 94760 N-INVAS EAR/PLS OXIMETRY 1: CPT

## 2025-02-26 PROCEDURE — 99232 SBSQ HOSP IP/OBS MODERATE 35: CPT | Performed by: INTERNAL MEDICINE

## 2025-02-26 PROCEDURE — 80053 COMPREHEN METABOLIC PANEL: CPT | Performed by: STUDENT IN AN ORGANIZED HEALTH CARE EDUCATION/TRAINING PROGRAM

## 2025-02-26 PROCEDURE — 85027 COMPLETE CBC AUTOMATED: CPT | Performed by: STUDENT IN AN ORGANIZED HEALTH CARE EDUCATION/TRAINING PROGRAM

## 2025-02-26 PROCEDURE — 94640 AIRWAY INHALATION TREATMENT: CPT

## 2025-02-26 PROCEDURE — 99222 1ST HOSP IP/OBS MODERATE 55: CPT | Performed by: INTERNAL MEDICINE

## 2025-02-26 RX ORDER — SENNOSIDES 8.6 MG
1 TABLET ORAL 2 TIMES DAILY
Status: DISCONTINUED | OUTPATIENT
Start: 2025-02-26 | End: 2025-02-27 | Stop reason: HOSPADM

## 2025-02-26 RX ORDER — BISACODYL 10 MG
10 SUPPOSITORY, RECTAL RECTAL DAILY PRN
Status: DISCONTINUED | OUTPATIENT
Start: 2025-02-26 | End: 2025-02-27 | Stop reason: HOSPADM

## 2025-02-26 RX ORDER — PANTOPRAZOLE SODIUM 40 MG/10ML
40 INJECTION, POWDER, LYOPHILIZED, FOR SOLUTION INTRAVENOUS EVERY 12 HOURS SCHEDULED
Status: DISCONTINUED | OUTPATIENT
Start: 2025-02-26 | End: 2025-02-27 | Stop reason: HOSPADM

## 2025-02-26 RX ORDER — MAGNESIUM CARB/ALUMINUM HYDROX 105-160MG
296 TABLET,CHEWABLE ORAL ONCE
Status: COMPLETED | OUTPATIENT
Start: 2025-02-26 | End: 2025-02-26

## 2025-02-26 RX ORDER — LUBIPROSTONE 24 UG/1
24 CAPSULE ORAL 2 TIMES DAILY WITH MEALS
Status: DISCONTINUED | OUTPATIENT
Start: 2025-02-26 | End: 2025-02-26

## 2025-02-26 RX ORDER — POLYETHYLENE GLYCOL 3350 17 G/17G
17 POWDER, FOR SOLUTION ORAL DAILY
Status: DISCONTINUED | OUTPATIENT
Start: 2025-02-27 | End: 2025-02-27 | Stop reason: HOSPADM

## 2025-02-26 RX ORDER — ONDANSETRON 2 MG/ML
4 INJECTION INTRAMUSCULAR; INTRAVENOUS ONCE
Status: COMPLETED | OUTPATIENT
Start: 2025-02-26 | End: 2025-02-26

## 2025-02-26 RX ORDER — LACTULOSE 10 G/15ML
10 SOLUTION ORAL 2 TIMES DAILY
Status: DISCONTINUED | OUTPATIENT
Start: 2025-02-26 | End: 2025-02-27 | Stop reason: HOSPADM

## 2025-02-26 RX ADMIN — IPRATROPIUM BROMIDE AND ALBUTEROL SULFATE 3 ML: .5; 3 SOLUTION RESPIRATORY (INHALATION) at 19:54

## 2025-02-26 RX ADMIN — BUDESONIDE AND FORMOTEROL FUMARATE DIHYDRATE 2 PUFF: 160; 4.5 AEROSOL RESPIRATORY (INHALATION) at 09:42

## 2025-02-26 RX ADMIN — TIMOLOL MALEATE 1 DROP: 2.5 SOLUTION/ DROPS OPHTHALMIC at 17:16

## 2025-02-26 RX ADMIN — HEPARIN SODIUM 5000 UNITS: 5000 INJECTION INTRAVENOUS; SUBCUTANEOUS at 15:16

## 2025-02-26 RX ADMIN — BRIMONIDINE TARTRATE 1 DROP: 2 SOLUTION/ DROPS OPHTHALMIC at 09:42

## 2025-02-26 RX ADMIN — SODIUM CHLORIDE 75 ML/HR: 0.9 INJECTION, SOLUTION INTRAVENOUS at 10:27

## 2025-02-26 RX ADMIN — POLYETHYLENE GLYCOL 3350 17 G: 17 POWDER, FOR SOLUTION ORAL at 09:42

## 2025-02-26 RX ADMIN — LEVOTHYROXINE SODIUM 75 MCG: 75 TABLET ORAL at 05:43

## 2025-02-26 RX ADMIN — AMLODIPINE BESYLATE 5 MG: 5 TABLET ORAL at 09:41

## 2025-02-26 RX ADMIN — DICLOFENAC SODIUM 2 G: 10 GEL TOPICAL at 09:42

## 2025-02-26 RX ADMIN — Medication 1000 UNITS: at 09:42

## 2025-02-26 RX ADMIN — MONTELUKAST 10 MG: 10 TABLET, FILM COATED ORAL at 21:38

## 2025-02-26 RX ADMIN — PANTOPRAZOLE SODIUM 40 MG: 40 TABLET, DELAYED RELEASE ORAL at 05:43

## 2025-02-26 RX ADMIN — ONDANSETRON 4 MG: 2 INJECTION INTRAMUSCULAR; INTRAVENOUS at 10:44

## 2025-02-26 RX ADMIN — PANTOPRAZOLE SODIUM 40 MG: 40 INJECTION, POWDER, LYOPHILIZED, FOR SOLUTION INTRAVENOUS at 21:38

## 2025-02-26 RX ADMIN — DICLOFENAC SODIUM 2 G: 10 GEL TOPICAL at 17:16

## 2025-02-26 RX ADMIN — LACTULOSE 10 G: 20 SOLUTION ORAL at 17:14

## 2025-02-26 RX ADMIN — IPRATROPIUM BROMIDE AND ALBUTEROL SULFATE 3 ML: .5; 3 SOLUTION RESPIRATORY (INHALATION) at 14:00

## 2025-02-26 RX ADMIN — MAGNESIUM CITRATE 296 ML: 1.75 LIQUID ORAL at 15:16

## 2025-02-26 RX ADMIN — ATORVASTATIN CALCIUM 40 MG: 40 TABLET, FILM COATED ORAL at 17:15

## 2025-02-26 RX ADMIN — SERTRALINE HYDROCHLORIDE 25 MG: 25 TABLET ORAL at 21:38

## 2025-02-26 RX ADMIN — SENNOSIDES 8.6 MG: 8.6 TABLET, FILM COATED ORAL at 17:15

## 2025-02-26 RX ADMIN — IPRATROPIUM BROMIDE AND ALBUTEROL SULFATE 3 ML: .5; 3 SOLUTION RESPIRATORY (INHALATION) at 07:15

## 2025-02-26 RX ADMIN — FAMOTIDINE 20 MG: 20 TABLET, FILM COATED ORAL at 21:38

## 2025-02-26 RX ADMIN — HEPARIN SODIUM 5000 UNITS: 5000 INJECTION INTRAVENOUS; SUBCUTANEOUS at 05:43

## 2025-02-26 RX ADMIN — BRIMONIDINE TARTRATE 1 DROP: 2 SOLUTION/ DROPS OPHTHALMIC at 15:16

## 2025-02-26 RX ADMIN — BUDESONIDE AND FORMOTEROL FUMARATE DIHYDRATE 2 PUFF: 160; 4.5 AEROSOL RESPIRATORY (INHALATION) at 17:16

## 2025-02-26 RX ADMIN — DICLOFENAC SODIUM 2 G: 10 GEL TOPICAL at 21:47

## 2025-02-26 RX ADMIN — CYANOCOBALAMIN TAB 500 MCG 500 MCG: 500 TAB at 09:41

## 2025-02-26 RX ADMIN — TIMOLOL MALEATE 1 DROP: 2.5 SOLUTION/ DROPS OPHTHALMIC at 09:42

## 2025-02-26 RX ADMIN — HEPARIN SODIUM 5000 UNITS: 5000 INJECTION INTRAVENOUS; SUBCUTANEOUS at 21:38

## 2025-02-26 RX ADMIN — BRIMONIDINE TARTRATE 1 DROP: 2 SOLUTION/ DROPS OPHTHALMIC at 21:47

## 2025-02-26 NOTE — ASSESSMENT & PLAN NOTE
-Hemoglobin stable 10.4 up to 10.6 and at baseline  -Likely second to chronic kidney disease  -Follow hemoglobin transfuse as needed

## 2025-02-26 NOTE — ASSESSMENT & PLAN NOTE
-EGD March 2024 for atypical chest pain was normal.  Distal esophageal biopsy negative for [place]'s and any eosinophilic process  - Ultrasound 2/24/25 of the right upper quadrant shows gallbladder sludge but no cholelithiasis or evidence of cholecystitis.  - Will speak to my attending about possible EGD on Thursday 2/27/25

## 2025-02-26 NOTE — CONSULTS
Consultation - Gastroenterology   Name: Noreen Alvarez 69 y.o. female I MRN: [id number]  Unit/Bed#: W -01 I Date of Admission: 2/23/2025   Date of Service: 2/26/2025 I Hospital Day: 2   Inpatient consult to gastroenterology  Consult performed by: Lico Humphrey PA-C  Consult ordered by: Zainab Negro MD          Physician Requesting Evaluation: Zainab Negro MD   Reason for Evaluation / Principal Problem: Epigastric pain        Interpretation was done by her daughter over the phone.   I offered the Ipad , but patient was comfortable using her daughter.    Assessment & Plan  Epigastric pain  -EGD March 2024 for atypical chest pain was normal.  Distal esophageal biopsy negative for Toledo's and any eosinophilic process  - Ultrasound 2/24/25 of the right upper quadrant shows gallbladder sludge but no cholelithiasis or evidence of cholecystitis.  - Will speak to my attending about possible EGD on Thursday 2/27/25  Chronic constipation  -Current bowel regimen MiraLAX 17 g twice daily  - get xray to assess stool burden  - will change to Miralax 17g once daily and Amitiza 24mcg BID  Anemia  -Hemoglobin stable 10.4 up to 10.6 and at baseline  -Likely second to chronic kidney disease  -Follow hemoglobin transfuse as needed  History of adenomatous polyp of colon  -Last colonoscopy in April 2024 with adequate bowel prep and 2 tubular adenomatous polyps removed  -Repeat surveillance colonoscopy suggested April 2029  Elevated alkaline phosphatase level  - currently 109.   Elevated in the past  - obtain isoenzymes        History of Present Illness   HPI:  Noreen Alvarez is a 69 y.o. female with past medical history of seasonal allergies, anemia, laryngeal cancer status post chemo and radiation 2016, cataract, chronic kidney disease stage III, COPD, depression, hypothyroidism, GERD, hypertension, hyperlipidemia who presented to the emergency room on Sunday, February 23, 2025 with shortness of breath  epigastric burning pain and right upper quadrant pain over the previous 3 days.  She was admitted for acute on chronic respiratory failure.  GI being consulted for persistent epigastric pain.    She had an EGD and March 2024 for atypical chest pain showing 2 cm hiatal hernia and otherwise normal.  Distal esophageal biopsy negative for Toledo's esophagus and no eosinophilic process.  Colonoscopy in April 2024 for cancer screening with adequate bowel prep 2 polyps removed which were tubular adenoma on pathology.  Ultrasound of the right upper quadrant shows gallbladder sludge but no cholelithiasis or evidence of cholecystitis.    Pain is epigastric over past few months.   Increased with eating.   Positive nausea post prandially.   Positive constipation.    Vital signs stable.  Hemoglobin stable at 10 and at baseline.  BUN and creatinine stable and at baseline.  Liver function test normal except for mildly elevated alkaline phosphatase at 109.    Review of Systems   Constitutional:  Negative for activity change, appetite change, chills, diaphoresis, fatigue and unexpected weight change.   HENT:  Negative for mouth sores, sore throat and trouble swallowing.    Eyes:  Negative for pain, redness and visual disturbance.   Respiratory:  Negative for apnea, chest tightness and shortness of breath.    Cardiovascular:  Negative for chest pain and leg swelling.   Gastrointestinal:  Negative for abdominal distention, abdominal pain, anal bleeding, blood in stool, constipation, diarrhea, nausea and vomiting.   Genitourinary:  Negative for difficulty urinating, dysuria and hematuria.   Musculoskeletal:  Negative for arthralgias, back pain, gait problem, joint swelling and myalgias.   Skin:  Negative for color change, pallor and rash.   Allergic/Immunologic: Negative for environmental allergies and food allergies.   Neurological:  Negative for dizziness, weakness, light-headedness, numbness and headaches.   Psychiatric/Behavioral:   Negative for agitation and behavioral problems.      Historical Information   Past Medical History:   Diagnosis Date    Acute kidney failure (HCC)     Allergic     Allergies     Anemia     Anxiety     Asthma     Cancer (HCC)     Cataract     Chronic kidney disease (CKD), stage III (moderate) (HCC)     COPD (chronic obstructive pulmonary disease) (HCC)     COVID-19     Covid + 9-2-86-cough at that time now fully resolved    Depression     Disease of thyroid gland     Essential hypertension     GERD (gastroesophageal reflux disease)     Glaucoma     High blood pressure     HTN (hypertension) 2021    Hyperlipidemia     Hypothyroidism     Memory loss     Mesenteric lymphadenopathy 2021    Pulmonary hypertension (HCC)     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     NH TENDON SHEATH INCISION Right 2021    Procedure: THUMB TRIGGER FINGER RELEASE;  Surgeon: Angeles Denton MD;  Location: [facility];  Service: Orthopedics    TUBAL LIGATION       Social History     Tobacco Use    Smoking status: Former     Current packs/day: 0.00     Average packs/day: 1 pack/day for 40.0 years (40.0 ttl pk-yrs)     Types: Cigarettes     Start date: 1974     Quit date: 2014     Years since quittin.1    Smokeless tobacco: Never   Vaping Use    Vaping status: Never Used   Substance and Sexual Activity    Alcohol use: Never     Comment: None    Drug use: Never     Comment: Denies    Sexual activity: Not Currently     Partners: Female     Birth control/protection: Surgical     E-Cigarette/Vaping    E-Cigarette Use Never User      E-Cigarette/Vaping Substances    Nicotine No     THC No     CBD No        Social History     Tobacco Use    Smoking status:  Former     Current packs/day: 0.00     Average packs/day: 1 pack/day for 40.0 years (40.0 ttl pk-yrs)     Types: Cigarettes     Start date: 1974     Quit date: 2014     Years since quittin.1    Smokeless tobacco: Never   Vaping Use    Vaping status: Never Used   Substance and Sexual Activity    Alcohol use: Never     Comment: None    Drug use: Never     Comment: Denies    Sexual activity: Not Currently     Partners: Female     Birth control/protection: Surgical       Current Facility-Administered Medications:     acetaminophen (TYLENOL) tablet 650 mg, Q6H PRN    albuterol inhalation solution 2.5 mg, Q6H PRN    aluminum-magnesium hydroxide-simethicone (MAALOX) oral suspension 30 mL, Q6H PRN    amLODIPine (NORVASC) tablet 5 mg, Daily    atorvastatin (LIPITOR) tablet 40 mg, Daily With Dinner    brimonidine tartrate 0.2 % ophthalmic solution 1 drop, TID    budesonide-formoterol (SYMBICORT) 160-4.5 mcg/act inhaler 2 puff, BID    Cholecalciferol (VITAMIN D3) tablet 1,000 Units, Daily    cyanocobalamin (VITAMIN B-12) tablet 500 mcg, Daily    Diclofenac Sodium (VOLTAREN) 1 % topical gel 2 g, 4x Daily    famotidine (PEPCID) tablet 20 mg, HS    heparin (porcine) subcutaneous injection 5,000 Units, Q8H ESTEVAN    hydrALAZINE (APRESOLINE) injection 5 mg, Q6H PRN    ipratropium-albuterol (DUO-NEB) 0.5-2.5 mg/3 mL inhalation solution 3 mL, TID    levothyroxine tablet 75 mcg, Early Morning    montelukast (SINGULAIR) tablet 10 mg, HS    pantoprazole (PROTONIX) EC tablet 40 mg, BID AC    polyethylene glycol (MIRALAX) packet 17 g, BID    sertraline (ZOLOFT) tablet 25 mg, HS    sodium chloride 0.9 % infusion, Continuous, Last Rate: 75 mL/hr (25)    timolol (TIMOPTIC) 0.25 % ophthalmic solution 1 drop, BID  Prior to Admission Medications   Prescriptions Last Dose Informant Patient Reported? Taking?   Budeson-Glycopyrrol-Formoterol (Breztri Aerosphere) 160-9-4.8 MCG/ACT AERO   No No   Sig: Inhale 2 puffs 2 (two)  times a day Rinse mouth after use.   Cholecalciferol (VITAMIN D3) 1,000 units tablet   Yes No   Sig: Take 1,000 Units by mouth daily   Diclofenac Sodium (VOLTAREN) 1 %   No No   Sig: Apply 2 g topically 4 (four) times a day   acetaminophen (TYLENOL) 325 mg tablet   No No   Sig: Take 2 tablets (650 mg total) by mouth every 6 (six) hours as needed for mild pain, moderate pain, headaches or fever   albuterol (2.5 mg/3 mL) 0.083 % nebulizer solution   No No   Sig: Take 3 mL (2.5 mg total) by nebulization every 6 (six) hours as needed for wheezing or shortness of breath   albuterol (PROVENTIL HFA,VENTOLIN HFA) 90 mcg/act inhaler   No No   Sig: Inhale 2 puffs every 6 (six) hours as needed for wheezing or shortness of breath   amLODIPine (NORVASC) 5 mg tablet   No No   Sig: TAKE 1 TABLET (5 MG TOTAL) BY MOUTH DAILY.   atorvastatin (LIPITOR) 40 mg tablet   No No   Sig: Take 1 tablet (40 mg total) by mouth daily with dinner   azithromycin (ZITHROMAX) 250 mg tablet   Yes No   Sig: TAKE 1 TABLET (250 MG TOTAL) BY MOUTH 3 (THREE) TIMES A WEEK FOR 36 DOSES   benzonatate (TESSALON) 200 MG capsule   No No   Sig: Take 1 capsule (200 mg total) by mouth 3 (three) times a day as needed for cough   brimonidine tartrate 0.2 % ophthalmic solution   Yes No   Sig: Administer 1 drop to both eyes 3 (three) times a day   clindamycin 1 % gel   No No   Sig: Apply topically 2 (two) times a day   cyanocobalamin (VITAMIN B-12) 500 MCG tablet   No No   Sig: Take 1 tablet (500 mcg total) by mouth daily   fluticasone (FLONASE) 50 mcg/act nasal spray   No No   Sig: SPRAY 1 SPRAY INTO EACH NOSTRIL EVERY DAY   ipratropium (ATROVENT) 0.06 % nasal spray   No No   Si SPRAYS INTO EACH NOSTRIL 3 (THREE) TIMES A DAY FOR INCREASED NASAL SECRETION   ipratropium-albuterol (DUO-NEB) 0.5-2.5 mg/3 mL nebulizer solution   Yes No   Sig: TAKE 3 ML BY NEBULIZATION EVERY 8 HOURS AS NEEDED FOR WHEEZING OR SHORTNESS OF BREATH   levothyroxine 75 mcg tablet   No No    Sig: Take 1 tablet (75 mcg total) by mouth daily in the early morning   montelukast (SINGULAIR) 10 mg tablet   No No   Sig: TAKE 1 TABLET BY MOUTH DAILY AT BEDTIME   pantoprazole (PROTONIX) 40 mg tablet   No No   Sig: TAKE 1 TABLET BY MOUTH EVERY DAY   sertraline (ZOLOFT) 25 mg tablet   No No   Sig: Take 1 tablet (25 mg total) by mouth daily at bedtime   tiZANidine (ZANAFLEX) 2 mg tablet   No No   Sig: TAKE 1 TABLET BY MOUTH AT BEDTIME AS NEEDED FOR MUSCLE SPASMS OR BACK PAIN   timolol (TIMOPTIC) 0.5 % ophthalmic solution   Yes No   Sig: INSTILL ONE DROP TO BOTH EYES TWICE A DAY      Facility-Administered Medications: None     Cefdinir, Amifostine, and Pollen extract    Objective :  Temp:  [98.1 °F (36.7 °C)-98.7 °F (37.1 °C)] 98.1 °F (36.7 °C)  HR:  [68-82] 75  BP: (142-146)/(71) 142/71  Resp:  [16-18] 18  SpO2:  [97 %-98 %] 97 %  O2 Device: Nasal cannula  Nasal Cannula O2 Flow Rate (L/min):  [2 L/min] 2 L/min    Physical Exam  Vitals reviewed.   Constitutional:       General: She is not in acute distress.     Appearance: Normal appearance. She is not ill-appearing.   HENT:      Head: Normocephalic and atraumatic.   Eyes:      General: No scleral icterus.     Conjunctiva/sclera: Conjunctivae normal.   Cardiovascular:      Rate and Rhythm: Normal rate and regular rhythm.   Pulmonary:      Effort: Pulmonary effort is normal. No respiratory distress.      Breath sounds: Normal breath sounds.   Abdominal:      General: Bowel sounds are normal. There is no distension.      Palpations: Abdomen is soft.      Tenderness: There is abdominal tenderness (epigastric TTP). There is no guarding.   Skin:     General: Skin is warm and dry.   Neurological:      Mental Status: She is alert and oriented to person, place, and time.   Psychiatric:         Mood and Affect: Mood normal.         Behavior: Behavior normal.           Lab Results: I have reviewed the following results:CBC/BMP:   .     02/26/25  0523   WBC 6.06   HGB 10.6*    HCT 35.0      SODIUM 140   K 4.5   CL 98   CO2 37*   BUN 29*   CREATININE 1.28   GLUC 92   MG 2.1   PHOS 4.0    , LFTs:   .     02/26/25  0523   AST 16   ALT 7   ALB 4.1   TBILI 0.44   ALKPHOS 109*    , PTT/INR:No new results in last 24 hours.     Imaging Results Review: I reviewed radiology reports from this admission including: chest xray and Ultrasound(s).  Other Study Results Review: No additional pertinent studies reviewed.        Lico Humphrey PA-C  [facility] - Gastroentrology

## 2025-02-26 NOTE — ASSESSMENT & PLAN NOTE
Elevated currently, possibly in setting of pain    Plan  Continue home amlodipine  Add prn hydralazine   /62

## 2025-02-26 NOTE — PROGRESS NOTES
Progress Note - Hospitalist   Name: Noreen Alvarez 69 y.o. female I MRN: [id number]  Unit/Bed#: W -01 I Date of Admission: 2/23/2025   Date of Service: 2/26/2025 I Hospital Day: 2    Assessment & Plan  Acute on chronic respiratory failure with hypoxia and hypercapnia (HCC)  Now on room air.   Epigastric pain  Ongoing epigastric pain and right upper quadrant pain.  Worsens with food at times.  Describes as  burning sensation.   EGD 3/2024: 2 cm hiatal hernia.  Biopsies: squamous mucosa with mild reactive changes.  Negative intestinal metaplasia, eosinophilic esophagitis, dysplasia, malignancy.  Troponin x 1 normal   EKG: NSR, nonspecific T wave abnormality.     Plan  Discussed with GI   Plan for EGD     Centrilobular emphysema (HCC)  Reports shortness of breath x 3 days though this sounds like it occurs simultaneously when she develops epigastric/ruq pain.  Does not appear to be in acute exacerbation.     Plan  Continue home medications  Given prednisone x 1 in ED, hold further.  History of laryngeal cancer  History of head and neck cancer in 2016 treated with radiation and chemotherapy.  Op follow up with ent  Multiple lung nodules  8/2024 PET CT   1.5 cm right lower lobe ground glass nodule not FDG avid.  Note that low-grade adenocarcinoma/adenocarcinoma spectrum lesions may not demonstrate significant FDG uptake.   Focal FDG uptake right mid lung corresponds to new area of nodule consolidation on CT.  Probably post infectious or inflammatory.  Initially referred to thoracic surgery for biopsy of the lung nodule but due to solid component of <4 mm, it is under observation.  9/2024 Chest CT:   Stable right lower lobe groundglass nodule 1.6 x 1 cm  Stable right lower lobe groundglass nodule 1.5 x 1.1 cm  Resolution of previously seen posterior right lower lobe tree in bud nodularity and right basilar nodular opacity  Stable left lower lobe nodule 1.2 x 1.1 cm    Plan  Op follow up with prabhu, next in April  HTN  "(hypertension)  Elevated currently, possibly in setting of pain    Plan  Continue home amlodipine  Add prn hydralazine   /62  Chronic constipation  Constipation x 3 days. Only having small BM per daughter.     Plan  Will give more aggressive bowel regimen .  Anemia  Hgb stable in the 10s.  Similar to prior labs.   Does not tolerate oral iron supplements due to constipation and abdominal pain  Ferritin is 32  May benefit from venofer   Need to follow up on cancer screening   Elevated alkaline phosphatase level  GI are consulted  History of adenomatous polyp of colon  GI are consulted and will see this am     VTE Pharmacologic Prophylaxis: VTE Score: 4 Moderate Risk (Score 3-4) - Pharmacological DVT Prophylaxis Ordered: heparin.    Mobility:   Basic Mobility Inpatient Raw Score: 24  JH-HLM Goal: 8: Walk 250 feet or more  JH-HLM Achieved: 8: Walk 250 feet ot more  JH-HLM Goal achieved. Continue to encourage appropriate mobility.    Patient Centered Rounds: I performed bedside rounds with nursing staff today.   Discussions with Specialists or Other Care Team Provider: Discussed with GI.     Education and Discussions with Family / Patient:  will update family . .     Current Length of Stay: 2 day(s)  Current Patient Status: Inpatient   Certification Statement: The patient will continue to require additional inpatient hospital stay due to ongoing epigastric pain   Discharge Plan: Anticipate discharge in 24-48 hrs to home.    Code Status: Level 1 - Full Code    Subjective   Patient seen and examined   Feeling \"okay \"  Still with epigastric pain and now with nausea    Objective :  Temp:  [98.1 °F (36.7 °C)-98.6 °F (37 °C)] 98.6 °F (37 °C)  HR:  [69-82] 69  BP: (125-146)/(62-71) 125/62  Resp:  [16-18] 16  SpO2:  [96 %-98 %] 98 %  O2 Device: Nasal cannula  Nasal Cannula O2 Flow Rate (L/min):  [2 L/min] 2 L/min    Body mass index is 30.38 kg/m².     Input and Output Summary (last 24 hours):     Intake/Output Summary " (Last 24 hours) at 2/26/2025 1701  Last data filed at 2/26/2025 1249  Gross per 24 hour   Intake 960 ml   Output --   Net 960 ml       Physical Exam  Constitutional:       General: She is not in acute distress.     Appearance: She is not ill-appearing, toxic-appearing or diaphoretic.   HENT:      Head: Normocephalic.   Eyes:      Pupils: Pupils are equal, round, and reactive to light.   Cardiovascular:      Rate and Rhythm: Normal rate.      Heart sounds: No murmur heard.     No friction rub. No gallop.   Pulmonary:      Effort: No respiratory distress.      Breath sounds: No stridor. No wheezing, rhonchi or rales.   Chest:      Chest wall: No tenderness.   Abdominal:      Tenderness: There is abdominal tenderness.   Musculoskeletal:      Right lower leg: No edema.      Left lower leg: No edema.   Skin:     Coloration: Skin is not jaundiced or pale.      Findings: No bruising, erythema, lesion or rash.   Neurological:      General: No focal deficit present.      Mental Status: She is alert.      Cranial Nerves: No cranial nerve deficit.      Sensory: No sensory deficit.      Motor: No weakness.      Coordination: Coordination normal.      Gait: Gait normal.      Deep Tendon Reflexes: Reflexes normal.   Psychiatric:         Mood and Affect: Mood normal.           Lines/Drains:              Lab Results: I have reviewed the following results:   Results from last 7 days   Lab Units 02/26/25  0523 02/25/25  0737 02/24/25  0431   WBC Thousand/uL 6.06   < > 5.67   HEMOGLOBIN g/dL 10.6*   < > 10.0*   HEMATOCRIT % 35.0   < > 33.4*   PLATELETS Thousands/uL 165   < > 181   SEGS PCT %  --   --  88*   LYMPHO PCT %  --   --  9*   MONO PCT %  --   --  2*   EOS PCT %  --   --  0    < > = values in this interval not displayed.     Results from last 7 days   Lab Units 02/26/25  0523   SODIUM mmol/L 140   POTASSIUM mmol/L 4.5   CHLORIDE mmol/L 98   CO2 mmol/L 37*   BUN mg/dL 29*   CREATININE mg/dL 1.28   ANION GAP mmol/L 5   CALCIUM  mg/dL 9.1   ALBUMIN g/dL 4.1   TOTAL BILIRUBIN mg/dL 0.44   ALK PHOS U/L 109*   ALT U/L 7   AST U/L 16   GLUCOSE RANDOM mg/dL 92                       Recent Cultures (last 7 days):         Imaging Results Review: No pertinent imaging studies reviewed.  Other Study Results Review: No additional pertinent studies reviewed.    Last 24 Hours Medication List:     Current Facility-Administered Medications:     acetaminophen (TYLENOL) tablet 650 mg, Q6H PRN    albuterol inhalation solution 2.5 mg, Q6H PRN    aluminum-magnesium hydroxide-simethicone (MAALOX) oral suspension 30 mL, Q6H PRN    amLODIPine (NORVASC) tablet 5 mg, Daily    atorvastatin (LIPITOR) tablet 40 mg, Daily With Dinner    bisacodyl (DULCOLAX) rectal suppository 10 mg, Daily PRN    brimonidine tartrate 0.2 % ophthalmic solution 1 drop, TID    budesonide-formoterol (SYMBICORT) 160-4.5 mcg/act inhaler 2 puff, BID    Cholecalciferol (VITAMIN D3) tablet 1,000 Units, Daily    cyanocobalamin (VITAMIN B-12) tablet 500 mcg, Daily    Diclofenac Sodium (VOLTAREN) 1 % topical gel 2 g, 4x Daily    famotidine (PEPCID) tablet 20 mg, HS    heparin (porcine) subcutaneous injection 5,000 Units, Q8H ESTEVAN    hydrALAZINE (APRESOLINE) injection 5 mg, Q6H PRN    ipratropium-albuterol (DUO-NEB) 0.5-2.5 mg/3 mL inhalation solution 3 mL, TID    lactulose (CHRONULAC) oral solution 10 g, BID    levothyroxine tablet 75 mcg, Early Morning    montelukast (SINGULAIR) tablet 10 mg, HS    pantoprazole (PROTONIX) injection 40 mg, Q12H ESTEVAN    [START ON 2/27/2025] polyethylene glycol (MIRALAX) packet 17 g, Daily    senna (SENOKOT) tablet 8.6 mg, BID    sertraline (ZOLOFT) tablet 25 mg, HS    sodium chloride 0.9 % infusion, Continuous, Last Rate: 75 mL/hr (02/26/25 1027)    timolol (TIMOPTIC) 0.25 % ophthalmic solution 1 drop, BID    Administrative Statements   Today, Patient Was Seen By: Zainab Negro MD  I have spent a total time of 30 minutes in caring for this patient on the day of the  visit/encounter including Diagnostic results and Risks and benefits of tx options.    **Please Note: This note may have been constructed using a voice recognition system.**

## 2025-02-26 NOTE — ASSESSMENT & PLAN NOTE
-Current bowel regimen MiraLAX 17 g twice daily  - get xray to assess stool burden  - will change to Miralax 17g once daily and Amitiza 24mcg BID

## 2025-02-26 NOTE — ASSESSMENT & PLAN NOTE
-Last colonoscopy in April 2024 with adequate bowel prep and 2 tubular adenomatous polyps removed  -Repeat surveillance colonoscopy suggested April 2029

## 2025-02-26 NOTE — ASSESSMENT & PLAN NOTE
Ongoing epigastric pain and right upper quadrant pain.  Worsens with food at times.  Describes as  burning sensation.   EGD 3/2024: 2 cm hiatal hernia.  Biopsies: squamous mucosa with mild reactive changes.  Negative intestinal metaplasia, eosinophilic esophagitis, dysplasia, malignancy.  Troponin x 1 normal   EKG: NSR, nonspecific T wave abnormality.     Plan  Discussed with GI   Plan for EGD

## 2025-02-26 NOTE — PLAN OF CARE
Problem: PAIN - ADULT  Goal: Verbalizes/displays adequate comfort level or baseline comfort level  Description: Interventions:  - Encourage patient to monitor pain and request assistance  - Assess pain using appropriate pain scale  - Administer analgesics based on type and severity of pain and evaluate response  - Implement non-pharmacological measures as appropriate and evaluate response  - Consider cultural and social influences on pain and pain management  - Notify physician/advanced practitioner if interventions unsuccessful or patient reports new pain  Outcome: Progressing     Problem: INFECTION - ADULT  Goal: Absence or prevention of progression during hospitalization  Description: INTERVENTIONS:  - Assess and monitor for signs and symptoms of infection  - Monitor lab/diagnostic results  - Monitor all insertion sites, i.e. indwelling lines, tubes, and drains  - Monitor endotracheal if appropriate and nasal secretions for changes in amount and color  - [place] appropriate cooling/warming therapies per order  - Administer medications as ordered  - Instruct and encourage patient and family to use good hand hygiene technique  - Identify and instruct in appropriate isolation precautions for identified infection/condition  Outcome: Progressing  Goal: Absence of fever/infection during neutropenic period  Description: INTERVENTIONS:  - Monitor WBC    Outcome: Progressing     Problem: DISCHARGE PLANNING  Goal: Discharge to home or other facility with appropriate resources  Description: INTERVENTIONS:  - Identify barriers to discharge w/patient and caregiver  - Arrange for needed discharge resources and transportation as appropriate  - Identify discharge learning needs (meds, wound care, etc.)  - Arrange for interpretive services to assist at discharge as needed  - Refer to Case Management Department for coordinating discharge planning if the patient needs post-hospital services based on physician/advanced  practitioner order or complex needs related to functional status, cognitive ability, or social support system  Outcome: Progressing     Problem: Knowledge Deficit  Goal: Patient/family/caregiver demonstrates understanding of disease process, treatment plan, medications, and discharge instructions  Description: Complete learning assessment and assess knowledge base.  Interventions:  - Provide teaching at level of understanding  - Provide teaching via preferred learning methods  Outcome: Progressing

## 2025-02-27 VITALS
HEIGHT: 64 IN | SYSTOLIC BLOOD PRESSURE: 149 MMHG | OXYGEN SATURATION: 93 % | BODY MASS INDEX: 30.22 KG/M2 | WEIGHT: 177 LBS | HEART RATE: 79 BPM | TEMPERATURE: 98.1 F | DIASTOLIC BLOOD PRESSURE: 64 MMHG | RESPIRATION RATE: 18 BRPM

## 2025-02-27 LAB
ALBUMIN SERPL BCG-MCNC: 3.9 G/DL (ref 3.5–5)
ALP SERPL-CCNC: 112 U/L (ref 34–104)
ALT SERPL W P-5'-P-CCNC: 7 U/L (ref 7–52)
ANION GAP SERPL CALCULATED.3IONS-SCNC: 3 MMOL/L (ref 4–13)
AST SERPL W P-5'-P-CCNC: 17 U/L (ref 13–39)
BILIRUB SERPL-MCNC: 0.38 MG/DL (ref 0.2–1)
BUN SERPL-MCNC: 21 MG/DL (ref 5–25)
CALCIUM SERPL-MCNC: 8.9 MG/DL (ref 8.4–10.2)
CHLORIDE SERPL-SCNC: 102 MMOL/L (ref 96–108)
CO2 SERPL-SCNC: 35 MMOL/L (ref 21–32)
CREAT SERPL-MCNC: 1.1 MG/DL (ref 0.6–1.3)
ERYTHROCYTE [DISTWIDTH] IN BLOOD BY AUTOMATED COUNT: 14.3 % (ref 11.6–15.1)
GFR SERPL CREATININE-BSD FRML MDRD: 51 ML/MIN/1.73SQ M
GLUCOSE SERPL-MCNC: 88 MG/DL (ref 65–140)
HCT VFR BLD AUTO: 33.7 % (ref 34.8–46.1)
HGB BLD-MCNC: 9.9 G/DL (ref 11.5–15.4)
MAGNESIUM SERPL-MCNC: 2.5 MG/DL (ref 1.9–2.7)
MCH RBC QN AUTO: 26.3 PG (ref 26.8–34.3)
MCHC RBC AUTO-ENTMCNC: 29.4 G/DL (ref 31.4–37.4)
MCV RBC AUTO: 90 FL (ref 82–98)
PHOSPHATE SERPL-MCNC: 3 MG/DL (ref 2.3–4.1)
PLATELET # BLD AUTO: 136 THOUSANDS/UL (ref 149–390)
PMV BLD AUTO: 10.3 FL (ref 8.9–12.7)
POTASSIUM SERPL-SCNC: 4.7 MMOL/L (ref 3.5–5.3)
PROT SERPL-MCNC: 6.4 G/DL (ref 6.4–8.4)
RBC # BLD AUTO: 3.76 MILLION/UL (ref 3.81–5.12)
SODIUM SERPL-SCNC: 140 MMOL/L (ref 135–147)
WBC # BLD AUTO: 4.64 THOUSAND/UL (ref 4.31–10.16)

## 2025-02-27 PROCEDURE — 94760 N-INVAS EAR/PLS OXIMETRY 1: CPT

## 2025-02-27 PROCEDURE — 85027 COMPLETE CBC AUTOMATED: CPT | Performed by: STUDENT IN AN ORGANIZED HEALTH CARE EDUCATION/TRAINING PROGRAM

## 2025-02-27 PROCEDURE — 94640 AIRWAY INHALATION TREATMENT: CPT

## 2025-02-27 PROCEDURE — 99239 HOSP IP/OBS DSCHRG MGMT >30: CPT | Performed by: INTERNAL MEDICINE

## 2025-02-27 PROCEDURE — 83735 ASSAY OF MAGNESIUM: CPT | Performed by: STUDENT IN AN ORGANIZED HEALTH CARE EDUCATION/TRAINING PROGRAM

## 2025-02-27 PROCEDURE — 99232 SBSQ HOSP IP/OBS MODERATE 35: CPT | Performed by: INTERNAL MEDICINE

## 2025-02-27 PROCEDURE — 80053 COMPREHEN METABOLIC PANEL: CPT | Performed by: STUDENT IN AN ORGANIZED HEALTH CARE EDUCATION/TRAINING PROGRAM

## 2025-02-27 PROCEDURE — 84100 ASSAY OF PHOSPHORUS: CPT | Performed by: STUDENT IN AN ORGANIZED HEALTH CARE EDUCATION/TRAINING PROGRAM

## 2025-02-27 PROCEDURE — 84075 ASSAY ALKALINE PHOSPHATASE: CPT | Performed by: PHYSICIAN ASSISTANT

## 2025-02-27 PROCEDURE — 84080 ASSAY ALKALINE PHOSPHATASES: CPT | Performed by: PHYSICIAN ASSISTANT

## 2025-02-27 RX ORDER — OMEPRAZOLE 40 MG/1
40 CAPSULE, DELAYED RELEASE ORAL
Qty: 30 CAPSULE | Refills: 3 | Status: SHIPPED | OUTPATIENT
Start: 2025-02-27 | End: 2025-03-05 | Stop reason: SDUPTHER

## 2025-02-27 RX ORDER — FAMOTIDINE 20 MG/1
20 TABLET, FILM COATED ORAL
Qty: 30 TABLET | Refills: 0 | Status: SHIPPED | OUTPATIENT
Start: 2025-02-27 | End: 2025-03-29

## 2025-02-27 RX ORDER — SENNOSIDES 8.6 MG
8.6 TABLET ORAL 2 TIMES DAILY
Qty: 30 TABLET | Refills: 0 | Status: SHIPPED | OUTPATIENT
Start: 2025-02-27

## 2025-02-27 RX ADMIN — DICLOFENAC SODIUM 2 G: 10 GEL TOPICAL at 13:52

## 2025-02-27 RX ADMIN — PANTOPRAZOLE SODIUM 40 MG: 40 INJECTION, POWDER, LYOPHILIZED, FOR SOLUTION INTRAVENOUS at 08:48

## 2025-02-27 RX ADMIN — CYANOCOBALAMIN TAB 500 MCG 500 MCG: 500 TAB at 08:55

## 2025-02-27 RX ADMIN — SODIUM CHLORIDE 75 ML/HR: 0.9 INJECTION, SOLUTION INTRAVENOUS at 04:59

## 2025-02-27 RX ADMIN — TIMOLOL MALEATE 1 DROP: 2.5 SOLUTION/ DROPS OPHTHALMIC at 09:04

## 2025-02-27 RX ADMIN — HEPARIN SODIUM 5000 UNITS: 5000 INJECTION INTRAVENOUS; SUBCUTANEOUS at 05:00

## 2025-02-27 RX ADMIN — SENNOSIDES 8.6 MG: 8.6 TABLET, FILM COATED ORAL at 08:55

## 2025-02-27 RX ADMIN — LACTULOSE 10 G: 20 SOLUTION ORAL at 08:48

## 2025-02-27 RX ADMIN — LEVOTHYROXINE SODIUM 75 MCG: 75 TABLET ORAL at 05:00

## 2025-02-27 RX ADMIN — Medication 1000 UNITS: at 08:55

## 2025-02-27 RX ADMIN — DICLOFENAC SODIUM 2 G: 10 GEL TOPICAL at 08:58

## 2025-02-27 RX ADMIN — AMLODIPINE BESYLATE 5 MG: 5 TABLET ORAL at 08:54

## 2025-02-27 RX ADMIN — BUDESONIDE AND FORMOTEROL FUMARATE DIHYDRATE 2 PUFF: 160; 4.5 AEROSOL RESPIRATORY (INHALATION) at 08:58

## 2025-02-27 RX ADMIN — BRIMONIDINE TARTRATE 1 DROP: 2 SOLUTION/ DROPS OPHTHALMIC at 08:58

## 2025-02-27 RX ADMIN — IPRATROPIUM BROMIDE AND ALBUTEROL SULFATE 3 ML: .5; 3 SOLUTION RESPIRATORY (INHALATION) at 13:35

## 2025-02-27 RX ADMIN — IPRATROPIUM BROMIDE AND ALBUTEROL SULFATE 3 ML: .5; 3 SOLUTION RESPIRATORY (INHALATION) at 08:27

## 2025-02-27 NOTE — PLAN OF CARE
Problem: PAIN - ADULT  Goal: Verbalizes/displays adequate comfort level or baseline comfort level  Description: Interventions:  - Encourage patient to monitor pain and request assistance  - Assess pain using appropriate pain scale  - Administer analgesics based on type and severity of pain and evaluate response  - Implement non-pharmacological measures as appropriate and evaluate response  - Consider cultural and social influences on pain and pain management  - Notify physician/advanced practitioner if interventions unsuccessful or patient reports new pain  Outcome: Progressing     Problem: INFECTION - ADULT  Goal: Absence or prevention of progression during hospitalization  Description: INTERVENTIONS:  - Assess and monitor for signs and symptoms of infection  - Monitor lab/diagnostic results  - Monitor all insertion sites, i.e. indwelling lines, tubes, and drains  - Monitor endotracheal if appropriate and nasal secretions for changes in amount and color  - [place] appropriate cooling/warming therapies per order  - Administer medications as ordered  - Instruct and encourage patient and family to use good hand hygiene technique  - Identify and instruct in appropriate isolation precautions for identified infection/condition  Outcome: Progressing  Goal: Absence of fever/infection during neutropenic period  Description: INTERVENTIONS:  - Monitor WBC    Outcome: Progressing     Problem: SAFETY ADULT  Goal: Patient will remain free of falls  Description: INTERVENTIONS:  - Educate patient/family on patient safety including physical limitations  - Instruct patient to call for assistance with activity   - Consult OT/PT to assist with strengthening/mobility   - Keep Call bell within reach  - Keep bed low and locked with side rails adjusted as appropriate  - Keep care items and personal belongings within reach  - Initiate and maintain comfort rounds  - Make Fall Risk Sign visible to staff  - Offer Toileting every  Hours,  in advance of need  - Initiate/Maintain alarm  - Obtain necessary fall risk management equipment:   - Apply yellow socks and bracelet for high fall risk patients  - Consider moving patient to room near nurses station  Outcome: Progressing  Goal: Maintain or return to baseline ADL function  Description: INTERVENTIONS:  -  Assess patient's ability to carry out ADLs; assess patient's baseline for ADL function and identify physical deficits which impact ability to perform ADLs (bathing, care of mouth/teeth, toileting, grooming, dressing, etc.)  - Assess/evaluate cause of self-care deficits   - Assess range of motion  - Assess patient's mobility; develop plan if impaired  - Assess patient's need for assistive devices and provide as appropriate  - Encourage maximum independence but intervene and supervise when necessary  - Involve family in performance of ADLs  - Assess for home care needs following discharge   - Consider OT consult to assist with ADL evaluation and planning for discharge  - Provide patient education as appropriate  Outcome: Progressing  Goal: Maintains/Returns to pre admission functional level  Description: INTERVENTIONS:  - Perform AM-PAC 6 Click Basic Mobility/ Daily Activity assessment daily.  - Set and communicate daily mobility goal to care team and patient/family/caregiver.   - Collaborate with rehabilitation services on mobility goals if consulted  - Perform Range of Motion  times a day.  - Reposition patient every  hours.  - Dangle patient  times a day  - Stand patient  times a day  - Ambulate patient times a day  - Out of bed to chair  times a day   - Out of bed for meals times a day  - Out of bed for toileting  - Record patient progress and toleration of activity level   Outcome: Progressing     Problem: DISCHARGE PLANNING  Goal: Discharge to home or other facility with appropriate resources  Description: INTERVENTIONS:  - Identify barriers to discharge w/patient and caregiver  - Arrange for  needed discharge resources and transportation as appropriate  - Identify discharge learning needs (meds, wound care, etc.)  - Arrange for interpretive services to assist at discharge as needed  - Refer to Case Management Department for coordinating discharge planning if the patient needs post-hospital services based on physician/advanced practitioner order or complex needs related to functional status, cognitive ability, or social support system  Outcome: Progressing     Problem: Knowledge Deficit  Goal: Patient/family/caregiver demonstrates understanding of disease process, treatment plan, medications, and discharge instructions  Description: Complete learning assessment and assess knowledge base.  Interventions:  - Provide teaching at level of understanding  - Provide teaching via preferred learning methods  Outcome: Progressing

## 2025-02-27 NOTE — PLAN OF CARE
Problem: PAIN - ADULT  Goal: Verbalizes/displays adequate comfort level or baseline comfort level  Description: Interventions:  - Encourage patient to monitor pain and request assistance  - Assess pain using appropriate pain scale  - Administer analgesics based on type and severity of pain and evaluate response  - Implement non-pharmacological measures as appropriate and evaluate response  - Consider cultural and social influences on pain and pain management  - Notify physician/advanced practitioner if interventions unsuccessful or patient reports new pain  Outcome: Progressing     Problem: INFECTION - ADULT  Goal: Absence or prevention of progression during hospitalization  Description: INTERVENTIONS:  - Assess and monitor for signs and symptoms of infection  - Monitor lab/diagnostic results  - Monitor all insertion sites, i.e. indwelling lines, tubes, and drains  - Monitor endotracheal if appropriate and nasal secretions for changes in amount and color  - [place] appropriate cooling/warming therapies per order  - Administer medications as ordered  - Instruct and encourage patient and family to use good hand hygiene technique  - Identify and instruct in appropriate isolation precautions for identified infection/condition  Outcome: Progressing  Goal: Absence of fever/infection during neutropenic period  Description: INTERVENTIONS:  - Monitor WBC    Outcome: Progressing     Problem: SAFETY ADULT  Goal: Patient will remain free of falls  Description: INTERVENTIONS:  - Educate patient/family on patient safety including physical limitations  - Instruct patient to call for assistance with activity   - Consult OT/PT to assist with strengthening/mobility   - Keep Call bell within reach  - Keep bed low and locked with side rails adjusted as appropriate  - Keep care items and personal belongings within reach  - Initiate and maintain comfort rounds  - Make Fall Risk Sign visible to staff  - Offer Toileting every 2 Hours,  in advance of need  - Initiate/Maintain bed/chair alarm  - Apply yellow socks and bracelet for high fall risk patients  - Consider moving patient to room near nurses station  Outcome: Progressing  Goal: Maintain or return to baseline ADL function  Description: INTERVENTIONS:  -  Assess patient's ability to carry out ADLs; assess patient's baseline for ADL function and identify physical deficits which impact ability to perform ADLs (bathing, care of mouth/teeth, toileting, grooming, dressing, etc.)  - Assess/evaluate cause of self-care deficits   - Assess range of motion  - Assess patient's mobility; develop plan if impaired  - Assess patient's need for assistive devices and provide as appropriate  - Encourage maximum independence but intervene and supervise when necessary  - Involve family in performance of ADLs  - Assess for home care needs following discharge   - Consider OT consult to assist with ADL evaluation and planning for discharge  - Provide patient education as appropriate  Outcome: Progressing  Goal: Maintains/Returns to pre admission functional level  Description: INTERVENTIONS:  - Perform AM-PAC 6 Click Basic Mobility/ Daily Activity assessment daily.  - Set and communicate daily mobility goal to care team and patient/family/caregiver.   - Collaborate with rehabilitation services on mobility goals if consulted  - Perform Range of Motion 3 times a day.  - Stand patient 3 times a day  - Ambulate patient 3 times a day  - Out of bed to chair 3 times a day   - Out of bed for meals 3 times a day  - Out of bed for toileting  - Record patient progress and toleration of activity level   Outcome: Progressing     Problem: DISCHARGE PLANNING  Goal: Discharge to home or other facility with appropriate resources  Description: INTERVENTIONS:  - Identify barriers to discharge w/patient and caregiver  - Arrange for needed discharge resources and transportation as appropriate  - Identify discharge learning needs (meds,  wound care, etc.)  - Arrange for interpretive services to assist at discharge as needed  - Refer to Case Management Department for coordinating discharge planning if the patient needs post-hospital services based on physician/advanced practitioner order or complex needs related to functional status, cognitive ability, or social support system  Outcome: Progressing     Problem: Knowledge Deficit  Goal: Patient/family/caregiver demonstrates understanding of disease process, treatment plan, medications, and discharge instructions  Description: Complete learning assessment and assess knowledge base.  Interventions:  - Provide teaching at level of understanding  - Provide teaching via preferred learning methods  Outcome: Progressing

## 2025-02-27 NOTE — ASSESSMENT & PLAN NOTE
-Hemoglobin stable 10.6 to 9.9 which is in her baseline  -Likely second to chronic kidney disease  -Follow hemoglobin transfuse as needed

## 2025-02-27 NOTE — ASSESSMENT & PLAN NOTE
-X-ray of the abdomen on February 26, 2025 showed moderate stool burden consistent with her constipation  -She got a one-time bottle of mag citrate on February 26, 2025   -Subsequent large bowel movement  -Continue Miralax 17g once daily and Amitiza 24mcg BID

## 2025-02-27 NOTE — PROGRESS NOTES
Progress Note - Gastroenterology   Name: Noreen Alvarez 69 y.o. female I MRN: [id number]  Unit/Bed#: W -01 I Date of Admission: 2/23/2025   Date of Service: 2/27/2025 I Hospital Day: 3        Interpretation accomplished today with her son  Assessment & Plan  Epigastric pain  -EGD March 2024 for atypical chest pain was normal.  Distal esophageal biopsy negative for Toledo's and any eosinophilic process  - Ultrasound 2/24/25 of the right upper quadrant shows gallbladder sludge but no cholelithiasis or evidence of cholecystitis.  -Can consider surgical consultation electively with regard to right upper quadrant discomfort and gallbladder sludge.  -Hold off on EGD at this time due to resolution of pain  Chronic constipation  -X-ray of the abdomen on February 26, 2025 showed moderate stool burden consistent with her constipation  -She got a one-time bottle of mag citrate on February 26, 2025   -Subsequent large bowel movement  -Continue Miralax 17g once daily and Amitiza 24mcg BID  Anemia  -Hemoglobin stable 10.6 to 9.9 which is in her baseline  -Likely second to chronic kidney disease  -Follow hemoglobin transfuse as needed  History of adenomatous polyp of colon  -Last colonoscopy in April 2024 with adequate bowel prep and 2 tubular adenomatous polyps removed  -Repeat surveillance colonoscopy suggested April 2029  Elevated alkaline phosphatase level  - currently 109.   Elevated in the past  - obtain isoenzymes (in process)    GI will sign off.   Plan to follow up as outpatient with regard to constipation and any need for EGD        Subjective   X-ray of the abdomen showed moderate stool burden consistent with her constipation.  She received a one-time bottle of mag citrate.  As of yesterday patient had a large bowel movement and began feeling better with resolution of her pain.    Objective :  Temp:  [98 °F (36.7 °C)-98.6 °F (37 °C)] 98.1 °F (36.7 °C)  HR:  [68-79] 79  BP: (125-149)/(62-64) 149/64  Resp:  [16-18]  18  SpO2:  [92 %-99 %] 99 %  O2 Device: Nasal cannula  Nasal Cannula O2 Flow Rate (L/min):  [2 L/min] 2 L/min    Physical Exam  Vitals reviewed.   Constitutional:       General: She is not in acute distress.     Appearance: Normal appearance. She is not ill-appearing.   HENT:      Head: Normocephalic and atraumatic.   Eyes:      General: No scleral icterus.     Conjunctiva/sclera: Conjunctivae normal.   Cardiovascular:      Rate and Rhythm: Normal rate and regular rhythm.   Pulmonary:      Effort: Pulmonary effort is normal. No respiratory distress.      Breath sounds: Normal breath sounds.   Abdominal:      General: Bowel sounds are normal. There is no distension.      Palpations: Abdomen is soft.      Tenderness: There is no abdominal tenderness. There is no guarding.   Skin:     General: Skin is warm and dry.   Neurological:      Mental Status: She is alert and oriented to person, place, and time.   Psychiatric:         Mood and Affect: Mood normal.         Behavior: Behavior normal.           Lab Results: I have reviewed the following results:CBC/BMP:   .     02/27/25  0454   WBC 4.64   HGB 9.9*   HCT 33.7*   *   SODIUM 140   K 4.7      CO2 35*   BUN 21   CREATININE 1.10   GLUC 88   PHOS 3.0    , LFTs:   .     02/27/25  0454   AST 17   ALT 7   ALB 3.9   TBILI 0.38   ALKPHOS 112*    , PTT/INR:No new results in last 24 hours.     Imaging Results Review: I reviewed radiology reports from this admission including: xray(s).  Other Study Results Review: No additional pertinent studies reviewed.        Lico Humphrey PA-C  [facility] - Gastroentrology

## 2025-02-27 NOTE — DISCHARGE INSTR - AVS FIRST PAGE
Dear Noreen Alvarez,     It was our pleasure to care for you here at [facility].  It is our hope that we were always able to exceed the expected standards for your care during your stay.  You were hospitalized due to difficulty breathing.  You were cared for on the 4th  floor under the service of Zainab Negro MD with the [facility] who covers for your primary care physician (PCP), Samantha Hernandez MD, while you were hospitalized.  If you have any questions or concerns related to this hospitalization, you may contact us at .  For follow up as well as medication refills, we recommend that you follow up with your primary care physician.  A registered nurse will reach out to you by phone within a few days after your discharge to answer any additional questions that you may have after going home.  However, at this time we provide for you here, the most important instructions / recommendations at discharge:     Notable Medication Adjustments -   We recommend a robust bowel regimen.   Testing Required after Discharge -   You should follow up with Gastroenterology outpatient and your pulmnologist.   ** Please contact your PCP to request testing orders for any of the testing recommended here **  Important follow up information -   Please follow up with PCP   Other Instructions -   If you experience any pain, shortness of breath, epigastric pain, lower abdominal pain, constipation, diarrhea fevers chills please seek urgent medical attention or come back into the emergency department  Please review this entire after visit summary as additional general instructions including medication list, appointments, activity, diet, any pertinent wound care, and other additional recommendations from your care team that may be provided for you.      Sincerely,     Zainab Negro MD

## 2025-02-27 NOTE — ASSESSMENT & PLAN NOTE
-EGD March 2024 for atypical chest pain was normal.  Distal esophageal biopsy negative for Toledo's and any eosinophilic process  - Ultrasound 2/24/25 of the right upper quadrant shows gallbladder sludge but no cholelithiasis or evidence of cholecystitis.  -Can consider surgical consultation electively with regard to right upper quadrant discomfort and gallbladder sludge.  -Hold off on EGD at this time due to resolution of pain

## 2025-02-28 NOTE — UTILIZATION REVIEW
NOTIFICATION OF ADMISSION DISCHARGE   This is a Notification of Discharge from [facility]. Please be advised that this patient has been discharge from our facility. Below you will find the admission and discharge date and time including the patient’s disposition.   UTILIZATION REVIEW CONTACT:  Kelly Coello  Utilization   Network Utilization Review Department  Phone: [phone] x carefully listen to the prompts. All voicemails are confidential.  Email: [email or web address]     ADMISSION INFORMATION  PRESENTATION DATE: 2/23/2025  5:19 PM  OBERVATION ADMISSION DATE: 02/23/2025 1915  INPATIENT ADMISSION DATE: 2/24/25  8:39 AM   DISCHARGE DATE: 2/27/2025  3:53 PM   DISPOSITION:Home/Self Care    Network Utilization Review Department  ATTENTION: Please call with any questions or concerns to [phone] and carefully listen to the prompts so that you are directed to the right person. All voicemails are confidential.   For Discharge needs, contact Care Management DC Support Team at [phone] opt. 2  Send all requests for admission clinical reviews, approved or denied determinations and any other requests to dedicated fax number below belonging to the campus where the patient is receiving treatment. List of dedicated fax numbers for the Facilities:  FACILITY NAME UR FAX NUMBER   ADMISSION DENIALS (Administrative/Medical Necessity) [phone]   DISCHARGE SUPPORT TEAM ([facility]) [phone]   PARENT CHILD HEALTH (Maternity/NICU/Pediatrics) [phone]   [facility] [phone]   [facility] [phone]   [facility] [phone]   [facility] [phone]   [facility] [phone]   [facility] [phone]   [facility] [phone]   [facility]  [phone]   [facility] [phone]   [facility] [phone]   [facility] [phone]   [facility] [phone]

## 2025-03-02 LAB
ATRIAL RATE: 62 BPM
ATRIAL RATE: 68 BPM
ATRIAL RATE: 70 BPM
P AXIS: 79 DEGREES
P AXIS: 82 DEGREES
P AXIS: 84 DEGREES
PR INTERVAL: 150 MS
PR INTERVAL: 154 MS
PR INTERVAL: 158 MS
QRS AXIS: 31 DEGREES
QRS AXIS: 41 DEGREES
QRS AXIS: 56 DEGREES
QRSD INTERVAL: 80 MS
QRSD INTERVAL: 86 MS
QRSD INTERVAL: 90 MS
QT INTERVAL: 410 MS
QT INTERVAL: 412 MS
QT INTERVAL: 418 MS
QTC INTERVAL: 416 MS
QTC INTERVAL: 439 MS
QTC INTERVAL: 451 MS
T WAVE AXIS: 0 DEGREES
T WAVE AXIS: 40 DEGREES
T WAVE AXIS: 75 DEGREES
VENTRICULAR RATE: 62 BPM
VENTRICULAR RATE: 68 BPM
VENTRICULAR RATE: 70 BPM

## 2025-03-02 PROCEDURE — 93010 ELECTROCARDIOGRAM REPORT: CPT | Performed by: INTERNAL MEDICINE

## 2025-03-04 ENCOUNTER — RESULTS FOLLOW-UP (OUTPATIENT)
Dept: GASTROENTEROLOGY | Facility: AMBULARY SURGERY CENTER | Age: 70
End: 2025-03-04

## 2025-03-04 ENCOUNTER — NURSE TRIAGE (OUTPATIENT)
Age: 70
End: 2025-03-04

## 2025-03-04 LAB
ALP BONE CFR SERPL: 37 % (ref 14–68)
ALP INTEST CFR SERPL: 14 % (ref 0–18)
ALP LIVER CFR SERPL: 49 % (ref 18–85)
ALP SERPL-CCNC: 131 IU/L (ref 44–121)

## 2025-03-04 NOTE — TELEPHONE ENCOUNTER
----- Message from Ila PUENTES sent at 3/4/2025  3:12 PM EST -----    ----- Message -----  From: Lico Humphrey PA-C  Sent: 2/27/2025  11:13 AM EST  To: Gastroenterology [facility]    Patient likely for discharge today from [facility].  Please scheduled for follow-up with any provider in the next 3 to 4 months with regard to constipation and possible need for EGD if epigastric pain persistent.

## 2025-03-05 DIAGNOSIS — R10.13 EPIGASTRIC PAIN: ICD-10-CM

## 2025-03-05 RX ORDER — OMEPRAZOLE 40 MG/1
40 CAPSULE, DELAYED RELEASE ORAL 2 TIMES DAILY
Qty: 60 CAPSULE | Refills: 4 | Status: SHIPPED | OUTPATIENT
Start: 2025-03-05

## 2025-03-05 NOTE — ASSESSMENT & PLAN NOTE
Hgb stable in the 10s.  Similar to prior labs.   Does not tolerate oral iron supplements due to constipation and abdominal pain  Needs to ensure she is up to date on cancer screening   GI follow up OP

## 2025-03-05 NOTE — ASSESSMENT & PLAN NOTE
Had a large bowel movement after aggressive bowel regiment, since then she feels altogether better.  Initially GI had considered further GI investigations however given that her symptoms have entirely resolved this can be deferred to the outpatient setting

## 2025-03-05 NOTE — ASSESSMENT & PLAN NOTE
GI are consulted   They are recommending OP follow up only at this time and no further IP GI work up

## 2025-03-05 NOTE — TELEPHONE ENCOUNTER
Patient's daughter Ashley returning call to schedule ED FU. She states patient is still having severe pain between ribs, under breast. Transferred to triage nurse.

## 2025-03-05 NOTE — TELEPHONE ENCOUNTER
FOLLOW UP: none at this time    REASON FOR CONVERSATION: Heartburn    SYMPTOMS: pain under ribcage, burning, decreased appetite, nausea, vomiting at night    OTHER: Daughter calling (consent verified). had discussed EGD in hospital but was feeling better.  Would like EGD at this time. Does not wish to make appt at this time since so far out.       DISPOSITION: Next Available Appointment and 72 Hour Provider Response    Reason for Disposition   The patient has epigastric pain for <3 months    Answer Assessment - Initial Assessment Questions  1. Do you have a history of GERD?  no  2. When did your symptoms start? Please describe your symptoms and how often you experience these symptoms.  A long time - was seen in hospital but got better - pain returned after discharge  3. Are you taking any acid reducing medications currently such as: Prilosec (Omeprazole), Protonix (Pantoprazole), Prevacid (Lansoprazole), Nexium (Esomeprazole), Aciphex (Rabeprazole), Dexilant (Dexlansoprazole)?  Prilosec   4. Have you tried any OTC acid reducing medications? (If so, which medications have you tried?) Zantac (Ranitidine), Pepcid (Famotidine), Tagamet (Cimetidine) Tums, Rolaids, Maalox, Mylanta.  famotidine  5. What was the outcome of taking the medications which you have for these symptoms?  Not working  6. Have you experienced any recent changes in your bowel habits?  denies  7. Have you had any new life stressors or diet changes?  denies  8. Does anything make your symptoms better?  denies    Protocols used: GI-Gerd-ADULT-[place]

## 2025-03-05 NOTE — ASSESSMENT & PLAN NOTE
During my encounter with the patient the patient's main complaint was epigastric pain and abdominal pain she endorsed constipation but also had a lot of dyspeptic like symptoms.  I did use an  phone during all of my encounters with the patient.  I also spoke with her Onelia who confirmed that her main symptoms have been gastrointestinal.  I did consult with GI and initially there had been a plan for further GI workup however this resolved after she had a bowel movement so no further plan for an EGD was pursued.

## 2025-03-05 NOTE — ASSESSMENT & PLAN NOTE
Now on room air.   My encounter with the patient there was no respiratory symptoms or distress  Patient did not require oxygen during my encounter with the patient  May have resolved since the previous providers documentation reflected this yesterday

## 2025-03-05 NOTE — DISCHARGE SUMMARY
Discharge Summary - Hospitalist   Name: Noreen Alvarez 69 y.o. female I MRN: [id number]  Unit/Bed#: W -01 I Date of Admission: 2/23/2025   Date of Service: 3/5/2025 I Hospital Day: 3     Assessment & Plan  Acute on chronic respiratory failure with hypoxia and hypercapnia (HCC)  Now on room air.   My encounter with the patient there was no respiratory symptoms or distress  Patient did not require oxygen during my encounter with the patient  May have resolved since the previous providers documentation reflected this yesterday  Epigastric pain  During my encounter with the patient the patient's main complaint was epigastric pain and abdominal pain she endorsed constipation but also had a lot of dyspeptic like symptoms.  I did use an  phone during all of my encounters with the patient.  I also spoke with her Onelia who confirmed that her main symptoms have been gastrointestinal.  I did consult with GI and initially there had been a plan for further GI workup however this resolved after she had a bowel movement so no further plan for an EGD was pursued.    Centrilobular emphysema (HCC)  Reports shortness of breath x 3 days though this sounds like it occurs simultaneously when she develops epigastric/ruq pain.  Does not appear to be in acute exacerbation.     Plan  Continue home medications  Given prednisone x 1 in ED, hold further.  No respiratory insufficiency or hypoxia during my encounters with patient   History of laryngeal cancer  History of head and neck cancer in 2016 treated with radiation and chemotherapy.  Op follow up with ent  Multiple lung nodules  8/2024 PET CT   1.5 cm right lower lobe ground glass nodule not FDG avid.  Note that low-grade adenocarcinoma/adenocarcinoma spectrum lesions may not demonstrate significant FDG uptake.   Focal FDG uptake right mid lung corresponds to new area of nodule consolidation on CT.  Probably post infectious or inflammatory.  Initially referred to thoracic  "surgery for biopsy of the lung nodule but due to solid component of <4 mm, it is under observation.  9/2024 Chest CT:   Stable right lower lobe groundglass nodule 1.6 x 1 cm  Stable right lower lobe groundglass nodule 1.5 x 1.1 cm  Resolution of previously seen posterior right lower lobe tree in bud nodularity and right basilar nodular opacity  Stable left lower lobe nodule 1.2 x 1.1 cm    Plan  Op follow up with leila pelletier in April  HTN (hypertension)  BP is 149/64  Continue with norvasc  Chronic constipation  Had a large bowel movement after aggressive bowel regiment, since then she feels altogether better.  Initially GI had considered further GI investigations however given that her symptoms have entirely resolved this can be deferred to the outpatient setting  Anemia  Hgb stable in the 10s.  Similar to prior labs.   Does not tolerate oral iron supplements due to constipation and abdominal pain  Needs to ensure she is up to date on cancer screening   GI follow up OP  Elevated alkaline phosphatase level  GI are consulted  They will follow up in OP setting  History of adenomatous polyp of colon  GI are consulted   They are recommending OP follow up only at this time and no further IP GI work up     Medical Problems       Resolved Problems  Date Reviewed: 12/19/2024          Resolved    COPD with acute exacerbation (HCC) 2/23/2025     Resolved by  GERTRUDIS Holliday    COPD exacerbation (HCC) 2/23/2025     Resolved by  GERTRUDIS Holliday        Discharging Physician / Practitioner: Zainab Negro MD  PCP: Samantha Hernandez MD  Admission Date:   Admission Orders (From admission, onward)       Ordered        02/24/25 0839  INPATIENT ADMISSION  Once            02/23/25 1915  Place in Observation  Once                          Discharge Date: February 27th 2025    Consultations During Hospital Stay:  GI - Dr Santoro       Procedures Performed:   CXR - -\"  Trace pleural effusions.     Left lower lobe nodule corresponding with " "CT.\"  RUQ US -   \"Gallbladder sludge. No cholelithiasis or evidence of cholecystitis.     Remainder of the examination is normal.\"  Abdominal film - moderate colonic stool burden     Significant Findings / Test Results:   As above.     Incidental Findings:   As above.   I reviewed the above mentioned incidental findings with the patient and/or family and they expressed understanding.    Test Results Pending at Discharge (will require follow up):   Follow up labs and imaging as per GI      Outpatient Tests Requested:  Should follow up with onc and ent as above.     Complications:  none.     Reason for Admission:     Hospital Course:   Noreen Alvarez is a 69 y.o. female patient who originally presented to the hospital on 2/23/2025 due to shortness of breath and epigastric pain.     Her shortness of breath appeared to have resolved and towards the end of her hospital stay epigastric pain and abdominal pain seem to be her most prominent complaint along with constipation.  She was treated aggressively with a bowel regimen and after she had a bowel movement her symptoms all resolved.    She was seen by GI and they will see her in the outpatient setting.    Was recommended to follow-up with her primary care doctor as well as ENT and oncology    Please see above list of diagnoses and related plan for additional information.     Condition at Discharge: stable    Discharge Day Visit / Exam:   Subjective:    Seen and examined, no new complaints.  Feeling\" good\"  Vitals: Blood Pressure: 149/64 (02/27/25 0854)  Pulse: 79 (02/27/25 0711)  Temperature: 98.1 °F (36.7 °C) (02/27/25 0711)  Temp Source: Oral (02/24/25 1751)  Respirations: 18 (02/27/25 0711)  Height: 5' 4\" (162.6 cm) (02/24/25 1523)  Weight - Scale: 80.3 kg (177 lb) (02/24/25 1523)  SpO2: 93 % (02/27/25 1336)  Physical Exam  Constitutional:       General: She is not in acute distress.     Appearance: She is not ill-appearing, toxic-appearing or diaphoretic.   HENT:      " Head: Normocephalic.      Mouth/Throat:      Mouth: Mucous membranes are moist.      Pharynx: No oropharyngeal exudate or posterior oropharyngeal erythema.   Eyes:      General: No scleral icterus.        Right eye: No discharge.         Left eye: No discharge.      Pupils: Pupils are equal, round, and reactive to light.   Cardiovascular:      Rate and Rhythm: Normal rate.      Heart sounds: No murmur heard.     No friction rub. No gallop.   Pulmonary:      Effort: No respiratory distress.      Breath sounds: No stridor. No wheezing, rhonchi or rales.   Chest:      Chest wall: No tenderness.   Abdominal:      General: Abdomen is flat. There is no distension.      Palpations: There is no mass.      Tenderness: There is no abdominal tenderness. There is no right CVA tenderness, left CVA tenderness, guarding or rebound.      Hernia: No hernia is present.   Musculoskeletal:         General: No swelling, tenderness, deformity or signs of injury.      Right lower leg: No edema.      Left lower leg: No edema.   Skin:     General: Skin is warm.      Capillary Refill: Capillary refill takes less than 2 seconds.      Coloration: Skin is not jaundiced or pale.      Findings: No bruising, erythema, lesion or rash.   Neurological:      General: No focal deficit present.      Mental Status: She is alert and oriented to person, place, and time.      Cranial Nerves: No cranial nerve deficit.      Sensory: No sensory deficit.      Motor: No weakness.      Coordination: Coordination normal.      Gait: Gait normal.      Deep Tendon Reflexes: Reflexes normal.   Psychiatric:         Mood and Affect: Mood normal.          Discussion with Family:  updated family. .     Discharge instructions/Information to patient and family:   See after visit summary for information provided to patient and family.      Provisions for Follow-Up Care:  See after visit summary for information related to follow-up care and any pertinent home health orders.       Mobility at time of Discharge:   Basic Mobility Inpatient Raw Score: 24  JH-HLM Goal: 8: Walk 250 feet or more  JH-HLM Achieved: 7: Walk 25 feet or more  No mobility issues on my assessment.      Disposition:   Home    Planned Readmission: none.     Discharge Medications:  See after visit summary for reconciled discharge medications provided to patient and/or family.      Administrative Statements   Discharge Statement:  I have spent a total time of 45 minutes in caring for this patient on the day of the visit/encounter. >30 minutes of time was spent on: Diagnostic results, Prognosis, Risks and benefits of tx options, Instructions for management, Patient and family education, Importance of tx compliance, Risk factor reductions, Impressions, Counseling / Coordination of care, Documenting in the medical record, Reviewing / ordering tests, medicine, procedures  , and Communicating with other healthcare professionals .    **Please Note: This note may have been constructed using a voice recognition system**

## 2025-03-05 NOTE — ASSESSMENT & PLAN NOTE
Reports shortness of breath x 3 days though this sounds like it occurs simultaneously when she develops epigastric/ruq pain.  Does not appear to be in acute exacerbation.     Plan  Continue home medications  Given prednisone x 1 in ED, hold further.  No respiratory insufficiency or hypoxia during my encounters with patient

## 2025-03-20 ENCOUNTER — HOSPITAL ENCOUNTER (OUTPATIENT)
Dept: RADIOLOGY | Facility: HOSPITAL | Age: 70
Discharge: HOME | End: 2025-03-20
Payer: COMMERCIAL

## 2025-03-20 ENCOUNTER — TELEPHONE (OUTPATIENT)
Age: 70
End: 2025-03-20

## 2025-03-20 ENCOUNTER — OFFICE VISIT (OUTPATIENT)
Dept: FAMILY MEDICINE CLINIC | Facility: CLINIC | Age: 70
End: 2025-03-20
Payer: COMMERCIAL

## 2025-03-20 VITALS
OXYGEN SATURATION: 86 % | HEIGHT: 64 IN | TEMPERATURE: 97.7 F | HEART RATE: 88 BPM | WEIGHT: 177 LBS | SYSTOLIC BLOOD PRESSURE: 130 MMHG | DIASTOLIC BLOOD PRESSURE: 50 MMHG | BODY MASS INDEX: 30.22 KG/M2 | RESPIRATION RATE: 22 BRPM

## 2025-03-20 DIAGNOSIS — C14.0 CANCER OF PHARYNX (HCC): ICD-10-CM

## 2025-03-20 DIAGNOSIS — N18.31 STAGE 3A CHRONIC KIDNEY DISEASE (HCC): ICD-10-CM

## 2025-03-20 DIAGNOSIS — F41.8 DEPRESSION WITH ANXIETY: ICD-10-CM

## 2025-03-20 DIAGNOSIS — G89.29 CHRONIC RIGHT-SIDED LOW BACK PAIN WITH RIGHT-SIDED SCIATICA: ICD-10-CM

## 2025-03-20 DIAGNOSIS — G89.29 CHRONIC LEFT SHOULDER PAIN: ICD-10-CM

## 2025-03-20 DIAGNOSIS — J43.2 CENTRILOBULAR EMPHYSEMA (HCC): ICD-10-CM

## 2025-03-20 DIAGNOSIS — Z99.81 CHRONIC RESPIRATORY FAILURE WITH HYPOXIA, ON HOME O2 THERAPY  (HCC): ICD-10-CM

## 2025-03-20 DIAGNOSIS — J44.1 ACUTE EXACERBATION OF CHRONIC OBSTRUCTIVE PULMONARY DISEASE (HCC): ICD-10-CM

## 2025-03-20 DIAGNOSIS — J45.50 SEVERE PERSISTENT ASTHMA WITHOUT COMPLICATION: Primary | ICD-10-CM

## 2025-03-20 DIAGNOSIS — G47.34 NOCTURNAL HYPOXIA: ICD-10-CM

## 2025-03-20 DIAGNOSIS — Z12.31 ENCOUNTER FOR SCREENING MAMMOGRAM FOR BREAST CANCER: ICD-10-CM

## 2025-03-20 DIAGNOSIS — F41.1 GAD (GENERALIZED ANXIETY DISORDER): ICD-10-CM

## 2025-03-20 DIAGNOSIS — M25.512 CHRONIC LEFT SHOULDER PAIN: ICD-10-CM

## 2025-03-20 DIAGNOSIS — E03.9 HYPOTHYROIDISM, UNSPECIFIED TYPE: ICD-10-CM

## 2025-03-20 DIAGNOSIS — J96.11 CHRONIC RESPIRATORY FAILURE WITH HYPOXIA, ON HOME O2 THERAPY  (HCC): ICD-10-CM

## 2025-03-20 DIAGNOSIS — E78.5 HYPERLIPIDEMIA, UNSPECIFIED HYPERLIPIDEMIA TYPE: ICD-10-CM

## 2025-03-20 DIAGNOSIS — I10 PRIMARY HYPERTENSION: ICD-10-CM

## 2025-03-20 DIAGNOSIS — M54.41 CHRONIC RIGHT-SIDED LOW BACK PAIN WITH RIGHT-SIDED SCIATICA: ICD-10-CM

## 2025-03-20 DIAGNOSIS — R10.13 EPIGASTRIC PAIN: ICD-10-CM

## 2025-03-20 DIAGNOSIS — E53.8 B12 DEFICIENCY: ICD-10-CM

## 2025-03-20 DIAGNOSIS — K59.04 CHRONIC IDIOPATHIC CONSTIPATION: ICD-10-CM

## 2025-03-20 PROCEDURE — G2211 COMPLEX E/M VISIT ADD ON: HCPCS | Performed by: FAMILY MEDICINE

## 2025-03-20 PROCEDURE — 99214 OFFICE O/P EST MOD 30 MIN: CPT | Performed by: FAMILY MEDICINE

## 2025-03-20 PROCEDURE — 73030 X-RAY EXAM OF SHOULDER: CPT

## 2025-03-20 RX ORDER — SERTRALINE HYDROCHLORIDE 25 MG/1
25 TABLET, FILM COATED ORAL
Qty: 90 TABLET | Refills: 1 | Status: SHIPPED | OUTPATIENT
Start: 2025-03-20

## 2025-03-20 RX ORDER — BENZONATATE 200 MG/1
200 CAPSULE ORAL 3 TIMES DAILY PRN
Qty: 90 CAPSULE | Refills: 0 | Status: SHIPPED | OUTPATIENT
Start: 2025-03-20

## 2025-03-20 RX ORDER — POLYETHYLENE GLYCOL 3350 17 G/17G
POWDER, FOR SOLUTION ORAL
Qty: 507 G | Refills: 0 | Status: SHIPPED | OUTPATIENT
Start: 2025-03-20

## 2025-03-20 RX ORDER — AMLODIPINE BESYLATE 5 MG/1
5 TABLET ORAL DAILY
Qty: 90 TABLET | Refills: 1 | Status: SHIPPED | OUTPATIENT
Start: 2025-03-20

## 2025-03-20 RX ORDER — TIZANIDINE 2 MG/1
2 TABLET ORAL EVERY 8 HOURS PRN
Qty: 90 TABLET | Refills: 1 | Status: SHIPPED | OUTPATIENT
Start: 2025-03-20

## 2025-03-20 RX ORDER — FAMOTIDINE 20 MG/1
20 TABLET, FILM COATED ORAL
Qty: 30 TABLET | Refills: 0 | Status: SHIPPED | OUTPATIENT
Start: 2025-03-20 | End: 2025-04-19

## 2025-03-20 RX ORDER — ATORVASTATIN CALCIUM 40 MG/1
40 TABLET, FILM COATED ORAL
Qty: 90 TABLET | Refills: 3 | Status: SHIPPED | OUTPATIENT
Start: 2025-03-20 | End: 2026-03-15

## 2025-03-20 RX ORDER — IPRATROPIUM BROMIDE AND ALBUTEROL SULFATE 2.5; .5 MG/3ML; MG/3ML
3 SOLUTION RESPIRATORY (INHALATION) EVERY 6 HOURS PRN
Qty: 300 ML | Refills: 1 | Status: SHIPPED | OUTPATIENT
Start: 2025-03-20

## 2025-03-20 RX ORDER — LEVOTHYROXINE SODIUM 75 UG/1
75 TABLET ORAL
Qty: 90 TABLET | Refills: 1 | Status: SHIPPED | OUTPATIENT
Start: 2025-03-20

## 2025-03-20 RX ORDER — MONTELUKAST SODIUM 10 MG/1
10 TABLET ORAL
Qty: 90 TABLET | Refills: 3 | Status: SHIPPED | OUTPATIENT
Start: 2025-03-20

## 2025-03-20 RX ORDER — BUDESONIDE, GLYCOPYRROLATE, AND FORMOTEROL FUMARATE 160; 9; 4.8 UG/1; UG/1; UG/1
2 AEROSOL, METERED RESPIRATORY (INHALATION) 2 TIMES DAILY
Qty: 10.7 G | Refills: 3 | Status: SHIPPED | OUTPATIENT
Start: 2025-03-20 | End: 2025-03-21 | Stop reason: SDUPTHER

## 2025-03-20 NOTE — TELEPHONE ENCOUNTER
Pts daughter called stating that her mother's appt was at 10:40 / I advised no that appt was at 10AM/ arrival time at 9:45     Ashley said  told her 10:40 / asked if her mother can still be seen because she is out in the parking lot.    Call office to ask about appt mix up.     At the same time the message  on chat  was answered and was told they would ask Dr. Thompson. / I advised pts daughter I was waiting for an answer.     Was then told by office that pt did come in to the office and was r/s for tomorrow. 3/21/25     Pts daughter had disconnected from call by then.

## 2025-03-21 ENCOUNTER — OFFICE VISIT (OUTPATIENT)
Dept: PULMONOLOGY | Facility: CLINIC | Age: 70
End: 2025-03-21
Payer: COMMERCIAL

## 2025-03-21 VITALS
BODY MASS INDEX: 30.22 KG/M2 | HEART RATE: 72 BPM | SYSTOLIC BLOOD PRESSURE: 100 MMHG | WEIGHT: 177 LBS | HEIGHT: 64 IN | TEMPERATURE: 97.3 F | DIASTOLIC BLOOD PRESSURE: 54 MMHG | OXYGEN SATURATION: 90 %

## 2025-03-21 DIAGNOSIS — J43.2 CENTRILOBULAR EMPHYSEMA (HCC): ICD-10-CM

## 2025-03-21 DIAGNOSIS — J96.11 CHRONIC RESPIRATORY FAILURE WITH HYPOXIA, ON HOME O2 THERAPY  (HCC): ICD-10-CM

## 2025-03-21 DIAGNOSIS — E66.811 CLASS 1 OBESITY DUE TO EXCESS CALORIES WITH SERIOUS COMORBIDITY AND BODY MASS INDEX (BMI) OF 32.0 TO 32.9 IN ADULT: ICD-10-CM

## 2025-03-21 DIAGNOSIS — R91.8 MULTIPLE LUNG NODULES: Primary | ICD-10-CM

## 2025-03-21 DIAGNOSIS — Z99.81 CHRONIC RESPIRATORY FAILURE WITH HYPOXIA, ON HOME O2 THERAPY  (HCC): ICD-10-CM

## 2025-03-21 DIAGNOSIS — E66.09 CLASS 1 OBESITY DUE TO EXCESS CALORIES WITH SERIOUS COMORBIDITY AND BODY MASS INDEX (BMI) OF 32.0 TO 32.9 IN ADULT: ICD-10-CM

## 2025-03-21 PROCEDURE — 99214 OFFICE O/P EST MOD 30 MIN: CPT | Performed by: INTERNAL MEDICINE

## 2025-03-21 RX ORDER — BUDESONIDE, GLYCOPYRROLATE, AND FORMOTEROL FUMARATE 160; 9; 4.8 UG/1; UG/1; UG/1
2 AEROSOL, METERED RESPIRATORY (INHALATION) 2 TIMES DAILY
Qty: 10.7 G | Refills: 3 | Status: SHIPPED | OUTPATIENT
Start: 2025-03-21

## 2025-03-21 RX ORDER — AZITHROMYCIN 250 MG/1
250 TABLET, FILM COATED ORAL 3 TIMES WEEKLY
Qty: 36 TABLET | Refills: 1 | Status: SHIPPED | OUTPATIENT
Start: 2025-03-21 | End: 2025-09-17

## 2025-03-21 NOTE — PROGRESS NOTES
"Name: Noreen Alvarez      : 1955      MRN: [id number]  Encounter Provider: Shannan Thompson MD  Encounter Date: 3/21/2025   Encounter department: [facility]  :  Assessment & Plan  Multiple lung nodules  She has multiple lung nodules on her CT scan which have been stable.  Her previous CT scan was unremarkable. However, she had an enlarging 1.6 x 0.8 cm ovoid groundglass right lower lobe nodule on her CT scan from  2022.  Her most recent CT scan from 2024 showed  \"A groundglass nodule in the lateral right lower lobe measures up to 1.6 cm, unchanged from most recent 2024 comparison, however increased from 2022 exam. A slow-growing/adenocarcinoma spectrum lesion  not entirely excluded\".  She had a CT PET scan and IR biopsy was ordered.  This was deferred subsequently per IR.  He had a repeat CT scan from 2024 showed stability of the lung nodules..She had laryngeal cancer in  which was treated with radiation therapy and chemo therapy.  She had post radiation changes in the neck..             Chronic respiratory failure with hypoxia, on home O2 therapy  (HCC)  She has chronic respiratory failure with hypoxia and has been on supplementation at 2 L/min        Centrilobular emphysema (HCC)  She has severe COPD emphysema from her previous smoking.  On clinical examination her chest auscultation was clear.  She is currently on treatment with Breztri, and nebulized ipratropium and albuterol.  She is on oxygen supplementation at 2 liters/minute.  Her PFT from 2022 showed severe airflow obstruction with severe diffusion defect air-trapping and hyperinflation consistent with emphysema.  I have advised her to continue with Breztri regularly and albuterol as needed.  She is also currently on azithromycin 250 mg 3 times a week to prevent exacerbations.  I had a long discussion with her and her daughter and answered all their questions.  "   Orders:    Budeson-Glycopyrrol-Formoterol (Breztri Aerosphere) 160-9-4.8 MCG/ACT AERO; Inhale 2 puffs 2 (two) times a day Rinse mouth after use.    azithromycin (ZITHROMAX) 250 mg tablet; Take 1 tablet (250 mg total) by mouth 3 (three) times a week    Class 1 obesity due to excess calories with serious comorbidity and body mass index (BMI) of 32.0 to 32.9 in adult  She is mildly obese and advised weight reduction.           History of Present Illness     [id number]  Noreen Alvarez is a 69 y.o. female who speaks [place]ian who has come for follow-up.  She has had multiple lung nodules which have been stable.  She also has pulmonary emphysema and has been on treatment with Breztri regularly and albuterol as needed.  She also has DuoNeb nebulizer as needed.  She is on azithromycin 250 mg 3 times a week after her recent admission to hospital with acute exacerbation.  She is on chronic oxygen supplementation at 2 L/min.  Her last CT scan from September of 2024 showed stability of the lung nodules.  She had previous history of cancer of pharynx.  She denied any hoarseness of voice or dysphagia.  Her exercise tolerance was less than a block.  She denied any significant phlegm or wheeze.  No chest pain no palpitation.  No swelling of feet.  Her appetite was good.      Review of Systems   Constitutional:  Negative for appetite change, chills, fatigue and fever.   HENT:  Negative for hearing loss, rhinorrhea, sneezing, sore throat, trouble swallowing and voice change.    Respiratory:  Positive for shortness of breath. Negative for cough and chest tightness.    Cardiovascular:  Negative for chest pain, palpitations and leg swelling.   Gastrointestinal:  Negative for constipation, diarrhea, nausea and vomiting.   Genitourinary:  Negative for dysuria, frequency and urgency.   Musculoskeletal:  Negative for arthralgias and gait problem.   Skin:  Negative for rash.   Allergic/Immunologic: Positive for environmental allergies.  "  Neurological:  Positive for light-headedness. Negative for dizziness, syncope and headaches.   Psychiatric/Behavioral:  Positive for sleep disturbance. Negative for agitation and confusion. The patient is nervous/anxious.           Objective   /54 (BP Location: Left arm, Patient Position: Sitting, Cuff Size: Standard)   Pulse 72   Temp (!) 97.3 °F (36.3 °C) (Tympanic)   Ht 5' 4\" (1.626 m)   Wt 80.3 kg (177 lb)   SpO2 90%   BMI 30.38 kg/m²      Physical Exam  Vitals reviewed.   Constitutional:       General: She is not in acute distress.     Appearance: She is obese. She is not ill-appearing, toxic-appearing or diaphoretic.   HENT:      Head: Normocephalic.      Mouth/Throat:      Mouth: Mucous membranes are moist.   Eyes:      General: No scleral icterus.     Comments: Conjunctival pallor.   Cardiovascular:      Rate and Rhythm: Normal rate and regular rhythm.      Heart sounds: Normal heart sounds. No murmur heard.  Pulmonary:      Effort: Pulmonary effort is normal. No respiratory distress.      Breath sounds: No stridor. No wheezing, rhonchi or rales.   Chest:      Chest wall: No tenderness.   Abdominal:      General: Bowel sounds are normal.      Palpations: Abdomen is soft.      Tenderness: There is no abdominal tenderness. There is no guarding.   Musculoskeletal:      Cervical back: Neck supple. No rigidity.      Right lower leg: No edema.      Left lower leg: No edema.   Lymphadenopathy:      Cervical: No cervical adenopathy.   Skin:     Coloration: Skin is not jaundiced or pale.      Findings: No rash.   Neurological:      Mental Status: She is alert and oriented to person, place, and time.      Gait: Gait normal.   Psychiatric:         Mood and Affect: Mood normal.         Behavior: Behavior normal.         Judgment: Judgment normal.           "

## 2025-03-21 NOTE — ASSESSMENT & PLAN NOTE
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  On clinical examination her chest auscultation was clear.  She is currently on treatment with Breztri, and nebulized ipratropium and albuterol.  She is on oxygen supplementation at 2 liters/minute.  Her PFT from 4/21/2022 showed severe airflow obstruction with severe diffusion defect air-trapping and hyperinflation consistent with emphysema.  I have advised her to continue with Breztri regularly and albuterol as needed.  She is also currently on azithromycin 250 mg 3 times a week to prevent exacerbations.  I had a long discussion with her and her daughter and answered all their questions.    Orders:    Budeson-Glycopyrrol-Formoterol (Breztri Aerosphere) 160-9-4.8 MCG/ACT AERO; Inhale 2 puffs 2 (two) times a day Rinse mouth after use.    azithromycin (ZITHROMAX) 250 mg tablet; Take 1 tablet (250 mg total) by mouth 3 (three) times a week

## 2025-03-21 NOTE — ASSESSMENT & PLAN NOTE
"She has multiple lung nodules on her CT scan which have been stable.  Her previous CT scan was unremarkable. However, she had an enlarging 1.6 x 0.8 cm ovoid groundglass right lower lobe nodule on her CT scan from  February 2022.  Her most recent CT scan from 6/19/2024 showed  \"A groundglass nodule in the lateral right lower lobe measures up to 1.6 cm, unchanged from most recent March 2024 comparison, however increased from December 2022 exam. A slow-growing/adenocarcinoma spectrum lesion  not entirely excluded\".  She had a CT PET scan and IR biopsy was ordered.  This was deferred subsequently per IR.  He had a repeat CT scan from September 2024 showed stability of the lung nodules..She had laryngeal cancer in 2015 which was treated with radiation therapy and chemo therapy.  She had post radiation changes in the neck..             "

## 2025-03-24 PROBLEM — K59.04 CHRONIC IDIOPATHIC CONSTIPATION: Status: ACTIVE | Noted: 2024-04-03

## 2025-03-24 NOTE — ASSESSMENT & PLAN NOTE
Start montelukast on a regular basis  Orders:    montelukast (SINGULAIR) 10 mg tablet; Take 1 tablet (10 mg total) by mouth daily at bedtime    ipratropium-albuterol (DUO-NEB) 0.5-2.5 mg/3 mL nebulizer solution; Take 3 mL by nebulization every 6 (six) hours as needed for wheezing or shortness of breath

## 2025-03-24 NOTE — PROGRESS NOTES
Name: Noreen Alvarez      : 1955     MRN: [id number]  Encounter Provider: Samantha Hernandez MD  Encounter Date: 3/20/2025  Encounter department: [facility]    Assessment & Plan  Severe persistent asthma without complication  On Breztri and albuterol as needed       Cancer of pharynx (HCC)  In remission, follow-up with ENT annually       Stage 3a chronic kidney disease (HCC)  Lab Results   Component Value Date    EGFR 51 2025    EGFR 42 2025    EGFR 39 2025    CREATININE 1.10 2025    CREATININE 1.28 2025    CREATININE 1.37 (H) 2025   Creatinine improving to baseline         Acute exacerbation of chronic obstructive pulmonary disease (HCC)  Recommend Tessalon Perles as needed  Orders:    benzonatate (TESSALON) 200 MG capsule; Take 1 capsule (200 mg total) by mouth 3 (three) times a day as needed for cough    B12 deficiency    Orders:    cyanocobalamin (VITAMIN B-12) 500 MCG tablet; Take 1 tablet (500 mcg total) by mouth daily    Centrilobular emphysema (HCC)  Start montelukast on a regular basis  Orders:    montelukast (SINGULAIR) 10 mg tablet; Take 1 tablet (10 mg total) by mouth daily at bedtime    ipratropium-albuterol (DUO-NEB) 0.5-2.5 mg/3 mL nebulizer solution; Take 3 mL by nebulization every 6 (six) hours as needed for wheezing or shortness of breath    TIMMY (generalized anxiety disorder)   continue Zoloft At bedtime  Orders:    sertraline (ZOLOFT) 25 mg tablet; Take 1 tablet (25 mg total) by mouth daily at bedtime    Depression with anxiety      Orders:    sertraline (ZOLOFT) 25 mg tablet; Take 1 tablet (25 mg total) by mouth daily at bedtime    Chronic left shoulder pain  Obtain x-ray of the left shoulder, cannot do physical therapy due to chronic hypoxia and oxygen.  Recommend tizanidine as needed for back pain and shoulder pain no NSAIDs due to CKD.  Can take Tylenol as needed as well  Orders:    XR shoulder 2+ vw left; Future    Chronic  right-sided low back pain with right-sided sciatica   cannot do physical therapy due to chronic hypoxia and oxygen  Orders:    tiZANidine (ZANAFLEX) 2 mg tablet; Take 1 tablet (2 mg total) by mouth every 8 (eight) hours as needed for muscle spasms    Epigastric pain    Orders:    famotidine (PEPCID) 20 mg tablet; Take 1 tablet (20 mg total) by mouth daily at bedtime    Chronic idiopathic constipation  Start daily MiraLAX 1 scoop in 8 ounces of water Orders:    polyethylene glycol (GLYCOLAX) 17 GM/SCOOP powder; Drink 1 scoop with 8 oz of water once daily    HTN (hypertension)  controlled on amlodipine  Orders:    amLODIPine (NORVASC) 5 mg tablet; Take 1 tablet (5 mg total) by mouth daily    Hyperlipidemia, unspecified hyperlipidemia type    Orders:    atorvastatin (LIPITOR) 40 mg tablet; Take 1 tablet (40 mg total) by mouth daily with dinner    Hypothyroidism, unspecified type    Orders:    levothyroxine 75 mcg tablet; Take 1 tablet (75 mcg total) by mouth daily in the early morning    Encounter for screening mammogram for breast cancer    Orders:    Mammo screening bilateral w 3d and cad; Future    Chronic respiratory failure with hypoxia, on home O2 therapy  (HCC)  Continue oxygen       Nocturnal hypoxia  Continue home oxygen                      Read package inserts for all medications before starting a new medications, call me if you have any questions.    Patient was given opportunity to ask questions and all questions were answered.    Subjective:     Noreen Alvarez is a 69 y.o. female.    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 laryngeal cancer in remission follows ENT, abdominal nodule , chronic constipation, lung nodule,COPD, hypoxic resp failure, home oxygen use, chronic low back pain presents for follow up   refused as she wants her daughter to translate   present and speaks understably well  She reports ever since  the hospitalization she has been experiencing worsening left shoulder pain  New for mammogram and lung cancer screening        Past Medical History:   Diagnosis Date    Acute kidney failure (HCC)     Allergic     Allergies     Anemia     Anxiety     Asthma     Cancer (HCC)     Cataract     Chronic kidney disease (CKD), stage III (moderate) (HCC)     COPD (chronic obstructive pulmonary disease) (HCC)     COVID-19     Covid + 5-4-88-cough at that time now fully resolved    Depression     Disease of thyroid gland     Essential hypertension     GERD (gastroesophageal reflux disease)     Glaucoma     High blood pressure     HTN (hypertension) 02/16/2021    Hyperlipidemia     Hypothyroidism     Memory loss     Mesenteric lymphadenopathy 07/13/2021    Pulmonary hypertension (HCC)     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    Throat cancer (HCC) 2014    chemo and rad therapy 2014       Family History   Problem Relation Age of Onset    Other Mother         respiratory disorder    Asthma Mother     COPD Mother     Depression Mother     Sudden death Father         shot himself    Suicidality Father     Brain cancer Sister     Diabetes Sister     Breast cancer Sister     Cancer Sister 62        pancreatic cancer    No Known Problems Sister     No Known Problems Sister     No Known Problems Sister     No Known Problems Sister     No Known Problems Sister     No Known Problems Sister     No Known Problems Daughter     No Known Problems Daughter     No Known Problems Maternal Grandmother     No Known Problems Maternal Grandfather     No Known Problems Paternal Grandmother     No Known Problems Paternal Grandfather     No Known Problems Maternal Aunt     No Known Problems Maternal Aunt     No Known Problems Maternal Aunt     No Known Problems Maternal Aunt     No Known Problems Maternal Aunt     No Known Problems Paternal Aunt     No Known Problems Paternal Aunt     No Known Problems Paternal Aunt     No Known Problems  Paternal Aunt     No Known Problems Son        Past Surgical History:   Procedure Laterality Date    COLONOSCOPY  2016    ESOPHAGOGASTRODUODENOSCOPY  2016    EYE SURGERY      IR BIOPSY LUNG  05/18/2022    LARYNGOSCOPY      w/ Bx.     NY TENDON SHEATH INCISION Right 02/16/2021    Procedure: THUMB TRIGGER FINGER RELEASE;  Surgeon: Angeels Denton MD;  Location: [facility];  Service: Orthopedics    TUBAL LIGATION          reports that she quit smoking about 11 years ago. Her smoking use included cigarettes. She started smoking about 51 years ago. She has a 40 pack-year smoking history. She has never used smokeless tobacco. She reports that she does not drink alcohol and does not use drugs.      Current Outpatient Medications:     acetaminophen (TYLENOL) 325 mg tablet, Take 2 tablets (650 mg total) by mouth every 6 (six) hours as needed for mild pain, moderate pain, headaches or fever, Disp: , Rfl:     albuterol (2.5 mg/3 mL) 0.083 % nebulizer solution, Take 3 mL (2.5 mg total) by nebulization every 6 (six) hours as needed for wheezing or shortness of breath, Disp: 360 mL, Rfl: 2    albuterol (PROVENTIL HFA,VENTOLIN HFA) 90 mcg/act inhaler, Inhale 2 puffs every 6 (six) hours as needed for wheezing or shortness of breath, Disp: 18 g, Rfl: 5    amLODIPine (NORVASC) 5 mg tablet, Take 1 tablet (5 mg total) by mouth daily, Disp: 90 tablet, Rfl: 1    atorvastatin (LIPITOR) 40 mg tablet, Take 1 tablet (40 mg total) by mouth daily with dinner, Disp: 90 tablet, Rfl: 3    benzonatate (TESSALON) 200 MG capsule, Take 1 capsule (200 mg total) by mouth 3 (three) times a day as needed for cough, Disp: 90 capsule, Rfl: 0    brimonidine tartrate 0.2 % ophthalmic solution, Administer 1 drop to both eyes 3 (three) times a day, Disp: , Rfl:     Cholecalciferol (VITAMIN D3) 1,000 units tablet, Take 1,000 Units by mouth daily, Disp: , Rfl:     clindamycin 1 % gel, Apply topically 2 (two) times a day, Disp: 60 g, Rfl: 0     cyanocobalamin (VITAMIN B-12) 500 MCG tablet, Take 1 tablet (500 mcg total) by mouth daily, Disp: 90 tablet, Rfl: 1    famotidine (PEPCID) 20 mg tablet, Take 1 tablet (20 mg total) by mouth daily at bedtime, Disp: 30 tablet, Rfl: 0    fluticasone (FLONASE) 50 mcg/act nasal spray, SPRAY 1 SPRAY INTO EACH NOSTRIL EVERY DAY, Disp: 48 mL, Rfl: 1    ipratropium (ATROVENT) 0.06 % nasal spray, 2 SPRAYS INTO EACH NOSTRIL 3 (THREE) TIMES A DAY FOR INCREASED NASAL SECRETION, Disp: 15 mL, Rfl: 2    ipratropium-albuterol (DUO-NEB) 0.5-2.5 mg/3 mL nebulizer solution, Take 3 mL by nebulization every 6 (six) hours as needed for wheezing or shortness of breath, Disp: 300 mL, Rfl: 1    levothyroxine 75 mcg tablet, Take 1 tablet (75 mcg total) by mouth daily in the early morning, Disp: 90 tablet, Rfl: 1    montelukast (SINGULAIR) 10 mg tablet, Take 1 tablet (10 mg total) by mouth daily at bedtime, Disp: 90 tablet, Rfl: 3    omeprazole (PriLOSEC) 40 MG capsule, Take 1 capsule (40 mg total) by mouth 2 (two) times a day, Disp: 60 capsule, Rfl: 4    polyethylene glycol (GLYCOLAX) 17 GM/SCOOP powder, Drink 1 scoop with 8 oz of water once daily, Disp: 507 g, Rfl: 0    senna (SENOKOT) 8.6 mg, Take 1 tablet (8.6 mg total) by mouth 2 (two) times a day, Disp: 30 tablet, Rfl: 0    sertraline (ZOLOFT) 25 mg tablet, Take 1 tablet (25 mg total) by mouth daily at bedtime, Disp: 90 tablet, Rfl: 1    timolol (TIMOPTIC) 0.5 % ophthalmic solution, INSTILL ONE DROP TO BOTH EYES TWICE A DAY, Disp: , Rfl:     tiZANidine (ZANAFLEX) 2 mg tablet, Take 1 tablet (2 mg total) by mouth every 8 (eight) hours as needed for muscle spasms, Disp: 90 tablet, Rfl: 1    azithromycin (ZITHROMAX) 250 mg tablet, Take 1 tablet (250 mg total) by mouth 3 (three) times a week, Disp: 36 tablet, Rfl: 1    Budeson-Glycopyrrol-Formoterol (Breztri Aerosphere) 160-9-4.8 MCG/ACT AERO, Inhale 2 puffs 2 (two) times a day Rinse mouth after use., Disp: 10.7 g, Rfl: 3    The following  portions of the patient's history were reviewed and updated as appropriate: allergies, current medications, past family history, past medical history, past social history, past surgical history and problem list.    Review of Systems   Constitutional:  Negative for fatigue and fever.   HENT:  Negative for congestion, facial swelling, mouth sores, rhinorrhea, sore throat and trouble swallowing.    Eyes:  Negative for pain and redness.   Respiratory:  Positive for cough and shortness of breath. Negative for wheezing.    Cardiovascular:  Negative for chest pain, palpitations and leg swelling.   Gastrointestinal:  Positive for constipation. Negative for abdominal pain, blood in stool, diarrhea and nausea.   Genitourinary:  Negative for dysuria, hematuria and urgency.   Musculoskeletal:  Negative for arthralgias, back pain and myalgias.   Skin:  Negative for rash and wound.   Neurological:  Negative for seizures, syncope and headaches.   Hematological:  Negative for adenopathy.   Psychiatric/Behavioral:  Negative for agitation and behavioral problems.          PHQ-2/9 Depression Screening    Little interest or pleasure in doing things: 0 - not at all  Feeling down, depressed, or hopeless: 0 - not at all  Trouble falling or staying asleep, or sleeping too much: 0 - not at all  Feeling tired or having little energy: 0 - not at all  Poor appetite or overeatin - not at all  Feeling bad about yourself - or that you are a failure or have let yourself or your family down: 0 - not at all  Trouble concentrating on things, such as reading the newspaper or watching television: 0 - not at all  Moving or speaking so slowly that other people could have noticed. Or the opposite - being so fidgety or restless that you have been moving around a lot more than usual: 0 - not at all  Thoughts that you would be better off dead, or of hurting yourself in some way: 0 - not at all  PHQ-9 Score: 0  PHQ-9 Interpretation: No or Minimal  "depression             Objective:    /50 (BP Location: Right arm, Patient Position: Sitting, Cuff Size: Adult)   Pulse 88   Temp 97.7 °F (36.5 °C) (Tympanic)   Resp 22   Ht 5' 4\" (1.626 m)   Wt 80.3 kg (177 lb)   SpO2 (!) 86%   BMI 30.38 kg/m²      Physical Exam  Vitals and nursing note reviewed.   Constitutional:       Appearance: Normal appearance. She is well-developed. She is not ill-appearing.   HENT:      Head: Normocephalic and atraumatic.      Right Ear: External ear normal.      Left Ear: External ear normal.      Nose: Nose normal.      Mouth/Throat:      Mouth: Mucous membranes are moist.      Pharynx: No oropharyngeal exudate or posterior oropharyngeal erythema.   Eyes:      General: No scleral icterus.        Right eye: No discharge.         Left eye: No discharge.      Conjunctiva/sclera: Conjunctivae normal.   Cardiovascular:      Rate and Rhythm: Normal rate.      Heart sounds: No murmur heard.     No gallop.   Pulmonary:      Effort: Pulmonary effort is normal. No respiratory distress.      Breath sounds: Decreased air movement present. No stridor. Decreased breath sounds present. No wheezing, rhonchi or rales.      Comments: Oxygen via nasal cannula  Abdominal:      Palpations: Abdomen is soft.      Tenderness: There is no abdominal tenderness.   Musculoskeletal:         General: Tenderness (Left shoulder, limited range of motion cannot lift overhead) present. No deformity.      Right lower leg: No edema.      Left lower leg: No edema.   Skin:     Findings: No erythema or rash.   Neurological:      Mental Status: She is alert. Mental status is at baseline.   Psychiatric:         Behavior: Behavior normal.         Judgment: Judgment normal.           Recent Results (from the past 52 weeks)   Tissue Exam    Collection Time: 04/11/24  8:28 AM   Result Value Ref Range    Case Report       Surgical Pathology Report                         Case: [id number]                                "   Authorizing Provider:  Sidra Raymundo MD          Collected:           04/11/2024 0828              Ordering Location:     [facility]        Received:            04/11/2024 [address] [place] [address] Endoscopy                                                           Pathologist:           Jasiel Sosa MD                                                                Specimens:   A) - Polyp, Colorectal, ascending colon polyp hot snare                                             B) - Polyp, Colorectal, sigmoid colon polyp cold snare                                     Final Diagnosis       A.  Ascending colon polyp (hot snare):     - Tubular adenoma; negative for high-grade dysplasia.    B.  Sigmoid colon polyp (cold snare):     - Tubular adenoma; negative for high-grade dysplasia.       Additional Information       All reported additional testing was performed with appropriately reactive controls.  These tests were developed and their performance characteristics determined by [facility] or appropriate performing facility, though some tests may be performed on tissues which have not been validated for performance characteristics (such as staining performed on alcohol exposed cell blocks and decalcified tissues).  Results should be interpreted with caution and in the context of the patients’ clinical condition. These tests may not be cleared or approved by the U.S. Food and Drug Administration, though the FDA has determined that such clearance or approval is not necessary. These tests are used for clinical purposes and they should not be regarded as investigational or for research. This laboratory has been approved by CLIA 88, designated as a high-complexity laboratory and is qualified to perform these tests. Interpretation performed at [facility], [address] [place] [id number]..      Synoptic Checklist          COLON/RECTUM POLYP FORM - GI - All  "Specimens          :    Adenoma(s)      Gross Description       A. The specimen is received in formalin, labeled with the patient's name and hospital number, and is designated \" ascending colon polyp\".  The specimen consists of a single tan-pink polypoid tissue fragment measuring 0.6 x 0.4 x 0.4 cm.  The presumed margins of resection is inked blue and the specimen is bisected revealing grossly unremarkable cut surfaces.  Entirely submitted.  One screened cassette.    B. The specimen is received in formalin, labeled with the patient's name and hospital number, and is designated \" sigmoid colon polyp\".  The specimen consists of a single tan tissue fragment measuring 0.2 cm in greatest dimension.  The specimen is entirely submitted in a screened cassette.    Note: The estimated total formalin fixation time based upon information provided by the submitting clinician and the standard processing schedule is under 72 hours. Rocky Calderon     Adult Nebulizer Package    Collection Time: 06/07/24  9:05 AM   Result Value Ref Range    Supplier Name [employer] [facility]     Supplier Phone Number [phone]     Order Status Delivery Successful     Delivery Note      Delivery Request Date 06/07/2024     Date Delivered  07/02/2024     Item Description Nebulizer Compressor with Mask     Item Description Nebulizer Set, Reusable     Item Description       Adult Nebulizer Mask, 1 per 1 month a€“ Use as directed and discard 1 month after first use    Item Description Disposable Nebulizer Compressor Filter    ECG 12 lead    Collection Time: 06/19/24  5:15 PM   Result Value Ref Range    Ventricular Rate 91 BPM    Atrial Rate 91 BPM    [place] Interval 156 ms    QRSD Interval 90 ms    QT Interval 360 ms    QTC Interval 442 ms    P Axis 88 degrees    QRS Axis 73 degrees    T Wave Axis 75 degrees   CBC and differential    Collection Time: 06/19/24  5:48 PM   Result Value Ref Range    WBC 7.46 4.31 - 10.16 Thousand/uL    RBC " 3.91 3.81 - 5.12 Million/uL    Hemoglobin 10.6 (L) 11.5 - 15.4 g/dL    Hematocrit 36.1 34.8 - 46.1 %    MCV 92 82 - 98 fL    MCH 27.1 26.8 - 34.3 pg    MCHC 29.4 (L) 31.4 - 37.4 g/dL    RDW 13.1 11.6 - 15.1 %    MPV 10.0 8.9 - 12.7 fL    Platelets 137 (L) 149 - 390 Thousands/uL    nRBC 0 /100 WBCs    Segmented % 90 (H) 43 - 75 %    Immature Grans % 1 0 - 2 %    Lymphocytes % 4 (L) 14 - 44 %    Monocytes % 5 4 - 12 %    Eosinophils Relative 0 0 - 6 %    Basophils Relative 0 0 - 1 %    Absolute Neutrophils 6.73 1.85 - 7.62 Thousands/µL    Absolute Immature Grans 0.08 0.00 - 0.20 Thousand/uL    Absolute Lymphocytes 0.27 (L) 0.60 - 4.47 Thousands/µL    Absolute Monocytes 0.37 0.17 - 1.22 Thousand/µL    Eosinophils Absolute 0.00 0.00 - 0.61 Thousand/µL    Basophils Absolute 0.01 0.00 - 0.10 Thousands/µL   Comprehensive metabolic panel    Collection Time: 06/19/24  5:48 PM   Result Value Ref Range    Sodium 141 135 - 147 mmol/L    Potassium 4.5 3.5 - 5.3 mmol/L    Chloride 96 96 - 108 mmol/L    CO2 40 (H) 21 - 32 mmol/L    ANION GAP 5 4 - 13 mmol/L    BUN 18 5 - 25 mg/dL    Creatinine 0.98 0.60 - 1.30 mg/dL    Glucose 129 65 - 140 mg/dL    Calcium 9.3 8.4 - 10.2 mg/dL    AST 21 13 - 39 U/L    ALT 12 7 - 52 U/L    Alkaline Phosphatase 126 (H) 34 - 104 U/L    Total Protein 7.2 6.4 - 8.4 g/dL    Albumin 4.4 3.5 - 5.0 g/dL    Total Bilirubin 0.42 0.20 - 1.00 mg/dL    eGFR 59 ml/min/1.73sq m   B-Type Natriuretic Peptide(BNP)    Collection Time: 06/19/24  5:48 PM   Result Value Ref Range     (H) 0 - 100 pg/mL   High Sensitivity Troponin I Random    Collection Time: 06/19/24  5:48 PM   Result Value Ref Range    HS TnI random 5 (L) 8 - 18 ng/L   Smear Review(Phlebs Do Not Order)    Collection Time: 06/19/24  5:48 PM   Result Value Ref Range    RBC Morphology Present     Platelet Estimate Adequate Adequate    Basophilic Stippling Present    Procalcitonin    Collection Time: 06/19/24  5:48 PM   Result Value Ref Range     Procalcitonin <0.05 <=0.25 ng/ml   Blood gas, venous    Collection Time: 06/19/24  6:02 PM   Result Value Ref Range    pH, Jonas 7.220 (L) 7.300 - 7.400    pCO2, Jonas 99.1 (HH) 42.0 - 50.0 mm Hg    pO2, Jonas 42.2 35.0 - 45.0 mm Hg    HCO3, Jonas 39.6 (H) 24 - 30 mmol/L    Base Excess, Jonas 8.7 mmol/L    O2 Content, Jonas 12.1 ml/dL    O2 HGB, VENOUS 73.6 60.0 - 80.0 %   COVID/FLU/RSV    Collection Time: 06/19/24  8:10 PM    Specimen: Nose; Nares   Result Value Ref Range    SARS-CoV-2 Negative Negative    INFLUENZA A PCR Negative Negative    INFLUENZA B PCR Negative Negative    RSV PCR Negative Negative   ECG 12 lead    Collection Time: 06/19/24 10:40 PM   Result Value Ref Range    Ventricular Rate 94 BPM    Atrial Rate 94 BPM    [place] Interval 174 ms    QRSD Interval 90 ms    QT Interval 370 ms    QTC Interval 462 ms    P Axis 84 degrees    QRS Axis 63 degrees    T Wave Axis 76 degrees   Blood gas, venous    Collection Time: 06/20/24 10:21 AM   Result Value Ref Range    pH, Jonas 7.275 (L) 7.300 - 7.400    pCO2, Jonas 80.3 (HH) 42.0 - 50.0 mm Hg    pO2, Jonas 46.3 (H) 35.0 - 45.0 mm Hg    HCO3, Jonas 36.5 (H) 24 - 30 mmol/L    Base Excess, Jonas 7.4 mmol/L    O2 Content, Jonas 12.6 ml/dL    O2 HGB, VENOUS 80.6 (H) 60.0 - 80.0 %   Respiratory Panel 2.1(RP2)with COVID19    Collection Time: 06/20/24 12:49 PM    Specimen: Nasopharyngeal Swab   Result Value Ref Range    Adenovirus Not Detected Not Detected    Bordetella parapertussis Not Detected Not Detected    Bordetella pertussis Not Detected Not Detected    Chlamydia pneumoniae Not Detected Not detected    SARS-CoV-2 Not Detected Not Detected    Coronavirus 229E Not Detected Not Detected    Coronavirus HKU1 Not Detected Not Detected    Coronavirus NL63 Not Detected Not Detected    Coronavirus OC43 Not Detected Not Detected    Human Metapneumovirus Not Detected Not Detected    Rhino/Enterovirus Not Detected Not Detected    Influenza A Not Detected Not Detected    Influenza B Not Detected No  Detected    Mycoplasma pneumoniae Not Detected Not Detected    Parainfluenza 1 Not Detected Not Detected    Parainfluenza 2 Not Detected Not Detected    Parainfluenza 3 Detected (A) Not Detected    Parainfluenza 4 Not Detected Not Detected    Respiratory Syncytial Virus Not Detected Not Detected   Procalcitonin    Collection Time: 06/21/24  8:03 AM   Result Value Ref Range    Procalcitonin <0.05 <=0.25 ng/ml   CBC and differential    Collection Time: 06/21/24  8:03 AM   Result Value Ref Range    WBC 12.98 (H) 4.31 - 10.16 Thousand/uL    RBC 3.99 3.81 - 5.12 Million/uL    Hemoglobin 10.5 (L) 11.5 - 15.4 g/dL    Hematocrit 36.9 34.8 - 46.1 %    MCV 93 82 - 98 fL    MCH 26.3 (L) 26.8 - 34.3 pg    MCHC 28.5 (L) 31.4 - 37.4 g/dL    RDW 13.2 11.6 - 15.1 %    MPV 9.5 8.9 - 12.7 fL    Platelets 177 149 - 390 Thousands/uL    nRBC 0 /100 WBCs    Segmented % 90 (H) 43 - 75 %    Immature Grans % 1 0 - 2 %    Lymphocytes % 3 (L) 14 - 44 %    Monocytes % 6 4 - 12 %    Eosinophils Relative 0 0 - 6 %    Basophils Relative 0 0 - 1 %    Absolute Neutrophils 11.70 (H) 1.85 - 7.62 Thousands/µL    Absolute Immature Grans 0.10 0.00 - 0.20 Thousand/uL    Absolute Lymphocytes 0.37 (L) 0.60 - 4.47 Thousands/µL    Absolute Monocytes 0.80 0.17 - 1.22 Thousand/µL    Eosinophils Absolute 0.00 0.00 - 0.61 Thousand/µL    Basophils Absolute 0.01 0.00 - 0.10 Thousands/µL   Basic metabolic panel    Collection Time: 06/21/24  8:03 AM   Result Value Ref Range    Sodium 142 135 - 147 mmol/L    Potassium 4.8 3.5 - 5.3 mmol/L    Chloride 97 96 - 108 mmol/L    CO2 44 (H) 21 - 32 mmol/L    ANION GAP 1 (L) 4 - 13 mmol/L    BUN 25 5 - 25 mg/dL    Creatinine 1.12 0.60 - 1.30 mg/dL    Glucose 120 65 - 140 mg/dL    Calcium 9.7 8.4 - 10.2 mg/dL    eGFR 50 ml/min/1.73sq m   Smear Review(Phlebs Do Not Order)    Collection Time: 06/21/24  8:03 AM   Result Value Ref Range    RBC Morphology Present     Platelet Estimate Adequate Adequate    Basophilic Stippling  Present    POCT Blood Gas (CG8+)    Collection Time: 06/21/24  5:33 PM   Result Value Ref Range    pH, Art i-STAT 7.256 (L) 7.350 - 7.450    pCO2, Art i-STAT 91.8 (HH) 36.0 - 44.0 mm HG    pO2, ART i-STAT 74.0 (L) 75.0 - 129.0 mm HG    BE, i-STAT 11 (H) -2 - 3 mmol/L    HCO3, Art i-STAT 40.8 (HH) 22.0 - 28.0 mmol/L    CO2, i-STAT 44 (H) 21 - 32 mmol/L    O2 Sat, i-STAT 91 (H) 60 - 85 %    SODIUM, I-STAT 137 136 - 145 mmol/l    Potassium, i-STAT 4.7 3.5 - 5.3 mmol/L    Calcium, Ionized i-STAT 1.27 1.12 - 1.32 mmol/L    Hct, i-STAT 34 (L) 34.8 - 46.1 %    Hgb, i-STAT 11.6 11.5 - 15.4 g/dl    Glucose, i-STAT 130 65 - 140 mg/dl    Specimen Type ARTERIAL    Blood gas, arterial    Collection Time: 06/21/24  8:23 PM   Result Value Ref Range    pH, Arterial 7.276 (L) 7.350 - 7.450    pCO2, Arterial 81.4 (HH) 36.0 - 44.0 mm Hg    pO2, Arterial 95.9 75.0 - 129.0 mm Hg    HCO3, Arterial 37.0 (H) 22.0 - 28.0 mmol/L    Base Excess, Arterial 7.6 mmol/L    O2 Content, Arterial 16.6 16.0 - 23.0 mL/dL    O2 HGB,Arterial  96.9 94.0 - 97.0 %    SOURCE Radial, Left     SRAVAN TEST Yes     Non Vent type BIPAP BIPAP     IPAP 12     EPAP 6     BIPAP fio2 40 %   Blood gas, arterial    Collection Time: 06/22/24  4:36 AM   Result Value Ref Range    pH, Arterial 7.272 (L) 7.350 - 7.450    pCO2, Arterial 92.7 (HH) 36.0 - 44.0 mm Hg    pO2, Arterial 99.6 75.0 - 129.0 mm Hg    HCO3, Arterial 41.8 (H) 22.0 - 28.0 mmol/L    Base Excess, Arterial 11.5 mmol/L    O2 Content, Arterial 16.5 16.0 - 23.0 mL/dL    O2 HGB,Arterial  96.9 94.0 - 97.0 %    SRAVAN TEST Yes     Non Vent type BIPAP BIPAP     IPAP 12     EPAP 6     BIPAP fio2 40 %   Basic metabolic panel    Collection Time: 06/22/24  5:00 AM   Result Value Ref Range    Sodium 140 135 - 147 mmol/L    Potassium 4.2 3.5 - 5.3 mmol/L    Chloride 93 (L) 96 - 108 mmol/L    CO2 43 (H) 21 - 32 mmol/L    ANION GAP 4 4 - 13 mmol/L    BUN 34 (H) 5 - 25 mg/dL    Creatinine 1.16 0.60 - 1.30 mg/dL    Glucose 114  65 - 140 mg/dL    Calcium 9.3 8.4 - 10.2 mg/dL    eGFR 48 ml/min/1.73sq m   Blood gas, arterial    Collection Time: 06/22/24  3:03 PM   Result Value Ref Range    pH, Arterial 7.306 (L) 7.350 - 7.450    pCO2, Arterial 74.4 (HH) 36.0 - 44.0 mm Hg    pO2, Arterial 93.5 75.0 - 129.0 mm Hg    HCO3, Arterial 36.3 (H) 22.0 - 28.0 mmol/L    Base Excess, Arterial 7.8 mmol/L    O2 Content, Arterial 15.5 (L) 16.0 - 23.0 mL/dL    O2 HGB,Arterial  96.0 94.0 - 97.0 %    SOURCE Radial, Right     SRAVAN TEST Yes     Non Vent type BIPAP BIPAP     IPAP 14     EPAP 6     BIPAP fio2 40 %   Blood gas, arterial    Collection Time: 06/22/24  7:55 PM   Result Value Ref Range    pH, Arterial 7.320 (L) 7.350 - 7.450    pCO2, Arterial 79.5 (HH) 36.0 - 44.0 mm Hg    pO2, Arterial 105.7 75.0 - 129.0 mm Hg    HCO3, Arterial 40.0 (H) 22.0 - 28.0 mmol/L    Base Excess, Arterial 11.1 mmol/L    O2 Content, Arterial 16.2 16.0 - 23.0 mL/dL    O2 HGB,Arterial  96.7 94.0 - 97.0 %    SOURCE Radial, Left     SRAVAN TEST Yes     Non Vent type BIPAP     CBC and differential    Collection Time: 06/23/24  4:59 AM   Result Value Ref Range    WBC 6.12 4.31 - 10.16 Thousand/uL    RBC 3.70 (L) 3.81 - 5.12 Million/uL    Hemoglobin 9.9 (L) 11.5 - 15.4 g/dL    Hematocrit 34.2 (L) 34.8 - 46.1 %    MCV 92 82 - 98 fL    MCH 26.8 26.8 - 34.3 pg    MCHC 28.9 (L) 31.4 - 37.4 g/dL    RDW 13.3 11.6 - 15.1 %    MPV 9.8 8.9 - 12.7 fL    Platelets 153 149 - 390 Thousands/uL    nRBC 0 /100 WBCs    Segmented % 89 (H) 43 - 75 %    Immature Grans % 1 0 - 2 %    Lymphocytes % 7 (L) 14 - 44 %    Monocytes % 3 (L) 4 - 12 %    Eosinophils Relative 0 0 - 6 %    Basophils Relative 0 0 - 1 %    Absolute Neutrophils 5.50 1.85 - 7.62 Thousands/µL    Absolute Immature Grans 0.03 0.00 - 0.20 Thousand/uL    Absolute Lymphocytes 0.40 (L) 0.60 - 4.47 Thousands/µL    Absolute Monocytes 0.19 0.17 - 1.22 Thousand/µL    Eosinophils Absolute 0.00 0.00 - 0.61 Thousand/µL    Basophils Absolute 0.00 0.00  - 0.10 Thousands/µL   Procalcitonin    Collection Time: 06/23/24  4:59 AM   Result Value Ref Range    Procalcitonin <0.05 <=0.25 ng/ml   Basic metabolic panel    Collection Time: 06/23/24  4:59 AM   Result Value Ref Range    Sodium 141 135 - 147 mmol/L    Potassium 4.7 3.5 - 5.3 mmol/L    Chloride 94 (L) 96 - 108 mmol/L    CO2 43 (H) 21 - 32 mmol/L    ANION GAP 4 4 - 13 mmol/L    BUN 40 (H) 5 - 25 mg/dL    Creatinine 1.13 0.60 - 1.30 mg/dL    Glucose 125 65 - 140 mg/dL    Calcium 9.1 8.4 - 10.2 mg/dL    eGFR 49 ml/min/1.73sq m   Blood gas, arterial    Collection Time: 06/23/24 11:07 AM   Result Value Ref Range    pH, Arterial 7.329 (L) 7.350 - 7.450    pCO2, Arterial 74.1 (HH) 36.0 - 44.0 mm Hg    pO2, Arterial 76.5 75.0 - 129.0 mm Hg    HCO3, Arterial 38.1 (H) 22.0 - 28.0 mmol/L    Base Excess, Arterial 9.5 mmol/L    O2 Content, Arterial 16.5 16.0 - 23.0 mL/dL    O2 HGB,Arterial  93.5 (L) 94.0 - 97.0 %    SOURCE Radial, Right     Nasal Cannula 2L    CBC and differential    Collection Time: 06/24/24  6:09 AM   Result Value Ref Range    WBC 8.97 4.31 - 10.16 Thousand/uL    RBC 4.14 3.81 - 5.12 Million/uL    Hemoglobin 10.9 (L) 11.5 - 15.4 g/dL    Hematocrit 37.6 34.8 - 46.1 %    MCV 91 82 - 98 fL    MCH 26.3 (L) 26.8 - 34.3 pg    MCHC 29.0 (L) 31.4 - 37.4 g/dL    RDW 13.2 11.6 - 15.1 %    MPV 9.5 8.9 - 12.7 fL    Platelets 182 149 - 390 Thousands/uL    nRBC 0 /100 WBCs    Segmented % 86 (H) 43 - 75 %    Immature Grans % 1 0 - 2 %    Lymphocytes % 7 (L) 14 - 44 %    Monocytes % 6 4 - 12 %    Eosinophils Relative 0 0 - 6 %    Basophils Relative 0 0 - 1 %    Absolute Neutrophils 7.78 (H) 1.85 - 7.62 Thousands/µL    Absolute Immature Grans 0.06 0.00 - 0.20 Thousand/uL    Absolute Lymphocytes 0.63 0.60 - 4.47 Thousands/µL    Absolute Monocytes 0.49 0.17 - 1.22 Thousand/µL    Eosinophils Absolute 0.00 0.00 - 0.61 Thousand/µL    Basophils Absolute 0.01 0.00 - 0.10 Thousands/µL   Blood gas, arterial    Collection Time:  06/25/24  6:00 AM   Result Value Ref Range    pH, Arterial 7.363 7.350 - 7.450    pCO2, Arterial 73.1 (HH) 36.0 - 44.0 mm Hg    pO2, Arterial 124.1 75.0 - 129.0 mm Hg    HCO3, Arterial 40.6 (H) 22.0 - 28.0 mmol/L    Base Excess, Arterial 12.5 mmol/L    O2 Content, Arterial 16.4 16.0 - 23.0 mL/dL    O2 HGB,Arterial  98.3 (H) 94.0 - 97.0 %    SOURCE Radial, Right     SRAVAN TEST Yes     Nasal Cannula 2L NC    BiLevel PAP Package    Collection Time: 06/25/24  1:33 PM   Result Value Ref Range    Supplier Name [employer]     Supplier Phone Number [phone]     Order Status Pending Patient Contact     Delivery Note      Delivery Request Date 06/25/2024     Date Delivered  06/26/2024     Item Description BiLevel Machine, Resmed S10 Aircurve Auto BiLevel     Item Description       PAP Mask, Full Face, Fit Upon Setup, N/A, 1 per 3 months    Item Description       PAP Mask Interface Cushion, Full Face, 1 per 1 month    Item Description PAP Headgear, 1 per 6 months     Item Description PAP Humidifier, Heated     Item Description Disposable PAP Filter, 2 per 1 month     Item Description Non-Disposable PAP Filter, 1 per 6 months     Item Description PAP Machine Tubing, Heated, 1 per 3 months     Item Description PAP Monitoring Modem     Item Description PAP Chinstrap, 1 per 6 months    Fingerstick Glucose (POCT)    Collection Time: 08/01/24  9:36 AM   Result Value Ref Range    POC Glucose 101 65 - 140 mg/dl   POCT Rapid Covid Ag    Collection Time: 12/19/24  1:56 PM   Result Value Ref Range    POCT SARS-CoV-2 Ag Positive (A) Negative    VALID CONTROL Valid    POCT rapid flu A and B    Collection Time: 12/19/24  1:56 PM   Result Value Ref Range    RAPID FLU A neg     RAPID FLU B neg    CBC and differential    Collection Time: 02/23/25  6:13 PM   Result Value Ref Range    WBC 6.63 4.31 - 10.16 Thousand/uL    RBC 3.92 3.81 - 5.12 Million/uL    Hemoglobin 10.3 (L) 11.5 - 15.4 g/dL    Hematocrit 35.5 34.8  "- 46.1 %    MCV 91 82 - 98 fL    MCH 26.3 (L) 26.8 - 34.3 pg    MCHC 29.0 (L) 31.4 - 37.4 g/dL    RDW 14.2 11.6 - 15.1 %    MPV 10.2 8.9 - 12.7 fL    Platelets 180 149 - 390 Thousands/uL    nRBC 0 /100 WBCs    Segmented % 77 (H) 43 - 75 %    Immature Grans % 0 0 - 2 %    Lymphocytes % 11 (L) 14 - 44 %    Monocytes % 7 4 - 12 %    Eosinophils Relative 4 0 - 6 %    Basophils Relative 1 0 - 1 %    Absolute Neutrophils 5.10 1.85 - 7.62 Thousands/µL    Absolute Immature Grans 0.02 0.00 - 0.20 Thousand/uL    Absolute Lymphocytes 0.72 0.60 - 4.47 Thousands/µL    Absolute Monocytes 0.49 0.17 - 1.22 Thousand/µL    Eosinophils Absolute 0.26 0.00 - 0.61 Thousand/µL    Basophils Absolute 0.04 0.00 - 0.10 Thousands/µL   Basic metabolic panel    Collection Time: 02/23/25  6:13 PM   Result Value Ref Range    Sodium 139 135 - 147 mmol/L    Potassium 5.1 3.5 - 5.3 mmol/L    Chloride 98 96 - 108 mmol/L    CO2 36 (H) 21 - 32 mmol/L    ANION GAP 5 4 - 13 mmol/L    BUN 24 5 - 25 mg/dL    Creatinine 1.18 0.60 - 1.30 mg/dL    Glucose 94 65 - 140 mg/dL    Calcium 9.3 8.4 - 10.2 mg/dL    eGFR 47 ml/min/1.73sq m   Lipase    Collection Time: 02/23/25  6:13 PM   Result Value Ref Range    Lipase 21 11 - 82 u/L   HS Troponin 0hr (reflex protocol)    Collection Time: 02/23/25  6:13 PM   Result Value Ref Range    hs TnI 0hr 3 \"Refer to ACS Flowchart\"- see link ng/L   TIBC Panel (incl. Iron, TIBC, % Iron Saturation)    Collection Time: 02/23/25  6:13 PM   Result Value Ref Range    Iron Saturation 23 15 - 50 %    TIBC 376.6 250 - 450 ug/dL    Iron 88 50 - 212 ug/dL    Transferrin 269 203 - 362 mg/dL    UIBC 289 155 - 355 ug/dL   Ferritin    Collection Time: 02/23/25  6:13 PM   Result Value Ref Range    Ferritin 32 11 - 307 ng/mL   Hepatic function panel    Collection Time: 02/23/25  6:13 PM   Result Value Ref Range    Total Bilirubin 0.41 0.20 - 1.00 mg/dL    Bilirubin, Direct 0.08 0.00 - 0.20 mg/dL    Alkaline Phosphatase 131 (H) 34 - 104 U/L    " AST 24 13 - 39 U/L    ALT 8 7 - 52 U/L    Total Protein 7.2 6.4 - 8.4 g/dL    Albumin 4.0 3.5 - 5.0 g/dL   ECG 12 lead    Collection Time: 02/23/25  6:42 PM   Result Value Ref Range    Ventricular Rate 62 BPM    Atrial Rate 62 BPM    NE Interval 158 ms    QRSD Interval 90 ms    QT Interval 410 ms    QTC Interval 416 ms    P Axis 79 degrees    QRS Axis 56 degrees    T Wave Axis 0 degrees   COVID/FLU/RSV    Collection Time: 02/23/25  8:54 PM    Specimen: Nose; Nares   Result Value Ref Range    SARS-CoV-2 Negative Negative    INFLUENZA A PCR Negative Negative    INFLUENZA B PCR Negative Negative    RSV PCR Negative Negative   High Sensitivity Troponin I Random    Collection Time: 02/23/25 10:27 PM   Result Value Ref Range    HS TnI random 3 (L) 8 - 18 ng/L   Comprehensive metabolic panel    Collection Time: 02/24/25  4:31 AM   Result Value Ref Range    Sodium 140 135 - 147 mmol/L    Potassium 4.9 3.5 - 5.3 mmol/L    Chloride 99 96 - 108 mmol/L    CO2 36 (H) 21 - 32 mmol/L    ANION GAP 5 4 - 13 mmol/L    BUN 27 (H) 5 - 25 mg/dL    Creatinine 1.11 0.60 - 1.30 mg/dL    Glucose 120 65 - 140 mg/dL    Calcium 9.3 8.4 - 10.2 mg/dL    AST 16 13 - 39 U/L    ALT 7 7 - 52 U/L    Alkaline Phosphatase 114 (H) 34 - 104 U/L    Total Protein 6.8 6.4 - 8.4 g/dL    Albumin 4.0 3.5 - 5.0 g/dL    Total Bilirubin 0.32 0.20 - 1.00 mg/dL    eGFR 50 ml/min/1.73sq m   Magnesium    Collection Time: 02/24/25  4:31 AM   Result Value Ref Range    Magnesium 2.1 1.9 - 2.7 mg/dL   CBC and differential    Collection Time: 02/24/25  4:31 AM   Result Value Ref Range    WBC 5.67 4.31 - 10.16 Thousand/uL    RBC 3.83 3.81 - 5.12 Million/uL    Hemoglobin 10.0 (L) 11.5 - 15.4 g/dL    Hematocrit 33.4 (L) 34.8 - 46.1 %    MCV 87 82 - 98 fL    MCH 26.1 (L) 26.8 - 34.3 pg    MCHC 29.9 (L) 31.4 - 37.4 g/dL    RDW 13.9 11.6 - 15.1 %    MPV 10.4 8.9 - 12.7 fL    Platelets 181 149 - 390 Thousands/uL    nRBC 0 /100 WBCs    Segmented % 88 (H) 43 - 75 %    Immature  Grans % 1 0 - 2 %    Lymphocytes % 9 (L) 14 - 44 %    Monocytes % 2 (L) 4 - 12 %    Eosinophils Relative 0 0 - 6 %    Basophils Relative 0 0 - 1 %    Absolute Neutrophils 4.97 1.85 - 7.62 Thousands/µL    Absolute Immature Grans 0.04 0.00 - 0.20 Thousand/uL    Absolute Lymphocytes 0.53 (L) 0.60 - 4.47 Thousands/µL    Absolute Monocytes 0.10 (L) 0.17 - 1.22 Thousand/µL    Eosinophils Absolute 0.01 0.00 - 0.61 Thousand/µL    Basophils Absolute 0.02 0.00 - 0.10 Thousands/µL   UA w Reflex to Microscopic w Reflex to Culture    Collection Time: 02/24/25  1:34 PM    Specimen: Urine, Clean Catch   Result Value Ref Range    Color, UA Light Yellow     Clarity, UA Clear     Specific Gravity, UA 1.025 1.003 - 1.030    pH, UA 5.5 4.5, 5.0, 5.5, 6.0, 6.5, 7.0, 7.5, 8.0    Leukocytes, UA Negative Negative    Nitrite, UA Negative Negative    Protein, UA Negative Negative mg/dl    Glucose, UA Negative Negative mg/dl    Ketones, UA Negative Negative mg/dl    Urobilinogen, UA <2.0 <2.0 mg/dl mg/dl    Bilirubin, UA Negative Negative    Occult Blood, UA Negative Negative   Echo complete w/ contrast if indicated    Collection Time: 02/24/25  3:33 PM   Result Value Ref Range    RAA A4C 12.2 cm2    LA Volume Index (BP) 23.1 mL/m2    LV Diastolic Volume (BP) 73 mL    LV Systolic Volume (BP) 31 mL    MV Peak A Khang 0.79 m/s    MV stenosis pressure 1/2 time 50 ms    MV Peak E Khang 81 cm/s    E wave deceleration time 172 ms    E/A ratio 1.03     MV valve area p 1/2 method 4.40     RVID d 3.5 cm    A4C EF 58 %    Left ventricular stroke volume (2D) 65.00 mL    IVSd 1.20 cm    Tricuspid annular plane systolic excursion 2.60 cm    Ao root 2.60 cm    LVPWd 1.30 cm    LA size 3.8 cm    LA volume (BP) 43 mL    EF 58 %    FS 38 28 - 44    LVIDS 2.80 cm    IVS 1.2 cm    LVIDd 4.50 cm    LA length (A2C) 4.90 cm    LEFT VENTRICLE SYSTOLIC VOLUME (MOD BIPLANE) 2D 28 mL    LV DIASTOLIC VOLUME (MOD BIPLANE) 2D 94 mL    Left Atrium Area-systolic Four Chamber  16 cm2    Left Atrium Area-systolic Apical Two Chamber 16.3 cm2    MV E' Tissue Velocity Septal 8 cm/s    LVSV, 2D 65 mL    BSA 1.86 m2    LV Diastolic Volume Index (BP) 39.2 mL/m2    LV Systolic Volume Index (BP) 16.7 mL/m2    LV EF 60    CBC    Collection Time: 02/25/25  7:37 AM   Result Value Ref Range    WBC 5.94 4.31 - 10.16 Thousand/uL    RBC 3.94 3.81 - 5.12 Million/uL    Hemoglobin 10.4 (L) 11.5 - 15.4 g/dL    Hematocrit 34.4 (L) 34.8 - 46.1 %    MCV 87 82 - 98 fL    MCH 26.4 (L) 26.8 - 34.3 pg    MCHC 30.2 (L) 31.4 - 37.4 g/dL    RDW 14.2 11.6 - 15.1 %    Platelets 187 149 - 390 Thousands/uL    MPV 9.7 8.9 - 12.7 fL   Comprehensive metabolic panel    Collection Time: 02/25/25  7:37 AM   Result Value Ref Range    Sodium 140 135 - 147 mmol/L    Potassium 4.0 3.5 - 5.3 mmol/L    Chloride 95 (L) 96 - 108 mmol/L    CO2 40 (H) 21 - 32 mmol/L    ANION GAP 5 4 - 13 mmol/L    BUN 32 (H) 5 - 25 mg/dL    Creatinine 1.37 (H) 0.60 - 1.30 mg/dL    Glucose 92 65 - 140 mg/dL    Calcium 9.3 8.4 - 10.2 mg/dL    AST 17 13 - 39 U/L    ALT 8 7 - 52 U/L    Alkaline Phosphatase 113 (H) 34 - 104 U/L    Total Protein 7.1 6.4 - 8.4 g/dL    Albumin 4.3 3.5 - 5.0 g/dL    Total Bilirubin 0.43 0.20 - 1.00 mg/dL    eGFR 39 ml/min/1.73sq m   Magnesium    Collection Time: 02/25/25  7:37 AM   Result Value Ref Range    Magnesium 2.1 1.9 - 2.7 mg/dL   Phosphorus    Collection Time: 02/25/25  7:37 AM   Result Value Ref Range    Phosphorus 3.9 2.3 - 4.1 mg/dL   Lipase    Collection Time: 02/25/25  6:06 PM   Result Value Ref Range    Lipase 34 11 - 82 u/L   High Sensitivity Troponin I Random    Collection Time: 02/25/25  6:06 PM   Result Value Ref Range    HS TnI random 3 (L) 8 - 18 ng/L   ECG 12 lead    Collection Time: 02/25/25  6:20 PM   Result Value Ref Range    Ventricular Rate 70 BPM    Atrial Rate 70 BPM    AR Interval 150 ms    QRSD Interval 86 ms    QT Interval 418 ms    QTC Interval 451 ms    P Axis 84 degrees    QRS Axis 41 degrees     T Wave Axis 75 degrees   ECG 12 lead    Collection Time: 02/25/25  6:23 PM   Result Value Ref Range    Ventricular Rate 68 BPM    Atrial Rate 68 BPM    MN Interval 154 ms    QRSD Interval 80 ms    QT Interval 412 ms    QTC Interval 439 ms    P Axis 82 degrees    QRS Axis 31 degrees    T Wave Axis 40 degrees   CBC    Collection Time: 02/26/25  5:23 AM   Result Value Ref Range    WBC 6.06 4.31 - 10.16 Thousand/uL    RBC 3.94 3.81 - 5.12 Million/uL    Hemoglobin 10.6 (L) 11.5 - 15.4 g/dL    Hematocrit 35.0 34.8 - 46.1 %    MCV 89 82 - 98 fL    MCH 26.9 26.8 - 34.3 pg    MCHC 30.3 (L) 31.4 - 37.4 g/dL    RDW 14.2 11.6 - 15.1 %    Platelets 165 149 - 390 Thousands/uL    MPV 9.8 8.9 - 12.7 fL   Comprehensive metabolic panel    Collection Time: 02/26/25  5:23 AM   Result Value Ref Range    Sodium 140 135 - 147 mmol/L    Potassium 4.5 3.5 - 5.3 mmol/L    Chloride 98 96 - 108 mmol/L    CO2 37 (H) 21 - 32 mmol/L    ANION GAP 5 4 - 13 mmol/L    BUN 29 (H) 5 - 25 mg/dL    Creatinine 1.28 0.60 - 1.30 mg/dL    Glucose 92 65 - 140 mg/dL    Calcium 9.1 8.4 - 10.2 mg/dL    AST 16 13 - 39 U/L    ALT 7 7 - 52 U/L    Alkaline Phosphatase 109 (H) 34 - 104 U/L    Total Protein 6.9 6.4 - 8.4 g/dL    Albumin 4.1 3.5 - 5.0 g/dL    Total Bilirubin 0.44 0.20 - 1.00 mg/dL    eGFR 42 ml/min/1.73sq m   Magnesium    Collection Time: 02/26/25  5:23 AM   Result Value Ref Range    Magnesium 2.1 1.9 - 2.7 mg/dL   Phosphorus    Collection Time: 02/26/25  5:23 AM   Result Value Ref Range    Phosphorus 4.0 2.3 - 4.1 mg/dL   CBC    Collection Time: 02/27/25  4:54 AM   Result Value Ref Range    WBC 4.64 4.31 - 10.16 Thousand/uL    RBC 3.76 (L) 3.81 - 5.12 Million/uL    Hemoglobin 9.9 (L) 11.5 - 15.4 g/dL    Hematocrit 33.7 (L) 34.8 - 46.1 %    MCV 90 82 - 98 fL    MCH 26.3 (L) 26.8 - 34.3 pg    MCHC 29.4 (L) 31.4 - 37.4 g/dL    RDW 14.3 11.6 - 15.1 %    Platelets 136 (L) 149 - 390 Thousands/uL    MPV 10.3 8.9 - 12.7 fL   Comprehensive metabolic panel     "Collection Time: 02/27/25  4:54 AM   Result Value Ref Range    Sodium 140 135 - 147 mmol/L    Potassium 4.7 3.5 - 5.3 mmol/L    Chloride 102 96 - 108 mmol/L    CO2 35 (H) 21 - 32 mmol/L    ANION GAP 3 (L) 4 - 13 mmol/L    BUN 21 5 - 25 mg/dL    Creatinine 1.10 0.60 - 1.30 mg/dL    Glucose 88 65 - 140 mg/dL    Calcium 8.9 8.4 - 10.2 mg/dL    AST 17 13 - 39 U/L    ALT 7 7 - 52 U/L    Alkaline Phosphatase 112 (H) 34 - 104 U/L    Total Protein 6.4 6.4 - 8.4 g/dL    Albumin 3.9 3.5 - 5.0 g/dL    Total Bilirubin 0.38 0.20 - 1.00 mg/dL    eGFR 51 ml/min/1.73sq m   Magnesium    Collection Time: 02/27/25  4:54 AM   Result Value Ref Range    Magnesium 2.5 1.9 - 2.7 mg/dL   Phosphorus    Collection Time: 02/27/25  4:54 AM   Result Value Ref Range    Phosphorus 3.0 2.3 - 4.1 mg/dL   Alkaline phosphatase, isoenzymes    Collection Time: 02/27/25  4:54 AM   Result Value Ref Range    Alk Phos Isoenzymes 131 (H) 44 - 121 IU/L    Alk Phos Liver Fract 49 18 - 85 %    Alk Phos Bone Fract 37 14 - 68 %    Alk Phos Intestine Fract 14 0 - 18 %       Laboratory Results: I have personally reviewed the pertinent laboratory results/reports            Disclaimer: Portions of the record may have been created with voice recognition software. Occasional wrong word or \"sound a like\" substitutions may have occurred due to the inherent limitations of voice recognition software. Read the chart carefully and recognize, using context, where substitutions have occurred. I have used the Epic copy/forward function to compose this note. I have reviewed my current note to ensure it reflects the current patient status, exam, assessment and plan.  "

## 2025-03-24 NOTE — ASSESSMENT & PLAN NOTE
Orders:    atorvastatin (LIPITOR) 40 mg tablet; Take 1 tablet (40 mg total) by mouth daily with dinner

## 2025-03-24 NOTE — ASSESSMENT & PLAN NOTE
Lab Results   Component Value Date    EGFR 51 02/27/2025    EGFR 42 02/26/2025    EGFR 39 02/25/2025    CREATININE 1.10 02/27/2025    CREATININE 1.28 02/26/2025    CREATININE 1.37 (H) 02/25/2025   Creatinine improving to baseline

## 2025-03-24 NOTE — ASSESSMENT & PLAN NOTE
Start daily MiraLAX 1 scoop in 8 ounces of water Orders:    polyethylene glycol (GLYCOLAX) 17 GM/SCOOP powder; Drink 1 scoop with 8 oz of water once daily

## 2025-03-24 NOTE — ASSESSMENT & PLAN NOTE
continue Zoloft At bedtime  Orders:    sertraline (ZOLOFT) 25 mg tablet; Take 1 tablet (25 mg total) by mouth daily at bedtime

## 2025-03-24 NOTE — ASSESSMENT & PLAN NOTE
controlled on amlodipine  Orders:    amLODIPine (NORVASC) 5 mg tablet; Take 1 tablet (5 mg total) by mouth daily

## 2025-03-24 NOTE — ASSESSMENT & PLAN NOTE
Orders:    sertraline (ZOLOFT) 25 mg tablet; Take 1 tablet (25 mg total) by mouth daily at bedtime

## 2025-03-25 ENCOUNTER — RESULTS FOLLOW-UP (OUTPATIENT)
Dept: FAMILY MEDICINE CLINIC | Facility: CLINIC | Age: 70
End: 2025-03-25

## 2025-03-25 DIAGNOSIS — G89.29 CHRONIC LEFT SHOULDER PAIN: Primary | ICD-10-CM

## 2025-03-25 DIAGNOSIS — M25.512 CHRONIC LEFT SHOULDER PAIN: Primary | ICD-10-CM

## 2025-03-30 DIAGNOSIS — K59.04 CHRONIC IDIOPATHIC CONSTIPATION: ICD-10-CM

## 2025-03-31 ENCOUNTER — HOSPITAL ENCOUNTER (OUTPATIENT)
Dept: CT IMAGING | Facility: HOSPITAL | Age: 70
Discharge: HOME | End: 2025-03-31
Payer: COMMERCIAL

## 2025-03-31 DIAGNOSIS — Z85.21 HISTORY OF LARYNGEAL CANCER: ICD-10-CM

## 2025-03-31 DIAGNOSIS — R91.8 MULTIPLE LUNG NODULES: ICD-10-CM

## 2025-03-31 DIAGNOSIS — R91.1 LUNG NODULE: ICD-10-CM

## 2025-03-31 PROCEDURE — 70491 CT SOFT TISSUE NECK W/DYE: CPT

## 2025-03-31 PROCEDURE — 71260 CT THORAX DX C+: CPT

## 2025-03-31 RX ORDER — POLYETHYLENE GLYCOL 3350 17 G/17G
POWDER, FOR SOLUTION ORAL
Qty: 507 G | Refills: 0 | OUTPATIENT
Start: 2025-03-31

## 2025-03-31 RX ADMIN — IOHEXOL 70 ML: 350 INJECTION, SOLUTION INTRAVENOUS at 07:23

## 2025-04-04 ENCOUNTER — RESULTS FOLLOW-UP (OUTPATIENT)
Dept: HEMATOLOGY ONCOLOGY | Facility: CLINIC | Age: 70
End: 2025-04-04

## 2025-04-10 ENCOUNTER — OFFICE VISIT (OUTPATIENT)
Dept: HEMATOLOGY ONCOLOGY | Facility: CLINIC | Age: 70
End: 2025-04-10
Payer: COMMERCIAL

## 2025-04-10 VITALS
TEMPERATURE: 97.5 F | BODY MASS INDEX: 30.39 KG/M2 | RESPIRATION RATE: 18 BRPM | HEIGHT: 64 IN | DIASTOLIC BLOOD PRESSURE: 50 MMHG | SYSTOLIC BLOOD PRESSURE: 102 MMHG | HEART RATE: 73 BPM | OXYGEN SATURATION: 92 % | WEIGHT: 178 LBS

## 2025-04-10 DIAGNOSIS — R91.8 MULTIPLE LUNG NODULES: Primary | ICD-10-CM

## 2025-04-10 DIAGNOSIS — C14.0 CANCER OF PHARYNX (HCC): ICD-10-CM

## 2025-04-10 PROCEDURE — 99214 OFFICE O/P EST MOD 30 MIN: CPT | Performed by: INTERNAL MEDICINE

## 2025-04-10 NOTE — ASSESSMENT & PLAN NOTE
No evidence of recurrence    Advanced COPD  Orders:    NM PET CT skull base to mid thigh; Future

## 2025-04-10 NOTE — ASSESSMENT & PLAN NOTE
68-year-old  female with remote history of head and neck cancer in 2016 treated with radiation and chemotherapy, subsequent CT scan showed nodules in the left upper lobe, right lower lobe, AP window however PET scan does not show any active uptake this is most likely reactive/aspiration    PET scan on 4/2025 showed increase in the right lower lung nodule with solid component, will do PET scan         Orders:    NM PET CT skull base to mid thigh; Future

## 2025-04-10 NOTE — PROGRESS NOTES
Name: Noreen Alvarez      : 1955      MRN: [id number]  Encounter Provider: Denise Muñoz MD  Encounter Date: 4/10/2025   Encounter department: [facility]  :  Assessment & Plan  Multiple lung nodules  68-year-old  female with remote history of head and neck cancer in 2016 treated with radiation and chemotherapy, subsequent CT scan showed nodules in the left upper lobe, right lower lobe, AP window however PET scan does not show any active uptake this is most likely reactive/aspiration    PET scan on 2025 showed increase in the right lower lung nodule with solid component, will do PET scan         Orders:    NM PET CT skull base to mid thigh; Future    Cancer of pharynx (HCC)  No evidence of recurrence    Advanced COPD  Orders:    NM PET CT skull base to mid thigh; Future        No follow-ups on file.    History of Present Illness   Chief Complaint   Patient presents with    Follow-up     History of Present Illness  Noreen Alvarez is a 69-year-old  female seen for initial consultation 2021 by Dr. Julio re: Nonspecific lung findings and mesenteric adenopathy on a PET-CT scan with history of head and neck malignancy in 2016.     History  COPD, previous smoking, hiatal hernia, she had a history of head and neck cancer in 2016 treated with chemotherapy and radiation therapy by Dr. Brooks without any evidence of recurrence thereafter     Subsequently she had lung nodules as such CAT scan in  showed left upper lobe lung nodule measuring 1.2 cm and AP window lymph node. PET scan did not show any active uptake favoring benign etiology versus aspiration     22 bx of right pulmonary nodule- Minute foci of atypical glandular proliferations.  differential includes atypical adenomatous hyperplasia, adenocarcinoma in-situ, or a low-grade invasive adenocarcinoma.     CT scan of the chest on 3/1/2024 showed moderate upper lobe emphysema, unchanged 1.1 cm right  lower lobe groundglass nodule unchanged 1 cm left lower lobe solid nodule, no new enlarging or suspicious pulmonary nodules     3/4/24 EGD pathology - - Negative for intestinal metaplasia, eosinophilic esophagitis, dysplasia and malignancy     4/2024 colonoscopy-  polyps removed.  No dysplasia or malignancy.      8/1/24 pet/ct - Focal FDG uptake in the right mid lung anteromedially, SUV max of 4.4. This corresponds to a new area of somewhat nodular consolidation on CT measuring up to 1.5 cm in size.    Stable or smaller mediastinal lymph nodes. Left subcarinal lymph node now measures 7 mm short axis, previously 1 cm. AP window lymph node measures 1 cm short axis, unchanged.     The 1.6 cm right lower lobe groundglass nodule does not demonstrate focal uptake.     There is a 1.1 cm lobular nodule in the left lower lobe posteriorly without focal uptake, stable from prior imaging. This was also previously negative on prior PET/CT.        IR cannot perform biopsy due to location.      8/7/24 Referral made to thoracic surgery for biopsy of lung nodule.     8/14/24 Saw PCP-  referred to ENT as prior ENT no longer takes her insurance.      8/22/24 f/u appointment made to review pet/ct     9/13/24 CT chest compared to PET/CT 8/1/2024, CTA chest 6/19/2024, CT chest 2/26/2022, 6/16/2017 -      There is no tracheal or endobronchial lesion. Biapical pleural-parenchymal scarring. Emphysematous changes of lungs. Medial basilar right upper lobe, right middle lobe, and lingular atelectasis versus scarring. Pulmonary nodules as noted on series  302:  -Stable 1.6 x 1.0 cm right lower lobe groundglass nodule (image 107)  -Stable 1.5 x 1.1 cm right lower lobe groundglass nodule (image 137)  -Resolution of previously seen posterior right lower lobe tree-in-bud nodularity and right basilar nodular opacity  -Stable 1.2 x 1.1 cm left lower lobe nodule (image 186)    CAT scan on April 2025 showed increase in the size of the right upper lobe  "mass with nodular component         Review of Systems   Constitutional:  Negative for chills and fever.   HENT:  Negative for ear pain and sore throat.    Eyes:  Negative for pain and visual disturbance.   Respiratory:  Positive for wheezing. Negative for cough and shortness of breath.    Cardiovascular:  Negative for chest pain and palpitations.   Gastrointestinal:  Negative for abdominal pain and vomiting.   Genitourinary:  Negative for dysuria and hematuria.   Musculoskeletal:  Negative for arthralgias and back pain.   Skin:  Negative for color change and rash.   Neurological:  Negative for seizures and syncope.   All other systems reviewed and are negative.          Objective   /50 (Patient Position: Sitting, Cuff Size: Large)   Pulse 73   Temp 97.5 °F (36.4 °C) (Temporal)   Resp 18   Ht 5' 4\" (1.626 m)   Wt 80.7 kg (178 lb)   SpO2 92%   BMI 30.55 kg/m²     Pain Screening:  Pain Score: 0-No pain  ECOG   2  Physical Exam  Vitals reviewed.   Constitutional:       General: She is not in acute distress.     Appearance: She is well-developed. She is not diaphoretic.   HENT:      Head: Normocephalic and atraumatic.   Eyes:      Conjunctiva/sclera: Conjunctivae normal.   Neck:      Trachea: No tracheal deviation.   Cardiovascular:      Rate and Rhythm: Normal rate and regular rhythm.      Heart sounds: No murmur heard.     No friction rub. No gallop.   Pulmonary:      Effort: Pulmonary effort is normal. No respiratory distress.      Breath sounds: Normal breath sounds. No wheezing or rales.   Chest:      Chest wall: No tenderness.   Abdominal:      General: There is no distension.      Palpations: Abdomen is soft.      Tenderness: There is no abdominal tenderness.   Musculoskeletal:      Cervical back: Normal range of motion and neck supple.      Right lower leg: No edema.      Left lower leg: No edema.   Lymphadenopathy:      Cervical: No cervical adenopathy.   Skin:     General: Skin is warm and dry.     "  Coloration: Skin is not pale.      Findings: No erythema.   Neurological:      Mental Status: She is alert.   Psychiatric:         Behavior: Behavior normal.         Thought Content: Thought content normal.       Physical Exam      Results    Labs: I have reviewed the following labs:  Lab Results   Component Value Date/Time    WBC 4.64 02/27/2025 04:54 AM    RBC 3.76 (L) 02/27/2025 04:54 AM    Hemoglobin 9.9 (L) 02/27/2025 04:54 AM    Hematocrit 33.7 (L) 02/27/2025 04:54 AM    MCV 90 02/27/2025 04:54 AM    MCH 26.3 (L) 02/27/2025 04:54 AM    RDW 14.3 02/27/2025 04:54 AM    Platelets 136 (L) 02/27/2025 04:54 AM    Segmented % 88 (H) 02/24/2025 04:31 AM    Lymphocytes % 9 (L) 02/24/2025 04:31 AM    Monocytes % 2 (L) 02/24/2025 04:31 AM    Eosinophils Relative 0 02/24/2025 04:31 AM    Basophils Relative 0 02/24/2025 04:31 AM    Immature Grans % 1 02/24/2025 04:31 AM    Absolute Neutrophils 4.97 02/24/2025 04:31 AM     Lab Results   Component Value Date/Time    Potassium 4.7 02/27/2025 04:54 AM    Chloride 102 02/27/2025 04:54 AM    CO2 35 (H) 02/27/2025 04:54 AM    BUN 21 02/27/2025 04:54 AM    Creatinine 1.10 02/27/2025 04:54 AM    Calcium 8.9 02/27/2025 04:54 AM    AST 17 02/27/2025 04:54 AM    ALT 7 02/27/2025 04:54 AM    Alkaline Phosphatase 112 (H) 02/27/2025 04:54 AM    Total Protein 6.4 02/27/2025 04:54 AM    Albumin 3.9 02/27/2025 04:54 AM    Total Bilirubin 0.38 02/27/2025 04:54 AM    eGFR 51 02/27/2025 04:54 AM

## 2025-04-25 DIAGNOSIS — J43.2 CENTRILOBULAR EMPHYSEMA (HCC): ICD-10-CM

## 2025-04-25 RX ORDER — IPRATROPIUM BROMIDE AND ALBUTEROL SULFATE 2.5; .5 MG/3ML; MG/3ML
3 SOLUTION RESPIRATORY (INHALATION) EVERY 6 HOURS PRN
Qty: 270 ML | Refills: 2 | Status: SHIPPED | OUTPATIENT
Start: 2025-04-25

## 2025-04-26 RX ORDER — AZITHROMYCIN 250 MG/1
250 TABLET, FILM COATED ORAL 3 TIMES WEEKLY
Qty: 36 TABLET | Refills: 2 | Status: SHIPPED | OUTPATIENT
Start: 2025-04-28 | End: 2025-10-25

## 2025-04-26 RX ORDER — BUDESONIDE, GLYCOPYRROLATE, AND FORMOTEROL FUMARATE 160; 9; 4.8 UG/1; UG/1; UG/1
2 AEROSOL, METERED RESPIRATORY (INHALATION) 2 TIMES DAILY
Qty: 10.7 G | Refills: 3 | Status: SHIPPED | OUTPATIENT
Start: 2025-04-26

## 2025-04-28 ENCOUNTER — OFFICE VISIT (OUTPATIENT)
Dept: OBGYN CLINIC | Facility: CLINIC | Age: 70
End: 2025-04-28
Payer: COMMERCIAL

## 2025-04-28 VITALS — BODY MASS INDEX: 30.39 KG/M2 | HEIGHT: 64 IN | WEIGHT: 178 LBS

## 2025-04-28 DIAGNOSIS — M25.512 CHRONIC LEFT SHOULDER PAIN: ICD-10-CM

## 2025-04-28 DIAGNOSIS — E03.9 HYPOTHYROIDISM, UNSPECIFIED TYPE: ICD-10-CM

## 2025-04-28 DIAGNOSIS — M75.02 ADHESIVE CAPSULITIS OF LEFT SHOULDER: Primary | ICD-10-CM

## 2025-04-28 DIAGNOSIS — M75.22 BICEPS TENDINITIS OF LEFT SHOULDER: ICD-10-CM

## 2025-04-28 DIAGNOSIS — G89.29 CHRONIC LEFT SHOULDER PAIN: ICD-10-CM

## 2025-04-28 PROCEDURE — 99204 OFFICE O/P NEW MOD 45 MIN: CPT | Performed by: STUDENT IN AN ORGANIZED HEALTH CARE EDUCATION/TRAINING PROGRAM

## 2025-04-28 RX ORDER — TIMOLOL MALEATE 5 MG/ML
SOLUTION/ DROPS OPHTHALMIC
Refills: 0 | OUTPATIENT
Start: 2025-04-28

## 2025-04-28 RX ORDER — BRIMONIDINE TARTRATE 2 MG/ML
1 SOLUTION/ DROPS OPHTHALMIC 3 TIMES DAILY
Refills: 0 | OUTPATIENT
Start: 2025-04-28

## 2025-04-28 NOTE — PROGRESS NOTES
Ortho Sports Medicine Shoulder  Visit     Assesment:   69 y.o. female left shoulder adhesive capsulitis and left biceps tendon in the setting of hypothyroidism    Assessment & Plan  Adhesive capsulitis of left shoulder  We reviewed their current history, physical exam, and imaging in detail today with the patient.  This is consistent with left shoulder adhesive capsulitis.  We discussed that the treatment recommendations for this are physical therapy as well as intra-articular corticosteroid injection. Discussed attending formal physical therapy for strengthening, range of motion, and at home exercise program.  PT prescription provided to the patient today.  A referral was placed today to Dr. Quezada for intra-articular corticosteroid injection.  All of the patient's questions were answered today, they are agreeable to this plan.  They will follow-up in clinic in 3 months for reevaluation.    Orders:    Ambulatory Referral to Physical Therapy; Future    Ambulatory Referral to Orthopedic Surgery; Future    Hypothyroidism, unspecified type         Biceps tendinitis of left shoulder            [id number] was used for today's visit.    Conservative treatment:    Ice to shoulder 1-2 times daily, for 20 minutes at a time.  PT for ROM and strengthening to shoulder, rotator cuff, scapular stabilizers.      Imaging:    All imaging from today was reviewed by myself and explained to the patient.       Injection:    No Injection planned at this time.      Surgery:     No surgery is recommended at this point, continue with conservative treatment plan as noted.      Follow up:    Return in about 3 months (around 7/28/2025).      Chief Complaint   Patient presents with    Left Shoulder - Pain         History of Present Illness:    She presets for initial evaluation of her left shoulder.  She is referred by Dr. Hernandez for consultation of her left shoulder.  She reports that her pain started 1 month ago.  She denies any  previous injuries or injections into the shoulder.  She reports pain with any activity of the shoulder as well as pain at rest.  Her pain radiates from her trapezius to her elbow at times.  She has not had any physical therapy for her shoulder.  She does require oxygen, and gets short of breath with heavy activity.  She denies any previous shoulder surgeries.  She does have a history of hypothyroidism.  She presents to clinic today for initial evaluation.    Shoulder Surgical History:  None    Past Medical, Social and Family History:  Past Medical History:   Diagnosis Date    Acute kidney failure (HCC)     Allergic     Allergies     Anemia     Anxiety     Asthma     Cancer (HCC)     Cataract     Chronic kidney disease (CKD), stage III (moderate) (HCC)     COPD (chronic obstructive pulmonary disease) (HCC)     COVID-19     Covid + 9-8-76-cough at that time now fully resolved    Depression     Disease of thyroid gland     Essential hypertension     GERD (gastroesophageal reflux disease)     Glaucoma     High blood pressure     HTN (hypertension) 02/16/2021    Hyperlipidemia     Hypothyroidism     Memory loss     Mesenteric lymphadenopathy 07/13/2021    Pulmonary hypertension (HCC)     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     AL TENDON SHEATH INCISION Right 02/16/2021    Procedure: THUMB TRIGGER FINGER RELEASE;  Surgeon: Angeles Denton MD;  Location: [facility];  Service: Orthopedics    TUBAL LIGATION       Allergies   Allergen Reactions    Cefdinir Hives    Amifostine Rash    Pollen Extract Allergic Rhinitis     Current Outpatient Medications on File Prior to Visit   Medication Sig Dispense Refill    acetaminophen (TYLENOL) 325 mg tablet Take 2 tablets  (650 mg total) by mouth every 6 (six) hours as needed for mild pain, moderate pain, headaches or fever      albuterol (2.5 mg/3 mL) 0.083 % nebulizer solution Take 3 mL (2.5 mg total) by nebulization every 6 (six) hours as needed for wheezing or shortness of breath 360 mL 2    albuterol (PROVENTIL HFA,VENTOLIN HFA) 90 mcg/act inhaler Inhale 2 puffs every 6 (six) hours as needed for wheezing or shortness of breath 18 g 5    amLODIPine (NORVASC) 5 mg tablet Take 1 tablet (5 mg total) by mouth daily 90 tablet 1    atorvastatin (LIPITOR) 40 mg tablet Take 1 tablet (40 mg total) by mouth daily with dinner 90 tablet 3    azithromycin (ZITHROMAX) 250 mg tablet Take 1 tablet (250 mg total) by mouth 3 (three) times a week Clarification: 250 mg tablet every other day to prevent COPD exacerbations 36 tablet 2    benzonatate (TESSALON) 200 MG capsule Take 1 capsule (200 mg total) by mouth 3 (three) times a day as needed for cough 90 capsule 0    brimonidine tartrate 0.2 % ophthalmic solution Administer 1 drop to both eyes 3 (three) times a day      Budeson-Glycopyrrol-Formoterol (Breztri Aerosphere) 160-9-4.8 MCG/ACT AERO Inhale 2 puffs 2 (two) times a day Rinse mouth after use. 10.7 g 3    Cholecalciferol (VITAMIN D3) 1,000 units tablet Take 1,000 Units by mouth daily      clindamycin 1 % gel Apply topically 2 (two) times a day 60 g 0    cyanocobalamin (VITAMIN B-12) 500 MCG tablet Take 1 tablet (500 mcg total) by mouth daily 90 tablet 1    fluticasone (FLONASE) 50 mcg/act nasal spray SPRAY 1 SPRAY INTO EACH NOSTRIL EVERY DAY 48 mL 1    ipratropium (ATROVENT) 0.06 % nasal spray 2 SPRAYS INTO EACH NOSTRIL 3 (THREE) TIMES A DAY FOR INCREASED NASAL SECRETION 15 mL 2    ipratropium-albuterol (DUO-NEB) 0.5-2.5 mg/3 mL nebulizer solution Take 3 mL by nebulization every 6 (six) hours as needed for wheezing or shortness of breath 270 mL 2    levothyroxine 75 mcg tablet Take 1 tablet (75 mcg total) by mouth daily in the early morning  90 tablet 1    montelukast (SINGULAIR) 10 mg tablet Take 1 tablet (10 mg total) by mouth daily at bedtime 90 tablet 3    omeprazole (PriLOSEC) 40 MG capsule Take 1 capsule (40 mg total) by mouth 2 (two) times a day 60 capsule 4    polyethylene glycol (GLYCOLAX) 17 GM/SCOOP powder Drink 1 scoop with 8 oz of water once daily 507 g 0    senna (SENOKOT) 8.6 mg Take 1 tablet (8.6 mg total) by mouth 2 (two) times a day 30 tablet 0    sertraline (ZOLOFT) 25 mg tablet Take 1 tablet (25 mg total) by mouth daily at bedtime 90 tablet 1    timolol (TIMOPTIC) 0.5 % ophthalmic solution INSTILL ONE DROP TO BOTH EYES TWICE A DAY      tiZANidine (ZANAFLEX) 2 mg tablet Take 1 tablet (2 mg total) by mouth every 8 (eight) hours as needed for muscle spasms 90 tablet 1    famotidine (PEPCID) 20 mg tablet Take 1 tablet (20 mg total) by mouth daily at bedtime 30 tablet 0     No current facility-administered medications on file prior to visit.     Social History     Socioeconomic History    Marital status: /Civil Union     Spouse name: Not on file    Number of children: Not on file    Years of education: Not on file    Highest education level: Not on file   Occupational History    Not on file   Tobacco Use    Smoking status: Former     Current packs/day: 0.00     Average packs/day: 1 pack/day for 40.0 years (40.0 ttl pk-yrs)     Types: Cigarettes     Start date: 1974     Quit date: 2014     Years since quittin.3    Smokeless tobacco: Never   Vaping Use    Vaping status: Never Used   Substance and Sexual Activity    Alcohol use: Never     Comment: None    Drug use: Never     Comment: Denies    Sexual activity: Not Currently     Partners: Female     Birth control/protection: Surgical   Other Topics Concern    Not on file   Social History Narrative    Most recent tobacco use screenin2020    Do you currently or have you served in the [employer]: No    Were you activated, into active duty, as a member of the  [employer] or as a Reservist: No    Marital status:     Sexual orientation: Heterosexual    Exercise level: Occasional    Diet: Regular    General stress level: Low    Has smoked since age: 19    Alcohol intake: None    Caffeine intake: Occasional    Chewing tobacco: none    Illicit drugs: Denies    Guns present in home: No    Seat belts used routinely: Yes    Sunscreen used routinely: Yes    Smoke alarm in home: Yes    Advance directive: No    Salt Intake: HTN Diet    Has the Patient had a mammogram to screen for breast cancer within 24 months: Yes    Would the patient like to schedule a Mammogram: No    Is the patient interested in a colorectal cancer screening: No    Live alone or with others: with others    Sexually active: No    Do you feel safe at home: Yes     Social Drivers of Health     Financial Resource Strain: Medium Risk (1/30/2024)    Overall Financial Resource Strain (CARDIA)     Difficulty of Paying Living Expenses: Somewhat hard   Food Insecurity: No Food Insecurity (2/24/2025)    Nursing - Inadequate Food Risk Classification     Worried About Running Out of Food in the Last Year: Never true     Ran Out of Food in the Last Year: Never true     Ran Out of Food in the Last Year: Never true   Transportation Needs: No Transportation Needs (2/24/2025)    Nursing - Transportation Risk Classification     Lack of Transportation: Not on file     Lack of Transportation: No   Physical Activity: Not on file   Stress: Not on file   Social Connections: Not on file   Intimate Partner Violence: Patient Unable To Answer (2/24/2025)    Nursing IPS     Feels Physically and Emotionally Safe: Not on file     Physically Hurt by Someone: Not on file     Humiliated or Emotionally Abused by Someone: Not on file     Physically Hurt by Someone: Patient unable to answer     Hurt or Threatened by Someone: Patient unable to answer   Housing Stability: Unknown (2/24/2025)    Nursing: Inadequate Housing Risk  "Classification     Has Housing: Not on file     Worried About Losing Housing: Not on file     Unable to Get Utilities: Not on file     Unable to Pay for Housing in the Last Year: No     Has Housin       I have reviewed the past medical, surgical, social and family history, medications and allergies as documented in the EMR.    Review of systems: ROS is negative other than that noted in the HPI.  Constitutional: Negative for fatigue and fever.      Physical Exam:    Height 5' 4\" (1.626 m), weight 80.7 kg (178 lb).    General/Constitutional: NAD, well developed, well nourished  HENT: Normocephalic, atraumatic  CV: Intact distal pulses, regular rate  Resp: No respiratory distress or labored breathing  GI: Soft and non-tender   Lymphatic: No lymphadenopathy palpated  Neuro: Alert and Oriented x 3, no focal deficits  Psych: Normal mood, normal affect, normal judgement, normal behavior  Skin: Warm, dry, no rashes, no erythema      Shoulder focused exam:       RIGHT LEFT    Scapula Atrophy Negative Negative     Winging Negative Negative     Protraction Negative Negative    ROM Passive      80     ER0  60 40     IRb T12    Sacrum     TTP: AC Negative Negative     Biceps Negative Negative     Coracoid Negative Negative    Special Tests: O'Briens Negative Negative     Speeds  Negative Positive    Instability: Apprehension & relocation not tested not tested     Load & shift not tested not tested    ROM active  FF  ER 0  170  65 60  45        UE NV Exam: +2 Radial pulses bilaterally  Sensation intact to light touch C5-T1 bilaterally, Radial/median/ulnar nerve motor intact    Cervical ROM is full without pain, numbness or tingling      Shoulder Imaging      I personally reviewed and interpreted 2 views of the left shoulder from 3/20/2025 which were reviewed in clinic with the patient in detail today.  This demonstrates no acute fracture or or osseous abnormality.  There is evidence of a possible calcification along the " superior lateral humeral head.  I have reviewed the radiology report and agree with their interpretation.    Scribe Attestation      I,:  Jade Serrano am acting as a scribe while in the presence of the attending physician.:       I,:  Arturo Thurman MD personally performed the services described in this documentation    as scribed in my presence.:

## 2025-05-05 ENCOUNTER — TELEPHONE (OUTPATIENT)
Dept: FAMILY MEDICINE CLINIC | Facility: CLINIC | Age: 70
End: 2025-05-05

## 2025-05-05 DIAGNOSIS — M46.1 SACROILIAC INFLAMMATION (HCC): Primary | ICD-10-CM

## 2025-05-05 NOTE — TELEPHONE ENCOUNTER
Daughter Ashley tried to get a refill for her mom Noreen for Diclofenac cream, but she didn't see this listed on her chart.?  Can this please be refilled?  [facility] [address]

## 2025-05-12 ENCOUNTER — HOSPITAL ENCOUNTER (OUTPATIENT)
Dept: RADIOLOGY | Age: 70
Discharge: HOME | End: 2025-05-12

## 2025-05-20 ENCOUNTER — OFFICE VISIT (OUTPATIENT)
Dept: OBGYN CLINIC | Facility: CLINIC | Age: 70
End: 2025-05-20
Payer: COMMERCIAL

## 2025-05-20 VITALS — BODY MASS INDEX: 30.39 KG/M2 | WEIGHT: 178 LBS | HEIGHT: 64 IN

## 2025-05-20 DIAGNOSIS — G89.29 CHRONIC LEFT SHOULDER PAIN: Primary | ICD-10-CM

## 2025-05-20 DIAGNOSIS — M25.512 CHRONIC LEFT SHOULDER PAIN: Primary | ICD-10-CM

## 2025-05-20 PROCEDURE — 20611 DRAIN/INJ JOINT/BURSA W/US: CPT | Performed by: PHYSICAL MEDICINE & REHABILITATION

## 2025-05-20 PROCEDURE — 99204 OFFICE O/P NEW MOD 45 MIN: CPT | Performed by: PHYSICAL MEDICINE & REHABILITATION

## 2025-05-20 RX ORDER — TRIAMCINOLONE ACETONIDE 40 MG/ML
80 INJECTION, SUSPENSION INTRA-ARTICULAR; INTRAMUSCULAR
Status: COMPLETED | OUTPATIENT
Start: 2025-05-20 | End: 2025-05-20

## 2025-05-20 RX ORDER — ROPIVACAINE HYDROCHLORIDE 5 MG/ML
10 INJECTION, SOLUTION EPIDURAL; INFILTRATION; PERINEURAL
Status: COMPLETED | OUTPATIENT
Start: 2025-05-20 | End: 2025-05-20

## 2025-05-20 RX ADMIN — TRIAMCINOLONE ACETONIDE 80 MG: 40 INJECTION, SUSPENSION INTRA-ARTICULAR; INTRAMUSCULAR at 10:30

## 2025-05-20 RX ADMIN — ROPIVACAINE HYDROCHLORIDE 10 ML: 5 INJECTION, SOLUTION EPIDURAL; INFILTRATION; PERINEURAL at 10:30

## 2025-05-20 NOTE — PROGRESS NOTES
Assessment & Plan  Chronic left shoulder pain  This is a patient referred by Dr. Thurman's team with left shoulder pain likely due to adhesive capsulitis and calcific RTC tendinopathy.  The patient is a good candidate for ultrasound guided glenohumeral joint steroid injection.  Please see procedure note below.  Patient tolerated the procedure and had improvement in proximal shoulder pain.  She reported referred RTC pain that persisted.  She would benefit from formal PT.  Can consider subacromial injection if continued symptoms.  Orders:    Large joint arthrocentesis: L glenohumeral    Imaging Studies (I personally reviewed images in PACS and report):  Left shoulder x-rays most recent to this encounter reviewed.  These images show no acute osseous abnormalities or severe degeneration.    Return if symptoms worsen or fail to improve.    Patient is in agreement with the above plan.    HPI:  Noreen Alvarez is a 70 y.o. female  who presents for evaluation of   Chief Complaint   Patient presents with    Left Shoulder - Pain, Injections       History of present illness from referring clinician's notes reviewed.  Chronic pain for the past 1-2 months without a recent injury.  The pain is throughout the shoulder.  It is worse with movements.  She has had no recent treatment.  She has yet to start PT.    Following history reviewed and updated:  Past Medical History:   Diagnosis Date    Acute kidney failure (HCC)     Allergic     Allergies     Anemia     Anxiety     Asthma     Cancer (HCC)     Cataract     Chronic kidney disease (CKD), stage III (moderate) (HCC)     COPD (chronic obstructive pulmonary disease) (HCC)     COVID-19     Covid + 0-0-99-cough at that time now fully resolved    Depression     Disease of thyroid gland     Essential hypertension     GERD (gastroesophageal reflux disease)     Glaucoma     High blood pressure     HTN (hypertension) 02/16/2021    Hyperlipidemia     Hypothyroidism     Memory loss      "Mesenteric lymphadenopathy 07/13/2021    Pulmonary hypertension (HCC)     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     NC TENDON SHEATH INCISION Right 02/16/2021    Procedure: THUMB TRIGGER FINGER RELEASE;  Surgeon: Angeles Denton MD;  Location: [facility];  Service: Orthopedics    TUBAL LIGATION       Social History   Social History     Substance and Sexual Activity   Alcohol Use Never    Comment: None     Social History     Substance and Sexual Activity   Drug Use Never    Comment: Denies     Tobacco Use History[1]  Family History   Problem Relation Age of Onset    Other Mother         respiratory disorder    Asthma Mother     COPD Mother     Depression Mother     Sudden death Father         shot himself    Suicidality Father     Brain cancer Sister     Diabetes Sister     Breast cancer Sister     Cancer Sister 62        pancreatic cancer    No Known Problems Sister     No Known Problems Sister     No Known Problems Sister     No Known Problems Sister     No Known Problems Sister     No Known Problems Sister     No Known Problems Daughter     No Known Problems Daughter     No Known Problems Maternal Grandmother     No Known Problems Maternal Grandfather     No Known Problems Paternal Grandmother     No Known Problems Paternal Grandfather     No Known Problems Maternal Aunt     No Known Problems Maternal Aunt     No Known Problems Maternal Aunt     No Known Problems Maternal Aunt     No Known Problems Maternal Aunt     No Known Problems Paternal Aunt     No Known Problems Paternal Aunt     No Known Problems Paternal Aunt     No Known Problems Paternal Aunt     No Known Problems Son      Allergies[2]     Constitutional:  Ht 5' 4.02\" (1.626 m)   Wt 80.7 kg " (178 lb)   BMI 30.54 kg/m²    General: NAD.  Eyes: Anicteric sclerae.  Neck: Supple.  Lungs: Unlabored breathing.  Cardiovascular: No lower extremity edema.  Skin: Intact without erythema.  Neurologic: Sensation intact to light touch.  Psychiatric: Mood and affect are appropriate.    Left Shoulder Exam     Tenderness   The patient is experiencing tenderness in the acromion and biceps tendon.    Range of Motion   Active abduction:  90 abnormal   Passive abduction:  100 abnormal   Forward flexion:  80 abnormal     Tests   Hernández test: positive  Impingement: positive    Other   Erythema: absent  Scars: absent  Sensation: normal  Pulse: present              Large joint arthrocentesis: L glenohumeral    Performed by: Shakir Quezada DO  Authorized by: Shakir Quezada DO    Universal Protocol:  Procedure performed by:  Consent: Verbal consent obtained. Written consent not obtained  Consent given by: patient  Timeout called at: 5/20/2025 10:06 AM.  Patient understanding: patient states understanding of the procedure being performed  Site marked: the operative site was marked  Radiology Images displayed and confirmed. If images not available, report reviewed: imaging studies available  Patient identity confirmed: verbally with patient  Supporting Documentation  Indications: pain and diagnostic evaluation     Is this a Visco injection? NoProcedure Details  Location: shoulder - L glenohumeral  Ultrasound guidance: yes (US guidance was used to find the area of interest.)  Medications administered: 80 mg triamcinolone acetonide 40 mg/mL; 10 mL ropivacaine 0.5 %    Patient tolerance: patient tolerated the procedure well with no immediate complications  Dressing:  Sterile dressing applied    The left glenohumeral joint was visualized with ultrasound and injected with steroid/anesthetic solution as indicated.    Prior to the injection, the ultrasound was used to evaluate for any neural or vascular structures.  Care was taken to  avoid these structures.    The images (and video if taken) were saved to the [employer] ultrasound system.    Risks of this procedure include:    - Risk of bleeding since a needle is involved.  - Risk of infection (1/10,000 chance as per recent studies).  Signs/symptoms were discussed and they would prompt an urgent evaluation at an emergency department.  - Risk of pigmentation or skin dimpling in the skin (2-3% chance as per recent studies) from the steroid.  - Risk of increased pain from steroid flare (1% chance as per recent studies) that typically lasts 24-48 hours.  - Risk of increased blood sugars from the steroid medication that can last for a few weeks.  If the patient is a diabetic or pre-diabetic, they were encouraged to closely monitor their blood sugars and discuss with PCP if elevated more than usual or if having symptoms.    We decided that the benefits outweigh the risks and so we proceeded with the procedure.                           [1]   Social History  Tobacco Use   Smoking Status Former    Current packs/day: 0.00    Average packs/day: 1 pack/day for 40.0 years (40.0 ttl pk-yrs)    Types: Cigarettes    Start date: 1974    Quit date: 2014    Years since quittin.3   Smokeless Tobacco Never   [2]   Allergies  Allergen Reactions    Cefdinir Hives    Amifostine Rash    Pollen Extract Allergic Rhinitis

## 2025-05-28 ENCOUNTER — HOSPITAL ENCOUNTER (OUTPATIENT)
Dept: RADIOLOGY | Age: 70
Discharge: HOME | End: 2025-05-28
Attending: INTERNAL MEDICINE
Payer: COMMERCIAL

## 2025-05-28 DIAGNOSIS — R91.8 MULTIPLE LUNG NODULES: ICD-10-CM

## 2025-05-28 DIAGNOSIS — D38.1 NEOPLASM OF UNCERTAIN BEHAVIOR OF RIGHT LOWER LOBE OF LUNG: ICD-10-CM

## 2025-05-28 DIAGNOSIS — C14.0 CANCER OF PHARYNX (HCC): ICD-10-CM

## 2025-05-28 LAB — GLUCOSE SERPL-MCNC: 82 MG/DL (ref 65–140)

## 2025-05-28 PROCEDURE — 78815 PET IMAGE W/CT SKULL-THIGH: CPT

## 2025-05-28 PROCEDURE — 82948 REAGENT STRIP/BLOOD GLUCOSE: CPT

## 2025-05-28 PROCEDURE — A9552 F18 FDG: HCPCS

## 2025-05-28 NOTE — LETTER
[facility]  [address] [place] [address]      June 5, 2025    MRN: [id number]     Phone: [phone]     Dear Ms. Alvarez,    You recently had a(n) Nuclear Medicine performed on 5/28/2025 at  [facility] that was requested by Denise Muñoz MD. The study was reviewed by a radiologist, which is a physician who specializes in medical imaging. The radiologist issued a report describing his or her findings. In that report there was a finding that the radiologist felt warranted further discussion with your health care provider and that discussion would be beneficial to you.      The results were sent to Denise Muñoz MD on 05/30/2025  9:25 AM. We recommend that you contact Denise Muñoz MD at [phone] or set up an appointment to discuss the results of the imaging test. If you have already heard from Denies Muñoz MD regarding the results of your study, you can disregard this letter.     This letter is not meant to alarm you, but intended to encourage you to follow-up on your results with the provider that sent you for the imaging study. In addition, we have enclosed answers to frequently asked questions by other patients who have also received a letter to review results with their health care provider (see page two).      Thank you for choosing [facility] for your medical imaging needs.                                                                                                                                                        FREQUENTLY ASKED QUESTIONS    Why am I receiving this letter?  [place] State Law requires us to notify patients who have findings on imaging exams that may require more testing or follow-up with a health professional within the next 3 months.        How serious is the finding on the imaging test?  This letter is sent to all patients who may need follow-up or more testing within the next 3 months.   Receiving this letter does not necessarily mean you have a life-threatening imaging finding or disease.  Recommendations in the radiologist’s imaging report are general in nature and it is up to your healthcare provider to say whether those recommendations make sense for your situation.  You are strongly encouraged to talk to your health care provider about the results and ask whether additional steps need to be taken.    Where can I get a copy of the final report for my recent radiology exam?  To get a full copy of the report you can access your records online at [email or web address] or please contact [facility]’s Medical Records Department at [phone] Monday through Friday between 8 am and 6 pm.         What do I need to do now?           Please contact your health care provider who requested the imaging study to discuss what further actions (if any) are needed.  You may have already heard from (your ordering provider) in regard to this test in which case you can disregard this letter.        NOTICE IN ACCORDANCE WITH THE [place] STATE “PATIENT TEST RESULT INFORMATION ACT OF 2018”    You are receiving this notice as a result of a determination by your diagnostic imaging service that further discussions of your test results are warranted and would be beneficial to you.    The complete results of your test or tests have been or will be sent to the health care practitioner that ordered the test or tests. It is recommended that you contact your health care practitioner to discuss your results as soon as possible.

## 2025-06-24 ENCOUNTER — OFFICE VISIT (OUTPATIENT)
Dept: FAMILY MEDICINE CLINIC | Facility: CLINIC | Age: 70
End: 2025-06-24
Payer: COMMERCIAL

## 2025-06-24 VITALS
RESPIRATION RATE: 20 BRPM | TEMPERATURE: 97.3 F | DIASTOLIC BLOOD PRESSURE: 48 MMHG | HEART RATE: 80 BPM | HEIGHT: 64 IN | SYSTOLIC BLOOD PRESSURE: 104 MMHG | WEIGHT: 176.6 LBS | BODY MASS INDEX: 30.15 KG/M2 | OXYGEN SATURATION: 95 %

## 2025-06-24 DIAGNOSIS — M54.41 CHRONIC RIGHT-SIDED LOW BACK PAIN WITH RIGHT-SIDED SCIATICA: ICD-10-CM

## 2025-06-24 DIAGNOSIS — F41.8 DEPRESSION WITH ANXIETY: ICD-10-CM

## 2025-06-24 DIAGNOSIS — J45.50 SEVERE PERSISTENT ASTHMA WITHOUT COMPLICATION: ICD-10-CM

## 2025-06-24 DIAGNOSIS — I10 PRIMARY HYPERTENSION: ICD-10-CM

## 2025-06-24 DIAGNOSIS — Z00.00 MEDICARE ANNUAL WELLNESS VISIT, SUBSEQUENT: Primary | ICD-10-CM

## 2025-06-24 DIAGNOSIS — M46.1 SACROILIAC INFLAMMATION (HCC): ICD-10-CM

## 2025-06-24 DIAGNOSIS — F41.1 GAD (GENERALIZED ANXIETY DISORDER): ICD-10-CM

## 2025-06-24 DIAGNOSIS — J43.2 CENTRILOBULAR EMPHYSEMA (HCC): ICD-10-CM

## 2025-06-24 DIAGNOSIS — M54.2 NECK PAIN: ICD-10-CM

## 2025-06-24 DIAGNOSIS — E03.9 HYPOTHYROIDISM, UNSPECIFIED TYPE: ICD-10-CM

## 2025-06-24 DIAGNOSIS — G89.29 CHRONIC RIGHT-SIDED LOW BACK PAIN WITH RIGHT-SIDED SCIATICA: ICD-10-CM

## 2025-06-24 PROCEDURE — 99214 OFFICE O/P EST MOD 30 MIN: CPT | Performed by: FAMILY MEDICINE

## 2025-06-24 PROCEDURE — G2211 COMPLEX E/M VISIT ADD ON: HCPCS | Performed by: FAMILY MEDICINE

## 2025-06-24 PROCEDURE — G0439 PPPS, SUBSEQ VISIT: HCPCS | Performed by: FAMILY MEDICINE

## 2025-06-24 RX ORDER — ALBUTEROL SULFATE 90 UG/1
2 INHALANT RESPIRATORY (INHALATION) EVERY 6 HOURS PRN
Qty: 18 G | Refills: 5 | Status: SHIPPED | OUTPATIENT
Start: 2025-06-24

## 2025-06-24 RX ORDER — SERTRALINE HYDROCHLORIDE 25 MG/1
25 TABLET, FILM COATED ORAL
Qty: 90 TABLET | Refills: 1 | Status: SHIPPED | OUTPATIENT
Start: 2025-06-24

## 2025-06-24 RX ORDER — AMLODIPINE BESYLATE 5 MG/1
5 TABLET ORAL DAILY
Qty: 90 TABLET | Refills: 1 | Status: SHIPPED | OUTPATIENT
Start: 2025-06-24

## 2025-06-24 RX ORDER — IPRATROPIUM BROMIDE AND ALBUTEROL SULFATE 2.5; .5 MG/3ML; MG/3ML
3 SOLUTION RESPIRATORY (INHALATION) EVERY 6 HOURS PRN
Qty: 270 ML | Refills: 2 | Status: SHIPPED | OUTPATIENT
Start: 2025-06-24

## 2025-06-24 RX ORDER — LEVOTHYROXINE SODIUM 75 UG/1
75 TABLET ORAL
Qty: 90 TABLET | Refills: 1 | Status: SHIPPED | OUTPATIENT
Start: 2025-06-24

## 2025-06-24 RX ORDER — ALBUTEROL SULFATE 0.83 MG/ML
2.5 SOLUTION RESPIRATORY (INHALATION) EVERY 6 HOURS PRN
Qty: 360 ML | Refills: 2 | Status: SHIPPED | OUTPATIENT
Start: 2025-06-24

## 2025-06-24 RX ORDER — TIZANIDINE 2 MG/1
2 TABLET ORAL EVERY 8 HOURS PRN
Qty: 90 TABLET | Refills: 1 | Status: SHIPPED | OUTPATIENT
Start: 2025-06-24

## 2025-06-24 RX ORDER — BUDESONIDE, GLYCOPYRROLATE, AND FORMOTEROL FUMARATE 160; 9; 4.8 UG/1; UG/1; UG/1
2 AEROSOL, METERED RESPIRATORY (INHALATION) 2 TIMES DAILY
Qty: 10.7 G | Refills: 3 | Status: SHIPPED | OUTPATIENT
Start: 2025-06-24

## 2025-06-24 NOTE — PROGRESS NOTES
Name: Noreen Alvarez      : 1955      MRN: [id number]  Encounter Provider: Samantha Hernandez MD  Encounter Date: 2025   Encounter department: [facility] MEDICINE  :  Assessment & Plan  Medicare annual wellness visit, subsequent  Questions reviewed, labs were previously ordered in 2024 advised to obtain the same       Severe persistent asthma without complication    Orders:    albuterol (PROVENTIL HFA,VENTOLIN HFA) 90 mcg/act inhaler; Inhale 2 puffs every 6 (six) hours as needed for wheezing or shortness of breath    Primary hypertension    Orders:    amLODIPine (NORVASC) 5 mg tablet; Take 1 tablet (5 mg total) by mouth daily    Hypothyroidism, unspecified type    Orders:    levothyroxine 75 mcg tablet; Take 1 tablet (75 mcg total) by mouth daily in the early morning    TIMMY (generalized anxiety disorder)    Orders:    sertraline (ZOLOFT) 25 mg tablet; Take 1 tablet (25 mg total) by mouth daily at bedtime    Depression with anxiety      Orders:    sertraline (ZOLOFT) 25 mg tablet; Take 1 tablet (25 mg total) by mouth daily at bedtime    Chronic right-sided low back pain with right-sided sciatica    Orders:    tiZANidine (ZANAFLEX) 2 mg tablet; Take 1 tablet (2 mg total) by mouth every 8 (eight) hours as needed for muscle spasms    Sacroiliac inflammation (HCC)         Neck pain    Orders:    Diclofenac Sodium (VOLTAREN) 1 %; Apply 2 g topically 4 (four) times a day    Centrilobular emphysema (HCC)    Orders:    Budeson-Glycopyrrol-Formoterol (Breztri Aerosphere) 160-9-4.8 MCG/ACT AERO; Inhale 2 puffs 2 (two) times a day Rinse mouth after use.    albuterol (2.5 mg/3 mL) 0.083 % nebulizer solution; Take 3 mL (2.5 mg total) by nebulization every 6 (six) hours as needed for wheezing or shortness of breath    ipratropium-albuterol (DUO-NEB) 0.5-2.5 mg/3 mL nebulizer solution; Take 3 mL by nebulization every 6 (six) hours as needed for wheezing or shortness of breath       Preventive  health issues were discussed with patient, and age appropriate screening tests were ordered as noted in patient's After Visit Summary. Personalized health advice and appropriate referrals for health education or preventive services given if needed, as noted in patient's After Visit Summary.    History of Present Illness     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 laryngeal cancer in remission follows ENT, abdominal nodule , chronic constipation, lung nodule,COPD, hypoxic resp failure, home oxygen use, chronic low back pain presents for follow up   used         Patient Care Team:  Samantha Hernandez MD as PCP - General (Family Medicine)  Samantha Hernandez MD as PCP - [facility]CP-[facility] ([facility])  Samantha Hernandez MD as PCP - PCP-[facility] ([facility])  Yessenia Dumont PA-C (Hematology and Oncology)  Denise Muñoz MD as Medical Oncologist (Hematology and Oncology)    Review of Systems   Constitutional:  Negative for fatigue and fever.   HENT:  Negative for congestion, facial swelling, mouth sores, rhinorrhea, sore throat and trouble swallowing.    Eyes:  Negative for pain and redness.   Respiratory:  Negative for cough, shortness of breath and wheezing.    Cardiovascular:  Negative for chest pain, palpitations and leg swelling.   Gastrointestinal:  Negative for abdominal pain, blood in stool, constipation, diarrhea and nausea.   Genitourinary:  Negative for dysuria, hematuria and urgency.   Musculoskeletal:  Negative for arthralgias, back pain and myalgias.   Skin:  Negative for rash and wound.   Neurological:  Negative for seizures, syncope and headaches.   Hematological:  Negative for adenopathy.   Psychiatric/Behavioral:  Negative for agitation and behavioral problems.      Medical History Reviewed by provider this encounter:  Tobacco  Allergies  Meds  Problems  Med Hx  Surg Hx  Fam Hx       Annual Wellness Visit Questionnaire    Noreen is here for her Subsequent Wellness visit. Last Medicare Wellness visit information reviewed, patient interviewed, no change since last AWV.     Health Risk Assessment:   Patient rates overall health as fair. Patient feels that their physical health rating is same. Patient is satisfied with their life. Eyesight was rated as same. Hearing was rated as same. Patient feels that their emotional and mental health rating is same. Patients states they are never, rarely angry. Patient states they are sometimes unusually tired/fatigued. Pain experienced in the last 7 days has been none. Patient states that she has experienced no weight loss or gain in last 6 months.     Depression Screening:   PHQ-9 Score: 0      Fall Risk Screening:   In the past year, patient has experienced: no history of falling in past year      Urinary Incontinence Screening:   Patient has not leaked urine accidently in the last six months.     Home Safety:  Patient has trouble with stairs inside or outside of their home. Patient has working smoke alarms and has working carbon monoxide detector. Home safety hazards include: none.     Nutrition:   Current diet is Regular.     Medications:   Patient is currently taking over-the-counter supplements. OTC medications include: see medication list. Patient is able to manage medications.     Activities of Daily Living (ADLs)/Instrumental Activities of Daily Living (IADLs):   Walk and transfer into and out of bed and chair?: Yes  Dress and groom yourself?: Yes    Bathe or shower yourself?: Yes    Feed yourself? Yes  Do your laundry/housekeeping?: Yes  Manage your money, pay your bills and track your expenses?: Yes  Make your own meals?: Yes    Do your own shopping?: Yes    Previous Hospitalizations:   Any hospitalizations or ED visits within the last 12 months?: Yes    How many hospitalizations have you had in the last year?: 1-2    Advance Care Planning:   Living will: No    Durable POA for  healthcare: No      Preventive Screenings      Cardiovascular Screening:    General: Screening Not Indicated and History Lipid Disorder      Diabetes Screening:     General: Screening Current      Colorectal Cancer Screening:     General: Screening Current      Breast Cancer Screening:     General: Screening Current      Cervical Cancer Screening:    General: Screening Not Indicated      Lung Cancer Screening:     General: Screening Not Indicated and History Lung Cancer      Hepatitis C Screening:    General: Screening Current    Immunizations:  - Immunizations due: Zoster (Shingrix)    Screening, Brief Intervention, and Referral to Treatment (SBIRT)     Screening      AUDIT-C Screenin) How often did you have a drink containing alcohol in the past year? never  2) How many drinks did you have on a typical day when you were drinking in the past year? 0  3) How often did you have 6 or more drinks on one occasion in the past year? never    AUDIT-C Score: 0  Interpretation: Score 0-2 (female): Negative screen for alcohol misuse    Single Item Drug Screening:  How often have you used an illegal drug (including marijuana) or a prescription medication for non-medical reasons in the past year? never    Single Item Drug Screen Score: 0  Interpretation: Negative screen for possible drug use disorder    Social Drivers of Health     Financial Resource Strain: Medium Risk (2024)    Overall Financial Resource Strain (CARDIA)     Difficulty of Paying Living Expenses: Somewhat hard   Food Insecurity: No Food Insecurity (2025)    Nursing - Inadequate Food Risk Classification     Worried About Running Out of Food in the Last Year: Never true     Ran Out of Food in the Last Year: Never true     Ran Out of Food in the Last Year: Never true   Transportation Needs: No Transportation Needs (2025)    PRAPARE - Transportation     Lack of Transportation (Medical): No     Lack of Transportation (Non-Medical): No   Housing  "Stability: Low Risk  (6/24/2025)    Housing Stability Vital Sign     Unable to Pay for Housing in the Last Year: No     Number of Times Moved in the Last Year: 1     Homeless in the Last Year: No   Utilities: Not At Risk (6/24/2025)    [facility] Utilities     Threatened with loss of utilities: No     No results found.    Objective   BP (!) 104/48 (BP Location: Right arm, Patient Position: Sitting, Cuff Size: Large)   Pulse 80   Temp (!) 97.3 °F (36.3 °C) (Tympanic)   Resp 20   Ht 5' 4\" (1.626 m)   Wt 80.1 kg (176 lb 9.6 oz)   SpO2 95% Comment: on 2liters of O2  BMI 30.31 kg/m²     Physical Exam  Vitals and nursing note reviewed.   Constitutional:       Appearance: Normal appearance. She is well-developed.   HENT:      Head: Normocephalic and atraumatic.      Right Ear: External ear normal.      Left Ear: External ear normal.      Nose: Nose normal.      Mouth/Throat:      Pharynx: No oropharyngeal exudate.     Eyes:      General: No scleral icterus.        Right eye: No discharge.         Left eye: No discharge.      Conjunctiva/sclera: Conjunctivae normal.      Pupils: Pupils are equal, round, and reactive to light.     Neck:      Thyroid: No thyromegaly.     Cardiovascular:      Rate and Rhythm: Normal rate and regular rhythm.      Heart sounds: No murmur heard.     No gallop.   Pulmonary:      Effort: Pulmonary effort is normal. No respiratory distress.      Breath sounds: Normal breath sounds. Decreased air movement present. No wheezing or rales.     Musculoskeletal:         General: No tenderness or deformity.      Cervical back: Normal range of motion.      Right lower leg: No edema.      Left lower leg: No edema.   Lymphadenopathy:      Cervical: No cervical adenopathy.     Skin:     General: Skin is warm.      Capillary Refill: Capillary refill takes less than 2 seconds.      Findings: No erythema or rash.     Neurological:      Mental Status: She is alert and oriented to person, place, and time.      Deep " Tendon Reflexes: Reflexes normal.     Psychiatric:         Behavior: Behavior normal.         Thought Content: Thought content normal.         Judgment: Judgment normal.

## 2025-06-24 NOTE — ASSESSMENT & PLAN NOTE
Orders:    sertraline (ZOLOFT) 25 mg tablet; Take 1 tablet (25 mg total) by mouth daily at bedtime

## 2025-06-25 NOTE — ASSESSMENT & PLAN NOTE
Orders:    Budeson-Glycopyrrol-Formoterol (Breztri Aerosphere) 160-9-4.8 MCG/ACT AERO; Inhale 2 puffs 2 (two) times a day Rinse mouth after use.    albuterol (2.5 mg/3 mL) 0.083 % nebulizer solution; Take 3 mL (2.5 mg total) by nebulization every 6 (six) hours as needed for wheezing or shortness of breath    ipratropium-albuterol (DUO-NEB) 0.5-2.5 mg/3 mL nebulizer solution; Take 3 mL by nebulization every 6 (six) hours as needed for wheezing or shortness of breath

## 2025-07-02 ENCOUNTER — HOSPITAL ENCOUNTER (OUTPATIENT)
Facility: HOSPITAL | Age: 70
Discharge: HOME | End: 2025-07-02
Payer: COMMERCIAL

## 2025-07-02 VITALS — WEIGHT: 176 LBS | BODY MASS INDEX: 30.05 KG/M2 | HEIGHT: 64 IN

## 2025-07-02 DIAGNOSIS — Z12.31 ENCOUNTER FOR SCREENING MAMMOGRAM FOR BREAST CANCER: ICD-10-CM

## 2025-07-02 PROCEDURE — 77067 SCR MAMMO BI INCL CAD: CPT

## 2025-07-02 PROCEDURE — 77063 BREAST TOMOSYNTHESIS BI: CPT

## 2025-07-17 DIAGNOSIS — M54.41 CHRONIC RIGHT-SIDED LOW BACK PAIN WITH RIGHT-SIDED SCIATICA: ICD-10-CM

## 2025-07-17 DIAGNOSIS — G89.29 CHRONIC RIGHT-SIDED LOW BACK PAIN WITH RIGHT-SIDED SCIATICA: ICD-10-CM

## 2025-07-21 RX ORDER — TIZANIDINE 2 MG/1
2 TABLET ORAL EVERY 8 HOURS PRN
Qty: 90 TABLET | Refills: 1 | Status: SHIPPED | OUTPATIENT
Start: 2025-07-21

## 2025-07-25 ENCOUNTER — HOSPITAL ENCOUNTER (EMERGENCY)
Facility: HOSPITAL | Age: 70
Discharge: HOME | End: 2025-07-25
Attending: EMERGENCY MEDICINE
Payer: COMMERCIAL

## 2025-07-25 ENCOUNTER — APPOINTMENT (EMERGENCY)
Dept: CT IMAGING | Facility: HOSPITAL | Age: 70
End: 2025-07-25
Payer: COMMERCIAL

## 2025-07-25 ENCOUNTER — TELEPHONE (OUTPATIENT)
Age: 70
End: 2025-07-25

## 2025-07-25 VITALS
OXYGEN SATURATION: 97 % | DIASTOLIC BLOOD PRESSURE: 72 MMHG | HEART RATE: 80 BPM | TEMPERATURE: 98.5 F | SYSTOLIC BLOOD PRESSURE: 177 MMHG | RESPIRATION RATE: 20 BRPM

## 2025-07-25 DIAGNOSIS — R13.10 DYSPHAGIA: Primary | ICD-10-CM

## 2025-07-25 LAB
ANION GAP SERPL CALCULATED.3IONS-SCNC: 7 MMOL/L (ref 4–13)
BASOPHILS # BLD AUTO: 0.03 THOUSANDS/ÂΜL (ref 0–0.1)
BASOPHILS NFR BLD AUTO: 0 % (ref 0–1)
BUN SERPL-MCNC: 24 MG/DL (ref 5–25)
CALCIUM SERPL-MCNC: 9.3 MG/DL (ref 8.4–10.2)
CHLORIDE SERPL-SCNC: 102 MMOL/L (ref 96–108)
CO2 SERPL-SCNC: 30 MMOL/L (ref 21–32)
CREAT SERPL-MCNC: 1.11 MG/DL (ref 0.6–1.3)
EOSINOPHIL # BLD AUTO: 0.13 THOUSAND/ÂΜL (ref 0–0.61)
EOSINOPHIL NFR BLD AUTO: 1 % (ref 0–6)
ERYTHROCYTE [DISTWIDTH] IN BLOOD BY AUTOMATED COUNT: 14.4 % (ref 11.6–15.1)
FLUAV AG UPPER RESP QL IA.RAPID: NEGATIVE
FLUBV AG UPPER RESP QL IA.RAPID: NEGATIVE
GFR SERPL CREATININE-BSD FRML MDRD: 50 ML/MIN/1.73SQ M
GLUCOSE SERPL-MCNC: 106 MG/DL (ref 65–140)
HCT VFR BLD AUTO: 35.7 % (ref 34.8–46.1)
HGB BLD-MCNC: 10.5 G/DL (ref 11.5–15.4)
IMM GRANULOCYTES # BLD AUTO: 0.05 THOUSAND/UL (ref 0–0.2)
IMM GRANULOCYTES NFR BLD AUTO: 1 % (ref 0–2)
LYMPHOCYTES # BLD AUTO: 0.95 THOUSANDS/ÂΜL (ref 0.6–4.47)
LYMPHOCYTES NFR BLD AUTO: 10 % (ref 14–44)
MCH RBC QN AUTO: 26.6 PG (ref 26.8–34.3)
MCHC RBC AUTO-ENTMCNC: 29.4 G/DL (ref 31.4–37.4)
MCV RBC AUTO: 90 FL (ref 82–98)
MONOCYTES # BLD AUTO: 0.61 THOUSAND/ÂΜL (ref 0.17–1.22)
MONOCYTES NFR BLD AUTO: 7 % (ref 4–12)
NEUTROPHILS # BLD AUTO: 7.57 THOUSANDS/ÂΜL (ref 1.85–7.62)
NEUTS SEG NFR BLD AUTO: 81 % (ref 43–75)
NRBC BLD AUTO-RTO: 0 /100 WBCS
PLATELET # BLD AUTO: 182 THOUSANDS/UL (ref 149–390)
PMV BLD AUTO: 10.8 FL (ref 8.9–12.7)
POTASSIUM SERPL-SCNC: 4.5 MMOL/L (ref 3.5–5.3)
RBC # BLD AUTO: 3.95 MILLION/UL (ref 3.81–5.12)
S PYO DNA THROAT QL NAA+PROBE: NOT DETECTED
SARS-COV+SARS-COV-2 AG RESP QL IA.RAPID: NEGATIVE
SODIUM SERPL-SCNC: 139 MMOL/L (ref 135–147)
WBC # BLD AUTO: 9.34 THOUSAND/UL (ref 4.31–10.16)

## 2025-07-25 PROCEDURE — 70491 CT SOFT TISSUE NECK W/DYE: CPT

## 2025-07-25 PROCEDURE — 87804 INFLUENZA ASSAY W/OPTIC: CPT

## 2025-07-25 PROCEDURE — 85025 COMPLETE CBC W/AUTO DIFF WBC: CPT

## 2025-07-25 PROCEDURE — 96374 THER/PROPH/DIAG INJ IV PUSH: CPT

## 2025-07-25 PROCEDURE — 87811 SARS-COV-2 COVID19 W/OPTIC: CPT

## 2025-07-25 PROCEDURE — 99285 EMERGENCY DEPT VISIT HI MDM: CPT | Performed by: EMERGENCY MEDICINE

## 2025-07-25 PROCEDURE — 87651 STREP A DNA AMP PROBE: CPT

## 2025-07-25 PROCEDURE — 36415 COLL VENOUS BLD VENIPUNCTURE: CPT

## 2025-07-25 PROCEDURE — 80048 BASIC METABOLIC PNL TOTAL CA: CPT

## 2025-07-25 PROCEDURE — 99284 EMERGENCY DEPT VISIT MOD MDM: CPT

## 2025-07-25 RX ORDER — HYDROMORPHONE HCL/PF 1 MG/ML
0.5 SYRINGE (ML) INJECTION ONCE
Status: COMPLETED | OUTPATIENT
Start: 2025-07-25 | End: 2025-07-25

## 2025-07-25 RX ADMIN — HYDROMORPHONE HYDROCHLORIDE 0.5 MG: 1 INJECTION, SOLUTION INTRAMUSCULAR; INTRAVENOUS; SUBCUTANEOUS at 04:17

## 2025-07-25 RX ADMIN — IOHEXOL 85 ML: 350 INJECTION, SOLUTION INTRAVENOUS at 05:27

## 2025-07-25 NOTE — TELEPHONE ENCOUNTER
Patient is a patient of Dr Perez.  She was seen in the ED last night and given a referral for dysphagia.  Her daughter called in to schedule and appt, please call her to make an appt. Thanks.

## 2025-07-25 NOTE — DISCHARGE INSTRUCTIONS
You were seen in the Emergency Department for: Pain with swallowing.     Your workup today showed: CT of the neck showing no significant change from previous CT to explain new difficulty swallowing.  Otherwise reassuring laboratory findings.    Your next steps should include: Follow-up with otolaryngology for persistent difficulty swallowing.  Follow-up with your PCP as needed    Reasons to RETURN IMMEDIATELY to the Emergency Department: worsening symptoms, difficulty breathing, temperature > 100.4 degrees, uncontrollable nausea/vomiting, or any other concerns.

## 2025-07-25 NOTE — ED ATTENDING ATTESTATION
7/25/2025  I, Reynaldo Faustin MD, saw and evaluated the patient. I have discussed the patient with the resident/non-physician practitioner and agree with the resident's/non-physician practitioner's findings, Plan of Care, and MDM as documented in the resident's/non-physician practitioner's note, except where noted. All available labs and Radiology studies were reviewed.  I was present for key portions of any procedure(s) performed by the resident/non-physician practitioner and I was immediately available to provide assistance.       At this point I agree with the current assessment done in the Emergency Department.  I have conducted an independent evaluation of this patient a history and physical is as follows:    70-year-old female with a history of laryngeal cancer status post chemoradiation, chronically on nasal cannula oxygen presented for evaluation of left-sided neck pain, feeling of swelling and feeling of difficulty swallowing.  States it's painful to swallow but she is tolerating saliva.    She has tenderness and some induration along the left SCM on exam.  Oropharynx clear and moist.  No dysphonia.    ED Course  ED Course as of 07/25/25 0748   Fri Jul 25, 2025   0746 STREP A PCR: Not Detected     Soft tissue neck CT shows:    1.  No significant change from prior exam to explain new dysphagia.  2.  No evidence of locally recurrent or johana metastatic disease in the neck.  3.  Unchanged focal narrowing of the left internal carotid artery at the level of the epiglottis.      ----Discharge home, ENT follow-up for any persistent symptoms.    Critical Care Time  Procedures

## 2025-07-25 NOTE — ED PROVIDER NOTES
Time reflects when diagnosis was documented in both MDM as applicable and the Disposition within this note       Time User Action Codes Description Comment    7/25/2025  7:20 AM Catalina Munroe Add [R13.10] Dysphagia           ED Disposition       ED Disposition   Discharge    Condition   Stable    Date/Time   Fri Jul 25, 2025  7:47 AM    Comment   Noreen Alvarez discharge to home/self care.                   Assessment & Plan       Medical Decision Making  Amount and/or Complexity of Data Reviewed  Labs: ordered. Decision-making details documented in ED Course.  Radiology: ordered. Decision-making details documented in ED Course.    Risk  Prescription drug management.      70-year-old female with history of hypertension COPD and head and neck cancer presenting to the ED from home for dysphagia    Differential diagnoses include but are not limited to: Recurrent cancer, esophagitis, lymphadenopathy, abscess, cellulitis, esophageal stricture, esophageal web, Zenker's diverticulum, scleroderma, achalasia    On examination patient is hypertensive 186/85.  Patient is not tachypneic on my examination.  Patient is talking, airway is intact.  No pharyngeal edema, obstruction, erythema.  No obvious swelling to the jaw or neck.  Patient with tenderness to palpation over the left sternocleidomastoid.  Given pretty previous history of laryngeal cancer, I am concerned for a recurrence.  Lower concern for abscess or cellulitis given lack of fever and localized erythema.  Unlikely to be Zenker's diverticulum given patient denies halitosis.  Will order CT soft tissue neck.  Evaluate with basic labs and treat pain with Dilaudid.    See ED course for further MDM.    ED Course as of 07/25/25 0759   Fri Jul 25, 2025   0500 Hemoglobin(!): 10.5  Baseline   0629 Pain improved on reassessment.  Patient's son who works at the hospital is present at the bedside.  Son at bedside states he had strep throat 1 to 2 weeks ago.  Strep test ordered.   Gave patient water to assess for improvement in dysphagia.  Currently with decreased pain while swallowing and not requesting more pain medication at this time.  Awaiting CT soft tissue report.   0716 CT soft tissue neck with contrast  IMPRESSION:     1.  No significant change from prior exam to explain new dysphagia.  2.  No evidence of locally recurrent or johana metastatic disease in the neck.  3.  Unchanged focal narrowing of the left internal carotid artery at the level of the epiglottis.     0744 Strep A PCR  Negative   0744 Discussed reassuring labs and imaging with patient.  Patient should follow-up with ENT for further workup of her dysphagia.  Patient may use Tylenol at home for pain control.  Strict return precautions discussed.  Patient agreeable and stable for discharge.       Medications   HYDROmorphone (DILAUDID) injection 0.5 mg (0.5 mg Intravenous Given 7/25/25 0417)   iohexol (OMNIPAQUE) 350 MG/ML injection (MULTI-DOSE) 85 mL (85 mL Intravenous Given 7/25/25 0575)       ED Risk Strat Scores                    No data recorded        SBIRT 22yo+      Flowsheet Row Most Recent Value   Initial Alcohol Screen: US AUDIT-C     1. How often do you have a drink containing alcohol? 0 Filed at: 07/25/2025 0423   2. How many drinks containing alcohol do you have on a typical day you are drinking?  0 Filed at: 07/25/2025 0423   3b. FEMALE Any Age, or MALE 65+: How often do you have 4 or more drinks on one occassion? 0 Filed at: 07/25/2025 0423   Audit-C Score 0 Filed at: 07/25/2025 0423   KVNG: How many times in the past year have you...    Used an illegal drug or used a prescription medication for non-medical reasons? Never Filed at: 07/25/2025 0423                            History of Present Illness       Chief Complaint   Patient presents with    Sore Throat     Pt reports throat pain starting yesterday. Worsening today and now pain is so severe pt cannot swallow anything included her own saliva. Hx  esophageal cancer in 2014. On 2 liter o2 chronically but currently 4 liters in triage to maintain > 90%. Denies SOB/ troubling breathing/ cp. Pt reports chills and HA. Unsure about fevers.       Past Medical History[1]   Past Surgical History[2]   Family History[3]   Social History[4]   E-Cigarette/Vaping    E-Cigarette Use Never User       E-Cigarette/Vaping Substances    Nicotine No     THC No     CBD No       I have reviewed and agree with the history as documented.       History provided by:  Patient   used: Yes ([id number])        70-year-old female with history of hypertension COPD and head and neck cancer presenting to the ED from home for dysphagia.  Patient states she has increased difficulty swallowing for the last 2 days.  Progressed from difficulty swallowing solids to now difficulty swallowing liquids and her saliva.  She indicates pain extending from her ear to her left clavicle.  He denies history of similar symptoms.  Denies difficulty breathing.  Denies explicit foul breath.  Denies cough productive of blood or phlegm.  Denies nausea or vomiting.  Denies abdominal pain or chest pain.  Patient was treated for her head and neck cancer in 2016 with radiation and chemotherapy without evidence of recurrence.  CAT scan performed in April 2025 showed increase in the size of a right upper lobe mass with a nodular component.    Review of Systems   Constitutional:  Negative for chills and fever.   HENT:  Positive for sore throat, trouble swallowing and voice change. Negative for drooling and ear pain.         Negative for halitosis   Eyes:  Negative for pain and visual disturbance.   Respiratory:  Negative for cough and shortness of breath.    Cardiovascular:  Negative for chest pain and palpitations.   Gastrointestinal:  Negative for abdominal pain, nausea and vomiting.   Genitourinary:  Negative for dysuria and hematuria.   Musculoskeletal:  Positive for neck pain. Negative for arthralgias  and back pain.   Skin:  Negative for color change and rash.   Neurological:  Negative for dizziness, syncope, weakness and numbness.   All other systems reviewed and are negative.          Objective       ED Triage Vitals   Temperature Pulse Blood Pressure Respirations SpO2 Patient Position - Orthostatic VS   07/25/25 0334 07/25/25 0334 07/25/25 0334 07/25/25 0334 07/25/25 0334 07/25/25 0415   98.5 °F (36.9 °C) (!) 108 (!) 186/85 (!) 24 95 % Sitting      Temp Source Heart Rate Source BP Location FiO2 (%) Pain Score    07/25/25 0334 07/25/25 0334 07/25/25 0334 -- 07/25/25 0417    Oral Monitor Right arm  9      Vitals      Date and Time Temp Pulse SpO2 Resp BP Pain Score FACES Pain Rating User   07/25/25 0600 -- 80 97 % -- 177/72 -- -- MM   07/25/25 0423 -- 85 95 % -- -- -- -- CG   07/25/25 0417 -- -- -- -- -- 9 -- CG   07/25/25 0415 -- 84 98 % 20 209/95 -- -- CG   07/25/25 0414 -- 83 -- -- -- -- -- AR   07/25/25 0355 -- 83 -- -- -- -- -- AR   07/25/25 0334 98.5 °F (36.9 °C) 108 95 % 24 186/85 -- --             Physical Exam  Vitals and nursing note reviewed.   Constitutional:       General: She is not in acute distress.     Appearance: She is well-developed.   HENT:      Head: Normocephalic and atraumatic.      Right Ear: Tympanic membrane normal.      Left Ear: Tympanic membrane normal.      Mouth/Throat:      Mouth: Mucous membranes are moist.      Pharynx: Oropharynx is clear. No oropharyngeal exudate or posterior oropharyngeal erythema.     Eyes:      Conjunctiva/sclera: Conjunctivae normal.     Neck:      Comments: Tenderness to palpation over the sternocleidomastoid.  No lymphadenopathy.  scar over midline trachea.  No masses.  No erythema or fluctuance.  No pharyngeal edema.  Mallampati 2  Cardiovascular:      Rate and Rhythm: Normal rate and regular rhythm.      Heart sounds: No murmur heard.  Pulmonary:      Effort: Pulmonary effort is normal. No respiratory distress.      Comments: Decreased breath sounds  bilaterally  Abdominal:      Palpations: Abdomen is soft.      Tenderness: There is no abdominal tenderness.     Musculoskeletal:         General: No swelling.      Cervical back: Neck supple.     Skin:     General: Skin is warm and dry.      Capillary Refill: Capillary refill takes less than 2 seconds.     Neurological:      Mental Status: She is alert.     Psychiatric:         Mood and Affect: Mood normal.         Results Reviewed       Procedure Component Value Units Date/Time    Strep A PCR [[id number]]  (Normal) Collected: 07/25/25 0627    Lab Status: Final result Specimen: Throat Updated: 07/25/25 0744     STREP A PCR Not Detected    Basic metabolic panel [[id number]] Collected: 07/25/25 0415    Lab Status: Final result Specimen: Blood from Arm, Left Updated: 07/25/25 0449     Sodium 139 mmol/L      Potassium 4.5 mmol/L      Chloride 102 mmol/L      CO2 30 mmol/L      ANION GAP 7 mmol/L      BUN 24 mg/dL      Creatinine 1.11 mg/dL      Glucose 106 mg/dL      Calcium 9.3 mg/dL      eGFR 50 ml/min/1.73sq m     Narrative:      National Kidney Disease Foundation guidelines for Chronic Kidney Disease (CKD):     Stage 1 with normal or high GFR (GFR > 90 mL/min/1.73 square meters)    Stage 2 Mild CKD (GFR = 60-89 mL/min/1.73 square meters)    Stage 3A Moderate CKD (GFR = 45-59 mL/min/1.73 square meters)    Stage 3B Moderate CKD (GFR = 30-44 mL/min/1.73 square meters)    Stage 4 Severe CKD (GFR = 15-29 mL/min/1.73 square meters)    Stage 5 End Stage CKD (GFR <15 mL/min/1.73 square meters)  Note: GFR calculation is accurate only with a steady state creatinine    CBC and differential [[id number]]  (Abnormal) Collected: 07/25/25 0415    Lab Status: Final result Specimen: Blood from Arm, Left Updated: 07/25/25 0433     WBC 9.34 Thousand/uL      RBC 3.95 Million/uL      Hemoglobin 10.5 g/dL      Hematocrit 35.7 %      MCV 90 fL      MCH 26.6 pg      MCHC 29.4 g/dL      RDW 14.4 %      MPV 10.8 fL      Platelets 182  Thousands/uL      nRBC 0 /100 WBCs      Segmented % 81 %      Immature Grans % 1 %      Lymphocytes % 10 %      Monocytes % 7 %      Eosinophils Relative 1 %      Basophils Relative 0 %      Absolute Neutrophils 7.57 Thousands/µL      Absolute Immature Grans 0.05 Thousand/uL      Absolute Lymphocytes 0.95 Thousands/µL      Absolute Monocytes 0.61 Thousand/µL      Eosinophils Absolute 0.13 Thousand/µL      Basophils Absolute 0.03 Thousands/µL     FLU/COVID Rapid Antigen (30 min. TAT) - Preferred screening test in ED [[id number]]  (Normal) Collected: 07/25/25 0344    Lab Status: Final result Specimen: Nares from Nose Updated: 07/25/25 0414     SARS COV Rapid Antigen Negative     Influenza A Rapid Antigen Negative     Influenza B Rapid Antigen Negative    Narrative:      This test has been performed using the [employer] 2 FLU+SARS Antigen test under the Emergency Use Authorization (EUA). This test has been validated by the  and verified by the performing laboratory. The Hope uses lateral flow immunofluorescent sandwich assay to detect SARS-COV, Influenza A and Influenza B Antigen.     The [employer] [facility] 2 SARS Antigen test does not differentiate between SARS-CoV and SARS-CoV-2.     Negative results are presumptive and may be confirmed with a molecular assay, if necessary, for patient management. Negative results do not rule out SARS-CoV-2 or influenza infection and should not be used as the sole basis for treatment or patient management decisions. A negative test result may occur if the level of antigen in a sample is below the limit of detection of this test.     Positive results are indicative of the presence of viral antigens, but do not rule out bacterial infection or co-infection with other viruses.     All test results should be used as an adjunct to clinical observations and other information available to the provider.    FOR PEDIATRIC PATIENTS - copy/paste COVID Guidelines URL to browser:  [email or web address]            CT soft tissue neck with contrast   Final Interpretation by Eliud Sinha MD (07/25 0715)      1.  No significant change from prior exam to explain new dysphagia.   2.  No evidence of locally recurrent or johana metastatic disease in the neck.   3.  Unchanged focal narrowing of the left internal carotid artery at the level of the epiglottis.            Workstation performed: [id number]             Procedures    ED Medication and Procedure Management   Prior to Admission Medications   Prescriptions Last Dose Informant Patient Reported? Taking?   Budeson-Glycopyrrol-Formoterol (Breztri Aerosphere) 160-9-4.8 MCG/ACT AERO   No No   Sig: Inhale 2 puffs 2 (two) times a day Rinse mouth after use.   Cholecalciferol (VITAMIN D3) 1,000 units tablet  Family Member, Self Yes No   Sig: Take 1,000 Units by mouth in the morning.   Diclofenac Sodium (VOLTAREN) 1 %   No No   Sig: Apply 2 g topically 4 (four) times a day   acetaminophen (TYLENOL) 325 mg tablet  Family Member, Self No No   Sig: Take 2 tablets (650 mg total) by mouth every 6 (six) hours as needed for mild pain, moderate pain, headaches or fever   albuterol (2.5 mg/3 mL) 0.083 % nebulizer solution   No No   Sig: Take 3 mL (2.5 mg total) by nebulization every 6 (six) hours as needed for wheezing or shortness of breath   albuterol (PROVENTIL HFA,VENTOLIN HFA) 90 mcg/act inhaler   No No   Sig: Inhale 2 puffs every 6 (six) hours as needed for wheezing or shortness of breath   amLODIPine (NORVASC) 5 mg tablet   No No   Sig: Take 1 tablet (5 mg total) by mouth daily   atorvastatin (LIPITOR) 40 mg tablet  Family Member, Self No No   Sig: Take 1 tablet (40 mg total) by mouth daily with dinner   azithromycin (ZITHROMAX) 250 mg tablet  Self No No   Sig: Take 1 tablet (250 mg total) by mouth 3 (three) times a week Clarification: 250 mg tablet every other day to prevent COPD exacerbations    benzonatate (TESSALON) 200 MG capsule  Family Member, Self No No   Sig: Take 1 capsule (200 mg total) by mouth 3 (three) times a day as needed for cough   brimonidine tartrate 0.2 % ophthalmic solution  Family Member, Self Yes No   Sig: Administer 1 drop to both eyes in the morning and 1 drop in the evening and 1 drop before bedtime.   clindamycin 1 % gel  Family Member, Self No No   Sig: Apply topically 2 (two) times a day   cyanocobalamin (VITAMIN B-12) 500 MCG tablet  Family Member, Self No No   Sig: Take 1 tablet (500 mcg total) by mouth daily   famotidine (PEPCID) 20 mg tablet  Family Member, Self No No   Sig: Take 1 tablet (20 mg total) by mouth daily at bedtime   fluticasone (FLONASE) 50 mcg/act nasal spray  Family Member, Self No No   Sig: SPRAY 1 SPRAY INTO EACH NOSTRIL EVERY DAY   ipratropium (ATROVENT) 0.06 % nasal spray  Family Member, Self No No   Si SPRAYS INTO EACH NOSTRIL 3 (THREE) TIMES A DAY FOR INCREASED NASAL SECRETION   ipratropium-albuterol (DUO-NEB) 0.5-2.5 mg/3 mL nebulizer solution   No No   Sig: Take 3 mL by nebulization every 6 (six) hours as needed for wheezing or shortness of breath   levothyroxine 75 mcg tablet   No No   Sig: Take 1 tablet (75 mcg total) by mouth daily in the early morning   montelukast (SINGULAIR) 10 mg tablet  Family Member, Self No No   Sig: Take 1 tablet (10 mg total) by mouth daily at bedtime   omeprazole (PriLOSEC) 40 MG capsule  Family Member, Self No No   Sig: Take 1 capsule (40 mg total) by mouth 2 (two) times a day   polyethylene glycol (GLYCOLAX) 17 GM/SCOOP powder  Family Member, Self No No   Sig: Drink 1 scoop with 8 oz of water once daily   senna (SENOKOT) 8.6 mg  Family Member, Self No No   Sig: Take 1 tablet (8.6 mg total) by mouth 2 (two) times a day   sertraline (ZOLOFT) 25 mg tablet   No No   Sig: Take 1 tablet (25 mg total) by mouth daily at bedtime   tiZANidine (ZANAFLEX) 2 mg tablet   No No   Sig: TAKE 1 TABLET BY MOUTH EVERY 8 HOURS AS  NEEDED FOR MUSCLE SPASMS.   timolol (TIMOPTIC) 0.5 % ophthalmic solution  Family Member, Self Yes No      Facility-Administered Medications: None     Patient's Medications   Discharge Prescriptions    No medications on file       ED SEPSIS DOCUMENTATION   Time reflects when diagnosis was documented in both MDM as applicable and the Disposition within this note       Time User Action Codes Description Comment    7/25/2025  7:20 AM Catalina Munroe [R13.10] Dysphagia                      [1]   Past Medical History:  Diagnosis Date    Acute kidney failure (HCC)     Allergic     Allergies     Anemia     Anxiety     Asthma     Cancer (HCC)     Cataract     Chronic kidney disease (CKD), stage III (moderate) (HCC)     COPD (chronic obstructive pulmonary disease) (HCC)     COVID-19     Covid + 7-5-90-cough at that time now fully resolved    Depression     Disease of thyroid gland     Essential hypertension     GERD (gastroesophageal reflux disease)     Glaucoma     High blood pressure     HTN (hypertension) 02/16/2021    Hyperlipidemia     Hypothyroidism     Memory loss     Mesenteric lymphadenopathy 07/13/2021    Pulmonary hypertension (HCC)     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     NV TENDON SHEATH INCISION Right 02/16/2021    Procedure: THUMB TRIGGER FINGER RELEASE;  Surgeon: Angeles Denton MD;  Location: [facility];  Service: Orthopedics    TUBAL LIGATION     [3]   Family History  Problem Relation Name Age of Onset    Other Mother Kamilla zamudio         respiratory disorder    Asthma Mother Kamilla zamudio     COPD Mother Kamilla zamudio     Depression Mother Kamilla zamudio     Sudden death Father          shot himself    Suicidality Father      Brain  cancer Sister Maryanne franco     Diabetes Sister Maryanne franco     Breast cancer Sister Maryanne franco     Cancer Sister Mercedez Taylor        pancreatic cancer    No Known Problems Sister      No Known Problems Sister      No Known Problems Sister      No Known Problems Sister      No Known Problems Sister      No Known Problems Sister      No Known Problems Daughter      No Known Problems Daughter      No Known Problems Maternal Grandmother      No Known Problems Maternal Grandfather      No Known Problems Paternal Grandmother      No Known Problems Paternal Grandfather      No Known Problems Maternal Aunt      No Known Problems Maternal Aunt      No Known Problems Maternal Aunt      No Known Problems Maternal Aunt      No Known Problems Maternal Aunt      No Known Problems Paternal Aunt      No Known Problems Paternal Aunt      No Known Problems Paternal Aunt      No Known Problems Paternal Aunt      No Known Problems Son     [4]   Social History  Tobacco Use    Smoking status: Former     Current packs/day: 0.00     Average packs/day: 1 pack/day for 40.0 years (40.0 ttl pk-yrs)     Types: Cigarettes     Start date: 1974     Quit date: 2014     Years since quittin.5    Smokeless tobacco: Never   Vaping Use    Vaping status: Never Used   Substance Use Topics    Alcohol use: Never     Comment: None    Drug use: Never     Comment: Raghav Munroe MD  [phone]

## 2025-07-28 ENCOUNTER — APPOINTMENT (OUTPATIENT)
Dept: LAB | Facility: HOSPITAL | Age: 70
End: 2025-07-28
Attending: FAMILY MEDICINE
Payer: COMMERCIAL

## 2025-07-28 ENCOUNTER — OFFICE VISIT (OUTPATIENT)
Dept: OBGYN CLINIC | Facility: CLINIC | Age: 70
End: 2025-07-28
Payer: COMMERCIAL

## 2025-07-28 VITALS — HEIGHT: 64 IN | WEIGHT: 176 LBS | BODY MASS INDEX: 30.05 KG/M2

## 2025-07-28 DIAGNOSIS — Z99.81 CHRONIC RESPIRATORY FAILURE WITH HYPOXIA, ON HOME O2 THERAPY  (HCC): ICD-10-CM

## 2025-07-28 DIAGNOSIS — Z85.21 HISTORY OF LARYNGEAL CANCER: ICD-10-CM

## 2025-07-28 DIAGNOSIS — Z75.8 SPANISH LANGUAGE INTERPRETER NEEDED: ICD-10-CM

## 2025-07-28 DIAGNOSIS — R91.1 LUNG NODULE: Primary | ICD-10-CM

## 2025-07-28 DIAGNOSIS — Z13.1 ENCOUNTER FOR SCREENING FOR DIABETES MELLITUS: ICD-10-CM

## 2025-07-28 DIAGNOSIS — R91.8 MULTIPLE LUNG NODULES: ICD-10-CM

## 2025-07-28 DIAGNOSIS — R91.1 LUNG NODULE: ICD-10-CM

## 2025-07-28 DIAGNOSIS — M75.01 ADHESIVE CAPSULITIS OF RIGHT SHOULDER: ICD-10-CM

## 2025-07-28 DIAGNOSIS — M54.50 CHRONIC RIGHT-SIDED LOW BACK PAIN, UNSPECIFIED WHETHER SCIATICA PRESENT: ICD-10-CM

## 2025-07-28 DIAGNOSIS — G89.29 CHRONIC RIGHT-SIDED LOW BACK PAIN, UNSPECIFIED WHETHER SCIATICA PRESENT: ICD-10-CM

## 2025-07-28 DIAGNOSIS — M75.02 ADHESIVE CAPSULITIS OF LEFT SHOULDER: Primary | ICD-10-CM

## 2025-07-28 DIAGNOSIS — D50.8 IRON DEFICIENCY ANEMIA DUE TO DIETARY CAUSES: ICD-10-CM

## 2025-07-28 DIAGNOSIS — J96.11 CHRONIC RESPIRATORY FAILURE WITH HYPOXIA, ON HOME O2 THERAPY  (HCC): ICD-10-CM

## 2025-07-28 DIAGNOSIS — R53.82 CHRONIC FATIGUE, UNSPECIFIED: ICD-10-CM

## 2025-07-28 DIAGNOSIS — E78.2 MIXED HYPERLIPIDEMIA: ICD-10-CM

## 2025-07-28 LAB
ALBUMIN SERPL BCG-MCNC: 4.3 G/DL (ref 3.5–5)
ALP SERPL-CCNC: 129 U/L (ref 34–104)
ALT SERPL W P-5'-P-CCNC: 9 U/L (ref 7–52)
ANION GAP SERPL CALCULATED.3IONS-SCNC: 8 MMOL/L (ref 4–13)
AST SERPL W P-5'-P-CCNC: 17 U/L (ref 13–39)
BASOPHILS # BLD AUTO: 0.03 THOUSANDS/ÂΜL (ref 0–0.1)
BASOPHILS NFR BLD AUTO: 0 % (ref 0–1)
BILIRUB SERPL-MCNC: 0.3 MG/DL (ref 0.2–1)
BUN SERPL-MCNC: 27 MG/DL (ref 5–25)
CALCIUM SERPL-MCNC: 9.9 MG/DL (ref 8.4–10.2)
CHLORIDE SERPL-SCNC: 100 MMOL/L (ref 96–108)
CHOLEST SERPL-MCNC: 228 MG/DL (ref ?–200)
CO2 SERPL-SCNC: 36 MMOL/L (ref 21–32)
CREAT SERPL-MCNC: 1.4 MG/DL (ref 0.6–1.3)
EOSINOPHIL # BLD AUTO: 0.15 THOUSAND/ÂΜL (ref 0–0.61)
EOSINOPHIL NFR BLD AUTO: 2 % (ref 0–6)
ERYTHROCYTE [DISTWIDTH] IN BLOOD BY AUTOMATED COUNT: 14.2 % (ref 11.6–15.1)
EST. AVERAGE GLUCOSE BLD GHB EST-MCNC: 120 MG/DL
FERRITIN SERPL-MCNC: 40 NG/ML (ref 30–307)
GFR SERPL CREATININE-BSD FRML MDRD: 38 ML/MIN/1.73SQ M
GLUCOSE P FAST SERPL-MCNC: 107 MG/DL (ref 65–99)
HBA1C MFR BLD: 5.8 %
HCT VFR BLD AUTO: 34.6 % (ref 34.8–46.1)
HDLC SERPL-MCNC: 91 MG/DL
HGB BLD-MCNC: 10.1 G/DL (ref 11.5–15.4)
IMM GRANULOCYTES # BLD AUTO: 0.02 THOUSAND/UL (ref 0–0.2)
IMM GRANULOCYTES NFR BLD AUTO: 0 % (ref 0–2)
IRON SATN MFR SERPL: 16 % (ref 15–50)
IRON SERPL-MCNC: 59 UG/DL (ref 50–212)
LDLC SERPL CALC-MCNC: 120 MG/DL (ref 0–100)
LYMPHOCYTES # BLD AUTO: 1.09 THOUSANDS/ÂΜL (ref 0.6–4.47)
LYMPHOCYTES NFR BLD AUTO: 15 % (ref 14–44)
MCH RBC QN AUTO: 26 PG (ref 26.8–34.3)
MCHC RBC AUTO-ENTMCNC: 29.2 G/DL (ref 31.4–37.4)
MCV RBC AUTO: 89 FL (ref 82–98)
MONOCYTES # BLD AUTO: 0.56 THOUSAND/ÂΜL (ref 0.17–1.22)
MONOCYTES NFR BLD AUTO: 8 % (ref 4–12)
NEUTROPHILS # BLD AUTO: 5.34 THOUSANDS/ÂΜL (ref 1.85–7.62)
NEUTS SEG NFR BLD AUTO: 75 % (ref 43–75)
NONHDLC SERPL-MCNC: 137 MG/DL
NRBC BLD AUTO-RTO: 0 /100 WBCS
PLATELET # BLD AUTO: 195 THOUSANDS/UL (ref 149–390)
PMV BLD AUTO: 10.3 FL (ref 8.9–12.7)
POTASSIUM SERPL-SCNC: 4.4 MMOL/L (ref 3.5–5.3)
PROT SERPL-MCNC: 7 G/DL (ref 6.4–8.4)
RBC # BLD AUTO: 3.89 MILLION/UL (ref 3.81–5.12)
SODIUM SERPL-SCNC: 144 MMOL/L (ref 135–147)
T4 FREE SERPL-MCNC: 0.92 NG/DL (ref 0.61–1.12)
TIBC SERPL-MCNC: 368.2 UG/DL (ref 250–450)
TRANSFERRIN SERPL-MCNC: 263 MG/DL (ref 203–362)
TRIGL SERPL-MCNC: 85 MG/DL (ref ?–150)
TSH SERPL DL<=0.05 MIU/L-ACNC: 11.82 UIU/ML (ref 0.45–4.5)
UIBC SERPL-MCNC: 309 UG/DL (ref 155–355)
WBC # BLD AUTO: 7.19 THOUSAND/UL (ref 4.31–10.16)

## 2025-07-28 PROCEDURE — 80061 LIPID PANEL: CPT

## 2025-07-28 PROCEDURE — 83540 ASSAY OF IRON: CPT

## 2025-07-28 PROCEDURE — 85025 COMPLETE CBC W/AUTO DIFF WBC: CPT

## 2025-07-28 PROCEDURE — 36415 COLL VENOUS BLD VENIPUNCTURE: CPT

## 2025-07-28 PROCEDURE — 80053 COMPREHEN METABOLIC PANEL: CPT

## 2025-07-28 PROCEDURE — 82728 ASSAY OF FERRITIN: CPT

## 2025-07-28 PROCEDURE — 83036 HEMOGLOBIN GLYCOSYLATED A1C: CPT

## 2025-07-28 PROCEDURE — 84443 ASSAY THYROID STIM HORMONE: CPT

## 2025-07-28 PROCEDURE — 84439 ASSAY OF FREE THYROXINE: CPT

## 2025-07-28 PROCEDURE — 99214 OFFICE O/P EST MOD 30 MIN: CPT | Performed by: STUDENT IN AN ORGANIZED HEALTH CARE EDUCATION/TRAINING PROGRAM

## 2025-07-28 PROCEDURE — 83550 IRON BINDING TEST: CPT

## 2025-07-29 ENCOUNTER — TELEPHONE (OUTPATIENT)
Dept: HEMATOLOGY ONCOLOGY | Facility: CLINIC | Age: 70
End: 2025-07-29

## 2025-07-29 ENCOUNTER — TELEPHONE (OUTPATIENT)
Age: 70
End: 2025-07-29

## 2025-07-30 ENCOUNTER — TELEPHONE (OUTPATIENT)
Dept: FAMILY MEDICINE CLINIC | Facility: CLINIC | Age: 70
End: 2025-07-30

## 2025-07-30 DIAGNOSIS — E03.9 HYPOTHYROIDISM, UNSPECIFIED TYPE: Primary | ICD-10-CM

## 2025-07-30 RX ORDER — LEVOTHYROXINE SODIUM 88 UG/1
88 TABLET ORAL
Qty: 90 TABLET | Refills: 1 | Status: SHIPPED | OUTPATIENT
Start: 2025-07-30

## 2025-08-14 ENCOUNTER — CONSULT (OUTPATIENT)
Dept: OTOLARYNGOLOGY | Facility: CLINIC | Age: 70
End: 2025-08-14

## 2025-08-14 DIAGNOSIS — E66.09 CLASS 1 OBESITY DUE TO EXCESS CALORIES WITH SERIOUS COMORBIDITY AND BODY MASS INDEX (BMI) OF 32.0 TO 32.9 IN ADULT: ICD-10-CM

## 2025-08-14 DIAGNOSIS — R09.81 CHRONIC NASAL CONGESTION: Primary | ICD-10-CM

## 2025-08-14 DIAGNOSIS — Z85.21 HISTORY OF LARYNGEAL CANCER: ICD-10-CM

## 2025-08-14 DIAGNOSIS — H91.90 HEARING LOSS, UNSPECIFIED HEARING LOSS TYPE, UNSPECIFIED LATERALITY: ICD-10-CM

## 2025-08-14 DIAGNOSIS — J34.3 HYPERTROPHY OF BOTH INFERIOR NASAL TURBINATES: ICD-10-CM

## 2025-08-14 DIAGNOSIS — E66.811 CLASS 1 OBESITY DUE TO EXCESS CALORIES WITH SERIOUS COMORBIDITY AND BODY MASS INDEX (BMI) OF 32.0 TO 32.9 IN ADULT: ICD-10-CM

## 2025-08-14 DIAGNOSIS — J34.2 DEVIATED NASAL SEPTUM: ICD-10-CM

## 2025-08-14 DIAGNOSIS — Z92.3 HISTORY OF RADIATION THERAPY: ICD-10-CM

## 2025-08-14 DIAGNOSIS — J34.89 OBSTRUCTION OF NASAL VALVE: ICD-10-CM

## 2025-08-14 DIAGNOSIS — Z99.81 CHRONIC RESPIRATORY FAILURE WITH HYPOXIA, ON HOME O2 THERAPY  (HCC): ICD-10-CM

## 2025-08-14 DIAGNOSIS — J96.11 CHRONIC RESPIRATORY FAILURE WITH HYPOXIA, ON HOME O2 THERAPY  (HCC): ICD-10-CM

## 2025-08-14 DIAGNOSIS — N18.30 STAGE 3 CHRONIC KIDNEY DISEASE, UNSPECIFIED WHETHER STAGE 3A OR 3B CKD (HCC): ICD-10-CM

## 2025-08-14 DIAGNOSIS — R91.8 MULTIPLE LUNG NODULES: ICD-10-CM

## 2025-08-15 ENCOUNTER — TELEPHONE (OUTPATIENT)
Dept: HEMATOLOGY ONCOLOGY | Facility: CLINIC | Age: 70
End: 2025-08-15

## (undated) DEVICE — GAUZE SPONGES,16 PLY: Brand: CURITY

## (undated) DEVICE — DISPOSABLE EQUIPMENT COVER: Brand: SMALL TOWEL DRAPE

## (undated) DEVICE — CURITY STRETCH BANDAGE: Brand: CURITY

## (undated) DEVICE — INTENDED FOR TISSUE SEPARATION, AND OTHER PROCEDURES THAT REQUIRE A SHARP SURGICAL BLADE TO PUNCTURE OR CUT.: Brand: BARD-PARKER ® CARBON RIB-BACK BLADES

## (undated) DEVICE — STERILE BETHLEHEM PLASTIC HAND: Brand: CARDINAL HEALTH

## (undated) DEVICE — GLOVE INDICATOR PI UNDERGLOVE SZ 7 BLUE

## (undated) DEVICE — GLOVE INDICATOR PI UNDERGLOVE SZ 6.5 BLUE

## (undated) DEVICE — MINI BLADE ROUND TIP SHARP ON ONE SIDE

## (undated) DEVICE — GLOVE SRG BIOGEL 6.5

## (undated) DEVICE — CHLORAPREP HI-LITE 26ML ORANGE

## (undated) DEVICE — IMPERVIOUS STOCKINETTE: Brand: DEROYAL

## (undated) DEVICE — CUFF TOURNIQUET 18 X 4 IN QUICK CONNECT DISP 1 BLADDER

## (undated) DEVICE — SPONGE PVP SCRUB WING STERILE

## (undated) DEVICE — OCCLUSIVE GAUZE STRIP,3% BISMUTH TRIBROMOPHENATE IN PETROLATUM BLEND: Brand: XEROFORM

## (undated) DEVICE — GLOVE SRG BIOGEL 7

## (undated) DEVICE — LIGHT HANDLE COVER SLEEVE DISP BLUE STELLAR

## (undated) DEVICE — ACE WRAP 4 IN STERILE

## (undated) DEVICE — NEEDLE 25G X 1 1/2